# Patient Record
Sex: FEMALE | Race: WHITE | NOT HISPANIC OR LATINO | URBAN - METROPOLITAN AREA
[De-identification: names, ages, dates, MRNs, and addresses within clinical notes are randomized per-mention and may not be internally consistent; named-entity substitution may affect disease eponyms.]

---

## 2015-07-27 RX ORDER — DIAZEPAM 5 MG
1 TABLET ORAL
Qty: 0 | Refills: 0 | DISCHARGE
Start: 2015-07-27

## 2015-07-28 RX ORDER — RILUZOLE 50 MG/1
1 TABLET ORAL
Qty: 0 | Refills: 0 | DISCHARGE
Start: 2015-07-28

## 2017-04-04 ENCOUNTER — INPATIENT (INPATIENT)
Facility: HOSPITAL | Age: 62
LOS: 5 days | Discharge: HOME | DRG: 207 | End: 2017-04-10
Attending: GENERAL ACUTE CARE HOSPITAL | Admitting: GENERAL ACUTE CARE HOSPITAL
Payer: COMMERCIAL

## 2017-04-04 DIAGNOSIS — Z43.1 ENCOUNTER FOR ATTENTION TO GASTROSTOMY: Chronic | ICD-10-CM

## 2017-04-04 DIAGNOSIS — Z93.1 GASTROSTOMY STATUS: Chronic | ICD-10-CM

## 2017-04-04 LAB — GAS PNL BLDV: SIGNIFICANT CHANGE UP

## 2017-04-04 PROCEDURE — 93010 ELECTROCARDIOGRAM REPORT: CPT

## 2017-04-04 PROCEDURE — 99285 EMERGENCY DEPT VISIT HI MDM: CPT | Mod: 25

## 2017-04-04 RX ORDER — SODIUM CHLORIDE 9 MG/ML
1000 INJECTION INTRAMUSCULAR; INTRAVENOUS; SUBCUTANEOUS ONCE
Qty: 0 | Refills: 0 | Status: COMPLETED | OUTPATIENT
Start: 2017-04-04 | End: 2017-04-04

## 2017-04-04 NOTE — ED PROVIDER NOTE - OBJECTIVE STATEMENT
61 y.o. female ALS, vent dependent since 2015, recently admitted at [facility] for a PNA, discharged recently home on Augmentin.  Brought in by ambulance today after it was noticed she was having some episodes of hypoxia at home today.  Dr Newman made a house call today to assess the vent, tried bagging her to see if stats would improve but they did not.  According to EMS pt has had 100 % pulse ox during transport.  Pt is non verbal at baseline, according to  she is cognitively there but can not communicate.  This history was obtained from the .

## 2017-04-04 NOTE — ED PROVIDER NOTE - MEDICAL DECISION MAKING DETAILS
Susy: 61 year old female recently discharged from hospital for pna. Patient with trach on ventilator. Patient desaturating at home. febrile here. will get labs, cxr, tylenol, r/o pna, iv abx, admit to rcu.

## 2017-04-04 NOTE — ED ADULT NURSE NOTE - OBJECTIVE STATEMENT
60 y/o female hx of ALS presenting to the ED via EMS for low SPO2 % at home as per EMS; Per EMS patient recently discharged from [facility] for pneumonia;  Patient arrived to the ED with a tracheostomy; SPO2% 97% on arrival; Respiratory therapist at bedside; Suctioned by respiratory therapist; Patient febrile rectally 100.4; Patient nonverbal; Spontaneous eye opening; Positive PEERLA; Patient in no acute respiratory distress at this time; No retractions noted; No JVD noted; Patient responsive to verbal stimuli; Stage one pressure ulcer located on sacrum; safety and comfort measures provided;  at bedside 62 y/o female hx of ALS presenting to the ED via EMS for low SPO2 % at home as per EMS; Per EMS patient recently discharged from [facility] for pneumonia;  Patient arrived to the ED with a tracheostomy; SPO2% 97% on arrival; Respiratory therapist at bedside; Suctioned by respiratory therapist; Patient also has PEG tube on left side; Patient febrile rectally 100.4; Patient nonverbal; Spontaneous eye opening; Positive PEERLA; Patient in no acute respiratory distress at this time; No retractions noted; No JVD noted; Patient responsive to verbal stimuli; Stage one pressure ulcer located on sacrum; safety and comfort measures provided;  at bedside

## 2017-04-05 VITALS
DIASTOLIC BLOOD PRESSURE: 79 MMHG | SYSTOLIC BLOOD PRESSURE: 111 MMHG | HEART RATE: 86 BPM | RESPIRATION RATE: 16 BRPM | OXYGEN SATURATION: 94 % | TEMPERATURE: 100 F

## 2017-04-05 DIAGNOSIS — F41.9 ANXIETY DISORDER, UNSPECIFIED: ICD-10-CM

## 2017-04-05 DIAGNOSIS — Z93.0 TRACHEOSTOMY STATUS: Chronic | ICD-10-CM

## 2017-04-05 DIAGNOSIS — Z99.11 DEPENDENCE ON RESPIRATOR [VENTILATOR] STATUS: ICD-10-CM

## 2017-04-05 DIAGNOSIS — J18.9 PNEUMONIA, UNSPECIFIED ORGANISM: ICD-10-CM

## 2017-04-05 DIAGNOSIS — A41.9 SEPSIS, UNSPECIFIED ORGANISM: ICD-10-CM

## 2017-04-05 DIAGNOSIS — J96.21 ACUTE AND CHRONIC RESPIRATORY FAILURE WITH HYPOXIA: ICD-10-CM

## 2017-04-05 DIAGNOSIS — J18.1 LOBAR PNEUMONIA, UNSPECIFIED ORGANISM: ICD-10-CM

## 2017-04-05 DIAGNOSIS — G12.21 AMYOTROPHIC LATERAL SCLEROSIS: ICD-10-CM

## 2017-04-05 LAB
ALBUMIN SERPL ELPH-MCNC: 3.8 G/DL — SIGNIFICANT CHANGE UP (ref 3.3–5)
ALBUMIN SERPL ELPH-MCNC: 4 G/DL — SIGNIFICANT CHANGE UP (ref 3.3–5)
ALP SERPL-CCNC: 105 U/L — SIGNIFICANT CHANGE UP (ref 40–120)
ALP SERPL-CCNC: 107 U/L — SIGNIFICANT CHANGE UP (ref 40–120)
ALT FLD-CCNC: 32 U/L RC — SIGNIFICANT CHANGE UP (ref 10–45)
ALT FLD-CCNC: 34 U/L RC — SIGNIFICANT CHANGE UP (ref 10–45)
ANION GAP SERPL CALC-SCNC: 14 MMOL/L — SIGNIFICANT CHANGE UP (ref 5–17)
ANION GAP SERPL CALC-SCNC: 18 MMOL/L — HIGH (ref 5–17)
ANISOCYTOSIS BLD QL: SLIGHT — SIGNIFICANT CHANGE UP
APPEARANCE UR: CLEAR — SIGNIFICANT CHANGE UP
APTT BLD: 30.1 SEC — SIGNIFICANT CHANGE UP (ref 27.5–37.4)
AST SERPL-CCNC: 32 U/L — SIGNIFICANT CHANGE UP (ref 10–40)
AST SERPL-CCNC: 34 U/L — SIGNIFICANT CHANGE UP (ref 10–40)
BACTERIA # UR AUTO: ABNORMAL /HPF
BASE EXCESS BLDV CALC-SCNC: -0.2 MMOL/L — SIGNIFICANT CHANGE UP (ref -2–2)
BASOPHILS # BLD AUTO: 0 K/UL — SIGNIFICANT CHANGE UP (ref 0–0.2)
BILIRUB SERPL-MCNC: 0.5 MG/DL — SIGNIFICANT CHANGE UP (ref 0.2–1.2)
BILIRUB SERPL-MCNC: 0.6 MG/DL — SIGNIFICANT CHANGE UP (ref 0.2–1.2)
BILIRUB UR-MCNC: NEGATIVE — SIGNIFICANT CHANGE UP
BUN SERPL-MCNC: 14 MG/DL — SIGNIFICANT CHANGE UP (ref 7–23)
BUN SERPL-MCNC: 14 MG/DL — SIGNIFICANT CHANGE UP (ref 7–23)
CA-I SERPL-SCNC: 1.25 MMOL/L — SIGNIFICANT CHANGE UP (ref 1.12–1.3)
CALCIUM SERPL-MCNC: 9.5 MG/DL — SIGNIFICANT CHANGE UP (ref 8.4–10.5)
CALCIUM SERPL-MCNC: 9.7 MG/DL — SIGNIFICANT CHANGE UP (ref 8.4–10.5)
CHLORIDE BLDV-SCNC: 104 MMOL/L — SIGNIFICANT CHANGE UP (ref 96–108)
CHLORIDE SERPL-SCNC: 101 MMOL/L — SIGNIFICANT CHANGE UP (ref 96–108)
CHLORIDE SERPL-SCNC: 99 MMOL/L — SIGNIFICANT CHANGE UP (ref 96–108)
CO2 BLDV-SCNC: 25 MMOL/L — SIGNIFICANT CHANGE UP (ref 22–30)
CO2 SERPL-SCNC: 20 MMOL/L — LOW (ref 22–31)
CO2 SERPL-SCNC: 22 MMOL/L — SIGNIFICANT CHANGE UP (ref 22–31)
COLOR SPEC: YELLOW — SIGNIFICANT CHANGE UP
CREAT SERPL-MCNC: 0.34 MG/DL — LOW (ref 0.5–1.3)
CREAT SERPL-MCNC: 0.35 MG/DL — LOW (ref 0.5–1.3)
DACRYOCYTES BLD QL SMEAR: SLIGHT — SIGNIFICANT CHANGE UP
DIFF PNL FLD: NEGATIVE — SIGNIFICANT CHANGE UP
EOSINOPHIL # BLD AUTO: 0.6 K/UL — HIGH (ref 0–0.5)
EOSINOPHIL NFR BLD AUTO: 3 % — SIGNIFICANT CHANGE UP (ref 0–6)
GAS PNL BLDA: SIGNIFICANT CHANGE UP
GAS PNL BLDV: 139 MMOL/L — SIGNIFICANT CHANGE UP (ref 136–145)
GAS PNL BLDV: SIGNIFICANT CHANGE UP
GLUCOSE BLDV-MCNC: 124 MG/DL — HIGH (ref 70–99)
GLUCOSE SERPL-MCNC: 130 MG/DL — HIGH (ref 70–99)
GLUCOSE SERPL-MCNC: 151 MG/DL — HIGH (ref 70–99)
GLUCOSE UR QL: NEGATIVE — SIGNIFICANT CHANGE UP
HCO3 BLDV-SCNC: 24 MMOL/L — SIGNIFICANT CHANGE UP (ref 21–29)
HCT VFR BLD CALC: 33 % — LOW (ref 34.5–45)
HCT VFR BLD CALC: 33.9 % — LOW (ref 34.5–45)
HCT VFR BLDA CALC: 35 % — LOW (ref 39–50)
HGB BLD CALC-MCNC: 11.4 G/DL — LOW (ref 11.5–15.5)
HGB BLD-MCNC: 10.9 G/DL — LOW (ref 11.5–15.5)
HGB BLD-MCNC: 11.6 G/DL — SIGNIFICANT CHANGE UP (ref 11.5–15.5)
HOROWITZ INDEX BLDV+IHG-RTO: SIGNIFICANT CHANGE UP
INR BLD: 1.21 RATIO — HIGH (ref 0.88–1.16)
KETONES UR-MCNC: NEGATIVE — SIGNIFICANT CHANGE UP
LACTATE BLDV-MCNC: 2.6 MMOL/L — HIGH (ref 0.7–2)
LEGIONELLA AG UR QL: NEGATIVE — SIGNIFICANT CHANGE UP
LEUKOCYTE ESTERASE UR-ACNC: NEGATIVE — SIGNIFICANT CHANGE UP
LYMPHOCYTES # BLD AUTO: 1.5 K/UL — SIGNIFICANT CHANGE UP (ref 1–3.3)
LYMPHOCYTES # BLD AUTO: 8 % — LOW (ref 13–44)
MACROCYTES BLD QL: SLIGHT — SIGNIFICANT CHANGE UP
MAGNESIUM SERPL-MCNC: 1.9 MG/DL — SIGNIFICANT CHANGE UP (ref 1.6–2.6)
MCHC RBC-ENTMCNC: 29 PG — SIGNIFICANT CHANGE UP (ref 27–34)
MCHC RBC-ENTMCNC: 29.7 PG — SIGNIFICANT CHANGE UP (ref 27–34)
MCHC RBC-ENTMCNC: 33 GM/DL — SIGNIFICANT CHANGE UP (ref 32–36)
MCHC RBC-ENTMCNC: 34 GM/DL — SIGNIFICANT CHANGE UP (ref 32–36)
MCV RBC AUTO: 87.2 FL — SIGNIFICANT CHANGE UP (ref 80–100)
MCV RBC AUTO: 87.8 FL — SIGNIFICANT CHANGE UP (ref 80–100)
METAMYELOCYTES # FLD: 1 % — HIGH (ref 0–0)
MICROCYTES BLD QL: SLIGHT — SIGNIFICANT CHANGE UP
MONOCYTES # BLD AUTO: 0.8 K/UL — SIGNIFICANT CHANGE UP (ref 0–0.9)
MONOCYTES NFR BLD AUTO: 4 % — SIGNIFICANT CHANGE UP (ref 2–14)
MYELOCYTES NFR BLD: 1 % — HIGH (ref 0–0)
NEUTROPHILS # BLD AUTO: 16.6 K/UL — HIGH (ref 1.8–7.4)
NEUTROPHILS NFR BLD AUTO: 81 % — HIGH (ref 43–77)
NEUTS BAND # BLD: 2 % — SIGNIFICANT CHANGE UP (ref 0–8)
NITRITE UR-MCNC: NEGATIVE — SIGNIFICANT CHANGE UP
PCO2 BLDV: 38 MMHG — SIGNIFICANT CHANGE UP (ref 35–50)
PH BLDV: 7.41 — SIGNIFICANT CHANGE UP (ref 7.35–7.45)
PH UR: 6.5 — SIGNIFICANT CHANGE UP (ref 4.8–8)
PHOSPHATE SERPL-MCNC: 4.1 MG/DL — SIGNIFICANT CHANGE UP (ref 2.5–4.5)
PLAT MORPH BLD: NORMAL — SIGNIFICANT CHANGE UP
PLATELET # BLD AUTO: 499 K/UL — HIGH (ref 150–400)
PLATELET # BLD AUTO: 508 K/UL — HIGH (ref 150–400)
PO2 BLDV: 76 MMHG — HIGH (ref 25–45)
POIKILOCYTOSIS BLD QL AUTO: SLIGHT — SIGNIFICANT CHANGE UP
POLYCHROMASIA BLD QL SMEAR: SLIGHT — SIGNIFICANT CHANGE UP
POTASSIUM BLDV-SCNC: 3.7 MMOL/L — SIGNIFICANT CHANGE UP (ref 3.5–5)
POTASSIUM SERPL-MCNC: 3.8 MMOL/L — SIGNIFICANT CHANGE UP (ref 3.5–5.3)
POTASSIUM SERPL-MCNC: 3.9 MMOL/L — SIGNIFICANT CHANGE UP (ref 3.5–5.3)
POTASSIUM SERPL-SCNC: 3.8 MMOL/L — SIGNIFICANT CHANGE UP (ref 3.5–5.3)
POTASSIUM SERPL-SCNC: 3.9 MMOL/L — SIGNIFICANT CHANGE UP (ref 3.5–5.3)
PROT SERPL-MCNC: 8 G/DL — SIGNIFICANT CHANGE UP (ref 6–8.3)
PROT SERPL-MCNC: 8.2 G/DL — SIGNIFICANT CHANGE UP (ref 6–8.3)
PROT UR-MCNC: SIGNIFICANT CHANGE UP
PROTHROM AB SERPL-ACNC: 13.2 SEC — HIGH (ref 9.8–12.7)
RAPID RVP RESULT: SIGNIFICANT CHANGE UP
RBC # BLD: 3.76 M/UL — LOW (ref 3.8–5.2)
RBC # BLD: 3.89 M/UL — SIGNIFICANT CHANGE UP (ref 3.8–5.2)
RBC # FLD: 14.8 % — HIGH (ref 10.3–14.5)
RBC # FLD: 15 % — HIGH (ref 10.3–14.5)
RBC BLD AUTO: ABNORMAL
RBC CASTS # UR COMP ASSIST: SIGNIFICANT CHANGE UP /HPF (ref 0–2)
SAO2 % BLDV: 94 % — HIGH (ref 67–88)
SODIUM SERPL-SCNC: 135 MMOL/L — SIGNIFICANT CHANGE UP (ref 135–145)
SODIUM SERPL-SCNC: 139 MMOL/L — SIGNIFICANT CHANGE UP (ref 135–145)
SP GR SPEC: 1.02 — SIGNIFICANT CHANGE UP (ref 1.01–1.02)
SPHEROCYTES BLD QL SMEAR: SLIGHT — SIGNIFICANT CHANGE UP
STOMATOCYTES BLD QL SMEAR: PRESENT — SIGNIFICANT CHANGE UP
UROBILINOGEN FLD QL: 2
WBC # BLD: 18.6 K/UL — HIGH (ref 3.8–10.5)
WBC # BLD: 19.5 K/UL — HIGH (ref 3.8–10.5)
WBC # FLD AUTO: 18.6 K/UL — HIGH (ref 3.8–10.5)
WBC # FLD AUTO: 19.5 K/UL — HIGH (ref 3.8–10.5)
WBC UR QL: SIGNIFICANT CHANGE UP /HPF (ref 0–5)

## 2017-04-05 PROCEDURE — 71010: CPT | Mod: 26

## 2017-04-05 PROCEDURE — 99233 SBSQ HOSP IP/OBS HIGH 50: CPT | Mod: GC

## 2017-04-05 RX ORDER — PANTOPRAZOLE SODIUM 20 MG/1
40 TABLET, DELAYED RELEASE ORAL DAILY
Qty: 0 | Refills: 0 | Status: DISCONTINUED | OUTPATIENT
Start: 2017-04-05 | End: 2017-04-10

## 2017-04-05 RX ORDER — PIPERACILLIN AND TAZOBACTAM 4; .5 G/20ML; G/20ML
3.38 INJECTION, POWDER, LYOPHILIZED, FOR SOLUTION INTRAVENOUS ONCE
Qty: 0 | Refills: 0 | Status: COMPLETED | OUTPATIENT
Start: 2017-04-05 | End: 2017-04-05

## 2017-04-05 RX ORDER — ACETAMINOPHEN 500 MG
1000 TABLET ORAL ONCE
Qty: 0 | Refills: 0 | Status: COMPLETED | OUTPATIENT
Start: 2017-04-05 | End: 2017-04-05

## 2017-04-05 RX ORDER — IPRATROPIUM/ALBUTEROL SULFATE 18-103MCG
3 AEROSOL WITH ADAPTER (GRAM) INHALATION EVERY 6 HOURS
Qty: 0 | Refills: 0 | Status: DISCONTINUED | OUTPATIENT
Start: 2017-04-05 | End: 2017-04-10

## 2017-04-05 RX ORDER — VANCOMYCIN HCL 1 G
1000 VIAL (EA) INTRAVENOUS EVERY 12 HOURS
Qty: 0 | Refills: 0 | Status: DISCONTINUED | OUTPATIENT
Start: 2017-04-05 | End: 2017-04-06

## 2017-04-05 RX ORDER — MEROPENEM 1 G/30ML
INJECTION INTRAVENOUS
Qty: 0 | Refills: 0 | Status: DISCONTINUED | OUTPATIENT
Start: 2017-04-05 | End: 2017-04-10

## 2017-04-05 RX ORDER — SERTRALINE 25 MG/1
50 TABLET, FILM COATED ORAL DAILY
Qty: 0 | Refills: 0 | Status: DISCONTINUED | OUTPATIENT
Start: 2017-04-05 | End: 2017-04-10

## 2017-04-05 RX ORDER — MEROPENEM 1 G/30ML
500 INJECTION INTRAVENOUS ONCE
Qty: 0 | Refills: 0 | Status: COMPLETED | OUTPATIENT
Start: 2017-04-05 | End: 2017-04-05

## 2017-04-05 RX ORDER — AZITHROMYCIN 500 MG/1
TABLET, FILM COATED ORAL
Qty: 0 | Refills: 0 | Status: DISCONTINUED | OUTPATIENT
Start: 2017-04-05 | End: 2017-04-05

## 2017-04-05 RX ORDER — DIAZEPAM 5 MG
2 TABLET ORAL EVERY 8 HOURS
Qty: 0 | Refills: 0 | Status: DISCONTINUED | OUTPATIENT
Start: 2017-04-05 | End: 2017-04-10

## 2017-04-05 RX ORDER — VANCOMYCIN HCL 1 G
1000 VIAL (EA) INTRAVENOUS ONCE
Qty: 0 | Refills: 0 | Status: COMPLETED | OUTPATIENT
Start: 2017-04-05 | End: 2017-04-05

## 2017-04-05 RX ORDER — MEROPENEM 1 G/30ML
500 INJECTION INTRAVENOUS EVERY 8 HOURS
Qty: 0 | Refills: 0 | Status: DISCONTINUED | OUTPATIENT
Start: 2017-04-05 | End: 2017-04-10

## 2017-04-05 RX ORDER — ENOXAPARIN SODIUM 100 MG/ML
40 INJECTION SUBCUTANEOUS DAILY
Qty: 0 | Refills: 0 | Status: DISCONTINUED | OUTPATIENT
Start: 2017-04-05 | End: 2017-04-10

## 2017-04-05 RX ORDER — AZITHROMYCIN 500 MG/1
500 TABLET, FILM COATED ORAL ONCE
Qty: 0 | Refills: 0 | Status: COMPLETED | OUTPATIENT
Start: 2017-04-05 | End: 2017-04-05

## 2017-04-05 RX ORDER — RILUZOLE 50 MG/1
50 TABLET ORAL
Qty: 0 | Refills: 0 | Status: DISCONTINUED | OUTPATIENT
Start: 2017-04-05 | End: 2017-04-10

## 2017-04-05 RX ADMIN — AZITHROMYCIN 250 MILLIGRAM(S): 500 TABLET, FILM COATED ORAL at 03:43

## 2017-04-05 RX ADMIN — Medication 3 MILLILITER(S): at 23:23

## 2017-04-05 RX ADMIN — Medication 2 MILLIGRAM(S): at 21:06

## 2017-04-05 RX ADMIN — PIPERACILLIN AND TAZOBACTAM 200 GRAM(S): 4; .5 INJECTION, POWDER, LYOPHILIZED, FOR SOLUTION INTRAVENOUS at 01:17

## 2017-04-05 RX ADMIN — RILUZOLE 50 MILLIGRAM(S): 50 TABLET ORAL at 06:00

## 2017-04-05 RX ADMIN — RILUZOLE 50 MILLIGRAM(S): 50 TABLET ORAL at 16:12

## 2017-04-05 RX ADMIN — Medication 2 MILLIGRAM(S): at 13:01

## 2017-04-05 RX ADMIN — Medication 2 MILLIGRAM(S): at 06:01

## 2017-04-05 RX ADMIN — Medication 250 MILLIGRAM(S): at 02:00

## 2017-04-05 RX ADMIN — Medication 3 MILLILITER(S): at 05:34

## 2017-04-05 RX ADMIN — Medication 3 MILLILITER(S): at 17:06

## 2017-04-05 RX ADMIN — Medication 3 MILLILITER(S): at 11:02

## 2017-04-05 RX ADMIN — SODIUM CHLORIDE 2000 MILLILITER(S): 9 INJECTION INTRAMUSCULAR; INTRAVENOUS; SUBCUTANEOUS at 00:45

## 2017-04-05 RX ADMIN — Medication 250 MILLIGRAM(S): at 13:33

## 2017-04-05 RX ADMIN — Medication 400 MILLIGRAM(S): at 01:05

## 2017-04-05 RX ADMIN — MEROPENEM 200 MILLIGRAM(S): 1 INJECTION INTRAVENOUS at 21:06

## 2017-04-05 RX ADMIN — SERTRALINE 50 MILLIGRAM(S): 25 TABLET, FILM COATED ORAL at 11:34

## 2017-04-05 RX ADMIN — MEROPENEM 200 MILLIGRAM(S): 1 INJECTION INTRAVENOUS at 13:00

## 2017-04-05 RX ADMIN — ENOXAPARIN SODIUM 40 MILLIGRAM(S): 100 INJECTION SUBCUTANEOUS at 11:34

## 2017-04-05 RX ADMIN — MEROPENEM 200 MILLIGRAM(S): 1 INJECTION INTRAVENOUS at 03:43

## 2017-04-05 RX ADMIN — PANTOPRAZOLE SODIUM 40 MILLIGRAM(S): 20 TABLET, DELAYED RELEASE ORAL at 11:34

## 2017-04-05 NOTE — H&P ADULT. - RS GEN PE MLT RESP DETAILS PC
breath sounds equal/no intercostal retractions/rhonchi/no subcutaneous emphysema/no wheezes/good air movement

## 2017-04-05 NOTE — ED ADULT NURSE REASSESSMENT NOTE - NS ED NURSE REASSESS COMMENT FT1
0025Patient straight cathed for urine with two nurses present under aseptic technique; urine light yellow and clear; safety and comfort measures maintained

## 2017-04-05 NOTE — DIETITIAN INITIAL EVALUATION ADULT. - PROBLEM SELECTOR PLAN 2
+ bedbound and non interactive at baseline  -c/w with full supportive care  -c/w  riluzole - home doses  -DVT prophylaxis - c/w home doses of Lovenox- bid as pt is high risk for dvt formation 2/2 total immobilization

## 2017-04-05 NOTE — DIETITIAN INITIAL EVALUATION ADULT. - PHYSICAL APPEARANCE
BMI 24Kg/m2 based on Adm wt 59.7Kg and past reported ht 5 feet 2 inches (vs current admit noted as 5 feet 4 inches)

## 2017-04-05 NOTE — H&P ADULT. - ASSESSMENT
This is a 62yo bedbound, ventilator dependent 2/2 ALS who was released yesterday from [facility] after being treated for PNA from 3/29/17 - 4/4/17 and discharged home on Augmentin. Pt was noted to be hypoxic at home with an O2 Saturation down into the 80's, was assessed by her pulmonologist x 2 at home, was given nebulizer treatments, and sent to our ED for further treatment. Pt received zosyn and vancomycin in our ED, and was sent to the MICU as an RCU border.

## 2017-04-05 NOTE — H&P ADULT. - PMH
ALS (amyotrophic lateral sclerosis)    Aspiration into airway    HLD (hyperlipidemia)    Ventilator dependent  Since 2015

## 2017-04-05 NOTE — DIETITIAN INITIAL EVALUATION ADULT. - ENERGY NEEDS
past reported ht: 5 feet 2 inches, Adm wt: 132 pounds, BMI: 24 Kg/m2, IBW: 110 pounds (+/- 10%), 120% IBW. Edema: none noted; Skin: Stage 1 L buttock.

## 2017-04-05 NOTE — DIETITIAN INITIAL EVALUATION ADULT. - NS AS NUTRI INTERV ENTERAL NUTRITION
Recommend initiate Jevity 1.2 via PEG @ goal rate 80ml/hr x 18 hours to provide 1440ml formula, 1728cal, 80Gm protein, 1162ml free water (meets 29cal/Kg and 1.3Gm protein/Kg based on Adm wt 60Kg). Additional free water flushes per team.

## 2017-04-05 NOTE — H&P ADULT. - PROBLEM SELECTOR PLAN 2
+ trach /  -trach care  -c/w full ventilator support- pt is unable to wean-  -abg  -c/w nebulizer treatments + bedbound and non interactive at baseline  -c/w with full supportive care  -c/w valium, sertraline, riluzole - home doses  -DVT prophylaxis - c/w home doses of Lovenox- bid as pt is high risk for dvt formation 2/2 total immobilization + bedbound and non interactive at baseline  -c/w with full supportive care  -c/w  riluzole - home doses  -DVT prophylaxis - c/w home doses of Lovenox- bid as pt is high risk for dvt formation 2/2 total immobilization

## 2017-04-05 NOTE — DIETITIAN INITIAL EVALUATION ADULT. - PROBLEM SELECTOR PLAN 1
BLE PNA LLL > RLL (CAP)    -sputum culture- combicath sputum culture  -nebulizer treatments  -Urine Legionella  -meropenem / vancomycin/ azithromycin  -aspiration precautions  -urine legionella

## 2017-04-05 NOTE — H&P ADULT. - PROBLEM SELECTOR PLAN 1
BLE PNA LLL > RLL (CAP)    -sputum culture- combicath sputum culture  -nebulizer treatments  -urine legionella BLE PNA LLL > RLL (CAP)    -sputum culture- combicath sputum culture  -nebulizer treatments  -Urine Legionella  -meropenem / vancomycin/ azithromycin  -aspiration precautions  -urine legionella

## 2017-04-05 NOTE — DIETITIAN INITIAL EVALUATION ADULT. - OTHER INFO
Nutrition Consult for enteral nutrition evaluation received and appreciated. Pt is bedbound with amyotrophic lateral sclerosis, trach + PEG, admitted 3/29 - 4/4, readmitted to ED after 1 day home for hypoxic respiratory failure, sepsis, PNA, trach change and revision. Pt with multiple past admits and h/o frequent aspiration. Per 's report to RN and chart review, pt receives bolus feeds of 6 cans Jevity 1.2 per day. Weight history indicates gradual weight gain: reported UBW = 44.5Kg, 5/20/15 admit = 45.7Kg, 7/1/15 admit = 45.6Kg, 5/17/16 admit = 47.9Kg, current admit = 59.7Kg (question accuracy of newest wt)

## 2017-04-05 NOTE — H&P ADULT. - ATTENDING COMMENTS
This is a 60yo female  bedbound, nonverbal, ventilator dependent female since 2015 with a PMHX of ALS (amyotropic lateral sclerosis), dyslipidemia, and s/p gastrostomy tube placement in the past. Pt was recently admitted for PNA to [facility] from 3/29/17 - 4/4/17 and was discharged home yesterday with Amoxicillin/clavulanic acid. The patient was noted to by hypoxic at home; desaturating down into the 80's, was seen by Dr. Newman x 2, was given nebulizer treatments, and sent to our ED for further treatment. Pt now admitted to the MICU as an RCU patient and appears to have severe sepsis due to bilateral lower lobe PNA worse at the LLL( HCAP), acute on chronic hypoxemic respiratory failure, metabolic encephalopathy all in the setting of advance amyotrophic lateral sclerosis coupled with neurocognitive / behavioral disorder. Care and treatment as detailed above. Case discussed extensively with  at bedside, staff, team and specialist on board. Extremely poor quality of life. Will need advance illness consult to further delineate patient's acute and subacute goals of care.

## 2017-04-05 NOTE — H&P ADULT. - HISTORY OF PRESENT ILLNESS
This is a 62yo bedbound, ventilator dependent female since 2015 with a PMHX of ALS (amyotropic lateral sclerosis), dyslipidemia, and s/p gastrostomy tube placement in the past. Pt was recently admitted for PNA to [facility] from 3/29/17 - 4/4/17 and was discharged home yesterday with Augmentin. The patient was noted to by hypoxic, desaturating down into the 80's at home, was seen by Dr. Newman x 2 at home today, was given nebulizer treatments, and sent our ED for further treatment. Pt now admitted to the MICU as an RCU patient and appears to have bilateral lower lobe PNA worse at the LLL. Vancomycin and Zosyn were given in the ED. This is a 62yo bedbound, ventilator dependent female since 2015 with a PMHX of ALS (amyotropic lateral sclerosis), dyslipidemia, and s/p gastrostomy tube placement in the past. Pt was recently admitted for PNA to [facility] from 3/29/17 - 4/4/17 and was discharged home yesterday with Augmentin. The patient was noted to by hypoxic at home; desaturating down into the 80's, was seen by Dr. Newman x 2, was given nebulizer treatments, and sent to our ED for further treatment. Pt now admitted to the MICU as an RCU patient and appears to have bilateral lower lobe PNA worse at the LLL, febrile to 100.4 and with leukocytosis. Vancomycin and Zosyn were given in the ED.

## 2017-04-05 NOTE — H&P ADULT. - PROBLEM SELECTOR PLAN 3
+ bedbound and non interactive at baseline  -c/w with full supportive care  -c/w valium, sertraline, riluzole - home doses  -DVT prophylaxis - c/w home doses of Lovenox- bid as pt is high risk for dvt formation 2/2 total immobilization -Blood Cultures x 2  -UA/ Urine culture / sputum cultures  -d/c Augmentin and zosyn (one dose given in the ED)  -IVF bolus as needed  -pt is hemodynamically stable - start pressor support if she becomes hypotensive and fails IVF bolus  -will start IVF maintenance

## 2017-04-05 NOTE — DIETITIAN INITIAL EVALUATION ADULT. - PROBLEM SELECTOR PLAN 4
tracheostomy and ventilator dependent  Hypoxia most likely 2/2 PNA  -c/w full ventilator support- pt is unable to wean  -nebulizer treatments  -chest PT  -Blood gases- adjust ventilator as needed to enhance oxygenation status

## 2017-04-05 NOTE — H&P ADULT. - PROBLEM SELECTOR PLAN 4
-Blood Cultures x 2  -UA/ Urine culture / sputum cultures  -d/c Augmentin and zosyn (one dose given in the ED)  -IVF bolus as needed  -pt is hemodynamically stable - start pressor support if she becomes hypotensive and fails IVF bolus  -will start IVF maintenance tracheostomy and ventilator dependent  Hypoxia most likely 2/2 PNA  -c/w full ventilator support- pt is unable to wean  -nebulizer treatments  -chest PT  -Blood gases- adjust ventilator as needed to enhance oxygenation status

## 2017-04-05 NOTE — DIETITIAN INITIAL EVALUATION ADULT. - ORAL INTAKE PTA
Pt is EN dependent via PEG. Per RN and past RD notes, pt receives bolus feeds of 6 cans Jevity 1.2 (2 cans tid) to provide 1440ml formula, 1728cal, 80Gm protein. Per chart review, pt takes no nutrition supplements, reports NKFA./n/a

## 2017-04-05 NOTE — DIETITIAN INITIAL EVALUATION ADULT. - PROBLEM SELECTOR PLAN 3
-Blood Cultures x 2  -UA/ Urine culture / sputum cultures  -d/c Augmentin and zosyn (one dose given in the ED)  -IVF bolus as needed  -pt is hemodynamically stable - start pressor support if she becomes hypotensive and fails IVF bolus  -will start IVF maintenance

## 2017-04-06 ENCOUNTER — RESULT REVIEW (OUTPATIENT)
Age: 62
End: 2017-04-06

## 2017-04-06 LAB
ALBUMIN SERPL ELPH-MCNC: 3.5 G/DL — SIGNIFICANT CHANGE UP (ref 3.3–5)
ALP SERPL-CCNC: 93 U/L — SIGNIFICANT CHANGE UP (ref 40–120)
ALT FLD-CCNC: 33 U/L RC — SIGNIFICANT CHANGE UP (ref 10–45)
ANION GAP SERPL CALC-SCNC: 16 MMOL/L — SIGNIFICANT CHANGE UP (ref 5–17)
APTT BLD: 28.5 SEC — SIGNIFICANT CHANGE UP (ref 27.5–37.4)
AST SERPL-CCNC: 31 U/L — SIGNIFICANT CHANGE UP (ref 10–40)
BILIRUB SERPL-MCNC: 0.5 MG/DL — SIGNIFICANT CHANGE UP (ref 0.2–1.2)
BUN SERPL-MCNC: 11 MG/DL — SIGNIFICANT CHANGE UP (ref 7–23)
CALCIUM SERPL-MCNC: 8.9 MG/DL — SIGNIFICANT CHANGE UP (ref 8.4–10.5)
CHLORIDE SERPL-SCNC: 101 MMOL/L — SIGNIFICANT CHANGE UP (ref 96–108)
CO2 SERPL-SCNC: 22 MMOL/L — SIGNIFICANT CHANGE UP (ref 22–31)
CREAT SERPL-MCNC: 0.26 MG/DL — LOW (ref 0.5–1.3)
CULTURE RESULTS: NO GROWTH — SIGNIFICANT CHANGE UP
GAS PNL BLDA: SIGNIFICANT CHANGE UP
GLUCOSE SERPL-MCNC: 164 MG/DL — HIGH (ref 70–99)
HCT VFR BLD CALC: 30.3 % — LOW (ref 34.5–45)
HGB BLD-MCNC: 10.2 G/DL — LOW (ref 11.5–15.5)
INR BLD: 1.22 RATIO — HIGH (ref 0.88–1.16)
MAGNESIUM SERPL-MCNC: 1.9 MG/DL — SIGNIFICANT CHANGE UP (ref 1.6–2.6)
MCHC RBC-ENTMCNC: 29.7 PG — SIGNIFICANT CHANGE UP (ref 27–34)
MCHC RBC-ENTMCNC: 33.7 GM/DL — SIGNIFICANT CHANGE UP (ref 32–36)
MCV RBC AUTO: 88 FL — SIGNIFICANT CHANGE UP (ref 80–100)
PHOSPHATE SERPL-MCNC: 2.7 MG/DL — SIGNIFICANT CHANGE UP (ref 2.5–4.5)
PLATELET # BLD AUTO: 428 K/UL — HIGH (ref 150–400)
POTASSIUM SERPL-MCNC: 3.6 MMOL/L — SIGNIFICANT CHANGE UP (ref 3.5–5.3)
POTASSIUM SERPL-SCNC: 3.6 MMOL/L — SIGNIFICANT CHANGE UP (ref 3.5–5.3)
PROT SERPL-MCNC: 7 G/DL — SIGNIFICANT CHANGE UP (ref 6–8.3)
PROTHROM AB SERPL-ACNC: 13.2 SEC — HIGH (ref 9.8–12.7)
RBC # BLD: 3.44 M/UL — LOW (ref 3.8–5.2)
RBC # FLD: 15 % — HIGH (ref 10.3–14.5)
SODIUM SERPL-SCNC: 139 MMOL/L — SIGNIFICANT CHANGE UP (ref 135–145)
SPECIMEN SOURCE: SIGNIFICANT CHANGE UP
VANCOMYCIN TROUGH SERPL-MCNC: 14.8 UG/ML — SIGNIFICANT CHANGE UP (ref 10–20)
WBC # BLD: 13.9 K/UL — HIGH (ref 3.8–10.5)
WBC # FLD AUTO: 13.9 K/UL — HIGH (ref 3.8–10.5)

## 2017-04-06 PROCEDURE — 71010: CPT | Mod: 26

## 2017-04-06 PROCEDURE — 99233 SBSQ HOSP IP/OBS HIGH 50: CPT | Mod: GC

## 2017-04-06 RX ORDER — POTASSIUM CHLORIDE 20 MEQ
20 PACKET (EA) ORAL
Qty: 0 | Refills: 0 | Status: COMPLETED | OUTPATIENT
Start: 2017-04-06 | End: 2017-04-06

## 2017-04-06 RX ORDER — VANCOMYCIN HCL 1 G
1250 VIAL (EA) INTRAVENOUS EVERY 12 HOURS
Qty: 0 | Refills: 0 | Status: DISCONTINUED | OUTPATIENT
Start: 2017-04-06 | End: 2017-04-07

## 2017-04-06 RX ADMIN — MEROPENEM 200 MILLIGRAM(S): 1 INJECTION INTRAVENOUS at 13:18

## 2017-04-06 RX ADMIN — Medication 3 MILLILITER(S): at 17:14

## 2017-04-06 RX ADMIN — MEROPENEM 200 MILLIGRAM(S): 1 INJECTION INTRAVENOUS at 21:10

## 2017-04-06 RX ADMIN — RILUZOLE 50 MILLIGRAM(S): 50 TABLET ORAL at 16:39

## 2017-04-06 RX ADMIN — Medication 20 MILLIEQUIVALENT(S): at 03:25

## 2017-04-06 RX ADMIN — Medication 250 MILLIGRAM(S): at 01:12

## 2017-04-06 RX ADMIN — Medication 3 MILLILITER(S): at 05:02

## 2017-04-06 RX ADMIN — SERTRALINE 50 MILLIGRAM(S): 25 TABLET, FILM COATED ORAL at 13:01

## 2017-04-06 RX ADMIN — Medication 166.67 MILLIGRAM(S): at 17:07

## 2017-04-06 RX ADMIN — Medication 3 MILLILITER(S): at 11:33

## 2017-04-06 RX ADMIN — Medication 2 MILLIGRAM(S): at 05:13

## 2017-04-06 RX ADMIN — Medication 2 MILLIGRAM(S): at 21:10

## 2017-04-06 RX ADMIN — MEROPENEM 200 MILLIGRAM(S): 1 INJECTION INTRAVENOUS at 05:13

## 2017-04-06 RX ADMIN — Medication 2 MILLIGRAM(S): at 13:01

## 2017-04-06 RX ADMIN — PANTOPRAZOLE SODIUM 40 MILLIGRAM(S): 20 TABLET, DELAYED RELEASE ORAL at 12:50

## 2017-04-06 RX ADMIN — Medication 3 MILLILITER(S): at 23:10

## 2017-04-06 RX ADMIN — Medication 20 MILLIEQUIVALENT(S): at 05:13

## 2017-04-06 RX ADMIN — ENOXAPARIN SODIUM 40 MILLIGRAM(S): 100 INJECTION SUBCUTANEOUS at 12:50

## 2017-04-06 RX ADMIN — RILUZOLE 50 MILLIGRAM(S): 50 TABLET ORAL at 06:17

## 2017-04-07 LAB
ALBUMIN SERPL ELPH-MCNC: 3.6 G/DL — SIGNIFICANT CHANGE UP (ref 3.3–5)
ALP SERPL-CCNC: 101 U/L — SIGNIFICANT CHANGE UP (ref 40–120)
ALT FLD-CCNC: 31 U/L RC — SIGNIFICANT CHANGE UP (ref 10–45)
ANION GAP SERPL CALC-SCNC: 15 MMOL/L — SIGNIFICANT CHANGE UP (ref 5–17)
APTT BLD: 30.7 SEC — SIGNIFICANT CHANGE UP (ref 27.5–37.4)
AST SERPL-CCNC: 28 U/L — SIGNIFICANT CHANGE UP (ref 10–40)
BASOPHILS # BLD AUTO: 0 K/UL — SIGNIFICANT CHANGE UP (ref 0–0.2)
BASOPHILS NFR BLD AUTO: 0.1 % — SIGNIFICANT CHANGE UP (ref 0–2)
BILIRUB SERPL-MCNC: 0.4 MG/DL — SIGNIFICANT CHANGE UP (ref 0.2–1.2)
BUN SERPL-MCNC: 13 MG/DL — SIGNIFICANT CHANGE UP (ref 7–23)
CALCIUM SERPL-MCNC: 9.2 MG/DL — SIGNIFICANT CHANGE UP (ref 8.4–10.5)
CHLORIDE SERPL-SCNC: 100 MMOL/L — SIGNIFICANT CHANGE UP (ref 96–108)
CO2 SERPL-SCNC: 25 MMOL/L — SIGNIFICANT CHANGE UP (ref 22–31)
CREAT SERPL-MCNC: 0.26 MG/DL — LOW (ref 0.5–1.3)
EOSINOPHIL # BLD AUTO: 0.6 K/UL — HIGH (ref 0–0.5)
EOSINOPHIL NFR BLD AUTO: 4.1 % — SIGNIFICANT CHANGE UP (ref 0–6)
GAS PNL BLDA: SIGNIFICANT CHANGE UP
GLUCOSE SERPL-MCNC: 115 MG/DL — HIGH (ref 70–99)
GRAM STN FLD: SIGNIFICANT CHANGE UP
HCT VFR BLD CALC: 31.1 % — LOW (ref 34.5–45)
HGB BLD-MCNC: 10.5 G/DL — LOW (ref 11.5–15.5)
INR BLD: 1.17 RATIO — HIGH (ref 0.88–1.16)
LYMPHOCYTES # BLD AUTO: 1.5 K/UL — SIGNIFICANT CHANGE UP (ref 1–3.3)
LYMPHOCYTES # BLD AUTO: 10.5 % — LOW (ref 13–44)
MAGNESIUM SERPL-MCNC: 2 MG/DL — SIGNIFICANT CHANGE UP (ref 1.6–2.6)
MCHC RBC-ENTMCNC: 29.8 PG — SIGNIFICANT CHANGE UP (ref 27–34)
MCHC RBC-ENTMCNC: 33.8 GM/DL — SIGNIFICANT CHANGE UP (ref 32–36)
MCV RBC AUTO: 88.3 FL — SIGNIFICANT CHANGE UP (ref 80–100)
MONOCYTES # BLD AUTO: 0.9 K/UL — SIGNIFICANT CHANGE UP (ref 0–0.9)
MONOCYTES NFR BLD AUTO: 6.2 % — SIGNIFICANT CHANGE UP (ref 2–14)
NEUTROPHILS # BLD AUTO: 11.4 K/UL — HIGH (ref 1.8–7.4)
NEUTROPHILS NFR BLD AUTO: 79 % — HIGH (ref 43–77)
PHOSPHATE SERPL-MCNC: 2.5 MG/DL — SIGNIFICANT CHANGE UP (ref 2.5–4.5)
PLATELET # BLD AUTO: 454 K/UL — HIGH (ref 150–400)
POTASSIUM SERPL-MCNC: 4.1 MMOL/L — SIGNIFICANT CHANGE UP (ref 3.5–5.3)
POTASSIUM SERPL-SCNC: 4.1 MMOL/L — SIGNIFICANT CHANGE UP (ref 3.5–5.3)
PROT SERPL-MCNC: 7.4 G/DL — SIGNIFICANT CHANGE UP (ref 6–8.3)
PROTHROM AB SERPL-ACNC: 12.8 SEC — HIGH (ref 9.8–12.7)
RBC # BLD: 3.52 M/UL — LOW (ref 3.8–5.2)
RBC # FLD: 15 % — HIGH (ref 10.3–14.5)
SODIUM SERPL-SCNC: 140 MMOL/L — SIGNIFICANT CHANGE UP (ref 135–145)
SPECIMEN SOURCE: SIGNIFICANT CHANGE UP
WBC # BLD: 14.4 K/UL — HIGH (ref 3.8–10.5)
WBC # FLD AUTO: 14.4 K/UL — HIGH (ref 3.8–10.5)

## 2017-04-07 PROCEDURE — 88312 SPECIAL STAINS GROUP 1: CPT | Mod: 26

## 2017-04-07 PROCEDURE — 88112 CYTOPATH CELL ENHANCE TECH: CPT | Mod: 26

## 2017-04-07 PROCEDURE — 88305 TISSUE EXAM BY PATHOLOGIST: CPT | Mod: 26

## 2017-04-07 RX ORDER — FENTANYL CITRATE 50 UG/ML
50 INJECTION INTRAVENOUS ONCE
Qty: 0 | Refills: 0 | Status: DISCONTINUED | OUTPATIENT
Start: 2017-04-07 | End: 2017-04-07

## 2017-04-07 RX ADMIN — PANTOPRAZOLE SODIUM 40 MILLIGRAM(S): 20 TABLET, DELAYED RELEASE ORAL at 11:48

## 2017-04-07 RX ADMIN — RILUZOLE 50 MILLIGRAM(S): 50 TABLET ORAL at 16:36

## 2017-04-07 RX ADMIN — Medication 3 MILLILITER(S): at 17:15

## 2017-04-07 RX ADMIN — Medication 2 MILLIGRAM(S): at 05:06

## 2017-04-07 RX ADMIN — MEROPENEM 200 MILLIGRAM(S): 1 INJECTION INTRAVENOUS at 14:16

## 2017-04-07 RX ADMIN — Medication 2 MILLIGRAM(S): at 14:15

## 2017-04-07 RX ADMIN — RILUZOLE 50 MILLIGRAM(S): 50 TABLET ORAL at 06:02

## 2017-04-07 RX ADMIN — MEROPENEM 200 MILLIGRAM(S): 1 INJECTION INTRAVENOUS at 05:06

## 2017-04-07 RX ADMIN — ENOXAPARIN SODIUM 40 MILLIGRAM(S): 100 INJECTION SUBCUTANEOUS at 11:48

## 2017-04-07 RX ADMIN — FENTANYL CITRATE 50 MICROGRAM(S): 50 INJECTION INTRAVENOUS at 16:03

## 2017-04-07 RX ADMIN — Medication 3 MILLILITER(S): at 05:31

## 2017-04-07 RX ADMIN — MEROPENEM 200 MILLIGRAM(S): 1 INJECTION INTRAVENOUS at 22:19

## 2017-04-07 RX ADMIN — Medication 2 MILLIGRAM(S): at 22:21

## 2017-04-07 RX ADMIN — Medication 166.67 MILLIGRAM(S): at 03:36

## 2017-04-07 RX ADMIN — FENTANYL CITRATE 50 MICROGRAM(S): 50 INJECTION INTRAVENOUS at 14:20

## 2017-04-07 RX ADMIN — Medication 3 MILLILITER(S): at 11:51

## 2017-04-07 RX ADMIN — Medication 3 MILLILITER(S): at 23:16

## 2017-04-07 RX ADMIN — SERTRALINE 50 MILLIGRAM(S): 25 TABLET, FILM COATED ORAL at 11:49

## 2017-04-08 LAB
-  AMIKACIN: SIGNIFICANT CHANGE UP
-  AZTREONAM: SIGNIFICANT CHANGE UP
-  CEFEPIME: SIGNIFICANT CHANGE UP
-  CEFTAZIDIME: SIGNIFICANT CHANGE UP
-  CIPROFLOXACIN: SIGNIFICANT CHANGE UP
-  GENTAMICIN: SIGNIFICANT CHANGE UP
-  IMIPENEM: SIGNIFICANT CHANGE UP
-  LEVOFLOXACIN: SIGNIFICANT CHANGE UP
-  MEROPENEM: SIGNIFICANT CHANGE UP
-  PIPERACILLIN/TAZOBACTAM: SIGNIFICANT CHANGE UP
-  TOBRAMYCIN: SIGNIFICANT CHANGE UP
ANION GAP SERPL CALC-SCNC: 13 MMOL/L — SIGNIFICANT CHANGE UP (ref 5–17)
BUN SERPL-MCNC: 13 MG/DL — SIGNIFICANT CHANGE UP (ref 7–23)
CALCIUM SERPL-MCNC: 9.6 MG/DL — SIGNIFICANT CHANGE UP (ref 8.4–10.5)
CHLORIDE SERPL-SCNC: 102 MMOL/L — SIGNIFICANT CHANGE UP (ref 96–108)
CO2 SERPL-SCNC: 25 MMOL/L — SIGNIFICANT CHANGE UP (ref 22–31)
CREAT SERPL-MCNC: 0.23 MG/DL — LOW (ref 0.5–1.3)
CULTURE RESULTS: SIGNIFICANT CHANGE UP
GLUCOSE SERPL-MCNC: 129 MG/DL — HIGH (ref 70–99)
HCT VFR BLD CALC: 30.8 % — LOW (ref 34.5–45)
HGB BLD-MCNC: 10.1 G/DL — LOW (ref 11.5–15.5)
MAGNESIUM SERPL-MCNC: 2.1 MG/DL — SIGNIFICANT CHANGE UP (ref 1.6–2.6)
MCHC RBC-ENTMCNC: 28.8 PG — SIGNIFICANT CHANGE UP (ref 27–34)
MCHC RBC-ENTMCNC: 32.6 GM/DL — SIGNIFICANT CHANGE UP (ref 32–36)
MCV RBC AUTO: 88.2 FL — SIGNIFICANT CHANGE UP (ref 80–100)
METHOD TYPE: SIGNIFICANT CHANGE UP
NIGHT BLUE STAIN TISS: SIGNIFICANT CHANGE UP
ORGANISM # SPEC MICROSCOPIC CNT: SIGNIFICANT CHANGE UP
ORGANISM # SPEC MICROSCOPIC CNT: SIGNIFICANT CHANGE UP
PHOSPHATE SERPL-MCNC: 2.9 MG/DL — SIGNIFICANT CHANGE UP (ref 2.5–4.5)
PLATELET # BLD AUTO: 419 K/UL — HIGH (ref 150–400)
POTASSIUM SERPL-MCNC: 4.1 MMOL/L — SIGNIFICANT CHANGE UP (ref 3.5–5.3)
POTASSIUM SERPL-SCNC: 4.1 MMOL/L — SIGNIFICANT CHANGE UP (ref 3.5–5.3)
RBC # BLD: 3.5 M/UL — LOW (ref 3.8–5.2)
RBC # FLD: 15.3 % — HIGH (ref 10.3–14.5)
SODIUM SERPL-SCNC: 140 MMOL/L — SIGNIFICANT CHANGE UP (ref 135–145)
SPECIMEN SOURCE: SIGNIFICANT CHANGE UP
SPECIMEN SOURCE: SIGNIFICANT CHANGE UP
WBC # BLD: 11.1 K/UL — HIGH (ref 3.8–10.5)
WBC # FLD AUTO: 11.1 K/UL — HIGH (ref 3.8–10.5)

## 2017-04-08 PROCEDURE — 99233 SBSQ HOSP IP/OBS HIGH 50: CPT | Mod: GC

## 2017-04-08 RX ORDER — POLYETHYLENE GLYCOL 3350 17 G/17G
17 POWDER, FOR SOLUTION ORAL
Qty: 0 | Refills: 0 | Status: DISCONTINUED | OUTPATIENT
Start: 2017-04-08 | End: 2017-04-10

## 2017-04-08 RX ORDER — SENNA PLUS 8.6 MG/1
2.5 TABLET ORAL DAILY
Qty: 0 | Refills: 0 | Status: DISCONTINUED | OUTPATIENT
Start: 2017-04-08 | End: 2017-04-10

## 2017-04-08 RX ADMIN — SERTRALINE 50 MILLIGRAM(S): 25 TABLET, FILM COATED ORAL at 11:43

## 2017-04-08 RX ADMIN — Medication 3 MILLILITER(S): at 05:30

## 2017-04-08 RX ADMIN — Medication 3 MILLILITER(S): at 12:51

## 2017-04-08 RX ADMIN — PANTOPRAZOLE SODIUM 40 MILLIGRAM(S): 20 TABLET, DELAYED RELEASE ORAL at 11:43

## 2017-04-08 RX ADMIN — Medication 2 MILLIGRAM(S): at 05:18

## 2017-04-08 RX ADMIN — Medication 2 MILLIGRAM(S): at 13:05

## 2017-04-08 RX ADMIN — RILUZOLE 50 MILLIGRAM(S): 50 TABLET ORAL at 17:59

## 2017-04-08 RX ADMIN — MEROPENEM 200 MILLIGRAM(S): 1 INJECTION INTRAVENOUS at 13:05

## 2017-04-08 RX ADMIN — Medication 3 MILLILITER(S): at 23:23

## 2017-04-08 RX ADMIN — Medication 2 MILLIGRAM(S): at 21:57

## 2017-04-08 RX ADMIN — POLYETHYLENE GLYCOL 3350 17 GRAM(S): 17 POWDER, FOR SOLUTION ORAL at 05:18

## 2017-04-08 RX ADMIN — Medication 3 MILLILITER(S): at 17:20

## 2017-04-08 RX ADMIN — SENNA PLUS 2.5 MILLILITER(S): 8.6 TABLET ORAL at 11:43

## 2017-04-08 RX ADMIN — MEROPENEM 200 MILLIGRAM(S): 1 INJECTION INTRAVENOUS at 05:17

## 2017-04-08 RX ADMIN — RILUZOLE 50 MILLIGRAM(S): 50 TABLET ORAL at 06:07

## 2017-04-08 RX ADMIN — MEROPENEM 200 MILLIGRAM(S): 1 INJECTION INTRAVENOUS at 21:57

## 2017-04-08 RX ADMIN — POLYETHYLENE GLYCOL 3350 17 GRAM(S): 17 POWDER, FOR SOLUTION ORAL at 17:58

## 2017-04-08 RX ADMIN — ENOXAPARIN SODIUM 40 MILLIGRAM(S): 100 INJECTION SUBCUTANEOUS at 11:43

## 2017-04-09 LAB
-  AMIKACIN: SIGNIFICANT CHANGE UP
-  AZTREONAM: SIGNIFICANT CHANGE UP
-  CEFEPIME: SIGNIFICANT CHANGE UP
-  CEFTAZIDIME: SIGNIFICANT CHANGE UP
-  CIPROFLOXACIN: SIGNIFICANT CHANGE UP
-  GENTAMICIN: SIGNIFICANT CHANGE UP
-  IMIPENEM: SIGNIFICANT CHANGE UP
-  LEVOFLOXACIN: SIGNIFICANT CHANGE UP
-  MEROPENEM: SIGNIFICANT CHANGE UP
-  PIPERACILLIN/TAZOBACTAM: SIGNIFICANT CHANGE UP
-  TOBRAMYCIN: SIGNIFICANT CHANGE UP
ANION GAP SERPL CALC-SCNC: 14 MMOL/L — SIGNIFICANT CHANGE UP (ref 5–17)
BUN SERPL-MCNC: 14 MG/DL — SIGNIFICANT CHANGE UP (ref 7–23)
CALCIUM SERPL-MCNC: 9.5 MG/DL — SIGNIFICANT CHANGE UP (ref 8.4–10.5)
CHLORIDE SERPL-SCNC: 103 MMOL/L — SIGNIFICANT CHANGE UP (ref 96–108)
CO2 SERPL-SCNC: 26 MMOL/L — SIGNIFICANT CHANGE UP (ref 22–31)
CREAT SERPL-MCNC: 0.25 MG/DL — LOW (ref 0.5–1.3)
CULTURE RESULTS: SIGNIFICANT CHANGE UP
GLUCOSE SERPL-MCNC: 122 MG/DL — HIGH (ref 70–99)
HCT VFR BLD CALC: 32.1 % — LOW (ref 34.5–45)
HGB BLD-MCNC: 10.5 G/DL — LOW (ref 11.5–15.5)
MAGNESIUM SERPL-MCNC: 2 MG/DL — SIGNIFICANT CHANGE UP (ref 1.6–2.6)
MCHC RBC-ENTMCNC: 28.8 PG — SIGNIFICANT CHANGE UP (ref 27–34)
MCHC RBC-ENTMCNC: 32.7 GM/DL — SIGNIFICANT CHANGE UP (ref 32–36)
MCV RBC AUTO: 88 FL — SIGNIFICANT CHANGE UP (ref 80–100)
METHOD TYPE: SIGNIFICANT CHANGE UP
ORGANISM # SPEC MICROSCOPIC CNT: SIGNIFICANT CHANGE UP
ORGANISM # SPEC MICROSCOPIC CNT: SIGNIFICANT CHANGE UP
PHOSPHATE SERPL-MCNC: 2.6 MG/DL — SIGNIFICANT CHANGE UP (ref 2.5–4.5)
PLATELET # BLD AUTO: 437 K/UL — HIGH (ref 150–400)
POTASSIUM SERPL-MCNC: 4 MMOL/L — SIGNIFICANT CHANGE UP (ref 3.5–5.3)
POTASSIUM SERPL-SCNC: 4 MMOL/L — SIGNIFICANT CHANGE UP (ref 3.5–5.3)
RBC # BLD: 3.65 M/UL — LOW (ref 3.8–5.2)
RBC # FLD: 15.1 % — HIGH (ref 10.3–14.5)
SODIUM SERPL-SCNC: 143 MMOL/L — SIGNIFICANT CHANGE UP (ref 135–145)
SPECIMEN SOURCE: SIGNIFICANT CHANGE UP
WBC # BLD: 8.9 K/UL — SIGNIFICANT CHANGE UP (ref 3.8–10.5)
WBC # FLD AUTO: 8.9 K/UL — SIGNIFICANT CHANGE UP (ref 3.8–10.5)

## 2017-04-09 PROCEDURE — 99223 1ST HOSP IP/OBS HIGH 75: CPT

## 2017-04-09 PROCEDURE — 99233 SBSQ HOSP IP/OBS HIGH 50: CPT | Mod: GC

## 2017-04-09 RX ADMIN — ENOXAPARIN SODIUM 40 MILLIGRAM(S): 100 INJECTION SUBCUTANEOUS at 13:14

## 2017-04-09 RX ADMIN — RILUZOLE 50 MILLIGRAM(S): 50 TABLET ORAL at 08:30

## 2017-04-09 RX ADMIN — MEROPENEM 200 MILLIGRAM(S): 1 INJECTION INTRAVENOUS at 13:51

## 2017-04-09 RX ADMIN — Medication 3 MILLILITER(S): at 17:13

## 2017-04-09 RX ADMIN — Medication 2 MILLIGRAM(S): at 13:14

## 2017-04-09 RX ADMIN — Medication 3 MILLILITER(S): at 11:43

## 2017-04-09 RX ADMIN — SERTRALINE 50 MILLIGRAM(S): 25 TABLET, FILM COATED ORAL at 13:14

## 2017-04-09 RX ADMIN — MEROPENEM 200 MILLIGRAM(S): 1 INJECTION INTRAVENOUS at 22:47

## 2017-04-09 RX ADMIN — RILUZOLE 50 MILLIGRAM(S): 50 TABLET ORAL at 17:09

## 2017-04-09 RX ADMIN — SENNA PLUS 2.5 MILLILITER(S): 8.6 TABLET ORAL at 13:13

## 2017-04-09 RX ADMIN — Medication 3 MILLILITER(S): at 23:46

## 2017-04-09 RX ADMIN — MEROPENEM 200 MILLIGRAM(S): 1 INJECTION INTRAVENOUS at 06:23

## 2017-04-09 RX ADMIN — Medication 3 MILLILITER(S): at 05:19

## 2017-04-09 RX ADMIN — PANTOPRAZOLE SODIUM 40 MILLIGRAM(S): 20 TABLET, DELAYED RELEASE ORAL at 13:13

## 2017-04-09 RX ADMIN — Medication 2 MILLIGRAM(S): at 21:00

## 2017-04-09 RX ADMIN — Medication 2 MILLIGRAM(S): at 06:23

## 2017-04-10 ENCOUNTER — TRANSCRIPTION ENCOUNTER (OUTPATIENT)
Age: 62
End: 2017-04-10

## 2017-04-10 VITALS
DIASTOLIC BLOOD PRESSURE: 53 MMHG | OXYGEN SATURATION: 96 % | RESPIRATION RATE: 14 BRPM | SYSTOLIC BLOOD PRESSURE: 105 MMHG | HEART RATE: 84 BPM

## 2017-04-10 LAB
ANION GAP SERPL CALC-SCNC: 15 MMOL/L — SIGNIFICANT CHANGE UP (ref 5–17)
BUN SERPL-MCNC: 14 MG/DL — SIGNIFICANT CHANGE UP (ref 7–23)
CALCIUM SERPL-MCNC: 9 MG/DL — SIGNIFICANT CHANGE UP (ref 8.4–10.5)
CHLORIDE SERPL-SCNC: 101 MMOL/L — SIGNIFICANT CHANGE UP (ref 96–108)
CO2 SERPL-SCNC: 25 MMOL/L — SIGNIFICANT CHANGE UP (ref 22–31)
CREAT SERPL-MCNC: 0.25 MG/DL — LOW (ref 0.5–1.3)
CULTURE RESULTS: SIGNIFICANT CHANGE UP
CULTURE RESULTS: SIGNIFICANT CHANGE UP
GLUCOSE SERPL-MCNC: 122 MG/DL — HIGH (ref 70–99)
HCT VFR BLD CALC: 32.5 % — LOW (ref 34.5–45)
HGB BLD-MCNC: 10.6 G/DL — LOW (ref 11.5–15.5)
MAGNESIUM SERPL-MCNC: 2 MG/DL — SIGNIFICANT CHANGE UP (ref 1.6–2.6)
MCHC RBC-ENTMCNC: 28.8 PG — SIGNIFICANT CHANGE UP (ref 27–34)
MCHC RBC-ENTMCNC: 32.7 GM/DL — SIGNIFICANT CHANGE UP (ref 32–36)
MCV RBC AUTO: 88.3 FL — SIGNIFICANT CHANGE UP (ref 80–100)
NON-GYNECOLOGICAL CYTOLOGY STUDY: SIGNIFICANT CHANGE UP
PHOSPHATE SERPL-MCNC: 3.1 MG/DL — SIGNIFICANT CHANGE UP (ref 2.5–4.5)
PLATELET # BLD AUTO: 444 K/UL — HIGH (ref 150–400)
POTASSIUM SERPL-MCNC: 4.2 MMOL/L — SIGNIFICANT CHANGE UP (ref 3.5–5.3)
POTASSIUM SERPL-SCNC: 4.2 MMOL/L — SIGNIFICANT CHANGE UP (ref 3.5–5.3)
RBC # BLD: 3.69 M/UL — LOW (ref 3.8–5.2)
RBC # FLD: 15 % — HIGH (ref 10.3–14.5)
SODIUM SERPL-SCNC: 141 MMOL/L — SIGNIFICANT CHANGE UP (ref 135–145)
SPECIMEN SOURCE: SIGNIFICANT CHANGE UP
SPECIMEN SOURCE: SIGNIFICANT CHANGE UP
WBC # BLD: 10.6 K/UL — HIGH (ref 3.8–10.5)
WBC # FLD AUTO: 10.6 K/UL — HIGH (ref 3.8–10.5)

## 2017-04-10 PROCEDURE — 87449 NOS EACH ORGANISM AG IA: CPT

## 2017-04-10 PROCEDURE — 87102 FUNGUS ISOLATION CULTURE: CPT

## 2017-04-10 PROCEDURE — 99233 SBSQ HOSP IP/OBS HIGH 50: CPT | Mod: GC

## 2017-04-10 PROCEDURE — 85027 COMPLETE CBC AUTOMATED: CPT

## 2017-04-10 PROCEDURE — 87040 BLOOD CULTURE FOR BACTERIA: CPT

## 2017-04-10 PROCEDURE — 85730 THROMBOPLASTIN TIME PARTIAL: CPT

## 2017-04-10 PROCEDURE — 82803 BLOOD GASES ANY COMBINATION: CPT

## 2017-04-10 PROCEDURE — 83735 ASSAY OF MAGNESIUM: CPT

## 2017-04-10 PROCEDURE — 87581 M.PNEUMON DNA AMP PROBE: CPT

## 2017-04-10 PROCEDURE — 82947 ASSAY GLUCOSE BLOOD QUANT: CPT

## 2017-04-10 PROCEDURE — 88312 SPECIAL STAINS GROUP 1: CPT

## 2017-04-10 PROCEDURE — 99232 SBSQ HOSP IP/OBS MODERATE 35: CPT

## 2017-04-10 PROCEDURE — 88305 TISSUE EXAM BY PATHOLOGIST: CPT

## 2017-04-10 PROCEDURE — 85014 HEMATOCRIT: CPT

## 2017-04-10 PROCEDURE — 84295 ASSAY OF SERUM SODIUM: CPT

## 2017-04-10 PROCEDURE — 81001 URINALYSIS AUTO W/SCOPE: CPT

## 2017-04-10 PROCEDURE — 94640 AIRWAY INHALATION TREATMENT: CPT

## 2017-04-10 PROCEDURE — 80053 COMPREHEN METABOLIC PANEL: CPT

## 2017-04-10 PROCEDURE — 94799 UNLISTED PULMONARY SVC/PX: CPT

## 2017-04-10 PROCEDURE — 83605 ASSAY OF LACTIC ACID: CPT

## 2017-04-10 PROCEDURE — 87015 SPECIMEN INFECT AGNT CONCNTJ: CPT

## 2017-04-10 PROCEDURE — 51701 INSERT BLADDER CATHETER: CPT

## 2017-04-10 PROCEDURE — 80202 ASSAY OF VANCOMYCIN: CPT

## 2017-04-10 PROCEDURE — 82330 ASSAY OF CALCIUM: CPT

## 2017-04-10 PROCEDURE — 94002 VENT MGMT INPAT INIT DAY: CPT

## 2017-04-10 PROCEDURE — 87116 MYCOBACTERIA CULTURE: CPT

## 2017-04-10 PROCEDURE — 87086 URINE CULTURE/COLONY COUNT: CPT

## 2017-04-10 PROCEDURE — 87798 DETECT AGENT NOS DNA AMP: CPT

## 2017-04-10 PROCEDURE — 87633 RESP VIRUS 12-25 TARGETS: CPT

## 2017-04-10 PROCEDURE — 71045 X-RAY EXAM CHEST 1 VIEW: CPT

## 2017-04-10 PROCEDURE — 94003 VENT MGMT INPAT SUBQ DAY: CPT

## 2017-04-10 PROCEDURE — 87486 CHLMYD PNEUM DNA AMP PROBE: CPT

## 2017-04-10 PROCEDURE — 85610 PROTHROMBIN TIME: CPT

## 2017-04-10 PROCEDURE — 88112 CYTOPATH CELL ENHANCE TECH: CPT

## 2017-04-10 PROCEDURE — 82565 ASSAY OF CREATININE: CPT

## 2017-04-10 PROCEDURE — 82435 ASSAY OF BLOOD CHLORIDE: CPT

## 2017-04-10 PROCEDURE — 87186 SC STD MICRODIL/AGAR DIL: CPT

## 2017-04-10 PROCEDURE — 84100 ASSAY OF PHOSPHORUS: CPT

## 2017-04-10 PROCEDURE — 87070 CULTURE OTHR SPECIMN AEROBIC: CPT

## 2017-04-10 PROCEDURE — 99285 EMERGENCY DEPT VISIT HI MDM: CPT | Mod: 25

## 2017-04-10 PROCEDURE — 80048 BASIC METABOLIC PNL TOTAL CA: CPT

## 2017-04-10 PROCEDURE — 93005 ELECTROCARDIOGRAM TRACING: CPT | Mod: XU

## 2017-04-10 PROCEDURE — 87206 SMEAR FLUORESCENT/ACID STAI: CPT

## 2017-04-10 PROCEDURE — 84132 ASSAY OF SERUM POTASSIUM: CPT

## 2017-04-10 RX ORDER — CIPROFLOXACIN LACTATE 400MG/40ML
1 VIAL (ML) INTRAVENOUS
Qty: 8 | Refills: 0 | OUTPATIENT
Start: 2017-04-10 | End: 2017-04-18

## 2017-04-10 RX ORDER — SENNA PLUS 8.6 MG/1
2.5 TABLET ORAL
Qty: 0 | Refills: 0 | DISCHARGE
Start: 2017-04-10

## 2017-04-10 RX ORDER — POLYETHYLENE GLYCOL 3350 17 G/17G
17 POWDER, FOR SOLUTION ORAL
Qty: 0 | Refills: 0 | DISCHARGE
Start: 2017-04-10

## 2017-04-10 RX ADMIN — MEROPENEM 200 MILLIGRAM(S): 1 INJECTION INTRAVENOUS at 05:05

## 2017-04-10 RX ADMIN — ENOXAPARIN SODIUM 40 MILLIGRAM(S): 100 INJECTION SUBCUTANEOUS at 12:39

## 2017-04-10 RX ADMIN — RILUZOLE 50 MILLIGRAM(S): 50 TABLET ORAL at 06:31

## 2017-04-10 RX ADMIN — Medication 3 MILLILITER(S): at 05:08

## 2017-04-10 RX ADMIN — SERTRALINE 50 MILLIGRAM(S): 25 TABLET, FILM COATED ORAL at 11:17

## 2017-04-10 RX ADMIN — Medication 2 MILLIGRAM(S): at 05:04

## 2017-04-10 RX ADMIN — Medication 3 MILLILITER(S): at 11:30

## 2017-04-10 RX ADMIN — PANTOPRAZOLE SODIUM 40 MILLIGRAM(S): 20 TABLET, DELAYED RELEASE ORAL at 12:39

## 2017-04-10 NOTE — DISCHARGE NOTE ADULT - PLAN OF CARE
Resolution of symptoms Please finish your course of antibiotics You have a tracheostomy in place. Please continue with your ventilator for breathing assistance. Follow up with your neurologist on discharge

## 2017-04-10 NOTE — DISCHARGE NOTE ADULT - MEDICATION SUMMARY - MEDICATIONS TO TAKE
I will START or STAY ON the medications listed below when I get home from the hospital:    Lovenox 40 mg/0.4 mL injectable solution  --  injectable 2 times a day  -- Indication: For DVT ppx    diazepam 2 mg oral tablet  -- 1 tab(s) by gastrostomy tube 3 times a day  -- Indication: For ALS (amyotrophic lateral sclerosis)    sertraline 50 mg oral tablet  -- 1 tab(s) by gastrostomy tube once a day  -- Indication: For ALS (amyotrophic lateral sclerosis)    ipratropium 500 mcg/2.5 mL inhalation solution  -- 2.5 milliliter(s) inhaled every 6 hours  -- Indication: For Lung dx    levalbuterol 0.63 mg/3 mL inhalation solution  -- 3 milliliter(s) inhaled every 6 hours  -- Indication: For Lung dx    polyethylene glycol 3350 oral powder for reconstitution  -- 17 gram(s) by mouth 2 times a day  -- Indication: For Stool softner    senna 8.8 mg/5 mL oral syrup  -- 2.5 milliliter(s) by mouth once a day  -- Indication: For Constipation    riluzole 50 mg oral tablet  -- 1 tab(s) by gastrostomy tube 2 times a day (before meals)  -- Indication: For ALS (amyotrophic lateral sclerosis)    Levaquin 500 mg oral tablet  -- 1 tab(s) by mouth every 24 hours  -- Avoid prolonged or excessive exposure to direct and/or artificial sunlight while taking this medication.  Do not take dairy products, antacids, or iron preparations within one hour of this medication.  Finish all this medication unless otherwise directed by prescriber.  May cause drowsiness or dizziness.  Medication should be taken with plenty of water.    -- Indication: For Pneumonia

## 2017-04-10 NOTE — DISCHARGE NOTE ADULT - HOSPITAL COURSE
This is a 62yo bedbound, ventilator dependent female since 2015 with a PMHX of ALS (amyotropic lateral sclerosis), dyslipidemia, and s/p gastrostomy tube placement in the past. Pt was recently admitted for PNA to [facility] from 3/29/17 - 4/4/17 and was discharged home yesterday with Augmentin. The patient was noted to by hypoxic at home; desaturating down into the 80's, was seen by Dr. Newman x 2, was given nebulizer treatments, and sent to our ED for further treatment. Pt now admitted to the MICU as an RCU patient and appears to have bilateral lower lobe PNA worse at the LLL, febrile to 100.4 and with leukocytosis. Vancomycin and Zosyn were given in the ED.     Patient was admitted to the MICU for further management. Patient was continued on vancomycin and meropenem. Previous sputum cultures from [facility] grew morganella, pseudomonas, and serratia. Patient underwent bronchoscopy which revealed mucous plugs. Bronchial cultures at that time grew pseudomonas. Blood cultures and urine culture were negative. Vancomycin was discontinued, and meropenem was continued. Patient was evaluated by Infectious Disease. As per ID recommendations, will discharge patient home on Levaquin for a total course of 14 days of antibiotics. Patient's respiratory status improved and patient was discharged home in stable condition.

## 2017-04-10 NOTE — DISCHARGE NOTE ADULT - PATIENT PORTAL LINK FT
“You can access the [facility] Patient Portal, offered by [facility], by registering with the following website: [email or web address]”

## 2017-04-10 NOTE — DISCHARGE NOTE ADULT - CARE PLAN
Principal Discharge DX:	Pneumonia of right lower lobe due to infectious organism  Goal:	Resolution of symptoms  Instructions for follow-up, activity and diet:	Please finish your course of antibiotics  Secondary Diagnosis:	Acute on chronic respiratory failure with hypoxia  Instructions for follow-up, activity and diet:	You have a tracheostomy in place. Please continue with your ventilator for breathing assistance.  Secondary Diagnosis:	ALS (amyotrophic lateral sclerosis)  Instructions for follow-up, activity and diet:	Follow up with your neurologist on discharge

## 2017-05-04 LAB
CULTURE RESULTS: SIGNIFICANT CHANGE UP
SPECIMEN SOURCE: SIGNIFICANT CHANGE UP

## 2017-05-20 LAB
CULTURE RESULTS: SIGNIFICANT CHANGE UP
SPECIMEN SOURCE: SIGNIFICANT CHANGE UP

## 2018-05-09 ENCOUNTER — INPATIENT (INPATIENT)
Facility: HOSPITAL | Age: 63
LOS: 1 days | Discharge: HOME | End: 2018-05-11
Attending: THORACIC SURGERY (CARDIOTHORACIC VASCULAR SURGERY) | Admitting: THORACIC SURGERY (CARDIOTHORACIC VASCULAR SURGERY)
Payer: COMMERCIAL

## 2018-05-09 VITALS
RESPIRATION RATE: 15 BRPM | DIASTOLIC BLOOD PRESSURE: 70 MMHG | HEART RATE: 76 BPM | TEMPERATURE: 97 F | SYSTOLIC BLOOD PRESSURE: 128 MMHG | OXYGEN SATURATION: 99 %

## 2018-05-09 DIAGNOSIS — Z93.0 TRACHEOSTOMY STATUS: Chronic | ICD-10-CM

## 2018-05-09 DIAGNOSIS — G12.20 MOTOR NEURON DISEASE, UNSPECIFIED: ICD-10-CM

## 2018-05-09 DIAGNOSIS — Z43.1 ENCOUNTER FOR ATTENTION TO GASTROSTOMY: Chronic | ICD-10-CM

## 2018-05-09 DIAGNOSIS — Z93.1 GASTROSTOMY STATUS: Chronic | ICD-10-CM

## 2018-05-09 LAB
ALBUMIN SERPL ELPH-MCNC: 4.3 G/DL — SIGNIFICANT CHANGE UP (ref 3.3–5)
ALP SERPL-CCNC: 116 U/L — SIGNIFICANT CHANGE UP (ref 40–120)
ALT FLD-CCNC: 40 U/L — HIGH (ref 4–33)
APPEARANCE UR: CLEAR — SIGNIFICANT CHANGE UP
APTT BLD: 36 SEC — SIGNIFICANT CHANGE UP (ref 27.5–37.4)
AST SERPL-CCNC: 34 U/L — HIGH (ref 4–32)
BACTERIA # UR AUTO: SIGNIFICANT CHANGE UP
BASOPHILS # BLD AUTO: 0.04 K/UL — SIGNIFICANT CHANGE UP (ref 0–0.2)
BASOPHILS NFR BLD AUTO: 0.4 % — SIGNIFICANT CHANGE UP (ref 0–2)
BILIRUB SERPL-MCNC: 0.6 MG/DL — SIGNIFICANT CHANGE UP (ref 0.2–1.2)
BILIRUB UR-MCNC: NEGATIVE — SIGNIFICANT CHANGE UP
BLD GP AB SCN SERPL QL: NEGATIVE — SIGNIFICANT CHANGE UP
BLOOD UR QL VISUAL: NEGATIVE — SIGNIFICANT CHANGE UP
BUN SERPL-MCNC: 17 MG/DL — SIGNIFICANT CHANGE UP (ref 7–23)
CALCIUM SERPL-MCNC: 9.4 MG/DL — SIGNIFICANT CHANGE UP (ref 8.4–10.5)
CHLORIDE SERPL-SCNC: 99 MMOL/L — SIGNIFICANT CHANGE UP (ref 98–107)
CO2 SERPL-SCNC: 23 MMOL/L — SIGNIFICANT CHANGE UP (ref 22–31)
COLOR SPEC: YELLOW — SIGNIFICANT CHANGE UP
CREAT SERPL-MCNC: 0.2 MG/DL — LOW (ref 0.5–1.3)
EOSINOPHIL # BLD AUTO: 0.53 K/UL — HIGH (ref 0–0.5)
EOSINOPHIL NFR BLD AUTO: 5 % — SIGNIFICANT CHANGE UP (ref 0–6)
GLUCOSE SERPL-MCNC: 111 MG/DL — HIGH (ref 70–99)
GLUCOSE UR-MCNC: NEGATIVE — SIGNIFICANT CHANGE UP
HCT VFR BLD CALC: 38.4 % — SIGNIFICANT CHANGE UP (ref 34.5–45)
HGB BLD-MCNC: 12.4 G/DL — SIGNIFICANT CHANGE UP (ref 11.5–15.5)
IMM GRANULOCYTES # BLD AUTO: 0.14 # — SIGNIFICANT CHANGE UP
IMM GRANULOCYTES NFR BLD AUTO: 1.3 % — SIGNIFICANT CHANGE UP (ref 0–1.5)
INR BLD: 1 — SIGNIFICANT CHANGE UP (ref 0.88–1.17)
KETONES UR-MCNC: NEGATIVE — SIGNIFICANT CHANGE UP
LEUKOCYTE ESTERASE UR-ACNC: HIGH
LYMPHOCYTES # BLD AUTO: 2.31 K/UL — SIGNIFICANT CHANGE UP (ref 1–3.3)
LYMPHOCYTES # BLD AUTO: 21.8 % — SIGNIFICANT CHANGE UP (ref 13–44)
MAGNESIUM SERPL-MCNC: 2.2 MG/DL — SIGNIFICANT CHANGE UP (ref 1.6–2.6)
MCHC RBC-ENTMCNC: 28.8 PG — SIGNIFICANT CHANGE UP (ref 27–34)
MCHC RBC-ENTMCNC: 32.3 % — SIGNIFICANT CHANGE UP (ref 32–36)
MCV RBC AUTO: 89.3 FL — SIGNIFICANT CHANGE UP (ref 80–100)
MONOCYTES # BLD AUTO: 0.77 K/UL — SIGNIFICANT CHANGE UP (ref 0–0.9)
MONOCYTES NFR BLD AUTO: 7.3 % — SIGNIFICANT CHANGE UP (ref 2–14)
MUCOUS THREADS # UR AUTO: SIGNIFICANT CHANGE UP
NEUTROPHILS # BLD AUTO: 6.82 K/UL — SIGNIFICANT CHANGE UP (ref 1.8–7.4)
NEUTROPHILS NFR BLD AUTO: 64.2 % — SIGNIFICANT CHANGE UP (ref 43–77)
NITRITE UR-MCNC: POSITIVE — HIGH
NRBC # FLD: 0 — SIGNIFICANT CHANGE UP
PH UR: 6 — SIGNIFICANT CHANGE UP (ref 4.6–8)
PLATELET # BLD AUTO: 307 K/UL — SIGNIFICANT CHANGE UP (ref 150–400)
PMV BLD: 10.4 FL — SIGNIFICANT CHANGE UP (ref 7–13)
POTASSIUM SERPL-MCNC: 4.2 MMOL/L — SIGNIFICANT CHANGE UP (ref 3.5–5.3)
POTASSIUM SERPL-SCNC: 4.2 MMOL/L — SIGNIFICANT CHANGE UP (ref 3.5–5.3)
PROT SERPL-MCNC: 7.8 G/DL — SIGNIFICANT CHANGE UP (ref 6–8.3)
PROT UR-MCNC: 10 MG/DL — SIGNIFICANT CHANGE UP
PROTHROM AB SERPL-ACNC: 11.1 SEC — SIGNIFICANT CHANGE UP (ref 9.8–13.1)
RBC # BLD: 4.3 M/UL — SIGNIFICANT CHANGE UP (ref 3.8–5.2)
RBC # FLD: 16.1 % — HIGH (ref 10.3–14.5)
RBC CASTS # UR COMP ASSIST: SIGNIFICANT CHANGE UP (ref 0–?)
REVIEW TO FOLLOW: YES — SIGNIFICANT CHANGE UP
RH IG SCN BLD-IMP: POSITIVE — SIGNIFICANT CHANGE UP
SODIUM SERPL-SCNC: 139 MMOL/L — SIGNIFICANT CHANGE UP (ref 135–145)
SP GR SPEC: 1.02 — SIGNIFICANT CHANGE UP (ref 1–1.04)
UROBILINOGEN FLD QL: NORMAL MG/DL — SIGNIFICANT CHANGE UP
WBC # BLD: 10.61 K/UL — HIGH (ref 3.8–10.5)
WBC # FLD AUTO: 10.61 K/UL — HIGH (ref 3.8–10.5)
WBC UR QL: SIGNIFICANT CHANGE UP (ref 0–?)

## 2018-05-09 PROCEDURE — 99291 CRITICAL CARE FIRST HOUR: CPT

## 2018-05-09 PROCEDURE — 71045 X-RAY EXAM CHEST 1 VIEW: CPT | Mod: 26

## 2018-05-09 RX ORDER — ENOXAPARIN SODIUM 100 MG/ML
40 INJECTION SUBCUTANEOUS DAILY
Qty: 0 | Refills: 0 | Status: DISCONTINUED | OUTPATIENT
Start: 2018-05-09 | End: 2018-05-11

## 2018-05-09 RX ORDER — NYSTATIN CREAM 100000 [USP'U]/G
1 CREAM TOPICAL
Qty: 0 | Refills: 0 | Status: DISCONTINUED | OUTPATIENT
Start: 2018-05-09 | End: 2018-05-11

## 2018-05-09 RX ORDER — ALBUTEROL 90 UG/1
2.5 AEROSOL, METERED ORAL EVERY 6 HOURS
Qty: 0 | Refills: 0 | Status: DISCONTINUED | OUTPATIENT
Start: 2018-05-09 | End: 2018-05-11

## 2018-05-09 RX ORDER — SERTRALINE 25 MG/1
50 TABLET, FILM COATED ORAL DAILY
Qty: 0 | Refills: 0 | Status: DISCONTINUED | OUTPATIENT
Start: 2018-05-09 | End: 2018-05-09

## 2018-05-09 RX ORDER — RILUZOLE 50 MG/1
50 TABLET ORAL
Qty: 0 | Refills: 0 | Status: DISCONTINUED | OUTPATIENT
Start: 2018-05-09 | End: 2018-05-11

## 2018-05-09 RX ORDER — DIAZEPAM 5 MG
3 TABLET ORAL EVERY 6 HOURS
Qty: 0 | Refills: 0 | Status: DISCONTINUED | OUTPATIENT
Start: 2018-05-09 | End: 2018-05-11

## 2018-05-09 RX ORDER — SERTRALINE 25 MG/1
50 TABLET, FILM COATED ORAL DAILY
Qty: 0 | Refills: 0 | Status: DISCONTINUED | OUTPATIENT
Start: 2018-05-09 | End: 2018-05-11

## 2018-05-09 RX ADMIN — NYSTATIN CREAM 1 APPLICATION(S): 100000 CREAM TOPICAL at 18:29

## 2018-05-09 RX ADMIN — Medication 3 MILLIGRAM(S): at 21:38

## 2018-05-09 RX ADMIN — RILUZOLE 50 MILLIGRAM(S): 50 TABLET ORAL at 21:38

## 2018-05-09 NOTE — H&P ADULT - HISTORY OF PRESENT ILLNESS
HPI:61yo bedbound, ventilator dependent/p Tracheostomy female since 2015 with a PMHX of ALS (amyotropic lateral sclerosis), dyslipidemia, and s/p gastrostomy tube placement in the past. Pt was recently admitted for PNA to [facility] from 3/29/17 - 4/4/17. Pt was being care for at home w/ home care was noted to have a tracheostomy cuff leak. Also noted that Gastrotomy tube material is deteriorating with excess granulation tissue. Care provider denies purulence, bleeding at either site      PAST MEDICAL & SURGICAL HISTORY:  Aspiration into airway  Ventilator dependent: Since 2015  HLD (hyperlipidemia)  ALS (amyotrophic lateral sclerosis)  Encounter for PEG (percutaneous endoscopic gastrostomy): peg placed  Dependence on tracheostomy  S/P gastrostomy: 10/14 for dysphagia  receives jevity 2 cans TID      REVIEW OF SYSTEMS 12 systems negative, Pt unable to contribute      General:No Weight change/ Fatigue/ HA/Dizzy	    Skin/Breast: No Rashes/ Lesions/ Masses  	  Ophthalmologic: No Blurry vision/ Glaucoma/ Blindness  	  ENMT: No Hearing loss/ Drainage/ Lesions	    Respiratory and Thorax: No Cough/ Wheezing/ SOB/ Hemoptysis/ Sputum production  	  Cardiovascular: No Chest pain/ Palpitations/ Diaphoresis	    Gastrointestinal: No Nausea/ Vomiting/ Constipation/ Appetite Change	    Genitourinary: No Heamturia/ Dysuria/ Frequency change/ Impotence	    Musculoskeletal: No Pain/ Weakness/ Claudication	    Neurological: No Seizures/ TIA/CVA/ Parastesias	    Psychiatric: No Dementia/ Depression/ SI/HI	    Hematology/Lymphatics: No hx of bleeding/ Edema	    Endocrine:	No Hyperglycemia/ Hypoglycemia    Allergic/Immunologic:	 No Anaphylaxis/ Intolerance/ Recent illnesses    MEDICATIONS  (STANDING):    MEDICATIONS  (PRN):      Allergies    Bactrim DS (Rash)    Intolerances        SOCIAL HISTORY:  Occupation:  Smoking Hx: denies  Etoh Hx: denies  IVDA Hx: denies    FAMILY HISTORY:  No pertinent family history in first degree relatives    unless noted, no significant family hx with Mother, Father, Siblings    Vital Signs Last 24 Hrs  T(C): --  T(F): --  HR: 78 (09 May 2018 11:36) (78 - 78)  BP: --  BP(mean): --  RR: --  SpO2: 100% (09 May 2018 11:36) (100% - 100%)    General: WN/WD NAD  Neurology: Awake,, has no motor function other then eye tracking  Eyes: Scleras clear, PERRLA/ EOMI,   ENT:t, grossly patent pharynx, no stridor  Neck: Neck supple, trachea midline, No JVD, Tracheostomy intact w/ cuff leak  Respiratory: CTA B/L, No wheezing, rales, rhonchi  CV: RRR, S1S2, no murmurs, rubs or gallops  Abdominal: Soft, NT, ND +BS, + gastrostomy tube in tact w/o purulence/ bleeding  Extremities: No edema, + peripheral pulses  Skin: No Rashes, Hematoma, Ecchymosis  Lymphatic: No Neck, axilla, groin LAD  Psych:   Incisions:   Tubes:    LABS:                RADIOLOGY & ADDITIONAL STUDIES:    ASSESSMENT:   62yFemalePAST MEDICAL & SURGICAL HISTORY:  Aspiration into airway  Ventilator dependent: Since 2015  HLD (hyperlipidemia)  ALS (amyotrophic lateral sclerosis)  Encounter for PEG (percutaneous endoscopic gastrostomy): peg placed  Dependence on tracheostomy  S/P gastrostomy: 10/14 for dysphagia  receives jevity 2 cans TID      PLAN: Admit to CTICU            Plan for Revision of Trach/Peg            Plan d/w Dr Owens

## 2018-05-09 NOTE — PROGRESS NOTE ADULT - SUBJECTIVE AND OBJECTIVE BOX
TINO MATA  MRN-[id number]    HPI:  HPI:63yo bedbound, ventilator dependent/p Tracheostomy female since 2015 with a PMHX of ALS (amyotropic lateral sclerosis), dyslipidemia, and s/p gastrostomy tube placement in the past. Pt was recently admitted for PNA to [facility] from 3/29/17 - 4/4/17. Pt was being care for at home w/ home care was noted to have a tracheostomy cuff leak. Also noted that Gastrotomy tube material is deteriorating with excess granulation tissue. Care provider denies purulence, bleeding at either site      PAST MEDICAL & SURGICAL HISTORY:  Aspiration into airway  Ventilator dependent: Since 2015  HLD (hyperlipidemia)  ALS (amyotrophic lateral sclerosis)  Encounter for PEG (percutaneous endoscopic gastrostomy): peg placed  Dependence on tracheostomy  S/P gastrostomy: 10/14 for dysphagia  receives jevity 2 cans TID      REVIEW OF SYSTEMS 12 systems negative, Pt unable to contribute      General:No Weight change/ Fatigue/ HA/Dizzy	    Skin/Breast: No Rashes/ Lesions/ Masses  	  Ophthalmologic: No Blurry vision/ Glaucoma/ Blindness  	  ENMT: No Hearing loss/ Drainage/ Lesions	    Respiratory and Thorax: No Cough/ Wheezing/ SOB/ Hemoptysis/ Sputum production  	  Cardiovascular: No Chest pain/ Palpitations/ Diaphoresis	    Gastrointestinal: No Nausea/ Vomiting/ Constipation/ Appetite Change	    Genitourinary: No Heamturia/ Dysuria/ Frequency change/ Impotence	    Musculoskeletal: No Pain/ Weakness/ Claudication	    Neurological: No Seizures/ TIA/CVA/ Parastesias	    Psychiatric: No Dementia/ Depression/ SI/HI	    Hematology/Lymphatics: No hx of bleeding/ Edema	    Endocrine:	No Hyperglycemia/ Hypoglycemia    Allergic/Immunologic:	 No Anaphylaxis/ Intolerance/ Recent illnesses        Allergies    Bactrim DS (Rash)    Intolerances        SOCIAL HISTORY:  Occupation:  Smoking Hx: denies  Etoh Hx: denies  IVDA Hx: denies    FAMILY HISTORY:  No pertinent family history in first degree relatives    unless noted, no significant family hx with Mother, Father, Siblings    Vital Signs Last 24 Hrs  T(C): --  T(F): --  HR: 78 (09 May 2018 11:36) (78 - 78)  BP: --  BP(mean): --  RR: --  SpO2: 100% (09 May 2018 11:36) (100% - 100%)    General: WN/WD NAD  Neurology: Awake,, has no motor function other then eye tracking  Eyes: Scleras clear,   ENT:t, grossly patent pharynx, no stridor  Neck: Neck supple, trachea midline, No JVD, Tracheostomy intact w/ cuff leak  Respiratory: CTA B/L, No wheezing, rales, rhonchi  CV: RRR, S1S2, no murmurs, rubs or gallops  Abdominal: Soft, NT, ND +BS, + gastrostomy tube in tact w/o purulence/ bleeding  Extremities: No edema, + peripheral pulses  Skin: No Rashes, Hematoma, Ecchymosis      PAST MEDICAL & SURGICAL HISTORY:  Aspiration into airway  Ventilator dependent: Since 2015  HLD (hyperlipidemia)  ALS (amyotrophic lateral sclerosis)  Encounter for PEG (percutaneous endoscopic gastrostomy): peg placed  Dependence on tracheostomy  S/P gastrostomy: 10/14 for dysphagia  receives jevity 2 cans TID      ***VITAL SIGNS:  Vital Signs Last 24 Hrs  T(C): 36.2 (09 May 2018 11:30), Max: 36.2 (09 May 2018 11:30)  T(F): 97.2 (09 May 2018 11:30), Max: 97.2 (09 May 2018 11:30)  HR: 81 (09 May 2018 14:00) (76 - 81)  BP: 114/62 (09 May 2018 14:00) (114/62 - 128/70)  BP(mean): 0 (09 May 2018 14:00) (0 - 83)  RR: 27 (09 May 2018 14:00) (14 - 28)  SpO2: 99% (09 May 2018 14:00) (99% - 100%)  I/Os:   I&O's Detail    CAPILLARY BLOOD GLUCOSE        =======================  VENTILATOR SETTINGS  ===================  Mode: AC/ CMV (Assist Control/ Continuous Mandatory Ventilation)  RR (machine): 14  TV (machine): 450  FiO2: 40  PEEP: 5  MAP: 9  PIP: 19        ============================ LABS =========================                        12.4   10.61 )-----------( 307      ( 09 May 2018 14:00 )             38.4     05-09    139  |  99  |  17  ----------------------------<  111<H>  4.2   |  23  |  0.20<L>    Ca    9.4      09 May 2018 14:00  Mg     2.2     05-09    TPro  7.8  /  Alb  4.3  /  TBili  0.6  /  DBili  x   /  AST  34<H>  /  ALT  40<H>  /  AlkPhos  116  05-09    LIVER FUNCTIONS - ( 09 May 2018 14:00 )  Alb: 4.3 g/dL / Pro: 7.8 g/dL / ALK PHOS: 116 u/L / ALT: 40 u/L / AST: 34 u/L / GGT: x           PT/INR - ( 09 May 2018 14:00 )   PT: 11.1 SEC;   INR: 1.00          PTT - ( 09 May 2018 14:00 )  PTT:36.0 SEC      =======================  MEDICATIONS  =================  MEDICATIONS  (STANDING):  enoxaparin Injectable 40 milliGRAM(s) SubCutaneous daily  multivitamin   Solution 5 milliLiter(s) Oral daily  riluzole 50 milliGRAM(s) Oral two times a day before meals  sertraline Concentrate 50 milliGRAM(s) Oral daily    MEDICATIONS  (PRN):  ALBUTerol    0.083% 2.5 milliGRAM(s) Nebulizer every 6 hours PRN Shortness of Breath and/or Wheezing  diazepam   Solution 3 milliGRAM(s) Oral every 6 hours PRN Anxiety    ASSESSMENT:   62yFemalePAST MEDICAL & SURGICAL HISTORY:  Aspiration into airway  Ventilator dependent: Since 2015  HLD (hyperlipidemia)  ALS (amyotrophic lateral sclerosis)  Encounter for PEG (percutaneous endoscopic gastrostomy): peg placed  Dependence on tracheostomy  S/P gastrostomy: 10/14 for dysphagia  receives jevity 2 cans TID    ====================== NEUROLOGY=====================  Pain control with  Tylenol IV    ==================== RESPIRATORY======================  Pt on vent support via Trach    Mechanical Ventilation:  Mode: AC/ CMV (Assist Control/ Continuous Mandatory Ventilation)  RR (machine): 14  TV (machine): 450  FiO2: 40  PEEP: 5  MAP: 9  PIP: 19    Mechanical ventilator staus assessed & settings reviewed  Continue bronchodilators, pulmonary toilet    ====================CARDIOVASCULAR==================  Continue hemodynamic monitoring.  Not on any pressors    ===================== RENAL =========================  IVF  Monitor I/Os and electrolytes      ==================== GASTROINTESTINAL===================  NPO, OR today  Continue GI prophylaxis with Protonix  Continue Zofran / Reglan for nausea - PRN	    =======================    ENDOCRIN  =====================  Glycemic monitoring  F/S with coverage  ===================HEMATOLOGIC/ONC ===================  No signs of active bleeding.   Follow CBC in AM  DVT prophylaxis   ========================INFECTIOUS DISEASE================  No signs of infection. Monitor for fever / leukocytosis.          Pertinent clinical, laboratory, radiographic, hemodynamic, echocardiographic, respiratory data, microbiologic data and chart were reviewed and analyzed frequently throughout the course of the day and night. GI and DVT prophylaxis, glycemic control, head of bed elevation and skin care issues were addressed.  Patient seen, examined and plan discussed with CT Surgery / CTICU team during rounds.    I have spent      35         minutes of critical care time with this pt between 1pm tp 12 mn      Steve Grover DO, FACEP

## 2018-05-09 NOTE — PATIENT PROFILE ADULT. - PSH
Dependence on tracheostomy    Encounter for PEG (percutaneous endoscopic gastrostomy)  peg placed  S/P gastrostomy  10/14 for dysphagia  receives jevity 2 cans TID

## 2018-05-10 ENCOUNTER — APPOINTMENT (OUTPATIENT)
Dept: THORACIC SURGERY | Facility: HOSPITAL | Age: 63
End: 2018-05-10

## 2018-05-10 LAB
GRAM STN SPT: SIGNIFICANT CHANGE UP
SPECIMEN SOURCE: SIGNIFICANT CHANGE UP
SPECIMEN SOURCE: SIGNIFICANT CHANGE UP

## 2018-05-10 PROCEDURE — 74018 RADEX ABDOMEN 1 VIEW: CPT | Mod: 26

## 2018-05-10 PROCEDURE — 99291 CRITICAL CARE FIRST HOUR: CPT

## 2018-05-10 PROCEDURE — 71045 X-RAY EXAM CHEST 1 VIEW: CPT | Mod: 26

## 2018-05-10 PROCEDURE — 43760 CHANGE GASTROSTOMY TUBE PERCUTANEOUS W/O GUIDE: CPT

## 2018-05-10 PROCEDURE — 31624 DX BRONCHOSCOPE/LAVAGE: CPT

## 2018-05-10 RX ORDER — SODIUM CHLORIDE 9 MG/ML
250 INJECTION INTRAMUSCULAR; INTRAVENOUS; SUBCUTANEOUS ONCE
Qty: 0 | Refills: 0 | Status: COMPLETED | OUTPATIENT
Start: 2018-05-10 | End: 2018-05-10

## 2018-05-10 RX ADMIN — NYSTATIN CREAM 1 APPLICATION(S): 100000 CREAM TOPICAL at 06:33

## 2018-05-10 RX ADMIN — NYSTATIN CREAM 1 APPLICATION(S): 100000 CREAM TOPICAL at 17:12

## 2018-05-10 RX ADMIN — SERTRALINE 50 MILLIGRAM(S): 25 TABLET, FILM COATED ORAL at 13:40

## 2018-05-10 RX ADMIN — Medication 3 MILLIGRAM(S): at 22:28

## 2018-05-10 RX ADMIN — Medication 5 MILLILITER(S): at 13:41

## 2018-05-10 RX ADMIN — ENOXAPARIN SODIUM 40 MILLIGRAM(S): 100 INJECTION SUBCUTANEOUS at 13:40

## 2018-05-10 RX ADMIN — RILUZOLE 50 MILLIGRAM(S): 50 TABLET ORAL at 22:28

## 2018-05-10 RX ADMIN — SODIUM CHLORIDE 250 MILLILITER(S): 9 INJECTION INTRAMUSCULAR; INTRAVENOUS; SUBCUTANEOUS at 15:41

## 2018-05-10 RX ADMIN — RILUZOLE 50 MILLIGRAM(S): 50 TABLET ORAL at 13:41

## 2018-05-10 NOTE — DIETITIAN INITIAL EVALUATION ADULT. - OTHER INFO
Nutrition consult received on 5/9/18 for intubated > 48 hrs . Pt. non verbal, ventilator dependant , was on EN support  , from home . Met with private aide @ bedside and per her , pt. was on Jevity 1.2 4 cans daily via pump @ 70 cc/hr from 8PM to 8 AM along with free water auto flush 40 ml/hr  + 120 ml free water x3/d .  Pt. was maintaining wt., no issues with EN support , noted pt. admitted for trach and PEG revision . It is also noted pt  was initially on 6 cans of Jevity 1.2  and per private aide , pt. was gaining too much wt.  PCP  decreased to 5 cans and then to 4 cans/d.

## 2018-05-10 NOTE — CONSULT NOTE ADULT - ATTENDING COMMENTS
VASCULAR NEUROLOGY ATTENDING  The patient is seen and examined the history and imaging are reviewed. I agree with the resident note unless otherwise noted. Patient with ALS and exam as above. Using eye movements to communicate with increasing difficulty. Advise computer modification to decrease eye strain. Discussed with  at bedside.

## 2018-05-10 NOTE — CONSULT NOTE ADULT - SUBJECTIVE AND OBJECTIVE BOX
VASCULAR NEUROLOGY ATTENDING NOTE    Patient seen and examined history and imaging reviewed. Agree with resident/fellow note as applicable.  HPI:  HPI:61yo bedbound, ventilator dependent/p Tracheostomy female since 2015 with a PMHX of ALS (amyotropic lateral sclerosis), dyslipidemia, and s/p gastrostomy tube placement in the past. Pt was recently admitted for PNA to [facility] from 3/29/17 - 4/4/17. Pt was being care for at home w/ home care was noted to have a tracheostomy cuff leak. Also noted that Gastrotomy tube material is deteriorating with excess granulation tissue. Care provider denies purulence, bleeding at either site    Underwent Trach and PEG revision 5/10/18    PAST MEDICAL & SURGICAL HISTORY:  Aspiration into airway  Ventilator dependent: Since 2015  HLD (hyperlipidemia)  ALS (amyotrophic lateral sclerosis)  Encounter for PEG (percutaneous endoscopic gastrostomy): peg placed  Dependence on tracheostomy  S/P gastrostomy: 10/14 for dysphagia  receives jevity 2 cans TID      REVIEW OF SYSTEMS 12 systems negative, Pt unable to contribute      General:No Weight change/ Fatigue/ HA/Dizzy	    Skin/Breast: No Rashes/ Lesions/ Masses  	  Ophthalmologic: No Blurry vision/ Glaucoma/ Blindness  	  ENMT: No Hearing loss/ Drainage/ Lesions	    Respiratory and Thorax: No Cough/ Wheezing/ SOB/ Hemoptysis/ Sputum production  	  Cardiovascular: No Chest pain/ Palpitations/ Diaphoresis	    Gastrointestinal: No Nausea/ Vomiting/ Constipation/ Appetite Change	    Genitourinary: No Heamturia/ Dysuria/ Frequency change/ Impotence	    Musculoskeletal: No Pain/ Weakness/ Claudication	    Neurological: No Seizures/ TIA/CVA/ Parastesias	    Psychiatric: No Dementia/ Depression/ SI/HI	    Hematology/Lymphatics: No hx of bleeding/ Edema	    Endocrine:	No Hyperglycemia/ Hypoglycemia    Allergic/Immunologic:	 No Anaphylaxis/ Intolerance/ Recent illnesses    MEDICATIONS  (STANDING):    MEDICATIONS  (PRN):      Allergies    Bactrim DS (Rash)    Intolerances        SOCIAL HISTORY:  Occupation:  Smoking Hx: denies  Etoh Hx: denies  IVDA Hx: denies    FAMILY HISTORY:  No pertinent family history in first degree relatives    unless noted, no significant family hx with Mother, Father, Siblings    Overnight Events: None    VITALS:  Vital Signs Last 24 Hrs  T(C): 36.9 (10 May 2018 12:00), Max: 36.9 (10 May 2018 12:00)  T(F): 98.4 (10 May 2018 12:00), Max: 98.4 (10 May 2018 12:00)  HR: 85 (10 May 2018 18:00) (70 - 97)  BP: 114/56 (10 May 2018 18:00) (93/53 - 163/76)  BP(mean): 69 (10 May 2018 18:00) (0 - 98)  RR: 14 (10 May 2018 18:00) (13 - 26)  SpO2: 100% (10 May 2018 18:00) (96% - 100%)    Labs:   05-09    139  |  99  |  17  ----------------------------<  111<H>  4.2   |  23  |  0.20<L>    Ca    9.4      09 May 2018 14:00  Mg     2.2     05-09    TPro  7.8  /  Alb  4.3  /  TBili  0.6  /  DBili  x   /  AST  34<H>  /  ALT  40<H>  /  AlkPhos  116  05-09                          12.4   10.61 )-----------( 307      ( 09 May 2018 14:00 )             38.4       MEDS:  enoxaparin Injectable 40 milliGRAM(s) SubCutaneous daily  multivitamin   Solution 5 milliLiter(s) Oral daily  nystatin Powder 1 Application(s) Topical two times a day  riluzole 50 milliGRAM(s) Oral two times a day before meals  sertraline Concentrate 50 milliGRAM(s) Oral daily      Exam:   Eyes open attentive. Attempts to follow eye blink on request. No up/down eye movements. Minimal R gaze movements trace or absent L lateral eye movements. Flaccid quadraplegia VASCULAR NEUROLOGY ATTENDING NOTE    Patient seen and examined history and imaging reviewed. Agree with resident/fellow note as applicable.  HPI:  HPI:63yo bedbound, ventilator dependent/p Tracheostomy female since 2015 with a PMHX of ALS (amyotropic lateral sclerosis), dyslipidemia, and s/p gastrostomy tube placement in the past. Pt was recently admitted for PNA to [facility] from 3/29/17 - 4/4/17. Pt was being care for at home w/ home care was noted to have a tracheostomy cuff leak. Also noted that Gastrotomy tube material is deteriorating with excess granulation tissue. Care provider denies purulence, bleeding at either site    Underwent Trach and PEG revision 5/10/18    PAST MEDICAL & SURGICAL HISTORY:  Aspiration into airway  Ventilator dependent: Since 2015  HLD (hyperlipidemia)  ALS (amyotrophic lateral sclerosis)  Encounter for PEG (percutaneous endoscopic gastrostomy): peg placed  Dependence on tracheostomy  S/P gastrostomy: 10/14 for dysphagia  receives jevity 2 cans TID      REVIEW OF SYSTEMS 12 systems negative, Pt unable to contribute      General:No Weight change/ Fatigue/ HA/Dizzy	    Skin/Breast: No Rashes/ Lesions/ Masses  	  Ophthalmologic: No Blurry vision/ Glaucoma/ Blindness  	  ENMT: No Hearing loss/ Drainage/ Lesions	    Respiratory and Thorax: No Cough/ Wheezing/ SOB/ Hemoptysis/ Sputum production  	  Cardiovascular: No Chest pain/ Palpitations/ Diaphoresis	    Gastrointestinal: No Nausea/ Vomiting/ Constipation/ Appetite Change	    Genitourinary: No Heamturia/ Dysuria/ Frequency change/ Impotence	    Musculoskeletal: No Pain/ Weakness/ Claudication	    Neurological: No Seizures/ TIA/CVA/ Parastesias	    Psychiatric: No Dementia/ Depression/ SI/HI	    Hematology/Lymphatics: No hx of bleeding/ Edema	    Endocrine:	No Hyperglycemia/ Hypoglycemia    Allergic/Immunologic:	 No Anaphylaxis/ Intolerance/ Recent illnesses    MEDICATIONS  (STANDING):    MEDICATIONS  (PRN):      Allergies    Bactrim DS (Rash)    Intolerances        SOCIAL HISTORY:  Occupation:  Smoking Hx: denies  Etoh Hx: denies  IVDA Hx: denies    FAMILY HISTORY:  No pertinent family history in first degree relatives    unless noted, no significant family hx with Mother, Father, Siblings    Overnight Events: None    VITALS:  Vital Signs Last 24 Hrs  T(C): 36.9 (10 May 2018 12:00), Max: 36.9 (10 May 2018 12:00)  T(F): 98.4 (10 May 2018 12:00), Max: 98.4 (10 May 2018 12:00)  HR: 85 (10 May 2018 18:00) (70 - 97)  BP: 114/56 (10 May 2018 18:00) (93/53 - 163/76)  BP(mean): 69 (10 May 2018 18:00) (0 - 98)  RR: 14 (10 May 2018 18:00) (13 - 26)  SpO2: 100% (10 May 2018 18:00) (96% - 100%)    Labs:   05-09    139  |  99  |  17  ----------------------------<  111<H>  4.2   |  23  |  0.20<L>    Ca    9.4      09 May 2018 14:00  Mg     2.2     05-09    TPro  7.8  /  Alb  4.3  /  TBili  0.6  /  DBili  x   /  AST  34<H>  /  ALT  40<H>  /  AlkPhos  116  05-09                          12.4   10.61 )-----------( 307      ( 09 May 2018 14:00 )             38.4       MEDS:  enoxaparin Injectable 40 milliGRAM(s) SubCutaneous daily  multivitamin   Solution 5 milliLiter(s) Oral daily  nystatin Powder 1 Application(s) Topical two times a day  riluzole 50 milliGRAM(s) Oral two times a day before meals  sertraline Concentrate 50 milliGRAM(s) Oral daily      Exam:   Eyes open attentive. Attempts to follow eye blink on request. No up/down eye movements. R gaze movements and L lateral eye movements intact on far gaze. Flaccid quadraplegia

## 2018-05-10 NOTE — PROGRESS NOTE ADULT - SUBJECTIVE AND OBJECTIVE BOX
TINO MATA  MRN-[id number]    HPI:  HPI:61yo bedbound, ventilator dependent/p Tracheostomy female since 2015 with a PMHX of ALS (amyotropic lateral sclerosis), dyslipidemia, and s/p gastrostomy tube placement in the past. Pt was recently admitted for PNA to [facility] from 3/29/17 - 4/4/17. Pt was being care for at home w/ home care was noted to have a tracheostomy cuff leak. Also noted that Gastrotomy tube material is deteriorating with excess granulation tissue. Care provider denies purulence, bleeding at either site    PAST MEDICAL & SURGICAL HISTORY:  Aspiration into airway  Ventilator dependent: Since 2015  HLD (hyperlipidemia)  ALS (amyotrophic lateral sclerosis)  Encounter for PEG (percutaneous endoscopic gastrostomy): peg placed  Dependence on tracheostomy  S/P gastrostomy: 10/14 for dysphagia  receives jevity 2 cans TID      REVIEW OF SYSTEMS 12 systems negative, Pt unable to contribute      General:No Weight change/ Fatigue/ HA/Dizzy	    Skin/Breast: No Rashes/ Lesions/ Masses  	  Ophthalmologic: No Blurry vision/ Glaucoma/ Blindness  	  ENMT: No Hearing loss/ Drainage/ Lesions	    Respiratory and Thorax: No Cough/ Wheezing/ SOB/ Hemoptysis/ Sputum production  	  Cardiovascular: No Chest pain/ Palpitations/ Diaphoresis	    General: On full vent support NAD  Neurology: Awake,, has no motor function other then eye tracking  Eyes: Scleras clear,   ENT:t, grossly patent pharynx, no stridor  Neck: Neck supple, trachea midline, No JVD, Tracheostomy intact w/ cuff leak  Respiratory: CTA B/L, No wheezing, rales, rhonchi  CV: RRR, S1S2, no murmurs, rubs or gallops  Abdominal: Soft, NT, ND +BS, + gastrostomy tube in tact w/o purulence/ bleeding  Extremities: No edema, + peripheral pulses  Skin: No Rashes, Hematoma, EcchymosisGastrointestinal: No Nausea/ Vomiting/ Constipation/ Appetite Change	        PAST MEDICAL & SURGICAL HISTORY:  Aspiration into airway  Ventilator dependent: Since 2015  HLD (hyperlipidemia)  ALS (amyotrophic lateral sclerosis)  Encounter for PEG (percutaneous endoscopic gastrostomy): peg placed  Dependence on tracheostomy  S/P gastrostomy: 10/14 for dysphagia  receives jevity 2 cans TID      ***VITAL SIGNS:  Vital Signs Last 24 Hrs  T(C): 36.7 (10 May 2018 04:00), Max: 36.7 (10 May 2018 04:00)  T(F): 98.1 (10 May 2018 04:00), Max: 98.1 (10 May 2018 04:00)  HR: 84 (10 May 2018 06:00) (76 - 97)  BP: 138/72 (10 May 2018 06:00) (93/53 - 163/76)  BP(mean): 88 (10 May 2018 06:00) (0 - 98)  RR: 26 (10 May 2018 06:00) (13 - 28)  SpO2: 99% (10 May 2018 06:00) (96% - 100%)  I/Os:   I&O's Detail    09 May 2018 07:01  -  10 May 2018 06:32  --------------------------------------------------------  IN:    Jevity: 550 mL  Total IN: 550 mL    OUT:    Voided: 80 mL  Total OUT: 80 mL    Total NET: 470 mL        CAPILLARY BLOOD GLUCOSE        =======================  VENTILATOR SETTINGS  ===================  Mode: AC/ CMV (Assist Control/ Continuous Mandatory Ventilation)  RR (machine): 14  TV (machine): 450  FiO2: 40  PEEP: 5  MAP: 9  PIP: 23      ============================ LABS =========================                        12.4   10.61 )-----------( 307      ( 09 May 2018 14:00 )             38.4     05-09    139  |  99  |  17  ----------------------------<  111<H>  4.2   |  23  |  0.20<L>    Ca    9.4            A/P:    62yFemalePAST MEDICAL & SURGICAL HISTORY:  Aspiration into airway  Ventilator dependent: Since 2015  HLD (hyperlipidemia)  ALS (amyotrophic lateral sclerosis)  Encounter for PEG (percutaneous endoscopic gastrostomy): peg placed  Dependence on tracheostomy  S/P gastrostomy: 10/14 for dysphagia  receives jevity 2 cans TID    OR today for Trach and PEG revision    ====================== NEUROLOGY=====================  Pain control with  Tylenol IV    ==================== RESPIRATORY======================  Pt on vent support via Trach    Mechanical Ventilation:  Mode: AC/ CMV (Assist Control/ Continuous Mandatory Ventilation)  RR (machine): 14  TV (machine): 450  FiO2: 40  PEEP: 5  MAP: 9  PIP: 19    Mechanical ventilator staus assessed & settings reviewed  Continue bronchodilators, pulmonary toilet    ====================CARDIOVASCULAR==================  Continue hemodynamic monitoring.  Not on any pressors    ===================== RENAL =========================  IVF  Monitor I/Os and electrolytes      ==================== GASTROINTESTINAL===================  NPO, OR today for Trach revision  Continue GI prophylaxis with Protonix  Continue Zofran / Reglan for nausea - PRN	    =======================    ENDOCRIN  =====================  Glycemic monitoring  F/S with coverage  ===================HEMATOLOGIC/ONC ===================  No signs of active bleeding.   Follow CBC in AM  DVT prophylaxis   ========================INFECTIOUS DISEASE================  No signs of infection. Monitor for fever / leukocytosis.          Pertinent clinical, laboratory, radiographic, hemodynamic, echocardiographic, respiratory data, microbiologic data and chart were reviewed and analyzed frequently throughout the course of the day and night. GI and DVT prophylaxis, glycemic control, head of bed elevation and skin care issues were addressed.  Patient seen, examined and plan discussed with CT Surgery / CTICU team during rounds.    I have spent     40         minutes of critical care time with this pt between 1am tp 9am      Steve Grover DO, FACEP

## 2018-05-10 NOTE — BRIEF OPERATIVE NOTE - PROCEDURE
<<-----Click on this checkbox to enter Procedure Tracheostomy tube replacement  05/10/2018  Bivona XLT Distal Balloon  Active  MALEXIS6  Gastrostomy tube change  05/10/2018    Active  [email or web address]  Flexible bronchoscopy by cardiothoracic surgery  05/10/2018    Active  [email or web address]

## 2018-05-10 NOTE — DIETITIAN INITIAL EVALUATION ADULT. - MD RECOMMEND
EN when medically able reflective to home regimen  - Jevity 1.2 4 cans  /d via pump @ 70 ml/hr 12hs = 1138 kcal &  53 gm protein/d.

## 2018-05-10 NOTE — PROGRESS NOTE ADULT - SUBJECTIVE AND OBJECTIVE BOX
PULMONARY PROGRESS NOTE    TINO MATA  MRN-[id number]    Patient is a 62y old  Female who presents with a chief complaint of Elective revision of Tracheostomy and Gastrostomy (09 May 2018 13:09)      HPI:  ALS, vent dependent. Noted difficulty with maintaining trach seal with prior trach. Also had PEG tube change done. Has been stable on vent at home.   - Noted during bronch-heavy mucoid impaction in LLL  Some widening of the posterior wall of the trachea. Trach changed to XLT.-  -    ACTIVE MEDICATION LIST:  MEDICATIONS  (STANDING):  enoxaparin Injectable 40 milliGRAM(s) SubCutaneous daily  multivitamin   Solution 5 milliLiter(s) Oral daily  nystatin Powder 1 Application(s) Topical two times a day  riluzole 50 milliGRAM(s) Oral two times a day before meals  sertraline Concentrate 50 milliGRAM(s) Oral daily    MEDICATIONS  (PRN):  ALBUTerol    0.083% 2.5 milliGRAM(s) Nebulizer every 6 hours PRN Shortness of Breath and/or Wheezing  diazepam   Solution 3 milliGRAM(s) Oral every 6 hours PRN Anxiety      EXAM:  Vital Signs Last 24 Hrs  T(C): 35.8 (10 May 2018 08:00), Max: 36.7 (09 May 2018 23:59)  T(F): 96.4 (10 May 2018 08:00), Max: 98.1 (09 May 2018 23:59)  HR: 75 (10 May 2018 08:00) (75 - 97)  BP: 100/60 (10 May 2018 08:00) (93/53 - 163/76)  BP(mean): 70 (10 May 2018 08:00) (0 - 98)  RR: 14 (10 May 2018 08:00) (13 - 28)  SpO2: 100% (10 May 2018 08:00) (96% - 100%)    GENERAL: The patient is awake and alert in no apparent distress. Nonverbal    SKIN: Warm, dry, no rashes    LUNGS: Clear to auscultation without wheezing, rales or rhonchi; respirations unlabored    HEART: Regular rate and rhythm without murmur.    ABDOMEN: +BS, Soft, Nontender    EXTREMITIES: No clubbing, cyanosis, edema                              12.4   10.61 )-----------( 307      ( 09 May 2018 14:00 )             38.4       05-09    139  |  99  |  17  ----------------------------<  111<H>  4.2   |  23  |  0.20<L>    Ca    9.4      09 May 2018 14:00  Mg     2.2     05-09    TPro  7.8  /  Alb  4.3  /  TBili  0.6  /  DBili  x   /  AST  34<H>  /  ALT  40<H>  /  AlkPhos  116  05-09      LIVER FUNCTIONS - ( 09 May 2018 14:00 )  Alb: 4.3 g/dL / Pro: 7.8 g/dL / ALK PHOS: 116 u/L / ALT: 40 u/L / AST: 34 u/L / GGT: x           CXR; LLL atelectasis    Urine cultures: GNR > 100,000.      PROBLEM LIST:  62y Female with HEALTH ISSUES - PROBLEM Dx:    ALS; dependent on vent/PEG  LLL atelectasis.  Tracheal widening noted during procedure. Possible TE fistula         RECS:  Trach changed to 6 XLT cuffed trach.    To repeat CXR post bronch/suctioning.    Check bronch/urine cultures.    Gastrograffin PEG study.     Neuro f/u Mehrdad        Pranav Nweman MD  [phone]

## 2018-05-11 ENCOUNTER — TRANSCRIPTION ENCOUNTER (OUTPATIENT)
Age: 63
End: 2018-05-11

## 2018-05-11 VITALS
HEART RATE: 80 BPM | DIASTOLIC BLOOD PRESSURE: 65 MMHG | RESPIRATION RATE: 14 BRPM | OXYGEN SATURATION: 100 % | TEMPERATURE: 97 F | SYSTOLIC BLOOD PRESSURE: 122 MMHG

## 2018-05-11 LAB
-  AMIKACIN: SIGNIFICANT CHANGE UP
-  AMPICILLIN/SULBACTAM: SIGNIFICANT CHANGE UP
-  AMPICILLIN: SIGNIFICANT CHANGE UP
-  AZTREONAM: SIGNIFICANT CHANGE UP
-  CEFAZOLIN: SIGNIFICANT CHANGE UP
-  CEFEPIME: SIGNIFICANT CHANGE UP
-  CEFOXITIN: SIGNIFICANT CHANGE UP
-  CEFTAZIDIME: SIGNIFICANT CHANGE UP
-  CEFTRIAXONE: SIGNIFICANT CHANGE UP
-  CIPROFLOXACIN: SIGNIFICANT CHANGE UP
-  ERTAPENEM: SIGNIFICANT CHANGE UP
-  GENTAMICIN: SIGNIFICANT CHANGE UP
-  IMIPENEM: SIGNIFICANT CHANGE UP
-  LEVOFLOXACIN: SIGNIFICANT CHANGE UP
-  MEROPENEM: SIGNIFICANT CHANGE UP
-  NITROFURANTOIN: SIGNIFICANT CHANGE UP
-  PIPERACILLIN/TAZOBACTAM: SIGNIFICANT CHANGE UP
-  TIGECYCLINE: SIGNIFICANT CHANGE UP
-  TOBRAMYCIN: SIGNIFICANT CHANGE UP
-  TRIMETHOPRIM/SULFAMETHOXAZOLE: SIGNIFICANT CHANGE UP
ALBUMIN SERPL ELPH-MCNC: 3.8 G/DL — SIGNIFICANT CHANGE UP (ref 3.3–5)
ALP SERPL-CCNC: 101 U/L — SIGNIFICANT CHANGE UP (ref 40–120)
ALT FLD-CCNC: 35 U/L — HIGH (ref 4–33)
AST SERPL-CCNC: 32 U/L — SIGNIFICANT CHANGE UP (ref 4–32)
BACTERIA UR CULT: SIGNIFICANT CHANGE UP
BILIRUB SERPL-MCNC: 0.6 MG/DL — SIGNIFICANT CHANGE UP (ref 0.2–1.2)
BUN SERPL-MCNC: 19 MG/DL — SIGNIFICANT CHANGE UP (ref 7–23)
CALCIUM SERPL-MCNC: 8.9 MG/DL — SIGNIFICANT CHANGE UP (ref 8.4–10.5)
CHLORIDE SERPL-SCNC: 102 MMOL/L — SIGNIFICANT CHANGE UP (ref 98–107)
CO2 SERPL-SCNC: 26 MMOL/L — SIGNIFICANT CHANGE UP (ref 22–31)
CREAT SERPL-MCNC: 0.2 MG/DL — LOW (ref 0.5–1.3)
CULTURE - ACID FAST SMEAR CONCENTRATED: SIGNIFICANT CHANGE UP
GLUCOSE SERPL-MCNC: 121 MG/DL — HIGH (ref 70–99)
HCT VFR BLD CALC: 35.7 % — SIGNIFICANT CHANGE UP (ref 34.5–45)
HGB BLD-MCNC: 11.1 G/DL — LOW (ref 11.5–15.5)
MCHC RBC-ENTMCNC: 27.8 PG — SIGNIFICANT CHANGE UP (ref 27–34)
MCHC RBC-ENTMCNC: 31.1 % — LOW (ref 32–36)
MCV RBC AUTO: 89.5 FL — SIGNIFICANT CHANGE UP (ref 80–100)
METHOD TYPE: SIGNIFICANT CHANGE UP
NRBC # FLD: 0 — SIGNIFICANT CHANGE UP
ORGANISM # SPEC MICROSCOPIC CNT: SIGNIFICANT CHANGE UP
ORGANISM # SPEC MICROSCOPIC CNT: SIGNIFICANT CHANGE UP
PLATELET # BLD AUTO: 280 K/UL — SIGNIFICANT CHANGE UP (ref 150–400)
PMV BLD: 10.1 FL — SIGNIFICANT CHANGE UP (ref 7–13)
POTASSIUM SERPL-MCNC: 3.8 MMOL/L — SIGNIFICANT CHANGE UP (ref 3.5–5.3)
POTASSIUM SERPL-SCNC: 3.8 MMOL/L — SIGNIFICANT CHANGE UP (ref 3.5–5.3)
PROT SERPL-MCNC: 6.8 G/DL — SIGNIFICANT CHANGE UP (ref 6–8.3)
RBC # BLD: 3.99 M/UL — SIGNIFICANT CHANGE UP (ref 3.8–5.2)
RBC # FLD: 16.2 % — HIGH (ref 10.3–14.5)
SODIUM SERPL-SCNC: 139 MMOL/L — SIGNIFICANT CHANGE UP (ref 135–145)
SPECIMEN SOURCE: SIGNIFICANT CHANGE UP
WBC # BLD: 9.2 K/UL — SIGNIFICANT CHANGE UP (ref 3.8–10.5)
WBC # FLD AUTO: 9.2 K/UL — SIGNIFICANT CHANGE UP (ref 3.8–10.5)

## 2018-05-11 PROCEDURE — 99238 HOSP IP/OBS DSCHRG MGMT 30/<: CPT

## 2018-05-11 PROCEDURE — 71045 X-RAY EXAM CHEST 1 VIEW: CPT | Mod: 26

## 2018-05-11 PROCEDURE — 99291 CRITICAL CARE FIRST HOUR: CPT

## 2018-05-11 RX ORDER — NYSTATIN CREAM 100000 [USP'U]/G
1 CREAM TOPICAL
Qty: 1 | Refills: 0
Start: 2018-05-11

## 2018-05-11 RX ORDER — SODIUM CHLORIDE 9 MG/ML
1000 INJECTION, SOLUTION INTRAVENOUS
Qty: 0 | Refills: 0 | Status: DISCONTINUED | OUTPATIENT
Start: 2018-05-11 | End: 2018-05-11

## 2018-05-11 RX ADMIN — SERTRALINE 50 MILLIGRAM(S): 25 TABLET, FILM COATED ORAL at 11:28

## 2018-05-11 RX ADMIN — NYSTATIN CREAM 1 APPLICATION(S): 100000 CREAM TOPICAL at 05:44

## 2018-05-11 RX ADMIN — Medication 5 MILLILITER(S): at 11:28

## 2018-05-11 RX ADMIN — RILUZOLE 50 MILLIGRAM(S): 50 TABLET ORAL at 10:25

## 2018-05-11 RX ADMIN — ENOXAPARIN SODIUM 40 MILLIGRAM(S): 100 INJECTION SUBCUTANEOUS at 12:17

## 2018-05-11 RX ADMIN — SODIUM CHLORIDE 50 MILLILITER(S): 9 INJECTION, SOLUTION INTRAVENOUS at 05:44

## 2018-05-11 NOTE — DISCHARGE NOTE ADULT - CARE PROVIDER_API CALL
Mervin Owens), Thoracic Surgery  [id number] [place] [address]  [place] [address]  Phone: [phone]  Fax: [phone]

## 2018-05-11 NOTE — DISCHARGE NOTE ADULT - MEDICATION SUMMARY - MEDICATIONS TO TAKE
I will START or STAY ON the medications listed below when I get home from the hospital:    Lovenox 40 mg/0.4 mL injectable solution  --  injectable 2 times a day  -- Indication: For DVT prophylaxis    diazepam 2 mg oral tablet  -- 1 tab(s) by gastrostomy tube 3 times a day  -- Indication: For Spasm    sertraline 50 mg oral tablet  -- 1 tab(s) by gastrostomy tube once a day  -- Indication: For Depression    ipratropium 500 mcg/2.5 mL inhalation solution  -- 2.5 milliliter(s) inhaled every 6 hours  -- Indication: For Asthma    levalbuterol 0.63 mg/3 mL inhalation solution  -- 3 milliliter(s) inhaled every 6 hours  -- Indication: For Asthma    nystatin 100,000 units/g topical powder  -- 1 application on skin 2 times a day to groin MDD:2 michael. Continue till resolution  -- Indication: For Dermatosis    polyethylene glycol 3350 oral powder for reconstitution  -- 17 gram(s) by mouth 2 times a day  -- Indication: For Constipation    senna 8.8 mg/5 mL oral syrup  -- 2.5 milliliter(s) by mouth once a day  -- Indication: For Constipation    riluzole 50 mg oral tablet  -- 1 tab(s) by gastrostomy tube 2 times a day (before meals)  -- Indication: For ALS

## 2018-05-11 NOTE — DISCHARGE NOTE ADULT - HOME CARE AGENCY
[facility]/ [place] [phone] for your LPN services. Your LPN will be at your home at 1pm. [facility], [place] for your hha services.

## 2018-05-11 NOTE — DISCHARGE NOTE ADULT - HOSPITAL COURSE
63yo bedbound, ventilator dependent/p Tracheostomy female since 2015 with a PMHX of ALS (amyotropic lateral sclerosis), dyslipidemia, and s/p gastrostomy tube placement in the past. Pt was recently admitted for PNA to Counts include 234 beds at the [facility] from 3/29/17 - 4/4/17. Pt was being care for at home w/ home care was noted to have a tracheostomy cuff leak. Also noted that Gastrotomy tube material is deteriorating with excess granulation tissue. Care provider denies purulence, bleeding at either site. Pt underwent revision of tracheostomy w/ Bovona XLT 6 and change of PEG. Postop op course uncomplicated

## 2018-05-11 NOTE — CHART NOTE - NSCHARTNOTEFT_GEN_A_CORE
POST ANESTHESIA EVALUATION    62y Female POSTOP DAY 1 S/P tracheostomy exchange, bronchoscopy    MENTAL STATUS: Patient participation [ x ] Awake     [  ] Arousable     [  ] Sedated    AIRWAY PATENCY: [ x ] Satisfactory  [  ] Other: trach    Vital Signs Last 24 Hrs  T(C): 36.4 (11 May 2018 08:00), Max: 36.9 (10 May 2018 12:00)  T(F): 97.6 (11 May 2018 08:00), Max: 98.4 (10 May 2018 12:00)  HR: 75 (11 May 2018 10:00) (67 - 86)  BP: 120/67 (11 May 2018 10:00) (94/51 - 137/68)  BP(mean): 80 (11 May 2018 10:00) (58 - 86)  RR: 14 (11 May 2018 10:00) (14 - 15)  SpO2: 100% (11 May 2018 10:00) (99% - 100%)  I&O's Summary    10 May 2018 07:01  -  11 May 2018 07:00  --------------------------------------------------------  IN: 1140 mL / OUT: 0 mL / NET: 1140 mL          NAUSEA/ VOMITTING:  [x  ] NONE  [  ] CONTROLLED [  ] OTHER     PAIN: [ x ] CONTROLLED WITH CURRENT REGIMEN  [  ] OTHER    [x  ] NO APPARENT ANESTHESIA COMPLICATIONS      Comments:

## 2018-05-11 NOTE — PROGRESS NOTE ADULT - SUBJECTIVE AND OBJECTIVE BOX
62 ventilator dependent (Trach), bedbound, (since 2015) F PMH ALS since 2015 with a [facility] ALS, HLD, s/p gastrostomy tube placement for revision. The pt was admitted for PNA to [facility] from 3/29/17 - 4/4/17. Pt was being care for at home w/ home care was noted to have a tracheostomy cuff leak. Also noted that Gastrotomy tube material is deteriorating with excess granulation tissue. There is no purulence, bleeding at either site    POD # 1 ([id number]): FOB, Tracheostomy tube replacement Gastrostomy tube change      Allergies:  	Bactrim DS: Drug, Rash    Home Medications:   * Incomplete Medication History as of 09-May-2018 12:23 documented in Structured Notes  · 	Levaquin 500 mg oral tablet: 1 tab(s) orally every 24 hours, Last Dose Taken:    · 	polyethylene glycol 3350 oral powder for reconstitution: 17 gram(s) orally 2 times a day, Last Dose Taken:    · 	senna 8.8 mg/5 mL oral syrup: 2.5 milliliter(s) orally once a day, Last Dose Taken:    · 	riluzole 50 mg oral tablet: 1 tab(s) by gastrostomy tube 2 times a day (before meals), Last Dose Taken:    · 	ipratropium 500 mcg/2.5 mL inhalation solution: 2.5 milliliter(s) inhaled every 6 hours, Last Dose Taken:    · 	levalbuterol 0.63 mg/3 mL inhalation solution: 3 milliliter(s) inhaled every 6 hours, Last Dose Taken:    · 	diazepam 2 mg oral tablet: 1 tab(s) by gastrostomy tube 3 times a day, Last Dose Taken:    · 	sertraline 50 mg oral tablet: 1 tab(s) by gastrostomy tube once a day, Last Dose Taken:    · 	Lovenox 40 mg/0.4 mL injectable solution:  injectable 2 times a day, Last Dose Taken:      MEDICATIONS  (STANDING):  enoxaparin Injectable 40 milliGRAM(s) SubCutaneous daily  lactated ringers. 1000 milliLiter(s) (50 mL/Hr) IV Continuous <Continuous>  multivitamin   Solution 5 milliLiter(s) Oral daily  nystatin Powder 1 Application(s) Topical two times a day  riluzole 50 milliGRAM(s) Oral two times a day before meals  sertraline Concentrate 50 milliGRAM(s) Oral daily    MEDICATIONS  (PRN):  ALBUTerol    0.083% 2.5 milliGRAM(s) Nebulizer every 6 hours PRN Shortness of Breath and/or Wheezing  diazepam   Solution 3 milliGRAM(s) Oral every 6 hours PRN Anxiety    PAST MEDICAL & SURGICAL HISTORY:  Aspiration into airway  Ventilator dependent: Since 2015  HLD (hyperlipidemia)  ALS (amyotrophic lateral sclerosis)  Encounter for PEG (percutaneous endoscopic gastrostomy): peg placed  Dependence on tracheostomy  S/P gastrostomy: 10/14 for dysphagia  receives jevity 2 cans TID    ICU Vital Signs Last 24 Hrs  T(C): 36.2 (11 May 2018 04:00), Max: 36.9 (10 May 2018 12:00)  T(F): 97.2 (11 May 2018 04:00), Max: 98.4 (10 May 2018 12:00)  HR: 69 (11 May 2018 06:00) (69 - 86)  BP: 104/54 (11 May 2018 05:00) (94/51 - 137/68)  BP(mean): 64 (11 May 2018 05:00) (58 - 86)  RR: 14 (11 May 2018 06:00) (14 - 15)  SpO2: 99% (11 May 2018 06:00) (99% - 100%)      Physical exam:                           General:      Awake,, has no motor function other then eye tracking  Neurology:  Awake,, has no motor function other then eye tracking  Eyes:           Scleras clear,   ENT:           + trach, grossly patent pharynx, no stridor  Neck           + trach supple, trachea midline, No JVD,    Respiratory: CTA B/L, No wheezing, rales, rhonchi  CV:              RRR, S1S2, no murmurs, rubs or gallops  Abdominal:  Soft, NT, ND +BS, + gastrostomy tube in tact w/o purulence/ bleeding  Extremities:  No edema, + peripheral pulses  Skin:            No Rashes, Hematoma, Ecchymosis      I&O's Summary    09 May 2018 07:01  -  10 May 2018 07:00  --------------------------------------------------------  IN: 550 mL / OUT: 80 mL / NET: 470 mL    10 May 2018 07:01  -  11 May 2018 06:05  --------------------------------------------------------  IN: 1165 mL / OUT: 0 mL / NET: 1165 mL      Labs:                                                                           11.1   9.20  )-----------( 280      ( 11 May 2018 03:10 )             35.7                            05-11    139  |  102  |  19  ----------------------------<  121<H>  3.8   |  26  |  0.20<L>    Ca    8.9      11 May 2018 03:10  Mg     2.2     05-09    TPro  6.8  /  Alb  3.8  /  TBili  0.6  /  DBili  x   /  AST  32  /  ALT  35<H>  /  AlkPhos  101  05-11    LIVER FUNCTIONS - ( 11 May 2018 03:10 )  Alb: 3.8 g/dL / Pro: 6.8 g/dL / ALK PHOS: 101 u/L / ALT: 35 u/L / AST: 32 u/L / GGT: x                                PT/INR - ( 09 May 2018 14:00 )   PT: 11.1 SEC;   INR: 1.00      PTT - ( 09 May 2018 14:00 )  PTT:36.0 SEC    Mode: AC/ CMV (Assist Control/ Continuous Mandatory Ventilation)  RR (machine): 14  TV (machine): 450  FiO2: 40  PEEP: 5  ITime: 1  MAP: 8  PIP: 21         CXR  [id number]  IMPRESSION:  Gas 3 tube in place. No pneumothorax.  Unchanged left basal and retrocardiac opacity which can be due to left   lower lobe atelectasis. A small left pleural effusion may also be   present. Other underlying pathology is not excluded.    Plan:  62 ventilator dependent (Trach), bedbound, (since 2015) F PMH ALS since 2015 with a PMH ALS, HLD, s/p gastrostomy tube placement for revision. The pt was admitted for PNA to [facility] from 3/29/17 - 4/4/17. Pt was being care for at home w/ home care was noted to have a tracheostomy cuff leak. Also noted that Gastrotomy tube material is deteriorating with excess granulation tissue. There is no purulence, bleeding at either site  POD # 1 ([id number]): FOB, Tracheostomy tube replacement Gastrostomy tube change      NEUROLOGY  Pain control with  Tylenol IV    RESPIRATORY  Pt on vent support via Trach  Mechanical Ventilation:  Mode: AC/ CMV (Assist Control/ Continuous Mandatory Ventilation)  RR (machine): 14  TV (machine): 450  FiO2: 40  PEEP: 5  MAP: 9  PIP: 19  Mechanical ventilator staus assessed & settings reviewed  Continue bronchodilators, pulmonary toilet    CARDIOVASCULAR  Continue hemodynamic monitoring.  Not on any pressors    RENAL  IVF  Monitor I/Os and electrolytes      GASTROINTESTINAL  Resume TF  Continue GI prophylaxis with Protonix  Continue Zofran / Reglan for nausea - PRN	    ENDOCRINE  Glycemic monitoring  F/S with coverage    HEMATOLOGIC/ONC   No signs of active bleeding.   Follow CBC in AM  SQH & SCDs for VTE prophylaxis     INFECTIOUS DISEASE  No signs of infection. Monitor for fever / leukocytosis.    All available pertinent clinical, laboratory, radiographic, hemodynamic, echocardiographic, respiratory data, microbiologic data and chart were reviewed and analyzed frequently. GI and DVT prophylaxis, glycemic control, head of bed elevation and skin care issues were addressed.  Patient seen, examined and plan discussed with CT Surgery / CTICU team during rounds.    Jason Spain MD

## 2018-05-11 NOTE — DISCHARGE NOTE ADULT - CARE PLAN
Principal Discharge DX:	Dependence on tracheostomy  Goal:	Continue Ventilatory Care  Assessment and plan of treatment:	Continue tracheostomy care  Secondary Diagnosis:	S/P gastrostomy  Goal:	continue tube feeding

## 2018-05-11 NOTE — DISCHARGE NOTE ADULT - PATIENT PORTAL LINK FT
You can access the [facility], offered by [facility], by registering with the following website: [email or web address]

## 2018-05-11 NOTE — DISCHARGE NOTE ADULT - ADDITIONAL INSTRUCTIONS
Follow up with Dr Owens as necessary. [phone] Follow up with Dr Owens as necessary. [phone].  Take medications as prescribed. Follow up with your doctor as directed.  Call your doctor for fever over 101 degrees,  Please call [phone] for chest pain, shortness of breath or for signs & symptoms of stroke. Perform trach care daily. Perform skin care daily around PEG tube site. Notify provider if redness, irritation or skin breakdown noted around site.

## 2018-05-12 LAB
-  AMIKACIN: SIGNIFICANT CHANGE UP
-  AMPICILLIN/SULBACTAM: SIGNIFICANT CHANGE UP
-  AMPICILLIN: SIGNIFICANT CHANGE UP
-  AZTREONAM: SIGNIFICANT CHANGE UP
-  CEFAZOLIN: SIGNIFICANT CHANGE UP
-  CEFEPIME: SIGNIFICANT CHANGE UP
-  CEFOXITIN: SIGNIFICANT CHANGE UP
-  CEFTAZIDIME: SIGNIFICANT CHANGE UP
-  CEFTRIAXONE: SIGNIFICANT CHANGE UP
-  CIPROFLOXACIN: SIGNIFICANT CHANGE UP
-  ERTAPENEM: SIGNIFICANT CHANGE UP
-  GENTAMICIN: SIGNIFICANT CHANGE UP
-  IMIPENEM: SIGNIFICANT CHANGE UP
-  LEVOFLOXACIN: SIGNIFICANT CHANGE UP
-  MEROPENEM: SIGNIFICANT CHANGE UP
-  PIPERACILLIN/TAZOBACTAM: SIGNIFICANT CHANGE UP
-  TIGECYCLINE: SIGNIFICANT CHANGE UP
-  TOBRAMYCIN: SIGNIFICANT CHANGE UP
-  TRIMETHOPRIM/SULFAMETHOXAZOLE: SIGNIFICANT CHANGE UP
BACTERIA SPT RESP CULT: SIGNIFICANT CHANGE UP
GRAM STN SPT: SIGNIFICANT CHANGE UP
METHOD TYPE: SIGNIFICANT CHANGE UP
ORGANISM # SPEC MICROSCOPIC CNT: SIGNIFICANT CHANGE UP
ORGANISM # SPEC MICROSCOPIC CNT: SIGNIFICANT CHANGE UP

## 2018-05-13 LAB — SPECIMEN SOURCE: SIGNIFICANT CHANGE UP

## 2018-05-17 LAB — SPECIMEN SOURCE: SIGNIFICANT CHANGE UP

## 2018-06-07 LAB — FUNGUS SPEC QL CULT: SIGNIFICANT CHANGE UP

## 2018-06-21 LAB — ACID FAST STN SPEC: SIGNIFICANT CHANGE UP

## 2018-07-01 ENCOUNTER — RX RENEWAL (OUTPATIENT)
Age: 63
End: 2018-07-01

## 2018-07-02 ENCOUNTER — RX RENEWAL (OUTPATIENT)
Age: 63
End: 2018-07-02

## 2018-07-16 ENCOUNTER — MEDICATION RENEWAL (OUTPATIENT)
Age: 63
End: 2018-07-16

## 2018-07-24 ENCOUNTER — RX RENEWAL (OUTPATIENT)
Age: 63
End: 2018-07-24

## 2018-08-21 ENCOUNTER — RX RENEWAL (OUTPATIENT)
Age: 63
End: 2018-08-21

## 2018-08-22 ENCOUNTER — MOBILE ON CALL (OUTPATIENT)
Age: 63
End: 2018-08-22

## 2018-08-24 ENCOUNTER — RX CHANGE (OUTPATIENT)
Age: 63
End: 2018-08-24

## 2018-08-27 ENCOUNTER — LABORATORY RESULT (OUTPATIENT)
Age: 63
End: 2018-08-27

## 2018-09-06 ENCOUNTER — LABORATORY RESULT (OUTPATIENT)
Age: 63
End: 2018-09-06

## 2018-09-06 ENCOUNTER — RX RENEWAL (OUTPATIENT)
Age: 63
End: 2018-09-06

## 2018-09-07 ENCOUNTER — RX RENEWAL (OUTPATIENT)
Age: 63
End: 2018-09-07

## 2018-09-07 LAB
BASOPHILS # BLD AUTO: 0.18 K/UL
BASOPHILS NFR BLD AUTO: 1.6 %
EOSINOPHIL # BLD AUTO: 0.71 K/UL
EOSINOPHIL NFR BLD AUTO: 6.2 %
HCT VFR BLD CALC: 35.3 %
HGB BLD-MCNC: 10 G/DL
LYMPHOCYTES # BLD AUTO: 1.07 K/UL
LYMPHOCYTES NFR BLD AUTO: 9.3 %
MAN DIFF?: NORMAL
MCHC RBC-ENTMCNC: 26 PG
MCHC RBC-ENTMCNC: 28.3 GM/DL
MCV RBC AUTO: 91.9 FL
MONOCYTES # BLD AUTO: 0.45 K/UL
MONOCYTES NFR BLD AUTO: 3.9 %
NEUTROPHILS # BLD AUTO: 8.89 K/UL
NEUTROPHILS NFR BLD AUTO: 77.4 %
PLATELET # BLD AUTO: 334 K/UL
RBC # BLD: 3.84 M/UL
RBC # FLD: 20.2 %
WBC # FLD AUTO: 11.48 K/UL

## 2018-09-16 ENCOUNTER — RX RENEWAL (OUTPATIENT)
Age: 63
End: 2018-09-16

## 2018-09-22 ENCOUNTER — RX RENEWAL (OUTPATIENT)
Age: 63
End: 2018-09-22

## 2018-09-22 ENCOUNTER — MOBILE ON CALL (OUTPATIENT)
Age: 63
End: 2018-09-22

## 2018-10-20 ENCOUNTER — APPOINTMENT (OUTPATIENT)
Dept: PULMONOLOGY | Facility: CLINIC | Age: 63
End: 2018-10-20

## 2018-10-20 ENCOUNTER — MOBILE ON CALL (OUTPATIENT)
Age: 63
End: 2018-10-20

## 2018-10-21 ENCOUNTER — RX CHANGE (OUTPATIENT)
Age: 63
End: 2018-10-21

## 2018-10-22 ENCOUNTER — APPOINTMENT (OUTPATIENT)
Dept: PULMONOLOGY | Facility: CLINIC | Age: 63
End: 2018-10-22

## 2018-10-23 PROBLEM — Z99.11 DEPENDENCE ON RESPIRATOR [VENTILATOR] STATUS: Chronic | Status: ACTIVE | Noted: 2017-04-05

## 2018-10-23 PROBLEM — T17.908A UNSPECIFIED FOREIGN BODY IN RESPIRATORY TRACT, PART UNSPECIFIED CAUSING OTHER INJURY, INITIAL ENCOUNTER: Chronic | Status: ACTIVE | Noted: 2017-04-05

## 2018-10-23 RX ORDER — LEVOFLOXACIN 750 MG/1
750 TABLET, FILM COATED ORAL DAILY
Qty: 7 | Refills: 0 | Status: DISCONTINUED | COMMUNITY
Start: 2018-08-24 | End: 2018-10-23

## 2018-10-30 ENCOUNTER — RX RENEWAL (OUTPATIENT)
Age: 63
End: 2018-10-30

## 2018-11-04 ENCOUNTER — RX RENEWAL (OUTPATIENT)
Age: 63
End: 2018-11-04

## 2018-11-08 ENCOUNTER — APPOINTMENT (OUTPATIENT)
Dept: PULMONOLOGY | Facility: CLINIC | Age: 63
End: 2018-11-08
Payer: MEDICAID

## 2018-11-08 PROCEDURE — 99349 HOME/RES VST EST MOD MDM 40: CPT

## 2018-11-08 RX ORDER — NYSTATIN 100000 [USP'U]/G
100000 POWDER TOPICAL
Qty: 15 | Refills: 0 | Status: ACTIVE | COMMUNITY
Start: 2018-05-11

## 2018-11-08 RX ORDER — CEFPODOXIME PROXETIL 200 MG/1
200 TABLET, FILM COATED ORAL
Qty: 14 | Refills: 0 | Status: DISCONTINUED | COMMUNITY
Start: 2018-10-21 | End: 2018-11-08

## 2018-11-08 RX ORDER — ENOXAPARIN SODIUM 100 MG/ML
40 INJECTION SUBCUTANEOUS
Qty: 12 | Refills: 0 | Status: ACTIVE | COMMUNITY
Start: 2018-05-29

## 2018-11-11 LAB — BACTERIA SPT CULT: ABNORMAL

## 2018-11-25 ENCOUNTER — RX RENEWAL (OUTPATIENT)
Age: 63
End: 2018-11-25

## 2018-12-02 ENCOUNTER — RX RENEWAL (OUTPATIENT)
Age: 63
End: 2018-12-02

## 2018-12-06 ENCOUNTER — RX RENEWAL (OUTPATIENT)
Age: 63
End: 2018-12-06

## 2018-12-06 ENCOUNTER — MOBILE ON CALL (OUTPATIENT)
Age: 63
End: 2018-12-06

## 2018-12-17 ENCOUNTER — RX RENEWAL (OUTPATIENT)
Age: 63
End: 2018-12-17

## 2018-12-18 ENCOUNTER — RX RENEWAL (OUTPATIENT)
Age: 63
End: 2018-12-18

## 2018-12-31 ENCOUNTER — RX RENEWAL (OUTPATIENT)
Age: 63
End: 2018-12-31

## 2019-01-10 ENCOUNTER — RX RENEWAL (OUTPATIENT)
Age: 64
End: 2019-01-10

## 2019-01-13 ENCOUNTER — MOBILE ON CALL (OUTPATIENT)
Age: 64
End: 2019-01-13

## 2019-01-16 VITALS
SYSTOLIC BLOOD PRESSURE: 130 MMHG | DIASTOLIC BLOOD PRESSURE: 80 MMHG | OXYGEN SATURATION: 99 % | HEART RATE: 97 BPM | RESPIRATION RATE: 14 BRPM

## 2019-01-16 RX ORDER — TOBRAMYCIN 40 MG/ML
80 INJECTION INTRAMUSCULAR; INTRAVENOUS
Qty: 224 | Refills: 4 | Status: DISCONTINUED | OUTPATIENT
Start: 2018-12-17 | End: 2019-01-16

## 2019-01-16 NOTE — PHYSICAL EXAM
[Normal Conjunctiva] : the conjunctiva exhibited no abnormalities [Neck Appearance] : the appearance of the neck was normal [Neck Cervical Mass (___cm)] : no neck mass was observed [Jugular Venous Distention Increased] : there was no jugular-venous distention [Thyroid Diffuse Enlargement] : the thyroid was not enlarged [Thyroid Nodule] : there were no palpable thyroid nodules [Heart Rate And Rhythm] : heart rate and rhythm were normal [Heart Sounds] : normal S1 and S2 [Murmurs] : no murmurs present [Respiration, Rhythm And Depth] : normal respiratory rhythm and effort [Exaggerated Use Of Accessory Muscles For Inspiration] : no accessory muscle use [Auscultation Breath Sounds / Voice Sounds] : lungs were clear to auscultation bilaterally [Abdomen Soft] : soft [Abdomen Tenderness] : non-tender [Abdomen Mass (___ Cm)] : no abdominal mass palpated [Abnormal Walk] : normal gait [Gait - Sufficient For Exercise Testing] : the gait was sufficient for exercise testing [Nail Clubbing] : no clubbing of the fingernails [Cyanosis, Localized] : no localized cyanosis [Petechial Hemorrhages (___cm)] : no petechial hemorrhages [] : no ischemic changes [FreeTextEntry1] : trach side clean

## 2019-01-16 NOTE — ASSESSMENT
[FreeTextEntry1] : Unclear cause of change in status. May be related to medications. Will obtain chest x-ray and labs

## 2019-01-16 NOTE — HISTORY OF PRESENT ILLNESS
[FreeTextEntry1] : ALS, vent dependent\par LLL atelectasis\par Called to see patient at home for change in status. Patient was on nebulized tobramycin for atelectasis and Pseudomonas colonization. Developed allergic reaction. According to  was more lethargic and had increased secretions. Was started on Cipro on  1/13. \par Noted by nursing to be anxious diaphoretic and tachycardic. Oxygen saturation maintained and blood pressure unchanged. Nurse is concerned that patient required hospitalization. I did not feel this was required

## 2019-01-16 NOTE — REVIEW OF SYSTEMS
[Cough] : cough [Sputum] : sputum  [Dyspnea] : dyspnea [Chronically Infected with _____] : chronically infected with [unfilled] [Negative] : Endocrine [de-identified] : Noncommunicative [de-identified] : Noncommunicative

## 2019-01-21 ENCOUNTER — LABORATORY RESULT (OUTPATIENT)
Age: 64
End: 2019-01-21

## 2019-02-04 ENCOUNTER — RX RENEWAL (OUTPATIENT)
Age: 64
End: 2019-02-04

## 2019-02-05 ENCOUNTER — RX RENEWAL (OUTPATIENT)
Age: 64
End: 2019-02-05

## 2019-02-12 ENCOUNTER — RX RENEWAL (OUTPATIENT)
Age: 64
End: 2019-02-12

## 2019-03-26 ENCOUNTER — RX RENEWAL (OUTPATIENT)
Age: 64
End: 2019-03-26

## 2019-04-24 ENCOUNTER — RX RENEWAL (OUTPATIENT)
Age: 64
End: 2019-04-24

## 2019-04-24 ENCOUNTER — RX CHANGE (OUTPATIENT)
Age: 64
End: 2019-04-24

## 2019-05-05 ENCOUNTER — RX RENEWAL (OUTPATIENT)
Age: 64
End: 2019-05-05

## 2019-05-06 ENCOUNTER — RX RENEWAL (OUTPATIENT)
Age: 64
End: 2019-05-06

## 2019-05-09 ENCOUNTER — RX RENEWAL (OUTPATIENT)
Age: 64
End: 2019-05-09

## 2019-05-13 ENCOUNTER — RX RENEWAL (OUTPATIENT)
Age: 64
End: 2019-05-13

## 2019-05-16 RX ORDER — CIPROFLOXACIN HYDROCHLORIDE 750 MG/1
750 TABLET, FILM COATED ORAL
Qty: 14 | Refills: 0 | Status: DISCONTINUED | COMMUNITY
Start: 2019-01-13 | End: 2019-05-16

## 2019-05-16 NOTE — HISTORY OF PRESENT ILLNESS
[FreeTextEntry1] : ALS, vent dependent\par LLL atelectasis\par Called to see patient at home for vent management and trach change\par Chronic vent failure secondary to ALS.\par Has recent infections, resolved.\par Due for scheduled trach change

## 2019-05-16 NOTE — REVIEW OF SYSTEMS
[Cough] : cough [Sputum] : sputum  [Dyspnea] : dyspnea [Chronically Infected with _____] : chronically infected with [unfilled] [Negative] : Endocrine [de-identified] : Noncommunicative

## 2019-05-26 ENCOUNTER — RX RENEWAL (OUTPATIENT)
Age: 64
End: 2019-05-26

## 2019-06-07 ENCOUNTER — RX RENEWAL (OUTPATIENT)
Age: 64
End: 2019-06-07

## 2019-06-19 ENCOUNTER — RX RENEWAL (OUTPATIENT)
Age: 64
End: 2019-06-19

## 2019-06-24 ENCOUNTER — RX RENEWAL (OUTPATIENT)
Age: 64
End: 2019-06-24

## 2019-07-05 NOTE — PHYSICAL EXAM
[Normal Conjunctiva] : the conjunctiva exhibited no abnormalities [FreeTextEntry1] : trach side clean [Neck Appearance] : the appearance of the neck was normal [Jugular Venous Distention Increased] : there was no jugular-venous distention [Neck Cervical Mass (___cm)] : no neck mass was observed [Thyroid Nodule] : there were no palpable thyroid nodules [Thyroid Diffuse Enlargement] : the thyroid was not enlarged [Heart Rate And Rhythm] : heart rate and rhythm were normal [Murmurs] : no murmurs present [Heart Sounds] : normal S1 and S2 [Respiration, Rhythm And Depth] : normal respiratory rhythm and effort [Exaggerated Use Of Accessory Muscles For Inspiration] : no accessory muscle use [Auscultation Breath Sounds / Voice Sounds] : lungs were clear to auscultation bilaterally [Abdomen Soft] : soft [Abdomen Tenderness] : non-tender [Abdomen Mass (___ Cm)] : no abdominal mass palpated [Abnormal Walk] : normal gait [Gait - Sufficient For Exercise Testing] : the gait was sufficient for exercise testing [Nail Clubbing] : no clubbing of the fingernails [Cyanosis, Localized] : no localized cyanosis [Petechial Hemorrhages (___cm)] : no petechial hemorrhages [] : no ischemic changes

## 2019-07-05 NOTE — ASSESSMENT
[FreeTextEntry1] : Status Stable.\par follow up for monthly visit, elective trach changes\par May need site debreidment.

## 2019-07-05 NOTE — REVIEW OF SYSTEMS
[Sputum] : sputum  [Cough] : cough [Dyspnea] : dyspnea [Chronically Infected with _____] : chronically infected with [unfilled] [Negative] : Endocrine [de-identified] : Noncommunicative

## 2019-07-17 ENCOUNTER — RX RENEWAL (OUTPATIENT)
Age: 64
End: 2019-07-17

## 2019-07-22 ENCOUNTER — RX RENEWAL (OUTPATIENT)
Age: 64
End: 2019-07-22

## 2019-07-29 ENCOUNTER — RX RENEWAL (OUTPATIENT)
Age: 64
End: 2019-07-29

## 2019-08-12 ENCOUNTER — RX RENEWAL (OUTPATIENT)
Age: 64
End: 2019-08-12

## 2019-08-19 ENCOUNTER — RX RENEWAL (OUTPATIENT)
Age: 64
End: 2019-08-19

## 2019-08-27 ENCOUNTER — RX RENEWAL (OUTPATIENT)
Age: 64
End: 2019-08-27

## 2019-09-08 ENCOUNTER — INPATIENT (INPATIENT)
Facility: HOSPITAL | Age: 64
LOS: 0 days | Discharge: HOME | End: 2019-09-09
Attending: THORACIC SURGERY (CARDIOTHORACIC VASCULAR SURGERY) | Admitting: THORACIC SURGERY (CARDIOTHORACIC VASCULAR SURGERY)
Payer: MEDICARE

## 2019-09-08 ENCOUNTER — RX RENEWAL (OUTPATIENT)
Age: 64
End: 2019-09-08

## 2019-09-08 VITALS
HEART RATE: 80 BPM | WEIGHT: 106.04 LBS | DIASTOLIC BLOOD PRESSURE: 70 MMHG | TEMPERATURE: 98 F | OXYGEN SATURATION: 100 % | RESPIRATION RATE: 21 BRPM | SYSTOLIC BLOOD PRESSURE: 123 MMHG

## 2019-09-08 DIAGNOSIS — Z93.0 TRACHEOSTOMY STATUS: Chronic | ICD-10-CM

## 2019-09-08 DIAGNOSIS — Z43.1 ENCOUNTER FOR ATTENTION TO GASTROSTOMY: Chronic | ICD-10-CM

## 2019-09-08 DIAGNOSIS — Z93.1 GASTROSTOMY STATUS: Chronic | ICD-10-CM

## 2019-09-08 DIAGNOSIS — G12.20 MOTOR NEURON DISEASE, UNSPECIFIED: ICD-10-CM

## 2019-09-08 LAB
ANION GAP SERPL CALC-SCNC: 15 MMO/L — HIGH (ref 7–14)
APTT BLD: 32.8 SEC — SIGNIFICANT CHANGE UP (ref 27.5–36.3)
BASOPHILS # BLD AUTO: 0.04 K/UL — SIGNIFICANT CHANGE UP (ref 0–0.2)
BASOPHILS NFR BLD AUTO: 0.3 % — SIGNIFICANT CHANGE UP (ref 0–2)
BLD GP AB SCN SERPL QL: NEGATIVE — SIGNIFICANT CHANGE UP
BUN SERPL-MCNC: 14 MG/DL — SIGNIFICANT CHANGE UP (ref 7–23)
CALCIUM SERPL-MCNC: 10 MG/DL — SIGNIFICANT CHANGE UP (ref 8.4–10.5)
CHLORIDE SERPL-SCNC: 104 MMOL/L — SIGNIFICANT CHANGE UP (ref 98–107)
CO2 SERPL-SCNC: 20 MMOL/L — LOW (ref 22–31)
CREAT SERPL-MCNC: < 0.2 MG/DL — LOW (ref 0.5–1.3)
EOSINOPHIL # BLD AUTO: 0.65 K/UL — HIGH (ref 0–0.5)
EOSINOPHIL NFR BLD AUTO: 5.6 % — SIGNIFICANT CHANGE UP (ref 0–6)
GLUCOSE SERPL-MCNC: 89 MG/DL — SIGNIFICANT CHANGE UP (ref 70–99)
HCT VFR BLD CALC: 38 % — SIGNIFICANT CHANGE UP (ref 34.5–45)
HGB BLD-MCNC: 11.5 G/DL — SIGNIFICANT CHANGE UP (ref 11.5–15.5)
IMM GRANULOCYTES NFR BLD AUTO: 1 % — SIGNIFICANT CHANGE UP (ref 0–1.5)
INR BLD: 1.17 — SIGNIFICANT CHANGE UP (ref 0.88–1.17)
LYMPHOCYTES # BLD AUTO: 17.7 % — SIGNIFICANT CHANGE UP (ref 13–44)
LYMPHOCYTES # BLD AUTO: 2.07 K/UL — SIGNIFICANT CHANGE UP (ref 1–3.3)
MCHC RBC-ENTMCNC: 27 PG — SIGNIFICANT CHANGE UP (ref 27–34)
MCHC RBC-ENTMCNC: 30.3 % — LOW (ref 32–36)
MCV RBC AUTO: 89.2 FL — SIGNIFICANT CHANGE UP (ref 80–100)
MONOCYTES # BLD AUTO: 0.72 K/UL — SIGNIFICANT CHANGE UP (ref 0–0.9)
MONOCYTES NFR BLD AUTO: 6.2 % — SIGNIFICANT CHANGE UP (ref 2–14)
NEUTROPHILS # BLD AUTO: 8.08 K/UL — HIGH (ref 1.8–7.4)
NEUTROPHILS NFR BLD AUTO: 69.2 % — SIGNIFICANT CHANGE UP (ref 43–77)
NRBC # FLD: 0 K/UL — SIGNIFICANT CHANGE UP (ref 0–0)
PLATELET # BLD AUTO: 406 K/UL — HIGH (ref 150–400)
PMV BLD: 10.3 FL — SIGNIFICANT CHANGE UP (ref 7–13)
POTASSIUM SERPL-MCNC: 3.9 MMOL/L — SIGNIFICANT CHANGE UP (ref 3.5–5.3)
POTASSIUM SERPL-SCNC: 3.9 MMOL/L — SIGNIFICANT CHANGE UP (ref 3.5–5.3)
PROTHROM AB SERPL-ACNC: 13 SEC — SIGNIFICANT CHANGE UP (ref 9.8–13.1)
RBC # BLD: 4.26 M/UL — SIGNIFICANT CHANGE UP (ref 3.8–5.2)
RBC # FLD: 16.5 % — HIGH (ref 10.3–14.5)
RH IG SCN BLD-IMP: POSITIVE — SIGNIFICANT CHANGE UP
SODIUM SERPL-SCNC: 139 MMOL/L — SIGNIFICANT CHANGE UP (ref 135–145)
WBC # BLD: 11.68 K/UL — HIGH (ref 3.8–10.5)
WBC # FLD AUTO: 11.68 K/UL — HIGH (ref 3.8–10.5)

## 2019-09-08 PROCEDURE — 99232 SBSQ HOSP IP/OBS MODERATE 35: CPT

## 2019-09-08 PROCEDURE — 99233 SBSQ HOSP IP/OBS HIGH 50: CPT

## 2019-09-08 RX ORDER — IPRATROPIUM BROMIDE 0.2 MG/ML
500 SOLUTION, NON-ORAL INHALATION EVERY 6 HOURS
Refills: 0 | Status: DISCONTINUED | OUTPATIENT
Start: 2019-09-08 | End: 2019-09-08

## 2019-09-08 RX ORDER — DIAZEPAM 5 MG
2 TABLET ORAL THREE TIMES A DAY
Refills: 0 | Status: DISCONTINUED | OUTPATIENT
Start: 2019-09-08 | End: 2019-09-09

## 2019-09-08 RX ORDER — ENOXAPARIN SODIUM 100 MG/ML
40 INJECTION SUBCUTANEOUS
Refills: 0 | Status: DISCONTINUED | OUTPATIENT
Start: 2019-09-08 | End: 2019-09-09

## 2019-09-08 RX ORDER — LEVALBUTEROL 1.25 MG/.5ML
0.63 SOLUTION, CONCENTRATE RESPIRATORY (INHALATION) EVERY 6 HOURS
Refills: 0 | Status: DISCONTINUED | OUTPATIENT
Start: 2019-09-08 | End: 2019-09-09

## 2019-09-08 RX ORDER — CHLORHEXIDINE GLUCONATE 213 G/1000ML
15 SOLUTION TOPICAL EVERY 12 HOURS
Refills: 0 | Status: DISCONTINUED | OUTPATIENT
Start: 2019-09-08 | End: 2019-09-09

## 2019-09-08 RX ORDER — SERTRALINE 25 MG/1
50 TABLET, FILM COATED ORAL DAILY
Refills: 0 | Status: DISCONTINUED | OUTPATIENT
Start: 2019-09-08 | End: 2019-09-09

## 2019-09-08 RX ORDER — SODIUM CHLORIDE 9 MG/ML
3 INJECTION INTRAMUSCULAR; INTRAVENOUS; SUBCUTANEOUS EVERY 6 HOURS
Refills: 0 | Status: DISCONTINUED | OUTPATIENT
Start: 2019-09-08 | End: 2019-09-09

## 2019-09-08 RX ORDER — MULTIVIT-MIN/FERROUS GLUCONATE 9 MG/15 ML
15 LIQUID (ML) ORAL DAILY
Refills: 0 | Status: DISCONTINUED | OUTPATIENT
Start: 2019-09-08 | End: 2019-09-08

## 2019-09-08 RX ORDER — IPRATROPIUM BROMIDE 0.2 MG/ML
1 SOLUTION, NON-ORAL INHALATION EVERY 6 HOURS
Refills: 0 | Status: DISCONTINUED | OUTPATIENT
Start: 2019-09-08 | End: 2019-09-09

## 2019-09-08 RX ORDER — ERYTHROMYCIN BASE 5 MG/GRAM
1 OINTMENT (GRAM) OPHTHALMIC (EYE)
Refills: 0 | Status: DISCONTINUED | OUTPATIENT
Start: 2019-09-08 | End: 2019-09-09

## 2019-09-08 RX ORDER — INFLUENZA VIRUS VACCINE 15; 15; 15; 15 UG/.5ML; UG/.5ML; UG/.5ML; UG/.5ML
0.5 SUSPENSION INTRAMUSCULAR ONCE
Refills: 0 | Status: COMPLETED | OUTPATIENT
Start: 2019-09-08 | End: 2019-09-08

## 2019-09-08 RX ORDER — SENNA PLUS 8.6 MG/1
2.5 TABLET ORAL DAILY
Refills: 0 | Status: DISCONTINUED | OUTPATIENT
Start: 2019-09-08 | End: 2019-09-09

## 2019-09-08 RX ORDER — POLYETHYLENE GLYCOL 3350 17 G/17G
17 POWDER, FOR SOLUTION ORAL
Refills: 0 | Status: DISCONTINUED | OUTPATIENT
Start: 2019-09-08 | End: 2019-09-09

## 2019-09-08 RX ORDER — ACETAMINOPHEN 500 MG
500 TABLET ORAL EVERY 6 HOURS
Refills: 0 | Status: DISCONTINUED | OUTPATIENT
Start: 2019-09-08 | End: 2019-09-09

## 2019-09-08 RX ADMIN — Medication 1 APPLICATION(S): at 21:10

## 2019-09-08 RX ADMIN — Medication 1 DROP(S): at 21:12

## 2019-09-08 RX ADMIN — Medication 1 TABLET(S): at 21:11

## 2019-09-08 RX ADMIN — CHLORHEXIDINE GLUCONATE 15 MILLILITER(S): 213 SOLUTION TOPICAL at 21:09

## 2019-09-08 RX ADMIN — SENNA PLUS 2.5 MILLILITER(S): 8.6 TABLET ORAL at 21:11

## 2019-09-08 RX ADMIN — SERTRALINE 50 MILLIGRAM(S): 25 TABLET, FILM COATED ORAL at 21:12

## 2019-09-08 RX ADMIN — Medication 2 MILLIGRAM(S): at 21:15

## 2019-09-08 RX ADMIN — Medication 1 PUFF(S): at 22:38

## 2019-09-08 NOTE — PATIENT PROFILE ADULT - NSASFUNCLEVELADLTRANSFER_GEN_A_NUR
Name: Saulo Mccall: [employer]  
: 1962 Admit Date: 2018 Phone: [phone]  Room: [facility] PCP: Bere Franklin MD  MRN: [id number] Date: 2018  Code: DNR Chart and notes reviewed. Data reviewed. I review the patient's current medications in the medical record at each encounter.  I have evaluated and examined the patient. Overnight events Afebrile Sats 92% on 2L Tachycardic Resp cx NRF thus far RVP negative BLE VD negative for DVT 
 
ROS: Reports improvement in SOB. Slept better last night as well. Reports cough with occasional white sputum. Denies fever or chills. Denies CP or abd pain. Denies LE pain/swelling. Past Medical History:  
Diagnosis Date  Breast CA ([facility] [address]  Metastatic cancer ([facility] [address] Past Surgical History:  
Procedure Laterality Date  BREAST SURGERY PROCEDURE UNLISTED Left 2004  
 mastectomy  HX GYN  2010  
 ovaries removed  HX VASCULAR ACCESS Right   
 portacath Family History Problem Relation Age of Onset  Hypertension Mother  Cancer Father   
  mesothelioma  Cancer Maternal Grandfather 76 Bladder Social History Substance Use Topics  Smoking status: Never Smoker  Smokeless tobacco: Never Used  Alcohol use Yes Comment: 3-4 drinks/month Allergies Allergen Reactions  Pcn [Penicillins] Anaphylaxis  Contrast Agent [Iodine] Shortness of Breath and Itching Patient reported during phone conversation on 17. Current Facility-Administered Medications Medication Dose Route Frequency  cloNIDine HCl (CATAPRES) tablet 0.1 mg  0.1 mg Oral BID  budesonide (PULMICORT) 500 mcg/2 ml nebulizer suspension  500 mcg Nebulization BID RT  
 guaiFENesin ER (MUCINEX) tablet 600 mg  600 mg Oral Q12H  
 sodium chloride (AYR SALINE) 0.65 % nasal drops 2 Drop  2 Drop Both Nostrils Q2H PRN  pantoprazole (PROTONIX) tablet 40 mg  40 mg Oral ACB  ALPRAZolam (XANAX) tablet 0.5 mg  0.5 mg Oral QID PRN  
 lactobac ac& pc-s.therm-b.anim (KAYLA Q/RISAQUAD)  1 Cap Oral DAILY  LORazepam (ATIVAN) injection 1 mg  1 mg IntraVENous Q4H PRN  
 morphine IR (MS IR) tablet 15 mg  15 mg Oral Q4H PRN  
 enoxaparin (LOVENOX) injection 40 mg  40 mg SubCUTAneous Q24H  
 sodium chloride (NS) flush 5-10 mL  5-10 mL IntraVENous Q8H  
 sodium chloride (NS) flush 5-10 mL  5-10 mL IntraVENous PRN  
 sodium chloride (NS) flush 5-10 mL  5-10 mL IntraVENous Q8H  
 sodium chloride (NS) flush 5-10 mL  5-10 mL IntraVENous PRN  
 acetaminophen (TYLENOL) tablet 650 mg  650 mg Oral Q6H PRN  
 oxyCODONE IR (ROXICODONE) tablet 5 mg  5 mg Oral Q4H PRN  prochlorperazine (COMPAZINE) injection 10 mg  10 mg IntraVENous Q6H PRN  
 naloxone (NARCAN) injection 0.4 mg  0.4 mg IntraVENous PRN  
 docusate sodium (COLACE) capsule 100 mg  100 mg Oral BID  zolpidem (AMBIEN) tablet 5 mg  5 mg Oral QHS  promethazine (PHENERGAN) tablet 25 mg  25 mg Oral Q6H PRN  
 albuterol-ipratropium (DUO-NEB) 2.5 MG-0.5 MG/3 ML  3 mL Nebulization Q4H PRN  
 montelukast (SINGULAIR) tablet 10 mg  10 mg Oral DAILY  dexamethasone (DECADRON) tablet 4 mg  4 mg Oral Q12H  
 
 
VITALS: 
Patient Vitals for the past 24 hrs: 
 Temp Pulse Resp BP SpO2  
09/17/18 0803 97.4 °F (36.3 °C) - - 111/87 92 % 09/17/18 0700 - (!) 112 - - -  
09/17/18 0447 98.3 °F (36.8 °C) (!) 108 21 93/69 94 % 09/16/18 2309 - (!) 116 - - -  
09/16/18 2001 97.6 °F (36.4 °C) (!) 127 27 124/79 96 % 09/16/18 1842 - - - - 97 % 09/16/18 1515 97.5 °F (36.4 °C) (!) 126 (!) 32 99/73 96 % 09/16/18 1425 - (!) 114 - - - Physical Exam:  
 
General:  Alert, cooperative,tachypnic at rest, appears stated age. Head:  Normocephalic, without obvious abnormality, atraumatic. Eyes:  Conjunctivae/corneas clear. Nose: Nares normal. Septum midline.  Mucosa normal.   
Throat: Lips, mucosa, and tongue normal.   
 Neck: Supple, symmetrical, trachea midline, no adenopathy Lungs:   Clear to auscultation bilaterally. Chest wall:  No tenderness or deformity. Heart:  Regular rate and rhythm, S1, S2 normal, no murmur, click, rub or gallop. Abdomen:   Soft, non-tender. Bowel sounds normal.   
Extremities: Extremities normal, atraumatic, no cyanosis or edema. Pulses: 2+ and symmetric all extremities. Skin: Skin color, texture, turgor normal.   
Lymph nodes: Cervical nodes normal.  
Neurologic: Grossly nonfocal  
 
 
Lab Results Component Value Date/Time Sodium 138 09/15/2018 02:24 AM  
 Potassium 4.2 09/15/2018 02:24 AM  
 Chloride 103 09/15/2018 02:24 AM  
 CO2 24 09/15/2018 02:24 AM  
 BUN 11 09/15/2018 02:24 AM  
 Creatinine 0.59 09/15/2018 02:24 AM  
 Glucose 107 (H) 09/15/2018 02:24 AM  
 Calcium 9.1 09/15/2018 02:24 AM  
 Magnesium 2.2 09/15/2018 02:24 AM  
 Phosphorus 4.2 09/15/2018 02:24 AM  
 
 
Lab Results Component Value Date/Time WBC 9.6 09/15/2018 02:24 AM  
 HGB 13.5 09/15/2018 02:24 AM  
 PLATELET 013 (H) 15/04/7433 02:24 AM  
 MCV 93.6 09/15/2018 02:24 AM  
 
 
Lab Results Component Value Date/Time AST (SGOT) HEMOLYZED,RECOLLECT REQUESTED 09/14/2018 12:18 PM  
 Alk. phosphatase 163 (H) 09/14/2018 12:18 PM  
 Protein, total 7.4 09/14/2018 12:18 PM  
 Albumin 3.1 (L) 09/14/2018 12:18 PM  
 Globulin 4.3 (H) 09/14/2018 12:18 PM  
 
 
 
Lab Results Component Value Date/Time TSH 4.78 (H) 02/16/2018 09:11 PM  
  
 
No results found for: PH, PHI, PCO2, PCO2I, PO2, PO2I, HCO3, HCO3I, FIO2, FIO2I Lab Results Component Value Date/Time Troponin-I, Qt. <0.05 09/14/2018 12:18 PM  
  
 
Lab Results Component Value Date/Time Culture result: LIGHT PROBABLE NORMAL RESPIRATORY KAYLA 09/15/2018 04:15 PM  
 Culture result: SO FAR 09/15/2018 04:15 PM  
 Culture result: NO GROWTH 3 DAYS 09/14/2018 12:59 PM  
 
 
 
Lab Results Component Value Date/Time  Color YELLOW/STRAW 09/15/2018 02:38 AM  
 Appearance CLEAR 09/15/2018 02:38 AM  
 pH (UA) 6.0 09/15/2018 02:38 AM  
 Protein NEGATIVE  09/15/2018 02:38 AM  
 Glucose NEGATIVE  09/15/2018 02:38 AM  
 Ketone NEGATIVE  09/15/2018 02:38 AM  
 Bilirubin NEGATIVE  09/15/2018 02:38 AM  
 Blood NEGATIVE  09/15/2018 02:38 AM  
 Urobilinogen 0.2 09/15/2018 02:38 AM  
 Nitrites NEGATIVE  09/15/2018 02:38 AM  
 Leukocyte Esterase NEGATIVE  09/15/2018 02:38 AM  
 WBC 0-4 09/15/2018 02:38 AM  
 RBC 0-5 09/15/2018 02:38 AM  
 Bacteria NEGATIVE  09/15/2018 02:38 AM  
 
 
Images: no new images this morning IMPRESSION 
· Acute Respiratory distress with mild Hypoxia · Never smoker · Episodic dyspnea/ choking episodes - ? Element anxiety/ VCD · ? Episodic choking/swallowing d/o · Abnormal Chest CT:  Neg bronchoscopy on 7/17; ? Progressive metastatic disease? · Widely metastatic Breast Cancer- liver/bone/brain-- off rx · Pleural effusions- modest 
· Neg eval for PE /DVT---> tx dose lovenox just stopped · nml LVEF by ECHO; mild diastolic dysfunction/ normal RV 
 
PLAN 
· Supplemental O2 to keep sats >90% · Empiric course abx cover possible aspiration; Augmentin at d/c pending results of MBS · MBS today at 10; discussed with Speech · Brain irradiation this afternoon · rx ? Asthma- tried dulera once. · Reflux precautions elevate HOB/ NPO 3 hours prior bed · Continue protonix ( active gerd sx) · rx anxiety - ? Push palliation · DVT Prophylaxis:  Lovenox · Will need outpatient pulmonary follow up Discussed with Speech and Oncology.  
 
Vivian Graham  
 
 4 = completely dependent

## 2019-09-08 NOTE — H&P ADULT - HISTORY OF PRESENT ILLNESS
HPI:61yo bedbound, ventilator dependent/p Tracheostomy female since 2015 with a PMHX of ALS (amyotropic lateral sclerosis), dyslipidemia, and s/p gastrostomy tube placement in the past. Pt is being care for at home w/ home care. She presents for a tracheostomy exchange scheduled for 9/8/19.      PAST MEDICAL & SURGICAL HISTORY:  Aspiration into airway  Ventilator dependent: Since 2015  HLD (hyperlipidemia)  ALS (amyotrophic lateral sclerosis)  Encounter for PEG (percutaneous endoscopic gastrostomy): peg placed  Dependence on tracheostomy  S/P gastrostomy: 10/14 for dysphagia  receives jevity 2 cans TID      REVIEW OF SYSTEMS 12 systems negative, Pt unable to contribute      General:No Weight change/ Fatigue/ HA/Dizzy	    Skin/Breast: No Rashes/ Lesions/ Masses  	  Ophthalmologic: No Blurry vision/ Glaucoma/ Blindness  	  ENMT: No Hearing loss/ Drainage/ Lesions	    Respiratory and Thorax: No Cough/ Wheezing/ SOB/ Hemoptysis/ Sputum production  	  Cardiovascular: No Chest pain/ Palpitations/ Diaphoresis	    Gastrointestinal: No Nausea/ Vomiting/ Constipation/ Appetite Change	    Genitourinary: No Heamturia/ Dysuria/ Frequency change/ Impotence	    Musculoskeletal: No Pain/ Weakness/ Claudication	    Neurological: No Seizures/ TIA/CVA/ Parastesias	    Psychiatric: No Dementia/ Depression/ SI/HI	    Hematology/Lymphatics: No hx of bleeding/ Edema	    Endocrine:	No Hyperglycemia/ Hypoglycemia    Allergic/Immunologic:	 No Anaphylaxis/ Intolerance/ Recent illnesses    MEDICATIONS  (STANDING):    MEDICATIONS  (PRN):      Allergies    Bactrim DS (Rash)    Intolerances        SOCIAL HISTORY:  Occupation:  Smoking Hx: denies  Etoh Hx: denies  IVDA Hx: denies    FAMILY HISTORY:  No pertinent family history in first degree relatives    unless noted, no significant family hx with Mother, Father, Siblings    ICU Vital Signs Last 24 Hrs  T(C): 36.4 (08 Sep 2019 15:14), Max: 36.4 (08 Sep 2019 15:14)  T(F): 97.6 (08 Sep 2019 15:14), Max: 97.6 (08 Sep 2019 15:14)  HR: 80 (08 Sep 2019 15:14) (80 - 80)  BP: 123/70 (08 Sep 2019 15:14) (123/70 - 123/70)  BP(mean): --  ABP: --  ABP(mean): --  RR: 21 (08 Sep 2019 15:14) (21 - 21)  SpO2: 100% (08 Sep 2019 15:14) (100% - 100%)      General: WN/WD NAD  Neurology: Awake,, has no motor function other then eye tracking  Eyes: Scleras clear, PERRLA/ EOMI,   ENT:t, grossly patent pharynx, no stridor  Neck: Neck supple, trachea midline, No JVD, Tracheostomy intact w/ cuff leak  Respiratory: CTA B/L, No wheezing, rales, rhonchi  CV: RRR, S1S2, no murmurs, rubs or gallops  Abdominal: Soft, NT, ND +BS, + gastrostomy tube in tact w/o purulence/ bleeding  Extremities: No edema, + peripheral pulses  Skin: No Rashes, Hematoma, Ecchymosis  Lymphatic: No Neck, axilla, groin LAD          ASSESSMENT:   62yFemalePAST MEDICAL & SURGICAL HISTORY:  Aspiration into airway  Ventilator dependent: Since 2015  HLD (hyperlipidemia)  ALS (amyotrophic lateral sclerosis)  Encounter for PEG (percutaneous endoscopic gastrostomy): peg placed  Dependence on tracheostomy  S/P gastrostomy: 10/14 for dysphagia  receives jevity 2 cans TID      PLAN: Admit to [facility]            Plan for trach exchange            Plan d/w Dr Owens

## 2019-09-09 ENCOUNTER — TRANSCRIPTION ENCOUNTER (OUTPATIENT)
Age: 64
End: 2019-09-09

## 2019-09-09 ENCOUNTER — APPOINTMENT (OUTPATIENT)
Dept: THORACIC SURGERY | Facility: HOSPITAL | Age: 64
End: 2019-09-09

## 2019-09-09 VITALS
DIASTOLIC BLOOD PRESSURE: 66 MMHG | SYSTOLIC BLOOD PRESSURE: 106 MMHG | HEART RATE: 78 BPM | OXYGEN SATURATION: 100 % | RESPIRATION RATE: 14 BRPM

## 2019-09-09 DIAGNOSIS — Z99.11 DEPENDENCE ON RESPIRATOR [VENTILATOR] STATUS: ICD-10-CM

## 2019-09-09 LAB
APPEARANCE UR: SIGNIFICANT CHANGE UP
BACTERIA # UR AUTO: NEGATIVE — SIGNIFICANT CHANGE UP
BILIRUB UR-MCNC: NEGATIVE — SIGNIFICANT CHANGE UP
BLOOD UR QL VISUAL: NEGATIVE — SIGNIFICANT CHANGE UP
COLOR SPEC: YELLOW — SIGNIFICANT CHANGE UP
GLUCOSE UR-MCNC: NEGATIVE — SIGNIFICANT CHANGE UP
GRAM STN SPT: SIGNIFICANT CHANGE UP
HCV AB S/CO SERPL IA: 0.24 S/CO — SIGNIFICANT CHANGE UP (ref 0–0.99)
HCV AB SERPL-IMP: SIGNIFICANT CHANGE UP
HYALINE CASTS # UR AUTO: SIGNIFICANT CHANGE UP
KETONES UR-MCNC: NEGATIVE — SIGNIFICANT CHANGE UP
LEUKOCYTE ESTERASE UR-ACNC: SIGNIFICANT CHANGE UP
NITRITE UR-MCNC: NEGATIVE — SIGNIFICANT CHANGE UP
PH UR: 6.5 — SIGNIFICANT CHANGE UP (ref 5–8)
PROT UR-MCNC: 20 — SIGNIFICANT CHANGE UP
RBC CASTS # UR COMP ASSIST: HIGH (ref 0–?)
SP GR SPEC: 1.01 — SIGNIFICANT CHANGE UP (ref 1–1.04)
SPECIMEN SOURCE: SIGNIFICANT CHANGE UP
SQUAMOUS # UR AUTO: SIGNIFICANT CHANGE UP
UROBILINOGEN FLD QL: NORMAL — SIGNIFICANT CHANGE UP
WBC UR QL: HIGH (ref 0–?)

## 2019-09-09 PROCEDURE — 31502 CHANGE OF WINDPIPE AIRWAY: CPT

## 2019-09-09 PROCEDURE — 31624 DX BRONCHOSCOPE/LAVAGE: CPT

## 2019-09-09 PROCEDURE — 71045 X-RAY EXAM CHEST 1 VIEW: CPT | Mod: 26

## 2019-09-09 PROCEDURE — 99233 SBSQ HOSP IP/OBS HIGH 50: CPT

## 2019-09-09 RX ORDER — ALBUTEROL 90 UG/1
2 AEROSOL, METERED ORAL EVERY 6 HOURS
Refills: 0 | Status: DISCONTINUED | OUTPATIENT
Start: 2019-09-09 | End: 2019-09-09

## 2019-09-09 RX ORDER — ERYTHROMYCIN BASE 5 MG/GRAM
1 OINTMENT (GRAM) OPHTHALMIC (EYE)
Qty: 15 | Refills: 0
Start: 2019-09-09 | End: 2019-09-18

## 2019-09-09 RX ADMIN — Medication 1 DROP(S): at 13:06

## 2019-09-09 RX ADMIN — SERTRALINE 50 MILLIGRAM(S): 25 TABLET, FILM COATED ORAL at 11:13

## 2019-09-09 RX ADMIN — CHLORHEXIDINE GLUCONATE 15 MILLILITER(S): 213 SOLUTION TOPICAL at 05:03

## 2019-09-09 RX ADMIN — ALBUTEROL 2 PUFF(S): 90 AEROSOL, METERED ORAL at 04:18

## 2019-09-09 RX ADMIN — POLYETHYLENE GLYCOL 3350 17 GRAM(S): 17 POWDER, FOR SOLUTION ORAL at 05:03

## 2019-09-09 RX ADMIN — Medication 1 PUFF(S): at 04:20

## 2019-09-09 RX ADMIN — Medication 2 MILLIGRAM(S): at 13:06

## 2019-09-09 RX ADMIN — Medication 1 DROP(S): at 05:02

## 2019-09-09 RX ADMIN — Medication 1 PUFF(S): at 09:30

## 2019-09-09 RX ADMIN — Medication 2 MILLIGRAM(S): at 05:02

## 2019-09-09 RX ADMIN — Medication 1 TABLET(S): at 11:13

## 2019-09-09 RX ADMIN — ALBUTEROL 2 PUFF(S): 90 AEROSOL, METERED ORAL at 09:32

## 2019-09-09 RX ADMIN — SENNA PLUS 2.5 MILLILITER(S): 8.6 TABLET ORAL at 11:13

## 2019-09-09 RX ADMIN — Medication 1 APPLICATION(S): at 05:03

## 2019-09-09 NOTE — DISCHARGE NOTE NURSING/CASE MANAGEMENT/SOCIAL WORK - PATIENT PORTAL LINK FT
You can access the FollowMyHealth Patient Portal offered by [facility] by registering at the following website: [email or web address]. By joining [employer]’s FollowMyHealth portal, you will also be able to view your health information using other applications (apps) compatible with our system.

## 2019-09-09 NOTE — DISCHARGE NOTE PROVIDER - HOSPITAL COURSE
61yo bedbound, ventilator dependent/p Tracheostomy female since 2015 with a PMHX of ALS (amyotropic lateral sclerosis), dyslipidemia, and s/p gastrostomy tube placement in the past. Pt is being care for at home w/ home care. She presents for a tracheostomy exchange scheduled for 9/9/19.    FB, tracheostomy exchanged with #8 cuffed shiley performed, precede tolerated. Patient stable for discharge 61yo bedbound, ventilator dependent/p Tracheostomy female since 2015 with a PMHX of ALS (amyotropic lateral sclerosis), dyslipidemia, and s/p gastrostomy tube placement in the past. Pt is being care for at home w/ home care. She presents for a tracheostomy exchange scheduled for 9/9/19.    Patient's home health aid states while patient voids becomes tachycardic, concern for UTI so UA and culture sent, will be followed up outpatient    FB, tracheostomy exchanged with #8 cuffed josuéley performed, precede tolerated. Patient stable for discharge

## 2019-09-09 NOTE — CONSULT NOTE ADULT - ATTENDING COMMENTS
VASCULAR NEUROLOGY ATTENDING  The patient is seen and examined the history and imaging are reviewed. I agree with the resident note unless otherwise noted. Patient with ALS and exam as above. Decreased eye movements since last in-hospital evaluation from 5/18. Would continue current medication regimen. Consideration for increasing sertraline if needed. Discussed with  at bedside.

## 2019-09-09 NOTE — CONSULT NOTE ADULT - SUBJECTIVE AND OBJECTIVE BOX
VASCULAR NEUROLOGY ATTENDING NOTE    Patient seen and examined history and imaging reviewed. Agree with resident/fellow note as applicable.    HPI: 63yo bedbound, ventilator dependent/p Tracheostomy female since 2015 with a PMHX of ALS (amyotropic lateral sclerosis), dyslipidemia, and s/p gastrostomy tube placement in the past. Pt is being care for at home w/ home care. She presents for a tracheostomy exchange scheduled for 9/8/19.    VITALS:  Vital Signs Last 24 Hrs  T(C): 36.4 (09 Sep 2019 04:00), Max: 37.4 (08 Sep 2019 18:00)  T(F): 97.5 (09 Sep 2019 04:00), Max: 99.3 (08 Sep 2019 18:00)  HR: 72 (09 Sep 2019 11:19) (65 - 87)  BP: 83/54 (09 Sep 2019 11:00) (83/54 - 134/68)  BP(mean): 65 (09 Sep 2019 11:00) (65 - 87)  RR: 14 (09 Sep 2019 11:00) (14 - 21)  SpO2: 100% (09 Sep 2019 11:19) (99% - 100%)    Labs:   09-08    139  |  104  |  14  ----------------------------<  89  3.9   |  20<L>  |  < 0.20<L>    Ca    10.0      08 Sep 2019 20:45                            11.5   11.68 )-----------( 406      ( 08 Sep 2019 20:45 )             38.0       MEDS:  ALBUTerol    90 MICROgram(s) HFA Inhaler 2 Puff(s) Inhalation every 6 hours  artificial  tears Solution 1 Drop(s) Both EYES three times a day  chlorhexidine 0.12% Liquid 15 milliLiter(s) Oral Mucosa every 12 hours  diazepam    Tablet 2 milliGRAM(s) Oral three times a day  enoxaparin Injectable 40 milliGRAM(s) SubCutaneous two times a day  erythromycin   Ointment 1 Application(s) Right EYE two times a day  ipratropium 17 MICROgram(s) HFA Inhaler 1 Puff(s) Inhalation every 6 hours  multivitamin 1 Tablet(s) Oral daily  polyethylene glycol 3350 17 Gram(s) Oral two times a day  senna Syrup 2.5 milliLiter(s) Oral daily  sertraline 50 milliGRAM(s) Oral daily      Exam:   Eyes open attentive. Not following eye blink on request. No up/down eye movements. R gaze movements and L lateral eye movements intact on tracking but not on request. Flaccid quadraplegia

## 2019-09-09 NOTE — DIETITIAN INITIAL EVALUATION ADULT. - OTHER INFO
Pt. known from previous admissions , noted the latest nutrition assessment during the admission in May 2018 and discharged on Jevity 1.2 @ 70 ml/hr x12hrs w/ free water 120 ml x3/d , which continued until admission , noted 8.8# wt. decrease in > 1 year . Pt. scheduled to be discharged today on home regimen  EN . No nutrition intervention indicated .

## 2019-09-09 NOTE — PROGRESS NOTE ADULT - SUBJECTIVE AND OBJECTIVE BOX
TINO MATA            MRN-[id number]         Bactrim DS (Rash)                 HPI:  HPI:61yo bedbound, ventilator dependent/p Tracheostomy female since 2015 with a PMHX of ALS (amyotropic lateral sclerosis), dyslipidemia, and s/p gastrostomy tube placement in the past. Pt is being care for at home w/ home care. She presents for a tracheostomy exchange scheduled for 9/9/19.      Issues:    Chronic respiratory failure on Vent support  ALS (amyotrophic lateral sclerosis)  Tracheostomy status  Hyperlipidemia                  Home Medications:  diazepam 2 mg oral tablet: 1 tab(s) by gastrostomy tube 3 times a day (09 May 2018 12:23)  ipratropium 500 mcg/2.5 mL inhalation solution: 2.5 milliliter(s) inhaled every 6 hours (09 May 2018 12:23)  levalbuterol 0.63 mg/3 mL inhalation solution: 3 milliliter(s) inhaled every 6 hours (09 May 2018 12:23)  Lovenox 40 mg/0.4 mL injectable solution:  injectable 2 times a day (09 May 2018 12:23)  polyethylene glycol 3350 oral powder for reconstitution: 17 gram(s) orally 2 times a day (09 May 2018 12:23)  riluzole 50 mg oral tablet: 1 tab(s) by gastrostomy tube 2 times a day (before meals) (09 May 2018 12:23)  senna 8.8 mg/5 mL oral syrup: 2.5 milliliter(s) orally once a day (09 May 2018 12:23)  sertraline 50 mg oral tablet: 1 tab(s) by gastrostomy tube once a day (09 May 2018 12:23)      PAST MEDICAL & SURGICAL HISTORY:  Aspiration into airway  Ventilator dependent: Since 2015  HLD (hyperlipidemia)  ALS (amyotrophic lateral sclerosis)  Encounter for PEG (percutaneous endoscopic gastrostomy): peg placed  Dependence on tracheostomy  S/P gastrostomy: 10/14 for dysphagia  receives jevity 2 cans TID        ICU Vital Signs Last 24 Hrs  T(C): 37.4 (08 Sep 2019 18:00), Max: 37.4 (08 Sep 2019 18:00)  T(F): 99.3 (08 Sep 2019 18:00), Max: 99.3 (08 Sep 2019 18:00)  HR: 78 (08 Sep 2019 18:30) (69 - 82)  BP: 104/60 (08 Sep 2019 18:30) (104/60 - 134/68)  BP(mean): 73 (08 Sep 2019 18:30) (73 - 87)  ABP: --  ABP(mean): --  RR: 14 (08 Sep 2019 18:30) (14 - 21)  SpO2: 99% (08 Sep 2019 18:30) (99% - 100%)    I&O's Detail    08 Sep 2019 07:01  -  08 Sep 2019 18:42  --------------------------------------------------------  IN:  Total IN: 0 mL    OUT:    Stool: 1 mL  Total OUT: 1 mL    Total NET: -1 mL        CAPILLARY BLOOD GLUCOSE          Home Medications:  diazepam 2 mg oral tablet: 1 tab(s) by gastrostomy tube 3 times a day (09 May 2018 12:23)  ipratropium 500 mcg/2.5 mL inhalation solution: 2.5 milliliter(s) inhaled every 6 hours (09 May 2018 12:23)  levalbuterol 0.63 mg/3 mL inhalation solution: 3 milliliter(s) inhaled every 6 hours (09 May 2018 12:23)  Lovenox 40 mg/0.4 mL injectable solution:  injectable 2 times a day (09 May 2018 12:23)  polyethylene glycol 3350 oral powder for reconstitution: 17 gram(s) orally 2 times a day (09 May 2018 12:23)  riluzole 50 mg oral tablet: 1 tab(s) by gastrostomy tube 2 times a day (before meals) (09 May 2018 12:23)  senna 8.8 mg/5 mL oral syrup: 2.5 milliliter(s) orally once a day (09 May 2018 12:23)  sertraline 50 mg oral tablet: 1 tab(s) by gastrostomy tube once a day (09 May 2018 12:23)      MEDICATIONS  (STANDING):  artificial  tears Solution 1 Drop(s) Both EYES three times a day  chlorhexidine 0.12% Liquid 15 milliLiter(s) Oral Mucosa every 12 hours  diazepam    Tablet 2 milliGRAM(s) Oral three times a day  enoxaparin Injectable 40 milliGRAM(s) SubCutaneous two times a day  erythromycin   Ointment 1 Application(s) Right EYE two times a day  ipratropium    for Nebulization 500 MICROGram(s) Nebulizer every 6 hours  levalbuterol Inhalation 0.63 milliGRAM(s) Inhalation every 6 hours  multivitamin/minerals/iron Oral Solution (CENTRUM) 15 milliLiter(s) Oral daily  polyethylene glycol 3350 17 Gram(s) Oral two times a day  senna Syrup 2.5 milliLiter(s) Oral daily  sertraline 50 milliGRAM(s) Oral daily    MEDICATIONS  (PRN):  acetaminophen    Suspension .. 500 milliGRAM(s) Oral every 6 hours PRN Temp greater or equal to 38C (100.4F), Mild Pain (1 - 3)  sodium chloride 0.9% for Nebulization 3 milliLiter(s) Nebulizer every 6 hours PRN dry secrestions          Physical exam:                             General:               Pt is awake, comfortable on vent                                                 Neuro:                 Has ALS, Could not assess                            Cardiovascular:   S1 & S2, regular                           Respiratory:         Air entry is fair and equal on both sides, has bilateral conducted sounds                           GI:                          Soft, nondistended and nontender, Bowel sounds active.                            Ext:                        No cyanosis or edema     Labs:                                                                   Plan:    General: 61yo bedbound, ventilator dependent/p Tracheostomy female since 2015 with a PMHX of ALS (amyotropic lateral sclerosis), dyslipidemia, and s/p gastrostomy tube placement in the past. Pt is being care for at home w/ home care. She presents for a tracheostomy exchange scheduled for 9/9/19.                              Neuro:                                          ALS - On Riluzole  Continue Zoloft and Diazepam                            Cardiovascular:                                          Continue hemodynamic monitoring.                              Respiratory:                                         Pt is on full mechanical vent support XO--40-5                                         Comfortable, not in any distress.                                                                Continue bronchodilators Atrovent / Xopenex, pulmonary toilet                            GI                                         On tube feeds. NPO after MN for trach exchange in AM                                         Continue GI prophylaxis with Pepcid / Protonix                                         Continue Zofran / Reglan for nausea - PRN	                                                                 Renal:                                         Continue free water via PEG                                         Monitor I/Os and electrolytes                                                                                        Hem/ Onc:                                                                                  Hold AM dose of Xarelto                           Infectious disease:                                            No signs of infection. Monitor for fever / leukocytosis.                                          Trach site has some clear secretions. Local trach care                            Endocrine                                             No active issues    Pt is on Lovenox  for DVT prophylaxis.     Pertinent clinical, laboratory, radiographic, hemodynamic, echocardiographic, respiratory data, microbiologic data and chart were reviewed and analyzed frequently throughout the course of the day and night  Patient seen, examined and plan discussed with CT Surgeon  / CTICU team during rounds.    Pt's status discussed with family at bedside, updated status          Moo Anderson MD
TINO MATA            MRN-[id number]         Bactrim DS (Rash)               HPI:61yo bedbound, ventilator dependent/p Tracheostomy female since 2015 with a PMHX of ALS (amyotropic lateral sclerosis), dyslipidemia, and s/p gastrostomy tube placement in the past. Pt is being care for at home w/ home care. She presents for a tracheostomy exchange scheduled for 9/9/19.      Issues:    Chronic respiratory failure on Vent support  ALS (amyotrophic lateral sclerosis)  Tracheostomy status  Hyperlipidemia                 Home Medications:  diazepam 2 mg oral tablet: 1 tab(s) by gastrostomy tube 3 times a day (09 May 2018 12:23)  ipratropium 500 mcg/2.5 mL inhalation solution: 2.5 milliliter(s) inhaled every 6 hours (09 May 2018 12:23)  levalbuterol 0.63 mg/3 mL inhalation solution: 3 milliliter(s) inhaled every 6 hours (09 May 2018 12:23)  Lovenox 40 mg/0.4 mL injectable solution:  injectable 2 times a day (09 May 2018 12:23)  polyethylene glycol 3350 oral powder for reconstitution: 17 gram(s) orally 2 times a day (09 May 2018 12:23)  riluzole 50 mg oral tablet: 1 tab(s) by gastrostomy tube 2 times a day (before meals) (09 May 2018 12:23)  senna 8.8 mg/5 mL oral syrup: 2.5 milliliter(s) orally once a day (09 May 2018 12:23)  sertraline 50 mg oral tablet: 1 tab(s) by gastrostomy tube once a day (09 May 2018 12:23)      PAST MEDICAL & SURGICAL HISTORY:  Aspiration into airway  Ventilator dependent: Since 2015  HLD (hyperlipidemia)  ALS (amyotrophic lateral sclerosis)  Encounter for PEG (percutaneous endoscopic gastrostomy): peg placed  Dependence on tracheostomy  S/P gastrostomy: 10/14 for dysphagia  receives jevity 2 cans TID        ICU Vital Signs Last 24 Hrs  T(C): 36.4 (09 Sep 2019 04:00), Max: 37.4 (08 Sep 2019 18:00)  T(F): 97.5 (09 Sep 2019 04:00), Max: 99.3 (08 Sep 2019 18:00)  HR: 78 (09 Sep 2019 05:00) (69 - 87)  BP: 97/60 (09 Sep 2019 05:00) (97/60 - 134/68)  BP(mean): 73 (09 Sep 2019 05:00) (67 - 87)  ABP: --  ABP(mean): --  RR: 14 (09 Sep 2019 05:00) (14 - 21)  SpO2: 100% (09 Sep 2019 05:00) (99% - 100%)    I&O's Detail    08 Sep 2019 07:01  -  09 Sep 2019 05:34  --------------------------------------------------------  IN:    Enteral Tube Flush: 90 mL    ns in tub fed  [id number]: 280 mL  Total IN: 370 mL    OUT:    Stool: 1 mL  Total OUT: 1 mL    Total NET: 369 mL        CAPILLARY BLOOD GLUCOSE          Home Medications:  diazepam 2 mg oral tablet: 1 tab(s) by gastrostomy tube 3 times a day (09 May 2018 12:23)  ipratropium 500 mcg/2.5 mL inhalation solution: 2.5 milliliter(s) inhaled every 6 hours (09 May 2018 12:23)  levalbuterol 0.63 mg/3 mL inhalation solution: 3 milliliter(s) inhaled every 6 hours (09 May 2018 12:23)  Lovenox 40 mg/0.4 mL injectable solution:  injectable 2 times a day (09 May 2018 12:23)  polyethylene glycol 3350 oral powder for reconstitution: 17 gram(s) orally 2 times a day (09 May 2018 12:23)  riluzole 50 mg oral tablet: 1 tab(s) by gastrostomy tube 2 times a day (before meals) (09 May 2018 12:23)  senna 8.8 mg/5 mL oral syrup: 2.5 milliliter(s) orally once a day (09 May 2018 12:23)  sertraline 50 mg oral tablet: 1 tab(s) by gastrostomy tube once a day (09 May 2018 12:23)      MEDICATIONS  (STANDING):  ALBUTerol    90 MICROgram(s) HFA Inhaler 2 Puff(s) Inhalation every 6 hours  artificial  tears Solution 1 Drop(s) Both EYES three times a day  chlorhexidine 0.12% Liquid 15 milliLiter(s) Oral Mucosa every 12 hours  diazepam    Tablet 2 milliGRAM(s) Oral three times a day  enoxaparin Injectable 40 milliGRAM(s) SubCutaneous two times a day  erythromycin   Ointment 1 Application(s) Right EYE two times a day  ipratropium 17 MICROgram(s) HFA Inhaler 1 Puff(s) Inhalation every 6 hours  multivitamin 1 Tablet(s) Oral daily  polyethylene glycol 3350 17 Gram(s) Oral two times a day  senna Syrup 2.5 milliLiter(s) Oral daily  sertraline 50 milliGRAM(s) Oral daily    MEDICATIONS  (PRN):  acetaminophen    Suspension .. 500 milliGRAM(s) Oral every 6 hours PRN Temp greater or equal to 38C (100.4F), Mild Pain (1 - 3)  sodium chloride 0.9% for Nebulization 3 milliLiter(s) Nebulizer every 6 hours PRN dry secrestions      Mode: AC/ CMV (Assist Control/ Continuous Mandatory Ventilation)  RR (machine): 14  TV (machine): 450  FiO2: 35  PEEP: 5  MAP: 11  PIP: 27      Physical exam:                             General:               Pt is awake, comfortable on vent                                                 Neuro:                 Has ALS, Could not assess                            Cardiovascular:   S1 & S2, regular                           Respiratory:         Air entry is fair and equal on both sides, has bilateral conducted sounds                           GI:                          Soft, nondistended and nontender, Bowel sounds active.                            Ext:                        No cyanosis or edema         Labs:                                                                           11.5   11.68 )-----------( 406      ( 08 Sep 2019 20:45 )             38.0             09-08    139  |  104  |  14  ----------------------------<  89  3.9   |  20<L>  |  < 0.20<L>    Ca    10.0      08 Sep 2019 20:45                    PT/INR - ( 08 Sep 2019 20:45 )   PT: 13.0 SEC;   INR: 1.17          PTT - ( 08 Sep 2019 20:45 )  PTT:32.8 SEC          Plan:    General: 61yo bedbound, ventilator dependent/p Tracheostomy female since 2015 with a PMHX of ALS (amyotropic lateral sclerosis), dyslipidemia, and s/p gastrostomy tube placement in the past. Pt is being care for at home w/ home care. She presents for a tracheostomy exchange scheduled for 9/9/19.                              Neuro:                                          ALS - On Riluzole  Continue Zoloft and Diazepam                            Cardiovascular:                                          Continue hemodynamic monitoring.                              Respiratory:                                         Pt is on full mechanical vent support WG--40-5                                         Comfortable, not in any distress.                                                                Continue bronchodilators Atrovent / Xopenex, pulmonary toilet                            GI                                         On tube feeds. NPO after MN for trach exchange in AM                                         Continue GI prophylaxis with Pepcid / Protonix                                         Continue Zofran / Reglan for nausea - PRN	                                                                 Renal:                                         Continue free water via PEG                                         Monitor I/Os and electrolytes                                                                                        Hem/ Onc:                                                                                  Hold AM dose of Xarelto                           Infectious disease:                                            No signs of infection. Monitor for fever / leukocytosis.                                          Trach site has some clear secretions. Local trach care                            Endocrine                                             No active issues    Pt is on Lovenox  for DVT prophylaxis.     Pertinent clinical, laboratory, radiographic, hemodynamic, echocardiographic, respiratory data, microbiologic data and chart were reviewed and analyzed frequently throughout the course of the day and night  Patient seen, examined and plan discussed with CT Surgeon  / CTICU team during rounds.    Pt's status discussed with family at bedside, updated status      Moo Anderson MD

## 2019-09-09 NOTE — BRIEF OPERATIVE NOTE - OPERATION/FINDINGS
Moderate amount of thick white secretions suctioned, sent for BAL. Size 7 xlt was too long, size 8 shiley placed

## 2019-09-09 NOTE — DISCHARGE NOTE PROVIDER - CARE PROVIDER_API CALL
Merivn Owens)  Thoracic Surgery  [address] [place] [address] [place] [address]  Phone: [phone]  Fax: [phone]  Follow Up Time:

## 2019-09-09 NOTE — DISCHARGE NOTE PROVIDER - NSDCCPTREATMENT_GEN_ALL_CORE_FT
PRINCIPAL PROCEDURE  Procedure: Bronchoscopy  Findings and Treatment:       SECONDARY PROCEDURE  Procedure: Tracheostomy tube change  Findings and Treatment:

## 2019-09-09 NOTE — DISCHARGE NOTE PROVIDER - INSTRUCTIONS
Continue gastrostomy tube feeds: Jevity 1.2 @ 70mlx 12 hrs daily Continue gastrostomy tube feeds: Jevity 1.2 @ 70mlx 12 hrs daily    Free water via gastrostomy tube 120ml by gravity every 3 hours

## 2019-09-09 NOTE — DISCHARGE NOTE PROVIDER - NSDCCPCAREPLAN_GEN_ALL_CORE_FT
PRINCIPAL DISCHARGE DIAGNOSIS  Diagnosis: ALS (amyotrophic lateral sclerosis)  Assessment and Plan of Treatment:       SECONDARY DISCHARGE DIAGNOSES  Diagnosis: Ventilator dependence  Assessment and Plan of Treatment:

## 2019-09-09 NOTE — BRIEF OPERATIVE NOTE - NSICDXBRIEFPROCEDURE_GEN_ALL_CORE_FT
PROCEDURES:  Tracheostomy tube change 09-Sep-2019 08:57:10  Dustin Graham  Bronchoscopy 09-Sep-2019 08:56:55  Dustin Graham

## 2019-09-10 LAB
CULTURE - ACID FAST SMEAR CONCENTRATED: SIGNIFICANT CHANGE UP
SPECIMEN SOURCE: SIGNIFICANT CHANGE UP
SPECIMEN SOURCE: SIGNIFICANT CHANGE UP

## 2019-09-11 LAB
BACTERIA UR CULT: SIGNIFICANT CHANGE UP
GRAM STN SPT: SIGNIFICANT CHANGE UP
METHOD TYPE: SIGNIFICANT CHANGE UP
ORGANISM # SPEC MICROSCOPIC CNT: SIGNIFICANT CHANGE UP

## 2019-09-12 LAB
-  AMIKACIN: SIGNIFICANT CHANGE UP
-  AMIKACIN: SIGNIFICANT CHANGE UP
-  AMPICILLIN/SULBACTAM: SIGNIFICANT CHANGE UP
-  AMPICILLIN: SIGNIFICANT CHANGE UP
-  AZTREONAM: SIGNIFICANT CHANGE UP
-  AZTREONAM: SIGNIFICANT CHANGE UP
-  CEFAZOLIN: SIGNIFICANT CHANGE UP
-  CEFEPIME: SIGNIFICANT CHANGE UP
-  CEFEPIME: SIGNIFICANT CHANGE UP
-  CEFOXITIN: SIGNIFICANT CHANGE UP
-  CEFTAZIDIME: SIGNIFICANT CHANGE UP
-  CEFTAZIDIME: SIGNIFICANT CHANGE UP
-  CEFTRIAXONE: SIGNIFICANT CHANGE UP
-  ERTAPENEM: SIGNIFICANT CHANGE UP
-  GENTAMICIN: SIGNIFICANT CHANGE UP
-  GENTAMICIN: SIGNIFICANT CHANGE UP
-  IMIPENEM: SIGNIFICANT CHANGE UP
-  IMIPENEM: SIGNIFICANT CHANGE UP
-  LEVOFLOXACIN: SIGNIFICANT CHANGE UP
-  LEVOFLOXACIN: SIGNIFICANT CHANGE UP
-  MEROPENEM: SIGNIFICANT CHANGE UP
-  MEROPENEM: SIGNIFICANT CHANGE UP
-  PIPERACILLIN/TAZOBACTAM: SIGNIFICANT CHANGE UP
-  PIPERACILLIN/TAZOBACTAM: SIGNIFICANT CHANGE UP
-  TIGECYCLINE: SIGNIFICANT CHANGE UP
-  TOBRAMYCIN: SIGNIFICANT CHANGE UP
-  TOBRAMYCIN: SIGNIFICANT CHANGE UP
-  TRIMETHOPRIM/SULFAMETHOXAZOLE: SIGNIFICANT CHANGE UP
BACTERIA SPT RESP CULT: SIGNIFICANT CHANGE UP
METHOD TYPE: SIGNIFICANT CHANGE UP

## 2019-09-15 ENCOUNTER — MOBILE ON CALL (OUTPATIENT)
Age: 64
End: 2019-09-15

## 2019-09-16 LAB
SPECIMEN SOURCE: SIGNIFICANT CHANGE UP
SPECIMEN SOURCE: SIGNIFICANT CHANGE UP

## 2019-09-23 ENCOUNTER — RX RENEWAL (OUTPATIENT)
Age: 64
End: 2019-09-23

## 2019-10-10 LAB — FUNGUS SPEC QL CULT: SIGNIFICANT CHANGE UP

## 2019-10-14 ENCOUNTER — RX RENEWAL (OUTPATIENT)
Age: 64
End: 2019-10-14

## 2019-10-30 ENCOUNTER — RX RENEWAL (OUTPATIENT)
Age: 64
End: 2019-10-30

## 2019-10-31 ENCOUNTER — RX RENEWAL (OUTPATIENT)
Age: 64
End: 2019-10-31

## 2019-11-11 LAB
ACID FAST STN SPEC: SIGNIFICANT CHANGE UP
ACID FAST STN SPEC: SIGNIFICANT CHANGE UP

## 2019-11-13 ENCOUNTER — RX RENEWAL (OUTPATIENT)
Age: 64
End: 2019-11-13

## 2019-11-17 ENCOUNTER — RX CHANGE (OUTPATIENT)
Age: 64
End: 2019-11-17

## 2019-11-25 ENCOUNTER — APPOINTMENT (OUTPATIENT)
Dept: PULMONOLOGY | Facility: CLINIC | Age: 64
End: 2019-11-25

## 2019-11-25 VITALS — DIASTOLIC BLOOD PRESSURE: 60 MMHG | SYSTOLIC BLOOD PRESSURE: 100 MMHG

## 2019-11-25 DIAGNOSIS — Z23 ENCOUNTER FOR IMMUNIZATION: ICD-10-CM

## 2019-11-25 DIAGNOSIS — Z43.0 ENCOUNTER FOR ATTENTION TO TRACHEOSTOMY: ICD-10-CM

## 2019-11-27 ENCOUNTER — RX RENEWAL (OUTPATIENT)
Age: 64
End: 2019-11-27

## 2019-12-01 ENCOUNTER — RX RENEWAL (OUTPATIENT)
Age: 64
End: 2019-12-01

## 2019-12-04 ENCOUNTER — RX RENEWAL (OUTPATIENT)
Age: 64
End: 2019-12-04

## 2020-01-02 NOTE — PHYSICAL EXAM
[Normal Conjunctiva] : the conjunctiva exhibited no abnormalities [Neck Appearance] : the appearance of the neck was normal [Thyroid Nodule] : there were no palpable thyroid nodules [Neck Cervical Mass (___cm)] : no neck mass was observed [Thyroid Diffuse Enlargement] : the thyroid was not enlarged [Jugular Venous Distention Increased] : there was no jugular-venous distention [Heart Sounds] : normal S1 and S2 [Murmurs] : no murmurs present [Heart Rate And Rhythm] : heart rate and rhythm were normal [Exaggerated Use Of Accessory Muscles For Inspiration] : no accessory muscle use [Respiration, Rhythm And Depth] : normal respiratory rhythm and effort [Auscultation Breath Sounds / Voice Sounds] : lungs were clear to auscultation bilaterally [Abdomen Soft] : soft [Abdomen Tenderness] : non-tender [Abdomen Mass (___ Cm)] : no abdominal mass palpated [Cyanosis, Localized] : no localized cyanosis [Gait - Sufficient For Exercise Testing] : the gait was sufficient for exercise testing [Abnormal Walk] : normal gait [Nail Clubbing] : no clubbing of the fingernails [] : no ischemic changes [Petechial Hemorrhages (___cm)] : no petechial hemorrhages [FreeTextEntry1] : immobliness

## 2020-01-02 NOTE — REVIEW OF SYSTEMS
[Cough] : cough [Dyspnea] : dyspnea [Sputum] : sputum  [Chronically Infected with _____] : chronically infected with [unfilled] [Negative] : Endocrine [de-identified] : Noncommunicative

## 2020-01-02 NOTE — ASSESSMENT
[FreeTextEntry1] : Status Stable.\par follow up for monthly visit, elective trach changes\par order feeding tubes\par \par Halyard Gastrostomy Feeding Tube 20F

## 2020-01-02 NOTE — HISTORY OF PRESENT ILLNESS
[FreeTextEntry1] : ALS, vent dependent\par LLL atelectasis\par Called to see patient at home for vent management and trach change\par Chronic vent failure secondary to ALS.\par dependent on tube feeding\par Has recent infections, resolved.\par Due for scheduled trach change\par flu vaccination

## 2020-01-03 ENCOUNTER — RX RENEWAL (OUTPATIENT)
Age: 65
End: 2020-01-03

## 2020-01-12 ENCOUNTER — RX RENEWAL (OUTPATIENT)
Age: 65
End: 2020-01-12

## 2020-02-16 ENCOUNTER — RX RENEWAL (OUTPATIENT)
Age: 65
End: 2020-02-16

## 2020-03-03 ENCOUNTER — RX RENEWAL (OUTPATIENT)
Age: 65
End: 2020-03-03

## 2020-03-04 ENCOUNTER — RX RENEWAL (OUTPATIENT)
Age: 65
End: 2020-03-04

## 2020-04-01 ENCOUNTER — RX RENEWAL (OUTPATIENT)
Age: 65
End: 2020-04-01

## 2020-04-19 ENCOUNTER — APPOINTMENT (OUTPATIENT)
Dept: PULMONOLOGY | Facility: CLINIC | Age: 65
End: 2020-04-19
Payer: MEDICARE

## 2020-04-19 PROCEDURE — 99349 HOME/RES VST EST MOD MDM 40: CPT

## 2020-05-01 ENCOUNTER — RX RENEWAL (OUTPATIENT)
Age: 65
End: 2020-05-01

## 2020-05-05 VITALS
DIASTOLIC BLOOD PRESSURE: 80 MMHG | OXYGEN SATURATION: 95 % | SYSTOLIC BLOOD PRESSURE: 130 MMHG | RESPIRATION RATE: 12 BRPM

## 2020-05-05 NOTE — HISTORY OF PRESENT ILLNESS
[Never] : never [TextBox_4] : ALS, vent dependent\par LLL atelectasis\par Called to see patient at home for vent management and trach change\par Chronic vent failure secondary to ALS.\par dependent on tube feeding\par No recent infections, resolved.\par Due for scheduled trach change\par \par

## 2020-05-05 NOTE — PROCEDURE
[FreeTextEntry1] : Trach change performed with standard Rowenaley trach, no complications\par Trach site noted to have granulation tissue at inner upper border

## 2020-05-05 NOTE — REVIEW OF SYSTEMS
[Negative] : Genitourinary [TextBox_3] : could not obtain-not communicative, neg per attendant staff [TextBox_69] : tube fed [TextBox_14] : trach site reported as clean

## 2020-05-05 NOTE — PHYSICAL EXAM
[No Acute Distress] : no acute distress [Normal Appearance] : normal appearance [Normal Oropharynx] : normal oropharynx [Normal Rate/Rhythm] : normal rate/rhythm [Normal S1, S2] : normal s1, s2 [No Neck Mass] : no neck mass [No Murmurs] : no murmurs [No Resp Distress] : no resp distress [No Abnormalities] : no abnormalities [Clear to Auscultation Bilaterally] : clear to auscultation bilaterally [Normal Gait] : normal gait [Benign] : benign [No Clubbing] : no clubbing [FROM] : FROM [No Edema] : no edema [No Cyanosis] : no cyanosis [Normal Color/ Pigmentation] : normal color/ pigmentation [Oriented x3] : oriented x3 [Normal Affect] : normal affect [TextBox_132] : unable to move [TextBox_44] : trach site clean

## 2020-05-05 NOTE — ASSESSMENT
[FreeTextEntry1] : Overall status unchanged\par Will continue to do trach changes and monitor granulation tissue at the site of tracheotomy tube\par May require in-hospital evaluation for trach site modification as was required on prior occasions.

## 2020-05-12 ENCOUNTER — RX RENEWAL (OUTPATIENT)
Age: 65
End: 2020-05-12

## 2020-06-21 ENCOUNTER — APPOINTMENT (OUTPATIENT)
Dept: PULMONOLOGY | Facility: CLINIC | Age: 65
End: 2020-06-21

## 2020-06-22 NOTE — PROCEDURE
[FreeTextEntry1] : Trach change performed with standard Shiley trach, no complications\par No granulation tissue noted\par \par Vent setting: Rate 14,  with 2L 02 min

## 2020-06-22 NOTE — ASSESSMENT
[FreeTextEntry1] : Overall status unchanged\par Will continue to do trach changes and monitor granulation tissue at the site of tracheotomy tube\par Repeat CXR and labs\par Order new suction machine and cough assist\par Should have backup ventilator as patient is 100% vent dependent.\par \par

## 2020-06-22 NOTE — HISTORY OF PRESENT ILLNESS
[TextBox_4] : ALS, vent dependent\par LLL atelectasis\par Called to see patient at home for vent management and trach change\par Chronic vent failure secondary to ALS.\par dependent on tube feeding\par No recent infections, resolved.\par Due for scheduled trach change\par Notified that several devices not working:\par Suction machine only running when plugged in; needs replacement\par Cough assist device not working\par Requesting backup ventilator\par \par

## 2020-06-22 NOTE — REVIEW OF SYSTEMS
[TextBox_3] : could not obtain-not communicative, neg per attendant staff [TextBox_14] : trach site reported as clean [TextBox_69] : tube fed

## 2020-07-13 ENCOUNTER — RX RENEWAL (OUTPATIENT)
Age: 65
End: 2020-07-13

## 2020-07-15 RX ORDER — INFANT FORMULA, IRON/DHA/ARA 2.32 G/1
POWDER (GRAM) ORAL
Qty: 120 | Refills: 5 | Status: ACTIVE | COMMUNITY
Start: 2020-07-15

## 2020-07-21 ENCOUNTER — RX RENEWAL (OUTPATIENT)
Age: 65
End: 2020-07-21

## 2020-08-12 ENCOUNTER — RX RENEWAL (OUTPATIENT)
Age: 65
End: 2020-08-12

## 2020-08-16 NOTE — PHYSICAL EXAM
[No Acute Distress] : no acute distress [Normal Oropharynx] : normal oropharynx [Normal Appearance] : normal appearance [No Neck Mass] : no neck mass [Normal Rate/Rhythm] : normal rate/rhythm [Normal S1, S2] : normal s1, s2 [No Murmurs] : no murmurs [No Resp Distress] : no resp distress [Clear to Auscultation Bilaterally] : clear to auscultation bilaterally [No Abnormalities] : no abnormalities [Normal Gait] : normal gait [No Clubbing] : no clubbing [Benign] : benign [No Edema] : no edema [No Cyanosis] : no cyanosis [FROM] : FROM [Normal Color/ Pigmentation] : normal color/ pigmentation [Normal Affect] : normal affect [Oriented x3] : oriented x3 [TextBox_44] : trach site clean [TextBox_132] : unable to move

## 2020-08-16 NOTE — PROCEDURE
[FreeTextEntry1] : Trach change performed with standard Shiley trach, no complications\par No granulation tissue noted\par \par Vent setting: Rate 14,  with 2L 02 min\par \par CXR-chronic LLL atelectasis

## 2020-08-16 NOTE — ASSESSMENT
[FreeTextEntry1] : Overall status unchanged\par Will continue to do trach changes and monitor granulation tissue at the site of tracheotomy tube\par Overall stable

## 2020-08-16 NOTE — REVIEW OF SYSTEMS
[Negative] : Genitourinary [TextBox_3] : could not obtain-not communicative, neg per attendant staff [TextBox_14] : trach site reported as clean [TextBox_69] : tube fed

## 2020-08-16 NOTE — HISTORY OF PRESENT ILLNESS
[Never] : never [TextBox_4] : ALS, vent dependent\par LLL atelectasis\par Called to see patient at home for vent management and trach change\par Chronic vent failure secondary to ALS.\par dependent on tube feeding\par No recent infections, resolved.\par Due for scheduled trach change\par

## 2020-09-02 ENCOUNTER — RX RENEWAL (OUTPATIENT)
Age: 65
End: 2020-09-02

## 2020-10-28 NOTE — H&P ADULT. - DOCUMENT STATUS
10/28/20 1601   Quick Adds   Type of Visit Initial PT Evaluation   Living Environment   Living Environment Comments Pt lives in a house with spouse, 2 MONO with unilateral handrails. All needs met on main level. Independent with mobility at baseline.    Self-Care   Usual Activity Tolerance good   Current Activity Tolerance moderate   Equipment Currently Used at Home crutches;walker, standard   Disability/Function   Fall history within last six months no   General Information   Onset of Illness/Injury or Date of Surgery 10/28/20   Referring Physician Luiz Hernandez MD   Patient/Family Therapy Goals Statement (PT) To decreased pain   Pertinent History of Current Problem (include personal factors and/or comorbidities that impact the POC) Pt is a 55 year old male POD0 s/p R TANISHA.   Existing Precautions/Restrictions no known precautions/restrictions   Weight-Bearing Status - LLE full weight-bearing   Weight-Bearing Status - RLE weight-bearing as tolerated   Cognition   Orientation Status (Cognition) oriented x 4   Affect/Mental Status (Cognition) WFL   Follows Commands (Cognition) WFL   Pain Assessment   Patient Currently in Pain Yes, see Vital Sign flowsheet  (5/10 R hip)   Range of Motion (ROM)   ROM Comment B LE WFL except R hip decreased   Strength   Strength Comments B LE demonstrated at least 3/5 strength except for R hip d/t pain   Bed Mobility   Comment (Bed Mobility) ModA supine <> sit   Transfers   Transfer Safety Comments Janice sit to stand   Gait/Stairs (Locomotion)   Distance in Feet (Required for LE Total Joints) 4   Comment (Gait/Stairs) CGA with FWW   Balance   Balance Comments good unsupported static sitting; fair+ standing with FWW   Clinical Impression   Criteria for Skilled Therapeutic Intervention yes, treatment indicated   PT Diagnosis (PT) impaired functional mobility   Influenced by the following impairments decreased R hip ROM and strength; pain   Functional limitations due to  impairments impaired bed mobility, transfers, ambulation, stairs   Clinical Presentation Stable/Uncomplicated   Clinical Presentation Rationale Pt is medically stable   Clinical Decision Making (Complexity) low complexity   Therapy Frequency (PT)   (BID)   Predicted Duration of Therapy Intervention (days/wks) 2 days   Planned Therapy Interventions (PT) balance training;bed mobility training;cryotherapy;gait training;home exercise program;patient/family education;ROM (range of motion);stair training;strengthening;transfer training   Anticipated Equipment Needs at Discharge (PT) walker, rolling   Risk & Benefits of therapy have been explained evaluation/treatment results reviewed;care plan/treatment goals reviewed;participants included;participants voiced agreement with care plan;risks/benefits reviewed;current/potential barriers reviewed;patient   PT Discharge Planning    PT Rationale for DC Rec Anticipate that patient will be at least Z[place] with bed mobility and stairs; SBA with transfers and ambulation by discharge.   PT Brief overview of current status  CGA with FWW   Total Evaluation Time   Total Evaluation Time (Minutes) 4      Authored by Resident/PA/NP

## 2020-11-22 ENCOUNTER — INPATIENT (INPATIENT)
Facility: HOSPITAL | Age: 65
LOS: 22 days | Discharge: HOME | DRG: 853 | End: 2020-12-15
Attending: INTERNAL MEDICINE | Admitting: STUDENT IN AN ORGANIZED HEALTH CARE EDUCATION/TRAINING PROGRAM
Payer: MEDICARE

## 2020-11-22 VITALS — HEIGHT: 62 IN

## 2020-11-22 DIAGNOSIS — Z93.1 GASTROSTOMY STATUS: Chronic | ICD-10-CM

## 2020-11-22 DIAGNOSIS — Z93.0 TRACHEOSTOMY STATUS: Chronic | ICD-10-CM

## 2020-11-22 DIAGNOSIS — J18.9 PNEUMONIA, UNSPECIFIED ORGANISM: ICD-10-CM

## 2020-11-22 DIAGNOSIS — Z43.1 ENCOUNTER FOR ATTENTION TO GASTROSTOMY: Chronic | ICD-10-CM

## 2020-11-22 LAB
ALBUMIN SERPL ELPH-MCNC: 2.5 G/DL — LOW (ref 3.3–5)
ALBUMIN SERPL ELPH-MCNC: 2.7 G/DL — LOW (ref 3.3–5)
ALP SERPL-CCNC: 105 U/L — SIGNIFICANT CHANGE UP (ref 40–120)
ALP SERPL-CCNC: 209 U/L — HIGH (ref 40–120)
ALT FLD-CCNC: 27 U/L — SIGNIFICANT CHANGE UP (ref 10–45)
ALT FLD-CCNC: 29 U/L — SIGNIFICANT CHANGE UP (ref 10–45)
ANION GAP SERPL CALC-SCNC: 16 MMOL/L — SIGNIFICANT CHANGE UP (ref 5–17)
ANION GAP SERPL CALC-SCNC: 16 MMOL/L — SIGNIFICANT CHANGE UP (ref 5–17)
APPEARANCE UR: ABNORMAL
AST SERPL-CCNC: 28 U/L — SIGNIFICANT CHANGE UP (ref 10–40)
AST SERPL-CCNC: 42 U/L — HIGH (ref 10–40)
BASE EXCESS BLDV CALC-SCNC: -8.9 MMOL/L — LOW (ref -2–2)
BASOPHILS # BLD AUTO: 0 K/UL — SIGNIFICANT CHANGE UP (ref 0–0.2)
BASOPHILS NFR BLD AUTO: 0 % — SIGNIFICANT CHANGE UP (ref 0–2)
BILIRUB SERPL-MCNC: 1 MG/DL — SIGNIFICANT CHANGE UP (ref 0.2–1.2)
BILIRUB SERPL-MCNC: 1.1 MG/DL — SIGNIFICANT CHANGE UP (ref 0.2–1.2)
BILIRUB UR-MCNC: NEGATIVE — SIGNIFICANT CHANGE UP
BUN SERPL-MCNC: 29 MG/DL — HIGH (ref 7–23)
BUN SERPL-MCNC: 34 MG/DL — HIGH (ref 7–23)
CA-I SERPL-SCNC: 1.15 MMOL/L — SIGNIFICANT CHANGE UP (ref 1.12–1.3)
CALCIUM SERPL-MCNC: 8 MG/DL — LOW (ref 8.4–10.5)
CALCIUM SERPL-MCNC: 8.2 MG/DL — LOW (ref 8.4–10.5)
CHLORIDE BLDV-SCNC: 104 MMOL/L — SIGNIFICANT CHANGE UP (ref 96–108)
CHLORIDE SERPL-SCNC: 98 MMOL/L — SIGNIFICANT CHANGE UP (ref 96–108)
CHLORIDE SERPL-SCNC: 98 MMOL/L — SIGNIFICANT CHANGE UP (ref 96–108)
CO2 BLDV-SCNC: 18 MMOL/L — LOW (ref 22–30)
CO2 SERPL-SCNC: 14 MMOL/L — LOW (ref 22–31)
CO2 SERPL-SCNC: 16 MMOL/L — LOW (ref 22–31)
COLOR SPEC: YELLOW — SIGNIFICANT CHANGE UP
CREAT SERPL-MCNC: <0.3 MG/DL — LOW (ref 0.5–1.3)
CREAT SERPL-MCNC: <0.3 MG/DL — LOW (ref 0.5–1.3)
DIFF PNL FLD: ABNORMAL
EOSINOPHIL # BLD AUTO: 0 K/UL — SIGNIFICANT CHANGE UP (ref 0–0.5)
EOSINOPHIL NFR BLD AUTO: 0 % — SIGNIFICANT CHANGE UP (ref 0–6)
GAS PNL BLDA: SIGNIFICANT CHANGE UP
GAS PNL BLDV: 128 MMOL/L — LOW (ref 135–145)
GAS PNL BLDV: SIGNIFICANT CHANGE UP
GAS PNL BLDV: SIGNIFICANT CHANGE UP
GLUCOSE BLDV-MCNC: 155 MG/DL — HIGH (ref 70–99)
GLUCOSE SERPL-MCNC: 142 MG/DL — HIGH (ref 70–99)
GLUCOSE SERPL-MCNC: 158 MG/DL — HIGH (ref 70–99)
GLUCOSE UR QL: NEGATIVE — SIGNIFICANT CHANGE UP
HCO3 BLDV-SCNC: 17 MMOL/L — LOW (ref 21–29)
HCT VFR BLD CALC: 31.2 % — LOW (ref 34.5–45)
HCT VFR BLD CALC: 32.3 % — LOW (ref 34.5–45)
HCT VFR BLDA CALC: 32 % — LOW (ref 39–50)
HGB BLD CALC-MCNC: 10.4 G/DL — LOW (ref 11.5–15.5)
HGB BLD-MCNC: 10 G/DL — LOW (ref 11.5–15.5)
HGB BLD-MCNC: 10.2 G/DL — LOW (ref 11.5–15.5)
INR BLD: 1.33 RATIO — HIGH (ref 0.88–1.16)
KETONES UR-MCNC: NEGATIVE — SIGNIFICANT CHANGE UP
LACTATE BLDV-MCNC: 4.3 MMOL/L — CRITICAL HIGH (ref 0.7–2)
LEUKOCYTE ESTERASE UR-ACNC: ABNORMAL
LYMPHOCYTES # BLD AUTO: 0 % — LOW (ref 13–44)
LYMPHOCYTES # BLD AUTO: 0 K/UL — LOW (ref 1–3.3)
MAGNESIUM SERPL-MCNC: 1.7 MG/DL — SIGNIFICANT CHANGE UP (ref 1.6–2.6)
MCHC RBC-ENTMCNC: 28.8 PG — SIGNIFICANT CHANGE UP (ref 27–34)
MCHC RBC-ENTMCNC: 28.9 PG — SIGNIFICANT CHANGE UP (ref 27–34)
MCHC RBC-ENTMCNC: 31.6 GM/DL — LOW (ref 32–36)
MCHC RBC-ENTMCNC: 32.1 GM/DL — SIGNIFICANT CHANGE UP (ref 32–36)
MCV RBC AUTO: 90.2 FL — SIGNIFICANT CHANGE UP (ref 80–100)
MCV RBC AUTO: 91.2 FL — SIGNIFICANT CHANGE UP (ref 80–100)
MONOCYTES # BLD AUTO: 0.77 K/UL — SIGNIFICANT CHANGE UP (ref 0–0.9)
MONOCYTES NFR BLD AUTO: 4 % — SIGNIFICANT CHANGE UP (ref 2–14)
NEUTROPHILS # BLD AUTO: 17.87 K/UL — HIGH (ref 1.8–7.4)
NEUTROPHILS NFR BLD AUTO: 80 % — HIGH (ref 43–77)
NITRITE UR-MCNC: NEGATIVE — SIGNIFICANT CHANGE UP
NRBC # BLD: 0 /100 WBCS — SIGNIFICANT CHANGE UP (ref 0–0)
PCO2 BLDV: 41 MMHG — SIGNIFICANT CHANGE UP (ref 35–50)
PH BLDV: 7.25 — LOW (ref 7.35–7.45)
PH UR: 6.5 — SIGNIFICANT CHANGE UP (ref 5–8)
PHOSPHATE SERPL-MCNC: 1.8 MG/DL — LOW (ref 2.5–4.5)
PLATELET # BLD AUTO: 154 K/UL — SIGNIFICANT CHANGE UP (ref 150–400)
PLATELET # BLD AUTO: 161 K/UL — SIGNIFICANT CHANGE UP (ref 150–400)
PO2 BLDV: 86 MMHG — HIGH (ref 25–45)
POTASSIUM BLDV-SCNC: 3 MMOL/L — LOW (ref 3.5–5.3)
POTASSIUM SERPL-MCNC: 3.2 MMOL/L — LOW (ref 3.5–5.3)
POTASSIUM SERPL-MCNC: 3.5 MMOL/L — SIGNIFICANT CHANGE UP (ref 3.5–5.3)
POTASSIUM SERPL-SCNC: 3.2 MMOL/L — LOW (ref 3.5–5.3)
POTASSIUM SERPL-SCNC: 3.5 MMOL/L — SIGNIFICANT CHANGE UP (ref 3.5–5.3)
PROCALCITONIN SERPL-MCNC: 34.67 NG/ML — HIGH (ref 0.02–0.1)
PROT SERPL-MCNC: 5.5 G/DL — LOW (ref 6–8.3)
PROT SERPL-MCNC: 5.6 G/DL — LOW (ref 6–8.3)
PROT UR-MCNC: 100 — SIGNIFICANT CHANGE UP
PROTHROM AB SERPL-ACNC: 15.7 SEC — HIGH (ref 10.6–13.6)
RBC # BLD: 3.46 M/UL — LOW (ref 3.8–5.2)
RBC # BLD: 3.54 M/UL — LOW (ref 3.8–5.2)
RBC # FLD: 16.8 % — HIGH (ref 10.3–14.5)
RBC # FLD: 16.9 % — HIGH (ref 10.3–14.5)
SAO2 % BLDV: 96 % — HIGH (ref 67–88)
SARS-COV-2 RNA SPEC QL NAA+PROBE: SIGNIFICANT CHANGE UP
SODIUM SERPL-SCNC: 128 MMOL/L — LOW (ref 135–145)
SODIUM SERPL-SCNC: 130 MMOL/L — LOW (ref 135–145)
SP GR SPEC: 1.02 — SIGNIFICANT CHANGE UP (ref 1.01–1.02)
UROBILINOGEN FLD QL: NEGATIVE — SIGNIFICANT CHANGE UP
WBC # BLD: 19.22 K/UL — HIGH (ref 3.8–10.5)
WBC # BLD: 31.89 K/UL — HIGH (ref 3.8–10.5)
WBC # FLD AUTO: 19.22 K/UL — HIGH (ref 3.8–10.5)
WBC # FLD AUTO: 31.89 K/UL — HIGH (ref 3.8–10.5)

## 2020-11-22 PROCEDURE — 99291 CRITICAL CARE FIRST HOUR: CPT | Mod: CS,GC

## 2020-11-22 PROCEDURE — 71045 X-RAY EXAM CHEST 1 VIEW: CPT | Mod: 26

## 2020-11-22 PROCEDURE — 99291 CRITICAL CARE FIRST HOUR: CPT

## 2020-11-22 PROCEDURE — 93010 ELECTROCARDIOGRAM REPORT: CPT

## 2020-11-22 RX ORDER — DIAZEPAM 5 MG
1.5 TABLET ORAL
Refills: 0 | Status: DISCONTINUED | OUTPATIENT
Start: 2020-11-22 | End: 2020-11-29

## 2020-11-22 RX ORDER — IPRATROPIUM/ALBUTEROL SULFATE 18-103MCG
3 AEROSOL WITH ADAPTER (GRAM) INHALATION EVERY 6 HOURS
Refills: 0 | Status: DISCONTINUED | OUTPATIENT
Start: 2020-11-22 | End: 2020-12-10

## 2020-11-22 RX ORDER — CEFEPIME 1 G/1
1000 INJECTION, POWDER, FOR SOLUTION INTRAMUSCULAR; INTRAVENOUS EVERY 8 HOURS
Refills: 0 | Status: DISCONTINUED | OUTPATIENT
Start: 2020-11-22 | End: 2020-11-23

## 2020-11-22 RX ORDER — VANCOMYCIN HCL 1 G
1000 VIAL (EA) INTRAVENOUS EVERY 12 HOURS
Refills: 0 | Status: DISCONTINUED | OUTPATIENT
Start: 2020-11-22 | End: 2020-11-24

## 2020-11-22 RX ORDER — ENOXAPARIN SODIUM 100 MG/ML
0 INJECTION SUBCUTANEOUS
Qty: 0 | Refills: 0 | DISCHARGE

## 2020-11-22 RX ORDER — RIVAROXABAN 15 MG-20MG
10 KIT ORAL
Refills: 0 | Status: DISCONTINUED | OUTPATIENT
Start: 2020-11-22 | End: 2020-12-02

## 2020-11-22 RX ORDER — DIAZEPAM 5 MG
2.5 TABLET ORAL DAILY
Refills: 0 | Status: DISCONTINUED | OUTPATIENT
Start: 2020-11-22 | End: 2020-11-22

## 2020-11-22 RX ORDER — HEPARIN SODIUM 5000 [USP'U]/ML
5000 INJECTION INTRAVENOUS; SUBCUTANEOUS EVERY 8 HOURS
Refills: 0 | Status: DISCONTINUED | OUTPATIENT
Start: 2020-11-22 | End: 2020-11-22

## 2020-11-22 RX ORDER — MIDODRINE HYDROCHLORIDE 2.5 MG/1
10 TABLET ORAL EVERY 8 HOURS
Refills: 0 | Status: DISCONTINUED | OUTPATIENT
Start: 2020-11-22 | End: 2020-11-23

## 2020-11-22 RX ORDER — CEFEPIME 1 G/1
2000 INJECTION, POWDER, FOR SOLUTION INTRAMUSCULAR; INTRAVENOUS ONCE
Refills: 0 | Status: COMPLETED | OUTPATIENT
Start: 2020-11-22 | End: 2020-11-22

## 2020-11-22 RX ORDER — INFLUENZA VIRUS VACCINE 15; 15; 15; 15 UG/.5ML; UG/.5ML; UG/.5ML; UG/.5ML
0.5 SUSPENSION INTRAMUSCULAR ONCE
Refills: 0 | Status: COMPLETED | OUTPATIENT
Start: 2020-11-22 | End: 2020-11-22

## 2020-11-22 RX ORDER — POTASSIUM CHLORIDE 20 MEQ
10 PACKET (EA) ORAL ONCE
Refills: 0 | Status: COMPLETED | OUTPATIENT
Start: 2020-11-22 | End: 2020-11-22

## 2020-11-22 RX ORDER — SODIUM CHLORIDE 9 MG/ML
2200 INJECTION INTRAMUSCULAR; INTRAVENOUS; SUBCUTANEOUS ONCE
Refills: 0 | Status: COMPLETED | OUTPATIENT
Start: 2020-11-22 | End: 2020-11-22

## 2020-11-22 RX ORDER — CHLORHEXIDINE GLUCONATE 213 G/1000ML
15 SOLUTION TOPICAL EVERY 12 HOURS
Refills: 0 | Status: DISCONTINUED | OUTPATIENT
Start: 2020-11-22 | End: 2020-12-10

## 2020-11-22 RX ORDER — SODIUM CHLORIDE 9 MG/ML
1000 INJECTION INTRAMUSCULAR; INTRAVENOUS; SUBCUTANEOUS ONCE
Refills: 0 | Status: DISCONTINUED | OUTPATIENT
Start: 2020-11-22 | End: 2020-11-22

## 2020-11-22 RX ORDER — SERTRALINE 25 MG/1
50 TABLET, FILM COATED ORAL DAILY
Refills: 0 | Status: DISCONTINUED | OUTPATIENT
Start: 2020-11-22 | End: 2020-12-10

## 2020-11-22 RX ORDER — POTASSIUM CHLORIDE 20 MEQ
40 PACKET (EA) ORAL ONCE
Refills: 0 | Status: DISCONTINUED | OUTPATIENT
Start: 2020-11-22 | End: 2020-11-22

## 2020-11-22 RX ORDER — CHLORHEXIDINE GLUCONATE 213 G/1000ML
1 SOLUTION TOPICAL
Refills: 0 | Status: DISCONTINUED | OUTPATIENT
Start: 2020-11-22 | End: 2020-12-04

## 2020-11-22 RX ORDER — ACETAMINOPHEN 500 MG
650 TABLET ORAL ONCE
Refills: 0 | Status: COMPLETED | OUTPATIENT
Start: 2020-11-22 | End: 2020-11-22

## 2020-11-22 RX ORDER — MIDODRINE HYDROCHLORIDE 2.5 MG/1
10 TABLET ORAL ONCE
Refills: 0 | Status: DISCONTINUED | OUTPATIENT
Start: 2020-11-22 | End: 2020-11-22

## 2020-11-22 RX ORDER — DIAZEPAM 5 MG
2.5 TABLET ORAL
Refills: 0 | Status: DISCONTINUED | OUTPATIENT
Start: 2020-11-22 | End: 2020-11-29

## 2020-11-22 RX ORDER — SODIUM CHLORIDE 9 MG/ML
1000 INJECTION, SOLUTION INTRAVENOUS ONCE
Refills: 0 | Status: COMPLETED | OUTPATIENT
Start: 2020-11-22 | End: 2020-11-22

## 2020-11-22 RX ORDER — CHLORHEXIDINE GLUCONATE 213 G/1000ML
1 SOLUTION TOPICAL
Refills: 0 | Status: DISCONTINUED | OUTPATIENT
Start: 2020-11-22 | End: 2020-12-10

## 2020-11-22 RX ORDER — DIAZEPAM 5 MG
1.5 TABLET ORAL DAILY
Refills: 0 | Status: DISCONTINUED | OUTPATIENT
Start: 2020-11-22 | End: 2020-11-22

## 2020-11-22 RX ORDER — VANCOMYCIN HCL 1 G
1000 VIAL (EA) INTRAVENOUS ONCE
Refills: 0 | Status: COMPLETED | OUTPATIENT
Start: 2020-11-22 | End: 2020-11-22

## 2020-11-22 RX ORDER — RILUZOLE 50 MG/1
50 TABLET ORAL
Refills: 0 | Status: DISCONTINUED | OUTPATIENT
Start: 2020-11-22 | End: 2020-12-08

## 2020-11-22 RX ADMIN — CEFEPIME 100 MILLIGRAM(S): 1 INJECTION, POWDER, FOR SOLUTION INTRAMUSCULAR; INTRAVENOUS at 22:01

## 2020-11-22 RX ADMIN — Medication 250 MILLIGRAM(S): at 17:20

## 2020-11-22 RX ADMIN — SODIUM CHLORIDE 1000 MILLILITER(S): 9 INJECTION, SOLUTION INTRAVENOUS at 17:40

## 2020-11-22 RX ADMIN — Medication 100 MILLIEQUIVALENT(S): at 19:32

## 2020-11-22 RX ADMIN — CEFEPIME 100 MILLIGRAM(S): 1 INJECTION, POWDER, FOR SOLUTION INTRAMUSCULAR; INTRAVENOUS at 16:01

## 2020-11-22 RX ADMIN — SODIUM CHLORIDE 2200 MILLILITER(S): 9 INJECTION INTRAMUSCULAR; INTRAVENOUS; SUBCUTANEOUS at 15:45

## 2020-11-22 RX ADMIN — Medication 650 MILLIGRAM(S): at 16:00

## 2020-11-22 RX ADMIN — MIDODRINE HYDROCHLORIDE 10 MILLIGRAM(S): 2.5 TABLET ORAL at 21:05

## 2020-11-22 NOTE — CHART NOTE - NSCHARTNOTEFT_GEN_A_CORE
Notified by nurse that patient's left foot is significantly colder than the right. Patient evaluated at bedside: left foot cold with normal color. Pulses were not able to be palpated, however were identified in both dorsalis pedis and posterior tibial arteries on doppler examination. Arterial ultrasound examination ordered. Continue with q4h neurovascular checks for now.     Darien Kamara MD, PGY-2 Resident  Department of Internal Medicine  Pager: [phone] (NS) / [phone] (ANALIA)  Email: [email or web address]

## 2020-11-22 NOTE — H&P ADULT - NSHPLABSRESULTS_GEN_ALL_CORE
LABS:                        10.2   19.22 )-----------( 154      ( 2020 17:16 )             32.3     Hgb Trend: 10.2<--  11-22    130<L>  |  98  |  34<H>  ----------------------------<  142<H>  3.2<L>   |  16<L>  |  <0.30<L>    Ca    8.2<L>      2020 16:17    TPro  5.5<L>  /  Alb  2.7<L>  /  TBili  1.0  /  DBili  x   /  AST  28  /  ALT  27  /  AlkPhos  209<H>  -    Creatinine Trend: <0.30<--  LIVER FUNCTIONS - ( 2020 16:17 )  Alb: 2.7 g/dL / Pro: 5.5 g/dL / ALK PHOS: 209 U/L / ALT: 27 U/L / AST: 28 U/L / GGT: x                 Urinalysis Basic - ( 2020 16:17 )    Color: Yellow / Appearance: Slightly Turbid / S.019 / pH: x  Gluc: x / Ketone: Negative  / Bili: Negative / Urobili: Negative   Blood: x / Protein: 100 / Nitrite: Negative   Leuk Esterase: Moderate / RBC: 13 /hpf / WBC 9 /HPF   Sq Epi: x / Non Sq Epi: 4 /hpf / Bacteria: Negative        CAPILLARY BLOOD GLUCOSE

## 2020-11-22 NOTE — H&P ADULT - NSHPPHYSICALEXAM_GEN_ALL_CORE
General: trached, chronically ill appearing   HEENT: NCAT, tracheostomy in place  Cardiac: tachy but regular, no murmurs, 2+ peripheral pulses  Chest: CTA  Abdomen: soft, non-distended, bowel sounds present  Extremities: no peripheral edema  Skin: no rashes  Neuro: +tracks with eyes -motor in all extremities General: trached, chronically ill appearing   HEENT: NCAT, tracheostomy in place  Cardiac: tachy but regular, no murmurs, 2+ peripheral pulses  Chest: CTA, some transmitted upper airway sounds  Abdomen: soft, non-distended, bowel sounds present  Extremities: no peripheral edema  Skin: no rashes  Neuro: +tracks with eyes -motor in all extremities

## 2020-11-22 NOTE — ED PROVIDER NOTE - CRITICAL CARE ATTESTATION
none/no productive sputum I have personally provided the amount of critical care time documented below concurrently with the resident/fellow.  This time excludes time spent on separate procedures and time spent teaching. I have reviewed the resident’s/fellow’s documentation and I agree with the assessment and plan of care

## 2020-11-22 NOTE — H&P ADULT - ASSESSMENT
ASSESSMENT:      PLAN:    #Neuro  -----------------  - no motor at baseline  - tracks with eyes  - no sedation    #CV  -----------------  - tachycardic, hypotensive, fluid responsive in ED. Likely 2/2 sepsis  - closely monitor BP, no pressors presently    #Resp  -----------------  - trached on ventilator      #Renal  -----------------  - mildly hyponatremic, likely 2/2 fluid status  - 3.2L crystalloids bolus in ED    #GI  -----------------  - PEG tube in place  - tube feeds    #ID  -----------------  - concern for urosepsis vs sepsis 2/2 pneumonia  - CXR with L sided opacity  - cefepime and vanc for empiric coverage  - f/u blood and urine cultures for sensitivities  - pending COVID PCR    #Endo  -----------------  - BG <180      #Heme  -----------------  - monitor H&H, 10.2 right now, no signs of acute bleeding      #DVT Prophylaxis  -----------------  - SCDs      #GOC  -----------------  - full code   ASSESSMENT:      PLAN:    #Neuro  -----------------  - no motor at baseline  - tracks with eyes  - no sedation    #CV  -----------------  - tachycardic, hypotensive, fluid responsive in ED. Likely 2/2 sepsis  - closely monitor BP, no pressors presently    #Resp  -----------------  - trached on ventilator      #Renal  -----------------  - mildly hyponatremic, likely 2/2 fluid status  - 3.2L crystalloids bolus in ED    #GI  -----------------  - PEG tube in place  - tube feeds    #ID  -----------------  - concern for urosepsis vs sepsis 2/2 pneumonia  - CXR with L sided opacity  - cefepime and vanc for empiric coverage  - f/u blood and urine cultures for sensitivities  - pending COVID PCR    #Endo  -----------------  - BG <180      #Heme  -----------------  - monitor H&H, 10.2 right now, no signs of acute bleeding      #DVT Prophylaxis  -----------------  - c/w home xarelto 10mg      #GOC  -----------------  - full code   ASSESSMENT:  66 y/o F with advanced ALS s/p trach/PEG and HLD p/w tachycardia and decreased UOP concern for sepsis 2/2 UTI, admitted to MICU for further workup and vent management.     PLAN:    #Neuro  -----------------  - no motor function at baseline  - tracks with eyes  - no sedation    #CV  -----------------  - tachycardic, hypotensive, fluid responsive in ED. Likely 2/2 sepsis  - tachycardia gradually improving with IVF  - will consider additional medical therapy if patient with persistent tachycardia  - closely monitor BP, no pressors presently    #Resp  -----------------  - trached on ventilator  - currently saturating well on home vent settings    #Renal  -----------------  - mildly hyponatremic, likely 2/2 fluid status  - 3.2L crystalloids bolus in ED  - SCr at baseline  - will cont to monitor    #GI  -----------------  - PEG tube in place  - will resume tube feeds    #ID  -----------------  - concern for urosepsis vs sepsis 2/2 pneumonia  - CXR with L sided opacity  - cefepime and vanc for empiric coverage  - f/u blood and urine cultures for sensitivities  - will send sputum cultures  - pending COVID PCR    #Endo  -----------------  - BG <180  - will send TSH and A1C  - otherwise no active issues    #Heme  -----------------  - monitor H&H, 10.2 right now, no signs of acute bleeding    #DVT Prophylaxis  -----------------  - c/w home xarelto 10mg    #GOC  -----------------  - full code

## 2020-11-22 NOTE — ED ADULT NURSE NOTE - OBJECTIVE STATEMENT
66 y/o female with PMH of HLD, ALS, tracheostomy (vent-dependent) presents with worsening fevers, foul smelling urine, diaphoresis, hypotensive 70's systolic and tachycardia. Currently vented FIO2 40%, Peep 5, RR 14. +gastric distention with peg tube +fever on arrival to ED +placed on primafit for urine output monitoring +bowel movement now (no bleeding/melena) +pt was straight cath'd via aseptic technique draining clear yellow urine about 200 cc. Vitals are now stable after NS boluses. Will continue to monitor pt on telemetry. No vomiting, pedal edema, pressure ulcers on arrival to ED.

## 2020-11-22 NOTE — ED PROVIDER NOTE - CARE PLAN
Principal Discharge DX:	Pneumonia  Secondary Diagnosis:	Acute on chronic respiratory failure with hypoxia and hypercapnia   Principal Discharge DX:	Pneumonia  Secondary Diagnosis:	Sepsis  Secondary Diagnosis:	ALS (amyotrophic lateral sclerosis)  Secondary Diagnosis:	Ventilator dependent

## 2020-11-22 NOTE — ED PROVIDER NOTE - ATTENDING CONTRIBUTION TO CARE
Pt usually on trach collar presents with respiratory distress, on vent now, tachycardic, febrile.  Hx from family and EMS as pt nonverbal.  Exam: tachycardic, coarse bs, trach in place c/d/i.

## 2020-11-22 NOTE — ED PROVIDER NOTE - CLINICAL SUMMARY MEDICAL DECISION MAKING FREE TEXT BOX
Hakeem, PGY3- 65 yoF, PMHx of advanced ALS, s/p trach/peg, vent dependent BIB EMS for concern for weakness, tachycardia, fever. Pt has 24 hour RN care. RN on fri/sat/sun reports sightly more weak on Friday. Noticed elevated HR, decreased urination. Prompted a cath which revealed cloudy urine which was sent for Ua/Ucx (no results yet). Fever today. Lives with family.  tachy, soft systolics, abd soft, reps status basline Hakeem, PGY3- 65 yoF, PMHx of advanced ALS, s/p trach/peg, vent dependent BIB EMS for concern for weakness, tachycardia, fever. Pt has 24 hour RN care. RN on fri/sat/sun reports sightly more weak on Friday. Noticed elevated HR, decreased urination. Prompted a cath which revealed cloudy urine which was sent for Ua/Ucx (no results yet). Fever today. Lives with family.  tachy, soft systolics, abd soft, reps status baseline, neuro baseline  likely UTI/sepsis, eval for pna/covid/electrolyte issues

## 2020-11-22 NOTE — H&P ADULT - ATTENDING COMMENTS
Patient seen and examined in ED with MICU resident. Patient is a 65F advanced ALS who is ventilator dependent coming from home with tachycardia. In the ED she was noted to be hypotensive (SBP 80s), tachycardic (HR 150s), and febrile (102). She was found to have cloudy urine and a possible left sided opacity on CXR. She is being admitted for sepsis and further treatment. She is critically ill requiring frequent bedside visits with therapy change.    1. Chronic Hypoxemic respiratory Failure - continue mechanical ventilation on home settings currently. Maintain O2 sat > trach care. Unclear when the last time trach was changed. Patient known to Dr. Newman and Dr. Owens. The trach was placed by Dr. Owens about 5 years prior. Check ABG to ensure appropriate ventilation. Check sputum culture and MRSA nasal swab. Bronchodilator therapy.  2. Oropharyngeal Dysphagia - continue PEG feeds. Nutrition evaluation.  3. Infectious Disease - followup COVID swab - though low risk. Followup blood, urine, and sputum cultures. Will start broad spectrum antibiotics. continue to turn and position and followup nursing skin evaluation.  - Patient admitted with Sepsis and has responded to IVF with improvement in BP and HR. Will repeat lactate which was initially 4.7  4. Neuro - advanced ALS at baseline can track with eyes. This function worsens in settings of infection as currently noted.   5. Dispo - plan for eventual transfer to RCU once stable and bed available. Patient comes from home on vent and family would like this to be the discharge plan as well. SW followup in AM. Patient with services at home.  - Dr. Newman serves as patients PMD per .    I have provided 45 minutes of critical care time independent of procedures and/or teaching services. Patient seen and examined in ED with MICU resident. Patient is a 65F advanced ALS who is ventilator dependent coming from home with tachycardia. In the ED she was noted to be hypotensive (SBP 80s), tachycardic (HR 150s), and febrile (102). She was found to have cloudy urine and a possible left sided opacity on CXR. She is being admitted for sepsis and further treatment. She is critically ill requiring frequent bedside visits with therapy change.    1. Chronic Hypoxemic respiratory Failure - continue mechanical ventilation on home settings currently. Maintain O2 sat > trach care. Unclear when the last time trach was changed. Patient known to Dr. Newman and Dr. Owens. The trach was placed by Dr. Owens about 5 years prior. Check ABG to ensure appropriate ventilation. Check sputum culture and MRSA nasal swab. Bronchodilator therapy.  2. Oropharyngeal Dysphagia - continue PEG feeds. Nutrition evaluation.  3. Infectious Disease - followup COVID swab - though low risk. Followup blood, urine, and sputum cultures. Will start broad spectrum antibiotics. continue to turn and position and followup nursing skin evaluation.  - Patient admitted with Sepsis and has responded to IVF with improvement in BP and HR. Will repeat lactate which was initially 4.7  4. Neuro - advanced ALS at baseline can track with eyes. This function worsens in settings of infection as currently noted.   5. Lower Extremity - unilateral cool LE. Pulses are present with doppler. Patient already on AC with Xarelto. Will check LE arterial doppler.  6. Dispo - plan for eventual transfer to RCU once stable and bed available. Patient comes from home on vent and family would like this to be the discharge plan as well. SW followup in AM. Patient with services at home.  - Dr. Newman serves as patients PMD per .    I have provided 45 minutes of critical care time independent of procedures and/or teaching services.

## 2020-11-22 NOTE — ED PROVIDER NOTE - OBJECTIVE STATEMENT
65 yoF, PMHx of advanced ALS, s/p trach/peg, vent dependent BIB EMS for concern for weakness, tachycardia, fever. Pt has 24 hour RN care. RN on fri/sat/sun reports sightly more weak on Friday. Noticed elevated HR, decreased urination. Prompted a cath which revealed cloudy urine which was sent for Ua/Ucx (no results yet). Fever today. Lives with family.

## 2020-11-22 NOTE — H&P ADULT - HISTORY OF PRESENT ILLNESS
64yo F hx of advanced ALS, HLD, vent dependent s/p trach in 2015 and PEG BIBEMS from home for fevers. Pt's 24hr home health nurse reported over the past few days the pt has appeared more lethargic, has been tachycardic, and running fevers. Urination also decreased. Straight cath in the ED revealed cloudy urine. Urinates normally at home, no indwelling carter. Pt is baseline non-verbal w/ minimal motor function. Able to track with eyes. No hx of covid contacts.     ED Course:  - hypotensive, tachycardic to 150s, Tmax 102 on presentation >> received 3.2L fluid bolus >>BP improved 100s SBP, HR 130s  - started on cefepime and vanc  - Tylenol suppository  - CXR w/ L sided opacity  - COVID pending

## 2020-11-22 NOTE — ED PROVIDER NOTE - PHYSICAL EXAMINATION
General: trach, on vent, tachy, appears ill  Head: atraumatic, normocephalic  Eyes: PERRL, no scleral icterus  ENT: no epistaxis, airway patent,   Neck: full ROM, trach in place, no bleeding/leakage, on vent (baseline settings)  CV: tachy, regular, distal pulses present, systolics soft, 80s-90s  Pulm: lungs with rhonchi (chronic "nosy lungs per family)  GI: abd soft, non tender, no guarding/rebound/masses, G tube in place  Back: no signs of trauma  Extremities: joints stable, distal pulses intact, no edema, no outward signs of trauma   Neuro: non verbal, vent dependent, no motion of extremities, PERRL, likely baseline exam   Derm: warm, dry, normal color

## 2020-11-22 NOTE — H&P ADULT - NSICDXPASTMEDICALHX_GEN_ALL_CORE_FT
PAST MEDICAL HISTORY:  ALS (amyotrophic lateral sclerosis)     Aspiration into airway     HLD (hyperlipidemia)     Ventilator dependent Since 2015

## 2020-11-22 NOTE — ED PROVIDER NOTE - PROGRESS NOTE DETAILS
Dr. Hoyt Note: pt s/p 2L fluids with stable BP and persistent tachycardia and elevated lactate on repeat, would give additional fluids, antibx given, bands reported, pt with sepsis syndrome with likely pneumonia with high risk for morbidity and mortality.  MICU consulted, for admission to ICU likely. Hakeem, PGY3- admit to ICU (given no RCU beds). HR tachy but improving. Lactate improved. Spoke to , questions answered.

## 2020-11-22 NOTE — ED PROVIDER NOTE - SECONDARY DIAGNOSIS.
Acute on chronic respiratory failure with hypoxia and hypercapnia Sepsis ALS (amyotrophic lateral sclerosis) Ventilator dependent

## 2020-11-22 NOTE — H&P ADULT - NSICDXPASTSURGICALHX_GEN_ALL_CORE_FT
PAST SURGICAL HISTORY:  Dependence on tracheostomy     Encounter for PEG (percutaneous endoscopic gastrostomy) peg placed    S/P gastrostomy 10/14 for dysphagia  receives jevity 2 cans TID

## 2020-11-22 NOTE — CHART NOTE - NSCHARTNOTEFT_GEN_A_CORE
TO BE COMPLETED WITHIN 6 HOURS OF INITIAL ASSESSMENT:    For use in patients that have 2 sepsis criteria and new organ dysfunction   •	New or increased oxygen requirement  •	Lactate >2    If patient persistent hypotension (SBP<90) or any lactate >4 then provider evaluation (Physician/PA/NP) within 30 minutes of bolus completion is required.    Vital Signs Last 24 Hrs  T(C): 37.8 (22 Nov 2020 20:00), Max: 39.2 (22 Nov 2020 16:00)  T(F): 100 (22 Nov 2020 20:00), Max: 102.5 (22 Nov 2020 16:00)  HR: 120 (22 Nov 2020 20:00) (120 - 136)  BP: 89/52 (22 Nov 2020 20:00) (89/52 - 115/51)  BP(mean): 66 (22 Nov 2020 20:00) (66 - 66)  RR: 14 (22 Nov 2020 20:00) (14 - 14)  SpO2: 100% (22 Nov 2020 20:00) (99% - 100%)  		  LUNGS:  Grossly CTA bilaterally, no wheeze  HEART: Tachycardic, S1S2  CAPILLARY REFiLL:  	Fingers: [  x] less than 2 seconds [  ] more than 2 seconds                                           Toes: [  x]  less than 2 seconds [  ] more than 2 seconds   PERIPHERAL PULSES:  Radial: [  x] Palpable  [  ]  non-palpable                                         Dorsalis Pedis: [  x] Palpable  [  ] non-palpable                                           SKIN:   [ x ]  Diaphoretic  [  ]  mottling  [  ]  Cold extremities  [ x ]  Warm [  ]  Dry                      Other:    Labs:  22 Nov 2020 16:17    130    |  98     |  34     ----------------------------<  142    3.2     |  16     |  <0.30    Ca    8.2        22 Nov 2020 16:17    TPro  5.5    /  Alb  2.7    /  TBili  1.0    /  DBili  x      /  AST  28     /  ALT  27     /  AlkPhos  209    22 Nov 2020 16:17                          10.2   19.22 )-----------( 154      ( 22 Nov 2020 17:16 )             32.3     PT/INR - ( 22 Nov 2020 20:43 )   PT: 15.7 sec;   INR: 1.33 ratio           Lactate: 4.7    Plan (orders must be placed in EMR):     [  x]  Check Repeat Lactate   [  x]  No change in current plan  [  ]  Start Vasopressors:  [  ]  Repeat Fluid Bolus:  [  ] other:

## 2020-11-23 LAB
-  CANDIDA ALBICANS: SIGNIFICANT CHANGE UP
-  CANDIDA GLABRATA: SIGNIFICANT CHANGE UP
-  CANDIDA KRUSEI: SIGNIFICANT CHANGE UP
-  CANDIDA PARAPSILOSIS: SIGNIFICANT CHANGE UP
-  CANDIDA TROPICALIS: SIGNIFICANT CHANGE UP
-  COAGULASE NEGATIVE STAPHYLOCOCCUS: SIGNIFICANT CHANGE UP
-  K. PNEUMONIAE GROUP: SIGNIFICANT CHANGE UP
-  KPC RESISTANCE GENE: SIGNIFICANT CHANGE UP
-  STREPTOCOCCUS SP. (NOT GRP A, B OR S PNEUMONIAE): SIGNIFICANT CHANGE UP
A BAUMANNII DNA SPEC QL NAA+PROBE: SIGNIFICANT CHANGE UP
ALBUMIN SERPL ELPH-MCNC: 2.6 G/DL — LOW (ref 3.3–5)
ALBUMIN SERPL ELPH-MCNC: 2.7 G/DL — LOW (ref 3.3–5)
ALP SERPL-CCNC: 108 U/L — SIGNIFICANT CHANGE UP (ref 40–120)
ALP SERPL-CCNC: 125 U/L — HIGH (ref 40–120)
ALT FLD-CCNC: 33 U/L — SIGNIFICANT CHANGE UP (ref 10–45)
ALT FLD-CCNC: 37 U/L — SIGNIFICANT CHANGE UP (ref 10–45)
ANION GAP SERPL CALC-SCNC: 15 MMOL/L — SIGNIFICANT CHANGE UP (ref 5–17)
ANION GAP SERPL CALC-SCNC: 8 MMOL/L — SIGNIFICANT CHANGE UP (ref 5–17)
APPEARANCE: ABNORMAL
APTT BLD: 26.5 SEC — LOW (ref 27.5–35.5)
AST SERPL-CCNC: 39 U/L — SIGNIFICANT CHANGE UP (ref 10–40)
AST SERPL-CCNC: 40 U/L — SIGNIFICANT CHANGE UP (ref 10–40)
BACTERIA: ABNORMAL
BILIRUB SERPL-MCNC: 1.2 MG/DL — SIGNIFICANT CHANGE UP (ref 0.2–1.2)
BILIRUB SERPL-MCNC: 1.4 MG/DL — HIGH (ref 0.2–1.2)
BILIRUBIN URINE: NEGATIVE
BLOOD URINE: ABNORMAL
BUN SERPL-MCNC: 22 MG/DL — SIGNIFICANT CHANGE UP (ref 7–23)
BUN SERPL-MCNC: 26 MG/DL — HIGH (ref 7–23)
CALCIUM SERPL-MCNC: 8.4 MG/DL — SIGNIFICANT CHANGE UP (ref 8.4–10.5)
CALCIUM SERPL-MCNC: 8.8 MG/DL — SIGNIFICANT CHANGE UP (ref 8.4–10.5)
CHLORIDE SERPL-SCNC: 110 MMOL/L — HIGH (ref 96–108)
CHLORIDE SERPL-SCNC: 98 MMOL/L — SIGNIFICANT CHANGE UP (ref 96–108)
CO2 SERPL-SCNC: 16 MMOL/L — LOW (ref 22–31)
CO2 SERPL-SCNC: 19 MMOL/L — LOW (ref 22–31)
COLOR: YELLOW
CREAT SERPL-MCNC: <0.3 MG/DL — LOW (ref 0.5–1.3)
CREAT SERPL-MCNC: <0.3 MG/DL — LOW (ref 0.5–1.3)
CULTURE RESULTS: SIGNIFICANT CHANGE UP
E CLOAC COMP DNA BLD POS QL NAA+PROBE: SIGNIFICANT CHANGE UP
E COLI DNA BLD POS QL NAA+NON-PROBE: SIGNIFICANT CHANGE UP
ENTEROCOC DNA BLD POS QL NAA+NON-PROBE: SIGNIFICANT CHANGE UP
ENTEROCOC DNA BLD POS QL NAA+NON-PROBE: SIGNIFICANT CHANGE UP
FIBRINOGEN PPP-MCNC: 1046 MG/DL — HIGH (ref 290–520)
FSP PPP-MCNC: >=5 <20
GAS PNL BLDA: SIGNIFICANT CHANGE UP
GLUCOSE BLDC GLUCOMTR-MCNC: 117 MG/DL — HIGH (ref 70–99)
GLUCOSE QUALITATIVE U: NEGATIVE
GLUCOSE SERPL-MCNC: 125 MG/DL — HIGH (ref 70–99)
GLUCOSE SERPL-MCNC: 156 MG/DL — HIGH (ref 70–99)
GP B STREP DNA BLD POS QL NAA+NON-PROBE: SIGNIFICANT CHANGE UP
GRAM STN FLD: SIGNIFICANT CHANGE UP
HAEM INFLU DNA BLD POS QL NAA+NON-PROBE: SIGNIFICANT CHANGE UP
HCT VFR BLD CALC: 29.8 % — LOW (ref 34.5–45)
HCT VFR BLD CALC: 32.6 % — LOW (ref 34.5–45)
HGB BLD-MCNC: 10.1 G/DL — LOW (ref 11.5–15.5)
HGB BLD-MCNC: 9.7 G/DL — LOW (ref 11.5–15.5)
HYALINE CASTS: 1 /LPF
INR BLD: 1.25 RATIO — HIGH (ref 0.88–1.16)
K OXYTOCA DNA BLD POS QL NAA+NON-PROBE: SIGNIFICANT CHANGE UP
KETONES URINE: NEGATIVE
L MONOCYTOG DNA BLD POS QL NAA+NON-PROBE: SIGNIFICANT CHANGE UP
LEUKOCYTE ESTERASE URINE: ABNORMAL
MAGNESIUM SERPL-MCNC: 1.8 MG/DL — SIGNIFICANT CHANGE UP (ref 1.6–2.6)
MAGNESIUM SERPL-MCNC: 2 MG/DL — SIGNIFICANT CHANGE UP (ref 1.6–2.6)
MCHC RBC-ENTMCNC: 28.4 PG — SIGNIFICANT CHANGE UP (ref 27–34)
MCHC RBC-ENTMCNC: 28.5 PG — SIGNIFICANT CHANGE UP (ref 27–34)
MCHC RBC-ENTMCNC: 31 GM/DL — LOW (ref 32–36)
MCHC RBC-ENTMCNC: 32.6 GM/DL — SIGNIFICANT CHANGE UP (ref 32–36)
MCV RBC AUTO: 87.6 FL — SIGNIFICANT CHANGE UP (ref 80–100)
MCV RBC AUTO: 91.6 FL — SIGNIFICANT CHANGE UP (ref 80–100)
METHOD TYPE: SIGNIFICANT CHANGE UP
MICROSCOPIC-UA: NORMAL
MRSA PCR RESULT.: DETECTED
MRSA SPEC QL CULT: SIGNIFICANT CHANGE UP
MSSA DNA SPEC QL NAA+PROBE: SIGNIFICANT CHANGE UP
N MEN ISLT CULT: SIGNIFICANT CHANGE UP
NITRITE URINE: NEGATIVE
NRBC # BLD: 0 /100 WBCS — SIGNIFICANT CHANGE UP (ref 0–0)
NRBC # BLD: 0 /100 WBCS — SIGNIFICANT CHANGE UP (ref 0–0)
P AERUGINOSA DNA BLD POS NAA+NON-PROBE: SIGNIFICANT CHANGE UP
PH URINE: 8.5
PHOSPHATE SERPL-MCNC: 1.8 MG/DL — LOW (ref 2.5–4.5)
PHOSPHATE SERPL-MCNC: 3.5 MG/DL — SIGNIFICANT CHANGE UP (ref 2.5–4.5)
PLATELET # BLD AUTO: 138 K/UL — LOW (ref 150–400)
PLATELET # BLD AUTO: 181 K/UL — SIGNIFICANT CHANGE UP (ref 150–400)
POTASSIUM SERPL-MCNC: 3.3 MMOL/L — LOW (ref 3.5–5.3)
POTASSIUM SERPL-MCNC: 5.1 MMOL/L — SIGNIFICANT CHANGE UP (ref 3.5–5.3)
POTASSIUM SERPL-SCNC: 3.3 MMOL/L — LOW (ref 3.5–5.3)
POTASSIUM SERPL-SCNC: 5.1 MMOL/L — SIGNIFICANT CHANGE UP (ref 3.5–5.3)
PROT SERPL-MCNC: 5.7 G/DL — LOW (ref 6–8.3)
PROT SERPL-MCNC: 5.9 G/DL — LOW (ref 6–8.3)
PROTEIN URINE: ABNORMAL
PROTHROM AB SERPL-ACNC: 14.8 SEC — HIGH (ref 10.6–13.6)
RBC # BLD: 3.4 M/UL — LOW (ref 3.8–5.2)
RBC # BLD: 3.56 M/UL — LOW (ref 3.8–5.2)
RBC # FLD: 16.7 % — HIGH (ref 10.3–14.5)
RBC # FLD: 16.9 % — HIGH (ref 10.3–14.5)
RED BLOOD CELLS URINE: 20 /HPF
S AUREUS DNA NOSE QL NAA+PROBE: DETECTED
S MARCESCENS DNA BLD POS NAA+NON-PROBE: SIGNIFICANT CHANGE UP
S PNEUM DNA BLD POS QL NAA+NON-PROBE: SIGNIFICANT CHANGE UP
S PYO DNA BLD POS QL NAA+NON-PROBE: SIGNIFICANT CHANGE UP
SODIUM SERPL-SCNC: 129 MMOL/L — LOW (ref 135–145)
SODIUM SERPL-SCNC: 137 MMOL/L — SIGNIFICANT CHANGE UP (ref 135–145)
SPECIFIC GRAVITY URINE: 1.02
SPECIMEN SOURCE: SIGNIFICANT CHANGE UP
SQUAMOUS EPITHELIAL CELLS: 2 /HPF
TRIPLE PHOSPHATE CRYSTALS: ABNORMAL
URINE COMMENTS: NORMAL
UROBILINOGEN URINE: NORMAL
WBC # BLD: 13.61 K/UL — HIGH (ref 3.8–10.5)
WBC # BLD: 31.2 K/UL — HIGH (ref 3.8–10.5)
WBC # FLD AUTO: 13.61 K/UL — HIGH (ref 3.8–10.5)
WBC # FLD AUTO: 31.2 K/UL — HIGH (ref 3.8–10.5)
WHITE BLOOD CELLS URINE: 3 /HPF

## 2020-11-23 PROCEDURE — 93308 TTE F-UP OR LMTD: CPT | Mod: 26,GC

## 2020-11-23 PROCEDURE — 93926 LOWER EXTREMITY STUDY: CPT | Mod: 26,LT

## 2020-11-23 PROCEDURE — 99291 CRITICAL CARE FIRST HOUR: CPT | Mod: 25

## 2020-11-23 PROCEDURE — 76604 US EXAM CHEST: CPT | Mod: 26,GC

## 2020-11-23 RX ORDER — SODIUM CHLORIDE 9 MG/ML
1000 INJECTION, SOLUTION INTRAVENOUS
Refills: 0 | Status: DISCONTINUED | OUTPATIENT
Start: 2020-11-23 | End: 2020-11-23

## 2020-11-23 RX ORDER — POTASSIUM CHLORIDE 20 MEQ
40 PACKET (EA) ORAL ONCE
Refills: 0 | Status: COMPLETED | OUTPATIENT
Start: 2020-11-23 | End: 2020-11-23

## 2020-11-23 RX ORDER — MIDODRINE HYDROCHLORIDE 2.5 MG/1
20 TABLET ORAL EVERY 8 HOURS
Refills: 0 | Status: DISCONTINUED | OUTPATIENT
Start: 2020-11-23 | End: 2020-11-26

## 2020-11-23 RX ORDER — SODIUM CHLORIDE 9 MG/ML
1000 INJECTION, SOLUTION INTRAVENOUS ONCE
Refills: 0 | Status: COMPLETED | OUTPATIENT
Start: 2020-11-23 | End: 2020-11-23

## 2020-11-23 RX ORDER — POTASSIUM PHOSPHATE, MONOBASIC POTASSIUM PHOSPHATE, DIBASIC 236; 224 MG/ML; MG/ML
30 INJECTION, SOLUTION INTRAVENOUS ONCE
Refills: 0 | Status: COMPLETED | OUTPATIENT
Start: 2020-11-23 | End: 2020-11-23

## 2020-11-23 RX ORDER — PIPERACILLIN AND TAZOBACTAM 4; .5 G/20ML; G/20ML
3.38 INJECTION, POWDER, LYOPHILIZED, FOR SOLUTION INTRAVENOUS EVERY 8 HOURS
Refills: 0 | Status: DISCONTINUED | OUTPATIENT
Start: 2020-11-23 | End: 2020-11-23

## 2020-11-23 RX ORDER — MEROPENEM 1 G/30ML
INJECTION INTRAVENOUS
Refills: 0 | Status: DISCONTINUED | OUTPATIENT
Start: 2020-11-23 | End: 2020-11-25

## 2020-11-23 RX ORDER — MEROPENEM 1 G/30ML
1000 INJECTION INTRAVENOUS EVERY 8 HOURS
Refills: 0 | Status: DISCONTINUED | OUTPATIENT
Start: 2020-11-23 | End: 2020-11-25

## 2020-11-23 RX ORDER — NOREPINEPHRINE BITARTRATE/D5W 8 MG/250ML
0.05 PLASTIC BAG, INJECTION (ML) INTRAVENOUS
Qty: 8 | Refills: 0 | Status: DISCONTINUED | OUTPATIENT
Start: 2020-11-23 | End: 2020-11-25

## 2020-11-23 RX ORDER — PIPERACILLIN AND TAZOBACTAM 4; .5 G/20ML; G/20ML
3.38 INJECTION, POWDER, LYOPHILIZED, FOR SOLUTION INTRAVENOUS ONCE
Refills: 0 | Status: DISCONTINUED | OUTPATIENT
Start: 2020-11-23 | End: 2020-11-23

## 2020-11-23 RX ORDER — MEROPENEM 1 G/30ML
1000 INJECTION INTRAVENOUS ONCE
Refills: 0 | Status: COMPLETED | OUTPATIENT
Start: 2020-11-23 | End: 2020-11-23

## 2020-11-23 RX ORDER — SODIUM CHLORIDE 9 MG/ML
1000 INJECTION, SOLUTION INTRAVENOUS
Refills: 0 | Status: DISCONTINUED | OUTPATIENT
Start: 2020-11-23 | End: 2020-11-24

## 2020-11-23 RX ADMIN — Medication 250 MILLIGRAM(S): at 05:05

## 2020-11-23 RX ADMIN — Medication 250 MILLIGRAM(S): at 17:41

## 2020-11-23 RX ADMIN — MEROPENEM 100 MILLIGRAM(S): 1 INJECTION INTRAVENOUS at 14:45

## 2020-11-23 RX ADMIN — CHLORHEXIDINE GLUCONATE 15 MILLILITER(S): 213 SOLUTION TOPICAL at 17:42

## 2020-11-23 RX ADMIN — Medication 3 MILLILITER(S): at 05:50

## 2020-11-23 RX ADMIN — MIDODRINE HYDROCHLORIDE 10 MILLIGRAM(S): 2.5 TABLET ORAL at 05:05

## 2020-11-23 RX ADMIN — SERTRALINE 50 MILLIGRAM(S): 25 TABLET, FILM COATED ORAL at 05:06

## 2020-11-23 RX ADMIN — Medication 1.5 MILLIGRAM(S): at 10:34

## 2020-11-23 RX ADMIN — Medication 40 MILLIEQUIVALENT(S): at 01:20

## 2020-11-23 RX ADMIN — CHLORHEXIDINE GLUCONATE 1 APPLICATION(S): 213 SOLUTION TOPICAL at 02:01

## 2020-11-23 RX ADMIN — MIDODRINE HYDROCHLORIDE 10 MILLIGRAM(S): 2.5 TABLET ORAL at 14:45

## 2020-11-23 RX ADMIN — POTASSIUM PHOSPHATE, MONOBASIC POTASSIUM PHOSPHATE, DIBASIC 83.33 MILLIMOLE(S): 236; 224 INJECTION, SOLUTION INTRAVENOUS at 01:44

## 2020-11-23 RX ADMIN — SODIUM CHLORIDE 100 MILLILITER(S): 9 INJECTION, SOLUTION INTRAVENOUS at 10:30

## 2020-11-23 RX ADMIN — RIVAROXABAN 10 MILLIGRAM(S): KIT at 17:41

## 2020-11-23 RX ADMIN — MIDODRINE HYDROCHLORIDE 20 MILLIGRAM(S): 2.5 TABLET ORAL at 21:08

## 2020-11-23 RX ADMIN — CHLORHEXIDINE GLUCONATE 1 APPLICATION(S): 213 SOLUTION TOPICAL at 05:06

## 2020-11-23 RX ADMIN — RILUZOLE 50 MILLIGRAM(S): 50 TABLET ORAL at 17:42

## 2020-11-23 RX ADMIN — Medication 3 MILLILITER(S): at 17:21

## 2020-11-23 RX ADMIN — Medication 2.5 MILLIGRAM(S): at 20:33

## 2020-11-23 RX ADMIN — SODIUM CHLORIDE 100 MILLILITER(S): 9 INJECTION, SOLUTION INTRAVENOUS at 19:24

## 2020-11-23 RX ADMIN — RILUZOLE 50 MILLIGRAM(S): 50 TABLET ORAL at 05:06

## 2020-11-23 RX ADMIN — Medication 1 DROP(S): at 17:38

## 2020-11-23 RX ADMIN — SODIUM CHLORIDE 2000 MILLILITER(S): 9 INJECTION, SOLUTION INTRAVENOUS at 17:44

## 2020-11-23 RX ADMIN — MEROPENEM 100 MILLIGRAM(S): 1 INJECTION INTRAVENOUS at 21:08

## 2020-11-23 RX ADMIN — CEFEPIME 100 MILLIGRAM(S): 1 INJECTION, POWDER, FOR SOLUTION INTRAMUSCULAR; INTRAVENOUS at 05:05

## 2020-11-23 RX ADMIN — Medication 3 MILLILITER(S): at 11:17

## 2020-11-23 RX ADMIN — Medication 3 MILLILITER(S): at 01:14

## 2020-11-23 NOTE — PROGRESS NOTE ADULT - ATTENDING COMMENTS
Pt seen and examined. 65 F with advanced ALS, chronic hypoxic/ hypercapnic resp failure, vent dependent, s/p trach and PEG, now presenting with septic shock and gram negative bacteremia 2/2 bacterial PNA + UTI. Remains vent dependent, FiO2 and PEEP requirements at baseline. UA + and POCUS showing dense LLL consolidation with mobile air bronchograms consistent with pneumonia. BCXs showing GNRs, FUP speciation and sensitivity. Cont broad spectrum ABx coverage with Vanc and Zosyn for now. Check SCx, and FUP UCx. Titrate pressors to keep MAP >65, follow lactate. IVC ~ 1.5 cm indicating fluid responsiveness, bolus with LR. Mild hyponatremia, ? 2/2 hydration. If worsens will check serum and urine osm and urine electrolytes. Overall prognosis guarded. Pt seen and examined. 65 F with advanced ALS, chronic hypoxic/ hypercapnic resp failure, vent dependent, s/p trach and PEG, now presenting with septic shock and gram negative bacteremia 2/2 bacterial PNA + UTI. Remains vent dependent, FiO2 and PEEP requirements at baseline. UA + and POCUS showing dense LLL consolidation with mobile air bronchograms consistent with pneumonia. BCXs showing GNRs, FUP speciation and sensitivity. Cont broad spectrum ABx coverage with Vanc and Zosyn for now. Check SCx, and FUP UCx. Titrate pressors to keep MAP >65, follow lactate. IVC ~ 1.5 cm indicating fluid responsiveness, bolus with LR. Mild hyponatremia, ? 2/2 hydration. If worsens will check serum and urine osm and urine electrolytes. Oropharyngeal dysphagia, cont PEG feeds. Overall prognosis guarded.

## 2020-11-23 NOTE — PROGRESS NOTE ADULT - ASSESSMENT
ASSESSMENT:  66 y/o F with advanced ALS s/p trach/PEG and HLD p/w tachycardia and decreased UOP concern for sepsis 2/2 UTI, admitted to MICU for further workup and vent management.     PLAN:    #Neuro  -----------------  - no motor function at baseline  - tracks with eyes  - no sedation    #CV  -----------------  - tachycardic, hypotensive, fluid responsive in ED. Likely 2/2 sepsis  - tachycardia gradually improving with IVF  - will consider additional medical therapy if patient with persistent tachycardia  - closely monitor BP, no pressors presently    #Resp  -----------------  - trached on ventilator  - currently saturating well on home vent settings    #Renal  -----------------  - mildly hyponatremic, likely 2/2 fluid status  - 3.2L crystalloids bolus in ED  - SCr at baseline  - will cont to monitor    #GI  -----------------  - PEG tube in place  - will resume tube feeds    #ID  -----------------  - concern for urosepsis vs sepsis 2/2 pneumonia  - CXR with L sided opacity  - COVID neg  - cefepime and vanc for empiric coverage  - f/u blood and urine cultures for sensitivities  - will send sputum cultures  - MRSA PCR    #Endo  -----------------  - BG <180  - will send TSH and A1C  - otherwise no active issues    #Heme  -----------------  - monitor H&H, 10.2 right now, no signs of acute bleeding    #DVT Prophylaxis  -----------------  - c/w home xarelto 10mg    #GOC  -----------------  - full code   ASSESSMENT:  66 y/o F with advanced ALS s/p trach/PEG and HLD p/w tachycardia and decreased UOP concern for sepsis 2/2 UTI, admitted to MICU for further workup and vent management.     PLAN:    #Neuro  -----------------  - no motor function at baseline  - tracks with eyes  - no sedation    #CV  -----------------  - tachycardic, hypotensive, fluid responsive in ED. Likely 2/2 sepsis  - sinus tach on EKG  - closely monitor BP  - on midodrine and levophed  - POCUS showing A-lines and flattened IVC, EF grossly normal  - 1L LR bolus    #Resp  -----------------  - trached on ventilator  - currently saturating well on home vent settings  - combicath for sputum culture    #Renal  -----------------  - mildly hyponatremic, likely 2/2 fluid status  - net in 200cc in last 24hrs  - 3.2L crystalloids bolus in ED, 1L bolus today  - SCr at baseline  - will cont to monitor  - carter placed 11/22, no indwelling Carter at home    #GI  -----------------  - PEG tube in place  - will resume tube feeds    #ID  -----------------  - concern for urosepsis vs sepsis 2/2 pneumonia  - CXR with L sided pneumonia  - COVID neg  - c/wcefepime and vanc for empiric coverage  - f/u blood and urine cultures for sensitivities  - will send sputum cultures  - MRSA PCR    #Endo  -----------------  - BG <180  - otherwise no active issues    #Heme  -----------------  - stable H&H, 10.2 right now, no signs of acute bleeding    #DVT Prophylaxis  -----------------  - c/w home xarelto 10mg  - consider switching to SubQ heparin if clinical status worsens    #GOC  -----------------  - full code   ASSESSMENT:  64 y/o F with advanced ALS s/p trach/PEG and HLD p/w tachycardia and decreased UOP concern for sepsis 2/2 UTI, admitted to MICU for further workup and vent management.     PLAN:    #Neuro  -----------------  - no motor function at baseline  - tracks with eyes  - no sedation    #CV  -----------------  - tachycardic, hypotensive, fluid responsive in ED. Likely 2/2 sepsis  - sinus tach on EKG  - closely monitor BP  - on midodrine and levophed  - POCUS showing A-lines and flattened IVC, EF grossly normal  - 1L LR bolus    #Resp  -----------------  - trached on ventilator  - currently saturating well on home vent settings  - combicath for sputum culture    #Renal  -----------------  - mildly hyponatremic, likely 2/2 fluid status  - net in 200cc in last 24hrs  - 3.2L crystalloids bolus in ED, 1L bolus today  - SCr at baseline  - will cont to monitor  - carter placed 11/22, no indwelling Carter at home    #GI  -----------------  - PEG tube in place  - will resume tube feeds    #ID  -----------------  - concern for urosepsis vs sepsis 2/2 pneumonia  - CXR with L sided pneumonia  - COVID neg  - c/wcefepime and vanc for empiric coverage  - f/u blood and urine cultures for sensitivities  - will send sputum cultures  - MRSA PCR    #Endo  -----------------  - BG <180  - otherwise no active issues    #Heme  -----------------  - stable H&H, 10.2 right now, no signs of acute bleeding    #DVT Prophylaxis  -----------------  - c/w home xarelto 10mg  - consider switching to SubQ heparin if clinical status worsens    #GOC  -----------------  - full code      #INTERVAL PROGRESS  -----------------  Joseph Ovalle, PGY-1 11-23-20 @ 14:39: Pt switched from cefepime to meropenum given growth of aerobic and anaerobic gram neg rods on BCX.

## 2020-11-23 NOTE — CHART NOTE - NSCHARTNOTEFT_GEN_A_CORE
: Dedrick Cardona MD and Jg Goode MD    INDICATION: Shock    PROCEDURE:  [x] LIMITED ECHO  [x] LIMITED CHEST    FINDINGS:  Thoracic: A-lines anteriorly with lung sliding. Left pleural effusion with consolidation. No pleural effusion on the right.  Cardiac: Difficult to obtain views. Grossly normal LV function, RV appears grossly normal in size. IVC 1.5cm    INTERPRETATION:  Left pleural effusion with consolidation, likely 2/2 atelectasis. Grossly normal LV function with IVC 1.5cm, could benefit from fluid resuscitation.    Images stored in [employer] : Dedrick Cardona MD and Jg Goode MD    INDICATION: Shock    PROCEDURE:  [x] LIMITED ECHO  [x] LIMITED CHEST    FINDINGS:  Thoracic: A-lines anteriorly with lung sliding. Trace left pleural effusion with adjacent small consolidation with mobile air bronchograms. No pleural effusion on the right.  Cardiac: Difficult to obtain views. Grossly normal LV function, RV appears grossly normal in size. IVC 1.5cm    INTERPRETATION:  Left pleural effusion with consolidation, likely 2/2 pneumonia. Grossly normal LV function with IVC 1.5cm, could benefit from fluid resuscitation.    Images stored in [facility]

## 2020-11-23 NOTE — PROGRESS NOTE ADULT - SUBJECTIVE AND OBJECTIVE BOX
Joseph Ovalle, PGY1    INTERVAL HPI/OVERNIGHT EVENTS:   LLE colder than R. Arterial doppler ordered.  K 3.3, repleted  BP w/ MAP in 50s, midodrine started, no improvement, levophed added.    SUBJECTIVE:   Patient seen and examined at bedside. ***      OBJECTIVE:    VITAL SIGNS:  ICU Vital Signs Last 24 Hrs  T(C): 37.4 (2020 04:00), Max: 39.2 (2020 16:00)  T(F): 99.3 (2020 04:00), Max: 102.5 (2020 16:00)  HR: 105 (2020 07:30) (99 - 136)  BP: 93/52 (2020 07:30) (79/47 - 119/58)  BP(mean): 70 (2020 07:30) (58 - 83)  ABP: --  ABP(mean): --  RR: 18 (2020 07:30) (14 - 18)  SpO2: 100% (2020 07:30) (99% - 100%)    Mode: AC/ CMV (Assist Control/ Continuous Mandatory Ventilation), RR (machine): 14, TV (machine): 450, FiO2: 40, PEEP: 5, ITime: 1, MAP: 7, PIP: 21    -22 @ 07:01  -  - @ 07:00  --------------------------------------------------------  IN: 854.8 mL / OUT: 600 mL / NET: 254.8 mL      CAPILLARY BLOOD GLUCOSE      POCT Blood Glucose.: 117 mg/dL (2020 05:28)      PHYSICAL EXAM:  General: sedated  HEENT: NCAT, PERRL, clear conjunctiva, sclera anicteric  Neck: supple, no JVD  Respiratory: CTAB  Cardiovascular: RRR, S1S2, no m/r/g  Abdomen: soft, nontender, nondistended, normal bowel sounds  Extremities: no edema, no cyanosis  Skin: warm, perfused  Neurological: nonfocal    MEDICATIONS:  MEDICATIONS  (STANDING):  albuterol/ipratropium for Nebulization 3 milliLiter(s) Nebulizer every 6 hours  cefepime   IVPB 1000 milliGRAM(s) IV Intermittent every 8 hours  chlorhexidine 0.12% Liquid 15 milliLiter(s) Oral Mucosa every 12 hours  chlorhexidine 4% Liquid 1 Application(s) Topical <User Schedule>  chlorhexidine 4% Liquid 1 Application(s) Topical <User Schedule>  diazepam   Solution 1.5 milliGRAM(s) Oral <User Schedule>  diazepam   Solution 2.5 milliGRAM(s) Oral <User Schedule>  influenza   Vaccine 0.5 milliLiter(s) IntraMuscular once  midodrine. 10 milliGRAM(s) Oral every 8 hours  norepinephrine Infusion 0.05 MICROgram(s)/kG/Min (5.04 mL/Hr) IV Continuous <Continuous>  riluzole 50 milliGRAM(s) Oral two times a day  rivaroxaban 10 milliGRAM(s) Enteral Tube with dinner  sertraline 50 milliGRAM(s) Oral daily  vancomycin  IVPB 1000 milliGRAM(s) IV Intermittent every 12 hours    MEDICATIONS  (PRN):      ALLERGIES:  Allergies    Bactrim DS (Rash)    Intolerances        LABS:                        10.1   13.61 )-----------( 138      ( 2020 00:32 )             32.6     11-    129<L>  |  98  |  26<H>  ----------------------------<  125<H>  3.3<L>   |  16<L>  |  <0.30<L>    Ca    8.4      2020 00:32  Phos  1.8       Mg     1.8         TPro  5.9<L>  /  Alb  2.6<L>  /  TBili  1.4<H>  /  DBili  x   /  AST  40  /  ALT  33  /  AlkPhos  125<H>  11-23    PT/INR - ( 2020 00:32 )   PT: 14.8 sec;   INR: 1.25 ratio         PTT - ( 2020 00:32 )  PTT:26.5 sec  ABG - ( 2020 20:39 )  pH, Arterial: 7.25  pH, Blood: x     /  pCO2: 44    /  pO2: 163   / HCO3: 18    / Base Excess: -8.0  /  SaO2: 99                Urinalysis Basic - ( 2020 16:17 )    Color: Yellow / Appearance: Slightly Turbid / S.019 / pH: x  Gluc: x / Ketone: Negative  / Bili: Negative / Urobili: Negative   Blood: x / Protein: 100 / Nitrite: Negative   Leuk Esterase: Moderate / RBC: 13 /hpf / WBC 9 /HPF   Sq Epi: x / Non Sq Epi: 4 /hpf / Bacteria: Negative          RADIOLOGY & ADDITIONAL TESTS: Reviewed. Joseph Ovalle, PGY1    INTERVAL HPI/OVERNIGHT EVENTS:   LLE colder than R. Arterial doppler ordered.  K 3.3, repleted  BP w/ MAP in 50s, midodrine started, no improvement, levophed added.    SUBJECTIVE:   Patient seen and examined at bedside. Trached, PEG, on pressors, vitals currently stable.      OBJECTIVE:    VITAL SIGNS:  ICU Vital Signs Last 24 Hrs  T(C): 37.4 (2020 04:00), Max: 39.2 (2020 16:00)  T(F): 99.3 (2020 04:00), Max: 102.5 (2020 16:00)  HR: 105 (2020 07:30) (99 - 136)  BP: 93/52 (2020 07:30) (79/47 - 119/58)  BP(mean): 70 (2020 07:30) (58 - 83)  ABP: --  ABP(mean): --  RR: 18 (2020 07:30) (14 - 18)  SpO2: 100% (2020 07:30) (99% - 100%)    Mode: AC/ CMV (Assist Control/ Continuous Mandatory Ventilation), RR (machine): 14, TV (machine): 450, FiO2: 40, PEEP: 5, ITime: 1, MAP: 7, PIP: 21    - @ 07:01  -   @ 07:00  --------------------------------------------------------  IN: 854.8 mL / OUT: 600 mL / NET: 254.8 mL      CAPILLARY BLOOD GLUCOSE      POCT Blood Glucose.: 117 mg/dL (2020 05:28)      PHYSICAL EXAM:  General: no movement 2/2 ALS, chronically ill appearing  HEENT: NCAT, PERRL, +tracks with eyes  Neck: +trached  Respiratory: CTAB  Cardiovascular: RRR, S1S2, no m/r/g  Abdomen: distended but soft, +bowel sounds  Extremities: no edema, no cyanosis  Skin: warm, perfused  Neurological: limited 2/2 ALS    MEDICATIONS:  MEDICATIONS  (STANDING):  albuterol/ipratropium for Nebulization 3 milliLiter(s) Nebulizer every 6 hours  cefepime   IVPB 1000 milliGRAM(s) IV Intermittent every 8 hours  chlorhexidine 0.12% Liquid 15 milliLiter(s) Oral Mucosa every 12 hours  chlorhexidine 4% Liquid 1 Application(s) Topical <User Schedule>  chlorhexidine 4% Liquid 1 Application(s) Topical <User Schedule>  diazepam   Solution 1.5 milliGRAM(s) Oral <User Schedule>  diazepam   Solution 2.5 milliGRAM(s) Oral <User Schedule>  influenza   Vaccine 0.5 milliLiter(s) IntraMuscular once  midodrine. 10 milliGRAM(s) Oral every 8 hours  norepinephrine Infusion 0.05 MICROgram(s)/kG/Min (5.04 mL/Hr) IV Continuous <Continuous>  riluzole 50 milliGRAM(s) Oral two times a day  rivaroxaban 10 milliGRAM(s) Enteral Tube with dinner  sertraline 50 milliGRAM(s) Oral daily  vancomycin  IVPB 1000 milliGRAM(s) IV Intermittent every 12 hours    MEDICATIONS  (PRN):      ALLERGIES:  Allergies    Bactrim DS (Rash)    Intolerances        LABS:                        10.1   13.61 )-----------( 138      ( 2020 00:32 )             32.6     11-    129<L>  |  98  |  26<H>  ----------------------------<  125<H>  3.3<L>   |  16<L>  |  <0.30<L>    Ca    8.4      2020 00:32  Phos  1.8       Mg     1.8         TPro  5.9<L>  /  Alb  2.6<L>  /  TBili  1.4<H>  /  DBili  x   /  AST  40  /  ALT  33  /  AlkPhos  125<H>  11-23    PT/INR - ( 2020 00:32 )   PT: 14.8 sec;   INR: 1.25 ratio         PTT - ( 2020 00:32 )  PTT:26.5 sec  ABG - ( 2020 20:39 )  pH, Arterial: 7.25  pH, Blood: x     /  pCO2: 44    /  pO2: 163   / HCO3: 18    / Base Excess: -8.0  /  SaO2: 99                Urinalysis Basic - ( 2020 16:17 )    Color: Yellow / Appearance: Slightly Turbid / S.019 / pH: x  Gluc: x / Ketone: Negative  / Bili: Negative / Urobili: Negative   Blood: x / Protein: 100 / Nitrite: Negative   Leuk Esterase: Moderate / RBC: 13 /hpf / WBC 9 /HPF   Sq Epi: x / Non Sq Epi: 4 /hpf / Bacteria: Negative          RADIOLOGY & ADDITIONAL TESTS: Reviewed.

## 2020-11-23 NOTE — CONSULT NOTE ADULT - SUBJECTIVE AND OBJECTIVE BOX
PULMONARY CONSULT NOTE      TINO MATA  MRN-[id number]    Patient is a 65y old  Female who presents with a chief complaint of septic shock (23 Nov 2020 07:36)      HISTORY OF PRESENT ILLNESS:    66 yo female with ALS on vent support- known to Dr Newman- Vent setting: Rate 14,  with 2L 02 min  brought into the hospital fever, change in urine output  admitted to ICU for septic shock / gram negative bacteremia  remains on her home vent requirements  on broad spectrum abx        Allergies    Bactrim DS (Rash)    Intolerances        PAST MEDICAL & SURGICAL HISTORY:  Aspiration into airway    Ventilator dependent  Since 2015    HLD (hyperlipidemia)    ALS (amyotrophic lateral sclerosis)    Encounter for PEG (percutaneous endoscopic gastrostomy)  peg placed    Dependence on tracheostomy    S/P gastrostomy  10/14 for dysphagia  receives jevity 2 cans TID            FAMILY HISTORY:  No pertinent family history in first degree relatives      Prescriptions:  #8 Cuffed Shiley Tracheostomy :  (22 Nov 2020 18:10)  #8 Shiley Tracheostomy Inner Cannula: 7.6 mm Inner Diameter  12.2 mm Outer Diameter  81 mm Length (22 Nov 2020 18:10)  Eyemycin 0.5% ophthalmic ointment: 1 application to each affected eye 2 times a day, continue for 1 week  (22 Nov 2020 18:10)  nystatin 100,000 units/g topical powder: 1 application topically 2 times a day to groin MDD:2 michael. Continue till resolution (22 Nov 2020 20:35)      SOCIAL HISTORY  Smoking History:     REVIEW OF SYSTEMS:  unable to obtain     d.    Vital Signs Last 24 Hrs  T(C): 36.5 (23 Nov 2020 13:00), Max: 39.2 (22 Nov 2020 16:00)  T(F): 97.7 (23 Nov 2020 13:00), Max: 102.5 (22 Nov 2020 16:00)  HR: 95 (23 Nov 2020 13:00) (93 - 136)  BP: 106/56 (23 Nov 2020 13:15) (79/47 - 119/58)  BP(mean): 77 (23 Nov 2020 13:15) (58 - 83)  RR: 14 (23 Nov 2020 13:00) (14 - 18)  SpO2: 100% (23 Nov 2020 13:00) (99% - 100%)    PHYSICAL EXAMINATION:    GENERAL: The patient is an elderly female in nad  trach+  peg_+  on vent      MEDICATIONS  (STANDING):  albuterol/ipratropium for Nebulization 3 milliLiter(s) Nebulizer every 6 hours  artificial  tears Solution 1 Drop(s) Both EYES two times a day  cefepime   IVPB 1000 milliGRAM(s) IV Intermittent every 8 hours  chlorhexidine 0.12% Liquid 15 milliLiter(s) Oral Mucosa every 12 hours  chlorhexidine 4% Liquid 1 Application(s) Topical <User Schedule>  chlorhexidine 4% Liquid 1 Application(s) Topical <User Schedule>  diazepam   Solution 1.5 milliGRAM(s) Oral <User Schedule>  diazepam   Solution 2.5 milliGRAM(s) Oral <User Schedule>  influenza   Vaccine 0.5 milliLiter(s) IntraMuscular once  lactated ringers. 1000 milliLiter(s) (100 mL/Hr) IV Continuous <Continuous>  midodrine. 10 milliGRAM(s) Oral every 8 hours  norepinephrine Infusion 0.05 MICROgram(s)/kG/Min (5.04 mL/Hr) IV Continuous <Continuous>  riluzole 50 milliGRAM(s) Oral two times a day  rivaroxaban 10 milliGRAM(s) Enteral Tube with dinner  sertraline 50 milliGRAM(s) Oral daily  vancomycin  IVPB 1000 milliGRAM(s) IV Intermittent every 12 hours      MEDICATIONS  (PRN):        LABS:   CBC Full  -  ( 23 Nov 2020 00:32 )  WBC Count : 13.61 K/uL  RBC Count : 3.56 M/uL  Hemoglobin : 10.1 g/dL  Hematocrit : 32.6 %  Platelet Count - Automated : 138 K/uL  Mean Cell Volume : 91.6 fl  Mean Cell Hemoglobin : 28.4 pg  Mean Cell Hemoglobin Concentration : 31.0 gm/dL  Auto Neutrophil # : x  Auto Lymphocyte # : x  Auto Monocyte # : x  Auto Eosinophil # : x  Auto Basophil # : x  Auto Neutrophil % : x  Auto Lymphocyte % : x  Auto Monocyte % : x  Auto Eosinophil % : x  Auto Basophil % : x    PT/INR - ( 23 Nov 2020 00:32 )   PT: 14.8 sec;   INR: 1.25 ratio         PTT - ( 23 Nov 2020 00:32 )  PTT:26.5 sec  11-23    129<L>  |  98  |  26<H>  ----------------------------<  125<H>  3.3<L>   |  16<L>  |  <0.30<L>    Ca    8.4      23 Nov 2020 00:32  Phos  1.8     11-23  Mg     1.8     11-23    TPro  5.9<L>  /  Alb  2.6<L>  /  TBili  1.4<H>  /  DBili  x   /  AST  40  /  ALT  33  /  AlkPhos  125<H>  11-23    ABG - ( 23 Nov 2020 13:16 )  pH, Arterial: 7.33  pH, Blood: x     /  pCO2: 37    /  pO2: 180   / HCO3: 19    / Base Excess: -6.2  /  SaO2: 99                        Culture - Blood (collected 22 Nov 2020 18:28)  Source: .Blood Blood-Peripheral  Gram Stain (23 Nov 2020 13:38):    Growth in aerobic bottle: Gram Negative Rods    Growth in anaerobic bottle: Gram Negative Rods  Preliminary Report (23 Nov 2020 13:38):    Growth in aerobic bottle: Gram Negative Rods    Growth in anaerobic bottle: Gram Negative Rods    "Due to technical problems, Proteus sp. will Not be reported as part of    the BCID panel until further notice"    ***Blood Panel PCR results on this specimenare available    approximately 3 hours after the Gram stain result.***    Gram stain, PCR, and/or culture results may not always    correspond due to difference in methodologies.    ************************************************************    This PCR assaywas performed using [employer].    The following targets are tested for: Enterococcus,    vancomycin resistant enterococci, Listeria monocytogenes,    coagulase negative staphylococci, S. aureus,    methicillin resistant S. aureus, Streptococcus agalactiae    (Group B), S. pneumoniae, S. pyogenes (Group A),    Acinetobacter baumannii, Enterobacter cloacae, E. coli,    Klebsiella oxytoca, K. pneumoniae, Proteus sp.,    Serratia marcescens, Haemophilus influenzae,    Neisseria meningitidis, Pseudomonas aeruginosa, Candida    albicans, C. glabrata, C krusei, C parapsilosis,    C. tropicalis and the KPC resistance gene.  Organism: Blood Culture PCR (23 Nov 2020 12:14)  Organism: Blood Culture PCR (23 Nov 2020 12:14)    Culture - Blood (collected 22 Nov 2020 18:28)  Source: .Blood Blood-Peripheral  Gram Stain (23 Nov 2020 13:38):    Growth in anaerobic bottle: Gram Negative Rods  Preliminary Report (23 Nov 2020 13:38):    Growth in anaerobic bottle: Gram Negative Rods        RADIOLOGY & ADDITIONAL STUDIES:    < from: Xray Chest 1 View- PORTABLE-Urgent (11.22.20 @ 16:12) >    EXAM:  XR CHEST PORTABLE URGENT 1V                            PROCEDURE DATE:  11/22/2020            INTERPRETATION:  CLINICAL INFORMATION: Sepsis    TECHNIQUE: Frontal radiograph of the chest.    COMPARISON: Chest radiograph 9/9/2019.    FINDINGS:    Left lower lobe pneumonia.  Bilateral perihilar bronchial wall thickening. Small left pleural effusions.  No pneumothorax.  The cardiac silhouette size cannot be accurately assessed on this projection.  No acute osseous findings. Degenerative changes of the spine.  Tracheostomy tube above emely.      IMPRESSION:    Left lower lobe pneumonia.    < end of copied text >  ECHO:    ASSESSMENT:  65 with chronic resp failure s/p vent admitted for septic shock    PLAN:  appreciate ICU  trach care as per protocols  broad spectrum abx      Thank you for allowing me to participate in the care of this patient.  Please feel free to call me for any questions/concerns.      Megan Ko DO  [facility] Pulmonary/Sleep Medicine  [phone] PULMONARY CONSULT NOTE      TINO MATA  MRN-[id number]    Patient is a 65y old  Female who presents with a chief complaint of septic shock (23 Nov 2020 07:36)      HISTORY OF PRESENT ILLNESS:    66 yo female with ALS on vent support- known to Dr Newman- Vent setting: Rate 14,  with 2L 02 min  brought into the hospital fever, change in urine output  admitted to ICU for septic shock / gram negative bacteremia  remains on her home vent requirements  on broad spectrum abx        Allergies    Bactrim DS (Rash)    Intolerances        PAST MEDICAL & SURGICAL HISTORY:  Aspiration into airway    Ventilator dependent  Since 2015    HLD (hyperlipidemia)    ALS (amyotrophic lateral sclerosis)    Encounter for PEG (percutaneous endoscopic gastrostomy)  peg placed    Dependence on tracheostomy    S/P gastrostomy  10/14 for dysphagia  receives jevity 2 cans TID            FAMILY HISTORY:  No pertinent family history in first degree relatives      Prescriptions:  #8 Cuffed Shiley Tracheostomy :  (22 Nov 2020 18:10)  #8 Shiley Tracheostomy Inner Cannula: 7.6 mm Inner Diameter  12.2 mm Outer Diameter  81 mm Length (22 Nov 2020 18:10)  Eyemycin 0.5% ophthalmic ointment: 1 application to each affected eye 2 times a day, continue for 1 week  (22 Nov 2020 18:10)  nystatin 100,000 units/g topical powder: 1 application topically 2 times a day to groin MDD:2 michael. Continue till resolution (22 Nov 2020 20:35)      SOCIAL HISTORY  Smoking History:     REVIEW OF SYSTEMS:  unable to obtain     d.    Vital Signs Last 24 Hrs  T(C): 36.5 (23 Nov 2020 13:00), Max: 39.2 (22 Nov 2020 16:00)  T(F): 97.7 (23 Nov 2020 13:00), Max: 102.5 (22 Nov 2020 16:00)  HR: 95 (23 Nov 2020 13:00) (93 - 136)  BP: 106/56 (23 Nov 2020 13:15) (79/47 - 119/58)  BP(mean): 77 (23 Nov 2020 13:15) (58 - 83)  RR: 14 (23 Nov 2020 13:00) (14 - 18)  SpO2: 100% (23 Nov 2020 13:00) (99% - 100%)    PHYSICAL EXAMINATION:    GENERAL: The patient is an elderly female in nad  trach+  peg_+  on vent      MEDICATIONS  (STANDING):  albuterol/ipratropium for Nebulization 3 milliLiter(s) Nebulizer every 6 hours  artificial  tears Solution 1 Drop(s) Both EYES two times a day  cefepime   IVPB 1000 milliGRAM(s) IV Intermittent every 8 hours  chlorhexidine 0.12% Liquid 15 milliLiter(s) Oral Mucosa every 12 hours  chlorhexidine 4% Liquid 1 Application(s) Topical <User Schedule>  chlorhexidine 4% Liquid 1 Application(s) Topical <User Schedule>  diazepam   Solution 1.5 milliGRAM(s) Oral <User Schedule>  diazepam   Solution 2.5 milliGRAM(s) Oral <User Schedule>  influenza   Vaccine 0.5 milliLiter(s) IntraMuscular once  lactated ringers. 1000 milliLiter(s) (100 mL/Hr) IV Continuous <Continuous>  midodrine. 10 milliGRAM(s) Oral every 8 hours  norepinephrine Infusion 0.05 MICROgram(s)/kG/Min (5.04 mL/Hr) IV Continuous <Continuous>  riluzole 50 milliGRAM(s) Oral two times a day  rivaroxaban 10 milliGRAM(s) Enteral Tube with dinner  sertraline 50 milliGRAM(s) Oral daily  vancomycin  IVPB 1000 milliGRAM(s) IV Intermittent every 12 hours      MEDICATIONS  (PRN):        LABS:   CBC Full  -  ( 23 Nov 2020 00:32 )  WBC Count : 13.61 K/uL  RBC Count : 3.56 M/uL  Hemoglobin : 10.1 g/dL  Hematocrit : 32.6 %  Platelet Count - Automated : 138 K/uL  Mean Cell Volume : 91.6 fl  Mean Cell Hemoglobin : 28.4 pg  Mean Cell Hemoglobin Concentration : 31.0 gm/dL  Auto Neutrophil # : x  Auto Lymphocyte # : x  Auto Monocyte # : x  Auto Eosinophil # : x  Auto Basophil # : x  Auto Neutrophil % : x  Auto Lymphocyte % : x  Auto Monocyte % : x  Auto Eosinophil % : x  Auto Basophil % : x    PT/INR - ( 23 Nov 2020 00:32 )   PT: 14.8 sec;   INR: 1.25 ratio         PTT - ( 23 Nov 2020 00:32 )  PTT:26.5 sec  11-23    129<L>  |  98  |  26<H>  ----------------------------<  125<H>  3.3<L>   |  16<L>  |  <0.30<L>    Ca    8.4      23 Nov 2020 00:32  Phos  1.8     11-23  Mg     1.8     11-23    TPro  5.9<L>  /  Alb  2.6<L>  /  TBili  1.4<H>  /  DBili  x   /  AST  40  /  ALT  33  /  AlkPhos  125<H>  11-23    ABG - ( 23 Nov 2020 13:16 )  pH, Arterial: 7.33  pH, Blood: x     /  pCO2: 37    /  pO2: 180   / HCO3: 19    / Base Excess: -6.2  /  SaO2: 99                        Culture - Blood (collected 22 Nov 2020 18:28)  Source: .Blood Blood-Peripheral  Gram Stain (23 Nov 2020 13:38):    Growth in aerobic bottle: Gram Negative Rods    Growth in anaerobic bottle: Gram Negative Rods  Preliminary Report (23 Nov 2020 13:38):    Growth in aerobic bottle: Gram Negative Rods    Growth in anaerobic bottle: Gram Negative Rods    "Due to technical problems, Proteus sp. will Not be reported as part of    the BCID panel until further notice"    ***Blood Panel PCR results on this specimenare available    approximately 3 hours after the Gram stain result.***    Gram stain, PCR, and/or culture results may not always    correspond due to difference in methodologies.    ************************************************************    This PCR assaywas performed using [employer].    The following targets are tested for: Enterococcus,    vancomycin resistant enterococci, Listeria monocytogenes,    coagulase negative staphylococci, S. aureus,    methicillin resistant S. aureus, Streptococcus agalactiae    (Group B), S. pneumoniae, S. pyogenes (Group A),    Acinetobacter baumannii, Enterobacter cloacae, E. coli,    Klebsiella oxytoca, K. pneumoniae, Proteus sp.,    Serratia marcescens, Haemophilus influenzae,    Neisseria meningitidis, Pseudomonas aeruginosa, Candida    albicans, C. glabrata, C krusei, C parapsilosis,    C. tropicalis and the KPC resistance gene.  Organism: Blood Culture PCR (23 Nov 2020 12:14)  Organism: Blood Culture PCR (23 Nov 2020 12:14)    Culture - Blood (collected 22 Nov 2020 18:28)  Source: .Blood Blood-Peripheral  Gram Stain (23 Nov 2020 13:38):    Growth in anaerobic bottle: Gram Negative Rods  Preliminary Report (23 Nov 2020 13:38):    Growth in anaerobic bottle: Gram Negative Rods        RADIOLOGY & ADDITIONAL STUDIES:    < from: Xray Chest 1 View- PORTABLE-Urgent (11.22.20 @ 16:12) >    EXAM:  XR CHEST PORTABLE URGENT 1V                            PROCEDURE DATE:  11/22/2020            INTERPRETATION:  CLINICAL INFORMATION: Sepsis    TECHNIQUE: Frontal radiograph of the chest.    COMPARISON: Chest radiograph 9/9/2019.    FINDINGS:    Left lower lobe pneumonia.  Bilateral perihilar bronchial wall thickening. Small left pleural effusions.  No pneumothorax.  The cardiac silhouette size cannot be accurately assessed on this projection.  No acute osseous findings. Degenerative changes of the spine.  Tracheostomy tube above emely.      IMPRESSION:    Left lower lobe pneumonia.    < end of copied text >  ECHO:    ASSESSMENT:  65 with chronic resp failure s/p vent admitted for septic shock    PLAN:  appreciate ICU  trach care as per protocols  broad spectrum abx  of note- LLL atelectasis appears old- noted outpatient       Thank you for allowing me to participate in the care of this patient.  Please feel free to call me for any questions/concerns.      Megan Ko DO  [facility] Pulmonary/Sleep Medicine  [phone]

## 2020-11-24 LAB
ALBUMIN SERPL ELPH-MCNC: 2.4 G/DL — LOW (ref 3.3–5)
ALP SERPL-CCNC: 98 U/L — SIGNIFICANT CHANGE UP (ref 40–120)
ALT FLD-CCNC: 32 U/L — SIGNIFICANT CHANGE UP (ref 10–45)
ANION GAP SERPL CALC-SCNC: 10 MMOL/L — SIGNIFICANT CHANGE UP (ref 5–17)
APTT BLD: 24.3 SEC — LOW (ref 27.5–35.5)
AST SERPL-CCNC: 27 U/L — SIGNIFICANT CHANGE UP (ref 10–40)
BILIRUB SERPL-MCNC: 0.9 MG/DL — SIGNIFICANT CHANGE UP (ref 0.2–1.2)
BUN SERPL-MCNC: 18 MG/DL — SIGNIFICANT CHANGE UP (ref 7–23)
CALCIUM SERPL-MCNC: 8.7 MG/DL — SIGNIFICANT CHANGE UP (ref 8.4–10.5)
CHLORIDE SERPL-SCNC: 111 MMOL/L — HIGH (ref 96–108)
CO2 SERPL-SCNC: 17 MMOL/L — LOW (ref 22–31)
CREAT SERPL-MCNC: <0.3 MG/DL — LOW (ref 0.5–1.3)
GAS PNL BLDA: SIGNIFICANT CHANGE UP
GLUCOSE SERPL-MCNC: 138 MG/DL — HIGH (ref 70–99)
HCT VFR BLD CALC: 29 % — LOW (ref 34.5–45)
HGB BLD-MCNC: 9.1 G/DL — LOW (ref 11.5–15.5)
INR BLD: 1.7 RATIO — HIGH (ref 0.88–1.16)
MAGNESIUM SERPL-MCNC: 2 MG/DL — SIGNIFICANT CHANGE UP (ref 1.6–2.6)
MCHC RBC-ENTMCNC: 28 PG — SIGNIFICANT CHANGE UP (ref 27–34)
MCHC RBC-ENTMCNC: 31.4 GM/DL — LOW (ref 32–36)
MCV RBC AUTO: 89.2 FL — SIGNIFICANT CHANGE UP (ref 80–100)
NRBC # BLD: 0 /100 WBCS — SIGNIFICANT CHANGE UP (ref 0–0)
PHOSPHATE SERPL-MCNC: 2.5 MG/DL — SIGNIFICANT CHANGE UP (ref 2.5–4.5)
PLATELET # BLD AUTO: 144 K/UL — LOW (ref 150–400)
POTASSIUM SERPL-MCNC: 4.4 MMOL/L — SIGNIFICANT CHANGE UP (ref 3.5–5.3)
POTASSIUM SERPL-SCNC: 4.4 MMOL/L — SIGNIFICANT CHANGE UP (ref 3.5–5.3)
PROT SERPL-MCNC: 5.3 G/DL — LOW (ref 6–8.3)
PROTHROM AB SERPL-ACNC: 19.9 SEC — HIGH (ref 10.6–13.6)
RBC # BLD: 3.25 M/UL — LOW (ref 3.8–5.2)
RBC # FLD: 17.2 % — HIGH (ref 10.3–14.5)
SARS-COV-2 IGG SERPL QL IA: NEGATIVE — SIGNIFICANT CHANGE UP
SARS-COV-2 IGM SERPL IA-ACNC: <0.1 INDEX — SIGNIFICANT CHANGE UP
SODIUM SERPL-SCNC: 138 MMOL/L — SIGNIFICANT CHANGE UP (ref 135–145)
VANCOMYCIN TROUGH SERPL-MCNC: 22 UG/ML — HIGH (ref 10–20)
WBC # BLD: 23.84 K/UL — HIGH (ref 3.8–10.5)
WBC # FLD AUTO: 23.84 K/UL — HIGH (ref 3.8–10.5)

## 2020-11-24 PROCEDURE — 99291 CRITICAL CARE FIRST HOUR: CPT | Mod: 25

## 2020-11-24 PROCEDURE — 93308 TTE F-UP OR LMTD: CPT | Mod: 26,GC

## 2020-11-24 RX ORDER — SODIUM CHLORIDE 9 MG/ML
1000 INJECTION, SOLUTION INTRAVENOUS ONCE
Refills: 0 | Status: COMPLETED | OUTPATIENT
Start: 2020-11-24 | End: 2020-11-24

## 2020-11-24 RX ORDER — SODIUM CHLORIDE 9 MG/ML
1000 INJECTION, SOLUTION INTRAVENOUS
Refills: 0 | Status: DISCONTINUED | OUTPATIENT
Start: 2020-11-24 | End: 2020-11-24

## 2020-11-24 RX ADMIN — MEROPENEM 100 MILLIGRAM(S): 1 INJECTION INTRAVENOUS at 21:15

## 2020-11-24 RX ADMIN — Medication 3 MILLILITER(S): at 00:36

## 2020-11-24 RX ADMIN — CHLORHEXIDINE GLUCONATE 1 APPLICATION(S): 213 SOLUTION TOPICAL at 05:12

## 2020-11-24 RX ADMIN — MEROPENEM 100 MILLIGRAM(S): 1 INJECTION INTRAVENOUS at 13:45

## 2020-11-24 RX ADMIN — RIVAROXABAN 10 MILLIGRAM(S): KIT at 17:37

## 2020-11-24 RX ADMIN — MIDODRINE HYDROCHLORIDE 20 MILLIGRAM(S): 2.5 TABLET ORAL at 05:11

## 2020-11-24 RX ADMIN — Medication 2.5 MILLIGRAM(S): at 21:14

## 2020-11-24 RX ADMIN — CHLORHEXIDINE GLUCONATE 1 APPLICATION(S): 213 SOLUTION TOPICAL at 05:11

## 2020-11-24 RX ADMIN — Medication 62.5 MILLIMOLE(S): at 01:18

## 2020-11-24 RX ADMIN — Medication 1 DROP(S): at 05:11

## 2020-11-24 RX ADMIN — MEROPENEM 100 MILLIGRAM(S): 1 INJECTION INTRAVENOUS at 05:11

## 2020-11-24 RX ADMIN — CHLORHEXIDINE GLUCONATE 15 MILLILITER(S): 213 SOLUTION TOPICAL at 17:37

## 2020-11-24 RX ADMIN — Medication 1 DROP(S): at 17:37

## 2020-11-24 RX ADMIN — RILUZOLE 50 MILLIGRAM(S): 50 TABLET ORAL at 17:37

## 2020-11-24 RX ADMIN — Medication 3 MILLILITER(S): at 17:27

## 2020-11-24 RX ADMIN — CHLORHEXIDINE GLUCONATE 15 MILLILITER(S): 213 SOLUTION TOPICAL at 05:11

## 2020-11-24 RX ADMIN — Medication 1.5 MILLIGRAM(S): at 09:14

## 2020-11-24 RX ADMIN — SERTRALINE 50 MILLIGRAM(S): 25 TABLET, FILM COATED ORAL at 05:11

## 2020-11-24 RX ADMIN — MIDODRINE HYDROCHLORIDE 20 MILLIGRAM(S): 2.5 TABLET ORAL at 21:15

## 2020-11-24 RX ADMIN — MIDODRINE HYDROCHLORIDE 20 MILLIGRAM(S): 2.5 TABLET ORAL at 13:45

## 2020-11-24 RX ADMIN — Medication 3 MILLILITER(S): at 11:37

## 2020-11-24 RX ADMIN — Medication 3 MILLILITER(S): at 05:21

## 2020-11-24 RX ADMIN — RILUZOLE 50 MILLIGRAM(S): 50 TABLET ORAL at 05:11

## 2020-11-24 RX ADMIN — SODIUM CHLORIDE 250 MILLILITER(S): 9 INJECTION, SOLUTION INTRAVENOUS at 18:01

## 2020-11-24 NOTE — PROGRESS NOTE ADULT - SUBJECTIVE AND OBJECTIVE BOX
Joseph Ovalle, PGY1    INTERVAL HPI/OVERNIGHT EVENTS:   ***    SUBJECTIVE:   Patient seen and examined at bedside. ***      OBJECTIVE:    VITAL SIGNS:  ICU Vital Signs Last 24 Hrs  T(C): 37 (2020 04:00), Max: 37 (2020 04:00)  T(F): 98.6 (2020 04:00), Max: 98.6 (2020 04:00)  HR: 100 (2020 06:00) (82 - 106)  BP: 107/55 (2020 06:00) (84/49 - 127/61)  BP(mean): 79 (2020 06:00) (61 - 88)  ABP: --  ABP(mean): --  RR: 141 (2020 06:00) (14 - 141)  SpO2: 100% (2020 05:45) (100% - 100%)    Mode: AC/ CMV (Assist Control/ Continuous Mandatory Ventilation), RR (machine): 14, TV (machine): 450, FiO2: 30, PEEP: 5, ITime: 0.9, MAP: 8, PIP: 25     @ 07:01  -   @ 07:00  --------------------------------------------------------  IN: 4538 mL / OUT: 2655 mL / NET: 1883 mL      CAPILLARY BLOOD GLUCOSE      POCT Blood Glucose.: 117 mg/dL (2020 05:28)      PHYSICAL EXAM:  General: no movement 2/2 ALS, chronically ill appearing  HEENT: NCAT, PERRL, +tracks with eyes  Neck: +trached  Respiratory: CTAB  Cardiovascular: RRR, S1S2, no m/r/g  Abdomen: distended but soft, +bowel sounds  Extremities: no edema, no cyanosis  Skin: warm, perfused  Neurological: limited 2/2 ALS    MEDICATIONS:  MEDICATIONS  (STANDING):  albuterol/ipratropium for Nebulization 3 milliLiter(s) Nebulizer every 6 hours  artificial  tears Solution 1 Drop(s) Both EYES two times a day  chlorhexidine 0.12% Liquid 15 milliLiter(s) Oral Mucosa every 12 hours  chlorhexidine 4% Liquid 1 Application(s) Topical <User Schedule>  chlorhexidine 4% Liquid 1 Application(s) Topical <User Schedule>  diazepam   Solution 1.5 milliGRAM(s) Oral <User Schedule>  diazepam   Solution 2.5 milliGRAM(s) Oral <User Schedule>  influenza   Vaccine 0.5 milliLiter(s) IntraMuscular once  lactated ringers. 1000 milliLiter(s) (100 mL/Hr) IV Continuous <Continuous>  meropenem  IVPB      meropenem  IVPB 1000 milliGRAM(s) IV Intermittent every 8 hours  midodrine. 20 milliGRAM(s) Oral every 8 hours  norepinephrine Infusion 0.05 MICROgram(s)/kG/Min (5.04 mL/Hr) IV Continuous <Continuous>  riluzole 50 milliGRAM(s) Oral two times a day  rivaroxaban 10 milliGRAM(s) Enteral Tube with dinner  sertraline 50 milliGRAM(s) Oral daily  vancomycin  IVPB 1000 milliGRAM(s) IV Intermittent every 12 hours    MEDICATIONS  (PRN):  petrolatum Ophthalmic Ointment 1 Application(s) Both EYES every 6 hours PRN Dryness      ALLERGIES:  Allergies    Bactrim DS (Rash)    Intolerances        LABS:                        9.1    23.84 )-----------( 144      ( 2020 00:15 )             29.0     11-    138  |  111<H>  |  18  ----------------------------<  138<H>  4.4   |  17<L>  |  <0.30<L>    Ca    8.7      2020 00:15  Phos  2.5     11-24  Mg     2.0     -24    TPro  5.3<L>  /  Alb  2.4<L>  /  TBili  0.9  /  DBili  x   /  AST  27  /  ALT  32  /  AlkPhos  98  11-24    PT/INR - ( 2020 00:15 )   PT: 19.9 sec;   INR: 1.70 ratio         PTT - ( 2020 00:15 )  PTT:24.3 sec  ABG - ( 2020 00:08 )  pH, Arterial: 7.36  pH, Blood: x     /  pCO2: 37    /  pO2: 172   / HCO3: 20    / Base Excess: -4.3  /  SaO2: 99                Urinalysis Basic - ( 2020 16:17 )    Color: Yellow / Appearance: Slightly Turbid / S.019 / pH: x  Gluc: x / Ketone: Negative  / Bili: Negative / Urobili: Negative   Blood: x / Protein: 100 / Nitrite: Negative   Leuk Esterase: Moderate / RBC: 13 /hpf / WBC 9 /HPF   Sq Epi: x / Non Sq Epi: 4 /hpf / Bacteria: Negative          RADIOLOGY & ADDITIONAL TESTS: Reviewed. Joseph Ovalle, PGY1    INTERVAL HPI/OVERNIGHT EVENTS:   - increased midodrine 10mg Q8h to 20mg Q8h  - still pending organisms and sensitivities, UCx negative    SUBJECTIVE:   Patient seen and examined at bedside. Trached, PEG, on pressors, vitals currently stable.      OBJECTIVE:    VITAL SIGNS:  ICU Vital Signs Last 24 Hrs  T(C): 37 (2020 04:00), Max: 37 (2020 04:00)  T(F): 98.6 (2020 04:00), Max: 98.6 (2020 04:00)  HR: 100 (2020 06:00) (82 - 106)  BP: 107/55 (2020 06:00) (84/49 - 127/61)  BP(mean): 79 (2020 06:00) (61 - 88)  ABP: --  ABP(mean): --  RR: 141 (2020 06:00) (14 - 141)  SpO2: 100% (2020 05:45) (100% - 100%)    Mode: AC/ CMV (Assist Control/ Continuous Mandatory Ventilation), RR (machine): 14, TV (machine): 450, FiO2: 30, PEEP: 5, ITime: 0.9, MAP: 8, PIP: 25    - @ 07:01  -   @ 07:00  --------------------------------------------------------  IN: 4538 mL / OUT: 2655 mL / NET: 1883 mL      CAPILLARY BLOOD GLUCOSE      POCT Blood Glucose.: 117 mg/dL (2020 05:28)      PHYSICAL EXAM:  General: no movement 2/2 ALS, chronically ill appearing  HEENT: NCAT, PERRL, +tracks with eyes  Neck: +trached  Respiratory: CTAB  Cardiovascular: RRR, S1S2, no m/r/g  Abdomen: distended but soft, +bowel sounds  Extremities: no edema, no cyanosis  Skin: warm, perfused  Neurological: limited 2/2 ALS    MEDICATIONS:  MEDICATIONS  (STANDING):  albuterol/ipratropium for Nebulization 3 milliLiter(s) Nebulizer every 6 hours  artificial  tears Solution 1 Drop(s) Both EYES two times a day  chlorhexidine 0.12% Liquid 15 milliLiter(s) Oral Mucosa every 12 hours  chlorhexidine 4% Liquid 1 Application(s) Topical <User Schedule>  chlorhexidine 4% Liquid 1 Application(s) Topical <User Schedule>  diazepam   Solution 1.5 milliGRAM(s) Oral <User Schedule>  diazepam   Solution 2.5 milliGRAM(s) Oral <User Schedule>  influenza   Vaccine 0.5 milliLiter(s) IntraMuscular once  lactated ringers. 1000 milliLiter(s) (100 mL/Hr) IV Continuous <Continuous>  meropenem  IVPB      meropenem  IVPB 1000 milliGRAM(s) IV Intermittent every 8 hours  midodrine. 20 milliGRAM(s) Oral every 8 hours  norepinephrine Infusion 0.05 MICROgram(s)/kG/Min (5.04 mL/Hr) IV Continuous <Continuous>  riluzole 50 milliGRAM(s) Oral two times a day  rivaroxaban 10 milliGRAM(s) Enteral Tube with dinner  sertraline 50 milliGRAM(s) Oral daily  vancomycin  IVPB 1000 milliGRAM(s) IV Intermittent every 12 hours    MEDICATIONS  (PRN):  petrolatum Ophthalmic Ointment 1 Application(s) Both EYES every 6 hours PRN Dryness      ALLERGIES:  Allergies    Bactrim DS (Rash)    Intolerances        LABS:                        9.1    23.84 )-----------( 144      ( 2020 00:15 )             29.0     11-24    138  |  111<H>  |  18  ----------------------------<  138<H>  4.4   |  17<L>  |  <0.30<L>    Ca    8.7      2020 00:15  Phos  2.5     11-24  Mg     2.0     11-24    TPro  5.3<L>  /  Alb  2.4<L>  /  TBili  0.9  /  DBili  x   /  AST  27  /  ALT  32  /  AlkPhos  98  11-24    PT/INR - ( 2020 00:15 )   PT: 19.9 sec;   INR: 1.70 ratio         PTT - ( 2020 00:15 )  PTT:24.3 sec  ABG - ( 2020 00:08 )  pH, Arterial: 7.36  pH, Blood: x     /  pCO2: 37    /  pO2: 172   / HCO3: 20    / Base Excess: -4.3  /  SaO2: 99          Urinalysis Basic - ( 2020 16:17 )    Color: Yellow / Appearance: Slightly Turbid / S.019 / pH: x  Gluc: x / Ketone: Negative  / Bili: Negative / Urobili: Negative   Blood: x / Protein: 100 / Nitrite: Negative   Leuk Esterase: Moderate / RBC: 13 /hpf / WBC 9 /HPF   Sq Epi: x / Non Sq Epi: 4 /hpf / Bacteria: Negative      RADIOLOGY & ADDITIONAL TESTS: Reviewed. Joseph Ovalle, PGY1    INTERVAL HPI/OVERNIGHT EVENTS:   - No acute events  - No pressor requirements    SUBJECTIVE:   Patient seen and examined at bedside. Trached, PEG, on pressors, vitals currently stable.      OBJECTIVE:    VITAL SIGNS:  ICU Vital Signs Last 24 Hrs  T(C): 37 (2020 04:00), Max: 37 (2020 04:00)  T(F): 98.6 (2020 04:00), Max: 98.6 (2020 04:00)  HR: 100 (2020 06:00) (82 - 106)  BP: 107/55 (2020 06:00) (84/49 - 127/61)  BP(mean): 79 (2020 06:00) (61 - 88)  ABP: --  ABP(mean): --  RR: 141 (2020 06:00) (14 - 141)  SpO2: 100% (2020 05:45) (100% - 100%)    Mode: AC/ CMV (Assist Control/ Continuous Mandatory Ventilation), RR (machine): 14, TV (machine): 450, FiO2: 30, PEEP: 5, ITime: 0.9, MAP: 8, PIP: 25    -23 @ 07:01  -  11-24 @ 07:00  --------------------------------------------------------  IN: 4538 mL / OUT: 2655 mL / NET: 1883 mL      CAPILLARY BLOOD GLUCOSE      POCT Blood Glucose.: 117 mg/dL (2020 05:28)      PHYSICAL EXAM:  General: no movement 2/2 ALS, chronically ill appearing  HEENT: NCAT, PERRL, +tracks with eyes  Neck: +trached  Respiratory: CTAB  Cardiovascular: RRR, S1S2, no m/r/g  Abdomen: distended but soft, +bowel sounds  Extremities: no edema, no cyanosis  Skin: warm, perfused  Neurological: limited 2/2 ALS    MEDICATIONS:  MEDICATIONS  (STANDING):  albuterol/ipratropium for Nebulization 3 milliLiter(s) Nebulizer every 6 hours  artificial  tears Solution 1 Drop(s) Both EYES two times a day  chlorhexidine 0.12% Liquid 15 milliLiter(s) Oral Mucosa every 12 hours  chlorhexidine 4% Liquid 1 Application(s) Topical <User Schedule>  chlorhexidine 4% Liquid 1 Application(s) Topical <User Schedule>  diazepam   Solution 1.5 milliGRAM(s) Oral <User Schedule>  diazepam   Solution 2.5 milliGRAM(s) Oral <User Schedule>  influenza   Vaccine 0.5 milliLiter(s) IntraMuscular once  lactated ringers. 1000 milliLiter(s) (100 mL/Hr) IV Continuous <Continuous>  meropenem  IVPB      meropenem  IVPB 1000 milliGRAM(s) IV Intermittent every 8 hours  midodrine. 20 milliGRAM(s) Oral every 8 hours  norepinephrine Infusion 0.05 MICROgram(s)/kG/Min (5.04 mL/Hr) IV Continuous <Continuous>  riluzole 50 milliGRAM(s) Oral two times a day  rivaroxaban 10 milliGRAM(s) Enteral Tube with dinner  sertraline 50 milliGRAM(s) Oral daily  vancomycin  IVPB 1000 milliGRAM(s) IV Intermittent every 12 hours    MEDICATIONS  (PRN):  petrolatum Ophthalmic Ointment 1 Application(s) Both EYES every 6 hours PRN Dryness      ALLERGIES:  Allergies    Bactrim DS (Rash)    Intolerances        LABS:                        9.1    23.84 )-----------( 144      ( 2020 00:15 )             29.0     11-24    138  |  111<H>  |  18  ----------------------------<  138<H>  4.4   |  17<L>  |  <0.30<L>    Ca    8.7      2020 00:15  Phos  2.5     11-24  Mg     2.0     -24    TPro  5.3<L>  /  Alb  2.4<L>  /  TBili  0.9  /  DBili  x   /  AST  27  /  ALT  32  /  AlkPhos  98  11-24    PT/INR - ( 2020 00:15 )   PT: 19.9 sec;   INR: 1.70 ratio         PTT - ( 2020 00:15 )  PTT:24.3 sec  ABG - ( 2020 00:08 )  pH, Arterial: 7.36  pH, Blood: x     /  pCO2: 37    /  pO2: 172   / HCO3: 20    / Base Excess: -4.3  /  SaO2: 99          Urinalysis Basic - ( 2020 16:17 )    Color: Yellow / Appearance: Slightly Turbid / S.019 / pH: x  Gluc: x / Ketone: Negative  / Bili: Negative / Urobili: Negative   Blood: x / Protein: 100 / Nitrite: Negative   Leuk Esterase: Moderate / RBC: 13 /hpf / WBC 9 /HPF   Sq Epi: x / Non Sq Epi: 4 /hpf / Bacteria: Negative      RADIOLOGY & ADDITIONAL TESTS: Reviewed.

## 2020-11-24 NOTE — DIETITIAN INITIAL EVALUATION ADULT. - OTHER INFO
GASTROINTESTINAL:  Last BM: 11/22 (x2)  Bowel Regimen: none    NUTRITION STATUS:  - Skin: no pressure injuries documented  - Mild hyponatremia noted on admission; lactated ringers ordered @ 100 ml/hr x 10 hours    WEIGHT HISTORY: UWB (reported 2017) 45-48kg); 52.1kg (5/10/18). Current weight 53.8kg appears stable.

## 2020-11-24 NOTE — DIETITIAN INITIAL EVALUATION ADULT. - PERTINENT LABORATORY DATA
11-24 @ 00:15: Sodium 138, Potassium 4.4, Calcium 8.7, Magnesium 2.0, Phosphorus 2.5, BUN 18, Creatinine <0.30<L>, Glucose 138<H>, Alk Phos 98, ALT/SGPT 32, AST/SGOT 27, Albumin 2.4<L>, Total Bilirubin 0.9, Hemoglobin 9.1<L>, Hematocrit 29.0<L>,  11-23 @ 13:41: Sodium 137, Potassium 5.1, Calcium 8.8, Magnesium 2.0, Phosphorus 3.5, BUN 22, Creatinine <0.30<L>, Glucose 156<H>, Alk Phos 108, ALT/SGPT 37, AST/SGOT 39, Albumin 2.7<L>, Total Bilirubin 1.2, Hemoglobin 9.7<L>, Hematocrit 29.8<L>

## 2020-11-24 NOTE — CHART NOTE - NSCHARTNOTEFT_GEN_A_CORE
: Dedrick Cardona MD and Jg Goode MD    INDICATION: Fluid status assessment    PROCEDURE:  [x] LIMITED ECHO    FINDINGS: IVC 1.6cm    INTERPRETATION: IVC 1.6cm, patient to receive 1L NS bolus and re-assess    Images stored in [employer] : Dedrick Cardona MD and Jg Goode MD    INDICATION: Fluid status assessment    PROCEDURE:  [x] LIMITED ECHO    FINDINGS: IVC 1.6cm with variability    INTERPRETATION: IVC 1.6cm, likely to be fluid responsive, patient to receive 1L NS bolus and re-assess    Images stored in [employer]

## 2020-11-24 NOTE — DIETITIAN INITIAL EVALUATION ADULT. - ADD RECOMMEND
1) Continue EN regimen as above. 2) Add multivitamin/minerals 15ml via PEG. 1) Continue EN regimen as above. 2) Add multivitamin/minerals via PEG.

## 2020-11-24 NOTE — DIETITIAN INITIAL EVALUATION ADULT. - FACTORS AFF FOOD INTAKE
EN initiated 11/23: Jevity 1.5 via PEG @ 70 ml/hr x 13 hours (20:00-9:00) to provide 910 ml formula, 1365 calories (25 neftali/kg), 58 grams protein (1.1 gm/kg), 692ml free water, based on dosing wt 53.8kg. Current order meets nutrition needs.

## 2020-11-24 NOTE — DIETITIAN INITIAL EVALUATION ADULT. - REASON INDICATOR FOR ASSESSMENT
Nutrition Consult for EN PTA received and appreciated.   Information obtained from: medical record, communication with team. Pt non-verbal, trach on vent. Nutrition Consult for EN PTA received and appreciated.   Information obtained from: RN, medical record, communication with team. Pt non-verbal, trach on vent.

## 2020-11-24 NOTE — PROGRESS NOTE ADULT - ATTENDING COMMENTS
Pt seen and examined. 65 F with advanced ALS, chronic hypoxic/ hypercapnic resp failure, vent dependent, s/p trach and PEG, now presenting with septic shock and gram negative bacteremia 2/2 bacterial PNA + UTI. Remains vent dependent, FiO2 and PEEP requirements at baseline. Leukocytosis improving, cont antibiotic coverage with Meropenem, d-c Vanc.  BCxs showing GNRs, FUP speciation and sensitivity. Titrate pressors to keep MAP >65, follow lactate. IVC ~ 1.6 cm indicating fluid responsiveness, bolus with LR. Mild hyponatremia now resolved. Cont to follow Cr/UO and electrolytes.  Oropharyngeal dysphagia, cont PEG feeds. Overall prognosis guarded.

## 2020-11-24 NOTE — PROGRESS NOTE ADULT - SUBJECTIVE AND OBJECTIVE BOX
PULMONARY PROGRESS NOTE    TINO MATA  MRN-[id number]    Patient is a 65y old  Female who presents with a chief complaint of sepsis    Per chart: "64 y/o F with advanced ALS s/p trach/PEG and HLD p/w tachycardia and decreased UOP concern for sepsis 2/2 UTI, admitted to MICU for further workup and vent management." (24 Nov 2020 07:20)      HPI:  BP still soft req pressors  eyes open-  -    ROS: could not obtain  -  -    ACTIVE MEDICATION LIST:  MEDICATIONS  (STANDING):  albuterol/ipratropium for Nebulization 3 milliLiter(s) Nebulizer every 6 hours  artificial  tears Solution 1 Drop(s) Both EYES two times a day  chlorhexidine 0.12% Liquid 15 milliLiter(s) Oral Mucosa every 12 hours  chlorhexidine 4% Liquid 1 Application(s) Topical <User Schedule>  chlorhexidine 4% Liquid 1 Application(s) Topical <User Schedule>  diazepam   Solution 1.5 milliGRAM(s) Oral <User Schedule>  diazepam   Solution 2.5 milliGRAM(s) Oral <User Schedule>  influenza   Vaccine 0.5 milliLiter(s) IntraMuscular once  lactated ringers. 1000 milliLiter(s) (100 mL/Hr) IV Continuous <Continuous>  meropenem  IVPB      meropenem  IVPB 1000 milliGRAM(s) IV Intermittent every 8 hours  midodrine. 20 milliGRAM(s) Oral every 8 hours  norepinephrine Infusion 0.05 MICROgram(s)/kG/Min (5.04 mL/Hr) IV Continuous <Continuous>  riluzole 50 milliGRAM(s) Oral two times a day  rivaroxaban 10 milliGRAM(s) Enteral Tube with dinner  sertraline 50 milliGRAM(s) Oral daily  vancomycin  IVPB 1000 milliGRAM(s) IV Intermittent every 12 hours    MEDICATIONS  (PRN):  petrolatum Ophthalmic Ointment 1 Application(s) Both EYES every 6 hours PRN Dryness      EXAM:  Vital Signs Last 24 Hrs  T(C): 36.9 (24 Nov 2020 08:00), Max: 37 (24 Nov 2020 04:00)  T(F): 98.4 (24 Nov 2020 08:00), Max: 98.6 (24 Nov 2020 04:00)  HR: 84 (24 Nov 2020 08:00) (82 - 106)  BP: 113/53 (24 Nov 2020 08:00) (84/49 - 127/61)  BP(mean): 77 (24 Nov 2020 08:00) (61 - 88)  RR: 17 (24 Nov 2020 08:00) (14 - 141)  SpO2: 100% (24 Nov 2020 08:00) (100% - 100%)    GENERAL: The patient is awake. Noncommunicative    SKIN: Warm, dry, no rashes    LUNGS: Clear to auscultation without wheezing, rales or rhonchi; respirations unlabored    HEART: Regular rate and rhythm without murmur.    ABDOMEN: +BS, Soft, Nontender    EXTREMITIES: No clubbing, cyanosis, edema    ABG - ( 24 Nov 2020 00:08 )  pH, Arterial: 7.36  pH, Blood: x     /  pCO2: 37    /  pO2: 172   / HCO3: 20    / Base Excess: -4.3  /  SaO2: 99                                        9.1    23.84 )-----------( 144      ( 24 Nov 2020 00:15 )             29.0       11-24    138  |  111<H>  |  18  ----------------------------<  138<H>  4.4   |  17<L>  |  <0.30<L>    Ca    8.7      24 Nov 2020 00:15  Phos  2.5     11-24  Mg     2.0     11-24    TPro  5.3<L>  /  Alb  2.4<L>  /  TBili  0.9  /  DBili  x   /  AST  27  /  ALT  32  /  AlkPhos  98  11-24      LIVER FUNCTIONS - ( 24 Nov 2020 00:15 )  Alb: 2.4 g/dL / Pro: 5.3 g/dL / ALK PHOS: 98 U/L / ALT: 32 U/L / AST: 27 U/L / GGT: x             CXR - LLL atelectasis (chronic)  Blood cx- GNR  OPt UA- triple phos crystals        PROBLEM LIST:  65y Female with HEALTH ISSUES - PROBLEM Dx:  ALS  Vent dependent  UTI  Sepsis, Bacteremia    RECS: Supp care per ICU  Await BC result  taper pressors/midodrine as tolerated  Future role of UTI prevention such as urinary acidification? will d/w urology      Pranav Newman MD  [phone]

## 2020-11-24 NOTE — DIETITIAN INITIAL EVALUATION ADULT. - ENTERAL
Jevity 1.5 via PEG @ 70 ml/hr x 13 hours (20:00-9:00) to provide 910 ml formula, 1365 calories (25 neftali/kg), 58 grams protein (1.1 gm/kg), 692ml free water, based on dosing wt 53.8kg.

## 2020-11-24 NOTE — DIETITIAN INITIAL EVALUATION ADULT. - PERTINENT MEDS FT
MEDICATIONS  (STANDING):  albuterol/ipratropium for Nebulization 3 milliLiter(s) Nebulizer every 6 hours  artificial  tears Solution 1 Drop(s) Both EYES two times a day  chlorhexidine 0.12% Liquid 15 milliLiter(s) Oral Mucosa every 12 hours  chlorhexidine 4% Liquid 1 Application(s) Topical <User Schedule>  chlorhexidine 4% Liquid 1 Application(s) Topical <User Schedule>  diazepam   Solution 1.5 milliGRAM(s) Oral <User Schedule>  diazepam   Solution 2.5 milliGRAM(s) Oral <User Schedule>  influenza   Vaccine 0.5 milliLiter(s) IntraMuscular once  lactated ringers. 1000 milliLiter(s) (100 mL/Hr) IV Continuous <Continuous>  meropenem  IVPB      meropenem  IVPB 1000 milliGRAM(s) IV Intermittent every 8 hours  midodrine. 20 milliGRAM(s) Oral every 8 hours  norepinephrine Infusion 0.05 MICROgram(s)/kG/Min (5.04 mL/Hr) IV Continuous <Continuous>  riluzole 50 milliGRAM(s) Oral two times a day  rivaroxaban 10 milliGRAM(s) Enteral Tube with dinner  sertraline 50 milliGRAM(s) Oral daily  vancomycin  IVPB 1000 milliGRAM(s) IV Intermittent every 12 hours

## 2020-11-24 NOTE — PROGRESS NOTE ADULT - ASSESSMENT
66 y/o F with advanced ALS s/p trach/PEG and HLD p/w tachycardia and decreased UOP concern for sepsis 2/2 UTI, admitted to MICU for further workup and vent management.     PLAN:    #Neuro  -----------------  - no motor function at baseline  - tracks with eyes  - no sedation    #CV  -----------------  - tachycardic, hypotensive, fluid responsive in ED. Likely 2/2 sepsis  - sinus tach on EKG  - closely monitor BP  - on midodrine and levophed  - POCUS showing A-lines and flattened IVC, EF grossly normal  - 1L LR bolus    #Resp  -----------------  - trached on ventilator  - currently saturating well on home vent settings  - combicath for sputum culture    #Renal  -----------------  - mildly hyponatremic, likely 2/2 fluid status  - net in 200cc in last 24hrs  - 3.2L crystalloids bolus in ED, 1L bolus today  - SCr at baseline  - will cont to monitor  - carter placed 11/22, no indwelling Carter at home    #GI  -----------------  - PEG tube in place  - will resume tube feeds    #ID  -----------------  - concern for urosepsis vs sepsis 2/2 pneumonia  - CXR with L sided pneumonia  - COVID neg  - c/wcefepime and vanc for empiric coverage  - f/u blood and urine cultures for sensitivities  - will send sputum cultures  - MRSA PCR    #Endo  -----------------  - BG <180  - otherwise no active issues    #Heme  -----------------  - stable H&H, 10.2 right now, no signs of acute bleeding    #DVT Prophylaxis  -----------------  - c/w home xarelto 10mg  - consider switching to SubQ heparin if clinical status worsens    #GOC  -----------------  - full code      #INTERVAL PROGRESS  -----------------  ****   64 y/o F with advanced ALS s/p trach/PEG and HLD p/w tachycardia and decreased UOP concern for sepsis 2/2 UTI, admitted to MICU for further workup and vent management.     PLAN:    #Neuro  -----------------  - no motor function at baseline  - tracks with eyes  - no sedation    #CV  -----------------  - tachycardic, hypotensive, fluid responsive in ED. Likely 2/2 sepsis  - sinus tach on EKG  - closely monitor BP  - on midodrine and levophed  - POCUS showing A-lines and flattened IVC, EF grossly normal    #Resp  -----------------  - trached on ventilator  - currently saturating well on home vent settings  - combicath for sputum culture    #Renal  -----------------  - mildly hyponatremic, likely 2/2 fluid status  - net in 200cc in last 24hrs  - 3.2L crystalloids bolus in ED, 1L bolus today  - SCr at baseline  - will cont to monitor  - carter placed 11/22, no indwelling Carter at home    #GI  -----------------  - PEG tube in place  - tube feeds  - protonix    #ID  -----------------  - concern for urosepsis vs sepsis 2/2 pneumonia  - CXR with L sided pneumonia  - COVID neg  - c/w meropenum and vanc for empiric coverage  - f/u blood and urine cultures for sensitivities  - MRSA+    #Endo  -----------------  - BG <180  - otherwise no active issues    #Heme  -----------------  - stable H&H, no signs of acute bleeding    #DVT Prophylaxis  -----------------  - c/w home xarelto 10mg  - consider switching to SubQ heparin if clinical status worsens    #GOC  -----------------  - full code      #INTERVAL PROGRESS  -----------------  ****   64 y/o F with advanced ALS s/p trach/PEG and HLD p/w tachycardia and decreased UOP concern for sepsis 2/2 UTI, admitted to MICU for further workup and vent management.     PLAN:    #Neuro  -----------------  - no motor function at baseline  - tracks with eyes  - no sedation    #CV  -----------------  - tachycardic, hypotensive, fluid responsive in ED. Likely 2/2 sepsis  - sinus tach on EKG  - closely monitor BP  - on midodrine and levophed  - POCUS showing A-lines and flattened IVC, EF grossly normal    #Resp  -----------------  - trached on ventilator  - currently saturating well on home vent settings  - combicath for sputum culture    #Renal  -----------------  - mildly hyponatremic, likely 2/2 fluid status  - net in 200cc in last 24hrs  - 3.2L crystalloids bolus in ED, 1L bolus today  - SCr at baseline  - will cont to monitor  - carter placed 11/22, no indwelling Carter at home    #GI  -----------------  - PEG tube in place  - tube feeds, Jevity 1.5 13hrs  - protonix    #ID  -----------------  - concern for urosepsis vs sepsis 2/2 pneumonia  - CXR with L sided pneumonia  - COVID neg  - c/w meropenum and vanc for empiric coverage  - f/u blood and urine cultures for sensitivities  - MRSA+    #Endo  -----------------  - BG <180  - otherwise no active issues    #Heme  -----------------  - stable H&H, no signs of acute bleeding    #DVT Prophylaxis  -----------------  - c/w home xarelto 10mg  - consider switching to SubQ heparin if clinical status worsens    #GOC  -----------------  - full code      #INTERVAL PROGRESS  -----------------  ****   64 y/o F with advanced ALS s/p trach/PEG and HLD p/w tachycardia and decreased UOP concern for sepsis 2/2 UTI, admitted to MICU for further workup and vent management.     PLAN:    #Neuro  -----------------  - no motor function at baseline  - tracks with eyes  - no sedation    #CV  -----------------  - tachycardic, hypotensive, fluid responsive in ED. Likely 2/2 sepsis  - sinus tach on EKG  - closely monitor BP  - on midodrine and levophed  - POCUS showing 1.3-1.6cm IVC  - 1L /hr    #Resp  -----------------  - trached on ventilator  - currently saturating well on home vent settings    #Renal  -----------------  - mildly hyponatremic, likely 2/2 fluid status  - net in 200cc in last 24hrs  - 3.2L crystalloids bolus in ED, 1L bolus today  - SCr at baseline  - will cont to monitor, ON output was 100/hr  - carter placed 11/22, no indwelling Carter at home    #GI  -----------------  - PEG tube in place  - tube feeds, Jevity 1.5 13hrs  - protonix    #ID  -----------------  - concern for urosepsis vs sepsis 2/2 pneumonia  - CXR with L sided pneumonia  - COVID neg  - c/w meropenum and vanc for empiric coverage  - f/u blood and urine cultures for sensitivities  - MRSA+    #Endo  -----------------  - BG <180  - otherwise no active issues    #Heme  -----------------  - stable H&H, no signs of acute bleeding    #DVT Prophylaxis  -----------------  - c/w home xarelto 10mg  - consider switching to SubQ heparin if clinical status worsens    #GOC  -----------------  - full code      #INTERVAL PROGRESS  -----------------  ****   64 y/o F with advanced ALS s/p trach/PEG and HLD p/w tachycardia and decreased UOP concern for sepsis 2/2 UTI, admitted to MICU for further workup and vent management.     PLAN:    #Neuro  -----------------  - no motor function at baseline  - tracks with eyes  - no sedation    #CV  -----------------  - tachycardic, hypotensive, fluid responsive in ED. Likely 2/2 sepsis  - sinus tach on EKG  - closely monitor BP  - on midodrine and levophed  - POCUS showing 1.3-1.6cm IVC  - 1L /hr    #Resp  -----------------  - trached on ventilator  - currently saturating well on home vent settings    #Renal  -----------------  - mildly hyponatremic, likely 2/2 fluid status  - net in 200cc in last 24hrs  - 3.2L crystalloids bolus in ED, 1L bolus today  - SCr at baseline  - will cont to monitor, ON output was 100/hr  - carter placed 11/22, no indwelling Carter at home    #GI  -----------------  - PEG tube in place  - tube feeds, Jevity 1.5 13hrs    #ID  -----------------  - concern for urosepsis vs sepsis 2/2 pneumonia  - CXR with L sided pneumonia  - COVID neg  - c/w meropenum and vanc for empiric coverage  - f/u blood species for sensitivities  - MRSA+    #Endo  -----------------  - BG <180  - otherwise no active issues    #Heme  -----------------  - stable H&H, no signs of acute bleeding    #DVT Prophylaxis  -----------------  - c/w home xarelto 10mg  - consider switching to SubQ heparin if clinical status worsens    #GOC  -----------------  - full code      #INTERVAL PROGRESS  -----------------  ****   64 y/o F with advanced ALS s/p trach/PEG and HLD p/w tachycardia and decreased UOP concern for sepsis 2/2 UTI, admitted to MICU for further workup and vent management.     PLAN:    #Neuro  -----------------  - no motor function at baseline  - tracks with eyes  - no sedation    #CV  -----------------  - tachycardic, hypotensive, fluid responsive in ED. Likely 2/2 sepsis  - sinus tach on EKG  - closely monitor BP  - on midodrine and levophed    #Resp  -----------------  - trached on ventilator  - currently saturating well on home vent settings    #Renal  -----------------  - mildly hyponatremic, likely 2/2 fluid status  - net in 200cc in last 24hrs  - 3.2L crystalloids bolus in ED, 1L bolus today  - SCr at baseline  - will cont to monitor, ON output was 100/hr  - carter placed 11/22, no indwelling Carter at home    #GI  -----------------  - PEG tube in place  - tube feeds, Jevity 1.5 13hrs    #ID  -----------------  - concern for urosepsis vs sepsis 2/2 pneumonia  - COVID neg  - c/w meropenum coverage  - f/u blood species for sensitivities    #Endo  -----------------  - BG <180  - otherwise no active issues    #Heme  -----------------  - stable H&H, no signs of acute bleeding    #DVT Prophylaxis  -----------------  - c/w home xarelto 10mg  - consider switching to SubQ heparin if clinical status worsens    #GOC  -----------------  - full code      #INTERVAL PROGRESS  -----------------  ****

## 2020-11-24 NOTE — DIETITIAN INITIAL EVALUATION ADULT. - REASON FOR ADMISSION
sepsis    Per chart: "64 y/o F with advanced ALS s/p trach/PEG and HLD p/w tachycardia and decreased UOP concern for sepsis 2/2 UTI, admitted to MICU for further workup and vent management."

## 2020-11-25 LAB
-  AMIKACIN: SIGNIFICANT CHANGE UP
-  AMIKACIN: SIGNIFICANT CHANGE UP
-  AMPICILLIN/SULBACTAM: SIGNIFICANT CHANGE UP
-  AMPICILLIN/SULBACTAM: SIGNIFICANT CHANGE UP
-  AMPICILLIN: SIGNIFICANT CHANGE UP
-  AMPICILLIN: SIGNIFICANT CHANGE UP
-  AZTREONAM: SIGNIFICANT CHANGE UP
-  AZTREONAM: SIGNIFICANT CHANGE UP
-  CEFAZOLIN: SIGNIFICANT CHANGE UP
-  CEFAZOLIN: SIGNIFICANT CHANGE UP
-  CEFEPIME: SIGNIFICANT CHANGE UP
-  CEFEPIME: SIGNIFICANT CHANGE UP
-  CEFOXITIN: SIGNIFICANT CHANGE UP
-  CEFOXITIN: SIGNIFICANT CHANGE UP
-  CEFTAZIDIME/AVIBACTAM: SIGNIFICANT CHANGE UP
-  CEFTAZIDIME/AVIBACTAM: SIGNIFICANT CHANGE UP
-  CEFTOLOZANE/TAZOBACTAM: SIGNIFICANT CHANGE UP
-  CEFTOLOZANE/TAZOBACTAM: SIGNIFICANT CHANGE UP
-  CEFTRIAXONE: SIGNIFICANT CHANGE UP
-  CEFTRIAXONE: SIGNIFICANT CHANGE UP
-  CIPROFLOXACIN: SIGNIFICANT CHANGE UP
-  CIPROFLOXACIN: SIGNIFICANT CHANGE UP
-  ERTAPENEM: SIGNIFICANT CHANGE UP
-  ERTAPENEM: SIGNIFICANT CHANGE UP
-  GENTAMICIN: SIGNIFICANT CHANGE UP
-  GENTAMICIN: SIGNIFICANT CHANGE UP
-  IMIPENEM: SIGNIFICANT CHANGE UP
-  IMIPENEM: SIGNIFICANT CHANGE UP
-  LEVOFLOXACIN: SIGNIFICANT CHANGE UP
-  LEVOFLOXACIN: SIGNIFICANT CHANGE UP
-  MEROPENEM: SIGNIFICANT CHANGE UP
-  MEROPENEM: SIGNIFICANT CHANGE UP
-  PIPERACILLIN/TAZOBACTAM: SIGNIFICANT CHANGE UP
-  PIPERACILLIN/TAZOBACTAM: SIGNIFICANT CHANGE UP
-  TIGECYCLINE: SIGNIFICANT CHANGE UP
-  TIGECYCLINE: SIGNIFICANT CHANGE UP
-  TOBRAMYCIN: SIGNIFICANT CHANGE UP
-  TOBRAMYCIN: SIGNIFICANT CHANGE UP
-  TRIMETHOPRIM/SULFAMETHOXAZOLE: SIGNIFICANT CHANGE UP
-  TRIMETHOPRIM/SULFAMETHOXAZOLE: SIGNIFICANT CHANGE UP
ALBUMIN SERPL ELPH-MCNC: 2.2 G/DL — LOW (ref 3.3–5)
ALP SERPL-CCNC: 118 U/L — SIGNIFICANT CHANGE UP (ref 40–120)
ALT FLD-CCNC: 24 U/L — SIGNIFICANT CHANGE UP (ref 10–45)
ANION GAP SERPL CALC-SCNC: 10 MMOL/L — SIGNIFICANT CHANGE UP (ref 5–17)
APTT BLD: 28.1 SEC — SIGNIFICANT CHANGE UP (ref 27.5–35.5)
AST SERPL-CCNC: 16 U/L — SIGNIFICANT CHANGE UP (ref 10–40)
BACTERIA UR CULT: ABNORMAL
BILIRUB SERPL-MCNC: 0.9 MG/DL — SIGNIFICANT CHANGE UP (ref 0.2–1.2)
BUN SERPL-MCNC: 18 MG/DL — SIGNIFICANT CHANGE UP (ref 7–23)
CALCIUM SERPL-MCNC: 8.7 MG/DL — SIGNIFICANT CHANGE UP (ref 8.4–10.5)
CHLORIDE SERPL-SCNC: 110 MMOL/L — HIGH (ref 96–108)
CO2 SERPL-SCNC: 21 MMOL/L — LOW (ref 22–31)
CREAT SERPL-MCNC: <0.3 MG/DL — LOW (ref 0.5–1.3)
CULTURE RESULTS: SIGNIFICANT CHANGE UP
GAS PNL BLDA: SIGNIFICANT CHANGE UP
GLUCOSE SERPL-MCNC: 138 MG/DL — HIGH (ref 70–99)
HCT VFR BLD CALC: 30.6 % — LOW (ref 34.5–45)
HGB BLD-MCNC: 9.7 G/DL — LOW (ref 11.5–15.5)
INR BLD: 1.5 RATIO — HIGH (ref 0.88–1.16)
MAGNESIUM SERPL-MCNC: 1.9 MG/DL — SIGNIFICANT CHANGE UP (ref 1.6–2.6)
MCHC RBC-ENTMCNC: 28.4 PG — SIGNIFICANT CHANGE UP (ref 27–34)
MCHC RBC-ENTMCNC: 31.7 GM/DL — LOW (ref 32–36)
MCV RBC AUTO: 89.7 FL — SIGNIFICANT CHANGE UP (ref 80–100)
METHOD TYPE: SIGNIFICANT CHANGE UP
METHOD TYPE: SIGNIFICANT CHANGE UP
NRBC # BLD: 0 /100 WBCS — SIGNIFICANT CHANGE UP (ref 0–0)
ORGANISM # SPEC MICROSCOPIC CNT: SIGNIFICANT CHANGE UP
PHOSPHATE SERPL-MCNC: 2.3 MG/DL — LOW (ref 2.5–4.5)
PLATELET # BLD AUTO: 137 K/UL — LOW (ref 150–400)
POTASSIUM SERPL-MCNC: 4 MMOL/L — SIGNIFICANT CHANGE UP (ref 3.5–5.3)
POTASSIUM SERPL-SCNC: 4 MMOL/L — SIGNIFICANT CHANGE UP (ref 3.5–5.3)
PROT SERPL-MCNC: 5.3 G/DL — LOW (ref 6–8.3)
PROTHROM AB SERPL-ACNC: 17.6 SEC — HIGH (ref 10.6–13.6)
RBC # BLD: 3.41 M/UL — LOW (ref 3.8–5.2)
RBC # FLD: 17.1 % — HIGH (ref 10.3–14.5)
SODIUM SERPL-SCNC: 141 MMOL/L — SIGNIFICANT CHANGE UP (ref 135–145)
SPECIMEN SOURCE: SIGNIFICANT CHANGE UP
VANCOMYCIN TROUGH SERPL-MCNC: 16.1 UG/ML — SIGNIFICANT CHANGE UP (ref 10–20)
WBC # BLD: 14.45 K/UL — HIGH (ref 3.8–10.5)
WBC # FLD AUTO: 14.45 K/UL — HIGH (ref 3.8–10.5)

## 2020-11-25 PROCEDURE — 99291 CRITICAL CARE FIRST HOUR: CPT

## 2020-11-25 RX ORDER — CEFEPIME 1 G/1
2000 INJECTION, POWDER, FOR SOLUTION INTRAMUSCULAR; INTRAVENOUS EVERY 12 HOURS
Refills: 0 | Status: DISCONTINUED | OUTPATIENT
Start: 2020-11-25 | End: 2020-11-30

## 2020-11-25 RX ADMIN — Medication 1 DROP(S): at 05:02

## 2020-11-25 RX ADMIN — CEFEPIME 100 MILLIGRAM(S): 1 INJECTION, POWDER, FOR SOLUTION INTRAMUSCULAR; INTRAVENOUS at 13:28

## 2020-11-25 RX ADMIN — Medication 3 MILLILITER(S): at 05:32

## 2020-11-25 RX ADMIN — CHLORHEXIDINE GLUCONATE 15 MILLILITER(S): 213 SOLUTION TOPICAL at 17:23

## 2020-11-25 RX ADMIN — Medication 62.5 MILLIMOLE(S): at 01:16

## 2020-11-25 RX ADMIN — SERTRALINE 50 MILLIGRAM(S): 25 TABLET, FILM COATED ORAL at 05:02

## 2020-11-25 RX ADMIN — RILUZOLE 50 MILLIGRAM(S): 50 TABLET ORAL at 17:23

## 2020-11-25 RX ADMIN — CHLORHEXIDINE GLUCONATE 15 MILLILITER(S): 213 SOLUTION TOPICAL at 05:02

## 2020-11-25 RX ADMIN — MEROPENEM 100 MILLIGRAM(S): 1 INJECTION INTRAVENOUS at 05:02

## 2020-11-25 RX ADMIN — Medication 3 MILLILITER(S): at 00:09

## 2020-11-25 RX ADMIN — Medication 3 MILLILITER(S): at 17:35

## 2020-11-25 RX ADMIN — Medication 1.5 MILLIGRAM(S): at 09:40

## 2020-11-25 RX ADMIN — RILUZOLE 50 MILLIGRAM(S): 50 TABLET ORAL at 05:02

## 2020-11-25 RX ADMIN — Medication 2.5 MILLIGRAM(S): at 21:13

## 2020-11-25 RX ADMIN — CHLORHEXIDINE GLUCONATE 1 APPLICATION(S): 213 SOLUTION TOPICAL at 05:01

## 2020-11-25 RX ADMIN — Medication 1 APPLICATION(S): at 13:28

## 2020-11-25 RX ADMIN — Medication 3 MILLILITER(S): at 11:06

## 2020-11-25 RX ADMIN — Medication 1 DROP(S): at 17:23

## 2020-11-25 RX ADMIN — RIVAROXABAN 10 MILLIGRAM(S): KIT at 17:23

## 2020-11-25 NOTE — PROGRESS NOTE ADULT - ASSESSMENT
64 y/o F with advanced ALS s/p trach/PEG and HLD p/w tachycardia and decreased UOP concern for sepsis 2/2 UTI, admitted to MICU for further workup and vent management.     PLAN:    #Neuro  -----------------  - no motor function at baseline  - tracks with eyes  - no sedation    #CV  -----------------  - tachycardic, hypotensive, fluid responsive in ED. Likely 2/2 sepsis  - sinus tach on EKG  - closely monitor BP  - on midodrine and levophed    #Resp  -----------------  - trached on ventilator  - currently saturating well on home vent settings    #Renal  -----------------  - mildly hyponatremic, likely 2/2 fluid status  - net in 200cc in last 24hrs  - 3.2L crystalloids bolus in ED, 1L bolus today  - SCr at baseline  - will cont to monitor, ON output was 100/hr  - carter placed 11/22, no indwelling Carter at home    #GI  -----------------  - PEG tube in place  - tube feeds, Jevity 1.5 13hrs    #ID  -----------------  - concern for urosepsis vs sepsis 2/2 pneumonia  - COVID neg  - c/w meropenum coverage  - f/u blood species for sensitivities    #Endo  -----------------  - BG <180  - otherwise no active issues    #Heme  -----------------  - stable H&H, no signs of acute bleeding    #DVT Prophylaxis  -----------------  - c/w home xarelto 10mg  - consider switching to SubQ heparin if clinical status worsens    #GOC  -----------------  - full code      #INTERVAL PROGRESS  -----------------  ****   64 y/o F with advanced ALS s/p trach/PEG and HLD p/w tachycardia and decreased UOP concern for sepsis 2/2 UTI, admitted to MICU for further workup and vent management.     PLAN:    #Neuro  -----------------  - no motor function at baseline  - tracks with eyes  - no sedation    #CV  -----------------  - tachycardic, hypotensive, fluid responsive in ED. Likely 2/2 sepsis  - sinus tach on EKG  - closely monitor BP  - on midodrine, off of pressors    #Resp  -----------------  - trached on ventilator  - currently saturating well on home vent settings    #Renal  -----------------  - mildly hyponatremic, likely 2/2 fluid status  - net in 200cc in last 24hrs  - 3.2L crystalloids bolus in ED, 1L bolus today  - SCr at baseline  - will cont to monitor, ON output was 100/hr  - dc carter, no carter at home    #GI  -----------------  - PEG tube in place  - tube feeds, Jevity 1.5 13hrs    #ID  -----------------  - concern for urosepsis vs sepsis 2/2 pneumonia  - COVID neg  - sensitivities back for Morganella bacteremia, will switch to cefepime    #Endo  -----------------  - BG <180  - otherwise no active issues    #Heme  -----------------  - stable H&H, no signs of acute bleeding    #DVT Prophylaxis  -----------------  - c/w home xarelto 10mg  - consider switching to SubQ heparin if clinical status worsens    #GOC  -----------------  - full code      #INTERVAL PROGRESS  -----------------

## 2020-11-25 NOTE — CHART NOTE - NSCHARTNOTEFT_GEN_A_CORE
MICU Transfer Note    Transfer from: MICU    Transfer to: (  ) Medicine    (  ) Telemetry     (x) RCU        (    ) Palliative         (   ) Stroke Unit          (   ) __________________    Accepting Physician: Dr. Booth  Signout given to:     MICU COURSE:   Pt admitted to MICU from ED for septic shock from suspected urologic source. Pt required pressor support and fluid resuscitation. Was started on meropenum and vancomycin for empiric coverage. Blood cultures showed Morganella sensitive to cefepime. Abx switched to cefepime. Pt is stable on vent. Was able to wean off pressor, only taking midodrine 20mg Q8 now. Pressures in >120s SBP for last 24+ hours. From a clinical stand point, pt does not require further MICU level care.          ASSESSMENT & PLAN:     Joseph Ovalle, PGY1    INTERVAL HPI/OVERNIGHT EVENTS:   - No acute events  - No pressor requirements    SUBJECTIVE:   Patient seen and examined at bedside. Trached, PEG, on pressors, vitals currently stable.      OBJECTIVE:    VITAL SIGNS:  ICU Vital Signs Last 24 Hrs  T(C): 37.4 (25 Nov 2020 04:00), Max: 37.6 (25 Nov 2020 00:00)  T(F): 99.3 (25 Nov 2020 04:00), Max: 99.7 (25 Nov 2020 00:00)  HR: 103 (25 Nov 2020 07:00) (84 - 104)  BP: 133/63 (25 Nov 2020 07:00) (94/50 - 144/69)  BP(mean): 91 (25 Nov 2020 07:00) (69 - 98)  ABP: --  ABP(mean): --  RR: 14 (25 Nov 2020 07:00) (14 - 17)  SpO2: 100% (25 Nov 2020 07:00) (99% - 100%)    Mode: AC/ CMV (Assist Control/ Continuous Mandatory Ventilation), RR (machine): 14, TV (machine): 450, FiO2: 30, PEEP: 5, ITime: 0.9, MAP: 7, PIP: 23    11-24 @ 07:01  -  11-25 @ 07:00  --------------------------------------------------------  IN: 2757 mL / OUT: 1640 mL / NET: 1117 mL      CAPILLARY BLOOD GLUCOSE          PHYSICAL EXAM:  General: no movement 2/2 ALS, chronically ill appearing  HEENT: NCAT, PERRL, +tracks with eyes  Neck: +trached  Respiratory: CTAB  Cardiovascular: RRR, S1S2, no m/r/g  Abdomen: distended but soft, +bowel sounds  Extremities: no edema, no cyanosis  Skin: warm, perfused  Neurological: limited 2/2 ALS    MEDICATIONS:  MEDICATIONS  (STANDING):  albuterol/ipratropium for Nebulization 3 milliLiter(s) Nebulizer every 6 hours  artificial  tears Solution 1 Drop(s) Both EYES two times a day  chlorhexidine 0.12% Liquid 15 milliLiter(s) Oral Mucosa every 12 hours  chlorhexidine 4% Liquid 1 Application(s) Topical <User Schedule>  chlorhexidine 4% Liquid 1 Application(s) Topical <User Schedule>  diazepam   Solution 1.5 milliGRAM(s) Oral <User Schedule>  diazepam   Solution 2.5 milliGRAM(s) Oral <User Schedule>  influenza   Vaccine 0.5 milliLiter(s) IntraMuscular once  meropenem  IVPB      meropenem  IVPB 1000 milliGRAM(s) IV Intermittent every 8 hours  midodrine. 20 milliGRAM(s) Oral every 8 hours  norepinephrine Infusion 0.05 MICROgram(s)/kG/Min (5.04 mL/Hr) IV Continuous <Continuous>  riluzole 50 milliGRAM(s) Oral two times a day  rivaroxaban 10 milliGRAM(s) Enteral Tube with dinner  sertraline 50 milliGRAM(s) Oral daily    MEDICATIONS  (PRN):  petrolatum Ophthalmic Ointment 1 Application(s) Both EYES every 6 hours PRN Dryness      ALLERGIES:  Allergies    Bactrim DS (Rash)    Intolerances        LABS:                        9.7    14.45 )-----------( 137      ( 25 Nov 2020 00:15 )             30.6     11-25    141  |  110<H>  |  18  ----------------------------<  138<H>  4.0   |  21<L>  |  <0.30<L>    Ca    8.7      25 Nov 2020 00:15  Phos  2.3     11-25  Mg     1.9     11-25    TPro  5.3<L>  /  Alb  2.2<L>  /  TBili  0.9  /  DBili  x   /  AST  16  /  ALT  24  /  AlkPhos  118  11-25    PT/INR - ( 25 Nov 2020 00:15 )   PT: 17.6 sec;   INR: 1.50 ratio         PTT - ( 25 Nov 2020 00:15 )  PTT:28.1 sec  ABG - ( 25 Nov 2020 00:17 )  pH, Arterial: 7.44  pH, Blood: x     /  pCO2: 35    /  pO2: 147   / HCO3: 23    / Base Excess: -.2   /  SaO2: 99            RADIOLOGY & ADDITIONAL TESTS: Reviewed.    Assessment and Plan:   · Assessment	  64 y/o F with advanced ALS s/p trach/PEG and HLD p/w tachycardia and decreased UOP concern for sepsis 2/2 UTI, admitted to MICU for further workup and vent management.     PLAN:    #Neuro  -----------------  - no motor function at baseline  - tracks with eyes  - no sedation    #CV  -----------------  - tachycardic, hypotensive, fluid responsive in ED. Likely 2/2 sepsis  - sinus tach on EKG  - closely monitor BP  - on midodrine, off of pressors    #Resp  -----------------  - trached on ventilator  - currently saturating well on home vent settings    #Renal  -----------------  - mildly hyponatremic, likely 2/2 fluid status  - net in 200cc in last 24hrs  - 3.2L crystalloids bolus in ED, 1L bolus today  - SCr at baseline  - will cont to monitor, ON output was 100/hr  - dc carter, no carter at home    #GI  -----------------  - PEG tube in place  - tube feeds, Jevity 1.5 13hrs    #ID  -----------------  - concern for urosepsis vs sepsis 2/2 pneumonia  - COVID neg  - sensitivities back for Morganella bacteremia, will switch to cefepime    #Endo  -----------------  - BG <180  - otherwise no active issues    #Heme  -----------------  - stable H&H, no signs of acute bleeding    #DVT Prophylaxis  -----------------  - c/w home xarelto 10mg  - consider switching to SubQ heparin if clinical status worsens    #GOC  -----------------  - full code          FOR FOLLOW UP:    [ ] results from repeat blood cultures drawn 11/24.        LABS:                        9.7    14.45 )-----------( 137      ( 25 Nov 2020 00:15 )             30.6     Hgb Trend: 9.7<--, 9.1<--, 9.7<--, 10.1<--, 10.0<--  11-25    141  |  110<H>  |  18  ----------------------------<  138<H>  4.0   |  21<L>  |  <0.30<L>    Ca    8.7      25 Nov 2020 00:15  Phos  2.3     11-25  Mg     1.9     11-25    TPro  5.3<L>  /  Alb  2.2<L>  /  TBili  0.9  /  DBili  x   /  AST  16  /  ALT  24  /  AlkPhos  118  11-25    Creatinine Trend: <0.30<--, <0.30<--, <0.30<--, <0.30<--, <0.30<--, <0.30<--  LIVER FUNCTIONS - ( 25 Nov 2020 00:15 )  Alb: 2.2 g/dL / Pro: 5.3 g/dL / ALK PHOS: 118 U/L / ALT: 24 U/L / AST: 16 U/L / GGT: x           PT/INR - ( 25 Nov 2020 00:15 )   PT: 17.6 sec;   INR: 1.50 ratio         PTT - ( 25 Nov 2020 00:15 )  PTT:28.1 sec        ABG - ( 25 Nov 2020 00:17 )  pH, Arterial: 7.44  pH, Blood: x     /  pCO2: 35    /  pO2: 147   / HCO3: 23    / Base Excess: -.2   /  SaO2: 99            CAPILLARY BLOOD GLUCOSE

## 2020-11-25 NOTE — PROGRESS NOTE ADULT - ATTENDING COMMENTS
Pt seen and examined. 65 F with advanced ALS, chronic hypoxic/ hypercapnic resp failure, vent dependent, s/p trach and PEG, now presenting with septic shock and Morganella bacteremia 2/2 bacterial PNA + UTI. Remains vent dependent, FiO2 and PEEP requirements at baseline. Leukocytosis improving, change Abx to Cefepime based on sensitivities. check surveillance Bcxs to ensure clearance of bacteremia. Now off pressors,  keep MAP >65, lactate cleared. Mild hyponatremia now resolved. Cont to follow Cr/UO and electrolytes.  Oropharyngeal dysphagia, cont PEG feeds. Overall prognosis guarded.

## 2020-11-25 NOTE — PROGRESS NOTE ADULT - SUBJECTIVE AND OBJECTIVE BOX
Joseph Ovalle, PGY1    INTERVAL HPI/OVERNIGHT EVENTS:   ***    SUBJECTIVE:   Patient seen and examined at bedside. ***      OBJECTIVE:    VITAL SIGNS:  ICU Vital Signs Last 24 Hrs  T(C): 37.4 (25 Nov 2020 04:00), Max: 37.6 (25 Nov 2020 00:00)  T(F): 99.3 (25 Nov 2020 04:00), Max: 99.7 (25 Nov 2020 00:00)  HR: 103 (25 Nov 2020 07:00) (84 - 104)  BP: 133/63 (25 Nov 2020 07:00) (94/50 - 144/69)  BP(mean): 91 (25 Nov 2020 07:00) (69 - 98)  ABP: --  ABP(mean): --  RR: 14 (25 Nov 2020 07:00) (14 - 17)  SpO2: 100% (25 Nov 2020 07:00) (99% - 100%)    Mode: AC/ CMV (Assist Control/ Continuous Mandatory Ventilation), RR (machine): 14, TV (machine): 450, FiO2: 30, PEEP: 5, ITime: 0.9, MAP: 7, PIP: 23    11-24 @ 07:01  -  11-25 @ 07:00  --------------------------------------------------------  IN: 2757 mL / OUT: 1640 mL / NET: 1117 mL      CAPILLARY BLOOD GLUCOSE          PHYSICAL EXAM:  General: sedated  HEENT: NCAT, PERRL, clear conjunctiva, sclera anicteric  Neck: supple, no JVD  Respiratory: CTAB  Cardiovascular: RRR, S1S2, no m/r/g  Abdomen: soft, nontender, nondistended, normal bowel sounds  Extremities: no edema, no cyanosis  Skin: warm, perfused  Neurological: nonfocal    MEDICATIONS:  MEDICATIONS  (STANDING):  albuterol/ipratropium for Nebulization 3 milliLiter(s) Nebulizer every 6 hours  artificial  tears Solution 1 Drop(s) Both EYES two times a day  chlorhexidine 0.12% Liquid 15 milliLiter(s) Oral Mucosa every 12 hours  chlorhexidine 4% Liquid 1 Application(s) Topical <User Schedule>  chlorhexidine 4% Liquid 1 Application(s) Topical <User Schedule>  diazepam   Solution 1.5 milliGRAM(s) Oral <User Schedule>  diazepam   Solution 2.5 milliGRAM(s) Oral <User Schedule>  influenza   Vaccine 0.5 milliLiter(s) IntraMuscular once  meropenem  IVPB      meropenem  IVPB 1000 milliGRAM(s) IV Intermittent every 8 hours  midodrine. 20 milliGRAM(s) Oral every 8 hours  norepinephrine Infusion 0.05 MICROgram(s)/kG/Min (5.04 mL/Hr) IV Continuous <Continuous>  riluzole 50 milliGRAM(s) Oral two times a day  rivaroxaban 10 milliGRAM(s) Enteral Tube with dinner  sertraline 50 milliGRAM(s) Oral daily    MEDICATIONS  (PRN):  petrolatum Ophthalmic Ointment 1 Application(s) Both EYES every 6 hours PRN Dryness      ALLERGIES:  Allergies    Bactrim DS (Rash)    Intolerances        LABS:                        9.7    14.45 )-----------( 137      ( 25 Nov 2020 00:15 )             30.6     11-25    141  |  110<H>  |  18  ----------------------------<  138<H>  4.0   |  21<L>  |  <0.30<L>    Ca    8.7      25 Nov 2020 00:15  Phos  2.3     11-25  Mg     1.9     11-25    TPro  5.3<L>  /  Alb  2.2<L>  /  TBili  0.9  /  DBili  x   /  AST  16  /  ALT  24  /  AlkPhos  118  11-25    PT/INR - ( 25 Nov 2020 00:15 )   PT: 17.6 sec;   INR: 1.50 ratio         PTT - ( 25 Nov 2020 00:15 )  PTT:28.1 sec  ABG - ( 25 Nov 2020 00:17 )  pH, Arterial: 7.44  pH, Blood: x     /  pCO2: 35    /  pO2: 147   / HCO3: 23    / Base Excess: -.2   /  SaO2: 99                      RADIOLOGY & ADDITIONAL TESTS: Reviewed. Joseph Ovalle, PGY1    INTERVAL HPI/OVERNIGHT EVENTS:   - No acute events  - No pressor requirements    SUBJECTIVE:   Patient seen and examined at bedside. Trached, PEG, on pressors, vitals currently stable.      OBJECTIVE:    VITAL SIGNS:  ICU Vital Signs Last 24 Hrs  T(C): 37.4 (25 Nov 2020 04:00), Max: 37.6 (25 Nov 2020 00:00)  T(F): 99.3 (25 Nov 2020 04:00), Max: 99.7 (25 Nov 2020 00:00)  HR: 103 (25 Nov 2020 07:00) (84 - 104)  BP: 133/63 (25 Nov 2020 07:00) (94/50 - 144/69)  BP(mean): 91 (25 Nov 2020 07:00) (69 - 98)  ABP: --  ABP(mean): --  RR: 14 (25 Nov 2020 07:00) (14 - 17)  SpO2: 100% (25 Nov 2020 07:00) (99% - 100%)    Mode: AC/ CMV (Assist Control/ Continuous Mandatory Ventilation), RR (machine): 14, TV (machine): 450, FiO2: 30, PEEP: 5, ITime: 0.9, MAP: 7, PIP: 23    11-24 @ 07:01  -  11-25 @ 07:00  --------------------------------------------------------  IN: 2757 mL / OUT: 1640 mL / NET: 1117 mL      CAPILLARY BLOOD GLUCOSE          PHYSICAL EXAM:  General: no movement 2/2 ALS, chronically ill appearing  HEENT: NCAT, PERRL, +tracks with eyes  Neck: +trached  Respiratory: CTAB  Cardiovascular: RRR, S1S2, no m/r/g  Abdomen: distended but soft, +bowel sounds  Extremities: no edema, no cyanosis  Skin: warm, perfused  Neurological: limited 2/2 ALS    MEDICATIONS:  MEDICATIONS  (STANDING):  albuterol/ipratropium for Nebulization 3 milliLiter(s) Nebulizer every 6 hours  artificial  tears Solution 1 Drop(s) Both EYES two times a day  chlorhexidine 0.12% Liquid 15 milliLiter(s) Oral Mucosa every 12 hours  chlorhexidine 4% Liquid 1 Application(s) Topical <User Schedule>  chlorhexidine 4% Liquid 1 Application(s) Topical <User Schedule>  diazepam   Solution 1.5 milliGRAM(s) Oral <User Schedule>  diazepam   Solution 2.5 milliGRAM(s) Oral <User Schedule>  influenza   Vaccine 0.5 milliLiter(s) IntraMuscular once  meropenem  IVPB      meropenem  IVPB 1000 milliGRAM(s) IV Intermittent every 8 hours  midodrine. 20 milliGRAM(s) Oral every 8 hours  norepinephrine Infusion 0.05 MICROgram(s)/kG/Min (5.04 mL/Hr) IV Continuous <Continuous>  riluzole 50 milliGRAM(s) Oral two times a day  rivaroxaban 10 milliGRAM(s) Enteral Tube with dinner  sertraline 50 milliGRAM(s) Oral daily    MEDICATIONS  (PRN):  petrolatum Ophthalmic Ointment 1 Application(s) Both EYES every 6 hours PRN Dryness      ALLERGIES:  Allergies    Bactrim DS (Rash)    Intolerances        LABS:                        9.7    14.45 )-----------( 137      ( 25 Nov 2020 00:15 )             30.6     11-25    141  |  110<H>  |  18  ----------------------------<  138<H>  4.0   |  21<L>  |  <0.30<L>    Ca    8.7      25 Nov 2020 00:15  Phos  2.3     11-25  Mg     1.9     11-25    TPro  5.3<L>  /  Alb  2.2<L>  /  TBili  0.9  /  DBili  x   /  AST  16  /  ALT  24  /  AlkPhos  118  11-25    PT/INR - ( 25 Nov 2020 00:15 )   PT: 17.6 sec;   INR: 1.50 ratio         PTT - ( 25 Nov 2020 00:15 )  PTT:28.1 sec  ABG - ( 25 Nov 2020 00:17 )  pH, Arterial: 7.44  pH, Blood: x     /  pCO2: 35    /  pO2: 147   / HCO3: 23    / Base Excess: -.2   /  SaO2: 99            RADIOLOGY & ADDITIONAL TESTS: Reviewed.

## 2020-11-26 LAB
ALBUMIN SERPL ELPH-MCNC: 2.5 G/DL — LOW (ref 3.3–5)
ALP SERPL-CCNC: 130 U/L — HIGH (ref 40–120)
ALT FLD-CCNC: 20 U/L — SIGNIFICANT CHANGE UP (ref 10–45)
ANION GAP SERPL CALC-SCNC: 10 MMOL/L — SIGNIFICANT CHANGE UP (ref 5–17)
APTT BLD: 31.3 SEC — SIGNIFICANT CHANGE UP (ref 27.5–35.5)
AST SERPL-CCNC: 18 U/L — SIGNIFICANT CHANGE UP (ref 10–40)
BILIRUB SERPL-MCNC: 0.9 MG/DL — SIGNIFICANT CHANGE UP (ref 0.2–1.2)
BUN SERPL-MCNC: 16 MG/DL — SIGNIFICANT CHANGE UP (ref 7–23)
CALCIUM SERPL-MCNC: 8.9 MG/DL — SIGNIFICANT CHANGE UP (ref 8.4–10.5)
CHLORIDE SERPL-SCNC: 103 MMOL/L — SIGNIFICANT CHANGE UP (ref 96–108)
CO2 SERPL-SCNC: 23 MMOL/L — SIGNIFICANT CHANGE UP (ref 22–31)
CREAT SERPL-MCNC: <0.3 MG/DL — LOW (ref 0.5–1.3)
GLUCOSE SERPL-MCNC: 118 MG/DL — HIGH (ref 70–99)
HCT VFR BLD CALC: 32.7 % — LOW (ref 34.5–45)
HGB BLD-MCNC: 10.4 G/DL — LOW (ref 11.5–15.5)
INR BLD: 1.43 RATIO — HIGH (ref 0.88–1.16)
MAGNESIUM SERPL-MCNC: 1.7 MG/DL — SIGNIFICANT CHANGE UP (ref 1.6–2.6)
MCHC RBC-ENTMCNC: 28.3 PG — SIGNIFICANT CHANGE UP (ref 27–34)
MCHC RBC-ENTMCNC: 31.8 GM/DL — LOW (ref 32–36)
MCV RBC AUTO: 89.1 FL — SIGNIFICANT CHANGE UP (ref 80–100)
NRBC # BLD: 0 /100 WBCS — SIGNIFICANT CHANGE UP (ref 0–0)
PHOSPHATE SERPL-MCNC: 2.6 MG/DL — SIGNIFICANT CHANGE UP (ref 2.5–4.5)
PLATELET # BLD AUTO: 127 K/UL — LOW (ref 150–400)
POTASSIUM SERPL-MCNC: 3.8 MMOL/L — SIGNIFICANT CHANGE UP (ref 3.5–5.3)
POTASSIUM SERPL-SCNC: 3.8 MMOL/L — SIGNIFICANT CHANGE UP (ref 3.5–5.3)
PROT SERPL-MCNC: 5.6 G/DL — LOW (ref 6–8.3)
PROTHROM AB SERPL-ACNC: 16.9 SEC — HIGH (ref 10.6–13.6)
RBC # BLD: 3.67 M/UL — LOW (ref 3.8–5.2)
RBC # FLD: 16.7 % — HIGH (ref 10.3–14.5)
SODIUM SERPL-SCNC: 136 MMOL/L — SIGNIFICANT CHANGE UP (ref 135–145)
WBC # BLD: 9.84 K/UL — SIGNIFICANT CHANGE UP (ref 3.8–10.5)
WBC # FLD AUTO: 9.84 K/UL — SIGNIFICANT CHANGE UP (ref 3.8–10.5)

## 2020-11-26 PROCEDURE — 99291 CRITICAL CARE FIRST HOUR: CPT

## 2020-11-26 RX ORDER — MAGNESIUM SULFATE 500 MG/ML
2 VIAL (ML) INJECTION ONCE
Refills: 0 | Status: COMPLETED | OUTPATIENT
Start: 2020-11-26 | End: 2020-11-26

## 2020-11-26 RX ORDER — MIDODRINE HYDROCHLORIDE 2.5 MG/1
20 TABLET ORAL EVERY 8 HOURS
Refills: 0 | Status: DISCONTINUED | OUTPATIENT
Start: 2020-11-26 | End: 2020-11-29

## 2020-11-26 RX ADMIN — Medication 1 DROP(S): at 17:41

## 2020-11-26 RX ADMIN — Medication 2.5 MILLIGRAM(S): at 21:29

## 2020-11-26 RX ADMIN — Medication 3 MILLILITER(S): at 00:21

## 2020-11-26 RX ADMIN — Medication 1.5 MILLIGRAM(S): at 09:23

## 2020-11-26 RX ADMIN — CHLORHEXIDINE GLUCONATE 15 MILLILITER(S): 213 SOLUTION TOPICAL at 05:09

## 2020-11-26 RX ADMIN — CHLORHEXIDINE GLUCONATE 1 APPLICATION(S): 213 SOLUTION TOPICAL at 12:46

## 2020-11-26 RX ADMIN — Medication 3 MILLILITER(S): at 11:07

## 2020-11-26 RX ADMIN — CHLORHEXIDINE GLUCONATE 1 APPLICATION(S): 213 SOLUTION TOPICAL at 05:10

## 2020-11-26 RX ADMIN — Medication 50 GRAM(S): at 01:56

## 2020-11-26 RX ADMIN — CEFEPIME 100 MILLIGRAM(S): 1 INJECTION, POWDER, FOR SOLUTION INTRAMUSCULAR; INTRAVENOUS at 14:42

## 2020-11-26 RX ADMIN — RILUZOLE 50 MILLIGRAM(S): 50 TABLET ORAL at 17:39

## 2020-11-26 RX ADMIN — Medication 1 DROP(S): at 05:09

## 2020-11-26 RX ADMIN — RILUZOLE 50 MILLIGRAM(S): 50 TABLET ORAL at 05:09

## 2020-11-26 RX ADMIN — CEFEPIME 100 MILLIGRAM(S): 1 INJECTION, POWDER, FOR SOLUTION INTRAMUSCULAR; INTRAVENOUS at 01:52

## 2020-11-26 RX ADMIN — RIVAROXABAN 10 MILLIGRAM(S): KIT at 17:38

## 2020-11-26 RX ADMIN — Medication 1 APPLICATION(S): at 05:44

## 2020-11-26 RX ADMIN — Medication 3 MILLILITER(S): at 17:05

## 2020-11-26 RX ADMIN — Medication 3 MILLILITER(S): at 05:11

## 2020-11-26 RX ADMIN — Medication 1 APPLICATION(S): at 12:45

## 2020-11-26 RX ADMIN — CHLORHEXIDINE GLUCONATE 15 MILLILITER(S): 213 SOLUTION TOPICAL at 17:43

## 2020-11-26 RX ADMIN — SERTRALINE 50 MILLIGRAM(S): 25 TABLET, FILM COATED ORAL at 05:09

## 2020-11-26 NOTE — PROGRESS NOTE ADULT - SUBJECTIVE AND OBJECTIVE BOX
Joseph Ovalle, PGY1    INTERVAL HPI/OVERNIGHT EVENTS:   - No acute events    SUBJECTIVE:   Patient seen and examined at bedside. Trached, PEG, on pressors, vitals currently stable.      OBJECTIVE:    VITAL SIGNS:  ICU Vital Signs Last 24 Hrs  T(C): 37.2 (26 Nov 2020 04:00), Max: 37.5 (25 Nov 2020 08:00)  T(F): 99 (26 Nov 2020 04:00), Max: 99.5 (25 Nov 2020 08:00)  HR: 105 (26 Nov 2020 05:09) (96 - 116)  BP: 143/68 (26 Nov 2020 04:00) (118/58 - 164/76)  BP(mean): 98 (26 Nov 2020 04:00) (81 - 111)  ABP: --  ABP(mean): --  RR: 16 (26 Nov 2020 04:00) (14 - 22)  SpO2: 99% (26 Nov 2020 05:09) (98% - 100%)    Mode: AC/ CMV (Assist Control/ Continuous Mandatory Ventilation), RR (machine): 14, TV (machine): 450, FiO2: 30, PEEP: 5, ITime: 0.9, MAP: 9, PIP: 25    11-25 @ 07:01  -  11-26 @ 07:00  --------------------------------------------------------  IN: 1190 mL / OUT: 1900 mL / NET: -710 mL      CAPILLARY BLOOD GLUCOSE      PHYSICAL EXAM:  General: sedated  HEENT: NCAT, PERRL, clear conjunctiva, sclera anicteric  Neck: supple, no JVD  Respiratory: CTAB  Cardiovascular: RRR, S1S2, no m/r/g  Abdomen: soft, nontender, nondistended, normal bowel sounds  Extremities: no edema, no cyanosis  Skin: warm, perfused  Neurological: nonfocal    MEDICATIONS:  MEDICATIONS  (STANDING):  albuterol/ipratropium for Nebulization 3 milliLiter(s) Nebulizer every 6 hours  artificial  tears Solution 1 Drop(s) Both EYES two times a day  cefepime   IVPB 2000 milliGRAM(s) IV Intermittent every 12 hours  chlorhexidine 0.12% Liquid 15 milliLiter(s) Oral Mucosa every 12 hours  chlorhexidine 4% Liquid 1 Application(s) Topical <User Schedule>  chlorhexidine 4% Liquid 1 Application(s) Topical <User Schedule>  diazepam   Solution 1.5 milliGRAM(s) Oral <User Schedule>  diazepam   Solution 2.5 milliGRAM(s) Oral <User Schedule>  influenza   Vaccine 0.5 milliLiter(s) IntraMuscular once  midodrine. 20 milliGRAM(s) Oral every 8 hours  riluzole 50 milliGRAM(s) Oral two times a day  rivaroxaban 10 milliGRAM(s) Enteral Tube with dinner  sertraline 50 milliGRAM(s) Oral daily    MEDICATIONS  (PRN):  petrolatum Ophthalmic Ointment 1 Application(s) Both EYES every 6 hours PRN Dryness      ALLERGIES:  Allergies    Bactrim DS (Rash)    Intolerances        LABS:                        10.4   9.84  )-----------( 127      ( 26 Nov 2020 00:18 )             32.7     11-26    136  |  103  |  16  ----------------------------<  118<H>  3.8   |  23  |  <0.30<L>    Ca    8.9      26 Nov 2020 00:18  Phos  2.6     11-26  Mg     1.7     11-26    TPro  5.6<L>  /  Alb  2.5<L>  /  TBili  0.9  /  DBili  x   /  AST  18  /  ALT  20  /  AlkPhos  130<H>  11-26    PT/INR - ( 26 Nov 2020 00:18 )   PT: 16.9 sec;   INR: 1.43 ratio         PTT - ( 26 Nov 2020 00:18 )  PTT:31.3 sec  ABG - ( 25 Nov 2020 00:17 )  pH, Arterial: 7.44  pH, Blood: x     /  pCO2: 35    /  pO2: 147   / HCO3: 23    / Base Excess: -.2   /  SaO2: 99            RADIOLOGY & ADDITIONAL TESTS: Reviewed. Joseph Ovalle, PGY1    INTERVAL HPI/OVERNIGHT EVENTS:   - No acute events    SUBJECTIVE:   Patient seen and examined at bedside. Trached, PEG, vitals stable.      OBJECTIVE:    VITAL SIGNS:  ICU Vital Signs Last 24 Hrs  T(C): 37.2 (26 Nov 2020 04:00), Max: 37.5 (25 Nov 2020 08:00)  T(F): 99 (26 Nov 2020 04:00), Max: 99.5 (25 Nov 2020 08:00)  HR: 105 (26 Nov 2020 05:09) (96 - 116)  BP: 143/68 (26 Nov 2020 04:00) (118/58 - 164/76)  BP(mean): 98 (26 Nov 2020 04:00) (81 - 111)  ABP: --  ABP(mean): --  RR: 16 (26 Nov 2020 04:00) (14 - 22)  SpO2: 99% (26 Nov 2020 05:09) (98% - 100%)    Mode: AC/ CMV (Assist Control/ Continuous Mandatory Ventilation), RR (machine): 14, TV (machine): 450, FiO2: 30, PEEP: 5, ITime: 0.9, MAP: 9, PIP: 25    11-25 @ 07:01  -  11-26 @ 07:00  --------------------------------------------------------  IN: 1190 mL / OUT: 1900 mL / NET: -710 mL      CAPILLARY BLOOD GLUCOSE      PHYSICAL EXAM:  General: no movement 2/2 ALS, chronically ill appearing  HEENT: NCAT, PERRL, +tracks with eyes  Neck: +trached  Respiratory: CTAB  Cardiovascular: RRR, S1S2, no m/r/g  Abdomen: distended but soft, +bowel sounds  Extremities: no edema, no cyanosis  Skin: warm, perfused  Neurological: limited 2/2 ALS    MEDICATIONS:  MEDICATIONS  (STANDING):  albuterol/ipratropium for Nebulization 3 milliLiter(s) Nebulizer every 6 hours  artificial  tears Solution 1 Drop(s) Both EYES two times a day  cefepime   IVPB 2000 milliGRAM(s) IV Intermittent every 12 hours  chlorhexidine 0.12% Liquid 15 milliLiter(s) Oral Mucosa every 12 hours  chlorhexidine 4% Liquid 1 Application(s) Topical <User Schedule>  chlorhexidine 4% Liquid 1 Application(s) Topical <User Schedule>  diazepam   Solution 1.5 milliGRAM(s) Oral <User Schedule>  diazepam   Solution 2.5 milliGRAM(s) Oral <User Schedule>  influenza   Vaccine 0.5 milliLiter(s) IntraMuscular once  midodrine. 20 milliGRAM(s) Oral every 8 hours  riluzole 50 milliGRAM(s) Oral two times a day  rivaroxaban 10 milliGRAM(s) Enteral Tube with dinner  sertraline 50 milliGRAM(s) Oral daily    MEDICATIONS  (PRN):  petrolatum Ophthalmic Ointment 1 Application(s) Both EYES every 6 hours PRN Dryness      ALLERGIES:  Allergies    Bactrim DS (Rash)    Intolerances        LABS:                        10.4   9.84  )-----------( 127      ( 26 Nov 2020 00:18 )             32.7     11-26    136  |  103  |  16  ----------------------------<  118<H>  3.8   |  23  |  <0.30<L>    Ca    8.9      26 Nov 2020 00:18  Phos  2.6     11-26  Mg     1.7     11-26    TPro  5.6<L>  /  Alb  2.5<L>  /  TBili  0.9  /  DBili  x   /  AST  18  /  ALT  20  /  AlkPhos  130<H>  11-26    PT/INR - ( 26 Nov 2020 00:18 )   PT: 16.9 sec;   INR: 1.43 ratio         PTT - ( 26 Nov 2020 00:18 )  PTT:31.3 sec  ABG - ( 25 Nov 2020 00:17 )  pH, Arterial: 7.44  pH, Blood: x     /  pCO2: 35    /  pO2: 147   / HCO3: 23    / Base Excess: -.2   /  SaO2: 99            RADIOLOGY & ADDITIONAL TESTS: Reviewed.

## 2020-11-26 NOTE — PROGRESS NOTE ADULT - ATTENDING COMMENTS
1. Severe sepsis from morganella bacteremia. Continue cefepime.  Pt now on midodrine. Off pressors. Repeat blood cxs to see if bacteremia has cleared.  2. Chronic hypercapnic and hypoxemic respiratory failure from ALS. Continue current AC vent settings.  3. Neuro: ALS. No motor movements except moves eyes.

## 2020-11-26 NOTE — PROGRESS NOTE ADULT - ASSESSMENT
66 y/o F with advanced ALS s/p trach/PEG and HLD p/w tachycardia and decreased UOP concern for sepsis 2/2 UTI, admitted to MICU for further workup and vent management. Septic shock requiring pressors, broad spectrum abx. Now off pressors, stable from a hemodynamic and respiratory standpoint.     PLAN:    #Neuro  -----------------  - no motor function at baseline  - tracks with eyes  - no sedation    #CV  -----------------  - tachycardic, hypotensive, fluid responsive in ED. Likely 2/2 sepsis  - sinus tach on EKG  - closely monitor BP  - on midodrine, off of pressors    #Resp  -----------------  - trached on ventilator  - currently saturating well on home vent settings    #Renal  -----------------  - mildly hyponatremic, likely 2/2 fluid status  - net in 200cc in last 24hrs  - 3.2L crystalloids bolus in ED, 1L bolus today  - SCr at baseline  - will cont to monitor, ON output was 100/hr  - dc carter, no carter at home    #GI  -----------------  - PEG tube in place  - tube feeds, Jevity 1.5 13hrs    #ID  -----------------  - concern for urosepsis vs sepsis 2/2 pneumonia  - COVID neg  - sensitivities back for Morganella bacteremia, will switch to cefepime    #Endo  -----------------  - BG <180  - otherwise no active issues    #Heme  -----------------  - stable H&H, no signs of acute bleeding    #DVT Prophylaxis  -----------------  - c/w home xarelto 10mg  - consider switching to SubQ heparin if clinical status worsens    #GOC  -----------------  - full code      #INTERVAL PROGRESS  -----------------   66 y/o F with advanced ALS s/p trach/PEG and HLD p/w tachycardia and decreased UOP concern for sepsis 2/2 UTI, admitted to MICU for further workup and vent management. Septic shock requiring pressors, broad spectrum abx. Now off pressors, stable from a hemodynamic and respiratory standpoint.     PLAN:    #Neuro  -----------------  - no motor function at baseline  - tracks with eyes  - no sedation    #CV  -----------------  - tachycardic, hypotensive, fluid responsive in ED. Likely 2/2 sepsis  - sinus tach on EKG  - closely monitor BP  - on midodrine PRN, off of pressors 48hrs    #Resp  -----------------  - trached on ventilator  - currently saturating well on home vent settings    #Renal  -----------------  - mildly hyponatremic, likely 2/2 fluid status  - net in 200cc in last 24hrs  - 3.2L crystalloids bolus in ED, 1L bolus today  - SCr at baseline  - will cont to monitor, ON output was 100/hr  - dc carter, no carter at home    #GI  -----------------  - PEG tube in place  - tube feeds, Jevity 1.5 13hrs    #ID  -----------------  - concern for urosepsis vs sepsis 2/2 pneumonia  - COVID neg  - sensitivities back for Morganella bacteremia, will switch to cefepime  - rpt bcx ngtd    #Endo  -----------------  - BG <180  - otherwise no active issues    #Heme  -----------------  - stable H&H, no signs of acute bleeding    #DVT Prophylaxis  -----------------  - c/w home xarelto 10mg  - consider switching to SubQ heparin if clinical status worsens    #GOC  -----------------  - full code      #INTERVAL PROGRESS  -----------------

## 2020-11-27 LAB
ALBUMIN SERPL ELPH-MCNC: 3 G/DL — LOW (ref 3.3–5)
ALP SERPL-CCNC: 130 U/L — HIGH (ref 40–120)
ALT FLD-CCNC: 17 U/L — SIGNIFICANT CHANGE UP (ref 10–45)
ANION GAP SERPL CALC-SCNC: 12 MMOL/L — SIGNIFICANT CHANGE UP (ref 5–17)
AST SERPL-CCNC: 17 U/L — SIGNIFICANT CHANGE UP (ref 10–40)
BILIRUB SERPL-MCNC: 0.8 MG/DL — SIGNIFICANT CHANGE UP (ref 0.2–1.2)
BUN SERPL-MCNC: 17 MG/DL — SIGNIFICANT CHANGE UP (ref 7–23)
CALCIUM SERPL-MCNC: 8.8 MG/DL — SIGNIFICANT CHANGE UP (ref 8.4–10.5)
CHLORIDE SERPL-SCNC: 100 MMOL/L — SIGNIFICANT CHANGE UP (ref 96–108)
CO2 SERPL-SCNC: 26 MMOL/L — SIGNIFICANT CHANGE UP (ref 22–31)
CREAT SERPL-MCNC: <0.3 MG/DL — LOW (ref 0.5–1.3)
GLUCOSE SERPL-MCNC: 128 MG/DL — HIGH (ref 70–99)
HCT VFR BLD CALC: 34.4 % — LOW (ref 34.5–45)
HGB BLD-MCNC: 10.7 G/DL — LOW (ref 11.5–15.5)
INR BLD: 1.22 RATIO — HIGH (ref 0.88–1.16)
MAGNESIUM SERPL-MCNC: 2.1 MG/DL — SIGNIFICANT CHANGE UP (ref 1.6–2.6)
MCHC RBC-ENTMCNC: 27.9 PG — SIGNIFICANT CHANGE UP (ref 27–34)
MCHC RBC-ENTMCNC: 31.1 GM/DL — LOW (ref 32–36)
MCV RBC AUTO: 89.6 FL — SIGNIFICANT CHANGE UP (ref 80–100)
NRBC # BLD: 0 /100 WBCS — SIGNIFICANT CHANGE UP (ref 0–0)
PHOSPHATE SERPL-MCNC: 3.3 MG/DL — SIGNIFICANT CHANGE UP (ref 2.5–4.5)
PLATELET # BLD AUTO: 162 K/UL — SIGNIFICANT CHANGE UP (ref 150–400)
POTASSIUM SERPL-MCNC: 4 MMOL/L — SIGNIFICANT CHANGE UP (ref 3.5–5.3)
POTASSIUM SERPL-SCNC: 4 MMOL/L — SIGNIFICANT CHANGE UP (ref 3.5–5.3)
PROT SERPL-MCNC: 6.1 G/DL — SIGNIFICANT CHANGE UP (ref 6–8.3)
PROTHROM AB SERPL-ACNC: 14.5 SEC — HIGH (ref 10.6–13.6)
RBC # BLD: 3.84 M/UL — SIGNIFICANT CHANGE UP (ref 3.8–5.2)
RBC # FLD: 16.4 % — HIGH (ref 10.3–14.5)
SODIUM SERPL-SCNC: 138 MMOL/L — SIGNIFICANT CHANGE UP (ref 135–145)
WBC # BLD: 12.71 K/UL — HIGH (ref 3.8–10.5)
WBC # FLD AUTO: 12.71 K/UL — HIGH (ref 3.8–10.5)

## 2020-11-27 PROCEDURE — 99291 CRITICAL CARE FIRST HOUR: CPT

## 2020-11-27 RX ORDER — POLYETHYLENE GLYCOL 3350 17 G/17G
17 POWDER, FOR SOLUTION ORAL
Refills: 0 | Status: DISCONTINUED | OUTPATIENT
Start: 2020-11-27 | End: 2020-12-08

## 2020-11-27 RX ORDER — SENNA PLUS 8.6 MG/1
1 TABLET ORAL DAILY
Refills: 0 | Status: DISCONTINUED | OUTPATIENT
Start: 2020-11-27 | End: 2020-12-08

## 2020-11-27 RX ADMIN — Medication 3 MILLILITER(S): at 05:59

## 2020-11-27 RX ADMIN — CEFEPIME 100 MILLIGRAM(S): 1 INJECTION, POWDER, FOR SOLUTION INTRAMUSCULAR; INTRAVENOUS at 14:56

## 2020-11-27 RX ADMIN — CHLORHEXIDINE GLUCONATE 1 APPLICATION(S): 213 SOLUTION TOPICAL at 14:54

## 2020-11-27 RX ADMIN — RILUZOLE 50 MILLIGRAM(S): 50 TABLET ORAL at 05:10

## 2020-11-27 RX ADMIN — Medication 1 DROP(S): at 05:10

## 2020-11-27 RX ADMIN — SERTRALINE 50 MILLIGRAM(S): 25 TABLET, FILM COATED ORAL at 05:11

## 2020-11-27 RX ADMIN — Medication 3 MILLILITER(S): at 00:01

## 2020-11-27 RX ADMIN — Medication 1 APPLICATION(S): at 01:36

## 2020-11-27 RX ADMIN — CEFEPIME 100 MILLIGRAM(S): 1 INJECTION, POWDER, FOR SOLUTION INTRAMUSCULAR; INTRAVENOUS at 01:33

## 2020-11-27 RX ADMIN — RILUZOLE 50 MILLIGRAM(S): 50 TABLET ORAL at 17:17

## 2020-11-27 RX ADMIN — Medication 1 APPLICATION(S): at 11:44

## 2020-11-27 RX ADMIN — CHLORHEXIDINE GLUCONATE 1 APPLICATION(S): 213 SOLUTION TOPICAL at 05:10

## 2020-11-27 RX ADMIN — Medication 3 MILLILITER(S): at 11:25

## 2020-11-27 RX ADMIN — CHLORHEXIDINE GLUCONATE 15 MILLILITER(S): 213 SOLUTION TOPICAL at 05:10

## 2020-11-27 RX ADMIN — Medication 1 DROP(S): at 17:17

## 2020-11-27 RX ADMIN — Medication 3 MILLILITER(S): at 17:17

## 2020-11-27 RX ADMIN — Medication 1.5 MILLIGRAM(S): at 10:50

## 2020-11-27 RX ADMIN — Medication 2.5 MILLIGRAM(S): at 22:44

## 2020-11-27 RX ADMIN — RIVAROXABAN 10 MILLIGRAM(S): KIT at 17:17

## 2020-11-27 RX ADMIN — POLYETHYLENE GLYCOL 3350 17 GRAM(S): 17 POWDER, FOR SOLUTION ORAL at 05:10

## 2020-11-27 RX ADMIN — CHLORHEXIDINE GLUCONATE 15 MILLILITER(S): 213 SOLUTION TOPICAL at 17:16

## 2020-11-27 NOTE — PROGRESS NOTE ADULT - SUBJECTIVE AND OBJECTIVE BOX
Joseph Ovalle, PGY1    INTERVAL HPI/OVERNIGHT EVENTS:   - no acute ON events    SUBJECTIVE:   Patient seen and examined at bedside. Trached, PEG, vitals stable.      OBJECTIVE:    VITAL SIGNS:  ICU Vital Signs Last 24 Hrs  T(C): 36.7 (27 Nov 2020 04:00), Max: 37.2 (26 Nov 2020 08:00)  T(F): 98.1 (27 Nov 2020 04:00), Max: 99 (26 Nov 2020 08:00)  HR: 107 (27 Nov 2020 07:00) (100 - 116)  BP: 155/75 (27 Nov 2020 07:00) (126/64 - 166/81)  BP(mean): 108 (27 Nov 2020 07:00) (88 - 116)  ABP: --  ABP(mean): --  RR: 21 (27 Nov 2020 07:00) (14 - 21)  SpO2: 99% (27 Nov 2020 07:00) (98% - 100%)    Mode: AC/ CMV (Assist Control/ Continuous Mandatory Ventilation), RR (machine): 14, TV (machine): 450, FiO2: 30, PEEP: 5, ITime: 0.9, MAP: 9, PIP: 19    11-26 @ 07:01  -  11-27 @ 07:00  --------------------------------------------------------  IN: 1290 mL / OUT: 2000 mL / NET: -710 mL      CAPILLARY BLOOD GLUCOSE          PHYSICAL EXAM:  General: no movement 2/2 ALS, chronically ill appearing  HEENT: NCAT, PERRL, +tracks with eyes  Neck: +trached  Respiratory: CTAB  Cardiovascular: RRR, S1S2, no m/r/g  Abdomen: distended but soft, +bowel sounds  Extremities: no edema, no cyanosis  Skin: warm, perfused  Neurological: limited 2/2 ALS    MEDICATIONS:  MEDICATIONS  (STANDING):  albuterol/ipratropium for Nebulization 3 milliLiter(s) Nebulizer every 6 hours  artificial  tears Solution 1 Drop(s) Both EYES two times a day  cefepime   IVPB 2000 milliGRAM(s) IV Intermittent every 12 hours  chlorhexidine 0.12% Liquid 15 milliLiter(s) Oral Mucosa every 12 hours  chlorhexidine 4% Liquid 1 Application(s) Topical <User Schedule>  chlorhexidine 4% Liquid 1 Application(s) Topical <User Schedule>  diazepam   Solution 1.5 milliGRAM(s) Oral <User Schedule>  diazepam   Solution 2.5 milliGRAM(s) Oral <User Schedule>  influenza   Vaccine 0.5 milliLiter(s) IntraMuscular once  polyethylene glycol 3350 17 Gram(s) Oral two times a day  riluzole 50 milliGRAM(s) Oral two times a day  rivaroxaban 10 milliGRAM(s) Enteral Tube with dinner  senna 1 Tablet(s) Oral daily  sertraline 50 milliGRAM(s) Oral daily    MEDICATIONS  (PRN):  midodrine. 20 milliGRAM(s) Oral every 8 hours PRN BP  petrolatum Ophthalmic Ointment 1 Application(s) Both EYES every 6 hours PRN Dryness      ALLERGIES:  Allergies    Bactrim DS (Rash)    Intolerances        LABS:                        10.7   12.71 )-----------( 162      ( 27 Nov 2020 00:30 )             34.4     11-27    138  |  100  |  17  ----------------------------<  128<H>  4.0   |  26  |  <0.30<L>    Ca    8.8      27 Nov 2020 00:30  Phos  3.3     11-27  Mg     2.1     11-27    TPro  6.1  /  Alb  3.0<L>  /  TBili  0.8  /  DBili  x   /  AST  17  /  ALT  17  /  AlkPhos  130<H>  11-27    PT/INR - ( 27 Nov 2020 00:30 )   PT: 14.5 sec;   INR: 1.22 ratio         PTT - ( 26 Nov 2020 00:18 )  PTT:31.3 sec          RADIOLOGY & ADDITIONAL TESTS: Reviewed.

## 2020-11-27 NOTE — PROGRESS NOTE ADULT - ASSESSMENT
66 y/o F with advanced ALS s/p trach/PEG and HLD p/w tachycardia and decreased UOP concern for sepsis 2/2 UTI, admitted to MICU for further workup and vent management. Septic shock requiring pressors, broad spectrum abx. Now off pressors, stable from a hemodynamic and respiratory standpoint.     PLAN:    #Neuro  -----------------  - no motor function at baseline  - tracks with eyes  - no sedation    #CV  -----------------  - off pressors  - on midodrine PRN    #Resp  -----------------  - trached on ventilator  - currently saturating well on home vent settings    #Renal  -----------------  - mildly hyponatremic, likely 2/2 fluid status  - net in 200cc in last 24hrs  - 3.2L crystalloids bolus in ED, 1L bolus today  - SCr at baseline  - will cont to monitor, ON output was 100/hr  - dc carter, no carter at home    #GI  -----------------  - PEG tube in place  - tube feeds, Jevity 1.5 13hrs  - tolerating well    #ID  -----------------  - here for septic shock, presummed GI/uro source  - COVID neg  - sensitivities back for Morganella bacteremia, will switch to cefepime  - rpt bcx negative    #Endo  -----------------  - BG <180    #Heme  -----------------  - stable H&H, no signs of acute bleeding    #DVT Prophylaxis  -----------------  - c/w home xarelto 10mg    #GOC  -----------------  - full code      #INTERVAL PROGRESS  -----------------

## 2020-11-27 NOTE — PROGRESS NOTE ADULT - ATTENDING COMMENTS
Pt seen and examined. 65 F with advanced ALS, chronic hypoxic/ hypercapnic resp failure, vent dependent, s/p trach and PEG, now presenting with septic shock and Morganella bacteremia 2/2 bacterial PNA + UTI. Remains vent dependent, FiO2 and PEEP requirements at baseline. Leukocytosis improving. Cont Cefepime, surveillance Bcxs  from 11/25 remain NGTD. Stable hemodynamics off pressors,  keep MAP >65, lactate cleared. Mild hyponatremia now resolved. Cont to follow Cr/UO and electrolytes.  Oropharyngeal dysphagia, cont PEG feeds. Overall prognosis guarded.

## 2020-11-28 DIAGNOSIS — R13.12 DYSPHAGIA, OROPHARYNGEAL PHASE: ICD-10-CM

## 2020-11-28 DIAGNOSIS — A41.4 SEPSIS DUE TO ANAEROBES: ICD-10-CM

## 2020-11-28 DIAGNOSIS — Z93.0 TRACHEOSTOMY STATUS: ICD-10-CM

## 2020-11-28 DIAGNOSIS — E87.1 HYPO-OSMOLALITY AND HYPONATREMIA: ICD-10-CM

## 2020-11-28 DIAGNOSIS — G12.21 AMYOTROPHIC LATERAL SCLEROSIS: ICD-10-CM

## 2020-11-28 DIAGNOSIS — Z29.9 ENCOUNTER FOR PROPHYLACTIC MEASURES, UNSPECIFIED: ICD-10-CM

## 2020-11-28 DIAGNOSIS — Z99.11 DEPENDENCE ON RESPIRATOR [VENTILATOR] STATUS: ICD-10-CM

## 2020-11-28 LAB
ALBUMIN SERPL ELPH-MCNC: 3.5 G/DL — SIGNIFICANT CHANGE UP (ref 3.3–5)
ALP SERPL-CCNC: 134 U/L — HIGH (ref 40–120)
ALT FLD-CCNC: 20 U/L — SIGNIFICANT CHANGE UP (ref 10–45)
ANION GAP SERPL CALC-SCNC: 10 MMOL/L — SIGNIFICANT CHANGE UP (ref 5–17)
AST SERPL-CCNC: 19 U/L — SIGNIFICANT CHANGE UP (ref 10–40)
BILIRUB SERPL-MCNC: 0.7 MG/DL — SIGNIFICANT CHANGE UP (ref 0.2–1.2)
BUN SERPL-MCNC: 19 MG/DL — SIGNIFICANT CHANGE UP (ref 7–23)
CALCIUM SERPL-MCNC: 9.2 MG/DL — SIGNIFICANT CHANGE UP (ref 8.4–10.5)
CHLORIDE SERPL-SCNC: 99 MMOL/L — SIGNIFICANT CHANGE UP (ref 96–108)
CO2 SERPL-SCNC: 29 MMOL/L — SIGNIFICANT CHANGE UP (ref 22–31)
CREAT SERPL-MCNC: <0.3 MG/DL — LOW (ref 0.5–1.3)
GLUCOSE SERPL-MCNC: 153 MG/DL — HIGH (ref 70–99)
HCT VFR BLD CALC: 36.6 % — SIGNIFICANT CHANGE UP (ref 34.5–45)
HGB BLD-MCNC: 11.3 G/DL — LOW (ref 11.5–15.5)
INR BLD: 1.22 RATIO — HIGH (ref 0.88–1.16)
MAGNESIUM SERPL-MCNC: 1.8 MG/DL — SIGNIFICANT CHANGE UP (ref 1.6–2.6)
MCHC RBC-ENTMCNC: 28.3 PG — SIGNIFICANT CHANGE UP (ref 27–34)
MCHC RBC-ENTMCNC: 30.9 GM/DL — LOW (ref 32–36)
MCV RBC AUTO: 91.5 FL — SIGNIFICANT CHANGE UP (ref 80–100)
NRBC # BLD: 0 /100 WBCS — SIGNIFICANT CHANGE UP (ref 0–0)
PHOSPHATE SERPL-MCNC: 2.7 MG/DL — SIGNIFICANT CHANGE UP (ref 2.5–4.5)
PLATELET # BLD AUTO: 246 K/UL — SIGNIFICANT CHANGE UP (ref 150–400)
POTASSIUM SERPL-MCNC: 4.1 MMOL/L — SIGNIFICANT CHANGE UP (ref 3.5–5.3)
POTASSIUM SERPL-SCNC: 4.1 MMOL/L — SIGNIFICANT CHANGE UP (ref 3.5–5.3)
PROT SERPL-MCNC: 6.8 G/DL — SIGNIFICANT CHANGE UP (ref 6–8.3)
PROTHROM AB SERPL-ACNC: 14.5 SEC — HIGH (ref 10.6–13.6)
RBC # BLD: 4 M/UL — SIGNIFICANT CHANGE UP (ref 3.8–5.2)
RBC # FLD: 16.2 % — HIGH (ref 10.3–14.5)
SODIUM SERPL-SCNC: 138 MMOL/L — SIGNIFICANT CHANGE UP (ref 135–145)
WBC # BLD: 17.12 K/UL — HIGH (ref 3.8–10.5)
WBC # FLD AUTO: 17.12 K/UL — HIGH (ref 3.8–10.5)

## 2020-11-28 PROCEDURE — ZZZZZ: CPT

## 2020-11-28 PROCEDURE — 99233 SBSQ HOSP IP/OBS HIGH 50: CPT | Mod: GC

## 2020-11-28 RX ADMIN — POLYETHYLENE GLYCOL 3350 17 GRAM(S): 17 POWDER, FOR SOLUTION ORAL at 05:33

## 2020-11-28 RX ADMIN — CHLORHEXIDINE GLUCONATE 1 APPLICATION(S): 213 SOLUTION TOPICAL at 05:33

## 2020-11-28 RX ADMIN — RILUZOLE 50 MILLIGRAM(S): 50 TABLET ORAL at 05:31

## 2020-11-28 RX ADMIN — CEFEPIME 100 MILLIGRAM(S): 1 INJECTION, POWDER, FOR SOLUTION INTRAMUSCULAR; INTRAVENOUS at 14:50

## 2020-11-28 RX ADMIN — CHLORHEXIDINE GLUCONATE 15 MILLILITER(S): 213 SOLUTION TOPICAL at 17:19

## 2020-11-28 RX ADMIN — Medication 3 MILLILITER(S): at 23:10

## 2020-11-28 RX ADMIN — CEFEPIME 100 MILLIGRAM(S): 1 INJECTION, POWDER, FOR SOLUTION INTRAMUSCULAR; INTRAVENOUS at 01:40

## 2020-11-28 RX ADMIN — Medication 1.5 MILLIGRAM(S): at 09:18

## 2020-11-28 RX ADMIN — RIVAROXABAN 10 MILLIGRAM(S): KIT at 17:19

## 2020-11-28 RX ADMIN — Medication 3 MILLILITER(S): at 12:39

## 2020-11-28 RX ADMIN — POLYETHYLENE GLYCOL 3350 17 GRAM(S): 17 POWDER, FOR SOLUTION ORAL at 17:19

## 2020-11-28 RX ADMIN — Medication 1 DROP(S): at 17:19

## 2020-11-28 RX ADMIN — Medication 3 MILLILITER(S): at 00:34

## 2020-11-28 RX ADMIN — CHLORHEXIDINE GLUCONATE 15 MILLILITER(S): 213 SOLUTION TOPICAL at 05:35

## 2020-11-28 RX ADMIN — RILUZOLE 50 MILLIGRAM(S): 50 TABLET ORAL at 17:19

## 2020-11-28 RX ADMIN — CHLORHEXIDINE GLUCONATE 1 APPLICATION(S): 213 SOLUTION TOPICAL at 11:47

## 2020-11-28 RX ADMIN — Medication 3 MILLILITER(S): at 05:20

## 2020-11-28 RX ADMIN — Medication 1 DROP(S): at 05:32

## 2020-11-28 RX ADMIN — Medication 3 MILLILITER(S): at 17:00

## 2020-11-28 RX ADMIN — SENNA PLUS 1 TABLET(S): 8.6 TABLET ORAL at 11:46

## 2020-11-28 RX ADMIN — Medication 2.5 MILLIGRAM(S): at 21:54

## 2020-11-28 RX ADMIN — SERTRALINE 50 MILLIGRAM(S): 25 TABLET, FILM COATED ORAL at 05:34

## 2020-11-28 NOTE — CONSULT NOTE ADULT - ASSESSMENT
66yo F hx of advanced ALS, HLD, vent dependent s/p trach in 2015 and PEG BIBEMS from home for fevers. Treated for sepsis in MICU then transferred to RCU where the team noticed air leaking around trach cuff. #8 cuffed DCT was removed and replaced with a #7 distal XLT. Pt tolerated procedure well with no further evidence of air leaking.     *Pt evaluated by covering ENT Dr. Joseline Branch at the bedside

## 2020-11-28 NOTE — PROGRESS NOTE ADULT - PROBLEM SELECTOR PLAN 6
DVT ppx: Xarelto 10mg daily  LLE Doppler 11/25- flow-limiting stenoses affecting the distal left superficial femoral artery and the popliteal arter    GI ppx: protonix 40mg daily

## 2020-11-28 NOTE — PROGRESS NOTE ADULT - PROBLEM SELECTOR PLAN 4
Continue Rilutek 50mg bid Continue Rilutek 50mg bid  Continue Valium 1.5 am and 2.5 pm   Continue Zoloft 50mg daily

## 2020-11-28 NOTE — PROGRESS NOTE ADULT - ATTENDING COMMENTS
Pt seen and examined.  d/w rcu team       65 F with advanced ALS, chronic hypoxic/ hypercapnic resp failure, vent dependent, s/p trach and PEG, now presenting with septic shock and Morganella bacteremia 2/2 bacterial PNA + UTI.   Remains vent dependent, FiO2 and PEEP requirements at baseline. increase pressure cuff pressure in the treach baloon---ENT EVALUATION FOR A TREACH CHANGE      Leukocytosis improving. Cont Cefepime, surveillance Bcxs  from 11/25 remain NGTD. Stable hemodynamics off pressors,  keep MAP >65, lactate cleared. Mild hyponatremia now resolved. Cont to follow Cr/UO and electrolytes.  Oropharyngeal dysphagia, cont PEG feeds. Overall prognosis guarded.

## 2020-11-28 NOTE — CONSULT NOTE ADULT - SUBJECTIVE AND OBJECTIVE BOX
CC: trach change    64yo F hx of advanced ALS, HLD, vent dependent s/p trach in 2015 and PEG BIBEMS from home for fevers. Pt's 24hr home health nurse reported over the past few days the pt has appeared more lethargic, has been tachycardic, and running fevers. Urination also decreased. Straight cath in the ED revealed cloudy urine. Urinates normally at home, no indwelling carter. Pt is baseline non-verbal w/ minimal motor function. Able to track with eyes. No hx of covid contacts. In ED, pt found to be hypotensive, tachycardic to 150s, Tmax 102 on presentation >> received 3.2L fluid bolus >>BP improved 100s SBP, HR 130s and started on broad spectrum abx. Pt since then clinically improved and was transferred to [facility] where they found air leaking from around the trach despite overinflating the cuff, as well as shallow volumes on the vent. ENT was called to evaluate for trach change.       PAST MEDICAL & SURGICAL HISTORY:  Aspiration into airway    Ventilator dependent  Since 2015    HLD (hyperlipidemia)    ALS (amyotrophic lateral sclerosis)    Encounter for PEG (percutaneous endoscopic gastrostomy)  peg placed    Dependence on tracheostomy    S/P gastrostomy  10/14 for dysphagia  receives jevity 2 cans TID      Allergies    Bactrim DS (Rash)    Intolerances      MEDICATIONS  (STANDING):  albuterol/ipratropium for Nebulization 3 milliLiter(s) Nebulizer every 6 hours  artificial  tears Solution 1 Drop(s) Both EYES two times a day  cefepime   IVPB 2000 milliGRAM(s) IV Intermittent every 12 hours  chlorhexidine 0.12% Liquid 15 milliLiter(s) Oral Mucosa every 12 hours  chlorhexidine 4% Liquid 1 Application(s) Topical <User Schedule>  chlorhexidine 4% Liquid 1 Application(s) Topical <User Schedule>  diazepam   Solution 1.5 milliGRAM(s) Oral <User Schedule>  diazepam   Solution 2.5 milliGRAM(s) Oral <User Schedule>  influenza   Vaccine 0.5 milliLiter(s) IntraMuscular once  polyethylene glycol 3350 17 Gram(s) Oral two times a day  riluzole 50 milliGRAM(s) Oral two times a day  rivaroxaban 10 milliGRAM(s) Enteral Tube with dinner  senna 1 Tablet(s) Oral daily  sertraline 50 milliGRAM(s) Oral daily    MEDICATIONS  (PRN):  midodrine. 20 milliGRAM(s) Oral every 8 hours PRN BP  petrolatum Ophthalmic Ointment 1 Application(s) Both EYES every 6 hours PRN Dryness      Social History: no pertinent social history    Family history: no pertinent family history    ROS:   unable to obtain    Vital Signs Last 24 Hrs  T(C): 36.7 (28 Nov 2020 13:58), Max: 37 (27 Nov 2020 19:36)  T(F): 98.1 (28 Nov 2020 13:58), Max: 98.6 (27 Nov 2020 19:36)  HR: 110 (28 Nov 2020 17:01) (100 - 118)  BP: 138/85 (28 Nov 2020 13:58) (138/85 - 163/89)  BP(mean): --  RR: 15 (28 Nov 2020 13:58) (14 - 15)  SpO2: 99% (28 Nov 2020 17:01) (97% - 100%)                          11.3   17.12 )-----------( 246      ( 28 Nov 2020 06:47 )             36.6    11-28    138  |  99  |  19  ----------------------------<  153<H>  4.1   |  29  |  <0.30<L>    Ca    9.2      28 Nov 2020 06:49  Phos  2.7     11-28  Mg     1.8     11-28    TPro  6.8  /  Alb  3.5  /  TBili  0.7  /  DBili  x   /  AST  19  /  ALT  20  /  AlkPhos  134<H>  11-28   PT/INR - ( 28 Nov 2020 06:48 )   PT: 14.5 sec;   INR: 1.22 ratio             PHYSICAL EXAM:  Gen: NAD, nonverbal  Skin: No rashes, bruises, or lesions  Head: Normocephalic, Atraumatic  Face: no edema, erythema, or fluctuance. Parotid glands soft without mass  Eyes: no scleral injection  Nose: Nares bilaterally patent, no discharge  Mouth: No Stridor / Drooling / Trismus.  Mucosa moist, tongue/uvula midline, oropharynx clear  Neck: #8 DCT cuffed removed and replaced with a #7 distal XLT cuffed. Flat, supple, no lymphadenopathy, trachea midline, no masses  Lymphatic: No lymphadenopathy  Resp: on vent  CV: no peripheral edema/cyanosis  GI: nondistended   Peripheral vascular: no JVD or edema  Neuro: unable to evaluate        Risks and benefits discussed with pt. Then, she was placed in a supine position with neck extended. A 10 CC syringe used to completely remove any remaining air in the trach cuff. #8 shiley cuffed trach tube was removed and replaced with with a #7 distal XLT cuffed. No bleeding. Clear secretions suctioned from stoma. Bedside tracheoscopy performed, emely visualized, no purulence, no erythema, no evidence of tracheomalacia. Pt tolerated the procedure well without complications.

## 2020-11-28 NOTE — PROGRESS NOTE ADULT - PROBLEM SELECTOR PLAN 2
S/p Tracheostomy 2015   - 450/14/30/5  -Weaning attempts as tolerated   -Maintain 02 sat > 92%  -Trach Care   -Aggressive Pulmonary Toileting/ Airway clearance  w/ Duonebs and Chest PT

## 2020-11-28 NOTE — PROGRESS NOTE ADULT - ASSESSMENT
65 F with advanced ALS, chronic hypoxic/ hypercapnic resp failure, vent dependent, s/p trach 2015 and PEG, now presenting with septic shock and Morganella bacteremia 2/2 bacterial PNA + UTI.  Patient was BIBEMS from home for fevers. Pt's 24hr home health nurse reported over the past few days the pt has appeared more lethargic, has been tachycardic, and running fevers with decreased Urinations. She was empirically started on meropenum and vancomycin in ED.  Was managed for Septic shock s/p fluid resuscitation, IV pressors, and Transferred to MICU.  Now her Leukocytosis improving and switched to Cefepime after Bcx 11/22 revealed Morganella morganii.  Surveillance Bcxs  from 11/25 remain NGTD.  Now patient off IV pressors and Stable Hemodynamics on oral midodrine 20mg q8. Hospital Course c/b Mild hyponatremia now resolved. Transferred to [facility] 11/28. 65 F with advanced ALS, chronic hypoxic/ hypercapnic resp failure, vent dependent, s/p trach 2015 and PEG, now presenting with septic shock and Morganella bacteremia 2/2 bacterial PNA + UTI.  Patient was BIBEMS from home for fevers. Pt's 24hr home health nurse reported over the past few days the pt has appeared more lethargic, has been tachycardic, and running fevers with decreased Urinations. She was empirically started on meropenum and vancomycin in ED.  Was managed for Septic shock s/p fluid resuscitation, IV pressors, and Transferred to MICU.  Now her Leukocytosis improving and switched to Cefepime after Bcx 11/22 revealed Morganella morganii.  Surveillance Bcxs  from 11/25 remain NGTD.  Now patient off IV pressors and Stable Hemodynamics on oral midodrine 20mg q8. Hospital Course c/b Mild hyponatremia now resolved. Transferred to [facility] 11/28.     11/28: Received to [facility]. Persistent Leukocytosis on Cefepime but Afebrile overnight. and remains hemodynamically stable. Pt noted with w/ Air leak and Elevated Cuff pressure. S/p Trach change to #7 distal XLT Amando by ENT.

## 2020-11-28 NOTE — PROGRESS NOTE ADULT - SUBJECTIVE AND OBJECTIVE BOX
[facility] Accept Note:     Patient is a 65y old  Female who presents with a chief complaint of sepsis (27 Nov 2020 07:46)    HPI:  66yo F hx of advanced ALS, HLD, vent dependent s/p trach in 2015 and PEG BIBEMS from home for fevers. Pt's 24hr home health nurse reported over the past few days the pt has appeared more lethargic, has been tachycardic, and running fevers. Urination also decreased. Straight cath in the ED revealed cloudy urine. Urinates normally at home, no indwelling carter. Pt is baseline non-verbal w/ minimal motor function. Able to track with eyes. No hx of covid contacts.     ED Course:  - hypotensive, tachycardic to 150s, Tmax 102 on presentation >> received 3.2L fluid bolus >>BP improved 100s SBP, HR 130s  - started on cefepime and vanc  - Tylenol suppository  - CXR w/ L sided opacity  - COVID pending (22 Nov 2020 18:05)      Interval Events:    REVIEW OF SYSTEMS:  [ ] Positive  [ ] All other systems negative  [ ] Unable to assess ROS because ________    Vital Signs Last 24 Hrs  T(C): 36.9 (11-28-20 @ 04:32), Max: 37 (11-27-20 @ 19:36)  T(F): 98.5 (11-28-20 @ 04:32), Max: 98.6 (11-27-20 @ 19:36)  HR: 100 (11-28-20 @ 05:26) (100 - 117)  BP: 153/93 (11-28-20 @ 04:32) (135/64 - 165/77)  RR: 14 (11-28-20 @ 04:32) (14 - 23)  SpO2: 100% (11-28-20 @ 05:26) (98% - 100%)PHYSICAL EXAM:  HEENT:   [ ]Tracheostomy:  [ ]Pupils equal  [ ]No oral lesions  [ ]Abnormal        SKIN  [ ]No Rash  [ ] Abnormal  [ ] pressure    CARDIAC  [ ]Regular  [ ]Abnormal    PULMONARY  [ ]Bilateral Clear Breath Sounds  [ ]Normal Excursion  [ ]Abnormal    GI  [ ]PEG      [ ] +BS		              [ ]Soft, nondistended, nontender	  [ ]Abnormal    MUSCULOSKELETAL                                   [ ]Bedbound                 [ ]Abnormal    [ ]Ambulatory/OOB to chair                           EXTREMITIES                                         [ ]Normal  [ ]Edema                           NEUROLOGIC  [ ] Normal, non focal  [ ] Focal findings:    PSYCHIATRIC  [ ]Alert and appropriate  [ ] Sedated	 [ ]Agitated    :  Carter: [ ] Yes, if yes: Date of Placement:                   [  ] No    LINES: Central Lines [ ] Yes, if yes: Date of Placement                                     [  ] No    HOSPITAL MEDICATIONS:  MEDICATIONS  (STANDING):  albuterol/ipratropium for Nebulization 3 milliLiter(s) Nebulizer every 6 hours  artificial  tears Solution 1 Drop(s) Both EYES two times a day  cefepime   IVPB 2000 milliGRAM(s) IV Intermittent every 12 hours  chlorhexidine 0.12% Liquid 15 milliLiter(s) Oral Mucosa every 12 hours  chlorhexidine 4% Liquid 1 Application(s) Topical <User Schedule>  chlorhexidine 4% Liquid 1 Application(s) Topical <User Schedule>  diazepam   Solution 1.5 milliGRAM(s) Oral <User Schedule>  diazepam   Solution 2.5 milliGRAM(s) Oral <User Schedule>  influenza   Vaccine 0.5 milliLiter(s) IntraMuscular once  polyethylene glycol 3350 17 Gram(s) Oral two times a day  riluzole 50 milliGRAM(s) Oral two times a day  rivaroxaban 10 milliGRAM(s) Enteral Tube with dinner  senna 1 Tablet(s) Oral daily  sertraline 50 milliGRAM(s) Oral daily    MEDICATIONS  (PRN):  midodrine. 20 milliGRAM(s) Oral every 8 hours PRN BP  petrolatum Ophthalmic Ointment 1 Application(s) Both EYES every 6 hours PRN Dryness      LABS:                        11.3   17.12 )-----------( 246      ( 28 Nov 2020 06:47 )             36.6     11-28    138  |  99  |  19  ----------------------------<  153<H>  4.1   |  29  |  <0.30<L>    Ca    9.2      28 Nov 2020 06:49  Phos  2.7     11-28  Mg     1.8     11-28    TPro  6.8  /  Alb  3.5  /  TBili  0.7  /  DBili  x   /  AST  19  /  ALT  20  /  AlkPhos  134<H>  11-28    PT/INR - ( 28 Nov 2020 06:48 )   PT: 14.5 sec;   INR: 1.22 ratio                 CAPILLARY BLOOD GLUCOSE    MICROBIOLOGY:     RADIOLOGY:  [ ] Reviewed and interpreted by me    Mode: AC/ CMV (Assist Control/ Continuous Mandatory Ventilation)  RR (machine): 14  TV (machine): 450  FiO2: 30  PEEP: 5  ITime: 1  MAP: 8  PIP: 21   R[facility] Accept Note:     Patient is a 65y old  Female who presents with a chief complaint of sepsis (27 Nov 2020 07:46)    HPI:  64yo F hx of advanced ALS, HLD, vent dependent s/p trach in 2015 and PEG BIBEMS from home for fevers. Pt's 24hr home health nurse reported over the past few days the pt has appeared more lethargic, has been tachycardic, and running fevers. Urination also decreased. Straight cath in the ED revealed cloudy urine. Urinates normally at home, no indwelling carter. Pt is baseline non-verbal w/ minimal motor function. Able to track with eyes. No hx of covid contacts.     ED Course:  - hypotensive, tachycardic to 150s, Tmax 102 on presentation >> received 3.2L fluid bolus >>BP improved 100s SBP, HR 130s  - started on cefepime and vanc  - Tylenol suppository  - CXR w/ L sided opacity  - COVID pending (22 Nov 2020 18:05)      Interval Events: Worsening Leukocytosis    REVIEW OF SYSTEMS:  [ ] Positive  [ ] All other systems negative  [ ] Unable to assess ROS because ________    Vital Signs Last 24 Hrs  T(C): 36.9 (11-28-20 @ 04:32), Max: 37 (11-27-20 @ 19:36)  T(F): 98.5 (11-28-20 @ 04:32), Max: 98.6 (11-27-20 @ 19:36)  HR: 100 (11-28-20 @ 05:26) (100 - 117)  BP: 153/93 (11-28-20 @ 04:32) (135/64 - 165/77)  RR: 14 (11-28-20 @ 04:32) (14 - 23)  SpO2: 100% (11-28-20 @ 05:26) (98% - 100%)    PHYSICAL EXAM:    HEENT:   [ x]Tracheostomy: *DCT Shiley   [ ]Pupils equal  [x ]No oral lesions  [ ]Abnormal    SKIN  [ ]No Rash  [ ] Abnormal  [ ] pressure    CARDIAC  [ ]Regular  [ ]Abnormal    PULMONARY  [x ]Bilateral Clear Breath Sounds  [ ]Normal Excursion  [ ]Abnormal    GI  [x ]PEG      [x ] +BS		              [ ]Soft, nondistended, nontender	  [ x]Abnormal-Soft and Mildly distended     MUSCULOSKELETAL                                   [x ]Bedbound                 [x ]Abnormal - not moving extremites x 4  [ ]Ambulatory/OOB to chair                           EXTREMITIES                                         [ ]Normal  [x ]Edema - 1+ Bilateral Hand Edema                          NEUROLOGIC  [ ] Normal, non focal  [ x] Focal findings: Lethargic, ill Appearing, Nonverbal. Eyes open, a    PSYCHIATRIC  [x] Unable to assess   [ ] Sedated	 [ ]Agitated    :  Carter: [ ] Yes, if yes: Date of Placement:                   [  x] No    LINES: Central Lines [ ] Yes, if yes: Date of Placement                                     [ x ] No    HOSPITAL MEDICATIONS:  MEDICATIONS  (STANDING):  albuterol/ipratropium for Nebulization 3 milliLiter(s) Nebulizer every 6 hours  artificial  tears Solution 1 Drop(s) Both EYES two times a day  cefepime   IVPB 2000 milliGRAM(s) IV Intermittent every 12 hours  chlorhexidine 0.12% Liquid 15 milliLiter(s) Oral Mucosa every 12 hours  chlorhexidine 4% Liquid 1 Application(s) Topical <User Schedule>  chlorhexidine 4% Liquid 1 Application(s) Topical <User Schedule>  diazepam   Solution 1.5 milliGRAM(s) Oral <User Schedule>  diazepam   Solution 2.5 milliGRAM(s) Oral <User Schedule>  influenza   Vaccine 0.5 milliLiter(s) IntraMuscular once  polyethylene glycol 3350 17 Gram(s) Oral two times a day  riluzole 50 milliGRAM(s) Oral two times a day  rivaroxaban 10 milliGRAM(s) Enteral Tube with dinner  senna 1 Tablet(s) Oral daily  sertraline 50 milliGRAM(s) Oral daily    MEDICATIONS  (PRN):  midodrine. 20 milliGRAM(s) Oral every 8 hours PRN BP  petrolatum Ophthalmic Ointment 1 Application(s) Both EYES every 6 hours PRN Dryness      LABS:                        11.3   17.12 )-----------( 246      ( 28 Nov 2020 06:47 )             36.6     11-28    138  |  99  |  19  ----------------------------<  153<H>  4.1   |  29  |  <0.30<L>    Ca    9.2      28 Nov 2020 06:49  Phos  2.7     11-28  Mg     1.8     11-28    TPro  6.8  /  Alb  3.5  /  TBili  0.7  /  DBili  x   /  AST  19  /  ALT  20  /  AlkPhos  134<H>  11-28    PT/INR - ( 28 Nov 2020 06:48 )   PT: 14.5 sec;   INR: 1.22 ratio                 CAPILLARY BLOOD GLUCOSE    MICROBIOLOGY:     RADIOLOGY:  [ ] Reviewed and interpreted by me    Mode: AC/ CMV (Assist Control/ Continuous Mandatory Ventilation)  RR (machine): 14  TV (machine): 450  FiO2: 30  PEEP: 5  ITime: 1  MAP: 8  PIP: 21   [facility] Accept Note:     Patient is a 65y old  Female who presents with a chief complaint of sepsis (27 Nov 2020 07:46)    HPI:  64yo F hx of advanced ALS, HLD, vent dependent s/p trach in 2015 and PEG BIBEMS from home for fevers. Pt's 24hr home health nurse reported over the past few days the pt has appeared more lethargic, has been tachycardic, and running fevers. Urination also decreased. Straight cath in the ED revealed cloudy urine. Urinates normally at home, no indwelling carter. Pt is baseline non-verbal w/ minimal motor function. Able to track with eyes. No hx of covid contacts.     ED Course:  - hypotensive, tachycardic to 150s, Tmax 102 on presentation >> received 3.2L fluid bolus >>BP improved 100s SBP, HR 130s  - started on cefepime and vanc  - Tylenol suppository  - CXR w/ L sided opacity  - COVID pending (22 Nov 2020 18:05)      Interval Events: Worsening Leukocytosis    REVIEW OF SYSTEMS:  [ ] Positive  [ ] All other systems negative  [ x] Unable to assess ROS because _Nonverbal_____    Vital Signs Last 24 Hrs  T(C): 36.9 (11-28-20 @ 04:32), Max: 37 (11-27-20 @ 19:36)  T(F): 98.5 (11-28-20 @ 04:32), Max: 98.6 (11-27-20 @ 19:36)  HR: 100 (11-28-20 @ 05:26) (100 - 117)  BP: 153/93 (11-28-20 @ 04:32) (135/64 - 165/77)  RR: 14 (11-28-20 @ 04:32) (14 - 23)  SpO2: 100% (11-28-20 @ 05:26) (98% - 100%)    PHYSICAL EXAM:    HEENT:   [ x]Tracheostomy: 8 DCT Shiley   [ ]Pupils equal  [x ]No oral lesions  [ ]Abnormal    SKIN  [ x]No Rash  [ ] Abnormal  [ ] pressure    CARDIAC  [ x]Regular  [ ]Abnormal    PULMONARY  [x ]Bilateral Clear Breath Sounds  [ ]Normal Excursion  [ ]Abnormal    GI  [x ]PEG      [x ] +BS		              [ ]Soft, nondistended, nontender	  [ x]Abnormal-Soft and Mildly distended     MUSCULOSKELETAL                                   [x ]Bedbound                 [x ]Abnormal - not moving extremites x 4  [ ]Ambulatory/OOB to chair                           EXTREMITIES                                         [ ]Normal  [x ]Edema - 1+ Bilateral Hand Edema                          NEUROLOGIC  [ ] Normal, non focal  [ x] Focal findings: Lethargic, ill Appearing, Nonverbal. Eyes open, not following commands, not moving extremities x 4 in setting of Advanced ALS at baseline     PSYCHIATRIC  [x] Unable to assess   [ ] Sedated	 [ ]Agitated    :  Carter: [ ] Yes, if yes: Date of Placement:                   [  x] No    LINES: Central Lines [ ] Yes, if yes: Date of Placement                                     [ x ] No    HOSPITAL MEDICATIONS:  MEDICATIONS  (STANDING):  albuterol/ipratropium for Nebulization 3 milliLiter(s) Nebulizer every 6 hours  artificial  tears Solution 1 Drop(s) Both EYES two times a day  cefepime   IVPB 2000 milliGRAM(s) IV Intermittent every 12 hours  chlorhexidine 0.12% Liquid 15 milliLiter(s) Oral Mucosa every 12 hours  chlorhexidine 4% Liquid 1 Application(s) Topical <User Schedule>  chlorhexidine 4% Liquid 1 Application(s) Topical <User Schedule>  diazepam   Solution 1.5 milliGRAM(s) Oral <User Schedule>  diazepam   Solution 2.5 milliGRAM(s) Oral <User Schedule>  influenza   Vaccine 0.5 milliLiter(s) IntraMuscular once  polyethylene glycol 3350 17 Gram(s) Oral two times a day  riluzole 50 milliGRAM(s) Oral two times a day  rivaroxaban 10 milliGRAM(s) Enteral Tube with dinner  senna 1 Tablet(s) Oral daily  sertraline 50 milliGRAM(s) Oral daily    MEDICATIONS  (PRN):  midodrine. 20 milliGRAM(s) Oral every 8 hours PRN BP  petrolatum Ophthalmic Ointment 1 Application(s) Both EYES every 6 hours PRN Dryness      LABS:                        11.3   17.12 )-----------( 246      ( 28 Nov 2020 06:47 )             36.6     11-28    138  |  99  |  19  ----------------------------<  153<H>  4.1   |  29  |  <0.30<L>    Ca    9.2      28 Nov 2020 06:49  Phos  2.7     11-28  Mg     1.8     11-28    TPro  6.8  /  Alb  3.5  /  TBili  0.7  /  DBili  x   /  AST  19  /  ALT  20  /  AlkPhos  134<H>  11-28    PT/INR - ( 28 Nov 2020 06:48 )   PT: 14.5 sec;   INR: 1.22 ratio                 CAPILLARY BLOOD GLUCOSE    MICROBIOLOGY:     RADIOLOGY:  [ ] Reviewed and interpreted by me    Mode: AC/ CMV (Assist Control/ Continuous Mandatory Ventilation)  RR (machine): 14  TV (machine): 450  FiO2: 30  PEEP: 5  ITime: 1  MAP: 8  PIP: 21   [facility] Accept Note:     Patient is a 65y old  Female who presents with a chief complaint of sepsis (27 Nov 2020 07:46)    HPI:  64yo F hx of advanced ALS, HLD, vent dependent s/p trach in 2015 and PEG BIBEMS from home for fevers. Pt's 24hr home health nurse reported over the past few days the pt has appeared more lethargic, has been tachycardic, and running fevers. Urination also decreased. Straight cath in the ED revealed cloudy urine. Urinates normally at home, no indwelling carter. Pt is baseline non-verbal w/ minimal motor function. Able to track with eyes. No hx of covid contacts.     ED Course:  - hypotensive, tachycardic to 150s, Tmax 102 on presentation >> received 3.2L fluid bolus >>BP improved 100s SBP, HR 130s  - started on cefepime and vanc  - Tylenol suppository  - CXR w/ L sided opacity  - COVID pending (22 Nov 2020 18:05)      Interval Events: Worsening Leukocytosis    REVIEW OF SYSTEMS:  [ ] Positive  [ ] All other systems negative  [ x] Unable to assess ROS because _Nonverbal_____    Vital Signs Last 24 Hrs  T(C): 36.9 (11-28-20 @ 04:32), Max: 37 (11-27-20 @ 19:36)  T(F): 98.5 (11-28-20 @ 04:32), Max: 98.6 (11-27-20 @ 19:36)  HR: 100 (11-28-20 @ 05:26) (100 - 117)  BP: 153/93 (11-28-20 @ 04:32) (135/64 - 165/77)  RR: 14 (11-28-20 @ 04:32) (14 - 23)  SpO2: 100% (11-28-20 @ 05:26) (98% - 100%)    PHYSICAL EXAM:    HEENT:   [ x]Tracheostomy: 8 DCT Shiley   [x ]Pupils w/ mildly reddened, no discharge or drainage noted  [x ]No oral lesions  [ ]Abnormal    SKIN  [ x]No Rash  [ ] Abnormal  [ ] pressure    CARDIAC  [ x]Regular  [ ]Abnormal    PULMONARY  [x ]Bilateral Clear Breath Sounds  [ ]Normal Excursion  [ ]Abnormal    GI  [x ]PEG      [x ] +BS		              [ ]Soft, nondistended, nontender	  [ x]Abnormal-Soft and Mildly distended     MUSCULOSKELETAL                                   [x ]Bedbound                 [x ]Abnormal - not moving extremites x 4  [ ]Ambulatory/OOB to chair                           EXTREMITIES                                         [ ]Normal  [x ]Edema - 1+ Bilateral Hand Edema                          NEUROLOGIC  [ ] Normal, non focal  [ x] Focal findings: Lethargic, ill Appearing, Nonverbal. Eyes open, not following commands, not moving extremities x 4 in setting of Advanced ALS at baseline     PSYCHIATRIC  [x] Unable to assess   [ ] Sedated	 [ ]Agitated    :  Carter: [ ] Yes, if yes: Date of Placement:                   [  x] No    LINES: Central Lines [ ] Yes, if yes: Date of Placement                                     [ x ] No    HOSPITAL MEDICATIONS:  MEDICATIONS  (STANDING):  albuterol/ipratropium for Nebulization 3 milliLiter(s) Nebulizer every 6 hours  artificial  tears Solution 1 Drop(s) Both EYES two times a day  cefepime   IVPB 2000 milliGRAM(s) IV Intermittent every 12 hours  chlorhexidine 0.12% Liquid 15 milliLiter(s) Oral Mucosa every 12 hours  chlorhexidine 4% Liquid 1 Application(s) Topical <User Schedule>  chlorhexidine 4% Liquid 1 Application(s) Topical <User Schedule>  diazepam   Solution 1.5 milliGRAM(s) Oral <User Schedule>  diazepam   Solution 2.5 milliGRAM(s) Oral <User Schedule>  influenza   Vaccine 0.5 milliLiter(s) IntraMuscular once  polyethylene glycol 3350 17 Gram(s) Oral two times a day  riluzole 50 milliGRAM(s) Oral two times a day  rivaroxaban 10 milliGRAM(s) Enteral Tube with dinner  senna 1 Tablet(s) Oral daily  sertraline 50 milliGRAM(s) Oral daily    MEDICATIONS  (PRN):  midodrine. 20 milliGRAM(s) Oral every 8 hours PRN BP  petrolatum Ophthalmic Ointment 1 Application(s) Both EYES every 6 hours PRN Dryness      LABS:                        11.3   17.12 )-----------( 246      ( 28 Nov 2020 06:47 )             36.6     11-28    138  |  99  |  19  ----------------------------<  153<H>  4.1   |  29  |  <0.30<L>    Ca    9.2      28 Nov 2020 06:49  Phos  2.7     11-28  Mg     1.8     11-28    TPro  6.8  /  Alb  3.5  /  TBili  0.7  /  DBili  x   /  AST  19  /  ALT  20  /  AlkPhos  134<H>  11-28    PT/INR - ( 28 Nov 2020 06:48 )   PT: 14.5 sec;   INR: 1.22 ratio                 CAPILLARY BLOOD GLUCOSE    MICROBIOLOGY:     RADIOLOGY:  [ ] Reviewed and interpreted by me    Mode: AC/ CMV (Assist Control/ Continuous Mandatory Ventilation)  RR (machine): 14  TV (machine): 450  FiO2: 30  PEEP: 5  ITime: 1  MAP: 8  PIP: 21

## 2020-11-28 NOTE — PROGRESS NOTE ADULT - PROBLEM SELECTOR PLAN 1
Septic shock 2/2 Morganella bacteremia 2/2 bacterial PNA + UTI. , presumed GI/uro source  - COVID neg  -  Bcx 11/22 + Morganella bacteremia  -  CXR 11/22: LLL PNA   -  Repeat Bcx 11/25: NGTD  Switched to Cefepime 11/25-

## 2020-11-29 LAB
ANION GAP SERPL CALC-SCNC: 9 MMOL/L — SIGNIFICANT CHANGE UP (ref 5–17)
BUN SERPL-MCNC: 20 MG/DL — SIGNIFICANT CHANGE UP (ref 7–23)
CALCIUM SERPL-MCNC: 9.3 MG/DL — SIGNIFICANT CHANGE UP (ref 8.4–10.5)
CHLORIDE SERPL-SCNC: 98 MMOL/L — SIGNIFICANT CHANGE UP (ref 96–108)
CO2 SERPL-SCNC: 28 MMOL/L — SIGNIFICANT CHANGE UP (ref 22–31)
CREAT SERPL-MCNC: <0.3 MG/DL — LOW (ref 0.5–1.3)
GLUCOSE SERPL-MCNC: 121 MG/DL — HIGH (ref 70–99)
HCT VFR BLD CALC: 36.1 % — SIGNIFICANT CHANGE UP (ref 34.5–45)
HGB BLD-MCNC: 11 G/DL — LOW (ref 11.5–15.5)
MAGNESIUM SERPL-MCNC: 1.8 MG/DL — SIGNIFICANT CHANGE UP (ref 1.6–2.6)
MCHC RBC-ENTMCNC: 28.1 PG — SIGNIFICANT CHANGE UP (ref 27–34)
MCHC RBC-ENTMCNC: 30.5 GM/DL — LOW (ref 32–36)
MCV RBC AUTO: 92.1 FL — SIGNIFICANT CHANGE UP (ref 80–100)
NRBC # BLD: 0 /100 WBCS — SIGNIFICANT CHANGE UP (ref 0–0)
PHOSPHATE SERPL-MCNC: 2.7 MG/DL — SIGNIFICANT CHANGE UP (ref 2.5–4.5)
PLATELET # BLD AUTO: 379 K/UL — SIGNIFICANT CHANGE UP (ref 150–400)
POTASSIUM SERPL-MCNC: 5 MMOL/L — SIGNIFICANT CHANGE UP (ref 3.5–5.3)
POTASSIUM SERPL-SCNC: 5 MMOL/L — SIGNIFICANT CHANGE UP (ref 3.5–5.3)
RBC # BLD: 3.92 M/UL — SIGNIFICANT CHANGE UP (ref 3.8–5.2)
RBC # FLD: 16.3 % — HIGH (ref 10.3–14.5)
SARS-COV-2 RNA SPEC QL NAA+PROBE: SIGNIFICANT CHANGE UP
SODIUM SERPL-SCNC: 135 MMOL/L — SIGNIFICANT CHANGE UP (ref 135–145)
VANCOMYCIN FLD-MCNC: <4 UG/ML — SIGNIFICANT CHANGE UP
WBC # BLD: 25.6 K/UL — HIGH (ref 3.8–10.5)
WBC # FLD AUTO: 25.6 K/UL — HIGH (ref 3.8–10.5)

## 2020-11-29 PROCEDURE — 99233 SBSQ HOSP IP/OBS HIGH 50: CPT | Mod: GC

## 2020-11-29 PROCEDURE — 99223 1ST HOSP IP/OBS HIGH 75: CPT | Mod: GC

## 2020-11-29 RX ORDER — DIAZEPAM 5 MG
1.5 TABLET ORAL DAILY
Refills: 0 | Status: DISCONTINUED | OUTPATIENT
Start: 2020-11-29 | End: 2020-12-06

## 2020-11-29 RX ORDER — DIAZEPAM 5 MG
1.5 TABLET ORAL
Refills: 0 | Status: DISCONTINUED | OUTPATIENT
Start: 2020-11-29 | End: 2020-11-29

## 2020-11-29 RX ORDER — VANCOMYCIN HCL 1 G
1000 VIAL (EA) INTRAVENOUS ONCE
Refills: 0 | Status: COMPLETED | OUTPATIENT
Start: 2020-11-29 | End: 2020-11-29

## 2020-11-29 RX ORDER — DIAZEPAM 5 MG
2.5 TABLET ORAL
Refills: 0 | Status: DISCONTINUED | OUTPATIENT
Start: 2020-11-29 | End: 2020-11-29

## 2020-11-29 RX ORDER — DIAZEPAM 5 MG
2.5 TABLET ORAL DAILY
Refills: 0 | Status: DISCONTINUED | OUTPATIENT
Start: 2020-11-29 | End: 2020-12-06

## 2020-11-29 RX ORDER — MIDODRINE HYDROCHLORIDE 2.5 MG/1
20 TABLET ORAL EVERY 8 HOURS
Refills: 0 | Status: DISCONTINUED | OUTPATIENT
Start: 2020-11-29 | End: 2020-11-30

## 2020-11-29 RX ADMIN — SERTRALINE 50 MILLIGRAM(S): 25 TABLET, FILM COATED ORAL at 05:30

## 2020-11-29 RX ADMIN — POLYETHYLENE GLYCOL 3350 17 GRAM(S): 17 POWDER, FOR SOLUTION ORAL at 05:30

## 2020-11-29 RX ADMIN — CEFEPIME 100 MILLIGRAM(S): 1 INJECTION, POWDER, FOR SOLUTION INTRAMUSCULAR; INTRAVENOUS at 13:08

## 2020-11-29 RX ADMIN — MIDODRINE HYDROCHLORIDE 20 MILLIGRAM(S): 2.5 TABLET ORAL at 21:15

## 2020-11-29 RX ADMIN — RILUZOLE 50 MILLIGRAM(S): 50 TABLET ORAL at 17:35

## 2020-11-29 RX ADMIN — SENNA PLUS 1 TABLET(S): 8.6 TABLET ORAL at 11:33

## 2020-11-29 RX ADMIN — Medication 3 MILLILITER(S): at 23:07

## 2020-11-29 RX ADMIN — Medication 250 MILLIGRAM(S): at 11:33

## 2020-11-29 RX ADMIN — Medication 2.5 MILLIGRAM(S): at 20:50

## 2020-11-29 RX ADMIN — CHLORHEXIDINE GLUCONATE 15 MILLILITER(S): 213 SOLUTION TOPICAL at 17:35

## 2020-11-29 RX ADMIN — Medication 1 DROP(S): at 05:28

## 2020-11-29 RX ADMIN — RILUZOLE 50 MILLIGRAM(S): 50 TABLET ORAL at 05:30

## 2020-11-29 RX ADMIN — CHLORHEXIDINE GLUCONATE 15 MILLILITER(S): 213 SOLUTION TOPICAL at 05:30

## 2020-11-29 RX ADMIN — Medication 3 MILLILITER(S): at 11:53

## 2020-11-29 RX ADMIN — CHLORHEXIDINE GLUCONATE 1 APPLICATION(S): 213 SOLUTION TOPICAL at 05:30

## 2020-11-29 RX ADMIN — Medication 3 MILLILITER(S): at 05:20

## 2020-11-29 RX ADMIN — Medication 3 MILLILITER(S): at 18:24

## 2020-11-29 RX ADMIN — RIVAROXABAN 10 MILLIGRAM(S): KIT at 17:35

## 2020-11-29 RX ADMIN — Medication 1 DROP(S): at 17:36

## 2020-11-29 RX ADMIN — CEFEPIME 100 MILLIGRAM(S): 1 INJECTION, POWDER, FOR SOLUTION INTRAMUSCULAR; INTRAVENOUS at 01:34

## 2020-11-29 RX ADMIN — Medication 1.5 MILLIGRAM(S): at 09:38

## 2020-11-29 NOTE — PROGRESS NOTE ADULT - ATTENDING COMMENTS
Pt seen and examined.  d/w rcu team       65 F with advanced ALS, chronic hypoxic/ hypercapnic resp failure, vent dependent, s/p trach and PEG, now presenting with septic shock and Morganella bacteremia 2/2 bacterial PNA + UTI.   Remains vent dependent, FiO2 and PEEP requirements at baseline.    Pt noted with w/ Air leak and Elevated Cuff pressure. S/p Trach change to #7 distal XLT [place] by ENT. --the TV ON THE VENT   are 450 now --       Leukocytosis improving. Cont Cefepime, surveillance Bcxs  from 11/25 remain NGTD. Stable hemodynamics off pressors,  keep MAP >65, lactate cleared. Mild hyponatremia now resolved. Cont to follow Cr/UO and electrolytes.  Oropharyngeal dysphagia, cont PEG feeds. Overall prognosis guarded.

## 2020-11-29 NOTE — PROGRESS NOTE ADULT - PROBLEM SELECTOR PLAN 6
DVT ppx: Xarelto 10mg daily  LLE Doppler 11/25- flow-limiting stenoses affecting the distal left superficial femoral artery and the popliteal arter    GI ppx: protonix 40mg daily -DVT ppx: Xarelto 10mg daily  -LLE Doppler 11/25- flow-limiting stenoses affecting the distal left superficial femoral artery and the popliteal artery  -GI ppx: protonix 40mg daily

## 2020-11-29 NOTE — PROGRESS NOTE ADULT - PROBLEM SELECTOR PLAN 1
Septic shock 2/2 Morganella bacteremia 2/2 bacterial PNA + UTI. , presumed GI/uro source  - COVID neg  -  Bcx 11/22 + Morganella bacteremia  -  CXR 11/22: LLL PNA   -  Repeat Bcx 11/25: NGTD  Switched to Cefepime 11/25- Septic shock 2/2 Morganella bacteremia 2/2 bacterial PNA + UTI. , presumed GI/uro source  - COVID neg  -  Bcx 11/22 + Morganella bacteremia  -  CXR 11/22: LLL PNA   -  Repeat Bcx 11/25: NGTD  - Switched to Cefepime 11/25-  - WBC 25; ID consult called; single dose vanco 1g given

## 2020-11-29 NOTE — CONSULT NOTE ADULT - SUBJECTIVE AND OBJECTIVE BOX
Patient is a 65y old  Female who presents with a chief complaint of sepsis (29 Nov 2020 08:58)    HPI:  64 y/o F with advanced ALS s/p trach/PEG 2015 was BIBEMS for fevers and tachycardia.     Pt was febrile to 102.5 with leukocytosis to 19.22 with 13% bands. CXR suggestive of LLL pneumonia. Pt was in shock, admitted to ICU for pressors and started on broad spectrum antibiotics. Bcx 11/22 returned 4 of 4 positive for Morganella, UA/Ucx unremarkable, Sputum cx normal flavia.    Pt improved on cefepime, repeat bcx 11/25 negative x 2 sets, weaned to oral midodrine from IV pressors and transferred to RCU.    Throughout admission pt has had fluctuating leukcoytosis, bumped back up to 25.6 today. As per RN at bedside, one soft BM but no gross diarrhea, no decubitus ulcers, and minimal secretions.     prior hospital charts reviewed [ x ]  primary team notes reviewed [ x ]  other consultant notes reviewed [x  ]  PAST MEDICAL & SURGICAL HISTORY:  Aspiration into airway    Ventilator dependent  Since 2015    HLD (hyperlipidemia)    ALS (amyotrophic lateral sclerosis)    Encounter for PEG (percutaneous endoscopic gastrostomy)  peg placed    Dependence on tracheostomy    S/P gastrostomy  10/14 for dysphagia  receives jevity 2 cans TID      Allergies  Bactrim DS (Rash)    ANTIMICROBIALS (past 90 days)  MEDICATIONS  (STANDING):  cefepime   IVPB   100 mL/Hr IV Intermittent (11-22-20 @ 16:01)    cefepime   IVPB   100 mL/Hr IV Intermittent (11-23-20 @ 05:05)   100 mL/Hr IV Intermittent (11-22-20 @ 22:01)    cefepime   IVPB   100 mL/Hr IV Intermittent (11-29-20 @ 13:08)   100 mL/Hr IV Intermittent (11-29-20 @ 01:34)   100 mL/Hr IV Intermittent (11-28-20 @ 14:50)   100 mL/Hr IV Intermittent (11-28-20 @ 01:40)   100 mL/Hr IV Intermittent (11-27-20 @ 14:56)   100 mL/Hr IV Intermittent (11-27-20 @ 01:33)   100 mL/Hr IV Intermittent (11-26-20 @ 14:42)   100 mL/Hr IV Intermittent (11-26-20 @ 01:52)   100 mL/Hr IV Intermittent (11-25-20 @ 13:28)    meropenem  IVPB   100 mL/Hr IV Intermittent (11-23-20 @ 14:45)    meropenem  IVPB   100 mL/Hr IV Intermittent (11-25-20 @ 05:02)   100 mL/Hr IV Intermittent (11-24-20 @ 21:15)   100 mL/Hr IV Intermittent (11-24-20 @ 13:45)   100 mL/Hr IV Intermittent (11-24-20 @ 05:11)   100 mL/Hr IV Intermittent (11-23-20 @ 21:08)    vancomycin  IVPB   250 mL/Hr IV Intermittent (11-29-20 @ 11:33)    vancomycin  IVPB   250 mL/Hr IV Intermittent (11-23-20 @ 17:41)   250 mL/Hr IV Intermittent (11-23-20 @ 05:05)    vancomycin  IVPB.   250 mL/Hr IV Intermittent (11-22-20 @ 17:20)      ANTIMICROBIALS:    cefepime   IVPB 2000 every 12 hours    OTHER MEDS: MEDICATIONS  (STANDING):  albuterol/ipratropium for Nebulization 3 every 6 hours  diazepam   Solution 1.5 daily  diazepam   Solution 2.5 daily  influenza   Vaccine 0.5 once  midodrine. 20 every 8 hours PRN  polyethylene glycol 3350 17 two times a day  riluzole 50 two times a day  rivaroxaban 10 with dinner  senna 1 daily  sertraline 50 daily    SOCIAL HISTORY:   unable to obtain as patient is non-verbal    FAMILY HISTORY:   unable to obtain as patient is non-verbal      REVIEW OF SYSTEMS  [  x] ROS unobtainable because:   unable to obtain as patient is non-verbal  [  ] All other systems negative except as noted below:	    Constitutional:  [ ] fever [ ] chills  [ ] weight loss  [ ] weakness  Skin:  [ ] rash [ ] phlebitis	  Eyes: [ ] icterus [ ] pain  [ ] discharge	  ENMT: [ ] sore throat  [ ] thrush [ ] ulcers [ ] exudates  Respiratory: [ ] dyspnea [ ] hemoptysis [ ] cough [ ] sputum	  Cardiovascular:  [ ] chest pain [ ] palpitations [ ] edema	  Gastrointestinal:  [ ] nausea [ ] vomiting [ ] diarrhea [ ] constipation [ ] pain	  Genitourinary:  [ ] dysuria [ ] frequency [ ] hematuria [ ] discharge [ ] flank pain  [ ] incontinence  Musculoskeletal:  [ ] myalgias [ ] arthralgias [ ] arthritis  [ ] back pain  Neurological:  [ ] headache [ ] seizures  [ ] confusion/altered mental status  Psychiatric:  [ ] anxiety [ ] depression	  Hematology/Lymphatics:  [ ] lymphadenopathy  Endocrine:  [ ] adrenal [ ] thyroid  Allergic/Immunologic:	 [ ] transplant [ ] seasonal    Vital Signs Last 24 Hrs  T(F): 98.3 (11-29-20 @ 13:39), Max: 100.2 (11-23-20 @ 00:00)  Vital Signs Last 24 Hrs  HR: 113 (11-29-20 @ 16:35) (102 - 114)  BP: 103/69 (11-29-20 @ 13:39) (103/69 - 139/87)  RR: 14 (11-29-20 @ 13:39)  SpO2: 99% (11-29-20 @ 16:35) (98% - 100%)  Wt(kg): --    PHYSICAL EXAM:  Constitutional: non-toxic, flacid paralysis  HEAD/EYES: b/l conjunctival injection  Cardiovascular:   normal S1, S2, no murmur, no edema  Respiratory:  clear BS bilaterally, no wheezes, no rales  GI:  soft, mildly distended, non-tender, normal bowel sounds  :  no invasive carter  Musculoskeletal:  no synovitis  Skin:  no rash, no erythema, no phlebitis  Heme/Onc: no lymphadenopathy   Psychiatric: non-verbal                            11.0   25.60 )-----------( 379      ( 29 Nov 2020 07:17 )             36.1     11-29    135  |  98  |  20  ----------------------------<  121<H>  5.0   |  28  |  <0.30<L>    Ca    9.3      29 Nov 2020 07:17  Phos  2.7     11-29  Mg     1.8     11-29    TPro  6.8  /  Alb  3.5  /  TBili  0.7  /  DBili  x   /  AST  19  /  ALT  20  /  AlkPhos  134<H>  11-28    MICROBIOLOGY:Vancomycin Level, Random: <4.0 (11-29 @ 07:17)  Vancomycin Level, Trough: 16.1 (11-25 @ 00:15)    Culture - Blood (collected 25 Nov 2020 02:16)  Source: .Blood Blood  Preliminary Report (26 Nov 2020 03:01):    No growth to date.    Culture - Blood (collected 25 Nov 2020 02:16)  Source: .Blood Blood  Preliminary Report (26 Nov 2020 03:01):    No growth to date.        Culture - Blood (11.22.20 @ 18:28)   - Ceftolozane/tazobactam: S <=2   - Ceftolozane/tazobactam: S <=2   - Ceftazidime/Avibactam: S <=4   - Ceftazidime/Avibactam: S <=4   - Multidrug (KPC pos) resistant organism: Nondet   - Staphylococcus aureus: Nondet   - Methicillin resistant Staphylococcus aureus (MRSA): Nondet   - Coagulase negative Staphylococcus: Nondet   - Enterococcus species: Nondet   - Vancomycin resistant Enterococcus sp.: Nondet   - Escherichia coli: Nondet   - Klebsiella oxytoca: Nondet   - Klebsiella pneumoniae: Nondet   - Serratia marcescens: Nondet   - Haemophilus influenzae: Nondet   - Listeria monocytogenes: Nondet   - Neisseria meningitidis: Nondet   - Pseudomonas aeruginosa: Nondet   - Acinetobacter baumanii: Nondet   - Enterobacter cloacae complex: Nondet   - Streptococcus sp. (Not Grp A, B or S pneumoniae): Nondet   - Streptococcus agalactiae (Group B): Nondet   - Streptococcus pyogenes (Group A): Nondet   - Streptococcus pneumoniae: Nondet   - Candida albicans: Nondet   - Candida glabrata: Nondet   - Candida krusei: Nondet   - Candida parapsilosis: Nondet   - Candida tropicalis: Nondet   - Trimethoprim/Sulfamethoxazole: R >2/38   - Trimethoprim/Sulfamethoxazole: R >2/38   - Piperacillin/Tazobactam: S <=8   - Piperacillin/Tazobactam: S <=8   - Tigecycline: R <=2   - Tigecycline: R <=2   - Tobramycin: S 4   - Tobramycin: S 4   - Cefoxitin: I 16   - Cefoxitin: S <=8   - Ceftriaxone: S <=1 Enterobacter, Citrobacter, and Serratia may develop resistance during prolonged therapy   - Ceftriaxone: S <=1 Enterobacter, Citrobacter, and Serratia may develop resistance during prolonged therapy   - Ciprofloxacin: R 2   - Ciprofloxacin: R >2   - Ertapenem: S <=0.5   - Ertapenem: S <=0.5   - Gentamicin: R >8   - Gentamicin: R >8   - Imipenem: I 2   - Imipenem: I 2   - Levofloxacin: R 2   - Levofloxacin: R 2   - Meropenem: S <=1   - Meropenem: S <=1   - Amikacin: S <=16   - Amikacin: S <=16   - Ampicillin: R >16 These ampicillin results predict results for amoxicillin   - Ampicillin: R >16 These ampicillin results predict results for amoxicillin   Gram Stain:   Growth in aerobic bottle: Gram Negative Rods   Growth in anaerobic bottle: Gram Negative Rods   - Ampicillin/Sulbactam: I 16/8 Enterobacter, Citrobacter, and Serratia may develop resistance during prolonged therapy (3-4 days)   - Ampicillin/Sulbactam: I 16/8 Enterobacter, Citrobacter, and Serratia may develop resistance during prolonged therapy (3-4 days)   - Aztreonam: S <=4   - Aztreonam: S <=4   - Cefazolin: R >16 Enterobacter, Citrobacter, and Serratia may develop resistance during prolonged therapy (3-4 days)   - Cefazolin: R >16 Enterobacter, Citrobacter, and Serratia may develop resistance during prolonged therapy (3-4 days)   - Cefepime: S <=2   - Cefepime: S <=2   Specimen Source: .Blood Blood-Peripheral   Organism: Blood Culture PCR   Organism: Morganella morganii   Organism: Morganella morganii   Culture Results:   Growth in aerobic and anaerobic bottles: Morganella morganii Multiple   Morphological Strains           RADIOLOGY:  < from: [facility] Duplex Low Ext Arterial, Ltd, Left (11.23.20 @ 13:49) >  Impression:    There are flow-limiting stenoses affecting the distal left superficial femoral artery and the popliteal artery.    < from: Xray Chest 1 View- PORTABLE-Urgent (11.22.20 @ 16:12) >    IMPRESSION:    Left lower lobe pneumonia.      < end of copied text >         Patient is a 65y old  Female who presents with a chief complaint of sepsis (29 Nov 2020 08:58)    HPI:  66 y/o F with advanced ALS s/p trach/PEG 2015 was BIBEMS for fevers and tachycardia.     Pt was febrile to 102.5 with leukocytosis to 19.22 with 13% bands. CXR suggestive of LLL pneumonia. Pt was in shock, admitted to ICU for pressors and started on broad spectrum antibiotics. Bcx 11/22 returned 4 of 4 positive for Morganella, UA/Ucx unremarkable, Sputum cx normal flavia.    Pt improved on cefepime, repeat bcx 11/25 negative x 2 sets, weaned to oral midodrine from IV pressors and transferred to RCU.    Throughout admission pt has had fluctuating leukcoytosis, bumped back up to 25.6 today. As per RN at bedside, one soft BM but no gross diarrhea, no decubitus ulcers, and minimal secretions.     prior hospital charts reviewed [ x ]  primary team notes reviewed [ x ]  other consultant notes reviewed [x  ]  PAST MEDICAL & SURGICAL HISTORY:  Aspiration into airway    Ventilator dependent  Since 2015    HLD (hyperlipidemia)    ALS (amyotrophic lateral sclerosis)    Encounter for PEG (percutaneous endoscopic gastrostomy)  peg placed    Dependence on tracheostomy    S/P gastrostomy  10/14 for dysphagia  receives jevity 2 cans TID      Allergies  Bactrim DS (Rash)    ANTIMICROBIALS (past 90 days)  MEDICATIONS  (STANDING):  cefepime   IVPB   100 mL/Hr IV Intermittent (11-22-20 @ 16:01)    cefepime   IVPB   100 mL/Hr IV Intermittent (11-23-20 @ 05:05)   100 mL/Hr IV Intermittent (11-22-20 @ 22:01)    cefepime   IVPB   100 mL/Hr IV Intermittent (11-29-20 @ 13:08)   100 mL/Hr IV Intermittent (11-29-20 @ 01:34)   100 mL/Hr IV Intermittent (11-28-20 @ 14:50)   100 mL/Hr IV Intermittent (11-28-20 @ 01:40)   100 mL/Hr IV Intermittent (11-27-20 @ 14:56)   100 mL/Hr IV Intermittent (11-27-20 @ 01:33)   100 mL/Hr IV Intermittent (11-26-20 @ 14:42)   100 mL/Hr IV Intermittent (11-26-20 @ 01:52)   100 mL/Hr IV Intermittent (11-25-20 @ 13:28)    meropenem  IVPB   100 mL/Hr IV Intermittent (11-23-20 @ 14:45)    meropenem  IVPB   100 mL/Hr IV Intermittent (11-25-20 @ 05:02)   100 mL/Hr IV Intermittent (11-24-20 @ 21:15)   100 mL/Hr IV Intermittent (11-24-20 @ 13:45)   100 mL/Hr IV Intermittent (11-24-20 @ 05:11)   100 mL/Hr IV Intermittent (11-23-20 @ 21:08)    vancomycin  IVPB   250 mL/Hr IV Intermittent (11-29-20 @ 11:33)    vancomycin  IVPB   250 mL/Hr IV Intermittent (11-23-20 @ 17:41)   250 mL/Hr IV Intermittent (11-23-20 @ 05:05)    vancomycin  IVPB.   250 mL/Hr IV Intermittent (11-22-20 @ 17:20)      ANTIMICROBIALS:    cefepime   IVPB 2000 every 12 hours    OTHER MEDS: MEDICATIONS  (STANDING):  albuterol/ipratropium for Nebulization 3 every 6 hours  diazepam   Solution 1.5 daily  diazepam   Solution 2.5 daily  influenza   Vaccine 0.5 once  midodrine. 20 every 8 hours PRN  polyethylene glycol 3350 17 two times a day  riluzole 50 two times a day  rivaroxaban 10 with dinner  senna 1 daily  sertraline 50 daily    SOCIAL HISTORY:   unable to obtain as patient is non-verbal    FAMILY HISTORY:   unable to obtain as patient is non-verbal      REVIEW OF SYSTEMS  [  x] ROS unobtainable because:   unable to obtain as patient is non-verbal  [  ] All other systems negative except as noted below:	    Constitutional:  [ ] fever [ ] chills  [ ] weight loss  [ ] weakness  Skin:  [ ] rash [ ] phlebitis	  Eyes: [ ] icterus [ ] pain  [ ] discharge	  ENMT: [ ] sore throat  [ ] thrush [ ] ulcers [ ] exudates  Respiratory: [ ] dyspnea [ ] hemoptysis [ ] cough [ ] sputum	  Cardiovascular:  [ ] chest pain [ ] palpitations [ ] edema	  Gastrointestinal:  [ ] nausea [ ] vomiting [ ] diarrhea [ ] constipation [ ] pain	  Genitourinary:  [ ] dysuria [ ] frequency [ ] hematuria [ ] discharge [ ] flank pain  [ ] incontinence  Musculoskeletal:  [ ] myalgias [ ] arthralgias [ ] arthritis  [ ] back pain  Neurological:  [ ] headache [ ] seizures  [ ] confusion/altered mental status  Psychiatric:  [ ] anxiety [ ] depression	  Hematology/Lymphatics:  [ ] lymphadenopathy  Endocrine:  [ ] adrenal [ ] thyroid  Allergic/Immunologic:	 [ ] transplant [ ] seasonal    Vital Signs Last 24 Hrs  T(F): 98.3 (11-29-20 @ 13:39), Max: 100.2 (11-23-20 @ 00:00)  Vital Signs Last 24 Hrs  HR: 113 (11-29-20 @ 16:35) (102 - 114)  BP: 103/69 (11-29-20 @ 13:39) (103/69 - 139/87)  RR: 14 (11-29-20 @ 13:39)  SpO2: 99% (11-29-20 @ 16:35) (98% - 100%)  Wt(kg): --    PHYSICAL EXAM:  Constitutional: non-toxic, flacid paralysis  HEAD/EYES: b/l conjunctival injection  ENT: trach with no secretions  Cardiovascular:   normal S1, S2, no murmur  Respiratory:  clear BS bilaterally, no wheezes, no rales  GI:  soft, mildly distended, non-tender, normal bowel sounds, PEG  :  no  carter  Musculoskeletal:  no synovitis  Skin:  no rash, no erythema, no phlebitis  Heme/Onc: no lymphadenopathy   vascular: no phlebitis, L leg cold                            11.0   25.60 )-----------( 379      ( 29 Nov 2020 07:17 )             36.1     11-29    135  |  98  |  20  ----------------------------<  121<H>  5.0   |  28  |  <0.30<L>    Ca    9.3      29 Nov 2020 07:17  Phos  2.7     11-29  Mg     1.8     11-29    TPro  6.8  /  Alb  3.5  /  TBili  0.7  /  DBili  x   /  AST  19  /  ALT  20  /  AlkPhos  134<H>  11-28    MICROBIOLOGY:Vancomycin Level, Random: <4.0 (11-29 @ 07:17)  Vancomycin Level, Trough: 16.1 (11-25 @ 00:15)    Culture - Blood (collected 25 Nov 2020 02:16)  Source: .Blood Blood  Preliminary Report (26 Nov 2020 03:01):    No growth to date.    Culture - Blood (collected 25 Nov 2020 02:16)  Source: .Blood Blood  Preliminary Report (26 Nov 2020 03:01):    No growth to date.        Culture - Blood (11.22.20 @ 18:28)   - Ceftolozane/tazobactam: S <=2   - Ceftolozane/tazobactam: S <=2   - Ceftazidime/Avibactam: S <=4   - Ceftazidime/Avibactam: S <=4   - Multidrug (KPC pos) resistant organism: Nondet   - Staphylococcus aureus: Nondet   - Methicillin resistant Staphylococcus aureus (MRSA): Nondet   - Coagulase negative Staphylococcus: Nondet   - Enterococcus species: Nondet   - Vancomycin resistant Enterococcus sp.: Nondet   - Escherichia coli: Nondet   - Klebsiella oxytoca: Nondet   - Klebsiella pneumoniae: Nondet   - Serratia marcescens: Nondet   - Haemophilus influenzae: Nondet   - Listeria monocytogenes: Nondet   - Neisseria meningitidis: Nondet   - Pseudomonas aeruginosa: Nondet   - Acinetobacter baumanii: Nondet   - Enterobacter cloacae complex: Nondet   - Streptococcus sp. (Not Grp A, B or S pneumoniae): Nondet   - Streptococcus agalactiae (Group B): Nondet   - Streptococcus pyogenes (Group A): Nondet   - Streptococcus pneumoniae: Nondet   - Candida albicans: Nondet   - Candida glabrata: Nondet   - Candida krusei: Nondet   - Candida parapsilosis: Nondet   - Candida tropicalis: Nondet   - Trimethoprim/Sulfamethoxazole: R >2/38   - Trimethoprim/Sulfamethoxazole: R >2/38   - Piperacillin/Tazobactam: S <=8   - Piperacillin/Tazobactam: S <=8   - Tigecycline: R <=2   - Tigecycline: R <=2   - Tobramycin: S 4   - Tobramycin: S 4   - Cefoxitin: I 16   - Cefoxitin: S <=8   - Ceftriaxone: S <=1 Enterobacter, Citrobacter, and Serratia may develop resistance during prolonged therapy   - Ceftriaxone: S <=1 Enterobacter, Citrobacter, and Serratia may develop resistance during prolonged therapy   - Ciprofloxacin: R 2   - Ciprofloxacin: R >2   - Ertapenem: S <=0.5   - Ertapenem: S <=0.5   - Gentamicin: R >8   - Gentamicin: R >8   - Imipenem: I 2   - Imipenem: I 2   - Levofloxacin: R 2   - Levofloxacin: R 2   - Meropenem: S <=1   - Meropenem: S <=1   - Amikacin: S <=16   - Amikacin: S <=16   - Ampicillin: R >16 These ampicillin results predict results for amoxicillin   - Ampicillin: R >16 These ampicillin results predict results for amoxicillin   Gram Stain:   Growth in aerobic bottle: Gram Negative Rods   Growth in anaerobic bottle: Gram Negative Rods   - Ampicillin/Sulbactam: I 16/8 Enterobacter, Citrobacter, and Serratia may develop resistance during prolonged therapy (3-4 days)   - Ampicillin/Sulbactam: I 16/8 Enterobacter, Citrobacter, and Serratia may develop resistance during prolonged therapy (3-4 days)   - Aztreonam: S <=4   - Aztreonam: S <=4   - Cefazolin: R >16 Enterobacter, Citrobacter, and Serratia may develop resistance during prolonged therapy (3-4 days)   - Cefazolin: R >16 Enterobacter, Citrobacter, and Serratia may develop resistance during prolonged therapy (3-4 days)   - Cefepime: S <=2   - Cefepime: S <=2   Specimen Source: .Blood Blood-Peripheral   Organism: Blood Culture PCR   Organism: Morganella morganii   Organism: Morganella morganii   Culture Results:   Growth in aerobic and anaerobic bottles: Morganella morganii Multiple   Morphological Strains           RADIOLOGY:  < from: [facility] Duplex Low Ext Arterial, Ltd, Left (11.23.20 @ 13:49) >  Impression:    There are flow-limiting stenoses affecting the distal left superficial femoral artery and the popliteal artery.    < from: Xray Chest 1 View- PORTABLE-Urgent (11.22.20 @ 16:12) >    IMPRESSION:    Left lower lobe pneumonia.      < end of copied text >

## 2020-11-29 NOTE — CONSULT NOTE ADULT - ASSESSMENT
64 y/o F with advanced ALS s/p trach/PEG 2015 was BIBEMS for fevers and tachycardia, found to have Morganella morganii bacteremia.     Morganella morganii bacteremia  -unclear source, usually urine but Ucx negative, however abx given first  -CXR with LLL pneumonia  -bacteremia cleared on subsequent bcx  -I cannot explain the degree of fluctuation in patients leukocytosis throughout admission, especially given stability of rest of CBC. Unclear that this most recent rise in WBC represents new process but would send Bcx, Ucx  -As per RN, no diarrhea. If diarrhea would send Cdiff  -consider ophtho consult for b/l conjunctival injectino

## 2020-11-29 NOTE — PROGRESS NOTE ADULT - SUBJECTIVE AND OBJECTIVE BOX
Patient is a 65y old  Female who presents with a chief complaint of sepsis (28 Nov 2020 17:13)      Interval Events: no overnight events    REVIEW OF SYSTEMS:  [ ] Positive  [ ] All other systems negative  [x ] Unable to assess ROS because nonverbal    Vital Signs Last 24 Hrs  T(C): 36.8 (11-29-20 @ 04:35), Max: 36.9 (11-28-20 @ 20:59)  T(F): 98.2 (11-29-20 @ 04:35), Max: 98.5 (11-28-20 @ 20:59)  HR: 114 (11-29-20 @ 08:41) (106 - 118)  BP: 137/85 (11-29-20 @ 04:35) (137/85 - 139/87)  RR: 14 (11-29-20 @ 04:35) (14 - 15)  SpO2: 98% (11-29-20 @ 08:41) (97% - 100%)    PHYSICAL EXAM:    HEENT:   [ x]Tracheostomy: 8 DCT Shiley   [x ]Pupils w/ mildly reddened, no discharge or drainage noted  [x ]No oral lesions  [ ]Abnormal    SKIN  [ x]No Rash  [ ] Abnormal  [ ] pressure    CARDIAC  [ x]Regular  [ ]Abnormal    PULMONARY  [x ]Bilateral Clear Breath Sounds  [ ]Normal Excursion  [ ]Abnormal    GI  [x ]PEG      [x ] +BS		              [ ]Soft, nondistended, nontender	  [ x]Abnormal-Soft and Mildly distended     MUSCULOSKELETAL                                   [x ]Bedbound                 [x ]Abnormal - not moving extremites x 4  [ ]Ambulatory/OOB to chair                           EXTREMITIES                                         [ ]Normal  [x ]Edema - 1+ Bilateral Hand Edema                          NEUROLOGIC  [ ] Normal, non focal  [ x] Focal findings: Lethargic, ill Appearing, Nonverbal. Eyes open, not following commands, not moving extremities x 4 in setting of Advanced ALS at baseline     PSYCHIATRIC  [x] Unable to assess   [ ] Sedated	 [ ]Agitated    :  Newman: [ ] Yes, if yes: Date of Placement:                   [  x] No    LINES: Central Lines [ ] Yes, if yes: Date of Placement                                     [ x ] No    HOSPITAL MEDICATIONS:  MEDICATIONS  (STANDING):  albuterol/ipratropium for Nebulization 3 milliLiter(s) Nebulizer every 6 hours  artificial  tears Solution 1 Drop(s) Both EYES two times a day  cefepime   IVPB 2000 milliGRAM(s) IV Intermittent every 12 hours  chlorhexidine 0.12% Liquid 15 milliLiter(s) Oral Mucosa every 12 hours  chlorhexidine 4% Liquid 1 Application(s) Topical <User Schedule>  chlorhexidine 4% Liquid 1 Application(s) Topical <User Schedule>  diazepam    Tablet 1.5 milliGRAM(s) Oral <User Schedule>  diazepam    Tablet 2.5 milliGRAM(s) Oral <User Schedule>  influenza   Vaccine 0.5 milliLiter(s) IntraMuscular once  polyethylene glycol 3350 17 Gram(s) Oral two times a day  riluzole 50 milliGRAM(s) Oral two times a day  rivaroxaban 10 milliGRAM(s) Enteral Tube with dinner  senna 1 Tablet(s) Oral daily  sertraline 50 milliGRAM(s) Oral daily    MEDICATIONS  (PRN):  midodrine. 20 milliGRAM(s) Oral every 8 hours PRN BP  petrolatum Ophthalmic Ointment 1 Application(s) Both EYES every 6 hours PRN Dryness      LABS:                        11.0   25.60 )-----------( 379      ( 29 Nov 2020 07:17 )             36.1     11-29    135  |  98  |  20  ----------------------------<  121<H>  5.0   |  28  |  <0.30<L>    Ca    9.3      29 Nov 2020 07:17  Phos  2.7     11-29  Mg     1.8     11-29    TPro  6.8  /  Alb  3.5  /  TBili  0.7  /  DBili  x   /  AST  19  /  ALT  20  /  AlkPhos  134<H>  11-28    PT/INR - ( 28 Nov 2020 06:48 )   PT: 14.5 sec;   INR: 1.22 ratio        CAPILLARY BLOOD GLUCOSE: reviewed    MICROBIOLOGY:  reviewed    RADIOLOGY: reviewed    Mode: AC/ CMV (Assist Control/ Continuous Mandatory Ventilation)  RR (machine): 14  TV (machine): 450  FiO2: 30  PEEP: 5  ITime: 1  MAP: 9  PIP: 22   Patient is a 65y old  Female who presents with a chief complaint of sepsis (28 Nov 2020 17:13)      Interval Events: no overnight events    REVIEW OF SYSTEMS:  [ ] Positive  [ ] All other systems negative  [x ] Unable to assess ROS because nonverbal    Vital Signs Last 24 Hrs  T(C): 36.8 (11-29-20 @ 04:35), Max: 36.9 (11-28-20 @ 20:59)  T(F): 98.2 (11-29-20 @ 04:35), Max: 98.5 (11-28-20 @ 20:59)  HR: 114 (11-29-20 @ 08:41) (106 - 118)  BP: 137/85 (11-29-20 @ 04:35) (137/85 - 139/87)  RR: 14 (11-29-20 @ 04:35) (14 - 15)  SpO2: 98% (11-29-20 @ 08:41) (97% - 100%)    PHYSICAL EXAM:    HEENT:   [ x]Tracheostomy: #7 XLT cuffed Shiley   [x ]Pupils w/ mildly reddened, no discharge or drainage noted  [x ]No oral lesions  [ ]Abnormal    SKIN  [ x]No Rash  [ ] Abnormal  [ ] pressure    CARDIAC  [ x]Regular  [ ]Abnormal    PULMONARY  [x ]Bilateral Clear Breath Sounds  [ ]Normal Excursion  [ ]Abnormal    GI  [x ]PEG      [x ] +BS		              [ ]Soft, nondistended, nontender	  [ x]Abnormal-Soft and Mildly distended     MUSCULOSKELETAL                                   [x ]Bedbound                 [x ]Abnormal - not moving extremites x 4  [ ]Ambulatory/OOB to chair                           EXTREMITIES                                         [ ]Normal  [x ]Edema - 1+ Bilateral Hand Edema                          NEUROLOGIC  [ ] Normal, non focal  [ x] Focal findings: Lethargic, ill Appearing, Nonverbal. Eyes open, not following commands, not moving extremities x 4 in setting of Advanced ALS at baseline     PSYCHIATRIC  [x] Unable to assess   [ ] Sedated	 [ ]Agitated    :  Paty: [ ] Yes, if yes: Date of Placement:                   [  x] No    LINES: Central Lines [ ] Yes, if yes: Date of Placement                                     [ x ] No    HOSPITAL MEDICATIONS:  MEDICATIONS  (STANDING):  albuterol/ipratropium for Nebulization 3 milliLiter(s) Nebulizer every 6 hours  artificial  tears Solution 1 Drop(s) Both EYES two times a day  cefepime   IVPB 2000 milliGRAM(s) IV Intermittent every 12 hours  chlorhexidine 0.12% Liquid 15 milliLiter(s) Oral Mucosa every 12 hours  chlorhexidine 4% Liquid 1 Application(s) Topical <User Schedule>  chlorhexidine 4% Liquid 1 Application(s) Topical <User Schedule>  diazepam    Tablet 1.5 milliGRAM(s) Oral <User Schedule>  diazepam    Tablet 2.5 milliGRAM(s) Oral <User Schedule>  influenza   Vaccine 0.5 milliLiter(s) IntraMuscular once  polyethylene glycol 3350 17 Gram(s) Oral two times a day  riluzole 50 milliGRAM(s) Oral two times a day  rivaroxaban 10 milliGRAM(s) Enteral Tube with dinner  senna 1 Tablet(s) Oral daily  sertraline 50 milliGRAM(s) Oral daily    MEDICATIONS  (PRN):  midodrine. 20 milliGRAM(s) Oral every 8 hours PRN BP  petrolatum Ophthalmic Ointment 1 Application(s) Both EYES every 6 hours PRN Dryness      LABS:                        11.0   25.60 )-----------( 379      ( 29 Nov 2020 07:17 )             36.1     11-29    135  |  98  |  20  ----------------------------<  121<H>  5.0   |  28  |  <0.30<L>    Ca    9.3      29 Nov 2020 07:17  Phos  2.7     11-29  Mg     1.8     11-29    TPro  6.8  /  Alb  3.5  /  TBili  0.7  /  DBili  x   /  AST  19  /  ALT  20  /  AlkPhos  134<H>  11-28    PT/INR - ( 28 Nov 2020 06:48 )   PT: 14.5 sec;   INR: 1.22 ratio        CAPILLARY BLOOD GLUCOSE: reviewed    MICROBIOLOGY:  reviewed    RADIOLOGY: reviewed    Mode: AC/ CMV (Assist Control/ Continuous Mandatory Ventilation)  RR (machine): 14  TV (machine): 450  FiO2: 30  PEEP: 5  ITime: 1  MAP: 9  PIP: 22

## 2020-11-29 NOTE — PROGRESS NOTE ADULT - ASSESSMENT
65 F with advanced ALS, chronic hypoxic/ hypercapnic resp failure, vent dependent, s/p trach 2015 and PEG, now presenting with septic shock and Morganella bacteremia 2/2 bacterial PNA + UTI.  Patient was BIBEMS from home for fevers. Pt's 24hr home health nurse reported over the past few days the pt has appeared more lethargic, has been tachycardic, and running fevers with decreased Urinations. She was empirically started on meropenum and vancomycin in ED.  Was managed for Septic shock s/p fluid resuscitation, IV pressors, and Transferred to MICU.  Now her Leukocytosis improving and switched to Cefepime after Bcx 11/22 revealed Morganella morganii.  Surveillance Bcxs  from 11/25 remain NGTD.  Now patient off IV pressors and Stable Hemodynamics on oral midodrine 20mg q8. Hospital Course c/b Mild hyponatremia now resolved. Transferred to RCU 11/28.     11/28: Received to RCU. Persistent Leukocytosis on Cefepime but Afebrile overnight. and remains hemodynamically stable. Pt noted with w/ Air leak and Elevated Cuff pressure. S/p Trach change to #7 distal XLT Amando by ENT. 65 F with advanced ALS, chronic hypoxic/ hypercapnic resp failure, vent dependent, s/p trach 2015 and PEG, now presenting with septic shock and Morganella bacteremia 2/2 bacterial PNA + UTI.  Patient was BIBEMS from home for fevers. Pt's 24hr home health nurse reported over the past few days the pt has appeared more lethargic, has been tachycardic, and running fevers with decreased Urinations. She was empirically started on meropenum and vancomycin in ED.  Was managed for Septic shock s/p fluid resuscitation, IV pressors, and Transferred to MICU.  Now her Leukocytosis improving and switched to Cefepime after Bcx 11/22 revealed Morganella morganii.  Surveillance Bcxs  from 11/25 remain NGTD.  Now patient off IV pressors and Stable Hemodynamics on oral midodrine 20mg q8. Hospital Course c/b Mild hyponatremia now resolved. Transferred to RCU 11/28. Trach changed to #7 distal xlt lise by ENT due to air leak.    11/29: WBC spike at 25 today.  Afebrile and vitals stable.  ID consult called.  Single dose Vancomycin 1g given. 65 F with advanced ALS, chronic hypoxic/ hypercapnic resp failure, vent dependent, s/p trach 2015 and PEG, now presenting with septic shock and Morganella bacteremia 2/2 bacterial PNA + UTI.  Patient was BIBEMS from home for fevers. Pt's 24hr home health nurse reported over the past few days the pt has appeared more lethargic, has been tachycardic, and running fevers with decreased Urinations. She was empirically started on meropenum and vancomycin in ED.  Was managed for Septic shock s/p fluid resuscitation, IV pressors, and Transferred to MICU.  Now her Leukocytosis improving and switched to Cefepime after Bcx 11/22 revealed Morganella morganii.  Surveillance Bcxs from 11/25 remain NGTD.  LLE doppler study revealed stenosis of femoral and popliteal arteries causing cool, pale LLE without cyanosis.  Now patient off IV pressors and stable Hemodynamics on oral midodrine 20mg q8. Hospital Course c/b Mild hyponatremia now resolved. Transferred to RCU 11/28. Trach changed to #7 distal xlt lise by ENT due to air leak.    11/29: WBC spike at 25 today.  Single dose Vancomycin 1g given.  Afebrile and vitals stable.  ID consult and recs followed for optho for conjunctival irritation, LE dopplers, and pan cx.

## 2020-11-30 DIAGNOSIS — I77.1 STRICTURE OF ARTERY: ICD-10-CM

## 2020-11-30 DIAGNOSIS — H16.8 OTHER KERATITIS: ICD-10-CM

## 2020-11-30 LAB
ANION GAP SERPL CALC-SCNC: 11 MMOL/L — SIGNIFICANT CHANGE UP (ref 5–17)
APPEARANCE UR: ABNORMAL
BACTERIA # UR AUTO: NEGATIVE — SIGNIFICANT CHANGE UP
BASOPHILS # BLD AUTO: 0 K/UL — SIGNIFICANT CHANGE UP (ref 0–0.2)
BASOPHILS NFR BLD AUTO: 0 % — SIGNIFICANT CHANGE UP (ref 0–2)
BILIRUB UR-MCNC: NEGATIVE — SIGNIFICANT CHANGE UP
BUN SERPL-MCNC: 21 MG/DL — SIGNIFICANT CHANGE UP (ref 7–23)
CALCIUM SERPL-MCNC: 8.9 MG/DL — SIGNIFICANT CHANGE UP (ref 8.4–10.5)
CHLORIDE SERPL-SCNC: 98 MMOL/L — SIGNIFICANT CHANGE UP (ref 96–108)
CO2 SERPL-SCNC: 26 MMOL/L — SIGNIFICANT CHANGE UP (ref 22–31)
COLOR SPEC: YELLOW — SIGNIFICANT CHANGE UP
CREAT SERPL-MCNC: <0.3 MG/DL — LOW (ref 0.5–1.3)
CULTURE RESULTS: SIGNIFICANT CHANGE UP
CULTURE RESULTS: SIGNIFICANT CHANGE UP
DACRYOCYTES BLD QL SMEAR: SLIGHT — SIGNIFICANT CHANGE UP
DIFF PNL FLD: NEGATIVE — SIGNIFICANT CHANGE UP
EOSINOPHIL # BLD AUTO: 0.26 K/UL — SIGNIFICANT CHANGE UP (ref 0–0.5)
EOSINOPHIL NFR BLD AUTO: 0.9 % — SIGNIFICANT CHANGE UP (ref 0–6)
EPI CELLS # UR: 1 /HPF — SIGNIFICANT CHANGE UP (ref 0–5)
GLUCOSE SERPL-MCNC: 103 MG/DL — HIGH (ref 70–99)
GLUCOSE UR QL: NEGATIVE — SIGNIFICANT CHANGE UP
GRAM STN FLD: SIGNIFICANT CHANGE UP
HCT VFR BLD CALC: 37.2 % — SIGNIFICANT CHANGE UP (ref 34.5–45)
HGB BLD-MCNC: 11.5 G/DL — SIGNIFICANT CHANGE UP (ref 11.5–15.5)
HYALINE CASTS # UR AUTO: 0 /LPF — SIGNIFICANT CHANGE UP (ref 0–7)
KETONES UR-MCNC: NEGATIVE — SIGNIFICANT CHANGE UP
LEUKOCYTE ESTERASE UR-ACNC: ABNORMAL
LYMPHOCYTES # BLD AUTO: 10.5 % — LOW (ref 13–44)
LYMPHOCYTES # BLD AUTO: 3.06 K/UL — SIGNIFICANT CHANGE UP (ref 1–3.3)
MAGNESIUM SERPL-MCNC: 1.9 MG/DL — SIGNIFICANT CHANGE UP (ref 1.6–2.6)
MANUAL SMEAR VERIFICATION: SIGNIFICANT CHANGE UP
MCHC RBC-ENTMCNC: 28.3 PG — SIGNIFICANT CHANGE UP (ref 27–34)
MCHC RBC-ENTMCNC: 30.9 GM/DL — LOW (ref 32–36)
MCV RBC AUTO: 91.4 FL — SIGNIFICANT CHANGE UP (ref 80–100)
MONOCYTES # BLD AUTO: 1.02 K/UL — HIGH (ref 0–0.9)
MONOCYTES NFR BLD AUTO: 3.5 % — SIGNIFICANT CHANGE UP (ref 2–14)
MYELOCYTES NFR BLD: 3.5 % — HIGH (ref 0–0)
NEUTROPHILS # BLD AUTO: 23.82 K/UL — HIGH (ref 1.8–7.4)
NEUTROPHILS NFR BLD AUTO: 81.6 % — HIGH (ref 43–77)
NITRITE UR-MCNC: NEGATIVE — SIGNIFICANT CHANGE UP
PH UR: 7.5 — SIGNIFICANT CHANGE UP (ref 5–8)
PHOSPHATE SERPL-MCNC: 3.5 MG/DL — SIGNIFICANT CHANGE UP (ref 2.5–4.5)
PLAT MORPH BLD: NORMAL — SIGNIFICANT CHANGE UP
PLATELET # BLD AUTO: 451 K/UL — HIGH (ref 150–400)
POIKILOCYTOSIS BLD QL AUTO: SLIGHT — SIGNIFICANT CHANGE UP
POLYCHROMASIA BLD QL SMEAR: SLIGHT — SIGNIFICANT CHANGE UP
POTASSIUM SERPL-MCNC: 5.4 MMOL/L — HIGH (ref 3.5–5.3)
POTASSIUM SERPL-SCNC: 5.4 MMOL/L — HIGH (ref 3.5–5.3)
PROT UR-MCNC: SIGNIFICANT CHANGE UP
RBC # BLD: 4.07 M/UL — SIGNIFICANT CHANGE UP (ref 3.8–5.2)
RBC # FLD: 16.2 % — HIGH (ref 10.3–14.5)
RBC BLD AUTO: ABNORMAL
RBC CASTS # UR COMP ASSIST: 5 /HPF — HIGH (ref 0–4)
SODIUM SERPL-SCNC: 135 MMOL/L — SIGNIFICANT CHANGE UP (ref 135–145)
SP GR SPEC: 1.01 — LOW (ref 1.01–1.02)
SPECIMEN SOURCE: SIGNIFICANT CHANGE UP
TARGETS BLD QL SMEAR: SLIGHT — SIGNIFICANT CHANGE UP
UROBILINOGEN FLD QL: SIGNIFICANT CHANGE UP
WBC # BLD: 29.19 K/UL — HIGH (ref 3.8–10.5)
WBC # FLD AUTO: 29.19 K/UL — HIGH (ref 3.8–10.5)
WBC UR QL: 2 /HPF — SIGNIFICANT CHANGE UP (ref 0–5)

## 2020-11-30 PROCEDURE — 71260 CT THORAX DX C+: CPT | Mod: 26

## 2020-11-30 PROCEDURE — 99233 SBSQ HOSP IP/OBS HIGH 50: CPT

## 2020-11-30 PROCEDURE — 74177 CT ABD & PELVIS W/CONTRAST: CPT | Mod: 26

## 2020-11-30 PROCEDURE — 99221 1ST HOSP IP/OBS SF/LOW 40: CPT | Mod: GC

## 2020-11-30 RX ORDER — SODIUM ZIRCONIUM CYCLOSILICATE 10 G/10G
5 POWDER, FOR SUSPENSION ORAL ONCE
Refills: 0 | Status: COMPLETED | OUTPATIENT
Start: 2020-11-30 | End: 2020-11-30

## 2020-11-30 RX ORDER — SODIUM CHLORIDE 9 MG/ML
250 INJECTION INTRAMUSCULAR; INTRAVENOUS; SUBCUTANEOUS ONCE
Refills: 0 | Status: COMPLETED | OUTPATIENT
Start: 2020-11-30 | End: 2020-11-30

## 2020-11-30 RX ORDER — VANCOMYCIN HCL 1 G
1000 VIAL (EA) INTRAVENOUS ONCE
Refills: 0 | Status: COMPLETED | OUTPATIENT
Start: 2020-11-30 | End: 2020-11-30

## 2020-11-30 RX ORDER — MIDODRINE HYDROCHLORIDE 2.5 MG/1
10 TABLET ORAL EVERY 8 HOURS
Refills: 0 | Status: DISCONTINUED | OUTPATIENT
Start: 2020-11-30 | End: 2020-12-03

## 2020-11-30 RX ORDER — MEROPENEM 1 G/30ML
1000 INJECTION INTRAVENOUS EVERY 8 HOURS
Refills: 0 | Status: DISCONTINUED | OUTPATIENT
Start: 2020-11-30 | End: 2020-12-04

## 2020-11-30 RX ADMIN — Medication 250 MILLIGRAM(S): at 14:30

## 2020-11-30 RX ADMIN — RILUZOLE 50 MILLIGRAM(S): 50 TABLET ORAL at 05:22

## 2020-11-30 RX ADMIN — SERTRALINE 50 MILLIGRAM(S): 25 TABLET, FILM COATED ORAL at 05:22

## 2020-11-30 RX ADMIN — CHLORHEXIDINE GLUCONATE 15 MILLILITER(S): 213 SOLUTION TOPICAL at 05:23

## 2020-11-30 RX ADMIN — RIVAROXABAN 10 MILLIGRAM(S): KIT at 17:56

## 2020-11-30 RX ADMIN — SODIUM ZIRCONIUM CYCLOSILICATE 5 GRAM(S): 10 POWDER, FOR SUSPENSION ORAL at 14:13

## 2020-11-30 RX ADMIN — Medication 1 DROP(S): at 17:56

## 2020-11-30 RX ADMIN — CHLORHEXIDINE GLUCONATE 1 APPLICATION(S): 213 SOLUTION TOPICAL at 05:23

## 2020-11-30 RX ADMIN — Medication 1 APPLICATION(S): at 17:57

## 2020-11-30 RX ADMIN — CEFEPIME 100 MILLIGRAM(S): 1 INJECTION, POWDER, FOR SOLUTION INTRAMUSCULAR; INTRAVENOUS at 03:08

## 2020-11-30 RX ADMIN — RILUZOLE 50 MILLIGRAM(S): 50 TABLET ORAL at 17:56

## 2020-11-30 RX ADMIN — MEROPENEM 100 MILLIGRAM(S): 1 INJECTION INTRAVENOUS at 21:08

## 2020-11-30 RX ADMIN — Medication 3 MILLILITER(S): at 23:51

## 2020-11-30 RX ADMIN — Medication 3 MILLILITER(S): at 17:38

## 2020-11-30 RX ADMIN — Medication 1 APPLICATION(S): at 23:54

## 2020-11-30 RX ADMIN — Medication 1 TABLET(S): at 18:03

## 2020-11-30 RX ADMIN — CHLORHEXIDINE GLUCONATE 1 APPLICATION(S): 213 SOLUTION TOPICAL at 14:14

## 2020-11-30 RX ADMIN — Medication 3 MILLILITER(S): at 11:24

## 2020-11-30 RX ADMIN — MEROPENEM 100 MILLIGRAM(S): 1 INJECTION INTRAVENOUS at 14:16

## 2020-11-30 RX ADMIN — MIDODRINE HYDROCHLORIDE 20 MILLIGRAM(S): 2.5 TABLET ORAL at 04:48

## 2020-11-30 RX ADMIN — Medication 1 APPLICATION(S): at 14:16

## 2020-11-30 RX ADMIN — Medication 3 MILLILITER(S): at 05:05

## 2020-11-30 RX ADMIN — MIDODRINE HYDROCHLORIDE 10 MILLIGRAM(S): 2.5 TABLET ORAL at 21:09

## 2020-11-30 RX ADMIN — Medication 1 DROP(S): at 14:14

## 2020-11-30 RX ADMIN — Medication 1.5 MILLIGRAM(S): at 09:16

## 2020-11-30 RX ADMIN — CHLORHEXIDINE GLUCONATE 15 MILLILITER(S): 213 SOLUTION TOPICAL at 17:56

## 2020-11-30 RX ADMIN — Medication 1 DROP(S): at 05:22

## 2020-11-30 RX ADMIN — POLYETHYLENE GLYCOL 3350 17 GRAM(S): 17 POWDER, FOR SOLUTION ORAL at 05:23

## 2020-11-30 RX ADMIN — Medication 1 DROP(S): at 23:54

## 2020-11-30 RX ADMIN — SODIUM CHLORIDE 1000 MILLILITER(S): 9 INJECTION INTRAMUSCULAR; INTRAVENOUS; SUBCUTANEOUS at 05:00

## 2020-11-30 RX ADMIN — Medication 2.5 MILLIGRAM(S): at 21:08

## 2020-11-30 NOTE — CONSULT NOTE ADULT - ATTENDING COMMENTS
65 f with ALS, s/p trach and PEG, was admitted 11/22 for septic shock and morganella bacteremia, was considered due to UTI vs pneumonia but urine cx was negative, sputum cx negative, CXR with LLL pneumonia  blood cx negative 11/25  WBC initially 19 then 31 and fluctuating, normalized on 11/26 and then increased to 25, no diarrhea, no resp secretions and no fever  exam with b/l injected conjunctivae and cold LLE     septic shock with morganella bacteremia, ?UTI vs pneumonia but urine and sputum cx negative  now again with leukocytosis and cold LLE    * LE arterial doppler  * repeat blood cx, urine cx and sputum cx  * c/w cefepime for now, blood cx negative 11/25 so day 5  * if worsening status will need chest/abd/pelvis CT with contrast  * consider ophthalmo eval    The above assessment and plan was discussed with the primary team    Salome Terrazas MD  Pager [phone]  After 5pm and on weekends call [phone]
I have interviewed and examined the patient and reviewed the residents note including the history, exam, assessment, and plan.  I agree with the residents assessment and plan.    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 No infiltrate.  OS: inferior epithelial defect measuring 5H x 3V mm. No infiltrate.  - Alternate erythromycin ointment q4 hours with lacrilube ointment q4 hours in both eyes so that patient is getting ointment every 2 hours   - Ocuflox drops QID to both eyes  - Please tape the eyelids shut for at least 10 hours. Given the ALS, patient likely has preserved mental function and would likely prefer to have eyes open. Recommend taping eyes shut at bedtime and allowing for a break from taping during the day  - Ophthalmology will continue to follow  - findings and plan discussed with primary team  - case to be discussed with Dr. Kristie Swan MD

## 2020-11-30 NOTE — PROGRESS NOTE ADULT - SUBJECTIVE AND OBJECTIVE BOX
Patient is a 65y old  Female who presents with a chief complaint of sepsis (29 Nov 2020 18:21)      Interval Events: Patient was Hypotensive overnight, Received Bolus and Midodrine with improved BP     REVIEW OF SYSTEMS:  [ ] Positive  [ ] All other systems negative  [x] Unable to assess ROS because Patient is unresponsive to verbal stimuli     Vital Signs Last 24 Hrs  T(C): 36.9 (11-30-20 @ 06:47), Max: 36.9 (11-30-20 @ 06:47)  T(F): 98.5 (11-30-20 @ 06:47), Max: 98.5 (11-30-20 @ 06:47)  HR: 74 (11-30-20 @ 08:26) (68 - 113)  BP: 105/58 (11-30-20 @ 06:47) (76/50 - 134/78)  RR: 14 (11-30-20 @ 04:27) (14 - 16)  SpO2: 100% (11-30-20 @ 08:26) (96% - 100%)    PHYSICAL EXAM:  HEENT:   [x]Tracheostomy: # 7 XLT Shiley   [x]Pupils equal  [ ]No oral lesions  [x]Abnormal: + Injected Conjunctiva RT > Lft     SKIN  [x]No Rash  [ ] Abnormal  [ ] pressure    CARDIAC  [ ]Regular  [x]Abnormal: Mildly tachycardic but regular rhythm     PULMONARY  [ ]Bilateral Clear Breath Sounds  [ ]Normal Excursion  [x]Abnormal: Bilateral Coarse Breath sounds, inspiratory crackles left base     GI  [x]PEG      [x] +BS		              [x]Soft, nondistended, nontender	  [ ]Abnormal    MUSCULOSKELETAL                                   [x]Bedbound                 [ ]Abnormal    [ ]Ambulatory/OOB to chair                           EXTREMITIES                                         [x] Decrease Dorsalis pedal pulse on left lower extremity ( Located with bedside doppler)   [ ]Edema                           NEUROLOGIC  [ ] Normal, non focal  [x] Focal findings: Functional Quadriplegic 2/2 ALS     PSYCHIATRIC  [ ]Alert and appropriate  [x] Sedated	 [ ]Agitated    :  Paty: [ ] Yes, if yes: Date of Placement:                   [x] No    LINES: Central Lines [ ] Yes, if yes: Date of Placement                                     [x] No    HOSPITAL MEDICATIONS:  MEDICATIONS  (STANDING):  albuterol/ipratropium for Nebulization 3 milliLiter(s) Nebulizer every 6 hours  artificial  tears Solution 1 Drop(s) Both EYES two times a day  cefepime   IVPB 2000 milliGRAM(s) IV Intermittent every 12 hours  chlorhexidine 0.12% Liquid 15 milliLiter(s) Oral Mucosa every 12 hours  chlorhexidine 4% Liquid 1 Application(s) Topical <User Schedule>  chlorhexidine 4% Liquid 1 Application(s) Topical <User Schedule>  diazepam   Solution 1.5 milliGRAM(s) Oral daily  diazepam   Solution 2.5 milliGRAM(s) Oral daily  influenza   Vaccine 0.5 milliLiter(s) IntraMuscular once  midodrine. 10 milliGRAM(s) Oral every 8 hours  polyethylene glycol 3350 17 Gram(s) Oral two times a day  riluzole 50 milliGRAM(s) Oral two times a day  rivaroxaban 10 milliGRAM(s) Enteral Tube with dinner  senna 1 Tablet(s) Oral daily  sertraline 50 milliGRAM(s) Oral daily  sodium chloride 0.9% Bolus 250 milliLiter(s) IV Bolus once  sodium zirconium cyclosilicate 5 Gram(s) Oral once    MEDICATIONS  (PRN):  petrolatum Ophthalmic Ointment 1 Application(s) Both EYES every 6 hours PRN Dryness      LABS:                        11.5   29.19 )-----------( 451      ( 30 Nov 2020 07:19 )             37.2     11-30    135  |  98  |  21  ----------------------------<  103<H>  5.4<H>   |  26  |  <0.30<L>    Ca    8.9      30 Nov 2020 07:10  Phos  3.5     11-30  Mg     1.9     11-30              CAPILLARY BLOOD GLUCOSE    MICROBIOLOGY:     RADIOLOGY:  [ ] Reviewed and interpreted by me    Mode: AC/ CMV (Assist Control/ Continuous Mandatory Ventilation)  RR (machine): 14  TV (machine): 450  FiO2: 30  PEEP: 5  ITime: 1  MAP: 9  PIP: 21

## 2020-11-30 NOTE — PROGRESS NOTE ADULT - ASSESSMENT
65 f with ALS, s/p trach and PEG, was admitted 11/22 for septic shock and morganella bacteremia, was considered due to UTI vs pneumonia but urine cx was negative, sputum cx negative, CXR with LLL pneumonia  blood cx negative 11/25  WBC initially 19 then 31 and fluctuating, normalized on 11/26 and then increased to 29 with some hypotension, no diarrhea, no resp secretions and no fever  exam with b/l injected conjunctivae and cold LLE but that resolved LLE not cold anymore     septic shock with morganella bacteremia, ?UTI vs pneumonia but urine and sputum cx negative  now again with leukocytosis and hypotension    * s/p a dose of vanco 11/29, wound give another dose of vanco today until cultures are back  * f/u the repeat blood cx, urine cx and sputum cx  * on cefepime, blood cx negative 11/25 so day 6 post negative cx, switch to andie 1 q 8 in view of new sepsis  * chest/abd/pelvis CT with contrast  * consider ophthalmo eval    The above assessment and plan was discussed with U    Salome Terrazas MD  Pager [phone]  After 5pm and on weekends call [phone]

## 2020-11-30 NOTE — PROGRESS NOTE ADULT - SUBJECTIVE AND OBJECTIVE BOX
Follow Up:  leukocytosis    Interval History: pt was hypotensive overnight and WBC increased to 29    ROS:    Unobtainable because of mental status        Allergies  Bactrim DS (Rash)        ANTIMICROBIALS:  cefepime   IVPB 2000 every 12 hours      OTHER MEDS:  albuterol/ipratropium for Nebulization 3 milliLiter(s) Nebulizer every 6 hours  artificial  tears Solution 1 Drop(s) Both EYES every 6 hours  chlorhexidine 0.12% Liquid 15 milliLiter(s) Oral Mucosa every 12 hours  chlorhexidine 4% Liquid 1 Application(s) Topical <User Schedule>  chlorhexidine 4% Liquid 1 Application(s) Topical <User Schedule>  diazepam   Solution 1.5 milliGRAM(s) Oral daily  diazepam   Solution 2.5 milliGRAM(s) Oral daily  influenza   Vaccine 0.5 milliLiter(s) IntraMuscular once  midodrine. 10 milliGRAM(s) Oral every 8 hours  petrolatum Ophthalmic Ointment 1 Application(s) Both EYES every 6 hours  polyethylene glycol 3350 17 Gram(s) Oral two times a day  riluzole 50 milliGRAM(s) Oral two times a day  rivaroxaban 10 milliGRAM(s) Enteral Tube with dinner  senna 1 Tablet(s) Oral daily  sertraline 50 milliGRAM(s) Oral daily  sodium chloride 0.9% Bolus 250 milliLiter(s) IV Bolus once  sodium zirconium cyclosilicate 5 Gram(s) Oral once      Vital Signs Last 24 Hrs  T(C): 36.9 (2020 06:47), Max: 36.9 (2020 06:47)  T(F): 98.5 (2020 06:47), Max: 98.5 (2020 06:47)  HR: 77 (2020 12:10) (68 - 113)  BP: 105/58 (2020 06:47) (76/50 - 134/78)  BP(mean): --  RR: 14 (2020 12:09) (14 - 16)  SpO2: 100% (2020 12:10) (96% - 100%)    Physical Exam:  General:    NAD  Eye: injected conjunctiva  ENT:   trach  Cardio:     regular S1, S2  Respiratory:    clear b/l,    no wheezing  abd:     soft,   BS +,   PEG  :     no  carter  Musculoskeletal:   no joint swelling  vascular: no phlebitis, the LLE not cold anymore  Skin:    no rash, no decubitus              11.5   29.19 )-----------( 451      ( 2020 07:19 )             37.2           135  |  98  |  21  ----------------------------<  103<H>  5.4<H>   |  26  |  <0.30<L>    Ca    8.9      2020 07:10  Phos  3.5       Mg     1.9             Urinalysis Basic - ( 2020 08:50 )    Color: Yellow / Appearance: Slightly Turbid / S.007 / pH: x  Gluc: x / Ketone: Negative  / Bili: Negative / Urobili: <2 mg/dL   Blood: x / Protein: Trace / Nitrite: Negative   Leuk Esterase: Moderate / RBC: 5 /HPF / WBC 2 /HPF   Sq Epi: x / Non Sq Epi: 1 /HPF / Bacteria: Negative        MICROBIOLOGY:  v  .Sputum Sputum  20 --  --    Few polymorphonuclear leukocytes per low power field  Rare Squamous epithelial cells per low power field  Few Gram Variable Rods seen per oil power field  Rare Gram positive cocci in pairs seen per oil power field      .Blood Blood  20   No Growth Final  --  --      .Sputum Sputum  20   Normal Respiratory Kia present  --    Rare polymorphonuclear leukocytes per low power field  Rare Squamous epithelial cells per low power field  Few Gram Variable Rods per oil power field      .Blood Blood-Peripheral  20   Growth in aerobic and anaerobic bottles: Morganella morganii  See previous culture [id number]  --  Blood Culture PCR  Morganella morganii  Morganella morganii      .Urine Clean Catch (Midstream)  20   <10,000 CFU/mL Normal Urogenital Kia  --  --                RADIOLOGY:  Images independently visualized and reviewed personally, findings as below  < from: Xray Chest 1 View- PORTABLE-Urgent (20 @ 16:12) >  IMPRESSION:    Left lower lobe pneumonia.      < end of copied text >  < from: [facility] Duplex Low Ext Arterial, Ltd, Left (20 @ 13:49) >  Impression:    There are flow-limiting stenoses affecting the distal left superficial femoral artery and the popliteal artery.

## 2020-11-30 NOTE — PROGRESS NOTE ADULT - PROBLEM SELECTOR PLAN 1
Patient admitted with Sepsis 2/2 Bacteremia / PNA  Blood cx 11/22: Morganella Morganii, Repeat BLD CX 11/25: No growth   CXR 11/22: Left Lower Lobe Pneumonia   Patient remains with increasing WBC, Case d/w ID   Will change Cefepime to Meropenem and Give Vanco x 1  Sputum cx 11/20: Gram Variable Rods / Gm positive cocci   Blood cx 11/30: Sent/ results Pending   Will Obtain CT Chest/ ABD/ Pelvis to evaluate infectious source Patient admitted with Sepsis 2/2 Bacteremia / PNA  Blood cx 11/22: Morganella Morganii, Repeat BLD CX 11/25: No growth   CXR 11/22: Left Lower Lobe Pneumonia   Patient remains with increasing WBC, Case d/w ID   Will change Cefepime to Meropenem and Give Vanco x 1  Sputum cx 11/20: Gram Variable Rods / Gm positive cocci   Blood cx 11/30: Sent/ results Pending   Will Obtain CT Chest/ ABD/ Pelvis to evaluate infectious source  Midodrine changed to 10 mg q 8 hrs standing ( Hold For SBP > 120 / hr< 60 )

## 2020-11-30 NOTE — PROVIDER CONTACT NOTE (OTHER) - BACKGROUND
elevated WBC , bc x to be send , sputum culture send 11/29 UA send this AM. next dose of midodrine at 0530

## 2020-11-30 NOTE — PROGRESS NOTE ADULT - PROBLEM SELECTOR PLAN 5
LLE Doppler 11/25: Flow Limiting stenosis of Distal and Left Superficial femoral and Popliteal artery LLE Doppler 11/25: Flow Limiting stenosis of Distal and Left Superficial femoral and Popliteal artery  Continue Xarelto

## 2020-11-30 NOTE — CONSULT NOTE ADULT - ASSESSMENT
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 No infiltrate.  OS: inferior epithelial defect measuring 5H x 3V mm. No infiltrate.  - Alternate erythromycin ointment q4 hours with lacrilube ointment q4 hours in both eyes so that patient is getting ointment every 2 hours   - Ocuflox drops QID to both eyes  - Please tape the eyelids shut for at least 10 hours. Given the ALS, patient likely has preserved mental function and would likely prefer to have eyes open. 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 No infiltrate.  OS: inferior epithelial defect measuring 5H x 3V mm. No infiltrate.  - Alternate erythromycin ointment q4 hours with lacrilube ointment q4 hours in both eyes so that patient is getting ointment every 2 hours   - Ocuflox drops QID to both eyes  - Please tape the eyelids shut for at least 10 hours. Given the ALS, patient likely has preserved mental function and would likely prefer to have eyes open. Recommend taping eyes shut at bedtime and allowing for a break from taping during the day  - Ophthalmology will continue to follow  - findings and plan discussed with primary team  - case to be discussed with Dr. Flowers    The patient should follow-up with [facility]mology within 1 week of discharge, sooner if symptoms worsen or change:  [address] [id number]  [phone]

## 2020-11-30 NOTE — PROGRESS NOTE ADULT - ASSESSMENT
65 F with advanced ALS, chronic hypoxic/ hypercapnic resp failure, vent dependent, s/p trach 2015 and PEG, now presenting with septic shock and Morganella bacteremia 2/2 bacterial PNA + UTI.  Patient was BIBEMS from home for fevers. Pt's 24hr home health nurse reported over the past few days the pt has appeared more lethargic, has been tachycardic, and running fevers with decreased Urination. She was empirically started on meropenum and vancomycin in ED.  Was managed for Septic shock s/p fluid resuscitation, IV pressors, and Transferred to MICU. Patients abx were switched to Cefepime after Bcx 11/22 revealed Morganella morganii.  Surveillance Bcxs from 11/25 remain NGTD.  LLE doppler study revealed stenosis of femoral and popliteal arteries causing cool, pale LLE without cyanosis. Patient was weaned off vasopressors and placed on Midodrine. Patient was Transferred to RCU on 11/28. Trach changed to #7 distal xlt shilinda by ENT due to air leak on 11/28.    11/30: Patient was hypotensive overnight improved with bolus and midodrine administration. Patient with rising WBC; Case d/w ID will broaden abx to Meropenem and give additional dose of Vancomycin today. Sputum cxs sent 11/30; Gram Variable Rods/ gm positive cocci ( identification pending); repeat Blood cxs sent this morning ( results pending). Will obtain Chest CT / ABD / Pelvis to evaluate for course of sepsis. Patient with mildly hypokalemia today Lokelma x 1 given in setting of rising potassium over 48 hrs. 65 F with advanced ALS, chronic hypoxic/ hypercapnic resp failure, vent dependent, s/p trach 2015 and PEG, now presenting with septic shock and Morganella bacteremia 2/2 bacterial PNA + UTI.  Patient was BIBEMS from home for fevers. Pt's 24hr home health nurse reported over the past few days the pt has appeared more lethargic, has been tachycardic, and running fevers with decreased Urination. She was empirically started on meropenum and vancomycin in ED.  Was managed for Septic shock s/p fluid resuscitation, IV pressors, and Transferred to MICU. Patients abx were switched to Cefepime after Bcx 11/22 revealed Morganella morganii.  Surveillance Bcxs from 11/25 remain NGTD.  LLE doppler study revealed stenosis of femoral and popliteal arteries causing cool, pale LLE without cyanosis. Patient was weaned off vasopressors and placed on Midodrine. Patient was Transferred to RCU on 11/28. Trach changed to #7 distal xlt shilinda by ENT due to air leak on 11/28.    11/30: Patient was hypotensive overnight improved with bolus and midodrine administration. Patient with rising WBC; Case d/w ID will broaden abx to Meropenem and give additional dose of Vancomycin today. Sputum cxs sent 11/30; Gram Variable Rods/ gm positive cocci ( identification pending); repeat Blood cxs sent this morning ( results pending). Will obtain Chest CT / ABD / Pelvis to evaluate for course of sepsis. Patient with mildly hypokalemia today Lokelma x 1 given in setting of rising potassium over 48 hrs. Midodrine changed to 10 mg q 8 hrs .

## 2020-11-30 NOTE — CONSULT NOTE ADULT - SUBJECTIVE AND OBJECTIVE BOX
[facility] DEPARTMENT OF OPHTHALMOLOGY - INITIAL ADULT CONSULT  ------------------------------------------------------------------------------------------------------  Anyi Goldstein MD PGY-3  Pager: [phone]  -------------------------------------------------------------------------------------------------------    HPI: 66 y/o F with advanced ALS, HLD, vent dependent s/p trach in 2015 and PEG BIBEMS from home for fevers. Ophthalmology consulted for ocular evaluation due to concern of exposure keratopathy.     PAST MEDICAL & SURGICAL HISTORY:  Ventilator dependent Since 2015  HLD (hyperlipidemia)  ALS (amyotrophic lateral sclerosis)  PEG dependent  Dependence on tracheostomy    Past ocular history: none    FAMILY HISTORY:  No pertinent family history in first degree relatives    Social History: lives at home with 24 hour health aid    MEDICATIONS  (STANDING):  albuterol/ipratropium for Nebulization 3 milliLiter(s) Nebulizer every 6 hours  artificial  tears Solution 1 Drop(s) Both EYES every 6 hours  chlorhexidine 0.12% Liquid 15 milliLiter(s) Oral Mucosa every 12 hours  chlorhexidine 4% Liquid 1 Application(s) Topical <User Schedule>  chlorhexidine 4% Liquid 1 Application(s) Topical <User Schedule>  diazepam   Solution 1.5 milliGRAM(s) Oral daily  diazepam   Solution 2.5 milliGRAM(s) Oral daily  influenza   Vaccine 0.5 milliLiter(s) IntraMuscular once  meropenem  IVPB 1000 milliGRAM(s) IV Intermittent every 8 hours  midodrine. 10 milliGRAM(s) Oral every 8 hours  multivitamin 1 Tablet(s) Oral daily  petrolatum Ophthalmic Ointment 1 Application(s) Both EYES every 6 hours  polyethylene glycol 3350 17 Gram(s) Oral two times a day  riluzole 50 milliGRAM(s) Oral two times a day  rivaroxaban 10 milliGRAM(s) Enteral Tube with dinner  senna 1 Tablet(s) Oral daily  sertraline 50 milliGRAM(s) Oral daily  sodium chloride 0.9% Bolus 250 milliLiter(s) IV Bolus once    Allergies & Intolerances:   Bactrim DS (Rash)    Review of Systems: Unable to obtain due to mental status    VITALS: T(C): 36.4 (11-30-20 @ 19:16)  T(F): 97.6 (11-30-20 @ 19:16), Max: 98.5 (11-30-20 @ 06:47)  HR: 77 (11-30-20 @ 19:54) (68 - 112)  BP: 141/80 (11-30-20 @ 19:16) (76/50 - 141/80)  RR:  (14 - 22)  SpO2:  (97% - 100%)    Alert and oriented x 0; patient non-verbal due to severe ALS but does track with eyes     Ophthalmology Exam:  Visual acuity (sc): RONIT as patient is non-verbal  Pupils: PERRL OU, no APD  Ttono: 11 OU  Extraocular movements (EOMs): grossly full  Confrontational Visual Field (CVF): RONIT as patient is non-verbal  Color Plates: RONIT as patient is non-verbal    Pen Light Exam (PLE)  External: Flat OU  Lids/Lashes/Lacrimal Ducts: Flat OU    Sclera/Conjunctiva: 1+ inj OU  Cornea: dense 4+ SPK OU; OD with inferior confluent SPK surrounding area of epithelial sloughing and with epithelial defect nasal to this area measuring 2x2 mm; OS with inferior epithelilal defect measuring 3x5 mm; no infiltrates noted   Anterior Chamber: D+F OU    Iris: Flat OU  Lens: Cl OU    Fundus Exam: dilated with 1% tropicamide and 2.5% phenylephrine  Approval obtained from primary team for dilation  Patient aware that pupils can remained dilated for at least 4-6 hours  Exam performed with 20D lens    Vitreous: wnl OU  Disc, cup/disc: sharp and pink, 0.4 OU  Macula: wnl OU  Vessels: wnl OU  Periphery: limited view due to surface disease   [facility] DEPARTMENT OF OPHTHALMOLOGY - INITIAL ADULT CONSULT  ------------------------------------------------------------------------------------------------------  Anyi Goldstein MD PGY-3  Pager: [phone]  -------------------------------------------------------------------------------------------------------    HPI: 64 y/o F with advanced ALS, HLD, vent dependent s/p trach in 2015 and PEG BIBEMS from home for fevers. Ophthalmology consulted for ocular evaluation due to concern of exposure keratopathy. Unable to obtain history due to mental status.    PAST MEDICAL & SURGICAL HISTORY:  Ventilator dependent Since 2015  HLD (hyperlipidemia)  ALS (amyotrophic lateral sclerosis)  PEG dependent  Dependence on tracheostomy    Past ocular history: none    FAMILY HISTORY:  No pertinent family history in first degree relatives, no glaucoma    Social History: lives at home with 24 hour health aid    MEDICATIONS  (STANDING):  albuterol/ipratropium for Nebulization 3 milliLiter(s) Nebulizer every 6 hours  artificial  tears Solution 1 Drop(s) Both EYES every 6 hours  chlorhexidine 0.12% Liquid 15 milliLiter(s) Oral Mucosa every 12 hours  chlorhexidine 4% Liquid 1 Application(s) Topical <User Schedule>  chlorhexidine 4% Liquid 1 Application(s) Topical <User Schedule>  diazepam   Solution 1.5 milliGRAM(s) Oral daily  diazepam   Solution 2.5 milliGRAM(s) Oral daily  influenza   Vaccine 0.5 milliLiter(s) IntraMuscular once  meropenem  IVPB 1000 milliGRAM(s) IV Intermittent every 8 hours  midodrine. 10 milliGRAM(s) Oral every 8 hours  multivitamin 1 Tablet(s) Oral daily  petrolatum Ophthalmic Ointment 1 Application(s) Both EYES every 6 hours  polyethylene glycol 3350 17 Gram(s) Oral two times a day  riluzole 50 milliGRAM(s) Oral two times a day  rivaroxaban 10 milliGRAM(s) Enteral Tube with dinner  senna 1 Tablet(s) Oral daily  sertraline 50 milliGRAM(s) Oral daily  sodium chloride 0.9% Bolus 250 milliLiter(s) IV Bolus once    Allergies & Intolerances:   Bactrim DS (Rash)    Review of Systems: Unable to obtain due to mental status    VITALS: T(C): 36.4 (11-30-20 @ 19:16)  T(F): 97.6 (11-30-20 @ 19:16), Max: 98.5 (11-30-20 @ 06:47)  HR: 77 (11-30-20 @ 19:54) (68 - 112)  BP: 141/80 (11-30-20 @ 19:16) (76/50 - 141/80)  RR:  (14 - 22)  SpO2:  (97% - 100%)    Alert and oriented x 0; patient non-verbal due to severe ALS but does track with eyes     Ophthalmology Exam:  Visual acuity (sc): RONIT as patient is non-verbal  Pupils: PERRL OU, no APD  Ttono: 11 OU  Extraocular movements (EOMs): grossly full  Confrontational Visual Field (CVF): RONIT as patient is non-verbal  Color Plates: RONIT as patient is non-verbal    Pen Light Exam (PLE)  External: Flat OU  Lids/Lashes/Lacrimal Ducts: Flat OU    Sclera/Conjunctiva: 1+ inj OU  Cornea: dense 4+ SPK OU; OD with inferior confluent SPK surrounding area of epithelial sloughing and with epithelial defect nasal to this area measuring 2x2 mm; OS with inferior epithelilal defect measuring 3x5 mm; no infiltrates noted   Anterior Chamber: D+F OU    Iris: Flat OU  Lens: Cl OU    Fundus Exam: dilated with 1% tropicamide and 2.5% phenylephrine  Approval obtained from primary team for dilation  Patient aware that pupils can remained dilated for at least 4-6 hours  Exam performed with 20D lens    Vitreous: wnl OU  Disc, cup/disc: sharp and pink, 0.4 OU  Macula: limited view due to surface disease  Vessels: limited view due to surface disease  Periphery: limited view due to surface disease

## 2020-11-30 NOTE — CHART NOTE - NSCHARTNOTEFT_GEN_A_CORE
Nutrition Chart Note.  Pt seen for: Nutrition follow-up on RCU    Source: EMR, Team    Chart reviewed, events noted. Per chart: 65F with advanced ALS s/p trach/PEG and HLD p/w tachycardia and decreased UOP concern for sepsis 2/2 UTI, admitted to MICU for further workup and vent management. Required IV pressors which has been terminated, transferred to RCU 11/28. Trach changed to #7 distal XLT shiley 2/2 air leak on 11/28.      Diet : NPO with Tube Feeds via PEG  Enteral Nutrition: Jevity 1.5 @ 70mL/hr x 13hrs (20:00-9:00). Provides 910mL formula, 1365kcals, 58g protein, 692ml free water  - RD observed Jevity 1.5; currently not infusing at this time as per orders (runs between hrs of 20:00-9:00)    Per flowsheets, Pt has received >80% EN provisions in the last 3 days (11/27-11/29).    Nutrition Events:   - 11/30: Potassium (5.4) <H> -  Lokelma x 1 given in setting of rising potassium over 48 hrs  - Home EN regimen of Jevity 1.2 @ 70 ml/hr x 13 hours overnight (8pm-9am)      GI: c fecal incontinence, last BM 11/30 (x1), 11/29 (x2), 11/28 (x1) - bowel regimen ordered (miralax, senna). Noted receiving abx      Current Weight: Will continue to monitor/trend weight status   Weight (in kg): 52.6 (11-27), 53.8 (11-22; dosing)      Pertinent Medications: MEDICATIONS  (STANDING):  albuterol/ipratropium for Nebulization 3 milliLiter(s) Nebulizer every 6 hours  artificial  tears Solution 1 Drop(s) Both EYES every 6 hours  chlorhexidine 0.12% Liquid 15 milliLiter(s) Oral Mucosa every 12 hours  chlorhexidine 4% Liquid 1 Application(s) Topical <User Schedule>  chlorhexidine 4% Liquid 1 Application(s) Topical <User Schedule>  diazepam   Solution 1.5 milliGRAM(s) Oral daily  diazepam   Solution 2.5 milliGRAM(s) Oral daily  influenza   Vaccine 0.5 milliLiter(s) IntraMuscular once  meropenem  IVPB 1000 milliGRAM(s) IV Intermittent every 8 hours  midodrine. 10 milliGRAM(s) Oral every 8 hours  petrolatum Ophthalmic Ointment 1 Application(s) Both EYES every 6 hours  polyethylene glycol 3350 17 Gram(s) Oral two times a day  riluzole 50 milliGRAM(s) Oral two times a day  rivaroxaban 10 milliGRAM(s) Enteral Tube with dinner  senna 1 Tablet(s) Oral daily  sertraline 50 milliGRAM(s) Oral daily  sodium chloride 0.9% Bolus 250 milliLiter(s) IV Bolus once  vancomycin  IVPB 1000 milliGRAM(s) IV Intermittent once    MEDICATIONS  (PRN):    Pertinent Labs:  11-30 Na135 mmol/L Glu 103 mg/dL<H> K+ 5.4 mmol/L<H> Cr  <0.30 mg/dL<L> BUN 21 mg/dL 11-30 Phos 3.5 mg/dL 11-28 Alb 3.5 g/dL      Skin per nursing documentation: no pressure injuries noted  Edema: 2+ B/L hands    Estimated Needs:   [x ] no change since previous assessment  Based on dosing weight: 118.6 lb/53.8 kg  Energy (25-30 kcals/kg): 4129-7700  Protein (1.0-1.2 g pro/kg): 53.8-64.56        Previous Nutrition Diagnosis: Swallowing difficulty  Nutrition Diagnosis is [x ] ongoing  - addressed with EN        New Nutrition Diagnosis: N/A      Interventions:   1. Continue Jevity 1.5 via PEG @ 70 ml/hr x 13 hours (20:00-9:00) to provide 910 ml formula, 1365 calories (25 neftali/kg), 58 grams protein (1.1 gm/kg), 692ml free water, based on dosing wt 53.8kg.  2. Recommend Add multivitamin/minerals via PEG to meet 100% RDI's    Monitoring and Evaluation:   Continue to monitor Nutritional intake, Tolerance to diet prescription, weights, labs, skin integrity  RD remains available upon request and will follow up per protocol    Beatris Bonilla, MS, RD, CDN  pager #[phone] Nutrition Chart Note.  Pt seen for: Nutrition follow-up on RCU    Source: EMR, Team    Chart reviewed, events noted. Per chart: 65F with advanced ALS s/p trach/PEG and HLD p/w tachycardia and decreased UOP concern for sepsis 2/2 UTI, admitted to MICU for further workup and vent management. Required IV pressors which has been terminated, transferred to RCU 11/28. Trach changed to #7 distal XLT shiley 2/2 air leak on 11/28.      Diet : NPO with Tube Feeds via PEG  Enteral Nutrition: Jevity 1.5 @ 70mL/hr x 13hrs (20:00-9:00). Provides 910mL formula, 1365kcals, 58g protein, 692ml free water  - RD observed Jevity 1.5; currently not infusing at this time as per orders (runs between hrs of 20:00-9:00)    Per flowsheets, Pt has received >80% EN provisions in the last 3 days (11/27-11/29).    Nutrition Events:   - 11/30: Potassium (5.4) <H> -  Lokelma x 1 given in setting of rising potassium over 48 hrs  - Home EN regimen of Jevity 1.2 @ 70 ml/hr x 13 hours overnight (8pm-9am)      GI: c fecal incontinence, last BM 11/30 (x1), 11/29 (x2), 11/28 (x1) - bowel regimen ordered (miralax, senna). Noted receiving abx      Current Weight: Will continue to monitor/trend weight status   Weight (in kg): 52.6 (11-27), 53.8 (11-22; dosing)      Pertinent Medications: MEDICATIONS  (STANDING):  albuterol/ipratropium for Nebulization 3 milliLiter(s) Nebulizer every 6 hours  artificial  tears Solution 1 Drop(s) Both EYES every 6 hours  chlorhexidine 0.12% Liquid 15 milliLiter(s) Oral Mucosa every 12 hours  chlorhexidine 4% Liquid 1 Application(s) Topical <User Schedule>  chlorhexidine 4% Liquid 1 Application(s) Topical <User Schedule>  diazepam   Solution 1.5 milliGRAM(s) Oral daily  diazepam   Solution 2.5 milliGRAM(s) Oral daily  influenza   Vaccine 0.5 milliLiter(s) IntraMuscular once  meropenem  IVPB 1000 milliGRAM(s) IV Intermittent every 8 hours  midodrine. 10 milliGRAM(s) Oral every 8 hours  petrolatum Ophthalmic Ointment 1 Application(s) Both EYES every 6 hours  polyethylene glycol 3350 17 Gram(s) Oral two times a day  riluzole 50 milliGRAM(s) Oral two times a day  rivaroxaban 10 milliGRAM(s) Enteral Tube with dinner  senna 1 Tablet(s) Oral daily  sertraline 50 milliGRAM(s) Oral daily  sodium chloride 0.9% Bolus 250 milliLiter(s) IV Bolus once  vancomycin  IVPB 1000 milliGRAM(s) IV Intermittent once    MEDICATIONS  (PRN):    Pertinent Labs:  11-30 Na135 mmol/L Glu 103 mg/dL<H> K+ 5.4 mmol/L<H> Cr  <0.30 mg/dL<L> BUN 21 mg/dL 11-30 Phos 3.5 mg/dL 11-28 Alb 3.5 g/dL      Skin per nursing documentation: no pressure injuries noted  Edema: 2+ B/L hands    Estimated Needs:   [x ] no change since previous assessment  Based on dosing weight: 118.6 lb/53.8 kg  Energy (25-30 kcals/kg): 6978-1949  Protein (1.0-1.2 g pro/kg): 53.8-64.56        Previous Nutrition Diagnosis: Swallowing difficulty  Nutrition Diagnosis is [x ] ongoing  - addressed with EN        New Nutrition Diagnosis: N/A      Interventions:   1. Continue Jevity 1.5 via PEG @ 70 ml/hr x 13 hours (20:00-9:00) to provide 910 ml formula, 1365 calories (25 neftali/kg), 58 grams protein (1.1 gm/kg), 692ml free water, based on dosing wt 53.8kg.  2. Recommend Add multivitamin/minerals via PEG to meet 100% RDI's  3. Monitor electrolytes - noted potassium slightly elevated, continue Lokelma PRN - defer to team    Monitoring and Evaluation:   Continue to monitor Nutritional intake, Tolerance to diet prescription, weights, labs, skin integrity  RD remains available upon request and will follow up per protocol    Beatris Bonilla, MS, RD, CDN  pager #[phone]

## 2020-11-30 NOTE — PROGRESS NOTE ADULT - PROBLEM SELECTOR PLAN 2
Patient with Tracheostomy at baseline ( 2015)   Continue Mechanical Ventilation  Continue Chest PT / Suctioning PRN

## 2020-11-30 NOTE — PROGRESS NOTE ADULT - ATTENDING COMMENTS
65 F with advanced ALS, chronic hypoxic/hypercapnic respiratory failure, vent dependent, s/p trach and PEG, now presenting with septic shock and Morganella bacteremia 2/2 bacterial PNA + UTI.   - Continues to have rising leukocytosis, unclear etiology  - Check CT chest/abd/pelvis   - Continue antibiotics

## 2020-11-30 NOTE — CHART NOTE - NSCHARTNOTEFT_GEN_A_CORE
Called by bedside RN for hypotension.  At bedside to see patient.  Manual BP 78/50, HR 89.  Patient has been afebrile.  As per RN patient has not had diarrhea/urinary frequency/vomiting.          Vital Signs Last 24 Hrs  T(C): 36.4 (30 Nov 2020 04:27), Max: 36.8 (29 Nov 2020 13:39)  T(F): 97.6 (30 Nov 2020 04:27), Max: 98.3 (29 Nov 2020 13:39)  HR: 89 (30 Nov 2020 04:27) (84 - 114)  BP: 78/50 (30 Nov 2020 04:41) (76/50 - 103/69)  RR: 14 (30 Nov 2020 04:27) (14 - 16)  SpO2: 100% (30 Nov 2020 04:27) (96% - 100%)        Physical Exam:  General: WN/WD NAD  Neurology: A&Ox0  Respiratory: CTA B/L.  Trach currently on assist control on vent.  CV: RRR, S1S2, no murmur  Abdominal: Soft, NT, ND no palpable mass  MSK: No edema, cool LLE       Labs:                          11.0   25.60 )-----------( 379      ( 29 Nov 2020 07:17 )             36.1     11-29    135  |  98  |  20  ----------------------------<  121<H>  5.0   |  28  |  <0.30<L>    Ca    9.3      29 Nov 2020 07:17  Phos  2.7     11-29  Mg     1.8     11-29    TPro  6.8  /  Alb  3.5  /  TBili  0.7  /  DBili  x   /  AST  19  /  ALT  20  /  AlkPhos  134<H>  11-28              HPI:  64yo F hx of advanced ALS, HLD, vent dependent s/p trach in 2015 and PEG BIBEMS from home for fevers. Recent transfer from MICU for resolution of septic shock that required IV fluids/vasopressors. Has had fluctuating coarse of leukocytosis--WBC on 11/29 was 25thousand. Currently on cefepime 2grams for LLL PNA.  Now presents with hypotension       Impression: Hypotension likely 2/2 orthostatics/sepsis    >Give PRN midodrine 20mg now  >f/u blood cultures  >Normal Saline 250cc bolus x 1   >Reassess BP at 0600  >continue iv cefepime  >? CT C/A/P with IV contrast   > Follow up labs this AM  >Follow up with RCU team and ID in AM  >VS q4    Alex Agee NP  [facility]  Spectra #[phone]

## 2020-12-01 LAB
ANION GAP SERPL CALC-SCNC: 11 MMOL/L — SIGNIFICANT CHANGE UP (ref 5–17)
BASOPHILS # BLD AUTO: 0.08 K/UL — SIGNIFICANT CHANGE UP (ref 0–0.2)
BASOPHILS NFR BLD AUTO: 0.4 % — SIGNIFICANT CHANGE UP (ref 0–2)
BUN SERPL-MCNC: 19 MG/DL — SIGNIFICANT CHANGE UP (ref 7–23)
CALCIUM SERPL-MCNC: 8.6 MG/DL — SIGNIFICANT CHANGE UP (ref 8.4–10.5)
CHLORIDE SERPL-SCNC: 97 MMOL/L — SIGNIFICANT CHANGE UP (ref 96–108)
CO2 SERPL-SCNC: 25 MMOL/L — SIGNIFICANT CHANGE UP (ref 22–31)
CREAT SERPL-MCNC: <0.3 MG/DL — LOW (ref 0.5–1.3)
EOSINOPHIL # BLD AUTO: 0.63 K/UL — HIGH (ref 0–0.5)
EOSINOPHIL NFR BLD AUTO: 3.5 % — SIGNIFICANT CHANGE UP (ref 0–6)
GLUCOSE SERPL-MCNC: 118 MG/DL — HIGH (ref 70–99)
HCT VFR BLD CALC: 35.3 % — SIGNIFICANT CHANGE UP (ref 34.5–45)
HGB BLD-MCNC: 10.7 G/DL — LOW (ref 11.5–15.5)
IMM GRANULOCYTES NFR BLD AUTO: 4.6 % — HIGH (ref 0–1.5)
LYMPHOCYTES # BLD AUTO: 1.23 K/UL — SIGNIFICANT CHANGE UP (ref 1–3.3)
LYMPHOCYTES # BLD AUTO: 6.8 % — LOW (ref 13–44)
MAGNESIUM SERPL-MCNC: 2 MG/DL — SIGNIFICANT CHANGE UP (ref 1.6–2.6)
MCHC RBC-ENTMCNC: 27.7 PG — SIGNIFICANT CHANGE UP (ref 27–34)
MCHC RBC-ENTMCNC: 30.3 GM/DL — LOW (ref 32–36)
MCV RBC AUTO: 91.5 FL — SIGNIFICANT CHANGE UP (ref 80–100)
MONOCYTES # BLD AUTO: 0.74 K/UL — SIGNIFICANT CHANGE UP (ref 0–0.9)
MONOCYTES NFR BLD AUTO: 4.1 % — SIGNIFICANT CHANGE UP (ref 2–14)
NEUTROPHILS # BLD AUTO: 14.68 K/UL — HIGH (ref 1.8–7.4)
NEUTROPHILS NFR BLD AUTO: 80.6 % — HIGH (ref 43–77)
NRBC # BLD: 0 /100 WBCS — SIGNIFICANT CHANGE UP (ref 0–0)
PHOSPHATE SERPL-MCNC: 3.8 MG/DL — SIGNIFICANT CHANGE UP (ref 2.5–4.5)
PLATELET # BLD AUTO: 525 K/UL — HIGH (ref 150–400)
POTASSIUM SERPL-MCNC: 3.7 MMOL/L — SIGNIFICANT CHANGE UP (ref 3.5–5.3)
POTASSIUM SERPL-SCNC: 3.7 MMOL/L — SIGNIFICANT CHANGE UP (ref 3.5–5.3)
RBC # BLD: 3.86 M/UL — SIGNIFICANT CHANGE UP (ref 3.8–5.2)
RBC # FLD: 16.2 % — HIGH (ref 10.3–14.5)
SODIUM SERPL-SCNC: 133 MMOL/L — LOW (ref 135–145)
VANCOMYCIN TROUGH SERPL-MCNC: 17.6 UG/ML — SIGNIFICANT CHANGE UP (ref 10–20)
WBC # BLD: 18.19 K/UL — HIGH (ref 3.8–10.5)
WBC # FLD AUTO: 18.19 K/UL — HIGH (ref 3.8–10.5)

## 2020-12-01 PROCEDURE — 99233 SBSQ HOSP IP/OBS HIGH 50: CPT

## 2020-12-01 PROCEDURE — 99232 SBSQ HOSP IP/OBS MODERATE 35: CPT

## 2020-12-01 RX ORDER — OFLOXACIN 0.3 %
1 DROPS OPHTHALMIC (EYE) EVERY 6 HOURS
Refills: 0 | Status: DISCONTINUED | OUTPATIENT
Start: 2020-12-01 | End: 2020-12-02

## 2020-12-01 RX ORDER — ERYTHROMYCIN BASE 5 MG/GRAM
1 OINTMENT (GRAM) OPHTHALMIC (EYE)
Refills: 0 | Status: DISCONTINUED | OUTPATIENT
Start: 2020-12-01 | End: 2020-12-10

## 2020-12-01 RX ADMIN — Medication 3 MILLILITER(S): at 05:29

## 2020-12-01 RX ADMIN — Medication 3 MILLILITER(S): at 11:02

## 2020-12-01 RX ADMIN — MIDODRINE HYDROCHLORIDE 10 MILLIGRAM(S): 2.5 TABLET ORAL at 16:08

## 2020-12-01 RX ADMIN — Medication 2.5 MILLIGRAM(S): at 20:34

## 2020-12-01 RX ADMIN — Medication 1 DROP(S): at 11:38

## 2020-12-01 RX ADMIN — CHLORHEXIDINE GLUCONATE 15 MILLILITER(S): 213 SOLUTION TOPICAL at 18:31

## 2020-12-01 RX ADMIN — Medication 1 DROP(S): at 18:31

## 2020-12-01 RX ADMIN — CHLORHEXIDINE GLUCONATE 1 APPLICATION(S): 213 SOLUTION TOPICAL at 11:39

## 2020-12-01 RX ADMIN — Medication 1 TABLET(S): at 11:48

## 2020-12-01 RX ADMIN — RILUZOLE 50 MILLIGRAM(S): 50 TABLET ORAL at 20:33

## 2020-12-01 RX ADMIN — Medication 1 APPLICATION(S): at 05:52

## 2020-12-01 RX ADMIN — Medication 3 MILLILITER(S): at 17:40

## 2020-12-01 RX ADMIN — POLYETHYLENE GLYCOL 3350 17 GRAM(S): 17 POWDER, FOR SOLUTION ORAL at 06:00

## 2020-12-01 RX ADMIN — Medication 1 APPLICATION(S): at 16:08

## 2020-12-01 RX ADMIN — CHLORHEXIDINE GLUCONATE 15 MILLILITER(S): 213 SOLUTION TOPICAL at 05:52

## 2020-12-01 RX ADMIN — MEROPENEM 100 MILLIGRAM(S): 1 INJECTION INTRAVENOUS at 05:53

## 2020-12-01 RX ADMIN — SERTRALINE 50 MILLIGRAM(S): 25 TABLET, FILM COATED ORAL at 05:53

## 2020-12-01 RX ADMIN — Medication 1 APPLICATION(S): at 15:50

## 2020-12-01 RX ADMIN — MEROPENEM 100 MILLIGRAM(S): 1 INJECTION INTRAVENOUS at 15:00

## 2020-12-01 RX ADMIN — Medication 1 APPLICATION(S): at 21:44

## 2020-12-01 RX ADMIN — Medication 3 MILLILITER(S): at 23:20

## 2020-12-01 RX ADMIN — CHLORHEXIDINE GLUCONATE 1 APPLICATION(S): 213 SOLUTION TOPICAL at 05:53

## 2020-12-01 RX ADMIN — RILUZOLE 50 MILLIGRAM(S): 50 TABLET ORAL at 05:53

## 2020-12-01 RX ADMIN — Medication 1.5 MILLIGRAM(S): at 11:48

## 2020-12-01 RX ADMIN — MEROPENEM 100 MILLIGRAM(S): 1 INJECTION INTRAVENOUS at 21:43

## 2020-12-01 RX ADMIN — MIDODRINE HYDROCHLORIDE 10 MILLIGRAM(S): 2.5 TABLET ORAL at 21:43

## 2020-12-01 RX ADMIN — Medication 1 DROP(S): at 18:30

## 2020-12-01 RX ADMIN — Medication 1 APPLICATION(S): at 18:30

## 2020-12-01 RX ADMIN — Medication 1 APPLICATION(S): at 11:39

## 2020-12-01 RX ADMIN — SENNA PLUS 1 TABLET(S): 8.6 TABLET ORAL at 11:39

## 2020-12-01 RX ADMIN — Medication 1 DROP(S): at 05:52

## 2020-12-01 RX ADMIN — Medication 1 APPLICATION(S): at 20:34

## 2020-12-01 RX ADMIN — RIVAROXABAN 10 MILLIGRAM(S): KIT at 18:30

## 2020-12-01 RX ADMIN — MIDODRINE HYDROCHLORIDE 10 MILLIGRAM(S): 2.5 TABLET ORAL at 05:53

## 2020-12-01 NOTE — PROVIDER CONTACT NOTE (OTHER) - SITUATION
increased secretions noted compare 11/29-11/30. pt. suctioned 6x. in the last 2 episodes sats drop to 80' but rapidly improving to 97%. in the last episode also noted 2 plugs. CPT done

## 2020-12-01 NOTE — PROGRESS NOTE ADULT - PROBLEM SELECTOR PLAN 5
LLE Doppler 11/25: Flow Limiting stenosis of Distal and Left Superficial femoral and Popliteal artery  Continue Xarelto

## 2020-12-01 NOTE — PROGRESS NOTE ADULT - ASSESSMENT
65 f with ALS, s/p trach and PEG, was admitted 11/22 for septic shock and morganella bacteremia, was considered due to UTI vs pneumonia but urine cx was negative, sputum cx negative, CXR with LLL pneumonia  blood cx negative 11/25  WBC initially 19 then 31 and fluctuating, normalized on 11/26 and then increased to 29 with some hypotension, no diarrhea, no resp secretions and no fever  exam with b/l injected conjunctivae and cold LLE but that resolved LLE not cold anymore     septic shock with morganella bacteremia, ?UTI vs pneumonia but urine and sputum cx negative  cleared blood cultures 11/25  now again with leukocytosis and hypotension, CT with LLL collapse, multiple L renal and ureteral stones with mild hydro and urothelial enhancement so likely pyelonephritis     * blood cxs negative, no more vanco needed  * would check a urine cx in view of CT findings  * c/w andie 1 q 8, started 11/20  * monitor the CBC/diff  The above assessment and plan was discussed with U    Salome Terrazas MD  Pager [phone]  After 5pm and on weekends call [phone]

## 2020-12-01 NOTE — CONSULT NOTE ADULT - ASSESSMENT
Impression: Admission for urosepsis with left non obstructing renal calculi and a 2mm distal ureteral stone, also called non-obstucting.                    It is not clear how much the distal stone is contributing to this entire episode, but it is suspect    Plan: There are several options available           1) The stone is only 2mm with very significant possibility that it will pass on its own and do nothing further unless she clinically deteriorates. This would obviate any invasive procedure.            2) Insert a left ureteral stent to relieve any possibly infected obstruction. Leave the stent in 1 week and remove and a distal 2mm stone will pass on its own. The problem with this plan is that there are more stones that could drop from the left kidney, placing us in the same situation. Therefore, she may need a second procedure of ureteroscopy to clean out the remaining stones           3) Place a percutaneous nephrostomy on the left. This would drain the kidney and prevent any further obstruction from the remaining stones. This would mean a permanent extra "tube", but everything could be resolved with one procedure.    She is stable at this time and will discuss further in the morning. If it can be done by IR, I am leaning toward a percutaneous nephrostomy but will discuss with [facility] staff, dr. Newman and ID

## 2020-12-01 NOTE — PROGRESS NOTE ADULT - SUBJECTIVE AND OBJECTIVE BOX
Patient is a 65y old  Female who presents with a chief complaint of sepsis (2020 20:25)      Interval Events: No events reported overnight, patient S/p CT Chest / ABD/ Pelvis yesterday evening for infectious work up     REVIEW OF SYSTEMS:  [ ] Positive  [ ] All other systems negative  [x] Unable to assess ROS because patient is Non-Verbal     Vital Signs Last 24 Hrs  T(C): 37 (20 @ 04:04), Max: 37 (20 @ 04:04)  T(F): 98.6 (20 @ 04:04), Max: 98.6 (20 @ 04:04)  HR: 84 (20 @ 08:33) (69 - 92)  BP: 117/69 (20 @ 04:04) (117/69 - 141/80)  RR: 14 (20 @ 04:04) (14 - 22)  SpO2: 100% (20 @ 08:33) (97% - 100%)    PHYSICAL EXAM:  HEENT:   [x]Tracheostomy: # 7 XLT Shiley   [x]Pupils equal  [ ]No oral lesions  [x]Abnormal: + Injected Conjunctiva RT > Lft     SKIN  [x]No Rash  [ ] Abnormal  [ ] pressure    CARDIAC  [x]Regular  [ ]Abnormal:     PULMONARY  [ ]Bilateral Clear Breath Sounds  [ ]Normal Excursion  [x]Abnormal: Bilateral Coarse Breath sounds, inspiratory crackles left base     GI  [x]PEG      [x] +BS		              [x]Soft, nondistended, nontender	  [ ]Abnormal    MUSCULOSKELETAL                                   [x]Bedbound                 [ ]Abnormal    [ ]Ambulatory/OOB to chair                           EXTREMITIES                                         [x] Decrease Dorsalis pedal pulse on left lower extremity ( Located with bedside doppler), Warm to touch   [ ]Edema                           NEUROLOGIC  [ ] Normal, non focal  [x] Focal findings: Functional Quadriplegic 2/2 ALS     PSYCHIATRIC  [ ]Alert and appropriate  [x] Sedated	 [ ]Agitated    :  Newman: [ ] Yes, if yes: Date of Placement:                   [x] No    LINES: Central Lines [ ] Yes, if yes: Date of Placement                                     [x] No    HOSPITAL MEDICATIONS:  MEDICATIONS  (STANDING):  albuterol/ipratropium for Nebulization 3 milliLiter(s) Nebulizer every 6 hours  artificial  tears Solution 1 Drop(s) Both EYES every 6 hours  chlorhexidine 0.12% Liquid 15 milliLiter(s) Oral Mucosa every 12 hours  chlorhexidine 4% Liquid 1 Application(s) Topical <User Schedule>  chlorhexidine 4% Liquid 1 Application(s) Topical <User Schedule>  diazepam   Solution 1.5 milliGRAM(s) Oral daily  diazepam   Solution 2.5 milliGRAM(s) Oral daily  erythromycin   Ointment 1 Application(s) Both EYES <User Schedule>  influenza   Vaccine 0.5 milliLiter(s) IntraMuscular once  meropenem  IVPB 1000 milliGRAM(s) IV Intermittent every 8 hours  midodrine. 10 milliGRAM(s) Oral every 8 hours  multivitamin 1 Tablet(s) Oral daily  ofloxacin 0.3% Solution 1 Drop(s) Both Ears every 6 hours  petrolatum Ophthalmic Ointment 1 Application(s) Both EYES <User Schedule>  polyethylene glycol 3350 17 Gram(s) Oral two times a day  riluzole 50 milliGRAM(s) Oral two times a day  rivaroxaban 10 milliGRAM(s) Enteral Tube with dinner  senna 1 Tablet(s) Oral daily  sertraline 50 milliGRAM(s) Oral daily    MEDICATIONS  (PRN):      LABS:                        10.7   18.19 )-----------( 525      ( 01 Dec 2020 06:42 )             35.3     12-    133<L>  |  97  |  19  ----------------------------<  118<H>  3.7   |  25  |  <0.30<L>    Ca    8.6      01 Dec 2020 06:42  Phos  3.8     12  Mg     2.0     12        Urinalysis Basic - ( 2020 08:50 )    Color: Yellow / Appearance: Slightly Turbid / S.007 / pH: x  Gluc: x / Ketone: Negative  / Bili: Negative / Urobili: <2 mg/dL   Blood: x / Protein: Trace / Nitrite: Negative   Leuk Esterase: Moderate / RBC: 5 /HPF / WBC 2 /HPF   Sq Epi: x / Non Sq Epi: 1 /HPF / Bacteria: Negative          CAPILLARY BLOOD GLUCOSE    MICROBIOLOGY:     RADIOLOGY:  [ ] Reviewed and interpreted by me    Mode: AC/ CMV (Assist Control/ Continuous Mandatory Ventilation)  RR (machine): 14  TV (machine): 450  FiO2: 30  PEEP: 5  ITime: 1  MAP: 9  PIP: 21

## 2020-12-01 NOTE — PROGRESS NOTE ADULT - ATTENDING COMMENTS
65 F with advanced ALS, chronic hypoxic/hypercapnic respiratory failure, vent dependent, s/p trach and PEG, now presenting with septic shock and Morganella bacteremia 2/2 bacterial PNA + UTI.   - CT shows LLL atelectasis likely due to secretions as well as colitis and pyelonephritis.   - Continue meropenem. D/C vancomycin.  - Metanebs to mobilize secretions.   - Leukocytosis improved.

## 2020-12-01 NOTE — PROGRESS NOTE ADULT - PROBLEM SELECTOR PLAN 2
Patient with Tracheostomy at baseline ( 2015)   Continue Mechanical Ventilation  Metaneb added for airway clearance   Continue Chest PT / Suctioning PRN

## 2020-12-01 NOTE — PROGRESS NOTE ADULT - ASSESSMENT
65 F with advanced ALS, chronic hypoxic/ hypercapnic resp failure, vent dependent, s/p trach 2015 and PEG, now presenting with septic shock and Morganella bacteremia 2/2 bacterial PNA + UTI.  Patient was BIBEMS from home for fevers. Pt's 24hr home health nurse reported over the past few days the pt has appeared more lethargic, has been tachycardic, and running fevers with decreased Urination. She was empirically started on meropenum and vancomycin in ED.  Was managed for Septic shock s/p fluid resuscitation, IV pressors, and Transferred to MICU. Patients abx were switched to Cefepime after Bcx 11/22 revealed Morganella morganii.  Surveillance Bcxs from 11/25 remain NGTD.  LLE doppler study revealed stenosis of femoral and popliteal arteries causing cool, pale LLE without cyanosis. Patient was weaned off vasopressors and placed on Midodrine. Patient was Transferred to RCU on 11/28. Trach changed to #7 distal xlt lise by ENT due to air leak on 11/28.    12/1: Patient with improved bp compared to yesterday since initiation of standing Midodrine. Patient had CT Chest abd pelvis performed yesterday consistent with Left lower lobe collapse and possible UTI. Case d/w  will continue Meropenem no further need for IV Vancomycin.

## 2020-12-01 NOTE — PROGRESS NOTE ADULT - SUBJECTIVE AND OBJECTIVE BOX
Follow Up:  leukocytosis    Interval History: CT s/o nephrolithiasis and hydro with urothelial enhancement s/o infection/ inflammation, today WBC improved to 18     ROS:    Unobtainable because of mental status            Allergies  Bactrim DS (Rash)        ANTIMICROBIALS:  meropenem  IVPB 1000 every 8 hours      OTHER MEDS:  albuterol/ipratropium for Nebulization 3 milliLiter(s) Nebulizer every 6 hours  artificial  tears Solution 1 Drop(s) Both EYES every 6 hours  chlorhexidine 0.12% Liquid 15 milliLiter(s) Oral Mucosa every 12 hours  chlorhexidine 4% Liquid 1 Application(s) Topical <User Schedule>  chlorhexidine 4% Liquid 1 Application(s) Topical <User Schedule>  diazepam   Solution 1.5 milliGRAM(s) Oral daily  diazepam   Solution 2.5 milliGRAM(s) Oral daily  erythromycin   Ointment 1 Application(s) Both EYES <User Schedule>  influenza   Vaccine 0.5 milliLiter(s) IntraMuscular once  midodrine. 10 milliGRAM(s) Oral every 8 hours  multivitamin 1 Tablet(s) Oral daily  ofloxacin 0.3% Solution 1 Drop(s) Both Ears every 6 hours  petrolatum Ophthalmic Ointment 1 Application(s) Both EYES <User Schedule>  polyethylene glycol 3350 17 Gram(s) Oral two times a day  riluzole 50 milliGRAM(s) Oral two times a day  rivaroxaban 10 milliGRAM(s) Enteral Tube with dinner  senna 1 Tablet(s) Oral daily  sertraline 50 milliGRAM(s) Oral daily      Vital Signs Last 24 Hrs  T(C): 37 (01 Dec 2020 04:04), Max: 37 (01 Dec 2020 04:04)  T(F): 98.6 (01 Dec 2020 04:04), Max: 98.6 (01 Dec 2020 04:04)  HR: 80 (01 Dec 2020 11:07) (69 - 92)  BP: 117/69 (01 Dec 2020 04:04) (117/69 - 141/80)  BP(mean): --  RR: 14 (01 Dec 2020 04:04) (14 - 22)  SpO2: 100% (01 Dec 2020 11:07) (98% - 100%)    Physical Exam:  General:    NAD  Eye: covered   ENT:   trach  Cardio:     regular S1, S2  Respiratory:    clear b/l,    no wheezing  abd:     soft,   BS +,   PEG  :     no  carter  Musculoskeletal:   no joint swelling  vascular: no phlebitis  Skin:    no rash, no decubitus                           10.7   18.19 )-----------( 525      ( 01 Dec 2020 06:42 )             35.3       12    133<L>  |  97  |  19  ----------------------------<  118<H>  3.7   |  25  |  <0.30<L>    Ca    8.6      01 Dec 2020 06:42  Phos  3.8       Mg     2.0             Urinalysis Basic - ( 2020 08:50 )    Color: Yellow / Appearance: Slightly Turbid / S.007 / pH: x  Gluc: x / Ketone: Negative  / Bili: Negative / Urobili: <2 mg/dL   Blood: x / Protein: Trace / Nitrite: Negative   Leuk Esterase: Moderate / RBC: 5 /HPF / WBC 2 /HPF   Sq Epi: x / Non Sq Epi: 1 /HPF / Bacteria: Negative        MICROBIOLOGY:  Vancomycin Level, Trough: 17.6 ug/mL (20 @ 06:42)  v  .Sputum Sputum  20   Normal Respiratory Kia present  --    Few polymorphonuclear leukocytes per low power field  Rare Squamous epithelial cells per low power field  Few Gram Variable Rods seen per oil power field  Rare Gram positive cocci in pairs seen per oil power field      .Blood Blood  20   No Growth Final  --  --      .Sputum Sputum  20   Normal Respiratory Kia present  --    Rare polymorphonuclear leukocytes per low power field  Rare Squamous epithelial cells per low power field  Few Gram Variable Rods per oil power field      .Blood Blood-Peripheral  20   Growth in aerobic and anaerobic bottles: Morganella morganii  See previous culture [id number]  --  Blood Culture PCR  Morganella morganii  Morganella morganii      .Urine Clean Catch (Midstream)  20   <10,000 CFU/mL Normal Urogenital Kia  --  --                RADIOLOGY:  Images independently visualized and reviewed personally, findings as below  < from: CT Abdomen and Pelvis w/ Oral Cont and w/ IV Cont (20 @ 20:16) >  IMPRESSION:  Left lower lobe collapse. Superimposed infection is not excluded.    2 mm left ureteral calculus and multiple nonobstructive subcentimeter left renal calculi. Associated mild hydronephrosis and urothelial enhancement suggests inflammation/infection.    Cholelithiasis.

## 2020-12-01 NOTE — PROGRESS NOTE ADULT - PROBLEM SELECTOR PLAN 1
Patient admitted with Sepsis 2/2 Bacteremia / PNA  Blood cx 11/22: Morganella Morganii, Repeat BLD CX 11/25: No growth   Sputum cx 11/20: Normal respiratory flavia   Blood cx 11/30: ( Results Pending)   CT Chest/ ABD/ Pelvis 11/30: Left Lower lobe collapse and Urothelial enhancement consistent with inflammation/ infection   Continue Meropenem, No further Vancomycin as per ID  Continue Midodrine changed to 10 mg q 8 hrs for BP Stability

## 2020-12-01 NOTE — PROGRESS NOTE ADULT - PROBLEM SELECTOR PLAN 6
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

## 2020-12-01 NOTE — CONSULT NOTE ADULT - SUBJECTIVE AND OBJECTIVE BOX
TINO MATA           Female     [id number]  65y               Patient is a 65y old  Female who presents with a chief complaint of sepsis (01 Dec 2020 11:41)      HPI:  66yo F hx of advanced ALS, HLD, vent dependent s/p trach in 2015 and PEG BIBEMS from home for fevers. Pt's 24hr home health nurse reported over the past few days the pt has appeared more lethargic, has been tachycardic, and running fevers. Urination also decreased. Straight cath in the ED revealed cloudy urine. Urinates normally at home, no indwelling carter. Pt is baseline non-verbal w/ minimal motor function. Able to track with eyes. No hx of covid contacts.     ED Course:  - hypotensive, tachycardic to 150s, Tmax 102 on presentation >> received 3.2L fluid bolus >>BP improved 100s SBP, HR 130s  - started on cefepime and vanc  - Tylenol suppository  - CXR w/ L sided opacity  - COVID pending (2020 18:05)    CT scan done revealed bilateral hydronephrosis, mild, more on the left then the right. There is a 2mm distal ureteral stone on the left, called by radiology as non-obstructing and several more stones on the left kidney, also non-obstructing, and no stones on the right.   Patient is voiding on her own, bladder scan revealed a 144cc PVR, and straight cath confirmed this at only 200cc.       PAST MEDICAL & SURGICAL HISTORY:  Aspiration into airway    Ventilator dependent  Since     HLD (hyperlipidemia)    ALS (amyotrophic lateral sclerosis)    Encounter for PEG (percutaneous endoscopic gastrostomy)  peg placed    Dependence on tracheostomy    S/P gastrostomy  10/14 for dysphagia  receives jevity 2 cans TID            MEDICATIONS  (STANDING):  albuterol/ipratropium for Nebulization 3 milliLiter(s) Nebulizer every 6 hours  artificial  tears Solution 1 Drop(s) Both EYES every 6 hours  chlorhexidine 0.12% Liquid 15 milliLiter(s) Oral Mucosa every 12 hours  chlorhexidine 4% Liquid 1 Application(s) Topical <User Schedule>  chlorhexidine 4% Liquid 1 Application(s) Topical <User Schedule>  diazepam   Solution 1.5 milliGRAM(s) Oral daily  diazepam   Solution 2.5 milliGRAM(s) Oral daily  erythromycin   Ointment 1 Application(s) Both EYES <User Schedule>  influenza   Vaccine 0.5 milliLiter(s) IntraMuscular once  meropenem  IVPB 1000 milliGRAM(s) IV Intermittent every 8 hours  midodrine. 10 milliGRAM(s) Oral every 8 hours  multivitamin 1 Tablet(s) Oral daily  ofloxacin 0.3% Solution 1 Drop(s) Both Ears every 6 hours  petrolatum Ophthalmic Ointment 1 Application(s) Both EYES <User Schedule>  polyethylene glycol 3350 17 Gram(s) Oral two times a day  riluzole 50 milliGRAM(s) Oral two times a day  rivaroxaban 10 milliGRAM(s) Enteral Tube with dinner  senna 1 Tablet(s) Oral daily  sertraline 50 milliGRAM(s) Oral daily    MEDICATIONS  (PRN):      Allergies    Bactrim DS (Rash)    Intolerances        SOCIAL HISTORY:     FAMILY HISTORY:  No pertinent family history in first degree relatives        Vital Signs Last 24 Hrs  T(C): 36.5 (01 Dec 2020 11:45), Max: 37 (01 Dec 2020 04:04)  T(F): 97.7 (01 Dec 2020 11:45), Max: 98.6 (01 Dec 2020 04:04)  HR: 90 (01 Dec 2020 17:42) (69 - 95)  BP: 131/76 (01 Dec 2020 11:45) (117/69 - 131/76)  BP(mean): --  RR: 16 (01 Dec 2020 11:45) (14 - 16)  SpO2: 99% (01 Dec 2020 17:42) (99% - 100%)    PHYSICAL EXAM: Pt on respirator, no communication    I&O's Summary    2020 07:  -  01 Dec 2020 07:00  --------------------------------------------------------  IN: 1160 mL / OUT: 1250 mL / NET: -90 mL    01 Dec 2020 07:01  -  01 Dec 2020 20:33  --------------------------------------------------------  IN: 380 mL / OUT: 0 mL / NET: 380 mL        LABS:                        10.7   18.19 )-----------( 525      ( 01 Dec 2020 06:42 )             35.3     12-    133<L>  |  97  |  19  ----------------------------<  118<H>  3.7   |  25  |  <0.30<L>    Ca    8.6      01 Dec 2020 06:42  Phos  3.8     12  Mg     2.0             Urinalysis Basic - ( 2020 08:50 )    Color: Yellow / Appearance: Slightly Turbid / S.007 / pH: x  Gluc: x / Ketone: Negative  / Bili: Negative / Urobili: <2 mg/dL   Blood: x / Protein: Trace / Nitrite: Negative   Leuk Esterase: Moderate / RBC: 5 /HPF / WBC 2 /HPF   Sq Epi: x / Non Sq Epi: 1 /HPF / Bacteria: Negative      Urine Culture:       RADIOLOGY & ADDITIONAL STUDIES:

## 2020-12-02 LAB
-  CEFTAZIDIME: SIGNIFICANT CHANGE UP
-  LEVOFLOXACIN: SIGNIFICANT CHANGE UP
-  TRIMETHOPRIM/SULFAMETHOXAZOLE: SIGNIFICANT CHANGE UP
ANION GAP SERPL CALC-SCNC: 11 MMOL/L — SIGNIFICANT CHANGE UP (ref 5–17)
APTT BLD: 30.8 SEC — SIGNIFICANT CHANGE UP (ref 27.5–35.5)
BASOPHILS # BLD AUTO: 0.07 K/UL — SIGNIFICANT CHANGE UP (ref 0–0.2)
BASOPHILS NFR BLD AUTO: 0.4 % — SIGNIFICANT CHANGE UP (ref 0–2)
BLD GP AB SCN SERPL QL: NEGATIVE — SIGNIFICANT CHANGE UP
BUN SERPL-MCNC: 17 MG/DL — SIGNIFICANT CHANGE UP (ref 7–23)
CALCIUM SERPL-MCNC: 8.7 MG/DL — SIGNIFICANT CHANGE UP (ref 8.4–10.5)
CHLORIDE SERPL-SCNC: 101 MMOL/L — SIGNIFICANT CHANGE UP (ref 96–108)
CO2 SERPL-SCNC: 25 MMOL/L — SIGNIFICANT CHANGE UP (ref 22–31)
CREAT SERPL-MCNC: <0.3 MG/DL — LOW (ref 0.5–1.3)
CULTURE RESULTS: NO GROWTH — SIGNIFICANT CHANGE UP
CULTURE RESULTS: SIGNIFICANT CHANGE UP
EOSINOPHIL # BLD AUTO: 0.74 K/UL — HIGH (ref 0–0.5)
EOSINOPHIL NFR BLD AUTO: 4.2 % — SIGNIFICANT CHANGE UP (ref 0–6)
GLUCOSE SERPL-MCNC: 118 MG/DL — HIGH (ref 70–99)
HCT VFR BLD CALC: 32.8 % — LOW (ref 34.5–45)
HGB BLD-MCNC: 10.3 G/DL — LOW (ref 11.5–15.5)
IMM GRANULOCYTES NFR BLD AUTO: 2.6 % — HIGH (ref 0–1.5)
INR BLD: 1.12 RATIO — SIGNIFICANT CHANGE UP (ref 0.88–1.16)
LYMPHOCYTES # BLD AUTO: 1.23 K/UL — SIGNIFICANT CHANGE UP (ref 1–3.3)
LYMPHOCYTES # BLD AUTO: 7 % — LOW (ref 13–44)
MAGNESIUM SERPL-MCNC: 2 MG/DL — SIGNIFICANT CHANGE UP (ref 1.6–2.6)
MCHC RBC-ENTMCNC: 28.5 PG — SIGNIFICANT CHANGE UP (ref 27–34)
MCHC RBC-ENTMCNC: 31.4 GM/DL — LOW (ref 32–36)
MCV RBC AUTO: 90.9 FL — SIGNIFICANT CHANGE UP (ref 80–100)
METHOD TYPE: SIGNIFICANT CHANGE UP
MONOCYTES # BLD AUTO: 1.06 K/UL — HIGH (ref 0–0.9)
MONOCYTES NFR BLD AUTO: 6 % — SIGNIFICANT CHANGE UP (ref 2–14)
NEUTROPHILS # BLD AUTO: 14.07 K/UL — HIGH (ref 1.8–7.4)
NEUTROPHILS NFR BLD AUTO: 79.8 % — HIGH (ref 43–77)
NRBC # BLD: 0 /100 WBCS — SIGNIFICANT CHANGE UP (ref 0–0)
ORGANISM # SPEC MICROSCOPIC CNT: SIGNIFICANT CHANGE UP
ORGANISM # SPEC MICROSCOPIC CNT: SIGNIFICANT CHANGE UP
PHOSPHATE SERPL-MCNC: 2.8 MG/DL — SIGNIFICANT CHANGE UP (ref 2.5–4.5)
PLATELET # BLD AUTO: 571 K/UL — HIGH (ref 150–400)
POTASSIUM SERPL-MCNC: 4.5 MMOL/L — SIGNIFICANT CHANGE UP (ref 3.5–5.3)
POTASSIUM SERPL-SCNC: 4.5 MMOL/L — SIGNIFICANT CHANGE UP (ref 3.5–5.3)
PROTHROM AB SERPL-ACNC: 13.4 SEC — SIGNIFICANT CHANGE UP (ref 10.6–13.6)
RBC # BLD: 3.61 M/UL — LOW (ref 3.8–5.2)
RBC # FLD: 16.1 % — HIGH (ref 10.3–14.5)
RH IG SCN BLD-IMP: POSITIVE — SIGNIFICANT CHANGE UP
SODIUM SERPL-SCNC: 137 MMOL/L — SIGNIFICANT CHANGE UP (ref 135–145)
SPECIMEN SOURCE: SIGNIFICANT CHANGE UP
SPECIMEN SOURCE: SIGNIFICANT CHANGE UP
WBC # BLD: 17.63 K/UL — HIGH (ref 3.8–10.5)
WBC # FLD AUTO: 17.63 K/UL — HIGH (ref 3.8–10.5)

## 2020-12-02 PROCEDURE — 99233 SBSQ HOSP IP/OBS HIGH 50: CPT

## 2020-12-02 PROCEDURE — 99231 SBSQ HOSP IP/OBS SF/LOW 25: CPT | Mod: GC

## 2020-12-02 PROCEDURE — 99232 SBSQ HOSP IP/OBS MODERATE 35: CPT

## 2020-12-02 RX ORDER — SODIUM CHLORIDE 9 MG/ML
1000 INJECTION, SOLUTION INTRAVENOUS
Refills: 0 | Status: DISCONTINUED | OUTPATIENT
Start: 2020-12-02 | End: 2020-12-02

## 2020-12-02 RX ORDER — OFLOXACIN 0.3 %
1 DROPS OPHTHALMIC (EYE)
Refills: 0 | Status: DISCONTINUED | OUTPATIENT
Start: 2020-12-02 | End: 2020-12-10

## 2020-12-02 RX ADMIN — Medication 3 MILLILITER(S): at 05:45

## 2020-12-02 RX ADMIN — Medication 1 APPLICATION(S): at 06:17

## 2020-12-02 RX ADMIN — SERTRALINE 50 MILLIGRAM(S): 25 TABLET, FILM COATED ORAL at 06:17

## 2020-12-02 RX ADMIN — Medication 1 APPLICATION(S): at 21:03

## 2020-12-02 RX ADMIN — Medication 1 DROP(S): at 06:17

## 2020-12-02 RX ADMIN — Medication 1 APPLICATION(S): at 19:38

## 2020-12-02 RX ADMIN — Medication 3 MILLILITER(S): at 11:37

## 2020-12-02 RX ADMIN — Medication 1 APPLICATION(S): at 17:00

## 2020-12-02 RX ADMIN — Medication 1 APPLICATION(S): at 04:27

## 2020-12-02 RX ADMIN — Medication 3 MILLILITER(S): at 17:48

## 2020-12-02 RX ADMIN — SODIUM CHLORIDE 50 MILLILITER(S): 9 INJECTION, SOLUTION INTRAVENOUS at 09:25

## 2020-12-02 RX ADMIN — Medication 1 APPLICATION(S): at 09:27

## 2020-12-02 RX ADMIN — Medication 1 DROP(S): at 23:03

## 2020-12-02 RX ADMIN — Medication 1 DROP(S): at 17:44

## 2020-12-02 RX ADMIN — MIDODRINE HYDROCHLORIDE 10 MILLIGRAM(S): 2.5 TABLET ORAL at 13:21

## 2020-12-02 RX ADMIN — RILUZOLE 50 MILLIGRAM(S): 50 TABLET ORAL at 06:17

## 2020-12-02 RX ADMIN — POLYETHYLENE GLYCOL 3350 17 GRAM(S): 17 POWDER, FOR SOLUTION ORAL at 06:17

## 2020-12-02 RX ADMIN — Medication 1 APPLICATION(S): at 12:08

## 2020-12-02 RX ADMIN — Medication 1 DROP(S): at 17:42

## 2020-12-02 RX ADMIN — CHLORHEXIDINE GLUCONATE 15 MILLILITER(S): 213 SOLUTION TOPICAL at 17:43

## 2020-12-02 RX ADMIN — Medication 3 MILLILITER(S): at 23:00

## 2020-12-02 RX ADMIN — CHLORHEXIDINE GLUCONATE 1 APPLICATION(S): 213 SOLUTION TOPICAL at 12:11

## 2020-12-02 RX ADMIN — Medication 1.5 MILLIGRAM(S): at 09:25

## 2020-12-02 RX ADMIN — Medication 1 APPLICATION(S): at 02:18

## 2020-12-02 RX ADMIN — Medication 1 APPLICATION(S): at 17:43

## 2020-12-02 RX ADMIN — CHLORHEXIDINE GLUCONATE 15 MILLILITER(S): 213 SOLUTION TOPICAL at 06:17

## 2020-12-02 RX ADMIN — Medication 1 DROP(S): at 12:10

## 2020-12-02 RX ADMIN — Medication 1 APPLICATION(S): at 13:22

## 2020-12-02 RX ADMIN — RILUZOLE 50 MILLIGRAM(S): 50 TABLET ORAL at 17:42

## 2020-12-02 RX ADMIN — Medication 1 TABLET(S): at 12:09

## 2020-12-02 RX ADMIN — Medication 1 DROP(S): at 00:30

## 2020-12-02 RX ADMIN — MEROPENEM 100 MILLIGRAM(S): 1 INJECTION INTRAVENOUS at 13:21

## 2020-12-02 RX ADMIN — Medication 1 DROP(S): at 12:09

## 2020-12-02 RX ADMIN — MEROPENEM 100 MILLIGRAM(S): 1 INJECTION INTRAVENOUS at 06:16

## 2020-12-02 RX ADMIN — Medication 1 APPLICATION(S): at 23:03

## 2020-12-02 RX ADMIN — CHLORHEXIDINE GLUCONATE 1 APPLICATION(S): 213 SOLUTION TOPICAL at 06:18

## 2020-12-02 RX ADMIN — MIDODRINE HYDROCHLORIDE 10 MILLIGRAM(S): 2.5 TABLET ORAL at 06:18

## 2020-12-02 RX ADMIN — Medication 1 APPLICATION(S): at 01:12

## 2020-12-02 RX ADMIN — MEROPENEM 100 MILLIGRAM(S): 1 INJECTION INTRAVENOUS at 21:03

## 2020-12-02 RX ADMIN — Medication 2.5 MILLIGRAM(S): at 21:03

## 2020-12-02 NOTE — PROGRESS NOTE ADULT - SUBJECTIVE AND OBJECTIVE BOX
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     Alert and oriented x 0; patient non-verbal due to severe ALS but does track with eyes     Ophthalmology Exam:  Visual acuity (sc): RONIT as patient is non-verbal  Pupils: PERRL OU, no APD  Ttono: 11 OU  Extraocular movements (EOMs): grossly full  Confrontational Visual Field (CVF): RONIT as patient is non-verbal  Color Plates: RONIT as patient is non-verbal    Pen Light Exam (PLE)  External: Flat OU  Lids/Lashes/Lacrimal Ducts: Flat OU  Does not blink and keeps eyes open  Sclera/Conjunctiva: 1+ inj OU  Cornea: dense 4+ SPK OU; OD with inferior confluent SPK surrounding area of epithelial sloughing, epi defect is resolved OS with 4+SPK  Anterior Chamber: D+F OU    Iris: Flat OU  Lens: Cl OU      Assessment and Recommendation:   · Assessment	  66 y/o F with advanced ALS, HLD, vent dependent s/p trach and PEG dependent here for fevers; 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 No infiltrate.  OS: confluent SPK, mostly inferiorly No infiltrate.  - Alternate erythromycin ointment q4 hours with lacrilube ointment q4 hours in both eyes so that patient is getting ointment every 2 hours   - Ocuflox drops QID to both eyes  - Please tape the eyelids shut for at least 10 hours. Given the ALS, patient likely has preserved mental function and would likely prefer to have eyes open. 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  Unable to obtain history due to mental status.     Alert and oriented x 0; patient non-verbal due to severe ALS but does track with eyes     Ophthalmology Exam:  Visual acuity (sc): RONIT as patient is non-verbal  Pupils: PERRL OU, no APD  Ttono: 11 OU  Extraocular movements (EOMs): grossly full  Confrontational Visual Field (CVF): RONIT as patient is non-verbal  Color Plates: RONIT as patient is non-verbal    Pen Light Exam (PLE)  External: Flat OU  Lids/Lashes/Lacrimal Ducts: Flat OU  Does not blink and keeps eyes open, + lagophthalmos OU  Sclera/Conjunctiva: 1+ inj OU  Cornea: dense 4+ SPK OU; OD with inferior confluent SPK surrounding area of epithelial sloughing, epi defect is resolved OS, no infiltrates OU  Anterior Chamber: D+F OU    Iris: Flat OU  Lens: Cl OU      Assessment and Recommendation:   · Assessment	  66 y/o F with advanced ALS, HLD, vent dependent s/p trach and PEG dependent here for fevers; 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 No infiltrate.  OS: confluent SPK, mostly inferiorly No infiltrate.  - Alternate erythromycin ointment q4 hours with lacrilube ointment q4 hours in both eyes so that patient is getting ointment every 2 hours   - Ocuflox drops QID to both eyes  - Please tape the eyelids shut for at least 10 hours. Given the ALS, patient likely has preserved mental function and would likely prefer to have eyes open. Recommend taping eyes shut at bedtime and allowing for a break from taping during the day  - Ophthalmology will continue to follow  - if epi defects worsen, may have to increase ointment to q1h and tape lids at all times  - findings and plan discussed with primary team  - d/w Dr. Flowers (cornea)      The patient should follow-up with [facility] Ophthalmology within 1 week of discharge, sooner if symptoms worsen or change:  [address]  [place] [address]  [phone]

## 2020-12-02 NOTE — PROGRESS NOTE ADULT - SUBJECTIVE AND OBJECTIVE BOX
Patient is a 65y old  Female who presents with a chief complaint of sepsis (01 Dec 2020 20:32)      Interval Events: No events reported overnight     REVIEW OF SYSTEMS:  [ ] Positive  [ ] All other systems negative  [x] Unable to assess ROS because patient with ALS unable to answer verbal questioning     Vital Signs Last 24 Hrs  T(C): 36.9 (20 @ 04:11), Max: 36.9 (20 @ 04:11)  T(F): 98.4 (20 @ 04:11), Max: 98.4 (20 @ 04:11)  HR: 87 (20 @ 08:15) (75 - 95)  BP: 118/68 (20 @ 06:00) (111/72 - 131/76)  RR: 14 (20 @ 04:42) (14 - 16)  SpO2: 97% (20 @ 08:15) (97% - 100%)    PHYSICAL EXAM:  HEENT:   [x]Tracheostomy: # 7 XLT Shiley   [x]Pupils equal  [ ]No oral lesions  [x]Abnormal: + Injected Conjunctiva RT > Lft     SKIN  [x]No Rash  [ ] Abnormal  [ ] pressure    CARDIAC  [x]Regular  [ ]Abnormal:     PULMONARY  [ ]Bilateral Clear Breath Sounds  [ ]Normal Excursion  [x]Abnormal: Bilateral Coarse Breath sounds    GI  [x]PEG      [x] +BS		              [x]Soft, nondistended, nontender	  [ ]Abnormal    MUSCULOSKELETAL                                   [x]Bedbound                 [ ]Abnormal    [ ]Ambulatory/OOB to chair                           EXTREMITIES                                         [x] Decrease Dorsalis pedal pulse on left lower extremity ( Located with bedside doppler), Warm to touch   [ ]Edema                           NEUROLOGIC  [ ] Normal, non focal  [x] Focal findings: Functional Quadriplegic 2/2 ALS     PSYCHIATRIC  [ ]Alert and appropriate  [x] Sedated	 [ ]Agitated    :  Newman: [ ] Yes, if yes: Date of Placement:                   [x] No    LINES: Central Lines [ ] Yes, if yes: Date of Placement                                     [x] No    HOSPITAL MEDICATIONS:  MEDICATIONS  (STANDING):  albuterol/ipratropium for Nebulization 3 milliLiter(s) Nebulizer every 6 hours  artificial  tears Solution 1 Drop(s) Both EYES every 6 hours  chlorhexidine 0.12% Liquid 15 milliLiter(s) Oral Mucosa every 12 hours  chlorhexidine 4% Liquid 1 Application(s) Topical <User Schedule>  chlorhexidine 4% Liquid 1 Application(s) Topical <User Schedule>  dextrose 5% + lactated ringers. 1000 milliLiter(s) (50 mL/Hr) IV Continuous <Continuous>  diazepam   Solution 1.5 milliGRAM(s) Oral daily  diazepam   Solution 2.5 milliGRAM(s) Oral daily  erythromycin   Ointment 1 Application(s) Both EYES <User Schedule>  influenza   Vaccine 0.5 milliLiter(s) IntraMuscular once  meropenem  IVPB 1000 milliGRAM(s) IV Intermittent every 8 hours  midodrine. 10 milliGRAM(s) Oral every 8 hours  multivitamin 1 Tablet(s) Oral daily  ofloxacin 0.3% Solution 1 Drop(s) Both Ears every 6 hours  petrolatum Ophthalmic Ointment 1 Application(s) Both EYES <User Schedule>  polyethylene glycol 3350 17 Gram(s) Oral two times a day  riluzole 50 milliGRAM(s) Oral two times a day  rivaroxaban 10 milliGRAM(s) Enteral Tube with dinner  senna 1 Tablet(s) Oral daily  sertraline 50 milliGRAM(s) Oral daily    MEDICATIONS  (PRN):      LABS:                        10.3   17.63 )-----------( 571      ( 02 Dec 2020 06:52 )             32.8     12-02    137  |  101  |  17  ----------------------------<  118<H>  4.5   |  25  |  <0.30<L>    Ca    8.7      02 Dec 2020 06:52  Phos  2.8     12-02  Mg     2.0     12-02        Urinalysis Basic - ( 2020 08:50 )    Color: Yellow / Appearance: Slightly Turbid / S.007 / pH: x  Gluc: x / Ketone: Negative  / Bili: Negative / Urobili: <2 mg/dL   Blood: x / Protein: Trace / Nitrite: Negative   Leuk Esterase: Moderate / RBC: 5 /HPF / WBC 2 /HPF   Sq Epi: x / Non Sq Epi: 1 /HPF / Bacteria: Negative          CAPILLARY BLOOD GLUCOSE    MICROBIOLOGY:     RADIOLOGY:  [ ] Reviewed and interpreted by me    Mode: AC/ CMV (Assist Control/ Continuous Mandatory Ventilation)  RR (machine): 14  TV (machine): 450  FiO2: 30  PEEP: 5  ITime: 1  MAP: 9  PIP: 22

## 2020-12-02 NOTE — PROGRESS NOTE ADULT - PROBLEM SELECTOR PLAN 7
Continue Protonix Continue Protonix and Xarelto  As per  patient was on Xarelto for DVT prophylaxis Continue Protonix   As per  patient was on Xarelto for DVT prophylaxis  Will Hold Xarelto for possible future  procedure   Will place patient on Venodyne boots

## 2020-12-02 NOTE — PROGRESS NOTE ADULT - ASSESSMENT
65 F with advanced ALS, chronic hypoxic/ hypercapnic resp failure, vent dependent, s/p trach 2015 and PEG, now presenting with septic shock and Morganella bacteremia 2/2 bacterial PNA + UTI.  Patient was BIBEMS from home for fevers. Pt's 24hr home health nurse reported over the past few days the pt has appeared more lethargic, has been tachycardic, and running fevers with decreased Urination. She was empirically started on meropenum and vancomycin in ED.  Was managed for Septic shock s/p fluid resuscitation, IV pressors, and Transferred to MICU. Patients abx were switched to Cefepime after Bcx 11/22 revealed Morganella morganii.  Surveillance Bcxs from 11/25 remain NGTD.  LLE doppler study revealed stenosis of femoral and popliteal arteries causing cool, pale LLE without cyanosis. Patient was weaned off vasopressors and placed on Midodrine. Patient was Transferred to RCU on 11/28. Trach changed to #7 distal xlt lise by ENT due to air leak on 11/28. Patient had CT abd pelvis performed which revealed 2 mm non obstructive left ureteral stone and multiple renal calculi with mild bilateral hydronephrosis.     12/1: Patient remains with Leukocytosis but afebrile. Patient seen by urology for Non-obstructing left renal stone / Left renal calculi for evaluation of  possible ureteral stent vs percutaneous nephrostomy. 65 F with advanced ALS, chronic hypoxic/ hypercapnic resp failure, vent dependent, s/p trach 2015 and PEG, now presenting with septic shock and Morganella bacteremia 2/2 bacterial PNA + UTI.  Patient was BIBEMS from home for fevers. Pt's 24hr home health nurse reported over the past few days the pt has appeared more lethargic, has been tachycardic, and running fevers with decreased Urination. She was empirically started on meropenum and vancomycin in ED.  Was managed for Septic shock s/p fluid resuscitation, IV pressors, and Transferred to MICU. Patients abx were switched to Cefepime after Bcx 11/22 revealed Morganella morganii.  Surveillance Bcxs from 11/25 remain NGTD.  LLE doppler study revealed stenosis of femoral and popliteal arteries causing cool, pale LLE without cyanosis. Patient was weaned off vasopressors and placed on Midodrine. Patient was Transferred to RCU on 11/28. Trach changed to #7 distal xlt lise by ENT due to air leak on 11/28. Patient had CT abd pelvis performed which revealed 2 mm non obstructive left ureteral stone and multiple renal calculi with mild bilateral hydronephrosis.     12/1: Patient remains with Leukocytosis but afebrile. Patient's Private Pulmonologist  consulted patient's private urologist for Non-obstructing left renal stone / Left renal calculi. Will hold on any  intervention as patiens remains afebrile and Leukocytosis continues to improve. 65 F with advanced ALS, chronic hypoxic/ hypercapnic resp failure, vent dependent, s/p trach 2015 and PEG, now presenting with septic shock and Morganella bacteremia 2/2 bacterial PNA + UTI.  Patient was BIBEMS from home for fevers. Pt's 24hr home health nurse reported over the past few days the pt has appeared more lethargic, has been tachycardic, and running fevers with decreased Urination. She was empirically started on meropenum and vancomycin in ED.  Was managed for Septic shock s/p fluid resuscitation, IV pressors, and Transferred to MICU. Patients abx were switched to Cefepime after Bcx 11/22 revealed Morganella morganii.  Surveillance Bcxs from 11/25 remain NGTD.  LLE doppler study revealed stenosis of femoral and popliteal arteries causing cool, pale LLE without cyanosis. Patient was weaned off vasopressors and placed on Midodrine. Patient was Transferred to RCU on 11/28. Trach changed to #7 distal xlt lise by ENT due to air leak on 11/28. Patient had CT abd pelvis performed which revealed 2 mm non obstructive left ureteral stone and multiple renal calculi with mild bilateral hydronephrosis.     12/1: Patient remains with Leukocytosis but afebrile. Patient's Private Pulmonologist  consulted patient's private urologist for Non-obstructing left renal stone / Left renal calculi. Will hold on any emergent  intervention as patiens remains afebrile and Leukocytosis continues to improve. Will hold Xarelto starting today in the event patient will require procedure.

## 2020-12-02 NOTE — PROGRESS NOTE ADULT - PROBLEM SELECTOR PLAN 5
LLE Doppler 11/25: Flow Limiting stenosis of Distal and Left Superficial femoral and Popliteal artery  Continue Xarelto LLE Doppler 11/25: Flow Limiting stenosis of Distal and Left Superficial femoral and Popliteal artery

## 2020-12-02 NOTE — PROGRESS NOTE ADULT - ASSESSMENT
Urosepesis    Plan: I planned for insertion of ureteral stent. Plan not agreed to by RCU attending. Will sign off, if need further care please call full time urology

## 2020-12-02 NOTE — PROGRESS NOTE ADULT - PROBLEM SELECTOR PLAN 1
Patient admitted with Sepsis 2/2 Bacteremia   Blood cx 11/22: Morganella Morganii, Repeat Bld CX 11/25: No growth   CT Chest / ABD/ Pelvis 11/30: Left Lower Lobe Collapse, 2mm Nonobstructive Left ureteral stone, left renal calculi and mild bilateral hydronephrosis   Sputum Cx 11/30: Stenotrophomonas ( Sensitivity Pending )   Urine Cx 12/1: Sent / Pending   Patient seen by Urology Possible Ureteral Stent vs Percutaneous Nephrostomy   Continue Meropenem as per Infectious Disease   Continue Midodrine 10 mg q 8 hrs for BP Stability   Taper Midodrine as bp tolerates Patient admitted with Sepsis 2/2 Bacteremia   Blood cx 11/22: Morganella Morganii, Repeat Bld CX 11/25: No growth   CT Chest / ABD/ Pelvis 11/30: Left Lower Lobe Collapse, 2mm Nonobstructive Left ureteral stone, left renal calculi and mild bilateral hydronephrosis   Sputum Cx 11/30: Stenotrophomonas ( Sensitivity Pending )   Urine Cx 12/1: Sent / Pending   Will hold on  intervention as patient remains afebrile and leukocytosis improving  Continue Meropenem as per Infectious Disease   Continue Midodrine 10 mg q 8 hrs for BP Stability   Taper Midodrine as bp tolerates Patient admitted with Sepsis 2/2 Bacteremia   Blood cx 11/22: Morganella Morganii, Repeat Bld CX 11/25: No growth   CT Chest / ABD/ Pelvis 11/30: Left Lower Lobe Collapse, 2mm Nonobstructive Left ureteral stone, left renal calculi and mild bilateral hydronephrosis   Sputum Cx 11/30: Stenotrophomonas ( Sensitivity Pending )   Urine Cx 12/1: Sent / Pending   Will hold on emergent  intervention as patient remains afebrile and leukocytosis currently improving  Continue Meropenem as per Infectious Disease   Continue Midodrine 10 mg q 8 hrs for BP Stability   Taper Midodrine as bp tolerates

## 2020-12-02 NOTE — PROGRESS NOTE ADULT - SUBJECTIVE AND OBJECTIVE BOX
CC: F/U for Bacteremia    Saw/spoke to patient. No fevers, no chills. No new complaints.    Allergies  Bactrim DS (Rash)    ANTIMICROBIALS:  meropenem  IVPB 1000 every 8 hours    PE:    Vital Signs Last 24 Hrs  T(C): 36.9 (02 Dec 2020 04:11), Max: 36.9 (02 Dec 2020 04:11)  T(F): 98.4 (02 Dec 2020 04:11), Max: 98.4 (02 Dec 2020 04:11)  HR: 87 (02 Dec 2020 08:15) (75 - 95)  BP: 118/68 (02 Dec 2020 06:00) (111/72 - 131/76)  RR: 14 (02 Dec 2020 04:42) (14 - 16)  SpO2: 97% (02 Dec 2020 08:15) (97% - 100%)    Gen: AOx0-1, NAD  CV: S1+S2 normal, nontachycardic  Resp: Trach  Abd: Soft, nontender, +BS, PEG  Ext: No LE edema, no wounds    LABS:                        10.3   17.63 )-----------( 571      ( 02 Dec 2020 06:52 )             32.8     12-02    137  |  101  |  17  ----------------------------<  118<H>  4.5   |  25  |  <0.30<L>    Ca    8.7      02 Dec 2020 06:52  Phos  2.8     12-02  Mg     2.0     12-02    MICROBIOLOGY:    .Blood Blood-Peripheral  11-30-20   No growth to date.    .Sputum Sputum  11-30-20   Moderate Stenotrophomonas maltophilia  Normal Respiratory Kia present  --    Few polymorphonuclear leukocytes per low power field  Rare Squamous epithelial cells per low power field  Few Gram Variable Rods seen per oil power field  Rare Gram positive cocci in pairs seen per oil power field    .Blood Blood  11-25-20   No Growth Final      .Sputum Sputum  11-23-20   Normal Respiratory Kia present  --    Rare polymorphonuclear leukocytes per low power field  Rare Squamous epithelial cells per low power field  Few Gram Variable Rods per oil power field    .Blood Blood-Peripheral  11-22-20   Growth in aerobic and anaerobic bottles: Morganella morganii  See previous culture [id number]  --  Blood Culture PCR  Morganella morganii  Morganella morganii    .Urine Clean Catch (Midstream)  11-22-20   <10,000 CFU/mL Normal Urogenital Kia      (otherwise reviewed)    RADIOLOGY:    11/30 CT:    IMPRESSION:  Left lower lobe collapse. Superimposed infection is not excluded.    2 mm left ureteral calculus and multiple nonobstructive subcentimeter left renal calculi. Associated mild hydronephrosis and urothelial enhancement suggests inflammation/infection.    Cholelithiasis.

## 2020-12-02 NOTE — PROGRESS NOTE ADULT - ATTENDING COMMENTS
65 F with advanced ALS, chronic hypoxic/hypercapnic respiratory failure, vent dependent, s/p trach and PEG, now presenting with septic shock and Morganella bacteremia due to UTI, nonobstructing stones on CT.  - Urology was consulted by Dr. Newman and was planned for ureteral stent or nephrostomy tube.  - At this time, the patient has improved clinically with leukocytosis improving and hemodynamically stable. Patient is voiding and no signs of renal failure. Will monitor bedside POCUS to ensure hydronephrosis is not worsening. Patient does have renal calculi which may potentially be cause of obstruction in future. Will discuss plan with son. No emergent need for procedure at this time so will hold off tonight's procedure and reschedule pending clinical status and discussion with family. Hold Xarelto.   - Continue meropenem.  - Metanebs to mobilize secretions.

## 2020-12-02 NOTE — PROGRESS NOTE ADULT - SUBJECTIVE AND OBJECTIVE BOX
TINO MATA 65y Female    The patient is a Patient is a 65y old  Female who presents with a chief complaint of sepsis (02 Dec 2020 11:11)  Uneventful night. WBC still elevated. Had made decision to insert ureteral stent. At first agreeable with pulmonary and ID. RCU attending did not feel it was indicated and case cancelled      Vital Signs Last 24 Hrs  T(C): 36.8 (02 Dec 2020 12:12), Max: 36.9 (02 Dec 2020 04:11)  T(F): 98.3 (02 Dec 2020 12:12), Max: 98.4 (02 Dec 2020 04:11)  HR: 87 (02 Dec 2020 16:15) (75 - 95)  BP: 129/83 (02 Dec 2020 12:12) (111/72 - 129/83)  BP(mean): --  RR: 15 (02 Dec 2020 16:15) (14 - 16)  SpO2: 98% (02 Dec 2020 16:15) (97% - 100%)    albuterol/ipratropium for Nebulization 3 milliLiter(s) Nebulizer every 6 hours  artificial  tears Solution 1 Drop(s) Both EYES every 6 hours  chlorhexidine 0.12% Liquid 15 milliLiter(s) Oral Mucosa every 12 hours  chlorhexidine 4% Liquid 1 Application(s) Topical <User Schedule>  chlorhexidine 4% Liquid 1 Application(s) Topical <User Schedule>  diazepam   Solution 1.5 milliGRAM(s) Oral daily  diazepam   Solution 2.5 milliGRAM(s) Oral daily  erythromycin   Ointment 1 Application(s) Both EYES <User Schedule>  influenza   Vaccine 0.5 milliLiter(s) IntraMuscular once  meropenem  IVPB 1000 milliGRAM(s) IV Intermittent every 8 hours  midodrine. 10 milliGRAM(s) Oral every 8 hours  multivitamin 1 Tablet(s) Oral daily  ofloxacin 0.3% Solution 1 Drop(s) Both EYES four times a day  petrolatum Ophthalmic Ointment 1 Application(s) Both EYES <User Schedule>  polyethylene glycol 3350 17 Gram(s) Oral two times a day  riluzole 50 milliGRAM(s) Oral two times a day  senna 1 Tablet(s) Oral daily  sertraline 50 milliGRAM(s) Oral daily          Physical exam:      Urine:     I&O's Summary    01 Dec 2020 07:01  -  02 Dec 2020 07:00  --------------------------------------------------------  IN: 1300 mL / OUT: 200 mL / NET: 1100 mL    02 Dec 2020 07:01  -  02 Dec 2020 17:11  --------------------------------------------------------  IN: 310 mL / OUT: 200 mL / NET: 110 mL        LABS:                        10.3   17.63 )-----------( 571      ( 02 Dec 2020 06:52 )             32.8     12-02    137  |  101  |  17  ----------------------------<  118<H>  4.5   |  25  |  <0.30<L>    Ca    8.7      02 Dec 2020 06:52  Phos  2.8     12-02  Mg     2.0     12-02        Urine Culture: 12-01 @ 17:36  Urine Culure Resuls   No growth  Organism --        Radiology    Prior notes/chart reviewed.

## 2020-12-02 NOTE — PROGRESS NOTE ADULT - ASSESSMENT
65 F with ALS, s/p trach and PEG, was admitted 11/22 for septic shock and morganella bacteremia, was considered due to UTI vs pneumonia but urine cx was negative, sputum cx negative, CXR with LLL pneumonia  Blood cx negative 11/25  WBC elevated, somewhat trending down  Septic shock with morganella bacteremia, ? UTI vs pneumonia but urine and sputum cx negative  Cleared blood cultures 11/25  Now again with leukocytosis and hypotension, CT with LLL collapse, multiple L renal and ureteral stones with mild hydro and urothelial enhancement so likely pyelonephritis   Overall, Leukocytosis, bacteremia (morganella), positive sputum culture  - Meropenem 1g q 8  - F/U pending BCXs  - F/U UCX  - Trend CBC/WBC  - Note Steno in sputum, unclear significance, monitor for clinical signs pna/tracheitis, low threshold to treat if signs resp infection    Chase Mims MD  Pager [phone]  After 5pm and on weekends call [phone]

## 2020-12-02 NOTE — PROGRESS NOTE ADULT - ATTENDING COMMENTS
I have discussed the patient with the resident and reviewed the residents note including the history, exam, assessment, and plan.  I agree with the residents assessment and plan.    Improving exposure keratopathy OU  Continue current management  Will follow  Dr. Flowers aware of pt (cornea)    Yarely Swan MD

## 2020-12-03 LAB
ANION GAP SERPL CALC-SCNC: 13 MMOL/L — SIGNIFICANT CHANGE UP (ref 5–17)
ANISOCYTOSIS BLD QL: SLIGHT — SIGNIFICANT CHANGE UP
BASOPHILS # BLD AUTO: 0 K/UL — SIGNIFICANT CHANGE UP (ref 0–0.2)
BASOPHILS NFR BLD AUTO: 0 % — SIGNIFICANT CHANGE UP (ref 0–2)
BUN SERPL-MCNC: 16 MG/DL — SIGNIFICANT CHANGE UP (ref 7–23)
CALCIUM SERPL-MCNC: 9.1 MG/DL — SIGNIFICANT CHANGE UP (ref 8.4–10.5)
CHLORIDE SERPL-SCNC: 102 MMOL/L — SIGNIFICANT CHANGE UP (ref 96–108)
CO2 SERPL-SCNC: 21 MMOL/L — LOW (ref 22–31)
CREAT SERPL-MCNC: <0.3 MG/DL — LOW (ref 0.5–1.3)
EOSINOPHIL # BLD AUTO: 0.15 K/UL — SIGNIFICANT CHANGE UP (ref 0–0.5)
EOSINOPHIL NFR BLD AUTO: 0.9 % — SIGNIFICANT CHANGE UP (ref 0–6)
GLUCOSE SERPL-MCNC: 113 MG/DL — HIGH (ref 70–99)
HCT VFR BLD CALC: 37.2 % — SIGNIFICANT CHANGE UP (ref 34.5–45)
HGB BLD-MCNC: 11.2 G/DL — LOW (ref 11.5–15.5)
HYPOCHROMIA BLD QL: SLIGHT — SIGNIFICANT CHANGE UP
LYMPHOCYTES # BLD AUTO: 12.7 % — LOW (ref 13–44)
LYMPHOCYTES # BLD AUTO: 2.05 K/UL — SIGNIFICANT CHANGE UP (ref 1–3.3)
MAGNESIUM SERPL-MCNC: 2.2 MG/DL — SIGNIFICANT CHANGE UP (ref 1.6–2.6)
MANUAL SMEAR VERIFICATION: SIGNIFICANT CHANGE UP
MCHC RBC-ENTMCNC: 28.3 PG — SIGNIFICANT CHANGE UP (ref 27–34)
MCHC RBC-ENTMCNC: 30.1 GM/DL — LOW (ref 32–36)
MCV RBC AUTO: 93.9 FL — SIGNIFICANT CHANGE UP (ref 80–100)
METAMYELOCYTES # FLD: 1.8 % — HIGH (ref 0–0)
MONOCYTES # BLD AUTO: 1.47 K/UL — HIGH (ref 0–0.9)
MONOCYTES NFR BLD AUTO: 9.1 % — SIGNIFICANT CHANGE UP (ref 2–14)
NEUTROPHILS # BLD AUTO: 12.18 K/UL — HIGH (ref 1.8–7.4)
NEUTROPHILS NFR BLD AUTO: 75.5 % — SIGNIFICANT CHANGE UP (ref 43–77)
PHOSPHATE SERPL-MCNC: 3.2 MG/DL — SIGNIFICANT CHANGE UP (ref 2.5–4.5)
PLAT MORPH BLD: ABNORMAL
PLATELET # BLD AUTO: 532 K/UL — HIGH (ref 150–400)
POLYCHROMASIA BLD QL SMEAR: SLIGHT — SIGNIFICANT CHANGE UP
POTASSIUM SERPL-MCNC: 4.4 MMOL/L — SIGNIFICANT CHANGE UP (ref 3.5–5.3)
POTASSIUM SERPL-SCNC: 4.4 MMOL/L — SIGNIFICANT CHANGE UP (ref 3.5–5.3)
RBC # BLD: 3.96 M/UL — SIGNIFICANT CHANGE UP (ref 3.8–5.2)
RBC # FLD: 16.1 % — HIGH (ref 10.3–14.5)
RBC BLD AUTO: SIGNIFICANT CHANGE UP
SMUDGE CELLS # BLD: PRESENT — SIGNIFICANT CHANGE UP
SODIUM SERPL-SCNC: 136 MMOL/L — SIGNIFICANT CHANGE UP (ref 135–145)
WBC # BLD: 16.13 K/UL — HIGH (ref 3.8–10.5)
WBC # FLD AUTO: 16.13 K/UL — HIGH (ref 3.8–10.5)

## 2020-12-03 PROCEDURE — 99233 SBSQ HOSP IP/OBS HIGH 50: CPT

## 2020-12-03 PROCEDURE — 99232 SBSQ HOSP IP/OBS MODERATE 35: CPT

## 2020-12-03 RX ORDER — HEPARIN SODIUM 5000 [USP'U]/ML
5000 INJECTION INTRAVENOUS; SUBCUTANEOUS EVERY 12 HOURS
Refills: 0 | Status: DISCONTINUED | OUTPATIENT
Start: 2020-12-03 | End: 2020-12-10

## 2020-12-03 RX ORDER — MIDODRINE HYDROCHLORIDE 2.5 MG/1
5 TABLET ORAL EVERY 8 HOURS
Refills: 0 | Status: DISCONTINUED | OUTPATIENT
Start: 2020-12-03 | End: 2020-12-04

## 2020-12-03 RX ADMIN — Medication 1 APPLICATION(S): at 11:05

## 2020-12-03 RX ADMIN — Medication 1 APPLICATION(S): at 21:34

## 2020-12-03 RX ADMIN — Medication 1.5 MILLIGRAM(S): at 11:05

## 2020-12-03 RX ADMIN — Medication 3 MILLILITER(S): at 23:16

## 2020-12-03 RX ADMIN — RILUZOLE 50 MILLIGRAM(S): 50 TABLET ORAL at 05:16

## 2020-12-03 RX ADMIN — CHLORHEXIDINE GLUCONATE 1 APPLICATION(S): 213 SOLUTION TOPICAL at 11:07

## 2020-12-03 RX ADMIN — Medication 1 APPLICATION(S): at 05:19

## 2020-12-03 RX ADMIN — Medication 1 APPLICATION(S): at 17:43

## 2020-12-03 RX ADMIN — HEPARIN SODIUM 5000 UNIT(S): 5000 INJECTION INTRAVENOUS; SUBCUTANEOUS at 17:50

## 2020-12-03 RX ADMIN — SENNA PLUS 1 TABLET(S): 8.6 TABLET ORAL at 11:08

## 2020-12-03 RX ADMIN — Medication 1 APPLICATION(S): at 08:02

## 2020-12-03 RX ADMIN — Medication 3 MILLILITER(S): at 17:57

## 2020-12-03 RX ADMIN — Medication 1 DROP(S): at 11:07

## 2020-12-03 RX ADMIN — CHLORHEXIDINE GLUCONATE 15 MILLILITER(S): 213 SOLUTION TOPICAL at 05:15

## 2020-12-03 RX ADMIN — Medication 3 MILLILITER(S): at 11:52

## 2020-12-03 RX ADMIN — CHLORHEXIDINE GLUCONATE 15 MILLILITER(S): 213 SOLUTION TOPICAL at 17:44

## 2020-12-03 RX ADMIN — MEROPENEM 100 MILLIGRAM(S): 1 INJECTION INTRAVENOUS at 21:34

## 2020-12-03 RX ADMIN — RILUZOLE 50 MILLIGRAM(S): 50 TABLET ORAL at 17:47

## 2020-12-03 RX ADMIN — Medication 1 DROP(S): at 05:18

## 2020-12-03 RX ADMIN — CHLORHEXIDINE GLUCONATE 1 APPLICATION(S): 213 SOLUTION TOPICAL at 05:15

## 2020-12-03 RX ADMIN — MEROPENEM 100 MILLIGRAM(S): 1 INJECTION INTRAVENOUS at 13:24

## 2020-12-03 RX ADMIN — Medication 1 APPLICATION(S): at 13:24

## 2020-12-03 RX ADMIN — Medication 1 APPLICATION(S): at 02:34

## 2020-12-03 RX ADMIN — Medication 3 MILLILITER(S): at 05:03

## 2020-12-03 RX ADMIN — Medication 1 DROP(S): at 05:13

## 2020-12-03 RX ADMIN — MEROPENEM 100 MILLIGRAM(S): 1 INJECTION INTRAVENOUS at 05:11

## 2020-12-03 RX ADMIN — SERTRALINE 50 MILLIGRAM(S): 25 TABLET, FILM COATED ORAL at 05:16

## 2020-12-03 RX ADMIN — Medication 1 DROP(S): at 17:44

## 2020-12-03 RX ADMIN — Medication 1 APPLICATION(S): at 10:06

## 2020-12-03 RX ADMIN — Medication 1 DROP(S): at 17:43

## 2020-12-03 RX ADMIN — Medication 2.5 MILLIGRAM(S): at 21:57

## 2020-12-03 RX ADMIN — POLYETHYLENE GLYCOL 3350 17 GRAM(S): 17 POWDER, FOR SOLUTION ORAL at 17:43

## 2020-12-03 RX ADMIN — Medication 1 APPLICATION(S): at 03:53

## 2020-12-03 RX ADMIN — Medication 1 TABLET(S): at 11:08

## 2020-12-03 RX ADMIN — MIDODRINE HYDROCHLORIDE 5 MILLIGRAM(S): 2.5 TABLET ORAL at 13:25

## 2020-12-03 NOTE — PROGRESS NOTE ADULT - PROBLEM SELECTOR PLAN 2
Patient with Tracheostomy at baseline ( 2015)   Continue Mechanical Ventilation  Continue Chest PT / Suctioning PRN Patient with Tracheostomy at baseline ( 2015)   Continue Mechanical Ventilation  Patient does not tolerate weaning due to advanced ALS  Continue Chest PT / Suctioning PRN

## 2020-12-03 NOTE — PROGRESS NOTE ADULT - ASSESSMENT
65 F with advanced ALS, chronic hypoxic/ hypercapnic resp failure, vent dependent, s/p trach 2015 and PEG, now presenting with septic shock and Morganella bacteremia 2/2 bacterial PNA + UTI.  Patient was BIBEMS from home for fevers. Pt's 24hr home health nurse reported over the past few days the pt has appeared more lethargic, has been tachycardic, and running fevers with decreased Urination. She was empirically started on meropenum and vancomycin in ED.  Was managed for Septic shock s/p fluid resuscitation, IV pressors, and Transferred to MICU. Patients abx were switched to Cefepime after Bcx 11/22 revealed Morganella morganii.  Surveillance Bcxs from 11/25 remain NGTD.  LLE doppler study revealed stenosis of femoral and popliteal arteries causing cool, pale LLE without cyanosis. Patient was weaned off vasopressors and placed on Midodrine. Patient was Transferred to RCU on 11/28. Trach changed to #7 distal xlt lise by ENT due to air leak on 11/28. Patient had CT abd pelvis performed which revealed 2 mm non obstructive left ureteral stone and multiple renal calculi with mild bilateral hydronephrosis.     12/2: Patient remained afebrile overnight, le 65 F with advanced ALS, chronic hypoxic/ hypercapnic resp failure, vent dependent, s/p trach 2015 and PEG, now presenting with septic shock and Morganella bacteremia 2/2 bacterial PNA + UTI.  Patient was BIBEMS from home for fevers. Pt's 24hr home health nurse reported over the past few days the pt has appeared more lethargic, has been tachycardic, and running fevers with decreased Urination. She was empirically started on meropenum and vancomycin in ED.  Was managed for Septic shock s/p fluid resuscitation, IV pressors, and Transferred to MICU. Patients abx were switched to Cefepime after Bcx 11/22 revealed Morganella morganii.  Surveillance Bcxs from 11/25 remain NGTD.  LLE doppler study revealed stenosis of femoral and popliteal arteries causing cool, pale LLE without cyanosis. Patient was weaned off vasopressors and placed on Midodrine. Patient was Transferred to RCU on 11/28. Trach changed to #7 distal xlt lise by ENT due to air leak on 11/28. Patient had CT abd pelvis performed which revealed 2 mm non obstructive left ureteral stone and multiple renal calculi with mild bilateral hydronephrosis.     12/2: Patient remained afebrile overnight, leukocytosis continues to improve. Bedside sono to be performed today to evaluate left renal stone today. BP remains improved will taper Midodrine to 5 mg q 8 hrs.

## 2020-12-03 NOTE — PROGRESS NOTE ADULT - PROBLEM SELECTOR PLAN 5
LLE Doppler 11/25: Flow Limiting stenosis of Distal and Left Superficial femoral and Popliteal artery

## 2020-12-03 NOTE — PROGRESS NOTE ADULT - ASSESSMENT
65 f with ALS, s/p trach and PEG, was admitted 11/22 for septic shock and morganella bacteremia, was considered due to UTI vs pneumonia but urine cx was negative, sputum cx negative, CXR with LLL pneumonia  blood cx negative 11/25  WBC initially 19 then 31 and fluctuating, normalized on 11/26 and then increased to 29 with some hypotension, no diarrhea, no resp secretions and no fever  exam with b/l injected conjunctivae and cold LLE but that resolved LLE not cold anymore     septic shock with morganella bacteremia, ?UTI vs pneumonia but urine and sputum cx negative  cleared blood cultures 11/25  now again with leukocytosis and hypotension, CT with LLL collapse, multiple L renal and ureteral stones with mild hydro and urothelial enhancement so likely pyelonephritis but urine cx came back negative, done on antibiotics though   sputum cx with Stenotrophomonas sensitive to levo and bactrim but no clinical evidence of pneumonia      * c/w andie 1 q 8, started 11/20  * monitor the CBC/diff  * if any change in the respiratory status or more fever, would start levo for steno in the sputum cx    The above assessment and plan was discussed with RCU    Salome Terrazas MD  Pager [phone]  After 5pm and on weekends call [phone]

## 2020-12-03 NOTE — PROGRESS NOTE ADULT - PROBLEM SELECTOR PLAN 7
Continue Protonix   As per  patient was on Xarelto for DVT prophylaxis  Will Hold Xarelto for possible future  procedure   Will place patient on Venodyne boots Continue Protonix   As per  patient was on Xarelto for DVT prophylaxis  Will Hold Xarelto for possible future  procedure   Will place patient on Heparin sub q for now

## 2020-12-03 NOTE — PROGRESS NOTE ADULT - ATTENDING COMMENTS
65 F with advanced ALS, chronic hypoxic/hypercapnic respiratory failure, vent dependent, s/p trach and PEG, now presenting with septic shock and Morganella bacteremia due to UTI, nonobstructing stones on CT.  - Clinically stable. Afebrile and remains hemodynamically stable. Leukocytosis is downtrending. Creatinine remains normal.  - Bedside POCUS performed by be does not show evidence of hydronephrosis. Renal calculi are present.  - Called Dr. Workman to discuss role of stenting at this time per his note but he states he has signed off and does not wish to discuss case and defers to in-house urology for further questions. In house urology service consulted. Patient is clinically stable at this time but if urology feels that a stent may benefit patient, she is stable for procedure. She is now off Xarelto.    - Continue meropenem. Suspect Stenotrophomonas is colonizer but if change in status, will consider addition of Levaquin per ID recommendations.   - Continue metanebs to mobilize secretions.

## 2020-12-03 NOTE — PROGRESS NOTE ADULT - SUBJECTIVE AND OBJECTIVE BOX
Patient is a 65y old  Female who presents with a chief complaint of sepsis (02 Dec 2020 17:10)      Interval Events: No events reported overnight     REVIEW OF SYSTEMS:  [ ] Positive  [ ] All other systems negative  [x] Unable to assess ROS because patient unable to communicate due to severe ALS     Vital Signs Last 24 Hrs  T(C): 36.4 (12-03-20 @ 04:28), Max: 36.8 (12-02-20 @ 12:12)  T(F): 97.5 (12-03-20 @ 04:28), Max: 98.3 (12-02-20 @ 12:12)  HR: 84 (12-03-20 @ 08:06) (75 - 91)  BP: 148/85 (12-03-20 @ 04:28) (129/83 - 148/85)  RR: 14 (12-03-20 @ 04:28) (14 - 16)  SpO2: 100% (12-03-20 @ 08:06) (96% - 100%)    PHYSICAL EXAM:  HEENT:   [x]Tracheostomy: # 7 XLT Shiley   [x]Pupils equal  [ ]No oral lesions  [x]Abnormal: + Injected Conjunctiva RT > Lft     SKIN  [x]No Rash  [ ] Abnormal  [ ] pressure    CARDIAC  [x]Regular  [ ]Abnormal:     PULMONARY  [ ]Bilateral Clear Breath Sounds  [ ]Normal Excursion  [x]Abnormal: Bilateral Coarse Breath sounds    GI  [x]PEG      [x] +BS		              [x]Soft, nondistended, nontender	  [ ]Abnormal    MUSCULOSKELETAL                                   [x]Bedbound                 [ ]Abnormal    [ ]Ambulatory/OOB to chair                           EXTREMITIES                                         [x] Decrease Dorsalis pedal pulse on left lower extremity ( Located with bedside doppler), Warm to touch   [ ]Edema                           NEUROLOGIC  [ ] Normal, non focal  [x] Focal findings: Functional Quadriplegic 2/2 ALS     PSYCHIATRIC  [ ]Alert and appropriate  [x] Sedated	 [ ]Agitated    :  Newman: [ ] Yes, if yes: Date of Placement:                   [x] No    LINES: Central Lines [ ] Yes, if yes: Date of Placement                                     [x] No      HOSPITAL MEDICATIONS:  MEDICATIONS  (STANDING):  albuterol/ipratropium for Nebulization 3 milliLiter(s) Nebulizer every 6 hours  artificial  tears Solution 1 Drop(s) Both EYES every 6 hours  chlorhexidine 0.12% Liquid 15 milliLiter(s) Oral Mucosa every 12 hours  chlorhexidine 4% Liquid 1 Application(s) Topical <User Schedule>  chlorhexidine 4% Liquid 1 Application(s) Topical <User Schedule>  diazepam   Solution 1.5 milliGRAM(s) Oral daily  diazepam   Solution 2.5 milliGRAM(s) Oral daily  erythromycin   Ointment 1 Application(s) Both EYES <User Schedule>  influenza   Vaccine 0.5 milliLiter(s) IntraMuscular once  meropenem  IVPB 1000 milliGRAM(s) IV Intermittent every 8 hours  midodrine. 10 milliGRAM(s) Oral every 8 hours  multivitamin 1 Tablet(s) Oral daily  ofloxacin 0.3% Solution 1 Drop(s) Both EYES four times a day  petrolatum Ophthalmic Ointment 1 Application(s) Both EYES <User Schedule>  polyethylene glycol 3350 17 Gram(s) Oral two times a day  riluzole 50 milliGRAM(s) Oral two times a day  senna 1 Tablet(s) Oral daily  sertraline 50 milliGRAM(s) Oral daily    MEDICATIONS  (PRN):      LABS:                        10.3   17.63 )-----------( 571      ( 02 Dec 2020 06:52 )             32.8     12-02    137  |  101  |  17  ----------------------------<  118<H>  4.5   |  25  |  <0.30<L>    Ca    8.7      02 Dec 2020 06:52  Phos  2.8     12-02  Mg     2.0     12-02      PT/INR - ( 02 Dec 2020 09:56 )   PT: 13.4 sec;   INR: 1.12 ratio         PTT - ( 02 Dec 2020 09:56 )  PTT:30.8 sec        CAPILLARY BLOOD GLUCOSE    MICROBIOLOGY:     RADIOLOGY:  [ ] Reviewed and interpreted by me    Mode: AC/ CMV (Assist Control/ Continuous Mandatory Ventilation)  RR (machine): 14  TV (machine): 450  FiO2: 30  PEEP: 5  ITime: 1  MAP: 9  PIP: 22

## 2020-12-03 NOTE — PROGRESS NOTE ADULT - SUBJECTIVE AND OBJECTIVE BOX
Follow Up:  leukocytosis    Interval History: WBC slightly improved to 16, urine cx came back negative, sputum cx with steno    ROS:    Unobtainable because of mental status                Allergies  Bactrim DS (Rash)        ANTIMICROBIALS:  meropenem  IVPB 1000 every 8 hours      OTHER MEDS:  albuterol/ipratropium for Nebulization 3 milliLiter(s) Nebulizer every 6 hours  artificial  tears Solution 1 Drop(s) Both EYES every 6 hours  chlorhexidine 0.12% Liquid 15 milliLiter(s) Oral Mucosa every 12 hours  chlorhexidine 4% Liquid 1 Application(s) Topical <User Schedule>  chlorhexidine 4% Liquid 1 Application(s) Topical <User Schedule>  diazepam   Solution 1.5 milliGRAM(s) Oral daily  diazepam   Solution 2.5 milliGRAM(s) Oral daily  erythromycin   Ointment 1 Application(s) Both EYES <User Schedule>  heparin   Injectable 5000 Unit(s) SubCutaneous every 12 hours  influenza   Vaccine 0.5 milliLiter(s) IntraMuscular once  midodrine. 5 milliGRAM(s) Oral every 8 hours  multivitamin 1 Tablet(s) Oral daily  ofloxacin 0.3% Solution 1 Drop(s) Both EYES four times a day  petrolatum Ophthalmic Ointment 1 Application(s) Both EYES <User Schedule>  polyethylene glycol 3350 17 Gram(s) Oral two times a day  riluzole 50 milliGRAM(s) Oral two times a day  senna 1 Tablet(s) Oral daily  sertraline 50 milliGRAM(s) Oral daily      Vital Signs Last 24 Hrs  T(C): 36.7 (03 Dec 2020 12:06), Max: 36.8 (02 Dec 2020 21:01)  T(F): 98 (03 Dec 2020 12:06), Max: 98.2 (02 Dec 2020 21:01)  HR: 101 (03 Dec 2020 12:06) (75 - 101)  BP: 105/69 (03 Dec 2020 12:06) (105/69 - 148/85)  BP(mean): --  RR: 16 (03 Dec 2020 12:06) (14 - 16)  SpO2: 100% (03 Dec 2020 12:06) (96% - 100%)    Physical Exam:  General:    NAD  ENT:   trach, no significant secretions  Cardio:     regular S1, S2  Respiratory:    clear b/l,    no wheezing  abd:     soft,   BS +,   PEG  :     no  carter  Musculoskeletal:   no joint swelling  vascular: no phlebitis  Skin:    no rash, no decubitus                         11.2   16.13 )-----------( 532      ( 03 Dec 2020 10:41 )             37.2       12-03    136  |  102  |  16  ----------------------------<  113<H>  4.4   |  21<L>  |  <0.30<L>    Ca    9.1      03 Dec 2020 09:20  Phos  3.2     12-03  Mg     2.2     12-03            MICROBIOLOGY:  v  .Urine Catheterized  12-01-20   No growth  --  --      .Blood Blood-Peripheral  11-30-20   No growth to date.  --  --      .Sputum Sputum  11-30-20   Moderate Stenotrophomonas maltophilia  Normal Respiratory Kia present  --  Stenotrophomonas maltophilia      .Blood Blood  11-25-20   No Growth Final  --  --      .Sputum Sputum  11-23-20   Normal Respiratory Kai present  --    Rare polymorphonuclear leukocytes per low power field  Rare Squamous epithelial cells per low power field  Few Gram Variable Rods per oil power field      .Blood Blood-Peripheral  11-22-20   Growth in aerobic and anaerobic bottles: Morganella morganii  See previous culture [id number]  --  Blood Culture PCR  Morganella morganii  Morganella morganii      .Urine Clean Catch (Midstream)  11-22-20   <10,000 CFU/mL Normal Urogenital Kia  --  --                RADIOLOGY:  Images independently visualized and reviewed personally, findings as below  < from: CT Abdomen and Pelvis w/ Oral Cont and w/ IV Cont (11.30.20 @ 20:16) >  IMPRESSION:  Left lower lobe collapse. Superimposed infection is not excluded.    2 mm left ureteral calculus and multiple nonobstructive subcentimeter left renal calculi. Associated mild hydronephrosis and urothelial enhancement suggests inflammation/infection.    Cholelithiasis.

## 2020-12-04 LAB
ANION GAP SERPL CALC-SCNC: 12 MMOL/L — SIGNIFICANT CHANGE UP (ref 5–17)
BASOPHILS # BLD AUTO: 0.1 K/UL — SIGNIFICANT CHANGE UP (ref 0–0.2)
BASOPHILS NFR BLD AUTO: 0.8 % — SIGNIFICANT CHANGE UP (ref 0–2)
BUN SERPL-MCNC: 16 MG/DL — SIGNIFICANT CHANGE UP (ref 7–23)
CALCIUM SERPL-MCNC: 9.4 MG/DL — SIGNIFICANT CHANGE UP (ref 8.4–10.5)
CHLORIDE SERPL-SCNC: 103 MMOL/L — SIGNIFICANT CHANGE UP (ref 96–108)
CO2 SERPL-SCNC: 24 MMOL/L — SIGNIFICANT CHANGE UP (ref 22–31)
CREAT SERPL-MCNC: <0.3 MG/DL — LOW (ref 0.5–1.3)
EOSINOPHIL # BLD AUTO: 0.55 K/UL — HIGH (ref 0–0.5)
EOSINOPHIL NFR BLD AUTO: 4.2 % — SIGNIFICANT CHANGE UP (ref 0–6)
GLUCOSE SERPL-MCNC: 110 MG/DL — HIGH (ref 70–99)
HCT VFR BLD CALC: 35.8 % — SIGNIFICANT CHANGE UP (ref 34.5–45)
HGB BLD-MCNC: 10.8 G/DL — LOW (ref 11.5–15.5)
IMM GRANULOCYTES NFR BLD AUTO: 1.6 % — HIGH (ref 0–1.5)
LYMPHOCYTES # BLD AUTO: 1.45 K/UL — SIGNIFICANT CHANGE UP (ref 1–3.3)
LYMPHOCYTES # BLD AUTO: 11 % — LOW (ref 13–44)
MAGNESIUM SERPL-MCNC: 2.1 MG/DL — SIGNIFICANT CHANGE UP (ref 1.6–2.6)
MCHC RBC-ENTMCNC: 28 PG — SIGNIFICANT CHANGE UP (ref 27–34)
MCHC RBC-ENTMCNC: 30.2 GM/DL — LOW (ref 32–36)
MCV RBC AUTO: 92.7 FL — SIGNIFICANT CHANGE UP (ref 80–100)
MONOCYTES # BLD AUTO: 1.01 K/UL — HIGH (ref 0–0.9)
MONOCYTES NFR BLD AUTO: 7.6 % — SIGNIFICANT CHANGE UP (ref 2–14)
NEUTROPHILS # BLD AUTO: 9.89 K/UL — HIGH (ref 1.8–7.4)
NEUTROPHILS NFR BLD AUTO: 74.8 % — SIGNIFICANT CHANGE UP (ref 43–77)
NRBC # BLD: 0 /100 WBCS — SIGNIFICANT CHANGE UP (ref 0–0)
PHOSPHATE SERPL-MCNC: 2.6 MG/DL — SIGNIFICANT CHANGE UP (ref 2.5–4.5)
PLATELET # BLD AUTO: 591 K/UL — HIGH (ref 150–400)
POTASSIUM SERPL-MCNC: 4.3 MMOL/L — SIGNIFICANT CHANGE UP (ref 3.5–5.3)
POTASSIUM SERPL-SCNC: 4.3 MMOL/L — SIGNIFICANT CHANGE UP (ref 3.5–5.3)
RBC # BLD: 3.86 M/UL — SIGNIFICANT CHANGE UP (ref 3.8–5.2)
RBC # FLD: 15.9 % — HIGH (ref 10.3–14.5)
SODIUM SERPL-SCNC: 139 MMOL/L — SIGNIFICANT CHANGE UP (ref 135–145)
WBC # BLD: 13.21 K/UL — HIGH (ref 3.8–10.5)
WBC # FLD AUTO: 13.21 K/UL — HIGH (ref 3.8–10.5)

## 2020-12-04 PROCEDURE — 99222 1ST HOSP IP/OBS MODERATE 55: CPT

## 2020-12-04 PROCEDURE — 99232 SBSQ HOSP IP/OBS MODERATE 35: CPT

## 2020-12-04 PROCEDURE — 99233 SBSQ HOSP IP/OBS HIGH 50: CPT

## 2020-12-04 RX ORDER — MEROPENEM 1 G/30ML
1000 INJECTION INTRAVENOUS EVERY 8 HOURS
Refills: 0 | Status: COMPLETED | OUTPATIENT
Start: 2020-12-04 | End: 2020-12-09

## 2020-12-04 RX ADMIN — HEPARIN SODIUM 5000 UNIT(S): 5000 INJECTION INTRAVENOUS; SUBCUTANEOUS at 05:03

## 2020-12-04 RX ADMIN — Medication 1 APPLICATION(S): at 03:44

## 2020-12-04 RX ADMIN — RILUZOLE 50 MILLIGRAM(S): 50 TABLET ORAL at 17:47

## 2020-12-04 RX ADMIN — Medication 3 MILLILITER(S): at 12:32

## 2020-12-04 RX ADMIN — CHLORHEXIDINE GLUCONATE 15 MILLILITER(S): 213 SOLUTION TOPICAL at 17:46

## 2020-12-04 RX ADMIN — Medication 1 APPLICATION(S): at 08:41

## 2020-12-04 RX ADMIN — Medication 3 MILLILITER(S): at 23:53

## 2020-12-04 RX ADMIN — Medication 1 DROP(S): at 11:53

## 2020-12-04 RX ADMIN — Medication 1 APPLICATION(S): at 22:40

## 2020-12-04 RX ADMIN — Medication 1 DROP(S): at 05:04

## 2020-12-04 RX ADMIN — CHLORHEXIDINE GLUCONATE 1 APPLICATION(S): 213 SOLUTION TOPICAL at 05:04

## 2020-12-04 RX ADMIN — MEROPENEM 100 MILLIGRAM(S): 1 INJECTION INTRAVENOUS at 05:03

## 2020-12-04 RX ADMIN — HEPARIN SODIUM 5000 UNIT(S): 5000 INJECTION INTRAVENOUS; SUBCUTANEOUS at 17:47

## 2020-12-04 RX ADMIN — Medication 1 DROP(S): at 17:47

## 2020-12-04 RX ADMIN — Medication 1 TABLET(S): at 11:54

## 2020-12-04 RX ADMIN — Medication 1 DROP(S): at 11:54

## 2020-12-04 RX ADMIN — RILUZOLE 50 MILLIGRAM(S): 50 TABLET ORAL at 05:03

## 2020-12-04 RX ADMIN — Medication 1 APPLICATION(S): at 11:52

## 2020-12-04 RX ADMIN — Medication 1.5 MILLIGRAM(S): at 08:40

## 2020-12-04 RX ADMIN — Medication 3 MILLILITER(S): at 18:18

## 2020-12-04 RX ADMIN — Medication 1 APPLICATION(S): at 17:45

## 2020-12-04 RX ADMIN — POLYETHYLENE GLYCOL 3350 17 GRAM(S): 17 POWDER, FOR SOLUTION ORAL at 17:47

## 2020-12-04 RX ADMIN — Medication 1 APPLICATION(S): at 10:53

## 2020-12-04 RX ADMIN — Medication 1 DROP(S): at 00:06

## 2020-12-04 RX ADMIN — Medication 2.5 MILLIGRAM(S): at 20:54

## 2020-12-04 RX ADMIN — Medication 1 APPLICATION(S): at 02:46

## 2020-12-04 RX ADMIN — Medication 1 APPLICATION(S): at 00:06

## 2020-12-04 RX ADMIN — Medication 1 DROP(S): at 17:46

## 2020-12-04 RX ADMIN — Medication 1 APPLICATION(S): at 05:04

## 2020-12-04 RX ADMIN — MEROPENEM 100 MILLIGRAM(S): 1 INJECTION INTRAVENOUS at 14:45

## 2020-12-04 RX ADMIN — SERTRALINE 50 MILLIGRAM(S): 25 TABLET, FILM COATED ORAL at 05:03

## 2020-12-04 RX ADMIN — POLYETHYLENE GLYCOL 3350 17 GRAM(S): 17 POWDER, FOR SOLUTION ORAL at 06:38

## 2020-12-04 RX ADMIN — SENNA PLUS 1 TABLET(S): 8.6 TABLET ORAL at 11:54

## 2020-12-04 RX ADMIN — Medication 1 APPLICATION(S): at 17:46

## 2020-12-04 RX ADMIN — CHLORHEXIDINE GLUCONATE 15 MILLILITER(S): 213 SOLUTION TOPICAL at 05:03

## 2020-12-04 RX ADMIN — Medication 3 MILLILITER(S): at 05:23

## 2020-12-04 RX ADMIN — Medication 1 APPLICATION(S): at 20:48

## 2020-12-04 RX ADMIN — MEROPENEM 100 MILLIGRAM(S): 1 INJECTION INTRAVENOUS at 22:40

## 2020-12-04 RX ADMIN — Medication 1 APPLICATION(S): at 14:44

## 2020-12-04 NOTE — PROGRESS NOTE ADULT - PROBLEM SELECTOR PLAN 5
LLE Doppler 11/25: Flow Limiting stenosis of Distal and Left Superficial femoral and Popliteal artery  Heparin sc while in hospital then convert back to home Xarelto

## 2020-12-04 NOTE — PROGRESS NOTE ADULT - PROBLEM SELECTOR PLAN 7
Continue Protonix   As per  patient was on Xarelto for DVT prophylaxis  Will Hold Xarelto for possible future  procedure   Heparin sub q for now

## 2020-12-04 NOTE — PROGRESS NOTE ADULT - ASSESSMENT
65 F with advanced ALS, chronic hypoxic/ hypercapnic resp failure, vent dependent, s/p trach 2015 and PEG, now presenting with septic shock and Morganella bacteremia 2/2 bacterial PNA + UTI.  Patient was BIBEMS from home for fevers. Pt's 24hr home health nurse reported over the past few days the pt has appeared more lethargic, has been tachycardic, and running fevers with decreased Urination. She was empirically started on meropenum and vancomycin in ED.  Was managed for Septic shock s/p fluid resuscitation, IV pressors, and Transferred to MICU. Patients abx were switched to Cefepime after Bcx 11/22 revealed Morganella morganii.  Surveillance Bcxs from 11/25 remain NGTD.  LLE doppler study revealed stenosis of femoral and popliteal arteries causing cool, pale LLE without cyanosis. Patient was weaned off vasopressors and placed on Midodrine. Patient was Transferred to [facility] on 11/28. Trach changed to #7 distal xlt lise by ENT due to air leak on 11/28. Patient had CT abd pelvis performed which revealed 2 mm non obstructive left ureteral stone and multiple renal calculi with mild bilateral hydronephrosis.     12/4: Leukocytosis improving. Day 5/10 Meropenem (ends 12/9).  BP stable and d/c Midodrine.  Urology plan for intervention 12/10 for renal calculi- scope, lithotripsy, and stent (remove after 1wk).

## 2020-12-04 NOTE — PROGRESS NOTE ADULT - ASSESSMENT
65 f with ALS, s/p trach and PEG, was admitted 11/22 for septic shock and morganella bacteremia, was considered due to UTI vs pneumonia but urine cx was negative, sputum cx negative, CXR with LLL pneumonia  blood cx negative 11/25  WBC initially 19 then 31 and fluctuating, normalized on 11/26 and then increased to 29 with some hypotension, no diarrhea, no resp secretions and no fever  exam with b/l injected conjunctivae and cold LLE but that resolved LLE not cold anymore     septic shock with morganella bacteremia, ?UTI vs pneumonia but urine and sputum cx negative  cleared blood cultures 11/25  new  leukocytosis and hypotension while on cefepime, CT with LLL collapse, multiple L renal and ureteral stones with mild hydro and urothelial enhancement so likely pyelonephritis but urine cx came back negative, done on antibiotics though, pt improved on andie  sputum cx with Stenotrophomonas sensitive to levo and bactrim but no clinical evidence of pneumonia, likely colonization      * c/w andie 1 q 8, started 11/30, now day 5 will complete a 10 day course  * monitor the CBC/diff  * if any change in the respiratory status or more fever, would start levo for steno in the sputum cx    The above assessment and plan was discussed with NUNO Terrazas MD  Pager [phone]  After 5pm and on weekends call [phone]

## 2020-12-04 NOTE — PROGRESS NOTE ADULT - SUBJECTIVE AND OBJECTIVE BOX
Patient is a 65y old  Female who presents with a chief complaint of sepsis (03 Dec 2020 13:46)      Interval Events:    REVIEW OF SYSTEMS:  [ ] Positive  [ ] All other systems negative  [ ] Unable to assess ROS because ________    Vital Signs Last 24 Hrs  T(C): 37.1 (12-04-20 @ 04:00), Max: 37.1 (12-04-20 @ 04:00)  T(F): 98.7 (12-04-20 @ 04:00), Max: 98.7 (12-04-20 @ 04:00)  HR: 93 (12-04-20 @ 05:39) (80 - 107)  BP: 133/78 (12-04-20 @ 04:00) (105/69 - 148/81)  RR: 14 (12-04-20 @ 04:00) (14 - 17)  SpO2: 100% (12-04-20 @ 05:39) (95% - 100%)    PHYSICAL EXAM:  HEENT:   [ ]Tracheostomy:  [ ]Pupils equal  [ ]No oral lesions  [ ]Abnormal    SKIN  [ ]No Rash  [ ] Abnormal  [ ] pressure    CARDIAC  [ ]Regular  [ ]Abnormal    PULMONARY  [ ]Bilateral Clear Breath Sounds  [ ]Normal Excursion  [ ]Abnormal    GI  [ ]PEG      [ ] +BS		              [ ]Soft, nondistended, nontender	  [ ]Abnormal    MUSCULOSKELETAL                                   [ ]Bedbound                 [ ]Abnormal    [ ]Ambulatory/OOB to chair                           EXTREMITIES                                         [ ]Normal  [ ]Edema                           NEUROLOGIC  [ ] Normal, non focal  [ ] Focal findings:    PSYCHIATRIC  [ ]Alert and appropriate  [ ] Sedated	 [ ]Agitated    :  Paty: [ ] Yes, if yes: Date of Placement:                   [  ] No    LINES: Central Lines [ ] Yes, if yes: Date of Placement                                     [  ] No    HOSPITAL MEDICATIONS:  MEDICATIONS  (STANDING):  albuterol/ipratropium for Nebulization 3 milliLiter(s) Nebulizer every 6 hours  artificial  tears Solution 1 Drop(s) Both EYES every 6 hours  chlorhexidine 0.12% Liquid 15 milliLiter(s) Oral Mucosa every 12 hours  chlorhexidine 4% Liquid 1 Application(s) Topical <User Schedule>  diazepam   Solution 1.5 milliGRAM(s) Oral daily  diazepam   Solution 2.5 milliGRAM(s) Oral daily  erythromycin   Ointment 1 Application(s) Both EYES <User Schedule>  heparin   Injectable 5000 Unit(s) SubCutaneous every 12 hours  influenza   Vaccine 0.5 milliLiter(s) IntraMuscular once  meropenem  IVPB 1000 milliGRAM(s) IV Intermittent every 8 hours  midodrine. 5 milliGRAM(s) Oral every 8 hours  multivitamin 1 Tablet(s) Oral daily  ofloxacin 0.3% Solution 1 Drop(s) Both EYES four times a day  petrolatum Ophthalmic Ointment 1 Application(s) Both EYES <User Schedule>  polyethylene glycol 3350 17 Gram(s) Oral two times a day  riluzole 50 milliGRAM(s) Oral two times a day  senna 1 Tablet(s) Oral daily  sertraline 50 milliGRAM(s) Oral daily    MEDICATIONS  (PRN):      LABS:                        10.8   13.21 )-----------( 591      ( 04 Dec 2020 07:19 )             35.8     12-04    139  |  103  |  16  ----------------------------<  110<H>  4.3   |  24  |  <0.30<L>    Ca    9.4      04 Dec 2020 07:19  Phos  2.6     12-04  Mg     2.1     12-04      PT/INR - ( 02 Dec 2020 09:56 )   PT: 13.4 sec;   INR: 1.12 ratio         PTT - ( 02 Dec 2020 09:56 )  PTT:30.8 sec        CAPILLARY BLOOD GLUCOSE    MICROBIOLOGY:     RADIOLOGY:  [ ] Reviewed and interpreted by me    Mode: AC/ CMV (Assist Control/ Continuous Mandatory Ventilation)  RR (machine): 14  TV (machine): 450  FiO2: 30  PEEP: 5  ITime: 1  MAP: 8  PIP: 24   Patient is a 65y old  Female who presents with a chief complaint of sepsis (03 Dec 2020 13:46)      Interval Events: No overnight events.    REVIEW OF SYSTEMS:  [ ] Positive  [ ] All other systems negative  [x] Unable to assess ROS because patient unable to communicate due to severe ALS    Vital Signs Last 24 Hrs  T(C): 37.1 (12-04-20 @ 04:00), Max: 37.1 (12-04-20 @ 04:00)  T(F): 98.7 (12-04-20 @ 04:00), Max: 98.7 (12-04-20 @ 04:00)  HR: 93 (12-04-20 @ 05:39) (80 - 107)  BP: 133/78 (12-04-20 @ 04:00) (105/69 - 148/81)  RR: 14 (12-04-20 @ 04:00) (14 - 17)  SpO2: 100% (12-04-20 @ 05:39) (95% - 100%)    PHYSICAL EXAM:  HEENT:   [x]Tracheostomy: # 7 XLT Shiley   [x]Pupils equal  [ ]No oral lesions  [x]Abnormal: + Injected Conjunctiva RT > Lft     SKIN  [x]No Rash  [ ] Abnormal  [ ] pressure    CARDIAC  [x]Regular  [ ]Abnormal:     PULMONARY  [ ]Bilateral Clear Breath Sounds  [ ]Normal Excursion  [x]Abnormal: Bilateral Coarse Breath sounds    GI  [x]PEG      [x] +BS		              [x]Soft, nondistended, nontender	  [ ]Abnormal    MUSCULOSKELETAL                                   [x]Bedbound                 [ ]Abnormal    [ ]Ambulatory/OOB to chair                           EXTREMITIES                                         [x] Decrease Dorsalis pedal pulse on doppler of left lower extremity, Warm to touch   [ ]Edema                           NEUROLOGIC  [ ] Normal, non focal  [x] Focal findings: Functional Quadriplegic 2/2 ALS     PSYCHIATRIC  [ ]Alert and appropriate  [x] Sedated	 [ ]Agitated    :  Newman: [ ] Yes, if yes: Date of Placement:                   [x] No    LINES: Central Lines [ ] Yes, if yes: Date of Placement                                     [x] No    HOSPITAL MEDICATIONS:  MEDICATIONS  (STANDING):  albuterol/ipratropium for Nebulization 3 milliLiter(s) Nebulizer every 6 hours  artificial  tears Solution 1 Drop(s) Both EYES every 6 hours  chlorhexidine 0.12% Liquid 15 milliLiter(s) Oral Mucosa every 12 hours  chlorhexidine 4% Liquid 1 Application(s) Topical <User Schedule>  diazepam   Solution 1.5 milliGRAM(s) Oral daily  diazepam   Solution 2.5 milliGRAM(s) Oral daily  erythromycin   Ointment 1 Application(s) Both EYES <User Schedule>  heparin   Injectable 5000 Unit(s) SubCutaneous every 12 hours  influenza   Vaccine 0.5 milliLiter(s) IntraMuscular once  meropenem  IVPB 1000 milliGRAM(s) IV Intermittent every 8 hours  midodrine. 5 milliGRAM(s) Oral every 8 hours  multivitamin 1 Tablet(s) Oral daily  ofloxacin 0.3% Solution 1 Drop(s) Both EYES four times a day  petrolatum Ophthalmic Ointment 1 Application(s) Both EYES <User Schedule>  polyethylene glycol 3350 17 Gram(s) Oral two times a day  riluzole 50 milliGRAM(s) Oral two times a day  senna 1 Tablet(s) Oral daily  sertraline 50 milliGRAM(s) Oral daily    MEDICATIONS  (PRN):      LABS:                        10.8   13.21 )-----------( 591      ( 04 Dec 2020 07:19 )             35.8     12-04    139  |  103  |  16  ----------------------------<  110<H>  4.3   |  24  |  <0.30<L>    Ca    9.4      04 Dec 2020 07:19  Phos  2.6     12-04  Mg     2.1     12-04      PT/INR - ( 02 Dec 2020 09:56 )   PT: 13.4 sec;   INR: 1.12 ratio    PTT - ( 02 Dec 2020 09:56 )  PTT:30.8 sec    CAPILLARY BLOOD GLUCOSE: reviewed    MICROBIOLOGY: reviewed    RADIOLOGY: reviewed    Mode: AC/ CMV (Assist Control/ Continuous Mandatory Ventilation)  RR (machine): 14  TV (machine): 450  FiO2: 30  PEEP: 5  ITime: 1  MAP: 8  PIP: 24

## 2020-12-04 NOTE — PROGRESS NOTE ADULT - PROBLEM SELECTOR PLAN 1
Patient admitted with Sepsis 2/2 Bacteremia   Blood cx 11/22: Morganella Morganii, Repeat Bld CX 11/25: No growth   CT Chest / ABD/ Pelvis 11/30: Left Lower Lobe Collapse, 2mm Nonobstructive Left ureteral stone, left renal calculi and mild bilateral hydronephrosis   Urine Cx 12/1: No growth   Sputum Cx 11/30: Stenotrophomonas ( Possible Colonization )   Continue Meropenem 10 day course (completes 12/9) per ID    scope, litho, and stent on 12/10 for renal calculi/hydro  Midodrine d/c

## 2020-12-04 NOTE — CONSULT NOTE ADULT - SUBJECTIVE AND OBJECTIVE BOX
HPI:  65 F with advanced ALS, chronic hypoxic/ hypercapnic resp failure, vent dependent, s/p trach 2015 and PEG, now presenting with septic shock and Morganella bacteremia 2/2 bacterial PNA + UTI.  Patient was BIBEMS from home for fevers. Pt's 24hr home health nurse reported over the past few days the pt has appeared more lethargic, has been tachycardic, and running fevers with decreased Urination. She was empirically started on meropenum and vancomycin in ED.  Was managed for Septic shock s/p fluid resuscitation, IV pressors, and Transferred to MICU. Patients abx were switched to Cefepime after Bcx 11/22 revealed Morganella morganii.  Surveillance Bcxs from 11/25 remain NGTD.  LLE doppler study revealed stenosis of femoral and popliteal arteries causing cool, pale LLE without cyanosis. Patient was weaned off vasopressors and placed on Midodrine. Patient was Transferred to RCU on 11/28. Trach changed to #7 distal xlt shiley by ENT due to air leak on 11/28. Patient had CT abd pelvis performed which revealed 2 mm non obstructive left ureteral stone and multiple renal calculi with mild bilateral hydronephrosis.   12/4: Leukocytosis improving. Day 5/10 Meropenem (ends 12/9).  BP stable and d/c Midodrine.  Urology plan for intervention 12/10 for renal calculi- scope, lithotripsy, and stent (remove after 1wk).      Patient seen at bedside, nontoxic.  Without acute events overnight.  Discussion between  attending, Dr. Guerrero and Pulm attending, Dr. Eddy. regarding management of stone.  Overall, previously planned for cysto stent placement; however, given improvement in clinical and objective status, decision made to hold emergent stent placement as patient was on A/C.        PAST MEDICAL & SURGICAL HISTORY:  Aspiration into airway    Ventilator dependent  Since 2015    HLD (hyperlipidemia)    ALS (amyotrophic lateral sclerosis)    Encounter for PEG (percutaneous endoscopic gastrostomy)  peg placed    Dependence on tracheostomy    S/P gastrostomy  10/14 for dysphagia  receives jevity 2 cans TID      MEDICATIONS  (STANDING):  albuterol/ipratropium for Nebulization 3 milliLiter(s) Nebulizer every 6 hours  artificial  tears Solution 1 Drop(s) Both EYES every 6 hours  chlorhexidine 0.12% Liquid 15 milliLiter(s) Oral Mucosa every 12 hours  chlorhexidine 4% Liquid 1 Application(s) Topical <User Schedule>  diazepam   Solution 1.5 milliGRAM(s) Oral daily  diazepam   Solution 2.5 milliGRAM(s) Oral daily  erythromycin   Ointment 1 Application(s) Both EYES <User Schedule>  heparin   Injectable 5000 Unit(s) SubCutaneous every 12 hours  influenza   Vaccine 0.5 milliLiter(s) IntraMuscular once  meropenem  IVPB 1000 milliGRAM(s) IV Intermittent every 8 hours  multivitamin 1 Tablet(s) Oral daily  ofloxacin 0.3% Solution 1 Drop(s) Both EYES four times a day  petrolatum Ophthalmic Ointment 1 Application(s) Both EYES <User Schedule>  polyethylene glycol 3350 17 Gram(s) Oral two times a day  riluzole 50 milliGRAM(s) Oral two times a day  senna 1 Tablet(s) Oral daily  sertraline 50 milliGRAM(s) Oral daily    MEDICATIONS  (PRN):    FAMILY HISTORY:  No pertinent family history in first degree relatives      Allergies    Bactrim DS (Rash)    Intolerances      SOCIAL HISTORY:   Tobacco hx:    REVIEW OF SYSTEMS: Pertinent positives and negatives as stated in HPI, otherwise negative    Vital signs  T(C): 36.8, Max: 37.1 (12-04 @ 04:00)  HR: 70  BP: 125/81  SpO2: 98%    Output    12-03-20 @ 07:01  -  12-04-20 @ 07:00  --------------------------------------------------------  IN: 1320 mL / OUT: 500 mL / NET: 820 mL    12-04-20 @ 07:01  -  12-04-20 @ 22:18  --------------------------------------------------------  IN: 0 mL / OUT: 400 mL / NET: -400 mL      UOP    Physical Exam  Gen: NAD  Pulm: Trach - ventilator  Abd: No appreciable CVAT    LABS:  12-04 @ 07:19    WBC 13.21 / Hct 35.8  / SCr <0.30    12-03 @ 10:41    WBC 16.13 / Hct 37.2  / SCr --       12-04    139  |  103  |  16  ----------------------------<  110<H>  4.3   |  24  |  <0.30<L>    Ca    9.4      04 Dec 2020 07:19  Phos  2.6     12-04  Mg     2.1     12-04            Urine Cx: cultureCulture - Urine (12.01.20 @ 17:36)   Specimen Source: .Urine Catheterized   Culture Results:   No growth       Historical Values  Culture - Urine (12.01.20 @ 17:36)   Specimen Source: .Urine Catheterized   Culture Results:   No growth   Culture - Urine (11.22.20 @ 18:15)   Specimen Source: .Urine Clean Catch (Midstream)   Culture Results:   <10,000 CFU/mL Normal Urogenital Kia   Culture - Urine (09.09.19 @ 11:14)   Culture - Urine:   NO GROWTH AT 24 HOURS   Specimen Source: URINE CATHETER   Culture - Urine (05.09.18 @ 17:16)   - Amikacin: S <=16 KULDIP   - Ampicillin/Sulbactam: S <=8/4 KULDIP   - Cefepime: S <=4 KULDIP   - Aztreonam: S <=4 KULDIP   - Ampicillin: R >16 KULDIP   - Gentamicin: S <=4 KULDIP   - Levofloxacin: S <=2 KULDIP   - Ertapenem: S <=1 KULDIP   - Ceftriaxone: S <=1 KULDIP   - Ceftazidime: S <=1 KULDIP   - Cefazolin: S <=8 KULDIP   - Cefoxitin: S <=8 KULDIP   - Ciprofloxacin: S <=1 KULDIP   - Piperacillin/Tazobactam: S <=16 KULDIP   - Nitrofurantoin: S <=32 KULDIP   - Imipenem: S <=1 KULDIP   - Meropenem: S <=1 KULDIP   - Tigecycline: S <=2 KULDIP   - Tobramycin: S <=4 KULDIP   Culture - Urine:   COLONY COUNT: > = 100,000 CFU/ML   - Trimethoprim/Sulfamethoxazole: S <=2/38 KULDIP   Specimen Source: URINE MIDSTREAM   Organism Identification: Klebsiella pneumoniae   Organism: Klebsiella pneumoniae   Method Type: [place] NEG URINE COMBO 61   Culture - Urine (04.05.17 @ 05:00)   Specimen Source: .Urine Catheterized   Culture Results:   No growth   Culture - Urine (07.07.15 @ 18:43)   Specimen Source: .Urine Catheterized   Culture Results:   No growth   Culture - Urine (07.01.15 @ 21:57)   Specimen Source: .Urine Catheterized   Culture Results:   No growth   Culture - Urine (05.19.15 @ 12:37)   Specimen Source: .Urine Catheterized   Culture Results:   No growth     RADIOLOGY:  < from: CT Abdomen and Pelvis w/ Oral Cont and w/ IV Cont (11.30.20 @ 20:16) >    EXAM:  CT ABDOMEN AND PELVIS OC IC                            KIDNEYS/URETERS: Subcentimeter nonobstructive left renal calculi. Mild bilateral hydronephrosis. Mild left ureteral dilatation with urothelial enhancement. 2 mm nonobstructive calculus distal left ureter (3, 251).    BLADDER: Air bubble within bladder lumen.  REPRODUCTIVE ORGANS: Uterus and adnexa appear unremarkable.        IMPRESSION:  Left lower lobe collapse. Superimposed infection is not excluded.    2 mm left ureteral calculus and multiple nonobstructive subcentimeter left renal calculi. Associated mild hydronephrosis and urothelial enhancement suggests inflammation/infection.    Cholelithiasis.  /                  NIKKO CHINCHILLA MD; Attending Radiologist  This document has been electronically signed. Dec  1 2020 10:01AM

## 2020-12-04 NOTE — PROGRESS NOTE ADULT - PROBLEM SELECTOR PLAN 2
Patient with Tracheostomy at baseline ( 2015)   Continue Mechanical Ventilation  Patient does not tolerate weaning due to advanced ALS  Continue Chest PT / Suctioning PRN

## 2020-12-04 NOTE — PROGRESS NOTE ADULT - SUBJECTIVE AND OBJECTIVE BOX
Follow Up:  leukocytosis    Interval History: WBC improved to 12, no acute events    ROS:    Unobtainable because of mental status          Allergies  Bactrim DS (Rash)        ANTIMICROBIALS:  meropenem  IVPB 1000 every 8 hours      OTHER MEDS:  albuterol/ipratropium for Nebulization 3 milliLiter(s) Nebulizer every 6 hours  artificial  tears Solution 1 Drop(s) Both EYES every 6 hours  chlorhexidine 0.12% Liquid 15 milliLiter(s) Oral Mucosa every 12 hours  chlorhexidine 4% Liquid 1 Application(s) Topical <User Schedule>  diazepam   Solution 1.5 milliGRAM(s) Oral daily  diazepam   Solution 2.5 milliGRAM(s) Oral daily  erythromycin   Ointment 1 Application(s) Both EYES <User Schedule>  heparin   Injectable 5000 Unit(s) SubCutaneous every 12 hours  influenza   Vaccine 0.5 milliLiter(s) IntraMuscular once  multivitamin 1 Tablet(s) Oral daily  ofloxacin 0.3% Solution 1 Drop(s) Both EYES four times a day  petrolatum Ophthalmic Ointment 1 Application(s) Both EYES <User Schedule>  polyethylene glycol 3350 17 Gram(s) Oral two times a day  riluzole 50 milliGRAM(s) Oral two times a day  senna 1 Tablet(s) Oral daily  sertraline 50 milliGRAM(s) Oral daily      Vital Signs Last 24 Hrs  T(C): 37.1 (04 Dec 2020 04:00), Max: 37.1 (04 Dec 2020 04:00)  T(F): 98.7 (04 Dec 2020 04:00), Max: 98.7 (04 Dec 2020 04:00)  HR: 104 (04 Dec 2020 08:29) (80 - 107)  BP: 133/78 (04 Dec 2020 04:00) (105/69 - 148/81)  BP(mean): --  RR: 14 (04 Dec 2020 04:00) (14 - 17)  SpO2: 99% (04 Dec 2020 08:29) (95% - 100%)    Physical Exam:  General:    NAD  ENT:   trach, no significant secretions  Cardio:     regular S1, S2  Respiratory:    clear b/l,    no wheezing  abd:     soft,   BS +,   PEG  :     no  carter  Musculoskeletal:   no joint swelling  vascular: no phlebitis  Skin:    no rash, no decubitus                           10.8   13.21 )-----------( 591      ( 04 Dec 2020 07:19 )             35.8       12-04    139  |  103  |  16  ----------------------------<  110<H>  4.3   |  24  |  <0.30<L>    Ca    9.4      04 Dec 2020 07:19  Phos  2.6     12-04  Mg     2.1     12-04            MICROBIOLOGY:  v  .Urine Catheterized  12-01-20   No growth  --  --      .Blood Blood-Peripheral  11-30-20   No growth to date.  --  --      .Sputum Sputum  11-30-20   Moderate Stenotrophomonas maltophilia  Normal Respiratory Kia present  --  Stenotrophomonas maltophilia      .Blood Blood  11-25-20   No Growth Final  --  --      .Sputum Sputum  11-23-20   Normal Respiratory Kia present  --    Rare polymorphonuclear leukocytes per low power field  Rare Squamous epithelial cells per low power field  Few Gram Variable Rods per oil power field      .Blood Blood-Peripheral  11-22-20   Growth in aerobic and anaerobic bottles: Morganella morganii  See previous culture [id number]  --  Blood Culture PCR  Morganella morganii  Morganella morganii      .Urine Clean Catch (Midstream)  11-22-20   <10,000 CFU/mL Normal Urogenital Kia  --  --                RADIOLOGY:  Images independently visualized and reviewed personally, findings as below  < from: CT Abdomen and Pelvis w/ Oral Cont and w/ IV Cont (11.30.20 @ 20:16) >  IMPRESSION:  Left lower lobe collapse. Superimposed infection is not excluded.    2 mm left ureteral calculus and multiple nonobstructive subcentimeter left renal calculi. Associated mild hydronephrosis and urothelial enhancement suggests inflammation/infection.    Cholelithiasis.    < end of copied text >  < from: CT Chest w/ IV Cont (11.30.20 @ 20:16) >  IMPRESSION:  Left lower lobe collapse. Superimposed infection is not excluded.    2 mm left ureteral calculus and multiple nonobstructive subcentimeter left renal calculi. Associated mild hydronephrosis and urothelial enhancement suggests inflammation/infection.    Cholelithiasis.

## 2020-12-04 NOTE — CONSULT NOTE ADULT - ASSESSMENT
65 F with advanced ALS, chronic hypoxic/ hypercapnic resp failure, vent dependent, s/p trach 2015 and PEG, now presenting with septic shock and Morganella bacteremia 2/2 bacterial PNA + UTI.   -- Discussed in depth between RCU and  attending (Dr. Guerrero) regarding distal stone in setting of fever/sepsis  -- Plan for OR next Thursday (12/10) for cystoscopy, ureteral stent placement   -- Please document medical clearance for general anesthesia  -- Preop Wednesday (12/9), NPOpMN, pertinent labs, IVF

## 2020-12-04 NOTE — PROGRESS NOTE ADULT - ATTENDING COMMENTS
65 F with advanced ALS, chronic hypoxic/hypercapnic respiratory failure, vent dependent, s/p trach and PEG, now presenting with septic shock and Morganella bacteremia due to UTI, nonobstructing stones on CT.  - Clinically stable. Afebrile and remains hemodynamically stable. Leukocytosis continues to downtrend. Creatinine remains normal.  - Discussed case with Dr. Guerrero. No urgent need for procedure as patient is clinically stable but given renal stones is scheduled ureteroscopy with laser lithothripsy and stent placement on 12/10.  - Continue meropenem until 12/9. Suspect Stenotrophomonas is colonizer but if change in status, will consider addition of Levaquin per ID recommendations.   - Continue metanebs to mobilize secretions.  - D/C midodrine. No

## 2020-12-05 LAB
ANION GAP SERPL CALC-SCNC: 15 MMOL/L — SIGNIFICANT CHANGE UP (ref 5–17)
BUN SERPL-MCNC: 19 MG/DL — SIGNIFICANT CHANGE UP (ref 7–23)
CALCIUM SERPL-MCNC: 9.6 MG/DL — SIGNIFICANT CHANGE UP (ref 8.4–10.5)
CHLORIDE SERPL-SCNC: 102 MMOL/L — SIGNIFICANT CHANGE UP (ref 96–108)
CO2 SERPL-SCNC: 21 MMOL/L — LOW (ref 22–31)
CREAT SERPL-MCNC: <0.3 MG/DL — LOW (ref 0.5–1.3)
CULTURE RESULTS: SIGNIFICANT CHANGE UP
GLUCOSE SERPL-MCNC: 98 MG/DL — SIGNIFICANT CHANGE UP (ref 70–99)
HCT VFR BLD CALC: 35.9 % — SIGNIFICANT CHANGE UP (ref 34.5–45)
HGB BLD-MCNC: 11.2 G/DL — LOW (ref 11.5–15.5)
MAGNESIUM SERPL-MCNC: 2 MG/DL — SIGNIFICANT CHANGE UP (ref 1.6–2.6)
MCHC RBC-ENTMCNC: 28.4 PG — SIGNIFICANT CHANGE UP (ref 27–34)
MCHC RBC-ENTMCNC: 31.2 GM/DL — LOW (ref 32–36)
MCV RBC AUTO: 90.9 FL — SIGNIFICANT CHANGE UP (ref 80–100)
NRBC # BLD: 0 /100 WBCS — SIGNIFICANT CHANGE UP (ref 0–0)
PHOSPHATE SERPL-MCNC: 2.5 MG/DL — SIGNIFICANT CHANGE UP (ref 2.5–4.5)
PLATELET # BLD AUTO: 596 K/UL — HIGH (ref 150–400)
POTASSIUM SERPL-MCNC: 4.6 MMOL/L — SIGNIFICANT CHANGE UP (ref 3.5–5.3)
POTASSIUM SERPL-SCNC: 4.6 MMOL/L — SIGNIFICANT CHANGE UP (ref 3.5–5.3)
RBC # BLD: 3.95 M/UL — SIGNIFICANT CHANGE UP (ref 3.8–5.2)
RBC # FLD: 16.1 % — HIGH (ref 10.3–14.5)
SODIUM SERPL-SCNC: 138 MMOL/L — SIGNIFICANT CHANGE UP (ref 135–145)
SPECIMEN SOURCE: SIGNIFICANT CHANGE UP
WBC # BLD: 11.21 K/UL — HIGH (ref 3.8–10.5)
WBC # FLD AUTO: 11.21 K/UL — HIGH (ref 3.8–10.5)

## 2020-12-05 PROCEDURE — 99233 SBSQ HOSP IP/OBS HIGH 50: CPT | Mod: GC

## 2020-12-05 RX ADMIN — Medication 1 DROP(S): at 17:18

## 2020-12-05 RX ADMIN — MEROPENEM 100 MILLIGRAM(S): 1 INJECTION INTRAVENOUS at 14:58

## 2020-12-05 RX ADMIN — Medication 1 DROP(S): at 00:06

## 2020-12-05 RX ADMIN — Medication 1 APPLICATION(S): at 06:30

## 2020-12-05 RX ADMIN — RILUZOLE 50 MILLIGRAM(S): 50 TABLET ORAL at 17:18

## 2020-12-05 RX ADMIN — Medication 2.5 MILLIGRAM(S): at 21:11

## 2020-12-05 RX ADMIN — Medication 1 APPLICATION(S): at 00:06

## 2020-12-05 RX ADMIN — CHLORHEXIDINE GLUCONATE 15 MILLILITER(S): 213 SOLUTION TOPICAL at 06:29

## 2020-12-05 RX ADMIN — Medication 1.5 MILLIGRAM(S): at 10:34

## 2020-12-05 RX ADMIN — Medication 1 DROP(S): at 06:31

## 2020-12-05 RX ADMIN — Medication 1 APPLICATION(S): at 08:21

## 2020-12-05 RX ADMIN — Medication 1 APPLICATION(S): at 12:54

## 2020-12-05 RX ADMIN — Medication 1 DROP(S): at 00:05

## 2020-12-05 RX ADMIN — Medication 1 DROP(S): at 17:14

## 2020-12-05 RX ADMIN — MEROPENEM 100 MILLIGRAM(S): 1 INJECTION INTRAVENOUS at 21:11

## 2020-12-05 RX ADMIN — Medication 1 DROP(S): at 12:53

## 2020-12-05 RX ADMIN — CHLORHEXIDINE GLUCONATE 1 APPLICATION(S): 213 SOLUTION TOPICAL at 06:29

## 2020-12-05 RX ADMIN — Medication 1 APPLICATION(S): at 02:37

## 2020-12-05 RX ADMIN — RILUZOLE 50 MILLIGRAM(S): 50 TABLET ORAL at 06:31

## 2020-12-05 RX ADMIN — HEPARIN SODIUM 5000 UNIT(S): 5000 INJECTION INTRAVENOUS; SUBCUTANEOUS at 17:18

## 2020-12-05 RX ADMIN — Medication 1 APPLICATION(S): at 21:06

## 2020-12-05 RX ADMIN — Medication 1 TABLET(S): at 12:54

## 2020-12-05 RX ADMIN — Medication 3 MILLILITER(S): at 05:34

## 2020-12-05 RX ADMIN — Medication 1 APPLICATION(S): at 15:59

## 2020-12-05 RX ADMIN — Medication 3 MILLILITER(S): at 17:54

## 2020-12-05 RX ADMIN — POLYETHYLENE GLYCOL 3350 17 GRAM(S): 17 POWDER, FOR SOLUTION ORAL at 17:18

## 2020-12-05 RX ADMIN — POLYETHYLENE GLYCOL 3350 17 GRAM(S): 17 POWDER, FOR SOLUTION ORAL at 06:31

## 2020-12-05 RX ADMIN — Medication 1 APPLICATION(S): at 17:17

## 2020-12-05 RX ADMIN — CHLORHEXIDINE GLUCONATE 15 MILLILITER(S): 213 SOLUTION TOPICAL at 17:17

## 2020-12-05 RX ADMIN — MEROPENEM 100 MILLIGRAM(S): 1 INJECTION INTRAVENOUS at 06:31

## 2020-12-05 RX ADMIN — SERTRALINE 50 MILLIGRAM(S): 25 TABLET, FILM COATED ORAL at 06:33

## 2020-12-05 RX ADMIN — HEPARIN SODIUM 5000 UNIT(S): 5000 INJECTION INTRAVENOUS; SUBCUTANEOUS at 06:30

## 2020-12-05 RX ADMIN — SENNA PLUS 1 TABLET(S): 8.6 TABLET ORAL at 12:54

## 2020-12-05 RX ADMIN — Medication 3 MILLILITER(S): at 12:07

## 2020-12-05 RX ADMIN — Medication 1 APPLICATION(S): at 10:35

## 2020-12-05 RX ADMIN — Medication 1 DROP(S): at 06:30

## 2020-12-05 NOTE — PROGRESS NOTE ADULT - PROBLEM SELECTOR PLAN 1
Patient admitted with Sepsis 2/2 Bacteremia   Blood cx 11/22: Morganella Morganii, Repeat Bld CX 11/25: No growth   CT Chest / ABD/ Pelvis 11/30: Left Lower Lobe Collapse, 2mm Nonobstructive Left ureteral stone, left renal calculi and mild bilateral hydronephrosis   Urine Cx 12/1: No growth   Sputum Cx 11/30: Stenotrophomonas ( Possible Colonization )   Continue Meropenem 10 day course (completes 12/9) per ID    scope, litho, and stent on 12/10 for renal calculi/hydro  Midodrine d/c Patient admitted with Sepsis 2/2 Bacteremia   Blood cx 11/22: Morganella Morganii, Repeat Bld CX 11/25: No growth   CT Chest / ABD/ Pelvis 11/30: Left Lower Lobe Collapse, 2mm Nonobstructive Left ureteral stone, left renal calculi and mild bilateral hydronephrosis   Urine Cx 12/1: No growth   Sputum Cx 11/30: Stenotrophomonas ( Possible Colonization )   Continue Meropenem 10 day course (completes 12/9) per ID    cystoscopy, litho, and stent on 12/10 for renal calculi/hydro  Midodrine d/c

## 2020-12-05 NOTE — PROGRESS NOTE ADULT - SUBJECTIVE AND OBJECTIVE BOX
Patient is a 65y old  Female who presents with a chief complaint of sepsis (04 Dec 2020 22:18)      Interval Events:    REVIEW OF SYSTEMS:  [ ] Positive  [ ] All other systems negative  [ ] Unable to assess ROS because ________    Vital Signs Last 24 Hrs  T(C): 36.5 (12-05-20 @ 04:50), Max: 36.8 (12-04-20 @ 21:11)  T(F): 97.7 (12-05-20 @ 04:50), Max: 98.3 (12-04-20 @ 21:11)  HR: 100 (12-05-20 @ 05:34) (70 - 108)  BP: 103/72 (12-05-20 @ 04:50) (103/72 - 125/81)  RR: 14 (12-05-20 @ 04:50) (14 - 18)  SpO2: 96% (12-05-20 @ 05:34) (96% - 100%)    PHYSICAL EXAM:  HEENT:   [ ]Tracheostomy:  [ ]Pupils equal  [ ]No oral lesions  [ ]Abnormal    SKIN  [ ]No Rash  [ ] Abnormal  [ ] pressure    CARDIAC  [ ]Regular  [ ]Abnormal    PULMONARY  [ ]Bilateral Clear Breath Sounds  [ ]Normal Excursion  [ ]Abnormal    GI  [ ]PEG      [ ] +BS		              [ ]Soft, nondistended, nontender	  [ ]Abnormal    MUSCULOSKELETAL                                   [ ]Bedbound                 [ ]Abnormal    [ ]Ambulatory/OOB to chair                           EXTREMITIES                                         [ ]Normal  [ ]Edema                           NEUROLOGIC  [ ] Normal, non focal  [ ] Focal findings:    PSYCHIATRIC  [ ]Alert and appropriate  [ ] Sedated	 [ ]Agitated    :  Paty: [ ] Yes, if yes: Date of Placement:                   [  ] No    LINES: Central Lines [ ] Yes, if yes: Date of Placement                                     [  ] No    HOSPITAL MEDICATIONS:  MEDICATIONS  (STANDING):  albuterol/ipratropium for Nebulization 3 milliLiter(s) Nebulizer every 6 hours  artificial  tears Solution 1 Drop(s) Both EYES every 6 hours  chlorhexidine 0.12% Liquid 15 milliLiter(s) Oral Mucosa every 12 hours  chlorhexidine 4% Liquid 1 Application(s) Topical <User Schedule>  diazepam   Solution 1.5 milliGRAM(s) Oral daily  diazepam   Solution 2.5 milliGRAM(s) Oral daily  erythromycin   Ointment 1 Application(s) Both EYES <User Schedule>  heparin   Injectable 5000 Unit(s) SubCutaneous every 12 hours  influenza   Vaccine 0.5 milliLiter(s) IntraMuscular once  meropenem  IVPB 1000 milliGRAM(s) IV Intermittent every 8 hours  multivitamin 1 Tablet(s) Oral daily  ofloxacin 0.3% Solution 1 Drop(s) Both EYES four times a day  petrolatum Ophthalmic Ointment 1 Application(s) Both EYES <User Schedule>  polyethylene glycol 3350 17 Gram(s) Oral two times a day  riluzole 50 milliGRAM(s) Oral two times a day  senna 1 Tablet(s) Oral daily  sertraline 50 milliGRAM(s) Oral daily    MEDICATIONS  (PRN):      LABS:                        10.8   13.21 )-----------( 591      ( 04 Dec 2020 07:19 )             35.8     12-04    139  |  103  |  16  ----------------------------<  110<H>  4.3   |  24  |  <0.30<L>    Ca    9.4      04 Dec 2020 07:19  Phos  2.6     12-04  Mg     2.1     12-04              CAPILLARY BLOOD GLUCOSE    MICROBIOLOGY:     RADIOLOGY:  [ ] Reviewed and interpreted by me    Mode: AC/ CMV (Assist Control/ Continuous Mandatory Ventilation)  RR (machine): 14  TV (machine): 450  FiO2: 21  PEEP: 5  ITime: 1  MAP: 9  PIP: 25   Patient is a 65y old  Female who presents with a chief complaint of sepsis (04 Dec 2020 22:18)    Interval Events: no overnight events    REVIEW OF SYSTEMS:  [ ] Positive  [ ] All other systems negative  [x] Unable to assess ROS because patient unable to communicate due to severe ALS    Vital Signs Last 24 Hrs  T(C): 36.5 (12-05-20 @ 04:50), Max: 36.8 (12-04-20 @ 21:11)  T(F): 97.7 (12-05-20 @ 04:50), Max: 98.3 (12-04-20 @ 21:11)  HR: 100 (12-05-20 @ 05:34) (70 - 108)  BP: 103/72 (12-05-20 @ 04:50) (103/72 - 125/81)  RR: 14 (12-05-20 @ 04:50) (14 - 18)  SpO2: 96% (12-05-20 @ 05:34) (96% - 100%)    PHYSICAL EXAM:  HEENT:   [x]Tracheostomy: # 7 XLT Shiley   [x]Pupils equal  [ ]No oral lesions  [x]Abnormal: + Injected Conjunctiva RT > Lft     SKIN  [x]No Rash  [ ] Abnormal  [ ] pressure    CARDIAC  [x]Regular  [ ]Abnormal:     PULMONARY  [ ]Bilateral Clear Breath Sounds  [ ]Normal Excursion  [x]Abnormal: Bilateral Coarse Breath sounds    GI  [x]PEG      [x] +BS		              [x]Soft, nondistended, nontender	  [ ]Abnormal    MUSCULOSKELETAL                                   [x]Bedbound                 [ ]Abnormal    [ ]Ambulatory/OOB to chair                           EXTREMITIES                                         [x] Decrease Dorsalis pedal pulse on doppler of left lower extremity, Warm to touch   [ ]Edema                           NEUROLOGIC  [ ] Normal, non focal  [x] Focal findings: Functional Quadriplegic 2/2 ALS     PSYCHIATRIC  [ ]Alert and appropriate  [x] Sedated	 [ ]Agitated    :  Newman: [ ] Yes, if yes: Date of Placement:                   [x] No    LINES: Central Lines [ ] Yes, if yes: Date of Placement                                     [x] No    HOSPITAL MEDICATIONS:  MEDICATIONS  (STANDING):  albuterol/ipratropium for Nebulization 3 milliLiter(s) Nebulizer every 6 hours  artificial  tears Solution 1 Drop(s) Both EYES every 6 hours  chlorhexidine 0.12% Liquid 15 milliLiter(s) Oral Mucosa every 12 hours  chlorhexidine 4% Liquid 1 Application(s) Topical <User Schedule>  diazepam   Solution 1.5 milliGRAM(s) Oral daily  diazepam   Solution 2.5 milliGRAM(s) Oral daily  erythromycin   Ointment 1 Application(s) Both EYES <User Schedule>  heparin   Injectable 5000 Unit(s) SubCutaneous every 12 hours  influenza   Vaccine 0.5 milliLiter(s) IntraMuscular once  meropenem  IVPB 1000 milliGRAM(s) IV Intermittent every 8 hours  multivitamin 1 Tablet(s) Oral daily  ofloxacin 0.3% Solution 1 Drop(s) Both EYES four times a day  petrolatum Ophthalmic Ointment 1 Application(s) Both EYES <User Schedule>  polyethylene glycol 3350 17 Gram(s) Oral two times a day  riluzole 50 milliGRAM(s) Oral two times a day  senna 1 Tablet(s) Oral daily  sertraline 50 milliGRAM(s) Oral daily    MEDICATIONS  (PRN):      LABS:                        10.8   13.21 )-----------( 591      ( 04 Dec 2020 07:19 )             35.8     12-04    139  |  103  |  16  ----------------------------<  110<H>  4.3   |  24  |  <0.30<L>    Ca    9.4      04 Dec 2020 07:19  Phos  2.6     12-04  Mg     2.1     12-04      CAPILLARY BLOOD GLUCOSE: reviewed    MICROBIOLOGY: reviewed    RADIOLOGY: reviewed    Mode: AC/ CMV (Assist Control/ Continuous Mandatory Ventilation)  RR (machine): 14  TV (machine): 450  FiO2: 21  PEEP: 5  ITime: 1  MAP: 9  PIP: 25

## 2020-12-05 NOTE — PROGRESS NOTE ADULT - ASSESSMENT
65 F with advanced ALS, chronic hypoxic/ hypercapnic resp failure, vent dependent, s/p trach 2015 and PEG, now presenting with septic shock and Morganella bacteremia 2/2 bacterial PNA + UTI.  Patient was BIBEMS from home for fevers. Pt's 24hr home health nurse reported over the past few days the pt has appeared more lethargic, has been tachycardic, and running fevers with decreased Urination. She was empirically started on meropenum and vancomycin in ED.  Was managed for Septic shock s/p fluid resuscitation, IV pressors, and Transferred to MICU. Patients abx were switched to Cefepime after Bcx 11/22 revealed Morganella morganii.  Surveillance Bcxs from 11/25 remain NGTD.  LLE doppler study revealed stenosis of femoral and popliteal arteries causing cool, pale LLE without cyanosis. Patient was weaned off vasopressors and placed on Midodrine. Patient was Transferred to RCU on 11/28. Trach changed to #7 distal xlt lise by ENT due to air leak on 11/28. Patient had CT abd pelvis performed which revealed 2 mm non obstructive left ureteral stone and multiple renal calculi with mild bilateral hydronephrosis.     12/4: Leukocytosis improving. Day 5/10 Meropenem (ends 12/9).  BP stable and d/c Midodrine.  Urology plan for intervention 12/10 for renal calculi- scope, lithotripsy, and stent (remove after 1wk). 65 F with advanced ALS, chronic hypoxic/ hypercapnic resp failure, vent dependent, s/p trach 2015 and PEG, now presenting with septic shock and Morganella bacteremia 2/2 bacterial PNA + UTI.  Patient was BIBEMS from home for fevers. Pt's 24hr home health nurse reported over the past few days the pt has appeared more lethargic, has been tachycardic, and running fevers with decreased Urination. She was empirically started on meropenum and vancomycin in ED.  Was managed for Septic shock s/p fluid resuscitation, IV pressors, and Transferred to MICU. Patients abx were switched to Cefepime after Bcx 11/22 revealed Morganella morganii.  Surveillance Bcxs from 11/25 remain NGTD.  LLE doppler study revealed stenosis of femoral and popliteal arteries causing cool, pale LLE without cyanosis. Patient was weaned off vasopressors and placed on Midodrine. Patient was Transferred to RCU on 11/28. Trach changed to #7 distal xlt lise by ENT due to air leak on 11/28. Patient had CT abd pelvis performed which revealed 2 mm non obstructive left ureteral stone and multiple renal calculi with mild bilateral hydronephrosis.     12/5: Leukocytosis improving. Day 6/10 Meropenem (ends 12/9).  Vital signs stable and afebrile.  Urology plan for intervention 12/10 for renal calculi- cystoscopy, lithotripsy, and stent (remove after 1wk).

## 2020-12-06 LAB
ANION GAP SERPL CALC-SCNC: 14 MMOL/L — SIGNIFICANT CHANGE UP (ref 5–17)
BASOPHILS # BLD AUTO: 0.18 K/UL — SIGNIFICANT CHANGE UP (ref 0–0.2)
BASOPHILS NFR BLD AUTO: 1.3 % — SIGNIFICANT CHANGE UP (ref 0–2)
BUN SERPL-MCNC: 22 MG/DL — SIGNIFICANT CHANGE UP (ref 7–23)
CALCIUM SERPL-MCNC: 9.4 MG/DL — SIGNIFICANT CHANGE UP (ref 8.4–10.5)
CHLORIDE SERPL-SCNC: 103 MMOL/L — SIGNIFICANT CHANGE UP (ref 96–108)
CO2 SERPL-SCNC: 21 MMOL/L — LOW (ref 22–31)
CREAT SERPL-MCNC: <0.3 MG/DL — LOW (ref 0.5–1.3)
EOSINOPHIL # BLD AUTO: 0.71 K/UL — HIGH (ref 0–0.5)
EOSINOPHIL NFR BLD AUTO: 5 % — SIGNIFICANT CHANGE UP (ref 0–6)
GLUCOSE SERPL-MCNC: 107 MG/DL — HIGH (ref 70–99)
HCT VFR BLD CALC: 37.9 % — SIGNIFICANT CHANGE UP (ref 34.5–45)
HGB BLD-MCNC: 11.2 G/DL — LOW (ref 11.5–15.5)
IMM GRANULOCYTES NFR BLD AUTO: 0.8 % — SIGNIFICANT CHANGE UP (ref 0–1.5)
LYMPHOCYTES # BLD AUTO: 14.3 % — SIGNIFICANT CHANGE UP (ref 13–44)
LYMPHOCYTES # BLD AUTO: 2.02 K/UL — SIGNIFICANT CHANGE UP (ref 1–3.3)
MAGNESIUM SERPL-MCNC: 2 MG/DL — SIGNIFICANT CHANGE UP (ref 1.6–2.6)
MCHC RBC-ENTMCNC: 27.4 PG — SIGNIFICANT CHANGE UP (ref 27–34)
MCHC RBC-ENTMCNC: 29.6 GM/DL — LOW (ref 32–36)
MCV RBC AUTO: 92.7 FL — SIGNIFICANT CHANGE UP (ref 80–100)
MONOCYTES # BLD AUTO: 2.23 K/UL — HIGH (ref 0–0.9)
MONOCYTES NFR BLD AUTO: 15.8 % — HIGH (ref 2–14)
NEUTROPHILS # BLD AUTO: 8.86 K/UL — HIGH (ref 1.8–7.4)
NEUTROPHILS NFR BLD AUTO: 62.8 % — SIGNIFICANT CHANGE UP (ref 43–77)
NRBC # BLD: 0 /100 WBCS — SIGNIFICANT CHANGE UP (ref 0–0)
PHOSPHATE SERPL-MCNC: 2.7 MG/DL — SIGNIFICANT CHANGE UP (ref 2.5–4.5)
PLATELET # BLD AUTO: 600 K/UL — HIGH (ref 150–400)
POTASSIUM SERPL-MCNC: 4.3 MMOL/L — SIGNIFICANT CHANGE UP (ref 3.5–5.3)
POTASSIUM SERPL-SCNC: 4.3 MMOL/L — SIGNIFICANT CHANGE UP (ref 3.5–5.3)
RBC # BLD: 4.09 M/UL — SIGNIFICANT CHANGE UP (ref 3.8–5.2)
RBC # FLD: 16 % — HIGH (ref 10.3–14.5)
SARS-COV-2 RNA SPEC QL NAA+PROBE: SIGNIFICANT CHANGE UP
SODIUM SERPL-SCNC: 138 MMOL/L — SIGNIFICANT CHANGE UP (ref 135–145)
WBC # BLD: 14.12 K/UL — HIGH (ref 3.8–10.5)
WBC # FLD AUTO: 14.12 K/UL — HIGH (ref 3.8–10.5)

## 2020-12-06 PROCEDURE — 99233 SBSQ HOSP IP/OBS HIGH 50: CPT | Mod: GC

## 2020-12-06 RX ORDER — DIAZEPAM 5 MG
1.5 TABLET ORAL
Refills: 0 | Status: DISCONTINUED | OUTPATIENT
Start: 2020-12-06 | End: 2020-12-10

## 2020-12-06 RX ORDER — DIAZEPAM 5 MG
2.5 TABLET ORAL
Refills: 0 | Status: DISCONTINUED | OUTPATIENT
Start: 2020-12-06 | End: 2020-12-10

## 2020-12-06 RX ADMIN — MEROPENEM 100 MILLIGRAM(S): 1 INJECTION INTRAVENOUS at 05:19

## 2020-12-06 RX ADMIN — Medication 1 DROP(S): at 11:52

## 2020-12-06 RX ADMIN — SERTRALINE 50 MILLIGRAM(S): 25 TABLET, FILM COATED ORAL at 05:20

## 2020-12-06 RX ADMIN — MEROPENEM 100 MILLIGRAM(S): 1 INJECTION INTRAVENOUS at 21:08

## 2020-12-06 RX ADMIN — Medication 1 DROP(S): at 00:24

## 2020-12-06 RX ADMIN — Medication 1 APPLICATION(S): at 15:27

## 2020-12-06 RX ADMIN — Medication 3 MILLILITER(S): at 12:07

## 2020-12-06 RX ADMIN — Medication 1 APPLICATION(S): at 09:56

## 2020-12-06 RX ADMIN — Medication 1.5 MILLIGRAM(S): at 10:33

## 2020-12-06 RX ADMIN — HEPARIN SODIUM 5000 UNIT(S): 5000 INJECTION INTRAVENOUS; SUBCUTANEOUS at 05:20

## 2020-12-06 RX ADMIN — Medication 3 MILLILITER(S): at 00:25

## 2020-12-06 RX ADMIN — Medication 2.5 MILLIGRAM(S): at 21:07

## 2020-12-06 RX ADMIN — Medication 3 MILLILITER(S): at 18:03

## 2020-12-06 RX ADMIN — Medication 1 DROP(S): at 05:18

## 2020-12-06 RX ADMIN — CHLORHEXIDINE GLUCONATE 15 MILLILITER(S): 213 SOLUTION TOPICAL at 05:19

## 2020-12-06 RX ADMIN — Medication 1 DROP(S): at 18:03

## 2020-12-06 RX ADMIN — RILUZOLE 50 MILLIGRAM(S): 50 TABLET ORAL at 05:20

## 2020-12-06 RX ADMIN — POLYETHYLENE GLYCOL 3350 17 GRAM(S): 17 POWDER, FOR SOLUTION ORAL at 05:20

## 2020-12-06 RX ADMIN — Medication 1 APPLICATION(S): at 18:03

## 2020-12-06 RX ADMIN — Medication 1 APPLICATION(S): at 05:19

## 2020-12-06 RX ADMIN — Medication 1 APPLICATION(S): at 21:04

## 2020-12-06 RX ADMIN — Medication 1 APPLICATION(S): at 05:18

## 2020-12-06 RX ADMIN — Medication 1 APPLICATION(S): at 11:52

## 2020-12-06 RX ADMIN — Medication 3 MILLILITER(S): at 05:26

## 2020-12-06 RX ADMIN — CHLORHEXIDINE GLUCONATE 15 MILLILITER(S): 213 SOLUTION TOPICAL at 18:02

## 2020-12-06 RX ADMIN — Medication 1 DROP(S): at 18:02

## 2020-12-06 RX ADMIN — Medication 1 APPLICATION(S): at 00:25

## 2020-12-06 RX ADMIN — Medication 1 APPLICATION(S): at 10:33

## 2020-12-06 RX ADMIN — Medication 3 MILLILITER(S): at 23:23

## 2020-12-06 RX ADMIN — SENNA PLUS 1 TABLET(S): 8.6 TABLET ORAL at 11:51

## 2020-12-06 RX ADMIN — Medication 1 TABLET(S): at 11:52

## 2020-12-06 RX ADMIN — MEROPENEM 100 MILLIGRAM(S): 1 INJECTION INTRAVENOUS at 14:26

## 2020-12-06 RX ADMIN — POLYETHYLENE GLYCOL 3350 17 GRAM(S): 17 POWDER, FOR SOLUTION ORAL at 18:02

## 2020-12-06 RX ADMIN — CHLORHEXIDINE GLUCONATE 1 APPLICATION(S): 213 SOLUTION TOPICAL at 05:19

## 2020-12-06 RX ADMIN — RILUZOLE 50 MILLIGRAM(S): 50 TABLET ORAL at 18:02

## 2020-12-06 RX ADMIN — Medication 1 APPLICATION(S): at 02:44

## 2020-12-06 RX ADMIN — HEPARIN SODIUM 5000 UNIT(S): 5000 INJECTION INTRAVENOUS; SUBCUTANEOUS at 18:02

## 2020-12-06 NOTE — PROGRESS NOTE ADULT - PROBLEM SELECTOR PLAN 1
Patient admitted with Sepsis 2/2 Bacteremia   Blood cx 11/22: Morganella Morganii, Repeat Bld CX 11/25: No growth   CT Chest / ABD/ Pelvis 11/30: Left Lower Lobe Collapse, 2mm Nonobstructive Left ureteral stone, left renal calculi and mild bilateral hydronephrosis   Urine Cx 12/1: No growth   Sputum Cx 11/30: Stenotrophomonas ( Possible Colonization )   Continue Meropenem 10 day course (completes 12/9) per ID    cystoscopy, litho, and stent on 12/10 for renal calculi/hydro  Midodrine d/c

## 2020-12-06 NOTE — PROGRESS NOTE ADULT - SUBJECTIVE AND OBJECTIVE BOX
Patient is a 65y old  Female who presents with a chief complaint of sepsis (05 Dec 2020 07:53)      Interval Events:    REVIEW OF SYSTEMS:  [ ] Positive  [ ] All other systems negative  [ ] Unable to assess ROS because ________    Vital Signs Last 24 Hrs  T(C): 37.2 (12-06-20 @ 04:51), Max: 37.2 (12-06-20 @ 04:51)  T(F): 99 (12-06-20 @ 04:51), Max: 99 (12-06-20 @ 04:51)  HR: 101 (12-06-20 @ 08:49) (84 - 111)  BP: 119/79 (12-06-20 @ 04:51) (100/57 - 119/79)  RR: 14 (12-06-20 @ 04:51) (14 - 37)  SpO2: 98% (12-06-20 @ 08:49) (97% - 100%)    PHYSICAL EXAM:  HEENT:   [ ]Tracheostomy:  [ ]Pupils equal  [ ]No oral lesions  [ ]Abnormal    SKIN  [ ]No Rash  [ ] Abnormal  [ ] pressure    CARDIAC  [ ]Regular  [ ]Abnormal    PULMONARY  [ ]Bilateral Clear Breath Sounds  [ ]Normal Excursion  [ ]Abnormal    GI  [ ]PEG      [ ] +BS		              [ ]Soft, nondistended, nontender	  [ ]Abnormal    MUSCULOSKELETAL                                   [ ]Bedbound                 [ ]Abnormal    [ ]Ambulatory/OOB to chair                           EXTREMITIES                                         [ ]Normal  [ ]Edema                           NEUROLOGIC  [ ] Normal, non focal  [ ] Focal findings:    PSYCHIATRIC  [ ]Alert and appropriate  [ ] Sedated	 [ ]Agitated    :  Newman: [ ] Yes, if yes: Date of Placement:                   [  ] No    LINES: Central Lines [ ] Yes, if yes: Date of Placement                                     [  ] No    HOSPITAL MEDICATIONS:  MEDICATIONS  (STANDING):  albuterol/ipratropium for Nebulization 3 milliLiter(s) Nebulizer every 6 hours  artificial  tears Solution 1 Drop(s) Both EYES every 6 hours  chlorhexidine 0.12% Liquid 15 milliLiter(s) Oral Mucosa every 12 hours  chlorhexidine 4% Liquid 1 Application(s) Topical <User Schedule>  diazepam   Solution 1.5 milliGRAM(s) Enteral Tube <User Schedule>  erythromycin   Ointment 1 Application(s) Both EYES <User Schedule>  heparin   Injectable 5000 Unit(s) SubCutaneous every 12 hours  influenza   Vaccine 0.5 milliLiter(s) IntraMuscular once  meropenem  IVPB 1000 milliGRAM(s) IV Intermittent every 8 hours  multivitamin 1 Tablet(s) Oral daily  ofloxacin 0.3% Solution 1 Drop(s) Both EYES four times a day  petrolatum Ophthalmic Ointment 1 Application(s) Both EYES <User Schedule>  polyethylene glycol 3350 17 Gram(s) Oral two times a day  riluzole 50 milliGRAM(s) Oral two times a day  senna 1 Tablet(s) Oral daily  sertraline 50 milliGRAM(s) Oral daily    MEDICATIONS  (PRN):      LABS:                        11.2   14.12 )-----------( 600      ( 06 Dec 2020 07:00 )             37.9     12-06    138  |  103  |  22  ----------------------------<  107<H>  4.3   |  21<L>  |  <0.30<L>    Ca    9.4      06 Dec 2020 07:07  Phos  2.7     12-06  Mg     2.0     12-06              CAPILLARY BLOOD GLUCOSE    MICROBIOLOGY:     RADIOLOGY:  [ ] Reviewed and interpreted by me    Mode: AC/ CMV (Assist Control/ Continuous Mandatory Ventilation)  RR (machine): 14  TV (machine): 450  FiO2: 21  PEEP: 5  ITime: 1  MAP: 9  PIP: 24   Patient is a 65y old  Female who presents with a chief complaint of sepsis (05 Dec 2020 07:53)      Interval Events:  no overnight issues     REVIEW OF SYSTEMS:  [ ] Positive  [ ] All other systems negative  [x ] Unable to assess ROS because _non verbal _______    Vital Signs Last 24 Hrs  T(C): 37.2 (12-06-20 @ 04:51), Max: 37.2 (12-06-20 @ 04:51)  T(F): 99 (12-06-20 @ 04:51), Max: 99 (12-06-20 @ 04:51)  HR: 101 (12-06-20 @ 08:49) (84 - 111)  BP: 119/79 (12-06-20 @ 04:51) (100/57 - 119/79)  RR: 14 (12-06-20 @ 04:51) (14 - 37)  SpO2: 98% (12-06-20 @ 08:49) (97% - 100%)    PHYSICAL EXAM:  HEENT:   [x ]Tracheostomy:  7 XLT  no wean   [ ]Pupils equal    + Injected Conjunctiva RT > Lft     [ ]No oral lesions  [ ]Abnormal    SKIN  [x ]No Rash  [ ] Abnormal  [ ] pressure    CARDIAC  [x ]Regular  [ ]Abnormal    PULMONARY  [x ]Bilateral Clear Breath Sounds  [ ]Normal Excursion  [ ]Abnormal    GI  [x ]PEG      [x ] +BS		              [x ]Soft, nondistended, nontender	  [ ]Abnormal    MUSCULOSKELETAL                                   [x ]Bedbound                 [ ]Abnormal    [ ]Ambulatory/OOB to chair                           EXTREMITIES                                         [ ]Normal  [ ]Edema                           NEUROLOGIC  [ ] Normal, non focal  [x ] Focal findings:    functional quad      PSYCHIATRIC  [ ]Alert and appropriate  [ ] Sedated	 [ ]Agitated    :  Newman: [ ] Yes, if yes: Date of Placement:                   [  ] No    LINES: Central Lines [ ] Yes, if yes: Date of Placement                                     [  ] No    HOSPITAL MEDICATIONS:  MEDICATIONS  (STANDING):  albuterol/ipratropium for Nebulization 3 milliLiter(s) Nebulizer every 6 hours  artificial  tears Solution 1 Drop(s) Both EYES every 6 hours  chlorhexidine 0.12% Liquid 15 milliLiter(s) Oral Mucosa every 12 hours  chlorhexidine 4% Liquid 1 Application(s) Topical <User Schedule>  diazepam   Solution 1.5 milliGRAM(s) Enteral Tube <User Schedule>  erythromycin   Ointment 1 Application(s) Both EYES <User Schedule>  heparin   Injectable 5000 Unit(s) SubCutaneous every 12 hours  influenza   Vaccine 0.5 milliLiter(s) IntraMuscular once  meropenem  IVPB 1000 milliGRAM(s) IV Intermittent every 8 hours  multivitamin 1 Tablet(s) Oral daily  ofloxacin 0.3% Solution 1 Drop(s) Both EYES four times a day  petrolatum Ophthalmic Ointment 1 Application(s) Both EYES <User Schedule>  polyethylene glycol 3350 17 Gram(s) Oral two times a day  riluzole 50 milliGRAM(s) Oral two times a day  senna 1 Tablet(s) Oral daily  sertraline 50 milliGRAM(s) Oral daily    MEDICATIONS  (PRN):      LABS:                        11.2   14.12 )-----------( 600      ( 06 Dec 2020 07:00 )             37.9     12-06    138  |  103  |  22  ----------------------------<  107<H>  4.3   |  21<L>  |  <0.30<L>    Ca    9.4      06 Dec 2020 07:07  Phos  2.7     12-06  Mg     2.0     12-06              CAPILLARY BLOOD GLUCOSE    MICROBIOLOGY:     RADIOLOGY:  [ ] Reviewed and interpreted by me    Mode: AC/ CMV (Assist Control/ Continuous Mandatory Ventilation)  RR (machine): 14  TV (machine): 450  FiO2: 21  PEEP: 5  ITime: 1  MAP: 9  PIP: 24   Patient is a 65y old  Female who presents with a chief complaint of sepsis (05 Dec 2020 07:53)      Interval Events:  no overnight issues     REVIEW OF SYSTEMS:  [ ] Positive  [ ] All other systems negative  [x ] Unable to assess ROS because _non verbal _______    Vital Signs Last 24 Hrs  T(C): 37.2 (12-06-20 @ 04:51), Max: 37.2 (12-06-20 @ 04:51)  T(F): 99 (12-06-20 @ 04:51), Max: 99 (12-06-20 @ 04:51)  HR: 101 (12-06-20 @ 08:49) (84 - 111)  BP: 119/79 (12-06-20 @ 04:51) (100/57 - 119/79)  RR: 14 (12-06-20 @ 04:51) (14 - 37)  SpO2: 98% (12-06-20 @ 08:49) (97% - 100%)    PHYSICAL EXAM:  HEENT:   [x ]Tracheostomy:  7 XLT  no wean   [ ]Pupils equal    + Injected Conjunctiva RT > Lft     [ ]No oral lesions  [ ]Abnormal    SKIN  [x ]No Rash  [ ] Abnormal  [ ] pressure    CARDIAC  [x ]Regular  [ ]Abnormal    PULMONARY  [x ]Bilateral Clear Breath Sounds  [ ]Normal Excursion  [ ]Abnormal    GI  [x ]PEG      [x ] +BS		              [x ]Soft, nondistended, nontender	  [ ]Abnormal    MUSCULOSKELETAL                                   [x ]Bedbound                 [ ]Abnormal    [ ]Ambulatory/OOB to chair                           EXTREMITIES                                         [ ]Normal    [ x]Edema       dorsalis pedal pulse on doppler of left lower extremity, Warm to touch                          NEUROLOGIC  [ ] Normal, non focal  [x ] Focal findings:    functional quad      PSYCHIATRIC  [ ]Alert and appropriate  [ ] Sedated	 [ ]Agitated    :  Newman: [ ] Yes, if yes: Date of Placement:                   [x  ] No    LINES: Central Lines [ ] Yes, if yes: Date of Placement                                     [ x ] No    HOSPITAL MEDICATIONS:  MEDICATIONS  (STANDING):  albuterol/ipratropium for Nebulization 3 milliLiter(s) Nebulizer every 6 hours  artificial  tears Solution 1 Drop(s) Both EYES every 6 hours  chlorhexidine 0.12% Liquid 15 milliLiter(s) Oral Mucosa every 12 hours  chlorhexidine 4% Liquid 1 Application(s) Topical <User Schedule>  diazepam   Solution 1.5 milliGRAM(s) Enteral Tube <User Schedule>  erythromycin   Ointment 1 Application(s) Both EYES <User Schedule>  heparin   Injectable 5000 Unit(s) SubCutaneous every 12 hours  influenza   Vaccine 0.5 milliLiter(s) IntraMuscular once  meropenem  IVPB 1000 milliGRAM(s) IV Intermittent every 8 hours  multivitamin 1 Tablet(s) Oral daily  ofloxacin 0.3% Solution 1 Drop(s) Both EYES four times a day  petrolatum Ophthalmic Ointment 1 Application(s) Both EYES <User Schedule>  polyethylene glycol 3350 17 Gram(s) Oral two times a day  riluzole 50 milliGRAM(s) Oral two times a day  senna 1 Tablet(s) Oral daily  sertraline 50 milliGRAM(s) Oral daily    MEDICATIONS  (PRN):      LABS:                        11.2   14.12 )-----------( 600      ( 06 Dec 2020 07:00 )             37.9     12-06    138  |  103  |  22  ----------------------------<  107<H>  4.3   |  21<L>  |  <0.30<L>    Ca    9.4      06 Dec 2020 07:07  Phos  2.7     12-06  Mg     2.0     12-06              CAPILLARY BLOOD GLUCOSE    MICROBIOLOGY:     RADIOLOGY:  [ ] Reviewed and interpreted by me    Mode: AC/ CMV (Assist Control/ Continuous Mandatory Ventilation)  RR (machine): 14  TV (machine): 450  FiO2: 21  PEEP: 5  ITime: 1  MAP: 9  PIP: 24

## 2020-12-06 NOTE — PROGRESS NOTE ADULT - ASSESSMENT
65 F with advanced ALS, chronic hypoxic/ hypercapnic resp failure, vent dependent, s/p trach 2015 and PEG, now presenting with septic shock and Morganella bacteremia 2/2 bacterial PNA + UTI.  Patient was BIBEMS from home for fevers. Pt's 24hr home health nurse reported over the past few days the pt has appeared more lethargic, has been tachycardic, and running fevers with decreased Urination. She was empirically started on meropenum and vancomycin in ED.  Was managed for Septic shock s/p fluid resuscitation, IV pressors, and Transferred to MICU. Patients abx were switched to Cefepime after Bcx 11/22 revealed Morganella morganii.  Surveillance Bcxs from 11/25 remain NGTD.  LLE doppler study revealed stenosis of femoral and popliteal arteries causing cool, pale LLE without cyanosis. Patient was weaned off vasopressors and placed on Midodrine. Patient was Transferred to [facility] on 11/28. Trach changed to #7 distal xlt lise by ENT due to air leak on 11/28. Patient had CT abd pelvis performed which revealed 2 mm non obstructive left ureteral stone and multiple renal calculi with mild bilateral hydronephrosis.     12/5: Leukocytosis improving. Day 6/10 Meropenem (ends 12/9).  Vital signs stable and afebrile.  Urology plan for intervention 12/10 for renal calculi- cystoscopy, lithotripsy, and stent (remove after 1wk).

## 2020-12-07 LAB
ANION GAP SERPL CALC-SCNC: 14 MMOL/L — SIGNIFICANT CHANGE UP (ref 5–17)
BASOPHILS # BLD AUTO: 0.19 K/UL — SIGNIFICANT CHANGE UP (ref 0–0.2)
BASOPHILS NFR BLD AUTO: 1.8 % — SIGNIFICANT CHANGE UP (ref 0–2)
BUN SERPL-MCNC: 23 MG/DL — SIGNIFICANT CHANGE UP (ref 7–23)
CALCIUM SERPL-MCNC: 9.8 MG/DL — SIGNIFICANT CHANGE UP (ref 8.4–10.5)
CHLORIDE SERPL-SCNC: 104 MMOL/L — SIGNIFICANT CHANGE UP (ref 96–108)
CO2 SERPL-SCNC: 23 MMOL/L — SIGNIFICANT CHANGE UP (ref 22–31)
CREAT SERPL-MCNC: <0.3 MG/DL — LOW (ref 0.5–1.3)
EOSINOPHIL # BLD AUTO: 0.74 K/UL — HIGH (ref 0–0.5)
EOSINOPHIL NFR BLD AUTO: 7 % — HIGH (ref 0–6)
GLUCOSE SERPL-MCNC: 117 MG/DL — HIGH (ref 70–99)
HCT VFR BLD CALC: 38.4 % — SIGNIFICANT CHANGE UP (ref 34.5–45)
HGB BLD-MCNC: 11.2 G/DL — LOW (ref 11.5–15.5)
IMM GRANULOCYTES NFR BLD AUTO: 0.9 % — SIGNIFICANT CHANGE UP (ref 0–1.5)
LYMPHOCYTES # BLD AUTO: 1.1 K/UL — SIGNIFICANT CHANGE UP (ref 1–3.3)
LYMPHOCYTES # BLD AUTO: 10.3 % — LOW (ref 13–44)
MAGNESIUM SERPL-MCNC: 2.1 MG/DL — SIGNIFICANT CHANGE UP (ref 1.6–2.6)
MCHC RBC-ENTMCNC: 27.9 PG — SIGNIFICANT CHANGE UP (ref 27–34)
MCHC RBC-ENTMCNC: 29.2 GM/DL — LOW (ref 32–36)
MCV RBC AUTO: 95.8 FL — SIGNIFICANT CHANGE UP (ref 80–100)
MONOCYTES # BLD AUTO: 0.92 K/UL — HIGH (ref 0–0.9)
MONOCYTES NFR BLD AUTO: 8.6 % — SIGNIFICANT CHANGE UP (ref 2–14)
NEUTROPHILS # BLD AUTO: 7.59 K/UL — HIGH (ref 1.8–7.4)
NEUTROPHILS NFR BLD AUTO: 71.4 % — SIGNIFICANT CHANGE UP (ref 43–77)
NRBC # BLD: 0 /100 WBCS — SIGNIFICANT CHANGE UP (ref 0–0)
PHOSPHATE SERPL-MCNC: 3.1 MG/DL — SIGNIFICANT CHANGE UP (ref 2.5–4.5)
PLATELET # BLD AUTO: 531 K/UL — HIGH (ref 150–400)
POTASSIUM SERPL-MCNC: 4.3 MMOL/L — SIGNIFICANT CHANGE UP (ref 3.5–5.3)
POTASSIUM SERPL-SCNC: 4.3 MMOL/L — SIGNIFICANT CHANGE UP (ref 3.5–5.3)
RBC # BLD: 4.01 M/UL — SIGNIFICANT CHANGE UP (ref 3.8–5.2)
RBC # FLD: 16.3 % — HIGH (ref 10.3–14.5)
SODIUM SERPL-SCNC: 141 MMOL/L — SIGNIFICANT CHANGE UP (ref 135–145)
WBC # BLD: 10.64 K/UL — HIGH (ref 3.8–10.5)
WBC # FLD AUTO: 10.64 K/UL — HIGH (ref 3.8–10.5)

## 2020-12-07 PROCEDURE — 99233 SBSQ HOSP IP/OBS HIGH 50: CPT

## 2020-12-07 PROCEDURE — 99232 SBSQ HOSP IP/OBS MODERATE 35: CPT

## 2020-12-07 RX ADMIN — Medication 1 APPLICATION(S): at 17:25

## 2020-12-07 RX ADMIN — Medication 1 APPLICATION(S): at 05:12

## 2020-12-07 RX ADMIN — Medication 1 APPLICATION(S): at 00:19

## 2020-12-07 RX ADMIN — MEROPENEM 100 MILLIGRAM(S): 1 INJECTION INTRAVENOUS at 21:49

## 2020-12-07 RX ADMIN — Medication 1 APPLICATION(S): at 03:03

## 2020-12-07 RX ADMIN — Medication 2.5 MILLIGRAM(S): at 20:56

## 2020-12-07 RX ADMIN — Medication 1 APPLICATION(S): at 11:41

## 2020-12-07 RX ADMIN — CHLORHEXIDINE GLUCONATE 15 MILLILITER(S): 213 SOLUTION TOPICAL at 17:26

## 2020-12-07 RX ADMIN — Medication 1.5 MILLIGRAM(S): at 08:32

## 2020-12-07 RX ADMIN — Medication 1 APPLICATION(S): at 16:26

## 2020-12-07 RX ADMIN — SENNA PLUS 1 TABLET(S): 8.6 TABLET ORAL at 11:45

## 2020-12-07 RX ADMIN — MEROPENEM 100 MILLIGRAM(S): 1 INJECTION INTRAVENOUS at 13:14

## 2020-12-07 RX ADMIN — Medication 1 APPLICATION(S): at 23:56

## 2020-12-07 RX ADMIN — Medication 3 MILLILITER(S): at 11:28

## 2020-12-07 RX ADMIN — Medication 1 DROP(S): at 05:12

## 2020-12-07 RX ADMIN — POLYETHYLENE GLYCOL 3350 17 GRAM(S): 17 POWDER, FOR SOLUTION ORAL at 05:13

## 2020-12-07 RX ADMIN — Medication 1 APPLICATION(S): at 08:33

## 2020-12-07 RX ADMIN — Medication 1 DROP(S): at 17:25

## 2020-12-07 RX ADMIN — Medication 1 DROP(S): at 23:56

## 2020-12-07 RX ADMIN — HEPARIN SODIUM 5000 UNIT(S): 5000 INJECTION INTRAVENOUS; SUBCUTANEOUS at 17:27

## 2020-12-07 RX ADMIN — Medication 1 TABLET(S): at 11:45

## 2020-12-07 RX ADMIN — SERTRALINE 50 MILLIGRAM(S): 25 TABLET, FILM COATED ORAL at 05:12

## 2020-12-07 RX ADMIN — MEROPENEM 100 MILLIGRAM(S): 1 INJECTION INTRAVENOUS at 05:12

## 2020-12-07 RX ADMIN — RILUZOLE 50 MILLIGRAM(S): 50 TABLET ORAL at 05:12

## 2020-12-07 RX ADMIN — Medication 1 DROP(S): at 00:19

## 2020-12-07 RX ADMIN — Medication 1 DROP(S): at 11:41

## 2020-12-07 RX ADMIN — Medication 1 APPLICATION(S): at 21:49

## 2020-12-07 RX ADMIN — HEPARIN SODIUM 5000 UNIT(S): 5000 INJECTION INTRAVENOUS; SUBCUTANEOUS at 05:13

## 2020-12-07 RX ADMIN — Medication 1 DROP(S): at 17:26

## 2020-12-07 RX ADMIN — Medication 1 APPLICATION(S): at 10:25

## 2020-12-07 RX ADMIN — Medication 1 APPLICATION(S): at 20:33

## 2020-12-07 RX ADMIN — CHLORHEXIDINE GLUCONATE 15 MILLILITER(S): 213 SOLUTION TOPICAL at 05:11

## 2020-12-07 RX ADMIN — Medication 1 APPLICATION(S): at 13:15

## 2020-12-07 RX ADMIN — CHLORHEXIDINE GLUCONATE 1 APPLICATION(S): 213 SOLUTION TOPICAL at 05:11

## 2020-12-07 RX ADMIN — Medication 3 MILLILITER(S): at 23:20

## 2020-12-07 RX ADMIN — POLYETHYLENE GLYCOL 3350 17 GRAM(S): 17 POWDER, FOR SOLUTION ORAL at 17:27

## 2020-12-07 RX ADMIN — Medication 3 MILLILITER(S): at 05:29

## 2020-12-07 RX ADMIN — Medication 1 DROP(S): at 11:40

## 2020-12-07 RX ADMIN — RILUZOLE 50 MILLIGRAM(S): 50 TABLET ORAL at 17:27

## 2020-12-07 RX ADMIN — Medication 3 MILLILITER(S): at 18:03

## 2020-12-07 NOTE — PROGRESS NOTE ADULT - SUBJECTIVE AND OBJECTIVE BOX
Follow Up:  leukocytosis    Interval History: WBC improved to 10, afebrile    ROS:    Unobtainable because of mental status          Allergies  Bactrim DS (Rash)        ANTIMICROBIALS:  meropenem  IVPB 1000 every 8 hours      OTHER MEDS:  albuterol/ipratropium for Nebulization 3 milliLiter(s) Nebulizer every 6 hours  artificial  tears Solution 1 Drop(s) Both EYES every 6 hours  chlorhexidine 0.12% Liquid 15 milliLiter(s) Oral Mucosa every 12 hours  chlorhexidine 4% Liquid 1 Application(s) Topical <User Schedule>  diazepam   Solution 1.5 milliGRAM(s) Enteral Tube <User Schedule>  diazepam   Solution 2.5 milliGRAM(s) Enteral Tube <User Schedule>  erythromycin   Ointment 1 Application(s) Both EYES <User Schedule>  heparin   Injectable 5000 Unit(s) SubCutaneous every 12 hours  influenza   Vaccine 0.5 milliLiter(s) IntraMuscular once  multivitamin 1 Tablet(s) Oral daily  ofloxacin 0.3% Solution 1 Drop(s) Both EYES four times a day  petrolatum Ophthalmic Ointment 1 Application(s) Both EYES <User Schedule>  polyethylene glycol 3350 17 Gram(s) Oral two times a day  riluzole 50 milliGRAM(s) Oral two times a day  senna 1 Tablet(s) Oral daily  sertraline 50 milliGRAM(s) Oral daily      Vital Signs Last 24 Hrs  T(C): 36.7 (07 Dec 2020 14:22), Max: 36.7 (07 Dec 2020 14:22)  T(F): 98.1 (07 Dec 2020 14:22), Max: 98.1 (07 Dec 2020 14:22)  HR: 90 (07 Dec 2020 16:01) (90 - 109)  BP: 131/74 (07 Dec 2020 14:22) (114/71 - 143/83)  BP(mean): --  RR: 14 (07 Dec 2020 14:22) (14 - 18)  SpO2: 96% (07 Dec 2020 16:01) (95% - 100%)    Physical Exam:  General:    NAD  ENT:   trach, no significant secretions  Cardio:     regular S1, S2  Respiratory:    clear b/l,    no wheezing  abd:     soft,   BS +,   PEG  :     no  carter  Musculoskeletal:   no joint swelling  vascular: no phlebitis  Skin:    no rash, no decubitus                           11.2   10.64 )-----------( 531      ( 07 Dec 2020 06:48 )             38.4       12-07    141  |  104  |  23  ----------------------------<  117<H>  4.3   |  23  |  <0.30<L>    Ca    9.8      07 Dec 2020 06:47  Phos  3.1     12-07  Mg     2.1     12-07            MICROBIOLOGY:  v  .Urine Catheterized  12-01-20   No growth  --  --      .Blood Blood-Peripheral  11-30-20   No Growth Final  --  --      .Sputum Sputum  11-30-20   Moderate Stenotrophomonas maltophilia  Normal Respiratory Kia present  --  Stenotrophomonas maltophilia      .Blood Blood  11-25-20   No Growth Final  --  --      .Sputum Sputum  11-23-20   Normal Respiratory Kia present  --    Rare polymorphonuclear leukocytes per low power field  Rare Squamous epithelial cells per low power field  Few Gram Variable Rods per oil power field      .Blood Blood-Peripheral  11-22-20   Growth in aerobic and anaerobic bottles: Morganella morganii  See previous culture [id number]  --  Blood Culture PCR  Morganella morganii  Morganella morganii      .Urine Clean Catch (Midstream)  11-22-20   <10,000 CFU/mL Normal Urogenital Kia  --  --                RADIOLOGY:  Images independently visualized and reviewed personally, findings as below  < from: CT Abdomen and Pelvis w/ Oral Cont and w/ IV Cont (11.30.20 @ 20:16) >  Left lower lobe collapse. Superimposed infection is not excluded.    2 mm left ureteral calculus and multiple nonobstructive subcentimeter left renal calculi. Associated mild hydronephrosis and urothelial enhancement suggests inflammation/infection.    Cholelithiasis.

## 2020-12-07 NOTE — PROGRESS NOTE ADULT - PROBLEM SELECTOR PLAN 6
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

## 2020-12-07 NOTE — PROGRESS NOTE ADULT - PROBLEM SELECTOR PLAN 7
Continue Protonix   As per  patient was on Xarelto for DVT prophylaxis  Will Hold Xarelto for possible future  procedure   Continue Heparin sub q for now

## 2020-12-07 NOTE — PROGRESS NOTE ADULT - PROBLEM SELECTOR PLAN 5
LLE Doppler 11/25: Flow Limiting stenosis of Distal and Left Superficial femoral and Popliteal artery  Previously was on Xarelto as outpatient for DVT Prophylaxis   Will resume Xarelto post  Procedure

## 2020-12-07 NOTE — PROGRESS NOTE ADULT - ATTENDING COMMENTS
I have discussed the patient with the resident and reviewed the residents note including the history, exam, assessment, and plan.  I agree with the residents assessment and plan.    Pt will need taping at all times for now as exposure appears to be slightly worse.  Continue current management  Will follow  Will need to be evaluated by corneal specialist- Dr. Flowers aware of pt    Yarely Swan MD

## 2020-12-07 NOTE — PROGRESS NOTE ADULT - SUBJECTIVE AND OBJECTIVE BOX
Patient is a 65y old  Female who presents with a chief complaint of sepsis (06 Dec 2020 09:47)      Interval Events: No events reported overnight    REVIEW OF SYSTEMS:  [ ] Positive  [ ] All other systems negative  [x] Unable to assess ROS because Patient is Nonverbal    Vital Signs Last 24 Hrs  T(C): 36.5 (12-07-20 @ 04:40), Max: 36.5 (12-07-20 @ 04:40)  T(F): 97.7 (12-07-20 @ 04:40), Max: 97.7 (12-07-20 @ 04:40)  HR: 98 (12-07-20 @ 08:19) (92 - 109)  BP: 143/83 (12-07-20 @ 04:40) (114/71 - 143/83)  RR: 14 (12-07-20 @ 04:40) (14 - 18)  SpO2: 98% (12-07-20 @ 08:19) (95% - 100%)    PHYSICAL EXAM:  HEENT:   [x]Tracheostomy: # 7 XLT Shiley   [x]Pupils equal  [ ]No oral lesions  [x]Abnormal: + Injected Conjunctiva RT > Lft     SKIN  [x]No Rash  [ ] Abnormal  [ ] pressure    CARDIAC  [x]Regular  [ ]Abnormal:     PULMONARY  [ ]Bilateral Clear Breath Sounds  [ ]Normal Excursion  [x]Abnormal: Bilateral Coarse Breath sounds    GI  [x]PEG      [x] +BS		              [x]Soft, nondistended, nontender	  [ ]Abnormal    MUSCULOSKELETAL                                   [x]Bedbound                 [ ]Abnormal    [ ]Ambulatory/OOB to chair                           EXTREMITIES                                         [x] Decrease Dorsalis pedal pulse on left lower extremity ( Located with bedside doppler), Warm to touch   [ ]Edema                           NEUROLOGIC  [ ] Normal, non focal  [x] Focal findings: Functional Quadriplegic 2/2 ALS     PSYCHIATRIC  [ ]Alert and appropriate  [x] Sedated	 [ ]Agitated    :  Newman: [ ] Yes, if yes: Date of Placement:                   [x] No    LINES: Central Lines [ ] Yes, if yes: Date of Placement                                     [x] No    HOSPITAL MEDICATIONS:  MEDICATIONS  (STANDING):  albuterol/ipratropium for Nebulization 3 milliLiter(s) Nebulizer every 6 hours  artificial  tears Solution 1 Drop(s) Both EYES every 6 hours  chlorhexidine 0.12% Liquid 15 milliLiter(s) Oral Mucosa every 12 hours  chlorhexidine 4% Liquid 1 Application(s) Topical <User Schedule>  diazepam   Solution 1.5 milliGRAM(s) Enteral Tube <User Schedule>  diazepam   Solution 2.5 milliGRAM(s) Enteral Tube <User Schedule>  erythromycin   Ointment 1 Application(s) Both EYES <User Schedule>  heparin   Injectable 5000 Unit(s) SubCutaneous every 12 hours  influenza   Vaccine 0.5 milliLiter(s) IntraMuscular once  meropenem  IVPB 1000 milliGRAM(s) IV Intermittent every 8 hours  multivitamin 1 Tablet(s) Oral daily  ofloxacin 0.3% Solution 1 Drop(s) Both EYES four times a day  petrolatum Ophthalmic Ointment 1 Application(s) Both EYES <User Schedule>  polyethylene glycol 3350 17 Gram(s) Oral two times a day  riluzole 50 milliGRAM(s) Oral two times a day  senna 1 Tablet(s) Oral daily  sertraline 50 milliGRAM(s) Oral daily    MEDICATIONS  (PRN):      LABS:                        11.2   10.64 )-----------( 531      ( 07 Dec 2020 06:48 )             38.4     12-07    141  |  104  |  23  ----------------------------<  117<H>  4.3   |  23  |  <0.30<L>    Ca    9.8      07 Dec 2020 06:47  Phos  3.1     12-07  Mg     2.1     12-07              CAPILLARY BLOOD GLUCOSE    MICROBIOLOGY:     RADIOLOGY:  [ ] Reviewed and interpreted by me    Mode: AC/ CMV (Assist Control/ Continuous Mandatory Ventilation)  RR (machine): 14  TV (machine): 450  FiO2: 21  PEEP: 5  ITime: 1  MAP: 8  PIP: 21

## 2020-12-07 NOTE — PROGRESS NOTE ADULT - ASSESSMENT
65 f with ALS, s/p trach and PEG, was admitted 11/22 for septic shock and morganella bacteremia, was considered due to UTI vs pneumonia but urine cx was negative, sputum cx negative, CXR with LLL pneumonia  blood cx negative 11/25  WBC initially 19 then 31 and fluctuating, normalized on 11/26 and then increased to 29 with some hypotension, no diarrhea, no resp secretions and no fever  exam with b/l injected conjunctivae and cold LLE but that resolved LLE not cold anymore     septic shock with morganella bacteremia, ?UTI vs pneumonia but urine and sputum cx negative  cleared blood cultures 11/25  new  leukocytosis and hypotension while on cefepime, CT with LLL collapse, multiple L renal and ureteral stones with mild hydro and urothelial enhancement so likely pyelonephritis but urine cx came back negative, done on antibiotics though, pt improved on andie  sputum cx with Stenotrophomonas sensitive to levo and bactrim but no clinical evidence of pneumonia, likely colonization      * c/w andie 1 q 8, started 11/30, now day 8 will complete a 10 day course  * monitor the CBC/diff  * if any change in the respiratory status or more fever, would start levo for steno in the sputum cx    The above assessment and plan was discussed with NUNO Terrazas MD  Pager [phone]  After 5pm and on weekends call [phone]

## 2020-12-07 NOTE — PROGRESS NOTE ADULT - ASSESSMENT
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 No infiltrate.  OS: confluent SPK, mostly inferiorly No infiltrate., small epidefects inferiorly.   - filaments removed w/ cotton tip.   - Alternate erythromycin ointment q4 hours with lacrilube ointment q4 hours in both eyes so that patient is getting ointment every 2 hours   - Ocuflox drops QID to both eyes  - Given the ALS, patient likely has preserved mental function and would likely prefer to have eyes open. however, given minimal improvement of epithelial defects, recommend taping overnight (on exam today patient did not have eyes taped in the morning) and please tape for at least 4-6 hours in the day.   - Ophthalmology will continue to follow  - findings and plan discussed with primary team      The patient should follow-up with [facility] within 1 week of discharge, sooner if symptoms worsen or change:  [address]  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 No infiltrate.  OS: confluent SPK, mostly inferiorly No infiltrate., small epidefects inferiorly.   - filaments removed w/ cotton tip.   - Alternate erythromycin ointment q4 hours with lacrilube ointment q4 hours in both eyes so that patient is getting ointment every 2 hours   - Ocuflox drops QID to both eyes  - Given the ALS, patient likely has preserved mental function and would likely prefer to have eyes open. however, given minimal improvement of epithelial defects, recommend taping overnight (on exam today patient did not have eyes taped in the morning) and please tape for at least 4-6 hours in the day. Would be ideal for pt to be taped at all times at this time.  - Ophthalmology will continue to follow  - findings and plan discussed with primary team      The patient should follow-up with [facility]mology within 1 week of discharge, sooner if symptoms worsen or change:  [address]  [facility] [address]  [phone]

## 2020-12-07 NOTE — PROGRESS NOTE ADULT - PROBLEM SELECTOR PLAN 1
Patient admitted with Sepsis 2/2 Bacteremia   Blood cx 11/22: Morganella Morganii, Repeat Bld CX 11/25: No growth   CT Chest / ABD/ Pelvis 11/30: Left Lower Lobe Collapse, 2mm Nonobstructive Left ureteral stone, left renal calculi and mild bilateral hydronephrosis   Urine Cx 12/1: No growth   Sputum Cx 11/30: Stenotrophomonas ( Possible Colonization )   Continue Meropenem 10 day course (completes 12/9) per ID   Planned for Cystoscopy, lithotripsy and stent placement on 12/10 for left renal calculi / hydronephrosis By Urology   Stent will be removed the following week

## 2020-12-07 NOTE — PROGRESS NOTE ADULT - ATTENDING COMMENTS
65 F with advanced ALS, chronic hypoxic/hypercapnic respiratory failure, vent dependent, s/p trach and PEG p/w septic shock and Morganella bacteremia due to UTI. Has  nonobstructing stones on CT w/ mild hydro and ureteral dilatation. Plan for Urological intervention this week w/ likely stent placement. Cont abx. Monitor UO. 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 Cont riluzole for ALS. Pt medically optimized for urological procedure. Pt has moderate risk for complications given baseline ventilator dependence.

## 2020-12-07 NOTE — PROGRESS NOTE ADULT - ASSESSMENT
65 F with advanced ALS, chronic hypoxic/ hypercapnic resp failure, vent dependent, s/p trach 2015 and PEG, now presenting with septic shock and Morganella bacteremia 2/2 bacterial PNA + UTI.  Patient was BIBEMS from home for fevers. Pt's 24hr home health nurse reported over the past few days the pt has appeared more lethargic, has been tachycardic, and running fevers with decreased Urination. She was empirically started on meropenum and vancomycin in ED.  Was managed for Septic shock s/p fluid resuscitation, IV pressors, and Transferred to MICU. Patients abx were switched to Cefepime after Bcx 11/22 revealed Morganella morganii.  Surveillance Bcxs from 11/25 remain NGTD.  LLE doppler study revealed stenosis of femoral and popliteal arteries causing cool, pale LLE without cyanosis. Patient was weaned off vasopressors and placed on Midodrine. Patient was Transferred to RCU on 11/28. Trach changed to #7 distal xlt lise by ENT due to air leak on 11/28. Patient had CT abd pelvis performed which revealed 2 mm non obstructive left ureteral stone and multiple renal calculi with mild bilateral hydronephrosis.     12/7: Leukocytosis improving. Day 8/10 Meropenem (ends 12/9).  Vital signs remain stable and patient remains afebrile. Patient scheduled for cystoscopy, lithotripsy, and left ureteral stent placement on 12/10.

## 2020-12-07 NOTE — PROGRESS NOTE ADULT - SUBJECTIVE AND OBJECTIVE BOX
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     MEDICATIONS  (STANDING):  albuterol/ipratropium for Nebulization 3 milliLiter(s) Nebulizer every 6 hours  artificial  tears Solution 1 Drop(s) Both EYES every 6 hours  chlorhexidine 0.12% Liquid 15 milliLiter(s) Oral Mucosa every 12 hours  chlorhexidine 4% Liquid 1 Application(s) Topical <User Schedule>  diazepam   Solution 1.5 milliGRAM(s) Enteral Tube <User Schedule>  diazepam   Solution 2.5 milliGRAM(s) Enteral Tube <User Schedule>  erythromycin   Ointment 1 Application(s) Both EYES <User Schedule>  heparin   Injectable 5000 Unit(s) SubCutaneous every 12 hours  influenza   Vaccine 0.5 milliLiter(s) IntraMuscular once  meropenem  IVPB 1000 milliGRAM(s) IV Intermittent every 8 hours  multivitamin 1 Tablet(s) Oral daily  ofloxacin 0.3% Solution 1 Drop(s) Both EYES four times a day  petrolatum Ophthalmic Ointment 1 Application(s) Both EYES <User Schedule>  polyethylene glycol 3350 17 Gram(s) Oral two times a day  riluzole 50 milliGRAM(s) Oral two times a day  senna 1 Tablet(s) Oral daily  sertraline 50 milliGRAM(s) Oral daily    MEDICATIONS  (PRN):      VITALS: T(C): 36.5 (12-07-20 @ 04:40)  T(F): 97.7 (12-07-20 @ 04:40), Max: 97.7 (12-07-20 @ 04:40)  HR: 98 (12-07-20 @ 08:19) (92 - 109)  BP: 143/83 (12-07-20 @ 04:40) (114/71 - 143/83)  RR:  (14 - 18)  SpO2:  (95% - 100%)  Wt(kg): --  General: AAO x 0    Ophthalmology Exam:  Visual acuity (sc): RONIT as patient is non-verbal  Pupils: PERRL OU, no APD  Ttono: 11 OU  Extraocular movements (EOMs): grossly full  Confrontational Visual Field (CVF): RONIT as patient is non-verbal  Color Plates: RONIT as patient is non-verbal    Pen Light Exam (PLE)  External: Flat OU  Lids/Lashes/Lacrimal Ducts: Flat OU  Does not blink and keeps eyes open  Sclera/Conjunctiva: 1+ inj OU  Cornea: dense 4+ SPK OU; OD with inferior confluent SPK surrounding area of epithelial sloughing, epidefect OD stable, some filaments removed w/ cotton tip. OS with 4+SPK and small epidefects inferiorly.   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     MEDICATIONS  (STANDING):  albuterol/ipratropium for Nebulization 3 milliLiter(s) Nebulizer every 6 hours  artificial  tears Solution 1 Drop(s) Both EYES every 6 hours  chlorhexidine 0.12% Liquid 15 milliLiter(s) Oral Mucosa every 12 hours  chlorhexidine 4% Liquid 1 Application(s) Topical <User Schedule>  diazepam   Solution 1.5 milliGRAM(s) Enteral Tube <User Schedule>  diazepam   Solution 2.5 milliGRAM(s) Enteral Tube <User Schedule>  erythromycin   Ointment 1 Application(s) Both EYES <User Schedule>  heparin   Injectable 5000 Unit(s) SubCutaneous every 12 hours  influenza   Vaccine 0.5 milliLiter(s) IntraMuscular once  meropenem  IVPB 1000 milliGRAM(s) IV Intermittent every 8 hours  multivitamin 1 Tablet(s) Oral daily  ofloxacin 0.3% Solution 1 Drop(s) Both EYES four times a day  petrolatum Ophthalmic Ointment 1 Application(s) Both EYES <User Schedule>  polyethylene glycol 3350 17 Gram(s) Oral two times a day  riluzole 50 milliGRAM(s) Oral two times a day  senna 1 Tablet(s) Oral daily  sertraline 50 milliGRAM(s) Oral daily    MEDICATIONS  (PRN):      VITALS: T(C): 36.5 (12-07-20 @ 04:40)  T(F): 97.7 (12-07-20 @ 04:40), Max: 97.7 (12-07-20 @ 04:40)  HR: 98 (12-07-20 @ 08:19) (92 - 109)  BP: 143/83 (12-07-20 @ 04:40) (114/71 - 143/83)  RR:  (14 - 18)  SpO2:  (95% - 100%)  Wt(kg): --  General: AAO x 0    Ophthalmology Exam:  Visual acuity (sc): RONIT as patient is non-verbal  Pupils: PERRL OU, no APD  Ttono: 10 OD 11 OS  Extraocular movements (EOMs): grossly full  Confrontational Visual Field (CVF): RONIT as patient is non-verbal  Color Plates: RONIT as patient is non-verbal    Pen Light Exam (PLE)  External: Flat OU  Lids/Lashes/Lacrimal Ducts: Flat OU  Does not blink and keeps eyes open  Sclera/Conjunctiva: 1+ inj OU  Cornea: dense 4+ SPK OU; OD with inferior confluent SPK surrounding area of epithelial sloughing, epidefect OD stable, some filaments removed w/ cotton tip. OS with 4+SPK and small epidefects inferiorly.   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  Unable to obtain history due to mental status    MEDICATIONS  (STANDING):  albuterol/ipratropium for Nebulization 3 milliLiter(s) Nebulizer every 6 hours  artificial  tears Solution 1 Drop(s) Both EYES every 6 hours  chlorhexidine 0.12% Liquid 15 milliLiter(s) Oral Mucosa every 12 hours  chlorhexidine 4% Liquid 1 Application(s) Topical <User Schedule>  diazepam   Solution 1.5 milliGRAM(s) Enteral Tube <User Schedule>  diazepam   Solution 2.5 milliGRAM(s) Enteral Tube <User Schedule>  erythromycin   Ointment 1 Application(s) Both EYES <User Schedule>  heparin   Injectable 5000 Unit(s) SubCutaneous every 12 hours  influenza   Vaccine 0.5 milliLiter(s) IntraMuscular once  meropenem  IVPB 1000 milliGRAM(s) IV Intermittent every 8 hours  multivitamin 1 Tablet(s) Oral daily  ofloxacin 0.3% Solution 1 Drop(s) Both EYES four times a day  petrolatum Ophthalmic Ointment 1 Application(s) Both EYES <User Schedule>  polyethylene glycol 3350 17 Gram(s) Oral two times a day  riluzole 50 milliGRAM(s) Oral two times a day  senna 1 Tablet(s) Oral daily  sertraline 50 milliGRAM(s) Oral daily    MEDICATIONS  (PRN):      VITALS: T(C): 36.5 (12-07-20 @ 04:40)  T(F): 97.7 (12-07-20 @ 04:40), Max: 97.7 (12-07-20 @ 04:40)  HR: 98 (12-07-20 @ 08:19) (92 - 109)  BP: 143/83 (12-07-20 @ 04:40) (114/71 - 143/83)  RR:  (14 - 18)  SpO2:  (95% - 100%)  Wt(kg): --  General: AAO x 0    Ophthalmology Exam:  Visual acuity (sc): RONIT as patient is non-verbal  Pupils: PERRL OU, no APD  Ttono: 10 OD 11 OS  Extraocular movements (EOMs): grossly full  Confrontational Visual Field (CVF): RONIT as patient is non-verbal  Color Plates: RONIT as patient is non-verbal    Pen Light Exam (PLE)  External: Flat OU  Lids/Lashes/Lacrimal Ducts: Flat OU  Does not blink and keeps eyes open  Sclera/Conjunctiva: 1+ inj OU  Cornea: dense 4+ SPK OU; OD with inferior confluent SPK surrounding area of epithelial sloughing, epi defect OD stable, some filaments removed w/ cotton tip. OS with 4+SPK and small epi defects inferiorly.   Anterior Chamber: D+F OU    Iris: Flat OU  Lens: Cl OU

## 2020-12-08 DIAGNOSIS — Z02.9 ENCOUNTER FOR ADMINISTRATIVE EXAMINATIONS, UNSPECIFIED: ICD-10-CM

## 2020-12-08 LAB
ANION GAP SERPL CALC-SCNC: 12 MMOL/L — SIGNIFICANT CHANGE UP (ref 5–17)
ANION GAP SERPL CALC-SCNC: 14 MMOL/L — SIGNIFICANT CHANGE UP (ref 5–17)
BASOPHILS # BLD AUTO: 0.2 K/UL — SIGNIFICANT CHANGE UP (ref 0–0.2)
BASOPHILS NFR BLD AUTO: 1.8 % — SIGNIFICANT CHANGE UP (ref 0–2)
BUN SERPL-MCNC: 21 MG/DL — SIGNIFICANT CHANGE UP (ref 7–23)
BUN SERPL-MCNC: 21 MG/DL — SIGNIFICANT CHANGE UP (ref 7–23)
CALCIUM SERPL-MCNC: 9.7 MG/DL — SIGNIFICANT CHANGE UP (ref 8.4–10.5)
CALCIUM SERPL-MCNC: 9.9 MG/DL — SIGNIFICANT CHANGE UP (ref 8.4–10.5)
CHLORIDE SERPL-SCNC: 102 MMOL/L — SIGNIFICANT CHANGE UP (ref 96–108)
CHLORIDE SERPL-SCNC: 103 MMOL/L — SIGNIFICANT CHANGE UP (ref 96–108)
CO2 SERPL-SCNC: 22 MMOL/L — SIGNIFICANT CHANGE UP (ref 22–31)
CO2 SERPL-SCNC: 22 MMOL/L — SIGNIFICANT CHANGE UP (ref 22–31)
CREAT SERPL-MCNC: <0.3 MG/DL — LOW (ref 0.5–1.3)
CREAT SERPL-MCNC: <0.3 MG/DL — LOW (ref 0.5–1.3)
EOSINOPHIL # BLD AUTO: 0.97 K/UL — HIGH (ref 0–0.5)
EOSINOPHIL NFR BLD AUTO: 8.5 % — HIGH (ref 0–6)
GLUCOSE SERPL-MCNC: 107 MG/DL — HIGH (ref 70–99)
GLUCOSE SERPL-MCNC: 84 MG/DL — SIGNIFICANT CHANGE UP (ref 70–99)
HCT VFR BLD CALC: 37.2 % — SIGNIFICANT CHANGE UP (ref 34.5–45)
HGB BLD-MCNC: 11.5 G/DL — SIGNIFICANT CHANGE UP (ref 11.5–15.5)
IMM GRANULOCYTES NFR BLD AUTO: 0.7 % — SIGNIFICANT CHANGE UP (ref 0–1.5)
LYMPHOCYTES # BLD AUTO: 18.6 % — SIGNIFICANT CHANGE UP (ref 13–44)
LYMPHOCYTES # BLD AUTO: 2.11 K/UL — SIGNIFICANT CHANGE UP (ref 1–3.3)
MAGNESIUM SERPL-MCNC: 2.2 MG/DL — SIGNIFICANT CHANGE UP (ref 1.6–2.6)
MAGNESIUM SERPL-MCNC: 2.3 MG/DL — SIGNIFICANT CHANGE UP (ref 1.6–2.6)
MCHC RBC-ENTMCNC: 28.7 PG — SIGNIFICANT CHANGE UP (ref 27–34)
MCHC RBC-ENTMCNC: 30.9 GM/DL — LOW (ref 32–36)
MCV RBC AUTO: 92.8 FL — SIGNIFICANT CHANGE UP (ref 80–100)
MONOCYTES # BLD AUTO: 1.09 K/UL — HIGH (ref 0–0.9)
MONOCYTES NFR BLD AUTO: 9.6 % — SIGNIFICANT CHANGE UP (ref 2–14)
NEUTROPHILS # BLD AUTO: 6.91 K/UL — SIGNIFICANT CHANGE UP (ref 1.8–7.4)
NEUTROPHILS NFR BLD AUTO: 60.8 % — SIGNIFICANT CHANGE UP (ref 43–77)
NRBC # BLD: 0 /100 WBCS — SIGNIFICANT CHANGE UP (ref 0–0)
PHOSPHATE SERPL-MCNC: 2.1 MG/DL — LOW (ref 2.5–4.5)
PHOSPHATE SERPL-MCNC: 2.7 MG/DL — SIGNIFICANT CHANGE UP (ref 2.5–4.5)
PLATELET # BLD AUTO: 575 K/UL — HIGH (ref 150–400)
POTASSIUM SERPL-MCNC: 4.5 MMOL/L — SIGNIFICANT CHANGE UP (ref 3.5–5.3)
POTASSIUM SERPL-MCNC: 7 MMOL/L — CRITICAL HIGH (ref 3.5–5.3)
POTASSIUM SERPL-SCNC: 4.5 MMOL/L — SIGNIFICANT CHANGE UP (ref 3.5–5.3)
POTASSIUM SERPL-SCNC: 7 MMOL/L — CRITICAL HIGH (ref 3.5–5.3)
RBC # BLD: 4.01 M/UL — SIGNIFICANT CHANGE UP (ref 3.8–5.2)
RBC # FLD: 16.6 % — HIGH (ref 10.3–14.5)
SODIUM SERPL-SCNC: 136 MMOL/L — SIGNIFICANT CHANGE UP (ref 135–145)
SODIUM SERPL-SCNC: 139 MMOL/L — SIGNIFICANT CHANGE UP (ref 135–145)
WBC # BLD: 11.36 K/UL — HIGH (ref 3.8–10.5)
WBC # FLD AUTO: 11.36 K/UL — HIGH (ref 3.8–10.5)

## 2020-12-08 PROCEDURE — 99232 SBSQ HOSP IP/OBS MODERATE 35: CPT

## 2020-12-08 RX ORDER — RILUZOLE 50 MG/1
50 TABLET ORAL
Refills: 0 | Status: DISCONTINUED | OUTPATIENT
Start: 2020-12-08 | End: 2020-12-10

## 2020-12-08 RX ORDER — SODIUM CHLORIDE 9 MG/ML
1000 INJECTION INTRAMUSCULAR; INTRAVENOUS; SUBCUTANEOUS
Refills: 0 | Status: DISCONTINUED | OUTPATIENT
Start: 2020-12-08 | End: 2020-12-09

## 2020-12-08 RX ORDER — SODIUM,POTASSIUM PHOSPHATES 278-250MG
1 POWDER IN PACKET (EA) ORAL EVERY 8 HOURS
Refills: 0 | Status: COMPLETED | OUTPATIENT
Start: 2020-12-08 | End: 2020-12-09

## 2020-12-08 RX ORDER — POLYETHYLENE GLYCOL 3350 17 G/17G
17 POWDER, FOR SOLUTION ORAL
Refills: 0 | Status: DISCONTINUED | OUTPATIENT
Start: 2020-12-08 | End: 2020-12-10

## 2020-12-08 RX ORDER — SENNA PLUS 8.6 MG/1
1 TABLET ORAL DAILY
Refills: 0 | Status: DISCONTINUED | OUTPATIENT
Start: 2020-12-08 | End: 2020-12-10

## 2020-12-08 RX ADMIN — Medication 3 MILLILITER(S): at 11:29

## 2020-12-08 RX ADMIN — Medication 1 APPLICATION(S): at 13:29

## 2020-12-08 RX ADMIN — Medication 1 APPLICATION(S): at 15:30

## 2020-12-08 RX ADMIN — Medication 2.5 MILLIGRAM(S): at 20:05

## 2020-12-08 RX ADMIN — SENNA PLUS 1 TABLET(S): 8.6 TABLET ORAL at 11:43

## 2020-12-08 RX ADMIN — Medication 1 DROP(S): at 17:13

## 2020-12-08 RX ADMIN — Medication 1 APPLICATION(S): at 02:07

## 2020-12-08 RX ADMIN — SERTRALINE 50 MILLIGRAM(S): 25 TABLET, FILM COATED ORAL at 05:02

## 2020-12-08 RX ADMIN — Medication 1 DROP(S): at 05:02

## 2020-12-08 RX ADMIN — Medication 1 TABLET(S): at 13:29

## 2020-12-08 RX ADMIN — CHLORHEXIDINE GLUCONATE 15 MILLILITER(S): 213 SOLUTION TOPICAL at 17:13

## 2020-12-08 RX ADMIN — SODIUM CHLORIDE 50 MILLILITER(S): 9 INJECTION INTRAMUSCULAR; INTRAVENOUS; SUBCUTANEOUS at 15:27

## 2020-12-08 RX ADMIN — HEPARIN SODIUM 5000 UNIT(S): 5000 INJECTION INTRAVENOUS; SUBCUTANEOUS at 05:01

## 2020-12-08 RX ADMIN — Medication 1 DROP(S): at 23:39

## 2020-12-08 RX ADMIN — Medication 1 TABLET(S): at 21:44

## 2020-12-08 RX ADMIN — Medication 3 MILLILITER(S): at 17:08

## 2020-12-08 RX ADMIN — HEPARIN SODIUM 5000 UNIT(S): 5000 INJECTION INTRAVENOUS; SUBCUTANEOUS at 17:13

## 2020-12-08 RX ADMIN — Medication 1 APPLICATION(S): at 20:06

## 2020-12-08 RX ADMIN — Medication 1 APPLICATION(S): at 21:44

## 2020-12-08 RX ADMIN — Medication 1.5 MILLIGRAM(S): at 08:45

## 2020-12-08 RX ADMIN — MEROPENEM 100 MILLIGRAM(S): 1 INJECTION INTRAVENOUS at 05:03

## 2020-12-08 RX ADMIN — Medication 1 APPLICATION(S): at 08:21

## 2020-12-08 RX ADMIN — RILUZOLE 50 MILLIGRAM(S): 50 TABLET ORAL at 05:02

## 2020-12-08 RX ADMIN — Medication 3 MILLILITER(S): at 23:25

## 2020-12-08 RX ADMIN — Medication 1 DROP(S): at 11:41

## 2020-12-08 RX ADMIN — Medication 1 APPLICATION(S): at 11:43

## 2020-12-08 RX ADMIN — Medication 1 DROP(S): at 23:40

## 2020-12-08 RX ADMIN — RILUZOLE 50 MILLIGRAM(S): 50 TABLET ORAL at 17:10

## 2020-12-08 RX ADMIN — MEROPENEM 100 MILLIGRAM(S): 1 INJECTION INTRAVENOUS at 21:44

## 2020-12-08 RX ADMIN — MEROPENEM 100 MILLIGRAM(S): 1 INJECTION INTRAVENOUS at 13:28

## 2020-12-08 RX ADMIN — CHLORHEXIDINE GLUCONATE 15 MILLILITER(S): 213 SOLUTION TOPICAL at 05:01

## 2020-12-08 RX ADMIN — Medication 1 APPLICATION(S): at 23:40

## 2020-12-08 RX ADMIN — CHLORHEXIDINE GLUCONATE 1 APPLICATION(S): 213 SOLUTION TOPICAL at 05:03

## 2020-12-08 RX ADMIN — POLYETHYLENE GLYCOL 3350 17 GRAM(S): 17 POWDER, FOR SOLUTION ORAL at 05:02

## 2020-12-08 RX ADMIN — Medication 1 TABLET(S): at 11:42

## 2020-12-08 RX ADMIN — Medication 1 APPLICATION(S): at 03:53

## 2020-12-08 RX ADMIN — Medication 1 DROP(S): at 11:42

## 2020-12-08 RX ADMIN — Medication 1 APPLICATION(S): at 17:11

## 2020-12-08 RX ADMIN — POLYETHYLENE GLYCOL 3350 17 GRAM(S): 17 POWDER, FOR SOLUTION ORAL at 17:10

## 2020-12-08 RX ADMIN — Medication 1 APPLICATION(S): at 10:15

## 2020-12-08 RX ADMIN — Medication 3 MILLILITER(S): at 05:30

## 2020-12-08 RX ADMIN — Medication 1 DROP(S): at 17:10

## 2020-12-08 RX ADMIN — Medication 1 APPLICATION(S): at 05:02

## 2020-12-08 RX ADMIN — SODIUM CHLORIDE 50 MILLILITER(S): 9 INJECTION INTRAMUSCULAR; INTRAVENOUS; SUBCUTANEOUS at 21:44

## 2020-12-08 NOTE — PROGRESS NOTE ADULT - SUBJECTIVE AND OBJECTIVE BOX
Patient is a 65y old  Female who presents with a chief complaint of sepsis (08 Dec 2020 07:31)      Interval Events: RN Reported patient with what appeared to be urinary retention x 1 yesterday     REVIEW OF SYSTEMS:  [ ] Positive  [ ] All other systems negative  [x] Unable to assess ROS because patient is Non-verbal due to advanced ALS     Vital Signs Last 24 Hrs  T(C): 36.6 (12-08-20 @ 04:40), Max: 36.8 (12-07-20 @ 18:00)  T(F): 97.9 (12-08-20 @ 04:40), Max: 98.2 (12-07-20 @ 18:00)  HR: 96 (12-08-20 @ 06:10) (90 - 111)  BP: 110/75 (12-08-20 @ 04:40) (110/75 - 142/74)  RR: 14 (12-08-20 @ 04:40) (14 - 18)  SpO2: 99% (12-08-20 @ 06:10) (96% - 100%)    PHYSICAL EXAM:  HEENT:   [x]Tracheostomy: # 7 XLT Shiley   [x]Pupils equal  [ ]No oral lesions  [x]Abnormal: + Injected Conjunctiva RT > Lft     SKIN  [x]No Rash  [ ] Abnormal  [ ] pressure    CARDIAC  [x]Regular  [ ]Abnormal:     PULMONARY  [ ]Bilateral Clear Breath Sounds  [ ]Normal Excursion  [x]Abnormal: Bilateral Coarse Breath sounds    GI  [x]PEG      [x] +BS		              [x]Soft, nondistended, nontender	  [ ]Abnormal    MUSCULOSKELETAL                                   [x]Bedbound                 [ ]Abnormal    [ ]Ambulatory/OOB to chair                           EXTREMITIES                                         [x] Decrease Dorsalis pedal pulse on left lower extremity ( Located with bedside doppler), Warm to touch   [ ]Edema                           NEUROLOGIC  [ ] Normal, non focal  [x] Focal findings: Functional Quadriplegic 2/2 ALS     PSYCHIATRIC  [ ]Alert and appropriate  [x] Sedated	 [ ]Agitated    :  Newman: [ ] Yes, if yes: Date of Placement:                   [x] No    LINES: Central Lines [ ] Yes, if yes: Date of Placement                                     [x] No    HOSPITAL MEDICATIONS:  MEDICATIONS  (STANDING):  albuterol/ipratropium for Nebulization 3 milliLiter(s) Nebulizer every 6 hours  artificial  tears Solution 1 Drop(s) Both EYES every 6 hours  chlorhexidine 0.12% Liquid 15 milliLiter(s) Oral Mucosa every 12 hours  chlorhexidine 4% Liquid 1 Application(s) Topical <User Schedule>  diazepam   Solution 1.5 milliGRAM(s) Enteral Tube <User Schedule>  diazepam   Solution 2.5 milliGRAM(s) Enteral Tube <User Schedule>  erythromycin   Ointment 1 Application(s) Both EYES <User Schedule>  heparin   Injectable 5000 Unit(s) SubCutaneous every 12 hours  influenza   Vaccine 0.5 milliLiter(s) IntraMuscular once  meropenem  IVPB 1000 milliGRAM(s) IV Intermittent every 8 hours  multivitamin 1 Tablet(s) Oral daily  ofloxacin 0.3% Solution 1 Drop(s) Both EYES four times a day  petrolatum Ophthalmic Ointment 1 Application(s) Both EYES <User Schedule>  polyethylene glycol 3350 17 Gram(s) Oral two times a day  senna 1 Tablet(s) Oral daily  sertraline 50 milliGRAM(s) Oral daily    MEDICATIONS  (PRN):      LABS:                        11.5   11.36 )-----------( 575      ( 08 Dec 2020 07:40 )             37.2     12-08    136  |  102  |  21  ----------------------------<  84  7.0<HH>   |  22  |  <0.30<L>    Ca    9.9      08 Dec 2020 07:40  Phos  2.7     12-08  Mg     2.3     12-08              CAPILLARY BLOOD GLUCOSE    MICROBIOLOGY:     RADIOLOGY:  [ ] Reviewed and interpreted by me    Mode: AC/ CMV (Assist Control/ Continuous Mandatory Ventilation)  RR (machine): 14  TV (machine): 450  FiO2: 21  PEEP: 5  ITime: 1  MAP: 8  PIP: 21

## 2020-12-08 NOTE — PROGRESS NOTE ADULT - ASSESSMENT
65 F with advanced ALS, chronic hypoxic/ hypercapnic resp failure, vent dependent, s/p trach 2015 and PEG, now presenting with septic shock and Morganella bacteremia 2/2 bacterial PNA + UTI.  Patient was BIBEMS from home for fevers. Pt's 24hr home health nurse reported over the past few days the pt has appeared more lethargic, has been tachycardic, and running fevers with decreased Urination. She was empirically started on meropenum and vancomycin in ED.  Was managed for Septic shock s/p fluid resuscitation, IV pressors, and Transferred to MICU. Patients abx were switched to Cefepime after Bcx 11/22 revealed Morganella morganii.  Surveillance Bcxs from 11/25 remain NGTD.  LLE doppler study revealed stenosis of femoral and popliteal arteries causing cool, pale LLE without cyanosis. Patient was weaned off vasopressors and placed on Midodrine. Patient was Transferred to RCU on 11/28. Trach changed to #7 distal xlt lise by ENT due to air leak on 11/28. Patient had CT abd pelvis performed which revealed 2 mm non obstructive left ureteral stone and multiple renal calculi with mild bilateral hydronephrosis.     12/8: No events reported overnight patient remains afebrile but slight bump in wbc today, will continue to monitor wbc and temperature curve. Patient scheduled for Ureteroscopy, Ureteral Stent, and lithotripsy on 12/10.

## 2020-12-08 NOTE — PROGRESS NOTE ADULT - PROBLEM SELECTOR PLAN 8
Patient is from home with 24 /7 care   Will need reinstating of home services prior to dc   Patients  called and updated on clinical status today

## 2020-12-08 NOTE — PROGRESS NOTE ADULT - SUBJECTIVE AND OBJECTIVE BOX
UROLOGY Progress Note  TINO MATA    S: Patient seen at bedside. Afebrile, leukocytosis improving.     O:  T(C): 36.6 (12-08-20 @ 04:40), Max: 36.8 (12-07-20 @ 18:00)  HR: 96 (12-08-20 @ 06:10) (90 - 111)  BP: 110/75 (12-08-20 @ 04:40) (110/75 - 142/74)  RR: 14 (12-08-20 @ 04:40) (14 - 18)  SpO2: 99% (12-08-20 @ 06:10) (96% - 100%)        Physical Exam:  Physical Exam  Gen: NAD  Pulm: Trach, mechanical ventiilatioin  Abd: No appreciable CVAT      Labs:  CBC (12-07 @ 06:48)                              11.2<L>                         10.64<H>  )----------------(  531<H>     71.4  % Neutrophils, 10.3<L>% Lymphocytes, ANC: 7.59<H>                              38.4                  BMP (12-07 @ 06:47)             141     |  104     |  23    		Ca++ --      Ca 9.8                ---------------------------------( 117<H>		Mg 2.1                4.3     |  23      |  <0.30<L>			Ph 3.1               -> .Urine Catheterized Culture (12-01 @ 17:36)     NG    NG    No growth    -> .Blood Blood-Peripheral Culture (11-30 @ 15:02)     NG    NG    No Growth Final    -> .Sputum Sputum Culture (11-30 @ 00:33)       Few polymorphonuclear leukocytes per low power field  Rare Squamous epithelial cells per low power field  Few Gram Variable Rods seen per oil power field  Rare Gram positive cocci in pairs seen per oil power field    Stenotrophomonas maltophilia    Moderate Stenotrophomonas maltophilia  Normal Respiratory Kia present        A/P: 65y Female s/p    - Pain control  - OOB as tolerated  - Diet:  - Monitor GI/ fxn  - Dispo: Floor

## 2020-12-08 NOTE — PROGRESS NOTE ADULT - ASSESSMENT
65 f with ALS, s/p trach and PEG, was admitted 11/22 for septic shock and morganella bacteremia, was considered due to UTI vs pneumonia but urine cx was negative, sputum cx negative, CXR with LLL pneumonia  blood cx negative 11/25  WBC initially 19 then 31 and fluctuating, normalized on 11/26 and then increased to 29 with some hypotension, no diarrhea, no resp secretions and no fever  exam with b/l injected conjunctivae and cold LLE but that resolved LLE not cold anymore     septic shock with morganella bacteremia, ?UTI vs pneumonia but urine and sputum cx negative  cleared blood cultures 11/25  new  leukocytosis and hypotension while on cefepime, CT with LLL collapse, multiple L renal and ureteral stones with mild hydro and urothelial enhancement so likely pyelonephritis but urine cx came back negative, done on antibiotics though, pt improved on andei  sputum cx with Stenotrophomonas sensitive to levo and bactrim but no clinical evidence of pneumonia, likely colonization      * c/w andie 1 q 8, started 11/30, now day 9, plan was for a 10 day course, but now urology is doing a cystoscopy and ureteral stent, so will continue until after the cystoscopy  * monitor the CBC/diff  * if any change in the respiratory status or more fever, would start levo for steno in the sputum cx    The above assessment and plan was discussed with RCMARIO Terrazas MD  Pager [phone]  After 5pm and on weekends call [phone]

## 2020-12-08 NOTE — PROGRESS NOTE ADULT - ATTENDING COMMENTS
65 F with advanced ALS, chronic hypoxic/hypercapnic respiratory failure, vent dependent, s/p trach and PEG p/w septic shock and Morganella bacteremia due to UTI. Has nonobstructing stones on CT w/ mild hydro and ureteral dilatation. Plan for Urological intervention this week w/ likely stent placement. Cont abx until postprocedure. Monitor UO, started on maintenance IVF. 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 Cont riluzole for ALS. Pt medically optimized and cleared for urological procedure. Pt has moderate risk for complications given baseline ventilator dependence.

## 2020-12-09 ENCOUNTER — TRANSCRIPTION ENCOUNTER (OUTPATIENT)
Age: 65
End: 2020-12-09

## 2020-12-09 LAB
ANION GAP SERPL CALC-SCNC: 11 MMOL/L — SIGNIFICANT CHANGE UP (ref 5–17)
BASOPHILS # BLD AUTO: 0.14 K/UL — SIGNIFICANT CHANGE UP (ref 0–0.2)
BASOPHILS NFR BLD AUTO: 1.4 % — SIGNIFICANT CHANGE UP (ref 0–2)
BUN SERPL-MCNC: 17 MG/DL — SIGNIFICANT CHANGE UP (ref 7–23)
CALCIUM SERPL-MCNC: 9 MG/DL — SIGNIFICANT CHANGE UP (ref 8.4–10.5)
CHLORIDE SERPL-SCNC: 103 MMOL/L — SIGNIFICANT CHANGE UP (ref 96–108)
CO2 SERPL-SCNC: 22 MMOL/L — SIGNIFICANT CHANGE UP (ref 22–31)
CREAT SERPL-MCNC: <0.3 MG/DL — LOW (ref 0.5–1.3)
EOSINOPHIL # BLD AUTO: 0.75 K/UL — HIGH (ref 0–0.5)
EOSINOPHIL NFR BLD AUTO: 7.7 % — HIGH (ref 0–6)
GLUCOSE BLDC GLUCOMTR-MCNC: 98 MG/DL — SIGNIFICANT CHANGE UP (ref 70–99)
GLUCOSE SERPL-MCNC: 85 MG/DL — SIGNIFICANT CHANGE UP (ref 70–99)
HCT VFR BLD CALC: 32.1 % — LOW (ref 34.5–45)
HGB BLD-MCNC: 9.8 G/DL — LOW (ref 11.5–15.5)
IMM GRANULOCYTES NFR BLD AUTO: 0.3 % — SIGNIFICANT CHANGE UP (ref 0–1.5)
LYMPHOCYTES # BLD AUTO: 1.72 K/UL — SIGNIFICANT CHANGE UP (ref 1–3.3)
LYMPHOCYTES # BLD AUTO: 17.7 % — SIGNIFICANT CHANGE UP (ref 13–44)
MAGNESIUM SERPL-MCNC: 2.1 MG/DL — SIGNIFICANT CHANGE UP (ref 1.6–2.6)
MCHC RBC-ENTMCNC: 28.2 PG — SIGNIFICANT CHANGE UP (ref 27–34)
MCHC RBC-ENTMCNC: 30.5 GM/DL — LOW (ref 32–36)
MCV RBC AUTO: 92.5 FL — SIGNIFICANT CHANGE UP (ref 80–100)
MONOCYTES # BLD AUTO: 0.72 K/UL — SIGNIFICANT CHANGE UP (ref 0–0.9)
MONOCYTES NFR BLD AUTO: 7.4 % — SIGNIFICANT CHANGE UP (ref 2–14)
NEUTROPHILS # BLD AUTO: 6.36 K/UL — SIGNIFICANT CHANGE UP (ref 1.8–7.4)
NEUTROPHILS NFR BLD AUTO: 65.5 % — SIGNIFICANT CHANGE UP (ref 43–77)
NRBC # BLD: 0 /100 WBCS — SIGNIFICANT CHANGE UP (ref 0–0)
PHOSPHATE SERPL-MCNC: 2.2 MG/DL — LOW (ref 2.5–4.5)
PLATELET # BLD AUTO: 504 K/UL — HIGH (ref 150–400)
POTASSIUM SERPL-MCNC: 4.6 MMOL/L — SIGNIFICANT CHANGE UP (ref 3.5–5.3)
POTASSIUM SERPL-SCNC: 4.6 MMOL/L — SIGNIFICANT CHANGE UP (ref 3.5–5.3)
RBC # BLD: 3.47 M/UL — LOW (ref 3.8–5.2)
RBC # FLD: 16.8 % — HIGH (ref 10.3–14.5)
SODIUM SERPL-SCNC: 136 MMOL/L — SIGNIFICANT CHANGE UP (ref 135–145)
WBC # BLD: 9.72 K/UL — SIGNIFICANT CHANGE UP (ref 3.8–10.5)
WBC # FLD AUTO: 9.72 K/UL — SIGNIFICANT CHANGE UP (ref 3.8–10.5)

## 2020-12-09 PROCEDURE — 99232 SBSQ HOSP IP/OBS MODERATE 35: CPT

## 2020-12-09 RX ORDER — MEROPENEM 1 G/30ML
INJECTION INTRAVENOUS
Refills: 0 | Status: DISCONTINUED | OUTPATIENT
Start: 2020-12-09 | End: 2020-12-09

## 2020-12-09 RX ORDER — SODIUM CHLORIDE 9 MG/ML
1000 INJECTION INTRAMUSCULAR; INTRAVENOUS; SUBCUTANEOUS
Refills: 0 | Status: DISCONTINUED | OUTPATIENT
Start: 2020-12-09 | End: 2020-12-10

## 2020-12-09 RX ORDER — MEROPENEM 1 G/30ML
1000 INJECTION INTRAVENOUS EVERY 8 HOURS
Refills: 0 | Status: DISCONTINUED | OUTPATIENT
Start: 2020-12-09 | End: 2020-12-10

## 2020-12-09 RX ORDER — SODIUM CHLORIDE 9 MG/ML
1000 INJECTION, SOLUTION INTRAVENOUS
Refills: 0 | Status: DISCONTINUED | OUTPATIENT
Start: 2020-12-10 | End: 2020-12-10

## 2020-12-09 RX ORDER — SODIUM,POTASSIUM PHOSPHATES 278-250MG
1 POWDER IN PACKET (EA) ORAL EVERY 8 HOURS
Refills: 0 | Status: COMPLETED | OUTPATIENT
Start: 2020-12-09 | End: 2020-12-09

## 2020-12-09 RX ADMIN — Medication 1 APPLICATION(S): at 18:05

## 2020-12-09 RX ADMIN — RILUZOLE 50 MILLIGRAM(S): 50 TABLET ORAL at 05:26

## 2020-12-09 RX ADMIN — Medication 1 DROP(S): at 11:03

## 2020-12-09 RX ADMIN — Medication 1 TABLET(S): at 11:28

## 2020-12-09 RX ADMIN — SERTRALINE 50 MILLIGRAM(S): 25 TABLET, FILM COATED ORAL at 05:26

## 2020-12-09 RX ADMIN — MEROPENEM 100 MILLIGRAM(S): 1 INJECTION INTRAVENOUS at 13:28

## 2020-12-09 RX ADMIN — Medication 1 APPLICATION(S): at 11:00

## 2020-12-09 RX ADMIN — Medication 1 APPLICATION(S): at 05:27

## 2020-12-09 RX ADMIN — Medication 1 DROP(S): at 05:27

## 2020-12-09 RX ADMIN — Medication 1 APPLICATION(S): at 21:52

## 2020-12-09 RX ADMIN — Medication 1 APPLICATION(S): at 08:47

## 2020-12-09 RX ADMIN — Medication 2.5 MILLIGRAM(S): at 21:53

## 2020-12-09 RX ADMIN — Medication 1 APPLICATION(S): at 21:53

## 2020-12-09 RX ADMIN — MEROPENEM 100 MILLIGRAM(S): 1 INJECTION INTRAVENOUS at 21:54

## 2020-12-09 RX ADMIN — POLYETHYLENE GLYCOL 3350 17 GRAM(S): 17 POWDER, FOR SOLUTION ORAL at 05:26

## 2020-12-09 RX ADMIN — Medication 1 APPLICATION(S): at 13:28

## 2020-12-09 RX ADMIN — Medication 1 TABLET(S): at 05:26

## 2020-12-09 RX ADMIN — CHLORHEXIDINE GLUCONATE 1 APPLICATION(S): 213 SOLUTION TOPICAL at 05:30

## 2020-12-09 RX ADMIN — Medication 1 TABLET(S): at 13:28

## 2020-12-09 RX ADMIN — HEPARIN SODIUM 5000 UNIT(S): 5000 INJECTION INTRAVENOUS; SUBCUTANEOUS at 18:06

## 2020-12-09 RX ADMIN — Medication 1 APPLICATION(S): at 11:29

## 2020-12-09 RX ADMIN — Medication 1 APPLICATION(S): at 23:52

## 2020-12-09 RX ADMIN — SODIUM CHLORIDE 50 MILLILITER(S): 9 INJECTION INTRAMUSCULAR; INTRAVENOUS; SUBCUTANEOUS at 05:27

## 2020-12-09 RX ADMIN — SODIUM CHLORIDE 50 MILLILITER(S): 9 INJECTION INTRAMUSCULAR; INTRAVENOUS; SUBCUTANEOUS at 14:20

## 2020-12-09 RX ADMIN — Medication 1 APPLICATION(S): at 02:03

## 2020-12-09 RX ADMIN — Medication 3 MILLILITER(S): at 17:47

## 2020-12-09 RX ADMIN — Medication 1.5 MILLIGRAM(S): at 08:45

## 2020-12-09 RX ADMIN — Medication 1 DROP(S): at 18:06

## 2020-12-09 RX ADMIN — Medication 1 DROP(S): at 23:51

## 2020-12-09 RX ADMIN — HEPARIN SODIUM 5000 UNIT(S): 5000 INJECTION INTRAVENOUS; SUBCUTANEOUS at 05:26

## 2020-12-09 RX ADMIN — Medication 1 DROP(S): at 11:04

## 2020-12-09 RX ADMIN — CHLORHEXIDINE GLUCONATE 15 MILLILITER(S): 213 SOLUTION TOPICAL at 05:26

## 2020-12-09 RX ADMIN — Medication 3 MILLILITER(S): at 13:34

## 2020-12-09 RX ADMIN — Medication 1 APPLICATION(S): at 04:01

## 2020-12-09 RX ADMIN — Medication 3 MILLILITER(S): at 05:29

## 2020-12-09 RX ADMIN — Medication 1 TABLET(S): at 21:53

## 2020-12-09 RX ADMIN — SENNA PLUS 1 TABLET(S): 8.6 TABLET ORAL at 11:05

## 2020-12-09 RX ADMIN — RILUZOLE 50 MILLIGRAM(S): 50 TABLET ORAL at 18:07

## 2020-12-09 RX ADMIN — CHLORHEXIDINE GLUCONATE 15 MILLILITER(S): 213 SOLUTION TOPICAL at 18:06

## 2020-12-09 RX ADMIN — Medication 1 TABLET(S): at 11:03

## 2020-12-09 RX ADMIN — MEROPENEM 100 MILLIGRAM(S): 1 INJECTION INTRAVENOUS at 05:27

## 2020-12-09 RX ADMIN — Medication 3 MILLILITER(S): at 23:37

## 2020-12-09 RX ADMIN — Medication 1 DROP(S): at 18:07

## 2020-12-09 NOTE — PROGRESS NOTE ADULT - ASSESSMENT
66 yo F scheduled for cystoscopy, ureteral stent placement with Dr. Jani Guerrero on 12/10.    Orders:  NPO after midnight  IVF after midnight   Consent obtained  Pt medically optimized and cleared for urologic procedure by Dr. Dinh  Type & Screen  Anti-coagulation held 64 yo F scheduled for cystoscopy, L ureteral stent placement with Dr. Jani Guerrero on 12/10.    Orders:  NPO after midnight  IVF after midnight   Consent obtained  Pt medically optimized and cleared for urologic procedure by Dr. Dinh  Type & Screen  Anti-coagulation held 64 yo F scheduled for Left ureteroscopy, laser lithotripsy, left ureteral stent placement with Dr. Jani Guerrero on 12/10.    Orders:  NPO after midnight  IVF after midnight   Consent obtained from HCP (, Dr. Velasquez.)  Pt medically optimized and cleared for urologic procedure by Dr. Dinh  Type & Screen  Anti-coagulation held

## 2020-12-09 NOTE — PROVIDER CONTACT NOTE (OTHER) - ACTION/TREATMENT ORDERED:
continue to monitor if continue to increase notify
nebulizers now, continue to monitor and notify if pt. and notify if saturations do not improve after suction.
ivf started as orderd

## 2020-12-09 NOTE — PROGRESS NOTE ADULT - PROBLEM SELECTOR PLAN 1
Patient admitted with Sepsis 2/2 Bacteremia   Blood cx 11/22: Morganella Morganii, Repeat Bld CX 11/25: No growth   CT Chest / ABD/ Pelvis 11/30: Left Lower Lobe Collapse, 2mm Nonobstructive Left ureteral stone, left renal calculi and mild bilateral hydronephrosis   Urine Cx 12/1: No growth   Sputum Cx 11/30: Stenotrophomonas ( Possible Colonization )   Continue Meropenem 10 day course, completes today (12/9)   Planned for Cystoscopy, lithotripsy and stent placement on 12/10 for left renal calculi / hydronephrosis By Urology   Stent will be removed the following week prior to discharge Patient admitted with Sepsis 2/2 Bacteremia   Blood cx 11/22: Morganella Morganii, Repeat Bld CX 11/25: No growth   CT Chest / ABD/ Pelvis 11/30: Left Lower Lobe Collapse, 2mm Nonobstructive Left ureteral stone, left renal calculi and mild bilateral hydronephrosis   Urine Cx 12/1: No growth   Sputum Cx 11/30: Stenotrophomonas ( Possible Colonization )   Continue Meropenem until  procedure performed as per ID  Planned for Cystoscopy, lithotripsy and stent placement on 12/10 for left renal calculi / hydronephrosis By Urology   Stent will be removed the following week prior to discharge Patient admitted with Sepsis 2/2 Bacteremia   Blood cx 11/22: Morganella Morganii, Repeat Bld CX 11/25: No growth   CT Chest / ABD/ Pelvis 11/30: Left Lower Lobe Collapse, 2mm Nonobstructive Left ureteral stone, left renal calculi and mild bilateral hydronephrosis   Urine Cx 12/1: No growth   Sputum Cx 11/30: Stenotrophomonas ( Possible Colonization )   Continue Meropenem until after  procedure performed as per ID  Planned for Cystoscopy, lithotripsy and stent placement on 12/10 for left renal calculi / hydronephrosis By Urology   Stent will be removed the following week prior to discharge

## 2020-12-09 NOTE — PROGRESS NOTE ADULT - SUBJECTIVE AND OBJECTIVE BOX
Follow Up:  leukocytosis    Interval History: pt afebrile, plan for cystoscopy and stent tomorrow    ROS:    Unobtainable because of mental status        Allergies  Bactrim DS (Rash)        ANTIMICROBIALS:  meropenem  IVPB 1000 every 8 hours      OTHER MEDS:  albuterol/ipratropium for Nebulization 3 milliLiter(s) Nebulizer every 6 hours  artificial  tears Solution 1 Drop(s) Both EYES every 6 hours  chlorhexidine 0.12% Liquid 15 milliLiter(s) Oral Mucosa every 12 hours  chlorhexidine 4% Liquid 1 Application(s) Topical <User Schedule>  diazepam   Solution 1.5 milliGRAM(s) Enteral Tube <User Schedule>  diazepam   Solution 2.5 milliGRAM(s) Enteral Tube <User Schedule>  erythromycin   Ointment 1 Application(s) Both EYES <User Schedule>  heparin   Injectable 5000 Unit(s) SubCutaneous every 12 hours  influenza   Vaccine 0.5 milliLiter(s) IntraMuscular once  multivitamin 1 Tablet(s) Oral daily  ofloxacin 0.3% Solution 1 Drop(s) Both EYES four times a day  petrolatum Ophthalmic Ointment 1 Application(s) Both EYES <User Schedule>  polyethylene glycol 3350 17 Gram(s) Oral two times a day  potassium phosphate / sodium phosphate Tablet (K-PHOS No. 2) 1 Tablet(s) Oral every 8 hours  riluzole 50 milliGRAM(s) Oral two times a day  senna 1 Tablet(s) Oral daily  sertraline 50 milliGRAM(s) Oral daily  sodium chloride 0.9%. 1000 milliLiter(s) IV Continuous <Continuous>      Vital Signs Last 24 Hrs  T(C): 37.2 (09 Dec 2020 03:23), Max: 37.2 (09 Dec 2020 03:23)  T(F): 98.9 (09 Dec 2020 03:23), Max: 98.9 (09 Dec 2020 03:23)  HR: 94 (09 Dec 2020 08:21) (90 - 105)  BP: 127/78 (09 Dec 2020 03:23) (127/78 - 135/69)  BP(mean): --  RR: 15 (09 Dec 2020 03:23) (14 - 16)  SpO2: 98% (09 Dec 2020 08:21) (97% - 100%)    Physical Exam:  General:    NAD  ENT:   trach, no significant secretions  Cardio:     regular S1, S2  Respiratory:    clear b/l,    no wheezing  abd:     soft,   BS +,   PEG  :     no  carter  Musculoskeletal:   no joint swelling  vascular: no phlebitis  Skin:    no rash, no decubitus                           9.8    9.72  )-----------( 504      ( 09 Dec 2020 07:37 )             32.1       12-09    136  |  103  |  17  ----------------------------<  85  4.6   |  22  |  <0.30<L>    Ca    9.0      09 Dec 2020 07:37  Phos  2.2     12-09  Mg     2.1     12-09            MICROBIOLOGY:  v  .Urine Catheterized  12-01-20   No growth  --  --      .Blood Blood-Peripheral  11-30-20   No Growth Final  --  --      .Sputum Sputum  11-30-20   Moderate Stenotrophomonas maltophilia  Normal Respiratory Kia present  --  Stenotrophomonas maltophilia      .Blood Blood  11-25-20   No Growth Final  --  --      .Sputum Sputum  11-23-20   Normal Respiratory Kia present  --    Rare polymorphonuclear leukocytes per low power field  Rare Squamous epithelial cells per low power field  Few Gram Variable Rods per oil power field      .Blood Blood-Peripheral  11-22-20   Growth in aerobic and anaerobic bottles: Morganella morganii  See previous culture [id number]  --  Blood Culture PCR  Morganella morganii  Morganella morganii      .Urine Clean Catch (Midstream)  11-22-20   <10,000 CFU/mL Normal Urogenital Kia  --  --                RADIOLOGY:  Images independently visualized and reviewed personally, findings as below

## 2020-12-09 NOTE — PROGRESS NOTE ADULT - PROBLEM SELECTOR PLAN 2
Patient with Tracheostomy at baseline ( 2015)   Continue Mechanical Ventilation  Patient does not tolerate weaning due to advanced ALS  Continue Chest PT / Suctioning PRN / Metaneb

## 2020-12-09 NOTE — PROGRESS NOTE ADULT - SUBJECTIVE AND OBJECTIVE BOX
Urology Preop Note    Diagnosis:   Procedure: cystoscopy, ureteral stent placement  Surgeon: Jani Guerrero                          9.8    9.72  )-----------( 504      ( 09 Dec 2020 07:37 )             32.1       12-09    136  |  103  |  17  ----------------------------<  85  4.6   |  22  |  <0.30<L>    Ca    9.0      09 Dec 2020 07:37  Phos  2.2     12-09  Mg     2.1     12-09      UCx: Culture - Urine (12.01.20 @ 17:36)    Specimen Source: .Urine Catheterized    Culture Results:   No growth    EKG: < from: 12 Lead ECG (11.22.20 @ 15:55) >  Ventricular Rate 133 BPM    Atrial Rate 133 BPM    P-R Interval 104 ms    QRS Duration 64 ms    Q-T Interval 382 ms    QTC Calculation(Bazett) 568 ms    P Axis -7 degrees    R Axis 21 degrees    T Axis 34 degrees    Diagnosis Line SINUS TACHYCARDIAWITH SHORT ME  NONSPECIFIC ST AND T WAVE ABNORMALITY  ABNORMAL ECG  WHEN COMPARED WITH ECG OF 04-APR-2017 23:54,  SIGNIFICANT CHANGES HAVE OCCURRED  new sinus tachycardia    Confirmed by ROSA Gore Zlata ([id number]) on 11/23/2020 9:27:11 AM    < end of copied text >    CT chest: < from: CT Chest w/ IV Cont (11.30.20 @ 20:16) >  IMPRESSION:  Left lower lobe collapse. Superimposed infection is not excluded.    2 mm left ureteral calculus and multiple nonobstructive subcentimeter left renal calculi. Associated mild hydronephrosis and urothelial enhancement suggests inflammation/infection.    Cholelithiasis.    NIKKO CHINCHILLA MD; Attending Radiologist  This document has been electronically signed. Dec  1 2020 10:01AM    < end of copied text >         Urology Preop Note    Diagnosis: 2mm L distal stone with sepsis  Procedure: cystoscopy, L ureteral stent placement  Surgeon: Jani Guerrero                          9.8    9.72  )-----------( 504      ( 09 Dec 2020 07:37 )             32.1       12-09    136  |  103  |  17  ----------------------------<  85  4.6   |  22  |  <0.30<L>    Ca    9.0      09 Dec 2020 07:37  Phos  2.2     12-09  Mg     2.1     12-09      UCx: Culture - Urine (12.01.20 @ 17:36)    Specimen Source: .Urine Catheterized    Culture Results:   No growth    EKG: < from: 12 Lead ECG (11.22.20 @ 15:55) >  Ventricular Rate 133 BPM    Atrial Rate 133 BPM    P-R Interval 104 ms    QRS Duration 64 ms    Q-T Interval 382 ms    QTC Calculation(Bazett) 568 ms    P Axis -7 degrees    R Axis 21 degrees    T Axis 34 degrees    Diagnosis Line SINUS TACHYCARDIAWITH SHORT PA  NONSPECIFIC ST AND T WAVE ABNORMALITY  ABNORMAL ECG  WHEN COMPARED WITH ECG OF 04-APR-2017 23:54,  SIGNIFICANT CHANGES HAVE OCCURRED  new sinus tachycardia    Confirmed by ROSA Gore Zlata ([id number]) on 11/23/2020 9:27:11 AM    < end of copied text >    CT chest: < from: CT Chest w/ IV Cont (11.30.20 @ 20:16) >  IMPRESSION:  Left lower lobe collapse. Superimposed infection is not excluded.    2 mm left ureteral calculus and multiple nonobstructive subcentimeter left renal calculi. Associated mild hydronephrosis and urothelial enhancement suggests inflammation/infection.    Cholelithiasis.    NIKKO CHINCHILLA MD; Attending Radiologist  This document has been electronically signed. Dec  1 2020 10:01AM    < end of copied text >         Urology Preop Note    Diagnosis: 2mm L distal stone with sepsis  Procedure: Left ureteroscopy, laser lithotripsy, left ureteral stent placement  Surgeon: Jani Guerrero                          9.8    9.72  )-----------( 504      ( 09 Dec 2020 07:37 )             32.1       12-09    136  |  103  |  17  ----------------------------<  85  4.6   |  22  |  <0.30<L>    Ca    9.0      09 Dec 2020 07:37  Phos  2.2     12-09  Mg     2.1     12-09      UCx: Culture - Urine (12.01.20 @ 17:36)    Specimen Source: .Urine Catheterized    Culture Results:   No growth    EKG: < from: 12 Lead ECG (11.22.20 @ 15:55) >  Ventricular Rate 133 BPM    Atrial Rate 133 BPM    P-R Interval 104 ms    QRS Duration 64 ms    Q-T Interval 382 ms    QTC Calculation(Bazett) 568 ms    P Axis -7 degrees    R Axis 21 degrees    T Axis 34 degrees    Diagnosis Line SINUS TACHYCARDIAWITH SHORT NH  NONSPECIFIC ST AND T WAVE ABNORMALITY  ABNORMAL ECG  WHEN COMPARED WITH ECG OF 04-APR-2017 23:54,  SIGNIFICANT CHANGES HAVE OCCURRED  new sinus tachycardia    Confirmed by ROSA Gore Zlata ([id number]) on 11/23/2020 9:27:11 AM    < end of copied text >    CT chest: < from: CT Chest w/ IV Cont (11.30.20 @ 20:16) >  IMPRESSION:  Left lower lobe collapse. Superimposed infection is not excluded.    2 mm left ureteral calculus and multiple nonobstructive subcentimeter left renal calculi. Associated mild hydronephrosis and urothelial enhancement suggests inflammation/infection.    Cholelithiasis.    NIKKO CHINCHILLA MD; Attending Radiologist  This document has been electronically signed. Dec  1 2020 10:01AM    < end of copied text >

## 2020-12-09 NOTE — PROGRESS NOTE ADULT - ATTENDING COMMENTS
65 F with advanced ALS, chronic hypoxic/hypercapnic respiratory failure, vent dependent, s/p trach and PEG p/w septic shock and Morganella bacteremia due to UTI. Has nonobstructing stones on CT w/ mild hydro and ureteral dilatation. Plan for Urological intervention  w/ likely stent placement. Cont abx until postprocedure. Monitor UO, started on maintenance IVF. 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 Cont riluzole for ALS. Pt medically optimized and cleared for urological procedure. Pt has moderate risk for complications given baseline ventilator dependence.

## 2020-12-09 NOTE — PROGRESS NOTE ADULT - ASSESSMENT
65 f with ALS, s/p trach and PEG, was admitted 11/22 for septic shock and morganella bacteremia, was considered due to UTI vs pneumonia but urine cx was negative, sputum cx negative, CXR with LLL pneumonia  blood cx negative 11/25  WBC initially 19 then 31 and fluctuating, normalized on 11/26 and then increased to 29 with some hypotension, no diarrhea, no resp secretions and no fever  exam with b/l injected conjunctivae and cold LLE but that resolved LLE not cold anymore     septic shock with morganella bacteremia, ?UTI vs pneumonia but urine and sputum cx negative  cleared blood cultures 11/25  new  leukocytosis and hypotension while on cefepime, CT with LLL collapse, multiple L renal and ureteral stones with mild hydro and urothelial enhancement so likely pyelonephritis but urine cx came back negative, done on antibiotics though, pt improved on andie  sputum cx with Stenotrophomonas sensitive to levo and bactrim but no clinical evidence of pneumonia, likely colonization      * c/w andie 1 q 8, started 11/30, now day 10, plan was for a 10 day course, but now urology is doing a cystoscopy and ureteral stent tomorrow, so will continue until after the cystoscopy  * monitor the CBC/diff  * if any change in the respiratory status or more fever, would start levo for steno in the sputum cx    The above assessment and plan was discussed with U    Salome Terrazas MD  Pager [phone]  After 5pm and on weekends call [phone]

## 2020-12-09 NOTE — PROGRESS NOTE ADULT - PROBLEM SELECTOR PLAN 6
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

## 2020-12-09 NOTE — PROGRESS NOTE ADULT - PROBLEM SELECTOR PLAN 8
Patient is from home with 24 /7 care   Will need reinstating of home services prior to dc   Patients  aware of medical / discharge plan

## 2020-12-09 NOTE — PROGRESS NOTE ADULT - ASSESSMENT
65 F with advanced ALS, chronic hypoxic/ hypercapnic resp failure, vent dependent, s/p trach 2015 and PEG, now presenting with septic shock and Morganella bacteremia 2/2 bacterial PNA + UTI.  Patient was BIBEMS from home for fevers. Pt's 24hr home health nurse reported over the past few days the pt has appeared more lethargic, has been tachycardic, and running fevers with decreased Urination. She was empirically started on meropenum and vancomycin in ED.  Was managed for Septic shock s/p fluid resuscitation, IV pressors, and Transferred to MICU. Patients abx were switched to Cefepime after Bcx 11/22 revealed Morganella morganii.  Surveillance Bcxs from 11/25 remain NGTD.  LLE doppler study revealed stenosis of femoral and popliteal arteries causing cool, pale LLE without cyanosis. Patient was weaned off vasopressors and placed on Midodrine. Patient was Transferred to RCU on 11/28. Trach changed to #7 distal xlt lise by ENT due to air leak on 11/28. Patient had CT abd pelvis performed which revealed 2 mm non obstructive left ureteral stone and multiple renal calculi with mild bilateral hydronephrosis.     12/9: RN reported patient with decreased Urine output yesterday afternoon was placed on NS IVF @ 50 cc/hr. Patient was put on Bladder Scan q 6 hrs, noted to have urinary retention in the afternoon 600 cc of retained urine noted. Patient straight cath' d x 1; will continue to monitor for urinary retention, Patient will be NPO At midnight for  procedure tomorrow. 65 F with advanced ALS, chronic hypoxic/ hypercapnic resp failure, vent dependent, s/p trach 2015 and PEG, now presenting with septic shock and Morganella bacteremia 2/2 bacterial PNA + UTI.  Patient was BIBEMS from home for fevers. Pt's 24hr home health nurse reported over the past few days the pt has appeared more lethargic, has been tachycardic, and running fevers with decreased Urination. She was empirically started on meropenum and vancomycin in ED.  Was managed for Septic shock s/p fluid resuscitation, IV pressors, and Transferred to MICU. Patients abx were switched to Cefepime after Bcx 11/22 revealed Morganella morganii.  Surveillance Bcxs from 11/25 remain NGTD.  LLE doppler study revealed stenosis of femoral and popliteal arteries causing cool, pale LLE without cyanosis. Patient was weaned off vasopressors and placed on Midodrine. Patient was Transferred to RCU on 11/28. Trach changed to #7 distal xlt lise by ENT due to air leak on 11/28. Patient had CT abd pelvis performed which revealed 2 mm non obstructive left ureteral stone and multiple renal calculi with mild bilateral hydronephrosis.     12/9: RN reported patient with decreased Urine output yesterday afternoon was placed on NS IVF @ 50 cc/hr. Patient was put on Bladder Scan q 6 hrs, noted to have urinary retention in the afternoon 600 cc of retained urine noted. Patient straight cath' d x 1; will continue to monitor for urinary retention, Patient will be NPO At midnight for  procedure tomorrow. ID wants to continue Meropenem after until  procedure performed, will renew. 65 F with advanced ALS, chronic hypoxic/ hypercapnic resp failure, vent dependent, s/p trach 2015 and PEG, now presenting with septic shock and Morganella bacteremia 2/2 bacterial PNA + UTI.  Patient was BIBEMS from home for fevers. Pt's 24hr home health nurse reported over the past few days the pt has appeared more lethargic, has been tachycardic, and running fevers with decreased Urination. She was empirically started on meropenum and vancomycin in ED.  Was managed for Septic shock s/p fluid resuscitation, IV pressors, and Transferred to MICU. Patients abx were switched to Cefepime after Bcx 11/22 revealed Morganella morganii.  Surveillance Bcxs from 11/25 remain NGTD.  LLE doppler study revealed stenosis of femoral and popliteal arteries causing cool, pale LLE without cyanosis. Patient was weaned off vasopressors and placed on Midodrine. Patient was Transferred to RCU on 11/28. Trach changed to #7 distal xlt lise by ENT due to air leak on 11/28. Patient had CT abd pelvis performed which revealed 2 mm non obstructive left ureteral stone and multiple renal calculi with mild bilateral hydronephrosis.     12/9: RN reported patient with decreased Urine output yesterday afternoon was placed on NS IVF @ 50 cc/hr. Patient was put on Bladder Scan q 6 hrs, noted to have urinary retention in the afternoon 600 cc of retained urine noted. Patient straight cath' d x 1; will continue to monitor for urinary retention, Patient will be NPO At midnight for  procedure tomorrow. ID wants to continue Meropenem until after  procedure performed, will renew.

## 2020-12-09 NOTE — PROGRESS NOTE ADULT - SUBJECTIVE AND OBJECTIVE BOX
Patient is a 65y old  Female who presents with a chief complaint of sepsis (08 Dec 2020 15:03)      Interval Events: No events reported overnight     REVIEW OF SYSTEMS:  [ ] Positive  [ ] All other systems negative  [x] Unable to assess ROS because patient unable to communicate due to severe ALS     Vital Signs Last 24 Hrs  T(C): 37.2 (12-09-20 @ 03:23), Max: 37.2 (12-09-20 @ 03:23)  T(F): 98.9 (12-09-20 @ 03:23), Max: 98.9 (12-09-20 @ 03:23)  HR: 103 (12-09-20 @ 05:42) (90 - 110)  BP: 127/78 (12-09-20 @ 03:23) (127/78 - 135/69)  RR: 15 (12-09-20 @ 03:23) (14 - 16)  SpO2: 97% (12-09-20 @ 05:42) (97% - 100%)    PHYSICAL EXAM:  HEENT:   [x]Tracheostomy: # 7 XLT Shiley   [x]Pupils equal  [ ]No oral lesions  [x]Abnormal: + Injected Conjunctiva RT > Lft     SKIN  [x]No Rash  [ ] Abnormal  [ ] pressure    CARDIAC  [x]Regular  [ ]Abnormal:     PULMONARY  [ ]Bilateral Clear Breath Sounds  [ ]Normal Excursion  [x]Abnormal: Bilateral Coarse Breath sounds    GI  [x]PEG      [x] +BS		              [x]Soft, nondistended, nontender	  [ ]Abnormal    MUSCULOSKELETAL                                   [x]Bedbound                 [ ]Abnormal    [ ]Ambulatory/OOB to chair                           EXTREMITIES                                         [x] Decrease Dorsalis pedal pulse on left lower extremity ( Located with bedside doppler), Warm to touch   [ ]Edema                           NEUROLOGIC  [ ] Normal, non focal  [x] Focal findings: Functional Quadriplegic 2/2 ALS     PSYCHIATRIC  [ ]Alert and appropriate  [x] Sedated	 [ ]Agitated    :  Newman: [ ] Yes, if yes: Date of Placement:                   [x] No    LINES: Central Lines [ ] Yes, if yes: Date of Placement                                     [x] No      HOSPITAL MEDICATIONS:  MEDICATIONS  (STANDING):  albuterol/ipratropium for Nebulization 3 milliLiter(s) Nebulizer every 6 hours  artificial  tears Solution 1 Drop(s) Both EYES every 6 hours  chlorhexidine 0.12% Liquid 15 milliLiter(s) Oral Mucosa every 12 hours  chlorhexidine 4% Liquid 1 Application(s) Topical <User Schedule>  diazepam   Solution 1.5 milliGRAM(s) Enteral Tube <User Schedule>  diazepam   Solution 2.5 milliGRAM(s) Enteral Tube <User Schedule>  erythromycin   Ointment 1 Application(s) Both EYES <User Schedule>  heparin   Injectable 5000 Unit(s) SubCutaneous every 12 hours  influenza   Vaccine 0.5 milliLiter(s) IntraMuscular once  multivitamin 1 Tablet(s) Oral daily  ofloxacin 0.3% Solution 1 Drop(s) Both EYES four times a day  petrolatum Ophthalmic Ointment 1 Application(s) Both EYES <User Schedule>  polyethylene glycol 3350 17 Gram(s) Oral two times a day  riluzole 50 milliGRAM(s) Oral two times a day  senna 1 Tablet(s) Oral daily  sertraline 50 milliGRAM(s) Oral daily  sodium chloride 0.9%. 1000 milliLiter(s) (50 mL/Hr) IV Continuous <Continuous>    MEDICATIONS  (PRN):      LABS:                        9.8    9.72  )-----------( 504      ( 09 Dec 2020 07:37 )             32.1     12-08    139  |  103  |  21  ----------------------------<  107<H>  4.5   |  22  |  <0.30<L>    Ca    9.7      08 Dec 2020 09:16  Phos  2.1     12-08  Mg     2.2     12-08              CAPILLARY BLOOD GLUCOSE    MICROBIOLOGY:     RADIOLOGY:  [ ] Reviewed and interpreted by me    Mode: AC/ CMV (Assist Control/ Continuous Mandatory Ventilation)  RR (machine): 14  TV (machine): 450  FiO2: 21  PEEP: 5  ITime: 1  MAP: 9  PIP: 26   Patient is a 65y old  Female who presents with a chief complaint of sepsis (08 Dec 2020 15:03)      Interval Events: Patient with decreased Urine output reported yesterday, Placed on IVF. Required to be straight cath x 1 in setting of urinary retention     REVIEW OF SYSTEMS:  [ ] Positive  [ ] All other systems negative  [x] Unable to assess ROS because patient unable to communicate due to severe ALS     Vital Signs Last 24 Hrs  T(C): 37.2 (12-09-20 @ 03:23), Max: 37.2 (12-09-20 @ 03:23)  T(F): 98.9 (12-09-20 @ 03:23), Max: 98.9 (12-09-20 @ 03:23)  HR: 103 (12-09-20 @ 05:42) (90 - 110)  BP: 127/78 (12-09-20 @ 03:23) (127/78 - 135/69)  RR: 15 (12-09-20 @ 03:23) (14 - 16)  SpO2: 97% (12-09-20 @ 05:42) (97% - 100%)    PHYSICAL EXAM:  HEENT:   [x]Tracheostomy: # 7 XLT Shiley   [x]Pupils equal  [ ]No oral lesions  [x]Abnormal: + Injected Conjunctiva RT > Lft     SKIN  [x]No Rash  [ ] Abnormal  [ ] pressure    CARDIAC  [x]Regular  [ ]Abnormal:     PULMONARY  [ ]Bilateral Clear Breath Sounds  [ ]Normal Excursion  [x]Abnormal: Bilateral Coarse Breath sounds    GI  [x]PEG      [x] +BS		              [x]Soft, nondistended, nontender	  [ ]Abnormal    MUSCULOSKELETAL                                   [x]Bedbound                 [ ]Abnormal    [ ]Ambulatory/OOB to chair                           EXTREMITIES                                         [x] Decrease Dorsalis pedal pulse on left lower extremity ( Located with bedside doppler), Warm to touch   [ ]Edema                           NEUROLOGIC  [ ] Normal, non focal  [x] Focal findings: Functional Quadriplegic 2/2 ALS     PSYCHIATRIC  [ ]Alert and appropriate  [x] Sedated	 [ ]Agitated    :  Newman: [ ] Yes, if yes: Date of Placement:                   [x] No    LINES: Central Lines [ ] Yes, if yes: Date of Placement                                     [x] No      HOSPITAL MEDICATIONS:  MEDICATIONS  (STANDING):  albuterol/ipratropium for Nebulization 3 milliLiter(s) Nebulizer every 6 hours  artificial  tears Solution 1 Drop(s) Both EYES every 6 hours  chlorhexidine 0.12% Liquid 15 milliLiter(s) Oral Mucosa every 12 hours  chlorhexidine 4% Liquid 1 Application(s) Topical <User Schedule>  diazepam   Solution 1.5 milliGRAM(s) Enteral Tube <User Schedule>  diazepam   Solution 2.5 milliGRAM(s) Enteral Tube <User Schedule>  erythromycin   Ointment 1 Application(s) Both EYES <User Schedule>  heparin   Injectable 5000 Unit(s) SubCutaneous every 12 hours  influenza   Vaccine 0.5 milliLiter(s) IntraMuscular once  multivitamin 1 Tablet(s) Oral daily  ofloxacin 0.3% Solution 1 Drop(s) Both EYES four times a day  petrolatum Ophthalmic Ointment 1 Application(s) Both EYES <User Schedule>  polyethylene glycol 3350 17 Gram(s) Oral two times a day  riluzole 50 milliGRAM(s) Oral two times a day  senna 1 Tablet(s) Oral daily  sertraline 50 milliGRAM(s) Oral daily  sodium chloride 0.9%. 1000 milliLiter(s) (50 mL/Hr) IV Continuous <Continuous>    MEDICATIONS  (PRN):      LABS:                        9.8    9.72  )-----------( 504      ( 09 Dec 2020 07:37 )             32.1     12-08    139  |  103  |  21  ----------------------------<  107<H>  4.5   |  22  |  <0.30<L>    Ca    9.7      08 Dec 2020 09:16  Phos  2.1     12-08  Mg     2.2     12-08              CAPILLARY BLOOD GLUCOSE    MICROBIOLOGY:     RADIOLOGY:  [ ] Reviewed and interpreted by me    Mode: AC/ CMV (Assist Control/ Continuous Mandatory Ventilation)  RR (machine): 14  TV (machine): 450  FiO2: 21  PEEP: 5  ITime: 1  MAP: 9  PIP: 26

## 2020-12-09 NOTE — PROGRESS NOTE ADULT - SUBJECTIVE AND OBJECTIVE BOX
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  Unable to obtain history due to mental status    MEDICATIONS  (STANDING):  albuterol/ipratropium for Nebulization 3 milliLiter(s) Nebulizer every 6 hours  artificial  tears Solution 1 Drop(s) Both EYES every 6 hours  chlorhexidine 0.12% Liquid 15 milliLiter(s) Oral Mucosa every 12 hours  chlorhexidine 4% Liquid 1 Application(s) Topical <User Schedule>  diazepam   Solution 1.5 milliGRAM(s) Enteral Tube <User Schedule>  diazepam   Solution 2.5 milliGRAM(s) Enteral Tube <User Schedule>  erythromycin   Ointment 1 Application(s) Both EYES <User Schedule>  heparin   Injectable 5000 Unit(s) SubCutaneous every 12 hours  influenza   Vaccine 0.5 milliLiter(s) IntraMuscular once  meropenem  IVPB 1000 milliGRAM(s) IV Intermittent every 8 hours  multivitamin 1 Tablet(s) Oral daily  ofloxacin 0.3% Solution 1 Drop(s) Both EYES four times a day  petrolatum Ophthalmic Ointment 1 Application(s) Both EYES <User Schedule>  polyethylene glycol 3350 17 Gram(s) Oral two times a day  riluzole 50 milliGRAM(s) Oral two times a day  senna 1 Tablet(s) Oral daily  sertraline 50 milliGRAM(s) Oral daily    VITALS: T(C): 36.5 (12-07-20 @ 04:40)  T(F): 97.7 (12-07-20 @ 04:40), Max: 97.7 (12-07-20 @ 04:40)  HR: 98 (12-07-20 @ 08:19) (92 - 109)  BP: 143/83 (12-07-20 @ 04:40) (114/71 - 143/83)  RR:  (14 - 18)  SpO2:  (95% - 100%)  Wt(kg): --  General: AAO x 0    Ophthalmology Exam:  Visual acuity (sc): RONIT as patient is non-verbal  Pupils: PERRL OU, no APD  Ttono: Soft OU  Extraocular movements (EOMs): grossly full  Confrontational Visual Field (CVF): RONIT as patient is non-verbal  Color Plates: RONIT as patient is non-verbal    Pen Light Exam (PLE)  External: Flat OU  Lids/Lashes/Lacrimal Ducts: Flat OU  Does not blink and keeps eyes open  Sclera/Conjunctiva: 1+ inj OU  Cornea: OD with inferior confluent SPK surrounding area of epithelial sloughing, no epi defect, no infiltrate, no filaments; OS with 2+ SPK and no epi defect, no infiltrate, and no filaments  Anterior Chamber: D+F OU    Iris: Flat OU  Lens: Cl OU      Assessment and Plan:   64 y/o F with advanced ALS, HLD, vent dependent s/p trach and PEG dependent here for fevers; 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 However, given severity of surface disease, would recommend taping at all times at this time (please make sure the eyelids are closed when taping)  - Ophthalmology will continue to follow  - findings and plan discussed with primary team    The patient should follow-up with [facility] Ophthalmology within 1 week of discharge, sooner if symptoms worsen or change:  [address] [address]  [phone] [facility] DEPARTMENT OF OPHTHALMOLOGY  ------------------------------------------------------------------------------  Anyi DOE MD  Pager: [phone]  ------------------------------------------------------------------------------    Interval History: 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  Unable to obtain history due to mental status    MEDICATIONS  (STANDING):  albuterol/ipratropium for Nebulization 3 milliLiter(s) Nebulizer every 6 hours  artificial  tears Solution 1 Drop(s) Both EYES every 6 hours  chlorhexidine 0.12% Liquid 15 milliLiter(s) Oral Mucosa every 12 hours  chlorhexidine 4% Liquid 1 Application(s) Topical <User Schedule>  diazepam   Solution 1.5 milliGRAM(s) Enteral Tube <User Schedule>  diazepam   Solution 2.5 milliGRAM(s) Enteral Tube <User Schedule>  erythromycin   Ointment 1 Application(s) Both EYES <User Schedule>  heparin   Injectable 5000 Unit(s) SubCutaneous every 12 hours  influenza   Vaccine 0.5 milliLiter(s) IntraMuscular once  meropenem  IVPB 1000 milliGRAM(s) IV Intermittent every 8 hours  multivitamin 1 Tablet(s) Oral daily  ofloxacin 0.3% Solution 1 Drop(s) Both EYES four times a day  petrolatum Ophthalmic Ointment 1 Application(s) Both EYES <User Schedule>  polyethylene glycol 3350 17 Gram(s) Oral two times a day  riluzole 50 milliGRAM(s) Oral two times a day  senna 1 Tablet(s) Oral daily  sertraline 50 milliGRAM(s) Oral daily    VITALS: T(C): 36.5 (12-07-20 @ 04:40)  T(F): 97.7 (12-07-20 @ 04:40), Max: 97.7 (12-07-20 @ 04:40)  HR: 98 (12-07-20 @ 08:19) (92 - 109)  BP: 143/83 (12-07-20 @ 04:40) (114/71 - 143/83)  RR:  (14 - 18)  SpO2:  (95% - 100%)  Wt(kg): --  General: AAO x 0    Ophthalmology Exam:  Visual acuity (sc): RONIT as patient is non-verbal  Pupils: PERRL OU, no APD  Ttono: Soft OU  Extraocular movements (EOMs): grossly full  Confrontational Visual Field (CVF): RONIT as patient is non-verbal  Color Plates: RONIT as patient is non-verbal    Pen Light Exam (PLE)  External: Flat OU  Lids/Lashes/Lacrimal Ducts: Flat OU  Does not blink and keeps eyes open  Sclera/Conjunctiva: 1+ inj OU  Cornea: OD with inferior confluent SPK surrounding area of epithelial sloughing, no epi defect, no infiltrate, no filaments; OS with 2+ SPK and no epi defect, no infiltrate, and no filaments  Anterior Chamber: D+F OU    Iris: Flat OU  Lens: Cl OU      Assessment and Plan:   64 y/o F with advanced ALS, HLD, vent dependent s/p trach and PEG dependent here for fevers; 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 However, given severity of surface disease, would recommend taping at all times at this time (please make sure the eyelids are closed when taping)  - Ophthalmology will continue to follow  - findings and plan discussed with primary team  - case discussed with Dr. Flowers, cornea specialist- to be evaluated again by Dr. Flowers on Friday    The patient should follow-up with [facility] Ophthalmology within 1 week of discharge, sooner if symptoms worsen or change:  [address] [address]  [phone]

## 2020-12-09 NOTE — PROGRESS NOTE ADULT - PROBLEM SELECTOR PLAN 7
Continue Protonix   As per  patient was on Xarelto for DVT prophylaxis  Will Hold Xarelto for possible future  procedure   Will hold AM  Heparin sub q for  Procedure

## 2020-12-10 ENCOUNTER — RESULT REVIEW (OUTPATIENT)
Age: 65
End: 2020-12-10

## 2020-12-10 ENCOUNTER — APPOINTMENT (OUTPATIENT)
Dept: UROLOGY | Facility: HOSPITAL | Age: 65
End: 2020-12-10

## 2020-12-10 LAB
ANION GAP SERPL CALC-SCNC: 13 MMOL/L — SIGNIFICANT CHANGE UP (ref 5–17)
APTT BLD: 31.4 SEC — SIGNIFICANT CHANGE UP (ref 27.5–35.5)
BASOPHILS # BLD AUTO: 0.12 K/UL — SIGNIFICANT CHANGE UP (ref 0–0.2)
BASOPHILS NFR BLD AUTO: 1.5 % — SIGNIFICANT CHANGE UP (ref 0–2)
BLD GP AB SCN SERPL QL: NEGATIVE — SIGNIFICANT CHANGE UP
BUN SERPL-MCNC: 12 MG/DL — SIGNIFICANT CHANGE UP (ref 7–23)
CALCIUM SERPL-MCNC: 9.2 MG/DL — SIGNIFICANT CHANGE UP (ref 8.4–10.5)
CHLORIDE SERPL-SCNC: 106 MMOL/L — SIGNIFICANT CHANGE UP (ref 96–108)
CO2 SERPL-SCNC: 21 MMOL/L — LOW (ref 22–31)
CREAT SERPL-MCNC: <0.3 MG/DL — LOW (ref 0.5–1.3)
EOSINOPHIL # BLD AUTO: 0.66 K/UL — HIGH (ref 0–0.5)
EOSINOPHIL NFR BLD AUTO: 8.4 % — HIGH (ref 0–6)
GLUCOSE BLDC GLUCOMTR-MCNC: 105 MG/DL — HIGH (ref 70–99)
GLUCOSE SERPL-MCNC: 96 MG/DL — SIGNIFICANT CHANGE UP (ref 70–99)
HCT VFR BLD CALC: 33.9 % — LOW (ref 34.5–45)
HGB BLD-MCNC: 10.4 G/DL — LOW (ref 11.5–15.5)
IMM GRANULOCYTES NFR BLD AUTO: 0.6 % — SIGNIFICANT CHANGE UP (ref 0–1.5)
INR BLD: 1.08 RATIO — SIGNIFICANT CHANGE UP (ref 0.88–1.16)
LYMPHOCYTES # BLD AUTO: 1.11 K/UL — SIGNIFICANT CHANGE UP (ref 1–3.3)
LYMPHOCYTES # BLD AUTO: 14.1 % — SIGNIFICANT CHANGE UP (ref 13–44)
MAGNESIUM SERPL-MCNC: 2.1 MG/DL — SIGNIFICANT CHANGE UP (ref 1.6–2.6)
MCHC RBC-ENTMCNC: 28.3 PG — SIGNIFICANT CHANGE UP (ref 27–34)
MCHC RBC-ENTMCNC: 30.7 GM/DL — LOW (ref 32–36)
MCV RBC AUTO: 92.4 FL — SIGNIFICANT CHANGE UP (ref 80–100)
MONOCYTES # BLD AUTO: 0.54 K/UL — SIGNIFICANT CHANGE UP (ref 0–0.9)
MONOCYTES NFR BLD AUTO: 6.8 % — SIGNIFICANT CHANGE UP (ref 2–14)
NEUTROPHILS # BLD AUTO: 5.41 K/UL — SIGNIFICANT CHANGE UP (ref 1.8–7.4)
NEUTROPHILS NFR BLD AUTO: 68.6 % — SIGNIFICANT CHANGE UP (ref 43–77)
NRBC # BLD: 0 /100 WBCS — SIGNIFICANT CHANGE UP (ref 0–0)
PHOSPHATE SERPL-MCNC: 2.9 MG/DL — SIGNIFICANT CHANGE UP (ref 2.5–4.5)
PLATELET # BLD AUTO: 467 K/UL — HIGH (ref 150–400)
POTASSIUM SERPL-MCNC: 4 MMOL/L — SIGNIFICANT CHANGE UP (ref 3.5–5.3)
POTASSIUM SERPL-SCNC: 4 MMOL/L — SIGNIFICANT CHANGE UP (ref 3.5–5.3)
PROTHROM AB SERPL-ACNC: 12.9 SEC — SIGNIFICANT CHANGE UP (ref 10.6–13.6)
RBC # BLD: 3.67 M/UL — LOW (ref 3.8–5.2)
RBC # FLD: 17 % — HIGH (ref 10.3–14.5)
RH IG SCN BLD-IMP: POSITIVE — SIGNIFICANT CHANGE UP
SODIUM SERPL-SCNC: 140 MMOL/L — SIGNIFICANT CHANGE UP (ref 135–145)
WBC # BLD: 7.89 K/UL — SIGNIFICANT CHANGE UP (ref 3.8–10.5)
WBC # FLD AUTO: 7.89 K/UL — SIGNIFICANT CHANGE UP (ref 3.8–10.5)

## 2020-12-10 PROCEDURE — 99233 SBSQ HOSP IP/OBS HIGH 50: CPT

## 2020-12-10 PROCEDURE — 52356 CYSTO/URETERO W/LITHOTRIPSY: CPT | Mod: LT

## 2020-12-10 PROCEDURE — 99232 SBSQ HOSP IP/OBS MODERATE 35: CPT

## 2020-12-10 PROCEDURE — 74420 UROGRAPHY RTRGR +-KUB: CPT | Mod: 26

## 2020-12-10 PROCEDURE — 88300 SURGICAL PATH GROSS: CPT | Mod: 26

## 2020-12-10 RX ORDER — CHLORHEXIDINE GLUCONATE 213 G/1000ML
15 SOLUTION TOPICAL EVERY 12 HOURS
Refills: 0 | Status: DISCONTINUED | OUTPATIENT
Start: 2020-12-10 | End: 2020-12-15

## 2020-12-10 RX ORDER — SODIUM CHLORIDE 9 MG/ML
1000 INJECTION INTRAMUSCULAR; INTRAVENOUS; SUBCUTANEOUS
Refills: 0 | Status: DISCONTINUED | OUTPATIENT
Start: 2020-12-10 | End: 2020-12-11

## 2020-12-10 RX ORDER — MEROPENEM 1 G/30ML
1000 INJECTION INTRAVENOUS EVERY 8 HOURS
Refills: 0 | Status: DISCONTINUED | OUTPATIENT
Start: 2020-12-10 | End: 2020-12-11

## 2020-12-10 RX ORDER — OFLOXACIN 0.3 %
1 DROPS OPHTHALMIC (EYE)
Refills: 0 | Status: DISCONTINUED | OUTPATIENT
Start: 2020-12-10 | End: 2020-12-15

## 2020-12-10 RX ORDER — HEPARIN SODIUM 5000 [USP'U]/ML
5000 INJECTION INTRAVENOUS; SUBCUTANEOUS EVERY 12 HOURS
Refills: 0 | Status: DISCONTINUED | OUTPATIENT
Start: 2020-12-10 | End: 2020-12-12

## 2020-12-10 RX ORDER — POLYETHYLENE GLYCOL 3350 17 G/17G
17 POWDER, FOR SOLUTION ORAL
Refills: 0 | Status: DISCONTINUED | OUTPATIENT
Start: 2020-12-10 | End: 2020-12-15

## 2020-12-10 RX ORDER — ERYTHROMYCIN BASE 5 MG/GRAM
1 OINTMENT (GRAM) OPHTHALMIC (EYE)
Refills: 0 | Status: DISCONTINUED | OUTPATIENT
Start: 2020-12-10 | End: 2020-12-15

## 2020-12-10 RX ORDER — SENNA PLUS 8.6 MG/1
1 TABLET ORAL DAILY
Refills: 0 | Status: DISCONTINUED | OUTPATIENT
Start: 2020-12-10 | End: 2020-12-15

## 2020-12-10 RX ORDER — SERTRALINE 25 MG/1
50 TABLET, FILM COATED ORAL DAILY
Refills: 0 | Status: DISCONTINUED | OUTPATIENT
Start: 2020-12-10 | End: 2020-12-15

## 2020-12-10 RX ORDER — CHLORHEXIDINE GLUCONATE 213 G/1000ML
15 SOLUTION TOPICAL EVERY 12 HOURS
Refills: 0 | Status: DISCONTINUED | OUTPATIENT
Start: 2020-12-10 | End: 2020-12-10

## 2020-12-10 RX ADMIN — Medication 1 DROP(S): at 17:17

## 2020-12-10 RX ADMIN — SODIUM CHLORIDE 50 MILLILITER(S): 9 INJECTION INTRAMUSCULAR; INTRAVENOUS; SUBCUTANEOUS at 12:00

## 2020-12-10 RX ADMIN — SODIUM CHLORIDE 50 MILLILITER(S): 9 INJECTION, SOLUTION INTRAVENOUS at 00:02

## 2020-12-10 RX ADMIN — Medication 1 APPLICATION(S): at 12:00

## 2020-12-10 RX ADMIN — Medication 1 APPLICATION(S): at 14:21

## 2020-12-10 RX ADMIN — CHLORHEXIDINE GLUCONATE 15 MILLILITER(S): 213 SOLUTION TOPICAL at 17:16

## 2020-12-10 RX ADMIN — HEPARIN SODIUM 5000 UNIT(S): 5000 INJECTION INTRAVENOUS; SUBCUTANEOUS at 17:17

## 2020-12-10 RX ADMIN — SERTRALINE 50 MILLIGRAM(S): 25 TABLET, FILM COATED ORAL at 11:58

## 2020-12-10 RX ADMIN — Medication 1 DROP(S): at 11:58

## 2020-12-10 RX ADMIN — MEROPENEM 100 MILLIGRAM(S): 1 INJECTION INTRAVENOUS at 05:01

## 2020-12-10 RX ADMIN — MEROPENEM 100 MILLIGRAM(S): 1 INJECTION INTRAVENOUS at 22:13

## 2020-12-10 RX ADMIN — Medication 1 DROP(S): at 12:00

## 2020-12-10 RX ADMIN — CHLORHEXIDINE GLUCONATE 15 MILLILITER(S): 213 SOLUTION TOPICAL at 05:00

## 2020-12-10 RX ADMIN — Medication 1 DROP(S): at 05:01

## 2020-12-10 RX ADMIN — Medication 1 DROP(S): at 17:16

## 2020-12-10 RX ADMIN — Medication 1 APPLICATION(S): at 05:01

## 2020-12-10 RX ADMIN — Medication 1 TABLET(S): at 12:22

## 2020-12-10 RX ADMIN — Medication 1 APPLICATION(S): at 05:00

## 2020-12-10 RX ADMIN — Medication 1 APPLICATION(S): at 17:17

## 2020-12-10 RX ADMIN — Medication 1 APPLICATION(S): at 20:07

## 2020-12-10 RX ADMIN — POLYETHYLENE GLYCOL 3350 17 GRAM(S): 17 POWDER, FOR SOLUTION ORAL at 05:00

## 2020-12-10 RX ADMIN — Medication 1 DROP(S): at 05:00

## 2020-12-10 RX ADMIN — MEROPENEM 100 MILLIGRAM(S): 1 INJECTION INTRAVENOUS at 14:20

## 2020-12-10 RX ADMIN — RILUZOLE 50 MILLIGRAM(S): 50 TABLET ORAL at 05:00

## 2020-12-10 RX ADMIN — Medication 1 APPLICATION(S): at 17:16

## 2020-12-10 RX ADMIN — Medication 1 APPLICATION(S): at 01:20

## 2020-12-10 RX ADMIN — SERTRALINE 50 MILLIGRAM(S): 25 TABLET, FILM COATED ORAL at 05:00

## 2020-12-10 RX ADMIN — CHLORHEXIDINE GLUCONATE 1 APPLICATION(S): 213 SOLUTION TOPICAL at 05:01

## 2020-12-10 RX ADMIN — Medication 1 APPLICATION(S): at 22:14

## 2020-12-10 RX ADMIN — Medication 3 MILLILITER(S): at 05:30

## 2020-12-10 RX ADMIN — POLYETHYLENE GLYCOL 3350 17 GRAM(S): 17 POWDER, FOR SOLUTION ORAL at 17:17

## 2020-12-10 NOTE — PROGRESS NOTE ADULT - PROBLEM SELECTOR PLAN 6
Patient with exposure Keratitis likely to inability to close her eyes  Continue Erythromycin and Ocuflox gtt  Continue Artificial tears and Lacrilube   Opthalmology follow up appreciated ( Eyelids to remain taped closed 24/7)

## 2020-12-10 NOTE — PROGRESS NOTE ADULT - SUBJECTIVE AND OBJECTIVE BOX
Patient is a 65y old  Female who presents with a chief complaint of sepsis (09 Dec 2020 22:08)      Interval Events: No events reported overnight, Patient scheduled for  procedure today     REVIEW OF SYSTEMS:  [ ] Positive  [ ] All other systems negative  [x] Unable to assess ROS because patient is Non-Verbal due to advanced ALS     Vital Signs Last 24 Hrs  T(C): 36.6 (12-10-20 @ 07:22), Max: 36.9 (12-10-20 @ 04:23)  T(F): 97.8 (12-10-20 @ 07:22), Max: 98.4 (12-10-20 @ 04:23)  HR: 98 (12-10-20 @ 07:22) (85 - 111)  BP: 146/84 (12-10-20 @ 07:22) (116/81 - 146/84)  RR: 14 (12-10-20 @ 04:23) (14 - 14)  SpO2: 96% (12-10-20 @ 07:22) (96% - 100%)    PHYSICAL EXAM:  HEENT:   [x]Tracheostomy: # 7 XLT [place]   [x]Pupils equal  [ ]No oral lesions  [x]Abnormal: + Injected Conjunctiva RT > Lft     SKIN  [x]No Rash  [ ] Abnormal  [ ] pressure    CARDIAC  [x]Regular  [ ]Abnormal:     PULMONARY  [ ]Bilateral Clear Breath Sounds  [ ]Normal Excursion  [x]Abnormal: Bilateral Coarse Breath sounds    GI  [x]PEG      [x] +BS		              [x]Soft, nondistended, nontender	  [ ]Abnormal    MUSCULOSKELETAL                                   [x]Bedbound                 [ ]Abnormal    [ ]Ambulatory/OOB to chair                           EXTREMITIES                                         [x] Decrease Dorsalis pedal pulse on left lower extremity, Warm to touch   [ ]Edema                           NEUROLOGIC  [ ] Normal, non focal  [x] Focal findings: Functional Quadriplegic 2/2 ALS     PSYCHIATRIC  [ ]Alert and appropriate  [x] Sedated	 [ ]Agitated    :  Newman: [ ] Yes, if yes: Date of Placement:                   [x] No    LINES: Central Lines [ ] Yes, if yes: Date of Placement                                     [x] No        HOSPITAL MEDICATIONS:  MEDICATIONS  (STANDING):  albuterol/ipratropium for Nebulization 3 milliLiter(s) Nebulizer every 6 hours  artificial  tears Solution 1 Drop(s) Both EYES every 6 hours  chlorhexidine 0.12% Liquid 15 milliLiter(s) Oral Mucosa every 12 hours  chlorhexidine 4% Liquid 1 Application(s) Topical <User Schedule>  dextrose 5% + lactated ringers. 1000 milliLiter(s) (50 mL/Hr) IV Continuous <Continuous>  diazepam   Solution 1.5 milliGRAM(s) Enteral Tube <User Schedule>  diazepam   Solution 2.5 milliGRAM(s) Enteral Tube <User Schedule>  erythromycin   Ointment 1 Application(s) Both EYES <User Schedule>  heparin   Injectable 5000 Unit(s) SubCutaneous every 12 hours  influenza   Vaccine 0.5 milliLiter(s) IntraMuscular once  meropenem  IVPB 1000 milliGRAM(s) IV Intermittent every 8 hours  multivitamin 1 Tablet(s) Oral daily  ofloxacin 0.3% Solution 1 Drop(s) Both EYES four times a day  petrolatum Ophthalmic Ointment 1 Application(s) Both EYES <User Schedule>  polyethylene glycol 3350 17 Gram(s) Oral two times a day  riluzole 50 milliGRAM(s) Oral two times a day  senna 1 Tablet(s) Oral daily  sertraline 50 milliGRAM(s) Oral daily    MEDICATIONS  (PRN):      LABS:                        10.4   7.89  )-----------( 467      ( 10 Dec 2020 07:01 )             33.9     12-10    140  |  106  |  12  ----------------------------<  96  4.0   |  21<L>  |  <0.30<L>    Ca    9.2      10 Dec 2020 07:00  Phos  2.9     12-10  Mg     2.1     12-10      PT/INR - ( 10 Dec 2020 07:01 )   PT: 12.9 sec;   INR: 1.08 ratio         PTT - ( 10 Dec 2020 07:01 )  PTT:31.4 sec        CAPILLARY BLOOD GLUCOSE    MICROBIOLOGY:     RADIOLOGY:  [ ] Reviewed and interpreted by me    Mode: AC/ CMV (Assist Control/ Continuous Mandatory Ventilation)  RR (machine): 14  TV (machine): 450  FiO2: 21  PEEP: 5  ITime: 1  MAP: 9  PIP: 24

## 2020-12-10 NOTE — PROGRESS NOTE ADULT - PROBLEM SELECTOR PLAN 7
Continue Protonix   As per  patient was on Xarelto for DVT prophylaxis  Will Hold Xarelto for  procedure   Will hold AM  Heparin sub q for  Procedure

## 2020-12-10 NOTE — PROGRESS NOTE ADULT - ASSESSMENT
65 F with advanced ALS, chronic hypoxic/ hypercapnic resp failure, vent dependent, s/p trach 2015 and PEG, now presenting with septic shock and Morganella bacteremia 2/2 bacterial PNA + UTI.  Patient was BIBEMS from home for fevers. Pt's 24hr home health nurse reported over the past few days the pt has appeared more lethargic, has been tachycardic, and running fevers with decreased Urination. She was empirically started on meropenum and vancomycin in ED.  Was managed for Septic shock s/p fluid resuscitation, IV pressors, and Transferred to MICU. Patients abx were switched to Cefepime after Bcx 11/22 revealed Morganella morganii.  Surveillance Bcxs from 11/25 remain NGTD.  LLE doppler study revealed stenosis of femoral and popliteal arteries causing cool, pale LLE without cyanosis. Patient was weaned off vasopressors and placed on Midodrine. Patient was Transferred to RCU on 11/28. Trach changed to #7 distal xlt lise by ENT due to air leak on 11/28. Patient had CT abd pelvis performed which revealed 2 mm non obstructive left ureteral stone and multiple renal calculi with mild bilateral hydronephrosis.     12/10: Patient scheduled for Ureteroscopy, Laser Lithotripsy and stent placement this morning. Meropenem extended yesterday in setting of  Procedure today. Patients IVF were continued for decreased urine out yesterday, Bun /creat remain stable on morning BMP. Patient did not require straight catheterization overnight. 65 F with advanced ALS, chronic hypoxic/ hypercapnic resp failure, vent dependent, s/p trach 2015 and PEG, now presenting with septic shock and Morganella bacteremia 2/2 bacterial PNA + UTI.  Patient was BIBEMS from home for fevers. Pt's 24hr home health nurse reported over the past few days the pt has appeared more lethargic, has been tachycardic, and running fevers with decreased Urination. She was empirically started on meropenum and vancomycin in ED.  Was managed for Septic shock s/p fluid resuscitation, IV pressors, and Transferred to MICU. Patients abx were switched to Cefepime after Bcx 11/22 revealed Morganella morganii.  Surveillance Bcxs from 11/25 remain NGTD.  LLE doppler study revealed stenosis of femoral and popliteal arteries causing cool, pale LLE without cyanosis. Patient was weaned off vasopressors and placed on Midodrine. Patient was Transferred to RCU on 11/28. Trach changed to #7 distal xlt lise by ENT due to air leak on 11/28. Patient had CT abd pelvis performed which revealed 2 mm non obstructive left ureteral stone and multiple renal calculi with mild bilateral hydronephrosis.     12/10: Patient scheduled for Ureteroscopy, Laser Lithotripsy and stent placement this morning. Meropenem extended yesterday in setting of  Procedure today. Patients IVF were continued for decreased urine output yesterday, Bun /creat remain stable on morning BMP. Patient did not require straight catheterization overnight.

## 2020-12-10 NOTE — PROGRESS NOTE ADULT - SUBJECTIVE AND OBJECTIVE BOX
Follow Up:  leukocytosis    Interval History: pt afebrile, s/p cystoscopy, stent and lithotripsy    ROS:    Unobtainable because of mental status      Allergies  Bactrim DS (Rash)        ANTIMICROBIALS:  meropenem  IVPB 1000 every 8 hours      OTHER MEDS:  artificial  tears Solution 1 Drop(s) Both EYES every 6 hours  chlorhexidine 0.12% Liquid 15 milliLiter(s) Oral Mucosa every 12 hours  erythromycin   Ointment 1 Application(s) Both EYES <User Schedule>  heparin   Injectable 5000 Unit(s) SubCutaneous every 12 hours  influenza   Vaccine 0.5 milliLiter(s) IntraMuscular once  multivitamin 1 Tablet(s) Oral daily  ofloxacin 0.3% Solution 1 Drop(s) Both EYES four times a day  petrolatum Ophthalmic Ointment 1 Application(s) Both EYES <User Schedule>  polyethylene glycol 3350 17 Gram(s) Oral two times a day  senna 1 Tablet(s) Oral daily  sertraline 50 milliGRAM(s) Oral daily  sodium chloride 0.9%. 1000 milliLiter(s) IV Continuous <Continuous>      Vital Signs Last 24 Hrs  T(C): 36.4 (10 Dec 2020 11:53), Max: 36.9 (10 Dec 2020 04:23)  T(F): 97.5 (10 Dec 2020 11:53), Max: 98.4 (10 Dec 2020 04:23)  HR: 82 (10 Dec 2020 15:23) (82 - 111)  BP: 126/79 (10 Dec 2020 11:53) (111/56 - 146/84)  BP(mean): 83 (10 Dec 2020 10:45) (77 - 84)  RR: 16 (10 Dec 2020 15:23) (14 - 16)  SpO2: 98% (10 Dec 2020 15:23) (96% - 100%)    Physical Exam:  General:    NAD  ENT:   trach, no significant secretions  Cardio:     regular S1, S2  Respiratory:    clear b/l,    no wheezing  abd:     soft,   BS +,   PEG  :     no  carter  Musculoskeletal:   no joint swelling  vascular: no phlebitis  Skin:    no rash, no decubitus                             10.4   7.89  )-----------( 467      ( 10 Dec 2020 07:01 )             33.9       12-10    140  |  106  |  12  ----------------------------<  96  4.0   |  21<L>  |  <0.30<L>    Ca    9.2      10 Dec 2020 07:00  Phos  2.9     12-10  Mg     2.1     12-10            MICROBIOLOGY:  v  .Urine Catheterized  12-01-20   No growth  --  --      .Blood Blood-Peripheral  11-30-20   No Growth Final  --  --      .Sputum Sputum  11-30-20   Moderate Stenotrophomonas maltophilia  Normal Respiratory Kia present  --  Stenotrophomonas maltophilia      .Blood Blood  11-25-20   No Growth Final  --  --      .Sputum Sputum  11-23-20   Normal Respiratory Kai present  --    Rare polymorphonuclear leukocytes per low power field  Rare Squamous epithelial cells per low power field  Few Gram Variable Rods per oil power field      .Blood Blood-Peripheral  11-22-20   Growth in aerobic and anaerobic bottles: Morganella morganii  See previous culture [id number]  --  Blood Culture PCR  Morganella morganii  Morganella morganii      .Urine Clean Catch (Midstream)  11-22-20   <10,000 CFU/mL Normal Urogenital Kia  --  --                RADIOLOGY:  Images independently visualized and reviewed personally, findings as below  < from: CT Abdomen and Pelvis w/ Oral Cont and w/ IV Cont (11.30.20 @ 20:16) >  Left lower lobe collapse. Superimposed infection is not excluded.    2 mm left ureteral calculus and multiple nonobstructive subcentimeter left renal calculi. Associated mild hydronephrosis and urothelial enhancement suggests inflammation/infection.    Cholelithiasis.

## 2020-12-10 NOTE — PROGRESS NOTE ADULT - PROBLEM SELECTOR PLAN 8
Patient is from home with 24 /7 care   Will need reinstating of home services prior to dc   Patients  aware of medical / discharge plan  Will likely proceed with Dc planning end of next week after  Stent removal

## 2020-12-10 NOTE — PROGRESS NOTE ADULT - ATTENDING COMMENTS
65 F with advanced ALS, chronic hypoxic/hypercapnic respiratory failure, vent dependent, s/p trach and PEG p/w septic shock and Morganella bacteremia due to UTI. Has nonobstructing stones on CT w/ mild hydro and ureteral dilatation. S/p lithotripsy and stent placement. Cont abx until postprocedure. Monitor UO, started on maintenance IVF. 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 Cont riluzole for ALS. Pt medically optimized and cleared for urological procedure. Pt has moderate risk for complications given baseline ventilator dependence.

## 2020-12-10 NOTE — PROGRESS NOTE ADULT - PROBLEM SELECTOR PLAN 1
Patient admitted with Sepsis 2/2 Bacteremia   Blood cx 11/22: Morganella Morganii, Repeat Bld CX 11/25: No growth   CT Chest / ABD/ Pelvis 11/30: Left Lower Lobe Collapse, 2mm Nonobstructive Left ureteral stone, left renal calculi and mild bilateral hydronephrosis   Urine Cx 12/1: No growth   Sputum Cx 11/30: Stenotrophomonas ( Possible Colonization )   Continue Meropenem until after  procedure performed as per ID  Planned for Cystoscopy, Laser lithotripsy and stent placement today  Stent will be removed the following week prior to discharge

## 2020-12-10 NOTE — CHART NOTE - NSCHARTNOTEFT_GEN_A_CORE
Patient S/p Ureteroscopy,  Stent placement and Lithotripsy this morning. Patient returned to the floor in NAD with stable VSS. Case d/w  team patient with intermittent urinary retention over the past 24 hrs to see if their was a contraindication to performing straight catheterization with Stent in place and external threads present. As per  no contraindication to straight catheterization with  stent but external threads must remain fixed in place with external Tegaderm.

## 2020-12-10 NOTE — CHART NOTE - NSCHARTNOTEFT_GEN_A_CORE
Nutrition Follow Up Note  Patient seen for: Nutrition follow up assessment in RCU  Source: EMR, RN    Diet: NPO with TF via PEG. See below.    Chart reviewed, events noted. Pt is a 66 yo F with PMHx advanced ALS s/p trach/PEG and HLD p/w tachycardia and decreased UOP concern for sepsis 2/2 UTI, admitted to MICU for further workup and vent management. Required IV pressors which has been discontinued. Transferred to RCU 11/28. Trach changed to #7 distal XLT shiley 2/2 air leak on 11/28. Pt s/p Ureteroscopy with  Stent placement and Lithotripsy this morning 12/10.      Diet : NPO with Tube Feeds via PEG  Enteral Nutrition: Jevity 1.5 @ 70mL/hr x 13hrs (20:00-9:00). Provides 910mL formula, 1365kcals, 58g protein, 692ml free water  - RD observed Jevity 1.5; currently not infusing at this time as per orders (runs between hrs of 20:00-9:00)      Enteral /Parenteral Nutrition:  NPO with Tube Feeds via PEG  Jevity 1.5 @ 70mL/hr x 13hrs (20:00-9:00) to provide 910mL formula, 1365kcals, 58g protein, 692ml free water  - Observed Jevity 1.5 hanging and infusing at goal rate of 70ml/hr  - Per RN, TF was held yesterday (12/9) pending procedure and resumed today (12/10) around 11:00  - Per flow sheets, pt has received 100% EN provisions in the last 5 days (12/5-12/8)  - Continues on Multivitamin ordered per prior RD recommendations on 11/30    Nutrition related labs:   - Potassium previously <H>; currently WNL  - Phosphorous previously <L>; currently WNL  - Creatinine <L> (<0.3)  Will continue to monitor    GI:   - Noted with fecal incontinence  - last BM 12/9   - Continues on bowel regimen (miralax, senna) and antibiotics      Daily Weight: (12/1) 115.3; (12/2) 115.3; (12/9) 109.3; Dosing wt: 118.6 (12/10)  % Weight Change  No significant wt changes. Will continue to monitor/trend weight    Pertinent Medications: MEDICATIONS  (STANDING):  artificial  tears Solution 1 Drop(s) Both EYES every 6 hours  chlorhexidine 0.12% Liquid 15 milliLiter(s) Oral Mucosa every 12 hours  erythromycin   Ointment 1 Application(s) Both EYES <User Schedule>  heparin   Injectable 5000 Unit(s) SubCutaneous every 12 hours  influenza   Vaccine 0.5 milliLiter(s) IntraMuscular once  meropenem  IVPB 1000 milliGRAM(s) IV Intermittent every 8 hours  multivitamin 1 Tablet(s) Oral daily  ofloxacin 0.3% Solution 1 Drop(s) Both EYES four times a day  petrolatum Ophthalmic Ointment 1 Application(s) Both EYES <User Schedule>  polyethylene glycol 3350 17 Gram(s) Oral two times a day  senna 1 Tablet(s) Oral daily  sertraline 50 milliGRAM(s) Oral daily  sodium chloride 0.9%. 1000 milliLiter(s) (50 mL/Hr) IV Continuous <Continuous>    MEDICATIONS  (PRN):    Pertinent Labs: 12-10 @ 07:00: Na 140, BUN 12, Cr <0.30<L>, BG 96, K+ 4.0, Phos 2.9, Mg 2.1, Alk Phos --, ALT/SGPT --, AST/SGOT --, HbA1c --    Finger Sticks:  POCT Blood Glucose.: 105 mg/dL (12-10 @ 05:41)  POCT Blood Glucose.: 98 mg/dL (12-09 @ 23:31)      Skin per nursing documentation: No pressure injuries noted at this time   Edema: Pt previously noted with edema, however has since resolved. No edema present at this time.    Estimated Needs:   [ x ] no change since previous assessment  Based on dosing weight: 118.6 lb (53.8 kg)  Energy (25-30 kcals/kg): 5855-8580  Protein (1.0-1.2 g pro/kg): 53.8-64.56    Previous Nutrition Diagnosis: Swallowing difficulty  Nutrition Diagnosis is [ x ] ongoing  - being addressed with TF via PEG       New Nutrition Diagnosis: N/A    Recommend  1) Continue Jevity 1.5 via PEG @ 70 ml/hr x 13 hours (20:00-9:00) to provide 910 ml formula, 1365 calories (25 neftali/kg), 58 grams protein (1.1 gm/kg), 692ml free water. Based on dosing wt 53.8kg.  2.) Continue multivitamin with minerals as ordered    Monitoring and Evaluation:   Continue to monitor Nutritional intake, Tolerance to diet prescription, weights, labs, skin integrity    RD remains available upon request and will follow up per protocol  Soniya Bergman, Dietetic Intern PGR# [phone] Nutrition Follow Up Note  Patient seen for: Nutrition follow up assessment in RCU  Source: EMR, RN    Diet: NPO with TF via PEG. See below.    Chart reviewed, events noted. Pt is a 64 yo F with PMHx advanced ALS s/p trach/PEG and HLD p/w tachycardia and decreased UOP concern for sepsis 2/2 UTI, admitted to MICU for further workup and vent management. Required IV pressors which has been discontinued. Transferred to RCU 11/28. Trach changed to #7 distal XLT shiley 2/2 air leak on 11/28. Pt s/p Ureteroscopy with  Stent placement and Lithotripsy this morning 12/10.      Diet : NPO with Tube Feeds via PEG    Enteral /Parenteral Nutrition:  NPO with Tube Feeds via PEG  Jevity 1.5 @ 70mL/hr x 13hrs (20:00-9:00) to provide 910mL formula, 1365kcals, 58g protein, 692ml free water  - Observed Jevity 1.5 hanging and infusing at goal rate of 70ml/hr  - Per RN, TF was held yesterday (12/9) pending procedure and resumed today (12/10) around 11:00  - Per flow sheets, pt has received 100% EN provisions in the last 5 days (12/5-12/8)  - Continues on Multivitamin ordered per prior RD recommendations on 11/30    Nutrition related labs:   - Potassium previously <H>; currently WNL  - Phosphorous previously <L>; currently WNL  - Creatinine <L> (<0.3)  Will continue to monitor    GI:   - Noted with fecal incontinence  - last BM 12/9   - Continues on bowel regimen (miralax, senna) and antibiotics      Daily Weight (in pounds): (12/1) 115.3; (12/2) 115.3; (12/9) 109.3; Dosing wt: 118.6 (12/10)  % Weight Change  No significant wt changes. Will continue to monitor/trend weight    Pertinent Medications: MEDICATIONS  (STANDING):  artificial  tears Solution 1 Drop(s) Both EYES every 6 hours  chlorhexidine 0.12% Liquid 15 milliLiter(s) Oral Mucosa every 12 hours  erythromycin   Ointment 1 Application(s) Both EYES <User Schedule>  heparin   Injectable 5000 Unit(s) SubCutaneous every 12 hours  influenza   Vaccine 0.5 milliLiter(s) IntraMuscular once  meropenem  IVPB 1000 milliGRAM(s) IV Intermittent every 8 hours  multivitamin 1 Tablet(s) Oral daily  ofloxacin 0.3% Solution 1 Drop(s) Both EYES four times a day  petrolatum Ophthalmic Ointment 1 Application(s) Both EYES <User Schedule>  polyethylene glycol 3350 17 Gram(s) Oral two times a day  senna 1 Tablet(s) Oral daily  sertraline 50 milliGRAM(s) Oral daily  sodium chloride 0.9%. 1000 milliLiter(s) (50 mL/Hr) IV Continuous <Continuous>    MEDICATIONS  (PRN):    Pertinent Labs: 12-10 @ 07:00: Na 140, BUN 12, Cr <0.30<L>, BG 96, K+ 4.0, Phos 2.9, Mg 2.1, Alk Phos --, ALT/SGPT --, AST/SGOT --, HbA1c --    Finger Sticks:  POCT Blood Glucose.: 105 mg/dL (12-10 @ 05:41)  POCT Blood Glucose.: 98 mg/dL (12-09 @ 23:31)      Skin per nursing documentation: No pressure injuries noted at this time   Edema: Pt previously noted with edema, however has since resolved. No edema present at this time.    Estimated Needs:   [ x ] no change since previous assessment  Based on dosing weight: 118.6 lb (53.8 kg)  Energy (25-30 kcals/kg): 5445-5170  Protein (1.0-1.2 g pro/kg): 53.8-64.56    Previous Nutrition Diagnosis: Swallowing difficulty  Nutrition Diagnosis is [ x ] ongoing  - being addressed with TF via PEG       New Nutrition Diagnosis: N/A    Recommend  1) Continue Jevity 1.5 via PEG @ 70 ml/hr x 13 hours (20:00-9:00) to provide 910 ml formula, 1365 calories (25 neftali/kg), 58 grams protein (1.1 gm/kg), 692ml free water. Based on dosing wt 53.8kg.  2.) Continue multivitamin with minerals as ordered    Monitoring and Evaluation:   Continue to monitor Nutritional intake, Tolerance to diet prescription, weights, labs, skin integrity    RD remains available upon request and will follow up per protocol  Soniya Bergman, Dietetic Intern PGR# [phone]

## 2020-12-10 NOTE — PROGRESS NOTE ADULT - ASSESSMENT
[facility]  Progress Note    Galvez Melanie Torres Patient Status:  Inpatient    1954 MRN [id number]   [facility] 4SW-A Attending Vanessa Bauer MD   [address] PCP Ventura Harper MD     Subjective:  Jassi Jacobsen Cul 65 f with ALS, s/p trach and PEG, was admitted 11/22 for septic shock and morganella bacteremia, was considered due to UTI vs pneumonia but urine cx was negative, sputum cx negative, CXR with LLL pneumonia  blood cx negative 11/25  WBC initially 19 then 31 and fluctuating, normalized on 11/26 and then increased to 29 with some hypotension, no diarrhea, no resp secretions and no fever  exam with b/l injected conjunctivae and cold LLE but that resolved LLE not cold anymore     septic shock with morganella bacteremia, ?UTI vs pneumonia but urine and sputum cx negative  cleared blood cultures 11/25  new  leukocytosis and hypotension while on cefepime, CT with LLL collapse, multiple L renal and ureteral stones with mild hydro and urothelial enhancement so likely pyelonephritis but urine cx came back negative, done on antibiotics though, pt improved on andie  sputum cx with Stenotrophomonas sensitive to levo and bactrim but no clinical evidence of pneumonia, likely colonization      * c/w andie 1 q 8, started 11/30, now day 10, plan was for a 10 day course, but was extended for cystoscopy, ureteral stent and lithotripsy which was done today  * discontinue meropenem tomorrow  * will sign off, please call with questions    The above assessment and plan was discussed with U    Salome Terrazas MD  Pager [phone]  After 5pm and on weekends call [phone]

## 2020-12-11 LAB
ANION GAP SERPL CALC-SCNC: 11 MMOL/L — SIGNIFICANT CHANGE UP (ref 5–17)
BASOPHILS # BLD AUTO: 0.11 K/UL — SIGNIFICANT CHANGE UP (ref 0–0.2)
BASOPHILS NFR BLD AUTO: 1.5 % — SIGNIFICANT CHANGE UP (ref 0–2)
BUN SERPL-MCNC: 11 MG/DL — SIGNIFICANT CHANGE UP (ref 7–23)
CALCIUM SERPL-MCNC: 9.1 MG/DL — SIGNIFICANT CHANGE UP (ref 8.4–10.5)
CHLORIDE SERPL-SCNC: 104 MMOL/L — SIGNIFICANT CHANGE UP (ref 96–108)
CO2 SERPL-SCNC: 22 MMOL/L — SIGNIFICANT CHANGE UP (ref 22–31)
CREAT SERPL-MCNC: <0.3 MG/DL — LOW (ref 0.5–1.3)
EOSINOPHIL # BLD AUTO: 0.64 K/UL — HIGH (ref 0–0.5)
EOSINOPHIL NFR BLD AUTO: 8.8 % — HIGH (ref 0–6)
GLUCOSE BLDC GLUCOMTR-MCNC: 123 MG/DL — HIGH (ref 70–99)
GLUCOSE SERPL-MCNC: 101 MG/DL — HIGH (ref 70–99)
HCT VFR BLD CALC: 35.4 % — SIGNIFICANT CHANGE UP (ref 34.5–45)
HGB BLD-MCNC: 10.8 G/DL — LOW (ref 11.5–15.5)
IMM GRANULOCYTES NFR BLD AUTO: 0.6 % — SIGNIFICANT CHANGE UP (ref 0–1.5)
LYMPHOCYTES # BLD AUTO: 1.02 K/UL — SIGNIFICANT CHANGE UP (ref 1–3.3)
LYMPHOCYTES # BLD AUTO: 14.1 % — SIGNIFICANT CHANGE UP (ref 13–44)
MAGNESIUM SERPL-MCNC: 2.2 MG/DL — SIGNIFICANT CHANGE UP (ref 1.6–2.6)
MCHC RBC-ENTMCNC: 28.4 PG — SIGNIFICANT CHANGE UP (ref 27–34)
MCHC RBC-ENTMCNC: 30.5 GM/DL — LOW (ref 32–36)
MCV RBC AUTO: 93.2 FL — SIGNIFICANT CHANGE UP (ref 80–100)
MONOCYTES # BLD AUTO: 0.52 K/UL — SIGNIFICANT CHANGE UP (ref 0–0.9)
MONOCYTES NFR BLD AUTO: 7.2 % — SIGNIFICANT CHANGE UP (ref 2–14)
NEUTROPHILS # BLD AUTO: 4.91 K/UL — SIGNIFICANT CHANGE UP (ref 1.8–7.4)
NEUTROPHILS NFR BLD AUTO: 67.8 % — SIGNIFICANT CHANGE UP (ref 43–77)
NRBC # BLD: 0 /100 WBCS — SIGNIFICANT CHANGE UP (ref 0–0)
PHOSPHATE SERPL-MCNC: 2.5 MG/DL — SIGNIFICANT CHANGE UP (ref 2.5–4.5)
PLATELET # BLD AUTO: 428 K/UL — HIGH (ref 150–400)
POTASSIUM SERPL-MCNC: 4.2 MMOL/L — SIGNIFICANT CHANGE UP (ref 3.5–5.3)
POTASSIUM SERPL-SCNC: 4.2 MMOL/L — SIGNIFICANT CHANGE UP (ref 3.5–5.3)
RBC # BLD: 3.8 M/UL — SIGNIFICANT CHANGE UP (ref 3.8–5.2)
RBC # FLD: 17.2 % — HIGH (ref 10.3–14.5)
SODIUM SERPL-SCNC: 137 MMOL/L — SIGNIFICANT CHANGE UP (ref 135–145)
WBC # BLD: 7.24 K/UL — SIGNIFICANT CHANGE UP (ref 3.8–10.5)
WBC # FLD AUTO: 7.24 K/UL — SIGNIFICANT CHANGE UP (ref 3.8–10.5)

## 2020-12-11 PROCEDURE — 99231 SBSQ HOSP IP/OBS SF/LOW 25: CPT

## 2020-12-11 PROCEDURE — 99233 SBSQ HOSP IP/OBS HIGH 50: CPT

## 2020-12-11 RX ORDER — IPRATROPIUM/ALBUTEROL SULFATE 18-103MCG
3 AEROSOL WITH ADAPTER (GRAM) INHALATION EVERY 6 HOURS
Refills: 0 | Status: DISCONTINUED | OUTPATIENT
Start: 2020-12-11 | End: 2020-12-15

## 2020-12-11 RX ORDER — DIAZEPAM 5 MG
1.5 TABLET ORAL
Refills: 0 | Status: DISCONTINUED | OUTPATIENT
Start: 2020-12-11 | End: 2020-12-15

## 2020-12-11 RX ORDER — RILUZOLE 50 MG/1
50 TABLET ORAL
Refills: 0 | Status: DISCONTINUED | OUTPATIENT
Start: 2020-12-11 | End: 2020-12-15

## 2020-12-11 RX ORDER — DIAZEPAM 5 MG
2.5 TABLET ORAL
Refills: 0 | Status: DISCONTINUED | OUTPATIENT
Start: 2020-12-11 | End: 2020-12-15

## 2020-12-11 RX ADMIN — Medication 1 APPLICATION(S): at 21:43

## 2020-12-11 RX ADMIN — RILUZOLE 50 MILLIGRAM(S): 50 TABLET ORAL at 08:44

## 2020-12-11 RX ADMIN — Medication 3 MILLILITER(S): at 11:13

## 2020-12-11 RX ADMIN — Medication 1 DROP(S): at 23:56

## 2020-12-11 RX ADMIN — Medication 1 DROP(S): at 05:48

## 2020-12-11 RX ADMIN — MEROPENEM 100 MILLIGRAM(S): 1 INJECTION INTRAVENOUS at 05:46

## 2020-12-11 RX ADMIN — Medication 1 APPLICATION(S): at 16:46

## 2020-12-11 RX ADMIN — Medication 3 MILLILITER(S): at 17:12

## 2020-12-11 RX ADMIN — Medication 3 MILLILITER(S): at 23:14

## 2020-12-11 RX ADMIN — Medication 2.5 MILLIGRAM(S): at 21:43

## 2020-12-11 RX ADMIN — HEPARIN SODIUM 5000 UNIT(S): 5000 INJECTION INTRAVENOUS; SUBCUTANEOUS at 17:43

## 2020-12-11 RX ADMIN — CHLORHEXIDINE GLUCONATE 15 MILLILITER(S): 213 SOLUTION TOPICAL at 17:41

## 2020-12-11 RX ADMIN — Medication 1 APPLICATION(S): at 04:40

## 2020-12-11 RX ADMIN — Medication 1 DROP(S): at 05:45

## 2020-12-11 RX ADMIN — Medication 1 APPLICATION(S): at 17:42

## 2020-12-11 RX ADMIN — HEPARIN SODIUM 5000 UNIT(S): 5000 INJECTION INTRAVENOUS; SUBCUTANEOUS at 05:46

## 2020-12-11 RX ADMIN — Medication 1 APPLICATION(S): at 14:34

## 2020-12-11 RX ADMIN — Medication 1 APPLICATION(S): at 05:46

## 2020-12-11 RX ADMIN — RILUZOLE 50 MILLIGRAM(S): 50 TABLET ORAL at 17:42

## 2020-12-11 RX ADMIN — Medication 1 TABLET(S): at 11:18

## 2020-12-11 RX ADMIN — CHLORHEXIDINE GLUCONATE 15 MILLILITER(S): 213 SOLUTION TOPICAL at 05:46

## 2020-12-11 RX ADMIN — Medication 1 APPLICATION(S): at 02:35

## 2020-12-11 RX ADMIN — Medication 1 DROP(S): at 17:41

## 2020-12-11 RX ADMIN — Medication 1 APPLICATION(S): at 08:36

## 2020-12-11 RX ADMIN — Medication 1.5 MILLIGRAM(S): at 08:35

## 2020-12-11 RX ADMIN — Medication 1 APPLICATION(S): at 23:57

## 2020-12-11 RX ADMIN — SERTRALINE 50 MILLIGRAM(S): 25 TABLET, FILM COATED ORAL at 11:17

## 2020-12-11 RX ADMIN — Medication 1 DROP(S): at 11:16

## 2020-12-11 RX ADMIN — Medication 1 APPLICATION(S): at 11:16

## 2020-12-11 RX ADMIN — Medication 1 APPLICATION(S): at 19:57

## 2020-12-11 RX ADMIN — Medication 1 APPLICATION(S): at 11:19

## 2020-12-11 RX ADMIN — POLYETHYLENE GLYCOL 3350 17 GRAM(S): 17 POWDER, FOR SOLUTION ORAL at 05:48

## 2020-12-11 NOTE — PROGRESS NOTE ADULT - SUBJECTIVE AND OBJECTIVE BOX
UROLOGY Progress Note  TINO MATA    S: Patient seen at bedside.  Overall no issues overnight s/p L. URS/Stone extraction/stent, making adequate urine.  Afebrile, VSS.    O:  Vital Signs Last 24 Hrs  T(C): 36.6 (11 Dec 2020 06:00), Max: 36.6 (10 Dec 2020 07:22)  T(F): 97.8 (11 Dec 2020 06:00), Max: 97.8 (10 Dec 2020 07:22)  HR: 100 (11 Dec 2020 06:00) (82 - 100)  BP: 152/86 (11 Dec 2020 05:45) (111/56 - 152/86)  BP(mean): 83 (10 Dec 2020 10:45) (77 - 84)  RR: 14 (11 Dec 2020 06:00) (14 - 16)  SpO2: 95% (11 Dec 2020 06:00) (95% - 100%)    12-09-20 @ 07:01  -  12-10-20 @ 07:00  --------------------------------------------------------  IN: 1710 mL / OUT: 0 mL / NET: 1710 mL    12-10-20 @ 07:01  -  12-11-20 @ 06:52  --------------------------------------------------------  IN: 2040 mL / OUT: 400 mL / NET: 1640 mL      Physical Exam:  Gen: NAD, eye-patches taped  Resp: No acute respiratory distress, normal effort  Abd: Soft, NT, ND  : primafit in place, stent strings tegadermed to suprapubic/mons area    Labs:  CBC (12-10 @ 07:01)                              10.4<L>                         7.89    )----------------(  467<H>     68.6  % Neutrophils, 14.1  % Lymphocytes, ANC: 5.41                                33.9<L>                BMP (12-10 @ 07:00)             140     |  106     |  12    		Ca++ --      Ca 9.2                ---------------------------------( 96    		Mg 2.1                4.0     |  21<L>   |  <0.30<L>			Ph 2.9         Coags (12-10 @ 07:01)  aPTT 31.4 / INR 1.08 / PT 12.9        -> .Urine Catheterized Culture (12-01 @ 17:36)     NG    NG    No growth            A/P: 65y Female s/p    - Pain control  - OOB as tolerated  - Diet:  - Monitor GI/ fxn  - Dispo: Floor

## 2020-12-11 NOTE — PROGRESS NOTE ADULT - ATTENDING COMMENTS
65 F with advanced ALS, chronic hypoxic/hypercapnic respiratory failure, vent dependent, s/p trach and PEG p/w septic shock and Morganella bacteremia due to UTI. Has nonobstructing stones on CT w/ mild hydro and ureteral dilatation. S/p lithotripsy and stent placement.  Can d/c abx today. No sepsis type picture psot procedure. Monitor UO, started on maintenance IVF. 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 Cont riluzole for ALS.  Plan for discharge started.

## 2020-12-11 NOTE — PROGRESS NOTE ADULT - SUBJECTIVE AND OBJECTIVE BOX
Patient is a 65y old  Female who presents with a chief complaint of sepsis (11 Dec 2020 06:49)      Interval Events:    REVIEW OF SYSTEMS:  [ ] Positive  [ ] All other systems negative  [ ] Unable to assess ROS because ________    Vital Signs Last 24 Hrs  T(C): 36.6 (12-11-20 @ 06:00), Max: 36.6 (12-10-20 @ 22:11)  T(F): 97.8 (12-11-20 @ 06:00), Max: 97.8 (12-10-20 @ 22:11)  HR: 100 (12-11-20 @ 06:00) (82 - 100)  BP: 152/86 (12-11-20 @ 05:45) (111/56 - 152/86)  RR: 14 (12-11-20 @ 06:00) (14 - 16)  SpO2: 95% (12-11-20 @ 06:00) (95% - 100%)PHYSICAL EXAM:  HEENT:   [ ]Tracheostomy:  [ ]Pupils equal  [ ]No oral lesions  [ ]Abnormal        SKIN  [ ]No Rash  [ ] Abnormal  [ ] pressure    CARDIAC  [ ]Regular  [ ]Abnormal    PULMONARY  [ ]Bilateral Clear Breath Sounds  [ ]Normal Excursion  [ ]Abnormal    GI  [ ]PEG      [ ] +BS		              [ ]Soft, nondistended, nontender	  [ ]Abnormal    MUSCULOSKELETAL                                   [ ]Bedbound                 [ ]Abnormal    [ ]Ambulatory/OOB to chair                           EXTREMITIES                                         [ ]Normal  [ ]Edema                           NEUROLOGIC  [ ] Normal, non focal  [ ] Focal findings:    PSYCHIATRIC  [ ]Alert and appropriate  [ ] Sedated	 [ ]Agitated    :  Newman: [ ] Yes, if yes: Date of Placement:                   [  ] No    LINES: Central Lines [ ] Yes, if yes: Date of Placement                                     [  ] No    HOSPITAL MEDICATIONS:  MEDICATIONS  (STANDING):  artificial  tears Solution 1 Drop(s) Both EYES every 6 hours  chlorhexidine 0.12% Liquid 15 milliLiter(s) Oral Mucosa every 12 hours  erythromycin   Ointment 1 Application(s) Both EYES <User Schedule>  heparin   Injectable 5000 Unit(s) SubCutaneous every 12 hours  influenza   Vaccine 0.5 milliLiter(s) IntraMuscular once  meropenem  IVPB 1000 milliGRAM(s) IV Intermittent every 8 hours  multivitamin 1 Tablet(s) Oral daily  ofloxacin 0.3% Solution 1 Drop(s) Both EYES four times a day  petrolatum Ophthalmic Ointment 1 Application(s) Both EYES <User Schedule>  polyethylene glycol 3350 17 Gram(s) Oral two times a day  senna 1 Tablet(s) Oral daily  sertraline 50 milliGRAM(s) Oral daily  sodium chloride 0.9%. 1000 milliLiter(s) (50 mL/Hr) IV Continuous <Continuous>    MEDICATIONS  (PRN):      LABS:                        10.8   7.24  )-----------( 428      ( 11 Dec 2020 06:51 )             35.4     12-11    137  |  104  |  11  ----------------------------<  101<H>  4.2   |  22  |  <0.30<L>    Ca    9.1      11 Dec 2020 06:50  Phos  2.5     12-11  Mg     2.2     12-11      PT/INR - ( 10 Dec 2020 07:01 )   PT: 12.9 sec;   INR: 1.08 ratio         PTT - ( 10 Dec 2020 07:01 )  PTT:31.4 sec        CAPILLARY BLOOD GLUCOSE    MICROBIOLOGY:     RADIOLOGY:  [ ] Reviewed and interpreted by me    Mode: AC/ CMV (Assist Control/ Continuous Mandatory Ventilation)  RR (machine): 14  TV (machine): 450  FiO2: 21  PEEP: 5  ITime: 1  MAP: 9  PIP: 25   Patient is a 65y old  Female who presents with a chief complaint of sepsis (11 Dec 2020 06:49)      Interval Events: S/p Stent; Afebrile     REVIEW OF SYSTEMS:  [ ] Positive  [ ] All other systems negative  [ x] Unable to assess ROS because __Nonverbal_____    Vital Signs Last 24 Hrs  T(C): 36.6 (12-11-20 @ 06:00), Max: 36.6 (12-10-20 @ 22:11)  T(F): 97.8 (12-11-20 @ 06:00), Max: 97.8 (12-10-20 @ 22:11)  HR: 100 (12-11-20 @ 06:00) (82 - 100)  BP: 152/86 (12-11-20 @ 05:45) (111/56 - 152/86)  RR: 14 (12-11-20 @ 06:00) (14 - 16)  SpO2: 95% (12-11-20 @ 06:00) (95% - 100%)    PHYSICAL EXAM:    HEENT:   [x]Tracheostomy: # 7 distal XLT Shiley, cuffed    [x]Pupils equal  [ ]No oral lesions  [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      SKIN  [x]No Rash  [ ] Abnormal  [ ] pressure    CARDIAC  [x]Regular  [ ]Abnormal:     PULMONARY  [ ]Bilateral Clear Breath Sounds  [ ]Normal Excursion  [x]Abnormal: Mild Bilateral Coarse Breath sounds    GI  [x]PEG      [x] +BS		              [x]Soft, nondistended, nontender	  [ ]Abnormal    MUSCULOSKELETAL                                   [x]Bedbound                 [ ]Abnormal    [ ]Ambulatory/OOB to chair                           EXTREMITIES                                         [x] Decrease Dorsalis pedal pulse on left lower extremity, Warm to touch   [ ]Edema                           NEUROLOGIC  [ ] Normal, non focal  [x] Focal findings: Awake, Nonvervbal, not following commands;  Functional Quadriplegic 2/2 ALS     PSYCHIATRIC  [ ] Unable, Noverbal   [ ] Sedated	 [ ]Agitated    :  Paty: [ ] Yes, if yes: Date of Placement:                   [x] No    LINES: Central Lines [ ] Yes, if yes: Date of Placement                                     [x] No    HOSPITAL MEDICATIONS:  MEDICATIONS  (STANDING):  artificial  tears Solution 1 Drop(s) Both EYES every 6 hours  chlorhexidine 0.12% Liquid 15 milliLiter(s) Oral Mucosa every 12 hours  erythromycin   Ointment 1 Application(s) Both EYES <User Schedule>  heparin   Injectable 5000 Unit(s) SubCutaneous every 12 hours  influenza   Vaccine 0.5 milliLiter(s) IntraMuscular once  meropenem  IVPB 1000 milliGRAM(s) IV Intermittent every 8 hours  multivitamin 1 Tablet(s) Oral daily  ofloxacin 0.3% Solution 1 Drop(s) Both EYES four times a day  petrolatum Ophthalmic Ointment 1 Application(s) Both EYES <User Schedule>  polyethylene glycol 3350 17 Gram(s) Oral two times a day  senna 1 Tablet(s) Oral daily  sertraline 50 milliGRAM(s) Oral daily  sodium chloride 0.9%. 1000 milliLiter(s) (50 mL/Hr) IV Continuous <Continuous>    MEDICATIONS  (PRN):      LABS:                        10.8   7.24  )-----------( 428      ( 11 Dec 2020 06:51 )             35.4     12-11    137  |  104  |  11  ----------------------------<  101<H>  4.2   |  22  |  <0.30<L>    Ca    9.1      11 Dec 2020 06:50  Phos  2.5     12-11  Mg     2.2     12-11      PT/INR - ( 10 Dec 2020 07:01 )   PT: 12.9 sec;   INR: 1.08 ratio         PTT - ( 10 Dec 2020 07:01 )  PTT:31.4 sec        CAPILLARY BLOOD GLUCOSE    MICROBIOLOGY:     RADIOLOGY:  [ ] Reviewed and interpreted by me    Mode: AC/ CMV (Assist Control/ Continuous Mandatory Ventilation)  RR (machine): 14  TV (machine): 450  FiO2: 21  PEEP: 5  ITime: 1  MAP: 9  PIP: 25   Patient is a 65y old  Female who presents with a chief complaint of sepsis (11 Dec 2020 06:49)      Interval Events: S/p Stent; Afebrile     REVIEW OF SYSTEMS:  [ ] Positive  [ ] All other systems negative  [ x] Unable to assess ROS because __Nonverbal_____    Vital Signs Last 24 Hrs  T(C): 36.6 (12-11-20 @ 06:00), Max: 36.6 (12-10-20 @ 22:11)  T(F): 97.8 (12-11-20 @ 06:00), Max: 97.8 (12-10-20 @ 22:11)  HR: 100 (12-11-20 @ 06:00) (82 - 100)  BP: 152/86 (12-11-20 @ 05:45) (111/56 - 152/86)  RR: 14 (12-11-20 @ 06:00) (14 - 16)  SpO2: 95% (12-11-20 @ 06:00) (95% - 100%)    PHYSICAL EXAM:    HEENT:   [x]Tracheostomy: # 7 distal XLT Shiley, cuffed    [x]Pupils equal  [ ]No oral lesions  [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      SKIN  [x]No Rash  [ ] Abnormal  [ ] pressure    CARDIAC  [x]Regular  [ ]Abnormal:     PULMONARY  [ ]Bilateral Clear Breath Sounds  [ ]Normal Excursion  [x]Abnormal: Mild Bilateral Coarse Breath sounds    GI  [x]PEG      [x] +BS		              [x]Soft, nondistended, nontender	  [ ]Abnormal    MUSCULOSKELETAL                                   [x]Bedbound                 [ ]Abnormal    [ ]Ambulatory/OOB to chair                           EXTREMITIES                                         [x] Decrease Dorsalis pedal pulse on left lower extremity, Warm to touch   [ ]Edema                           NEUROLOGIC  [ ] Normal, non focal  [x] Focal findings: Awake, Nonvervbal, not following commands;  Functional Quadriplegic 2/2 ALS     PSYCHIATRIC  [x ] Unable to assess, Nonverbal  [ ] Sedated	 [ ]Agitated    :  Paty: [ ] Yes, if yes: Date of Placement:                   [x] No    LINES: Central Lines [ ] Yes, if yes: Date of Placement                                     [x] No    HOSPITAL MEDICATIONS:  MEDICATIONS  (STANDING):  artificial  tears Solution 1 Drop(s) Both EYES every 6 hours  chlorhexidine 0.12% Liquid 15 milliLiter(s) Oral Mucosa every 12 hours  erythromycin   Ointment 1 Application(s) Both EYES <User Schedule>  heparin   Injectable 5000 Unit(s) SubCutaneous every 12 hours  influenza   Vaccine 0.5 milliLiter(s) IntraMuscular once  meropenem  IVPB 1000 milliGRAM(s) IV Intermittent every 8 hours  multivitamin 1 Tablet(s) Oral daily  ofloxacin 0.3% Solution 1 Drop(s) Both EYES four times a day  petrolatum Ophthalmic Ointment 1 Application(s) Both EYES <User Schedule>  polyethylene glycol 3350 17 Gram(s) Oral two times a day  senna 1 Tablet(s) Oral daily  sertraline 50 milliGRAM(s) Oral daily  sodium chloride 0.9%. 1000 milliLiter(s) (50 mL/Hr) IV Continuous <Continuous>    MEDICATIONS  (PRN):      LABS:                        10.8   7.24  )-----------( 428      ( 11 Dec 2020 06:51 )             35.4     12-11    137  |  104  |  11  ----------------------------<  101<H>  4.2   |  22  |  <0.30<L>    Ca    9.1      11 Dec 2020 06:50  Phos  2.5     12-11  Mg     2.2     12-11      PT/INR - ( 10 Dec 2020 07:01 )   PT: 12.9 sec;   INR: 1.08 ratio         PTT - ( 10 Dec 2020 07:01 )  PTT:31.4 sec        CAPILLARY BLOOD GLUCOSE    MICROBIOLOGY:     RADIOLOGY:  [ ] Reviewed and interpreted by me    Mode: AC/ CMV (Assist Control/ Continuous Mandatory Ventilation)  RR (machine): 14  TV (machine): 450  FiO2: 21  PEEP: 5  ITime: 1  MAP: 9  PIP: 25

## 2020-12-11 NOTE — PROGRESS NOTE ADULT - ASSESSMENT
64yo F with PMH advanced ALS, chronic hypoxia/hypercapneic respiratory failure, vent dependent, s/p trach/peg 2015 with incidentally found 2mm L. distal stone now s/p URS/stent placement.     - Monitor for fevers  - Continue to record UOP (primafit), if concern for low UOP/retention > bladder scan  - Keep stent string secured (tegaderm)  - Will plan to remove stent sometime next week at bedside 64yo F with PMH advanced ALS, chronic hypoxia/hypercapneic respiratory failure, vent dependent, s/p trach/peg 2015 with incidentally found 2mm L. distal stone now s/p URS/stent placement.     - Monitor for fevers  - Continue to record UOP (primafit), if concern for low UOP/retention > bladder scan  - Keep stent string secured (tegaderm)  - Will plan to remove stent next week,  spoken to who says has RN at home who could remove vs. if still IP can do at bedside (no contraindication to discharge from  perspective).   - Patient may f/u with Dr. Guerrero at the [facility].  Please call the office ([phone]) to confirm/schedule appointment.

## 2020-12-11 NOTE — PROGRESS NOTE ADULT - SUBJECTIVE AND OBJECTIVE BOX
[facility] DEPARTMENT OF OPHTHALMOLOGY  ------------------------------------------------------------------------------  Anyi VALENTINO3 MD  Pager: [phone]  ------------------------------------------------------------------------------    S: Patient seen and examined at the bedside. Patient is non-verbal. Patient's son at bedside, who reports that the patient has an eye regimen at home, performed by her home nurse. Son is unsure what drops or ointments she is on. He reports that occasionally an ophthalmologist makes a home visit to see the patient, usually only when the family notices that the eyes look bad.     MEDICATIONS  (STANDING):  albuterol/ipratropium for Nebulization 3 milliLiter(s) Nebulizer every 6 hours  artificial  tears Solution 1 Drop(s) Both EYES every 6 hours  chlorhexidine 0.12% Liquid 15 milliLiter(s) Oral Mucosa every 12 hours  chlorhexidine 4% Liquid 1 Application(s) Topical <User Schedule>  diazepam   Solution 1.5 milliGRAM(s) Enteral Tube <User Schedule>  diazepam   Solution 2.5 milliGRAM(s) Enteral Tube <User Schedule>  erythromycin   Ointment 1 Application(s) Both EYES <User Schedule>  heparin   Injectable 5000 Unit(s) SubCutaneous every 12 hours  influenza   Vaccine 0.5 milliLiter(s) IntraMuscular once  meropenem  IVPB 1000 milliGRAM(s) IV Intermittent every 8 hours  multivitamin 1 Tablet(s) Oral daily  ofloxacin 0.3% Solution 1 Drop(s) Both EYES four times a day  petrolatum Ophthalmic Ointment 1 Application(s) Both EYES <User Schedule>  polyethylene glycol 3350 17 Gram(s) Oral two times a day  riluzole 50 milliGRAM(s) Oral two times a day  senna 1 Tablet(s) Oral daily  sertraline 50 milliGRAM(s) Oral daily    VITALS: T(C): 36.5 (12-07-20 @ 04:40)  T(F): 97.7 (12-07-20 @ 04:40), Max: 97.7 (12-07-20 @ 04:40)  HR: 98 (12-07-20 @ 08:19) (92 - 109)  BP: 143/83 (12-07-20 @ 04:40) (114/71 - 143/83)  RR:  (14 - 18)  SpO2:  (95% - 100%)  Wt(kg): --  General: AAO x 0    Ophthalmology Exam:  Visual acuity (sc): RONIT as patient is non-verbal  Pupils: PERRL OU, no APD  Ttono: Soft OU  Extraocular movements (EOMs): grossly full  Confrontational Visual Field (CVF): RONIT as patient is non-verbal  Color Plates: RONIT as patient is non-verbal    Pen Light Exam (PLE)  External: Flat OU  Lids/Lashes/Lacrimal Ducts: Flat OU  Does not blink and keeps eyes open  Sclera/Conjunctiva: 1+ inj OU  Cornea: OD with 3+ inferior SPK, no epi defect, no infiltrate: OS also with 2+ SPK and no epi defect, no infiltrate, and no filaments  Anterior Chamber: D+F OU    Iris: Flat OU  Lens: Cl OU      Assessment and Plan:   64 y/o F with advanced ALS, HLD, vent dependent s/p trach and PEG dependent here for fevers; 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 However, given severity of surface disease, would recommend taping at all times at this time (please make sure the eyelids are closed when taping)  - Ophthalmology will continue to follow  - findings and plan discussed with primary team and son  - Son was at bedside today; discussed importance of regular ophthalmic care and made recommendations for discharge (recommendation is for erythromycin ointment every 1 hour while awake and taping as much as possible at home). Discussed the option of tarsorrhaphy, but son refused.    The patient should follow-up with [facility] within 1 week of discharge, sooner if symptoms worsen or change:  [address] [address]  [phone]    SDW Dr. Flowers (cornea specialist)

## 2020-12-11 NOTE — PROGRESS NOTE ADULT - ASSESSMENT
65 F with advanced ALS, chronic hypoxic/ hypercapnic resp failure, vent dependent, s/p trach 2015 and PEG, now presenting with septic shock and Morganella bacteremia 2/2 bacterial PNA + UTI.  Patient was BIBEMS from home for fevers. Pt's 24hr home health nurse reported over the past few days the pt has appeared more lethargic, has been tachycardic, and running fevers with decreased Urination. She was empirically started on meropenum and vancomycin in ED.  Was managed for Septic shock s/p fluid resuscitation, IV pressors, and Transferred to MICU. Patients abx were switched to Cefepime after Bcx 11/22 revealed Morganella morganii.  Surveillance Bcxs from 11/25 remain NGTD.  LLE doppler study revealed stenosis of femoral and popliteal arteries causing cool, pale LLE without cyanosis. Patient was weaned off vasopressors and placed on Midodrine. Patient was Transferred to RCU on 11/28. Trach changed to #7 distal xlt lise by ENT due to air leak on 11/28. Patient had CT abd pelvis performed which revealed 2 mm non obstructive left ureteral stone and multiple renal calculi with mild bilateral hydronephrosis.     12/10: Patient scheduled for Ureteroscopy, Laser Lithotripsy and stent placement this morning. Meropenem extended yesterday in setting of  Procedure today. Patients IVF were continued for decreased urine output yesterday, Bun /creat remain stable on morning BMP. Patient did not require straight catheterization overnight. 65 F with advanced ALS, chronic hypoxic/ hypercapnic resp failure, vent dependent, s/p trach 2015 and PEG, now presenting with septic shock and Morganella bacteremia 2/2 bacterial PNA + UTI.  Patient was BIBEMS from home for fevers. Pt's 24hr home health nurse reported over the past few days the pt has appeared more lethargic, has been tachycardic, and running fevers with decreased Urination. She was empirically started on meropenum and vancomycin in ED.  Was managed for Septic shock s/p fluid resuscitation, IV pressors, and Transferred to MICU. Patients abx were switched to Cefepime after Bcx 11/22 revealed Morganella morganii.  Surveillance Bcxs from 11/25 remain NGTD.  LLE doppler study revealed stenosis of femoral and popliteal arteries causing cool, pale LLE without cyanosis. Patient was weaned off vasopressors and placed on Midodrine. Patient was Transferred to RCU on 11/28. Trach changed to #7 distal xlt lise by ENT due to air leak on 11/28. Patient had CT abd pelvis performed which revealed 2 mm non obstructive left ureteral stone and multiple renal calculi with mild bilateral hydronephrosis.     12/10: Patient scheduled for Ureteroscopy, Laser Lithotripsy and stent placement this morning. Meropenem extended yesterday in setting of  Procedure today. Patients IVF were continued for decreased urine output yesterday, Bun /creat remain stable on morning BMP. Patient did not require straight catheterization overnight.   12/11: Last Day of antibiotics today, Decreased UOP, RN to place primifit to monitor I/O's. Remains Afebrile,  WBC and Vitals Stable.    65 F with advanced ALS, chronic hypoxic/ hypercapnic resp failure, vent dependent, s/p trach 2015 and PEG, now presenting with septic shock and Morganella bacteremia 2/2 bacterial PNA + UTI.  Patient was BIBEMS from home for fevers. Pt's 24hr home health nurse reported over the past few days the pt has appeared more lethargic, has been tachycardic, and running fevers with decreased Urination. She was empirically started on meropenum and vancomycin in ED.  Was managed for Septic shock s/p fluid resuscitation, IV pressors, and Transferred to MICU. Patients abx were switched to Cefepime after Bcx 11/22 revealed Morganella morganii.  Surveillance Bcxs from 11/25 remain NGTD.  LLE doppler study revealed stenosis of femoral and popliteal arteries causing cool, pale LLE without cyanosis. Patient was weaned off vasopressors and placed on Midodrine. Patient was Transferred to RCU on 11/28. Trach changed to #7 distal xlt lise by ENT due to air leak on 11/28. Patient had CT abd pelvis performed which revealed 2 mm non obstructive left ureteral stone and multiple renal calculi with mild bilateral hydronephrosis.     12/10: Patient scheduled for Ureteroscopy, Laser Lithotripsy and stent placement this morning. Meropenem extended yesterday in setting of  Procedure today. Patients IVF were continued for decreased urine output yesterday, Bun /creat remain stable on morning BMP. Patient did not require straight catheterization overnight.   12/11: Last Day of antibiotics today, Decreased UOP, RN to place primifit to monitor I/O's. Remains Afebrile,  WBC and Vitals Stable.  following and planned for stent removal next week.

## 2020-12-12 ENCOUNTER — TRANSCRIPTION ENCOUNTER (OUTPATIENT)
Age: 65
End: 2020-12-12

## 2020-12-12 LAB
ANION GAP SERPL CALC-SCNC: 11 MMOL/L — SIGNIFICANT CHANGE UP (ref 5–17)
BASOPHILS # BLD AUTO: 0.08 K/UL — SIGNIFICANT CHANGE UP (ref 0–0.2)
BASOPHILS NFR BLD AUTO: 1.1 % — SIGNIFICANT CHANGE UP (ref 0–2)
BUN SERPL-MCNC: 11 MG/DL — SIGNIFICANT CHANGE UP (ref 7–23)
CALCIUM SERPL-MCNC: 9.5 MG/DL — SIGNIFICANT CHANGE UP (ref 8.4–10.5)
CHLORIDE SERPL-SCNC: 103 MMOL/L — SIGNIFICANT CHANGE UP (ref 96–108)
CO2 SERPL-SCNC: 23 MMOL/L — SIGNIFICANT CHANGE UP (ref 22–31)
CREAT SERPL-MCNC: <0.3 MG/DL — LOW (ref 0.5–1.3)
EOSINOPHIL # BLD AUTO: 0.63 K/UL — HIGH (ref 0–0.5)
EOSINOPHIL NFR BLD AUTO: 8.3 % — HIGH (ref 0–6)
GLUCOSE SERPL-MCNC: 101 MG/DL — HIGH (ref 70–99)
HCT VFR BLD CALC: 35.8 % — SIGNIFICANT CHANGE UP (ref 34.5–45)
HGB BLD-MCNC: 11 G/DL — LOW (ref 11.5–15.5)
IMM GRANULOCYTES NFR BLD AUTO: 0.5 % — SIGNIFICANT CHANGE UP (ref 0–1.5)
LYMPHOCYTES # BLD AUTO: 1.73 K/UL — SIGNIFICANT CHANGE UP (ref 1–3.3)
LYMPHOCYTES # BLD AUTO: 22.8 % — SIGNIFICANT CHANGE UP (ref 13–44)
MAGNESIUM SERPL-MCNC: 2.2 MG/DL — SIGNIFICANT CHANGE UP (ref 1.6–2.6)
MCHC RBC-ENTMCNC: 28.8 PG — SIGNIFICANT CHANGE UP (ref 27–34)
MCHC RBC-ENTMCNC: 30.7 GM/DL — LOW (ref 32–36)
MCV RBC AUTO: 93.7 FL — SIGNIFICANT CHANGE UP (ref 80–100)
MONOCYTES # BLD AUTO: 0.55 K/UL — SIGNIFICANT CHANGE UP (ref 0–0.9)
MONOCYTES NFR BLD AUTO: 7.2 % — SIGNIFICANT CHANGE UP (ref 2–14)
NEUTROPHILS # BLD AUTO: 4.56 K/UL — SIGNIFICANT CHANGE UP (ref 1.8–7.4)
NEUTROPHILS NFR BLD AUTO: 60.1 % — SIGNIFICANT CHANGE UP (ref 43–77)
NRBC # BLD: 0 /100 WBCS — SIGNIFICANT CHANGE UP (ref 0–0)
PHOSPHATE SERPL-MCNC: 2.2 MG/DL — LOW (ref 2.5–4.5)
PLATELET # BLD AUTO: 446 K/UL — HIGH (ref 150–400)
POTASSIUM SERPL-MCNC: 4.4 MMOL/L — SIGNIFICANT CHANGE UP (ref 3.5–5.3)
POTASSIUM SERPL-SCNC: 4.4 MMOL/L — SIGNIFICANT CHANGE UP (ref 3.5–5.3)
RBC # BLD: 3.82 M/UL — SIGNIFICANT CHANGE UP (ref 3.8–5.2)
RBC # FLD: 17.3 % — HIGH (ref 10.3–14.5)
SODIUM SERPL-SCNC: 137 MMOL/L — SIGNIFICANT CHANGE UP (ref 135–145)
WBC # BLD: 7.59 K/UL — SIGNIFICANT CHANGE UP (ref 3.8–10.5)
WBC # FLD AUTO: 7.59 K/UL — SIGNIFICANT CHANGE UP (ref 3.8–10.5)

## 2020-12-12 PROCEDURE — 99233 SBSQ HOSP IP/OBS HIGH 50: CPT | Mod: GC

## 2020-12-12 RX ORDER — RIVAROXABAN 15 MG-20MG
10 KIT ORAL DAILY
Refills: 0 | Status: DISCONTINUED | OUTPATIENT
Start: 2020-12-12 | End: 2020-12-15

## 2020-12-12 RX ORDER — RIVAROXABAN 15 MG-20MG
10 KIT ORAL DAILY
Refills: 0 | Status: DISCONTINUED | OUTPATIENT
Start: 2020-12-12 | End: 2020-12-12

## 2020-12-12 RX ADMIN — Medication 1 APPLICATION(S): at 21:12

## 2020-12-12 RX ADMIN — Medication 1 DROP(S): at 05:18

## 2020-12-12 RX ADMIN — Medication 1 DROP(S): at 05:19

## 2020-12-12 RX ADMIN — Medication 1 APPLICATION(S): at 09:25

## 2020-12-12 RX ADMIN — Medication 3 MILLILITER(S): at 23:11

## 2020-12-12 RX ADMIN — Medication 3 MILLILITER(S): at 17:51

## 2020-12-12 RX ADMIN — Medication 1 DROP(S): at 17:17

## 2020-12-12 RX ADMIN — Medication 1 APPLICATION(S): at 02:05

## 2020-12-12 RX ADMIN — SERTRALINE 50 MILLIGRAM(S): 25 TABLET, FILM COATED ORAL at 11:43

## 2020-12-12 RX ADMIN — Medication 1 APPLICATION(S): at 20:28

## 2020-12-12 RX ADMIN — Medication 1 TABLET(S): at 11:43

## 2020-12-12 RX ADMIN — Medication 1 APPLICATION(S): at 05:20

## 2020-12-12 RX ADMIN — Medication 1.5 MILLIGRAM(S): at 08:10

## 2020-12-12 RX ADMIN — RILUZOLE 50 MILLIGRAM(S): 50 TABLET ORAL at 05:22

## 2020-12-12 RX ADMIN — Medication 1 DROP(S): at 11:40

## 2020-12-12 RX ADMIN — Medication 2.5 MILLIGRAM(S): at 21:11

## 2020-12-12 RX ADMIN — Medication 1 APPLICATION(S): at 08:07

## 2020-12-12 RX ADMIN — Medication 1 APPLICATION(S): at 17:01

## 2020-12-12 RX ADMIN — Medication 1 APPLICATION(S): at 17:17

## 2020-12-12 RX ADMIN — Medication 1 APPLICATION(S): at 13:22

## 2020-12-12 RX ADMIN — RIVAROXABAN 10 MILLIGRAM(S): KIT at 21:11

## 2020-12-12 RX ADMIN — CHLORHEXIDINE GLUCONATE 15 MILLILITER(S): 213 SOLUTION TOPICAL at 17:16

## 2020-12-12 RX ADMIN — Medication 1 DROP(S): at 17:16

## 2020-12-12 RX ADMIN — Medication 3 MILLILITER(S): at 05:30

## 2020-12-12 RX ADMIN — HEPARIN SODIUM 5000 UNIT(S): 5000 INJECTION INTRAVENOUS; SUBCUTANEOUS at 05:18

## 2020-12-12 RX ADMIN — Medication 1 APPLICATION(S): at 11:41

## 2020-12-12 RX ADMIN — POLYETHYLENE GLYCOL 3350 17 GRAM(S): 17 POWDER, FOR SOLUTION ORAL at 17:17

## 2020-12-12 RX ADMIN — CHLORHEXIDINE GLUCONATE 15 MILLILITER(S): 213 SOLUTION TOPICAL at 05:17

## 2020-12-12 RX ADMIN — SENNA PLUS 1 TABLET(S): 8.6 TABLET ORAL at 11:43

## 2020-12-12 RX ADMIN — Medication 1 APPLICATION(S): at 05:19

## 2020-12-12 RX ADMIN — RILUZOLE 50 MILLIGRAM(S): 50 TABLET ORAL at 17:18

## 2020-12-12 RX ADMIN — Medication 3 MILLILITER(S): at 11:51

## 2020-12-12 NOTE — DISCHARGE NOTE PROVIDER - CARE PROVIDERS DIRECT ADDRESSES
,[email or web address]

## 2020-12-12 NOTE — DISCHARGE NOTE PROVIDER - NSDCCPCAREPLAN_GEN_ALL_CORE_FT
PRINCIPAL DISCHARGE DIAGNOSIS  Diagnosis: Pneumonia  Assessment and Plan of Treatment:       SECONDARY DISCHARGE DIAGNOSES  Diagnosis: Ventilator dependent  Assessment and Plan of Treatment: Since 2015    Diagnosis: ALS (amyotrophic lateral sclerosis)  Assessment and Plan of Treatment:     Diagnosis: Sepsis  Assessment and Plan of Treatment:      PRINCIPAL DISCHARGE DIAGNOSIS  Diagnosis: Septic shock  Assessment and Plan of Treatment: Admitted with Septic shock with acute organ dysfunction due to anaerobic bacteria.   - Blood cx 11/22: Morganella Morganii, Repeat Bld CX 11/25: No growth   - CT Chest / ABD/ Pelvis 11/30: Left Lower Lobe Collapse, 2mm Nonobstructive Left ureteral stone, left renal calculi and mild bilateral hydronephrosis   - Urine Cx 12/1: No growth   -Sputum Cx 11/30: Stenotrophomonas ( Possible Colonization )   - S/p Ureteroscopy, Laser Lithotripsy and stent placement on 12/10  -Completed Meropenem on 12/11  -Stent will be removed next week prior to discharge.  -Infectious disease and  consults appreciated  - Patient will require outpatient follow up with Dr. Guerrero at the [facility] for [facility]rology at office ([phone]) to confirm/schedule appointment.         SECONDARY DISCHARGE DIAGNOSES  Diagnosis: Oropharyngeal dysphagia  Assessment and Plan of Treatment: Tolerating Jevity 1.5 TF at goal.    Diagnosis: Ventilator dependent  Assessment and Plan of Treatment: Patient with Tracheostomy at baseline ( 2015)   Continue Mechanical Ventilation  Patient does not tolerate weaning due to advanced ALS  Continue Chest PT / Suctioning PRN / Metaneb.    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      Diagnosis: ALS (amyotrophic lateral sclerosis)  Assessment and Plan of Treatment: Continue Rilutek 50mg bid  Continue Valium 1.5 am and 2.5 pm   Continue Zoloft 50mg daily.    Diagnosis: Arterial stenosis  Assessment and Plan of Treatment: LLE Doppler 11/25: Flow Limiting stenosis of Distal and Left Superficial femoral and Popliteal artery   Xarelto resumed after   Procedure for DVT prophylaxis     PRINCIPAL DISCHARGE DIAGNOSIS  Diagnosis: Septic shock  Assessment and Plan of Treatment: Admitted with Septic shock with acute organ dysfunction due to Morganella bacteremia 2/2 bacterial PNA + UTI   - Blood cx 11/22: Morganella Morganii, Repeat Bld CX 11/25: No growth   - CT Chest / ABD/ Pelvis 11/30: Left Lower Lobe Collapse, 2mm Nonobstructive Left ureteral stone, left renal calculi and mild bilateral hydronephrosis   - Urine Cx 12/1: No growth   -Sputum Cx 11/30: Stenotrophomonas ( Possible Colonization )   - S/p Ureteroscopy, Laser Lithotripsy and stent placement on 12/10  -Completed Meropenem on 12/11  -Stent will be removed next week prior to discharge.  -Infectious disease and  consults appreciated  - Patient will require outpatient follow up with Dr. Guerrero at the [facility] for [facility]rology at office ([phone]) to confirm/schedule appointment.         SECONDARY DISCHARGE DIAGNOSES  Diagnosis: Oropharyngeal dysphagia  Assessment and Plan of Treatment: Tolerating Jevity 1.5 TF at goal.    Diagnosis: Ventilator dependent  Assessment and Plan of Treatment: Patient with Tracheostomy at baseline ( 2015)   Continue Mechanical Ventilation  Patient does not tolerate weaning due to advanced ALS  Continue Chest PT / Suctioning PRN / Metaneb.    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      Diagnosis: ALS (amyotrophic lateral sclerosis)  Assessment and Plan of Treatment: Continue Rilutek 50mg bid  Continue Valium 1.5 am and 2.5 pm   Continue Zoloft 50mg daily.    Diagnosis: Arterial stenosis  Assessment and Plan of Treatment: LLE Doppler 11/25: Flow Limiting stenosis of Distal and Left Superficial femoral and Popliteal artery   Xarelto resumed after   Procedure for DVT prophylaxis     PRINCIPAL DISCHARGE DIAGNOSIS  Diagnosis: Septic shock  Assessment and Plan of Treatment: Patient admitted with Sepsis 2/2 Bacteremia   Blood cx 11/22: Morganella Morganii, Repeat Bld CX 11/25: No growth   CT Chest / ABD/ Pelvis 11/30: Left Lower Lobe Collapse, 2mm Nonobstructive Left ureteral stone, left renal calculi and mild bilateral hydronephrosis   Urine Cx 12/1: No growth   Sputum Cx 11/30: Stenotrophomonas ( Likely Colonized / Not treated)   S/p Ureteroscopy, Laser Lithotripsy and stent placement on 12/10    Patient Completed full course of Meropenem on 12/11    stent to be removed by Home PCP ( ) on 12/17   Patient will require outpatient follow up with Dr. Guerrero at the [facility] for [facility] at office ([phone]) to confirm/schedule appointment.         SECONDARY DISCHARGE DIAGNOSES  Diagnosis: Ventilator dependent  Assessment and Plan of Treatment: Patient with Tracheostomy at baseline ( 2015)   Continue Mechanical Ventilation  Patient does not tolerate weaning due to advanced ALS  Continue Chest PT q 6 hrs with Bed  / Suctioning PRN    Diagnosis: ALS (amyotrophic lateral sclerosis)  Assessment and Plan of Treatment: Continue Rilutek 50mg bid  Continue Valium 1.5 am and 2.5 pm   Continue Zoloft 50mg daily    Diagnosis: Oropharyngeal dysphagia  Assessment and Plan of Treatment: Tolerating Jevity 1.5 TF at goal rate    Diagnosis: Arterial stenosis  Assessment and Plan of Treatment: Flow Limiting stenosis of Distal and Left Superficial femoral and Popliteal artery  Patient was on Xarelto from home for DVT PPX  Continue Xarelto 10 mg Daily      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        PRINCIPAL DISCHARGE DIAGNOSIS  Diagnosis: Septic shock  Assessment and Plan of Treatment: Patient admitted with Sepsis 2/2 Bacteremia   Blood cx 11/22: Morganella Morganii, Repeat Bld CX 11/25: No growth   CT Chest / ABD/ Pelvis 11/30: Left Lower Lobe Collapse, 2mm Nonobstructive Left ureteral stone, left renal calculi and mild bilateral hydronephrosis   Urine Cx 12/1: No growth   Sputum Cx 11/30: Stenotrophomonas ( Likely Colonized / Not treated)   S/p Ureteroscopy, Laser Lithotripsy and stent placement on 12/10    Patient Completed full course of Meropenem on 12/11   Patient currently remains with  stent strings secured with tegaderm to lower abdomen currently    stent to be removed by Home PCP ( ) on 12/17   Patient will require outpatient follow up with Dr. Guerrero at the [facility] for [facility] at office ([phone]) to confirm/schedule appointment.         SECONDARY DISCHARGE DIAGNOSES  Diagnosis: Ventilator dependent  Assessment and Plan of Treatment: Patient with Tracheostomy at baseline ( 2015)   Continue Mechanical Ventilation  Patient does not tolerate weaning due to advanced ALS  Continue Chest PT q 6 hrs with Bed  / Suctioning PRN    Diagnosis: ALS (amyotrophic lateral sclerosis)  Assessment and Plan of Treatment: Continue Rilutek 50mg bid  Continue Valium 1.5 am and 2.5 pm   Continue Zoloft 50mg daily    Diagnosis: Oropharyngeal dysphagia  Assessment and Plan of Treatment: Tolerating Jevity 1.5 TF at goal rate    Diagnosis: Arterial stenosis  Assessment and Plan of Treatment: Flow Limiting stenosis of Distal and Left Superficial femoral and Popliteal artery  Patient was on Xarelto from home for DVT PPX  Continue Xarelto 10 mg Daily      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

## 2020-12-12 NOTE — DISCHARGE NOTE PROVIDER - HOSPITAL COURSE
65 F with advanced ALS, chronic hypoxic/ hypercapnic resp failure, vent dependent, s/p trach 2015 and PEG, now presenting with septic shock and Morganella bacteremia 2/2 bacterial PNA + UTI.  Patient was BIBEMS from home for fevers. Pt's 24hr home health nurse reported over the past few days the pt has appeared more lethargic, has been tachycardic, and running fevers with decreased Urination. She was empirically started on meropenum and vancomycin in ED.  Was managed for Septic shock s/p fluid resuscitation, IV pressors, and Transferred to MICU. Patients abx were switched to Cefepime after Bcx 11/22 revealed Morganella morganii.  Surveillance Bcxs from 11/25 remain NGTD.  LLE doppler study revealed stenosis of femoral and popliteal arteries causing cool, pale LLE without cyanosis. Patient was weaned off vasopressors and placed on Midodrine. Patient was Transferred to RCU on 11/28. Trach changed to #7 distal xlt lise by ENT due to air leak on 11/28. Patient had CT abd pelvis performed which revealed 2 mm non obstructive left ureteral stone and multiple renal calculi with mild bilateral hydronephrosis.  S/p Ureteroscopy, Laser Lithotripsy and stent placement on 12/10. Patient is pending removal of Stent planned for this week prior to discharge. Patient remains Ventilator dependent and does not tolerate weaning due to advanced ALS. She will be discharged back to home with 24 /7 care.  Awaiting Home Care Services to be reinstated and Supplies delivery pending coordination with Social work team. Patient advised to follow up with Dr. Guerrero at the [place] for Urology at office ([phone]) to confirm/schedule appointment.    65 F with advanced ALS, chronic hypoxic/ hypercapnic resp failure, vent dependent, s/p trach 2015 and PEG, now presenting with septic shock and Morganella bacteremia 2/2 bacterial PNA + UTI.  Patient was BIBEMS from home for fevers. Pt's 24hr home health nurse reported over the past few days the pt has appeared more lethargic, has been tachycardic, and running fevers with decreased Urination. She was empirically started on meropenum and vancomycin in ED.  Was managed for Septic shock s/p fluid resuscitation, IV pressors, and Transferred to MICU. Patients abx were switched to Cefepime after Bcx 11/22 revealed Morganella morganii.  Surveillance Bcxs from 11/25 remain NGTD.  LLE doppler study revealed stenosis of femoral and popliteal arteries causing cool, pale LLE without cyanosis. Patient was weaned off vasopressors and placed on Midodrine. Patient was Transferred to RCU on 11/28. Trach changed to #7 distal xlt lise by ENT due to air leak on 11/28. Patient had CT abd pelvis performed which revealed 2 mm non obstructive left ureteral stone and multiple renal calculi with mild bilateral hydronephrosis.  S/p Ureteroscopy, Laser Lithotripsy and stent placement on 12/10. Completed Meropenum 12/11. Patient is pending removal of Stent planned for this week prior to discharge. Patient remains Ventilator dependent and does not tolerate weaning due to advanced ALS. She will be discharged back to home with 24 /7 care. She is awaiting Home Care Services to be reinstated and Supplies delivery pending coordination with Social work team. Patient advised to follow up with Dr. Guerrero at the [place] for Urology at office ([phone]) to confirm/schedule appointment.      Patient 65 year old Female with Advanced ALS, Chronic Hypoxic/ Hypercapnic Respiratory failure, Vent dependent, S/p Tracheostomy and PEG in 2015, who presented with septic shock due to Morganella bacteremia 2/2 UTI. Patient was BIBEMS from home for fevers. Pt's 24hr home health nurse reported over the prior days to admission pt appeared more lethargic, had been tachycardic, and febrile with decreased Urine output. Patient was admitted to the MICU in setting of sepsis and was empirically started on meropenem and vancomycin in the ED. Patient was managed for Septic shock with fluid resuscitation, IV pressors, and abx. Patients abx were switched to Meropenem after Bcx 11/22 revealed Morganella morganii.  Repeat Surveillance Bcxs from 11/25 remain NGTD.  During admission patient noted to have decreased pulse of LLE doppler study revealed stenosis of femoral and popliteal arteries causing cool, pale LLE without cyanosis ( No intervention performed). Patient previously on Xarelto from home and was continued during admission. Patient was weaned off vasopressors and placed on Midodrine in the ICU. Patient Clinically stabilized and was Transferred to RCU on 11/28. Trach changed to #7 Distal XLT [place] by ENT due to air leak on 11/28. Patient had CT abd pelvis performed on 11/30 in setting of Fevers and Leukocytosis which revealed 2 mm non obstructive left ureteral stone and multiple renal calculi with mild bilateral hydronephrosis. Patient Underwent Ureteroscopy, Laser Lithotripsy and stent placement on 12/10 by    ( ). Patient completed full course of Meropenem on 12/11. Patient will be discharged home with 24/7 VNS Services. Patients Private PMD Scheduled to removed  Stent on 12/17. Patient advised to follow up with Dr. Guerrero at the [place] [facility] for Urology at office ([phone]) to confirm/schedule appointment.

## 2020-12-12 NOTE — DISCHARGE NOTE PROVIDER - CARE PROVIDER_API CALL
Jani Guerrero  UROLOGY  [address] [place] [address]  Phone: [phone]  Fax: [phone]  Follow Up Time:    Jani Guerrero  UROLOGY  [address] [facility], [address]  [place] [address]  Phone: [phone]  Fax: [phone]  Follow Up Time:     Pranav Newman  CRITICAL CARE MEDICINE  [address] [facility], [address]  [place] [address]  Phone: [phone]  Fax: [phone]  Follow Up Time:

## 2020-12-12 NOTE — DISCHARGE NOTE PROVIDER - PROVIDER TOKENS
PROVIDER:[TOKEN:[[id number]]] PROVIDER:[TOKEN:[[id number]]],PROVIDER:[TOKEN:[[id number]]]

## 2020-12-12 NOTE — DISCHARGE NOTE PROVIDER - NSDCMRMEDTOKEN_GEN_ALL_CORE_FT
#8 Cuffed Shiley Tracheostomy :   #8 Shiley Tracheostomy Inner Cannula: 7.6 mm Inner Diameter  12.2 mm Outer Diameter  81 mm Length  diazepam 2 mg oral tablet: 1 tab(s) by gastrostomy tube 3 times a day  Eyemycin 0.5% ophthalmic ointment: 1 application to each affected eye 2 times a day, continue for 1 week   ipratropium 500 mcg/2.5 mL inhalation solution: 2.5 milliliter(s) inhaled every 6 hours  levalbuterol 0.63 mg/3 mL inhalation solution: 3 milliliter(s) inhaled every 6 hours  nystatin 100,000 units/g topical powder: 1 application topically 2 times a day to groin MDD:2 michael. Continue till resolution  ocular lubricant ophthalmic solution: 1 drop(s) to each affected eye 3 times a day  polyethylene glycol 3350 oral powder for reconstitution: 17 gram(s) orally 2 times a day  riluzole 50 mg oral tablet: 1 tab(s) by gastrostomy tube 2 times a day (before meals)  senna 8.8 mg/5 mL oral syrup: 2.5 milliliter(s) orally once a day  sertraline 50 mg oral tablet: 1 tab(s) by gastrostomy tube once a day  Xarelto 10 mg oral tablet: 1 tab(s) orally once a day   #8 Cuffed Shiley Tracheostomy :   #8 Shiley Tracheostomy Inner Cannula: 7.6 mm Inner Diameter  12.2 mm Outer Diameter  81 mm Length  Chronic Respiratory Failure : Chronic Respiratory Failure 2/2 ALS   ICD10 Code: J96.10 / G12.21    # 7 Distal XLT Shiley Cuffed / Dispense: 1  30 Inner Cannulas     Refills: 5   diazepam 2 mg oral tablet: 1 tab(s) by gastrostomy tube 3 times a day  Eyemycin 0.5% ophthalmic ointment: 1 application to each affected eye 2 times a day, continue for 1 week   ipratropium 500 mcg/2.5 mL inhalation solution: 2.5 milliliter(s) inhaled every 6 hours  levalbuterol 0.63 mg/3 mL inhalation solution: 3 milliliter(s) inhaled every 6 hours  nystatin 100,000 units/g topical powder: 1 application topically 2 times a day to groin MDD:2 michael. Continue till resolution  ocular lubricant ophthalmic solution: 1 drop(s) to each affected eye 3 times a day  polyethylene glycol 3350 oral powder for reconstitution: 17 gram(s) orally 2 times a day  riluzole 50 mg oral tablet: 1 tab(s) by gastrostomy tube 2 times a day (before meals)  senna 8.8 mg/5 mL oral syrup: 2.5 milliliter(s) orally once a day  sertraline 50 mg oral tablet: 1 tab(s) by gastrostomy tube once a day  Xarelto 10 mg oral tablet: 1 tab(s) orally once a day   Chronic Respiratory Failure : Chronic Respiratory Failure 2/2 ALS   ICD10 Code: J96.10 / G12.21    # 7 Distal XLT Rowenalinda Cuffed / Dispense: 1  30 Inner Cannulas     Refills: 5   diazePAM 5 mg/5 mL oral solution: 1.5 milliliter(s) by gastrostomy tube once a day at 8 am   diazePAM 5 mg/5 mL oral solution: 2.5 milliliter(s) by gastrostomy tube once a day at 9 pm   Eyemycin 0.5% ophthalmic ointment: 1 application to both eyes 6 times a day  To be given at (02:00, 06:00, 10:00, 14:00, 18:00 and 22:00)   ipratropium 500 mcg/2.5 mL inhalation solution: 2.5 milliliter(s) inhaled every 6 hours  Lacri-Lube S.O.P. ophthalmic ointment: 1 application to each affected eye 6 times a day  To be given at ( 00:00, 04:00, 08:00,12:00,16:00, 20:00)    levalbuterol 0.63 mg/3 mL inhalation solution: 3 milliliter(s) inhaled every 6 hours  Multiple Vitamins oral tablet: 1 tab(s) by gastrostomy tube once a day  nystatin 100,000 units/g topical powder: 1 application topically 2 times a day to groin MDD:2 michael. Continue till resolution  ocular lubricant ophthalmic solution: 1 drop(s) to each affected eye every 6 hours  ofloxacin 0.3% ophthalmic solution: 1 drop(s) to each both eyes 4 times a day  polyethylene glycol 3350 oral powder for reconstitution: 17 gram(s) by gastrostomy tube 2 times a day  riluzole 50 mg oral tablet: 1 tab(s) by gastrostomy tube 2 times a day (before meals)  senna 8.8 mg/5 mL oral syrup: 2.5 milliliter(s) by gastrostomy tube once a day  sertraline 50 mg oral tablet: 1 tab(s) by gastrostomy tube once a day  Xarelto 10 mg oral tablet: 1 tab(s) by gastrostomy tube once a day

## 2020-12-12 NOTE — DISCHARGE NOTE PROVIDER - NSDCHHATTENDCERT_GEN_ALL_CORE
97.4 My signature below certifies that the above stated patient is homebound and upon completion of the Face-To-Face encounter, has the need for intermittent skilled nursing, physical therapy and/or speech or occupational therapy services in their home for their current diagnosis as outlined in their initial plan of care. These services will continue to be monitored by myself or another physician.

## 2020-12-12 NOTE — DISCHARGE NOTE PROVIDER - NSFOLLOWUPCLINICS_GEN_ALL_ED_FT
[facility]  Ophthalmology  [address] [place] [address] [address]  [place] [address]  Phone: [phone]  Fax:   Follow Up Time:

## 2020-12-12 NOTE — DISCHARGE NOTE PROVIDER - NSRESEARCHGRANT_PROPHYLAXISRECOMFT_GEN_A_CORE
Rivaroxaban 10 mg oral tablet: 1 tab orally once a day for 30 days IMPROVE-DD Application Not Available

## 2020-12-12 NOTE — PROGRESS NOTE ADULT - SUBJECTIVE AND OBJECTIVE BOX
Patient is a 65y old  Female who presents with a chief complaint of sepsis (11 Dec 2020 14:45)      Interval Events:    REVIEW OF SYSTEMS:  [ ] Positive  [ ] All other systems negative  [ ] Unable to assess ROS because ________    Vital Signs Last 24 Hrs  T(C): 36.9 (12-12-20 @ 04:05), Max: 37 (12-11-20 @ 14:24)  T(F): 98.4 (12-12-20 @ 04:05), Max: 98.6 (12-11-20 @ 14:24)  HR: 96 (12-12-20 @ 05:31) (85 - 110)  BP: 102/62 (12-12-20 @ 04:05) (102/62 - 114/71)  RR: 14 (12-12-20 @ 04:05) (14 - 20)  SpO2: 94% (12-12-20 @ 05:31) (92% - 98%)PHYSICAL EXAM:  HEENT:   [ ]Tracheostomy:  [ ]Pupils equal  [ ]No oral lesions  [ ]Abnormal        SKIN  [ ]No Rash  [ ] Abnormal  [ ] pressure    CARDIAC  [ ]Regular  [ ]Abnormal    PULMONARY  [ ]Bilateral Clear Breath Sounds  [ ]Normal Excursion  [ ]Abnormal    GI  [ ]PEG      [ ] +BS		              [ ]Soft, nondistended, nontender	  [ ]Abnormal    MUSCULOSKELETAL                                   [ ]Bedbound                 [ ]Abnormal    [ ]Ambulatory/OOB to chair                           EXTREMITIES                                         [ ]Normal  [ ]Edema                           NEUROLOGIC  [ ] Normal, non focal  [ ] Focal findings:    PSYCHIATRIC  [ ]Alert and appropriate  [ ] Sedated	 [ ]Agitated    :  Newman: [ ] Yes, if yes: Date of Placement:                   [  ] No    LINES: Central Lines [ ] Yes, if yes: Date of Placement                                     [  ] No    HOSPITAL MEDICATIONS:  MEDICATIONS  (STANDING):  albuterol/ipratropium for Nebulization. 3 milliLiter(s) Nebulizer every 6 hours  artificial  tears Solution 1 Drop(s) Both EYES every 6 hours  chlorhexidine 0.12% Liquid 15 milliLiter(s) Oral Mucosa every 12 hours  diazepam   Solution 1.5 milliGRAM(s) Oral <User Schedule>  diazepam   Solution 2.5 milliGRAM(s) Oral <User Schedule>  erythromycin   Ointment 1 Application(s) Both EYES <User Schedule>  heparin   Injectable 5000 Unit(s) SubCutaneous every 12 hours  influenza   Vaccine 0.5 milliLiter(s) IntraMuscular once  multivitamin 1 Tablet(s) Oral daily  ofloxacin 0.3% Solution 1 Drop(s) Both EYES four times a day  petrolatum Ophthalmic Ointment 1 Application(s) Both EYES <User Schedule>  polyethylene glycol 3350 17 Gram(s) Oral two times a day  riluzole 50 milliGRAM(s) Oral two times a day  senna 1 Tablet(s) Oral daily  sertraline 50 milliGRAM(s) Oral daily    MEDICATIONS  (PRN):      LABS:                        10.8   7.24  )-----------( 428      ( 11 Dec 2020 06:51 )             35.4     12-12    137  |  103  |  11  ----------------------------<  101<H>  4.4   |  23  |  <0.30<L>    Ca    9.5      12 Dec 2020 06:36  Phos  2.2     12-12  Mg     2.2     12-12              CAPILLARY BLOOD GLUCOSE    MICROBIOLOGY:     RADIOLOGY:  [ ] Reviewed and interpreted by me    Mode: AC/ CMV (Assist Control/ Continuous Mandatory Ventilation)  RR (machine): 14  TV (machine): 450  FiO2: 21  PEEP: 5  ITime: 1  MAP: 10  PIP: 25   Patient is a 65y old  Female who presents with a chief complaint of sepsis (11 Dec 2020 14:45)      Interval Events: No overnight events     REVIEW OF SYSTEMS:  [ ] Positive  [ ] All other systems negative  [ ] Unable to assess ROS because __Nonverbal     Vital Signs Last 24 Hrs  T(C): 36.9 (12-12-20 @ 04:05), Max: 37 (12-11-20 @ 14:24)  T(F): 98.4 (12-12-20 @ 04:05), Max: 98.6 (12-11-20 @ 14:24)  HR: 96 (12-12-20 @ 05:31) (85 - 110)  BP: 102/62 (12-12-20 @ 04:05) (102/62 - 114/71)  RR: 14 (12-12-20 @ 04:05) (14 - 20)  SpO2: 94% (12-12-20 @ 05:31) (92% - 98%)    PHYSICAL EXAM:    HEENT:   [x]Tracheostomy: # 7 distal XLT Shiley, cuffed    [x]Pupils equal  [ ]No oral lesions  [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      SKIN  [x]No Rash  [ ] Abnormal  [ ] pressure    CARDIAC  [x]Regular  [ ]Abnormal:     PULMONARY  [ ]Bilateral Clear Breath Sounds  [ ]Normal Excursion  [x]Abnormal: Mild Bilateral Coarse Breath sounds    GI  [x]PEG      [x] +BS		              [x]Soft, nondistended, nontender	  [ ]Abnormal    MUSCULOSKELETAL                                   [x]Bedbound                 [ ]Abnormal    [ ]Ambulatory/OOB to chair                           EXTREMITIES                                         [x] Decrease Dorsalis pedal pulse on left lower extremity, Warm to touch   [ ]Edema                           NEUROLOGIC  [ ] Normal, non focal  [x] Focal findings: Awake, Nonvervbal, not following commands;  Functional Quadriplegic 2/2 ALS     PSYCHIATRIC  [x ] Unable to assess, Nonverbal  [ ] Sedated	 [ ]Agitated    :  Newman: [ ] Yes, if yes: Date of Placement:                   [x] No    LINES: Central Lines [ ] Yes, if yes: Date of Placement                                     [x] No      HOSPITAL MEDICATIONS:  MEDICATIONS  (STANDING):  albuterol/ipratropium for Nebulization. 3 milliLiter(s) Nebulizer every 6 hours  artificial  tears Solution 1 Drop(s) Both EYES every 6 hours  chlorhexidine 0.12% Liquid 15 milliLiter(s) Oral Mucosa every 12 hours  diazepam   Solution 1.5 milliGRAM(s) Oral <User Schedule>  diazepam   Solution 2.5 milliGRAM(s) Oral <User Schedule>  erythromycin   Ointment 1 Application(s) Both EYES <User Schedule>  heparin   Injectable 5000 Unit(s) SubCutaneous every 12 hours  influenza   Vaccine 0.5 milliLiter(s) IntraMuscular once  multivitamin 1 Tablet(s) Oral daily  ofloxacin 0.3% Solution 1 Drop(s) Both EYES four times a day  petrolatum Ophthalmic Ointment 1 Application(s) Both EYES <User Schedule>  polyethylene glycol 3350 17 Gram(s) Oral two times a day  riluzole 50 milliGRAM(s) Oral two times a day  senna 1 Tablet(s) Oral daily  sertraline 50 milliGRAM(s) Oral daily    MEDICATIONS  (PRN):      LABS:                        10.8   7.24  )-----------( 428      ( 11 Dec 2020 06:51 )             35.4     12-12    137  |  103  |  11  ----------------------------<  101<H>  4.4   |  23  |  <0.30<L>    Ca    9.5      12 Dec 2020 06:36  Phos  2.2     12-12  Mg     2.2     12-12              CAPILLARY BLOOD GLUCOSE    MICROBIOLOGY:     RADIOLOGY:  [ ] Reviewed and interpreted by me    Mode: AC/ CMV (Assist Control/ Continuous Mandatory Ventilation)  RR (machine): 14  TV (machine): 450  FiO2: 21  PEEP: 5  ITime: 1  MAP: 10  PIP: 25

## 2020-12-12 NOTE — PROGRESS NOTE ADULT - ASSESSMENT
65 F with advanced ALS, chronic hypoxic/ hypercapnic resp failure, vent dependent, s/p trach 2015 and PEG, now presenting with septic shock and Morganella bacteremia 2/2 bacterial PNA + UTI.  Patient was BIBEMS from home for fevers. Pt's 24hr home health nurse reported over the past few days the pt has appeared more lethargic, has been tachycardic, and running fevers with decreased Urination. She was empirically started on meropenum and vancomycin in ED.  Was managed for Septic shock s/p fluid resuscitation, IV pressors, and Transferred to MICU. Patients abx were switched to Cefepime after Bcx 11/22 revealed Morganella morganii.  Surveillance Bcxs from 11/25 remain NGTD.  LLE doppler study revealed stenosis of femoral and popliteal arteries causing cool, pale LLE without cyanosis. Patient was weaned off vasopressors and placed on Midodrine. Patient was Transferred to RCU on 11/28. Trach changed to #7 distal xlt lise by ENT due to air leak on 11/28. Patient had CT abd pelvis performed which revealed 2 mm non obstructive left ureteral stone and multiple renal calculi with mild bilateral hydronephrosis.     12/10: Patient scheduled for Ureteroscopy, Laser Lithotripsy and stent placement this morning. Meropenem extended yesterday in setting of  Procedure today. Patients IVF were continued for decreased urine output yesterday, Bun /creat remain stable on morning BMP. Patient did not require straight catheterization overnight.   12/11: Last Day of antibiotics today, Decreased UOP, RN to place primifit to monitor I/O's. Remains Afebrile,  WBC and Vitals Stable.  following and planned for stent removal next week.   12/12:    65 F with advanced ALS, chronic hypoxic/ hypercapnic resp failure, vent dependent, s/p trach 2015 and PEG, now presenting with septic shock and Morganella bacteremia 2/2 bacterial PNA + UTI.  Patient was BIBEMS from home for fevers. Pt's 24hr home health nurse reported over the past few days the pt has appeared more lethargic, has been tachycardic, and running fevers with decreased Urination. She was empirically started on meropenum and vancomycin in ED.  Was managed for Septic shock s/p fluid resuscitation, IV pressors, and Transferred to MICU. Patients abx were switched to Cefepime after Bcx 11/22 revealed Morganella morganii.  Surveillance Bcxs from 11/25 remain NGTD.  LLE doppler study revealed stenosis of femoral and popliteal arteries causing cool, pale LLE without cyanosis. Patient was weaned off vasopressors and placed on Midodrine. Patient was Transferred to RCU on 11/28. Trach changed to #7 distal xlt lise by ENT due to air leak on 11/28. Patient had CT abd pelvis performed which revealed 2 mm non obstructive left ureteral stone and multiple renal calculi with mild bilateral hydronephrosis.     12/10: Patient scheduled for Ureteroscopy, Laser Lithotripsy and stent placement this morning. Meropenem extended yesterday in setting of  Procedure today. Patients IVF were continued for decreased urine output yesterday, Bun /creat remain stable on morning BMP. Patient did not require straight catheterization overnight.   12/11: Last Day of antibiotics today, Decreased UOP, RN to place primifit to monitor I/O's. Remains Afebrile,  WBC and Vitals Stable.  following and planned for stent removal next week.   12/12: CR Stable; Remains Afebrile: discuss with  if cleared to restart Xarelto

## 2020-12-13 LAB
ANION GAP SERPL CALC-SCNC: 11 MMOL/L — SIGNIFICANT CHANGE UP (ref 5–17)
BUN SERPL-MCNC: 16 MG/DL — SIGNIFICANT CHANGE UP (ref 7–23)
CALCIUM SERPL-MCNC: 9.5 MG/DL — SIGNIFICANT CHANGE UP (ref 8.4–10.5)
CHLORIDE SERPL-SCNC: 103 MMOL/L — SIGNIFICANT CHANGE UP (ref 96–108)
CO2 SERPL-SCNC: 23 MMOL/L — SIGNIFICANT CHANGE UP (ref 22–31)
CREAT SERPL-MCNC: <0.3 MG/DL — LOW (ref 0.5–1.3)
GLUCOSE SERPL-MCNC: 93 MG/DL — SIGNIFICANT CHANGE UP (ref 70–99)
HCT VFR BLD CALC: 38 % — SIGNIFICANT CHANGE UP (ref 34.5–45)
HGB BLD-MCNC: 11.6 G/DL — SIGNIFICANT CHANGE UP (ref 11.5–15.5)
MAGNESIUM SERPL-MCNC: 2.2 MG/DL — SIGNIFICANT CHANGE UP (ref 1.6–2.6)
MCHC RBC-ENTMCNC: 28.6 PG — SIGNIFICANT CHANGE UP (ref 27–34)
MCHC RBC-ENTMCNC: 30.5 GM/DL — LOW (ref 32–36)
MCV RBC AUTO: 93.6 FL — SIGNIFICANT CHANGE UP (ref 80–100)
NRBC # BLD: 0 /100 WBCS — SIGNIFICANT CHANGE UP (ref 0–0)
PHOSPHATE SERPL-MCNC: 3.1 MG/DL — SIGNIFICANT CHANGE UP (ref 2.5–4.5)
PLATELET # BLD AUTO: 283 K/UL — SIGNIFICANT CHANGE UP (ref 150–400)
POTASSIUM SERPL-MCNC: 4.9 MMOL/L — SIGNIFICANT CHANGE UP (ref 3.5–5.3)
POTASSIUM SERPL-SCNC: 4.9 MMOL/L — SIGNIFICANT CHANGE UP (ref 3.5–5.3)
RBC # BLD: 4.06 M/UL — SIGNIFICANT CHANGE UP (ref 3.8–5.2)
RBC # FLD: 17.4 % — HIGH (ref 10.3–14.5)
SODIUM SERPL-SCNC: 137 MMOL/L — SIGNIFICANT CHANGE UP (ref 135–145)
WBC # BLD: 6.99 K/UL — SIGNIFICANT CHANGE UP (ref 3.8–10.5)
WBC # FLD AUTO: 6.99 K/UL — SIGNIFICANT CHANGE UP (ref 3.8–10.5)

## 2020-12-13 PROCEDURE — 99233 SBSQ HOSP IP/OBS HIGH 50: CPT | Mod: GC

## 2020-12-13 RX ADMIN — Medication 2.5 MILLIGRAM(S): at 20:31

## 2020-12-13 RX ADMIN — Medication 1 DROP(S): at 11:48

## 2020-12-13 RX ADMIN — RIVAROXABAN 10 MILLIGRAM(S): KIT at 21:37

## 2020-12-13 RX ADMIN — Medication 1 APPLICATION(S): at 00:16

## 2020-12-13 RX ADMIN — Medication 1 DROP(S): at 23:43

## 2020-12-13 RX ADMIN — Medication 1 TABLET(S): at 11:48

## 2020-12-13 RX ADMIN — SENNA PLUS 1 TABLET(S): 8.6 TABLET ORAL at 11:50

## 2020-12-13 RX ADMIN — Medication 1 APPLICATION(S): at 13:16

## 2020-12-13 RX ADMIN — Medication 1 DROP(S): at 17:20

## 2020-12-13 RX ADMIN — Medication 1 DROP(S): at 17:22

## 2020-12-13 RX ADMIN — Medication 1 APPLICATION(S): at 19:50

## 2020-12-13 RX ADMIN — Medication 1 APPLICATION(S): at 10:50

## 2020-12-13 RX ADMIN — Medication 1 APPLICATION(S): at 04:21

## 2020-12-13 RX ADMIN — Medication 1 APPLICATION(S): at 05:18

## 2020-12-13 RX ADMIN — Medication 1 APPLICATION(S): at 17:21

## 2020-12-13 RX ADMIN — Medication 1 DROP(S): at 00:15

## 2020-12-13 RX ADMIN — CHLORHEXIDINE GLUCONATE 15 MILLILITER(S): 213 SOLUTION TOPICAL at 17:22

## 2020-12-13 RX ADMIN — Medication 1 APPLICATION(S): at 21:37

## 2020-12-13 RX ADMIN — Medication 1.5 MILLIGRAM(S): at 08:29

## 2020-12-13 RX ADMIN — RILUZOLE 50 MILLIGRAM(S): 50 TABLET ORAL at 17:22

## 2020-12-13 RX ADMIN — Medication 3 MILLILITER(S): at 12:15

## 2020-12-13 RX ADMIN — Medication 1 APPLICATION(S): at 08:30

## 2020-12-13 RX ADMIN — RILUZOLE 50 MILLIGRAM(S): 50 TABLET ORAL at 05:18

## 2020-12-13 RX ADMIN — Medication 1 DROP(S): at 05:18

## 2020-12-13 RX ADMIN — CHLORHEXIDINE GLUCONATE 15 MILLILITER(S): 213 SOLUTION TOPICAL at 05:18

## 2020-12-13 RX ADMIN — Medication 3 MILLILITER(S): at 23:06

## 2020-12-13 RX ADMIN — SERTRALINE 50 MILLIGRAM(S): 25 TABLET, FILM COATED ORAL at 11:50

## 2020-12-13 RX ADMIN — Medication 1 APPLICATION(S): at 11:49

## 2020-12-13 RX ADMIN — Medication 1 APPLICATION(S): at 02:23

## 2020-12-13 RX ADMIN — Medication 1 APPLICATION(S): at 15:25

## 2020-12-13 RX ADMIN — POLYETHYLENE GLYCOL 3350 17 GRAM(S): 17 POWDER, FOR SOLUTION ORAL at 17:22

## 2020-12-13 RX ADMIN — Medication 3 MILLILITER(S): at 17:09

## 2020-12-13 RX ADMIN — Medication 3 MILLILITER(S): at 05:16

## 2020-12-13 RX ADMIN — Medication 1 APPLICATION(S): at 23:44

## 2020-12-13 NOTE — PROGRESS NOTE ADULT - PROBLEM SELECTOR PLAN 7
Continue Protonix   As per  patient was on Xarelto for DVT prophylaxis  Will Hold Xarelto for  procedure   Will hold AM  Heparin sub q for  Procedure Continue Protonix   As per  patient was on Xarelto for DVT prophylaxis  Xarelto resumed after  procedure

## 2020-12-13 NOTE — PROGRESS NOTE ADULT - PROBLEM SELECTOR PLAN 5
LLE Doppler 11/25: Flow Limiting stenosis of Distal and Left Superficial femoral and Popliteal artery  Previously was on Xarelto as outpatient for DVT Prophylaxis   Will resume Xarelto post  Procedure LLE Doppler 11/25: Flow Limiting stenosis of Distal and Left Superficial femoral and Popliteal artery  Xarelto resumed 12/12 for DVT Prophylaxis   Will resume Xarelto post  Procedure

## 2020-12-13 NOTE — PROGRESS NOTE ADULT - SUBJECTIVE AND OBJECTIVE BOX
Patient is a 65y old  Female who presents with a chief complaint of sepsis (12 Dec 2020 10:15)      Interval Events:    REVIEW OF SYSTEMS:  [ ] Positive  [ ] All other systems negative  [ ] Unable to assess ROS because ________    Vital Signs Last 24 Hrs  T(C): 36.9 (12-13-20 @ 04:55), Max: 37 (12-12-20 @ 12:00)  T(F): 98.4 (12-13-20 @ 04:55), Max: 98.6 (12-12-20 @ 12:00)  HR: 95 (12-13-20 @ 05:36) (90 - 108)  BP: 120/75 (12-13-20 @ 04:55) (108/78 - 120/75)  RR: 14 (12-13-20 @ 04:55) (14 - 16)  SpO2: 97% (12-13-20 @ 05:36) (95% - 100%)PHYSICAL EXAM:  HEENT:   [ ]Tracheostomy:  [ ]Pupils equal  [ ]No oral lesions  [ ]Abnormal        SKIN  [ ]No Rash  [ ] Abnormal  [ ] pressure    CARDIAC  [ ]Regular  [ ]Abnormal    PULMONARY  [ ]Bilateral Clear Breath Sounds  [ ]Normal Excursion  [ ]Abnormal    GI  [ ]PEG      [ ] +BS		              [ ]Soft, nondistended, nontender	  [ ]Abnormal    MUSCULOSKELETAL                                   [ ]Bedbound                 [ ]Abnormal    [ ]Ambulatory/OOB to chair                           EXTREMITIES                                         [ ]Normal  [ ]Edema                           NEUROLOGIC  [ ] Normal, non focal  [ ] Focal findings:    PSYCHIATRIC  [ ]Alert and appropriate  [ ] Sedated	 [ ]Agitated    :  Newman: [ ] Yes, if yes: Date of Placement:                   [  ] No    LINES: Central Lines [ ] Yes, if yes: Date of Placement                                     [  ] No    HOSPITAL MEDICATIONS:  MEDICATIONS  (STANDING):  albuterol/ipratropium for Nebulization. 3 milliLiter(s) Nebulizer every 6 hours  artificial  tears Solution 1 Drop(s) Both EYES every 6 hours  chlorhexidine 0.12% Liquid 15 milliLiter(s) Oral Mucosa every 12 hours  diazepam   Solution 1.5 milliGRAM(s) Oral <User Schedule>  diazepam   Solution 2.5 milliGRAM(s) Oral <User Schedule>  erythromycin   Ointment 1 Application(s) Both EYES <User Schedule>  influenza   Vaccine 0.5 milliLiter(s) IntraMuscular once  multivitamin 1 Tablet(s) Oral daily  ofloxacin 0.3% Solution 1 Drop(s) Both EYES four times a day  petrolatum Ophthalmic Ointment 1 Application(s) Both EYES <User Schedule>  polyethylene glycol 3350 17 Gram(s) Oral two times a day  riluzole 50 milliGRAM(s) Oral two times a day  rivaroxaban 10 milliGRAM(s) Enteral Tube daily  senna 1 Tablet(s) Oral daily  sertraline 50 milliGRAM(s) Oral daily    MEDICATIONS  (PRN):      LABS:                        11.0   7.59  )-----------( 446      ( 12 Dec 2020 06:36 )             35.8     12-12    137  |  103  |  11  ----------------------------<  101<H>  4.4   |  23  |  <0.30<L>    Ca    9.5      12 Dec 2020 06:36  Phos  2.2     12-12  Mg     2.2     12-12              CAPILLARY BLOOD GLUCOSE    MICROBIOLOGY:     RADIOLOGY:  [ ] Reviewed and interpreted by me    Mode: AC/ CMV (Assist Control/ Continuous Mandatory Ventilation)  RR (machine): 14  TV (machine): 450  FiO2: 21  PEEP: 5  ITime: 1  MAP: 9  PIP: 23   Patient is a 65y old  Female who presents with a chief complaint of sepsis (12 Dec 2020 10:15)      Interval Events: Remains Afebrile with no overnight events    REVIEW OF SYSTEMS:  [ ] Positive  [ ] All other systems negative  [x ] Unable to assess ROS because _Nonverbal in setting of Advanced ALS _____    Vital Signs Last 24 Hrs  T(C): 36.9 (12-13-20 @ 04:55), Max: 37 (12-12-20 @ 12:00)  T(F): 98.4 (12-13-20 @ 04:55), Max: 98.6 (12-12-20 @ 12:00)  HR: 95 (12-13-20 @ 05:36) (90 - 108)  BP: 120/75 (12-13-20 @ 04:55) (108/78 - 120/75)  RR: 14 (12-13-20 @ 04:55) (14 - 16)  SpO2: 97% (12-13-20 @ 05:36) (95% - 100%    PHYSICAL EXAM:    HEENT:   [x]Tracheostomy: # 7 distal XLT Shiley, cuffed    [x]Pupils equal  [ ]No oral lesions  [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      SKIN  [x]No Rash  [ ] Abnormal  [ ] pressure    CARDIAC  [x]Regular  [ ]Abnormal:     PULMONARY  [ ]Bilateral Clear Breath Sounds  [ ]Normal Excursion  [x]Abnormal: Mild Bilateral Coarse Breath sounds    GI  [x]PEG      [x] +BS		              [x]Soft, nondistended, nontender	  [ ]Abnormal    MUSCULOSKELETAL                                   [x]Bedbound                 [ ]Abnormal    [ ]Ambulatory/OOB to chair                           EXTREMITIES                                         [x] Decrease Dorsalis pedal pulse on left lower extremity, Warm to touch   [ ]Edema                           NEUROLOGIC  [ ] Normal, non focal  [x] Focal findings: Awake, Nonverbal; not following commands;  Functional Quadriplegic 2/2 ALS     PSYCHIATRIC  [x ] Unable to assess, Nonverbal  [ ] Sedated	 [ ]Agitated    :  Paty: [ ] Yes, if yes: Date of Placement:                   [x] No    LINES: Central Lines [ ] Yes, if yes: Date of Placement                                     [x] No    HOSPITAL MEDICATIONS:  MEDICATIONS  (STANDING):  albuterol/ipratropium for Nebulization. 3 milliLiter(s) Nebulizer every 6 hours  artificial  tears Solution 1 Drop(s) Both EYES every 6 hours  chlorhexidine 0.12% Liquid 15 milliLiter(s) Oral Mucosa every 12 hours  diazepam   Solution 1.5 milliGRAM(s) Oral <User Schedule>  diazepam   Solution 2.5 milliGRAM(s) Oral <User Schedule>  erythromycin   Ointment 1 Application(s) Both EYES <User Schedule>  influenza   Vaccine 0.5 milliLiter(s) IntraMuscular once  multivitamin 1 Tablet(s) Oral daily  ofloxacin 0.3% Solution 1 Drop(s) Both EYES four times a day  petrolatum Ophthalmic Ointment 1 Application(s) Both EYES <User Schedule>  polyethylene glycol 3350 17 Gram(s) Oral two times a day  riluzole 50 milliGRAM(s) Oral two times a day  rivaroxaban 10 milliGRAM(s) Enteral Tube daily  senna 1 Tablet(s) Oral daily  sertraline 50 milliGRAM(s) Oral daily    MEDICATIONS  (PRN):      LABS:                        11.0   7.59  )-----------( 446      ( 12 Dec 2020 06:36 )             35.8     12-12    137  |  103  |  11  ----------------------------<  101<H>  4.4   |  23  |  <0.30<L>    Ca    9.5      12 Dec 2020 06:36  Phos  2.2     12-12  Mg     2.2     12-12              CAPILLARY BLOOD GLUCOSE    MICROBIOLOGY:     RADIOLOGY:  [ ] Reviewed and interpreted by me    Mode: AC/ CMV (Assist Control/ Continuous Mandatory Ventilation)  RR (machine): 14  TV (machine): 450  FiO2: 21  PEEP: 5  ITime: 1  MAP: 9  PIP: 23

## 2020-12-13 NOTE — PROGRESS NOTE ADULT - PROBLEM SELECTOR PLAN 8
Patient is from home with 24 /7 care   Will need reinstating of home services prior to dc   Patients  aware of medical / discharge plan  Will likely proceed with Dc planning end of next week after  Stent removal Patient is from home with 24 /7 care   Will need reinstating of home services prior to dc   Patients  aware of medical / discharge plan  Will likely proceed with Dc planning end of this week after  Stent removal

## 2020-12-13 NOTE — PROGRESS NOTE ADULT - PROBLEM SELECTOR PLAN 1
Patient admitted with Sepsis 2/2 Bacteremia   Blood cx 11/22: Morganella Morganii, Repeat Bld CX 11/25: No growth   CT Chest / ABD/ Pelvis 11/30: Left Lower Lobe Collapse, 2mm Nonobstructive Left ureteral stone, left renal calculi and mild bilateral hydronephrosis   Urine Cx 12/1: No growth   Sputum Cx 11/30: Stenotrophomonas ( Possible Colonization )   Continue Meropenem until after  procedure performed as per ID  Planned for Cystoscopy, Laser lithotripsy and stent placement today  Stent will be removed the following week prior to discharge Patient admitted with Sepsis 2/2 Bacteremia   Blood cx 11/22: Morganella Morganii, Repeat Bld CX 11/25: No growth   CT Chest / ABD/ Pelvis 11/30: Left Lower Lobe Collapse, 2mm Nonobstructive Left ureteral stone, left renal calculi and mild bilateral hydronephrosis   Urine Cx 12/1: No growth   Sputum Cx 11/30: Stenotrophomonas ( Possible Colonization )   - S/p Ureteroscopy, Laser Lithotripsy and stent placement on 12/10    -Completed Meropenum 12/11 s/p  procedure   -Stent will be removed this week prior to discharge

## 2020-12-13 NOTE — PROGRESS NOTE ADULT - ASSESSMENT
65 F with advanced ALS, chronic hypoxic/ hypercapnic resp failure, vent dependent, s/p trach 2015 and PEG, now presenting with septic shock and Morganella bacteremia 2/2 bacterial PNA + UTI.  Patient was BIBEMS from home for fevers. Pt's 24hr home health nurse reported over the past few days the pt has appeared more lethargic, has been tachycardic, and running fevers with decreased Urination. She was empirically started on meropenum and vancomycin in ED.  Was managed for Septic shock s/p fluid resuscitation, IV pressors, and Transferred to MICU. Patients abx were switched to Cefepime after Bcx 11/22 revealed Morganella morganii.  Surveillance Bcxs from 11/25 remain NGTD.  LLE doppler study revealed stenosis of femoral and popliteal arteries causing cool, pale LLE without cyanosis. Patient was weaned off vasopressors and placed on Midodrine. Patient was Transferred to RCU on 11/28. Trach changed to #7 distal xlt lise by ENT due to air leak on 11/28. Patient had CT abd pelvis performed which revealed 2 mm non obstructive left ureteral stone and multiple renal calculi with mild bilateral hydronephrosis.     12/10: Patient scheduled for Ureteroscopy, Laser Lithotripsy and stent placement this morning. Meropenem extended yesterday in setting of  Procedure today. Patients IVF were continued for decreased urine output yesterday, Bun /creat remain stable on morning BMP. Patient did not require straight catheterization overnight.   12/11: Last Day of antibiotics today, Decreased UOP, RN to place primifit to monitor I/O's. Remains Afebrile,  WBC and Vitals Stable.  following and planned for stent removal next week.   12/12: CR Stable; Remains Afebrile: discuss with  if cleared to restart Xarelto  12/13:    65 F with advanced ALS, chronic hypoxic/ hypercapnic resp failure, vent dependent, s/p trach 2015 and PEG, now presenting with septic shock and Morganella bacteremia 2/2 bacterial PNA + UTI.  Patient was BIBEMS from home for fevers. Pt's 24hr home health nurse reported over the past few days the pt has appeared more lethargic, has been tachycardic, and running fevers with decreased Urination. She was empirically started on meropenum and vancomycin in ED.  Was managed for Septic shock s/p fluid resuscitation, IV pressors, and Transferred to MICU. Patients abx were switched to Cefepime after Bcx 11/22 revealed Morganella morganii.  Surveillance Bcxs from 11/25 remain NGTD.  LLE doppler study revealed stenosis of femoral and popliteal arteries causing cool, pale LLE without cyanosis. Patient was weaned off vasopressors and placed on Midodrine. Patient was Transferred to RCU on 11/28. Trach changed to #7 distal xlt lise by ENT due to air leak on 11/28. Patient had CT abd pelvis performed which revealed 2 mm non obstructive left ureteral stone and multiple renal calculi with mild bilateral hydronephrosis.     12/10: Patient scheduled for Ureteroscopy, Laser Lithotripsy and stent placement this morning. Meropenem extended yesterday in setting of  Procedure today. Patients IVF were continued for decreased urine output yesterday, Bun /creat remain stable on morning BMP. Patient did not require straight catheterization overnight.   12/11: Last Day of antibiotics today, Decreased UOP, RN to place primifit to monitor I/O's. Remains Afebrile,  WBC and Vitals Stable.  following and planned for stent removal next week.   12/12: CR Stable; Remains Afebrile: discuss with  if cleared to restart Xarelto  12/13: Hgb stable after resuming Xarelto yesterday,  Remain afebrile and without Leukocytosis; Pending Stent removal this week and DC planning w/home vent once supplies delivered and Services reinstated

## 2020-12-14 ENCOUNTER — TRANSCRIPTION ENCOUNTER (OUTPATIENT)
Age: 65
End: 2020-12-14

## 2020-12-14 LAB
ANION GAP SERPL CALC-SCNC: 14 MMOL/L — SIGNIFICANT CHANGE UP (ref 5–17)
BASOPHILS # BLD AUTO: 0.07 K/UL — SIGNIFICANT CHANGE UP (ref 0–0.2)
BASOPHILS NFR BLD AUTO: 0.8 % — SIGNIFICANT CHANGE UP (ref 0–2)
BUN SERPL-MCNC: 16 MG/DL — SIGNIFICANT CHANGE UP (ref 7–23)
CALCIUM SERPL-MCNC: 9.7 MG/DL — SIGNIFICANT CHANGE UP (ref 8.4–10.5)
CHLORIDE SERPL-SCNC: 102 MMOL/L — SIGNIFICANT CHANGE UP (ref 96–108)
CO2 SERPL-SCNC: 23 MMOL/L — SIGNIFICANT CHANGE UP (ref 22–31)
CREAT SERPL-MCNC: <0.3 MG/DL — LOW (ref 0.5–1.3)
EOSINOPHIL # BLD AUTO: 0.71 K/UL — HIGH (ref 0–0.5)
EOSINOPHIL NFR BLD AUTO: 7.9 % — HIGH (ref 0–6)
GLUCOSE SERPL-MCNC: 112 MG/DL — HIGH (ref 70–99)
HCT VFR BLD CALC: 39.3 % — SIGNIFICANT CHANGE UP (ref 34.5–45)
HGB BLD-MCNC: 11.9 G/DL — SIGNIFICANT CHANGE UP (ref 11.5–15.5)
IMM GRANULOCYTES NFR BLD AUTO: 0.8 % — SIGNIFICANT CHANGE UP (ref 0–1.5)
LYMPHOCYTES # BLD AUTO: 1.83 K/UL — SIGNIFICANT CHANGE UP (ref 1–3.3)
LYMPHOCYTES # BLD AUTO: 20.4 % — SIGNIFICANT CHANGE UP (ref 13–44)
MAGNESIUM SERPL-MCNC: 2.2 MG/DL — SIGNIFICANT CHANGE UP (ref 1.6–2.6)
MCHC RBC-ENTMCNC: 28.3 PG — SIGNIFICANT CHANGE UP (ref 27–34)
MCHC RBC-ENTMCNC: 30.3 GM/DL — LOW (ref 32–36)
MCV RBC AUTO: 93.6 FL — SIGNIFICANT CHANGE UP (ref 80–100)
MONOCYTES # BLD AUTO: 0.64 K/UL — SIGNIFICANT CHANGE UP (ref 0–0.9)
MONOCYTES NFR BLD AUTO: 7.1 % — SIGNIFICANT CHANGE UP (ref 2–14)
NEUTROPHILS # BLD AUTO: 5.67 K/UL — SIGNIFICANT CHANGE UP (ref 1.8–7.4)
NEUTROPHILS NFR BLD AUTO: 63 % — SIGNIFICANT CHANGE UP (ref 43–77)
NRBC # BLD: 0 /100 WBCS — SIGNIFICANT CHANGE UP (ref 0–0)
PHOSPHATE SERPL-MCNC: 3.2 MG/DL — SIGNIFICANT CHANGE UP (ref 2.5–4.5)
PLATELET # BLD AUTO: 479 K/UL — HIGH (ref 150–400)
POTASSIUM SERPL-MCNC: 4.2 MMOL/L — SIGNIFICANT CHANGE UP (ref 3.5–5.3)
POTASSIUM SERPL-SCNC: 4.2 MMOL/L — SIGNIFICANT CHANGE UP (ref 3.5–5.3)
RBC # BLD: 4.2 M/UL — SIGNIFICANT CHANGE UP (ref 3.8–5.2)
RBC # FLD: 17.3 % — HIGH (ref 10.3–14.5)
SODIUM SERPL-SCNC: 139 MMOL/L — SIGNIFICANT CHANGE UP (ref 135–145)
WBC # BLD: 8.99 K/UL — SIGNIFICANT CHANGE UP (ref 3.8–10.5)
WBC # FLD AUTO: 8.99 K/UL — SIGNIFICANT CHANGE UP (ref 3.8–10.5)

## 2020-12-14 PROCEDURE — 99233 SBSQ HOSP IP/OBS HIGH 50: CPT

## 2020-12-14 RX ORDER — DIAZEPAM 5 MG
2.5 TABLET ORAL
Qty: 0 | Refills: 0 | DISCHARGE
Start: 2020-12-14

## 2020-12-14 RX ORDER — DIAZEPAM 5 MG
1.5 TABLET ORAL
Qty: 0 | Refills: 0 | DISCHARGE
Start: 2020-12-14

## 2020-12-14 RX ORDER — OFLOXACIN 0.3 %
1 DROPS OPHTHALMIC (EYE)
Qty: 120 | Refills: 0
Start: 2020-12-14 | End: 2021-01-12

## 2020-12-14 RX ORDER — DIAZEPAM 5 MG
2 TABLET ORAL
Qty: 0 | Refills: 0 | DISCHARGE
Start: 2020-12-14

## 2020-12-14 RX ORDER — RIVAROXABAN 15 MG-20MG
1 KIT ORAL
Qty: 0 | Refills: 0 | DISCHARGE

## 2020-12-14 RX ORDER — ERYTHROMYCIN BASE 5 MG/GRAM
1 OINTMENT (GRAM) OPHTHALMIC (EYE)
Qty: 180 | Refills: 0
Start: 2020-12-14 | End: 2021-01-12

## 2020-12-14 RX ADMIN — Medication 1 DROP(S): at 17:16

## 2020-12-14 RX ADMIN — CHLORHEXIDINE GLUCONATE 15 MILLILITER(S): 213 SOLUTION TOPICAL at 05:56

## 2020-12-14 RX ADMIN — Medication 1 APPLICATION(S): at 04:21

## 2020-12-14 RX ADMIN — Medication 1 APPLICATION(S): at 11:50

## 2020-12-14 RX ADMIN — RIVAROXABAN 10 MILLIGRAM(S): KIT at 21:06

## 2020-12-14 RX ADMIN — Medication 1 APPLICATION(S): at 21:06

## 2020-12-14 RX ADMIN — Medication 1 APPLICATION(S): at 07:36

## 2020-12-14 RX ADMIN — Medication 1 APPLICATION(S): at 02:06

## 2020-12-14 RX ADMIN — Medication 1 DROP(S): at 17:15

## 2020-12-14 RX ADMIN — Medication 1 APPLICATION(S): at 17:14

## 2020-12-14 RX ADMIN — Medication 3 MILLILITER(S): at 17:56

## 2020-12-14 RX ADMIN — Medication 3 MILLILITER(S): at 05:04

## 2020-12-14 RX ADMIN — Medication 1 TABLET(S): at 14:41

## 2020-12-14 RX ADMIN — Medication 1.5 MILLIGRAM(S): at 08:21

## 2020-12-14 RX ADMIN — Medication 1 DROP(S): at 11:50

## 2020-12-14 RX ADMIN — Medication 2.5 MILLIGRAM(S): at 21:13

## 2020-12-14 RX ADMIN — Medication 1 DROP(S): at 05:55

## 2020-12-14 RX ADMIN — SERTRALINE 50 MILLIGRAM(S): 25 TABLET, FILM COATED ORAL at 17:29

## 2020-12-14 RX ADMIN — Medication 1 APPLICATION(S): at 14:43

## 2020-12-14 RX ADMIN — Medication 1 APPLICATION(S): at 10:06

## 2020-12-14 RX ADMIN — POLYETHYLENE GLYCOL 3350 17 GRAM(S): 17 POWDER, FOR SOLUTION ORAL at 17:16

## 2020-12-14 RX ADMIN — POLYETHYLENE GLYCOL 3350 17 GRAM(S): 17 POWDER, FOR SOLUTION ORAL at 05:56

## 2020-12-14 RX ADMIN — Medication 1 APPLICATION(S): at 17:15

## 2020-12-14 RX ADMIN — Medication 1 APPLICATION(S): at 05:55

## 2020-12-14 RX ADMIN — Medication 3 MILLILITER(S): at 11:43

## 2020-12-14 RX ADMIN — RILUZOLE 50 MILLIGRAM(S): 50 TABLET ORAL at 05:56

## 2020-12-14 RX ADMIN — SENNA PLUS 1 TABLET(S): 8.6 TABLET ORAL at 14:42

## 2020-12-14 RX ADMIN — CHLORHEXIDINE GLUCONATE 15 MILLILITER(S): 213 SOLUTION TOPICAL at 17:15

## 2020-12-14 RX ADMIN — RILUZOLE 50 MILLIGRAM(S): 50 TABLET ORAL at 17:16

## 2020-12-14 NOTE — PROGRESS NOTE ADULT - PROBLEM SELECTOR PLAN 8
Patient is from home with 24 /7 care   Patients  requesting to take patient home early this week as private PCP  willing to remove  Stent at home   SW currently working on resumption of home care services

## 2020-12-14 NOTE — PROGRESS NOTE ADULT - SUBJECTIVE AND OBJECTIVE BOX
UROLOGY Progress Note  TINO MATA    S: Pt seen at bedside, no issues overnight.      O:  Vital Signs Last 24 Hrs  T(C): 36.6 (14 Dec 2020 03:56), Max: 36.6 (14 Dec 2020 03:56)  T(F): 97.8 (14 Dec 2020 03:56), Max: 97.8 (14 Dec 2020 03:56)  HR: 102 (14 Dec 2020 08:50) (91 - 107)  BP: 128/80 (14 Dec 2020 03:56) (120/79 - 129/83)  BP(mean): --  RR: 15 (14 Dec 2020 03:56) (14 - 16)  SpO2: 98% (14 Dec 2020 08:50) (95% - 99%)    12-13-20 @ 07:01  -  12-14-20 @ 07:00  --------------------------------------------------------  IN: 1260 mL / OUT: 725 mL / NET: 535 mL      Physical Exam:  Gen: NAD, eye-patches taped  Resp: No acute respiratory distress, normal effort  Abd: Soft, NT, ND  : primafit in place, stent strings tegadermed to thigh    Labs:  CBC (12-14 @ 07:29)                              11.9                           8.99    )----------------(  479<H>     63.0  % Neutrophils, 20.4  % Lymphocytes, ANC: 5.67                                39.3                CBC (12-13 @ 07:11)                              11.6                           6.99    )----------------(  283        --    % Neutrophils, --    % Lymphocytes, ANC: --                                  38.0                  BMP (12-14 @ 07:28)             139     |  102     |  16    		Ca++ --      Ca 9.7                ---------------------------------( 112<H>		Mg 2.2                4.2     |  23      |  <0.30<L>			Ph 3.2     BMP (12-13 @ 07:10)             137     |  103     |  16    		Ca++ --      Ca 9.5                ---------------------------------( 93    		Mg 2.2                4.9     |  23      |  <0.30<L>			Ph 3.1

## 2020-12-14 NOTE — DISCHARGE NOTE NURSING/CASE MANAGEMENT/SOCIAL WORK - NSDCPEXARELTO_GEN_ALL_CORE
Rivaroxaban/Xarelto - Dietary Advice/Rivaroxaban/Xarelto - Compliance/Rivaroxaban/Xarelto - Potential for adverse drug reactions and interactions/Rivaroxaban/Xarelto - Follow up monitoring

## 2020-12-14 NOTE — PROGRESS NOTE ADULT - PROBLEM SELECTOR PLAN 6
Patient with exposure Keratitis likely to inability to close her eyes  Continue Erythromycin and Ocuflox gtt  Continue Artificial tears and Lacrilube   Tarsorrhaphy D/w patients Son by opthalmology ( Declined)   Eyelids to remain taped closed 24/7

## 2020-12-14 NOTE — DISCHARGE NOTE NURSING/CASE MANAGEMENT/SOCIAL WORK - PATIENT PORTAL LINK FT
You can access the FollowMyHealth Patient Portal offered by [facility] by registering at the following website: [email or web address]. By joining [facility]’s FollowMyHealth portal, you will also be able to view your health information using other applications (apps) compatible with our system.

## 2020-12-14 NOTE — PROGRESS NOTE ADULT - ATTENDING COMMENTS
Agree with plan as outlined above. Patient seen and examined at bedside today. Patient history, laboratory data, and imaging personally reviewed.    Pt is a 65F with PMHx advanced ALS with chronic hypercapnic respiratory failure c/b ventilator dependence presenting with septic shock 2/2 PNA and UTI c/b Morganella bacteremia, now clinically improved.     Pt with chronic hypercapnic respiratory failure 2/2 ALS with ventilator dependence via tracheostomy (#7 XLT.) Doing well on current vent settings, no changes needed to home vent. Airway clearance in place. Trach care and suctioning as per RCU team. Does not tolerate vent weaning 2/2 advanced neuromuscular weakness. On home Rilutek.     While in hospital, pt was found to have renal calculus c/b hydronephrosis s/p ureteroscopy, lithotripsy and stent placement (12/10). Stent with external thread taped to abdomen and can be removed at home by Dr. Newman on 12/17 as per urology team.    On Xarelto for LLE arterial stenosis. On occuflox+erythromycin eye drops for exposure keratitis, eyes taped ATC for now as per opthalmology team. pt tolerating PEG feeds at goal rate. GI ppx in place. DVT ppx with Xarelto.     Plan for d/c home with 24/7 home services. Dispo planning in progress.

## 2020-12-14 NOTE — PROGRESS NOTE ADULT - ASSESSMENT
66yo F with PMH advanced ALS, chronic hypoxia/hypercapneic respiratory failure, vent dependent, s/p trach/peg 2015 with incidentally found 2mm L. distal stone now s/p URS/stent placement.     - Monitor for fevers  - Continue to record UOP (primafit), if concern for low UOP/retention > bladder scan  - Keep stent string secured (tegaderm)  - Will plan to remove one week from procedure,  spoken to who says has RN at home who could remove/ ?PCP lives 2 blocks away and could possibly pull vs. if still IP can do at bedside (no contraindication to discharge from  perspective).   - Patient may f/u with Dr. Guerrero at the [facility].  Please call the office ([phone]) to confirm/schedule appointment.

## 2020-12-14 NOTE — PROGRESS NOTE ADULT - ASSESSMENT
65 F with advanced ALS, chronic hypoxic/ hypercapnic resp failure, vent dependent, s/p trach 2015 and PEG, now presenting with septic shock and Morganella bacteremia 2/2 bacterial PNA + UTI.  Patient was BIBEMS from home for fevers. Pt's 24hr home health nurse reported over the past few days the pt has appeared more lethargic, has been tachycardic, and running fevers with decreased Urination. She was empirically started on meropenum and vancomycin in ED.  Was managed for Septic shock s/p fluid resuscitation, IV pressors, and Transferred to MICU. Patients abx were switched to Cefepime after Bcx 11/22 revealed Morganella morganii.  Surveillance Bcxs from 11/25 remain NGTD.  LLE doppler study revealed stenosis of femoral and popliteal arteries causing cool, pale LLE without cyanosis ( No intervention performed). Patient was weaned off vasopressors and placed on Midodrine in the ICU. Patient Clinically stabilized and was Transferred to RCU on 11/28. Trach changed to #7 distal xlt lise by ENT due to air leak on 11/28. Patient had CT abd pelvis performed on 11/30 in setting of Fevers and Leukocytosis which revealed 2 mm non obstructive left ureteral stone and multiple renal calculi with mild bilateral hydronephrosis. Patient Underwent Ureteroscopy, Laser Lithotripsy and stent placement on 12/10 by  ( ).     12/14: No events reported overnight, Patient remains afebrile and without Leukocytosis. Case d/w Patients  he wishes to take her home as patients home PCP is willing to remove  stent at home. LYNN currently working with [employer] [facility] and DME company to ensure all home supplies are available as well as reinstating 24/7 home care. Respiratory dept through [facility] called by Sw to see if patient will require home vent trial prior to dc as per agency if vent settings were unchanged home vent trial not necessary. Awaiting Home Vent settings,  if vent settings are not the same will place patient on home vent settings and check ABG Today.

## 2020-12-14 NOTE — PROGRESS NOTE ADULT - PROBLEM SELECTOR PLAN 1
Patient admitted with Sepsis 2/2 Bacteremia   Blood cx 11/22: Morganella Morganii, Repeat Bld CX 11/25: No growth   CT Chest / ABD/ Pelvis 11/30: Left Lower Lobe Collapse, 2mm Nonobstructive Left ureteral stone, left renal calculi and mild bilateral hydronephrosis   Urine Cx 12/1: No growth   Sputum Cx 11/30: Stenotrophomonas ( Possible Colonization )   S/p Ureteroscopy, Laser Lithotripsy and stent placement on 12/10    Patient Completed full course of Meropenem on 12/11    stent to be removed by Home PCP ( ) on 12/17

## 2020-12-14 NOTE — PROGRESS NOTE ADULT - PROBLEM SELECTOR PLAN 5
LLE Doppler 11/25: Flow Limiting stenosis of Distal and Left Superficial femoral and Popliteal artery  Patient was on Xarelto from home for DVT PPX  Continue Xarelto 10 mg Daily

## 2020-12-14 NOTE — PROGRESS NOTE ADULT - SUBJECTIVE AND OBJECTIVE BOX
Patient is a 65y old  Female who presents with a chief complaint of sepsis (13 Dec 2020 07:28)      Interval Events: No events reported overnight     REVIEW OF SYSTEMS:  [ ] Positive  [ ] All other systems negative  [x] Unable to assess ROS because patient with advanced ALS and Non-Verbal     Vital Signs Last 24 Hrs  T(C): 36.6 (12-14-20 @ 03:56), Max: 36.6 (12-14-20 @ 03:56)  T(F): 97.8 (12-14-20 @ 03:56), Max: 97.8 (12-14-20 @ 03:56)  HR: 94 (12-14-20 @ 05:12) (91 - 107)  BP: 128/80 (12-14-20 @ 03:56) (120/79 - 129/83)  RR: 15 (12-14-20 @ 03:56) (14 - 16)  SpO2: 98% (12-14-20 @ 05:12) (95% - 99%)    PHYSICAL EXAM:  HEENT:   [x]Tracheostomy: # 7 XLT Shiley   [x]Pupils equal  [ ]No oral lesions  [x]Abnormal: + Injected Conjunctiva RT > Lft     SKIN  [x]No Rash  [ ] Abnormal  [ ] pressure    CARDIAC  [x]Regular  [ ]Abnormal:     PULMONARY  [ ]Bilateral Clear Breath Sounds  [ ]Normal Excursion  [x]Abnormal: Bilateral Coarse Breath sounds    GI  [x]PEG      [x] +BS		              [x]Soft, nondistended, nontender	  [ ]Abnormal    MUSCULOSKELETAL                                   [x]Bedbound                 [ ]Abnormal    [ ]Ambulatory/OOB to chair                           EXTREMITIES                                         [x] Decrease Dorsalis pedal pulse on left lower extremity, Warm to touch   [ ]Edema                           NEUROLOGIC  [ ] Normal, non focal  [x] Focal findings: Functional Quadriplegic 2/2 ALS     PSYCHIATRIC  [ ]Alert and appropriate  [x] Sedated	 [ ]Agitated    :  Newman: [ ] Yes, if yes: Date of Placement:                   [x] No    LINES: Central Lines [ ] Yes, if yes: Date of Placement                                     [x] No    HOSPITAL MEDICATIONS:  MEDICATIONS  (STANDING):  albuterol/ipratropium for Nebulization. 3 milliLiter(s) Nebulizer every 6 hours  artificial  tears Solution 1 Drop(s) Both EYES every 6 hours  chlorhexidine 0.12% Liquid 15 milliLiter(s) Oral Mucosa every 12 hours  diazepam   Solution 1.5 milliGRAM(s) Oral <User Schedule>  diazepam   Solution 2.5 milliGRAM(s) Oral <User Schedule>  erythromycin   Ointment 1 Application(s) Both EYES <User Schedule>  influenza   Vaccine 0.5 milliLiter(s) IntraMuscular once  multivitamin 1 Tablet(s) Oral daily  ofloxacin 0.3% Solution 1 Drop(s) Both EYES four times a day  petrolatum Ophthalmic Ointment 1 Application(s) Both EYES <User Schedule>  polyethylene glycol 3350 17 Gram(s) Oral two times a day  riluzole 50 milliGRAM(s) Oral two times a day  rivaroxaban 10 milliGRAM(s) Enteral Tube daily  senna 1 Tablet(s) Oral daily  sertraline 50 milliGRAM(s) Oral daily    MEDICATIONS  (PRN):      LABS:                        11.9   8.99  )-----------( 479      ( 14 Dec 2020 07:29 )             39.3     12-14    139  |  102  |  16  ----------------------------<  112<H>  4.2   |  23  |  <0.30<L>    Ca    9.7      14 Dec 2020 07:28  Phos  3.2     12-14  Mg     2.2     12-14              CAPILLARY BLOOD GLUCOSE    MICROBIOLOGY:     RADIOLOGY:  [ ] Reviewed and interpreted by me    Mode: AC/ CMV (Assist Control/ Continuous Mandatory Ventilation)  RR (machine): 14  TV (machine): 450  FiO2: 21  PEEP: 5  ITime: 1  MAP: 9  PIP: 22

## 2020-12-15 VITALS — OXYGEN SATURATION: 98 %

## 2020-12-15 LAB — SURGICAL PATHOLOGY STUDY: SIGNIFICANT CHANGE UP

## 2020-12-15 PROCEDURE — 99233 SBSQ HOSP IP/OBS HIGH 50: CPT

## 2020-12-15 RX ADMIN — Medication 1 TABLET(S): at 11:44

## 2020-12-15 RX ADMIN — Medication 1 APPLICATION(S): at 11:38

## 2020-12-15 RX ADMIN — Medication 1 DROP(S): at 05:07

## 2020-12-15 RX ADMIN — Medication 1 DROP(S): at 00:02

## 2020-12-15 RX ADMIN — Medication 1 APPLICATION(S): at 11:36

## 2020-12-15 RX ADMIN — SERTRALINE 50 MILLIGRAM(S): 25 TABLET, FILM COATED ORAL at 11:36

## 2020-12-15 RX ADMIN — RILUZOLE 50 MILLIGRAM(S): 50 TABLET ORAL at 05:06

## 2020-12-15 RX ADMIN — Medication 1 DROP(S): at 05:06

## 2020-12-15 RX ADMIN — POLYETHYLENE GLYCOL 3350 17 GRAM(S): 17 POWDER, FOR SOLUTION ORAL at 05:06

## 2020-12-15 RX ADMIN — Medication 3 MILLILITER(S): at 05:13

## 2020-12-15 RX ADMIN — Medication 1.5 MILLIGRAM(S): at 09:05

## 2020-12-15 RX ADMIN — Medication 1 DROP(S): at 11:38

## 2020-12-15 RX ADMIN — Medication 1 DROP(S): at 11:37

## 2020-12-15 RX ADMIN — SENNA PLUS 1 TABLET(S): 8.6 TABLET ORAL at 11:39

## 2020-12-15 RX ADMIN — Medication 1 APPLICATION(S): at 02:00

## 2020-12-15 RX ADMIN — Medication 1 APPLICATION(S): at 05:06

## 2020-12-15 RX ADMIN — Medication 1 APPLICATION(S): at 07:56

## 2020-12-15 RX ADMIN — Medication 3 MILLILITER(S): at 00:16

## 2020-12-15 RX ADMIN — Medication 3 MILLILITER(S): at 11:28

## 2020-12-15 RX ADMIN — Medication 1 APPLICATION(S): at 03:24

## 2020-12-15 RX ADMIN — CHLORHEXIDINE GLUCONATE 15 MILLILITER(S): 213 SOLUTION TOPICAL at 05:06

## 2020-12-15 RX ADMIN — Medication 1 APPLICATION(S): at 00:02

## 2020-12-15 NOTE — PROGRESS NOTE ADULT - ATTENDING COMMENTS
Agree with plan as outlined above. Patient seen and examined at bedside today. Patient history, laboratory data, and imaging personally reviewed.    Pt is a 65F with PMHx advanced ALS with chronic hypercapnic respiratory failure c/b ventilator dependence presenting with septic shock 2/2 PNA and UTI c/b Morganella bacteremia, now clinically improved.     Pt with chronic hypercapnic respiratory failure 2/2 ALS with ventilator dependence via tracheostomy (#7 XLT). Doing well on current vent settings, no changes needed to home vent. Airway clearance in place. Trach care and suctioning as per RCU team. Does not tolerate vent weaning 2/2 advanced neuromuscular weakness. On home Rilutek.     While in hospital, pt was found to have renal calculus c/b hydronephrosis s/p ureteroscopy, lithotripsy and stent placement (12/10). Stent with external thread taped to abdomen and can be removed at home by Dr. Newman on 12/17 as per urology team.    On Xarelto for LLE arterial stenosis. On occuflox+erythromycin eye drops for exposure keratitis, eyes taped ATC for now as per opthalmology team. pt tolerating PEG feeds at goal rate. GI ppx in place. DVT ppx with Xarelto.     Pt medically optimized for discharge home. Plan for d/c home with 24/7 home services today.

## 2020-12-15 NOTE — PROGRESS NOTE ADULT - PROBLEM SELECTOR PROBLEM 6
No
Exposure keratitis
Prophylactic measure
Exposure keratitis
Prophylactic measure

## 2020-12-15 NOTE — PROGRESS NOTE ADULT - SUBJECTIVE AND OBJECTIVE BOX
Patient is a 65y old  Female who presents with a chief complaint of sepsis (14 Dec 2020 09:11)      Interval Events: No events reported overnight     REVIEW OF SYSTEMS:  [ ] Positive  [ ] All other systems negative  [x] Unable to assess ROS because patient is Non-Verbal due to advanced ALS     Vital Signs Last 24 Hrs  T(C): 36.6 (12-15-20 @ 05:10), Max: 36.9 (12-14-20 @ 20:30)  T(F): 97.9 (12-15-20 @ 05:10), Max: 98.4 (12-14-20 @ 20:30)  HR: 102 (12-15-20 @ 05:16) (90 - 106)  BP: 131/83 (12-15-20 @ 05:10) (131/83 - 137/85)  RR: 15 (12-15-20 @ 05:10) (14 - 16)  SpO2: 98% (12-15-20 @ 07:32) (96% - 100%)    PHYSICAL EXAM:  HEENT:   [x]Tracheostomy: # 7 XLT Shiley   [x]Pupils equal  [ ]No oral lesions  [x]Abnormal: + Injected Conjunctiva RT > Lft     SKIN  [x]No Rash  [ ] Abnormal  [ ] pressure    CARDIAC  [x]Regular  [ ]Abnormal:     PULMONARY  [ ]Bilateral Clear Breath Sounds  [ ]Normal Excursion  [x]Abnormal: Bilateral Coarse Breath sounds    GI  [x]PEG      [x] +BS		              [x]Soft, nondistended, nontender	  [ ]Abnormal    MUSCULOSKELETAL                                   [x]Bedbound                 [ ]Abnormal    [ ]Ambulatory/OOB to chair                           EXTREMITIES                                         [x] Decrease Dorsalis pedal pulse on left lower extremity, Warm to touch   [ ]Edema                           NEUROLOGIC  [ ] Normal, non focal  [x] Focal findings: Functional Quadriplegic 2/2 ALS     PSYCHIATRIC  [ ]Alert and appropriate  [x] Sedated	 [ ]Agitated    :  Newman: [ ] Yes, if yes: Date of Placement:                   [x] No    LINES: Central Lines [ ] Yes, if yes: Date of Placement                                     [x] No      HOSPITAL MEDICATIONS:  MEDICATIONS  (STANDING):  albuterol/ipratropium for Nebulization. 3 milliLiter(s) Nebulizer every 6 hours  artificial  tears Solution 1 Drop(s) Both EYES every 6 hours  chlorhexidine 0.12% Liquid 15 milliLiter(s) Oral Mucosa every 12 hours  diazepam   Solution 1.5 milliGRAM(s) Oral <User Schedule>  diazepam   Solution 2.5 milliGRAM(s) Oral <User Schedule>  erythromycin   Ointment 1 Application(s) Both EYES <User Schedule>  influenza   Vaccine 0.5 milliLiter(s) IntraMuscular once  multivitamin 1 Tablet(s) Oral daily  ofloxacin 0.3% Solution 1 Drop(s) Both EYES four times a day  petrolatum Ophthalmic Ointment 1 Application(s) Both EYES <User Schedule>  polyethylene glycol 3350 17 Gram(s) Oral two times a day  riluzole 50 milliGRAM(s) Oral two times a day  rivaroxaban 10 milliGRAM(s) Enteral Tube daily  senna 1 Tablet(s) Oral daily  sertraline 50 milliGRAM(s) Oral daily    MEDICATIONS  (PRN):      LABS:                        11.9   8.99  )-----------( 479      ( 14 Dec 2020 07:29 )             39.3     12-14    139  |  102  |  16  ----------------------------<  112<H>  4.2   |  23  |  <0.30<L>    Ca    9.7      14 Dec 2020 07:28  Phos  3.2     12-14  Mg     2.2     12-14              CAPILLARY BLOOD GLUCOSE    MICROBIOLOGY:     RADIOLOGY:  [ ] Reviewed and interpreted by me    Mode: AC/ CMV (Assist Control/ Continuous Mandatory Ventilation)  RR (machine): 14  TV (machine): 450  FiO2: 21  PEEP: 5  ITime: 1  MAP: 9  PIP: 25

## 2020-12-15 NOTE — PROGRESS NOTE ADULT - PROBLEM SELECTOR PLAN 5
LLE Doppler 11/25: Flow Limiting stenosis of Distal and Left Superficial femoral and Popliteal artery  Patient was previously on Xarelto from home for DVT PPX, will continue

## 2020-12-15 NOTE — PROGRESS NOTE ADULT - PROBLEM SELECTOR PROBLEM 3
ALS (amyotrophic lateral sclerosis)
Hyponatremia
ALS (amyotrophic lateral sclerosis)
Hyponatremia

## 2020-12-15 NOTE — PROGRESS NOTE ADULT - PROBLEM SELECTOR PROBLEM 2
Ventilator dependent

## 2020-12-15 NOTE — PROGRESS NOTE ADULT - ASSESSMENT
65 F with advanced ALS, chronic hypoxic/ hypercapnic resp failure, vent dependent, s/p trach 2015 and PEG, now presenting with septic shock and Morganella bacteremia 2/2 bacterial PNA + UTI.  Patient was BIBEMS from home for fevers. Pt's 24hr home health nurse reported over the past few days the pt has appeared more lethargic, has been tachycardic, and running fevers with decreased Urination. She was empirically started on meropenum and vancomycin in ED.  Was managed for Septic shock s/p fluid resuscitation, IV pressors, and Transferred to MICU. Patients abx were switched to Cefepime after Bcx 11/22 revealed Morganella morganii.  Surveillance Bcxs from 11/25 remain NGTD.  LLE doppler study revealed stenosis of femoral and popliteal arteries causing cool, pale LLE without cyanosis ( No intervention performed). Patient was weaned off vasopressors and placed on Midodrine in the ICU. Patient Clinically stabilized and was Transferred to RCU on 11/28. Trach changed to #7 distal xlt lise by ENT due to air leak on 11/28. Patient had CT abd pelvis performed on 11/30 in setting of Fevers and Leukocytosis which revealed 2 mm non obstructive left ureteral stone and multiple renal calculi with mild bilateral hydronephrosis. Patient Underwent Ureteroscopy, Laser Lithotripsy and stent placement on 12/10 by  ( ).     12/15: No events reported overnight, Patient remains medically stable for DC home today. Patients home vent settings were confirmed with the home care agency which are the same as inpatient. As per home care agency no need for home vent trial as no adjustments have been made. Patient scheduled for discharge home this morning. [facility] reinstated.

## 2020-12-15 NOTE — PROGRESS NOTE ADULT - NSHPATTENDINGPLANDISCUSS_GEN_ALL_CORE
ID, pulmonary and RCU attending
MICU team
MICU team
RCU
MICU team
MICU team
RCU
team
RCU Team
RCU Team
team
rcu
rcu

## 2020-12-15 NOTE — PROGRESS NOTE ADULT - PROBLEM SELECTOR PLAN 1
Patient admitted with Sepsis 2/2 Bacteremia   Blood cx 11/22: Morganella Morganii, Repeat Bld CX 11/25: No growth   CT Chest / ABD/ Pelvis 11/30: Left Lower Lobe Collapse, 2mm Nonobstructive Left ureteral stone, left renal calculi and mild bilateral hydronephrosis   Urine Cx 12/1: No growth   Sputum Cx 11/30: Stenotrophomonas ( Likely Colonization, Not txd )   S/p Ureteroscopy, Laser Lithotripsy and stent placement on 12/10    Patient Completed full course of Meropenem on 12/11    stent to be removed by Home PCP ( ) on 12/17 as outpatient

## 2020-12-15 NOTE — PROGRESS NOTE ADULT - PROBLEM SELECTOR PLAN 3
Continue Rilutek 50mg bid  Continue Valium 1.5 am and 2.5 pm   Continue Zoloft 50mg daily
Now resolved   serum CR stable
Continue Rilutek 50mg bid  Continue Valium 1.5 am and 2.5 pm   Continue Zoloft 50mg daily
Now resolved   serum CR stable

## 2020-12-15 NOTE — PROGRESS NOTE ADULT - PROBLEM SELECTOR PROBLEM 5
Arterial stenosis
Oropharyngeal dysphagia
Arterial stenosis
Oropharyngeal dysphagia

## 2020-12-15 NOTE — PROGRESS NOTE ADULT - PROBLEM SELECTOR PROBLEM 4
ALS (amyotrophic lateral sclerosis)
Oropharyngeal dysphagia
ALS (amyotrophic lateral sclerosis)

## 2020-12-15 NOTE — PROGRESS NOTE ADULT - PROBLEM SELECTOR PLAN 6
Patient with exposure Keratitis likely to inability to close her eyes  Continue Erythromycin and Ocuflox gtt  Continue Artificial tears and Lacrilube   Tarsorrhaphy D/w patients Son by opthalmology ( Declined)   Eyelids to remain taped closed 24/7  Patient to follow up with opthalmology as outpatient

## 2020-12-15 NOTE — PROGRESS NOTE ADULT - PROBLEM SELECTOR PROBLEM 1
Septic shock with acute organ dysfunction due to anaerobic bacteria

## 2020-12-16 ENCOUNTER — NON-APPOINTMENT (OUTPATIENT)
Age: 65
End: 2020-12-16

## 2020-12-18 LAB — NIDUS STONE QN: SIGNIFICANT CHANGE UP

## 2020-12-20 ENCOUNTER — NON-APPOINTMENT (OUTPATIENT)
Age: 65
End: 2020-12-20

## 2020-12-20 VITALS — OXYGEN SATURATION: 98 %

## 2020-12-20 DIAGNOSIS — H10.9 UNSPECIFIED CONJUNCTIVITIS: ICD-10-CM

## 2020-12-20 NOTE — ASSESSMENT
[FreeTextEntry1] : Status post stent placement and removal.\par Concerned about decrease in urine output will recheck labs and follow-up with urology normal...

## 2020-12-20 NOTE — PHYSICAL EXAM
[No Acute Distress] : no acute distress [Normal Appearance] : normal appearance [No Neck Mass] : no neck mass [Normal Rate/Rhythm] : normal rate/rhythm [Normal S1, S2] : normal s1, s2 [No Murmurs] : no murmurs [No Resp Distress] : no resp distress [Clear to Auscultation Bilaterally] : clear to auscultation bilaterally [No Abnormalities] : no abnormalities [Benign] : benign [Normal Gait] : normal gait [No Clubbing] : no clubbing [No Cyanosis] : no cyanosis [No Edema] : no edema [FROM] : FROM [Normal Color/ Pigmentation] : normal color/ pigmentation [Oriented x3] : oriented x3 [Normal Affect] : normal affect [TextBox_11] : Bilateral conjunctivitis [TextBox_44] : trach site clean [TextBox_132] : unable to move

## 2020-12-20 NOTE — HISTORY OF PRESENT ILLNESS
[TextBox_4] : Hospitalized for sepsis and obstructive uropathy.  Found to have multiple kidney stones.  Underwent stent placement.  Completed course of antibiotics x2.  Had positive blood cultures.\par \par Went to see patient for stent removal.  Home nurses reported dry diapers.  Patient appears to be in no distress outside of issue of conjunctivitis and exposure keratitis.

## 2020-12-20 NOTE — REASON FOR VISIT
[Follow-Up - From Hospitalization] : a follow-up visit after a recent hospitalization [TextBox_44] : sepsis, urinary retention

## 2020-12-23 LAB
BASOPHILS # BLD AUTO: 0.05 K/UL
BASOPHILS NFR BLD AUTO: 0.5 %
EOSINOPHIL # BLD AUTO: 0.39 K/UL
EOSINOPHIL NFR BLD AUTO: 3.5 %
HCT VFR BLD CALC: 37.7 %
HGB BLD-MCNC: 11.3 G/DL
IMM GRANULOCYTES NFR BLD AUTO: 0.7 %
LYMPHOCYTES # BLD AUTO: 1.73 K/UL
LYMPHOCYTES NFR BLD AUTO: 15.7 %
MAN DIFF?: NORMAL
MCHC RBC-ENTMCNC: 28 PG
MCHC RBC-ENTMCNC: 30 GM/DL
MCV RBC AUTO: 93.5 FL
MONOCYTES # BLD AUTO: 0.79 K/UL
MONOCYTES NFR BLD AUTO: 7.2 %
NEUTROPHILS # BLD AUTO: 7.99 K/UL
NEUTROPHILS NFR BLD AUTO: 72.4 %
PLATELET # BLD AUTO: 345 K/UL
RBC # BLD: 4.03 M/UL
RBC # FLD: 16.5 %
WBC # FLD AUTO: 11.03 K/UL

## 2020-12-24 LAB
ALBUMIN SERPL ELPH-MCNC: 4.2 G/DL
ALP BLD-CCNC: 153 U/L
ALT SERPL-CCNC: 27 U/L
ANION GAP SERPL CALC-SCNC: 19 MMOL/L
AST SERPL-CCNC: 23 U/L
BILIRUB SERPL-MCNC: 0.6 MG/DL
BUN SERPL-MCNC: 18 MG/DL
CALCIUM SERPL-MCNC: 9.4 MG/DL
CHLORIDE SERPL-SCNC: 98 MMOL/L
CO2 SERPL-SCNC: 18 MMOL/L
CREAT SERPL-MCNC: 0.25 MG/DL
GLUCOSE SERPL-MCNC: NORMAL
POTASSIUM SERPL-SCNC: NORMAL
PROT SERPL-MCNC: 7.9 G/DL
SODIUM SERPL-SCNC: 135 MMOL/L

## 2020-12-27 DIAGNOSIS — N39.0 URINARY TRACT INFECTION, SITE NOT SPECIFIED: ICD-10-CM

## 2020-12-30 LAB
ALBUMIN SERPL ELPH-MCNC: 3.9 G/DL
ALP BLD-CCNC: 152 U/L
ALT SERPL-CCNC: 22 U/L
ANION GAP SERPL CALC-SCNC: 18 MMOL/L
AST SERPL-CCNC: 19 U/L
BASOPHILS # BLD AUTO: 0.07 K/UL
BASOPHILS NFR BLD AUTO: 0.5 %
BILIRUB SERPL-MCNC: 0.6 MG/DL
BUN SERPL-MCNC: 20 MG/DL
CALCIUM SERPL-MCNC: 9.2 MG/DL
CHLORIDE SERPL-SCNC: 101 MMOL/L
CO2 SERPL-SCNC: 18 MMOL/L
CREAT SERPL-MCNC: 0.26 MG/DL
EOSINOPHIL # BLD AUTO: 0.39 K/UL
EOSINOPHIL NFR BLD AUTO: 2.7 %
GLUCOSE SERPL-MCNC: 87 MG/DL
HCT VFR BLD CALC: 34.5 %
HGB BLD-MCNC: 10.5 G/DL
IMM GRANULOCYTES NFR BLD AUTO: 0.8 %
LYMPHOCYTES # BLD AUTO: 1.5 K/UL
LYMPHOCYTES NFR BLD AUTO: 10.6 %
MAN DIFF?: NORMAL
MCHC RBC-ENTMCNC: 28.5 PG
MCHC RBC-ENTMCNC: 30.4 GM/DL
MCV RBC AUTO: 93.5 FL
MONOCYTES # BLD AUTO: 1.12 K/UL
MONOCYTES NFR BLD AUTO: 7.9 %
NEUTROPHILS # BLD AUTO: 11.01 K/UL
NEUTROPHILS NFR BLD AUTO: 77.5 %
PLATELET # BLD AUTO: 395 K/UL
POTASSIUM SERPL-SCNC: 4.1 MMOL/L
PROT SERPL-MCNC: 7.7 G/DL
RBC # BLD: 3.69 M/UL
RBC # FLD: 16.2 %
SODIUM SERPL-SCNC: 137 MMOL/L
WBC # FLD AUTO: 14.21 K/UL

## 2021-01-02 LAB — BACTERIA UR CULT: ABNORMAL

## 2021-01-02 RX ORDER — AMOXICILLIN AND CLAVULANATE POTASSIUM 600; 42.9 MG/5ML; MG/5ML
600-42.9 FOR SUSPENSION ORAL
Qty: 1 | Refills: 0 | Status: DISCONTINUED | COMMUNITY
Start: 2021-01-02 | End: 2021-01-02

## 2021-01-05 NOTE — DIETITIAN INITIAL EVALUATION ADULT. - ORAL INTAKE PTA/DIET HISTORY
· With hypothermia on presentation    Possibly in setting of limited oral intake  · s/p IV ancef for possible LE cellulitis  · Improved  · TSH wnl DIET HISTORY:   - EN dependent via PEG. Per past admits, previously receiving 6 cans/day Jevity1.2 (2017), changed to 4 cans/day Jevity1.2 (2018, 2019) in setting of excessive weight gain  ALLERGIES: NKFA  NUTRITION SUPPLEMENTS: none noted  OTHER: trach with vent dependence since 2015 DIET HISTORY:   - EN dependent via PEG. Per past admits, previously receiving 6 cans/day Jevity1.2 (2017), changed to 4 cans/day Jevity1.2 (2018, 2019) in setting of excessive weight gain. RN confirmed home regimen of Jevity @ 70 ml/hr x 13 hours overnight (8pm-9am).  ALLERGIES: NKFA  NUTRITION SUPPLEMENTS: none noted  OTHER: trach with vent dependence since 2015

## 2021-01-18 PROCEDURE — 87070 CULTURE OTHR SPECIMN AEROBIC: CPT

## 2021-01-18 PROCEDURE — 94002 VENT MGMT INPAT INIT DAY: CPT

## 2021-01-18 PROCEDURE — 85384 FIBRINOGEN ACTIVITY: CPT

## 2021-01-18 PROCEDURE — 82803 BLOOD GASES ANY COMBINATION: CPT

## 2021-01-18 PROCEDURE — 87150 DNA/RNA AMPLIFIED PROBE: CPT

## 2021-01-18 PROCEDURE — 71045 X-RAY EXAM CHEST 1 VIEW: CPT

## 2021-01-18 PROCEDURE — 84132 ASSAY OF SERUM POTASSIUM: CPT

## 2021-01-18 PROCEDURE — 93926 LOWER EXTREMITY STUDY: CPT

## 2021-01-18 PROCEDURE — 84100 ASSAY OF PHOSPHORUS: CPT

## 2021-01-18 PROCEDURE — 82435 ASSAY OF BLOOD CHLORIDE: CPT

## 2021-01-18 PROCEDURE — 88300 SURGICAL PATH GROSS: CPT

## 2021-01-18 PROCEDURE — 85610 PROTHROMBIN TIME: CPT

## 2021-01-18 PROCEDURE — 82330 ASSAY OF CALCIUM: CPT

## 2021-01-18 PROCEDURE — 87640 STAPH A DNA AMP PROBE: CPT

## 2021-01-18 PROCEDURE — C1889: CPT

## 2021-01-18 PROCEDURE — 99291 CRITICAL CARE FIRST HOUR: CPT | Mod: 25

## 2021-01-18 PROCEDURE — 71260 CT THORAX DX C+: CPT

## 2021-01-18 PROCEDURE — 83735 ASSAY OF MAGNESIUM: CPT

## 2021-01-18 PROCEDURE — 85018 HEMOGLOBIN: CPT

## 2021-01-18 PROCEDURE — 85027 COMPLETE CBC AUTOMATED: CPT

## 2021-01-18 PROCEDURE — 96374 THER/PROPH/DIAG INJ IV PUSH: CPT | Mod: XU

## 2021-01-18 PROCEDURE — 87040 BLOOD CULTURE FOR BACTERIA: CPT

## 2021-01-18 PROCEDURE — 86901 BLOOD TYPING SEROLOGIC RH(D): CPT

## 2021-01-18 PROCEDURE — 86769 SARS-COV-2 COVID-19 ANTIBODY: CPT

## 2021-01-18 PROCEDURE — 81001 URINALYSIS AUTO W/SCOPE: CPT

## 2021-01-18 PROCEDURE — 94003 VENT MGMT INPAT SUBQ DAY: CPT

## 2021-01-18 PROCEDURE — 86850 RBC ANTIBODY SCREEN: CPT

## 2021-01-18 PROCEDURE — U0003: CPT

## 2021-01-18 PROCEDURE — 83605 ASSAY OF LACTIC ACID: CPT

## 2021-01-18 PROCEDURE — 76000 FLUOROSCOPY <1 HR PHYS/QHP: CPT

## 2021-01-18 PROCEDURE — 87641 MR-STAPH DNA AMP PROBE: CPT

## 2021-01-18 PROCEDURE — 85730 THROMBOPLASTIN TIME PARTIAL: CPT

## 2021-01-18 PROCEDURE — C2625: CPT

## 2021-01-18 PROCEDURE — 74177 CT ABD & PELVIS W/CONTRAST: CPT

## 2021-01-18 PROCEDURE — 85362 FIBRIN DEGRADATION PRODUCTS: CPT

## 2021-01-18 PROCEDURE — 94640 AIRWAY INHALATION TREATMENT: CPT

## 2021-01-18 PROCEDURE — 84145 PROCALCITONIN (PCT): CPT

## 2021-01-18 PROCEDURE — 85014 HEMATOCRIT: CPT

## 2021-01-18 PROCEDURE — 96375 TX/PRO/DX INJ NEW DRUG ADDON: CPT | Mod: XU

## 2021-01-18 PROCEDURE — 82365 CALCULUS SPECTROSCOPY: CPT

## 2021-01-18 PROCEDURE — 84295 ASSAY OF SERUM SODIUM: CPT

## 2021-01-18 PROCEDURE — 80202 ASSAY OF VANCOMYCIN: CPT

## 2021-01-18 PROCEDURE — 80048 BASIC METABOLIC PNL TOTAL CA: CPT

## 2021-01-18 PROCEDURE — 82962 GLUCOSE BLOOD TEST: CPT

## 2021-01-18 PROCEDURE — 85025 COMPLETE CBC W/AUTO DIFF WBC: CPT

## 2021-01-18 PROCEDURE — 87186 SC STD MICRODIL/AGAR DIL: CPT

## 2021-01-18 PROCEDURE — 87086 URINE CULTURE/COLONY COUNT: CPT

## 2021-01-18 PROCEDURE — 82947 ASSAY GLUCOSE BLOOD QUANT: CPT

## 2021-01-18 PROCEDURE — C1758: CPT

## 2021-01-18 PROCEDURE — 80053 COMPREHEN METABOLIC PANEL: CPT

## 2021-01-18 PROCEDURE — 93005 ELECTROCARDIOGRAM TRACING: CPT | Mod: XU

## 2021-01-18 PROCEDURE — 86900 BLOOD TYPING SEROLOGIC ABO: CPT

## 2021-01-18 PROCEDURE — C1769: CPT

## 2021-01-18 PROCEDURE — 51701 INSERT BLADDER CATHETER: CPT

## 2021-01-20 LAB
ALBUMIN SERPL ELPH-MCNC: 3.9 G/DL
ALP BLD-CCNC: 130 U/L
ALT SERPL-CCNC: 27 U/L
ANION GAP SERPL CALC-SCNC: 18 MMOL/L
AST SERPL-CCNC: 26 U/L
BASOPHILS # BLD AUTO: 0.04 K/UL
BASOPHILS NFR BLD AUTO: 0.3 %
BILIRUB SERPL-MCNC: 0.5 MG/DL
BUN SERPL-MCNC: 19 MG/DL
CALCIUM SERPL-MCNC: 9.2 MG/DL
CHLORIDE SERPL-SCNC: 100 MMOL/L
CO2 SERPL-SCNC: 18 MMOL/L
CREAT SERPL-MCNC: 0.25 MG/DL
EOSINOPHIL # BLD AUTO: 0.45 K/UL
EOSINOPHIL NFR BLD AUTO: 3.6 %
GLUCOSE SERPL-MCNC: 99 MG/DL
HCT VFR BLD CALC: 36.3 %
HGB BLD-MCNC: 11.2 G/DL
IMM GRANULOCYTES NFR BLD AUTO: 1.3 %
LYMPHOCYTES # BLD AUTO: 1.57 K/UL
LYMPHOCYTES NFR BLD AUTO: 12.4 %
MAN DIFF?: NORMAL
MCHC RBC-ENTMCNC: 28.6 PG
MCHC RBC-ENTMCNC: 30.9 GM/DL
MCV RBC AUTO: 92.8 FL
MONOCYTES # BLD AUTO: 0.73 K/UL
MONOCYTES NFR BLD AUTO: 5.8 %
NEUTROPHILS # BLD AUTO: 9.68 K/UL
NEUTROPHILS NFR BLD AUTO: 76.6 %
PLATELET # BLD AUTO: 345 K/UL
POTASSIUM SERPL-SCNC: 4.1 MMOL/L
PROT SERPL-MCNC: 7.4 G/DL
RBC # BLD: 3.91 M/UL
RBC # FLD: 16.1 %
SODIUM SERPL-SCNC: 136 MMOL/L
WBC # FLD AUTO: 12.64 K/UL

## 2021-01-25 LAB — BACTERIA UR CULT: ABNORMAL

## 2021-01-31 ENCOUNTER — RX RENEWAL (OUTPATIENT)
Age: 66
End: 2021-01-31

## 2021-02-01 ENCOUNTER — RX RENEWAL (OUTPATIENT)
Age: 66
End: 2021-02-01

## 2021-02-08 RX ORDER — ALBUTEROL SULFATE 2.5 MG/3ML
(2.5 MG/3ML) SOLUTION RESPIRATORY (INHALATION)
Qty: 225 | Refills: 6 | Status: DISCONTINUED | COMMUNITY
Start: 2019-03-28 | End: 2021-02-08

## 2021-02-15 DIAGNOSIS — N20.0 CALCULUS OF KIDNEY: ICD-10-CM

## 2021-02-17 LAB
ALBUMIN SERPL ELPH-MCNC: 4 G/DL
ALP BLD-CCNC: 115 U/L
ALT SERPL-CCNC: 27 U/L
ANION GAP SERPL CALC-SCNC: 15 MMOL/L
AST SERPL-CCNC: 25 U/L
BASOPHILS # BLD AUTO: 0.05 K/UL
BASOPHILS NFR BLD AUTO: 0.4 %
BILIRUB SERPL-MCNC: 0.6 MG/DL
BUN SERPL-MCNC: 16 MG/DL
CALCIUM SERPL-MCNC: 9.2 MG/DL
CHLORIDE SERPL-SCNC: 101 MMOL/L
CO2 SERPL-SCNC: 19 MMOL/L
CREAT SERPL-MCNC: 0.2 MG/DL
EOSINOPHIL # BLD AUTO: 0.65 K/UL
EOSINOPHIL NFR BLD AUTO: 4.6 %
GLUCOSE SERPL-MCNC: 108 MG/DL
HCT VFR BLD CALC: 38.9 %
HGB BLD-MCNC: 11.6 G/DL
IMM GRANULOCYTES NFR BLD AUTO: 1.4 %
LYMPHOCYTES # BLD AUTO: 1.85 K/UL
LYMPHOCYTES NFR BLD AUTO: 13.1 %
MAN DIFF?: NORMAL
MCHC RBC-ENTMCNC: 27.6 PG
MCHC RBC-ENTMCNC: 29.8 GM/DL
MCV RBC AUTO: 92.6 FL
MONOCYTES # BLD AUTO: 0.98 K/UL
MONOCYTES NFR BLD AUTO: 6.9 %
NEUTROPHILS # BLD AUTO: 10.38 K/UL
NEUTROPHILS NFR BLD AUTO: 73.6 %
PLATELET # BLD AUTO: 313 K/UL
POTASSIUM SERPL-SCNC: 4.4 MMOL/L
PROT SERPL-MCNC: 7.4 G/DL
RBC # BLD: 4.2 M/UL
RBC # FLD: 16.8 %
SODIUM SERPL-SCNC: 135 MMOL/L
WBC # FLD AUTO: 14.11 K/UL

## 2021-02-18 LAB
SARS-COV-2 IGG SERPL IA-ACNC: <0.1 INDEX
SARS-COV-2 IGG SERPL QL IA: NEGATIVE

## 2021-02-22 LAB — BACTERIA UR CULT: ABNORMAL

## 2021-03-08 ENCOUNTER — RX RENEWAL (OUTPATIENT)
Age: 66
End: 2021-03-08

## 2021-04-05 ENCOUNTER — RX RENEWAL (OUTPATIENT)
Age: 66
End: 2021-04-05

## 2021-04-05 RX ORDER — SODIUM CHLORIDE FOR INHALATION 3 %
3 VIAL, NEBULIZER (ML) INHALATION
Qty: 24000 | Refills: 0 | Status: ACTIVE | COMMUNITY
Start: 2018-08-21 | End: 1900-01-01

## 2021-06-30 DIAGNOSIS — L03.113 CELLULITIS OF RIGHT UPPER LIMB: ICD-10-CM

## 2021-07-23 ENCOUNTER — NON-APPOINTMENT (OUTPATIENT)
Age: 66
End: 2021-07-23

## 2021-07-23 ENCOUNTER — APPOINTMENT (OUTPATIENT)
Dept: PULMONOLOGY | Facility: CLINIC | Age: 66
End: 2021-07-23

## 2021-07-23 DIAGNOSIS — A31.8 OTHER MYCOBACTERIAL INFECTIONS: ICD-10-CM

## 2021-07-23 RX ORDER — CEFDINIR 300 MG/1
300 CAPSULE ORAL
Qty: 28 | Refills: 1 | Status: ACTIVE | COMMUNITY
Start: 2020-12-30 | End: 1900-01-01

## 2021-07-24 LAB
ALBUMIN SERPL ELPH-MCNC: 3.7 G/DL
ALP BLD-CCNC: 111 U/L
ALT SERPL-CCNC: 28 U/L
ANION GAP SERPL CALC-SCNC: 23 MMOL/L
AST SERPL-CCNC: 20 U/L
BASOPHILS # BLD AUTO: 0.04 K/UL
BASOPHILS NFR BLD AUTO: 0.3 %
BILIRUB SERPL-MCNC: 0.6 MG/DL
BUN SERPL-MCNC: 16 MG/DL
CALCIUM SERPL-MCNC: 9.4 MG/DL
CHLORIDE SERPL-SCNC: 98 MMOL/L
CO2 SERPL-SCNC: 19 MMOL/L
CREAT SERPL-MCNC: 0.1 MG/DL
EOSINOPHIL # BLD AUTO: 0.18 K/UL
EOSINOPHIL NFR BLD AUTO: 1.6 %
GLUCOSE SERPL-MCNC: 124 MG/DL
HCT VFR BLD CALC: 35.4 %
HGB BLD-MCNC: 10.6 G/DL
IMM GRANULOCYTES NFR BLD AUTO: 1.4 %
LYMPHOCYTES # BLD AUTO: 1.09 K/UL
LYMPHOCYTES NFR BLD AUTO: 9.4 %
MAN DIFF?: NORMAL
MCHC RBC-ENTMCNC: 27.3 PG
MCHC RBC-ENTMCNC: 29.9 GM/DL
MCV RBC AUTO: 91.2 FL
MONOCYTES # BLD AUTO: 0.97 K/UL
MONOCYTES NFR BLD AUTO: 8.4 %
NEUTROPHILS # BLD AUTO: 9.12 K/UL
NEUTROPHILS NFR BLD AUTO: 78.9 %
PLATELET # BLD AUTO: 298 K/UL
POTASSIUM SERPL-SCNC: 3.9 MMOL/L
PROCALCITONIN SERPL-MCNC: 0.14 NG/ML
PROT SERPL-MCNC: 7.5 G/DL
RBC # BLD: 3.88 M/UL
RBC # FLD: 17.2 %
SODIUM SERPL-SCNC: 140 MMOL/L
WBC # FLD AUTO: 11.56 K/UL

## 2021-07-24 NOTE — HISTORY OF PRESENT ILLNESS
[TextBox_4] : home visit\par Contacted by  fever lethargy on ventilator.\par \par Trach change earlier in week without incident

## 2021-07-25 LAB
RAPID RVP RESULT: NOT DETECTED
SARS-COV-2 RNA PNL RESP NAA+PROBE: NOT DETECTED

## 2021-07-26 ENCOUNTER — RX RENEWAL (OUTPATIENT)
Age: 66
End: 2021-07-26

## 2021-08-23 ENCOUNTER — RX RENEWAL (OUTPATIENT)
Age: 66
End: 2021-08-23

## 2021-09-06 ENCOUNTER — RX RENEWAL (OUTPATIENT)
Age: 66
End: 2021-09-06

## 2021-09-29 NOTE — PATIENT PROFILE ADULT - ...
08-Sep-2019 20:37:00

## 2021-10-05 LAB
ALBUMIN SERPL ELPH-MCNC: 3.9 G/DL
ALP BLD-CCNC: 131 U/L
ALT SERPL-CCNC: 22 U/L
ANION GAP SERPL CALC-SCNC: 18 MMOL/L
AST SERPL-CCNC: 21 U/L
BASOPHILS # BLD AUTO: 0.05 K/UL
BASOPHILS NFR BLD AUTO: 0.4 %
BILIRUB SERPL-MCNC: 0.4 MG/DL
BUN SERPL-MCNC: 17 MG/DL
CALCIUM SERPL-MCNC: 9.2 MG/DL
CHLORIDE SERPL-SCNC: 101 MMOL/L
CO2 SERPL-SCNC: 20 MMOL/L
CREAT SERPL-MCNC: 0.12 MG/DL
EOSINOPHIL # BLD AUTO: 0.47 K/UL
EOSINOPHIL NFR BLD AUTO: 3.9 %
GLUCOSE SERPL-MCNC: 122 MG/DL
HCT VFR BLD CALC: 34.6 %
HGB BLD-MCNC: 10.5 G/DL
IMM GRANULOCYTES NFR BLD AUTO: 3.5 %
LYMPHOCYTES # BLD AUTO: 1.69 K/UL
LYMPHOCYTES NFR BLD AUTO: 14.2 %
MAN DIFF?: NORMAL
MCHC RBC-ENTMCNC: 26.9 PG
MCHC RBC-ENTMCNC: 30.3 GM/DL
MCV RBC AUTO: 88.7 FL
MONOCYTES # BLD AUTO: 0.97 K/UL
MONOCYTES NFR BLD AUTO: 8.1 %
NEUTROPHILS # BLD AUTO: 8.34 K/UL
NEUTROPHILS NFR BLD AUTO: 69.9 %
PLATELET # BLD AUTO: 425 K/UL
POTASSIUM SERPL-SCNC: 4.4 MMOL/L
PROCALCITONIN SERPL-MCNC: 0.1 NG/ML
PROT SERPL-MCNC: 7 G/DL
RBC # BLD: 3.9 M/UL
RBC # FLD: 17.2 %
SODIUM SERPL-SCNC: 139 MMOL/L
WBC # FLD AUTO: 11.94 K/UL

## 2021-10-23 ENCOUNTER — RX RENEWAL (OUTPATIENT)
Age: 66
End: 2021-10-23

## 2021-12-24 ENCOUNTER — RX RENEWAL (OUTPATIENT)
Age: 66
End: 2021-12-24

## 2022-01-24 ENCOUNTER — NON-APPOINTMENT (OUTPATIENT)
Age: 67
End: 2022-01-24

## 2022-02-15 ENCOUNTER — RX RENEWAL (OUTPATIENT)
Age: 67
End: 2022-02-15

## 2022-03-14 ENCOUNTER — RX RENEWAL (OUTPATIENT)
Age: 67
End: 2022-03-14

## 2022-04-04 ENCOUNTER — RX RENEWAL (OUTPATIENT)
Age: 67
End: 2022-04-04

## 2022-04-04 RX ORDER — MUPIROCIN 20 MG/G
2 OINTMENT TOPICAL
Qty: 22 | Refills: 5 | Status: ACTIVE | COMMUNITY
Start: 2018-09-16 | End: 1900-01-01

## 2022-04-24 ENCOUNTER — RX RENEWAL (OUTPATIENT)
Age: 67
End: 2022-04-24

## 2022-05-02 DIAGNOSIS — Z93.0 TRACHEOSTOMY STATUS: ICD-10-CM

## 2022-05-02 DIAGNOSIS — J44.9 CHRONIC OBSTRUCTIVE PULMONARY DISEASE, UNSPECIFIED: ICD-10-CM

## 2022-05-02 DIAGNOSIS — Z99.11 DEPENDENCE ON RESPIRATOR [VENTILATOR] STATUS: ICD-10-CM

## 2022-05-04 LAB
ALBUMIN SERPL ELPH-MCNC: 4.3 G/DL
ALP BLD-CCNC: 111 U/L
ALT SERPL-CCNC: 38 U/L
ANION GAP SERPL CALC-SCNC: 18 MMOL/L
AST SERPL-CCNC: 31 U/L
BACTERIA SPT CULT: ABNORMAL
BASOPHILS # BLD AUTO: 0.07 K/UL
BASOPHILS NFR BLD AUTO: 0.7 %
BILIRUB SERPL-MCNC: 0.6 MG/DL
BUN SERPL-MCNC: 17 MG/DL
CALCIUM SERPL-MCNC: 9.2 MG/DL
CHLORIDE SERPL-SCNC: 100 MMOL/L
CO2 SERPL-SCNC: 18 MMOL/L
CREAT SERPL-MCNC: <0.1 MG/DL
EGFR: 150 ML/MIN/1.73M2
EOSINOPHIL # BLD AUTO: 0.65 K/UL
EOSINOPHIL NFR BLD AUTO: 6.2 %
GLUCOSE SERPL-MCNC: 108 MG/DL
HCT VFR BLD CALC: 40.1 %
HGB BLD-MCNC: 12.5 G/DL
IMM GRANULOCYTES NFR BLD AUTO: 1.7 %
LYMPHOCYTES # BLD AUTO: 1.87 K/UL
LYMPHOCYTES NFR BLD AUTO: 17.7 %
MAN DIFF?: NORMAL
MCHC RBC-ENTMCNC: 27.8 PG
MCHC RBC-ENTMCNC: 31.2 GM/DL
MCV RBC AUTO: 89.1 FL
MONOCYTES # BLD AUTO: 0.9 K/UL
MONOCYTES NFR BLD AUTO: 8.5 %
NEUTROPHILS # BLD AUTO: 6.87 K/UL
NEUTROPHILS NFR BLD AUTO: 65.2 %
PLATELET # BLD AUTO: 301 K/UL
POTASSIUM SERPL-SCNC: 4.2 MMOL/L
PROT SERPL-MCNC: 7.2 G/DL
RBC # BLD: 4.5 M/UL
RBC # FLD: 17.1 %
SODIUM SERPL-SCNC: 136 MMOL/L
WBC # FLD AUTO: 10.54 K/UL

## 2022-05-15 ENCOUNTER — NON-APPOINTMENT (OUTPATIENT)
Age: 67
End: 2022-05-15

## 2022-05-16 VITALS — RESPIRATION RATE: 14 BRPM | OXYGEN SATURATION: 99 % | HEART RATE: 105 BPM

## 2022-05-16 DIAGNOSIS — R00.0 TACHYCARDIA, UNSPECIFIED: ICD-10-CM

## 2022-05-16 LAB
RAPID RVP RESULT: NOT DETECTED
SARS-COV-2 RNA PNL RESP NAA+PROBE: NOT DETECTED

## 2022-05-18 ENCOUNTER — LABORATORY RESULT (OUTPATIENT)
Age: 67
End: 2022-05-18

## 2022-05-18 LAB
ALBUMIN SERPL ELPH-MCNC: 3.9 G/DL
ALP BLD-CCNC: 111 U/L
ALT SERPL-CCNC: 35 U/L
ANION GAP SERPL CALC-SCNC: 20 MMOL/L
AST SERPL-CCNC: 24 U/L
BASOPHILS # BLD AUTO: 0.03 K/UL
BASOPHILS NFR BLD AUTO: 0.3 %
BILIRUB SERPL-MCNC: 0.7 MG/DL
BUN SERPL-MCNC: 15 MG/DL
CALCIUM SERPL-MCNC: 9.1 MG/DL
CHLORIDE SERPL-SCNC: 96 MMOL/L
CO2 SERPL-SCNC: 20 MMOL/L
COVID-19 NUCLEOCAPSID  GAM ANTIBODY INTERPRETATION: NEGATIVE
COVID-19 SPIKE DOMAIN ANTIBODY INTERPRETATION: POSITIVE
CREAT SERPL-MCNC: <0.1 MG/DL
EGFR: 150 ML/MIN/1.73M2
EOSINOPHIL # BLD AUTO: 0.12 K/UL
EOSINOPHIL NFR BLD AUTO: 1.1 %
GLUCOSE SERPL-MCNC: 196 MG/DL
HCT VFR BLD CALC: 31.8 %
HGB BLD-MCNC: 10.3 G/DL
IMM GRANULOCYTES NFR BLD AUTO: 1.5 %
LYMPHOCYTES # BLD AUTO: 0.92 K/UL
LYMPHOCYTES NFR BLD AUTO: 8.4 %
MAN DIFF?: NORMAL
MCHC RBC-ENTMCNC: 28.1 PG
MCHC RBC-ENTMCNC: 32.4 GM/DL
MCV RBC AUTO: 86.6 FL
MONOCYTES # BLD AUTO: 0.96 K/UL
MONOCYTES NFR BLD AUTO: 8.7 %
NEUTROPHILS # BLD AUTO: 8.81 K/UL
NEUTROPHILS NFR BLD AUTO: 80 %
PLATELET # BLD AUTO: 277 K/UL
POTASSIUM SERPL-SCNC: 4 MMOL/L
PROCALCITONIN SERPL-MCNC: 0.29 NG/ML
PROT SERPL-MCNC: 6.9 G/DL
RBC # BLD: 3.67 M/UL
RBC # FLD: 16.1 %
SARS-COV-2 AB SERPL IA-ACNC: >250 U/ML
SARS-COV-2 AB SERPL QL IA: 0.12 INDEX
SODIUM SERPL-SCNC: 135 MMOL/L
WBC # FLD AUTO: 11 K/UL

## 2022-05-25 DIAGNOSIS — J95.851: ICD-10-CM

## 2022-06-16 ENCOUNTER — RX RENEWAL (OUTPATIENT)
Age: 67
End: 2022-06-16

## 2022-07-13 DIAGNOSIS — Z01.818 ENCOUNTER FOR OTHER PREPROCEDURAL EXAMINATION: ICD-10-CM

## 2022-07-20 ENCOUNTER — RX RENEWAL (OUTPATIENT)
Age: 67
End: 2022-07-20

## 2022-07-31 ENCOUNTER — INPATIENT (INPATIENT)
Facility: HOSPITAL | Age: 67
LOS: 1 days | Discharge: HOME | End: 2022-08-02
Attending: THORACIC SURGERY (CARDIOTHORACIC VASCULAR SURGERY) | Admitting: THORACIC SURGERY (CARDIOTHORACIC VASCULAR SURGERY)

## 2022-07-31 ENCOUNTER — RX RENEWAL (OUTPATIENT)
Age: 67
End: 2022-07-31

## 2022-07-31 VITALS
WEIGHT: 112.66 LBS | TEMPERATURE: 97 F | RESPIRATION RATE: 14 BRPM | DIASTOLIC BLOOD PRESSURE: 56 MMHG | HEART RATE: 88 BPM | SYSTOLIC BLOOD PRESSURE: 113 MMHG | OXYGEN SATURATION: 100 %

## 2022-07-31 DIAGNOSIS — G12.21 AMYOTROPHIC LATERAL SCLEROSIS: ICD-10-CM

## 2022-07-31 DIAGNOSIS — Z93.0 TRACHEOSTOMY STATUS: Chronic | ICD-10-CM

## 2022-07-31 DIAGNOSIS — Z43.1 ENCOUNTER FOR ATTENTION TO GASTROSTOMY: Chronic | ICD-10-CM

## 2022-07-31 DIAGNOSIS — J96.00 ACUTE RESPIRATORY FAILURE, UNSPECIFIED WHETHER WITH HYPOXIA OR HYPERCAPNIA: ICD-10-CM

## 2022-07-31 DIAGNOSIS — Z93.1 GASTROSTOMY STATUS: Chronic | ICD-10-CM

## 2022-07-31 LAB
A1C WITH ESTIMATED AVERAGE GLUCOSE RESULT: 5.1 % — SIGNIFICANT CHANGE UP (ref 4–5.6)
ALBUMIN SERPL ELPH-MCNC: 3.6 G/DL — SIGNIFICANT CHANGE UP (ref 3.3–5)
ALP SERPL-CCNC: 104 U/L — SIGNIFICANT CHANGE UP (ref 40–120)
ALT FLD-CCNC: 30 U/L — SIGNIFICANT CHANGE UP (ref 4–33)
ANION GAP SERPL CALC-SCNC: 18 MMOL/L — HIGH (ref 7–14)
APTT BLD: 33.3 SEC — SIGNIFICANT CHANGE UP (ref 27–36.3)
AST SERPL-CCNC: 71 U/L — HIGH (ref 4–32)
B-OH-BUTYR SERPL-SCNC: <0 MMOL/L — SIGNIFICANT CHANGE UP (ref 0–0.4)
BASE EXCESS BLDV CALC-SCNC: -4.1 MMOL/L — LOW (ref -2–3)
BASOPHILS # BLD AUTO: 0.03 K/UL — SIGNIFICANT CHANGE UP (ref 0–0.2)
BASOPHILS NFR BLD AUTO: 0.3 % — SIGNIFICANT CHANGE UP (ref 0–2)
BILIRUB SERPL-MCNC: 0.6 MG/DL — SIGNIFICANT CHANGE UP (ref 0.2–1.2)
BLD GP AB SCN SERPL QL: NEGATIVE — SIGNIFICANT CHANGE UP
BLOOD GAS VENOUS COMPREHENSIVE RESULT: SIGNIFICANT CHANGE UP
BUN SERPL-MCNC: 13 MG/DL — SIGNIFICANT CHANGE UP (ref 7–23)
CALCIUM SERPL-MCNC: 9.2 MG/DL — SIGNIFICANT CHANGE UP (ref 8.4–10.5)
CHLORIDE BLDV-SCNC: 105 MMOL/L — SIGNIFICANT CHANGE UP (ref 96–108)
CHLORIDE SERPL-SCNC: 103 MMOL/L — SIGNIFICANT CHANGE UP (ref 98–107)
CO2 BLDV-SCNC: 22.9 MMOL/L — SIGNIFICANT CHANGE UP (ref 22–26)
CO2 SERPL-SCNC: 17 MMOL/L — LOW (ref 22–31)
CREAT SERPL-MCNC: <0.2 MG/DL — LOW (ref 0.5–1.3)
EGFR: 127 ML/MIN/1.73M2 — SIGNIFICANT CHANGE UP
EOSINOPHIL # BLD AUTO: 0.33 K/UL — SIGNIFICANT CHANGE UP (ref 0–0.5)
EOSINOPHIL NFR BLD AUTO: 3 % — SIGNIFICANT CHANGE UP (ref 0–6)
ESTIMATED AVERAGE GLUCOSE: 100 — SIGNIFICANT CHANGE UP
GAS PNL BLDV: 136 MMOL/L — SIGNIFICANT CHANGE UP (ref 136–145)
GLUCOSE BLDC GLUCOMTR-MCNC: 104 MG/DL — HIGH (ref 70–99)
GLUCOSE BLDC GLUCOMTR-MCNC: 115 MG/DL — HIGH (ref 70–99)
GLUCOSE BLDV-MCNC: 107 MG/DL — HIGH (ref 70–99)
GLUCOSE SERPL-MCNC: 114 MG/DL — HIGH (ref 70–99)
HCO3 BLDV-SCNC: 22 MMOL/L — SIGNIFICANT CHANGE UP (ref 22–29)
HCT VFR BLD CALC: 35.7 % — SIGNIFICANT CHANGE UP (ref 34.5–45)
HCT VFR BLDA CALC: 35 % — SIGNIFICANT CHANGE UP (ref 34.5–46.5)
HGB BLD CALC-MCNC: 11.8 G/DL — SIGNIFICANT CHANGE UP (ref 11.5–15.5)
HGB BLD-MCNC: 10.9 G/DL — LOW (ref 11.5–15.5)
IANC: 8.33 K/UL — HIGH (ref 1.8–7.4)
IMM GRANULOCYTES NFR BLD AUTO: 1.8 % — HIGH (ref 0–1.5)
INR BLD: 1.2 RATIO — HIGH (ref 0.88–1.16)
LACTATE BLDV-MCNC: 3.8 MMOL/L — HIGH (ref 0.5–2)
LYMPHOCYTES # BLD AUTO: 1.46 K/UL — SIGNIFICANT CHANGE UP (ref 1–3.3)
LYMPHOCYTES # BLD AUTO: 13.1 % — SIGNIFICANT CHANGE UP (ref 13–44)
MCHC RBC-ENTMCNC: 26.7 PG — LOW (ref 27–34)
MCHC RBC-ENTMCNC: 30.5 GM/DL — LOW (ref 32–36)
MCV RBC AUTO: 87.3 FL — SIGNIFICANT CHANGE UP (ref 80–100)
MONOCYTES # BLD AUTO: 0.78 K/UL — SIGNIFICANT CHANGE UP (ref 0–0.9)
MONOCYTES NFR BLD AUTO: 7 % — SIGNIFICANT CHANGE UP (ref 2–14)
NEUTROPHILS # BLD AUTO: 8.33 K/UL — HIGH (ref 1.8–7.4)
NEUTROPHILS NFR BLD AUTO: 74.8 % — SIGNIFICANT CHANGE UP (ref 43–77)
NRBC # BLD: 0 /100 WBCS — SIGNIFICANT CHANGE UP
NRBC # FLD: 0 K/UL — SIGNIFICANT CHANGE UP
PCO2 BLDV: 41 MMHG — SIGNIFICANT CHANGE UP (ref 39–42)
PH BLDV: 7.33 — SIGNIFICANT CHANGE UP (ref 7.32–7.43)
PLATELET # BLD AUTO: 309 K/UL — SIGNIFICANT CHANGE UP (ref 150–400)
PO2 BLDV: 58 MMHG — SIGNIFICANT CHANGE UP
POTASSIUM BLDV-SCNC: 4.2 MMOL/L — SIGNIFICANT CHANGE UP (ref 3.5–5.1)
POTASSIUM SERPL-MCNC: 5.6 MMOL/L — HIGH (ref 3.5–5.3)
POTASSIUM SERPL-SCNC: 5.6 MMOL/L — HIGH (ref 3.5–5.3)
PROT SERPL-MCNC: 7.8 G/DL — SIGNIFICANT CHANGE UP (ref 6–8.3)
PROTHROM AB SERPL-ACNC: 14 SEC — HIGH (ref 10.5–13.4)
RBC # BLD: 4.09 M/UL — SIGNIFICANT CHANGE UP (ref 3.8–5.2)
RBC # FLD: 17.4 % — HIGH (ref 10.3–14.5)
RH IG SCN BLD-IMP: POSITIVE — SIGNIFICANT CHANGE UP
SAO2 % BLDV: 89.7 % — SIGNIFICANT CHANGE UP
SARS-COV-2 RNA SPEC QL NAA+PROBE: SIGNIFICANT CHANGE UP
SODIUM SERPL-SCNC: 138 MMOL/L — SIGNIFICANT CHANGE UP (ref 135–145)
TSH SERPL-MCNC: 1.71 UIU/ML — SIGNIFICANT CHANGE UP (ref 0.27–4.2)
WBC # BLD: 11.13 K/UL — HIGH (ref 3.8–10.5)
WBC # FLD AUTO: 11.13 K/UL — HIGH (ref 3.8–10.5)

## 2022-07-31 PROCEDURE — 99233 SBSQ HOSP IP/OBS HIGH 50: CPT

## 2022-07-31 PROCEDURE — 71045 X-RAY EXAM CHEST 1 VIEW: CPT | Mod: 26

## 2022-07-31 PROCEDURE — 93010 ELECTROCARDIOGRAM REPORT: CPT

## 2022-07-31 RX ORDER — IPRATROPIUM/ALBUTEROL SULFATE 18-103MCG
3 AEROSOL WITH ADAPTER (GRAM) INHALATION EVERY 6 HOURS
Refills: 0 | Status: DISCONTINUED | OUTPATIENT
Start: 2022-07-31 | End: 2022-08-01

## 2022-07-31 RX ORDER — SODIUM CHLORIDE 9 MG/ML
4 INJECTION INTRAMUSCULAR; INTRAVENOUS; SUBCUTANEOUS EVERY 12 HOURS
Refills: 0 | Status: DISCONTINUED | OUTPATIENT
Start: 2022-07-31 | End: 2022-08-02

## 2022-07-31 RX ORDER — CHLORHEXIDINE GLUCONATE 213 G/1000ML
15 SOLUTION TOPICAL EVERY 12 HOURS
Refills: 0 | Status: DISCONTINUED | OUTPATIENT
Start: 2022-07-31 | End: 2022-08-02

## 2022-07-31 RX ORDER — SENNA PLUS 8.6 MG/1
5 TABLET ORAL DAILY
Refills: 0 | Status: DISCONTINUED | OUTPATIENT
Start: 2022-07-31 | End: 2022-08-02

## 2022-07-31 RX ORDER — POLYETHYLENE GLYCOL 3350 17 G/17G
17 POWDER, FOR SOLUTION ORAL DAILY
Refills: 0 | Status: DISCONTINUED | OUTPATIENT
Start: 2022-07-31 | End: 2022-08-02

## 2022-07-31 RX ORDER — SODIUM CHLORIDE 9 MG/ML
3 INJECTION INTRAMUSCULAR; INTRAVENOUS; SUBCUTANEOUS EVERY 8 HOURS
Refills: 0 | Status: DISCONTINUED | OUTPATIENT
Start: 2022-07-31 | End: 2022-08-01

## 2022-07-31 RX ORDER — SERTRALINE 25 MG/1
50 TABLET, FILM COATED ORAL DAILY
Refills: 0 | Status: DISCONTINUED | OUTPATIENT
Start: 2022-07-31 | End: 2022-08-02

## 2022-07-31 RX ORDER — DIAZEPAM 5 MG
2 TABLET ORAL AT BEDTIME
Refills: 0 | Status: DISCONTINUED | OUTPATIENT
Start: 2022-07-31 | End: 2022-08-02

## 2022-07-31 RX ORDER — SODIUM CHLORIDE 9 MG/ML
500 INJECTION, SOLUTION INTRAVENOUS ONCE
Refills: 0 | Status: COMPLETED | OUTPATIENT
Start: 2022-07-31 | End: 2022-07-31

## 2022-07-31 RX ADMIN — SODIUM CHLORIDE 500 MILLILITER(S): 9 INJECTION, SOLUTION INTRAVENOUS at 19:55

## 2022-07-31 RX ADMIN — SODIUM CHLORIDE 4 MILLILITER(S): 9 INJECTION INTRAMUSCULAR; INTRAVENOUS; SUBCUTANEOUS at 22:48

## 2022-07-31 RX ADMIN — Medication 3 MILLILITER(S): at 22:57

## 2022-07-31 RX ADMIN — SODIUM CHLORIDE 3 MILLILITER(S): 9 INJECTION INTRAMUSCULAR; INTRAVENOUS; SUBCUTANEOUS at 20:12

## 2022-07-31 RX ADMIN — Medication 2 MILLIGRAM(S): at 22:56

## 2022-07-31 RX ADMIN — CHLORHEXIDINE GLUCONATE 15 MILLILITER(S): 213 SOLUTION TOPICAL at 18:19

## 2022-07-31 NOTE — H&P ADULT - HISTORY OF PRESENT ILLNESS
65 F with advanced ALS, chronic hypoxic/ hypercapnic resp failure, vent dependent, s/p trach 2015 and PEG scheduled from tracheostomy exchange. Patient was BIBEMS from home as a preadmission. Pt's 24hr home health nurse and  with patient with paper chart. Patient on ventilator support via size 7 shiley XLT tracheostomy. Patient seen at bedside, nonverbal appears well. Vitals obtained in ambulance stable and WNL, no recent illness or concerns per  and home health aide.     Of note, patient takes Xarelto 10mg via PEG tube daily. last dose today 7/31/22 @7am

## 2022-07-31 NOTE — PROGRESS NOTE ADULT - SUBJECTIVE AND OBJECTIVE BOX
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 exchange,  planned for tomorrow in OR   Stable hemodynamics and oxygenation, afebrile, pending labs  Vent dependent at home             HISTORY:      Unable to obtain as patient cannot communicate, underlying ALS         PHYSICAL EXAM:      Gen: Comfortable, No acute distress     Eyes: Sclera white, Conjunctiva normal, Eyelids normal, Pupils symmetrical      ENT: Mucous membranes moist,  size 7 XLT cuffed shiley in place    Neck: Trachea midline,  ,  ,  ,  ,  ,       CV: Rate regular, Rhythm regular,  ,  ,       Resp: Breath sounds clear, No accessory muscles use,    Abd: Soft, -distended, Non-tender, Bowel sounds normal,  PEG+     Skin: Warm, No peripheral edema of lower extremities,  ,       : No carter     Neuro: Not moving ext, atrophic muscles    Psych: opens eyes              ASSESSMENT AND PLAN:           NEURO:     ALS- continue Riluzole  Patient on diazepam 5mg solution Q12h via PEG at home, continue  Eyedrops as patient takes at home for exposure keratitis      RESPIRATORY:   Chronic respiratory failure secondary to neuromuscular weakness, underlying ALS - mechanical ventilation AC-/14/40%/5  Monitor Spo2, maintained >95%. Vent bundle. Check CXR. Bronchodilators as neeeded, pulmonary toilet  Has size 7 XLT shiley, planned for trach exchange in OR tomorrow by Dr. Owens.        CARDIOVASCULAR:     Hemodynamically stable - Not on pressors. Continue hemodynamic monitoring.     Telemetry (medical test) - Reviewed by me today independently. Normal sinus rhythm.           RENAL:     Stable - Monitor IOs and electrolytes. Keep K above 4.0 and Mg above 2.0.           GASTROINTESTINAL:     GI prophylaxis not indicated     Zofran and Reglan IV PRN for nausea     PEG tube feeds, bowel regimen               HEMATOLOGIC:     No signs of active bleeding. Monitor Hgb in CBC in AM     DVT prophylaxis with heparin subQ and SCDs. Hold Xarelto. Check PT/PTT.          INFECTIOUS DISEASE:     No signs of active infection. Will monitor for fever and leukocytosis.           ENDOCRINE:     Stable – Monitor glucose fingersticks for goal 120-180.             Pertinent clinical, laboratory, radiographic, hemodynamic,  respiratory data, microbiologic data and chart were reviewed by myself and analyzed frequently throughout the course of the day and night by myself.     Plan discussed at length with the CTICU staff and Attending CT Surgeon -   Dr. Mervin Owens     Patient's status was discussed with patient at bedside.         ________________________________________________           _________________________  VITAL SIGNS:  Vital Signs Last 24 Hrs  T(C): 36 (31 Jul 2022 16:14), Max: 36 (31 Jul 2022 16:14)  T(F): 96.8 (31 Jul 2022 16:14), Max: 96.8 (31 Jul 2022 16:14)  HR: 82 (31 Jul 2022 16:30) (82 - 88)  BP: 113/56 (31 Jul 2022 16:14) (113/56 - 113/56)  BP(mean): 72 (31 Jul 2022 16:14) (72 - 72)  RR: 14 (31 Jul 2022 16:14) (14 - 14)  SpO2: 99% (31 Jul 2022 16:30) (99% - 100%)    Parameters below as of 31 Jul 2022 16:14  Patient On (Oxygen Delivery Method): tracheostomy collar    O2 Concentration (%): 40  I/Os:   I&O's Detail      Mode: AC/ CMV (Assist Control/ Continuous Mandatory Ventilation)  RR (machine): 14  TV (machine): 450  FiO2: 40  PEEP: 5  MAP: 9  PIP: 26      MEDICATIONS:  MEDICATIONS  (STANDING):  albuterol/ipratropium for Nebulization 3 milliLiter(s) Nebulizer every 6 hours  chlorhexidine 0.12% Liquid 15 milliLiter(s) Oral Mucosa every 12 hours  polyethylene glycol 3350 17 Gram(s) Oral daily  senna Syrup 5 milliLiter(s) Oral daily  sodium chloride 0.9% lock flush 3 milliLiter(s) IV Push every 8 hours    MEDICATIONS  (PRN):      LABS:  Laboratory data was independently reviewed by me today.                       RADIOLOGY:   Radiology images were independently reviewed by me today. Reports were reviewed by me today.               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 exchange,  planned for tomorrow in OR   Stable hemodynamics and oxygenation, afebrile, pending labs  Vent dependent at home  Last dose Xarelto 7AM.           HISTORY:      Unable to obtain as patient cannot communicate, underlying ALS         PHYSICAL EXAM:      Gen: Comfortable, No acute distress     Eyes: Sclera white, Conjunctiva normal, Eyelids normal, Pupils symmetrical      ENT: Mucous membranes moist,  size 7 XLT cuffed shiley in place    Neck: Trachea midline,  ,  ,  ,  ,  ,       CV: Rate regular, Rhythm regular,  ,  ,       Resp: Breath sounds clear, No accessory muscles use,    Abd: Soft, -distended, Non-tender, Bowel sounds normal,  PEG+     Skin: Warm, No peripheral edema of lower extremities,  ,       : No carter     Neuro: Not moving ext, atrophic muscles    Psych: opens eyes              ASSESSMENT AND PLAN:           NEURO:     ALS- continue Riluzole  Patient on diazepam 5mg solution Q12h via PEG at home, continue  Eyedrops as patient takes at home for exposure keratitis      RESPIRATORY:   Chronic respiratory failure secondary to neuromuscular weakness, underlying ALS - mechanical ventilation AC-/14/40%/5  Monitor Spo2, maintained >95%. Vent bundle. Check CXR. Bronchodilators as neeeded, pulmonary toilet  Has size 7 XLT shiley, planned for trach exchange in OR tomorrow by Dr. Owens.        CARDIOVASCULAR:     Hemodynamically stable - Not on pressors. Continue hemodynamic monitoring.     Telemetry (medical test) - Reviewed by me today independently. Normal sinus rhythm.           RENAL:     Stable - Monitor IOs and electrolytes. Keep K above 4.0 and Mg above 2.0.           GASTROINTESTINAL:     GI prophylaxis not indicated     Zofran and Reglan IV PRN for nausea     PEG tube feeds, bowel regimen               HEMATOLOGIC:     No signs of active bleeding. Monitor Hgb in CBC in AM     DVT prophylaxis with Xarelto. Last dose was given to her at home this morning. Hold further doses pending OR tomorrow. Check PT/PTT.          INFECTIOUS DISEASE:     No signs of active infection. Will monitor for fever and leukocytosis.           ENDOCRINE:     Stable – Monitor glucose fingersticks for goal 120-180.             Pertinent clinical, laboratory, radiographic, hemodynamic,  respiratory data, microbiologic data and chart were reviewed by myself and analyzed frequently throughout the course of the day and night by myself.     Plan discussed at length with the CTICU staff and Attending CT Surgeon -   Dr. Mervin Owens     Patient's status was discussed with patient at bedside.         ________________________________________________           _________________________  VITAL SIGNS:  Vital Signs Last 24 Hrs  T(C): 36 (31 Jul 2022 16:14), Max: 36 (31 Jul 2022 16:14)  T(F): 96.8 (31 Jul 2022 16:14), Max: 96.8 (31 Jul 2022 16:14)  HR: 82 (31 Jul 2022 16:30) (82 - 88)  BP: 113/56 (31 Jul 2022 16:14) (113/56 - 113/56)  BP(mean): 72 (31 Jul 2022 16:14) (72 - 72)  RR: 14 (31 Jul 2022 16:14) (14 - 14)  SpO2: 99% (31 Jul 2022 16:30) (99% - 100%)    Parameters below as of 31 Jul 2022 16:14  Patient On (Oxygen Delivery Method): tracheostomy collar    O2 Concentration (%): 40  I/Os:   I&O's Detail      Mode: AC/ CMV (Assist Control/ Continuous Mandatory Ventilation)  RR (machine): 14  TV (machine): 450  FiO2: 40  PEEP: 5  MAP: 9  PIP: 26      MEDICATIONS:  MEDICATIONS  (STANDING):  albuterol/ipratropium for Nebulization 3 milliLiter(s) Nebulizer every 6 hours  chlorhexidine 0.12% Liquid 15 milliLiter(s) Oral Mucosa every 12 hours  polyethylene glycol 3350 17 Gram(s) Oral daily  senna Syrup 5 milliLiter(s) Oral daily  sodium chloride 0.9% lock flush 3 milliLiter(s) IV Push every 8 hours    MEDICATIONS  (PRN):      LABS:  Laboratory data was independently reviewed by me today.                       RADIOLOGY:   Radiology images were independently reviewed by me today. Reports were reviewed by me today.

## 2022-07-31 NOTE — H&P ADULT - ASSESSMENT
65 F with advanced ALS, chronic hypoxic/ hypercapnic resp failure, vent dependent, s/p trach 2015 and PEG scheduled from tracheostomy exchange.

## 2022-07-31 NOTE — H&P ADULT - NSHPPHYSICALEXAM_GEN_ALL_CORE
Neuro: Awake and alert  HEENT: PERRL  Neck: Trach in place, erythema noted   CV: regular rate, regular rhythm  Pulm/chest: no accessory muscle use noted  Abd: soft, appears distended, PEG in place  Ext: +pulses  Skin: warm, well perfused

## 2022-07-31 NOTE — PATIENT PROFILE ADULT - NSPROGENSOURCEINFO_GEN_A_NUR
patient trached & nonverbal, no family at bedside at this time/unable to respond patient trached & nonverbal, no family at bedside at this time

## 2022-07-31 NOTE — H&P ADULT - PROBLEM SELECTOR PLAN 1
Admit to CTICU  Admission labs (CBC, cmp, coags, T&S)  CXR   STAT COVID preop  Tube feeds with NPO after MN   Resume  home meds, hold xarelto  IV access  OR tomorrow for trach exchange  Cont with vent support  All above In agreement with Dr Owens

## 2022-07-31 NOTE — H&P ADULT - NSICDXFAMHXNEG_GEN_ALL
asthma/atrial fibrillation/brain aneurysm/cancer/congestive heart failure/COPD/coronary disease/diabetes/emphysema/heart disease/irritable bowel syndrome/kidney disease/stroke/thyroid disease/VTE

## 2022-07-31 NOTE — H&P ADULT - NS ATTEND AMEND GEN_ALL_CORE FT
Patient seen and examined agree with above note as modified, where appropriate, by me. pt for tracheostomy upsizing and bronchoscopy

## 2022-08-01 ENCOUNTER — TRANSCRIPTION ENCOUNTER (OUTPATIENT)
Age: 67
End: 2022-08-01

## 2022-08-01 LAB
ALBUMIN SERPL ELPH-MCNC: 3.8 G/DL — SIGNIFICANT CHANGE UP (ref 3.3–5)
ALP SERPL-CCNC: 101 U/L — SIGNIFICANT CHANGE UP (ref 40–120)
ALT FLD-CCNC: 27 U/L — SIGNIFICANT CHANGE UP (ref 4–33)
ANION GAP SERPL CALC-SCNC: 14 MMOL/L — SIGNIFICANT CHANGE UP (ref 7–14)
AST SERPL-CCNC: 23 U/L — SIGNIFICANT CHANGE UP (ref 4–32)
BILIRUB SERPL-MCNC: 0.5 MG/DL — SIGNIFICANT CHANGE UP (ref 0.2–1.2)
BUN SERPL-MCNC: 14 MG/DL — SIGNIFICANT CHANGE UP (ref 7–23)
CALCIUM SERPL-MCNC: 9.2 MG/DL — SIGNIFICANT CHANGE UP (ref 8.4–10.5)
CHLORIDE SERPL-SCNC: 104 MMOL/L — SIGNIFICANT CHANGE UP (ref 98–107)
CO2 SERPL-SCNC: 21 MMOL/L — LOW (ref 22–31)
CREAT SERPL-MCNC: <0.2 MG/DL — LOW (ref 0.5–1.3)
EGFR: 127 ML/MIN/1.73M2 — SIGNIFICANT CHANGE UP
GLUCOSE BLDC GLUCOMTR-MCNC: 180 MG/DL — HIGH (ref 70–99)
GLUCOSE SERPL-MCNC: 117 MG/DL — HIGH (ref 70–99)
HCT VFR BLD CALC: 37.5 % — SIGNIFICANT CHANGE UP (ref 34.5–45)
HGB BLD-MCNC: 11.4 G/DL — LOW (ref 11.5–15.5)
MAGNESIUM SERPL-MCNC: 2 MG/DL — SIGNIFICANT CHANGE UP (ref 1.6–2.6)
MCHC RBC-ENTMCNC: 26.3 PG — LOW (ref 27–34)
MCHC RBC-ENTMCNC: 30.4 GM/DL — LOW (ref 32–36)
MCV RBC AUTO: 86.6 FL — SIGNIFICANT CHANGE UP (ref 80–100)
NRBC # BLD: 0 /100 WBCS — SIGNIFICANT CHANGE UP
NRBC # FLD: 0 K/UL — SIGNIFICANT CHANGE UP
PHOSPHATE SERPL-MCNC: 4.2 MG/DL — SIGNIFICANT CHANGE UP (ref 2.5–4.5)
PLATELET # BLD AUTO: 351 K/UL — SIGNIFICANT CHANGE UP (ref 150–400)
POTASSIUM SERPL-MCNC: 3.9 MMOL/L — SIGNIFICANT CHANGE UP (ref 3.5–5.3)
POTASSIUM SERPL-SCNC: 3.9 MMOL/L — SIGNIFICANT CHANGE UP (ref 3.5–5.3)
PROT SERPL-MCNC: 7.5 G/DL — SIGNIFICANT CHANGE UP (ref 6–8.3)
RBC # BLD: 4.33 M/UL — SIGNIFICANT CHANGE UP (ref 3.8–5.2)
RBC # FLD: 17.4 % — HIGH (ref 10.3–14.5)
SODIUM SERPL-SCNC: 139 MMOL/L — SIGNIFICANT CHANGE UP (ref 135–145)
WBC # BLD: 12.15 K/UL — HIGH (ref 3.8–10.5)
WBC # FLD AUTO: 12.15 K/UL — HIGH (ref 3.8–10.5)

## 2022-08-01 PROCEDURE — 99223 1ST HOSP IP/OBS HIGH 75: CPT

## 2022-08-01 PROCEDURE — 99233 SBSQ HOSP IP/OBS HIGH 50: CPT

## 2022-08-01 RX ORDER — SODIUM CHLORIDE 9 MG/ML
1000 INJECTION, SOLUTION INTRAVENOUS
Refills: 0 | Status: COMPLETED | OUTPATIENT
Start: 2022-08-01 | End: 2023-06-30

## 2022-08-01 RX ORDER — ACETAMINOPHEN 500 MG
750 TABLET ORAL ONCE
Refills: 0 | Status: COMPLETED | OUTPATIENT
Start: 2022-08-01 | End: 2022-08-01

## 2022-08-01 RX ORDER — INSULIN LISPRO 100/ML
VIAL (ML) SUBCUTANEOUS EVERY 6 HOURS
Refills: 0 | Status: DISCONTINUED | OUTPATIENT
Start: 2022-08-01 | End: 2022-08-02

## 2022-08-01 RX ORDER — LEVALBUTEROL 1.25 MG/.5ML
0.63 SOLUTION, CONCENTRATE RESPIRATORY (INHALATION) EVERY 6 HOURS
Refills: 0 | Status: DISCONTINUED | OUTPATIENT
Start: 2022-08-01 | End: 2022-08-02

## 2022-08-01 RX ORDER — ACETAMINOPHEN 500 MG
750 TABLET ORAL ONCE
Refills: 0 | Status: DISCONTINUED | OUTPATIENT
Start: 2022-08-01 | End: 2022-08-02

## 2022-08-01 RX ORDER — RILUZOLE 50 MG/1
50 TABLET ORAL
Refills: 0 | Status: DISCONTINUED | OUTPATIENT
Start: 2022-08-01 | End: 2022-08-02

## 2022-08-01 RX ORDER — SODIUM CHLORIDE 9 MG/ML
1000 INJECTION, SOLUTION INTRAVENOUS
Refills: 0 | Status: DISCONTINUED | OUTPATIENT
Start: 2022-08-01 | End: 2022-08-02

## 2022-08-01 RX ADMIN — LEVALBUTEROL 0.63 MILLIGRAM(S): 1.25 SOLUTION, CONCENTRATE RESPIRATORY (INHALATION) at 16:37

## 2022-08-01 RX ADMIN — LEVALBUTEROL 0.63 MILLIGRAM(S): 1.25 SOLUTION, CONCENTRATE RESPIRATORY (INHALATION) at 09:50

## 2022-08-01 RX ADMIN — Medication 300 MILLIGRAM(S): at 20:15

## 2022-08-01 RX ADMIN — POLYETHYLENE GLYCOL 3350 17 GRAM(S): 17 POWDER, FOR SOLUTION ORAL at 11:51

## 2022-08-01 RX ADMIN — SENNA PLUS 5 MILLILITER(S): 8.6 TABLET ORAL at 11:51

## 2022-08-01 RX ADMIN — Medication 2 MILLIGRAM(S): at 21:35

## 2022-08-01 RX ADMIN — Medication 1: at 23:51

## 2022-08-01 RX ADMIN — SODIUM CHLORIDE 4 MILLILITER(S): 9 INJECTION INTRAMUSCULAR; INTRAVENOUS; SUBCUTANEOUS at 09:48

## 2022-08-01 RX ADMIN — CHLORHEXIDINE GLUCONATE 15 MILLILITER(S): 213 SOLUTION TOPICAL at 17:47

## 2022-08-01 RX ADMIN — SODIUM CHLORIDE 4 MILLILITER(S): 9 INJECTION INTRAMUSCULAR; INTRAVENOUS; SUBCUTANEOUS at 22:10

## 2022-08-01 RX ADMIN — Medication 750 MILLIGRAM(S): at 22:00

## 2022-08-01 RX ADMIN — CHLORHEXIDINE GLUCONATE 15 MILLILITER(S): 213 SOLUTION TOPICAL at 05:52

## 2022-08-01 RX ADMIN — Medication 3 MILLILITER(S): at 04:58

## 2022-08-01 RX ADMIN — SERTRALINE 50 MILLIGRAM(S): 25 TABLET, FILM COATED ORAL at 11:51

## 2022-08-01 RX ADMIN — LEVALBUTEROL 0.63 MILLIGRAM(S): 1.25 SOLUTION, CONCENTRATE RESPIRATORY (INHALATION) at 22:10

## 2022-08-01 RX ADMIN — RILUZOLE 50 MILLIGRAM(S): 50 TABLET ORAL at 18:30

## 2022-08-01 RX ADMIN — SODIUM CHLORIDE 3 MILLILITER(S): 9 INJECTION INTRAMUSCULAR; INTRAVENOUS; SUBCUTANEOUS at 05:53

## 2022-08-01 RX ADMIN — SODIUM CHLORIDE 30 MILLILITER(S): 9 INJECTION, SOLUTION INTRAVENOUS at 23:45

## 2022-08-01 NOTE — PROGRESS NOTE ADULT - SUBJECTIVE AND OBJECTIVE BOX
CHIEF COMPLAINT: FOLLOW UP IN ICU FOR CHRONIC RESPIRATORY FAILURE ON A VENTILATOR         ISSUES:    Chronic hypercarbic respiratory failure, vent dependent s/p tracheostomy (since 2015)  Gastrostomy tube  Amyotrophic lateral sclerosis (ALS)  Hx of aspiration  S/P PEG Tube (2015)  Peripheral arterial disease, on Xarelto at home  Exposure keratitis          INTERVAL EVENTS:      Admitted for trach. exchange,  planned for tomorrow in OR   Stable hemodynamics and oxygenation, afebrile, pending labs  Vent dependent at home  Last dose Xarelto 7AM.           HISTORY:      Unable to obtain as patient cannot communicate, underlying ALS         PHYSICAL EXAM:      Gen: Comfortable, No acute distress     Eyes: Sclera white, Conjunctiva normal, Eyelids normal, Pupils symmetrical      ENT: Mucous membranes moist,  size 7 XLT cuffed shiley in place    Neck: Trachea midline,  ,  ,  ,  ,  ,       CV: Rate regular, Rhythm regular,  ,  ,       Resp: Breath sounds clear, No accessory muscles use,    Abd: Soft, -distended, Non-tender, Bowel sounds normal,  PEG+     Skin: Warm, No peripheral edema of lower extremities,  ,       : No carter     Neuro: Not moving ext, atrophic muscles    Psych: opens eyes              ASSESSMENT AND PLAN:           NEURO:     ALS - Stable. non formulary medication -- Riluzole. Discussing with pharmacy to resume or will request family to bring in.   Patient on diazepam 5mg solution Q12h via PEG at home, continue  Eyedrops as patient takes at home for exposure keratitis      RESPIRATORY:   Chronic respiratory failure secondary to neuromuscular weakness, underlying ALS - mechanical ventilation AC-/14/40%/5  Monitor Spo2, maintained >95%. Vent bundle. Check CXR. Bronchodilators as neeeded, pulmonary toilet  Has size 7 XLT shiley, planned for trach exchange in OR tomorrow by Dr. Owens.        CARDIOVASCULAR:     Hemodynamically stable - Not on pressors. Continue hemodynamic monitoring.     Telemetry (medical test) - Reviewed by me today independently. Normal sinus rhythm.           RENAL:     Stable - Monitor IOs and electrolytes. Keep K above 4.0 and Mg above 2.0.           GASTROINTESTINAL:     GI prophylaxis not indicated     Zofran and Reglan IV PRN for nausea     PEG tube feeds, bowel regimen               HEMATOLOGIC:     No signs of active bleeding. Monitor Hgb in CBC in AM     DVT prophylaxis with Xarelto. Last dose was given to her 7/31AM. Hold further doses pending OR tomorrow. Check PT/PTT.          INFECTIOUS DISEASE:     No signs of active infection. Will monitor for fever and leukocytosis.           ENDOCRINE:     Stable – Monitor glucose fingersticks for goal 120-180.             Pertinent clinical, laboratory, radiographic, hemodynamic,  respiratory data, microbiologic data and chart were reviewed by myself and analyzed frequently throughout the course of the day and night by myself.     Plan discussed at length with the CTICU staff and Attending CT Surgeon -   Dr. Mervin Owens           ________________________________________________       _________________________  VITAL SIGNS:  Vital Signs Last 24 Hrs  T(C): 35.8 (01 Aug 2022 12:00), Max: 36.8 (01 Aug 2022 08:00)  T(F): 96.4 (01 Aug 2022 12:00), Max: 98.2 (01 Aug 2022 08:00)  HR: 90 (01 Aug 2022 15:24) (80 - 101)  BP: 106/57 (01 Aug 2022 15:00) (92/57 - 159/77)  BP(mean): 72 (01 Aug 2022 15:00) (69 - 123)  RR: 14 (01 Aug 2022 15:00) (12 - 14)  SpO2: 100% (01 Aug 2022 15:24) (96% - 100%)    Parameters below as of 01 Aug 2022 15:00  Patient On (Oxygen Delivery Method): ventilator      I/Os:   I&O's Detail    31 Jul 2022 07:01  -  01 Aug 2022 07:00  --------------------------------------------------------  IN:    Jevity 1.2: 210 mL    Lactated Ringers Bolus: 500 mL  Total IN: 710 mL    OUT:  Total OUT: 0 mL    Total NET: 710 mL      01 Aug 2022 07:01  -  01 Aug 2022 15:52  --------------------------------------------------------  IN:    Jevity 1.2: 560 mL    Oral Fluid: 120 mL  Total IN: 680 mL    OUT:    Incontinent per Collection Bag (mL): 1 mL  Total OUT: 1 mL    Total NET: 679 mL          Mode: AC/ CMV (Assist Control/ Continuous Mandatory Ventilation)  RR (machine): 14  TV (machine): 400  FiO2: 40  PEEP: 5  ITime: 0.64  MAP: 9  PIP: 29      MEDICATIONS:  MEDICATIONS  (STANDING):  chlorhexidine 0.12% Liquid 15 milliLiter(s) Oral Mucosa every 12 hours  diazepam    Tablet 2 milliGRAM(s) Oral at bedtime  levalbuterol Inhalation 0.63 milliGRAM(s) Inhalation every 6 hours  polyethylene glycol 3350 17 Gram(s) Oral daily  senna Syrup 5 milliLiter(s) Oral daily  sertraline 50 milliGRAM(s) Oral daily  sodium chloride 3%  Inhalation 4 milliLiter(s) Inhalation every 12 hours    MEDICATIONS  (PRN):      LABS:  Laboratory data was independently reviewed by me today.                           11.4   12.15 )-----------( 351      ( 01 Aug 2022 03:30 )             37.5     08-01    139  |  104  |  14  ----------------------------<  117<H>  3.9   |  21<L>  |  <0.20<L>    Ca    9.2      01 Aug 2022 03:30  Phos  4.2     08-01  Mg     2.00     08-01    TPro  7.5  /  Alb  3.8  /  TBili  0.5  /  DBili  x   /  AST  23  /  ALT  27  /  AlkPhos  101  08-01    LIVER FUNCTIONS - ( 01 Aug 2022 03:30 )  Alb: 3.8 g/dL / Pro: 7.5 g/dL / ALK PHOS: 101 U/L / ALT: 27 U/L / AST: 23 U/L / GGT: x           PT/INR - ( 31 Jul 2022 16:33 )   PT: 14.0 sec;   INR: 1.20 ratio         PTT - ( 31 Jul 2022 16:33 )  PTT:33.3 sec        RADIOLOGY:   Radiology images were independently reviewed by me today. Reports were reviewed by me today.    Xray Chest 1 View AP/PA:   ACC: [id number] EXAM:  XR CHEST AP OR PA 1V                          PROCEDURE DATE:  07/31/2022          INTERPRETATION:  Chest one view    HISTORY: Tracheostomy    COMPARISON STUDY: 11/30/2020    Frontal expiratory view of the chest shows the heartto be normal in   size. Tracheostomy tube is again noted.    The lungs show small retrocardiac infiltrate and there is no evidence of   pneumothorax nor definite pleural effusion.    IMPRESSION:  Small left infiltrate. Tracheostomy tube present.        Thank you for the courtesy of this referral.    --- End of Report ---            PREETHI GRIMALDO MD; Attending Interventional Radiologist  This document has been electronically signed. Aug  1 2022  3:04PM (07-31-22 @ 18:54)

## 2022-08-01 NOTE — INPATIENT CERTIFICATION FOR MEDICARE PATIENTS - IN ORDER TO MEET MEDICARE REQUIREMENTS.
In order to meet Medicare requirements, the clinical documentation must support the information cited in the admission order.  Please be sure to provide detailed and clear documentation about the following in the admitting note/history and physical:
In order to meet Medicare requirements, the clinical documentation must support the information cited in the admission order.  Please be sure to provide detailed and clear documentation about the following in the admitting note/history and physical:
yes

## 2022-08-01 NOTE — INPATIENT CERTIFICATION FOR MEDICARE PATIENTS - RISKS OF ADVERSE EVENTS
Concern for cardiopulmonary deterioration/Concern for delay in diagnosis and treatment
Concern for cardiopulmonary deterioration

## 2022-08-01 NOTE — CONSULT NOTE ADULT - SUBJECTIVE AND OBJECTIVE BOX
HPI:  65 F with advanced ALS, parkinson's , chronic hypoxic/ hypercapnic resp failure, vent dependent, s/p trach  and PEG scheduled from tracheostomy exchange. Patient was BIBEMS from home as a preadmission. Pt's 24hr home health nurse and  with patient with paper chart. Patient on ventilator support via size 7 shiley XLT tracheostomy. Patient seen at bedside, nonverbal appears well. Vitals obtained in ambulance stable and WNL, no recent illness or concerns per chart per  and home health aide.     Of note, patient takes Xarelto 10mg via PEG tube daily. last dose today 22 @7am (2022 17:06)      REVIEW OF SYSTEMS:  unable to obtain       Medications:   MEDICATIONS  (STANDING):  chlorhexidine 0.12% Liquid 15 milliLiter(s) Oral Mucosa every 12 hours  diazepam    Tablet 2 milliGRAM(s) Oral at bedtime  levalbuterol Inhalation 0.63 milliGRAM(s) Inhalation every 6 hours  polyethylene glycol 3350 17 Gram(s) Oral daily  riluzole 50 milliGRAM(s) Oral two times a day  senna Syrup 5 milliLiter(s) Oral daily  sertraline 50 milliGRAM(s) Oral daily  sodium chloride 3%  Inhalation 4 milliLiter(s) Inhalation every 12 hours    MEDICATIONS  (PRN):      Allergies    Bactrim DS (Rash)    Intolerances        PAST MEDICAL & SURGICAL HISTORY:  ALS (amyotrophic lateral sclerosis)      HLD (hyperlipidemia)      Ventilator dependent  Since       Aspiration into airway      S/P gastrostomy  10/14 for dysphagia  receives jevity 2 cans TID      Dependence on tracheostomy      Encounter for PEG (percutaneous endoscopic gastrostomy)  peg placed          Social :    No smoking       No ETOH use            Vital Signs Last 24 Hrs  T(C): 35.8 (01 Aug 2022 12:00), Max: 36.8 (01 Aug 2022 08:00)  T(F): 96.4 (01 Aug 2022 12:00), Max: 98.2 (01 Aug 2022 08:00)  HR: 104 (01 Aug 2022 17:00) (80 - 104)  BP: 130/65 (01 Aug 2022 17:00) (92/57 - 135/65)  BP(mean): 83 (01 Aug 2022 17:00) (69 - 123)  RR: 14 (01 Aug 2022 17:00) (14 - 14)  SpO2: 100% (01 Aug 2022 17:00) (96% - 100%)    Parameters below as of 01 Aug 2022 17:00  Patient On (Oxygen Delivery Method): ventilator      CAPILLARY BLOOD GLUCOSE      POCT Blood Glucose.: 104 mg/dL (2022 21:58)       @ : @ 07:00  --------------------------------------------------------  IN: 710 mL / OUT: 0 mL / NET: 710 mL     @ : @ 18:13  --------------------------------------------------------  IN: 820 mL / OUT: 1 mL / NET: 819 mL        Physical Exam:    Daily     Daily Weight in k.1 (01 Aug 2022 03:00)  General: nonverbal   HEENT:  trache in place   CV:  RRR, no murmur, no JVD  Lungs:  ronchi B   Abdomen:  Soft, peg in place   Extremities:  no edema   Neuro:  does not follow commands   LABS:                        11.4   12.15 )-----------( 351      ( 01 Aug 2022 03:30 )             37.5     08-    139  |  104  |  14  ----------------------------<  117<H>  3.9   |  21<L>  |  <0.20<L>    Ca    9.2      01 Aug 2022 03:30  Phos  4.2       Mg     2.00         TPro  7.5  /  Alb  3.8  /  TBili  0.5  /  DBili  x   /  AST  23  /  ALT  27  /  AlkPhos  101  08-    PT/INR - ( 2022 16:33 )   PT: 14.0 sec;   INR: 1.20 ratio         PTT - ( 2022 16:33 )  PTT:33.3 sec            RADIOLOGY & ADDITIONAL TESTS:    ---------------------------------------------------------------------------  I personally reviewed: [  ]EKG   [  ]CXR    [  ] CT    [  ]Other  ---------------------------------------------------------------------------

## 2022-08-01 NOTE — CONSULT NOTE ADULT - ASSESSMENT
65 F with advanced ALS, parkinson's , chronic hypoxic/ hypercapnic resp failure, vent dependent, s/p trach 2015 and PEG scheduled from tracheostomy exchange. Patient was BIBEMS from home as a preadmission. Pt's 24hr home health nurse and  with patient with paper chart. Patient on ventilator support via size 7 shiley XLT tracheostomy. Patient seen at bedside, nonverbal appears well. Vitals obtained in ambulance stable and WNL, no recent illness or concerns per chart per  and home health aide.     - Respiratory failure: planned trache exchange... on hold for now .. pt had been on AC     - hx of parkinson's : will clarify with neuro and pmd ... unclear why pt was on AC ..? ppx ?    will discuss with Dr. Ruiz  65 F with advanced ALS, parkinson's , chronic hypoxic/ hypercapnic resp failure, vent dependent, s/p trach 2015 and PEG scheduled from tracheostomy exchange. Patient was BIBEMS from home as a preadmission. Pt's 24hr home health nurse and  with patient with paper chart. Patient on ventilator support via size 7 shiley XLT tracheostomy. Patient seen at bedside, nonverbal appears well. Vitals obtained in ambulance stable and WNL, no recent illness or concerns per chart per  and home health aide.     - Respiratory failure: planned trache exchange... on hold for now .. pt had been on AC   - leukocytosis: high risk for infection .. CXR : ? infiltrate.. would check rectal temp   low threshold for starting abx   will discuss with Dr. Owens  - hx of parkinson's : will clarify with neuro and pmd ... unclear why pt was on AC ..? ppx ?    will discuss with Dr. Ruiz

## 2022-08-02 ENCOUNTER — TRANSCRIPTION ENCOUNTER (OUTPATIENT)
Age: 67
End: 2022-08-02

## 2022-08-02 ENCOUNTER — APPOINTMENT (OUTPATIENT)
Dept: THORACIC SURGERY | Facility: HOSPITAL | Age: 67
End: 2022-08-02

## 2022-08-02 VITALS
HEART RATE: 97 BPM | OXYGEN SATURATION: 100 % | DIASTOLIC BLOOD PRESSURE: 63 MMHG | RESPIRATION RATE: 14 BRPM | SYSTOLIC BLOOD PRESSURE: 122 MMHG

## 2022-08-02 LAB
ANION GAP SERPL CALC-SCNC: 13 MMOL/L — SIGNIFICANT CHANGE UP (ref 7–14)
APTT BLD: 30.6 SEC — SIGNIFICANT CHANGE UP (ref 27–36.3)
BUN SERPL-MCNC: 10 MG/DL — SIGNIFICANT CHANGE UP (ref 7–23)
CALCIUM SERPL-MCNC: 9.4 MG/DL — SIGNIFICANT CHANGE UP (ref 8.4–10.5)
CHLORIDE SERPL-SCNC: 101 MMOL/L — SIGNIFICANT CHANGE UP (ref 98–107)
CO2 SERPL-SCNC: 23 MMOL/L — SIGNIFICANT CHANGE UP (ref 22–31)
CREAT SERPL-MCNC: <0.2 MG/DL — LOW (ref 0.5–1.3)
EGFR: 127 ML/MIN/1.73M2 — SIGNIFICANT CHANGE UP
GLUCOSE BLDC GLUCOMTR-MCNC: 174 MG/DL — HIGH (ref 70–99)
GLUCOSE SERPL-MCNC: 174 MG/DL — HIGH (ref 70–99)
GRAM STN FLD: SIGNIFICANT CHANGE UP
GRAM STN FLD: SIGNIFICANT CHANGE UP
HCT VFR BLD CALC: 40.5 % — SIGNIFICANT CHANGE UP (ref 34.5–45)
HCT VFR BLD CALC: 43.3 % — SIGNIFICANT CHANGE UP (ref 34.5–45)
HGB BLD-MCNC: 11.8 G/DL — SIGNIFICANT CHANGE UP (ref 11.5–15.5)
HGB BLD-MCNC: 13.3 G/DL — SIGNIFICANT CHANGE UP (ref 11.5–15.5)
INR BLD: 1.14 RATIO — SIGNIFICANT CHANGE UP (ref 0.88–1.16)
LMWH PPP CHRO-ACNC: 0.04 IU/ML — LOW (ref 0.5–1)
MAGNESIUM SERPL-MCNC: 2.1 MG/DL — SIGNIFICANT CHANGE UP (ref 1.6–2.6)
MCHC RBC-ENTMCNC: 25.9 PG — LOW (ref 27–34)
MCHC RBC-ENTMCNC: 26.9 PG — LOW (ref 27–34)
MCHC RBC-ENTMCNC: 29.1 GM/DL — LOW (ref 32–36)
MCHC RBC-ENTMCNC: 30.7 GM/DL — LOW (ref 32–36)
MCV RBC AUTO: 87.5 FL — SIGNIFICANT CHANGE UP (ref 80–100)
MCV RBC AUTO: 89 FL — SIGNIFICANT CHANGE UP (ref 80–100)
NIGHT BLUE STAIN TISS: SIGNIFICANT CHANGE UP
NIGHT BLUE STAIN TISS: SIGNIFICANT CHANGE UP
NRBC # BLD: 0 /100 WBCS — SIGNIFICANT CHANGE UP
NRBC # BLD: 0 /100 WBCS — SIGNIFICANT CHANGE UP
NRBC # FLD: 0 K/UL — SIGNIFICANT CHANGE UP
NRBC # FLD: 0 K/UL — SIGNIFICANT CHANGE UP
PHOSPHATE SERPL-MCNC: 2.7 MG/DL — SIGNIFICANT CHANGE UP (ref 2.5–4.5)
PLATELET # BLD AUTO: 360 K/UL — SIGNIFICANT CHANGE UP (ref 150–400)
PLATELET # BLD AUTO: 369 K/UL — SIGNIFICANT CHANGE UP (ref 150–400)
POTASSIUM SERPL-MCNC: 3.9 MMOL/L — SIGNIFICANT CHANGE UP (ref 3.5–5.3)
POTASSIUM SERPL-SCNC: 3.9 MMOL/L — SIGNIFICANT CHANGE UP (ref 3.5–5.3)
PROTHROM AB SERPL-ACNC: 13.3 SEC — SIGNIFICANT CHANGE UP (ref 10.5–13.4)
RBC # BLD: 4.55 M/UL — SIGNIFICANT CHANGE UP (ref 3.8–5.2)
RBC # BLD: 4.95 M/UL — SIGNIFICANT CHANGE UP (ref 3.8–5.2)
RBC # FLD: 17.2 % — HIGH (ref 10.3–14.5)
RBC # FLD: 17.8 % — HIGH (ref 10.3–14.5)
SODIUM SERPL-SCNC: 137 MMOL/L — SIGNIFICANT CHANGE UP (ref 135–145)
SPECIMEN SOURCE: SIGNIFICANT CHANGE UP
WBC # BLD: 19.11 K/UL — HIGH (ref 3.8–10.5)
WBC # BLD: 19.43 K/UL — HIGH (ref 3.8–10.5)
WBC # FLD AUTO: 19.11 K/UL — HIGH (ref 3.8–10.5)
WBC # FLD AUTO: 19.43 K/UL — HIGH (ref 3.8–10.5)

## 2022-08-02 PROCEDURE — 31645 BRNCHSC W/THER ASPIR 1ST: CPT

## 2022-08-02 PROCEDURE — 71045 X-RAY EXAM CHEST 1 VIEW: CPT | Mod: 26

## 2022-08-02 PROCEDURE — 31502 CHANGE OF WINDPIPE AIRWAY: CPT

## 2022-08-02 PROCEDURE — 99233 SBSQ HOSP IP/OBS HIGH 50: CPT

## 2022-08-02 DEVICE — IMPLANTABLE DEVICE: Type: IMPLANTABLE DEVICE | Status: FUNCTIONAL

## 2022-08-02 RX ORDER — ACETAMINOPHEN 500 MG
750 TABLET ORAL ONCE
Refills: 0 | Status: DISCONTINUED | OUTPATIENT
Start: 2022-08-02 | End: 2022-08-02

## 2022-08-02 RX ORDER — LEVOFLOXACIN 5 MG/ML
1 INJECTION, SOLUTION INTRAVENOUS
Qty: 5 | Refills: 0
Start: 2022-08-02 | End: 2022-08-06

## 2022-08-02 RX ORDER — RIVAROXABAN 15 MG-20MG
10 KIT ORAL DAILY
Refills: 0 | Status: DISCONTINUED | OUTPATIENT
Start: 2022-08-02 | End: 2022-08-02

## 2022-08-02 RX ORDER — CLARITHROMYCIN 500 MG
1 TABLET ORAL
Qty: 20 | Refills: 0
Start: 2022-08-02 | End: 2022-08-11

## 2022-08-02 RX ADMIN — CHLORHEXIDINE GLUCONATE 15 MILLILITER(S): 213 SOLUTION TOPICAL at 05:48

## 2022-08-02 RX ADMIN — LEVALBUTEROL 0.63 MILLIGRAM(S): 1.25 SOLUTION, CONCENTRATE RESPIRATORY (INHALATION) at 15:48

## 2022-08-02 RX ADMIN — RILUZOLE 50 MILLIGRAM(S): 50 TABLET ORAL at 06:09

## 2022-08-02 RX ADMIN — Medication 1: at 06:07

## 2022-08-02 NOTE — BRIEF OPERATIVE NOTE - NSICDXBRIEFPROCEDURE_GEN_ALL_CORE_FT
PROCEDURES:  Bronchoscopy, with BAL 02-Aug-2022 09:40:41  Chyna Culp  Replace trach tube 02-Aug-2022 09:41:57 Tracheostomy tube upsized Chyna Culp

## 2022-08-02 NOTE — DISCHARGE NOTE NURSING/CASE MANAGEMENT/SOCIAL WORK - NSDCFUADDAPPT_GEN_ALL_CORE_FT
Please, call Thoracic Surgery office at [phone] and schedule a follow-up appointment with your surgeon-Dr. Owens.

## 2022-08-02 NOTE — DIETITIAN INITIAL EVALUATION ADULT - NUTRITIONGOAL OUTCOME1
pt to resume enteral nutrition support and to continue home TF regimen.  Pt to meet current protein needs by addition of protein module.

## 2022-08-02 NOTE — DISCHARGE NOTE NURSING/CASE MANAGEMENT/SOCIAL WORK - NSPROEXTENSIONSOFSELF_GEN_A_NUR
ENT/Head and Neck Surgical Progress Note    Procedure:  Tracheostomy 9/6/19    Subjective:  Patient doing well overall.  Tolerating PMSV.  Reports increased secretions, but denies shortness of breath.  Eager to start swallowing.    WBC (K/mcL)   Date Value   09/10/2019 5.1      HCT (%)   Date Value   09/10/2019 34.6 (L)       PHYSICAL EXAM:  Visit Vitals  /71 (BP Location: [facility] [place], Patient Position: Sitting)   Pulse 106   Temp 98.3 °F (36.8 °C) (Oral)   Resp 20   Ht 5' 9\" (1.753 m)   Wt 65 kg   SpO2 95%   BMI 21.16 kg/m²       General: Well-developed, well-nourished male. In no acute distress.  Skin: warm with normal turgor  Head: atraumatic and normocephalic  Face:Normal appearance. Facial movement symmetric.   Eyes: eyelids and conjunctiva appear normal, no excessive tearing or discharge. Ocular mobility is normal.  Ears: Assessment of hearing with conversational voice normal. External inspection of the ears normal.   Nose: DHT in place  Neck: Shiley 6 cuffed trach in place with sutures. No bleeding or drainage.  Respiratory:  Symmetric chest movement, no excessive respiratory effort, no use of accessory muscles.    Procedure Name:  Trach tube change  The #6 cuffless trach was prepped.  The sutures were removed and the cuffed trach was removed from the stoma.  The new #6 cuffless trach was placed in the open stoma with the aid of the stylet.  Trach ties were reapplied and the inner cannula was inserted.  No immediate complications.  O2 stats remained >98% the entire procedure.    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 Currently undergoing palliative immunotherapy.    Plan:  1. Trach switched to a #6 cuffless today - will go home with this in place - home health orders placed  2. ST - continue PMSV, bedside swallow eval and likely video wallow to determine what is safe to eat at home, hopefully remove NG tomorrow pending these results  3. RT - routine  trach cares and d/c trach teaching  4. Will continue to follow  5. Prepare for possible d/c Thursday with just trach in place    JACQUE Khan  Supervising physician: Billy Cortes MD  Pager: [phone]     none

## 2022-08-02 NOTE — DISCHARGE NOTE PROVIDER - CARE PROVIDER_API CALL
Mervin Owens)  Surgery; Thoracic Surgery  [phone] [place] [address], [place] [address]  Phone: [phone]  Fax: [phone]  Follow Up Time:

## 2022-08-02 NOTE — DISCHARGE NOTE PROVIDER - NSDCCPCAREPLAN_GEN_ALL_CORE_FT
PRINCIPAL DISCHARGE DIAGNOSIS  Diagnosis: Chronic respiratory failure with hypoxia and hypercapnia  Assessment and Plan of Treatment:

## 2022-08-02 NOTE — DISCHARGE NOTE PROVIDER - HOSPITAL COURSE
65 F with advanced ALS, chronic hypoxic/ hypercapnic resp failure, vent dependent, s/p trach 2015 and PEG scheduled from tracheostomy exchange. Patient was BIBEMS from home as a preadmission. Pt's 24hr home health nurse and  with patient with paper chart. Patient on ventilator support via size 7 shiley XLT tracheostomy. Patient seen at bedside, nonverbal appears well. Vitals obtained in ambulance stable and WNL, no recent illness or concerns per  and home health aide.  Of note, patient takes Xarelto 10mg via PEG tube daily. last dose today 7/31/22 @7am.  Patient underwent Flexible bronchoscopy, bronchoalveolar lavage from right and left bronchial trees and Tracheostomy tube upsized to Shiley 8 XLT cuffed distal with disposable inner cannula. She was discharged home on POD#0 in stable condition with one week course of antibiotic for elevated white count.

## 2022-08-02 NOTE — DIETITIAN INITIAL EVALUATION ADULT - ETIOLOGY
related to increased demand for nutrients-protein related to pt meets criteria for severe malnutrition in the context of chronic illness

## 2022-08-02 NOTE — DISCHARGE NOTE NURSING/CASE MANAGEMENT/SOCIAL WORK - NSDCPEFALRISK_GEN_ALL_CORE
For information on Fall & Injury Prevention, visit: [email or web address] OR  [email or web address] OR  https://www.cdc.gov/steadi/patient.html

## 2022-08-02 NOTE — DIETITIAN INITIAL EVALUATION ADULT - ENTERAL
Resume Jevity 1.2 @70mL/h x12h/d to provide 1008kcal w/46gm protein and add 1 pack no carb prosource/d to increase protein intake to 61gms/d to meet protein needs.

## 2022-08-02 NOTE — DIETITIAN INITIAL EVALUATION ADULT - NSFNSGIIOFT_GEN_A_CORE
08-01-22 @ 07:01  -  08-02-22 @ 07:00  --------------------------------------------------------  OUT:  Total OUT: 0 mL    Total NET: 1050 mL

## 2022-08-02 NOTE — DIETITIAN INITIAL EVALUATION ADULT - PERTINENT LABORATORY DATA
08-02    137  |  101  |  10  ----------------------------<  174<H>  3.9   |  23  |  <0.20<L>    Ca    9.4      02 Aug 2022 04:44  Phos  2.7     08-02  Mg     2.10     08-02    TPro  7.5  /  Alb  3.8  /  TBili  0.5  /  DBili  x   /  AST  23  /  ALT  27  /  AlkPhos  101  08-01  POCT Blood Glucose.: 174 mg/dL (08-02-22 @ 06:04)  A1C with Estimated Average Glucose Result: 5.1 % (07-31-22 @ 16:33)

## 2022-08-02 NOTE — PROGRESS NOTE ADULT - ASSESSMENT
65 F with advanced ALS, parkinson's , chronic hypoxic/ hypercapnic resp failure, vent dependent, s/p trach 2015 and PEG scheduled from tracheostomy exchange. Patient was BIBEMS from home as a preadmission. Pt's 24hr home health nurse and  with patient with paper chart. Patient on ventilator support via size 7 shiley XLT tracheostomy. Patient seen at bedside, nonverbal appears well. Vitals obtained in ambulance stable and WNL, no recent illness or concerns per chart per  and home health aide.     - Respiratory failure: planned trache exchange... discused with Dr. Owens : planned today   - leukocytosis: high risk for infection .. CXR : ? infiltrate.. would check rectal temp, pan culture and consider abx  : defer to primary team /.d/w pul      reports her xarelto was ppx   cont off for now   restart when cleared by Dr. Owens   d/w  with  , Dr. Ruiz , pul and neuro

## 2022-08-02 NOTE — DIETITIAN INITIAL EVALUATION ADULT - SIGNS/SYMPTOMS
as evidenced by tube feeding not meeting protein needs.  as evidenced by hx ALS, TF dependent, loss of muscle mass and fat stores

## 2022-08-02 NOTE — DIETITIAN NUTRITION RISK NOTIFICATION - TREATMENT: THE FOLLOWING DIET HAS BEEN RECOMMENDED
Diet, NPO with Tube Feed:   Tube Feeding Modality: Gastrostomy  Jevity 1.2 Marck (JEVITY1.2RTH)  Total Volume for 24 Hours (mL): 840  Continuous  Starting Tube Feed Rate {mL per Hour}: 70  Until Goal Tube Feed Rate (mL per Hour): 70  Tube Feed Duration (in Hours): 12  Tube Feed Start Time: 20:00  Tube Feed Stop Time: 08:00  No Carb Prosource (1pkg = 15gms Protein)     Qty per Day:  1 (08-02-22 @ 10:33) [Active]

## 2022-08-02 NOTE — DISCHARGE NOTE PROVIDER - NSDCMRMEDTOKEN_GEN_ALL_CORE_FT
Chronic Respiratory Failure : Chronic Respiratory Failure 2/2 ALS   ICD10 Code: J96.10 / G12.21    # 7 Distal XLT Rowenalinda Cuffed / Dispense: 1  30 Inner Cannulas     Refills: 5   diazePAM 5 mg/5 mL oral solution: 1.5 milliliter(s) by gastrostomy tube once a day at 8 am   diazePAM 5 mg/5 mL oral solution: 2.5 milliliter(s) by gastrostomy tube once a day at 9 pm   Eyemycin 0.5% ophthalmic ointment: 1 application to both eyes 6 times a day  To be given at (02:00, 06:00, 10:00, 14:00, 18:00 and 22:00)   ipratropium 500 mcg/2.5 mL inhalation solution: 2.5 milliliter(s) inhaled every 6 hours  Lacri-Lube S.O.P. ophthalmic ointment: 1 application to each affected eye 6 times a day  To be given at ( 00:00, 04:00, 08:00,12:00,16:00, 20:00)    levalbuterol 0.63 mg/3 mL inhalation solution: 3 milliliter(s) inhaled every 6 hours  levoFLOXacin 500 mg oral tablet: 1 tab(s) by gastrostomy tube once a day MDD:1   Multiple Vitamins oral tablet: 1 tab(s) by gastrostomy tube once a day  nystatin 100,000 units/g topical powder: 1 application topically 2 times a day to groin MDD:2 michael. Continue till resolution  ocular lubricant ophthalmic solution: 1 drop(s) to each affected eye every 6 hours  ofloxacin 0.3% ophthalmic solution: 1 drop(s) to each both eyes 4 times a day  polyethylene glycol 3350 oral powder for reconstitution: 17 gram(s) by gastrostomy tube 2 times a day  riluzole 50 mg oral tablet: 1 tab(s) by gastrostomy tube 2 times a day (before meals)  senna 8.8 mg/5 mL oral syrup: 2.5 milliliter(s) by gastrostomy tube once a day  sertraline 50 mg oral tablet: 1 tab(s) by gastrostomy tube once a day  Xarelto 10 mg oral tablet: 1 tab(s) by gastrostomy tube once a day

## 2022-08-02 NOTE — DIETITIAN INITIAL EVALUATION ADULT - ADD RECOMMEND
1) Monitor weights, labs, BM's, skin integrity, FS, tolerance to TF; 2) Add protein module to meet protein needs.

## 2022-08-02 NOTE — PROGRESS NOTE ADULT - SUBJECTIVE AND OBJECTIVE BOX
Date of service: 22 @ 15:48      Patient is a 67y old  Female who presents with a chief complaint of Tracheostomy exchange (02 Aug 2022 14:50)                                                               INTERVAL HPI/OVERNIGHT EVENTS:    REVIEW OF SYSTEMS:   bedbound     nonverbal   does not follow commands                                                                                                                                                                                                                                                                                  Medications:  MEDICATIONS  (STANDING):  acetaminophen   IVPB .. 750 milliGRAM(s) IV Intermittent once  acetaminophen   IVPB .. 750 milliGRAM(s) IV Intermittent once  chlorhexidine 0.12% Liquid 15 milliLiter(s) Oral Mucosa every 12 hours  dextrose 5% + lactated ringers. 1000 milliLiter(s) (30 mL/Hr) IV Continuous <Continuous>  diazepam    Tablet 2 milliGRAM(s) Oral at bedtime  insulin lispro (ADMELOG) corrective regimen sliding scale   SubCutaneous every 6 hours  levalbuterol Inhalation 0.63 milliGRAM(s) Inhalation every 6 hours  polyethylene glycol 3350 17 Gram(s) Oral daily  riluzole 50 milliGRAM(s) Oral two times a day  rivaroxaban 10 milliGRAM(s) Enteral Tube daily  senna Syrup 5 milliLiter(s) Oral daily  sertraline 50 milliGRAM(s) Oral daily  sodium chloride 3%  Inhalation 4 milliLiter(s) Inhalation every 12 hours    MEDICATIONS  (PRN):       Allergies    Bactrim DS (Rash)    Intolerances      Vital Signs Last 24 Hrs  T(C): 36.6 (02 Aug 2022 12:00), Max: 37.5 (01 Aug 2022 20:00)  T(F): 97.8 (02 Aug 2022 12:00), Max: 99.5 (01 Aug 2022 20:00)  HR: 84 (02 Aug 2022 14:00) (81 - 108)  BP: 131/62 (02 Aug 2022 14:00) (110/57 - 181/92)  BP(mean): 81 (02 Aug 2022 14:00) (72 - 115)  RR: 14 (02 Aug 2022 14:00) (14 - 14)  SpO2: 100% (02 Aug 2022 14:00) (97% - 100%)    Parameters below as of 02 Aug 2022 15:00  Patient On (Oxygen Delivery Method): ventilator      CAPILLARY BLOOD GLUCOSE      POCT Blood Glucose.: 174 mg/dL (02 Aug 2022 06:04)  POCT Blood Glucose.: 180 mg/dL (01 Aug 2022 23:27)      08-01 @ 07:  -   @ 07:00  --------------------------------------------------------  IN: 1725 mL / OUT: 1 mL / NET: 1724 mL     @ 07:  -   @ 15:48  --------------------------------------------------------  IN: 375 mL / OUT: 0 mL / NET: 375 mL      Physical Exam:    Daily     Daily Weight in k.2 (02 Aug 2022 07:00)  General: nonverbal   HEENT:  trache in place   CV:  RRR, S1S2   Lungs:  CTA   Abdomen:  peg in place   Extremities: no edema   =                                                                                                                                                                                                                                                                                              LABS:                               13.3   19.11 )-----------( 369      ( 02 Aug 2022 12:57 )             43.3                      08-02    137  |  101  |  10  ----------------------------<  174<H>  3.9   |  23  |  <0.20<L>    Ca    9.4      02 Aug 2022 04:44  Phos  2.7     08-  Mg     2.10         TPro  7.5  /  Alb  3.8  /  TBili  0.5  /  DBili  x   /  AST  23  /  ALT  27  /  AlkPhos  101  08-                       RADIOLOGY & ADDITIONAL TESTS         I personally reviewed: [  ]EKG   [  ]CXR    [  ] CT      A/P:         Discussed with :     [employer] consultants' Notes   Time spent :

## 2022-08-02 NOTE — DIETITIAN INITIAL EVALUATION ADULT - ORAL INTAKE PTA/DIET HISTORY
Information obtained from patient's .  Pt is non-verbal. Interview took place while place receiving a procedure at bedside.  Pt has been enteral feed dependent since 10/2014.  Current home TF regimen includes Jevity 1.2  3.5 cans/d delivered via pump from 8PM-8AM through gastrostomy tube.  Pt has been tolerating TF well w/o issues.  BM reported as regular.  Water flushes provided.  Pt receives daily MVI supplement.  Wt has been stable.

## 2022-08-02 NOTE — DIETITIAN NUTRITION RISK NOTIFICATION - BUCCAL DEPLETION IS
FitKit was given to patient on 11/26/2019 9:41 AM, Verbalized understanding on how to collect specimen and the timeframe to return it and where to return it.  Injection received today, informed to sit in clinic 15 minutes, verbalized understanding.     severe

## 2022-08-02 NOTE — DISCHARGE NOTE NURSING/CASE MANAGEMENT/SOCIAL WORK - PATIENT PORTAL LINK FT
You can access the FollowMyHealth Patient Portal offered by [facility] by registering at the following website: [email or web address]. By joining [facility]s FollowMyHealth portal, you will also be able to view your health information using other applications (apps) compatible with our system.

## 2022-08-02 NOTE — BRIEF OPERATIVE NOTE - OPERATION/FINDINGS
Flexible bronchoscopy, broncheoalveolar lavage from right and left bronchial trees  Tracheostomy tube upsized to Shiley 8 XLT cuffed distal with disposable inner cannula

## 2022-08-02 NOTE — DIETITIAN INITIAL EVALUATION ADULT - OTHER INFO
65 F with advanced ALS, chronic hypoxic/ hypercapnic resp failure, vent dependent, s/p trach 2015 and PEG scheduled from tracheostomy exchange. Patient was BIBEMS from home as a preadmission. Pt's 24hr home health nurse and  with patient with paper chart. Patient on ventilator support via size 7 shiley XLT tracheostomy. Patient seen at bedside, nonverbal appears well. Vitals obtained in ambulance stable and WNL, no recent illness or concerns per  and home health aide  Review of Systems: Hx of ALS  Non verbal  Trach

## 2022-08-02 NOTE — DISCHARGE NOTE PROVIDER - NSDCFUADDAPPT_GEN_ALL_CORE_FT
Please, call Thoracic Surgery office at [phone] and schedule a follow-up appointment with your surgeon-Dr. Owens.

## 2022-08-02 NOTE — PROGRESS NOTE ADULT - NUTRITIONAL ASSESSMENT
This patient has been assessed with a concern for Malnutrition and has been determined to have a diagnosis/diagnoses of Severe protein-calorie malnutrition.    This patient is being managed with:   Diet NPO with Tube Feed-  Tube Feeding Modality: Gastrostomy  Jevity 1.2 Marck (JEVITY1.2RTH)  Total Volume for 24 Hours (mL): 840  Continuous  Starting Tube Feed Rate {mL per Hour}: 70  Until Goal Tube Feed Rate (mL per Hour): 70  Tube Feed Duration (in Hours): 12  Tube Feed Start Time: 20:00  Tube Feed Stop Time: 08:00  No Carb Prosource (1pkg = 15gms Protein)     Qty per Day:  1  Entered: Aug  2 2022 10:33AM

## 2022-08-02 NOTE — DIETITIAN INITIAL EVALUATION ADULT - PERTINENT MEDS FT
MEDICATIONS  (STANDING):  acetaminophen   IVPB .. 750 milliGRAM(s) IV Intermittent once  chlorhexidine 0.12% Liquid 15 milliLiter(s) Oral Mucosa every 12 hours  dextrose 5% + lactated ringers. 1000 milliLiter(s) (30 mL/Hr) IV Continuous <Continuous>  diazepam    Tablet 2 milliGRAM(s) Oral at bedtime  insulin lispro (ADMELOG) corrective regimen sliding scale   SubCutaneous every 6 hours  levalbuterol Inhalation 0.63 milliGRAM(s) Inhalation every 6 hours  polyethylene glycol 3350 17 Gram(s) Oral daily  riluzole 50 milliGRAM(s) Oral two times a day  senna Syrup 5 milliLiter(s) Oral daily  sertraline 50 milliGRAM(s) Oral daily  sodium chloride 3%  Inhalation 4 milliLiter(s) Inhalation every 12 hours    MEDICATIONS  (PRN):

## 2022-08-04 LAB
-  AMIKACIN: SIGNIFICANT CHANGE UP
-  AMIKACIN: SIGNIFICANT CHANGE UP
-  AZTREONAM: SIGNIFICANT CHANGE UP
-  AZTREONAM: SIGNIFICANT CHANGE UP
-  CEFEPIME: SIGNIFICANT CHANGE UP
-  CEFEPIME: SIGNIFICANT CHANGE UP
-  CEFTAZIDIME: SIGNIFICANT CHANGE UP
-  CEFTAZIDIME: SIGNIFICANT CHANGE UP
-  CIPROFLOXACIN: SIGNIFICANT CHANGE UP
-  CIPROFLOXACIN: SIGNIFICANT CHANGE UP
-  GENTAMICIN: SIGNIFICANT CHANGE UP
-  GENTAMICIN: SIGNIFICANT CHANGE UP
-  IMIPENEM: SIGNIFICANT CHANGE UP
-  IMIPENEM: SIGNIFICANT CHANGE UP
-  LEVOFLOXACIN: SIGNIFICANT CHANGE UP
-  LEVOFLOXACIN: SIGNIFICANT CHANGE UP
-  MEROPENEM: SIGNIFICANT CHANGE UP
-  MEROPENEM: SIGNIFICANT CHANGE UP
-  PIPERACILLIN/TAZOBACTAM: SIGNIFICANT CHANGE UP
-  PIPERACILLIN/TAZOBACTAM: SIGNIFICANT CHANGE UP
-  TOBRAMYCIN: SIGNIFICANT CHANGE UP
-  TOBRAMYCIN: SIGNIFICANT CHANGE UP
METHOD TYPE: SIGNIFICANT CHANGE UP
METHOD TYPE: SIGNIFICANT CHANGE UP

## 2022-08-05 LAB
-  AMIKACIN: SIGNIFICANT CHANGE UP
-  AMOXICILLIN/CLAVULANIC ACID: SIGNIFICANT CHANGE UP
-  AMPICILLIN/SULBACTAM: SIGNIFICANT CHANGE UP
-  AMPICILLIN: SIGNIFICANT CHANGE UP
-  AZTREONAM: SIGNIFICANT CHANGE UP
-  CEFAZOLIN: SIGNIFICANT CHANGE UP
-  CEFEPIME: SIGNIFICANT CHANGE UP
-  CEFOXITIN: SIGNIFICANT CHANGE UP
-  CEFTAZIDIME/AVIBACTAM: SIGNIFICANT CHANGE UP
-  CEFTOLOZANE/TAZOBACTAM: SIGNIFICANT CHANGE UP
-  CEFTRIAXONE: SIGNIFICANT CHANGE UP
-  CIPROFLOXACIN: SIGNIFICANT CHANGE UP
-  ERTAPENEM: SIGNIFICANT CHANGE UP
-  GENTAMICIN: SIGNIFICANT CHANGE UP
-  IMIPENEM: SIGNIFICANT CHANGE UP
-  LEVOFLOXACIN: SIGNIFICANT CHANGE UP
-  MEROPENEM: SIGNIFICANT CHANGE UP
-  PIPERACILLIN/TAZOBACTAM: SIGNIFICANT CHANGE UP
-  TOBRAMYCIN: SIGNIFICANT CHANGE UP
-  TRIMETHOPRIM/SULFAMETHOXAZOLE: SIGNIFICANT CHANGE UP
CULTURE RESULTS: SIGNIFICANT CHANGE UP
METHOD TYPE: SIGNIFICANT CHANGE UP
ORGANISM # SPEC MICROSCOPIC CNT: SIGNIFICANT CHANGE UP
SPECIMEN SOURCE: SIGNIFICANT CHANGE UP

## 2022-08-06 LAB
CULTURE RESULTS: SIGNIFICANT CHANGE UP
ORGANISM # SPEC MICROSCOPIC CNT: SIGNIFICANT CHANGE UP
ORGANISM # SPEC MICROSCOPIC CNT: SIGNIFICANT CHANGE UP
SPECIMEN SOURCE: SIGNIFICANT CHANGE UP

## 2022-08-23 ENCOUNTER — RX RENEWAL (OUTPATIENT)
Age: 67
End: 2022-08-23

## 2022-08-31 LAB
CULTURE RESULTS: SIGNIFICANT CHANGE UP
CULTURE RESULTS: SIGNIFICANT CHANGE UP
SPECIMEN SOURCE: SIGNIFICANT CHANGE UP
SPECIMEN SOURCE: SIGNIFICANT CHANGE UP

## 2022-09-05 ENCOUNTER — RX RENEWAL (OUTPATIENT)
Age: 67
End: 2022-09-05

## 2022-09-09 ENCOUNTER — RX RENEWAL (OUTPATIENT)
Age: 67
End: 2022-09-09

## 2022-10-07 RX ORDER — PNEUMOCOCCAL 20-VALENT CONJUGATE VACCINE 2.2; 2.2; 2.2; 2.2; 2.2; 2.2; 2.2; 2.2; 2.2; 2.2; 2.2; 2.2; 2.2; 2.2; 2.2; 2.2; 4.4; 2.2; 2.2; 2.2 UG/.5ML; UG/.5ML; UG/.5ML; UG/.5ML; UG/.5ML; UG/.5ML; UG/.5ML; UG/.5ML; UG/.5ML; UG/.5ML; UG/.5ML; UG/.5ML; UG/.5ML; UG/.5ML; UG/.5ML; UG/.5ML; UG/.5ML; UG/.5ML; UG/.5ML; UG/.5ML
0.5 INJECTION, SUSPENSION INTRAMUSCULAR
Qty: 1 | Refills: 0 | Status: ACTIVE | COMMUNITY
Start: 2022-10-07 | End: 1900-01-01

## 2022-10-07 RX ORDER — MODERNA COVID-19 VACCINE, BIVALENT 25; 25 UG/.5ML; UG/.5ML
50 INJECTION, SUSPENSION INTRAMUSCULAR
Qty: 1 | Refills: 0 | Status: ACTIVE | COMMUNITY
Start: 2022-10-07 | End: 1900-01-01

## 2022-10-07 RX ORDER — INFLUENZA A VIRUS A/VICTORIA/4897/2022 IVR-238 (H1N1) ANTIGEN (FORMALDEHYDE INACTIVATED), INFLUENZA A VIRUS A/DARWIN/9/2021 SAN-010 (H3N2) ANTIGEN (FORMALDEHYDE INACTIVATED), INFLUENZA B VIRUS B/PHUKET/3073/2013 ANTIGEN (FORMALDEHYDE INACTIVATED), AND INFLUENZA B VIRUS B/MICHIGAN/01/2021 ANTIGEN (FORMALDEHYDE INACTIVATED) 60; 60; 60; 60 UG/.7ML; UG/.7ML; UG/.7ML; UG/.7ML
0.7 INJECTION, SUSPENSION INTRAMUSCULAR
Qty: 1 | Refills: 0 | Status: ACTIVE | COMMUNITY
Start: 2022-10-07 | End: 1900-01-01

## 2022-10-12 RX ORDER — ALBUTEROL SULFATE 2.5 MG/3ML
(2.5 MG/3ML) SOLUTION RESPIRATORY (INHALATION)
Qty: 225 | Refills: 5 | Status: ACTIVE | COMMUNITY
Start: 2018-08-21 | End: 1900-01-01

## 2022-10-27 ENCOUNTER — RX RENEWAL (OUTPATIENT)
Age: 67
End: 2022-10-27

## 2022-11-30 DIAGNOSIS — N20.1 CALCULUS OF URETER: ICD-10-CM

## 2022-12-01 LAB
APPEARANCE: CLEAR
BACTERIA: ABNORMAL
BILIRUBIN URINE: NEGATIVE
BLOOD URINE: NEGATIVE
COLOR: YELLOW
GLUCOSE QUALITATIVE U: NEGATIVE
HYALINE CASTS: 0 /LPF
KETONES URINE: NEGATIVE
LEUKOCYTE ESTERASE URINE: NEGATIVE
MICROSCOPIC-UA: NORMAL
NITRITE URINE: NEGATIVE
PH URINE: 7
PROTEIN URINE: NORMAL
RED BLOOD CELLS URINE: 1 /HPF
SPECIFIC GRAVITY URINE: 1.01
SQUAMOUS EPITHELIAL CELLS: 1 /HPF
UROBILINOGEN URINE: NORMAL
WHITE BLOOD CELLS URINE: 2 /HPF

## 2022-12-05 LAB — BACTERIA UR CULT: ABNORMAL

## 2022-12-19 ENCOUNTER — RX RENEWAL (OUTPATIENT)
Age: 67
End: 2022-12-19

## 2022-12-30 RX ORDER — LIDOCAINE HYDROCHLORIDE 20 MG/ML
2 JELLY TOPICAL
Qty: 1 | Refills: 5 | Status: ACTIVE | COMMUNITY
Start: 2019-11-17 | End: 1900-01-01

## 2023-01-18 ENCOUNTER — RX RENEWAL (OUTPATIENT)
Age: 68
End: 2023-01-18

## 2023-02-17 NOTE — PROGRESS NOTE ADULT - PROBLEM SELECTOR PLAN 6
----- Message from Ryne Saha MD sent at 2/17/2023 12:17 PM CST -----  Lipids overall worsened. Recommend Lipitor 10mg daily #30 with 2 refills if agreeable. Repeat lipids in 3 mo.  Schedule appt if pt prefers   Patient with exposure Keratitis likely to inability to close her eyes  Lacrilube and artificial tears changed to standing    Opthalmology called for consult

## 2023-02-18 ENCOUNTER — INPATIENT (INPATIENT)
Facility: HOSPITAL | Age: 68
LOS: 4 days | Discharge: HOME | DRG: 870 | End: 2023-02-23
Attending: SPECIALIST | Admitting: SPECIALIST
Payer: MEDICARE

## 2023-02-18 VITALS
DIASTOLIC BLOOD PRESSURE: 127 MMHG | TEMPERATURE: 100 F | HEART RATE: 128 BPM | SYSTOLIC BLOOD PRESSURE: 162 MMHG | OXYGEN SATURATION: 100 % | RESPIRATION RATE: 19 BRPM

## 2023-02-18 DIAGNOSIS — J96.01 ACUTE RESPIRATORY FAILURE WITH HYPOXIA: ICD-10-CM

## 2023-02-18 DIAGNOSIS — Z93.1 GASTROSTOMY STATUS: Chronic | ICD-10-CM

## 2023-02-18 DIAGNOSIS — Z43.1 ENCOUNTER FOR ATTENTION TO GASTROSTOMY: Chronic | ICD-10-CM

## 2023-02-18 DIAGNOSIS — Z93.0 TRACHEOSTOMY STATUS: Chronic | ICD-10-CM

## 2023-02-18 LAB
ALBUMIN SERPL ELPH-MCNC: 4.1 G/DL — SIGNIFICANT CHANGE UP (ref 3.3–5)
ALBUMIN SERPL ELPH-MCNC: 4.5 G/DL — SIGNIFICANT CHANGE UP (ref 3.3–5)
ALP SERPL-CCNC: 112 U/L — SIGNIFICANT CHANGE UP (ref 40–120)
ALP SERPL-CCNC: 125 U/L — HIGH (ref 40–120)
ALT FLD-CCNC: 59 U/L — HIGH (ref 10–45)
ALT FLD-CCNC: 65 U/L — HIGH (ref 10–45)
ANION GAP SERPL CALC-SCNC: 17 MMOL/L — SIGNIFICANT CHANGE UP (ref 5–17)
ANION GAP SERPL CALC-SCNC: 21 MMOL/L — HIGH (ref 5–17)
ANISOCYTOSIS BLD QL: SLIGHT — SIGNIFICANT CHANGE UP
APPEARANCE UR: ABNORMAL
APTT BLD: 36.1 SEC — HIGH (ref 27.5–35.5)
AST SERPL-CCNC: 69 U/L — HIGH (ref 10–40)
AST SERPL-CCNC: 70 U/L — HIGH (ref 10–40)
BACTERIA # UR AUTO: ABNORMAL
BASE EXCESS BLDV CALC-SCNC: -5.8 MMOL/L — LOW (ref -2–3)
BASOPHILS # BLD AUTO: 0.21 K/UL — HIGH (ref 0–0.2)
BASOPHILS NFR BLD AUTO: 0.8 % — SIGNIFICANT CHANGE UP (ref 0–2)
BILIRUB SERPL-MCNC: 1 MG/DL — SIGNIFICANT CHANGE UP (ref 0.2–1.2)
BILIRUB SERPL-MCNC: 1.1 MG/DL — SIGNIFICANT CHANGE UP (ref 0.2–1.2)
BILIRUB UR-MCNC: NEGATIVE — SIGNIFICANT CHANGE UP
BUN SERPL-MCNC: 16 MG/DL — SIGNIFICANT CHANGE UP (ref 7–23)
BUN SERPL-MCNC: 18 MG/DL — SIGNIFICANT CHANGE UP (ref 7–23)
CA-I SERPL-SCNC: 1.22 MMOL/L — SIGNIFICANT CHANGE UP (ref 1.15–1.33)
CALCIUM SERPL-MCNC: 8.8 MG/DL — SIGNIFICANT CHANGE UP (ref 8.4–10.5)
CALCIUM SERPL-MCNC: 9.6 MG/DL — SIGNIFICANT CHANGE UP (ref 8.4–10.5)
CHLORIDE BLDV-SCNC: 104 MMOL/L — SIGNIFICANT CHANGE UP (ref 96–108)
CHLORIDE SERPL-SCNC: 102 MMOL/L — SIGNIFICANT CHANGE UP (ref 96–108)
CHLORIDE SERPL-SCNC: 102 MMOL/L — SIGNIFICANT CHANGE UP (ref 96–108)
CK SERPL-CCNC: 30 U/L — SIGNIFICANT CHANGE UP (ref 25–170)
CO2 BLDV-SCNC: 26 MMOL/L — SIGNIFICANT CHANGE UP (ref 22–26)
CO2 SERPL-SCNC: 15 MMOL/L — LOW (ref 22–31)
CO2 SERPL-SCNC: 18 MMOL/L — LOW (ref 22–31)
COLOR SPEC: YELLOW — SIGNIFICANT CHANGE UP
CREAT SERPL-MCNC: <0.3 MG/DL — LOW (ref 0.5–1.3)
CREAT SERPL-MCNC: <0.3 MG/DL — LOW (ref 0.5–1.3)
DIFF PNL FLD: NEGATIVE — SIGNIFICANT CHANGE UP
EGFR: 115 ML/MIN/1.73M2 — SIGNIFICANT CHANGE UP
EGFR: 115 ML/MIN/1.73M2 — SIGNIFICANT CHANGE UP
EOSINOPHIL # BLD AUTO: 0.21 K/UL — SIGNIFICANT CHANGE UP (ref 0–0.5)
EOSINOPHIL NFR BLD AUTO: 0.8 % — SIGNIFICANT CHANGE UP (ref 0–6)
EPI CELLS # UR: 0 /HPF — SIGNIFICANT CHANGE UP
GAS PNL BLDA: SIGNIFICANT CHANGE UP
GAS PNL BLDV: 133 MMOL/L — LOW (ref 136–145)
GAS PNL BLDV: SIGNIFICANT CHANGE UP
GAS PNL BLDV: SIGNIFICANT CHANGE UP
GLUCOSE BLDV-MCNC: 282 MG/DL — HIGH (ref 70–99)
GLUCOSE SERPL-MCNC: 196 MG/DL — HIGH (ref 70–99)
GLUCOSE SERPL-MCNC: 260 MG/DL — HIGH (ref 70–99)
GLUCOSE UR QL: ABNORMAL
HCO3 BLDV-SCNC: 24 MMOL/L — SIGNIFICANT CHANGE UP (ref 22–29)
HCT VFR BLD CALC: 43.8 % — SIGNIFICANT CHANGE UP (ref 34.5–45)
HCT VFR BLD CALC: 50 % — HIGH (ref 34.5–45)
HCT VFR BLDA CALC: 44 % — SIGNIFICANT CHANGE UP (ref 34.5–46.5)
HGB BLD CALC-MCNC: 14.5 G/DL — SIGNIFICANT CHANGE UP (ref 11.7–16.1)
HGB BLD-MCNC: 13.8 G/DL — SIGNIFICANT CHANGE UP (ref 11.5–15.5)
HGB BLD-MCNC: 15.2 G/DL — SIGNIFICANT CHANGE UP (ref 11.5–15.5)
HYALINE CASTS # UR AUTO: 5 /LPF — HIGH (ref 0–2)
INR BLD: 1.22 RATIO — HIGH (ref 0.88–1.16)
KETONES UR-MCNC: NEGATIVE — SIGNIFICANT CHANGE UP
LACTATE BLDV-MCNC: 3.2 MMOL/L — HIGH (ref 0.5–2)
LEUKOCYTE ESTERASE UR-ACNC: ABNORMAL
LYMPHOCYTES # BLD AUTO: 18.9 % — SIGNIFICANT CHANGE UP (ref 13–44)
LYMPHOCYTES # BLD AUTO: 4.85 K/UL — HIGH (ref 1–3.3)
MAGNESIUM SERPL-MCNC: 1.6 MG/DL — SIGNIFICANT CHANGE UP (ref 1.6–2.6)
MANUAL SMEAR VERIFICATION: SIGNIFICANT CHANGE UP
MCHC RBC-ENTMCNC: 27 PG — SIGNIFICANT CHANGE UP (ref 27–34)
MCHC RBC-ENTMCNC: 28 PG — SIGNIFICANT CHANGE UP (ref 27–34)
MCHC RBC-ENTMCNC: 30.4 GM/DL — LOW (ref 32–36)
MCHC RBC-ENTMCNC: 31.5 GM/DL — LOW (ref 32–36)
MCV RBC AUTO: 89 FL — SIGNIFICANT CHANGE UP (ref 80–100)
MCV RBC AUTO: 89 FL — SIGNIFICANT CHANGE UP (ref 80–100)
METAMYELOCYTES # FLD: 1.6 % — HIGH (ref 0–0)
MICROCYTES BLD QL: SLIGHT — SIGNIFICANT CHANGE UP
MONOCYTES # BLD AUTO: 1.9 K/UL — HIGH (ref 0–0.9)
MONOCYTES NFR BLD AUTO: 7.4 % — SIGNIFICANT CHANGE UP (ref 2–14)
MYELOCYTES NFR BLD: 1.6 % — HIGH (ref 0–0)
NEUTROPHILS # BLD AUTO: 17.68 K/UL — HIGH (ref 1.8–7.4)
NEUTROPHILS NFR BLD AUTO: 66.4 % — SIGNIFICANT CHANGE UP (ref 43–77)
NEUTS BAND # BLD: 2.5 % — SIGNIFICANT CHANGE UP (ref 0–8)
NITRITE UR-MCNC: NEGATIVE — SIGNIFICANT CHANGE UP
NRBC # BLD: 0 /100 WBCS — SIGNIFICANT CHANGE UP (ref 0–0)
PCO2 BLDV: 66 MMHG — HIGH (ref 39–42)
PH BLDV: 7.17 — CRITICAL LOW (ref 7.32–7.43)
PH UR: 7 — SIGNIFICANT CHANGE UP (ref 5–8)
PHOSPHATE SERPL-MCNC: 4.5 MG/DL — SIGNIFICANT CHANGE UP (ref 2.5–4.5)
PLAT MORPH BLD: ABNORMAL
PLATELET # BLD AUTO: 306 K/UL — SIGNIFICANT CHANGE UP (ref 150–400)
PLATELET # BLD AUTO: 466 K/UL — HIGH (ref 150–400)
PO2 BLDV: 99 MMHG — HIGH (ref 25–45)
POIKILOCYTOSIS BLD QL AUTO: SLIGHT — SIGNIFICANT CHANGE UP
POLYCHROMASIA BLD QL SMEAR: SLIGHT — SIGNIFICANT CHANGE UP
POTASSIUM BLDV-SCNC: 5.5 MMOL/L — HIGH (ref 3.5–5.1)
POTASSIUM SERPL-MCNC: 3.4 MMOL/L — LOW (ref 3.5–5.3)
POTASSIUM SERPL-MCNC: 4.6 MMOL/L — SIGNIFICANT CHANGE UP (ref 3.5–5.3)
POTASSIUM SERPL-SCNC: 3.4 MMOL/L — LOW (ref 3.5–5.3)
POTASSIUM SERPL-SCNC: 4.6 MMOL/L — SIGNIFICANT CHANGE UP (ref 3.5–5.3)
PROCALCITONIN SERPL-MCNC: 1.31 NG/ML — HIGH (ref 0.02–0.1)
PROT SERPL-MCNC: 7.7 G/DL — SIGNIFICANT CHANGE UP (ref 6–8.3)
PROT SERPL-MCNC: 8.4 G/DL — HIGH (ref 6–8.3)
PROT UR-MCNC: >600
PROTHROM AB SERPL-ACNC: 14.1 SEC — HIGH (ref 10.5–13.4)
RAPID RVP RESULT: SIGNIFICANT CHANGE UP
RBC # BLD: 4.92 M/UL — SIGNIFICANT CHANGE UP (ref 3.8–5.2)
RBC # BLD: 5.62 M/UL — HIGH (ref 3.8–5.2)
RBC # FLD: 17.4 % — HIGH (ref 10.3–14.5)
RBC # FLD: 17.8 % — HIGH (ref 10.3–14.5)
RBC BLD AUTO: ABNORMAL
RBC CASTS # UR COMP ASSIST: 5 /HPF — HIGH (ref 0–4)
SAO2 % BLDV: 98.9 % — HIGH (ref 67–88)
SARS-COV-2 RNA SPEC QL NAA+PROBE: SIGNIFICANT CHANGE UP
SODIUM SERPL-SCNC: 137 MMOL/L — SIGNIFICANT CHANGE UP (ref 135–145)
SODIUM SERPL-SCNC: 138 MMOL/L — SIGNIFICANT CHANGE UP (ref 135–145)
SP GR SPEC: 1.02 — SIGNIFICANT CHANGE UP (ref 1.01–1.02)
SPHEROCYTES BLD QL SMEAR: SLIGHT — SIGNIFICANT CHANGE UP
TROPONIN T, HIGH SENSITIVITY RESULT: 101 NG/L — HIGH (ref 0–51)
UROBILINOGEN FLD QL: NEGATIVE — SIGNIFICANT CHANGE UP
WBC # BLD: 25.45 K/UL — HIGH (ref 3.8–10.5)
WBC # BLD: 25.66 K/UL — HIGH (ref 3.8–10.5)
WBC # FLD AUTO: 25.45 K/UL — HIGH (ref 3.8–10.5)
WBC # FLD AUTO: 25.66 K/UL — HIGH (ref 3.8–10.5)
WBC UR QL: 44 /HPF — HIGH (ref 0–5)

## 2023-02-18 PROCEDURE — 71045 X-RAY EXAM CHEST 1 VIEW: CPT | Mod: 26

## 2023-02-18 PROCEDURE — 70450 CT HEAD/BRAIN W/O DYE: CPT | Mod: 26,MA

## 2023-02-18 PROCEDURE — 99285 EMERGENCY DEPT VISIT HI MDM: CPT | Mod: CS,GC

## 2023-02-18 RX ORDER — PIPERACILLIN AND TAZOBACTAM 4; .5 G/20ML; G/20ML
3.38 INJECTION, POWDER, LYOPHILIZED, FOR SOLUTION INTRAVENOUS ONCE
Refills: 0 | Status: COMPLETED | OUTPATIENT
Start: 2023-02-19 | End: 2023-02-19

## 2023-02-18 RX ORDER — SODIUM CHLORIDE 9 MG/ML
1550 INJECTION, SOLUTION INTRAVENOUS ONCE
Refills: 0 | Status: COMPLETED | OUTPATIENT
Start: 2023-02-18 | End: 2023-02-18

## 2023-02-18 RX ORDER — ENOXAPARIN SODIUM 100 MG/ML
40 INJECTION SUBCUTANEOUS EVERY 24 HOURS
Refills: 0 | Status: DISCONTINUED | OUTPATIENT
Start: 2023-02-18 | End: 2023-02-19

## 2023-02-18 RX ORDER — PIPERACILLIN AND TAZOBACTAM 4; .5 G/20ML; G/20ML
3.38 INJECTION, POWDER, LYOPHILIZED, FOR SOLUTION INTRAVENOUS EVERY 8 HOURS
Refills: 0 | Status: DISCONTINUED | OUTPATIENT
Start: 2023-02-19 | End: 2023-02-23

## 2023-02-18 RX ORDER — POLYETHYLENE GLYCOL 3350 17 G/17G
17 POWDER, FOR SOLUTION ORAL ONCE
Refills: 0 | Status: COMPLETED | OUTPATIENT
Start: 2023-02-18 | End: 2023-02-18

## 2023-02-18 RX ORDER — PANTOPRAZOLE SODIUM 20 MG/1
40 TABLET, DELAYED RELEASE ORAL DAILY
Refills: 0 | Status: DISCONTINUED | OUTPATIENT
Start: 2023-02-18 | End: 2023-02-22

## 2023-02-18 RX ORDER — SERTRALINE 25 MG/1
50 TABLET, FILM COATED ORAL DAILY
Refills: 0 | Status: DISCONTINUED | OUTPATIENT
Start: 2023-02-18 | End: 2023-02-23

## 2023-02-18 RX ORDER — CHLORHEXIDINE GLUCONATE 213 G/1000ML
1 SOLUTION TOPICAL
Refills: 0 | Status: DISCONTINUED | OUTPATIENT
Start: 2023-02-18 | End: 2023-02-23

## 2023-02-18 RX ORDER — INSULIN LISPRO 100/ML
VIAL (ML) SUBCUTANEOUS EVERY 6 HOURS
Refills: 0 | Status: DISCONTINUED | OUTPATIENT
Start: 2023-02-18 | End: 2023-02-21

## 2023-02-18 RX ORDER — IPRATROPIUM/ALBUTEROL SULFATE 18-103MCG
3 AEROSOL WITH ADAPTER (GRAM) INHALATION EVERY 6 HOURS
Refills: 0 | Status: DISCONTINUED | OUTPATIENT
Start: 2023-02-18 | End: 2023-02-18

## 2023-02-18 RX ORDER — IPRATROPIUM/ALBUTEROL SULFATE 18-103MCG
3 AEROSOL WITH ADAPTER (GRAM) INHALATION EVERY 6 HOURS
Refills: 0 | Status: DISCONTINUED | OUTPATIENT
Start: 2023-02-18 | End: 2023-02-23

## 2023-02-18 RX ORDER — SENNA PLUS 8.6 MG/1
10 TABLET ORAL AT BEDTIME
Refills: 0 | Status: DISCONTINUED | OUTPATIENT
Start: 2023-02-18 | End: 2023-02-23

## 2023-02-18 RX ORDER — PIPERACILLIN AND TAZOBACTAM 4; .5 G/20ML; G/20ML
3.38 INJECTION, POWDER, LYOPHILIZED, FOR SOLUTION INTRAVENOUS ONCE
Refills: 0 | Status: COMPLETED | OUTPATIENT
Start: 2023-02-18 | End: 2023-02-18

## 2023-02-18 RX ORDER — CHLORHEXIDINE GLUCONATE 213 G/1000ML
15 SOLUTION TOPICAL EVERY 12 HOURS
Refills: 0 | Status: DISCONTINUED | OUTPATIENT
Start: 2023-02-18 | End: 2023-02-23

## 2023-02-18 RX ADMIN — SODIUM CHLORIDE 1550 MILLILITER(S): 9 INJECTION, SOLUTION INTRAVENOUS at 20:51

## 2023-02-18 RX ADMIN — PIPERACILLIN AND TAZOBACTAM 200 GRAM(S): 4; .5 INJECTION, POWDER, LYOPHILIZED, FOR SOLUTION INTRAVENOUS at 21:37

## 2023-02-18 RX ADMIN — ENOXAPARIN SODIUM 40 MILLIGRAM(S): 100 INJECTION SUBCUTANEOUS at 21:37

## 2023-02-18 NOTE — H&P ADULT - NSHPLABSRESULTS_GEN_ALL_CORE
LABS:                        13.8   25.45 )-----------( 306      ( 2023 22:53 )             43.8     Hgb Trend: 13.8<--, 15.2<--      138  |  102  |  16  ----------------------------<  196<H>  3.4<L>   |  15<L>  |  <0.30<L>    Ca    8.8      2023 22:53  Phos  4.5       Mg     1.6         TPro  7.7  /  Alb  4.1  /  TBili  1.1  /  DBili  x   /  AST  70<H>  /  ALT  65<H>  /  AlkPhos  112      Creatinine Trend: <0.30<--, <0.30<--  PT/INR - ( 2023 19:41 )   PT: 14.1 sec;   INR: 1.22 ratio         PTT - ( 2023 19:41 )  PTT:36.1 sec  Urinalysis Basic - ( 2023 19:41 )    Color: Yellow / Appearance: Slightly Turbid / S.017 / pH: x  Gluc: x / Ketone: Negative  / Bili: Negative / Urobili: Negative   Blood: x / Protein: >600 / Nitrite: Negative   Leuk Esterase: Small / RBC: 5 /hpf / WBC 44 /HPF   Sq Epi: x / Non Sq Epi: 0 /hpf / Bacteria: Many      Arterial Blood Gas:   @ 22:47  7.25/46/162/20/100.0/-7.0  ABG lactate: --    Venous Blood Gas:   @ 18:50  7.17/66/99/24/98.9  VBG Lactate: 3.2      MICROBIOLOGY: hx MRSA, hx Pseudomonas (pan sensitive), Morganella morganii (MDR, Zosyn sensitive), Stenotrophomonas (ceftazidine R)    RADIOLOGY & ADDITIONAL TESTS:    Personally reviewed CXR, CTH, EKG  CTH - < from: CT Head No Cont (23 @ 20:35) >    IMPRESSION:  No acute intracranial hemorrhage, vasogenic edema or extra-axial   collection.    Involutional and ischemic gliotic changes appear mildly advanced for the   patient's age.      < end of copied text >    CXR PRELIM - clear lungs

## 2023-02-18 NOTE — CONSULT NOTE ADULT - ASSESSMENT
68 yo f history of advanced ALS, chronic hypoxic/hypercapnic respiratory failure, vent dependent, status post trach in 2015 and PEG here for hypoxia secondary to vent malfunction prior to arrival. Pt reportedly presented to the ED with a #8 shiley distal XLT cuffed trach in place, however not in proper position, which was then removed by ED staff and replaced with a #8 portex cuffed trach which aided in stabilizing her however required overinflation of the cuff ~60 mmhg in order to prevent leaking. ENT was then called to evaluate for trach change. On exam, #8 cuffed portex in place, cuff overinflated, stoma exposed inferiorly with audible leak. Trach was then changed to a new #8 shiley distal XLT cuffed, cuff pressure wnl, tracheoscopy performed which showed trach in good position, pt saturating well, tolerated procedure well.

## 2023-02-18 NOTE — ED PROVIDER NOTE - PHYSICAL EXAMINATION
General: Highly debilitated adult female, 4 extremity contractures appreciated trached, nonverbal  Breath sounds are coarse bilaterally  Cardiac regular rate and rhythm  Abdomen soft nondistended nontender  Extremities: Contracted low muscle mass as previously described  Neurologic: Unable to participate in exam, occasional eye movements

## 2023-02-18 NOTE — H&P ADULT - ATTENDING COMMENTS
65F Hx Advanced ALS (Non-Verbal, Funct Quad), Parkinson, Trach/PEG/Vent Dependency on Home Vent with 24Hr HHA, Chronic HPHC Resp Failure found Unresponsive and Cyanotic after Vent Machine malfunction and BIB EMS to ED.   - Acute on Chronic HPHC Resp Failure on Vent Support  - Partially out Portex XL # 8 Cuffed Trach revised to Reg Trach by ED Service   - Trach Cuff Balloon overinflated with high  balloon pressure 60 cmH2O for air leak    - Hemodynamically stable and unremarkable CXR   - ENT evaluation for reinsertion of Portex XL # 8 Cuffed Trach    - Empiric ABx Coverage for Aspiration and UA +ve UTI   - Enteral Feed plan via PEG   - Closely monitor I&O and Renal Function   - DVT PPx with LVX   - GOC - Full Code per  at bedside     Patient seen and examined with ICU Resident/Fellow at bedside after lab data, medical records and radiology reports reviewed. I have read and agreeable in general with resident's Documented Note, Assessment and Management Plan which reflected my opinions from bedside round and discussion.   Total Critical Care Time = 45 Min excluding teaching and procedure activity.

## 2023-02-18 NOTE — ED ADULT NURSE NOTE - NSIMPLEMENTINTERV_GEN_ALL_ED
Implemented All Fall Risk Interventions:  [place] to call system. Call bell, personal items and telephone within reach. Instruct patient to call for assistance. Room bathroom lighting operational. Non-slip footwear when patient is off stretcher. Physically safe environment: no spills, clutter or unnecessary equipment. Stretcher in lowest position, wheels locked, appropriate side rails in place. Provide visual cue, wrist band, yellow gown, etc. Monitor gait and stability. Monitor for mental status changes and reorient to person, place, and time. Review medications for side effects contributing to fall risk. Reinforce activity limits and safety measures with patient and family.

## 2023-02-18 NOTE — H&P ADULT - VTE RISK ASSESSMENT
Chief complaint:   Chief Complaint   Patient presents with   • Follow-up       Vitals:  Visit Vitals  Pulse 78   Temp 98.4 °F (36.9 °C) (Oral)   Resp 18   Ht 5' 10\" (1.778 m)   Wt 85.5 kg   SpO2 98%   BMI 27.06 kg/m²       HISTORY OF PRESENT ILLNESS     HPI    Other significant problems:  Patient Active Problem List    Diagnosis Date Noted   • Chronic cough 04/14/2017     Priority: Low   • Chronic lymphocytic leukemia (CMS/HCC) 05/04/2016     Priority: Low     Per flow cytometry 5/2016     • Constipation 05/02/2016     Priority: Low   • Adjustment disorder 11/13/2015     Priority: Low     Associated with son with mental health problems.     • Impaired fasting glucose 05/15/2014     Priority: Low   • H/O prostate cancer 11/03/2013     Priority: Low   • GERD (gastroesophageal reflux disease) 06/04/2013     Priority: Low   • Hypogonadism male 05/09/2013     Priority: Low   • Diverticulosis of colon (without mention of hemorrhage) 01/26/2012     Priority: Low   • Unspecified glaucoma 01/26/2012     Priority: Low   • Other and unspecified hyperlipidemia 01/26/2012     Priority: Low   • Impotence of organic origin 01/26/2012     Priority: Low   • Sensorineural hearing loss, unspecified 01/26/2012     Priority: Low       PAST MEDICAL, FAMILY AND SOCIAL HISTORY     Medications:  Current Outpatient Prescriptions   Medication   • sildenafil (VIAGRA) 100 MG tablet   • ANDROGEL PUMP 20.25 MG/ACT (1.62%) gel   • zoster vaccine live (ZOSTAVAX) 53127 UNT/0.65ML injectable suspension   • fluticasone (FLONASE) 50 MCG/ACT nasal spray   • simvastatin (ZOCOR) 20 MG tablet   • Glucosamine-Chondroitin (GLUCOSAMINE CHONDR COMPLEX PO)   • Ascorbic Acid (VITAMIN C) 1000 MG tablet   • ranitidine (ZANTAC) 150 MG capsule     No current facility-administered medications for this visit.        Allergies:  ALLERGIES:  No Known Allergies    Past Medical  History/Surgeries:  Past Medical History:   Diagnosis Date   • Diverticulosis    • Erectile  dysfunction    • Glaucoma    • Hyperlipidemia    • Prostate cancer (CMS/HCC)    • Sensorineural hearing loss, unspecified        Past Surgical History:   Procedure Laterality Date   • PROSTATECTOMY         Family History:  No family history on file.    Social History:  Social History   Substance Use Topics   • Smoking status: Never Smoker   • Smokeless tobacco: Never Used   • Alcohol use 7.0 oz/week     14 drink(s) per week       REVIEW OF SYSTEMS     Review of Systems    PHYSICAL EXAM     Physical Exam  Lab Services on 12/05/2017   Component Date Value Ref Range Status   • WBC 12/05/2017 13.0* 4.2 - 11.0 K/mcL Final   • RBC 12/05/2017 4.67  4.50 - 5.90 mil/mcL Final   • HGB 12/05/2017 15.1  13.0 - 17.0 g/dL Final   • HCT 12/05/2017 43.1  39.0 - 51.0 % Final   • MCV 12/05/2017 92.3  78.0 - 100.0 fl Final   • MCH 12/05/2017 32.3  26.0 - 34.0 pg Final   • MCHC 12/05/2017 35.0  32.0 - 36.5 g/dL Final   • RDW-CV 12/05/2017 13.0  11.0 - 15.0 % Final   • PLT 12/05/2017 159  140 - 450 K/mcL Final   • DIFF TYPE 12/05/2017 MANUAL DIFFERENTIAL   Final   • SEG 12/05/2017 36  % Final   • LYMPH 12/05/2017 44  % Final   • REACTIVE LYMPH 12/05/2017 15* 0 - 5 % Final   • MONO 12/05/2017 5  % Final   • EOS 12/05/2017 0  % Final   • BASO 12/05/2017 0  % Final   • Absolute Neut 12/05/2017 4.7  1.8 - 7.7 K/mcL Final   • Absolute Lymph 12/05/2017 7.7* 1.0 - 4.0 K/mcL Final   • Absolute Mono 12/05/2017 0.7  0.3 - 0.9 K/mcL Final   • Absolute Eos 12/05/2017 0.0* 0.1 - 0.5 K/mcL Final   • Absolute Baso 12/05/2017 0.0  0.0 - 0.3 K/mcL Final   • RBC MORPHOLOGY 12/05/2017 NORMAL  NORMAL Final   • PLATELETS APPEAR 12/05/2017 NORMAL  NORMAL Final   • Toxic Vacuolation 12/05/2017 PRESENT   Final       ASSESSMENT/PLAN     ***   <<--- Click to launch

## 2023-02-18 NOTE — ED PROVIDER NOTE - OBJECTIVE STATEMENT
58-year-old female history of advanced ALS, chronic hypoxic/hypercapnic respiratory failure, vent dependent, status post trach in 2015 and PEG here for hypoxia secondary to vent malfunction prior to arrival.  As per aide at bedside noticed that vent was alarming, could not find any issues within the circuit, O2 sats dropped to 60s, called EMS and began ACLS protocol.  On EMS arrival patient was found to have O2 sat of 68% and end-tidal in the 90s.  Patient bagged with improvement in sats, able to bag without resistance.  As per aide at bedside, patient only interacts through eye movements, is paralyzed caudal to this.  No recent fevers.

## 2023-02-18 NOTE — ED PROVIDER NOTE - PROGRESS NOTE DETAILS
Keshia Flores PGY-3: Pt signed out to me pending labs, imaging. Patient here with acute hypoxemic respiratory distress. Was hypoxic to 60s at home, reported ventilator failure, bagged to 100% via EMS. TBA. MICU consulted. Persistent desats in the ED.  Trach tube hanging 1/2 way out of skin.      Initial trach tube was a #8 XLT distal that was hanging [facility] out of her stoma; the gap was packed with gauze, peak pressures were 50s to 60s with frequent desaturations.  Respiratory therapist removed ~30 cc of air from the cuff before removing it, and it still came out semi-inflated.    This trach was exchanged for a #8 Portex, after which peak pressures decreased to 24, O2 sats maintained 99 to 100%, increased to 80% % FiO2.    Cuff pressure is at 60, but leak is still present at 60.    We suspect the initial trach was overinflated (possibly chronically) prior to ED arrival, and this may have injured the patient's trachea prior to ED arrival.    MICU Attending Dr. Leone made aware of these findings during face-to-face encounter.

## 2023-02-18 NOTE — ED ADULT NURSE NOTE - OBJECTIVE STATEMENT
67y female w/ pmh of ALS, tracheostomy presents to ED w/ respiratory distress. As per EMS pt was picked up from home after aide stated she noticed the vent alarming. As per EMS, aide states she checked vent and did not see any reason for the alarm to be going off. EMS states pt was hypoxic to 68% and tachycardic to 140 on arrival with an end tidal in the 90s. EMS bagged pt through trach which improved oxygen saturation and decreased end tidal CO2. Pt is at baseline; non verbal and communicates with eye movement only.

## 2023-02-18 NOTE — H&P ADULT - ASSESSMENT
65F with advanced ALS (baseline nonverbal, communicates minimally through eye movements, PEG, vent dependent, trach upsized 8/2022 with CT surgery), chronic hypoxic/hypercapnic respiratory failure, HLD; BIBEMS for hypoxia to 66% after vent malfunction at home; found to have sepsis, likely  source; admitted to MICU for further management.     PLAN:    NEURO    # workup for anoxic brain injury  - CTH - no acute finding  - c/w home diazepam  - c/w home sertraline    # advanced ALS  - baseline nonverbal, awake, communication with eye movements has been declining in recent months  - c/w home riluzole    CARDIOVASCULAR  # HLD    RESPIRATORY  # trach   - appreciate ENT recs  - Duonebs    GI/NUTRITION  # PEG  - feeds, site care, bowel reg    # PPX  - pantop 40 IV qD    /RENAL  ***    SKIN  - c/w Lacrilube, f/u home eyedrops/ointment  - f/u home nystatin    ID  # sepsis  - possibly from UTI  - Zosyn  - MRSA/MSSA  - Allergy: rash with Bactrim   - c/w mupirocin 2% ointment ***, triamcinolone 0.5% ointment    ENDOCRINE  # ?DM  - BG 200s, glucosuria    HEMATOLOGIC  # leukocytosis   - iso sepsis  # thrombocytosis  - possibly reactive    DVT PPX  - hold home Xarelto; consider resumption if CTH neg    ETHICS  - spouse considering DNR/DNI documentation in AM. Full code for now.   65F with advanced ALS (baseline nonverbal, communicates minimally through eye movements, PEG, vent dependent, trach upsized 8/2022 with CT surgery), chronic hypoxic/hypercapnic respiratory failure, HLD; BIBEMS for hypoxia to 66% after vent malfunction at home; found to have sepsis, likely  source; admitted to MICU for further management.     PLAN:    NEURO    # workup for anoxic brain injury  - CTH - no acute finding  - c/w home diazepam  - c/w home sertraline    # advanced ALS  - baseline nonverbal, awake, communication with eye movements has been declining in recent months  - c/w home riluzole    CARDIOVASCULAR  - sinus tachycardia iso sepsis    RESPIRATORY  # tracheostomy  - trach changed in ED, additional change by ENT  - Briana    GI/NUTRITION  # PEG  - feeds, site care, bowel reg    # PPX  - pantop 40 IV qD    /RENAL  - No active issues.     SKIN  - c/w Lacrilube, f/u home eyedrops/ointment  - f/u home nystatin    ID  # sepsis  - possibly from UTI  - Zosyn  - MRSA/MSSA  - Allergy: rash with Bactrim   - BCx*2, UCx  - consider sputum Cx if able    ENDOCRINE  # ?DM  - a/w BG 200s, glucosuria  - A1c    HEMATOLOGIC  # leukocytosis   - iso sepsis  # thrombocytosis  - possibly reactive    DVT PPX  - Lovenox 40 sc q24    MISC  - f/u med rec with spouse in AM    ETHICS  - spouse considering DNR/DNI documentation in AM. Full code for now.

## 2023-02-18 NOTE — H&P ADULT - HISTORY OF PRESENT ILLNESS
65F with advanced ALS (baseline nonverbal, communicates minimally through eye movements, PEG, vent dependent, trach upsized 8/2022 with CT surgery), chronic hypoxic/hypercapnic respiratory failure, HLD; BIBEMS for hypoxia to 66% after vent malfunction at home, admitted to MICU for further management.    On EMS arrival, ETCO2 high 90s. EMS provided bag-mask ventilation with improvement of O2 sat to 100% and ETCO2 to 55. In the ED, T 100.4 rectal, HR 110s to low 130s, -186, satting well on vent. W 25.66, Plt 466. Mildly elevated liver enzymes: , AST 69, ALT 59. VBG: pH 7.17 pCO2 66 lactate 3.2. UA +++protein +LE +WBC +++bacteria +hyaline casts ++glucose. Received LR Bolus 1.5L, Zosyn. 65F with advanced ALS (baseline nonverbal, communicates minimally through eye movements, PEG, vent dependent, trach upsized 8/2022 with CT surgery), chronic hypoxic/hypercapnic respiratory failure, HLD; BIBEMS for hypoxia to 66% after vent malfunction at home, admitted to MICU for further management.    On EMS arrival, ETCO2 high 90s. EMS provided bag-mask ventilation with improvement of O2 sat to 100% and ETCO2 to 55. In the ED, T 100.4 rectal, HR 110s to low 130s, -186, satting well on vent. W 25.66, Plt 466. Mildly elevated liver enzymes: , AST 69, ALT 59. VBG: pH 7.17 pCO2 66 lactate 3.2. UA +++protein +LE +WBC +++bacteria +hyaline casts ++glucose. Received LR Bolus 1.5L, Zosyn. Trach changed due to malpositioning.

## 2023-02-18 NOTE — ED PROVIDER NOTE - ATTENDING CONTRIBUTION TO CARE
MD Thomas:  patient seen and evaluated with the resident.  I was present for key portions of the History & Physical, and I agree with the Impression & Plan.    MD Thomas: 58-year-old female, brought into the ED by EMS for evaluation of respiratory failure at home.  Patient is medically complicated, with advanced ALS, trach, ventilator dependent.  Per report, the home ventilator malfunctioned and the patient was in severe respiratory distress, found to have an O2 sat of 68% upon EMS arrival end-tidal CO2 was in the high 90s.    Patient was bagged throughout transport from private residence to the ED.  Upon arrival to the [place] ED patient's sat was 100% end-tidal CO2 55.    I had a long discussion with the patient's  Amor, and he is ready to change her CODE STATUS from full code to DNR/DNI.  He is on his way to the hospital, and will be here in approximately 30 minutes.    Baseline mental status is nonverbal;  feels he can communicate with her via her eye movements which have also been declining over the last few months    Vital signs: Heart rate 120s, normotensive, O2 sat 100% on 100% FiO2, end-tidal CO2 55  General: Highly debilitated adult female, 4 extremity contractures appreciated trached, nonverbal  Breath sounds are coarse bilaterally  Cardiac regular rate and rhythm  Abdomen soft nondistended nontender  Extremities: Contracted low muscle mass as previously described  Neurologic: Unable to participate in exam, but patient appears awake    Impression and plan hypercapnic respiratory failure due to home ventilator malfunction.      Currently with ventilatory status improving status post BVM to the [facility] by EMS.  Primary MD is Dr. Newman    ECG independently reviewed by me and shows sinus tachycardia, rate 117 , QRS 70, QTc 454    Given extent of hypercapnia and hemodynamic instability she will need sepsis work-up    Given extent of hypercapnia and hypoxia she will need CT head to evaluate for signs of anoxic injury.

## 2023-02-18 NOTE — ED PROVIDER NOTE - CLINICAL SUMMARY MEDICAL DECISION MAKING FREE TEXT BOX
Impression and plan hypercapnic respiratory failure due to home ventilator malfunction.      Currently with ventilatory status improving status post BVM to the [facility] by EMS.  Primary MD is Dr. Newman    ECG independently reviewed by me and shows sinus tachycardia, rate 117 , QRS 70, QTc 454    Given extent of hypercapnia and hemodynamic instability she will need sepsis work-up    Given extent of hypercapnia and hypoxia she will need CT head to evaluate for signs of anoxic injury.

## 2023-02-18 NOTE — ED ADULT NURSE NOTE - TEMPLATE
PULMONARY  progress note    KEVIN VILLARREAL  MRN-[id number]    Patient is a 75y old  Male who presents with a chief complaint of Aspiration Pneumonia (28 Feb 2019 16:13)  No cough fever or sob      MEDICATIONS  (STANDING):  ALBUTerol/ipratropium for Nebulization 3 milliLiter(s) Nebulizer every 6 hours  dextrose 5%. 1000 milliLiter(s) (50 mL/Hr) IV Continuous <Continuous>  dextrose 50% Injectable 12.5 Gram(s) IV Push once  dextrose 50% Injectable 25 Gram(s) IV Push once  dextrose 50% Injectable 25 Gram(s) IV Push once  enoxaparin Injectable 40 milliGRAM(s) SubCutaneous daily  insulin lispro (HumaLOG) corrective regimen sliding scale   SubCutaneous every 6 hours  pantoprazole   Suspension 40 milliGRAM(s) Oral daily  piperacillin/tazobactam IVPB. 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  Gastric ulcer  Hyperlipemia  Diabetes mellitus  Hypertension  PEG (percutaneous endoscopic gastrostomy) status  Coronary artery disease involving coronary bypass graft of native heart with angina pectoris  History of eye surgery: endophthalmitis in 2014  H/O aortic root repair  No Past Surgical History           REVIEW OF SYSTEMS: as per RN  CONSTITUTIONAL: No fever, weight loss, or fatigue   NECK: No pain or stiffness or nodes  RESPIRATORY:  cough --  wheezing --  chills--   hemoptysis--  Shortness of Breath--  CARDIOVASCULAR: No chest pain, palpitations, passing out, dizziness, or leg swelling  GASTROINTESTINAL: No abdominal or epigastric pain. No nausea, vomiting, or hematemesis; No diarrhea or constipation. No melena or hematochezia. g/T feeds  GENITOURINARY: No dysuria, frequency, hematuria, or incontinence  NEUROLOGICAL: No headaches, memory loss++,  loss of strength++  SKIN: No itching, burning, rashes, or lesions   LYMPH Nodes: No enlarged glands  HEME/LYMPH: No easy bruising, or bleeding gums  ALLERGY AND IMMUNOLOGIC: No hives or eczema    Vital Signs Last 24 Hrs  T(C): 36.8 (01 Mar 2019 05:11), Max: 36.9 (28 Feb 2019 13:50)  T(F): 98.3 (01 Mar 2019 05:11), Max: 98.4 (28 Feb 2019 13:50)  HR: 102 (01 Mar 2019 05:11) (100 - 110)  BP: 114/66 (01 Mar 2019 05:11) (97/56 - 114/66)  BP(mean): --  RR: 17 (01 Mar 2019 05:11) (17 - 18)  SpO2: 98% (01 Mar 2019 05:11) (95% - 98%)  I&O's Detail      PHYSICAL EXAMINATION:    GENERAL: The patient is a well-developed, well-nourished in no apparent distress.   SKIN no rash ecchymoses or bruises  HEENT: Head is normocephalic and atraumatic  BENJY , Extraocular muscles are intact. Mucous membranes  moist.   Neck supple ,No LN felt JVP not increased  Thyroid not enlarged  Cardiovascular:  S1 S2 heard, RSR, No JVD , systolic  murmur at apex, No gallop or rub  Respiratory: Chest wall symmetrical with good air entry ,Percussion note normal,    Lungs vesicular breathing with no   rales or   wheeze	  ABDOMEN:  Soft, Non-tender,  no hepatomegaly or splenomegaly BS positive	  Extremities: Normal range of motion, No clubbing, cyanosis or edema  Vascular: Peripheral pulses palpable 2+ bilaterally  CNS:  eyes closed, non communicative  B/L dysfunction lt  hemiplegia      LABS:                        10.1   11.75 )-----------( 203      ( 01 Mar 2019 05:57 )             31.7     03-01    142  |  111<H>  |  21<H>  ----------------------------<  187<H>  4.0   |  26  |  0.73    Ca    8.9      01 Mar 2019 05:57  Phos  2.6     03-01  Mg     2.0     03-01      MICROBIOLOGY:    Culture - Blood (collected 02-26-19 @ 11:52)  Source: .Blood  Preliminary Report (02-27-19 @ 12:01):    No growth to date.    Culture - Blood (collected 02-26-19 @ 11:52)  Source: .Blood two aerobic bottles received  Preliminary Report (02-27-19 @ 12:01):    No growth to date. General

## 2023-02-18 NOTE — ED PROVIDER NOTE - CARE PLAN
1 Principal Discharge DX:	Acute respiratory failure with hypoxia  Secondary Diagnosis:	Acute respiratory failure with hypercapnia

## 2023-02-18 NOTE — H&P ADULT - NSHPPHYSICALEXAM_GEN_ALL_CORE
ICU Vital Signs Last 24 Hrs  T(C): 37.9 (18 Feb 2023 22:29), Max: 38 (18 Feb 2023 18:55)  T(F): 100.2 (18 Feb 2023 22:29), Max: 100.4 (18 Feb 2023 18:55)  HR: 106 (18 Feb 2023 23:00) (106 - 131)  BP: 148/69 (18 Feb 2023 23:00) (126/76 - 186/102)  BP(mean): 99 (18 Feb 2023 23:00) (92 - 103)  RR: 14 (18 Feb 2023 23:00) (14 - 19)  SpO2: 100% (18 Feb 2023 23:00) (98% - 100%)    O2 Parameters below as of 18 Feb 2023 22:29  Patient On (Oxygen Delivery Method): ventilator    O2 Concentration (%): 40      Mode: AC/ CMV (Assist Control/ Continuous Mandatory Ventilation), RR (machine): 14, TV (machine): 450, FiO2: 40, PEEP: 5, ITime: 1, MAP: 8, PIP: 24      PHYSICAL EXAM:  General: Intubated/sedated. Alert. Following commands? *****  HEENT: ET tube *****. Pupils constricted, ERRL. Extraocular movements grossly intact.***** No rhinorrhea.   Chest/Lungs: CTAB, no wheezes or crackles appreciated. Symmetric chest rise.  Heart: Regular rate and rhythm. S1/S2. No murmurs appreciated. *****.   Abdomen: Soft, nontender to palpation, all quadrants; no distension. No guarding.  : No suprapubic tenderness.   Extremities: No significant extremity edema. WWP.  Neuro: Alert, oriented.***** Sedated.**** Following commands. Moving extremities spontaneously.  Psych: Sedated, appropriate mood *****    LINES:   . ICU Vital Signs Last 24 Hrs  T(C): 37.9 (18 Feb 2023 22:29), Max: 38 (18 Feb 2023 18:55)  T(F): 100.2 (18 Feb 2023 22:29), Max: 100.4 (18 Feb 2023 18:55)  HR: 106 (18 Feb 2023 23:00) (106 - 131)  BP: 148/69 (18 Feb 2023 23:00) (126/76 - 186/102)  BP(mean): 99 (18 Feb 2023 23:00) (92 - 103)  RR: 14 (18 Feb 2023 23:00) (14 - 19)  SpO2: 100% (18 Feb 2023 23:00) (98% - 100%)    O2 Parameters below as of 18 Feb 2023 22:29  Patient On (Oxygen Delivery Method): ventilator    O2 Concentration (%): 40      Mode: AC/ CMV (Assist Control/ Continuous Mandatory Ventilation), RR (machine): 14, TV (machine): 450, FiO2: 40, PEEP: 5, ITime: 1, MAP: 8, PIP: 24      PHYSICAL EXAM:  Interview/exam limited by patient condition   General: Not responding to voice/sternal rub.  HEENT: NC/AT. PERRL. No rhinorrhea. Trach with secretions.  Chest/Lungs: Anterior CTAB coarse breath sounds b/l.   Heart: Tachycardia, regular rhythm. S1/S2. No murmurs appreciated.   Abdomen: Soft, nontender to palpation, all quadrants; somewhat distended. No guarding.  : No suprapubic tenderness.   Extremities: No significant extremity edema. WWP.  Neuro: Not responding to voice/sternal rub.  Psych: Not responding to voice/sternal rub.

## 2023-02-19 DIAGNOSIS — Z93.0 TRACHEOSTOMY STATUS: ICD-10-CM

## 2023-02-19 LAB
A1C WITH ESTIMATED AVERAGE GLUCOSE RESULT: 4.9 % — SIGNIFICANT CHANGE UP (ref 4–5.6)
B PERT IGG+IGM PNL SER: ABNORMAL
BASE EXCESS BLDV CALC-SCNC: -3.1 MMOL/L — LOW (ref -2–3)
BASE EXCESS BLDV CALC-SCNC: -5.3 MMOL/L — LOW (ref -2–3)
CA-I SERPL-SCNC: 1.12 MMOL/L — LOW (ref 1.15–1.33)
CA-I SERPL-SCNC: 1.16 MMOL/L — SIGNIFICANT CHANGE UP (ref 1.15–1.33)
CHLORIDE BLDV-SCNC: 100 MMOL/L — SIGNIFICANT CHANGE UP (ref 96–108)
CHLORIDE BLDV-SCNC: 101 MMOL/L — SIGNIFICANT CHANGE UP (ref 96–108)
CK MB CFR SERPL CALC: 2.2 NG/ML — SIGNIFICANT CHANGE UP (ref 0–3.8)
CK MB CFR SERPL CALC: 2.7 NG/ML — SIGNIFICANT CHANGE UP (ref 0–3.8)
CK MB CFR SERPL CALC: 2.8 NG/ML — SIGNIFICANT CHANGE UP (ref 0–3.8)
CK SERPL-CCNC: 30 U/L — SIGNIFICANT CHANGE UP (ref 25–170)
CK SERPL-CCNC: 30 U/L — SIGNIFICANT CHANGE UP (ref 25–170)
CO2 BLDV-SCNC: 23 MMOL/L — SIGNIFICANT CHANGE UP (ref 22–26)
CO2 BLDV-SCNC: 25 MMOL/L — SIGNIFICANT CHANGE UP (ref 22–26)
COLOR FLD: SIGNIFICANT CHANGE UP
EOSINOPHIL # FLD: 1 % — SIGNIFICANT CHANGE UP
ESTIMATED AVERAGE GLUCOSE: 94 MG/DL — SIGNIFICANT CHANGE UP (ref 68–114)
FLUID INTAKE SUBSTANCE CLASS: SIGNIFICANT CHANGE UP
GAS PNL BLDA: SIGNIFICANT CHANGE UP
GAS PNL BLDA: SIGNIFICANT CHANGE UP
GAS PNL BLDV: 134 MMOL/L — LOW (ref 136–145)
GAS PNL BLDV: 135 MMOL/L — LOW (ref 136–145)
GAS PNL BLDV: SIGNIFICANT CHANGE UP
GLUCOSE BLDC GLUCOMTR-MCNC: 130 MG/DL — HIGH (ref 70–99)
GLUCOSE BLDC GLUCOMTR-MCNC: 140 MG/DL — HIGH (ref 70–99)
GLUCOSE BLDC GLUCOMTR-MCNC: 149 MG/DL — HIGH (ref 70–99)
GLUCOSE BLDC GLUCOMTR-MCNC: 225 MG/DL — HIGH (ref 70–99)
GLUCOSE BLDV-MCNC: 163 MG/DL — HIGH (ref 70–99)
GLUCOSE BLDV-MCNC: 259 MG/DL — HIGH (ref 70–99)
GRAM STN FLD: SIGNIFICANT CHANGE UP
GRAM STN FLD: SIGNIFICANT CHANGE UP
HCO3 BLDV-SCNC: 22 MMOL/L — SIGNIFICANT CHANGE UP (ref 22–29)
HCO3 BLDV-SCNC: 23 MMOL/L — SIGNIFICANT CHANGE UP (ref 22–29)
HCT VFR BLDA CALC: 40 % — SIGNIFICANT CHANGE UP (ref 34.5–46.5)
HCT VFR BLDA CALC: 45 % — SIGNIFICANT CHANGE UP (ref 34.5–46.5)
HGB BLD CALC-MCNC: 13.2 G/DL — SIGNIFICANT CHANGE UP (ref 11.7–16.1)
HGB BLD CALC-MCNC: 14.9 G/DL — SIGNIFICANT CHANGE UP (ref 11.7–16.1)
HOROWITZ INDEX BLDV+IHG-RTO: 21 — SIGNIFICANT CHANGE UP
HOROWITZ INDEX BLDV+IHG-RTO: SIGNIFICANT CHANGE UP
LACTATE BLDV-MCNC: 2.7 MMOL/L — HIGH (ref 0.5–2)
LACTATE BLDV-MCNC: 3.6 MMOL/L — HIGH (ref 0.5–2)
LACTATE SERPL-SCNC: 2.4 MMOL/L — HIGH (ref 0.5–2)
LYMPHOCYTES # FLD: 1 % — SIGNIFICANT CHANGE UP
MONOS+MACROS # FLD: 3 % — SIGNIFICANT CHANGE UP
MRSA PCR RESULT.: DETECTED
NEUTROPHILS-BODY FLUID: 95 % — SIGNIFICANT CHANGE UP
PCO2 BLDV: 39 MMHG — SIGNIFICANT CHANGE UP (ref 39–42)
PCO2 BLDV: 55 MMHG — HIGH (ref 39–42)
PH BLDV: 7.23 — LOW (ref 7.32–7.43)
PH BLDV: 7.36 — SIGNIFICANT CHANGE UP (ref 7.32–7.43)
PO2 BLDV: 47 MMHG — HIGH (ref 25–45)
PO2 BLDV: 86 MMHG — HIGH (ref 25–45)
POTASSIUM BLDV-SCNC: 3.7 MMOL/L — SIGNIFICANT CHANGE UP (ref 3.5–5.1)
POTASSIUM BLDV-SCNC: 4.2 MMOL/L — SIGNIFICANT CHANGE UP (ref 3.5–5.1)
RCV VOL RI: HIGH /UL (ref 0–0)
S AUREUS DNA NOSE QL NAA+PROBE: DETECTED
SAO2 % BLDV: 84.3 % — SIGNIFICANT CHANGE UP (ref 67–88)
SAO2 % BLDV: 99 % — HIGH (ref 67–88)
SPECIMEN SOURCE: SIGNIFICANT CHANGE UP
SPECIMEN SOURCE: SIGNIFICANT CHANGE UP
TOTAL NUCLEATED CELL COUNT, BODY FLUID: 3688 /UL — SIGNIFICANT CHANGE UP
TROPONIN T, HIGH SENSITIVITY RESULT: 101 NG/L — HIGH (ref 0–51)
TROPONIN T, HIGH SENSITIVITY RESULT: 125 NG/L — HIGH (ref 0–51)
TUBE TYPE: SIGNIFICANT CHANGE UP

## 2023-02-19 PROCEDURE — 31624 DX BRONCHOSCOPE/LAVAGE: CPT | Mod: GC

## 2023-02-19 PROCEDURE — 93308 TTE F-UP OR LMTD: CPT | Mod: 26,GC

## 2023-02-19 PROCEDURE — 76604 US EXAM CHEST: CPT | Mod: 26,GC

## 2023-02-19 PROCEDURE — 99291 CRITICAL CARE FIRST HOUR: CPT | Mod: 25

## 2023-02-19 PROCEDURE — 31645 BRNCHSC W/THER ASPIR 1ST: CPT | Mod: GC

## 2023-02-19 PROCEDURE — 93010 ELECTROCARDIOGRAM REPORT: CPT

## 2023-02-19 PROCEDURE — ZZZZZ: CPT

## 2023-02-19 RX ORDER — RIVAROXABAN 15 MG-20MG
10 KIT ORAL DAILY
Refills: 0 | Status: DISCONTINUED | OUTPATIENT
Start: 2023-02-19 | End: 2023-02-23

## 2023-02-19 RX ORDER — VANCOMYCIN HCL 1 G
1 VIAL (EA) INTRAVENOUS
Refills: 0 | Status: DISCONTINUED | OUTPATIENT
Start: 2023-02-19 | End: 2023-02-20

## 2023-02-19 RX ORDER — RIVAROXABAN 15 MG-20MG
1 KIT ORAL
Qty: 0 | Refills: 0 | DISCHARGE

## 2023-02-19 RX ORDER — MUPIROCIN 20 MG/G
0 OINTMENT TOPICAL
Qty: 0 | Refills: 0 | DISCHARGE

## 2023-02-19 RX ORDER — TOBRAMYCIN SULFATE 40 MG/ML
1 VIAL (ML) INJECTION
Refills: 0 | Status: DISCONTINUED | OUTPATIENT
Start: 2023-02-19 | End: 2023-02-20

## 2023-02-19 RX ORDER — IPRATROPIUM/ALBUTEROL SULFATE 18-103MCG
0 AEROSOL WITH ADAPTER (GRAM) INHALATION
Qty: 0 | Refills: 0 | DISCHARGE

## 2023-02-19 RX ORDER — SERTRALINE 25 MG/1
1 TABLET, FILM COATED ORAL
Qty: 0 | Refills: 0 | DISCHARGE

## 2023-02-19 RX ORDER — POTASSIUM CHLORIDE 20 MEQ
40 PACKET (EA) ORAL ONCE
Refills: 0 | Status: COMPLETED | OUTPATIENT
Start: 2023-02-19 | End: 2023-02-19

## 2023-02-19 RX ORDER — MUPIROCIN 20 MG/G
1 OINTMENT TOPICAL
Refills: 0 | Status: DISCONTINUED | OUTPATIENT
Start: 2023-02-19 | End: 2023-02-23

## 2023-02-19 RX ADMIN — PIPERACILLIN AND TAZOBACTAM 25 GRAM(S): 4; .5 INJECTION, POWDER, LYOPHILIZED, FOR SOLUTION INTRAVENOUS at 07:47

## 2023-02-19 RX ADMIN — Medication 2: at 05:29

## 2023-02-19 RX ADMIN — Medication 1 DROP(S): at 22:11

## 2023-02-19 RX ADMIN — Medication 3 MILLILITER(S): at 11:15

## 2023-02-19 RX ADMIN — CHLORHEXIDINE GLUCONATE 15 MILLILITER(S): 213 SOLUTION TOPICAL at 17:15

## 2023-02-19 RX ADMIN — Medication 1 APPLICATION(S): at 05:28

## 2023-02-19 RX ADMIN — CHLORHEXIDINE GLUCONATE 1 APPLICATION(S): 213 SOLUTION TOPICAL at 05:28

## 2023-02-19 RX ADMIN — Medication 1 DROP(S): at 19:03

## 2023-02-19 RX ADMIN — Medication 3 MILLILITER(S): at 00:13

## 2023-02-19 RX ADMIN — CHLORHEXIDINE GLUCONATE 15 MILLILITER(S): 213 SOLUTION TOPICAL at 05:28

## 2023-02-19 RX ADMIN — PIPERACILLIN AND TAZOBACTAM 25 GRAM(S): 4; .5 INJECTION, POWDER, LYOPHILIZED, FOR SOLUTION INTRAVENOUS at 13:43

## 2023-02-19 RX ADMIN — Medication 1 APPLICATION(S): at 10:56

## 2023-02-19 RX ADMIN — Medication 1 DROP(S): at 18:00

## 2023-02-19 RX ADMIN — PIPERACILLIN AND TAZOBACTAM 25 GRAM(S): 4; .5 INJECTION, POWDER, LYOPHILIZED, FOR SOLUTION INTRAVENOUS at 21:10

## 2023-02-19 RX ADMIN — SENNA PLUS 10 MILLILITER(S): 8.6 TABLET ORAL at 21:11

## 2023-02-19 RX ADMIN — Medication 3 MILLILITER(S): at 23:44

## 2023-02-19 RX ADMIN — Medication 1 APPLICATION(S): at 21:11

## 2023-02-19 RX ADMIN — Medication 40 MILLIEQUIVALENT(S): at 01:42

## 2023-02-19 RX ADMIN — Medication 1 DROP(S): at 17:00

## 2023-02-19 RX ADMIN — SERTRALINE 50 MILLIGRAM(S): 25 TABLET, FILM COATED ORAL at 11:29

## 2023-02-19 RX ADMIN — Medication 3 MILLILITER(S): at 17:14

## 2023-02-19 RX ADMIN — Medication 1 APPLICATION(S): at 01:41

## 2023-02-19 RX ADMIN — PANTOPRAZOLE SODIUM 40 MILLIGRAM(S): 20 TABLET, DELAYED RELEASE ORAL at 11:29

## 2023-02-19 RX ADMIN — Medication 1 DROP(S): at 20:20

## 2023-02-19 RX ADMIN — Medication 3 MILLILITER(S): at 06:09

## 2023-02-19 RX ADMIN — Medication 1 DROP(S): at 23:05

## 2023-02-19 RX ADMIN — PIPERACILLIN AND TAZOBACTAM 25 GRAM(S): 4; .5 INJECTION, POWDER, LYOPHILIZED, FOR SOLUTION INTRAVENOUS at 01:42

## 2023-02-19 RX ADMIN — Medication 1 TABLET(S): at 11:29

## 2023-02-19 RX ADMIN — Medication 1 DROP(S): at 21:06

## 2023-02-19 NOTE — CHART NOTE - NSCHARTNOTEFT_GEN_A_CORE
MICU Transfer Note  ---------------------------    Transfer from: MICU  Transfer to:  (  ) Medicine    (  ) Telemetry    ( x) RCU    (  ) Palliative    (  ) Stroke Unit    (  ) _______________  Accepting Physician:      MICU COURSE    65F with advanced ALS (baseline nonverbal, communicates minimally through eye movements, PEG, vent dependent, trach upsized 8/2022 with CT surgery), chronic hypoxic/hypercapnic respiratory failure, HLD; BIBEMS for hypoxia to 66% after vent malfunction at home, admitted to MICU for further management. Pt was transferred to MICU because no beds were available in RCU.    ENT performed trach change - trach was changed to a new #8 shiley distal XLT cuffed, tracheoscopy showed it was in good position. She is on baseline O2 requirements (FiO2 21). She is being treated empirically with Zosyn to cover for possible aspiration PNA given unclear retrocardiac opacity on CXR with leukocytosis, plan for likely 5 day course. No current ICU needs.      To-Do:    [ ] Complete course of empiric Abx for PNA  [ ] f/u BCx, UCx, MRSA PCR    OBJECTIVE --  Vital Signs Last 24 Hrs  T(C): 37.5 (19 Feb 2023 12:00), Max: 38 (18 Feb 2023 18:55)  T(F): 99.5 (19 Feb 2023 12:00), Max: 100.4 (18 Feb 2023 18:55)  HR: 90 (19 Feb 2023 12:00) (67 - 131)  BP: 99/58 (19 Feb 2023 12:00) (91/55 - 186/102)  BP(mean): 73 (19 Feb 2023 12:00) (68 - 103)  RR: 16 (19 Feb 2023 12:00) (14 - 19)  SpO2: 100% (19 Feb 2023 12:00) (98% - 100%)    Parameters below as of 19 Feb 2023 08:00  Patient On (Oxygen Delivery Method): ventilator    O2 Concentration (%): 21    I&O's Summary    18 Feb 2023 07:01  -  19 Feb 2023 07:00  --------------------------------------------------------  IN: 115 mL / OUT: 600 mL / NET: -485 mL    19 Feb 2023 07:01  -  19 Feb 2023 12:38  --------------------------------------------------------  IN: 120 mL / OUT: 0 mL / NET: 120 mL        MEDICATIONS  (STANDING):  albuterol/ipratropium for Nebulization 3 milliLiter(s) Nebulizer every 6 hours  chlorhexidine 0.12% Liquid 15 milliLiter(s) Oral Mucosa every 12 hours  chlorhexidine 4% Liquid 1 Application(s) Topical <User Schedule>  enoxaparin Injectable 40 milliGRAM(s) SubCutaneous every 24 hours  insulin lispro (ADMELOG) corrective regimen sliding scale   SubCutaneous every 6 hours  multivitamin 1 Tablet(s) Oral daily  pantoprazole  Injectable 40 milliGRAM(s) IV Push daily  petrolatum Ophthalmic Ointment 1 Application(s) Both EYES every 4 hours  piperacillin/tazobactam IVPB.. 3.375 Gram(s) IV Intermittent every 8 hours  senna Syrup 10 milliLiter(s) Oral at bedtime  sertraline 50 milliGRAM(s) Oral daily    MEDICATIONS  (PRN):        LABS                                            13.8                  Neurophils% (auto):   x      (02-18 @ 22:53):    25.45)-----------(306          Lymphocytes% (auto):  x                                             43.8                   Eosinphils% (auto):   x        Manual%: Neutrophils x    ; Lymphocytes x    ; Eosinophils x    ; Bands%: x    ; Blasts x                                    138    |  102    |  16                  Calcium: 8.8   / iCa: x      (02-18 @ 22:53)    ----------------------------<  196       Magnesium: 1.6                              3.4     |  15     |  <0.30            Phosphorous: 4.5      TPro  7.7    /  Alb  4.1    /  TBili  1.1    /  DBili  x      /  AST  70     /  ALT  65     /  AlkPhos  112    18 Feb 2023 22:53    ( 02-18 @ 19:41 )   PT: 14.1 sec;   INR: 1.22 ratio  aPTT: 36.1 sec MICU Transfer Note  ---------------------------    Transfer from: MICU  Transfer to:  (  ) Medicine    (  ) Telemetry    ( x) RCU    (  ) Palliative    (  ) Stroke Unit    (  ) _______________  Accepting Physician:      MICU COURSE    65F with advanced ALS (baseline nonverbal, communicates minimally through eye movements, PEG, vent dependent, trach upsized 8/2022 with CT surgery), chronic hypoxic/hypercapnic respiratory failure, HLD; BIBEMS for hypoxia to 66% after vent malfunction at home, admitted to MICU for further management. Pt was transferred to MICU because no beds were available in RCU.    ENT performed trach change - trach was changed to a new #8 shiley distal XLT cuffed, tracheoscopy showed it was in good position. She is on baseline O2 requirements (FiO2 21). She is being treated empirically with Zosyn to cover for possible aspiration PNA given unclear retrocardiac opacity on CXR with leukocytosis, plan for likely 5 day course. No current ICU needs.      To-Do:    [ ] Complete course of empiric Abx for PNA  [ ] f/u Ophthalmology recs    OBJECTIVE --  Vital Signs Last 24 Hrs  T(C): 37.5 (19 Feb 2023 12:00), Max: 38 (18 Feb 2023 18:55)  T(F): 99.5 (19 Feb 2023 12:00), Max: 100.4 (18 Feb 2023 18:55)  HR: 90 (19 Feb 2023 12:00) (67 - 131)  BP: 99/58 (19 Feb 2023 12:00) (91/55 - 186/102)  BP(mean): 73 (19 Feb 2023 12:00) (68 - 103)  RR: 16 (19 Feb 2023 12:00) (14 - 19)  SpO2: 100% (19 Feb 2023 12:00) (98% - 100%)    Parameters below as of 19 Feb 2023 08:00  Patient On (Oxygen Delivery Method): ventilator    O2 Concentration (%): 21    I&O's Summary    18 Feb 2023 07:01  -  19 Feb 2023 07:00  --------------------------------------------------------  IN: 115 mL / OUT: 600 mL / NET: -485 mL    19 Feb 2023 07:01  -  19 Feb 2023 12:38  --------------------------------------------------------  IN: 120 mL / OUT: 0 mL / NET: 120 mL        MEDICATIONS  (STANDING):  albuterol/ipratropium for Nebulization 3 milliLiter(s) Nebulizer every 6 hours  chlorhexidine 0.12% Liquid 15 milliLiter(s) Oral Mucosa every 12 hours  chlorhexidine 4% Liquid 1 Application(s) Topical <User Schedule>  enoxaparin Injectable 40 milliGRAM(s) SubCutaneous every 24 hours  insulin lispro (ADMELOG) corrective regimen sliding scale   SubCutaneous every 6 hours  multivitamin 1 Tablet(s) Oral daily  pantoprazole  Injectable 40 milliGRAM(s) IV Push daily  petrolatum Ophthalmic Ointment 1 Application(s) Both EYES every 4 hours  piperacillin/tazobactam IVPB.. 3.375 Gram(s) IV Intermittent every 8 hours  senna Syrup 10 milliLiter(s) Oral at bedtime  sertraline 50 milliGRAM(s) Oral daily    MEDICATIONS  (PRN):        LABS                                            13.8                  Neurophils% (auto):   x      (02-18 @ 22:53):    25.45)-----------(306          Lymphocytes% (auto):  x                                             43.8                   Eosinphils% (auto):   x        Manual%: Neutrophils x    ; Lymphocytes x    ; Eosinophils x    ; Bands%: x    ; Blasts x                                    138    |  102    |  16                  Calcium: 8.8   / iCa: x      (02-18 @ 22:53)    ----------------------------<  196       Magnesium: 1.6                              3.4     |  15     |  <0.30            Phosphorous: 4.5      TPro  7.7    /  Alb  4.1    /  TBili  1.1    /  DBili  x      /  AST  70     /  ALT  65     /  AlkPhos  112    18 Feb 2023 22:53    ( 02-18 @ 19:41 )   PT: 14.1 sec;   INR: 1.22 ratio  aPTT: 36.1 sec      A&P:  65F with advanced ALS (baseline nonverbal, communicates minimally through eye movements, PEG, vent dependent, trach upsized 8/2022 with CT surgery), chronic hypoxic/hypercapnic respiratory failure, HLD; BIBEMS for hypoxia to 66% after vent malfunction at home; found to have sepsis, likely  source; admitted to MICU for further management.     PLAN:    NEURO  # workup for anoxic brain injury  - CTH - no acute finding  - c/w home diazepam  - c/w home sertraline    # advanced ALS  - baseline nonverbal, awake, communication with eye movements has been declining in recent months    CARDIOVASCULAR  - sinus tachycardia iso sepsis, now improved    RESPIRATORY  # tracheostomy  - trach changed in ED, additional change by ENT  - Amritbs    GI/NUTRITION  # PEG  - feeds, site care, bowel reg    /RENAL  - No active issues.     SKIN  - c/w Lacrilube, f/u home eyedrops/ointment  - f/u home nystatin    ID  # sepsis  - CXR with retrocardiac opacity, could not rule out underlying infection. UTI also possible source  - F/u BCx, UCx  - MRSA PCR positive - mupirocin  - Allergy: rash with Bactrim   - Zosyn 4/5 days  - consider sputum Cx if able    ENDOCRINE  - Glucose levels controlled  - A1c 4.9    HEMATOLOGIC  # leukocytosis   - iso sepsis, improving  # thrombocytosis  - possibly reactive    DVT PPX  - [id number] QD    OPHTHO  Corneal ulcer  - Concerned for corneal ulcer, ophtho consulted  - eye culture with MRSA and stpah epi  - c/w Vanc, ofloxacin, and erythromycin eyedrops q2hrs.   - d/c tobramycin  - f/u optho    ETHICS  - spouse considering DNR/DNI. Full code for now.    Dispo  - Listed for [facility] MICU Transfer Note  ---------------------------    Transfer from: MICU  Transfer to:  (  ) Medicine    (  ) Telemetry    ( x) RCU    (  ) Palliative    (  ) Stroke Unit    (  ) _______________  Accepting Physician:  Dr. Gil    MICU COURSE    65F with advanced ALS (baseline nonverbal, communicates minimally through eye movements, PEG, vent dependent, trach upsized 8/2022 with CT surgery), chronic hypoxic/hypercapnic respiratory failure, HLD; BIBEMS for hypoxia to 66% after vent malfunction at home, admitted to MICU for further management. Pt was transferred to MICU because no beds were available in RCU.    ENT performed trach change - trach was changed to a new #8 shiley distal XLT cuffed, tracheoscopy showed it was in good position. She is on baseline O2 requirements (FiO2 21). She is being treated empirically with Zosyn to cover for possible aspiration PNA given unclear retrocardiac opacity on CXR with leukocytosis, plan for likely 5 day course. No current ICU needs.      To-Do:    [ ] Complete course of empiric Abx for PNA (zosyn day 4/5 today2/22)  [ ] f/u Ophthalmology recs    OBJECTIVE --  Vital Signs Last 24 Hrs  T(C): 37.5 (19 Feb 2023 12:00), Max: 38 (18 Feb 2023 18:55)  T(F): 99.5 (19 Feb 2023 12:00), Max: 100.4 (18 Feb 2023 18:55)  HR: 90 (19 Feb 2023 12:00) (67 - 131)  BP: 99/58 (19 Feb 2023 12:00) (91/55 - 186/102)  BP(mean): 73 (19 Feb 2023 12:00) (68 - 103)  RR: 16 (19 Feb 2023 12:00) (14 - 19)  SpO2: 100% (19 Feb 2023 12:00) (98% - 100%)    Parameters below as of 19 Feb 2023 08:00  Patient On (Oxygen Delivery Method): ventilator    O2 Concentration (%): 21    I&O's Summary    18 Feb 2023 07:01  -  19 Feb 2023 07:00  --------------------------------------------------------  IN: 115 mL / OUT: 600 mL / NET: -485 mL    19 Feb 2023 07:01  -  19 Feb 2023 12:38  --------------------------------------------------------  IN: 120 mL / OUT: 0 mL / NET: 120 mL        MEDICATIONS  (STANDING):  albuterol/ipratropium for Nebulization 3 milliLiter(s) Nebulizer every 6 hours  chlorhexidine 0.12% Liquid 15 milliLiter(s) Oral Mucosa every 12 hours  chlorhexidine 4% Liquid 1 Application(s) Topical <User Schedule>  enoxaparin Injectable 40 milliGRAM(s) SubCutaneous every 24 hours  insulin lispro (ADMELOG) corrective regimen sliding scale   SubCutaneous every 6 hours  multivitamin 1 Tablet(s) Oral daily  pantoprazole  Injectable 40 milliGRAM(s) IV Push daily  petrolatum Ophthalmic Ointment 1 Application(s) Both EYES every 4 hours  piperacillin/tazobactam IVPB.. 3.375 Gram(s) IV Intermittent every 8 hours  senna Syrup 10 milliLiter(s) Oral at bedtime  sertraline 50 milliGRAM(s) Oral daily    MEDICATIONS  (PRN):        LABS                                            13.8                  Neurophils% (auto):   x      (02-18 @ 22:53):    25.45)-----------(306          Lymphocytes% (auto):  x                                             43.8                   Eosinphils% (auto):   x        Manual%: Neutrophils x    ; Lymphocytes x    ; Eosinophils x    ; Bands%: x    ; Blasts x                                    138    |  102    |  16                  Calcium: 8.8   / iCa: x      (02-18 @ 22:53)    ----------------------------<  196       Magnesium: 1.6                              3.4     |  15     |  <0.30            Phosphorous: 4.5      TPro  7.7    /  Alb  4.1    /  TBili  1.1    /  DBili  x      /  AST  70     /  ALT  65     /  AlkPhos  112    18 Feb 2023 22:53    ( 02-18 @ 19:41 )   PT: 14.1 sec;   INR: 1.22 ratio  aPTT: 36.1 sec      A&P:  65F with advanced ALS (baseline nonverbal, communicates minimally through eye movements, PEG, vent dependent, trach upsized 8/2022 with CT surgery), chronic hypoxic/hypercapnic respiratory failure, HLD; BIBEMS for hypoxia to 66% after vent malfunction at home; found to have sepsis, likely  source; admitted to MICU for further management.     PLAN:    NEURO  # workup for anoxic brain injury  - CTH - no acute finding  - c/w home diazepam  - c/w home sertraline    # advanced ALS  - baseline nonverbal, awake, communication with eye movements has been declining in recent months    CARDIOVASCULAR  - sinus tachycardia iso sepsis, now improved    RESPIRATORY  # tracheostomy  - trach changed in ED, additional change by ENT  - Briana    GI/NUTRITION  # PEG  - feeds, site care, bowel reg    /RENAL  - No active issues.     SKIN  - c/w Lacrilube, f/u home eyedrops/ointment  - f/u home nystatin    ID  # sepsis  - CXR with retrocardiac opacity, could not rule out underlying infection. UTI also possible source  - F/u BCx, UCx  - MRSA PCR positive - mupirocin  - Allergy: rash with Bactrim   - Zosyn 4/5 days  - consider sputum Cx if able    ENDOCRINE  - Glucose levels controlled  - A1c 4.9    HEMATOLOGIC  # leukocytosis   - iso sepsis, improving  # thrombocytosis  - possibly reactive    DVT PPX  - [id number] QD    OPHTHO  Corneal ulcer  - Concerned for corneal ulcer, ophtho consulted  - eye culture with MRSA and stpah epi  - c/w Vanc, ofloxacin, and erythromycin eyedrops q2hrs.   - d/c tobramycin  - f/u optho    ETHICS  - spouse considering DNR/DNI. Full code for now.    Dispo  - Listed for RCU

## 2023-02-19 NOTE — CHART NOTE - NSCHARTNOTEFT_GEN_A_CORE
: Ev Can    INDICATION: critical illness    PROCEDURE:  [x] LIMITED ECHO  [x] LIMITED CHEST  [ ] LIMITED RETROPERITONEAL  [ ] LIMITED ABDOMINAL  [ ] LIMITED DVT  [ ] NEEDLE GUIDANCE VASCULAR  [ ] NEEDLE GUIDANCE THORACENTESIS  [ ] NEEDLE GUIDANCE PARACENTESIS  [ ] NEEDLE GUIDANCE PERICARDIOCENTESIS  [ ] OTHER    FINDINGS:  A line predominant pattern anteriorly  scattered B lines at bases  poor cardiac views  IVC 1cm       INTERPRETATION:  A line predominant aeration pattern     Images uploaded on [employer] : Ev Can    INDICATION: critical illness    PROCEDURE:  [x] LIMITED ECHO  [x] LIMITED CHEST  [ ] LIMITED RETROPERITONEAL  [ ] LIMITED ABDOMINAL  [ ] LIMITED DVT  [ ] NEEDLE GUIDANCE VASCULAR  [ ] NEEDLE GUIDANCE THORACENTESIS  [ ] NEEDLE GUIDANCE PARACENTESIS  [ ] NEEDLE GUIDANCE PERICARDIOCENTESIS  [ ] OTHER    FINDINGS:  A line predominant pattern anteriorly  scattered B lines at bases  poor cardiac views  IVC 1cm       INTERPRETATION:  A line predominant aeration pattern     Images uploaded on [employer]      Attending Addendum:     I was present during the entire procedure and agree with the above findings and interpretation. TIme spent on the procedure was separate from the critical care time spent caring for the patient.     Chaz Molina MD

## 2023-02-19 NOTE — PROGRESS NOTE ADULT - SUBJECTIVE AND OBJECTIVE BOX
INTERVAL HPI/OVERNIGHT EVENTS:    SUBJECTIVE: Patient seen and examined at bedside. Pt does not respond to commands or questions.      VITAL SIGNS:  ICU Vital Signs Last 24 Hrs  T(C): 37.5 (2023 08:00), Max: 38 (2023 18:55)  T(F): 99.5 (2023 08:00), Max: 100.4 (2023 18:55)  HR: 86 (2023 11:00) (67 - 131)  BP: 112/56 (2023 11:00) (91/55 - 186/102)  BP(mean): 76 (2023 11:00) (68 - 103)  ABP: --  ABP(mean): --  RR: 16 (2023 11:00) (14 - 19)  SpO2: 100% (2023 11:00) (98% - 100%)    O2 Parameters below as of 2023 08:00  Patient On (Oxygen Delivery Method): ventilator    O2 Concentration (%): 21      Mode: AC/ CMV (Assist Control/ Continuous Mandatory Ventilation), RR (machine): 16, TV (machine): 380, FiO2: 21, PEEP: 5, ITime: 1, MAP: 8, PIP: 21  Plateau pressure:   P/F ratio:      @ 07:01  -   @ 07:00  --------------------------------------------------------  IN: 115 mL / OUT: 600 mL / NET: -485 mL     @ 07:01  -   @ 11:29  --------------------------------------------------------  IN: 100 mL / OUT: 0 mL / NET: 100 mL      CAPILLARY BLOOD GLUCOSE      POCT Blood Glucose.: 130 mg/dL (2023 11:28)    ECG:    PHYSICAL EXAM:    General: Not responding to voice/sternal rub.  HEENT: Possible corneal ulcer present on R eye  Chest/Lungs: Anterior CTAB coarse breath sounds b/l.   Heart: Tachycardia, regular rhythm. S1/S2. No murmurs appreciated.   Abdomen: Soft, nontender to palpation, all quadrants; somewhat distended. No guarding.  : No suprapubic tenderness.   Extremities: No significant extremity edema. WWP.  Neuro: Not responding to voice/sternal rub.  Psych: Not responding to voice/sternal rub.    MEDICATIONS:  MEDICATIONS  (STANDING):  albuterol/ipratropium for Nebulization 3 milliLiter(s) Nebulizer every 6 hours  chlorhexidine 0.12% Liquid 15 milliLiter(s) Oral Mucosa every 12 hours  chlorhexidine 4% Liquid 1 Application(s) Topical <User Schedule>  enoxaparin Injectable 40 milliGRAM(s) SubCutaneous every 24 hours  insulin lispro (ADMELOG) corrective regimen sliding scale   SubCutaneous every 6 hours  multivitamin 1 Tablet(s) Oral daily  pantoprazole  Injectable 40 milliGRAM(s) IV Push daily  petrolatum Ophthalmic Ointment 1 Application(s) Both EYES every 4 hours  piperacillin/tazobactam IVPB.. 3.375 Gram(s) IV Intermittent every 8 hours  senna Syrup 10 milliLiter(s) Oral at bedtime  sertraline 50 milliGRAM(s) Oral daily    MEDICATIONS  (PRN):      ALLERGIES:  Allergies    Bactrim DS (Rash)    Intolerances        LABS:                        13.8   25.45 )-----------( 306      ( 2023 22:53 )             43.8         138  |  102  |  16  ----------------------------<  196<H>  3.4<L>   |  15<L>  |  <0.30<L>    Ca    8.8      2023 22:53  Phos  4.5       Mg     1.6         TPro  7.7  /  Alb  4.1  /  TBili  1.1  /  DBili  x   /  AST  70<H>  /  ALT  65<H>  /  AlkPhos  112  -    PT/INR - ( 2023 19:41 )   PT: 14.1 sec;   INR: 1.22 ratio         PTT - ( 2023 19:41 )  PTT:36.1 sec  Urinalysis Basic - ( 2023 19:41 )    Color: Yellow / Appearance: Slightly Turbid / S.017 / pH: x  Gluc: x / Ketone: Negative  / Bili: Negative / Urobili: Negative   Blood: x / Protein: >600 / Nitrite: Negative   Leuk Esterase: Small / RBC: 5 /hpf / WBC 44 /HPF   Sq Epi: x / Non Sq Epi: 0 /hpf / Bacteria: Many        RADIOLOGY & ADDITIONAL TESTS: Reviewed.

## 2023-02-19 NOTE — PATIENT PROFILE ADULT - FUNCTIONAL ASSESSMENT - BASIC MOBILITY 3.
Consent: The patient's consent was obtained including but not limited to risks of crusting, scabbing, blistering, scarring, darker or lighter pigmentary change, recurrence, incomplete removal and infection. 1 = Total assistance Number Of Freeze-Thaw Cycles: 1 freeze-thaw cycle Duration Of Freeze Thaw-Cycle (Seconds): 5 Render Note In Bullet Format When Appropriate: No Detail Level: Detailed Post-Care Instructions: I reviewed with the patient in detail post-care instructions. Patient is to wear sunprotection, and avoid picking at any of the treated lesions. Pt may apply Vaseline to crusted or scabbing areas.

## 2023-02-19 NOTE — PROCEDURE NOTE - NSBRONCHPROCDETAILS_GEN_A_CORE_FT
ICU BRONCHOSCOPY PROCEDURE NOTE                                                             : Chaz Landers MD  ANESTHETIC: intratracheal lidocaine   PROCEDURE:  Flexible bronchoscopy  Position:   Supine  Intubation site:  tracheostomy    INDICATION:  Hemoptysis    FINDINGS:  Bronchoscope inserted through tracheostomy. Airway inspection performed. Blood visualized oozing down into lower airways, likely from oozing from tracheostomy site. Main emely sharp. Airways inspected to subsegmental level. Blood visualized pooling in RML (lateral segment), RLL (posterior segment), LLL (superior, medial basal, posterior segment). Therapeutic aspiration of secretions performed. BAL of lingula performed. Final airway inspection revealed no active bleeding. Bronchoscope removed through tracheostomy.     ICU BRONCHOSCOPY PROCEDURE NOTE                                                             : Chaz Landers MD  ANESTHETIC: intratracheal lidocaine   PROCEDURE:  Flexible bronchoscopy  Position:   Supine  Intubation site:  tracheostomy    INDICATION:  clot visualized at main emely on laryngoscope    FINDINGS:  Bronchoscope inserted through tracheostomy. Airway inspection performed. Blood visualized oozing down into lower airways, likely from oozing from tracheostomy site. Main emely sharp. Airways inspected to subsegmental level. Blood visualized pooling in RML (lateral segment), RLL (posterior segment), LLL (superior, medial basal, posterior segment). Therapeutic aspiration of secretions performed. BAL of lingula performed. Final airway inspection revealed no active bleeding. Bronchoscope removed through tracheostomy.     ICU BRONCHOSCOPY PROCEDURE NOTE                                                             : Chaz Landers MD  ANESTHETIC: intratracheal lidocaine   PROCEDURE:  Flexible bronchoscopy  Position:   Supine  Intubation site:  tracheostomy    INDICATION:  clot visualized at main emely on laryngoscope    FINDINGS:  Bronchoscope inserted through tracheostomy. Airway inspection performed. Blood visualized oozing down into lower airways, likely from oozing from tracheostomy site. Main emely sharp. Airways inspected to subsegmental level. Blood visualized pooling in RML (lateral segment), RLL (posterior segment), LLL (superior, medial basal, posterior segment). Therapeutic aspiration of secretions/clots performed. BAL of lower lobe performed. Final airway inspection revealed no active bleeding. Bronchoscope removed through tracheostomy.

## 2023-02-19 NOTE — CONSULT NOTE ADULT - ASSESSMENT
Assessment and Recommendations:  67y female with a past medical history advanced ALS (baseline nonverbal, communicates minimally through eye movements, PEG, vent dependent, trach upsized 8/2022 with CT surgery), chronic hypoxic/hypercapnic respiratory failure, HLD; BIBEMS for hypoxia to 66% after vent malfunction at home, ocular history unknown, ophthalmology consulted to evaluate for corneal ulcer.     Va unable to be assess due to mental status. PERRL no APD. IOP wnl.     #Corneal Ulcer vs Exposure Keratopathy OU  - 6mm of lagophthalmos OU  - OD: Inferior 6mm x 4mm circular area of stromal haze. 0.5x0.5 overlying epi defect.   - OS: Inferior 4mm x 2mm circular area of stromal haze. 0.5mm x0.5mm overlying epi defect.  - No thinning/perforation at this time  - Given extent of stromal haze, will treat as corneal ulcer  - Cultures sent for BOTH eyes  - Recommend fortified vancomycin q2h and fortified tobramycin q2h BOTH eyes, alternating every hour. (order placed by ophthalmology)  - Recommend taping of eyelids AT ALL TIMES, given extent of exposure.     SDW Dr. Joshi, chief    Outpatient Follow-up: Patient should follow-up with his/her ophthalmologist or with [facility] Department of Ophthalmology within 1 week of after discharge at:    [address]  [place] [address]  [phone]    Francheska Canales MD PGY 1  Available on Microsoft Teams Assessment and Recommendations:  67y female with a past medical history advanced ALS (baseline nonverbal, communicates minimally through eye movements, PEG, vent dependent, trach upsized 8/2022 with CT surgery), chronic hypoxic/hypercapnic respiratory failure, HLD; BIBEMS for hypoxia to 66% after vent malfunction at home, ocular history unknown, ophthalmology consulted to evaluate for corneal ulcer.     Va unable to be assess due to mental status. PERRL no APD. IOP wnl.     #Corneal Ulcer vs Exposure Keratopathy OU  - 6mm of lagophthalmos OU  - OD: Inferior 6mm x 4mm circular area of stromal haze. 0.5x0.5 overlying epi defect.   - OS: Inferior 4mm x 2mm circular area of stromal haze. 0.5mm x0.5mm overlying epi defect.  - No thinning/perforation at this time  - Given extent of stromal haze, will treat as corneal ulcer  - Cultures sent for BOTH eyes  - Recommend fortified vancomycin q2h and fortified tobramycin q2h BOTH eyes, alternating every hour. (order placed by ophthalmology)  - Recommend taping of eyelids AT ALL TIMES, given extent of exposure. Instructions given to nurse.     SDW Dr. Joshi, chief    Outpatient Follow-up: Patient should follow-up with his/her ophthalmologist or with [facility] Department of Ophthalmology within 1 week of after discharge at:    [address]  [place] [address] [phone]    Francheska Canales MD PGY 1  Available on [employer] Teams

## 2023-02-19 NOTE — PROGRESS NOTE ADULT - SUBJECTIVE AND OBJECTIVE BOX
PULMONARY PROGRESS NOTE    TINO MATA  MRN-[id number]    Patient is a 67y old  Female who presents with a chief complaint of hypoxia (18 Feb 2023 23:42)      HPI:  well known to me. ALS, vent dependent 7 years. Noncommunicative- actual MS unknown. Acute cyanosis yesterday, possibly related to circuit issue.   Never had loss of pulse, no CPR. Bag ventilated- brought to hospital. Noted increased BP-now better. High WBC count,   -CXR no change from baseline-  -    ROS: could not obtain  -  -    ACTIVE MEDICATION LIST:  MEDICATIONS  (STANDING):  albuterol/ipratropium for Nebulization 3 milliLiter(s) Nebulizer every 6 hours  chlorhexidine 0.12% Liquid 15 milliLiter(s) Oral Mucosa every 12 hours  chlorhexidine 4% Liquid 1 Application(s) Topical <User Schedule>  enoxaparin Injectable 40 milliGRAM(s) SubCutaneous every 24 hours  insulin lispro (ADMELOG) corrective regimen sliding scale   SubCutaneous every 6 hours  multivitamin 1 Tablet(s) Oral daily  pantoprazole  Injectable 40 milliGRAM(s) IV Push daily  petrolatum Ophthalmic Ointment 1 Application(s) Both EYES every 4 hours  piperacillin/tazobactam IVPB.. 3.375 Gram(s) IV Intermittent every 8 hours  senna Syrup 10 milliLiter(s) Oral at bedtime  sertraline 50 milliGRAM(s) Oral daily    MEDICATIONS  (PRN):      EXAM:  Vital Signs Last 24 Hrs  T(C): 37.5 (19 Feb 2023 08:00), Max: 38 (18 Feb 2023 18:55)  T(F): 99.5 (19 Feb 2023 08:00), Max: 100.4 (18 Feb 2023 18:55)  HR: 84 (19 Feb 2023 09:32) (67 - 131)  BP: 122/61 (19 Feb 2023 09:00) (91/55 - 186/102)  BP(mean): 84 (19 Feb 2023 09:00) (68 - 103)  RR: 16 (19 Feb 2023 09:00) (14 - 19)  SpO2: 100% (19 Feb 2023 09:32) (98% - 100%)    Parameters below as of 19 Feb 2023 08:00  Patient On (Oxygen Delivery Method): ventilator    O2 Concentration (%): 21    GENERAL: The patient is eyes open without response    SKIN: Warm, dry, no rashes    LUNGS: Clear to auscultation without wheezing, rales or rhonchi; respirations unlabored    HEART: Regular rate and rhythm without murmur.    ABDOMEN: +BS, Soft, Nontender    EXTREMITIES: No clubbing, cyanosis, edema    ABG - ( 19 Feb 2023 01:25 )  pH, Arterial: 7.44  pH, Blood: x     /  pCO2: 28    /  pO2: 203   / HCO3: 19    / Base Excess: -3.8  /  SaO2: 100.0       CXR- LLL atelectasis (chronic). Tracheal air column dilated due to cuff                      13.8   25.45 )-----------( 306      ( 18 Feb 2023 22:53 )             43.8       02-18    138  |  102  |  16  ----------------------------<  196<H>  3.4<L>   |  15<L>  |  <0.30<L>    Ca    8.8      18 Feb 2023 22:53  Phos  4.5     02-18  Mg     1.6     02-18    TPro  7.7  /  Alb  4.1  /  TBili  1.1  /  DBili  x   /  AST  70<H>  /  ALT  65<H>  /  AlkPhos  112  02-18      LIVER FUNCTIONS - ( 18 Feb 2023 22:53 )  Alb: 4.1 g/dL / Pro: 7.7 g/dL / ALK PHOS: 112 U/L / ALT: 65 U/L / AST: 70 U/L / GGT: x         CT head- no acute finding      PROBLEM LIST:  67y Female with HEALTH ISSUES - PROBLEM Dx:  Tracheostomy in place  ALS  Vent dependent  Loss of ventilation at home- exact etiology unclear        RECS:  unclear if patient suffered any neurological injury from event  elevated WBC likely stress- has chronic urinary colonization, and chronic LLL atelectasis  will ask CT surg to eval trach tube        Pranav Newman MD  [phone]

## 2023-02-19 NOTE — PROCEDURE NOTE - NSBRONCHHISTORY_GEN_A_CORE_FT
66 yo F with hx ALS with tracheostomy exchange overnight for trach dysfunction. Clot seen by ENT at main emely. Bronchoscopy performed for airway evaluation.

## 2023-02-19 NOTE — PROGRESS NOTE ADULT - ASSESSMENT
65F with advanced ALS (baseline nonverbal, communicates minimally through eye movements, PEG, vent dependent, trach upsized 8/2022 with CT surgery), chronic hypoxic/hypercapnic respiratory failure, HLD; BIBEMS for hypoxia to 66% after vent malfunction at home; found to have sepsis, likely  source; admitted to MICU for further management.     PLAN:    NEURO    # workup for anoxic brain injury  - CTH - no acute finding  - c/w home diazepam  - c/w home sertraline    # advanced ALS  - baseline nonverbal, awake, communication with eye movements has been declining in recent months  - c/w home riluzole    CARDIOVASCULAR  - sinus tachycardia iso sepsis    RESPIRATORY  # tracheostomy  - trach changed in ED, additional change by ENT  - Briana    GI/NUTRITION  # PEG  - feeds, site care, bowel reg    # PPX  - pantop 40 IV qD    /RENAL  - No active issues.     SKIN  - c/w Lacrilube, f/u home eyedrops/ointment  - f/u home nystatin    ID  # sepsis  - possibly from UTI  - Zosyn  - MRSA/MSSA  - Allergy: rash with Bactrim   - BCx*2, UCx  - consider sputum Cx if able    ENDOCRINE  # ?DM  - a/w BG 200s, glucosuria  - A1c    HEMATOLOGIC  # leukocytosis   - iso sepsis  # thrombocytosis  - possibly reactive    DVT PPX  - Lovenox 40 sc q24    MISC  - f/u med rec with spouse in AM    ETHICS  - spouse considering DNR/DNI documentation in AM. Full code for now.   65F with advanced ALS (baseline nonverbal, communicates minimally through eye movements, PEG, vent dependent, trach upsized 8/2022 with CT surgery), chronic hypoxic/hypercapnic respiratory failure, HLD; BIBEMS for hypoxia to 66% after vent malfunction at home; found to have sepsis, likely  source; admitted to MICU for further management.     PLAN:    NEURO    # workup for anoxic brain injury  - CTH - no acute finding  - c/w home diazepam  - c/w home sertraline    # advanced ALS  - baseline nonverbal, awake, communication with eye movements has been declining in recent months  - c/w home riluzole    CARDIOVASCULAR  - sinus tachycardia iso sepsis    RESPIRATORY  # tracheostomy  - trach changed in ED, additional change by ENT  - Briana    GI/NUTRITION  # PEG  - feeds, site care, bowel reg    # PPX  - pantop 40 IV qD    /RENAL  - No active issues.     SKIN  - c/w Lacrilube, f/u home eyedrops/ointment  - f/u home nystatin    ID  # sepsis  - possibly from UTI  - Zosyn  - MRSA/MSSA  - Allergy: rash with Bactrim   - BCx*2, UCx  - consider sputum Cx if able    ENDOCRINE  # ?DM  - a/w BG 200s, glucosuria  - A1c    HEMATOLOGIC  # leukocytosis   - iso sepsis  # thrombocytosis  - possibly reactive    DVT PPX  - Lovenox 40 sc q24    OPHTHO  ?Corneal ulcer  - Concerned for corneal ulcer, ophtho consulted    MISC  - f/u med rec with spouse in AM    ETHICS  - spouse considering DNR/DNI documentation in AM. Full code for now.   65F with advanced ALS (baseline nonverbal, communicates minimally through eye movements, PEG, vent dependent, trach upsized 8/2022 with CT surgery), chronic hypoxic/hypercapnic respiratory failure, HLD; BIBEMS for hypoxia to 66% after vent malfunction at home; found to have sepsis, likely  source; admitted to MICU for further management.     PLAN:    NEURO  # workup for anoxic brain injury  - CTH - no acute finding  - c/w home diazepam  - c/w home sertraline    # advanced ALS  - baseline nonverbal, awake, communication with eye movements has been declining in recent months  - c/w home riluzole    CARDIOVASCULAR  - sinus tachycardia iso sepsis, now improved    RESPIRATORY  # tracheostomy  - trach changed in ED, additional change by ENT  - Briana    GI/NUTRITION  # PEG  - feeds, site care, bowel reg    # PPX  - pantop 40 IV qD    /RENAL  - No active issues.     SKIN  - c/w Lacrilube, f/u home eyedrops/ointment  - f/u home nystatin    ID  # sepsis  - CXR with retrocardiac opacity, could not rule out underlying infection. UTI also possible source  - Zosyn (2/18-)  - F/u BCx, UCx  - MRSA/MSSA PCR pending  - Allergy: rash with Bactrim   - consider sputum Cx if able    ENDOCRINE  - Glucose levels controlled  - A1c 4.9    HEMATOLOGIC  # leukocytosis   - iso sepsis  # thrombocytosis  - possibly reactive    DVT PPX  - Lovenox 40 sc q24    OPHTHO  ?Corneal ulcer  - Concerned for corneal ulcer, ophtho consulted    MISC  - f/u med rec with spouse in AM    ETHICS  - spouse considering DNR/DNI documentation in AM. Full code for now.

## 2023-02-19 NOTE — PROCEDURE NOTE - PROCEDURE DATE TIME, MLM
19-Feb-2023 14:23 Recommendations    1. Continue current regular diet.   2. If PO intake <50%, add Boost Glucose Control BID.   3. RD following.    Goals: Continue adequate intake via meals  Nutrition Goal Status: new  Communication of RD Recs: (POC)

## 2023-02-19 NOTE — CHART NOTE - NSCHARTNOTEFT_GEN_A_CORE
ENT called back to evaluate trach tube reportedly falling out of stoma, pt losing tidal volumes and audible leak noted. Upon evaluation noted that velcro tie was too loose, tightened velcro tie so trach is flush against the skin, tidal volumes normal no further leaking noted. Bedside tracheoscopy performed, trach in good position, noted to have blood clot sitting on emely, no purulence, no erythema, no evidence of tracheomalacia.     P:  - Advised team of blood clot on emely, suggested bronch lavage   - HOB elevation  - Suction PRN  - Continue trach care  - Care per primary team

## 2023-02-20 LAB
ALBUMIN SERPL ELPH-MCNC: 3.8 G/DL — SIGNIFICANT CHANGE UP (ref 3.3–5)
ALP SERPL-CCNC: 91 U/L — SIGNIFICANT CHANGE UP (ref 40–120)
ALT FLD-CCNC: 52 U/L — HIGH (ref 10–45)
ANION GAP SERPL CALC-SCNC: 16 MMOL/L — SIGNIFICANT CHANGE UP (ref 5–17)
APTT BLD: 30 SEC — SIGNIFICANT CHANGE UP (ref 27.5–35.5)
AST SERPL-CCNC: 37 U/L — SIGNIFICANT CHANGE UP (ref 10–40)
BASE EXCESS BLDV CALC-SCNC: -1.7 MMOL/L — SIGNIFICANT CHANGE UP (ref -2–3)
BASOPHILS # BLD AUTO: 0.06 K/UL — SIGNIFICANT CHANGE UP (ref 0–0.2)
BASOPHILS NFR BLD AUTO: 0.5 % — SIGNIFICANT CHANGE UP (ref 0–2)
BILIRUB SERPL-MCNC: 1 MG/DL — SIGNIFICANT CHANGE UP (ref 0.2–1.2)
BUN SERPL-MCNC: 17 MG/DL — SIGNIFICANT CHANGE UP (ref 7–23)
CA-I SERPL-SCNC: 1.19 MMOL/L — SIGNIFICANT CHANGE UP (ref 1.15–1.33)
CALCIUM SERPL-MCNC: 8.7 MG/DL — SIGNIFICANT CHANGE UP (ref 8.4–10.5)
CHLORIDE BLDV-SCNC: 103 MMOL/L — SIGNIFICANT CHANGE UP (ref 96–108)
CHLORIDE SERPL-SCNC: 103 MMOL/L — SIGNIFICANT CHANGE UP (ref 96–108)
CO2 BLDV-SCNC: 26 MMOL/L — SIGNIFICANT CHANGE UP (ref 22–26)
CO2 SERPL-SCNC: 19 MMOL/L — LOW (ref 22–31)
CREAT SERPL-MCNC: <0.3 MG/DL — LOW (ref 0.5–1.3)
EGFR: 115 ML/MIN/1.73M2 — SIGNIFICANT CHANGE UP
EOSINOPHIL # BLD AUTO: 0.12 K/UL — SIGNIFICANT CHANGE UP (ref 0–0.5)
EOSINOPHIL NFR BLD AUTO: 1 % — SIGNIFICANT CHANGE UP (ref 0–6)
GAS PNL BLDV: 136 MMOL/L — SIGNIFICANT CHANGE UP (ref 136–145)
GAS PNL BLDV: SIGNIFICANT CHANGE UP
GAS PNL BLDV: SIGNIFICANT CHANGE UP
GLUCOSE BLDC GLUCOMTR-MCNC: 125 MG/DL — HIGH (ref 70–99)
GLUCOSE BLDC GLUCOMTR-MCNC: 131 MG/DL — HIGH (ref 70–99)
GLUCOSE BLDC GLUCOMTR-MCNC: 151 MG/DL — HIGH (ref 70–99)
GLUCOSE BLDC GLUCOMTR-MCNC: 206 MG/DL — HIGH (ref 70–99)
GLUCOSE BLDV-MCNC: 207 MG/DL — HIGH (ref 70–99)
GLUCOSE SERPL-MCNC: 205 MG/DL — HIGH (ref 70–99)
HCO3 BLDV-SCNC: 24 MMOL/L — SIGNIFICANT CHANGE UP (ref 22–29)
HCT VFR BLD CALC: 38.8 % — SIGNIFICANT CHANGE UP (ref 34.5–45)
HCT VFR BLDA CALC: 37 % — SIGNIFICANT CHANGE UP (ref 34.5–46.5)
HGB BLD CALC-MCNC: 12.4 G/DL — SIGNIFICANT CHANGE UP (ref 11.7–16.1)
HGB BLD-MCNC: 12.3 G/DL — SIGNIFICANT CHANGE UP (ref 11.5–15.5)
IMM GRANULOCYTES NFR BLD AUTO: 1 % — HIGH (ref 0–0.9)
INR BLD: 1.16 RATIO — SIGNIFICANT CHANGE UP (ref 0.88–1.16)
LACTATE BLDV-MCNC: 1.6 MMOL/L — SIGNIFICANT CHANGE UP (ref 0.5–2)
LYMPHOCYTES # BLD AUTO: 1.36 K/UL — SIGNIFICANT CHANGE UP (ref 1–3.3)
LYMPHOCYTES # BLD AUTO: 11 % — LOW (ref 13–44)
MAGNESIUM SERPL-MCNC: 2 MG/DL — SIGNIFICANT CHANGE UP (ref 1.6–2.6)
MCHC RBC-ENTMCNC: 27.7 PG — SIGNIFICANT CHANGE UP (ref 27–34)
MCHC RBC-ENTMCNC: 31.7 GM/DL — LOW (ref 32–36)
MCV RBC AUTO: 87.4 FL — SIGNIFICANT CHANGE UP (ref 80–100)
MONOCYTES # BLD AUTO: 1.01 K/UL — HIGH (ref 0–0.9)
MONOCYTES NFR BLD AUTO: 8.2 % — SIGNIFICANT CHANGE UP (ref 2–14)
NEUTROPHILS # BLD AUTO: 9.68 K/UL — HIGH (ref 1.8–7.4)
NEUTROPHILS NFR BLD AUTO: 78.3 % — HIGH (ref 43–77)
NRBC # BLD: 0 /100 WBCS — SIGNIFICANT CHANGE UP (ref 0–0)
PCO2 BLDV: 45 MMHG — HIGH (ref 39–42)
PH BLDV: 7.34 — SIGNIFICANT CHANGE UP (ref 7.32–7.43)
PHOSPHATE SERPL-MCNC: 2.4 MG/DL — LOW (ref 2.5–4.5)
PLATELET # BLD AUTO: 287 K/UL — SIGNIFICANT CHANGE UP (ref 150–400)
PO2 BLDV: 64 MMHG — HIGH (ref 25–45)
POTASSIUM BLDV-SCNC: 2.8 MMOL/L — CRITICAL LOW (ref 3.5–5.1)
POTASSIUM SERPL-MCNC: 3 MMOL/L — LOW (ref 3.5–5.3)
POTASSIUM SERPL-SCNC: 3 MMOL/L — LOW (ref 3.5–5.3)
PROT SERPL-MCNC: 7.1 G/DL — SIGNIFICANT CHANGE UP (ref 6–8.3)
PROTHROM AB SERPL-ACNC: 13.5 SEC — HIGH (ref 10.5–13.4)
RBC # BLD: 4.44 M/UL — SIGNIFICANT CHANGE UP (ref 3.8–5.2)
RBC # FLD: 17.5 % — HIGH (ref 10.3–14.5)
SAO2 % BLDV: 95.6 % — HIGH (ref 67–88)
SODIUM SERPL-SCNC: 138 MMOL/L — SIGNIFICANT CHANGE UP (ref 135–145)
WBC # BLD: 12.35 K/UL — HIGH (ref 3.8–10.5)
WBC # FLD AUTO: 12.35 K/UL — HIGH (ref 3.8–10.5)

## 2023-02-20 PROCEDURE — 99291 CRITICAL CARE FIRST HOUR: CPT

## 2023-02-20 RX ORDER — POTASSIUM CHLORIDE 20 MEQ
40 PACKET (EA) ORAL EVERY 4 HOURS
Refills: 0 | Status: COMPLETED | OUTPATIENT
Start: 2023-02-20 | End: 2023-02-20

## 2023-02-20 RX ORDER — VANCOMYCIN HCL 1 G
1 VIAL (EA) INTRAVENOUS
Refills: 0 | Status: DISCONTINUED | OUTPATIENT
Start: 2023-02-20 | End: 2023-02-21

## 2023-02-20 RX ORDER — TOBRAMYCIN SULFATE 40 MG/ML
1 VIAL (ML) INJECTION
Refills: 0 | Status: DISCONTINUED | OUTPATIENT
Start: 2023-02-20 | End: 2023-02-21

## 2023-02-20 RX ORDER — MORPHINE SULFATE 50 MG/1
2 CAPSULE, EXTENDED RELEASE ORAL EVERY 4 HOURS
Refills: 0 | Status: DISCONTINUED | OUTPATIENT
Start: 2023-02-20 | End: 2023-02-23

## 2023-02-20 RX ORDER — POTASSIUM PHOSPHATE, MONOBASIC POTASSIUM PHOSPHATE, DIBASIC 236; 224 MG/ML; MG/ML
15 INJECTION, SOLUTION INTRAVENOUS ONCE
Refills: 0 | Status: COMPLETED | OUTPATIENT
Start: 2023-02-20 | End: 2023-02-20

## 2023-02-20 RX ORDER — MORPHINE SULFATE 50 MG/1
2 CAPSULE, EXTENDED RELEASE ORAL EVERY 8 HOURS
Refills: 0 | Status: DISCONTINUED | OUTPATIENT
Start: 2023-02-20 | End: 2023-02-23

## 2023-02-20 RX ORDER — ACETAMINOPHEN 500 MG
750 TABLET ORAL ONCE
Refills: 0 | Status: DISCONTINUED | OUTPATIENT
Start: 2023-02-20 | End: 2023-02-20

## 2023-02-20 RX ORDER — ERYTHROMYCIN BASE 5 MG/GRAM
1 OINTMENT (GRAM) OPHTHALMIC (EYE)
Refills: 0 | Status: DISCONTINUED | OUTPATIENT
Start: 2023-02-20 | End: 2023-02-21

## 2023-02-20 RX ADMIN — Medication 3 MILLILITER(S): at 05:41

## 2023-02-20 RX ADMIN — MORPHINE SULFATE 2 MILLIGRAM(S): 50 CAPSULE, EXTENDED RELEASE ORAL at 12:15

## 2023-02-20 RX ADMIN — PIPERACILLIN AND TAZOBACTAM 25 GRAM(S): 4; .5 INJECTION, POWDER, LYOPHILIZED, FOR SOLUTION INTRAVENOUS at 05:06

## 2023-02-20 RX ADMIN — Medication 1 DROP(S): at 04:03

## 2023-02-20 RX ADMIN — Medication 40 MILLIEQUIVALENT(S): at 05:06

## 2023-02-20 RX ADMIN — Medication 1 DROP(S): at 08:14

## 2023-02-20 RX ADMIN — Medication 1 DROP(S): at 18:11

## 2023-02-20 RX ADMIN — Medication 1 TABLET(S): at 12:15

## 2023-02-20 RX ADMIN — MUPIROCIN 1 APPLICATION(S): 20 OINTMENT TOPICAL at 05:03

## 2023-02-20 RX ADMIN — PANTOPRAZOLE SODIUM 40 MILLIGRAM(S): 20 TABLET, DELAYED RELEASE ORAL at 12:16

## 2023-02-20 RX ADMIN — Medication 1 DROP(S): at 10:35

## 2023-02-20 RX ADMIN — Medication 40 MILLIEQUIVALENT(S): at 02:31

## 2023-02-20 RX ADMIN — Medication 1 DROP(S): at 06:35

## 2023-02-20 RX ADMIN — Medication 1 DROP(S): at 16:14

## 2023-02-20 RX ADMIN — MORPHINE SULFATE 2 MILLIGRAM(S): 50 CAPSULE, EXTENDED RELEASE ORAL at 02:48

## 2023-02-20 RX ADMIN — Medication 1 DROP(S): at 09:01

## 2023-02-20 RX ADMIN — Medication 2: at 00:26

## 2023-02-20 RX ADMIN — Medication 1 APPLICATION(S): at 18:01

## 2023-02-20 RX ADMIN — POTASSIUM PHOSPHATE, MONOBASIC POTASSIUM PHOSPHATE, DIBASIC 62.5 MILLIMOLE(S): 236; 224 INJECTION, SOLUTION INTRAVENOUS at 02:31

## 2023-02-20 RX ADMIN — Medication 1 DROP(S): at 14:19

## 2023-02-20 RX ADMIN — CHLORHEXIDINE GLUCONATE 15 MILLILITER(S): 213 SOLUTION TOPICAL at 18:02

## 2023-02-20 RX ADMIN — MORPHINE SULFATE 2 MILLIGRAM(S): 50 CAPSULE, EXTENDED RELEASE ORAL at 12:30

## 2023-02-20 RX ADMIN — PIPERACILLIN AND TAZOBACTAM 25 GRAM(S): 4; .5 INJECTION, POWDER, LYOPHILIZED, FOR SOLUTION INTRAVENOUS at 13:44

## 2023-02-20 RX ADMIN — Medication 3 MILLILITER(S): at 11:02

## 2023-02-20 RX ADMIN — Medication 3 MILLILITER(S): at 17:03

## 2023-02-20 RX ADMIN — Medication 1 DROP(S): at 12:10

## 2023-02-20 RX ADMIN — SENNA PLUS 10 MILLILITER(S): 8.6 TABLET ORAL at 21:42

## 2023-02-20 RX ADMIN — Medication 1: at 05:02

## 2023-02-20 RX ADMIN — Medication 1 DROP(S): at 18:42

## 2023-02-20 RX ADMIN — Medication 1 APPLICATION(S): at 13:45

## 2023-02-20 RX ADMIN — Medication 1 APPLICATION(S): at 10:33

## 2023-02-20 RX ADMIN — RIVAROXABAN 10 MILLIGRAM(S): KIT at 12:16

## 2023-02-20 RX ADMIN — Medication 1 DROP(S): at 00:31

## 2023-02-20 RX ADMIN — Medication 1 DROP(S): at 17:08

## 2023-02-20 RX ADMIN — MORPHINE SULFATE 2 MILLIGRAM(S): 50 CAPSULE, EXTENDED RELEASE ORAL at 16:30

## 2023-02-20 RX ADMIN — Medication 1 DROP(S): at 02:30

## 2023-02-20 RX ADMIN — Medication 1 DROP(S): at 07:14

## 2023-02-20 RX ADMIN — Medication 1 APPLICATION(S): at 21:42

## 2023-02-20 RX ADMIN — Medication 1 APPLICATION(S): at 01:45

## 2023-02-20 RX ADMIN — Medication 1 DROP(S): at 03:30

## 2023-02-20 RX ADMIN — Medication 1 DROP(S): at 22:00

## 2023-02-20 RX ADMIN — Medication 1 DROP(S): at 15:34

## 2023-02-20 RX ADMIN — MORPHINE SULFATE 2 MILLIGRAM(S): 50 CAPSULE, EXTENDED RELEASE ORAL at 16:15

## 2023-02-20 RX ADMIN — MUPIROCIN 1 APPLICATION(S): 20 OINTMENT TOPICAL at 18:00

## 2023-02-20 RX ADMIN — CHLORHEXIDINE GLUCONATE 1 APPLICATION(S): 213 SOLUTION TOPICAL at 05:04

## 2023-02-20 RX ADMIN — Medication 1 DROP(S): at 20:24

## 2023-02-20 RX ADMIN — PIPERACILLIN AND TAZOBACTAM 25 GRAM(S): 4; .5 INJECTION, POWDER, LYOPHILIZED, FOR SOLUTION INTRAVENOUS at 21:42

## 2023-02-20 RX ADMIN — MORPHINE SULFATE 2 MILLIGRAM(S): 50 CAPSULE, EXTENDED RELEASE ORAL at 02:31

## 2023-02-20 RX ADMIN — Medication 1 DROP(S): at 11:06

## 2023-02-20 RX ADMIN — Medication 3 MILLILITER(S): at 23:06

## 2023-02-20 RX ADMIN — Medication 1 APPLICATION(S): at 05:05

## 2023-02-20 RX ADMIN — Medication 1 DROP(S): at 13:25

## 2023-02-20 RX ADMIN — SERTRALINE 50 MILLIGRAM(S): 25 TABLET, FILM COATED ORAL at 12:16

## 2023-02-20 RX ADMIN — Medication 1 DROP(S): at 21:30

## 2023-02-20 RX ADMIN — CHLORHEXIDINE GLUCONATE 15 MILLILITER(S): 213 SOLUTION TOPICAL at 05:06

## 2023-02-20 RX ADMIN — Medication 1 DROP(S): at 05:00

## 2023-02-20 RX ADMIN — Medication 1 DROP(S): at 01:54

## 2023-02-20 NOTE — PROGRESS NOTE ADULT - SUBJECTIVE AND OBJECTIVE BOX
Dayron Berumen, PGY2  Internal Medicine  Pager [phone] ([facility])    OVERNIGHT EVENTS / SUBJECTIVE: Patient seen and examined at bedside.     OBJECTIVE:    VITAL SIGNS:  ICU Vital Signs Last 24 Hrs  T(C): 36.3 (2023 04:00), Max: 37.5 (2023 08:00)  T(F): 97.3 (2023 04:00), Max: 99.5 (2023 08:00)  HR: 84 (2023 07:00) (80 - 96)  BP: 87/53 (2023 07:00) (87/53 - 156/72)  BP(mean): 65 (2023 07:00) (65 - 104)  ABP: --  ABP(mean): --  RR: 16 (2023 07:00) (16 - 20)  SpO2: 96% (2023 07:00) (94% - 100%)    O2 Parameters below as of 2023 05:59  Patient On (Oxygen Delivery Method): ventilator          Mode: AC/ CMV (Assist Control/ Continuous Mandatory Ventilation), RR (machine): 16, TV (machine): 380, FiO2: 21, PEEP: 5, ITime: 1, MAP: 8, PIP: 22    02-19 @ 07:01  -  02-20 @ 07:00  --------------------------------------------------------  IN: 1200 mL / OUT: 650 mL / NET: 550 mL      CAPILLARY BLOOD GLUCOSE      POCT Blood Glucose.: 151 mg/dL (2023 05:01)      PHYSICAL EXAM:    General: NAD  HEENT: NC/AT; PERRL, clear conjunctiva  Neck: supple  Respiratory: CTA b/l  Cardiovascular: +S1/S2; RRR  Abdomen: soft, NT/ND; +BS x4  Extremities: WWP, 2+ peripheral pulses b/l; no LE edema  Skin: normal color and turgor; no rash  Neurological:    MEDICATIONS:  MEDICATIONS  (STANDING):  albuterol/ipratropium for Nebulization 3 milliLiter(s) Nebulizer every 6 hours  chlorhexidine 0.12% Liquid 15 milliLiter(s) Oral Mucosa every 12 hours  chlorhexidine 4% Liquid 1 Application(s) Topical <User Schedule>  insulin lispro (ADMELOG) corrective regimen sliding scale   SubCutaneous every 6 hours  multivitamin 1 Tablet(s) Oral daily  mupirocin 2% Ointment 1 Application(s) Topical two times a day  pantoprazole  Injectable 40 milliGRAM(s) IV Push daily  petrolatum Ophthalmic Ointment 1 Application(s) Both EYES every 4 hours  piperacillin/tazobactam IVPB.. 3.375 Gram(s) IV Intermittent every 8 hours  rivaroxaban 10 milliGRAM(s) Enteral Tube daily  senna Syrup 10 milliLiter(s) Oral at bedtime  sertraline 50 milliGRAM(s) Oral daily  tobramycin 14 mG/mL Fortified Ophthalmic 1 Drop(s) Both EYES every 2 hours  vancomycin 25 mG/mL Fortified Ophthalmic 1 Drop(s) Both EYES every 2 hours    MEDICATIONS  (PRN):  morphine  - Injectable 2 milliGRAM(s) IV Push every 4 hours PRN Mild Pain (1 - 3)  morphine  - Injectable 2 milliGRAM(s) IV Push every 8 hours PRN Moderate Pain (4 - 6)      ALLERGIES:  Allergies    Bactrim DS (Rash)    Intolerances        LABS:                        12.3   12.35 )-----------( 287      ( 2023 00:42 )             38.8     Hemoglobin: 12.3 g/dL ( @ 00:42)  Hemoglobin: 13.8 g/dL ( @ 22:53)  Hemoglobin: 15.2 g/dL ( @ 19:41)    CBC Full  -  ( 2023 00:42 )  WBC Count : 12.35 K/uL  RBC Count : 4.44 M/uL  Hemoglobin : 12.3 g/dL  Hematocrit : 38.8 %  Platelet Count - Automated : 287 K/uL  Mean Cell Volume : 87.4 fl  Mean Cell Hemoglobin : 27.7 pg  Mean Cell Hemoglobin Concentration : 31.7 gm/dL  Auto Neutrophil # : 9.68 K/uL  Auto Lymphocyte # : 1.36 K/uL  Auto Monocyte # : 1.01 K/uL  Auto Eosinophil # : 0.12 K/uL  Auto Basophil # : 0.06 K/uL  Auto Neutrophil % : 78.3 %  Auto Lymphocyte % : 11.0 %  Auto Monocyte % : 8.2 %  Auto Eosinophil % : 1.0 %  Auto Basophil % : 0.5 %        138  |  103  |  17  ----------------------------<  205<H>  3.0<L>   |  19<L>  |  <0.30<L>    Ca    8.7      2023 00:41  Phos  2.4       Mg     2.0         TPro  7.1  /  Alb  3.8  /  TBili  1.0  /  DBili  x   /  AST  37  /  ALT  52<H>  /  AlkPhos  91      Creatinine Trend: <0.30<--, <0.30<--, <0.30<--  LIVER FUNCTIONS - ( 2023 00:41 )  Alb: 3.8 g/dL / Pro: 7.1 g/dL / ALK PHOS: 91 U/L / ALT: 52 U/L / AST: 37 U/L / GGT: x           PT/INR - ( 2023 00:43 )   PT: 13.5 sec;   INR: 1.16 ratio         PTT - ( 2023 00:43 )  PTT:30.0 sec    hs Troponin:                101 <<== 23 @ 05:45                125 <<== 23 @ 01:33                101 <<== 23 @ 22:53    ABG - ( 2023 01:25 )  pH, Arterial: 7.44  pH, Blood: x     /  pCO2: 28    /  pO2: 203   / HCO3: 19    / Base Excess: -3.8  /  SaO2: 100.0               00:11 - VBG - pH: 7.34  | pCO2: 45    | pO2: 64    | Lactate: 1.6        Urinalysis Basic - ( 2023 19:41 )    Color: Yellow / Appearance: Slightly Turbid / S.017 / pH: x  Gluc: x / Ketone: Negative  / Bili: Negative / Urobili: Negative   Blood: x / Protein: >600 / Nitrite: Negative   Leuk Esterase: Small / RBC: 5 /hpf / WBC 44 /HPF   Sq Epi: x / Non Sq Epi: 0 /hpf / Bacteria: Many      CSF:                      EKG:   MICROBIOLOGY:    Culture - Bronchial (collected 2023 15:33)  Source: .Bronchial Bronchial  Gram Stain (2023 20:55):    Few polymorphonuclear leukocytes per low power field    Rare squamous epithelial cells per low power field    No organisms seen    Culture - Sputum (collected 2023 02:28)  Source: .Sputum Sputum  Gram Stain (2023 10:35):    Moderate polymorphonuclear leukocytes seen per low power field    Few Squamous epithelial cells seen per low power field    Moderate Gram positive cocci in pairs seen per oil power field    Moderate Gram Negative Rods seen per oil power field    Few Gram Positive Rods seen per oil power field    Few Gram Positive Cocci in Clusters seen per oil power field    Culture - Blood (collected 2023 18:59)  Source: .Blood Blood-Peripheral  Preliminary Report (2023 02:02):    No growth to date.    Culture - Blood (collected 2023 18:50)  Source: .Blood Blood-Peripheral  Preliminary Report (2023 02:02):    No growth to date.      IMAGING:      Labs, imaging, EKG personally reviewed    RADIOLOGY & ADDITIONAL TESTS: Reviewed. Javier Liu, PGY1   Emergency Medicine    OVERNIGHT EVENTS / SUBJECTIVE: Patient seen and examined at bedside.     OBJECTIVE:    VITAL SIGNS:  ICU Vital Signs Last 24 Hrs  T(C): 36.3 (2023 04:00), Max: 37.5 (2023 08:00)  T(F): 97.3 (2023 04:00), Max: 99.5 (2023 08:00)  HR: 84 (2023 07:00) (80 - 96)  BP: 87/53 (2023 07:00) (87/53 - 156/72)  BP(mean): 65 (2023 07:00) (65 - 104)  ABP: --  ABP(mean): --  RR: 16 (2023 07:00) (16 - 20)  SpO2: 96% (2023 07:00) (94% - 100%)    O2 Parameters below as of 2023 05:59  Patient On (Oxygen Delivery Method): ventilator          Mode: AC/ CMV (Assist Control/ Continuous Mandatory Ventilation), RR (machine): 16, TV (machine): 380, FiO2: 21, PEEP: 5, ITime: 1, MAP: 8, PIP: 22    02-19 @ 07:01  -  02-20 @ 07:00  --------------------------------------------------------  IN: 1200 mL / OUT: 650 mL / NET: 550 mL      CAPILLARY BLOOD GLUCOSE      POCT Blood Glucose.: 151 mg/dL (2023 05:01)      PHYSICAL EXAM:    General: NAD  HEENT: NC/AT; PERRL, clear conjunctiva  Neck: supple  Respiratory: CTA b/l  Cardiovascular: +S1/S2; RRR  Abdomen: soft, NT/ND; +BS x4  Extremities: WWP, 2+ peripheral pulses b/l; no LE edema  Skin: normal color and turgor; no rash  Neurological:    MEDICATIONS:  MEDICATIONS  (STANDING):  albuterol/ipratropium for Nebulization 3 milliLiter(s) Nebulizer every 6 hours  chlorhexidine 0.12% Liquid 15 milliLiter(s) Oral Mucosa every 12 hours  chlorhexidine 4% Liquid 1 Application(s) Topical <User Schedule>  insulin lispro (ADMELOG) corrective regimen sliding scale   SubCutaneous every 6 hours  multivitamin 1 Tablet(s) Oral daily  mupirocin 2% Ointment 1 Application(s) Topical two times a day  pantoprazole  Injectable 40 milliGRAM(s) IV Push daily  petrolatum Ophthalmic Ointment 1 Application(s) Both EYES every 4 hours  piperacillin/tazobactam IVPB.. 3.375 Gram(s) IV Intermittent every 8 hours  rivaroxaban 10 milliGRAM(s) Enteral Tube daily  senna Syrup 10 milliLiter(s) Oral at bedtime  sertraline 50 milliGRAM(s) Oral daily  tobramycin 14 mG/mL Fortified Ophthalmic 1 Drop(s) Both EYES every 2 hours  vancomycin 25 mG/mL Fortified Ophthalmic 1 Drop(s) Both EYES every 2 hours    MEDICATIONS  (PRN):  morphine  - Injectable 2 milliGRAM(s) IV Push every 4 hours PRN Mild Pain (1 - 3)  morphine  - Injectable 2 milliGRAM(s) IV Push every 8 hours PRN Moderate Pain (4 - 6)      ALLERGIES:  Allergies    Bactrim DS (Rash)    Intolerances        LABS:                        12.3   12.35 )-----------( 287      ( 2023 00:42 )             38.8     Hemoglobin: 12.3 g/dL ( @ 00:42)  Hemoglobin: 13.8 g/dL ( @ 22:53)  Hemoglobin: 15.2 g/dL ( @ 19:41)    CBC Full  -  ( 2023 00:42 )  WBC Count : 12.35 K/uL  RBC Count : 4.44 M/uL  Hemoglobin : 12.3 g/dL  Hematocrit : 38.8 %  Platelet Count - Automated : 287 K/uL  Mean Cell Volume : 87.4 fl  Mean Cell Hemoglobin : 27.7 pg  Mean Cell Hemoglobin Concentration : 31.7 gm/dL  Auto Neutrophil # : 9.68 K/uL  Auto Lymphocyte # : 1.36 K/uL  Auto Monocyte # : 1.01 K/uL  Auto Eosinophil # : 0.12 K/uL  Auto Basophil # : 0.06 K/uL  Auto Neutrophil % : 78.3 %  Auto Lymphocyte % : 11.0 %  Auto Monocyte % : 8.2 %  Auto Eosinophil % : 1.0 %  Auto Basophil % : 0.5 %        138  |  103  |  17  ----------------------------<  205<H>  3.0<L>   |  19<L>  |  <0.30<L>    Ca    8.7      2023 00:41  Phos  2.4       Mg     2.0         TPro  7.1  /  Alb  3.8  /  TBili  1.0  /  DBili  x   /  AST  37  /  ALT  52<H>  /  AlkPhos  91      Creatinine Trend: <0.30<--, <0.30<--, <0.30<--  LIVER FUNCTIONS - ( 2023 00:41 )  Alb: 3.8 g/dL / Pro: 7.1 g/dL / ALK PHOS: 91 U/L / ALT: 52 U/L / AST: 37 U/L / GGT: x           PT/INR - ( 2023 00:43 )   PT: 13.5 sec;   INR: 1.16 ratio         PTT - ( 2023 00:43 )  PTT:30.0 sec    hs Troponin:                101 <<== 23 @ 05:45                125 <<== 23 @ 01:33                101 <<== 23 @ 22:53    ABG - ( 2023 01:25 )  pH, Arterial: 7.44  pH, Blood: x     /  pCO2: 28    /  pO2: 203   / HCO3: 19    / Base Excess: -3.8  /  SaO2: 100.0               00:11 - VBG - pH: 7.34  | pCO2: 45    | pO2: 64    | Lactate: 1.6        Urinalysis Basic - ( 2023 19:41 )    Color: Yellow / Appearance: Slightly Turbid / S.017 / pH: x  Gluc: x / Ketone: Negative  / Bili: Negative / Urobili: Negative   Blood: x / Protein: >600 / Nitrite: Negative   Leuk Esterase: Small / RBC: 5 /hpf / WBC 44 /HPF   Sq Epi: x / Non Sq Epi: 0 /hpf / Bacteria: Many      CSF:                      EKG:   MICROBIOLOGY:    Culture - Bronchial (collected 2023 15:33)  Source: .Bronchial Bronchial  Gram Stain (2023 20:55):    Few polymorphonuclear leukocytes per low power field    Rare squamous epithelial cells per low power field    No organisms seen    Culture - Sputum (collected 2023 02:28)  Source: .Sputum Sputum  Gram Stain (2023 10:35):    Moderate polymorphonuclear leukocytes seen per low power field    Few Squamous epithelial cells seen per low power field    Moderate Gram positive cocci in pairs seen per oil power field    Moderate Gram Negative Rods seen per oil power field    Few Gram Positive Rods seen per oil power field    Few Gram Positive Cocci in Clusters seen per oil power field    Culture - Blood (collected 2023 18:59)  Source: .Blood Blood-Peripheral  Preliminary Report (2023 02:02):    No growth to date.    Culture - Blood (collected 2023 18:50)  Source: .Blood Blood-Peripheral  Preliminary Report (2023 02:02):    No growth to date.      IMAGING:      Labs, imaging, EKG personally reviewed    RADIOLOGY & ADDITIONAL TESTS: Reviewed.

## 2023-02-20 NOTE — PROGRESS NOTE ADULT - ASSESSMENT
65F with advanced ALS (baseline nonverbal, communicates minimally through eye movements, PEG, vent dependent, trach upsized 8/2022 with CT surgery), chronic hypoxic/hypercapnic respiratory failure, HLD; BIBEMS for hypoxia to 66% after vent malfunction at home; found to have sepsis, likely  source; admitted to MICU for further management.     PLAN:    NEURO  # workup for anoxic brain injury  - CTH - no acute finding  - c/w home diazepam  - c/w home sertraline    # advanced ALS  - baseline nonverbal, awake, communication with eye movements has been declining in recent months  - c/w home riluzole    CARDIOVASCULAR  - sinus tachycardia iso sepsis, now improved    RESPIRATORY  # tracheostomy  - trach changed in ED, additional change by ENT  - Briana    GI/NUTRITION  # PEG  - feeds, site care, bowel reg    # PPX  - pantop 40 IV qD    /RENAL  - No active issues.     SKIN  - c/w Lacrilube, f/u home eyedrops/ointment  - f/u home nystatin    ID  # sepsis  - CXR with retrocardiac opacity, could not rule out underlying infection. UTI also possible source  - Zosyn (2/18-)  - F/u BCx, UCx  - MRSA/MSSA PCR pending  - Allergy: rash with Bactrim   - consider sputum Cx if able    ENDOCRINE  - Glucose levels controlled  - A1c 4.9    HEMATOLOGIC  # leukocytosis   - iso sepsis  # thrombocytosis  - possibly reactive    DVT PPX  - Lovenox 40 sc q24    OPHTHO  ?Corneal ulcer  - Concerned for corneal ulcer, ophtho consulted    MISC  - f/u med rec with spouse in AM    ETHICS  - spouse considering DNR/DNI documentation in AM. Full code for now.   65F with advanced ALS (baseline nonverbal, communicates minimally through eye movements, PEG, vent dependent, trach upsized 8/2022 with CT surgery), chronic hypoxic/hypercapnic respiratory failure, HLD; BIBEMS for hypoxia to 66% after vent malfunction at home; found to have sepsis, likely  source; admitted to MICU for further management.     PLAN:    NEURO  # workup for anoxic brain injury  - CTH - no acute finding  - c/w home diazepam  - c/w home sertraline    # advanced ALS  - baseline nonverbal, awake, communication with eye movements has been declining in recent months  - c/w home riluzole    CARDIOVASCULAR  - sinus tachycardia iso sepsis, now improved    RESPIRATORY  # tracheostomy  - trach changed in ED, additional change by ENT  - Briana    GI/NUTRITION  # PEG  - feeds, site care, bowel reg    # PPX  - pantop 40 IV qD    /RENAL  - No active issues.     SKIN  - c/w Lacrilube, f/u home eyedrops/ointment  - f/u home nystatin    ID  # sepsis  - CXR with retrocardiac opacity, could not rule out underlying infection. UTI also possible source  - Zosyn (2/18-)  - F/u BCx, UCx  - MRSA/MSSA PCR pending  - Allergy: rash with Bactrim   - consider sputum Cx if able    ENDOCRINE  - Glucose levels controlled  - A1c 4.9    HEMATOLOGIC  # leukocytosis   - iso sepsis, improving  # thrombocytosis  - possibly reactive    DVT PPX  - Apixaban 10mg QD    OPHTHO  Corneal ulcer  - Concerned for corneal ulcer, ophtho consulted  - Vanc and tobramycin eyedrops q2hrs.     ETHICS  - spouse considering DNR/DNI documentation in AM. Full code for now.   65F with advanced ALS (baseline nonverbal, communicates minimally through eye movements, PEG, vent dependent, trach upsized 8/2022 with CT surgery), chronic hypoxic/hypercapnic respiratory failure, HLD; BIBEMS for hypoxia to 66% after vent malfunction at home; found to have sepsis, likely  source; admitted to MICU for further management.     PLAN:    NEURO  # workup for anoxic brain injury  - CTH - no acute finding  - c/w home diazepam  - c/w home sertraline    # advanced ALS  - baseline nonverbal, awake, communication with eye movements has been declining in recent months  - c/w home riluzole    CARDIOVASCULAR  - sinus tachycardia iso sepsis, now improved    RESPIRATORY  # tracheostomy  - trach changed in ED, additional change by ENT  - Briana    GI/NUTRITION  # PEG  - feeds, site care, bowel reg    # PPX  - pantop 40 IV qD    /RENAL  - No active issues.     SKIN  - c/w Lacrilube, f/u home eyedrops/ointment  - f/u home nystatin    ID  # sepsis  - CXR with retrocardiac opacity, could not rule out underlying infection. UTI also possible source  - Zosyn (2/18-)  - F/u BCx, UCx  - MRSA/MSSA PCR pending  - Allergy: rash with Bactrim   - consider sputum Cx if able    ENDOCRINE  - Glucose levels controlled  - A1c 4.9    HEMATOLOGIC  # leukocytosis   - iso sepsis, improving  # thrombocytosis  - possibly reactive    DVT PPX  - Apixaban 10mg QD    OPHTHO  Corneal ulcer  - Concerned for corneal ulcer, ophtho consulted  - Vanc and tobramycin eyedrops q2hrs.     ETHICS  - spouse considering DNR/DNI documentation in AM. Full code for now.    Dispo  - Listed for RCU

## 2023-02-20 NOTE — PROGRESS NOTE ADULT - SUBJECTIVE AND OBJECTIVE BOX
[facility] DEPARTMENT OF OPHTHALMOLOGY  ------------------------------------------------------------------------------    Interval History: AOx0. 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     MEDICATIONS  (STANDING):  albuterol/ipratropium for Nebulization 3 milliLiter(s) Nebulizer every 6 hours  chlorhexidine 0.12% Liquid 15 milliLiter(s) Oral Mucosa every 12 hours  chlorhexidine 4% Liquid 1 Application(s) Topical <User Schedule>  insulin lispro (ADMELOG) corrective regimen sliding scale   SubCutaneous every 6 hours  multivitamin 1 Tablet(s) Oral daily  mupirocin 2% Ointment 1 Application(s) Topical two times a day  pantoprazole  Injectable 40 milliGRAM(s) IV Push daily  petrolatum Ophthalmic Ointment 1 Application(s) Both EYES every 4 hours  piperacillin/tazobactam IVPB.. 3.375 Gram(s) IV Intermittent every 8 hours  rivaroxaban 10 milliGRAM(s) Enteral Tube daily  senna Syrup 10 milliLiter(s) Oral at bedtime  sertraline 50 milliGRAM(s) Oral daily  tobramycin 14 mG/mL Fortified Ophthalmic 1 Drop(s) Both EYES every 2 hours  vancomycin 25 mG/mL Fortified Ophthalmic 1 Drop(s) Both EYES every 2 hours    MEDICATIONS  (PRN):  morphine  - Injectable 2 milliGRAM(s) IV Push every 4 hours PRN Mild Pain (1 - 3)  morphine  - Injectable 2 milliGRAM(s) IV Push every 8 hours PRN Moderate Pain (4 - 6)      VITALS: T(C): 36.3 (02-20-23 @ 20:00)  T(F): 97.3 (02-20-23 @ 20:00), Max: 98.4 (02-20-23 @ 00:00)  HR: 88 (02-20-23 @ 21:00) (80 - 105)  BP: 122/60 (02-20-23 @ 21:00) (87/53 - 164/71)  RR:  (16 - 20)  SpO2:  (94% - 100%)  Wt(kg): --  General: AAO x 3, appropriate mood and affect    Ophthalmology Exam:  Visual acuity (sc): RONIT  Pupils: PERRL OU  Ttono: 20 OU (avoiding scarring)  Extraocular movements (EOMs): RONIT  Confrontational Visual Field (CVF): RONIT    Pen Light Exam (PLE)  External: Flat  Lids/Lashes/Lacrimal Ducts: Flat OU. Eyelid margin hyperemic. 6mm lagophthalmos OU.   Sclera/Conjunctiva: Trace Injection OU. Mucoid discharge OU  Cornea: OD: Inferior 6mm x 6mm circular area of stromal haze with stromal neovascularization. No epi defect. OS: Inferior 4mm x 4mm circular area of stromal haze with neovascularization. No epi defect.  Anterior Chamber: Deep and formed OU.    Iris: Flat OU.  Lens: NS OU.

## 2023-02-20 NOTE — PROGRESS NOTE ADULT - ASSESSMENT
67y female with a past medical history advanced ALS (baseline nonverbal, communicates minimally through eye movements, PEG, vent dependent, trach upsized 8/2022 with CT surgery), chronic hypoxic/hypercapnic respiratory failure, HLD; BIBEMS for hypoxia to 66% after vent malfunction at home, ocular history unknown, ophthalmology consulted to evaluate for corneal ulcer.     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 Stromal neovascularization  - OS: Inferior 4mm x 4mm circular area of stromal haze. 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   - Recommend taping of eyelids AT ALL TIMES, given extent of exposure. Instructions given to nurse.     ROLANDA Joshi, chief    Outpatient Follow-up: Patient should follow-up with his/her ophthalmologist or with [facility] Department of Ophthalmology within 1 week of after discharge at:    [address]  [place] [address]  [phone]

## 2023-02-21 LAB
-  AMIKACIN: SIGNIFICANT CHANGE UP
-  AMOXICILLIN/CLAVULANIC ACID: SIGNIFICANT CHANGE UP
-  AMPICILLIN/SULBACTAM: SIGNIFICANT CHANGE UP
-  AMPICILLIN: SIGNIFICANT CHANGE UP
-  AZTREONAM: SIGNIFICANT CHANGE UP
-  CEFAZOLIN: SIGNIFICANT CHANGE UP
-  CEFEPIME: SIGNIFICANT CHANGE UP
-  CEFOXITIN: SIGNIFICANT CHANGE UP
-  CEFTAZIDIME: SIGNIFICANT CHANGE UP
-  CEFTAZIDIME: SIGNIFICANT CHANGE UP
-  CEFTRIAXONE: SIGNIFICANT CHANGE UP
-  CIPROFLOXACIN: SIGNIFICANT CHANGE UP
-  CLINDAMYCIN: SIGNIFICANT CHANGE UP
-  CLINDAMYCIN: SIGNIFICANT CHANGE UP
-  DAPTOMYCIN: SIGNIFICANT CHANGE UP
-  ERTAPENEM: SIGNIFICANT CHANGE UP
-  ERYTHROMYCIN: SIGNIFICANT CHANGE UP
-  ERYTHROMYCIN: SIGNIFICANT CHANGE UP
-  GENTAMICIN: SIGNIFICANT CHANGE UP
-  IMIPENEM: SIGNIFICANT CHANGE UP
-  IMIPENEM: SIGNIFICANT CHANGE UP
-  LEVOFLOXACIN: SIGNIFICANT CHANGE UP
-  LINEZOLID: SIGNIFICANT CHANGE UP
-  MEROPENEM: SIGNIFICANT CHANGE UP
-  NITROFURANTOIN: SIGNIFICANT CHANGE UP
-  OXACILLIN: SIGNIFICANT CHANGE UP
-  OXACILLIN: SIGNIFICANT CHANGE UP
-  PENICILLIN: SIGNIFICANT CHANGE UP
-  PENICILLIN: SIGNIFICANT CHANGE UP
-  PIPERACILLIN/TAZOBACTAM: SIGNIFICANT CHANGE UP
-  RIFAMPIN: SIGNIFICANT CHANGE UP
-  RIFAMPIN: SIGNIFICANT CHANGE UP
-  TETRACYCLINE: SIGNIFICANT CHANGE UP
-  TETRACYCLINE: SIGNIFICANT CHANGE UP
-  TOBRAMYCIN: SIGNIFICANT CHANGE UP
-  TRIMETHOPRIM/SULFAMETHOXAZOLE: SIGNIFICANT CHANGE UP
-  VANCOMYCIN: SIGNIFICANT CHANGE UP
-  VANCOMYCIN: SIGNIFICANT CHANGE UP
ALBUMIN SERPL ELPH-MCNC: 3.7 G/DL — SIGNIFICANT CHANGE UP (ref 3.3–5)
ALP SERPL-CCNC: 93 U/L — SIGNIFICANT CHANGE UP (ref 40–120)
ALT FLD-CCNC: 42 U/L — SIGNIFICANT CHANGE UP (ref 10–45)
ANION GAP SERPL CALC-SCNC: 17 MMOL/L — SIGNIFICANT CHANGE UP (ref 5–17)
APTT BLD: 31.7 SEC — SIGNIFICANT CHANGE UP (ref 27.5–35.5)
AST SERPL-CCNC: 26 U/L — SIGNIFICANT CHANGE UP (ref 10–40)
BASE EXCESS BLDV CALC-SCNC: -1.3 MMOL/L — SIGNIFICANT CHANGE UP (ref -2–3)
BASE EXCESS BLDV CALC-SCNC: -2.5 MMOL/L — LOW (ref -2–3)
BASE EXCESS BLDV CALC-SCNC: 0 MMOL/L — SIGNIFICANT CHANGE UP (ref -2–3)
BASOPHILS # BLD AUTO: 0.06 K/UL — SIGNIFICANT CHANGE UP (ref 0–0.2)
BASOPHILS NFR BLD AUTO: 0.7 % — SIGNIFICANT CHANGE UP (ref 0–2)
BILIRUB SERPL-MCNC: 0.7 MG/DL — SIGNIFICANT CHANGE UP (ref 0.2–1.2)
BLOOD GAS VENOUS - CREATININE: SIGNIFICANT CHANGE UP MG/DL (ref 0.5–1.3)
BUN SERPL-MCNC: 17 MG/DL — SIGNIFICANT CHANGE UP (ref 7–23)
CA-I SERPL-SCNC: 1.14 MMOL/L — LOW (ref 1.15–1.33)
CA-I SERPL-SCNC: 1.14 MMOL/L — LOW (ref 1.15–1.33)
CA-I SERPL-SCNC: 1.18 MMOL/L — SIGNIFICANT CHANGE UP (ref 1.15–1.33)
CALCIUM SERPL-MCNC: 8.6 MG/DL — SIGNIFICANT CHANGE UP (ref 8.4–10.5)
CHLORIDE BLDV-SCNC: 104 MMOL/L — SIGNIFICANT CHANGE UP (ref 96–108)
CHLORIDE BLDV-SCNC: 110 MMOL/L — HIGH (ref 96–108)
CHLORIDE BLDV-SCNC: 110 MMOL/L — HIGH (ref 96–108)
CHLORIDE SERPL-SCNC: 105 MMOL/L — SIGNIFICANT CHANGE UP (ref 96–108)
CO2 BLDV-SCNC: 26 MMOL/L — SIGNIFICANT CHANGE UP (ref 22–26)
CO2 BLDV-SCNC: 26 MMOL/L — SIGNIFICANT CHANGE UP (ref 22–26)
CO2 BLDV-SCNC: 27 MMOL/L — HIGH (ref 22–26)
CO2 SERPL-SCNC: 19 MMOL/L — LOW (ref 22–31)
CREAT SERPL-MCNC: <0.3 MG/DL — LOW (ref 0.5–1.3)
CULTURE RESULTS: SIGNIFICANT CHANGE UP
EGFR: 115 ML/MIN/1.73M2 — SIGNIFICANT CHANGE UP
EOSINOPHIL # BLD AUTO: 0.4 K/UL — SIGNIFICANT CHANGE UP (ref 0–0.5)
EOSINOPHIL NFR BLD AUTO: 4.4 % — SIGNIFICANT CHANGE UP (ref 0–6)
GAS PNL BLDV: 137 MMOL/L — SIGNIFICANT CHANGE UP (ref 136–145)
GAS PNL BLDV: 142 MMOL/L — SIGNIFICANT CHANGE UP (ref 136–145)
GAS PNL BLDV: 142 MMOL/L — SIGNIFICANT CHANGE UP (ref 136–145)
GAS PNL BLDV: SIGNIFICANT CHANGE UP
GLUCOSE BLDC GLUCOMTR-MCNC: 105 MG/DL — HIGH (ref 70–99)
GLUCOSE BLDC GLUCOMTR-MCNC: 134 MG/DL — HIGH (ref 70–99)
GLUCOSE BLDC GLUCOMTR-MCNC: 137 MG/DL — HIGH (ref 70–99)
GLUCOSE BLDC GLUCOMTR-MCNC: 138 MG/DL — HIGH (ref 70–99)
GLUCOSE BLDC GLUCOMTR-MCNC: 291 MG/DL — HIGH (ref 70–99)
GLUCOSE BLDV-MCNC: 159 MG/DL — HIGH (ref 70–99)
GLUCOSE BLDV-MCNC: 178 MG/DL — HIGH (ref 70–99)
GLUCOSE BLDV-MCNC: 344 MG/DL — HIGH (ref 70–99)
GLUCOSE SERPL-MCNC: 311 MG/DL — HIGH (ref 70–99)
HCO3 BLDV-SCNC: 24 MMOL/L — SIGNIFICANT CHANGE UP (ref 22–29)
HCO3 BLDV-SCNC: 25 MMOL/L — SIGNIFICANT CHANGE UP (ref 22–29)
HCO3 BLDV-SCNC: 25 MMOL/L — SIGNIFICANT CHANGE UP (ref 22–29)
HCT VFR BLD CALC: 38.9 % — SIGNIFICANT CHANGE UP (ref 34.5–45)
HCT VFR BLDA CALC: 35 % — SIGNIFICANT CHANGE UP (ref 34.5–46.5)
HCT VFR BLDA CALC: 36 % — SIGNIFICANT CHANGE UP (ref 34.5–46.5)
HCT VFR BLDA CALC: 37 % — SIGNIFICANT CHANGE UP (ref 34.5–46.5)
HGB BLD CALC-MCNC: 11.8 G/DL — SIGNIFICANT CHANGE UP (ref 11.7–16.1)
HGB BLD CALC-MCNC: 12 G/DL — SIGNIFICANT CHANGE UP (ref 11.7–16.1)
HGB BLD CALC-MCNC: 12.2 G/DL — SIGNIFICANT CHANGE UP (ref 11.7–16.1)
HGB BLD-MCNC: 11.8 G/DL — SIGNIFICANT CHANGE UP (ref 11.5–15.5)
HOROWITZ INDEX BLDV+IHG-RTO: 21 — SIGNIFICANT CHANGE UP
HOROWITZ INDEX BLDV+IHG-RTO: 21 — SIGNIFICANT CHANGE UP
IMM GRANULOCYTES NFR BLD AUTO: 0.9 % — SIGNIFICANT CHANGE UP (ref 0–0.9)
INR BLD: 1.19 RATIO — HIGH (ref 0.88–1.16)
LACTATE BLDV-MCNC: 1.8 MMOL/L — SIGNIFICANT CHANGE UP (ref 0.5–2)
LACTATE BLDV-MCNC: 2 MMOL/L — SIGNIFICANT CHANGE UP (ref 0.5–2)
LACTATE BLDV-MCNC: 2.8 MMOL/L — HIGH (ref 0.5–2)
LYMPHOCYTES # BLD AUTO: 1.25 K/UL — SIGNIFICANT CHANGE UP (ref 1–3.3)
LYMPHOCYTES # BLD AUTO: 13.8 % — SIGNIFICANT CHANGE UP (ref 13–44)
MAGNESIUM SERPL-MCNC: 2 MG/DL — SIGNIFICANT CHANGE UP (ref 1.6–2.6)
MCHC RBC-ENTMCNC: 27.4 PG — SIGNIFICANT CHANGE UP (ref 27–34)
MCHC RBC-ENTMCNC: 30.3 GM/DL — LOW (ref 32–36)
MCV RBC AUTO: 90.5 FL — SIGNIFICANT CHANGE UP (ref 80–100)
METHOD TYPE: SIGNIFICANT CHANGE UP
MONOCYTES # BLD AUTO: 0.8 K/UL — SIGNIFICANT CHANGE UP (ref 0–0.9)
MONOCYTES NFR BLD AUTO: 8.8 % — SIGNIFICANT CHANGE UP (ref 2–14)
NEUTROPHILS # BLD AUTO: 6.48 K/UL — SIGNIFICANT CHANGE UP (ref 1.8–7.4)
NEUTROPHILS NFR BLD AUTO: 71.4 % — SIGNIFICANT CHANGE UP (ref 43–77)
NRBC # BLD: 0 /100 WBCS — SIGNIFICANT CHANGE UP (ref 0–0)
ORGANISM # SPEC MICROSCOPIC CNT: SIGNIFICANT CHANGE UP
PCO2 BLDV: 40 MMHG — SIGNIFICANT CHANGE UP (ref 39–42)
PCO2 BLDV: 49 MMHG — HIGH (ref 39–42)
PCO2 BLDV: 51 MMHG — HIGH (ref 39–42)
PH BLDV: 7.29 — LOW (ref 7.32–7.43)
PH BLDV: 7.32 — SIGNIFICANT CHANGE UP (ref 7.32–7.43)
PH BLDV: 7.4 — SIGNIFICANT CHANGE UP (ref 7.32–7.43)
PHOSPHATE SERPL-MCNC: 2 MG/DL — LOW (ref 2.5–4.5)
PLATELET # BLD AUTO: 255 K/UL — SIGNIFICANT CHANGE UP (ref 150–400)
PO2 BLDV: 43 MMHG — SIGNIFICANT CHANGE UP (ref 25–45)
PO2 BLDV: 48 MMHG — HIGH (ref 25–45)
PO2 BLDV: 55 MMHG — HIGH (ref 25–45)
POTASSIUM BLDV-SCNC: 3.3 MMOL/L — LOW (ref 3.5–5.1)
POTASSIUM BLDV-SCNC: 3.6 MMOL/L — SIGNIFICANT CHANGE UP (ref 3.5–5.1)
POTASSIUM BLDV-SCNC: 4.6 MMOL/L — SIGNIFICANT CHANGE UP (ref 3.5–5.1)
POTASSIUM SERPL-MCNC: 3.7 MMOL/L — SIGNIFICANT CHANGE UP (ref 3.5–5.3)
POTASSIUM SERPL-SCNC: 3.7 MMOL/L — SIGNIFICANT CHANGE UP (ref 3.5–5.3)
PROT SERPL-MCNC: 7.1 G/DL — SIGNIFICANT CHANGE UP (ref 6–8.3)
PROTHROM AB SERPL-ACNC: 13.7 SEC — HIGH (ref 10.5–13.4)
RBC # BLD: 4.3 M/UL — SIGNIFICANT CHANGE UP (ref 3.8–5.2)
RBC # FLD: 18.1 % — HIGH (ref 10.3–14.5)
SAO2 % BLDV: 83.6 % — SIGNIFICANT CHANGE UP (ref 67–88)
SAO2 % BLDV: 84 % — SIGNIFICANT CHANGE UP (ref 67–88)
SAO2 % BLDV: 90.2 % — HIGH (ref 67–88)
SODIUM SERPL-SCNC: 141 MMOL/L — SIGNIFICANT CHANGE UP (ref 135–145)
SPECIMEN SOURCE: SIGNIFICANT CHANGE UP
WBC # BLD: 9.07 K/UL — SIGNIFICANT CHANGE UP (ref 3.8–10.5)
WBC # FLD AUTO: 9.07 K/UL — SIGNIFICANT CHANGE UP (ref 3.8–10.5)

## 2023-02-21 PROCEDURE — 99232 SBSQ HOSP IP/OBS MODERATE 35: CPT

## 2023-02-21 PROCEDURE — 99291 CRITICAL CARE FIRST HOUR: CPT

## 2023-02-21 RX ORDER — POTASSIUM PHOSPHATE, MONOBASIC POTASSIUM PHOSPHATE, DIBASIC 236; 224 MG/ML; MG/ML
15 INJECTION, SOLUTION INTRAVENOUS ONCE
Refills: 0 | Status: COMPLETED | OUTPATIENT
Start: 2023-02-21 | End: 2023-02-21

## 2023-02-21 RX ORDER — VANCOMYCIN HCL 1 G
1 VIAL (EA) INTRAVENOUS
Refills: 0 | Status: DISCONTINUED | OUTPATIENT
Start: 2023-02-21 | End: 2023-02-22

## 2023-02-21 RX ORDER — VANCOMYCIN HCL 1 G
1 VIAL (EA) INTRAVENOUS EVERY 4 HOURS
Refills: 0 | Status: DISCONTINUED | OUTPATIENT
Start: 2023-02-21 | End: 2023-02-21

## 2023-02-21 RX ORDER — INSULIN LISPRO 100/ML
VIAL (ML) SUBCUTANEOUS EVERY 6 HOURS
Refills: 0 | Status: DISCONTINUED | OUTPATIENT
Start: 2023-02-21 | End: 2023-02-23

## 2023-02-21 RX ORDER — OFLOXACIN 0.3 %
1 DROPS OPHTHALMIC (EYE)
Refills: 0 | Status: DISCONTINUED | OUTPATIENT
Start: 2023-02-21 | End: 2023-02-23

## 2023-02-21 RX ORDER — ERYTHROMYCIN BASE 5 MG/GRAM
1 OINTMENT (GRAM) OPHTHALMIC (EYE) AT BEDTIME
Refills: 0 | Status: DISCONTINUED | OUTPATIENT
Start: 2023-02-21 | End: 2023-02-23

## 2023-02-21 RX ORDER — OFLOXACIN 0.3 %
1 DROPS OPHTHALMIC (EYE)
Refills: 0 | Status: DISCONTINUED | OUTPATIENT
Start: 2023-02-21 | End: 2023-02-21

## 2023-02-21 RX ADMIN — Medication 1 DROP(S): at 22:59

## 2023-02-21 RX ADMIN — Medication 1 APPLICATION(S): at 13:52

## 2023-02-21 RX ADMIN — MUPIROCIN 1 APPLICATION(S): 20 OINTMENT TOPICAL at 17:12

## 2023-02-21 RX ADMIN — RIVAROXABAN 10 MILLIGRAM(S): KIT at 11:53

## 2023-02-21 RX ADMIN — MORPHINE SULFATE 2 MILLIGRAM(S): 50 CAPSULE, EXTENDED RELEASE ORAL at 09:38

## 2023-02-21 RX ADMIN — MORPHINE SULFATE 2 MILLIGRAM(S): 50 CAPSULE, EXTENDED RELEASE ORAL at 00:17

## 2023-02-21 RX ADMIN — MUPIROCIN 1 APPLICATION(S): 20 OINTMENT TOPICAL at 05:02

## 2023-02-21 RX ADMIN — MORPHINE SULFATE 2 MILLIGRAM(S): 50 CAPSULE, EXTENDED RELEASE ORAL at 04:20

## 2023-02-21 RX ADMIN — MORPHINE SULFATE 2 MILLIGRAM(S): 50 CAPSULE, EXTENDED RELEASE ORAL at 04:05

## 2023-02-21 RX ADMIN — CHLORHEXIDINE GLUCONATE 15 MILLILITER(S): 213 SOLUTION TOPICAL at 05:01

## 2023-02-21 RX ADMIN — Medication 1 DROP(S): at 17:30

## 2023-02-21 RX ADMIN — Medication 1 DROP(S): at 00:22

## 2023-02-21 RX ADMIN — CHLORHEXIDINE GLUCONATE 1 APPLICATION(S): 213 SOLUTION TOPICAL at 05:02

## 2023-02-21 RX ADMIN — SERTRALINE 50 MILLIGRAM(S): 25 TABLET, FILM COATED ORAL at 11:53

## 2023-02-21 RX ADMIN — Medication 1 DROP(S): at 11:53

## 2023-02-21 RX ADMIN — Medication 1 APPLICATION(S): at 10:11

## 2023-02-21 RX ADMIN — Medication 1 APPLICATION(S): at 02:38

## 2023-02-21 RX ADMIN — Medication 3 MILLILITER(S): at 17:19

## 2023-02-21 RX ADMIN — CHLORHEXIDINE GLUCONATE 15 MILLILITER(S): 213 SOLUTION TOPICAL at 17:12

## 2023-02-21 RX ADMIN — MORPHINE SULFATE 2 MILLIGRAM(S): 50 CAPSULE, EXTENDED RELEASE ORAL at 09:53

## 2023-02-21 RX ADMIN — PIPERACILLIN AND TAZOBACTAM 25 GRAM(S): 4; .5 INJECTION, POWDER, LYOPHILIZED, FOR SOLUTION INTRAVENOUS at 13:00

## 2023-02-21 RX ADMIN — Medication 3: at 00:03

## 2023-02-21 RX ADMIN — Medication 1 APPLICATION(S): at 21:49

## 2023-02-21 RX ADMIN — Medication 1 APPLICATION(S): at 17:12

## 2023-02-21 RX ADMIN — POTASSIUM PHOSPHATE, MONOBASIC POTASSIUM PHOSPHATE, DIBASIC 62.5 MILLIMOLE(S): 236; 224 INJECTION, SOLUTION INTRAVENOUS at 02:43

## 2023-02-21 RX ADMIN — Medication 3 MILLILITER(S): at 23:11

## 2023-02-21 RX ADMIN — Medication 1 APPLICATION(S): at 20:44

## 2023-02-21 RX ADMIN — MORPHINE SULFATE 2 MILLIGRAM(S): 50 CAPSULE, EXTENDED RELEASE ORAL at 17:25

## 2023-02-21 RX ADMIN — Medication 1 DROP(S): at 05:29

## 2023-02-21 RX ADMIN — Medication 1 TABLET(S): at 11:53

## 2023-02-21 RX ADMIN — MORPHINE SULFATE 2 MILLIGRAM(S): 50 CAPSULE, EXTENDED RELEASE ORAL at 00:02

## 2023-02-21 RX ADMIN — PIPERACILLIN AND TAZOBACTAM 25 GRAM(S): 4; .5 INJECTION, POWDER, LYOPHILIZED, FOR SOLUTION INTRAVENOUS at 05:02

## 2023-02-21 RX ADMIN — PIPERACILLIN AND TAZOBACTAM 25 GRAM(S): 4; .5 INJECTION, POWDER, LYOPHILIZED, FOR SOLUTION INTRAVENOUS at 21:44

## 2023-02-21 RX ADMIN — Medication 1 APPLICATION(S): at 21:45

## 2023-02-21 RX ADMIN — Medication 1 APPLICATION(S): at 23:15

## 2023-02-21 RX ADMIN — SENNA PLUS 10 MILLILITER(S): 8.6 TABLET ORAL at 21:45

## 2023-02-21 RX ADMIN — Medication 1 APPLICATION(S): at 05:01

## 2023-02-21 RX ADMIN — Medication 3 MILLILITER(S): at 05:29

## 2023-02-21 RX ADMIN — MORPHINE SULFATE 2 MILLIGRAM(S): 50 CAPSULE, EXTENDED RELEASE ORAL at 17:40

## 2023-02-21 RX ADMIN — PANTOPRAZOLE SODIUM 40 MILLIGRAM(S): 20 TABLET, DELAYED RELEASE ORAL at 11:53

## 2023-02-21 RX ADMIN — Medication 1 APPLICATION(S): at 05:02

## 2023-02-21 RX ADMIN — Medication 1 APPLICATION(S): at 17:11

## 2023-02-21 RX ADMIN — Medication 3 MILLILITER(S): at 11:33

## 2023-02-21 NOTE — PROGRESS NOTE ADULT - SUBJECTIVE AND OBJECTIVE BOX
Javier Liu, PGY1   Emergency Medicine    OVERNIGHT EVENTS / SUBJECTIVE: Patient seen and examined at bedside.     OBJECTIVE:    VITAL SIGNS:  ICU Vital Signs Last 24 Hrs  T(C): 36.5 (21 Feb 2023 04:00), Max: 36.9 (20 Feb 2023 16:00)  T(F): 97.7 (21 Feb 2023 04:00), Max: 98.4 (20 Feb 2023 16:00)  HR: 90 (21 Feb 2023 07:00) (80 - 105)  BP: 101/52 (21 Feb 2023 07:00) (93/51 - 164/71)  BP(mean): 75 (21 Feb 2023 07:00) (66 - 106)  ABP: --  ABP(mean): --  RR: 20 (21 Feb 2023 07:00) (16 - 20)  SpO2: 100% (21 Feb 2023 07:00) (94% - 100%)    O2 Parameters below as of 21 Feb 2023 05:29  Patient On (Oxygen Delivery Method): ventilator          Mode: AC/ CMV (Assist Control/ Continuous Mandatory Ventilation), RR (machine): 20, TV (machine): 380, FiO2: 21, PEEP: 5, ITime: 1, MAP: 9, PIP: 22    02-20 @ 07:01  -  02-21 @ 07:00  --------------------------------------------------------  IN: 1840 mL / OUT: 300 mL / NET: 1540 mL      CAPILLARY BLOOD GLUCOSE      POCT Blood Glucose.: 137 mg/dL (21 Feb 2023 05:00)      PHYSICAL EXAM:    General: NAD  HEENT: NC/AT; PERRL, clear conjunctiva  Neck: supple  Respiratory: CTA b/l  Cardiovascular: +S1/S2; RRR  Abdomen: soft, NT/ND; +BS x4  Extremities: WWP, 2+ peripheral pulses b/l; no LE edema  Skin: normal color and turgor; no rash  Neurological:    MEDICATIONS:  MEDICATIONS  (STANDING):  albuterol/ipratropium for Nebulization 3 milliLiter(s) Nebulizer every 6 hours  chlorhexidine 0.12% Liquid 15 milliLiter(s) Oral Mucosa every 12 hours  chlorhexidine 4% Liquid 1 Application(s) Topical <User Schedule>  erythromycin   Ointment 1 Application(s) Both EYES two times a day  insulin lispro (ADMELOG) corrective regimen sliding scale   SubCutaneous every 6 hours  multivitamin 1 Tablet(s) Oral daily  mupirocin 2% Ointment 1 Application(s) Topical two times a day  pantoprazole  Injectable 40 milliGRAM(s) IV Push daily  petrolatum Ophthalmic Ointment 1 Application(s) Both EYES every 4 hours  piperacillin/tazobactam IVPB.. 3.375 Gram(s) IV Intermittent every 8 hours  rivaroxaban 10 milliGRAM(s) Enteral Tube daily  senna Syrup 10 milliLiter(s) Oral at bedtime  sertraline 50 milliGRAM(s) Oral daily  tobramycin 14 mG/mL Fortified Ophthalmic 1 Drop(s) Both EYES four times a day  vancomycin 25 mG/mL Fortified Ophthalmic 1 Drop(s) Both EYES four times a day    MEDICATIONS  (PRN):  morphine  - Injectable 2 milliGRAM(s) IV Push every 4 hours PRN Mild Pain (1 - 3)  morphine  - Injectable 2 milliGRAM(s) IV Push every 8 hours PRN Moderate Pain (4 - 6)      ALLERGIES:  Allergies    Bactrim DS (Rash)    Intolerances        LABS:                        11.8   9.07  )-----------( 255      ( 21 Feb 2023 00:26 )             38.9     Hemoglobin: 11.8 g/dL (02-21 @ 00:26)  Hemoglobin: 12.3 g/dL (02-20 @ 00:42)  Hemoglobin: 13.8 g/dL (02-18 @ 22:53)  Hemoglobin: 15.2 g/dL (02-18 @ 19:41)    CBC Full  -  ( 21 Feb 2023 00:26 )  WBC Count : 9.07 K/uL  RBC Count : 4.30 M/uL  Hemoglobin : 11.8 g/dL  Hematocrit : 38.9 %  Platelet Count - Automated : 255 K/uL  Mean Cell Volume : 90.5 fl  Mean Cell Hemoglobin : 27.4 pg  Mean Cell Hemoglobin Concentration : 30.3 gm/dL  Auto Neutrophil # : 6.48 K/uL  Auto Lymphocyte # : 1.25 K/uL  Auto Monocyte # : 0.80 K/uL  Auto Eosinophil # : 0.40 K/uL  Auto Basophil # : 0.06 K/uL  Auto Neutrophil % : 71.4 %  Auto Lymphocyte % : 13.8 %  Auto Monocyte % : 8.8 %  Auto Eosinophil % : 4.4 %  Auto Basophil % : 0.7 %    02-21    141  |  105  |  17  ----------------------------<  311<H>  3.7   |  19<L>  |  <0.30<L>    Ca    8.6      21 Feb 2023 00:26  Phos  2.0     02-21  Mg     2.0     02-21    TPro  7.1  /  Alb  3.7  /  TBili  0.7  /  DBili  x   /  AST  26  /  ALT  42  /  AlkPhos  93  02-21    Creatinine Trend: <0.30<--, <0.30<--, <0.30<--, <0.30<--  LIVER FUNCTIONS - ( 21 Feb 2023 00:26 )  Alb: 3.7 g/dL / Pro: 7.1 g/dL / ALK PHOS: 93 U/L / ALT: 42 U/L / AST: 26 U/L / GGT: x           PT/INR - ( 21 Feb 2023 00:26 )   PT: 13.7 sec;   INR: 1.19 ratio         PTT - ( 21 Feb 2023 00:26 )  PTT:31.7 sec    hs Troponin:        03:14 - VBG - pH: 7.29  | pCO2: 51    | pO2: 48    | Lactate: 2.0    23:58 - VBG - pH: 7.32  | pCO2: 49    | pO2: 55    | Lactate: 1.8          CSF:                      EKG:   MICROBIOLOGY:    Culture - Bronchial (collected 19 Feb 2023 15:33)  Source: .Bronchial Bronchial  Gram Stain (19 Feb 2023 20:55):    Few polymorphonuclear leukocytes per low power field    Rare squamous epithelial cells per low power field    No organisms seen  Preliminary Report (20 Feb 2023 15:50):    Few Pseudomonas aeruginosa    Normal Respiratory Kia present    Culture - Fungal, Other (collected 19 Feb 2023 13:56)  Source: .Other  Preliminary Report (20 Feb 2023 09:26):    Testing in progress    Culture - Fungal, Other (collected 19 Feb 2023 13:56)  Source: .Other  Preliminary Report (20 Feb 2023 09:26):    Testing in progress    Culture - Eye (collected 19 Feb 2023 13:56)  Source: .Eye Cornea-Left  Preliminary Report (20 Feb 2023 20:11):    Staphylococcus aureus isolated    Culture - Eye (collected 19 Feb 2023 13:56)  Source: .Eye Cornea-Right  Preliminary Report (20 Feb 2023 22:06):    Staphylococcus epidermidis isolated    Culture - Sputum (collected 19 Feb 2023 02:28)  Source: .Sputum Sputum  Gram Stain (19 Feb 2023 10:35):    Moderate polymorphonuclear leukocytes seen per low power field    Few Squamous epithelial cells seen per low power field    Moderate Gram positive cocci in pairs seen per oil power field    Moderate Gram Negative Rods seen per oil power field    Few Gram Positive Rods seen per oil power field    Few Gram Positive Cocci in Clusters seen per oil power field  Preliminary Report (20 Feb 2023 12:13):    Few Pseudomonas aeruginosa    Normal Respiratory Kia present    Culture - Urine (collected 18 Feb 2023 19:41)  Source: Clean Catch Clean Catch (Midstream)  Preliminary Report (20 Feb 2023 18:45):    >100,000 CFU/ml Serratia marcescens    Culture - Blood (collected 18 Feb 2023 18:59)  Source: .Blood Blood-Peripheral  Preliminary Report (20 Feb 2023 02:02):    No growth to date.    Culture - Blood (collected 18 Feb 2023 18:50)  Source: .Blood Blood-Peripheral  Preliminary Report (20 Feb 2023 02:02):    No growth to date.      IMAGING:      Labs, imaging, EKG personally reviewed    RADIOLOGY & ADDITIONAL TESTS: Reviewed. Javier Liu, PGY1   Emergency Medicine.    OVERNIGHT EVENTS / SUBJECTIVE: Patient seen and examined at bedside.     OBJECTIVE:    VITAL SIGNS:  ICU Vital Signs Last 24 Hrs  T(C): 36.5 (21 Feb 2023 04:00), Max: 36.9 (20 Feb 2023 16:00)  T(F): 97.7 (21 Feb 2023 04:00), Max: 98.4 (20 Feb 2023 16:00)  HR: 90 (21 Feb 2023 07:00) (80 - 105)  BP: 101/52 (21 Feb 2023 07:00) (93/51 - 164/71)  BP(mean): 75 (21 Feb 2023 07:00) (66 - 106)  ABP: --  ABP(mean): --  RR: 20 (21 Feb 2023 07:00) (16 - 20)  SpO2: 100% (21 Feb 2023 07:00) (94% - 100%)    O2 Parameters below as of 21 Feb 2023 05:29  Patient On (Oxygen Delivery Method): ventilator          Mode: AC/ CMV (Assist Control/ Continuous Mandatory Ventilation), RR (machine): 20, TV (machine): 380, FiO2: 21, PEEP: 5, ITime: 1, MAP: 9, PIP: 22    02-20 @ 07:01  -  02-21 @ 07:00  --------------------------------------------------------  IN: 1840 mL / OUT: 300 mL / NET: 1540 mL      CAPILLARY BLOOD GLUCOSE      POCT Blood Glucose.: 137 mg/dL (21 Feb 2023 05:00)      PHYSICAL EXAM:    General: NAD  HEENT: NC/AT; PERRL, clear conjunctiva  Neck: supple  Respiratory: CTA b/l  Cardiovascular: +S1/S2; RRR  Abdomen: soft, NT/ND; +BS x4  Extremities: WWP, 2+ peripheral pulses b/l; no LE edema  Skin: normal color and turgor; no rash  Neurological:    MEDICATIONS:  MEDICATIONS  (STANDING):  albuterol/ipratropium for Nebulization 3 milliLiter(s) Nebulizer every 6 hours  chlorhexidine 0.12% Liquid 15 milliLiter(s) Oral Mucosa every 12 hours  chlorhexidine 4% Liquid 1 Application(s) Topical <User Schedule>  erythromycin   Ointment 1 Application(s) Both EYES two times a day  insulin lispro (ADMELOG) corrective regimen sliding scale   SubCutaneous every 6 hours  multivitamin 1 Tablet(s) Oral daily  mupirocin 2% Ointment 1 Application(s) Topical two times a day  pantoprazole  Injectable 40 milliGRAM(s) IV Push daily  petrolatum Ophthalmic Ointment 1 Application(s) Both EYES every 4 hours  piperacillin/tazobactam IVPB.. 3.375 Gram(s) IV Intermittent every 8 hours  rivaroxaban 10 milliGRAM(s) Enteral Tube daily  senna Syrup 10 milliLiter(s) Oral at bedtime  sertraline 50 milliGRAM(s) Oral daily  tobramycin 14 mG/mL Fortified Ophthalmic 1 Drop(s) Both EYES four times a day  vancomycin 25 mG/mL Fortified Ophthalmic 1 Drop(s) Both EYES four times a day    MEDICATIONS  (PRN):  morphine  - Injectable 2 milliGRAM(s) IV Push every 4 hours PRN Mild Pain (1 - 3)  morphine  - Injectable 2 milliGRAM(s) IV Push every 8 hours PRN Moderate Pain (4 - 6)      ALLERGIES:  Allergies    Bactrim DS (Rash)    Intolerances        LABS:                        11.8   9.07  )-----------( 255      ( 21 Feb 2023 00:26 )             38.9     Hemoglobin: 11.8 g/dL (02-21 @ 00:26)  Hemoglobin: 12.3 g/dL (02-20 @ 00:42)  Hemoglobin: 13.8 g/dL (02-18 @ 22:53)  Hemoglobin: 15.2 g/dL (02-18 @ 19:41)    CBC Full  -  ( 21 Feb 2023 00:26 )  WBC Count : 9.07 K/uL  RBC Count : 4.30 M/uL  Hemoglobin : 11.8 g/dL  Hematocrit : 38.9 %  Platelet Count - Automated : 255 K/uL  Mean Cell Volume : 90.5 fl  Mean Cell Hemoglobin : 27.4 pg  Mean Cell Hemoglobin Concentration : 30.3 gm/dL  Auto Neutrophil # : 6.48 K/uL  Auto Lymphocyte # : 1.25 K/uL  Auto Monocyte # : 0.80 K/uL  Auto Eosinophil # : 0.40 K/uL  Auto Basophil # : 0.06 K/uL  Auto Neutrophil % : 71.4 %  Auto Lymphocyte % : 13.8 %  Auto Monocyte % : 8.8 %  Auto Eosinophil % : 4.4 %  Auto Basophil % : 0.7 %    02-21    141  |  105  |  17  ----------------------------<  311<H>  3.7   |  19<L>  |  <0.30<L>    Ca    8.6      21 Feb 2023 00:26  Phos  2.0     02-21  Mg     2.0     02-21    TPro  7.1  /  Alb  3.7  /  TBili  0.7  /  DBili  x   /  AST  26  /  ALT  42  /  AlkPhos  93  02-21    Creatinine Trend: <0.30<--, <0.30<--, <0.30<--, <0.30<--  LIVER FUNCTIONS - ( 21 Feb 2023 00:26 )  Alb: 3.7 g/dL / Pro: 7.1 g/dL / ALK PHOS: 93 U/L / ALT: 42 U/L / AST: 26 U/L / GGT: x           PT/INR - ( 21 Feb 2023 00:26 )   PT: 13.7 sec;   INR: 1.19 ratio         PTT - ( 21 Feb 2023 00:26 )  PTT:31.7 sec    hs Troponin:        03:14 - VBG - pH: 7.29  | pCO2: 51    | pO2: 48    | Lactate: 2.0    23:58 - VBG - pH: 7.32  | pCO2: 49    | pO2: 55    | Lactate: 1.8          CSF:                      EKG:   MICROBIOLOGY:    Culture - Bronchial (collected 19 Feb 2023 15:33)  Source: .Bronchial Bronchial  Gram Stain (19 Feb 2023 20:55):    Few polymorphonuclear leukocytes per low power field    Rare squamous epithelial cells per low power field    No organisms seen  Preliminary Report (20 Feb 2023 15:50):    Few Pseudomonas aeruginosa    Normal Respiratory Kia present    Culture - Fungal, Other (collected 19 Feb 2023 13:56)  Source: .Other  Preliminary Report (20 Feb 2023 09:26):    Testing in progress    Culture - Fungal, Other (collected 19 Feb 2023 13:56)  Source: .Other  Preliminary Report (20 Feb 2023 09:26):    Testing in progress    Culture - Eye (collected 19 Feb 2023 13:56)  Source: .Eye Cornea-Left  Preliminary Report (20 Feb 2023 20:11):    Staphylococcus aureus isolated    Culture - Eye (collected 19 Feb 2023 13:56)  Source: .Eye Cornea-Right  Preliminary Report (20 Feb 2023 22:06):    Staphylococcus epidermidis isolated    Culture - Sputum (collected 19 Feb 2023 02:28)  Source: .Sputum Sputum  Gram Stain (19 Feb 2023 10:35):    Moderate polymorphonuclear leukocytes seen per low power field    Few Squamous epithelial cells seen per low power field    Moderate Gram positive cocci in pairs seen per oil power field    Moderate Gram Negative Rods seen per oil power field    Few Gram Positive Rods seen per oil power field    Few Gram Positive Cocci in Clusters seen per oil power field  Preliminary Report (20 Feb 2023 12:13):    Few Pseudomonas aeruginosa    Normal Respiratory Kia present    Culture - Urine (collected 18 Feb 2023 19:41)  Source: Clean Catch Clean Catch (Midstream)  Preliminary Report (20 Feb 2023 18:45):    >100,000 CFU/ml Serratia marcescens    Culture - Blood (collected 18 Feb 2023 18:59)  Source: .Blood Blood-Peripheral  Preliminary Report (20 Feb 2023 02:02):    No growth to date.    Culture - Blood (collected 18 Feb 2023 18:50)  Source: .Blood Blood-Peripheral  Preliminary Report (20 Feb 2023 02:02):    No growth to date.      IMAGING:      Labs, imaging, EKG personally reviewed    RADIOLOGY & ADDITIONAL TESTS: Reviewed. Javier Liu, PGY1   Emergency Medicine..    OVERNIGHT EVENTS / SUBJECTIVE: Patient seen and examined at bedside.     OBJECTIVE:    VITAL SIGNS:  ICU Vital Signs Last 24 Hrs  T(C): 36.5 (21 Feb 2023 04:00), Max: 36.9 (20 Feb 2023 16:00)  T(F): 97.7 (21 Feb 2023 04:00), Max: 98.4 (20 Feb 2023 16:00)  HR: 90 (21 Feb 2023 07:00) (80 - 105)  BP: 101/52 (21 Feb 2023 07:00) (93/51 - 164/71)  BP(mean): 75 (21 Feb 2023 07:00) (66 - 106)  ABP: --  ABP(mean): --  RR: 20 (21 Feb 2023 07:00) (16 - 20)  SpO2: 100% (21 Feb 2023 07:00) (94% - 100%)    O2 Parameters below as of 21 Feb 2023 05:29  Patient On (Oxygen Delivery Method): ventilator          Mode: AC/ CMV (Assist Control/ Continuous Mandatory Ventilation), RR (machine): 20, TV (machine): 380, FiO2: 21, PEEP: 5, ITime: 1, MAP: 9, PIP: 22    02-20 @ 07:01  -  02-21 @ 07:00  --------------------------------------------------------  IN: 1840 mL / OUT: 300 mL / NET: 1540 mL      CAPILLARY BLOOD GLUCOSE      POCT Blood Glucose.: 137 mg/dL (21 Feb 2023 05:00)      PHYSICAL EXAM:    General: NAD  HEENT: NC/AT; PERRL, clear conjunctiva  Neck: supple  Respiratory: CTA b/l  Cardiovascular: +S1/S2; RRR  Abdomen: soft, NT/ND; +BS x4  Extremities: WWP, 2+ peripheral pulses b/l; no LE edema  Skin: normal color and turgor; no rash  Neurological:    MEDICATIONS:  MEDICATIONS  (STANDING):  albuterol/ipratropium for Nebulization 3 milliLiter(s) Nebulizer every 6 hours  chlorhexidine 0.12% Liquid 15 milliLiter(s) Oral Mucosa every 12 hours  chlorhexidine 4% Liquid 1 Application(s) Topical <User Schedule>  erythromycin   Ointment 1 Application(s) Both EYES two times a day  insulin lispro (ADMELOG) corrective regimen sliding scale   SubCutaneous every 6 hours  multivitamin 1 Tablet(s) Oral daily  mupirocin 2% Ointment 1 Application(s) Topical two times a day  pantoprazole  Injectable 40 milliGRAM(s) IV Push daily  petrolatum Ophthalmic Ointment 1 Application(s) Both EYES every 4 hours  piperacillin/tazobactam IVPB.. 3.375 Gram(s) IV Intermittent every 8 hours  rivaroxaban 10 milliGRAM(s) Enteral Tube daily  senna Syrup 10 milliLiter(s) Oral at bedtime  sertraline 50 milliGRAM(s) Oral daily  tobramycin 14 mG/mL Fortified Ophthalmic 1 Drop(s) Both EYES four times a day  vancomycin 25 mG/mL Fortified Ophthalmic 1 Drop(s) Both EYES four times a day    MEDICATIONS  (PRN):  morphine  - Injectable 2 milliGRAM(s) IV Push every 4 hours PRN Mild Pain (1 - 3)  morphine  - Injectable 2 milliGRAM(s) IV Push every 8 hours PRN Moderate Pain (4 - 6)      ALLERGIES:  Allergies    Bactrim DS (Rash)    Intolerances        LABS:                        11.8   9.07  )-----------( 255      ( 21 Feb 2023 00:26 )             38.9     Hemoglobin: 11.8 g/dL (02-21 @ 00:26)  Hemoglobin: 12.3 g/dL (02-20 @ 00:42)  Hemoglobin: 13.8 g/dL (02-18 @ 22:53)  Hemoglobin: 15.2 g/dL (02-18 @ 19:41)    CBC Full  -  ( 21 Feb 2023 00:26 )  WBC Count : 9.07 K/uL  RBC Count : 4.30 M/uL  Hemoglobin : 11.8 g/dL  Hematocrit : 38.9 %  Platelet Count - Automated : 255 K/uL  Mean Cell Volume : 90.5 fl  Mean Cell Hemoglobin : 27.4 pg  Mean Cell Hemoglobin Concentration : 30.3 gm/dL  Auto Neutrophil # : 6.48 K/uL  Auto Lymphocyte # : 1.25 K/uL  Auto Monocyte # : 0.80 K/uL  Auto Eosinophil # : 0.40 K/uL  Auto Basophil # : 0.06 K/uL  Auto Neutrophil % : 71.4 %  Auto Lymphocyte % : 13.8 %  Auto Monocyte % : 8.8 %  Auto Eosinophil % : 4.4 %  Auto Basophil % : 0.7 %    02-21    141  |  105  |  17  ----------------------------<  311<H>  3.7   |  19<L>  |  <0.30<L>    Ca    8.6      21 Feb 2023 00:26  Phos  2.0     02-21  Mg     2.0     02-21    TPro  7.1  /  Alb  3.7  /  TBili  0.7  /  DBili  x   /  AST  26  /  ALT  42  /  AlkPhos  93  02-21    Creatinine Trend: <0.30<--, <0.30<--, <0.30<--, <0.30<--  LIVER FUNCTIONS - ( 21 Feb 2023 00:26 )  Alb: 3.7 g/dL / Pro: 7.1 g/dL / ALK PHOS: 93 U/L / ALT: 42 U/L / AST: 26 U/L / GGT: x           PT/INR - ( 21 Feb 2023 00:26 )   PT: 13.7 sec;   INR: 1.19 ratio         PTT - ( 21 Feb 2023 00:26 )  PTT:31.7 sec    hs Troponin:        03:14 - VBG - pH: 7.29  | pCO2: 51    | pO2: 48    | Lactate: 2.0    23:58 - VBG - pH: 7.32  | pCO2: 49    | pO2: 55    | Lactate: 1.8          CSF:                      EKG:   MICROBIOLOGY:    Culture - Bronchial (collected 19 Feb 2023 15:33)  Source: .Bronchial Bronchial  Gram Stain (19 Feb 2023 20:55):    Few polymorphonuclear leukocytes per low power field    Rare squamous epithelial cells per low power field    No organisms seen  Preliminary Report (20 Feb 2023 15:50):    Few Pseudomonas aeruginosa    Normal Respiratory Kia present    Culture - Fungal, Other (collected 19 Feb 2023 13:56)  Source: .Other  Preliminary Report (20 Feb 2023 09:26):    Testing in progress    Culture - Fungal, Other (collected 19 Feb 2023 13:56)  Source: .Other  Preliminary Report (20 Feb 2023 09:26):    Testing in progress    Culture - Eye (collected 19 Feb 2023 13:56)  Source: .Eye Cornea-Left  Preliminary Report (20 Feb 2023 20:11):    Staphylococcus aureus isolated    Culture - Eye (collected 19 Feb 2023 13:56)  Source: .Eye Cornea-Right  Preliminary Report (20 Feb 2023 22:06):    Staphylococcus epidermidis isolated    Culture - Sputum (collected 19 Feb 2023 02:28)  Source: .Sputum Sputum  Gram Stain (19 Feb 2023 10:35):    Moderate polymorphonuclear leukocytes seen per low power field    Few Squamous epithelial cells seen per low power field    Moderate Gram positive cocci in pairs seen per oil power field    Moderate Gram Negative Rods seen per oil power field    Few Gram Positive Rods seen per oil power field    Few Gram Positive Cocci in Clusters seen per oil power field  Preliminary Report (20 Feb 2023 12:13):    Few Pseudomonas aeruginosa    Normal Respiratory Kia present    Culture - Urine (collected 18 Feb 2023 19:41)  Source: Clean Catch Clean Catch (Midstream)  Preliminary Report (20 Feb 2023 18:45):    >100,000 CFU/ml Serratia marcescens    Culture - Blood (collected 18 Feb 2023 18:59)  Source: .Blood Blood-Peripheral  Preliminary Report (20 Feb 2023 02:02):    No growth to date.    Culture - Blood (collected 18 Feb 2023 18:50)  Source: .Blood Blood-Peripheral  Preliminary Report (20 Feb 2023 02:02):    No growth to date.      IMAGING:      Labs, imaging, EKG personally reviewed    RADIOLOGY & ADDITIONAL TESTS: Reviewed.

## 2023-02-21 NOTE — CHART NOTE - NSCHARTNOTEFT_GEN_A_CORE
Palliative consult received for GOC discussion as per family request. Chart reviewed, case discussed with primary team today. LCSW placed call to patient's spouse Tyson, who states that patient is pending downgrade to RCU and notes wishing to see patient's status in the coming days prior to meeting with GAP. Tyson states that he is unsure at present if patient is truly decompensating or if this is an acute crisis that will resolve. Tyson requests GAP to hold consult for now, and states that he will reach out to writer if and when he wishes for GAP involvement. Primary team made aware.    GAP signing off, but remains available for reconsult if needed.  (phone) [phone]  (pager) [phone]

## 2023-02-21 NOTE — PROGRESS NOTE ADULT - SUBJECTIVE AND OBJECTIVE BOX
[facility] DEPARTMENT OF OPHTHALMOLOGY  ------------------------------------------------------------------------------  Darin Richard MD PGY-3  Pager: [phone], also available on teams  ------------------------------------------------------------------------------    Interval History: No acute events overnight.     MEDICATIONS  (STANDING):  albuterol/ipratropium for Nebulization 3 milliLiter(s) Nebulizer every 6 hours  chlorhexidine 0.12% Liquid 15 milliLiter(s) Oral Mucosa every 12 hours  chlorhexidine 4% Liquid 1 Application(s) Topical <User Schedule>  erythromycin   Ointment 1 Application(s) Both EYES at bedtime  insulin lispro (ADMELOG) corrective regimen sliding scale   SubCutaneous every 6 hours  multivitamin 1 Tablet(s) Oral daily  mupirocin 2% Ointment 1 Application(s) Topical two times a day  pantoprazole  Injectable 40 milliGRAM(s) IV Push daily  petrolatum Ophthalmic Ointment 1 Application(s) Both EYES every 4 hours  piperacillin/tazobactam IVPB.. 3.375 Gram(s) IV Intermittent every 8 hours  rivaroxaban 10 milliGRAM(s) Enteral Tube daily  senna Syrup 10 milliLiter(s) Oral at bedtime  sertraline 50 milliGRAM(s) Oral daily    MEDICATIONS  (PRN):  morphine  - Injectable 2 milliGRAM(s) IV Push every 8 hours PRN Moderate Pain (4 - 6)  morphine  - Injectable 2 milliGRAM(s) IV Push every 4 hours PRN Mild Pain (1 - 3)      VITALS: T(C): 37 (02-21-23 @ 16:00)  T(F): 98.6 (02-21-23 @ 16:00), Max: 98.6 (02-21-23 @ 08:00)  HR: 88 (02-21-23 @ 18:00) (83 - 105)  BP: 117/56 (02-21-23 @ 18:00) (93/50 - 158/74)  RR:  (16 - 22)  SpO2:  (97% - 100%)  Wt(kg): --  General: AAO x 0, trached, non verbal not following commands    Ophthalmology Exam:  Visual acuity (sc): RONIT d/t AMS  Pupils: PERRL OU  Extraocular movements (EOMs): RONIT d/t AMS  Confrontational Visual Field (CVF): RONIT d/t AMS    Pen Light Exam (PLE)  External: Flat  Lids/Lashes/Lacrimal Ducts: Flat OU. Eyelid margin hyperemic. 6mm lagophthalmos OU.   Sclera/Conjunctiva: Trace Injection OU. Mucoid discharge OU  Cornea: OD: Inferior 6mm x 6mm circular area of stromal haze with stromal neovascularization. pinpoint epi defect. OS: Inferior 4mm x 4mm circular area of stromal haze with neovascularization. 1x2mm inferior epi defect.   Anterior Chamber: Deep and formed OU.    Iris: Flat OU.  Lens: NS OU.      Fundus Exam: dilated with 1% tropicamide and 2.5% phenylephrine  Approval obtained from primary team for dilation  Patient aware that pupils can remained dilated for at least 4-6 hours.  Exam performed with 20 D lens    Vitreous: wnl OU Clear view to posterior pole OU.   Disc, cup/disc: sharp and pink, 0.55 OU  Macula: Flat OU. Mac drusen OU.   Vessels: wnl OU  Periphery: Limited exam. Flat OU    Labs:  Culture - Eye (02.19.23 @ 13:56)    Specimen Source: .Eye Cornea-Left    Culture Results:   Staphylococcus aureus isolated    Culture - Eye (02.19.23 @ 13:56)    Specimen Source: .Eye Cornea-Right    Culture Results:   Staphylococcus epidermidis isolated

## 2023-02-21 NOTE — PROGRESS NOTE ADULT - ASSESSMENT
65F with advanced ALS (baseline nonverbal, communicates minimally through eye movements, PEG, vent dependent, trach upsized 8/2022 with CT surgery), chronic hypoxic/hypercapnic respiratory failure, HLD; BIBEMS for hypoxia to 66% after vent malfunction at home; found to have sepsis, likely  source; admitted to MICU for further management.     PLAN:    NEURO  # workup for anoxic brain injury  - CTH - no acute finding  - c/w home diazepam  - c/w home sertraline    # advanced ALS  - baseline nonverbal, awake, communication with eye movements has been declining in recent months  - c/w home riluzole    CARDIOVASCULAR  - sinus tachycardia iso sepsis, now improved    RESPIRATORY  # tracheostomy  - trach changed in ED, additional change by ENT  - Briana    GI/NUTRITION  # PEG  - feeds, site care, bowel reg    # PPX  - pantop 40 IV qD    /RENAL  - No active issues.     SKIN  - c/w Lacrilube, f/u home eyedrops/ointment  - f/u home nystatin    ID  # sepsis  - CXR with retrocardiac opacity, could not rule out underlying infection. UTI also possible source  - Zosyn (2/18-)  - F/u BCx, UCx  - MRSA/MSSA PCR pending  - Allergy: rash with Bactrim   - consider sputum Cx if able    ENDOCRINE  - Glucose levels controlled  - A1c 4.9    HEMATOLOGIC  # leukocytosis   - iso sepsis, improving  # thrombocytosis  - possibly reactive    DVT PPX  - Apixaban 10mg QD    OPHTHO  Corneal ulcer  - Concerned for corneal ulcer, ophtho consulted  - Vanc and tobramycin eyedrops q2hrs.     ETHICS  - spouse considering DNR/DNI documentation in AM. Full code for now.    Dispo  - Listed for RCU   65F with advanced ALS (baseline nonverbal, communicates minimally through eye movements, PEG, vent dependent, trach upsized 8/2022 with CT surgery), chronic hypoxic/hypercapnic respiratory failure, HLD; BIBEMS for hypoxia to 66% after vent malfunction at home; found to have sepsis, likely  source; admitted to MICU for further management.     PLAN:    NEURO  # workup for anoxic brain injury  - CTH - no acute finding  - c/w home diazepam  - c/w home sertraline    # advanced ALS  - baseline nonverbal, awake, communication with eye movements has been declining in recent months  - c/w home riluzole    CARDIOVASCULAR  - sinus tachycardia iso sepsis, now improved    RESPIRATORY  # tracheostomy  - trach changed in ED, additional change by ENT  - Briana    GI/NUTRITION  # PEG  - feeds, site care, bowel reg    # PPX  - pantop 40 IV qD    /RENAL  - No active issues.     SKIN  - c/w Lacrilube, f/u home eyedrops/ointment  - f/u home nystatin    ID  # sepsis  - CXR with retrocardiac opacity, could not rule out underlying infection. UTI also possible source  - Zosyn (2/18-)  - F/u BCx, UCx  - MRSA/MSSA PCR pending  - Allergy: rash with Bactrim   - consider sputum Cx if able    ENDOCRINE  - Glucose levels controlled  - A1c 4.9    HEMATOLOGIC  # leukocytosis   - iso sepsis, improving  # thrombocytosis  - possibly reactive    DVT PPX  - Apixaban 10mg QD    OPHTHO  Corneal ulcer  - Concerned for corneal ulcer, ophtho consulted  - Vanc, tobramycin, and erythromycin eyedrops q2hrs.     ETHICS  - spouse considering DNR/DNI documentation in AM. Full code for now.    Dispo  - Listed for RCU   65F with advanced ALS (baseline nonverbal, communicates minimally through eye movements, PEG, vent dependent, trach upsized 8/2022 with CT surgery), chronic hypoxic/hypercapnic respiratory failure, HLD; BIBEMS for hypoxia to 66% after vent malfunction at home; found to have sepsis, likely  source; admitted to MICU for further management.     PLAN:    NEURO  # workup for anoxic brain injury  - CTH - no acute finding  - c/w home diazepam  - c/w home sertraline    # advanced ALS  - baseline nonverbal, awake, communication with eye movements has been declining in recent months    CARDIOVASCULAR  - sinus tachycardia iso sepsis, now improved    RESPIRATORY  # tracheostomy  - trach changed in ED, additional change by ENT  - Briana    GI/NUTRITION  # PEG  - feeds, site care, bowel reg    # PPX  - pantop 40 IV qD    /RENAL  - No active issues.     SKIN  - c/w Lacrilube, f/u home eyedrops/ointment  - f/u home nystatin    ID  # sepsis  - CXR with retrocardiac opacity, could not rule out underlying infection. UTI also possible source  - F/u BCx, UCx  - MRSA/MSSA PCR pending  - Allergy: rash with Bactrim   - Zosyn 3/5 days  - consider sputum Cx if able    ENDOCRINE  - Glucose levels controlled  - A1c 4.9    HEMATOLOGIC  # leukocytosis   - iso sepsis, improving  # thrombocytosis  - possibly reactive    DVT PPX  - Apixaban 10mg QD    OPHTHO  Corneal ulcer  - Concerned for corneal ulcer, ophtho consulted  - Vanc, tobramycin, and erythromycin eyedrops q2hrs.     ETHICS  - spouse considering DNR/DNI documentation in AM. Full code for now.    Dispo  - Listed for RCU

## 2023-02-21 NOTE — PROGRESS NOTE ADULT - ASSESSMENT
67y female with a past medical history advanced ALS (baseline nonverbal, communicates minimally through eye movements, PEG, vent dependent, trach upsized 8/2022 with CT surgery), chronic hypoxic/hypercapnic respiratory failure, HLD; BIBEMS for hypoxia to 66% after vent malfunction at home, ocular history unknown, ophthalmology consulted to evaluate for corneal ulcer.     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   - Cultures sent for BOTH eyes, growing staph au and staph epi - likely contaminate.   - STOP fortified vancomycin and fortified tobramycin  - Start oflox qid to both eyes  - Start lacrilube q2h to both eyes  - erythromycin ointment qhs both eyes.   - Recommend taping of eyelids AT ALL TIMES, given extent of exposure. Instructions given to nurse.     Pt seen and discussed with Nasir Whitney    Outpatient Follow-up: Patient should follow-up with his/her ophthalmologist or with [facility] Department of Ophthalmology within 1 week of after discharge at:    [address]  [place] [address]  [phone]

## 2023-02-22 LAB
ALBUMIN SERPL ELPH-MCNC: 3.7 G/DL — SIGNIFICANT CHANGE UP (ref 3.3–5)
ALP SERPL-CCNC: 92 U/L — SIGNIFICANT CHANGE UP (ref 40–120)
ALT FLD-CCNC: 35 U/L — SIGNIFICANT CHANGE UP (ref 10–45)
ANION GAP SERPL CALC-SCNC: 16 MMOL/L — SIGNIFICANT CHANGE UP (ref 5–17)
APTT BLD: 29.5 SEC — SIGNIFICANT CHANGE UP (ref 27.5–35.5)
AST SERPL-CCNC: 26 U/L — SIGNIFICANT CHANGE UP (ref 10–40)
BASE EXCESS BLDV CALC-SCNC: 0.5 MMOL/L — SIGNIFICANT CHANGE UP (ref -2–3)
BASOPHILS # BLD AUTO: 0.03 K/UL — SIGNIFICANT CHANGE UP (ref 0–0.2)
BASOPHILS NFR BLD AUTO: 0.3 % — SIGNIFICANT CHANGE UP (ref 0–2)
BILIRUB SERPL-MCNC: 0.7 MG/DL — SIGNIFICANT CHANGE UP (ref 0.2–1.2)
BUN SERPL-MCNC: 17 MG/DL — SIGNIFICANT CHANGE UP (ref 7–23)
CA-I SERPL-SCNC: 1.19 MMOL/L — SIGNIFICANT CHANGE UP (ref 1.15–1.33)
CALCIUM SERPL-MCNC: 8.8 MG/DL — SIGNIFICANT CHANGE UP (ref 8.4–10.5)
CHLORIDE BLDV-SCNC: 108 MMOL/L — SIGNIFICANT CHANGE UP (ref 96–108)
CHLORIDE SERPL-SCNC: 107 MMOL/L — SIGNIFICANT CHANGE UP (ref 96–108)
CO2 BLDV-SCNC: 27 MMOL/L — HIGH (ref 22–26)
CO2 SERPL-SCNC: 19 MMOL/L — LOW (ref 22–31)
CREAT SERPL-MCNC: <0.3 MG/DL — LOW (ref 0.5–1.3)
EGFR: 115 ML/MIN/1.73M2 — SIGNIFICANT CHANGE UP
EOSINOPHIL # BLD AUTO: 0.42 K/UL — SIGNIFICANT CHANGE UP (ref 0–0.5)
EOSINOPHIL NFR BLD AUTO: 4.6 % — SIGNIFICANT CHANGE UP (ref 0–6)
GAS PNL BLDV: 141 MMOL/L — SIGNIFICANT CHANGE UP (ref 136–145)
GAS PNL BLDV: SIGNIFICANT CHANGE UP
GAS PNL BLDV: SIGNIFICANT CHANGE UP
GLUCOSE BLDC GLUCOMTR-MCNC: 116 MG/DL — HIGH (ref 70–99)
GLUCOSE BLDC GLUCOMTR-MCNC: 124 MG/DL — HIGH (ref 70–99)
GLUCOSE BLDC GLUCOMTR-MCNC: 127 MG/DL — HIGH (ref 70–99)
GLUCOSE BLDV-MCNC: 140 MG/DL — HIGH (ref 70–99)
GLUCOSE SERPL-MCNC: 146 MG/DL — HIGH (ref 70–99)
HCO3 BLDV-SCNC: 25 MMOL/L — SIGNIFICANT CHANGE UP (ref 22–29)
HCT VFR BLD CALC: 37.1 % — SIGNIFICANT CHANGE UP (ref 34.5–45)
HCT VFR BLDA CALC: 35 % — SIGNIFICANT CHANGE UP (ref 34.5–46.5)
HGB BLD CALC-MCNC: 11.7 G/DL — SIGNIFICANT CHANGE UP (ref 11.7–16.1)
HGB BLD-MCNC: 11.4 G/DL — LOW (ref 11.5–15.5)
HOROWITZ INDEX BLDV+IHG-RTO: 21 — SIGNIFICANT CHANGE UP
IMM GRANULOCYTES NFR BLD AUTO: 1.2 % — HIGH (ref 0–0.9)
INR BLD: 1.1 RATIO — SIGNIFICANT CHANGE UP (ref 0.88–1.16)
LACTATE BLDV-MCNC: 1.9 MMOL/L — SIGNIFICANT CHANGE UP (ref 0.5–2)
LYMPHOCYTES # BLD AUTO: 1.64 K/UL — SIGNIFICANT CHANGE UP (ref 1–3.3)
LYMPHOCYTES # BLD AUTO: 18 % — SIGNIFICANT CHANGE UP (ref 13–44)
MAGNESIUM SERPL-MCNC: 2 MG/DL — SIGNIFICANT CHANGE UP (ref 1.6–2.6)
MCHC RBC-ENTMCNC: 27.3 PG — SIGNIFICANT CHANGE UP (ref 27–34)
MCHC RBC-ENTMCNC: 30.7 GM/DL — LOW (ref 32–36)
MCV RBC AUTO: 89 FL — SIGNIFICANT CHANGE UP (ref 80–100)
MONOCYTES # BLD AUTO: 0.78 K/UL — SIGNIFICANT CHANGE UP (ref 0–0.9)
MONOCYTES NFR BLD AUTO: 8.6 % — SIGNIFICANT CHANGE UP (ref 2–14)
NEUTROPHILS # BLD AUTO: 6.14 K/UL — SIGNIFICANT CHANGE UP (ref 1.8–7.4)
NEUTROPHILS NFR BLD AUTO: 67.3 % — SIGNIFICANT CHANGE UP (ref 43–77)
NRBC # BLD: 0 /100 WBCS — SIGNIFICANT CHANGE UP (ref 0–0)
PCO2 BLDV: 41 MMHG — SIGNIFICANT CHANGE UP (ref 39–42)
PH BLDV: 7.4 — SIGNIFICANT CHANGE UP (ref 7.32–7.43)
PHOSPHATE SERPL-MCNC: 1.8 MG/DL — LOW (ref 2.5–4.5)
PLATELET # BLD AUTO: 271 K/UL — SIGNIFICANT CHANGE UP (ref 150–400)
PO2 BLDV: 68 MMHG — HIGH (ref 25–45)
POTASSIUM BLDV-SCNC: 3.9 MMOL/L — SIGNIFICANT CHANGE UP (ref 3.5–5.1)
POTASSIUM SERPL-MCNC: 4 MMOL/L — SIGNIFICANT CHANGE UP (ref 3.5–5.3)
POTASSIUM SERPL-SCNC: 4 MMOL/L — SIGNIFICANT CHANGE UP (ref 3.5–5.3)
PROT SERPL-MCNC: 6.8 G/DL — SIGNIFICANT CHANGE UP (ref 6–8.3)
PROTHROM AB SERPL-ACNC: 12.8 SEC — SIGNIFICANT CHANGE UP (ref 10.5–13.4)
RBC # BLD: 4.17 M/UL — SIGNIFICANT CHANGE UP (ref 3.8–5.2)
RBC # FLD: 18.1 % — HIGH (ref 10.3–14.5)
SAO2 % BLDV: 96.9 % — HIGH (ref 67–88)
SODIUM SERPL-SCNC: 142 MMOL/L — SIGNIFICANT CHANGE UP (ref 135–145)
WBC # BLD: 9.12 K/UL — SIGNIFICANT CHANGE UP (ref 3.8–10.5)
WBC # FLD AUTO: 9.12 K/UL — SIGNIFICANT CHANGE UP (ref 3.8–10.5)

## 2023-02-22 PROCEDURE — 99233 SBSQ HOSP IP/OBS HIGH 50: CPT | Mod: GC

## 2023-02-22 PROCEDURE — 99221 1ST HOSP IP/OBS SF/LOW 40: CPT

## 2023-02-22 PROCEDURE — 99232 SBSQ HOSP IP/OBS MODERATE 35: CPT

## 2023-02-22 RX ORDER — POTASSIUM PHOSPHATE, MONOBASIC POTASSIUM PHOSPHATE, DIBASIC 236; 224 MG/ML; MG/ML
15 INJECTION, SOLUTION INTRAVENOUS ONCE
Refills: 0 | Status: COMPLETED | OUTPATIENT
Start: 2023-02-22 | End: 2023-02-22

## 2023-02-22 RX ADMIN — Medication 3 MILLILITER(S): at 05:40

## 2023-02-22 RX ADMIN — Medication 1 APPLICATION(S): at 04:48

## 2023-02-22 RX ADMIN — MORPHINE SULFATE 2 MILLIGRAM(S): 50 CAPSULE, EXTENDED RELEASE ORAL at 01:03

## 2023-02-22 RX ADMIN — Medication 1 DROP(S): at 12:08

## 2023-02-22 RX ADMIN — POTASSIUM PHOSPHATE, MONOBASIC POTASSIUM PHOSPHATE, DIBASIC 62.5 MILLIMOLE(S): 236; 224 INJECTION, SOLUTION INTRAVENOUS at 05:26

## 2023-02-22 RX ADMIN — Medication 1 APPLICATION(S): at 21:32

## 2023-02-22 RX ADMIN — Medication 1 TABLET(S): at 12:07

## 2023-02-22 RX ADMIN — CHLORHEXIDINE GLUCONATE 15 MILLILITER(S): 213 SOLUTION TOPICAL at 18:16

## 2023-02-22 RX ADMIN — MUPIROCIN 1 APPLICATION(S): 20 OINTMENT TOPICAL at 18:16

## 2023-02-22 RX ADMIN — Medication 1 DROP(S): at 14:16

## 2023-02-22 RX ADMIN — Medication 1 APPLICATION(S): at 10:05

## 2023-02-22 RX ADMIN — Medication 1 APPLICATION(S): at 22:30

## 2023-02-22 RX ADMIN — Medication 1 APPLICATION(S): at 20:45

## 2023-02-22 RX ADMIN — Medication 1 DROP(S): at 00:13

## 2023-02-22 RX ADMIN — MORPHINE SULFATE 2 MILLIGRAM(S): 50 CAPSULE, EXTENDED RELEASE ORAL at 15:52

## 2023-02-22 RX ADMIN — MORPHINE SULFATE 2 MILLIGRAM(S): 50 CAPSULE, EXTENDED RELEASE ORAL at 06:00

## 2023-02-22 RX ADMIN — Medication 1 DROP(S): at 20:45

## 2023-02-22 RX ADMIN — MORPHINE SULFATE 2 MILLIGRAM(S): 50 CAPSULE, EXTENDED RELEASE ORAL at 07:21

## 2023-02-22 RX ADMIN — Medication 3 MILLILITER(S): at 11:03

## 2023-02-22 RX ADMIN — PIPERACILLIN AND TAZOBACTAM 25 GRAM(S): 4; .5 INJECTION, POWDER, LYOPHILIZED, FOR SOLUTION INTRAVENOUS at 21:29

## 2023-02-22 RX ADMIN — Medication 3 MILLILITER(S): at 23:06

## 2023-02-22 RX ADMIN — Medication 1 DROP(S): at 10:05

## 2023-02-22 RX ADMIN — Medication 1 APPLICATION(S): at 16:02

## 2023-02-22 RX ADMIN — Medication 1 APPLICATION(S): at 12:08

## 2023-02-22 RX ADMIN — Medication 1 APPLICATION(S): at 08:03

## 2023-02-22 RX ADMIN — Medication 1 DROP(S): at 05:26

## 2023-02-22 RX ADMIN — PIPERACILLIN AND TAZOBACTAM 25 GRAM(S): 4; .5 INJECTION, POWDER, LYOPHILIZED, FOR SOLUTION INTRAVENOUS at 05:26

## 2023-02-22 RX ADMIN — Medication 1 APPLICATION(S): at 18:16

## 2023-02-22 RX ADMIN — Medication 1 DROP(S): at 02:57

## 2023-02-22 RX ADMIN — MORPHINE SULFATE 2 MILLIGRAM(S): 50 CAPSULE, EXTENDED RELEASE ORAL at 00:48

## 2023-02-22 RX ADMIN — Medication 1 APPLICATION(S): at 05:26

## 2023-02-22 RX ADMIN — MORPHINE SULFATE 2 MILLIGRAM(S): 50 CAPSULE, EXTENDED RELEASE ORAL at 17:40

## 2023-02-22 RX ADMIN — Medication 1 DROP(S): at 08:03

## 2023-02-22 RX ADMIN — Medication 3 MILLILITER(S): at 17:01

## 2023-02-22 RX ADMIN — MUPIROCIN 1 APPLICATION(S): 20 OINTMENT TOPICAL at 05:27

## 2023-02-22 RX ADMIN — RIVAROXABAN 10 MILLIGRAM(S): KIT at 12:07

## 2023-02-22 RX ADMIN — SERTRALINE 50 MILLIGRAM(S): 25 TABLET, FILM COATED ORAL at 12:07

## 2023-02-22 RX ADMIN — Medication 1 DROP(S): at 04:48

## 2023-02-22 RX ADMIN — PIPERACILLIN AND TAZOBACTAM 25 GRAM(S): 4; .5 INJECTION, POWDER, LYOPHILIZED, FOR SOLUTION INTRAVENOUS at 14:15

## 2023-02-22 RX ADMIN — MORPHINE SULFATE 2 MILLIGRAM(S): 50 CAPSULE, EXTENDED RELEASE ORAL at 17:10

## 2023-02-22 RX ADMIN — Medication 1 APPLICATION(S): at 02:57

## 2023-02-22 RX ADMIN — Medication 1 DROP(S): at 00:59

## 2023-02-22 RX ADMIN — CHLORHEXIDINE GLUCONATE 1 APPLICATION(S): 213 SOLUTION TOPICAL at 05:55

## 2023-02-22 RX ADMIN — Medication 1 DROP(S): at 18:16

## 2023-02-22 RX ADMIN — MORPHINE SULFATE 2 MILLIGRAM(S): 50 CAPSULE, EXTENDED RELEASE ORAL at 14:26

## 2023-02-22 RX ADMIN — Medication 1 DROP(S): at 22:30

## 2023-02-22 RX ADMIN — Medication 1 DROP(S): at 05:54

## 2023-02-22 RX ADMIN — CHLORHEXIDINE GLUCONATE 15 MILLILITER(S): 213 SOLUTION TOPICAL at 05:26

## 2023-02-22 RX ADMIN — Medication 1 APPLICATION(S): at 14:16

## 2023-02-22 RX ADMIN — Medication 1 DROP(S): at 16:02

## 2023-02-22 NOTE — CONSULT NOTE ADULT - PROBLEM SELECTOR RECOMMENDATION 9
- HOB elevation  - Suction PRN  - Continue trach care  - Care per primary team  - Call with questions or concerns
s/p trach changed by ENT to #8 shiley distal XLT cuffed  Pt with audible leak around tracheostomy but saturating well on current vent settings  Pt seen and examined with Dr. Dunbar, No acute thoracic surgery intervention   Continue Trach care   Care as per MICU team

## 2023-02-22 NOTE — PROGRESS NOTE ADULT - ASSESSMENT
65F with advanced ALS (baseline nonverbal, communicates minimally through eye movements, PEG, vent dependent, trach upsized 8/2022 with CT surgery), chronic hypoxic/hypercapnic respiratory failure, HLD; BIBEMS for hypoxia to 66% after vent malfunction at home; found to have sepsis, likely  source; admitted to MICU for further management.     PLAN:    NEURO  # workup for anoxic brain injury  - CTH - no acute finding  - c/w home diazepam  - c/w home sertraline    # advanced ALS  - baseline nonverbal, awake, communication with eye movements has been declining in recent months    CARDIOVASCULAR  - sinus tachycardia iso sepsis, now improved    RESPIRATORY  # tracheostomy  - trach changed in ED, additional change by ENT  - Briana    GI/NUTRITION  # PEG  - feeds, site care, bowel reg    # PPX  - pantop 40 IV qD    /RENAL  - No active issues.     SKIN  - c/w Lacrilube, f/u home eyedrops/ointment  - f/u home nystatin    ID  # sepsis  - CXR with retrocardiac opacity, could not rule out underlying infection. UTI also possible source  - F/u BCx, UCx  - MRSA/MSSA PCR pending  - Allergy: rash with Bactrim   - Zosyn 3/5 days  - consider sputum Cx if able    ENDOCRINE  - Glucose levels controlled  - A1c 4.9    HEMATOLOGIC  # leukocytosis   - iso sepsis, improving  # thrombocytosis  - possibly reactive    DVT PPX  - Apixaban 10mg QD    OPHTHO  Corneal ulcer  - Concerned for corneal ulcer, ophtho consulted  - Vanc, tobramycin, and erythromycin eyedrops q2hrs.     ETHICS  - spouse considering DNR/DNI documentation in AM. Full code for now.    Dispo  - Listed for RCU   65F with advanced ALS (baseline nonverbal, communicates minimally through eye movements, PEG, vent dependent, trach upsized 8/2022 with CT surgery), chronic hypoxic/hypercapnic respiratory failure, HLD; BIBEMS for hypoxia to 66% after vent malfunction at home; found to have sepsis, likely  source; admitted to MICU for further management.     PLAN:    NEURO  # workup for anoxic brain injury  - CTH - no acute finding  - c/w home diazepam  - c/w home sertraline    # advanced ALS  - baseline nonverbal, awake, communication with eye movements has been declining in recent months    CARDIOVASCULAR  - sinus tachycardia iso sepsis, now improved    RESPIRATORY  # tracheostomy  - trach changed in ED, additional change by ENT  - Briana    GI/NUTRITION  # PEG  - feeds, site care, bowel reg    # PPX  - pantop 40 IV qD    /RENAL  - No active issues.     SKIN  - c/w Lacrilube, f/u home eyedrops/ointment  - f/u home nystatin    ID  # sepsis  - CXR with retrocardiac opacity, could not rule out underlying infection. UTI also possible source  - F/u BCx, UCx  - MRSA/MSSA PCR pending  - Allergy: rash with Bactrim   - Zosyn 3/5 days  - consider sputum Cx if able    ENDOCRINE  - Glucose levels controlled  - A1c 4.9    HEMATOLOGIC  # leukocytosis   - iso sepsis, improving  # thrombocytosis  - possibly reactive    DVT PPX  - [id number] QD    OPHTHO  Corneal ulcer  - Concerned for corneal ulcer, ophtho consulted  - Vanc, tobramycin, and erythromycin eyedrops q2hrs.     ETHICS  - spouse considering DNR/DNI documentation in AM. Full code for now.    Dispo  - Listed for RCU   65F with advanced ALS (baseline nonverbal, communicates minimally through eye movements, PEG, vent dependent, trach upsized 8/2022 with CT surgery), chronic hypoxic/hypercapnic respiratory failure, HLD; BIBEMS for hypoxia to 66% after vent malfunction at home; found to have sepsis, likely  source; admitted to MICU for further management.     PLAN:    NEURO  # workup for anoxic brain injury  - CTH - no acute finding  - c/w home diazepam  - c/w home sertraline    # advanced ALS  - baseline nonverbal, awake, communication with eye movements has been declining in recent months    CARDIOVASCULAR  - sinus tachycardia iso sepsis, now improved    RESPIRATORY  # tracheostomy  - trach changed in ED, additional change by ENT  - Briana    GI/NUTRITION  # PEG  - feeds, site care, bowel reg    # PPX  - pantop 40 IV qD    /RENAL  - No active issues.     SKIN  - c/w Lacrilube, f/u home eyedrops/ointment  - f/u home nystatin    ID  # sepsis  - CXR with retrocardiac opacity, could not rule out underlying infection. UTI also possible source  - F/u BCx, UCx  - MRSA PCR positive - mupirocin  - Allergy: rash with Bactrim   - Zosyn 4/5 days  - consider sputum Cx if able    ENDOCRINE  - Glucose levels controlled  - A1c 4.9    HEMATOLOGIC  # leukocytosis   - iso sepsis, improving  # thrombocytosis  - possibly reactive    DVT PPX  - [id number] QD    OPHTHO  Corneal ulcer  - Concerned for corneal ulcer, ophtho consulted  - Vanc, tobramycin, and erythromycin eyedrops q2hrs.     ETHICS  - spouse considering DNR/DNI documentation in AM. Full code for now.    Dispo  - Listed for RCU   65F with advanced ALS (baseline nonverbal, communicates minimally through eye movements, PEG, vent dependent, trach upsized 8/2022 with CT surgery), chronic hypoxic/hypercapnic respiratory failure, HLD; BIBEMS for hypoxia to 66% after vent malfunction at home; found to have sepsis, likely  source; admitted to MICU for further management.     PLAN:    NEURO  # workup for anoxic brain injury  - CTH - no acute finding  - c/w home diazepam  - c/w home sertraline    # advanced ALS  - baseline nonverbal, awake, communication with eye movements has been declining in recent months    CARDIOVASCULAR  - sinus tachycardia iso sepsis, now improved    RESPIRATORY  # tracheostomy  - trach changed in ED, additional change by ENT  - Briaan    GI/NUTRITION  # PEG  - feeds, site care, bowel reg    # PPX  - pantop 40 IV qD    /RENAL  - No active issues.     SKIN  - c/w Lacrilube, f/u home eyedrops/ointment  - f/u home nystatin    ID  # sepsis  - CXR with retrocardiac opacity, could not rule out underlying infection. UTI also possible source  - F/u BCx, UCx  - MRSA PCR positive - mupirocin  - Allergy: rash with Bactrim   - Zosyn 4/5 days  - consider sputum Cx if able    ENDOCRINE  - Glucose levels controlled  - A1c 4.9    HEMATOLOGIC  # leukocytosis   - iso sepsis, improving  # thrombocytosis  - possibly reactive    DVT PPX  - [id number] QD    OPHTHO  Corneal ulcer  - Concerned for corneal ulcer, ophtho consulted  - Vanc, tobramycin, and erythromycin eyedrops q2hrs.     ETHICS  - spouse considering DNR/DNI. Full code for now.    Dispo  - Listed for RCU   65F with advanced ALS (baseline nonverbal, communicates minimally through eye movements, PEG, vent dependent, trach upsized 8/2022 with CT surgery), chronic hypoxic/hypercapnic respiratory failure, HLD; BIBEMS for hypoxia to 66% after vent malfunction at home; found to have sepsis, likely  source; admitted to MICU for further management.     PLAN:    NEURO  # workup for anoxic brain injury  - CTH - no acute finding  - c/w home diazepam  - c/w home sertraline    # advanced ALS  - baseline nonverbal, awake, communication with eye movements has been declining in recent months    CARDIOVASCULAR  - sinus tachycardia iso sepsis, now improved    RESPIRATORY  # tracheostomy  - trach changed in ED, additional change by ENT  - Briana    GI/NUTRITION  # PEG  - feeds, site care, bowel reg    # PPX  - pantop 40 IV qD    /RENAL  - No active issues.     SKIN  - c/w Lacrilube, f/u home eyedrops/ointment  - f/u home nystatin    ID  # sepsis  - CXR with retrocardiac opacity, could not rule out underlying infection. UTI also possible source  - F/u BCx, UCx  - MRSA PCR positive - mupirocin  - Allergy: rash with Bactrim   - Zosyn 4/5 days  - consider sputum Cx if able    ENDOCRINE  - Glucose levels controlled  - A1c 4.9    HEMATOLOGIC  # leukocytosis   - iso sepsis, improving  # thrombocytosis  - possibly reactive    DVT PPX  - [id number] QD    OPHTHO  Corneal ulcer  - Concerned for corneal ulcer, ophtho consulted  - eye culture with MRSA and stpah epi  - c/w Vanc, ofloxacin, and erythromycin eyedrops q2hrs.   - d/c tobramycin  - f/u optho    ETHICS  - spouse considering DNR/DNI. Full code for now.    Dispo  - Listed for RCU   65F with advanced ALS (baseline nonverbal, communicates minimally through eye movements, PEG, vent dependent, trach upsized 8/2022 with CT surgery), chronic hypoxic/hypercapnic respiratory failure, HLD; BIBEMS for hypoxia to 66% after vent malfunction at home; found to have sepsis, likely  source; admitted to MICU for further management.     PLAN:    NEURO  # workup for anoxic brain injury  - CTH - no acute finding  - c/w home diazepam  - c/w home sertraline    # advanced ALS  - baseline nonverbal, awake, communication with eye movements has been declining in recent months    CARDIOVASCULAR  - sinus tachycardia iso sepsis, now improved    RESPIRATORY  # tracheostomy  - trach changed in ED, additional change by ENT  - Briana    GI/NUTRITION  # PEG  - feeds, site care, bowel reg    /RENAL  - No active issues.     SKIN  - c/w Lacrilube, f/u home eyedrops/ointment  - f/u home nystatin    ID  # sepsis  - CXR with retrocardiac opacity, could not rule out underlying infection. UTI also possible source  - F/u BCx, UCx  - MRSA PCR positive - mupirocin  - Allergy: rash with Bactrim   - Zosyn 4/5 days  - consider sputum Cx if able    ENDOCRINE  - Glucose levels controlled  - A1c 4.9    HEMATOLOGIC  # leukocytosis   - iso sepsis, improving  # thrombocytosis  - possibly reactive    DVT PPX  - [id number] QD    OPHTHO  Corneal ulcer  - Concerned for corneal ulcer, ophtho consulted  - eye culture with MRSA and stpah epi  - c/w Vanc, ofloxacin, and erythromycin eyedrops q2hrs.   - d/c tobramycin  - f/u optho    ETHICS  - spouse considering DNR/DNI. Full code for now.    Dispo  - Listed for RCU

## 2023-02-22 NOTE — DIETITIAN INITIAL EVALUATION ADULT - PERTINENT LABORATORY DATA
02-22    142  |  107  |  17  ----------------------------<  146<H>  4.0   |  19<L>  |  <0.30<L>    Ca    8.8      22 Feb 2023 00:21  Phos  1.8     02-22  Mg     2.0     02-22    TPro  6.8  /  Alb  3.7  /  TBili  0.7  /  DBili  x   /  AST  26  /  ALT  35  /  AlkPhos  92  02-22  POCT Blood Glucose.: 124 mg/dL (02-22-23 @ 05:28)  A1C with Estimated Average Glucose Result: 4.9 % (02-19-23 @ 01:33)  A1C with Estimated Average Glucose Result: 5.1 % (07-31-22 @ 16:33)

## 2023-02-22 NOTE — DIETITIAN INITIAL EVALUATION ADULT - ORAL INTAKE PTA/DIET HISTORY
Pt on enteral nutrition only via PEG; Jevity 1.2 starting at 70 mL/hr at 20:00 running until total volume 1540 mL achieved.  Pt on enteral nutrition only via PEG; Jevity 1.2 starting at 70 mL/hr at 20:00 for total volume 1540 mL (3-4 cans daily, alternating days). Regimen provides total volume: 1540 mL, 1848 kcals, 85 gm protein, and 1243 mL free water. Free water boluses: 120 mL (06:45, 12:00, 15:00, 18:00, 00:00), 40 mL/hr during feeds.  Pt on enteral nutrition only via PEG; Jevity 1.2 starting at 70 mL/hr x12 hours (starting at 20:00, 3-4 cans daily, alternating days). Regimen provides total volume: 840 mL, 1008 kcals, 47 gm protein, and 678 mL free water. Free water boluses: 120 mL (06:45, 12:00, 15:00, 18:00, 00:00), 40 mL/hr during feeds.

## 2023-02-22 NOTE — DIETITIAN INITIAL EVALUATION ADULT - ADD RECOMMEND
Continue to trend labs, weight, skin integrity, and intake. As medically feasible, provide multivitamin.

## 2023-02-22 NOTE — DIETITIAN INITIAL EVALUATION ADULT - PERTINENT MEDS FT
MEDICATIONS  (STANDING):  albuterol/ipratropium for Nebulization 3 milliLiter(s) Nebulizer every 6 hours  chlorhexidine 0.12% Liquid 15 milliLiter(s) Oral Mucosa every 12 hours  chlorhexidine 4% Liquid 1 Application(s) Topical <User Schedule>  erythromycin   Ointment 1 Application(s) Both EYES at bedtime  insulin lispro (ADMELOG) corrective regimen sliding scale   SubCutaneous every 6 hours  multivitamin 1 Tablet(s) Oral daily  mupirocin 2% Ointment 1 Application(s) Topical two times a day  ofloxacin 0.3% Solution 1 Drop(s) Right EYE every 2 hours  pantoprazole  Injectable 40 milliGRAM(s) IV Push daily  petrolatum Ophthalmic Ointment 1 Application(s) Both EYES every 2 hours  piperacillin/tazobactam IVPB.. 3.375 Gram(s) IV Intermittent every 8 hours  rivaroxaban 10 milliGRAM(s) Enteral Tube daily  senna Syrup 10 milliLiter(s) Oral at bedtime  sertraline 50 milliGRAM(s) Oral daily  vancomycin 25 mG/mL Fortified Ophthalmic 1 Drop(s) Left EYE four times a day    MEDICATIONS  (PRN):  morphine  - Injectable 2 milliGRAM(s) IV Push every 4 hours PRN Mild Pain (1 - 3)  morphine  - Injectable 2 milliGRAM(s) IV Push every 8 hours PRN Moderate Pain (4 - 6)

## 2023-02-22 NOTE — CONSULT NOTE ADULT - ASSESSMENT
65F with advanced ALS (baseline nonverbal, communicates minimally through eye movements, PEG, vent dependent, s/p trach 7/2015 with Dr. Owens last upsized on 8/2022, chronic hypoxic/hypercapnic respiratory failure, HLD; BIBEMS for hypoxia to 66% after vent malfunction at home, admitted to MICU for further management. ENT performed trach change - trach was changed to a new #8 shiley distal XLT cuffed, tracheoscopy showed it was in good position. She is on baseline O2 requirements (FiO2 21). She is being treated empirically with Zosyn to cover for possible aspiration PNA given unclear retrocardiac opacity on CXR with leukocytosis, plan for likely 5 day course. Pt to be transferred to RCU. Thoracic surgery called to evaluated leak around tracheostomy.

## 2023-02-22 NOTE — CONSULT NOTE ADULT - NS ATTEND AMEND GEN_ALL_CORE FT
Continue present care.  If unable to maintain adequate ventilation would replace the current tracheostomy with Bivona trach

## 2023-02-22 NOTE — PROGRESS NOTE ADULT - SUBJECTIVE AND OBJECTIVE BOX
INTERVAL HPI/OVERNIGHT EVENTS:    SUBJECTIVE: Patient seen and examined at bedside.     CONSTITUTIONAL: No weakness, fevers or chills  EYES/ENT: No visual changes;  No vertigo or throat pain   NECK: No pain or stiffness  RESPIRATORY: No cough, wheezing, hemoptysis; No shortness of breath  CARDIOVASCULAR: No chest pain or palpitations  GASTROINTESTINAL: No abdominal or epigastric pain. No nausea, vomiting, or hematemesis; No diarrhea or constipation. No melena or hematochezia.  GENITOURINARY: No dysuria, frequency or hematuria  NEUROLOGICAL: No numbness or weakness  SKIN: No itching, rashes    OBJECTIVE:    VITAL SIGNS:  ICU Vital Signs Last 24 Hrs  T(C): 36.5 (22 Feb 2023 04:00), Max: 37 (21 Feb 2023 08:00)  T(F): 97.7 (22 Feb 2023 04:00), Max: 98.6 (21 Feb 2023 08:00)  HR: 79 (22 Feb 2023 06:00) (77 - 101)  BP: 139/73 (22 Feb 2023 06:00) (93/50 - 158/76)  BP(mean): 100 (22 Feb 2023 06:00) (68 - 109)  ABP: --  ABP(mean): --  RR: 20 (22 Feb 2023 06:00) (20 - 24)  SpO2: 100% (22 Feb 2023 06:00) (97% - 100%)    O2 Parameters below as of 22 Feb 2023 05:40  Patient On (Oxygen Delivery Method): ventilator          Mode: AC/ CMV (Assist Control/ Continuous Mandatory Ventilation), RR (machine): 20, TV (machine): 380, FiO2: 21, PEEP: 5, ITime: 1, MAP: 9, PIP: 23    02-20 @ 07:01  -  02-21 @ 07:00  --------------------------------------------------------  IN: 1840 mL / OUT: 300 mL / NET: 1540 mL    02-21 @ 07:01  -  02-22 @ 06:57  --------------------------------------------------------  IN: 1792.5 mL / OUT: 350 mL / NET: 1442.5 mL      CAPILLARY BLOOD GLUCOSE      POCT Blood Glucose.: 124 mg/dL (22 Feb 2023 05:28)      PHYSICAL EXAM:    General: NAD  HEENT: NC/AT; PERRL, clear conjunctiva  Neck: supple  Respiratory: CTA b/l  Cardiovascular: +S1/S2; RRR  Abdomen: soft, NT/ND; +BS x4  Extremities: WWP, 2+ peripheral pulses b/l; no LE edema  Skin: normal color and turgor; no rash  Neurological:    MEDICATIONS:  MEDICATIONS  (STANDING):  albuterol/ipratropium for Nebulization 3 milliLiter(s) Nebulizer every 6 hours  chlorhexidine 0.12% Liquid 15 milliLiter(s) Oral Mucosa every 12 hours  chlorhexidine 4% Liquid 1 Application(s) Topical <User Schedule>  erythromycin   Ointment 1 Application(s) Both EYES at bedtime  insulin lispro (ADMELOG) corrective regimen sliding scale   SubCutaneous every 6 hours  multivitamin 1 Tablet(s) Oral daily  mupirocin 2% Ointment 1 Application(s) Topical two times a day  ofloxacin 0.3% Solution 1 Drop(s) Right EYE every 2 hours  pantoprazole  Injectable 40 milliGRAM(s) IV Push daily  petrolatum Ophthalmic Ointment 1 Application(s) Both EYES every 2 hours  piperacillin/tazobactam IVPB.. 3.375 Gram(s) IV Intermittent every 8 hours  rivaroxaban 10 milliGRAM(s) Enteral Tube daily  senna Syrup 10 milliLiter(s) Oral at bedtime  sertraline 50 milliGRAM(s) Oral daily  vancomycin 25 mG/mL Fortified Ophthalmic 1 Drop(s) Left EYE four times a day    MEDICATIONS  (PRN):  morphine  - Injectable 2 milliGRAM(s) IV Push every 4 hours PRN Mild Pain (1 - 3)  morphine  - Injectable 2 milliGRAM(s) IV Push every 8 hours PRN Moderate Pain (4 - 6)      ALLERGIES:  Allergies    Bactrim DS (Rash)    Intolerances        LABS:                        11.4   9.12  )-----------( 271      ( 22 Feb 2023 00:21 )             37.1     02-22    142  |  107  |  17  ----------------------------<  146<H>  4.0   |  19<L>  |  <0.30<L>    Ca    8.8      22 Feb 2023 00:21  Phos  1.8     02-22  Mg     2.0     02-22    TPro  6.8  /  Alb  3.7  /  TBili  0.7  /  DBili  x   /  AST  26  /  ALT  35  /  AlkPhos  92  02-22    PT/INR - ( 22 Feb 2023 00:21 )   PT: 12.8 sec;   INR: 1.10 ratio         PTT - ( 22 Feb 2023 00:21 )  PTT:29.5 sec      RADIOLOGY & ADDITIONAL TESTS: Reviewed. INTERVAL HPI/OVERNIGHT EVENTS:    SUBJECTIVE: Patient seen and examined at bedside. Patient seen with eyes closed this. Unable to provide ROS>     OBJECTIVE:    VITAL SIGNS:  ICU Vital Signs Last 24 Hrs  T(C): 36.5 (22 Feb 2023 04:00), Max: 37 (21 Feb 2023 08:00)  T(F): 97.7 (22 Feb 2023 04:00), Max: 98.6 (21 Feb 2023 08:00)  HR: 79 (22 Feb 2023 06:00) (77 - 101)  BP: 139/73 (22 Feb 2023 06:00) (93/50 - 158/76)  BP(mean): 100 (22 Feb 2023 06:00) (68 - 109)  ABP: --  ABP(mean): --  RR: 20 (22 Feb 2023 06:00) (20 - 24)  SpO2: 100% (22 Feb 2023 06:00) (97% - 100%)    O2 Parameters below as of 22 Feb 2023 05:40  Patient On (Oxygen Delivery Method): ventilator          Mode: AC/ CMV (Assist Control/ Continuous Mandatory Ventilation), RR (machine): 20, TV (machine): 380, FiO2: 21, PEEP: 5, ITime: 1, MAP: 9, PIP: 23    02-20 @ 07:01 - 02-21 @ 07:00  --------------------------------------------------------  IN: 1840 mL / OUT: 300 mL / NET: 1540 mL    02-21 @ 07:01 - 02-22 @ 06:57  --------------------------------------------------------  IN: 1792.5 mL / OUT: 350 mL / NET: 1442.5 mL      CAPILLARY BLOOD GLUCOSE      POCT Blood Glucose.: 124 mg/dL (22 Feb 2023 05:28)      PHYSICAL EXAM:    General: NAD  HEENT: NC/AT; PERRL, clear conjunctiva  Neck: supple  Respiratory: CTA b/l  Cardiovascular: +S1/S2; RRR  Abdomen: soft, NT/ND; +BS x4  Extremities: WWP, 2+ peripheral pulses b/l; no LE edema  Skin: normal color and turgor; no rash  Neurological:    MEDICATIONS:  MEDICATIONS  (STANDING):  albuterol/ipratropium for Nebulization 3 milliLiter(s) Nebulizer every 6 hours  chlorhexidine 0.12% Liquid 15 milliLiter(s) Oral Mucosa every 12 hours  chlorhexidine 4% Liquid 1 Application(s) Topical <User Schedule>  erythromycin   Ointment 1 Application(s) Both EYES at bedtime  insulin lispro (ADMELOG) corrective regimen sliding scale   SubCutaneous every 6 hours  multivitamin 1 Tablet(s) Oral daily  mupirocin 2% Ointment 1 Application(s) Topical two times a day  ofloxacin 0.3% Solution 1 Drop(s) Right EYE every 2 hours  pantoprazole  Injectable 40 milliGRAM(s) IV Push daily  petrolatum Ophthalmic Ointment 1 Application(s) Both EYES every 2 hours  piperacillin/tazobactam IVPB.. 3.375 Gram(s) IV Intermittent every 8 hours  rivaroxaban 10 milliGRAM(s) Enteral Tube daily  senna Syrup 10 milliLiter(s) Oral at bedtime  sertraline 50 milliGRAM(s) Oral daily  vancomycin 25 mG/mL Fortified Ophthalmic 1 Drop(s) Left EYE four times a day    MEDICATIONS  (PRN):  morphine  - Injectable 2 milliGRAM(s) IV Push every 4 hours PRN Mild Pain (1 - 3)  morphine  - Injectable 2 milliGRAM(s) IV Push every 8 hours PRN Moderate Pain (4 - 6)      ALLERGIES:  Allergies    Bactrim DS (Rash)    Intolerances        LABS:                        11.4   9.12  )-----------( 271      ( 22 Feb 2023 00:21 )             37.1     02-22    142  |  107  |  17  ----------------------------<  146<H>  4.0   |  19<L>  |  <0.30<L>    Ca    8.8      22 Feb 2023 00:21  Phos  1.8     02-22  Mg     2.0     02-22    TPro  6.8  /  Alb  3.7  /  TBili  0.7  /  DBili  x   /  AST  26  /  ALT  35  /  AlkPhos  92  02-22    PT/INR - ( 22 Feb 2023 00:21 )   PT: 12.8 sec;   INR: 1.10 ratio         PTT - ( 22 Feb 2023 00:21 )  PTT:29.5 sec      RADIOLOGY & ADDITIONAL TESTS: Reviewed. INTERVAL HPI/OVERNIGHT EVENTS:    SUBJECTIVE: Patient seen and examined at bedside. Patient seen with eyes closed this. Unable to provide ROS>     OBJECTIVE:    VITAL SIGNS:  ICU Vital Signs Last 24 Hrs  T(C): 36.5 (22 Feb 2023 04:00), Max: 37 (21 Feb 2023 08:00)  T(F): 97.7 (22 Feb 2023 04:00), Max: 98.6 (21 Feb 2023 08:00)  HR: 79 (22 Feb 2023 06:00) (77 - 101)  BP: 139/73 (22 Feb 2023 06:00) (93/50 - 158/76)  BP(mean): 100 (22 Feb 2023 06:00) (68 - 109)  ABP: --  ABP(mean): --  RR: 20 (22 Feb 2023 06:00) (20 - 24)  SpO2: 100% (22 Feb 2023 06:00) (97% - 100%)    O2 Parameters below as of 22 Feb 2023 05:40  Patient On (Oxygen Delivery Method): ventilator          Mode: AC/ CMV (Assist Control/ Continuous Mandatory Ventilation), RR (machine): 20, TV (machine): 380, FiO2: 21, PEEP: 5, ITime: 1, MAP: 9, PIP: 23    02-20 @ 07:01 - 02-21 @ 07:00  --------------------------------------------------------  IN: 1840 mL / OUT: 300 mL / NET: 1540 mL    02-21 @ 07:01 - 02-22 @ 06:57  --------------------------------------------------------  IN: 1792.5 mL / OUT: 350 mL / NET: 1442.5 mL      CAPILLARY BLOOD GLUCOSE      POCT Blood Glucose.: 124 mg/dL (22 Feb 2023 05:28)      PHYSICAL EXAM:    General: NAD  HEENT: NC/AT; PERRL, clear conjunctiva  Neck: supple  Respiratory: CTA b/l, trach  Cardiovascular: +S1/S2; RRR  Abdomen: soft, NT/ND; +BS x4  Extremities: WWP, 2+ peripheral pulses b/l; no LE edema  Skin: normal color and turgor; no rash  Neurological: AOx0, eyes closed, baseline nonverbal    MEDICATIONS:  MEDICATIONS  (STANDING):  albuterol/ipratropium for Nebulization 3 milliLiter(s) Nebulizer every 6 hours  chlorhexidine 0.12% Liquid 15 milliLiter(s) Oral Mucosa every 12 hours  chlorhexidine 4% Liquid 1 Application(s) Topical <User Schedule>  erythromycin   Ointment 1 Application(s) Both EYES at bedtime  insulin lispro (ADMELOG) corrective regimen sliding scale   SubCutaneous every 6 hours  multivitamin 1 Tablet(s) Oral daily  mupirocin 2% Ointment 1 Application(s) Topical two times a day  ofloxacin 0.3% Solution 1 Drop(s) Right EYE every 2 hours  pantoprazole  Injectable 40 milliGRAM(s) IV Push daily  petrolatum Ophthalmic Ointment 1 Application(s) Both EYES every 2 hours  piperacillin/tazobactam IVPB.. 3.375 Gram(s) IV Intermittent every 8 hours  rivaroxaban 10 milliGRAM(s) Enteral Tube daily  senna Syrup 10 milliLiter(s) Oral at bedtime  sertraline 50 milliGRAM(s) Oral daily  vancomycin 25 mG/mL Fortified Ophthalmic 1 Drop(s) Left EYE four times a day    MEDICATIONS  (PRN):  morphine  - Injectable 2 milliGRAM(s) IV Push every 4 hours PRN Mild Pain (1 - 3)  morphine  - Injectable 2 milliGRAM(s) IV Push every 8 hours PRN Moderate Pain (4 - 6)      ALLERGIES:  Allergies    Bactrim DS (Rash)    Intolerances        LABS:                        11.4   9.12  )-----------( 271      ( 22 Feb 2023 00:21 )             37.1     02-22    142  |  107  |  17  ----------------------------<  146<H>  4.0   |  19<L>  |  <0.30<L>    Ca    8.8      22 Feb 2023 00:21  Phos  1.8     02-22  Mg     2.0     02-22    TPro  6.8  /  Alb  3.7  /  TBili  0.7  /  DBili  x   /  AST  26  /  ALT  35  /  AlkPhos  92  02-22    PT/INR - ( 22 Feb 2023 00:21 )   PT: 12.8 sec;   INR: 1.10 ratio         PTT - ( 22 Feb 2023 00:21 )  PTT:29.5 sec      RADIOLOGY & ADDITIONAL TESTS: Reviewed. INTERVAL HPI/OVERNIGHT EVENTS:    SUBJECTIVE: Patient seen and examined at bedside. Patient seen with eyes closed this. Unable to provide ROS. On trach to vent.    OBJECTIVE:    VITAL SIGNS:  ICU Vital Signs Last 24 Hrs  T(C): 36.5 (22 Feb 2023 04:00), Max: 37 (21 Feb 2023 08:00)  T(F): 97.7 (22 Feb 2023 04:00), Max: 98.6 (21 Feb 2023 08:00)  HR: 79 (22 Feb 2023 06:00) (77 - 101)  BP: 139/73 (22 Feb 2023 06:00) (93/50 - 158/76)  BP(mean): 100 (22 Feb 2023 06:00) (68 - 109)  ABP: --  ABP(mean): --  RR: 20 (22 Feb 2023 06:00) (20 - 24)  SpO2: 100% (22 Feb 2023 06:00) (97% - 100%)    O2 Parameters below as of 22 Feb 2023 05:40  Patient On (Oxygen Delivery Method): ventilator          Mode: AC/ CMV (Assist Control/ Continuous Mandatory Ventilation), RR (machine): 20, TV (machine): 380, FiO2: 21, PEEP: 5, ITime: 1, MAP: 9, PIP: 23    02-20 @ 07:01  -  02-21 @ 07:00  --------------------------------------------------------  IN: 1840 mL / OUT: 300 mL / NET: 1540 mL    02-21 @ 07:01  -  02-22 @ 06:57  --------------------------------------------------------  IN: 1792.5 mL / OUT: 350 mL / NET: 1442.5 mL      CAPILLARY BLOOD GLUCOSE      POCT Blood Glucose.: 124 mg/dL (22 Feb 2023 05:28)      PHYSICAL EXAM:    General: NAD  HEENT: eyes covered in dressing with abx  Neck: supple  Respiratory: CTA b/l, trach  Cardiovascular: +S1/S2; RRR  Abdomen: soft, NT/ND; +BS x4  Extremities: WWP, 2+ peripheral pulses b/l; no LE edema  Skin: normal color and turgor; no rash  Neurological: AOx0, eyes closed, baseline nonverbal    MEDICATIONS:  MEDICATIONS  (STANDING):  albuterol/ipratropium for Nebulization 3 milliLiter(s) Nebulizer every 6 hours  chlorhexidine 0.12% Liquid 15 milliLiter(s) Oral Mucosa every 12 hours  chlorhexidine 4% Liquid 1 Application(s) Topical <User Schedule>  erythromycin   Ointment 1 Application(s) Both EYES at bedtime  insulin lispro (ADMELOG) corrective regimen sliding scale   SubCutaneous every 6 hours  multivitamin 1 Tablet(s) Oral daily  mupirocin 2% Ointment 1 Application(s) Topical two times a day  ofloxacin 0.3% Solution 1 Drop(s) Right EYE every 2 hours  pantoprazole  Injectable 40 milliGRAM(s) IV Push daily  petrolatum Ophthalmic Ointment 1 Application(s) Both EYES every 2 hours  piperacillin/tazobactam IVPB.. 3.375 Gram(s) IV Intermittent every 8 hours  rivaroxaban 10 milliGRAM(s) Enteral Tube daily  senna Syrup 10 milliLiter(s) Oral at bedtime  sertraline 50 milliGRAM(s) Oral daily  vancomycin 25 mG/mL Fortified Ophthalmic 1 Drop(s) Left EYE four times a day    MEDICATIONS  (PRN):  morphine  - Injectable 2 milliGRAM(s) IV Push every 4 hours PRN Mild Pain (1 - 3)  morphine  - Injectable 2 milliGRAM(s) IV Push every 8 hours PRN Moderate Pain (4 - 6)      ALLERGIES:  Allergies    Bactrim DS (Rash)    Intolerances        LABS:                        11.4   9.12  )-----------( 271      ( 22 Feb 2023 00:21 )             37.1     02-22    142  |  107  |  17  ----------------------------<  146<H>  4.0   |  19<L>  |  <0.30<L>    Ca    8.8      22 Feb 2023 00:21  Phos  1.8     02-22  Mg     2.0     02-22    TPro  6.8  /  Alb  3.7  /  TBili  0.7  /  DBili  x   /  AST  26  /  ALT  35  /  AlkPhos  92  02-22    PT/INR - ( 22 Feb 2023 00:21 )   PT: 12.8 sec;   INR: 1.10 ratio         PTT - ( 22 Feb 2023 00:21 )  PTT:29.5 sec      RADIOLOGY & ADDITIONAL TESTS: Reviewed.

## 2023-02-22 NOTE — PROGRESS NOTE ADULT - SUBJECTIVE AND OBJECTIVE BOX
[facility] DEPARTMENT OF OPHTHALMOLOGY  ------------------------------------------------------------------------------  Darin Richard MD PGY-3  Pager: [phone], also available on teams  ------------------------------------------------------------------------------    Interval History: No acute events overnight.     MEDICATIONS  (STANDING):  albuterol/ipratropium for Nebulization 3 milliLiter(s) Nebulizer every 6 hours  chlorhexidine 0.12% Liquid 15 milliLiter(s) Oral Mucosa every 12 hours  chlorhexidine 4% Liquid 1 Application(s) Topical <User Schedule>  erythromycin   Ointment 1 Application(s) Both EYES at bedtime  insulin lispro (ADMELOG) corrective regimen sliding scale   SubCutaneous every 6 hours  multivitamin 1 Tablet(s) Oral daily  mupirocin 2% Ointment 1 Application(s) Topical two times a day  ofloxacin 0.3% Solution 1 Drop(s) Right EYE every 2 hours  petrolatum Ophthalmic Ointment 1 Application(s) Both EYES every 2 hours  piperacillin/tazobactam IVPB.. 3.375 Gram(s) IV Intermittent every 8 hours  rivaroxaban 10 milliGRAM(s) Enteral Tube daily  senna Syrup 10 milliLiter(s) Oral at bedtime  sertraline 50 milliGRAM(s) Oral daily    MEDICATIONS  (PRN):  morphine  - Injectable 2 milliGRAM(s) IV Push every 4 hours PRN Mild Pain (1 - 3)  morphine  - Injectable 2 milliGRAM(s) IV Push every 8 hours PRN Moderate Pain (4 - 6)      VITALS: T(C): 37 (02-22-23 @ 16:00)  T(F): 98.6 (02-22-23 @ 16:00), Max: 98.6 (02-21-23 @ 20:00)  HR: 91 (02-22-23 @ 19:00) (77 - 101)  BP: 102/58 (02-22-23 @ 19:00) (98/63 - 158/76)  RR:  (20 - 24)  SpO2:  (98% - 100%)  Wt(kg): --  General: AAO x 3, appropriate mood and affect      Ophthalmology Exam:  Visual acuity (sc): RONIT d/t AMS  Pupils: PERRL OU  Extraocular movements (EOMs): RONIT d/t AMS  Confrontational Visual Field (CVF): RONIT d/t AMS    Pen Light Exam (PLE)  External: Flat  Lids/Lashes/Lacrimal Ducts: Flat OU. Eyelid margin hyperemic. 6mm lagophthalmos OU.   Sclera/Conjunctiva: Trace Injection OU. Mucoid discharge OU  Cornea: OD: Inferior 6mm x 6mm circular area of stromal haze with stromal neovascularization. no epi defect. OS: Inferior 4mm x 4mm circular area of stromal haze with neovascularization.  no epi defect.   Anterior Chamber: Deep and formed OU.    Iris: Flat OU.  Lens: NS OU.    Labs:    Culture - Eye (02.19.23 @ 13:56)    -  Trimethoprim/Sulfamethoxazole: S <=0.5/9.5    -  Vancomycin: S 1    -  Penicillin: R >8    -  Rifampin: S <=1 Should not be used as monotherapy    -  Tetracycline: S <=1    -  Oxacillin: R >2    -  Linezolid: S 2    -  Erythromycin: R >4    -  Clindamycin: S <=0.25    -  Daptomycin: S 0.5    -  Gentamicin: S <=1 Should not be used as monotherapy    -  Ampicillin/Sulbactam: R <=8/4    -  Cefazolin: R <=4    Specimen Source: .Eye Cornea-Left    Culture Results:   Methicillin Resistant Staphylococcus aureus isolated    Organism Identification: Methicillin resistant Staphylococcus aureus    Organism: Methicillin resistant Staphylococcus aureus    Method Type: KULDIP    Culture - Eye (02.19.23 @ 13:56)    -  Cefazolin: R <=4    -  Ampicillin/Sulbactam: R <=8/4    -  Erythromycin: R >4    -  Gentamicin: S <=1 Should not be used as monotherapy    -  Clindamycin: S 0.5    -  Oxacillin: R >2    -  Tetracycline: R >8    -  Rifampin: S <=1 Should not be used as monotherapy    -  Penicillin: R 8    -  Vancomycin: S 2    -  Trimethoprim/Sulfamethoxazole: R >2/38    Specimen Source: .Eye Cornea-Right    Culture Results:   Staphylococcus epidermidis isolated    Organism Identification: Staphylococcus epidermidis    Organism: Staphylococcus epidermidis    Method Type: KULDIP

## 2023-02-22 NOTE — DIETITIAN INITIAL EVALUATION ADULT - [id number]_CORE
02-21-23 @ 07:01  -  02-22-23 @ 07:00  --------------------------------------------------------  OUT:  Total OUT: 0 mL    Total NET: 1190 mL

## 2023-02-22 NOTE — DIETITIAN INITIAL EVALUATION ADULT - OTHER INFO
Wt Hx:   Dosing wt 52 kG/114.6 lbs. Daily wt 110.6 lbs (2/18), 114.6 lbs (2/22).   UBW ~110 lbs with no changes in wt PTA. Ht: 62 inches (confirmed by )  IBW: 110 lbs   IBW%: 100.5% (based on wt from 2/18)  Wt Hx per HIE (lbs): 106 (9/8/19), 118 (12/11/20), 121 (2/18/23 ?accuracy).    Nutrition-Related Concerns:   - Advanced ALS  - Tracheostomy  - Sepsis   - Low K 2/18 + 2/20, now WNL. Low Phos last 3 days s/p KPhosphate   - Elevated BG during admission; ordered for sliding scale of insulin

## 2023-02-22 NOTE — CONSULT NOTE ADULT - SUBJECTIVE AND OBJECTIVE BOX
[facility] DEPARTMENT OF OPHTHALMOLOGY - INITIAL ADULT CONSULT  -----------------------------------------------------------------------------------------------------------------  Francheska Canales MD PGY 2  Contact via Cell [employer] Teams  -----------------------------------------------------------------------------------------------------------------    HPI: Per Primary team  65F with advanced ALS (baseline nonverbal, communicates minimally through eye movements, PEG, vent dependent, trach upsized 8/2022 with CT surgery), chronic hypoxic/hypercapnic respiratory failure, HLD; BIBEMS for hypoxia to 66% after vent malfunction at home, admitted to MICU for further management.    On EMS arrival, ETCO2 high 90s. EMS provided bag-mask ventilation with improvement of O2 sat to 100% and ETCO2 to 55. In the ED, T 100.4 rectal, HR 110s to low 130s, -186, satting well on vent. W 25.66, Plt 466. Mildly elevated liver enzymes: , AST 69, ALT 59. VBG: pH 7.17 pCO2 66 lactate 3.2. UA +++protein +LE +WBC +++bacteria +hyaline casts ++glucose. Received LR Bolus 1.5L, Zosyn. Trach changed due to malpositioning. (18 Feb 2023 23:42)    Interval History: Ophthalmology consulted due to concern for corneal ulcers. Pt is nonverbal.     PAST MEDICAL & SURGICAL HISTORY:  ALS (amyotrophic lateral sclerosis)      HLD (hyperlipidemia)      Ventilator dependent  Since 2015      Aspiration into airway      S/P gastrostomy  10/14 for dysphagia  receives jevity 2 cans TID      Dependence on tracheostomy      Encounter for PEG (percutaneous endoscopic gastrostomy)  peg placed        Past Ocular History: unknown    Family History:  FAMILY HISTORY:  No pertinent family history in first degree relatives    Ophthalmic Medications: Lacrilube    MEDICATIONS  (STANDING):  albuterol/ipratropium for Nebulization 3 milliLiter(s) Nebulizer every 6 hours  chlorhexidine 0.12% Liquid 15 milliLiter(s) Oral Mucosa every 12 hours  chlorhexidine 4% Liquid 1 Application(s) Topical <User Schedule>  insulin lispro (ADMELOG) corrective regimen sliding scale   SubCutaneous every 6 hours  multivitamin 1 Tablet(s) Oral daily  pantoprazole  Injectable 40 milliGRAM(s) IV Push daily  petrolatum Ophthalmic Ointment 1 Application(s) Both EYES every 4 hours  piperacillin/tazobactam IVPB.. 3.375 Gram(s) IV Intermittent every 8 hours  rivaroxaban 10 milliGRAM(s) Enteral Tube daily  senna Syrup 10 milliLiter(s) Oral at bedtime  sertraline 50 milliGRAM(s) Oral daily    MEDICATIONS  (PRN):    Allergies & Intolerances:   Bactrim DS (Rash)    Review of Systems: Unable to obtain due to mental status    VITALS: T(C): 37.5 (02-19-23 @ 12:00)  T(F): 99.5 (02-19-23 @ 12:00), Max: 100.4 (02-18-23 @ 18:55)  HR: 90 (02-19-23 @ 14:00) (67 - 131)  BP: 95/53 (02-19-23 @ 14:00) (91/55 - 186/102)  RR:  (14 - 19)  SpO2:  (98% - 100%)  Wt(kg): --  General: AAO x0.    Ophthalmology Exam:  Visual acuity (sc): Unable to assess.  Pupils: PERRL OU, no APD  Tonometry:  10 OU  Extraocular movements (EOMs): Unable to assess  Confrontational Visual Field (CVF): Unable to assess  Color Plates: Unable to assess    Pen Light Exam (PLE)  External: Limited use of facial muscles.   Lids/Lashes/Lacrimal Ducts: Flat OU. Eyelid margin hyperemic. 6mm lagophthalmos OU.   Sclera/Conjunctiva: Trace Injection OU. Mucoid discharge OU  Cornea: OD: Inferior 6mm x 4mm circular area of stromal haze. 0.5x0.5 overlying epi defect. OS: Inferior 4mm x 2mm circular area of stromal haze. 0.5mm x0.5mm overlying epi defect.  Anterior Chamber: Deep and formed OU.    Iris: Flat OU.  Lens: NS OU.    Fundus Exam: dilated with 1% tropicamide and 2.5% phenylephrine  Approval obtained from primary team for dilation  Patient aware that pupils can remained dilated for at least 4-6 hours.  Exam performed with 20 D lens    Vitreous: wnl OU Clear view to posterior pole OU.   Disc, cup/disc: sharp and pink, 0.55 OU  Macula: Flat OU. Mac drusen OU.   Vessels: wnl OU  Periphery: Limited exam. Flat OU  
CC: trach change     HPI: 68 yo f history of advanced ALS, chronic hypoxic/hypercapnic respiratory failure, vent dependent, status post trach in 2015 and PEG here for hypoxia secondary to vent malfunction prior to arrival.  As per aide at bedside noticed that vent was alarming, could not find any issues within the circuit, O2 sats dropped to 60s, called EMS and began ACLS protocol.  On EMS arrival patient was found to have O2 sat of 68% and end-tidal in the 90s.  Patient bagged with improvement in sats, able to bag without resistance.  As per aide at bedside, patient only interacts through eye movements, is paralyzed caudal to this. No recent fevers. In the ED, #8 shiley distal XLT cuffed trach was removed and replaced with a #8 portex cuffed trach which aided in stabilizing her however required overinflation of the cuff ~60 mmhg in order to prevent leaking. ENT was then called to evaluate for trach change.       PAST MEDICAL & SURGICAL HISTORY:  ALS (amyotrophic lateral sclerosis)      HLD (hyperlipidemia)      Ventilator dependent  Since 2015      Aspiration into airway      S/P gastrostomy  10/14 for dysphagia  receives jevity 2 cans TID      Dependence on tracheostomy      Encounter for PEG (percutaneous endoscopic gastrostomy)  peg placed        Allergies    Bactrim DS (Rash)    Intolerances      MEDICATIONS  (STANDING):  albuterol/ipratropium for Nebulization 3 milliLiter(s) Nebulizer every 6 hours  chlorhexidine 0.12% Liquid 15 milliLiter(s) Oral Mucosa every 12 hours  chlorhexidine 4% Liquid 1 Application(s) Topical <User Schedule>  enoxaparin Injectable 40 milliGRAM(s) SubCutaneous every 24 hours  insulin lispro (ADMELOG) corrective regimen sliding scale   SubCutaneous every 6 hours  multivitamin 1 Tablet(s) Oral daily  pantoprazole  Injectable 40 milliGRAM(s) IV Push daily  petrolatum Ophthalmic Ointment 1 Application(s) Both EYES every 4 hours  senna Syrup 10 milliLiter(s) Oral at bedtime  sertraline 50 milliGRAM(s) Oral daily    MEDICATIONS  (PRN):      FAMILY HISTORY:  No pertinent family history in first degree relatives. No pertinent family history of: asthma, atrial fibrillation, brain aneurysm, cancer, congestive heart failure, COPD, coronary disease, diabetes, emphysema, heart disease, irritable bowel syndrome, kidney disease, stroke, thyroid disease and VTE.     Social History:  Social History (marital status, living situation, occupation, and sexual history): Marital status:   Lives with:   Ventilator dependent      ROS:   unable to obtain due to pts clinical condition     Vital Signs Last 24 Hrs  T(C): 37.9 (18 Feb 2023 22:29), Max: 38 (18 Feb 2023 18:55)  T(F): 100.2 (18 Feb 2023 22:29), Max: 100.4 (18 Feb 2023 18:55)  HR: 106 (18 Feb 2023 23:00) (106 - 131)  BP: 148/69 (18 Feb 2023 23:00) (126/76 - 186/102)  BP(mean): 99 (18 Feb 2023 23:00) (92 - 103)  RR: 14 (18 Feb 2023 23:00) (14 - 19)  SpO2: 100% (18 Feb 2023 23:00) (98% - 100%)    Parameters below as of 18 Feb 2023 22:29  Patient On (Oxygen Delivery Method): ventilator    O2 Concentration (%): 40                          13.8   25.45 )-----------( 306      ( 18 Feb 2023 22:53 )             43.8    02-18    138  |  102  |  16  ----------------------------<  196<H>  3.4<L>   |  15<L>  |  <0.30<L>    Ca    8.8      18 Feb 2023 22:53  Phos  4.5     02-18  Mg     1.6     02-18    TPro  7.7  /  Alb  4.1  /  TBili  1.1  /  DBili  x   /  AST  70<H>  /  ALT  65<H>  /  AlkPhos  112  02-18   PT/INR - ( 18 Feb 2023 19:41 )   PT: 14.1 sec;   INR: 1.22 ratio         PTT - ( 18 Feb 2023 19:41 )  PTT:36.1 sec    PHYSICAL EXAM:  Gen: NAD  Skin: No rashes, bruises, or lesions  Head: Normocephalic, Atraumatic  Face: no edema, erythema, or fluctuance. Parotid glands soft without mass  Eyes: no scleral injection  Nose: Nares bilaterally patent, no discharge  Mouth: No Stridor. Mucosa moist, tongue/uvula midline, oropharynx clear  Neck: #8 portex cuffed tracheostomy in place, cuff overinflated, leak noted from exposed stoma inferiorly, secured with velcro tie. Flat, supple, no lymphadenopathy, trachea midline, no masses  Lymphatic: No lymphadenopathy  Resp: on vent saturating well   CV: no peripheral edema/cyanosis  GI: nondistended   Peripheral vascular: no JVD or edema  Neuro: facial nerve intact, no facial droop      Trach change:  Risks and benefits discussed with pt. Then, pt was placed in a supine position with neck extended. A 10 CC syringe used to completely remove any remaining air in the trach cuff. #8 portex cuffed trach tube was removed and replaced with a #8 shiley XLT cuffed. Minimal bleeding. Clear secretions suctioned from stoma. Bedside tracheoscopy performed, emely visualized, no purulence, no erythema, no evidence of tracheomalacia. Pt tolerated the procedure well without complications.        
History of Present Illness:  65F with advanced ALS (baseline nonverbal, communicates minimally through eye movements, PEG, vent dependent, trach upsized 8/2022 with CT surgery), chronic hypoxic/hypercapnic respiratory failure, HLD; BIBEMS for hypoxia to 66% after vent malfunction at home, admitted to MICU for further management.  On EMS arrival, ETCO2 high 90s. EMS provided bag-mask ventilation with improvement of O2 sat to 100% and ETCO2 to 55. In the ED, T 100.4 rectal, HR 110s to low 130s, -186, satting well on vent. W 25.66, Plt 466. Mildly elevated liver enzymes: , AST 69, ALT 59. VBG: pH 7.17 pCO2 66 lactate 3.2. UA +++protein +LE +WBC +++bacteria +hyaline casts ++glucose. Received LR Bolus 1.5L, Zosyn. Trach changed due to malpositioning. (18 Feb 2023 23:42)       Past Medical History  No pertinent past medical history    ALS (amyotrophic lateral sclerosis)    Parkinson disease    HLD (hyperlipidemia)    Ventilator dependent  Since 2015    Aspiration into airway        Past Surgical History  S/P gastrostomy  10/14 for dysphagia  receives jevity 2 cans TID    Dependence on tracheostomy    Encounter for PEG (percutaneous endoscopic gastrostomy)  peg placed        MEDICATIONS  (STANDING):  albuterol/ipratropium for Nebulization 3 milliLiter(s) Nebulizer every 6 hours  chlorhexidine 0.12% Liquid 15 milliLiter(s) Oral Mucosa every 12 hours  chlorhexidine 4% Liquid 1 Application(s) Topical <User Schedule>  erythromycin   Ointment 1 Application(s) Both EYES at bedtime  insulin lispro (ADMELOG) corrective regimen sliding scale   SubCutaneous every 6 hours  multivitamin 1 Tablet(s) Oral daily  mupirocin 2% Ointment 1 Application(s) Topical two times a day  ofloxacin 0.3% Solution 1 Drop(s) Right EYE every 2 hours  petrolatum Ophthalmic Ointment 1 Application(s) Both EYES every 2 hours  piperacillin/tazobactam IVPB.. 3.375 Gram(s) IV Intermittent every 8 hours  rivaroxaban 10 milliGRAM(s) Enteral Tube daily  senna Syrup 10 milliLiter(s) Oral at bedtime  sertraline 50 milliGRAM(s) Oral daily    MEDICATIONS  (PRN):  morphine  - Injectable 2 milliGRAM(s) IV Push every 4 hours PRN Mild Pain (1 - 3)  morphine  - Injectable 2 milliGRAM(s) IV Push every 8 hours PRN Moderate Pain (4 - 6)      Vital Signs Last 24 Hrs  T(C): 37 (02-22-23 @ 16:00), Max: 37 (02-21-23 @ 20:00)  T(F): 98.6 (02-22-23 @ 16:00), Max: 98.6 (02-21-23 @ 20:00)  HR: 91 (02-22-23 @ 19:00) (77 - 101)  BP: 102/58 (02-22-23 @ 19:00) (98/63 - 158/76)  RR: 20 (02-22-23 @ 19:00) (20 - 24)  SpO2: 100% (02-22-23 @ 19:00) (98% - 100%)             Height (cm): 157.5   Weight (kg): 52    BMI (kg/m2): 21   (22 Feb 2023 02:00)      Mode: AC/ CMV (Assist Control/ Continuous Mandatory Ventilation), RR (machine): 20, TV (machine): 380, FiO2: 21, PEEP: 5, ITime: 1, MAP: 9, PIP: 23      Allergies: Bactrim DS (Rash)      FAMILY HISTORY:  No pertinent family history in first degree relatives        Review of Systems  Unable to assess, trach to vent      PHYSICAL EXAM  General: NAD  HEENT: eyes covered in dressing with abx  Neck: supple  Respiratory: CTA b/l, trach to vent  Cardiovascular: +S1/S2; RRR  Abdomen: soft, NT/ND; +BS x4  Extremities: WWP, 2+ peripheral pulses b/l; no LE edema  Skin: normal color and turgor; no rash  Neurological: AOx0, eyes closed, baseline nonverbal                                                              LABS:                        11.4   9.12  )-----------( 271      ( 22 Feb 2023 00:21 )             37.1     142  |  107  |  17  ----------------------------<  146<H>  4.0   |  19<L>  |  <0.30<L>    AST  26  /  ALT  35  /  AlkPhos  92  02-22    PT/INR/PTT - ( 22 Feb 2023 00:21 )   PT: 12.8 sec;   INR: 1.10 ratio   PTT:29.5 sec

## 2023-02-22 NOTE — DIETITIAN INITIAL EVALUATION ADULT - NS FNS DIET ORDER
Diet, NPO:   Tube Feeding Modality: Gastrostomy  Jevity 1.2 Marck (JEVITY1.2RTH)  Total Volume for 24 Hours (mL): 1260  Intermittent  Starting Tube Feed Rate {mL per Hour}: 10  Increase Tube Feed Rate by (mL): 10    Every 4 hours  Until Goal Tube Feed Rate (mL per Hour): 70  Tube Feeding Hours ON: 18  Tube Feeding OFF (Hours): 6  Tube Feed Start Time: 01:30 (02-19-23 @ 01:10)

## 2023-02-22 NOTE — DIETITIAN INITIAL EVALUATION ADULT - ENTERAL
Jevity 1.2 at goal rate 55 mL/hr x18 hrs to provide total volume 990 mL, 1188 kcals, 55 gm protein, and 799 mL free water. Meets ~24 kcals/kG and 1.1 gm protein/kG based on daily wt in 50.2 kG (2/18).

## 2023-02-22 NOTE — DIETITIAN INITIAL EVALUATION ADULT - REASON INDICATOR FOR ASSESSMENT
Pt seen for length of stay on MICU  Source: Medical record, RN, and pt's  via phone call (pt nonverbal)

## 2023-02-22 NOTE — PROGRESS NOTE ADULT - ASSESSMENT
67y female with a past medical history advanced ALS (baseline nonverbal, communicates minimally through eye movements, PEG, vent dependent, trach upsized 8/2022 with CT surgery), chronic hypoxic/hypercapnic respiratory failure, HLD; BIBEMS for hypoxia to 66% after vent malfunction at home, ocular history unknown, ophthalmology consulted to evaluate for corneal ulcer.     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   - Cultures sent for BOTH eyes, growing MRSA and staph epi - likely contaminate.   - STOP fortified vancomycin and fortified tobramycin  - Start oflox qid to both eyes  - Start Erythromycin q2h to both eyes  - Recommend taping of eyelids AT ALL TIMES WITH Tegaderm, creating a moister chamber, given extent of exposure. Instructions given to nurse.     Pt seen and discussed with Dr. Flowers (cornea)    Outpatient Follow-up: Patient should follow-up with his/her ophthalmologist or with [facility] Department of Ophthalmology within 1 week of after discharge at:    [address] [facility]  [place] [address]  [phone]

## 2023-02-22 NOTE — CHART NOTE - NSCHARTNOTEFT_GEN_A_CORE
MICU Transfer Note  ---------------------------    Transfer from: MICU  Transfer to:  (  ) Medicine    (  ) Telemetry    ( x) RCU    (  ) Palliative    (  ) Stroke Unit    (  ) _______________  Accepting Physician:  Dr. Gil    MICU COURSE    65F with advanced ALS (baseline nonverbal, communicates minimally through eye movements, PEG, vent dependent, trach upsized 8/2022 with CT surgery), chronic hypoxic/hypercapnic respiratory failure, HLD; BIBEMS for hypoxia to 66% after vent malfunction at home, admitted to MICU for further management. Pt was transferred to MICU because no beds were available in RCU.    ENT performed trach change - trach was changed to a new #8 shiley distal XLT cuffed, tracheoscopy showed it was in good position. She is on baseline O2 requirements (FiO2 21). She is being treated empirically with Zosyn to cover for possible aspiration PNA given unclear retrocardiac opacity on CXR with leukocytosis, plan for likely 5 day course. No current ICU needs.      To-Do:    [ ] Complete course of empiric Abx for PNA (zosyn day 4/5 today2/22)  [ ] f/u Ophthalmology recs    OBJECTIVE --  Vital Signs Last 24 Hrs  T(C): 37.5 (19 Feb 2023 12:00), Max: 38 (18 Feb 2023 18:55)  T(F): 99.5 (19 Feb 2023 12:00), Max: 100.4 (18 Feb 2023 18:55)  HR: 90 (19 Feb 2023 12:00) (67 - 131)  BP: 99/58 (19 Feb 2023 12:00) (91/55 - 186/102)  BP(mean): 73 (19 Feb 2023 12:00) (68 - 103)  RR: 16 (19 Feb 2023 12:00) (14 - 19)  SpO2: 100% (19 Feb 2023 12:00) (98% - 100%)    Parameters below as of 19 Feb 2023 08:00  Patient On (Oxygen Delivery Method): ventilator    O2 Concentration (%): 21    I&O's Summary    18 Feb 2023 07:01  -  19 Feb 2023 07:00  --------------------------------------------------------  IN: 115 mL / OUT: 600 mL / NET: -485 mL    19 Feb 2023 07:01  -  19 Feb 2023 12:38  --------------------------------------------------------  IN: 120 mL / OUT: 0 mL / NET: 120 mL        MEDICATIONS  (STANDING):  albuterol/ipratropium for Nebulization 3 milliLiter(s) Nebulizer every 6 hours  chlorhexidine 0.12% Liquid 15 milliLiter(s) Oral Mucosa every 12 hours  chlorhexidine 4% Liquid 1 Application(s) Topical <User Schedule>  enoxaparin Injectable 40 milliGRAM(s) SubCutaneous every 24 hours  insulin lispro (ADMELOG) corrective regimen sliding scale   SubCutaneous every 6 hours  multivitamin 1 Tablet(s) Oral daily  pantoprazole  Injectable 40 milliGRAM(s) IV Push daily  petrolatum Ophthalmic Ointment 1 Application(s) Both EYES every 4 hours  piperacillin/tazobactam IVPB.. 3.375 Gram(s) IV Intermittent every 8 hours  senna Syrup 10 milliLiter(s) Oral at bedtime  sertraline 50 milliGRAM(s) Oral daily    MEDICATIONS  (PRN):        LABS                                            13.8                  Neurophils% (auto):   x      (02-18 @ 22:53):    25.45)-----------(306          Lymphocytes% (auto):  x                                             43.8                   Eosinphils% (auto):   x        Manual%: Neutrophils x    ; Lymphocytes x    ; Eosinophils x    ; Bands%: x    ; Blasts x                                    138    |  102    |  16                  Calcium: 8.8   / iCa: x      (02-18 @ 22:53)    ----------------------------<  196       Magnesium: 1.6                              3.4     |  15     |  <0.30            Phosphorous: 4.5      TPro  7.7    /  Alb  4.1    /  TBili  1.1    /  DBili  x      /  AST  70     /  ALT  65     /  AlkPhos  112    18 Feb 2023 22:53    ( 02-18 @ 19:41 )   PT: 14.1 sec;   INR: 1.22 ratio  aPTT: 36.1 sec      A&P:  65F with advanced ALS (baseline nonverbal, communicates minimally through eye movements, PEG, vent dependent, trach upsized 8/2022 with CT surgery), chronic hypoxic/hypercapnic respiratory failure, HLD; BIBEMS for hypoxia to 66% after vent malfunction at home; found to have sepsis, likely  source; admitted to MICU for further management.     PLAN:    NEURO  # workup for anoxic brain injury  - CTH - no acute finding  - c/w home diazepam  - c/w home sertraline    # advanced ALS  - baseline nonverbal, awake, communication with eye movements has been declining in recent months    CARDIOVASCULAR  - sinus tachycardia iso sepsis, now improved    RESPIRATORY  # tracheostomy  - trach changed in ED, additional change by ENT  - Briana    GI/NUTRITION  # PEG  - feeds, site care, bowel reg    /RENAL  - No active issues.     SKIN  - c/w Lacrilube, f/u home eyedrops/ointment  - f/u home nystatin    ID  # sepsis  - CXR with retrocardiac opacity, could not rule out underlying infection. UTI also possible source  - F/u BCx, UCx  - MRSA PCR positive - mupirocin  - Allergy: rash with Bactrim   - Zosyn 4/5 days  - consider sputum Cx if able    ENDOCRINE  - Glucose levels controlled  - A1c 4.9    HEMATOLOGIC  # leukocytosis   - iso sepsis, improving  # thrombocytosis  - possibly reactive    DVT PPX  - [id number] QD    OPHTHO  Corneal ulcer  - Concerned for corneal ulcer, ophtho consulted  - eye culture with MRSA and stpah epi  - c/w Vanc, ofloxacin, and erythromycin eyedrops q2hrs.   - d/c tobramycin  - f/u optho    ETHICS  - spouse considering DNR/DNI. Full code for now.    Dispo  - Listed for RCU.

## 2023-02-22 NOTE — DIETITIAN INITIAL EVALUATION ADULT - ENERGY INTAKE
Ordered for Jevity 1.2 at goal rate 70 mL/hr x18 hrs. Current Rate: on hold per 18 hr policy, was at 70 mL/hr.

## 2023-02-23 ENCOUNTER — TRANSCRIPTION ENCOUNTER (OUTPATIENT)
Age: 68
End: 2023-02-23

## 2023-02-23 VITALS
HEART RATE: 87 BPM | SYSTOLIC BLOOD PRESSURE: 123 MMHG | DIASTOLIC BLOOD PRESSURE: 60 MMHG | OXYGEN SATURATION: 100 % | RESPIRATION RATE: 20 BRPM

## 2023-02-23 LAB
ALBUMIN SERPL ELPH-MCNC: 3.7 G/DL — SIGNIFICANT CHANGE UP (ref 3.3–5)
ALP SERPL-CCNC: 90 U/L — SIGNIFICANT CHANGE UP (ref 40–120)
ALT FLD-CCNC: 33 U/L — SIGNIFICANT CHANGE UP (ref 10–45)
ANION GAP SERPL CALC-SCNC: 14 MMOL/L — SIGNIFICANT CHANGE UP (ref 5–17)
AST SERPL-CCNC: 25 U/L — SIGNIFICANT CHANGE UP (ref 10–40)
BASOPHILS # BLD AUTO: 0.05 K/UL — SIGNIFICANT CHANGE UP (ref 0–0.2)
BASOPHILS NFR BLD AUTO: 0.5 % — SIGNIFICANT CHANGE UP (ref 0–2)
BILIRUB SERPL-MCNC: 0.6 MG/DL — SIGNIFICANT CHANGE UP (ref 0.2–1.2)
BUN SERPL-MCNC: 15 MG/DL — SIGNIFICANT CHANGE UP (ref 7–23)
CALCIUM SERPL-MCNC: 8.7 MG/DL — SIGNIFICANT CHANGE UP (ref 8.4–10.5)
CHLORIDE SERPL-SCNC: 105 MMOL/L — SIGNIFICANT CHANGE UP (ref 96–108)
CO2 SERPL-SCNC: 19 MMOL/L — LOW (ref 22–31)
CREAT SERPL-MCNC: <0.3 MG/DL — LOW (ref 0.5–1.3)
EGFR: 115 ML/MIN/1.73M2 — SIGNIFICANT CHANGE UP
EOSINOPHIL # BLD AUTO: 0.67 K/UL — HIGH (ref 0–0.5)
EOSINOPHIL NFR BLD AUTO: 6.4 % — HIGH (ref 0–6)
GAS PNL BLDA: SIGNIFICANT CHANGE UP
GLUCOSE BLDC GLUCOMTR-MCNC: 112 MG/DL — HIGH (ref 70–99)
GLUCOSE BLDC GLUCOMTR-MCNC: 158 MG/DL — HIGH (ref 70–99)
GLUCOSE SERPL-MCNC: 156 MG/DL — HIGH (ref 70–99)
HCT VFR BLD CALC: 40 % — SIGNIFICANT CHANGE UP (ref 34.5–45)
HGB BLD-MCNC: 12 G/DL — SIGNIFICANT CHANGE UP (ref 11.5–15.5)
IMM GRANULOCYTES NFR BLD AUTO: 0.9 % — SIGNIFICANT CHANGE UP (ref 0–0.9)
LYMPHOCYTES # BLD AUTO: 1.68 K/UL — SIGNIFICANT CHANGE UP (ref 1–3.3)
LYMPHOCYTES # BLD AUTO: 16 % — SIGNIFICANT CHANGE UP (ref 13–44)
MAGNESIUM SERPL-MCNC: 2 MG/DL — SIGNIFICANT CHANGE UP (ref 1.6–2.6)
MCHC RBC-ENTMCNC: 27.1 PG — SIGNIFICANT CHANGE UP (ref 27–34)
MCHC RBC-ENTMCNC: 30 GM/DL — LOW (ref 32–36)
MCV RBC AUTO: 90.3 FL — SIGNIFICANT CHANGE UP (ref 80–100)
MONOCYTES # BLD AUTO: 0.79 K/UL — SIGNIFICANT CHANGE UP (ref 0–0.9)
MONOCYTES NFR BLD AUTO: 7.5 % — SIGNIFICANT CHANGE UP (ref 2–14)
NEUTROPHILS # BLD AUTO: 7.2 K/UL — SIGNIFICANT CHANGE UP (ref 1.8–7.4)
NEUTROPHILS NFR BLD AUTO: 68.7 % — SIGNIFICANT CHANGE UP (ref 43–77)
NRBC # BLD: 0 /100 WBCS — SIGNIFICANT CHANGE UP (ref 0–0)
PHOSPHATE SERPL-MCNC: 2.9 MG/DL — SIGNIFICANT CHANGE UP (ref 2.5–4.5)
PLATELET # BLD AUTO: 279 K/UL — SIGNIFICANT CHANGE UP (ref 150–400)
POTASSIUM SERPL-MCNC: 4.2 MMOL/L — SIGNIFICANT CHANGE UP (ref 3.5–5.3)
POTASSIUM SERPL-SCNC: 4.2 MMOL/L — SIGNIFICANT CHANGE UP (ref 3.5–5.3)
PROT SERPL-MCNC: 6.8 G/DL — SIGNIFICANT CHANGE UP (ref 6–8.3)
RBC # BLD: 4.43 M/UL — SIGNIFICANT CHANGE UP (ref 3.8–5.2)
RBC # FLD: 18 % — HIGH (ref 10.3–14.5)
SARS-COV-2 RNA SPEC QL NAA+PROBE: SIGNIFICANT CHANGE UP
SODIUM SERPL-SCNC: 138 MMOL/L — SIGNIFICANT CHANGE UP (ref 135–145)
WBC # BLD: 10.48 K/UL — SIGNIFICANT CHANGE UP (ref 3.8–10.5)
WBC # FLD AUTO: 10.48 K/UL — SIGNIFICANT CHANGE UP (ref 3.8–10.5)

## 2023-02-23 PROCEDURE — 82565 ASSAY OF CREATININE: CPT

## 2023-02-23 PROCEDURE — 82550 ASSAY OF CK (CPK): CPT

## 2023-02-23 PROCEDURE — 70450 CT HEAD/BRAIN W/O DYE: CPT | Mod: MA

## 2023-02-23 PROCEDURE — 84295 ASSAY OF SERUM SODIUM: CPT

## 2023-02-23 PROCEDURE — 94640 AIRWAY INHALATION TREATMENT: CPT

## 2023-02-23 PROCEDURE — 82435 ASSAY OF BLOOD CHLORIDE: CPT

## 2023-02-23 PROCEDURE — 87070 CULTURE OTHR SPECIMN AEROBIC: CPT

## 2023-02-23 PROCEDURE — 36415 COLL VENOUS BLD VENIPUNCTURE: CPT

## 2023-02-23 PROCEDURE — 81001 URINALYSIS AUTO W/SCOPE: CPT

## 2023-02-23 PROCEDURE — 82803 BLOOD GASES ANY COMBINATION: CPT

## 2023-02-23 PROCEDURE — 84145 PROCALCITONIN (PCT): CPT

## 2023-02-23 PROCEDURE — 85610 PROTHROMBIN TIME: CPT

## 2023-02-23 PROCEDURE — 89051 BODY FLUID CELL COUNT: CPT

## 2023-02-23 PROCEDURE — 87102 FUNGUS ISOLATION CULTURE: CPT

## 2023-02-23 PROCEDURE — 87077 CULTURE AEROBIC IDENTIFY: CPT

## 2023-02-23 PROCEDURE — 87186 SC STD MICRODIL/AGAR DIL: CPT

## 2023-02-23 PROCEDURE — 85730 THROMBOPLASTIN TIME PARTIAL: CPT

## 2023-02-23 PROCEDURE — 83605 ASSAY OF LACTIC ACID: CPT

## 2023-02-23 PROCEDURE — 84100 ASSAY OF PHOSPHORUS: CPT

## 2023-02-23 PROCEDURE — 85014 HEMATOCRIT: CPT

## 2023-02-23 PROCEDURE — 0225U NFCT DS DNA&RNA 21 SARSCOV2: CPT

## 2023-02-23 PROCEDURE — 94002 VENT MGMT INPAT INIT DAY: CPT

## 2023-02-23 PROCEDURE — U0003: CPT

## 2023-02-23 PROCEDURE — 82962 GLUCOSE BLOOD TEST: CPT

## 2023-02-23 PROCEDURE — 84132 ASSAY OF SERUM POTASSIUM: CPT

## 2023-02-23 PROCEDURE — 99291 CRITICAL CARE FIRST HOUR: CPT

## 2023-02-23 PROCEDURE — 85025 COMPLETE CBC W/AUTO DIFF WBC: CPT

## 2023-02-23 PROCEDURE — 96374 THER/PROPH/DIAG INJ IV PUSH: CPT

## 2023-02-23 PROCEDURE — 82553 CREATINE MB FRACTION: CPT

## 2023-02-23 PROCEDURE — 87040 BLOOD CULTURE FOR BACTERIA: CPT

## 2023-02-23 PROCEDURE — 99223 1ST HOSP IP/OBS HIGH 75: CPT

## 2023-02-23 PROCEDURE — 87640 STAPH A DNA AMP PROBE: CPT

## 2023-02-23 PROCEDURE — 83036 HEMOGLOBIN GLYCOSYLATED A1C: CPT

## 2023-02-23 PROCEDURE — 87641 MR-STAPH DNA AMP PROBE: CPT

## 2023-02-23 PROCEDURE — 83735 ASSAY OF MAGNESIUM: CPT

## 2023-02-23 PROCEDURE — 82330 ASSAY OF CALCIUM: CPT

## 2023-02-23 PROCEDURE — 99233 SBSQ HOSP IP/OBS HIGH 50: CPT | Mod: GC

## 2023-02-23 PROCEDURE — 71045 X-RAY EXAM CHEST 1 VIEW: CPT

## 2023-02-23 PROCEDURE — 85018 HEMOGLOBIN: CPT

## 2023-02-23 PROCEDURE — 87086 URINE CULTURE/COLONY COUNT: CPT

## 2023-02-23 PROCEDURE — U0005: CPT

## 2023-02-23 PROCEDURE — 93005 ELECTROCARDIOGRAM TRACING: CPT

## 2023-02-23 PROCEDURE — 84484 ASSAY OF TROPONIN QUANT: CPT

## 2023-02-23 PROCEDURE — 94003 VENT MGMT INPAT SUBQ DAY: CPT

## 2023-02-23 PROCEDURE — 82947 ASSAY GLUCOSE BLOOD QUANT: CPT

## 2023-02-23 PROCEDURE — 80053 COMPREHEN METABOLIC PANEL: CPT

## 2023-02-23 RX ORDER — ERYTHROMYCIN BASE 5 MG/GRAM
1 OINTMENT (GRAM) OPHTHALMIC (EYE)
Qty: 1 | Refills: 0
Start: 2023-02-23 | End: 2023-03-15

## 2023-02-23 RX ORDER — OFLOXACIN 0.3 %
1 DROPS OPHTHALMIC (EYE)
Qty: 8 | Refills: 0
Start: 2023-02-23 | End: 2023-03-15

## 2023-02-23 RX ORDER — ERYTHROMYCIN BASE 5 MG/GRAM
1 OINTMENT (GRAM) OPHTHALMIC (EYE)
Qty: 10 | Refills: 0
Start: 2023-02-23 | End: 2023-03-15

## 2023-02-23 RX ADMIN — Medication 1 TABLET(S): at 11:48

## 2023-02-23 RX ADMIN — Medication 1 DROP(S): at 11:48

## 2023-02-23 RX ADMIN — MORPHINE SULFATE 2 MILLIGRAM(S): 50 CAPSULE, EXTENDED RELEASE ORAL at 10:59

## 2023-02-23 RX ADMIN — Medication 1 DROP(S): at 06:07

## 2023-02-23 RX ADMIN — MORPHINE SULFATE 2 MILLIGRAM(S): 50 CAPSULE, EXTENDED RELEASE ORAL at 11:40

## 2023-02-23 RX ADMIN — Medication 1 DROP(S): at 07:53

## 2023-02-23 RX ADMIN — Medication 1 APPLICATION(S): at 10:04

## 2023-02-23 RX ADMIN — Medication 1 APPLICATION(S): at 07:53

## 2023-02-23 RX ADMIN — CHLORHEXIDINE GLUCONATE 15 MILLILITER(S): 213 SOLUTION TOPICAL at 05:08

## 2023-02-23 RX ADMIN — Medication 1 APPLICATION(S): at 01:41

## 2023-02-23 RX ADMIN — RIVAROXABAN 10 MILLIGRAM(S): KIT at 11:48

## 2023-02-23 RX ADMIN — MORPHINE SULFATE 2 MILLIGRAM(S): 50 CAPSULE, EXTENDED RELEASE ORAL at 08:15

## 2023-02-23 RX ADMIN — Medication 1 APPLICATION(S): at 06:06

## 2023-02-23 RX ADMIN — MORPHINE SULFATE 2 MILLIGRAM(S): 50 CAPSULE, EXTENDED RELEASE ORAL at 02:00

## 2023-02-23 RX ADMIN — Medication 3 MILLILITER(S): at 11:20

## 2023-02-23 RX ADMIN — Medication 1 DROP(S): at 00:40

## 2023-02-23 RX ADMIN — Medication 3 MILLILITER(S): at 05:44

## 2023-02-23 RX ADMIN — Medication 1 APPLICATION(S): at 11:49

## 2023-02-23 RX ADMIN — Medication 2: at 05:12

## 2023-02-23 RX ADMIN — Medication 1 DROP(S): at 01:41

## 2023-02-23 RX ADMIN — CHLORHEXIDINE GLUCONATE 1 APPLICATION(S): 213 SOLUTION TOPICAL at 05:40

## 2023-02-23 RX ADMIN — Medication 1 APPLICATION(S): at 00:39

## 2023-02-23 RX ADMIN — PIPERACILLIN AND TAZOBACTAM 25 GRAM(S): 4; .5 INJECTION, POWDER, LYOPHILIZED, FOR SOLUTION INTRAVENOUS at 05:06

## 2023-02-23 RX ADMIN — MORPHINE SULFATE 2 MILLIGRAM(S): 50 CAPSULE, EXTENDED RELEASE ORAL at 01:43

## 2023-02-23 RX ADMIN — Medication 2: at 01:40

## 2023-02-23 RX ADMIN — MORPHINE SULFATE 2 MILLIGRAM(S): 50 CAPSULE, EXTENDED RELEASE ORAL at 07:53

## 2023-02-23 RX ADMIN — Medication 1 DROP(S): at 10:03

## 2023-02-23 RX ADMIN — Medication 1 APPLICATION(S): at 04:52

## 2023-02-23 RX ADMIN — SERTRALINE 50 MILLIGRAM(S): 25 TABLET, FILM COATED ORAL at 11:48

## 2023-02-23 RX ADMIN — MUPIROCIN 1 APPLICATION(S): 20 OINTMENT TOPICAL at 05:07

## 2023-02-23 RX ADMIN — Medication 1 DROP(S): at 04:52

## 2023-02-23 NOTE — DISCHARGE NOTE PROVIDER - CARE PROVIDERS DIRECT ADDRESSES
[email or web address]

## 2023-02-23 NOTE — PROGRESS NOTE ADULT - TIME BILLING
review of laboratory data, radiology results, discussion with primary team\patient, and monitoring for potential decompensation. Interventions were performed as documented above
review of laboratory data, radiology results, discussion with primary team\patient, and monitoring for potential decompensation. Interventions were performed as documented above

## 2023-02-23 NOTE — DISCHARGE NOTE PROVIDER - NSDCMRMEDTOKEN_GEN_ALL_CORE_FT
albuterol 2.5 mg/3 mL (0.083%) inhalation solution: 3 milliliter(s) inhaled every 6 hours, As Needed  diazePAM 5 mg/5 mL oral solution: 2 milliliter(s) by gastrostomy tube 4 times a day, As Needed  Multiple Vitamins oral tablet: 1 tab(s) by gastrostomy tube once a day  ocular lubricant ophthalmic solution: 1 drop(s) to each affected eye every 6 hours  riluzole 50 mg oral tablet: 1 tab(s) by gastrostomy tube 2 times a day (before meals)  sertraline 50 mg oral tablet: 1 tab(s) by gastrostomy tube once a day  Sodium Chloride, Inhalation 3% inhalation solution: 1 puff(s) inhaled 4 times a day, As Needed  Xarelto 10 mg oral tablet: 1 tab(s) by gastrostomy tube once a day   albuterol 2.5 mg/3 mL (0.083%) inhalation solution: 3 milliliter(s) inhaled every 6 hours, As Needed  diazePAM 5 mg/5 mL oral solution: 2 milliliter(s) by gastrostomy tube 4 times a day, As Needed  erythromycin 0.5% ophthalmic ointment: 1 application to each affected eye once a day (at bedtime)  erythromycin 0.5% ophthalmic ointment: 1 application to each affected eye every 2 hours   Multiple Vitamins oral tablet: 1 tab(s) by gastrostomy tube once a day  ocular lubricant ophthalmic solution: 1 drop(s) to each affected eye every 2 hours   ofloxacin 0.3% ophthalmic solution: 1 drop(s) to each affected eye 4 times a day   riluzole 50 mg oral tablet: 1 tab(s) by gastrostomy tube 2 times a day (before meals)  sertraline 50 mg oral tablet: 1 tab(s) by gastrostomy tube once a day  Sodium Chloride, Inhalation 3% inhalation solution: 1 puff(s) inhaled 4 times a day, As Needed  Xarelto 10 mg oral tablet: 1 tab(s) by gastrostomy tube once a day

## 2023-02-23 NOTE — DISCHARGE NOTE PROVIDER - NSFOLLOWUPCLINICS_GEN_ALL_ED_FT
[facility]  Ophthalmology  [address] [place] [address]  Phone: [phone]  Fax:     [facility]  Ophthalmology  [address] [address]  Phone: [phone]  Fax:

## 2023-02-23 NOTE — DISCHARGE NOTE PROVIDER - NSDCCPTREATMENT_GEN_ALL_CORE_FT
PRINCIPAL PROCEDURE  Procedure: CT head wo con  Findings and Treatment: COMPARISON EXAMINATION: None.  FINDINGS:  VENTRICLES, SULCI AND BASAL CISTERNS:  Symmetric prominence of the ventricles and sulci in the setting of cortical volume loss in the   frontotemporal predominant pattern. These findings are advanced for the patient's age.  INTRA-AXIAL:  Confluent bihemispheric white matter microvascular changes.   No mass, hemorrhage, or vasogenic edema.  EXTRA-AXIAL:  No mass or collection is seen.  VISUALIZED SINUSES:  Near complete opacification of left sphenoid sinus and its lateral recess.  VISUALIZED MASTOIDS:  Near complete opacification of bilateral mastoid air cells.  CALVARIUM:  Intact.  ORBITS: Unremarkable.  IMPRESSION:  No acute intracranial hemorrhage, vasogenic edema or extra-axial   collection.  Involutional and ischemic gliotic changes appear mildly advanced for the patient's age

## 2023-02-23 NOTE — DISCHARGE NOTE NURSING/CASE MANAGEMENT/SOCIAL WORK - PATIENT PORTAL LINK FT
You can access the FollowMyHealth Patient Portal offered by [facility] by registering at the following website: [email or web address]. By joining [employer]’s FollowMyHealth portal, you will also be able to view your health information using other applications (apps) compatible with our system.

## 2023-02-23 NOTE — DISCHARGE NOTE PROVIDER - NSDCCAREPROVSEEN_GEN_ALL_CORE_FT
[facility]  [facility] Opthalmology  [facility] Thoracic Surgery  [facility] ENT

## 2023-02-23 NOTE — PROGRESS NOTE ADULT - SUBJECTIVE AND OBJECTIVE BOX
Javier Liu, PGY1   Emergency Medicine    OVERNIGHT EVENTS / SUBJECTIVE: Patient seen and examined at bedside.     OBJECTIVE:    VITAL SIGNS:  ICU Vital Signs Last 24 Hrs  T(C): 36.9 (23 Feb 2023 04:00), Max: 37 (22 Feb 2023 16:00)  T(F): 98.4 (23 Feb 2023 04:00), Max: 98.6 (22 Feb 2023 16:00)  HR: 83 (23 Feb 2023 06:00) (77 - 101)  BP: 110/66 (23 Feb 2023 06:00) (102/58 - 132/70)  BP(mean): 83 (23 Feb 2023 06:00) (72 - 98)  ABP: --  ABP(mean): --  RR: 20 (23 Feb 2023 06:00) (20 - 49)  SpO2: 100% (23 Feb 2023 06:00) (98% - 100%)    O2 Parameters below as of 23 Feb 2023 05:44  Patient On (Oxygen Delivery Method): ventilator          Mode: AC/ CMV (Assist Control/ Continuous Mandatory Ventilation), RR (machine): 20, TV (machine): 380, FiO2: 21, PEEP: 5, ITime: 1, MAP: 8, PIP: 20    02-22 @ 07:01  -  02-23 @ 07:00  --------------------------------------------------------  IN: 1752.5 mL / OUT: 470 mL / NET: 1282.5 mL      CAPILLARY BLOOD GLUCOSE      POCT Blood Glucose.: 158 mg/dL (23 Feb 2023 05:11)      PHYSICAL EXAM:    General: NAD  HEENT: NC/AT; PERRL, clear conjunctiva  Neck: supple  Respiratory: CTA b/l  Cardiovascular: +S1/S2; RRR  Abdomen: soft, NT/ND; +BS x4  Extremities: WWP, 2+ peripheral pulses b/l; no LE edema  Skin: normal color and turgor; no rash  Neurological:    MEDICATIONS:  MEDICATIONS  (STANDING):  albuterol/ipratropium for Nebulization 3 milliLiter(s) Nebulizer every 6 hours  chlorhexidine 0.12% Liquid 15 milliLiter(s) Oral Mucosa every 12 hours  chlorhexidine 4% Liquid 1 Application(s) Topical <User Schedule>  erythromycin   Ointment 1 Application(s) Both EYES at bedtime  insulin lispro (ADMELOG) corrective regimen sliding scale   SubCutaneous every 6 hours  multivitamin 1 Tablet(s) Oral daily  mupirocin 2% Ointment 1 Application(s) Topical two times a day  ofloxacin 0.3% Solution 1 Drop(s) Right EYE every 2 hours  petrolatum Ophthalmic Ointment 1 Application(s) Both EYES every 2 hours  piperacillin/tazobactam IVPB.. 3.375 Gram(s) IV Intermittent every 8 hours  rivaroxaban 10 milliGRAM(s) Enteral Tube daily  senna Syrup 10 milliLiter(s) Oral at bedtime  sertraline 50 milliGRAM(s) Oral daily    MEDICATIONS  (PRN):  morphine  - Injectable 2 milliGRAM(s) IV Push every 4 hours PRN Mild Pain (1 - 3)  morphine  - Injectable 2 milliGRAM(s) IV Push every 8 hours PRN Moderate Pain (4 - 6)      ALLERGIES:  Allergies    Bactrim DS (Rash)    Intolerances        LABS:                        12.0   10.48 )-----------( 279      ( 23 Feb 2023 00:56 )             40.0     Hemoglobin: 12.0 g/dL (02-23 @ 00:56)  Hemoglobin: 11.4 g/dL (02-22 @ 00:21)  Hemoglobin: 11.8 g/dL (02-21 @ 00:26)  Hemoglobin: 12.3 g/dL (02-20 @ 00:42)  Hemoglobin: 13.8 g/dL (02-18 @ 22:53)    CBC Full  -  ( 23 Feb 2023 00:56 )  WBC Count : 10.48 K/uL  RBC Count : 4.43 M/uL  Hemoglobin : 12.0 g/dL  Hematocrit : 40.0 %  Platelet Count - Automated : 279 K/uL  Mean Cell Volume : 90.3 fl  Mean Cell Hemoglobin : 27.1 pg  Mean Cell Hemoglobin Concentration : 30.0 gm/dL  Auto Neutrophil # : 7.20 K/uL  Auto Lymphocyte # : 1.68 K/uL  Auto Monocyte # : 0.79 K/uL  Auto Eosinophil # : 0.67 K/uL  Auto Basophil # : 0.05 K/uL  Auto Neutrophil % : 68.7 %  Auto Lymphocyte % : 16.0 %  Auto Monocyte % : 7.5 %  Auto Eosinophil % : 6.4 %  Auto Basophil % : 0.5 %    02-23    138  |  105  |  15  ----------------------------<  156<H>  4.2   |  19<L>  |  <0.30<L>    Ca    8.7      23 Feb 2023 00:57  Phos  2.9     02-23  Mg     2.0     02-23    TPro  6.8  /  Alb  3.7  /  TBili  0.6  /  DBili  x   /  AST  25  /  ALT  33  /  AlkPhos  90  02-23    Creatinine Trend: <0.30<--, <0.30<--, <0.30<--, <0.30<--, <0.30<--, <0.30<--  LIVER FUNCTIONS - ( 23 Feb 2023 00:57 )  Alb: 3.7 g/dL / Pro: 6.8 g/dL / ALK PHOS: 90 U/L / ALT: 33 U/L / AST: 25 U/L / GGT: x           PT/INR - ( 22 Feb 2023 00:21 )   PT: 12.8 sec;   INR: 1.10 ratio         PTT - ( 22 Feb 2023 00:21 )  PTT:29.5 sec    hs Troponin:    ABG - ( 23 Feb 2023 00:50 )  pH, Arterial: 7.38  pH, Blood: x     /  pCO2: 42    /  pO2: 121   / HCO3: 25    / Base Excess: -0.4  /  SaO2: 99.4                00:50 - ABG - pH: 7.38  |  pCO2: 42    |  pO2: 121   | Lactate:       | BE: -0.4         CSF:                      EKG:   MICROBIOLOGY:    IMAGING:      Labs, imaging, EKG personally reviewed    RADIOLOGY & ADDITIONAL TESTS: Reviewed.

## 2023-02-23 NOTE — DISCHARGE NOTE NURSING/CASE MANAGEMENT/SOCIAL WORK - NSDCPEFALRISK_GEN_ALL_CORE
For information on Fall & Injury Prevention, visit: [email or web address] OR  [email or web address] OR  https://www.cdc.gov/steadi/patient.html

## 2023-02-23 NOTE — PROGRESS NOTE ADULT - ASSESSMENT
65F with advanced ALS (baseline nonverbal, communicates minimally through eye movements, PEG, vent dependent, trach upsized 8/2022 with CT surgery), chronic hypoxic/hypercapnic respiratory failure, HLD; BIBEMS for hypoxia to 66% after vent malfunction at home; found to have sepsis, likely  source; admitted to MICU for further management.     PLAN:    NEURO  # workup for anoxic brain injury  - CTH - no acute finding  - c/w home diazepam  - c/w home sertraline    # advanced ALS  - baseline nonverbal, awake, communication with eye movements has been declining in recent months    CARDIOVASCULAR  - sinus tachycardia iso sepsis, now improved    RESPIRATORY  # tracheostomy  - trach changed in ED, additional change by ENT  - Briana    GI/NUTRITION  # PEG  - feeds, site care, bowel reg    /RENAL  - No active issues.     SKIN  - c/w Lacrilube, f/u home eyedrops/ointment  - f/u home nystatin    ID  # sepsis  - CXR with retrocardiac opacity, could not rule out underlying infection. UTI also possible source  - F/u BCx, UCx  - MRSA PCR positive - mupirocin  - Allergy: rash with Bactrim   - Zosyn 4/5 days  - consider sputum Cx if able    ENDOCRINE  - Glucose levels controlled  - A1c 4.9    HEMATOLOGIC  # leukocytosis   - iso sepsis, improving  # thrombocytosis  - possibly reactive    DVT PPX  - [id number] QD    OPHTHO  Corneal ulcer  - Concerned for corneal ulcer, ophtho consulted  - eye culture with MRSA and stpah epi  - c/w Vanc, ofloxacin, and erythromycin eyedrops q2hrs.   - d/c tobramycin  - f/u optho    ETHICS  - spouse considering DNR/DNI. Full code for now.    Dispo  - Listed for RCU   65F with advanced ALS (baseline nonverbal, communicates minimally through eye movements, PEG, vent dependent, trach upsized 8/2022 with CT surgery), chronic hypoxic/hypercapnic respiratory failure, HLD; BIBEMS for hypoxia to 66% after vent malfunction at home; found to have sepsis, likely  source; admitted to MICU for further management.     PLAN:    NEURO  # workup for anoxic brain injury  - CTH - no acute finding  - c/w home diazepam  - c/w home sertraline    # advanced ALS  - baseline nonverbal, awake, communication with eye movements has been declining in recent months    CARDIOVASCULAR  - sinus tachycardia iso sepsis, now improved    RESPIRATORY  # tracheostomy  - trach changed in ED, additional change by ENT  - Briana    GI/NUTRITION  # PEG  - feeds, site care, bowel reg    /RENAL  - No active issues.     SKIN  - c/w Lacrilube, f/u home eyedrops/ointment  - f/u home nystatin    ID  # sepsis  - CXR with retrocardiac opacity, could not rule out underlying infection. UTI also possible source  - F/u BCx, UCx  - MRSA PCR positive - mupirocin  - Allergy: rash with Bactrim   - d/c Zosyn s/p 5/5?  - consider sputum Cx if able    ENDOCRINE  - Glucose levels controlled  - A1c 4.9    HEMATOLOGIC  # leukocytosis   - iso sepsis, improving  # thrombocytosis  - possibly reactive    DVT PPX  - [id number] QD    OPHTHO  Corneal ulcer  - Concerned for corneal ulcer, ophtho consulted  - eye culture with MRSA and stpah epi  -    ETHICS  - spouse considering DNR/DNI. Full code for now.    Dispo  - Listed for RCU   65F with advanced ALS (baseline nonverbal, communicates minimally through eye movements, PEG, vent dependent, trach upsized 8/2022 with CT surgery), chronic hypoxic/hypercapnic respiratory failure, HLD; BIBEMS for hypoxia to 66% after vent malfunction at home; found to have sepsis, likely  source; admitted to MICU for further management.     PLAN:    NEURO  # workup for anoxic brain injury  - CTH - no acute finding  - c/w home diazepam  - c/w home sertraline    # advanced ALS  - baseline nonverbal, awake, communication with eye movements has been declining in recent months    CARDIOVASCULAR  - sinus tachycardia iso sepsis, now improved    RESPIRATORY  # tracheostomy  - trach changed in ED, additional change by ENT  - Briana    GI/NUTRITION  # PEG  - feeds, site care, bowel reg    /RENAL  - No active issues.     SKIN  - c/w Lacrilube, f/u home eyedrops/ointment  - f/u home nystatin    ID  # sepsis  - CXR with retrocardiac opacity, could not rule out underlying infection. UTI also possible source  - F/u BCx, UCx  - MRSA PCR positive - mupirocin  - Allergy: rash with Bactrim   - d/c Zosyn s/p 5/5?  - consider sputum Cx if able    ENDOCRINE  - Glucose levels controlled  - A1c 4.9    HEMATOLOGIC  # leukocytosis   - iso sepsis, improving  # thrombocytosis  - possibly reactive    DVT PPX  - [id number] QD    OPHTHO  Corneal ulcer  - Concerned for corneal ulcer, ophtho consulted  - eye culture with MRSA and stpah epi  - STOP fortified vancomycin and fortified tobramycin  - Start oflox qid to both eyes  - Start Erythromycin q2h to both eyes    ETHICS  - spouse considering DNR/DNI. Full code for now.    Dispo  - Listed for RCU   65F with advanced ALS (baseline nonverbal, communicates minimally through eye movements, PEG, vent dependent, trach upsized 8/2022 with CT surgery), chronic hypoxic/hypercapnic respiratory failure, HLD; BIBEMS for hypoxia to 66% after vent malfunction at home; found to have sepsis, likely  source; admitted to MICU for further management.     PLAN:    NEURO  # workup for anoxic brain injury  - CTH - no acute finding  - c/w home diazepam  - c/w home sertraline    # advanced ALS  - baseline nonverbal, awake, communication with eye movements has been declining in recent months    CARDIOVASCULAR  - sinus tachycardia iso sepsis, now improved    RESPIRATORY  # tracheostomy  - trach changed in ED, additional change by ENT  - Briana    GI/NUTRITION  # PEG  - feeds, site care, bowel reg    /RENAL  - No active issues.     SKIN  - c/w Lacrilube, f/u home eyedrops/ointment  - f/u home nystatin    ID  # sepsis  - CXR with retrocardiac opacity, could not rule out underlying infection. UTI also possible source  - F/u BCx, UCx  - MRSA PCR positive - mupirocin  - Allergy: rash with Bactrim   - d/c Zosyn s/p 5/5  - consider sputum Cx if able    ENDOCRINE  - Glucose levels controlled  - A1c 4.9    HEMATOLOGIC  # leukocytosis   - iso sepsis, improving  # thrombocytosis  - possibly reactive    DVT PPX  - [id number] QD    OPHTHO  Corneal ulcer  - Concerned for corneal ulcer, ophtho consulted  - eye culture with MRSA and stpah epi  - STOP fortified vancomycin and fortified tobramycin  - Start oflox qid to both eyes  - Start Erythromycin q2h to both eyes  - f/u outpatient in 1 week    ETHICS  - spouse considering DNR/DNI. Full code for now.    Dispo  - Listed for RCU, possible dc today.

## 2023-02-23 NOTE — DISCHARGE NOTE PROVIDER - CARE PROVIDER_API CALL
Pranav Newman  CRITICAL CARE MEDICINE  [address], [address]  [place] [address]  Phone: [phone]  Fax: [phone]  Follow Up Time:     Marck Dunbar)  Surgery; Thoracic Surgery  [address], [place] [address]  Phone: [phone]  Fax: [phone]  Follow Up Time:

## 2023-02-23 NOTE — PROGRESS NOTE ADULT - ATTENDING COMMENTS
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 Plan as above. F/u cultures.
65F Hx Advanced ALS (Non-Verbal, Funct Quad), Parkinson, Trach/PEG/Vent Dependency on Home Vent with 24Hr HHA, Chronic HPHC Resp Failure found Unresponsive and Cyanotic after Vent Machine malfunction and BIB EMS to ED.     - Acute on Chronic HPHC Resp Failure on Vent Support  - Trache issues, changed to a 8 shiley XLT by ENT team. Thoracic surgery (Dr. Owens) to manage.   - Continue complete ventilator support for acute on chronic respiratory failure  - Bronchoscopy yesterday revealed large clots, therapeutically aspirated. Probably traumatic in the setting of the acute events.   - Empiric ABx Coverage for Aspiration and UA +ve UTI   - Enteral Feed plan via PEG   - Corneal abrasion, opathalmology cx, eye drops recommended. Also recommended to keep eyes closed at all times as of now.   - Closely monitor I&O and Renal Function   - DVT PPx with LVX   - GOC - Full Code per  at bedside .
65F Hx Advanced ALS (Non-Verbal, Funct Quad), Parkinson, Trach/PEG/Vent Dependency on Home Vent with 24Hr HHA, Chronic HPHC Resp Failure found Unresponsive and Cyanotic after Vent Machine malfunction and BIB EMS to ED.     - Acute on Chronic HPHC Resp Failure on Vent Support  - Trache issues, changed to a 8 shiley XLT by ENT team. Thoracic surgery (Dr. Owens) to manage.   - Empiric ABx Coverage for Aspiration and UA +ve UTI   - Enteral Feed plan via PEG   - Corneal abrasion, opathalmology cx.   - Closely monitor I&O and Renal Function   - DVT PPx with LVX   - GOC - Full Code per  at bedside
65F Hx Advanced ALS (Non-Verbal, Funct Quad), Parkinson, Trach/PEG/Vent Dependency on Home Vent, Resp Failure found Unresponsive and cyanotic after Vent  malfunction.    - Acute on Chronic hypoxic Resp Failure on vent, cont support, titrate to maintain sat>90% and ph in goal range  - s/p 8 shiley XLT by ENT team on admission  - s/p Bronchoscopy - revealed clots initially, no further bleeding noted  - BAL w/ PsA, completed zosyn course    - minimal suctioning requirements now  -  cont enteral Feed plan via PEG   - Corneal abrasion, keratitis, f/u ophthalmology reccs - cont eye drops and eye closure   - DVT PPx     Pt planned for d/c home - family and home nurse have been managing pt care for many years w/o difficulty  Planned for outpt Optho f/u in 1 week. Pt also has home visits by Pulm and Thoracic as outpt and they will cont to follow on d/c
65F Hx Advanced ALS (Non-Verbal, Funct Quad), Parkinson, Trach/PEG/Vent Dependency on Home Vent, Resp Failure found Unresponsive and cyanotic after Vent  malfunction.    - Acute on Chronic hypoxic Resp Failure on vent, cont support, titrate to maintain sat>90% and ph in goal range  - s/p 8 shiley XLT by ENT team on admission  - s/p Bronchoscopy  revealed clots, no active bleeding now, only blood tinged secretions  - BAL w/ PsA, f/u sensitivites, cont zosyn for now  -  cont enteral Feed plan via PEG   - Corneal abrasion, keratitis, f/u ophthalmology reccs - cont eye drops and eye closure    - Ucx + serratia, f/u sensitivities   - Closely monitor I&O and Renal Function   - DVT PPx
65F Hx Advanced ALS (Non-Verbal, Funct Quad), Parkinson, Trach/PEG/Vent Dependency on Home Vent, Resp Failure found Unresponsive and cyanotic after Vent  malfunction.    - Acute on Chronic hypoxic Resp Failure on vent, cont support, titrate to maintain sat>90% and ph in goal range  - s/p 8 shiley XLT by ENT team on admission  - will f/u ENT but likely no further interventions needed  - s/p Bronchoscopy - revealed clots initially, no further bleeding noted  - BAL w/ PsA, complete zosyn course tomorrow  -  cont enteral Feed plan via PEG   - Corneal abrasion, keratitis, f/u ophthalmology reccs - cont eye drops and eye closure    - Ucx + serratia   - Closely monitor I&O and Renal Function   - DVT PPx

## 2023-02-23 NOTE — DISCHARGE NOTE PROVIDER - HOSPITAL COURSE
HPI:  65F with advanced ALS (baseline nonverbal, communicates minimally through eye movements, PEG, vent dependent, trach upsized 8/2022 with CT surgery), chronic hypoxic/hypercapnic respiratory failure, HLD; BIBEMS for hypoxia to 66% after vent malfunction at home, admitted to MICU for further management.    On EMS arrival, ETCO2 high 90s. EMS provided bag-mask ventilation with improvement of O2 sat to 100% and ETCO2 to 55. In the ED, T 100.4 rectal, HR 110s to low 130s, -186, satting well on vent. W 25.66, Plt 466. Mildly elevated liver enzymes: , AST 69, ALT 59. VBG: pH 7.17 pCO2 66 lactate 3.2. UA +++protein +LE +WBC +++bacteria +hyaline casts ++glucose. Received LR Bolus 1.5L, Zosyn. Trach changed due to malpositioning. (18 Feb 2023 23:42)   HPI:  65F with advanced ALS (baseline nonverbal, communicates minimally through eye movements, PEG, vent dependent, trach upsized 8/2022 with CT surgery), chronic hypoxic/hypercapnic respiratory failure, HLD; BIBEMS for hypoxia to 66% after vent malfunction at home, admitted to MICU for further management.    On EMS arrival, ETCO2 high 90s. EMS provided bag-mask ventilation with improvement of O2 sat to 100% and ETCO2 to 55. In the ED, T 100.4 rectal, HR 110s to low 130s, -186, satting well on vent. W 25.66, Plt 466. Mildly elevated liver enzymes: , AST 69, ALT 59. VBG: pH 7.17 pCO2 66 lactate 3.2. UA +++protein +LE +WBC +++bacteria +hyaline casts ++glucose. Received LR Bolus 1.5L, Zosyn. Trach changed due to malpositioning. (18 Feb 2023 23:42)    Hospital/MICU Course  Patient treated with 5 day course of antibiotics for possible UTI (Serratia cultured from urine) and PsA in sputum. 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 They recommended taping of eyes as well as around the clock eye drops with close follow-up with Opthalmology. Patient was bedboarded to the RCU but given no bed was available after patient completed 5d of antibiotics she was cleared for discharge by Optho with close Optho follow-up. This was discussed with the patient's spouse who reports has optho that may see patient at her home.    She is hemodynamically stable and ready for discharge.

## 2023-02-23 NOTE — DISCHARGE NOTE PROVIDER - NSDCCPCAREPLAN_GEN_ALL_CORE_FT
PRINCIPAL DISCHARGE DIAGNOSIS  Diagnosis: Acute respiratory failure with hypoxia  Assessment and Plan of Treatment: You presented to the hospital with low oxygen levels after vent malfunction at home. Your tracheostomy was evaluated by the ENT doctors and thoracic surgeons. Some bleeding was noted that resolved with hemospray. You were given a 5d course of antibiotics for possible pneumonia.      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 You were seen by the eye doctors. Please see the exam and recommendations below per the eye doctors.  - 6mm of lagophthalmos OU with inferior stromal scarring and neovascularization, no thinning  - Small epithelial defects in both eye healed today, clinically appear exposure related and not infectious/ulcer.   - Cultures sent for BOTH eyes, growing MRSA and staph epi - likely contaminate.   - Start oflox qid to both eyes  - Start Erythromycin q2h to both eyes  - Recommend taping of eyelids AT ALL TIMES WITH Tegaderm, creating a moister chamber, given extent of exposure. Instructions given to nurse.

## 2023-03-01 ENCOUNTER — RX RENEWAL (OUTPATIENT)
Age: 68
End: 2023-03-01

## 2023-03-08 ENCOUNTER — RX RENEWAL (OUTPATIENT)
Age: 68
End: 2023-03-08

## 2023-03-16 ENCOUNTER — RX RENEWAL (OUTPATIENT)
Age: 68
End: 2023-03-16

## 2023-03-16 RX ORDER — SERTRALINE HYDROCHLORIDE 50 MG/1
50 TABLET, FILM COATED ORAL
Qty: 90 | Refills: 1 | Status: ACTIVE | COMMUNITY
Start: 2018-11-04 | End: 1900-01-01

## 2023-04-05 NOTE — HISTORY OF PRESENT ILLNESS
[TextBox_4] : Patient seen at home for urgent visit.  Change in pulmonary status increased congestion tachycardia fever to 102.  Rapid COVID test done at bedside negative.   had COVID last week.  Had recent mild exacerbation of symptoms which resolved spontaneously.\par \par Remains ventilator dependent and dependent on tube feeding

## 2023-04-05 NOTE — PHYSICAL EXAM
[TextBox_2] : Noncommunicative eyes open appears distressed [TextBox_68] : Diffuse bilateral rhonchi

## 2023-04-10 ENCOUNTER — RX RENEWAL (OUTPATIENT)
Age: 68
End: 2023-04-10

## 2023-04-10 RX ORDER — NEOMYCIN SULFATE AND POLYMYXIN B SULFATE, BACITRACIN ZINC AND HYDROCORTISONE 3.5; 10000; 400; 1 MG/G; [USP'U]/G; [USP'U]/G; MG/G
1 OINTMENT OPHTHALMIC
Qty: 3.5 | Refills: 5 | Status: ACTIVE | COMMUNITY
Start: 2023-04-10 | End: 1900-01-01

## 2023-04-11 ENCOUNTER — RX RENEWAL (OUTPATIENT)
Age: 68
End: 2023-04-11

## 2023-04-11 RX ORDER — NEOMYCIN SULFATE AND POLYMYXIN B SULFATE, BACITRACIN ZINC AND HYDROCORTISONE 3.5; 10000; 400; 1 MG/G; [USP'U]/G; [USP'U]/G; MG/G
1 OINTMENT OPHTHALMIC
Qty: 1 | Refills: 0 | Status: ACTIVE | COMMUNITY
Start: 2019-11-17 | End: 1900-01-01

## 2023-04-11 RX ORDER — ERYTHROMYCIN 5 MG/G
5 OINTMENT OPHTHALMIC
Qty: 3.5 | Refills: 0 | Status: ACTIVE | COMMUNITY
Start: 2023-04-11 | End: 1900-01-01

## 2023-04-24 ENCOUNTER — RX RENEWAL (OUTPATIENT)
Age: 68
End: 2023-04-24

## 2023-05-04 RX ORDER — CIPROFLOXACIN 500 MG/5ML
500 MG/5ML KIT ORAL
Qty: 1 | Refills: 0 | Status: ACTIVE | COMMUNITY
Start: 2022-05-16 | End: 1900-01-01

## 2023-05-09 ENCOUNTER — RX RENEWAL (OUTPATIENT)
Age: 68
End: 2023-05-09

## 2023-07-24 ENCOUNTER — INPATIENT (INPATIENT)
Facility: HOSPITAL | Age: 68
LOS: 10 days | Discharge: HOME HEALTH | DRG: 870 | End: 2023-08-04
Attending: INTERNAL MEDICINE | Admitting: SPECIALIST
Payer: MEDICARE

## 2023-07-24 VITALS — HEART RATE: 107 BPM | OXYGEN SATURATION: 100 %

## 2023-07-24 DIAGNOSIS — Z93.0 TRACHEOSTOMY STATUS: Chronic | ICD-10-CM

## 2023-07-24 DIAGNOSIS — Z93.1 GASTROSTOMY STATUS: Chronic | ICD-10-CM

## 2023-07-24 DIAGNOSIS — Z43.1 ENCOUNTER FOR ATTENTION TO GASTROSTOMY: Chronic | ICD-10-CM

## 2023-07-24 LAB
APPEARANCE UR: ABNORMAL
APTT BLD: 30.1 SEC — SIGNIFICANT CHANGE UP (ref 27.5–35.5)
BILIRUB UR-MCNC: NEGATIVE — SIGNIFICANT CHANGE UP
COLOR SPEC: YELLOW — SIGNIFICANT CHANGE UP
DIFF PNL FLD: NEGATIVE — SIGNIFICANT CHANGE UP
GAS PNL BLDV: SIGNIFICANT CHANGE UP
GLUCOSE UR QL: NEGATIVE — SIGNIFICANT CHANGE UP
HCT VFR BLD CALC: 49.4 % — HIGH (ref 34.5–45)
HGB BLD-MCNC: 16.2 G/DL — HIGH (ref 11.5–15.5)
INR BLD: 1.05 RATIO — SIGNIFICANT CHANGE UP (ref 0.88–1.16)
KETONES UR-MCNC: NEGATIVE — SIGNIFICANT CHANGE UP
LEUKOCYTE ESTERASE UR-ACNC: NEGATIVE — SIGNIFICANT CHANGE UP
MCHC RBC-ENTMCNC: 27.3 PG — SIGNIFICANT CHANGE UP (ref 27–34)
MCHC RBC-ENTMCNC: 32.8 GM/DL — SIGNIFICANT CHANGE UP (ref 32–36)
MCV RBC AUTO: 83.3 FL — SIGNIFICANT CHANGE UP (ref 80–100)
NITRITE UR-MCNC: NEGATIVE — SIGNIFICANT CHANGE UP
PH UR: 7 — SIGNIFICANT CHANGE UP (ref 5–8)
PLATELET # BLD AUTO: 402 K/UL — HIGH (ref 150–400)
PROT UR-MCNC: >600
PROTHROM AB SERPL-ACNC: 12.1 SEC — SIGNIFICANT CHANGE UP (ref 10.5–13.4)
RBC # BLD: 5.93 M/UL — HIGH (ref 3.8–5.2)
RBC # FLD: 17.5 % — HIGH (ref 10.3–14.5)
SP GR SPEC: 1.01 — SIGNIFICANT CHANGE UP (ref 1.01–1.02)
UROBILINOGEN FLD QL: NEGATIVE — SIGNIFICANT CHANGE UP
WBC # BLD: 23.73 K/UL — HIGH (ref 3.8–10.5)
WBC # FLD AUTO: 23.73 K/UL — HIGH (ref 3.8–10.5)

## 2023-07-24 PROCEDURE — 99285 EMERGENCY DEPT VISIT HI MDM: CPT | Mod: GC

## 2023-07-24 PROCEDURE — 71045 X-RAY EXAM CHEST 1 VIEW: CPT | Mod: 26

## 2023-07-24 RX ORDER — SODIUM CHLORIDE 9 MG/ML
1500 INJECTION, SOLUTION INTRAVENOUS ONCE
Refills: 0 | Status: COMPLETED | OUTPATIENT
Start: 2023-07-24 | End: 2023-07-24

## 2023-07-24 RX ORDER — CEFEPIME 1 G/1
2000 INJECTION, POWDER, FOR SOLUTION INTRAMUSCULAR; INTRAVENOUS ONCE
Refills: 0 | Status: COMPLETED | OUTPATIENT
Start: 2023-07-24 | End: 2023-07-24

## 2023-07-24 RX ORDER — CHLORHEXIDINE GLUCONATE 213 G/1000ML
15 SOLUTION TOPICAL EVERY 12 HOURS
Refills: 0 | Status: DISCONTINUED | OUTPATIENT
Start: 2023-07-24 | End: 2023-08-04

## 2023-07-24 RX ORDER — ACETAMINOPHEN 500 MG
675 TABLET ORAL ONCE
Refills: 0 | Status: COMPLETED | OUTPATIENT
Start: 2023-07-24 | End: 2023-07-24

## 2023-07-24 RX ORDER — VANCOMYCIN HCL 1 G
1000 VIAL (EA) INTRAVENOUS ONCE
Refills: 0 | Status: COMPLETED | OUTPATIENT
Start: 2023-07-24 | End: 2023-07-24

## 2023-07-24 RX ADMIN — CEFEPIME 100 MILLIGRAM(S): 1 INJECTION, POWDER, FOR SOLUTION INTRAMUSCULAR; INTRAVENOUS at 23:40

## 2023-07-24 RX ADMIN — Medication 270 MILLIGRAM(S): at 23:30

## 2023-07-24 NOTE — ED PROVIDER NOTE - CLINICAL SUMMARY MEDICAL DECISION MAKING FREE TEXT BOX
68-year-old female past medical history ALS nonverbal at baseline Parkinson's trach PEG vent dependent on home vent brought in by EMS for  increased heart rate and lethargy for few days.   Cardiac to 100s febrile 100.3 other vital signs stable.   On focal exam.  Concern for infectious etiology UTI versus URI versus PNA.  Plan Labs UA CXR EKG IVF ABX meds MICU consult and admission.

## 2023-07-24 NOTE — ED PROVIDER NOTE - PHYSICAL EXAMINATION
Gen: NAD, non-toxic appearing  Head: normal appearing  HEENT: normal conjunctiva, oral mucosa moist  Lung: no respiratory distress, CTA b/l     CV: tachy 100s and rhythm, no murmurs  Abd: soft, non distended, non tender   MSK: no visible deformities  Neuro: No focal deficits  Skin: Warm

## 2023-07-24 NOTE — ED PROVIDER NOTE - WR ORDER DATE AND TIME 1
Patient is admitted from our clinic for sotalol loading.    Past medical history significant for appendectomy cholecystectomy and knee surgery and esophageal stricture.  He also has atrial fibrillation status post AV A. fib ablation.  Was also implanted with a watchman device because of recurrent falls he is off anticoagulation    Patient History: Home Medications  psyllium (Metamucil) 1.7 g, PO, Wafer, qDay  07/29/2020 13:44  carbidopa-levodopa (carbidopa-levodopa 25 mg-100 mg oral tablet) 1 tab(s), PO, TID, # 270 tab(s), Tab  07/29/2020 13:35  docusate (Colace 100 mg oral capsule) 100 mg = 1 cap, PO, Capsule, qDay, PRN for constipation, # 20 cap  07/29/2020 13:35  polyethylene glycol 3350 (MiraLax 17 g oral powder) 17 g = 1 packet, PO, Packet, qDay, dissolve in water before taking  07/29/2020 13:35  melatonin (Melatonin 1 mg oral tablet) 1 mg = 1 tab, PO, Tablet, qHS, PRN for insomnia, stop now per anesthesia guidelines, # 90 tab  07/11/2018 15:30    Review of systems unremarkable    Social history is noncontributory    On physical examination  Vital Signs (last 24 hrs)_____ Last Charted___________Minimum____________ Maximum____________  Temp    L 97.5 (JUL 29 15:41) L 97.5 (JUL 29 15:41) 97.8  (JUL 29 11:54)  Heart Rate   67  (JUL 29 15:41) 64  (JUL 29 11:54) 67  (JUL 29 15:41)  Resp Rate       18  (JUL 29 15:41) 16  (JUL 29 11:54) 18  (JUL 29 15:41)  SBP    139  (JUL 29 15:41) 139  (JUL 29 15:41) H 148 (JUL 29 11:54)  DBP    74  (JUL 29 15:41) 70  (JUL 29 11:54) 74  (JUL 29 15:41)  Lungs are clear    Card examinations unremarkable  Abdominal central nervous system examination normal  Labs (Last four charted values)  WBC                  5.0 (JUL 29)   Hgb                  13.7 (JUL 29)   Hct                  41 (JUL 29)   Plt                  L 146 (JUL 29)   Na                   141 (JUL 29)   K                    4.2 (JUL 29)   CO2                  22 (JUL 29)   Cl                   107 (JUL 29)   Cr                    0.94 (JUL 29)   BUN                  19 (JUL 29)   Glucose              83 (JUL 29)   Ca                   9.3 (JUL 29)   PT                   10.6 (JUL 29)   INR                  1.0 (JUL 29)   PTT                  28 (JUL 29)     EKG shows a paced V sensed rhythm with right bundle branch block QT and QTCs are within normal limits    For details please see my clinic note    Plan is for patient to be started on sotalol at 120 mg p.o. twice daily and continue to monitor QT and QTC intervals           Electronically Signed On 07.29.2020 17:21  ___________________________________________________   Charli Kaye MD     24-Jul-2023 22:43

## 2023-07-24 NOTE — ED ADULT NURSE NOTE - NSFALLRISKINTERV_ED_ALL_ED

## 2023-07-24 NOTE — ED PROVIDER NOTE - OBJECTIVE STATEMENT
68-year-old female past medical history ALS nonverbal at baseline Parkinson's trach PEG vent dependent on home vent brought in by EMS for  increased heart rate and lethargy for few days.  Patient was seen by outpatient pulmonologist and was noted to be tachycardic and was sent to ED for further work-up.  Per  at bedside patient's been more lethargic over the last several days.  Has had symptoms similar to this in the past secondary to urinary tract infections.   Limited history.

## 2023-07-24 NOTE — ED PROVIDER NOTE - ATTENDING CONTRIBUTION TO CARE
65F w/ hx of Advanced ALS (Non-Verbal, Funct Quad), Parkinson, Trach/PEG/Vent Dependency on Home Vent, presents to the ER w/ increased lethargy and tachycardia, was seen by Dr. Kelly and referred to the ER for further management w/ concern for infection pt w/ trach no increased vent settings. pt w/ increased HR at home in the 110s, pt w/ prior hx of urinary sepsis which family  and aide at bedside think is the eitiology of her symtpoms. pt w/ PEG tube, has trach inplace,   on exam, pt is w/ eyes open, not following commands, she has diminished breath sounds b/l, she has PEG tube and no surrounding erythema w/ soft abdomen, has intact passive ROM Of the arms and legs, pt w/ no lower leg edema. plan for labs imaging and reassessment concern for urinary sepsis, PT tba for further management of tachycardia. 65F w/ hx of Advanced ALS (Non-Verbal, Funct Quad), Parkinson, Trach/PEG/Vent Dependency on Home Vent, presents to the ER w/ increased lethargy and tachycardia, was seen by Dr. Kelly and referred to the ER for further management w/ concern for infection pt w/ trach no increased vent settings. pt w/ increased HR at home in the 110s, pt w/ prior hx of urinary sepsis which family  and aide at bedside think is the eitiology of her symtpoms. pt w/ PEG tube, has trach inplace,   on exam, pt is w/ eyes open, not following commands, she has diminished breath sounds b/l, she has PEG tube and no surrounding erythema w/ soft abdomen, has intact passive ROM Of the arms and legs, pt w/ no lower leg edema. plan for labs imaging and reassessment concern for urinary sepsis, PT tba for further management of tachycardia. pt found to be rectally febrile 100.3,

## 2023-07-24 NOTE — ED ADULT NURSE NOTE - OBJECTIVE STATEMENT
patient is a 69 y/o F with hx of ALS (ventilator dependent), parkinsons, s/p peg tube placement BIBEMS from home c/o rapid heart beat. patient accompanied by son at bedside to provide history. per EMS and patient son patient has become increasingly lethargic with increased HR to 110s and was directed to ED by PCP for concern for urosepsis, patient with history of UTIs. per EMS patient hypotensive to 80s/40s and patient received ~300 cc NS en route.  patient on chronic vent with peg tube in place. patient awake however not able to follow commands, strong peripheral pulses present. patient nonverbal. abdomen soft and nondistended. skin intact. b/l 20 G IVs inserted. placed on CM in NSR. MD Solomonosian at bedside to assess patient. patient and family at bedside updated on plan of care. comfort and safety maintained

## 2023-07-24 NOTE — ED ADULT NURSE REASSESSMENT NOTE - NS ED NURSE REASSESS COMMENT FT1
Patient straight cathed for urine using sterile technique. Second RN present to confirm sterility. Explained procedure as it was being done - Pt tolerated procedure well. Sterile specimens collected and sent to lab as ordered. drained aprox 500 ml of urine. Comfort and safety provided.

## 2023-07-25 DIAGNOSIS — R53.83 OTHER FATIGUE: ICD-10-CM

## 2023-07-25 LAB
ALBUMIN SERPL ELPH-MCNC: 2.9 G/DL — LOW (ref 3.3–5)
ALBUMIN SERPL ELPH-MCNC: 3.2 G/DL — LOW (ref 3.3–5)
ALBUMIN SERPL ELPH-MCNC: 3.3 G/DL — SIGNIFICANT CHANGE UP (ref 3.3–5)
ALBUMIN SERPL ELPH-MCNC: 3.4 G/DL — SIGNIFICANT CHANGE UP (ref 3.3–5)
ALBUMIN SERPL ELPH-MCNC: 3.8 G/DL — SIGNIFICANT CHANGE UP (ref 3.3–5)
ALP SERPL-CCNC: 103 U/L — SIGNIFICANT CHANGE UP (ref 40–120)
ALP SERPL-CCNC: 106 U/L — SIGNIFICANT CHANGE UP (ref 40–120)
ALP SERPL-CCNC: 151 U/L — HIGH (ref 40–120)
ALP SERPL-CCNC: 93 U/L — SIGNIFICANT CHANGE UP (ref 40–120)
ALP SERPL-CCNC: 94 U/L — SIGNIFICANT CHANGE UP (ref 40–120)
ALT FLD-CCNC: 37 U/L — SIGNIFICANT CHANGE UP (ref 10–45)
ALT FLD-CCNC: 37 U/L — SIGNIFICANT CHANGE UP (ref 10–45)
ALT FLD-CCNC: 40 U/L — SIGNIFICANT CHANGE UP (ref 10–45)
ALT FLD-CCNC: 41 U/L — SIGNIFICANT CHANGE UP (ref 10–45)
ALT FLD-CCNC: 62 U/L — HIGH (ref 10–45)
ANION GAP SERPL CALC-SCNC: 12 MMOL/L — SIGNIFICANT CHANGE UP (ref 5–17)
ANION GAP SERPL CALC-SCNC: 13 MMOL/L — SIGNIFICANT CHANGE UP (ref 5–17)
ANION GAP SERPL CALC-SCNC: 23 MMOL/L — HIGH (ref 5–17)
APTT BLD: 28.2 SEC — SIGNIFICANT CHANGE UP (ref 27.5–35.5)
AST SERPL-CCNC: 105 U/L — HIGH (ref 10–40)
AST SERPL-CCNC: 27 U/L — SIGNIFICANT CHANGE UP (ref 10–40)
AST SERPL-CCNC: 32 U/L — SIGNIFICANT CHANGE UP (ref 10–40)
AST SERPL-CCNC: 35 U/L — SIGNIFICANT CHANGE UP (ref 10–40)
AST SERPL-CCNC: 39 U/L — SIGNIFICANT CHANGE UP (ref 10–40)
BASE EXCESS BLDV CALC-SCNC: -0.5 MMOL/L — SIGNIFICANT CHANGE UP (ref -2–3)
BASE EXCESS BLDV CALC-SCNC: -1.7 MMOL/L — SIGNIFICANT CHANGE UP (ref -2–3)
BASE EXCESS BLDV CALC-SCNC: -1.8 MMOL/L — SIGNIFICANT CHANGE UP (ref -2–3)
BASOPHILS # BLD AUTO: 0 K/UL — SIGNIFICANT CHANGE UP (ref 0–0.2)
BASOPHILS NFR BLD AUTO: 0 % — SIGNIFICANT CHANGE UP (ref 0–2)
BILIRUB SERPL-MCNC: 0.7 MG/DL — SIGNIFICANT CHANGE UP (ref 0.2–1.2)
BILIRUB SERPL-MCNC: 0.8 MG/DL — SIGNIFICANT CHANGE UP (ref 0.2–1.2)
BILIRUB SERPL-MCNC: 0.8 MG/DL — SIGNIFICANT CHANGE UP (ref 0.2–1.2)
BILIRUB SERPL-MCNC: 0.9 MG/DL — SIGNIFICANT CHANGE UP (ref 0.2–1.2)
BILIRUB SERPL-MCNC: 1.1 MG/DL — SIGNIFICANT CHANGE UP (ref 0.2–1.2)
BUN SERPL-MCNC: 16 MG/DL — SIGNIFICANT CHANGE UP (ref 7–23)
BUN SERPL-MCNC: 16 MG/DL — SIGNIFICANT CHANGE UP (ref 7–23)
BUN SERPL-MCNC: 17 MG/DL — SIGNIFICANT CHANGE UP (ref 7–23)
BUN SERPL-MCNC: 18 MG/DL — SIGNIFICANT CHANGE UP (ref 7–23)
BUN SERPL-MCNC: 18 MG/DL — SIGNIFICANT CHANGE UP (ref 7–23)
CA-I SERPL-SCNC: 1.11 MMOL/L — LOW (ref 1.15–1.33)
CA-I SERPL-SCNC: 1.13 MMOL/L — LOW (ref 1.15–1.33)
CA-I SERPL-SCNC: 1.19 MMOL/L — SIGNIFICANT CHANGE UP (ref 1.15–1.33)
CALCIUM SERPL-MCNC: 8.6 MG/DL — SIGNIFICANT CHANGE UP (ref 8.4–10.5)
CALCIUM SERPL-MCNC: 8.8 MG/DL — SIGNIFICANT CHANGE UP (ref 8.4–10.5)
CALCIUM SERPL-MCNC: 9 MG/DL — SIGNIFICANT CHANGE UP (ref 8.4–10.5)
CALCIUM SERPL-MCNC: 9.3 MG/DL — SIGNIFICANT CHANGE UP (ref 8.4–10.5)
CALCIUM SERPL-MCNC: 9.5 MG/DL — SIGNIFICANT CHANGE UP (ref 8.4–10.5)
CHLORIDE BLDV-SCNC: 91 MMOL/L — LOW (ref 96–108)
CHLORIDE BLDV-SCNC: 93 MMOL/L — LOW (ref 96–108)
CHLORIDE BLDV-SCNC: 95 MMOL/L — LOW (ref 96–108)
CHLORIDE SERPL-SCNC: 88 MMOL/L — LOW (ref 96–108)
CHLORIDE SERPL-SCNC: 91 MMOL/L — LOW (ref 96–108)
CHLORIDE SERPL-SCNC: 93 MMOL/L — LOW (ref 96–108)
CHLORIDE SERPL-SCNC: 94 MMOL/L — LOW (ref 96–108)
CHLORIDE SERPL-SCNC: 95 MMOL/L — LOW (ref 96–108)
CO2 BLDV-SCNC: 25 MMOL/L — SIGNIFICANT CHANGE UP (ref 22–26)
CO2 BLDV-SCNC: 25 MMOL/L — SIGNIFICANT CHANGE UP (ref 22–26)
CO2 BLDV-SCNC: 28 MMOL/L — HIGH (ref 22–26)
CO2 SERPL-SCNC: 14 MMOL/L — LOW (ref 22–31)
CO2 SERPL-SCNC: 18 MMOL/L — LOW (ref 22–31)
CO2 SERPL-SCNC: 19 MMOL/L — LOW (ref 22–31)
CO2 SERPL-SCNC: 20 MMOL/L — LOW (ref 22–31)
CO2 SERPL-SCNC: 20 MMOL/L — LOW (ref 22–31)
CREAT SERPL-MCNC: <0.3 MG/DL — LOW (ref 0.5–1.3)
EGFR: 114 ML/MIN/1.73M2 — SIGNIFICANT CHANGE UP
EOSINOPHIL # BLD AUTO: 0 K/UL — SIGNIFICANT CHANGE UP (ref 0–0.5)
EOSINOPHIL NFR BLD AUTO: 0 % — SIGNIFICANT CHANGE UP (ref 0–6)
GAS PNL BLDA: SIGNIFICANT CHANGE UP
GAS PNL BLDV: 120 MMOL/L — CRITICAL LOW (ref 136–145)
GAS PNL BLDV: 121 MMOL/L — LOW (ref 136–145)
GAS PNL BLDV: 123 MMOL/L — LOW (ref 136–145)
GAS PNL BLDV: SIGNIFICANT CHANGE UP
GLUCOSE BLDV-MCNC: 104 MG/DL — HIGH (ref 70–99)
GLUCOSE BLDV-MCNC: 117 MG/DL — HIGH (ref 70–99)
GLUCOSE BLDV-MCNC: 206 MG/DL — HIGH (ref 70–99)
GLUCOSE SERPL-MCNC: 102 MG/DL — HIGH (ref 70–99)
GLUCOSE SERPL-MCNC: 131 MG/DL — HIGH (ref 70–99)
GLUCOSE SERPL-MCNC: 178 MG/DL — HIGH (ref 70–99)
GLUCOSE SERPL-MCNC: 193 MG/DL — HIGH (ref 70–99)
GLUCOSE SERPL-MCNC: 209 MG/DL — HIGH (ref 70–99)
GRAM STN FLD: SIGNIFICANT CHANGE UP
HCO3 BLDV-SCNC: 24 MMOL/L — SIGNIFICANT CHANGE UP (ref 22–29)
HCO3 BLDV-SCNC: 24 MMOL/L — SIGNIFICANT CHANGE UP (ref 22–29)
HCO3 BLDV-SCNC: 26 MMOL/L — SIGNIFICANT CHANGE UP (ref 22–29)
HCT VFR BLD CALC: 42 % — SIGNIFICANT CHANGE UP (ref 34.5–45)
HCT VFR BLDA CALC: 37 % — SIGNIFICANT CHANGE UP (ref 34.5–46.5)
HCT VFR BLDA CALC: 38 % — SIGNIFICANT CHANGE UP (ref 34.5–46.5)
HCT VFR BLDA CALC: 45 % — SIGNIFICANT CHANGE UP (ref 34.5–46.5)
HGB BLD CALC-MCNC: 12.4 G/DL — SIGNIFICANT CHANGE UP (ref 11.7–16.1)
HGB BLD CALC-MCNC: 13.7 G/DL — SIGNIFICANT CHANGE UP (ref 11.7–16.1)
HGB BLD CALC-MCNC: 15.1 G/DL — SIGNIFICANT CHANGE UP (ref 11.7–16.1)
HGB BLD-MCNC: 13.7 G/DL — SIGNIFICANT CHANGE UP (ref 11.5–15.5)
INR BLD: 1.21 RATIO — HIGH (ref 0.88–1.16)
LACTATE BLDV-MCNC: 2.5 MMOL/L — HIGH (ref 0.5–2)
LACTATE BLDV-MCNC: 2.7 MMOL/L — HIGH (ref 0.5–2)
LACTATE BLDV-MCNC: 3.1 MMOL/L — HIGH (ref 0.5–2)
LIDOCAIN IGE QN: 30 U/L — SIGNIFICANT CHANGE UP (ref 7–60)
LYMPHOCYTES # BLD AUTO: 0.21 K/UL — LOW (ref 1–3.3)
LYMPHOCYTES # BLD AUTO: 0.9 % — LOW (ref 13–44)
MAGNESIUM SERPL-MCNC: 1.7 MG/DL — SIGNIFICANT CHANGE UP (ref 1.6–2.6)
MAGNESIUM SERPL-MCNC: 1.8 MG/DL — SIGNIFICANT CHANGE UP (ref 1.6–2.6)
MAGNESIUM SERPL-MCNC: 2 MG/DL — SIGNIFICANT CHANGE UP (ref 1.6–2.6)
MAGNESIUM SERPL-MCNC: 2.1 MG/DL — SIGNIFICANT CHANGE UP (ref 1.6–2.6)
MCHC RBC-ENTMCNC: 27.5 PG — SIGNIFICANT CHANGE UP (ref 27–34)
MCHC RBC-ENTMCNC: 32.6 GM/DL — SIGNIFICANT CHANGE UP (ref 32–36)
MCV RBC AUTO: 84.2 FL — SIGNIFICANT CHANGE UP (ref 80–100)
MONOCYTES # BLD AUTO: 0.85 K/UL — SIGNIFICANT CHANGE UP (ref 0–0.9)
MONOCYTES NFR BLD AUTO: 3.6 % — SIGNIFICANT CHANGE UP (ref 2–14)
MRSA PCR RESULT.: SIGNIFICANT CHANGE UP
NEUTROPHILS # BLD AUTO: 22.02 K/UL — HIGH (ref 1.8–7.4)
NEUTROPHILS NFR BLD AUTO: 92.8 % — HIGH (ref 43–77)
NRBC # BLD: 0 /100 WBCS — SIGNIFICANT CHANGE UP (ref 0–0)
OSMOLALITY UR: 248 MOS/KG — LOW (ref 300–900)
OTHER CELLS CSF MANUAL: 12.9 ML/DL — LOW (ref 18–22)
PCO2 BLDV: 42 MMHG — SIGNIFICANT CHANGE UP (ref 39–42)
PCO2 BLDV: 42 MMHG — SIGNIFICANT CHANGE UP (ref 39–42)
PCO2 BLDV: 51 MMHG — HIGH (ref 39–42)
PH BLDV: 7.32 — SIGNIFICANT CHANGE UP (ref 7.32–7.43)
PH BLDV: 7.36 — SIGNIFICANT CHANGE UP (ref 7.32–7.43)
PH BLDV: 7.36 — SIGNIFICANT CHANGE UP (ref 7.32–7.43)
PHOSPHATE SERPL-MCNC: 1.6 MG/DL — LOW (ref 2.5–4.5)
PHOSPHATE SERPL-MCNC: 1.8 MG/DL — LOW (ref 2.5–4.5)
PHOSPHATE SERPL-MCNC: 4.2 MG/DL — SIGNIFICANT CHANGE UP (ref 2.5–4.5)
PHOSPHATE SERPL-MCNC: 6.1 MG/DL — HIGH (ref 2.5–4.5)
PLATELET # BLD AUTO: 271 K/UL — SIGNIFICANT CHANGE UP (ref 150–400)
PO2 BLDV: 34 MMHG — SIGNIFICANT CHANGE UP (ref 25–45)
PO2 BLDV: 52 MMHG — HIGH (ref 25–45)
PO2 BLDV: 89 MMHG — HIGH (ref 25–45)
POTASSIUM BLDV-SCNC: 2.3 MMOL/L — CRITICAL LOW (ref 3.5–5.1)
POTASSIUM BLDV-SCNC: 3.1 MMOL/L — LOW (ref 3.5–5.1)
POTASSIUM BLDV-SCNC: 3.3 MMOL/L — LOW (ref 3.5–5.1)
POTASSIUM SERPL-MCNC: 2.7 MMOL/L — CRITICAL LOW (ref 3.5–5.3)
POTASSIUM SERPL-MCNC: 2.9 MMOL/L — CRITICAL LOW (ref 3.5–5.3)
POTASSIUM SERPL-MCNC: 4.5 MMOL/L — SIGNIFICANT CHANGE UP (ref 3.5–5.3)
POTASSIUM SERPL-MCNC: 5.9 MMOL/L — HIGH (ref 3.5–5.3)
POTASSIUM SERPL-MCNC: 5.9 MMOL/L — HIGH (ref 3.5–5.3)
POTASSIUM SERPL-SCNC: 2.7 MMOL/L — CRITICAL LOW (ref 3.5–5.3)
POTASSIUM SERPL-SCNC: 2.9 MMOL/L — CRITICAL LOW (ref 3.5–5.3)
POTASSIUM SERPL-SCNC: 4.5 MMOL/L — SIGNIFICANT CHANGE UP (ref 3.5–5.3)
POTASSIUM SERPL-SCNC: 5.9 MMOL/L — HIGH (ref 3.5–5.3)
POTASSIUM SERPL-SCNC: 5.9 MMOL/L — HIGH (ref 3.5–5.3)
PROT SERPL-MCNC: 6.4 G/DL — SIGNIFICANT CHANGE UP (ref 6–8.3)
PROT SERPL-MCNC: 6.6 G/DL — SIGNIFICANT CHANGE UP (ref 6–8.3)
PROT SERPL-MCNC: 6.6 G/DL — SIGNIFICANT CHANGE UP (ref 6–8.3)
PROT SERPL-MCNC: 6.8 G/DL — SIGNIFICANT CHANGE UP (ref 6–8.3)
PROT SERPL-MCNC: 8.6 G/DL — HIGH (ref 6–8.3)
PROTHROM AB SERPL-ACNC: 13.9 SEC — HIGH (ref 10.5–13.4)
RAPID RVP RESULT: SIGNIFICANT CHANGE UP
RBC # BLD: 4.99 M/UL — SIGNIFICANT CHANGE UP (ref 3.8–5.2)
RBC # FLD: 16.7 % — HIGH (ref 10.3–14.5)
S AUREUS DNA NOSE QL NAA+PROBE: SIGNIFICANT CHANGE UP
SAO2 % BLDV: 69.2 % — SIGNIFICANT CHANGE UP (ref 67–88)
SAO2 % BLDV: 91.2 % — HIGH (ref 67–88)
SAO2 % BLDV: 99 % — HIGH (ref 67–88)
SARS-COV-2 RNA SPEC QL NAA+PROBE: SIGNIFICANT CHANGE UP
SODIUM SERPL-SCNC: 123 MMOL/L — LOW (ref 135–145)
SODIUM SERPL-SCNC: 124 MMOL/L — LOW (ref 135–145)
SODIUM SERPL-SCNC: 125 MMOL/L — LOW (ref 135–145)
SODIUM SERPL-SCNC: 127 MMOL/L — LOW (ref 135–145)
SODIUM SERPL-SCNC: 127 MMOL/L — LOW (ref 135–145)
SODIUM UR-SCNC: 26 MMOL/L — SIGNIFICANT CHANGE UP
SPECIMEN SOURCE: SIGNIFICANT CHANGE UP
TROPONIN T, HIGH SENSITIVITY RESULT: 77 NG/L — HIGH (ref 0–51)
TSH SERPL-MCNC: 4.3 UIU/ML — HIGH (ref 0.27–4.2)
WBC # BLD: 17.35 K/UL — HIGH (ref 3.8–10.5)
WBC # FLD AUTO: 17.35 K/UL — HIGH (ref 3.8–10.5)

## 2023-07-25 PROCEDURE — 71260 CT THORAX DX C+: CPT | Mod: 26

## 2023-07-25 PROCEDURE — 99223 1ST HOSP IP/OBS HIGH 75: CPT

## 2023-07-25 PROCEDURE — 93010 ELECTROCARDIOGRAM REPORT: CPT

## 2023-07-25 PROCEDURE — 74177 CT ABD & PELVIS W/CONTRAST: CPT | Mod: 26

## 2023-07-25 PROCEDURE — 99291 CRITICAL CARE FIRST HOUR: CPT | Mod: 25

## 2023-07-25 PROCEDURE — 99292 CRITICAL CARE ADDL 30 MIN: CPT

## 2023-07-25 PROCEDURE — 76705 ECHO EXAM OF ABDOMEN: CPT | Mod: 26

## 2023-07-25 RX ORDER — VANCOMYCIN HCL 1 G
1 VIAL (EA) INTRAVENOUS EVERY 4 HOURS
Refills: 0 | Status: DISCONTINUED | OUTPATIENT
Start: 2023-07-25 | End: 2023-08-01

## 2023-07-25 RX ORDER — SODIUM CHLORIDE 9 MG/ML
1000 INJECTION, SOLUTION INTRAVENOUS
Refills: 0 | Status: DISCONTINUED | OUTPATIENT
Start: 2023-07-25 | End: 2023-07-25

## 2023-07-25 RX ORDER — CALCIUM GLUCONATE 100 MG/ML
1 VIAL (ML) INTRAVENOUS ONCE
Refills: 0 | Status: COMPLETED | OUTPATIENT
Start: 2023-07-25 | End: 2023-07-25

## 2023-07-25 RX ORDER — TOBRAMYCIN SULFATE 40 MG/ML
1 VIAL (ML) INJECTION EVERY 4 HOURS
Refills: 0 | Status: DISCONTINUED | OUTPATIENT
Start: 2023-07-25 | End: 2023-08-01

## 2023-07-25 RX ORDER — SODIUM CHLORIDE 9 MG/ML
500 INJECTION, SOLUTION INTRAVENOUS ONCE
Refills: 0 | Status: COMPLETED | OUTPATIENT
Start: 2023-07-25 | End: 2023-07-25

## 2023-07-25 RX ORDER — POTASSIUM CHLORIDE 20 MEQ
40 PACKET (EA) ORAL ONCE
Refills: 0 | Status: COMPLETED | OUTPATIENT
Start: 2023-07-25 | End: 2023-07-25

## 2023-07-25 RX ORDER — POTASSIUM CHLORIDE 20 MEQ
20 PACKET (EA) ORAL ONCE
Refills: 0 | Status: DISCONTINUED | OUTPATIENT
Start: 2023-07-25 | End: 2023-07-25

## 2023-07-25 RX ORDER — MAGNESIUM SULFATE 500 MG/ML
1 VIAL (ML) INJECTION ONCE
Refills: 0 | Status: COMPLETED | OUTPATIENT
Start: 2023-07-25 | End: 2023-07-25

## 2023-07-25 RX ORDER — RILUZOLE 50 MG/1
50 TABLET ORAL
Refills: 0 | Status: DISCONTINUED | OUTPATIENT
Start: 2023-07-25 | End: 2023-08-04

## 2023-07-25 RX ORDER — DIAZEPAM 5 MG
2 TABLET ORAL EVERY 6 HOURS
Refills: 0 | Status: DISCONTINUED | OUTPATIENT
Start: 2023-07-25 | End: 2023-07-25

## 2023-07-25 RX ORDER — MIDODRINE HYDROCHLORIDE 2.5 MG/1
20 TABLET ORAL EVERY 8 HOURS
Refills: 0 | Status: DISCONTINUED | OUTPATIENT
Start: 2023-07-25 | End: 2023-07-27

## 2023-07-25 RX ORDER — PIPERACILLIN AND TAZOBACTAM 4; .5 G/20ML; G/20ML
3.38 INJECTION, POWDER, LYOPHILIZED, FOR SOLUTION INTRAVENOUS EVERY 8 HOURS
Refills: 0 | Status: DISCONTINUED | OUTPATIENT
Start: 2023-07-25 | End: 2023-08-01

## 2023-07-25 RX ORDER — PIPERACILLIN AND TAZOBACTAM 4; .5 G/20ML; G/20ML
3.38 INJECTION, POWDER, LYOPHILIZED, FOR SOLUTION INTRAVENOUS ONCE
Refills: 0 | Status: COMPLETED | OUTPATIENT
Start: 2023-07-25 | End: 2023-07-25

## 2023-07-25 RX ORDER — MIDODRINE HYDROCHLORIDE 2.5 MG/1
10 TABLET ORAL EVERY 8 HOURS
Refills: 0 | Status: DISCONTINUED | OUTPATIENT
Start: 2023-07-25 | End: 2023-07-25

## 2023-07-25 RX ORDER — POTASSIUM CHLORIDE 20 MEQ
40 PACKET (EA) ORAL ONCE
Refills: 0 | Status: DISCONTINUED | OUTPATIENT
Start: 2023-07-25 | End: 2023-07-25

## 2023-07-25 RX ORDER — POLYETHYLENE GLYCOL 3350 17 G/17G
17 POWDER, FOR SOLUTION ORAL DAILY
Refills: 0 | Status: DISCONTINUED | OUTPATIENT
Start: 2023-07-25 | End: 2023-07-30

## 2023-07-25 RX ORDER — POTASSIUM CHLORIDE 20 MEQ
10 PACKET (EA) ORAL
Refills: 0 | Status: COMPLETED | OUTPATIENT
Start: 2023-07-25 | End: 2023-07-25

## 2023-07-25 RX ORDER — ERYTHROMYCIN BASE 5 MG/GRAM
1 OINTMENT (GRAM) OPHTHALMIC (EYE)
Refills: 0 | Status: DISCONTINUED | OUTPATIENT
Start: 2023-07-25 | End: 2023-08-01

## 2023-07-25 RX ORDER — SERTRALINE 25 MG/1
50 TABLET, FILM COATED ORAL DAILY
Refills: 0 | Status: DISCONTINUED | OUTPATIENT
Start: 2023-07-25 | End: 2023-07-25

## 2023-07-25 RX ORDER — IPRATROPIUM/ALBUTEROL SULFATE 18-103MCG
3 AEROSOL WITH ADAPTER (GRAM) INHALATION EVERY 6 HOURS
Refills: 0 | Status: DISCONTINUED | OUTPATIENT
Start: 2023-07-25 | End: 2023-08-04

## 2023-07-25 RX ORDER — SODIUM CHLORIDE 9 MG/ML
250 INJECTION INTRAMUSCULAR; INTRAVENOUS; SUBCUTANEOUS ONCE
Refills: 0 | Status: COMPLETED | OUTPATIENT
Start: 2023-07-25 | End: 2023-07-25

## 2023-07-25 RX ORDER — SODIUM CHLORIDE 9 MG/ML
1000 INJECTION, SOLUTION INTRAVENOUS ONCE
Refills: 0 | Status: COMPLETED | OUTPATIENT
Start: 2023-07-25 | End: 2023-07-25

## 2023-07-25 RX ORDER — SENNA PLUS 8.6 MG/1
2 TABLET ORAL AT BEDTIME
Refills: 0 | Status: DISCONTINUED | OUTPATIENT
Start: 2023-07-25 | End: 2023-07-30

## 2023-07-25 RX ORDER — DIAZEPAM 5 MG
2 TABLET ORAL EVERY 6 HOURS
Refills: 0 | Status: DISCONTINUED | OUTPATIENT
Start: 2023-07-25 | End: 2023-07-27

## 2023-07-25 RX ORDER — ALBUMIN HUMAN 25 %
250 VIAL (ML) INTRAVENOUS ONCE
Refills: 0 | Status: COMPLETED | OUTPATIENT
Start: 2023-07-25 | End: 2023-07-25

## 2023-07-25 RX ORDER — RIVAROXABAN 15 MG-20MG
10 KIT ORAL DAILY
Refills: 0 | Status: DISCONTINUED | OUTPATIENT
Start: 2023-07-25 | End: 2023-08-01

## 2023-07-25 RX ORDER — SODIUM CHLORIDE 9 MG/ML
1000 INJECTION INTRAMUSCULAR; INTRAVENOUS; SUBCUTANEOUS ONCE
Refills: 0 | Status: DISCONTINUED | OUTPATIENT
Start: 2023-07-25 | End: 2023-07-25

## 2023-07-25 RX ORDER — SERTRALINE 25 MG/1
75 TABLET, FILM COATED ORAL DAILY
Refills: 0 | Status: DISCONTINUED | OUTPATIENT
Start: 2023-07-25 | End: 2023-08-04

## 2023-07-25 RX ORDER — CHLORHEXIDINE GLUCONATE 213 G/1000ML
1 SOLUTION TOPICAL
Refills: 0 | Status: DISCONTINUED | OUTPATIENT
Start: 2023-07-25 | End: 2023-08-04

## 2023-07-25 RX ORDER — CEFTRIAXONE 500 MG/1
1000 INJECTION, POWDER, FOR SOLUTION INTRAMUSCULAR; INTRAVENOUS EVERY 24 HOURS
Refills: 0 | Status: DISCONTINUED | OUTPATIENT
Start: 2023-07-25 | End: 2023-07-25

## 2023-07-25 RX ADMIN — Medication 1 DROP(S): at 19:18

## 2023-07-25 RX ADMIN — SODIUM CHLORIDE 1500 MILLILITER(S): 9 INJECTION, SOLUTION INTRAVENOUS at 00:17

## 2023-07-25 RX ADMIN — Medication 250 MILLIGRAM(S): at 01:15

## 2023-07-25 RX ADMIN — Medication 100 MILLIEQUIVALENT(S): at 09:55

## 2023-07-25 RX ADMIN — Medication 1 APPLICATION(S): at 23:11

## 2023-07-25 RX ADMIN — Medication 1 APPLICATION(S): at 23:10

## 2023-07-25 RX ADMIN — Medication 1 APPLICATION(S): at 20:06

## 2023-07-25 RX ADMIN — RILUZOLE 50 MILLIGRAM(S): 50 TABLET ORAL at 17:13

## 2023-07-25 RX ADMIN — SODIUM CHLORIDE 500 MILLILITER(S): 9 INJECTION, SOLUTION INTRAVENOUS at 01:17

## 2023-07-25 RX ADMIN — CHLORHEXIDINE GLUCONATE 15 MILLILITER(S): 213 SOLUTION TOPICAL at 17:14

## 2023-07-25 RX ADMIN — Medication 100 GRAM(S): at 04:52

## 2023-07-25 RX ADMIN — PIPERACILLIN AND TAZOBACTAM 200 GRAM(S): 4; .5 INJECTION, POWDER, LYOPHILIZED, FOR SOLUTION INTRAVENOUS at 09:00

## 2023-07-25 RX ADMIN — Medication 3 MILLILITER(S): at 17:24

## 2023-07-25 RX ADMIN — CEFTRIAXONE 100 MILLIGRAM(S): 500 INJECTION, POWDER, FOR SOLUTION INTRAMUSCULAR; INTRAVENOUS at 03:30

## 2023-07-25 RX ADMIN — Medication 3 MILLILITER(S): at 23:02

## 2023-07-25 RX ADMIN — Medication 1 DROP(S): at 20:06

## 2023-07-25 RX ADMIN — Medication 40 MILLIEQUIVALENT(S): at 09:55

## 2023-07-25 RX ADMIN — Medication 1 DROP(S): at 06:15

## 2023-07-25 RX ADMIN — Medication 100 GRAM(S): at 07:33

## 2023-07-25 RX ADMIN — Medication 85 MILLIMOLE(S): at 09:56

## 2023-07-25 RX ADMIN — RIVAROXABAN 10 MILLIGRAM(S): KIT at 14:00

## 2023-07-25 RX ADMIN — PIPERACILLIN AND TAZOBACTAM 25 GRAM(S): 4; .5 INJECTION, POWDER, LYOPHILIZED, FOR SOLUTION INTRAVENOUS at 21:06

## 2023-07-25 RX ADMIN — Medication 1 APPLICATION(S): at 20:08

## 2023-07-25 RX ADMIN — Medication 3 MILLILITER(S): at 11:24

## 2023-07-25 RX ADMIN — Medication 1 DROP(S): at 17:14

## 2023-07-25 RX ADMIN — CHLORHEXIDINE GLUCONATE 15 MILLILITER(S): 213 SOLUTION TOPICAL at 06:15

## 2023-07-25 RX ADMIN — SODIUM CHLORIDE 500 MILLILITER(S): 9 INJECTION INTRAMUSCULAR; INTRAVENOUS; SUBCUTANEOUS at 04:00

## 2023-07-25 RX ADMIN — SENNA PLUS 2 TABLET(S): 8.6 TABLET ORAL at 21:01

## 2023-07-25 RX ADMIN — SODIUM CHLORIDE 1000 MILLILITER(S): 9 INJECTION, SOLUTION INTRAVENOUS at 11:59

## 2023-07-25 RX ADMIN — CHLORHEXIDINE GLUCONATE 1 APPLICATION(S): 213 SOLUTION TOPICAL at 06:16

## 2023-07-25 RX ADMIN — Medication 100 MILLIEQUIVALENT(S): at 09:00

## 2023-07-25 RX ADMIN — SODIUM CHLORIDE 1000 MILLILITER(S): 9 INJECTION, SOLUTION INTRAVENOUS at 23:00

## 2023-07-25 RX ADMIN — SERTRALINE 75 MILLIGRAM(S): 25 TABLET, FILM COATED ORAL at 12:01

## 2023-07-25 RX ADMIN — MIDODRINE HYDROCHLORIDE 10 MILLIGRAM(S): 2.5 TABLET ORAL at 12:00

## 2023-07-25 RX ADMIN — Medication 125 MILLILITER(S): at 21:14

## 2023-07-25 RX ADMIN — Medication 100 MILLIEQUIVALENT(S): at 07:49

## 2023-07-25 RX ADMIN — Medication 1 APPLICATION(S): at 23:53

## 2023-07-25 RX ADMIN — SODIUM CHLORIDE 1000 MILLILITER(S): 9 INJECTION, SOLUTION INTRAVENOUS at 16:45

## 2023-07-25 RX ADMIN — Medication 1 DROP(S): at 21:01

## 2023-07-25 RX ADMIN — POLYETHYLENE GLYCOL 3350 17 GRAM(S): 17 POWDER, FOR SOLUTION ORAL at 11:59

## 2023-07-25 RX ADMIN — Medication 1 DROP(S): at 23:53

## 2023-07-25 RX ADMIN — MIDODRINE HYDROCHLORIDE 20 MILLIGRAM(S): 2.5 TABLET ORAL at 21:01

## 2023-07-25 RX ADMIN — Medication 40 MILLIEQUIVALENT(S): at 06:15

## 2023-07-25 RX ADMIN — SODIUM CHLORIDE 75 MILLILITER(S): 9 INJECTION, SOLUTION INTRAVENOUS at 05:00

## 2023-07-25 RX ADMIN — Medication 1 APPLICATION(S): at 21:00

## 2023-07-25 RX ADMIN — PIPERACILLIN AND TAZOBACTAM 25 GRAM(S): 4; .5 INJECTION, POWDER, LYOPHILIZED, FOR SOLUTION INTRAVENOUS at 12:02

## 2023-07-25 RX ADMIN — RILUZOLE 50 MILLIGRAM(S): 50 TABLET ORAL at 06:15

## 2023-07-25 NOTE — CONSULT NOTE ADULT - SUBJECTIVE AND OBJECTIVE BOX
Health system DEPARTMENT OF OPHTHALMOLOGY - INITIAL ADULT CONSULT  -----------------------------------------------------------------------------------------------------------------  Dedrick Brewster MD, PGY3  Available on [employer] Teams  -----------------------------------------------------------------------------------------------------------------    HPI:  68-year-old female past medical history ALS nonverbal at baseline Parkinson's trach PEG vent dependent on home vent brought in by EMS for  increased heart rate and lethargy for few days.  Patient was seen by outpatient pulmonologist and was noted to be tachycardic and was sent to ED for further work-up.  History from the  was obtained by the ED. Per   patient's been more lethargic over the last several days.  Has had symptoms similar to this in the past secondary to urinary tract infections.     In the ED patient noted to have a rectal temp of 100.3, and tachycardic,  wbc 23.73, lactate of 4.5, ph 7.38, urinalysis + for bacteria, and a chest x ray showing Left-sided retrocardiac opacity which may represent atelectasis and/or pneumonia. patient given vanc cefepime x1, 2 L fluid bolus, and IV acetaminophen for fever.   (25 Jul 2023 01:50)    Interval History: patient's sister in law at bedside provides history. Patient has been having significant issue with eyes, told that she has an infection and has been on drops outpatient. Also has been struggling to keep eyes closed, family tapes eyes shut. Follow with Dr. Kline? outpatient     Past Medical History: ALS (nonverbal), Parkinsons, trach, PEG, vent dependent at home, PNA  Past Ocular History: eye infection?  Drops: ofloxacin?  Allergies: Bactrim DS  Family History: denies  Surgical History: denies  Outpatient Ophthalmologist: Dr. Camejo?      Review of Systems:  RONIT 2/2 AMS    Vital Signs: T(C): 36.5 (07-25-23 @ 12:00)  T(F): 97.7 (07-25-23 @ 12:00), Max: 100.3 (07-24-23 @ 23:19)  HR: 88 (07-25-23 @ 18:00) (80 - 107)  BP: 118/58 (07-25-23 @ 18:00) (74/46 - 143/79)  RR:  (15 - 36)  SpO2:  (95% - 100%)  Wt(kg): --  AAOx0, nonverbal at baseline    Ophthalmology Exam:  Visual acuity (cc): RONIT 2/2 AMS  Pupils: PERRL OU, no APD  Intraocular Pressure:  STP OU  Extraocular movements (EOMs): RONIT 2/2 AMS  Confrontational Visual Field (CVF): RONIT 2/2 AMS  Color Plates: RONIT 2/2 AMS    Pen Light Exam (PLE)  External: Normal OU.  Lids/Lashes/Lacrimal Ducts: severe lagophthalmos OU, essentially unable to close both eyes on her own, lids feel taut   Sclera/Conjunctiva: 1-2+ injection OU  Cornea: inferior corneal epithelial defect measuring 8mmHx3.5mmV, several layers of stromal scarring with neovascularization underneath active epi defect, +stromal cell, discharge overlying, 10-20% thinning OD inferior corneal epithelial defect measuring 4.2ocAw6lvA, stromal cell, stromal scarring and mild neovascularization underneath  Anterior Chamber: Deep and formed OU.    Iris: Flat OU.  Lens: NS OU.    Fundus Exam: dilated with 1% tropicamide and 2.5% phenylephrine  Approval obtained from primary team for dilation  Patient aware that pupils can remained dilated for at least 4-6 hours.  Exam performed with 20 D lens    Vitreous: wnl OU  Disc, cup/disc: sharp and pink, 0.4 OU  Macula: macular drusen OU  Vessels: wnl OU  Periphery: wnl OU    Labs/Imaging:

## 2023-07-25 NOTE — H&P ADULT - ASSESSMENT
68-year-old female past medical history ALS nonverbal at baseline, Parkinson's trach PEG vent dependent on home vent , and history of Urosepsis brought in by EMS for increased heart rate (110s) and lethargy for few days. Patient has rectal temp of 100.3 and urinalysis + for bacteria. Concern for Urosepsis. Patient sent to MICU overnight due to lack of RCU beds       NEURO  # advanced ALS  - baseline nonverbal, awake, communicates with eye movements  -continue home rilizole 50 mg bid by peg tube  -c/w home diazepam 5mg/5ml 4x a day     CARDIOVASCULAR  #sinus tachycardia 2/2 ?urosepsis  -CXR showing possible atlectasis vs pna  -Urinalysis + for bacteria  -lactate 4.5, wbc 23  -s/p vanc cefepime x1  -f/u urine and blood cultures, procalc      RESPIRATORY  # tracheostomy  - Duonebs    #Vent Dependent  -vent settings VC, , RR 16, PEEP 5, Fio2 40     GI/NUTRITION  # PEG  - feeds, site care, bowel reg    /RENAL  #Urosepsis   -as above    SKIN  no active issues    ID  #urosepsis  -as above    ENDOCRINE  no active issues    HEMATOLOGIC  no active issues     DVT PPX  - [id number] QD    Psych  #depression  c/w home sertraline 50 mg    ETHICS  - Full code.    Dispo  - Listed for RCU.   68-year-old female past medical history ALS nonverbal at baseline, Parkinson's trach PEG vent dependent on home vent , and history of Urosepsis brought in by EMS for increased heart rate (110s) and lethargy for few days. Patient has rectal temp of 100.3 and urinalysis + for bacteria. Concern for Urosepsis. Patient sent to MICU overnight due to lack of RCU beds       NEURO  # advanced ALS  - baseline nonverbal, awake, communicates with eye movements  -continue home rilizole 50 mg bid by peg tube  -c/w home diazepam 5mg/5ml 4x a day     CARDIOVASCULAR  #sinus tachycardia 2/2 ?urosepsis  -CXR showing possible atlectasis vs pna  -Urinalysis + for bacteria  -lactate 4.5, wbc 23  -s/p vanc cefepime x1  -s/p 1.5 L fluid bolus  -start ceftriaxone   -IV D5LR at 75cc/H  -f/u urine and blood cultures, procal        RESPIRATORY  # tracheostomy  - Duonebs    #Vent Dependent  -vent settings VC, , RR 16, PEEP 5, Fio2 40     GI/NUTRITION  # PEG  - enteral feeds, site care, bowel reg    /RENAL  #Urosepsis   -as above    #Electrolyte disturbances  -hyponatremia Na 125, Cl 88  -IV D5LR at 75cc/H    SKIN  no active issues    ID  #urosepsis  -as above    ENDOCRINE  no active issues    HEMATOLOGIC  no active issues     DVT PPX  - [id number] QD    Psych  #depression  c/w home sertraline 50 mg    ETHICS  - Full code.    Dispo  - Listed for RCU.   68-year-old female past medical history ALS nonverbal at baseline, Parkinson's trach PEG vent dependent on home vent , and history of Urosepsis brought in by EMS for increased heart rate (110s) and lethargy for few days. Patient has rectal temp of 100.3 and urinalysis + for bacteria. Concern for Urosepsis. Patient sent to MICU overnight due to lack of RCU beds       NEURO  # advanced ALS  - baseline nonverbal, awake, communicates with eye movements  -continue home rilizole 50 mg bid by peg tube  -c/w home diazepam 5mg/5ml 4x a day     CARDIOVASCULAR  #sinus tachycardia 2/2 ?urosepsis vs PNA  -CXR showing possible atlectasis vs pna  -Urinalysis + for bacteria  -lactate 4.5, wbc 23  -s/p vanc cefepime x1  -s/p 2 L fluid bolus  -start ceftriaxone   -IV D5LR at 75cc/H  -f/u urine and blood cultures, procal      RESPIRATORY  # tracheostomy  - Duonebs    #Vent Dependent  -vent settings VC, , RR 16, PEEP 5, Fio2 40     GI/NUTRITION  # PEG  - enteral feeds, site care, bowel reg    /RENAL  #Urosepsis   -as above    #Electrolyte disturbances  -hyponatremia Na 125, Cl 88  -IV D5LR at 75cc/H    SKIN  no active issues    ID  #urosepsis  -as above    ENDOCRINE  no active issues    HEMATOLOGIC  no active issues     DVT PPX  - [id number] QD    Psych  #depression  c/w home sertraline 50 mg    ETHICS  - Full code.    Dispo  - Listed for RCU.   68-year-old female past medical history ALS nonverbal at baseline, Parkinson's trach PEG vent dependent on home vent , and history of Urosepsis brought in by EMS for increased heart rate (110s) and lethargy for few days. Patient has rectal temp of 100.3 and urinalysis + for bacteria. Concern for Urosepsis. Patient sent to MICU overnight due to lack of RCU beds       NEURO  # advanced ALS  - baseline nonverbal, awake, communicates with eye movements  -continue home rilizole 50 mg bid by peg tube  -c/w home diazepam 5mg/5ml 4x a day     CARDIOVASCULAR  #sinus tachycardia 2/2 ?urosepsis vs PNA  -CXR showing possible atlectasis vs pna  -Urinalysis + for bacteria  -lactate 4.5, wbc 23  -s/p vanc cefepime x1  -s/p 2 L fluid bolus  -start ceftriaxone   -IV LR at 75cc/H  -f/u urine and blood cultures, procal      RESPIRATORY  # tracheostomy  - Duonebs    #Vent Dependent  -vent settings VC, , RR 16, PEEP 5, Fio2 40     GI/NUTRITION  # PEG  - enteral feeds, site care, bowel reg    /RENAL  #Urosepsis   -as above    #Electrolyte disturbances  -hyponatremia Na 125, Cl 88  -IV D5LR at 75cc/H    SKIN  no active issues    ID  #urosepsis  -as above    ENDOCRINE  no active issues    HEMATOLOGIC  no active issues     DVT PPX  - [id number] QD    Psych  #depression  c/w home sertraline 50 mg    ETHICS  - Full code.    Dispo  - Listed for RCU.   68-year-old female past medical history ALS nonverbal at baseline, Parkinson's trach PEG vent dependent on home vent , and history of Urosepsis brought in by EMS for increased heart rate (110s) and lethargy for few days. Patient has rectal temp of 100.3 and urinalysis + for bacteria. Concern for Urosepsis. Patient sent to MICU overnight due to lack of RCU beds       NEURO  # advanced ALS  - baseline nonverbal, awake, communicates with eye movements  -continue home rilizole 50 mg bid by peg tube  -c/w home diazepam 5mg/5ml 4x a day     CARDIOVASCULAR  #sinus tachycardia 2/2 ?urosepsis vs PNA  -CXR showing possible atlectasis vs pna  -Urinalysis + for bacteria  -lactate 2.7<4.5, wbc 23  -s/p vanc cefepime x1  -s/p 2 L fluid bolus  -start ceftriaxone   -IV LR at 75cc/H  -f/u urine and blood cultures, procal      RESPIRATORY  # tracheostomy  - Duonebs    #Vent Dependent  -vent settings VC, , RR 16, PEEP 5, Fio2 40     GI/NUTRITION  # PEG  - enteral feeds, site care, bowel reg  -NPO with tube feeds  -consult nutrition regarding feeds    /RENAL  #Urosepsis   -as above    #Electrolyte disturbances  -hyponatremia Na 125, Cl 88  -IV D5LR at 75cc/H    SKIN  no active issues    ID  #urosepsis  -as above    ENDOCRINE  no active issues    HEMATOLOGIC  no active issues     DVT PPX  - [id number] QD    Psych  #depression  c/w home sertraline 50 mg    ETHICS  - Full code.    Dispo  - Listed for RCU.   68-year-old female past medical history ALS nonverbal at baseline, Parkinson's trach PEG vent dependent on home vent , and history of Urosepsis brought in by EMS for increased heart rate (110s) and lethargy for few days. Patient has rectal temp of 100.3 and urinalysis + for bacteria. Concern for Urosepsis. Patient sent to MICU overnight due to lack of RCU beds       NEURO  # advanced ALS  - baseline nonverbal, awake, communicates with eye movements  -continue home rilizole 50 mg bid by peg tube  -c/w home diazepam 5mg/5ml 4x a day     CARDIOVASCULAR  #sinus tachycardia 2/2 ?urosepsis vs PNA  -CXR showing possible atlectasis vs pna  -Urinalysis + for bacteria  -lactate 2.7<4.5, wbc 23  -s/p vanc cefepime x1  -s/p 2 L fluid bolus  -start ceftriaxone   -IV LR at 75cc/H  -f/u urine and blood cultures, procal      RESPIRATORY  # tracheostomy  - Duonebs    #Vent Dependent  -vent settings VC, , RR 16, PEEP 5, Fio2 40     GI/NUTRITION  # PEG  - enteral feeds, site care, bowel reg  -NPO with tube feeds  -consult nutrition regarding feeds    /RENAL  #Urosepsis   -as above    #Electrolyte disturbances  -hyponatremia Na 125, Cl 88  -IV LR at 75cc/H    SKIN  no active issues    ID  #urosepsis  -as above    ENDOCRINE  no active issues    HEMATOLOGIC  no active issues     DVT PPX  - [id number] QD    Psych  #depression  c/w home sertraline 50 mg    ETHICS  - Full code.    Dispo  - Listed for RCU.   68-year-old female past medical history ALS nonverbal at baseline, Parkinson's trach PEG vent dependent on home vent , and history of Urosepsis brought in by EMS for increased heart rate (110s) and lethargy for few days. Patient has rectal temp of 100.3 and urinalysis + for bacteria. Concern for Urosepsis. Patient sent to MICU overnight due to lack of RCU beds       NEURO  # advanced ALS  - baseline nonverbal, awake, communicates with eye movements  -hold home riluzole 50 mg bid (not on pharmacy)  -c/w home diazepam 5mg/5ml 4x a day     CARDIOVASCULAR  #sinus tachycardia 2/2 ?urosepsis vs PNA  -CXR showing possible atlectasis vs pna  -Urinalysis + for bacteria  -lactate 2.7<4.5, wbc 23  -s/p vanc cefepime x1  -s/p 2 L fluid bolus  -start ceftriaxone   -IV LR at 75cc/H  -f/u urine and blood cultures, procal      RESPIRATORY  # tracheostomy  - Duonebs    #Vent Dependent  -vent settings VC, , RR 16, PEEP 5, Fio2 40     GI/NUTRITION  # PEG  - enteral feeds, site care, bowel reg  -NPO with tube feeds  -consult nutrition regarding feeds    /RENAL  #Urosepsis   -as above    #Electrolyte disturbances  -hyponatremia Na 125, Cl 88  -IV LR at 75cc/H    SKIN  no active issues    ID  #urosepsis  -as above    ENDOCRINE  no active issues    HEMATOLOGIC  no active issues     DVT PPX  - [id number] QD    Psych  #depression  c/w home sertraline 50 mg    ETHICS  - Full code.    Dispo  - Listed for RCU.   68-year-old female past medical history ALS nonverbal at baseline, Parkinson's trach PEG vent dependent on home vent , and history of Urosepsis brought in by EMS for increased heart rate (110s) and lethargy for few days. Patient has rectal temp of 100.3 and urinalysis + for bacteria. Concern for Urosepsis. Patient sent to MICU overnight due to lack of RCU beds       NEURO  # advanced ALS  - baseline nonverbal, awake, communicates with eye movements  -hold home riluzole 50 mg bid (not on pharmacy)  -c/w home diazepam 5mg/5ml 4x a day PRN     CARDIOVASCULAR  #sinus tachycardia 2/2 ?urosepsis vs PNA  -CXR showing possible atlectasis vs pna  -CT chest showing consolidation in the posterior segment of the right lower lobe and patchy ground glass   opacities in the left upper lobe concerning for asp PNA  -Urinalysis + for bacteria  -lactate 2.7<4.5, wbc 23  -s/p vanc cefepime x1  -s/p 2 L fluid bolus  -start ceftriaxone   -IV LR at 75cc/H  -f/u urine and blood cultures, procal      RESPIRATORY    #Vent Dependent  -vent settings VC, , RR 16, PEEP 5, Fio2 40   -tracheostomy       GI/NUTRITION  # PEG  - enteral feeds, site care, bowel reg  -NPO with tube feeds  -consult nutrition regarding feeds    /RENAL  #Urosepsis   -as above    #Electrolyte disturbances  -hyponatremia Na 125, Cl 88  -IV LR at 75cc/H    SKIN  no active issues    ID  #urosepsis  -as above    ENDOCRINE  no active issues    HEMATOLOGIC  no active issues     DVT PPX  - [id number] QD    Psych  #depression  c/w home sertraline 50 mg    ETHICS  - Full code.    Dispo  - Listed for RCU.   68-year-old female past medical history ALS nonverbal at baseline, Parkinson's trach PEG vent dependent on home vent , and history of Urosepsis brought in by EMS for increased heart rate (110s) and lethargy for few days. Patient has rectal temp of 100.3 and urinalysis + for bacteria. Concern for Urosepsis. Patient sent to MICU overnight due to lack of RCU beds       NEURO  # advanced ALS  - baseline nonverbal, awake, communicates with eye movements  -continue home riluzole 50 mg bid   -c/w home diazepam 2 mg 4x a day PRN     CARDIOVASCULAR  #sinus tachycardia 2/2 ?urosepsis vs PNA  -CXR showing possible atlectasis vs pna  -CT chest showing consolidation in the posterior segment of the right lower lobe and patchy ground glass   opacities in the left upper lobe concerning for asp PNA  -Urinalysis + for bacteria  -lactate 2.7<4.5, wbc 23  -s/p vanc cefepime x1  -s/p 2 L fluid bolus  -start ceftriaxone   -IV LR at 75cc/H  -f/u urine and blood cultures, procal      RESPIRATORY    #Vent Dependent  -vent settings VC, , RR 16, PEEP 5, Fio2 40   -tracheostomy       GI/NUTRITION  # PEG  - enteral feeds, site care, bowel reg  -NPO with tube feeds  -consult nutrition regarding feeds    /RENAL  #Urosepsis   -as above    #Electrolyte disturbances  -hyponatremia Na 125, Cl 88  -IV LR at 75cc/H    SKIN  no active issues    ID  #urosepsis  -as above    ENDOCRINE  no active issues    HEMATOLOGIC  no active issues     DVT PPX  - [id number] QD    Psych  #depression  -hold home sertraline 4ml (80mg equivalent)  -start setraline 75 mg     ETHICS  - Full code.    Dispo  - Listed for RCU.   68-year-old female past medical history ALS nonverbal at baseline, Parkinson's trach PEG vent dependent on home vent , and history of Urosepsis brought in by EMS for increased heart rate (110s) and lethargy for few days. Patient has rectal temp of 100.3 and urinalysis + for bacteria. Concern for Urosepsis. Patient sent to MICU overnight due to lack of RCU beds       NEURO  # advanced ALS  - baseline nonverbal, awake, communicates with eye movements  -continue home riluzole 50 mg bid   -c/w home diazepam 2 mg 4x a day PRN     CARDIOVASCULAR  #sinus tachycardia 2/2 ?urosepsis vs PNA  -CXR showing possible atlectasis vs pna  -CT chest showing consolidation in the posterior segment of the right lower lobe and patchy ground glass   opacities in the left upper lobe concerning for asp PNA  -Urinalysis + for bacteria  -lactate 2.7<4.5, wbc 23  -s/p vanc cefepime x1  -s/p 2 L fluid bolus  -start ceftriaxone   -IV LR at 75cc/H  -f/u urine and blood cultures, procal      RESPIRATORY    #Vent Dependent  -vent settings VC, , RR 16, PEEP 5, Fio2 40   -tracheostomy       GI/NUTRITION  # PEG  - enteral feeds, site care, bowel reg  -NPO with tube feeds  -consult nutrition regarding feeds    /RENAL  #Urosepsis   -as above    #Electrolyte disturbances  -hyponatremia Na 125, Cl 88  -IV LR at 75cc/H    SKIN  no active issues    ID  #urosepsis  -as above    ENDOCRINE  no active issues    HEMATOLOGIC  no active issues     DVT PPX  - [id number] QD    Psych  #depression  -hold home sertraline 4ml (80mg equivalent)  -start setraline 75 mg     ETHICS  - Full code.  -son Heber #[phone]    Dispo  - Listed for RCU.

## 2023-07-25 NOTE — DIETITIAN INITIAL EVALUATION ADULT - ENTERAL
Jevity 1.2 at goal rate 75 mL/hr x12 hrs to provide total volume 900 mL, 1080 kcals, 50 gm protein, and 726 mL free water. Meets ~22 kcals/kG and 1.0 gm protein/kG based on UBW 49.8 kG (2/18).

## 2023-07-25 NOTE — CONSULT NOTE ADULT - ATTENDING COMMENTS
68y female with a past medical history/ocular history of ALS (nonverbal), Parkinsons, trach, PEG, vent dependent at home, PNA consulted for eye discharge and possible infection. 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 Start fortified vancomycin and fortified tobramycin q 2hrs, erythromycin ointment q1 hr OU. Lid taping does not appear to be working as pt's eyes remain open and tight when tape is applied. Discussed with pt's sister-in-law that tarsorrhaphy is recommended given risk of worsening exposure, corneal ulcer and possible performation. Reports they will need to have family discussion. Will follow.

## 2023-07-25 NOTE — DIETITIAN INITIAL EVALUATION ADULT - PERTINENT MEDS FT
MEDICATIONS  (STANDING):  albuterol/ipratropium for Nebulization 3 milliLiter(s) Nebulizer every 6 hours  artificial  tears Solution 1 Drop(s) Both EYES two times a day  chlorhexidine 0.12% Liquid 15 milliLiter(s) Oral Mucosa every 12 hours  chlorhexidine 4% Liquid 1 Application(s) Topical <User Schedule>  midodrine 10 milliGRAM(s) Oral every 8 hours  piperacillin/tazobactam IVPB.. 3.375 Gram(s) IV Intermittent every 8 hours  polyethylene glycol 3350 17 Gram(s) Oral daily  riluzole 50 milliGRAM(s) Oral two times a day  rivaroxaban 10 milliGRAM(s) Oral daily  senna 2 Tablet(s) Oral at bedtime  sertraline 75 milliGRAM(s) Oral daily    MEDICATIONS  (PRN):  diazepam  Injectable 2 milliGRAM(s) IV Push every 6 hours PRN spasm

## 2023-07-25 NOTE — H&P ADULT - NSHPPHYSICALEXAM_GEN_ALL_CORE
General: intubated not following commands.   HENT: head normocephalic atraumatic  ears:normal pinna  Nose: normal nasal mucosa  Throat: supple  CV:heart sounds ausculted, difficult to make clear s1 and s2 due to vent  pulm: lungs clear to auscultation  MSK: no pitting edema  GI: abdomen soft, no mass palpated  skin: no lesions

## 2023-07-25 NOTE — H&P ADULT - ATTENDING COMMENTS
68F Hx ALS, Non-Verbal, Bedbound, Parkinson, Trach/PEG/Vent Support p/w ED  from Home for Tachycardia 110s, T*100.3*F with UA +ve need RCU Admission will be boarded in MICU overnight..   - A&O x 0, FC x 0, Non-Verbal, Bedbound Quadriplegic   - Trach/ Vent Support via Shiley # 8 XLT with home Vent 16/400/40%    - Hemodynamically stable   - Restart Enteral Feed via PEG   - Serial CBC, CMP, PT/INR, Procalcitonin, Lactate, CPK,    - Panculture and add Empiric ABx coverage for UTI vs. CAP   - L'Ytes reviewed Hypovolemic Hyponatremia with HAG-MA   - IV D5LR at 75cc/Hr and monitor I&O and Renal Function   - DVT PPx - SCD and Xarelto   - GOC - Full Code - Transfer to RCU when bed available 68F Hx ALS, Non-Verbal, Bedbound, Parkinson, Trach/PEG/Vent Support p/w ED  from Home for Tachycardia 110s, T*100.3*F with UA +ve need RCU Admission will be boarded in MICU overnight..   - A&O x 0, FC x 0, Non-Verbal, Bedbound Quadriplegic   - Trach/ Vent Support via Shiley # 8 XLT with home Vent 16/400/40%    - Hemodynamically stable   - Restart Enteral Feed via PEG   - Serial CBC, CMP, PT/INR, Procalcitonin, Lactate, CPK,    - Panculture and add Empiric ABx coverage for UTI vs. CAP   - L'Alisa reviewed Hypovolemic Hyponatremia with HAG-MA   - IV D5LR at 75cc/Hr and monitor I&O and Renal Function   - DVT PPx - SCD and Xarelto   - GOC - Full Code - Transfer to RCU when bed available       Patient seen and examined with ICU Resident/Fellow at bedside after lab data, medical records and radiology reports reviewed. I have read and agreeable in general with resident's Documented Note, Assessment and Management Plan which reflected my opinions from bedside round and discussion.   Total Critical Care Time = 45 Min excluding teaching and procedure activity.

## 2023-07-25 NOTE — DIETITIAN INITIAL EVALUATION ADULT - PERTINENT LABORATORY DATA
07-25    123<L>  |  91<L>  |  18  ----------------------------<  131<H>  2.9<LL>   |  20<L>  |  <0.30<L>    Ca    9.5      25 Jul 2023 06:25  Phos  1.8     07-25  Mg     1.8     07-25    TPro  6.8  /  Alb  3.3  /  TBili  0.8  /  DBili  x   /  AST  35  /  ALT  41  /  AlkPhos  106  07-25  A1C with Estimated Average Glucose Result: 4.9 % (02-19-23 @ 01:33)  A1C with Estimated Average Glucose Result: 5.1 % (07-31-22 @ 16:33)

## 2023-07-25 NOTE — DIETITIAN INITIAL EVALUATION ADULT - HEIGHT FOR BMI (FEET)
ONCOLOGY INTAKE: Records Information      APPT INFORMATION:  Referring provider:  Dr. Tiara Zafar MD  Referring provider s clinic:   ONCOLOGY ADULT  Reason for visit/diagnosis:  Sarcoma (H)  Has patient been notified of appointment date and time?: na, patient will have wife call back to schedule    RECORDS INFORMATION:  Were the records received with the referral (via [employer])? No, internal referral    Has patient been seen for any external appt for this diagnosis? no    If yes, where? naq    Has patient had any imaging or procedures outside of [facility] for this condition? no      If Yes, where? na    ADDITIONAL INFORMATION:  na     5

## 2023-07-25 NOTE — CHART NOTE - NSCHARTNOTEFT_GEN_A_CORE
Patient seen and examined on rounds this AM and throughout the day. Patient is critically ill requiring frequent bedside visits with therapy change. Patient is a 68F with extensive history including ALS and Parkinson's with chronic nonverbal and bedbound state who is vent dependent presenting for evaluation of tachycardia and lethargy noted at home. On presentation patient was noted to be febrile and tachycardic with a leukocytosis. She has a prior history of UTI (Serratia) and tracheitis (Pseudomonas). On imaging she is found to have a multifocal pneumonia, chronic LLL bronchiectasis, and possible colitis.     She was admitted to the MICU for further monitoring and care    Gen: not following commands  Vitals: reviewed   HEENT: eyes taped, NCAT, MMM, tracheostomy in place  CVS: tachycardia, S1S2  Lungs: expiratory wheeze, rhonchi L base, coarse BS R base  Abd: soft, NT, +PEG  Ext: no edema    Labs: reviewed  Cultures in lab    68F extensive medical history including ALS, nonverbal and bedbound, Parkinsons, chronic respiratory failure requiring trach and vent, chronic oropharyngeal dysphagia with PEG presenting for lethargy and tachycardia. She is found to be febrile and admitted for further workup of sepsis    #Neuro - ALS with advanced neurologic disease - continue home diazepam and Riluzole  - Parkinson's  - Depression - Sertraline  #Pulmonary - acute on chronic respiratory failure requiring mechanical ventilation  - ABG noted and appropriate ventilation. Maintain O2 sat > 90%  - dilated trachea noted on CT scan appears chronic - monitor cuff balloon pressure - has required upsize of tracheostomy in past with CTS  - Multifocal pneumonia noted on CT  - Chronic LLL bronchiectasis - bronchodilators and airway clearance  #Cardiovascular - hypotension in setting of sepsis  - IVF resuscitation and start Midodrine with goal MAP > 60  - If unable to maintain MAP with hydration may require vasopressors  #Infectious Disease - CT noted with concern for pneumonia and colitis and patient with + UA and history of UTI  - Zosyn to cover bugs based on prior sensitivities  - Follow-up cultures: blood, urine, and sputum. check nasal MRSA  #Gastro - oropharyngeal dysphagia - with PEG - on 12 hour feeds at home  - Nutrition evaluation  - Monitor BM  #Nephrology - hyponatremia noted on admission labs - check urine studies  - Repeat BMP  - Monitor potassium - was hypokalemic on presentation  - Monitor urine output  #DVT proph - on Xarelto 10mg daily at home    GOC: Full Code  DVT proph: Xarelto  POCUS: Not in shock on admission    I have provided 60 minutes of noncontinuous critical care time independent of procedures and/or teaching services. If patient remains hemodynamically stable will eventually require transfer to RCU for further management and care. Long term prognosis guarded.

## 2023-07-25 NOTE — H&P ADULT - NSHPLABSRESULTS_GEN_ALL_CORE
16.2   23.73 )-----------( 402      ( 2023 23:21 )             49.4           125<L>  |  88<L>  |  17  ----------------------------<  209<H>  4.5   |  14<L>  |  <0.30<L>    Ca    9.3      2023 23:21  Mg     1.9         TPro  8.6<H>  /  Alb  3.8  /  TBili  1.1  /  DBili  x   /  AST  105<H>  /  ALT  62<H>  /  AlkPhos  151<H>                Urinalysis Basic - ( 2023 23:22 )    Color: Yellow / Appearance: Slightly Turbid / S.015 / pH: x  Gluc: x / Ketone: Negative  / Bili: Negative / Urobili: Negative   Blood: x / Protein: >600 / Nitrite: Negative   Leuk Esterase: Negative / RBC: 5 /hpf / WBC 7 /HPF   Sq Epi: x / Non Sq Epi: x / Bacteria: Many        PT/INR - ( 2023 23:21 )   PT: 12.1 sec;   INR: 1.05 ratio         PTT - ( 2023 23:21 )  PTT:30.1 sec          CAPILLARY BLOOD GLUCOSE            Xray Chest 1 View AP/PA:   ******PRELIMINARY REPORT******      ******PRELIMINARY REPORT******         ACC: [id number] EXAM:  XR CHEST AP OR PA 1V   ORDERED BY: AJIT CARLSON     PROCEDURE DATE:  2023    ******PRELIMINARY REPORT******      ******PRELIMINARY REPORT******          INTERPRETATION:  CLINICAL INFORMATION: Sepsis.    TECHNIQUE: Frontal radiograph of the chest.    COMPARISON: Chest x-ray 2023    IMPRESSION:  Left-sided retrocardiac opacity which may represent atelectasis and/or   pneumonia.  Bilateral perihilar prominence, likely related to pulmonary vasculature.  Tracheostomy with tip above the emely.  No pleural effusion or pneumothorax.  Cardiomediastinal silhouette size is within normal limits.  No acute osseous abnormality.          ******PRELIMINARY REPORT******      ******PRELIMINARY REPORT******        ТАТЬЯНА COSTA MD; Resident Radiologist  This document is a PRELIMINARY interpretation and is pending final   attending approval. 2023 11:44PM (23 @ 23:25) 16.2   23.73 )-----------( 402      ( 2023 23:21 )             49.4           125<L>  |  88<L>  |  17  ----------------------------<  209<H>  4.5   |  14<L>  |  <0.30<L>    Ca    9.3      2023 23:21  Mg     1.9         TPro  8.6<H>  /  Alb  3.8  /  TBili  1.1  /  DBili  x   /  AST  105<H>  /  ALT  62<H>  /  AlkPhos  151<H>                Urinalysis Basic - ( 2023 23:22 )    Color: Yellow / Appearance: Slightly Turbid / S.015 / pH: x  Gluc: x / Ketone: Negative  / Bili: Negative / Urobili: Negative   Blood: x / Protein: >600 / Nitrite: Negative   Leuk Esterase: Negative / RBC: 5 /hpf / WBC 7 /HPF   Sq Epi: x / Non Sq Epi: x / Bacteria: Many        PT/INR - ( 2023 23:21 )   PT: 12.1 sec;   INR: 1.05 ratio         PTT - ( 2023 23:21 )  PTT:30.1 sec          CAPILLARY BLOOD GLUCOSE            Xray Chest 1 View AP/PA:   ******PRELIMINARY REPORT******      ******PRELIMINARY REPORT******         ACC: [id number] EXAM:  XR CHEST AP OR PA 1V   ORDERED BY: AJIT CARLSON     PROCEDURE DATE:  2023        INTERPRETATION:  CLINICAL INFORMATION: Sepsis.    TECHNIQUE: Frontal radiograph of the chest.    COMPARISON: Chest x-ray 2023    IMPRESSION:  Left-sided retrocardiac opacity which may represent atelectasis and/or   pneumonia.  Bilateral perihilar prominence, likely related to pulmonary vasculature.  Tracheostomy with tip above the emely.  No pleural effusion or pneumothorax.  Cardiomediastinal silhouette size is within normal limits.  No acute osseous abnormality.    CT ABD/PELVIS / CT CHEST w contrast     IMPRESSION:  Consolidation in the posterior segment of the right lower lobe,   concerning for aspiration pneumonia. Additional patchy ground glass   opacities in the left upper lobe reflect additional areas of infection.   Small left and trace right pleural effusions.    Trachea appears dilated, possibly secondary to overinflated tracheostomy   cuff.    Mild wall thickening of the ascending colon concerning for colitis,   possibly infectious or inflammatory in etiology.    Cholelithiasis. No CT evidence of acute cholecystitis.    Hepatic steatosis.

## 2023-07-25 NOTE — DIETITIAN INITIAL EVALUATION ADULT - OTHER INFO
Wt Hx:   Dosing wt 55.9 kG/123.2 lbs (? accuracy)  Daily wts during previous admission (lbs): 110.6 (2/18), 114.6 (2/22).   UBW ~110 lbs per previous RD assessment 2/22/23.   Ht: 62 inches (confirmed by )  IBW: 110 lbs   IBW%: 112%  Wt Hx per HIE (lbs): 106 (9/8/19), 118 (12/11/20), 121 (2/18/23 ?accuracy), 118 (2/20/23), 113 (2/21/23).    Nutrition-Related Concerns:   - Advanced ALS  - Tracheostomy & PEG  - Urosepsis   - Hyponatremia s/p NaCl bolus   - Low K & Phos; s/p NaPhosphate & KCl

## 2023-07-25 NOTE — H&P ADULT - HISTORY OF PRESENT ILLNESS
68-year-old female past medical history ALS nonverbal at baseline Parkinson's trach PEG vent dependent on home vent brought in by EMS for  increased heart rate and lethargy for few days.  Patient was seen by outpatient pulmonologist and was noted to be tachycardic and was sent to ED for further work-up.  Per  at bedside patient's been more lethargic over the last several days.  Has had symptoms similar to this in the past secondary to urinary tract infections.   Limited history.    In the ED patient noted to have a rectal temp of 100.3, and tachycardic,  wbc 23.73, lactate of 4.5, ph 7.38, urinalysis + for bacteria, and a chest x ray showing Left-sided retrocardiac opacity which may represent atelectasis and/or pneumonia. patient given vanc cefepime x1, 1.5 L fluid bolus, and IV acetaminophen for fever.   68-year-old female past medical history ALS nonverbal at baseline Parkinson's trach PEG vent dependent on home vent brought in by EMS for  increased heart rate and lethargy for few days.  Patient was seen by outpatient pulmonologist and was noted to be tachycardic and was sent to ED for further work-up.  Per  at bedside patient's been more lethargic over the last several days.  Has had symptoms similar to this in the past secondary to urinary tract infections.   Limited history.    In the ED patient noted to have a rectal temp of 100.3, and tachycardic,  wbc 23.73, lactate of 4.5, ph 7.38, urinalysis + for bacteria, and a chest x ray showing Left-sided retrocardiac opacity which may represent atelectasis and/or pneumonia. patient given vanc cefepime x1, 2 L fluid bolus, and IV acetaminophen for fever.   68-year-old female past medical history ALS nonverbal at baseline Parkinson's trach PEG vent dependent on home vent brought in by EMS for  increased heart rate and lethargy for few days.  Patient was seen by outpatient pulmonologist and was noted to be tachycardic and was sent to ED for further work-up.  History from the  was obtained by the ED. Per   patient's been more lethargic over the last several days.  Has had symptoms similar to this in the past secondary to urinary tract infections.     In the ED patient noted to have a rectal temp of 100.3, and tachycardic,  wbc 23.73, lactate of 4.5, ph 7.38, urinalysis + for bacteria, and a chest x ray showing Left-sided retrocardiac opacity which may represent atelectasis and/or pneumonia. patient given vanc cefepime x1, 2 L fluid bolus, and IV acetaminophen for fever.

## 2023-07-25 NOTE — DIETITIAN INITIAL EVALUATION ADULT - NSFNSGIIOFT_GEN_A_CORE
07-25-23 @ 07:01  -  07-25-23 @ 14:00  --------------------------------------------------------  OUT:  Total OUT: 0 mL    Total NET: 30 mL

## 2023-07-25 NOTE — DIETITIAN INITIAL EVALUATION ADULT - ADD RECOMMEND
As medically feasible, add multivitamin for micronutrient coverage. Continue to trend labs, weight, skin integrity, and intake.

## 2023-07-25 NOTE — PATIENT PROFILE ADULT - FALL HARM RISK - HARM RISK INTERVENTIONS

## 2023-07-25 NOTE — DIETITIAN INITIAL EVALUATION ADULT - ORAL INTAKE PTA/DIET HISTORY
Pt on enteral nutrition only via PEG; Jevity 1.2 starting at 70 mL/hr x12 hours (starting at 20:00, 3-4 cans daily, alternating days). Regimen provides total volume: 840 mL, 1008 kcals, 47 gm protein, and 678 mL free water. Free water boluses: 120 mL (06:45, 12:00, 15:00, 18:00, 00:00), 40 mL/hr during feeds. impaired R Knee ROM/bilateral upper extremity Active ROM was WFL (within functional limits)/Left LE Active ROM was WFL (within functional limits)

## 2023-07-25 NOTE — DIETITIAN INITIAL EVALUATION ADULT - NS FNS DIET ORDER
Diet, NPO with Tube Feed:   Tube Feeding Modality: Gastrostomy  Jevity 1.2 Marck (JEVITY1.2RTH)  Total Volume for 24 Hours (mL): 840  Continuous  Starting Tube Feed Rate {mL per Hour}: 10  Increase Tube Feed Rate by (mL): 10     Every 4 hours  Until Goal Tube Feed Rate (mL per Hour): 70  Tube Feed Duration (in Hours): 12  Tube Feed Start Time: 06:00  Tube Feed Stop Time: 00:00  No Carb Prosource (1pkg = 15gms Protein)     Qty per Day:  1 (07-25-23 @ 08:49)

## 2023-07-25 NOTE — ED ADULT NURSE REASSESSMENT NOTE - NS ED NURSE REASSESS COMMENT FT1
patient taken to CT and MICU by RN, EDT, RT, and MD Donnelly on ventilator. MICU LASHAWN guerra at bedside to assume care of patient

## 2023-07-25 NOTE — DIETITIAN INITIAL EVALUATION ADULT - REASON INDICATOR FOR ASSESSMENT
Consult for Enteral/Parenteral nutrition prior to admission, MST Score 2 or > and assessment/tube feeding  Source: Medical record, previous RD notes, and daughter in law at bedside.

## 2023-07-25 NOTE — CONSULT NOTE ADULT - ASSESSMENT
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, OU  - patient has Parkinson's and ALS, has been being treated outpatient for infection in both eyes, however cannot close own eyes requires taping  - family noting that eyes are producing more discharge and more red than prior  - unable to assess subjective aspects of visual exam 2/2 AMS/averbal  - no rAPD appreciated  - inferior corneal findings with significant stromal cell, prior stromal scarring, neovascularization large epithelial defects with stromal cell and mild discharge OD > OS  - OD also with 10-20% thinning  - cultures of discharge obtained and sent to lab  - start fortified vancomyin and fortified tobramycin q2 hours both eyes (alternate drops vanc at 12, 4, 8 and tobra at 2, 6, 10 etc..)  - start erythromycin ointment q1 hour both eyes  - as patient does not blink at all, may be able to just maintain eye open with NO tape (as gauze and tape have also likely been scraping and damaging cornea - just continue to apply ointment every hour  - discussed need for tarsorrhaphy with sister in law, however family discussion needs to be had in regards to the GOC  - ophtho to follow daily    Seen and discussed with Dr. Ovalle, attending.     Outpatient Follow-up: Patient should follow-up with his/her ophthalmologist or with [facility] Department of Ophthalmology within 1 week of after discharge at:    [address]  [place] [address]  [phone]    Dedrick Brewster MD, PGY3  Also available on [employer] Teams

## 2023-07-26 LAB
ALBUMIN SERPL ELPH-MCNC: 3.3 G/DL — SIGNIFICANT CHANGE UP (ref 3.3–5)
ALP SERPL-CCNC: 80 U/L — SIGNIFICANT CHANGE UP (ref 40–120)
ALT FLD-CCNC: 34 U/L — SIGNIFICANT CHANGE UP (ref 10–45)
ANION GAP SERPL CALC-SCNC: 14 MMOL/L — SIGNIFICANT CHANGE UP (ref 5–17)
APTT BLD: 29.8 SEC — SIGNIFICANT CHANGE UP (ref 24.5–35.6)
AST SERPL-CCNC: 25 U/L — SIGNIFICANT CHANGE UP (ref 10–40)
BASE EXCESS BLDV CALC-SCNC: -4.2 MMOL/L — LOW (ref -2–3)
BILIRUB SERPL-MCNC: 0.8 MG/DL — SIGNIFICANT CHANGE UP (ref 0.2–1.2)
BUN SERPL-MCNC: 14 MG/DL — SIGNIFICANT CHANGE UP (ref 7–23)
CA-I SERPL-SCNC: 1.19 MMOL/L — SIGNIFICANT CHANGE UP (ref 1.15–1.33)
CALCIUM SERPL-MCNC: 9 MG/DL — SIGNIFICANT CHANGE UP (ref 8.4–10.5)
CHLORIDE BLDV-SCNC: 97 MMOL/L — SIGNIFICANT CHANGE UP (ref 96–108)
CHLORIDE SERPL-SCNC: 101 MMOL/L — SIGNIFICANT CHANGE UP (ref 96–108)
CO2 BLDV-SCNC: 25 MMOL/L — SIGNIFICANT CHANGE UP (ref 22–26)
CO2 SERPL-SCNC: 18 MMOL/L — LOW (ref 22–31)
CORTIS AM PEAK SERPL-MCNC: 24.7 UG/DL — HIGH (ref 6–18.4)
CREAT SERPL-MCNC: <0.3 MG/DL — LOW (ref 0.5–1.3)
EGFR: 114 ML/MIN/1.73M2 — SIGNIFICANT CHANGE UP
GAS PNL BLDA: SIGNIFICANT CHANGE UP
GAS PNL BLDV: 127 MMOL/L — LOW (ref 136–145)
GAS PNL BLDV: SIGNIFICANT CHANGE UP
GLUCOSE BLDV-MCNC: 253 MG/DL — HIGH (ref 70–99)
GLUCOSE SERPL-MCNC: 164 MG/DL — HIGH (ref 70–99)
HCO3 BLDV-SCNC: 23 MMOL/L — SIGNIFICANT CHANGE UP (ref 22–29)
HCT VFR BLD CALC: 37.2 % — SIGNIFICANT CHANGE UP (ref 34.5–45)
HCT VFR BLDA CALC: 38 % — SIGNIFICANT CHANGE UP (ref 34.5–46.5)
HGB BLD CALC-MCNC: 12.6 G/DL — SIGNIFICANT CHANGE UP (ref 11.7–16.1)
HGB BLD-MCNC: 12.3 G/DL — SIGNIFICANT CHANGE UP (ref 11.5–15.5)
HOROWITZ INDEX BLDV+IHG-RTO: 30 — SIGNIFICANT CHANGE UP
INR BLD: 1.21 RATIO — HIGH (ref 0.85–1.18)
LACTATE BLDV-MCNC: 3.1 MMOL/L — HIGH (ref 0.5–2)
MAGNESIUM SERPL-MCNC: 2.1 MG/DL — SIGNIFICANT CHANGE UP (ref 1.6–2.6)
MCHC RBC-ENTMCNC: 27.7 PG — SIGNIFICANT CHANGE UP (ref 27–34)
MCHC RBC-ENTMCNC: 33.1 GM/DL — SIGNIFICANT CHANGE UP (ref 32–36)
MCV RBC AUTO: 83.8 FL — SIGNIFICANT CHANGE UP (ref 80–100)
NRBC # BLD: 0 /100 WBCS — SIGNIFICANT CHANGE UP (ref 0–0)
OSMOLALITY SERPL: 285 MOSMOL/KG — SIGNIFICANT CHANGE UP (ref 280–301)
PCO2 BLDV: 52 MMHG — HIGH (ref 39–42)
PH BLDV: 7.26 — LOW (ref 7.32–7.43)
PHOSPHATE SERPL-MCNC: 3.1 MG/DL — SIGNIFICANT CHANGE UP (ref 2.5–4.5)
PLATELET # BLD AUTO: 254 K/UL — SIGNIFICANT CHANGE UP (ref 150–400)
PO2 BLDV: 34 MMHG — SIGNIFICANT CHANGE UP (ref 25–45)
POTASSIUM BLDV-SCNC: 4.9 MMOL/L — SIGNIFICANT CHANGE UP (ref 3.5–5.1)
POTASSIUM SERPL-MCNC: 4.4 MMOL/L — SIGNIFICANT CHANGE UP (ref 3.5–5.3)
POTASSIUM SERPL-SCNC: 4.4 MMOL/L — SIGNIFICANT CHANGE UP (ref 3.5–5.3)
PROT SERPL-MCNC: 6.4 G/DL — SIGNIFICANT CHANGE UP (ref 6–8.3)
PROTHROM AB SERPL-ACNC: 14.1 SEC — HIGH (ref 9.5–13)
RBC # BLD: 4.44 M/UL — SIGNIFICANT CHANGE UP (ref 3.8–5.2)
RBC # FLD: 17.2 % — HIGH (ref 10.3–14.5)
SAO2 % BLDV: 55.1 % — LOW (ref 67–88)
SODIUM SERPL-SCNC: 133 MMOL/L — LOW (ref 135–145)
WBC # BLD: 14.64 K/UL — HIGH (ref 3.8–10.5)
WBC # FLD AUTO: 14.64 K/UL — HIGH (ref 3.8–10.5)

## 2023-07-26 PROCEDURE — 99221 1ST HOSP IP/OBS SF/LOW 40: CPT

## 2023-07-26 PROCEDURE — 99233 SBSQ HOSP IP/OBS HIGH 50: CPT | Mod: GC

## 2023-07-26 RX ADMIN — Medication 3 MILLILITER(S): at 17:20

## 2023-07-26 RX ADMIN — Medication 3 MILLILITER(S): at 11:10

## 2023-07-26 RX ADMIN — Medication 1 APPLICATION(S): at 08:49

## 2023-07-26 RX ADMIN — SENNA PLUS 2 TABLET(S): 8.6 TABLET ORAL at 21:10

## 2023-07-26 RX ADMIN — Medication 1 APPLICATION(S): at 03:24

## 2023-07-26 RX ADMIN — Medication 1 DROP(S): at 23:02

## 2023-07-26 RX ADMIN — Medication 1 APPLICATION(S): at 11:16

## 2023-07-26 RX ADMIN — Medication 1 APPLICATION(S): at 02:01

## 2023-07-26 RX ADMIN — Medication 1 DROP(S): at 03:24

## 2023-07-26 RX ADMIN — Medication 1 DROP(S): at 17:15

## 2023-07-26 RX ADMIN — Medication 1 APPLICATION(S): at 09:15

## 2023-07-26 RX ADMIN — Medication 1 APPLICATION(S): at 19:34

## 2023-07-26 RX ADMIN — Medication 1 APPLICATION(S): at 17:14

## 2023-07-26 RX ADMIN — Medication 1 APPLICATION(S): at 21:10

## 2023-07-26 RX ADMIN — Medication 1 DROP(S): at 08:15

## 2023-07-26 RX ADMIN — CHLORHEXIDINE GLUCONATE 1 APPLICATION(S): 213 SOLUTION TOPICAL at 05:02

## 2023-07-26 RX ADMIN — Medication 1 APPLICATION(S): at 14:30

## 2023-07-26 RX ADMIN — RIVAROXABAN 10 MILLIGRAM(S): KIT at 11:16

## 2023-07-26 RX ADMIN — Medication 1 DROP(S): at 19:34

## 2023-07-26 RX ADMIN — POLYETHYLENE GLYCOL 3350 17 GRAM(S): 17 POWDER, FOR SOLUTION ORAL at 11:16

## 2023-07-26 RX ADMIN — PIPERACILLIN AND TAZOBACTAM 25 GRAM(S): 4; .5 INJECTION, POWDER, LYOPHILIZED, FOR SOLUTION INTRAVENOUS at 05:01

## 2023-07-26 RX ADMIN — Medication 1 APPLICATION(S): at 03:23

## 2023-07-26 RX ADMIN — Medication 1 DROP(S): at 05:01

## 2023-07-26 RX ADMIN — RILUZOLE 50 MILLIGRAM(S): 50 TABLET ORAL at 05:01

## 2023-07-26 RX ADMIN — Medication 1 APPLICATION(S): at 05:02

## 2023-07-26 RX ADMIN — Medication 1 APPLICATION(S): at 20:24

## 2023-07-26 RX ADMIN — Medication 1 APPLICATION(S): at 05:00

## 2023-07-26 RX ADMIN — Medication 1 DROP(S): at 11:16

## 2023-07-26 RX ADMIN — PIPERACILLIN AND TAZOBACTAM 25 GRAM(S): 4; .5 INJECTION, POWDER, LYOPHILIZED, FOR SOLUTION INTRAVENOUS at 13:26

## 2023-07-26 RX ADMIN — Medication 1 APPLICATION(S): at 23:45

## 2023-07-26 RX ADMIN — Medication 1 APPLICATION(S): at 06:25

## 2023-07-26 RX ADMIN — Medication 1 APPLICATION(S): at 15:20

## 2023-07-26 RX ADMIN — MIDODRINE HYDROCHLORIDE 20 MILLIGRAM(S): 2.5 TABLET ORAL at 05:01

## 2023-07-26 RX ADMIN — PIPERACILLIN AND TAZOBACTAM 25 GRAM(S): 4; .5 INJECTION, POWDER, LYOPHILIZED, FOR SOLUTION INTRAVENOUS at 23:02

## 2023-07-26 RX ADMIN — Medication 3 MILLILITER(S): at 05:22

## 2023-07-26 RX ADMIN — Medication 1 APPLICATION(S): at 04:05

## 2023-07-26 RX ADMIN — MIDODRINE HYDROCHLORIDE 20 MILLIGRAM(S): 2.5 TABLET ORAL at 21:09

## 2023-07-26 RX ADMIN — CHLORHEXIDINE GLUCONATE 15 MILLILITER(S): 213 SOLUTION TOPICAL at 05:01

## 2023-07-26 RX ADMIN — Medication 1 APPLICATION(S): at 13:25

## 2023-07-26 RX ADMIN — Medication 1 DROP(S): at 05:02

## 2023-07-26 RX ADMIN — Medication 1 APPLICATION(S): at 23:02

## 2023-07-26 RX ADMIN — Medication 1 DROP(S): at 16:12

## 2023-07-26 RX ADMIN — CHLORHEXIDINE GLUCONATE 15 MILLILITER(S): 213 SOLUTION TOPICAL at 17:15

## 2023-07-26 RX ADMIN — MIDODRINE HYDROCHLORIDE 20 MILLIGRAM(S): 2.5 TABLET ORAL at 13:28

## 2023-07-26 RX ADMIN — Medication 1 DROP(S): at 10:21

## 2023-07-26 RX ADMIN — Medication 1 DROP(S): at 21:10

## 2023-07-26 RX ADMIN — Medication 1 APPLICATION(S): at 11:06

## 2023-07-26 RX ADMIN — Medication 1 APPLICATION(S): at 16:12

## 2023-07-26 RX ADMIN — Medication 1 DROP(S): at 02:01

## 2023-07-26 RX ADMIN — SERTRALINE 75 MILLIGRAM(S): 25 TABLET, FILM COATED ORAL at 11:15

## 2023-07-26 RX ADMIN — Medication 1 DROP(S): at 13:27

## 2023-07-26 RX ADMIN — RILUZOLE 50 MILLIGRAM(S): 50 TABLET ORAL at 17:14

## 2023-07-26 RX ADMIN — Medication 1 APPLICATION(S): at 10:21

## 2023-07-26 NOTE — PROGRESS NOTE ADULT - SUBJECTIVE AND OBJECTIVE BOX
Patient is a 68y old  Female who presents with a chief complaint of Other fatigue     (25 Jul 2023 12:21)      24 hour events: ***    REVIEW OF SYSTEMS:  Constitutional: [ ] fevers [ ] chills [ ] weight loss [ ] weight gain  HEENT: [ ] dry eyes [ ] eye irritation [ ] postnasal drip [ ] nasal congestion  CV: [ ] chest pain [ ] orthopnea [ ] palpitations [ ] murmur  Resp: [ ] cough [ ] shortness of breath [ ] dyspnea [ ] wheezing [ ] sputum [ ] hemoptysis  GI: [ ] nausea [ ] vomiting [ ] diarrhea [ ] constipation [ ] abd pain [ ] dysphagia   : [ ] dysuria [ ] nocturia [ ] hematuria [ ] increased urinary frequency  Musculoskeletal: [ ] back pain [ ] myalgias [ ] arthralgias [ ] fracture  Skin: [ ] rash [ ] itch  Neurological: [ ] headache [ ] dizziness [ ] syncope [ ] weakness [ ] numbness  Psychiatric: [ ] anxiety [ ] depression  Endocrine: [ ] diabetes [ ] thyroid problem  Hematologic/Lymphatic: [ ] anemia [ ] bleeding problem  Allergic/Immunologic: [ ] itchy eyes [ ] nasal discharge [ ] hives [ ] angioedema  [ ] All other systems negative  [ ] Unable to assess ROS because ________    OBJECTIVE:  ICU Vital Signs Last 24 Hrs  T(C): 36.6 (26 Jul 2023 04:00), Max: 37.3 (25 Jul 2023 08:00)  T(F): 97.9 (26 Jul 2023 04:00), Max: 99.1 (25 Jul 2023 08:00)  HR: 86 (26 Jul 2023 06:00) (80 - 105)  BP: 129/56 (26 Jul 2023 06:00) (80/47 - 131/58)  BP(mean): 81 (26 Jul 2023 06:00) (57 - 87)  ABP: --  ABP(mean): --  RR: 16 (26 Jul 2023 06:00) (15 - 24)  SpO2: 91% (26 Jul 2023 06:00) (91% - 100%)    O2 Parameters below as of 26 Jul 2023 05:40  Patient On (Oxygen Delivery Method): ventilator          Mode: AC/ CMV (Assist Control/ Continuous Mandatory Ventilation), RR (machine): 16, TV (machine): 400, FiO2: 30, PEEP: 5, ITime: 1, MAP: 11, PIP: 35    07-25 @ 07:01  -  07-26 @ 07:00  --------------------------------------------------------  IN: 3986.5 mL / OUT: 2800 mL / NET: 1186.5 mL      CAPILLARY BLOOD GLUCOSE          PHYSICAL EXAM:  GENERAL: NAD, well-developed  HEAD:  Atraumatic, Normocephalic  EYES: EOMI, PERRLA, conjunctiva and sclera clear  NECK: Supple, No JVD, Thyroid not palpable, non tender, Trachea midline  CHEST/LUNG: ( )ETT in place, ( )Tracheostomy in place, ( )no chest deformity,  ( )  Normal expansion/effort/palpation,  ( )Normal percussion/auscultation,  Clear to auscultation bilaterally; No wheeze  HEART: Regular rate and rhythm; No murmurs, rubs, or gallops, ( ) No JVD, ( )Normal Pulses, ( )Edema   ABDOMEN: Soft, Nontender, Nondistended; Bowel sounds presen, ( ) No Masses, (  ) No organomegaly,  (  ) Non-tender normal BS   : Hackett in Place, Voiding freely  Musculoskeletal/EXTREMITIES:(  ) Normal strength, movement, and tone, (  ) No focal atropy, (  ) Normal ROM, (  ) Normal digits and nails,  2+ Peripheral Pulses, No clubbing, cyanosis, or edema  PSYCH: AAOx3, (  ) Normal mood/affect/judgment/insight, (  ) Intact memory,   NEUROLOGY: non-focal, exam  SKIN: No rashes or lesions      LINES:    HOSPITAL MEDICATIONS:  MEDICATIONS  (STANDING):  albuterol/ipratropium for Nebulization 3 milliLiter(s) Nebulizer every 6 hours  artificial  tears Solution 1 Drop(s) Both EYES two times a day  chlorhexidine 0.12% Liquid 15 milliLiter(s) Oral Mucosa every 12 hours  chlorhexidine 4% Liquid 1 Application(s) Topical <User Schedule>  erythromycin   Ointment 1 Application(s) Both EYES <User Schedule>  midodrine 20 milliGRAM(s) Oral every 8 hours  piperacillin/tazobactam IVPB.. 3.375 Gram(s) IV Intermittent every 8 hours  polyethylene glycol 3350 17 Gram(s) Oral daily  riluzole 50 milliGRAM(s) Oral two times a day  rivaroxaban 10 milliGRAM(s) Oral daily  senna 2 Tablet(s) Oral at bedtime  sertraline 75 milliGRAM(s) Oral daily  tobramycin 14 mG/mL Fortified Ophthalmic 1 Drop(s) Both EYES every 4 hours  vancomycin 25 mG/mL Fortified Ophthalmic 1 Drop(s) Both EYES every 4 hours    MEDICATIONS  (PRN):  diazepam  Injectable 2 milliGRAM(s) IV Push every 6 hours PRN spasm      LABS:                        12.3   14.64 )-----------( 254      ( 26 Jul 2023 00:35 )             37.2     Hgb Trend: 12.3<--, 13.7<--, 16.2<--  07-26    133<L>  |  101  |  14  ----------------------------<  164<H>  4.4   |  18<L>  |  <0.30<L>    Ca    9.0      26 Jul 2023 00:35  Phos  3.1     07-26  Mg     2.1     07-26    TPro  6.4  /  Alb  3.3  /  TBili  0.8  /  DBili  x   /  AST  25  /  ALT  34  /  AlkPhos  80  07-26    Creatinine Trend: <0.30<--, <0.30<--, <0.30<--, <0.30<--, <0.30<--, <0.30<--  PT/INR - ( 26 Jul 2023 00:35 )   PT: 14.1 sec;   INR: 1.21 ratio         PTT - ( 26 Jul 2023 00:35 )  PTT:29.8 sec  Urinalysis Basic - ( 26 Jul 2023 00:35 )    Color: x / Appearance: x / SG: x / pH: x  Gluc: 164 mg/dL / Ketone: x  / Bili: x / Urobili: x   Blood: x / Protein: x / Nitrite: x   Leuk Esterase: x / RBC: x / WBC x   Sq Epi: x / Non Sq Epi: x / Bacteria: x      Arterial Blood Gas:  07-26 @ 00:15  7.37/39/97/22/99.3/-2.5  ABG lactate: --  Arterial Blood Gas:  07-25 @ 06:16  7.43/33/174/22/99.8/-1.7  ABG lactate: --    Venous Blood Gas:  07-26 @ 00:15  7.26/52/34/23/55.1  VBG Lactate: 3.1  Venous Blood Gas:  07-25 @ 04:50  7.36/42/89/24/99.0  VBG Lactate: 2.5  Venous Blood Gas:  07-25 @ 04:01  7.32/51/34/26/69.2  VBG Lactate: 2.7  Venous Blood Gas:  07-25 @ 02:04  7.36/42/52/24/91.2  VBG Lactate: 3.1  Venous Blood Gas:  07-24 @ 22:44  7.38/43/64/25/95.6  VBG Lactate: 4.5      EKG:    MICROBIOLOGY:     RADIOLOGY:  [ ] Reviewed and interpreted by me    EKG:  MICROBIOLOGY:     Radiology: ***    Bedside lung ultrasound: ***    Bedside ECHO: ***    EKG:    CENTRAL LINE: Y/N          DATE INSERTED:              REMOVE: Y/N    HACKETT: Y/N                        DATE INSERTED:              REMOVE: Y/N    A-LINE: Y/N                       DATE INSERTED:              REMOVE: Y/N    GLOBAL ISSUE/BEST PRACTICE:  Analgesia:  Sedation:  HOB elevation: yes  Stress ulcer prophylaxis:  VTE prophylaxis:  Glycemic control:  Nutrition:    CODE STATUS: ***  [facility] discussion: Y     Patient is a 68y old  Female who presents with a chief complaint of Other fatigue     (25 Jul 2023 12:21)      24 hour events: MAP decreased to 54 ovn, s/p 1500 LR, 250 NS, 250 5% albumin bolus, midodrine increased to 20mg q8h. BP improved after boluses. NAEO otherwise.    REVIEW OF SYSTEMS:  Constitutional: [ ] fevers [ ] chills [ ] weight loss [ ] weight gain  HEENT: [ ] dry eyes [ ] eye irritation [ ] postnasal drip [ ] nasal congestion  CV: [ ] chest pain [ ] orthopnea [ ] palpitations [ ] murmur  Resp: [ ] cough [ ] shortness of breath [ ] dyspnea [ ] wheezing [ ] sputum [ ] hemoptysis  GI: [ ] nausea [ ] vomiting [ ] diarrhea [ ] constipation [ ] abd pain [ ] dysphagia   : [ ] dysuria [ ] nocturia [ ] hematuria [ ] increased urinary frequency  Musculoskeletal: [ ] back pain [ ] myalgias [ ] arthralgias [ ] fracture  Skin: [ ] rash [ ] itch  Neurological: [ ] headache [ ] dizziness [ ] syncope [ ] weakness [ ] numbness  Psychiatric: [ ] anxiety [ ] depression  Endocrine: [ ] diabetes [ ] thyroid problem  Hematologic/Lymphatic: [ ] anemia [ ] bleeding problem  Allergic/Immunologic: [ ] itchy eyes [ ] nasal discharge [ ] hives [ ] angioedema  [ ] All other systems negative  [ ] Unable to assess ROS because ________    OBJECTIVE:  ICU Vital Signs Last 24 Hrs  T(C): 36.6 (26 Jul 2023 04:00), Max: 37.3 (25 Jul 2023 08:00)  T(F): 97.9 (26 Jul 2023 04:00), Max: 99.1 (25 Jul 2023 08:00)  HR: 86 (26 Jul 2023 06:00) (80 - 105)  BP: 129/56 (26 Jul 2023 06:00) (80/47 - 131/58)  BP(mean): 81 (26 Jul 2023 06:00) (57 - 87)  ABP: --  ABP(mean): --  RR: 16 (26 Jul 2023 06:00) (15 - 24)  SpO2: 91% (26 Jul 2023 06:00) (91% - 100%)    O2 Parameters below as of 26 Jul 2023 05:40  Patient On (Oxygen Delivery Method): ventilator          Mode: AC/ CMV (Assist Control/ Continuous Mandatory Ventilation), RR (machine): 16, TV (machine): 400, FiO2: 30, PEEP: 5, ITime: 1, MAP: 11, PIP: 35    07-25 @ 07:01  -  07-26 @ 07:00  --------------------------------------------------------  IN: 3986.5 mL / OUT: 2800 mL / NET: 1186.5 mL      CAPILLARY BLOOD GLUCOSE          PHYSICAL EXAM:  GENERAL: NAD, well-developed  HEAD:  Atraumatic, Normocephalic  EYES: EOMI, PERRLA, conjunctiva and sclera clear  NECK: Supple, No JVD, Thyroid not palpable, non tender, Trachea midline  CHEST/LUNG: ( )ETT in place, ( )Tracheostomy in place, ( )no chest deformity,  ( )  Normal expansion/effort/palpation,  ( )Normal percussion/auscultation,  Clear to auscultation bilaterally; No wheeze  HEART: Regular rate and rhythm; No murmurs, rubs, or gallops, ( ) No JVD, ( )Normal Pulses, ( )Edema   ABDOMEN: Soft, Nontender, Nondistended; Bowel sounds presen, ( ) No Masses, (  ) No organomegaly,  (  ) Non-tender normal BS   : Hackett in Place, Voiding freely  Musculoskeletal/EXTREMITIES:(  ) Normal strength, movement, and tone, (  ) No focal atropy, (  ) Normal ROM, (  ) Normal digits and nails,  2+ Peripheral Pulses, No clubbing, cyanosis, or edema  PSYCH: AAOx3, (  ) Normal mood/affect/judgment/insight, (  ) Intact memory,   NEUROLOGY: non-focal, exam  SKIN: No rashes or lesions      LINES:    HOSPITAL MEDICATIONS:  MEDICATIONS  (STANDING):  albuterol/ipratropium for Nebulization 3 milliLiter(s) Nebulizer every 6 hours  artificial  tears Solution 1 Drop(s) Both EYES two times a day  chlorhexidine 0.12% Liquid 15 milliLiter(s) Oral Mucosa every 12 hours  chlorhexidine 4% Liquid 1 Application(s) Topical <User Schedule>  erythromycin   Ointment 1 Application(s) Both EYES <User Schedule>  midodrine 20 milliGRAM(s) Oral every 8 hours  piperacillin/tazobactam IVPB.. 3.375 Gram(s) IV Intermittent every 8 hours  polyethylene glycol 3350 17 Gram(s) Oral daily  riluzole 50 milliGRAM(s) Oral two times a day  rivaroxaban 10 milliGRAM(s) Oral daily  senna 2 Tablet(s) Oral at bedtime  sertraline 75 milliGRAM(s) Oral daily  tobramycin 14 mG/mL Fortified Ophthalmic 1 Drop(s) Both EYES every 4 hours  vancomycin 25 mG/mL Fortified Ophthalmic 1 Drop(s) Both EYES every 4 hours    MEDICATIONS  (PRN):  diazepam  Injectable 2 milliGRAM(s) IV Push every 6 hours PRN spasm      LABS:                        12.3   14.64 )-----------( 254      ( 26 Jul 2023 00:35 )             37.2     Hgb Trend: 12.3<--, 13.7<--, 16.2<--  07-26    133<L>  |  101  |  14  ----------------------------<  164<H>  4.4   |  18<L>  |  <0.30<L>    Ca    9.0      26 Jul 2023 00:35  Phos  3.1     07-26  Mg     2.1     07-26    TPro  6.4  /  Alb  3.3  /  TBili  0.8  /  DBili  x   /  AST  25  /  ALT  34  /  AlkPhos  80  07-26    Creatinine Trend: <0.30<--, <0.30<--, <0.30<--, <0.30<--, <0.30<--, <0.30<--  PT/INR - ( 26 Jul 2023 00:35 )   PT: 14.1 sec;   INR: 1.21 ratio         PTT - ( 26 Jul 2023 00:35 )  PTT:29.8 sec  Urinalysis Basic - ( 26 Jul 2023 00:35 )    Color: x / Appearance: x / SG: x / pH: x  Gluc: 164 mg/dL / Ketone: x  / Bili: x / Urobili: x   Blood: x / Protein: x / Nitrite: x   Leuk Esterase: x / RBC: x / WBC x   Sq Epi: x / Non Sq Epi: x / Bacteria: x      Arterial Blood Gas:  07-26 @ 00:15  7.37/39/97/22/99.3/-2.5  ABG lactate: --  Arterial Blood Gas:  07-25 @ 06:16  7.43/33/174/22/99.8/-1.7  ABG lactate: --    Venous Blood Gas:  07-26 @ 00:15  7.26/52/34/23/55.1  VBG Lactate: 3.1  Venous Blood Gas:  07-25 @ 04:50  7.36/42/89/24/99.0  VBG Lactate: 2.5  Venous Blood Gas:  07-25 @ 04:01  7.32/51/34/26/69.2  VBG Lactate: 2.7  Venous Blood Gas:  07-25 @ 02:04  7.36/42/52/24/91.2  VBG Lactate: 3.1  Venous Blood Gas:  07-24 @ 22:44  7.38/43/64/25/95.6  VBG Lactate: 4.5      EKG:    MICROBIOLOGY:     RADIOLOGY:  [ ] Reviewed and interpreted by me    EKG:  MICROBIOLOGY:     Radiology: ***    Bedside lung ultrasound: ***    Bedside ECHO: ***    EKG:    CENTRAL LINE: Y/N          DATE INSERTED:              REMOVE: Y/N    HACKETT: Y/N                        DATE INSERTED:              REMOVE: Y/N    A-LINE: Y/N                       DATE INSERTED:              REMOVE: Y/N    GLOBAL ISSUE/BEST PRACTICE:  Analgesia:  Sedation:  HOB elevation: yes  Stress ulcer prophylaxis:  VTE prophylaxis:  Glycemic control:  Nutrition:    CODE STATUS: ***  [facility] discussion: Y     Patient is a 68y old  Female who presents with a chief complaint of Other fatigue     (25 Jul 2023 12:21)      24 hour events: MAP decreased to 54 ovn, s/p 1500 LR, 250 NS, 250 5% albumin bolus, midodrine increased to 20mg q8h. BP improved after boluses. NAEO otherwise.    REVIEW OF SYSTEMS:  Constitutional: [ ] fevers [ ] chills [ ] weight loss [ ] weight gain  HEENT: [ ] dry eyes [ ] eye irritation [ ] postnasal drip [ ] nasal congestion  CV: [ ] chest pain [ ] orthopnea [ ] palpitations [ ] murmur  Resp: [ ] cough [ ] shortness of breath [ ] dyspnea [ ] wheezing [ ] sputum [ ] hemoptysis  GI: [ ] nausea [ ] vomiting [ ] diarrhea [ ] constipation [ ] abd pain [ ] dysphagia   : [ ] dysuria [ ] nocturia [ ] hematuria [ ] increased urinary frequency  Musculoskeletal: [ ] back pain [ ] myalgias [ ] arthralgias [ ] fracture  Skin: [ ] rash [ ] itch  Neurological: [ ] headache [ ] dizziness [ ] syncope [ ] weakness [ ] numbness  Psychiatric: [ ] anxiety [ ] depression  Endocrine: [ ] diabetes [ ] thyroid problem  Hematologic/Lymphatic: [ ] anemia [ ] bleeding problem  Allergic/Immunologic: [ ] itchy eyes [ ] nasal discharge [ ] hives [ ] angioedema  [ ] All other systems negative  [X ] Unable to assess ROS because __nonverbal______    OBJECTIVE:  ICU Vital Signs Last 24 Hrs  T(C): 36.6 (26 Jul 2023 04:00), Max: 37.3 (25 Jul 2023 08:00)  T(F): 97.9 (26 Jul 2023 04:00), Max: 99.1 (25 Jul 2023 08:00)  HR: 86 (26 Jul 2023 06:00) (80 - 105)  BP: 129/56 (26 Jul 2023 06:00) (80/47 - 131/58)  BP(mean): 81 (26 Jul 2023 06:00) (57 - 87)  ABP: --  ABP(mean): --  RR: 16 (26 Jul 2023 06:00) (15 - 24)  SpO2: 91% (26 Jul 2023 06:00) (91% - 100%)    O2 Parameters below as of 26 Jul 2023 05:40  Patient On (Oxygen Delivery Method): ventilator          Mode: AC/ CMV (Assist Control/ Continuous Mandatory Ventilation), RR (machine): 16, TV (machine): 400, FiO2: 30, PEEP: 5, ITime: 1, MAP: 11, PIP: 35    07-25 @ 07:01  -  07-26 @ 07:00  --------------------------------------------------------  IN: 3986.5 mL / OUT: 2800 mL / NET: 1186.5 mL      CAPILLARY BLOOD GLUCOSE          PHYSICAL EXAM:  GENERAL: NAD, well-developed  HEAD:  Atraumatic, Normocephalic  EYES: eyes were  NECK: Supple, No JVD, Thyroid not palpable, non tender, Trachea midline  CHEST/LUNG: ( )ETT in place, ( )Tracheostomy in place, ( )no chest deformity,  ( )  Normal expansion/effort/palpation,  ( )Normal percussion/auscultation,  Clear to auscultation bilaterally; No wheeze  HEART: Regular rate and rhythm; No murmurs, rubs, or gallops, ( ) No JVD, ( )Normal Pulses, ( )Edema   ABDOMEN: Soft, Nontender, Nondistended; Bowel sounds presen, ( ) No Masses, (  ) No organomegaly,  (  ) Non-tender normal BS   : Hackett in Place, Voiding freely  Musculoskeletal/EXTREMITIES:(  ) Normal strength, movement, and tone, (  ) No focal atropy, (  ) Normal ROM, (  ) Normal digits and nails,  2+ Peripheral Pulses, No clubbing, cyanosis, or edema  PSYCH: AAOx3, (  ) Normal mood/affect/judgment/insight, (  ) Intact memory,   NEUROLOGY: non-focal, exam  SKIN: No rashes or lesions      LINES:    HOSPITAL MEDICATIONS:  MEDICATIONS  (STANDING):  albuterol/ipratropium for Nebulization 3 milliLiter(s) Nebulizer every 6 hours  artificial  tears Solution 1 Drop(s) Both EYES two times a day  chlorhexidine 0.12% Liquid 15 milliLiter(s) Oral Mucosa every 12 hours  chlorhexidine 4% Liquid 1 Application(s) Topical <User Schedule>  erythromycin   Ointment 1 Application(s) Both EYES <User Schedule>  midodrine 20 milliGRAM(s) Oral every 8 hours  piperacillin/tazobactam IVPB.. 3.375 Gram(s) IV Intermittent every 8 hours  polyethylene glycol 3350 17 Gram(s) Oral daily  riluzole 50 milliGRAM(s) Oral two times a day  rivaroxaban 10 milliGRAM(s) Oral daily  senna 2 Tablet(s) Oral at bedtime  sertraline 75 milliGRAM(s) Oral daily  tobramycin 14 mG/mL Fortified Ophthalmic 1 Drop(s) Both EYES every 4 hours  vancomycin 25 mG/mL Fortified Ophthalmic 1 Drop(s) Both EYES every 4 hours    MEDICATIONS  (PRN):  diazepam  Injectable 2 milliGRAM(s) IV Push every 6 hours PRN spasm      LABS:                        12.3   14.64 )-----------( 254      ( 26 Jul 2023 00:35 )             37.2     Hgb Trend: 12.3<--, 13.7<--, 16.2<--  07-26    133<L>  |  101  |  14  ----------------------------<  164<H>  4.4   |  18<L>  |  <0.30<L>    Ca    9.0      26 Jul 2023 00:35  Phos  3.1     07-26  Mg     2.1     07-26    TPro  6.4  /  Alb  3.3  /  TBili  0.8  /  DBili  x   /  AST  25  /  ALT  34  /  AlkPhos  80  07-26    Creatinine Trend: <0.30<--, <0.30<--, <0.30<--, <0.30<--, <0.30<--, <0.30<--  PT/INR - ( 26 Jul 2023 00:35 )   PT: 14.1 sec;   INR: 1.21 ratio         PTT - ( 26 Jul 2023 00:35 )  PTT:29.8 sec  Urinalysis Basic - ( 26 Jul 2023 00:35 )    Color: x / Appearance: x / SG: x / pH: x  Gluc: 164 mg/dL / Ketone: x  / Bili: x / Urobili: x   Blood: x / Protein: x / Nitrite: x   Leuk Esterase: x / RBC: x / WBC x   Sq Epi: x / Non Sq Epi: x / Bacteria: x      Arterial Blood Gas:  07-26 @ 00:15  7.37/39/97/22/99.3/-2.5  ABG lactate: --  Arterial Blood Gas:  07-25 @ 06:16  7.43/33/174/22/99.8/-1.7  ABG lactate: --    Venous Blood Gas:  07-26 @ 00:15  7.26/52/34/23/55.1  VBG Lactate: 3.1  Venous Blood Gas:  07-25 @ 04:50  7.36/42/89/24/99.0  VBG Lactate: 2.5  Venous Blood Gas:  07-25 @ 04:01  7.32/51/34/26/69.2  VBG Lactate: 2.7  Venous Blood Gas:  07-25 @ 02:04  7.36/42/52/24/91.2  VBG Lactate: 3.1  Venous Blood Gas:  07-24 @ 22:44  7.38/43/64/25/95.6  VBG Lactate: 4.5      EKG:    MICROBIOLOGY:     RADIOLOGY:  [ ] Reviewed and interpreted by me    EKG:  MICROBIOLOGY:     Radiology: ***    Bedside lung ultrasound: ***    Bedside ECHO: ***    EKG:    CENTRAL LINE: Y/N          DATE INSERTED:              REMOVE: Y/N    HACKETT: Y/N                        DATE INSERTED:              REMOVE: Y/N    A-LINE: Y/N                       DATE INSERTED:              REMOVE: Y/N    GLOBAL ISSUE/BEST PRACTICE:  Analgesia:  Sedation:  HOB elevation: yes  Stress ulcer prophylaxis:  VTE prophylaxis:  Glycemic control:  Nutrition:    CODE STATUS: ***  [facility] discussion: Y     Patient is a 68y old  Female who presents with a chief complaint of Other fatigue     (25 Jul 2023 12:21)      24 hour events: MAP decreased to 54 ovn, s/p 1500 LR, 250 NS, 250 5% albumin bolus, midodrine increased to 20mg q8h. BP improved after boluses. NAEO otherwise.    REVIEW OF SYSTEMS:  Constitutional: [ ] fevers [ ] chills [ ] weight loss [ ] weight gain  HEENT: [ ] dry eyes [ ] eye irritation [ ] postnasal drip [ ] nasal congestion  CV: [ ] chest pain [ ] orthopnea [ ] palpitations [ ] murmur  Resp: [ ] cough [ ] shortness of breath [ ] dyspnea [ ] wheezing [ ] sputum [ ] hemoptysis  GI: [ ] nausea [ ] vomiting [ ] diarrhea [ ] constipation [ ] abd pain [ ] dysphagia   : [ ] dysuria [ ] nocturia [ ] hematuria [ ] increased urinary frequency  Musculoskeletal: [ ] back pain [ ] myalgias [ ] arthralgias [ ] fracture  Skin: [ ] rash [ ] itch  Neurological: [ ] headache [ ] dizziness [ ] syncope [ ] weakness [ ] numbness  Psychiatric: [ ] anxiety [ ] depression  Endocrine: [ ] diabetes [ ] thyroid problem  Hematologic/Lymphatic: [ ] anemia [ ] bleeding problem  Allergic/Immunologic: [ ] itchy eyes [ ] nasal discharge [ ] hives [ ] angioedema  [ ] All other systems negative  [X ] Unable to assess ROS because __nonverbal______    OBJECTIVE:  ICU Vital Signs Last 24 Hrs  T(C): 36.6 (26 Jul 2023 04:00), Max: 37.3 (25 Jul 2023 08:00)  T(F): 97.9 (26 Jul 2023 04:00), Max: 99.1 (25 Jul 2023 08:00)  HR: 86 (26 Jul 2023 06:00) (80 - 105)  BP: 129/56 (26 Jul 2023 06:00) (80/47 - 131/58)  BP(mean): 81 (26 Jul 2023 06:00) (57 - 87)  ABP: --  ABP(mean): --  RR: 16 (26 Jul 2023 06:00) (15 - 24)  SpO2: 91% (26 Jul 2023 06:00) (91% - 100%)    O2 Parameters below as of 26 Jul 2023 05:40  Patient On (Oxygen Delivery Method): ventilator          Mode: AC/ CMV (Assist Control/ Continuous Mandatory Ventilation), RR (machine): 16, TV (machine): 400, FiO2: 30, PEEP: 5, ITime: 1, MAP: 11, PIP: 35    07-25 @ 07:01  -  07-26 @ 07:00  --------------------------------------------------------  IN: 3986.5 mL / OUT: 2800 mL / NET: 1186.5 mL      CAPILLARY BLOOD GLUCOSE          PHYSICAL EXAM:  GENERAL: NAD, well-developed  HEAD:  Atraumatic, Normocephalic  EYES: Eyes were taped shut covered with gauze  NECK: Supple, No JVD  CHEST/LUNG: Tracheostomy in place, b/l rhonchi, no wheezes, no crakles  HEART: Regular rate and rhythm; No murmurs, rubs, or gallops  ABDOMEN: Soft, Nontender, Nondistended; Bowel sounds present  : Hackett in Place, Voiding freely  Musculoskeletal/EXTREMITIES: no peripheral edema, peripheral pulses 2+, extremities warm  PSYCH: Baseline nonverbal  NEUROLOGY: Baseline nonverbal  SKIN: No rashes or lesions      LINES:    HOSPITAL MEDICATIONS:  MEDICATIONS  (STANDING):  albuterol/ipratropium for Nebulization 3 milliLiter(s) Nebulizer every 6 hours  artificial  tears Solution 1 Drop(s) Both EYES two times a day  chlorhexidine 0.12% Liquid 15 milliLiter(s) Oral Mucosa every 12 hours  chlorhexidine 4% Liquid 1 Application(s) Topical <User Schedule>  erythromycin   Ointment 1 Application(s) Both EYES <User Schedule>  midodrine 20 milliGRAM(s) Oral every 8 hours  piperacillin/tazobactam IVPB.. 3.375 Gram(s) IV Intermittent every 8 hours  polyethylene glycol 3350 17 Gram(s) Oral daily  riluzole 50 milliGRAM(s) Oral two times a day  rivaroxaban 10 milliGRAM(s) Oral daily  senna 2 Tablet(s) Oral at bedtime  sertraline 75 milliGRAM(s) Oral daily  tobramycin 14 mG/mL Fortified Ophthalmic 1 Drop(s) Both EYES every 4 hours  vancomycin 25 mG/mL Fortified Ophthalmic 1 Drop(s) Both EYES every 4 hours    MEDICATIONS  (PRN):  diazepam  Injectable 2 milliGRAM(s) IV Push every 6 hours PRN spasm      LABS:                        12.3   14.64 )-----------( 254      ( 26 Jul 2023 00:35 )             37.2     Hgb Trend: 12.3<--, 13.7<--, 16.2<--  07-26    133<L>  |  101  |  14  ----------------------------<  164<H>  4.4   |  18<L>  |  <0.30<L>    Ca    9.0      26 Jul 2023 00:35  Phos  3.1     07-26  Mg     2.1     07-26    TPro  6.4  /  Alb  3.3  /  TBili  0.8  /  DBili  x   /  AST  25  /  ALT  34  /  AlkPhos  80  07-26    Creatinine Trend: <0.30<--, <0.30<--, <0.30<--, <0.30<--, <0.30<--, <0.30<--  PT/INR - ( 26 Jul 2023 00:35 )   PT: 14.1 sec;   INR: 1.21 ratio         PTT - ( 26 Jul 2023 00:35 )  PTT:29.8 sec  Urinalysis Basic - ( 26 Jul 2023 00:35 )    Color: x / Appearance: x / SG: x / pH: x  Gluc: 164 mg/dL / Ketone: x  / Bili: x / Urobili: x   Blood: x / Protein: x / Nitrite: x   Leuk Esterase: x / RBC: x / WBC x   Sq Epi: x / Non Sq Epi: x / Bacteria: x      Arterial Blood Gas:  07-26 @ 00:15  7.37/39/97/22/99.3/-2.5  ABG lactate: --  Arterial Blood Gas:  07-25 @ 06:16  7.43/33/174/22/99.8/-1.7  ABG lactate: --    Venous Blood Gas:  07-26 @ 00:15  7.26/52/34/23/55.1  VBG Lactate: 3.1  Venous Blood Gas:  07-25 @ 04:50  7.36/42/89/24/99.0  VBG Lactate: 2.5  Venous Blood Gas:  07-25 @ 04:01  7.32/51/34/26/69.2  VBG Lactate: 2.7  Venous Blood Gas:  07-25 @ 02:04  7.36/42/52/24/91.2  VBG Lactate: 3.1  Venous Blood Gas:  07-24 @ 22:44  7.38/43/64/25/95.6  VBG Lactate: 4.5      EKG:    MICROBIOLOGY:     RADIOLOGY:  [ ] Reviewed and interpreted by me    EKG:  MICROBIOLOGY:     Radiology: ***    Bedside lung ultrasound: ***    Bedside ECHO: ***    EKG:    CENTRAL LINE: Y/N          DATE INSERTED:              REMOVE: Y/N    HACKETT: Y/N                        DATE INSERTED:              REMOVE: Y/N    A-LINE: Y/N                       DATE INSERTED:              REMOVE: Y/N    GLOBAL ISSUE/BEST PRACTICE:  Analgesia:  Sedation:  HOB elevation: yes  Stress ulcer prophylaxis:  VTE prophylaxis:  Glycemic control:  Nutrition:    CODE STATUS: ***  GO discussion: Y

## 2023-07-26 NOTE — PROGRESS NOTE ADULT - ASSESSMENT
68-year-old female past medical history ALS nonverbal at baseline, Parkinson's trach PEG vent dependent on home vent , and history of Urosepsis brought in by EMS for increased heart rate (110s) and lethargy for few days. Patient has rectal temp of 100.3 and urinalysis + for bacteria. Concern for Urosepsis. Patient sent to MICU overnight due to lack of [facility] beds       NEURO  # advanced ALS  - baseline nonverbal, awake, communicates with eye movements  -continue home riluzole 50 mg bid   -c/w home diazepam 2 mg 4x a day PRN     CARDIOVASCULAR  #sinus tachycardia 2/2 ?urosepsis vs PNA  -CXR showing possible atlectasis vs pna  -CT chest showing consolidation in the posterior segment of the right lower lobe and patchy ground glass   opacities in the left upper lobe concerning for asp PNA  -Urinalysis + for bacteria  -lactate 2.7<4.5, wbc 23  -s/p vanc cefepime x1  -s/p 2 L fluid bolus  -start ceftriaxone   -IV LR at 75cc/H  -f/u urine and blood cultures, procal      RESPIRATORY    #Vent Dependent  -vent settings VC, , RR 16, PEEP 5, Fio2 40   -tracheostomy       GI/NUTRITION  # PEG  - enteral feeds, site care, bowel reg  -NPO with tube feeds  -consult nutrition regarding feeds    /RENAL  #Urosepsis   -as above    #Electrolyte disturbances  -hyponatremia Na 125, Cl 88  -IV LR at 75cc/H    SKIN  no active issues    ID  #urosepsis  -as above    ENDOCRINE  no active issues    HEMATOLOGIC  no active issues     DVT PPX  - [id number] QD    Psych  #depression  -hold home sertraline 4ml (80mg equivalent)  -start setraline 75 mg     ETHICS  - Full code.  -son [facility] #[phone]    Dispo  - Listed for RCU.   68-year-old female past medical history ALS nonverbal at baseline, Parkinson's trach PEG vent dependent on home vent , and history of Urosepsis brought in by EMS for increased heart rate (110s) and lethargy for few days. Patient has rectal temp of 100.3 and urinalysis + for bacteria. Concern for Urosepsis. Patient sent to MICU overnight due to lack of [facility] beds       ====Neuro====  # advanced ALS  - baseline nonverbal, awake, communicates with eye movements  -continue home riluzole 50 mg bid   -c/w home diazepam 2 mg 4x a day PRN     ====Cardiovascular====  #sinus tachycardia 2/2 ?urosepsis vs PNA  -CXR showing possible atlectasis vs pna  -CT chest showing consolidation in the posterior segment of the right lower lobe and patchy ground glass   opacities in the left upper lobe concerning for asp PNA  -Urinalysis + for bacteria  -lactate 2.7<4.5, wbc 23  -s/p vanc cefepime x1 and ceftriaxone  -c/w Zosyn  -f/u ucx, bcx, sputum cx, procal      RESPIRATORY    #Vent Dependent  -vent settings VC, , RR 16, PEEP 5, Fio2 40   -tracheostomy       GI/NUTRITION  # PEG  - enteral feeds, site care, bowel reg  -NPO with tube feeds  -consult nutrition regarding feeds    /RENAL  #Urosepsis   -as above    #Electrolyte disturbances  -hyponatremia Na 125, Cl 88  -IV LR at 75cc/H    SKIN  no active issues    ID  #urosepsis  -as above    ENDOCRINE  no active issues    HEMATOLOGIC  no active issues     DVT PPX  - [id number] QD    Psych  #depression  -hold home sertraline 4ml (80mg equivalent)  -start setraline 75 mg     ETHICS  - Full code.  -son He[facility] #[phone]    Dispo  - Listed for RCU.   68-year-old female past medical history ALS nonverbal at baseline, Parkinson's trach PEG vent dependent on home vent , and history of Urosepsis brought in by EMS for increased heart rate (110s) and lethargy for few days. Patient has rectal temp of 100.3 and urinalysis + for bacteria. Concern for Urosepsis. Patient sent to MICU overnight due to lack of [facility] beds       ====Neuro====  # advanced ALS  - baseline nonverbal, awake, communicates with eye movements  -continue home riluzole 50 mg bid   -c/w home diazepam 2 mg 4x a day PRN     ====Cardiovascular====  #sinus tachycardia 2/2 ?urosepsis vs PNA  -CXR showing possible atlectasis vs pna  -CT chest showing consolidation in the posterior segment of the right lower lobe and patchy ground glass   opacities in the left upper lobe concerning for asp PNA  -Urinalysis + for bacteria  -lactate 2.7<4.5, wbc 23  -s/p vanc cefepime x1 and ceftriaxone  -c/w Zosyn  -f/u ucx, bcx 7/24 NGTD  -sputum cx 7/25 -ve  -procal?      RESPIRATORY    #Vent Dependent  -vent settings VC, , RR 16, PEEP 5, Fio2 40   -tracheostomy       GI/NUTRITION  # PEG  - enteral feeds, site care, bowel reg  -NPO with tube feeds  -consult nutrition regarding feeds    /RENAL  #Urosepsis   -as above    #Hyponatremia  -Na 133, improving  -Clinically euvolemic  -Holding mIVF    SKIN  no active issues    ID  #urosepsis  -as above    ENDOCRINE  no active issues    HEMATOLOGIC  no active issues     DVT PPX  - [id number] QD    Psych  #depression  -hold home sertraline 4ml (80mg equivalent)  -start setraline 75 mg     ETHICS  - Full code.  -son Heber #[phone]    Dispo  - Listed for RCU.   68-year-old female past medical history ALS nonverbal at baseline, Parkinson's trach PEG vent dependent on home vent , and history of Urosepsis brought in by EMS for increased heart rate (110s) and lethargy for few days. Patient has rectal temp of 100.3 and urinalysis + for bacteria. Concern for Urosepsis. Patient sent to MICU overnight due to lack of [facility] beds       ====Neuro====  # advanced ALS  - baseline nonverbal, awake, communicates with eye movements  -continue home riluzole 50 mg bid   -c/w home diazepam 2 mg 4x a day PRN     ====Cardiovascular====  #sinus tachycardia 2/2 ?urosepsis vs PNA  -CXR showing possible atlectasis vs pna  -CT chest showing consolidation in the posterior segment of the right lower lobe and patchy ground glass   opacities in the left upper lobe concerning for asp PNA  -Urinalysis + for bacteria  -lactate 2.7<4.5, wbc 23  -s/p vanc cefepime x1 and ceftriaxone  -c/w Zosyn  -f/u ucx, bcx 7/24 NGTD  -sputum cx 7/25 -ve  -procal?      RESPIRATORY    #Vent Dependent  -vent settings VC, , RR 16, PEEP 5, Fio2 40   -tracheostomy       GI/NUTRITION  # PEG  - enteral feeds, site care, bowel reg  -NPO with tube feeds  -consult nutrition regarding feeds    /RENAL  #Urosepsis   -as above    #Hyponatremia  -Na 133, improving  -SIADH vs. Hypothyroidism vs. Adrenal in  -Clinically euvolemic  -Holding mIVF    ====SKIN====  no active issuees    ====ID====  #urosepsis  - c/w zosyn  - f/u sputum cx, bcx, ucx  - procal?    #Chronic eye infection  - vanc/tobramycin alternate, appreciate ophthal recc  - tape eye shut  - consider __    ====ENDOCRINE====  no active issues    ====HEMATOLOGIC====  #DVT PPX  - [id number] QD    ====Psych====  #depression  -hold home sertraline 4ml (80mg equivalent)  -c/w setraline 75 mg     ETHICS  - Full code.  -manas Buck #[phone]    Dispo  - Listed for RCU.   68-year-old female past medical history ALS nonverbal at baseline, Parkinson's trach PEG vent dependent on home vent , and history of Urosepsis brought in by EMS for increased heart rate (110s) and lethargy for few days. Patient has rectal temp of 100.3 and urinalysis + for bacteria. Concern for Urosepsis. Patient sent to MICU overnight due to lack of [facility] beds       ====Neuro====  # advanced ALS  - baseline nonverbal, awake, communicates with eye movements  -continue home riluzole 50 mg bid   -c/w home diazepam 2 mg 4x a day PRN     ====Cardiovascular====  #sinus tachycardia 2/2 ?urosepsis vs PNA  -CXR showing possible atlectasis vs pna  -CT chest showing consolidation in the posterior segment of the right lower lobe and patchy ground glass   opacities in the left upper lobe concerning for asp PNA  -Urinalysis + for bacteria  -lactate 2.7<4.5, wbc 23  -s/p vanc cefepime x1 and ceftriaxone  -c/w Zosyn  -f/u ucx, bcx 7/24 NGTD  -sputum cx 7/25 -ve      ====Pulmonary====  #Vent Dependent  -vent settings VC, , RR 16, PEEP 5, Fio2 40   -tracheostomy       ====Gastro====  # PEG  - enteral feeds, site care, bowel reg  -NPO with tube feeds  -consult nutrition regarding feeds    /RENAL  #Urosepsis   -as above    #Hyponatremia  -Na 133, improving  -SIADH vs. Hypothyroidism vs. Adrenal in  -Clinically euvolemic  -Holding mIVF    ====SKIN====  no active issuees    ====ID====  #urosepsis  - c/w zosyn  - f/u sputum cx, bcx, ucx  - procal?    #Chronic eye infection  - vanc/tobramycin alternate, appreciate ophthal recc  - tape eye shut  - consider __    ====ENDOCRINE====  no active issues    ====HEMATOLOGIC====  #DVT PPX  - [id number] QD    ====Psych====  #depression  -hold home sertraline 4ml (80mg equivalent)  -c/w setraline 75 mg     ETHICS  - Full code.  -manas Buck #[phone]    Dispo  - Listed for RCU.   68-year-old female past medical history ALS nonverbal at baseline, Parkinson's trach PEG vent dependent on home vent , and history of Urosepsis brought in by EMS for increased heart rate (110s) and lethargy for few days. Patient has rectal temp of 100.3 and urinalysis + for bacteria. Concern for Urosepsis. Patient sent to MICU overnight due to lack of [facility] beds       ====Neuro====  # advanced ALS  - baseline nonverbal, awake, communicates with eye movements  -continue home riluzole 50 mg bid   -c/w home diazepam 2 mg 4x a day PRN     ====Cardiovascular====  #sinus tachycardia 2/2 ?urosepsis vs PNA  -CXR showing possible atlectasis vs pna  -CT chest showing consolidation in the posterior segment of the right lower lobe and patchy ground glass   opacities in the left upper lobe concerning for asp PNA  -Urinalysis + for bacteria  -lactate 2.7<4.5, wbc 23  -s/p vanc cefepime x1 and ceftriaxone  -c/w Zosyn  -f/u ucx, bcx 7/24 NGTD  -sputum cx 7/25 -ve  - c/w Midodrine 20mg q8h, titrate to maintain MAP > 60      ====Pulmonary====  #Vent Dependent  -vent settings VC, , RR 16, PEEP 5, Fio2 40   -tracheostomy       ====Gastroenterology====  # PEG  - enteral feeds, site care, bowel reg  -NPO with tube feeds  -consult nutrition regarding feeds    ====/Renal====  #UTI/Urosepsis  -c/w Zosyn  -f/u ucx, bcx 7/24 NGTD  -sputum cx 7/25 -ve  - c/w Midodrine 20mg q8h, titrate to maintain MAP > 60    #Hyponatremia  - Na 133, improving  - Uosm 248, Orly 26  - SIADH vs. Hypothyroidism vs. Adrenal insufficiency  - TSH 4.3  - f/u AM cortisol  - Clinically euvolemic  - Holding mIVF    ====SKIN====  - No active issues    ====ID====  #urosepsis  -c/w Zosyn  -f/u ucx, bcx 7/24 NGTD  -sputum cx 7/25 -ve  - c/w Midodrine 20mg q8h, titrate to maintain MAP > 60    #Chronic eye infection  - Appreciate ophthal recc  - c/w fortified vancomyin and fortified tobramycin q2 hours both eyes (alternate drops vanc at 12, 4, 8 and tobra at 2, 6, 10 etc..)  - c/w erythromycin ointment q1 hour both eyes  - as patient does not blink at all, may be able to just maintain eye open with NO tape (as gauze and tape have also likely been scraping and damaging cornea - just continue to apply ointment every hour      ====ENDOCRINE====  - No active issues    ====HEMATOLOGIC====  #DVT PPX  - [id number] QD    ====Psych====  #depression  -hold home sertraline 4ml (80mg equivalent)  -c/w setraline 75 mg     ETHICS  - Full code.  -manas Buck #[phone]    Dispo  - Listed for RCU.

## 2023-07-26 NOTE — CHART NOTE - NSCHARTNOTEFT_GEN_A_CORE
MICU DOWN GRADE NOTE      Patient is a 68y old  Female who presents with a chief complaint of UTI/Urosepsis (26 Jul 2023 07:47)      HPI:    68-year-old female PMH for ALS nonverbal at baseline Parkinson's trach PEG vent dependent on home vent brought in by EMS for  increased heart rate and lethargy for few days.  Patient was seen by outpatient pulmonologist and was noted to be tachycardic and was sent to ED for further work-up. Per , patient's been more lethargic over the last several days.  Has had symptoms similar to this in the past secondary to urinary tract infections. In the ED, vitals were significant for rectal temp of 100.3 and HR 98,  wbc 23.73, lactate of 4.5, ph 7.38, urinalysis + for bacteria. CXR showed left retrocardiac     MICU Course:            REVIEW OF SYSTEMS:  CONSTITUTIONAL: No fever, chills  HEENT:  No blurry vision No sinus or throat pain  NECK: No pain or stiffness  RESPIRATORY: No cough, wheezing, chills or hemoptysis; No shortness of breath  CARDIOVASCULAR: No chest pain, palpitations  GASTROINTESTINAL: No abdominal pain. No nausea, vomiting, or diarrhea  GENITOURINARY: No dysuria  NEUROLOGICAL: No HA, No focal weakness  SKIN: No itching, burning, rashes, or lesions   MUSCULOSKELETAL: No joint pain or swelling; No muscle, back, or extremity pain    MEDICATIONS:  albuterol/ipratropium for Nebulization 3 milliLiter(s) Nebulizer every 6 hours  artificial  tears Solution 1 Drop(s) Both EYES two times a day  chlorhexidine 0.12% Liquid 15 milliLiter(s) Oral Mucosa every 12 hours  chlorhexidine 4% Liquid 1 Application(s) Topical <User Schedule>  diazepam  Injectable 2 milliGRAM(s) IV Push every 6 hours PRN  erythromycin   Ointment 1 Application(s) Both EYES <User Schedule>  midodrine 20 milliGRAM(s) Oral every 8 hours  piperacillin/tazobactam IVPB.. 3.375 Gram(s) IV Intermittent every 8 hours  polyethylene glycol 3350 17 Gram(s) Oral daily  riluzole 50 milliGRAM(s) Oral two times a day  rivaroxaban 10 milliGRAM(s) Oral daily  senna 2 Tablet(s) Oral at bedtime  sertraline 75 milliGRAM(s) Oral daily  tobramycin 14 mG/mL Fortified Ophthalmic 1 Drop(s) Both EYES every 4 hours  vancomycin 25 mG/mL Fortified Ophthalmic 1 Drop(s) Both EYES every 4 hours      T(C): 36.3 (07-26-23 @ 20:00), Max: 36.6 (07-26-23 @ 04:00)  HR: 86 (07-26-23 @ 21:00) (74 - 98)  BP: 113/57 (07-26-23 @ 21:00) (83/48 - 158/68)  RR: 16 (07-26-23 @ 21:00) (16 - 24)  SpO2: 100% (07-26-23 @ 21:00) (91% - 100%)  Wt(kg): --Vital Signs Last 24 Hrs  T(C): 36.3 (26 Jul 2023 20:00), Max: 36.6 (26 Jul 2023 04:00)  T(F): 97.3 (26 Jul 2023 20:00), Max: 97.9 (26 Jul 2023 04:00)  HR: 86 (26 Jul 2023 21:00) (74 - 98)  BP: 113/57 (26 Jul 2023 21:00) (83/48 - 158/68)  BP(mean): 82 (26 Jul 2023 21:00) (57 - 98)  RR: 16 (26 Jul 2023 21:00) (16 - 24)  SpO2: 100% (26 Jul 2023 21:00) (91% - 100%)    Parameters below as of 26 Jul 2023 20:00  Patient On (Oxygen Delivery Method): ventilator    O2 Concentration (%): 30    PHYSICAL EXAM:  GENERAL: NAD, well-groomed, well-developed  HEAD:  Atraumatic, Normocephalic  EYES: EOMI, PERRLA, conjunctiva and sclera clear  ENMT:  Moist mucous membranes  NECK: Supple, No JVD,  CHEST/LUNG: Clear to auscultation bilaterally; No rales, rhonchi, wheezing, or rubs  HEART: Regular rate and rhythm; No murmurs, rubs, or gallops  ABDOMEN: Soft, Nontender, Nondistended; Bowel sounds present  NEURO: Alert & Oriented X3  EXTREMITIES: No LE edema, no calf tenderness  LYMPH: No lymphadenopathy noted  SKIN: No rashes or lesions    Consultant(s) Notes Reviewed:  [x ] YES  [ ] NO  Care Discussed with Consultants/Other Providers [ x] YES  [ ] NO    LABS:                        12.3   14.64 )-----------( 254      ( 26 Jul 2023 00:35 )             37.2     07-26    133<L>  |  101  |  14  ----------------------------<  164<H>  4.4   |  18<L>  |  <0.30<L>    Ca    9.0      26 Jul 2023 00:35  Phos  3.1     07-26  Mg     2.1     07-26    TPro  6.4  /  Alb  3.3  /  TBili  0.8  /  DBili  x   /  AST  25  /  ALT  34  /  AlkPhos  80  07-26    PT/INR - ( 26 Jul 2023 00:35 )   PT: 14.1 sec;   INR: 1.21 ratio         PTT - ( 26 Jul 2023 00:35 )  PTT:29.8 sec  Urinalysis Basic - ( 26 Jul 2023 00:35 )    Color: x / Appearance: x / SG: x / pH: x  Gluc: 164 mg/dL / Ketone: x  / Bili: x / Urobili: x   Blood: x / Protein: x / Nitrite: x   Leuk Esterase: x / RBC: x / WBC x   Sq Epi: x / Non Sq Epi: x / Bacteria: x      CAPILLARY BLOOD GLUCOSE          ABG - ( 26 Jul 2023 00:15 )  pH, Arterial: 7.37  pH, Blood: x     /  pCO2: 39    /  pO2: 97    / HCO3: 22    / Base Excess: -2.5  /  SaO2: 99.3              Urinalysis Basic - ( 26 Jul 2023 00:35 )    Color: x / Appearance: x / SG: x / pH: x  Gluc: 164 mg/dL / Ketone: x  / Bili: x / Urobili: x   Blood: x / Protein: x / Nitrite: x   Leuk Esterase: x / RBC: x / WBC x   Sq Epi: x / Non Sq Epi: x / Bacteria: x        RADIOLOGY & ADDITIONAL TESTS:    Imaging Personally Reviewed:  [x ] YES  [ ] NO MICU DOWN GRADE NOTE      Patient is a 68y old  Female who presents with a chief complaint of UTI/Urosepsis (26 Jul 2023 07:47)      HPI:    68-year-old female PMH for ALS nonverbal at baseline Parkinson's trach PEG vent dependent on home vent brought in by EMS for  increased heart rate and lethargy for few days.  Patient was seen by outpatient pulmonologist and was noted to be tachycardic and was sent to ED for further work-up. Per , patient's been more lethargic over the last several days.  Has had symptoms similar to this in the past secondary to urinary tract infections. In the ED, vitals were significant for rectal temp of 100.3 and HR 98,  wbc 23.73, lactate of 4.5, ph 7.38, urinalysis + for bacteria. CXR showed left retrocardiac opacity and CT c/a/p showed consolidation in the posterior segment of the right lower lobe concerning for aspiration pneumonia and  mild wall thickening of the ascending colon concerning for colitis.        MICU Course:  Patient was started on zoysn     Check list:            REVIEW OF SYSTEMS:  CONSTITUTIONAL: No fever, chills  HEENT:  No blurry vision No sinus or throat pain  NECK: No pain or stiffness  RESPIRATORY: No cough, wheezing, chills or hemoptysis; No shortness of breath  CARDIOVASCULAR: No chest pain, palpitations  GASTROINTESTINAL: No abdominal pain. No nausea, vomiting, or diarrhea  GENITOURINARY: No dysuria  NEUROLOGICAL: No HA, No focal weakness  SKIN: No itching, burning, rashes, or lesions   MUSCULOSKELETAL: No joint pain or swelling; No muscle, back, or extremity pain    MEDICATIONS:  albuterol/ipratropium for Nebulization 3 milliLiter(s) Nebulizer every 6 hours  artificial  tears Solution 1 Drop(s) Both EYES two times a day  chlorhexidine 0.12% Liquid 15 milliLiter(s) Oral Mucosa every 12 hours  chlorhexidine 4% Liquid 1 Application(s) Topical <User Schedule>  diazepam  Injectable 2 milliGRAM(s) IV Push every 6 hours PRN  erythromycin   Ointment 1 Application(s) Both EYES <User Schedule>  midodrine 20 milliGRAM(s) Oral every 8 hours  piperacillin/tazobactam IVPB.. 3.375 Gram(s) IV Intermittent every 8 hours  polyethylene glycol 3350 17 Gram(s) Oral daily  riluzole 50 milliGRAM(s) Oral two times a day  rivaroxaban 10 milliGRAM(s) Oral daily  senna 2 Tablet(s) Oral at bedtime  sertraline 75 milliGRAM(s) Oral daily  tobramycin 14 mG/mL Fortified Ophthalmic 1 Drop(s) Both EYES every 4 hours  vancomycin 25 mG/mL Fortified Ophthalmic 1 Drop(s) Both EYES every 4 hours      T(C): 36.3 (07-26-23 @ 20:00), Max: 36.6 (07-26-23 @ 04:00)  HR: 86 (07-26-23 @ 21:00) (74 - 98)  BP: 113/57 (07-26-23 @ 21:00) (83/48 - 158/68)  RR: 16 (07-26-23 @ 21:00) (16 - 24)  SpO2: 100% (07-26-23 @ 21:00) (91% - 100%)  Wt(kg): --Vital Signs Last 24 Hrs  T(C): 36.3 (26 Jul 2023 20:00), Max: 36.6 (26 Jul 2023 04:00)  T(F): 97.3 (26 Jul 2023 20:00), Max: 97.9 (26 Jul 2023 04:00)  HR: 86 (26 Jul 2023 21:00) (74 - 98)  BP: 113/57 (26 Jul 2023 21:00) (83/48 - 158/68)  BP(mean): 82 (26 Jul 2023 21:00) (57 - 98)  RR: 16 (26 Jul 2023 21:00) (16 - 24)  SpO2: 100% (26 Jul 2023 21:00) (91% - 100%)    Parameters below as of 26 Jul 2023 20:00  Patient On (Oxygen Delivery Method): ventilator    O2 Concentration (%): 30    PHYSICAL EXAM:  GENERAL: NAD, well-groomed, well-developed  HEAD:  Atraumatic, Normocephalic  EYES: EOMI, PERRLA, conjunctiva and sclera clear  ENMT:  Moist mucous membranes  NECK: Supple, No JVD,  CHEST/LUNG: Clear to auscultation bilaterally; No rales, rhonchi, wheezing, or rubs  HEART: Regular rate and rhythm; No murmurs, rubs, or gallops  ABDOMEN: Soft, Nontender, Nondistended; Bowel sounds present  NEURO: Alert & Oriented X3  EXTREMITIES: No LE edema, no calf tenderness  LYMPH: No lymphadenopathy noted  SKIN: No rashes or lesions    Consultant(s) Notes Reviewed:  [x ] YES  [ ] NO  Care Discussed with Consultants/Other Providers [ x] YES  [ ] NO    LABS:                        12.3   14.64 )-----------( 254      ( 26 Jul 2023 00:35 )             37.2     07-26    133<L>  |  101  |  14  ----------------------------<  164<H>  4.4   |  18<L>  |  <0.30<L>    Ca    9.0      26 Jul 2023 00:35  Phos  3.1     07-26  Mg     2.1     07-26    TPro  6.4  /  Alb  3.3  /  TBili  0.8  /  DBili  x   /  AST  25  /  ALT  34  /  AlkPhos  80  07-26    PT/INR - ( 26 Jul 2023 00:35 )   PT: 14.1 sec;   INR: 1.21 ratio         PTT - ( 26 Jul 2023 00:35 )  PTT:29.8 sec  Urinalysis Basic - ( 26 Jul 2023 00:35 )    Color: x / Appearance: x / SG: x / pH: x  Gluc: 164 mg/dL / Ketone: x  / Bili: x / Urobili: x   Blood: x / Protein: x / Nitrite: x   Leuk Esterase: x / RBC: x / WBC x   Sq Epi: x / Non Sq Epi: x / Bacteria: x      CAPILLARY BLOOD GLUCOSE          ABG - ( 26 Jul 2023 00:15 )  pH, Arterial: 7.37  pH, Blood: x     /  pCO2: 39    /  pO2: 97    / HCO3: 22    / Base Excess: -2.5  /  SaO2: 99.3              Urinalysis Basic - ( 26 Jul 2023 00:35 )    Color: x / Appearance: x / SG: x / pH: x  Gluc: 164 mg/dL / Ketone: x  / Bili: x / Urobili: x   Blood: x / Protein: x / Nitrite: x   Leuk Esterase: x / RBC: x / WBC x   Sq Epi: x / Non Sq Epi: x / Bacteria: x        RADIOLOGY & ADDITIONAL TESTS:    Imaging Personally Reviewed:  [x ] YES  [ ] NO MICU DOWN GRADE NOTE      Patient is a 68y old  Female who presents with a chief complaint of UTI/Urosepsis (26 Jul 2023 07:47)      HPI:    68-year-old female PMH for ALS nonverbal at baseline Parkinson's trach PEG vent dependent on home vent brought in by EMS for  increased heart rate and lethargy for few days.  Patient was seen by outpatient pulmonologist and was noted to be tachycardic and was sent to ED for further work-up. Per , patient's been more lethargic over the last several days.  Has had symptoms similar to this in the past secondary to urinary tract infections. In the ED, vitals were significant for rectal temp of 100.3 and HR 98,  wbc 23.73, lactate of 4.5, ph 7.38, urinalysis + for bacteria. CXR showed left retrocardiac opacity and CT c/a/p showed consolidation in the posterior segment of the right lower lobe concerning for aspiration pneumonia and  mild wall thickening of the ascending colon concerning for colitis.        MICU Course:  Patient was started on zoysn 7/25. Overnight, MAP decreased to 54 and midodrine increased to 20mg q8h.      Check list:  -f/u ucx, bcx  -titrate midorine to maintain MAP > 60    -c/w zoysn  -c/w fortified vancomyin and fortified tobramycin q2 hours both eyes (alternate drops vanc at 12, 4, 8 and tobra at 2, 6, 10 etc..) and erythromycin ointment q1 hour both eyes for chronic eye infections  -c/w home vent settings: , RR 16, PEEP 5, Fio2 40               REVIEW OF SYSTEMS:  CONSTITUTIONAL: No fever, chills  HEENT:  No blurry vision No sinus or throat pain  NECK: No pain or stiffness  RESPIRATORY: No cough, wheezing, chills or hemoptysis; No shortness of breath  CARDIOVASCULAR: No chest pain, palpitations  GASTROINTESTINAL: No abdominal pain. No nausea, vomiting, or diarrhea  GENITOURINARY: No dysuria  NEUROLOGICAL: No HA, No focal weakness  SKIN: No itching, burning, rashes, or lesions   MUSCULOSKELETAL: No joint pain or swelling; No muscle, back, or extremity pain    MEDICATIONS:  albuterol/ipratropium for Nebulization 3 milliLiter(s) Nebulizer every 6 hours  artificial  tears Solution 1 Drop(s) Both EYES two times a day  chlorhexidine 0.12% Liquid 15 milliLiter(s) Oral Mucosa every 12 hours  chlorhexidine 4% Liquid 1 Application(s) Topical <User Schedule>  diazepam  Injectable 2 milliGRAM(s) IV Push every 6 hours PRN  erythromycin   Ointment 1 Application(s) Both EYES <User Schedule>  midodrine 20 milliGRAM(s) Oral every 8 hours  piperacillin/tazobactam IVPB.. 3.375 Gram(s) IV Intermittent every 8 hours  polyethylene glycol 3350 17 Gram(s) Oral daily  riluzole 50 milliGRAM(s) Oral two times a day  rivaroxaban 10 milliGRAM(s) Oral daily  senna 2 Tablet(s) Oral at bedtime  sertraline 75 milliGRAM(s) Oral daily  tobramycin 14 mG/mL Fortified Ophthalmic 1 Drop(s) Both EYES every 4 hours  vancomycin 25 mG/mL Fortified Ophthalmic 1 Drop(s) Both EYES every 4 hours      T(C): 36.3 (07-26-23 @ 20:00), Max: 36.6 (07-26-23 @ 04:00)  HR: 86 (07-26-23 @ 21:00) (74 - 98)  BP: 113/57 (07-26-23 @ 21:00) (83/48 - 158/68)  RR: 16 (07-26-23 @ 21:00) (16 - 24)  SpO2: 100% (07-26-23 @ 21:00) (91% - 100%)  Wt(kg): --Vital Signs Last 24 Hrs  T(C): 36.3 (26 Jul 2023 20:00), Max: 36.6 (26 Jul 2023 04:00)  T(F): 97.3 (26 Jul 2023 20:00), Max: 97.9 (26 Jul 2023 04:00)  HR: 86 (26 Jul 2023 21:00) (74 - 98)  BP: 113/57 (26 Jul 2023 21:00) (83/48 - 158/68)  BP(mean): 82 (26 Jul 2023 21:00) (57 - 98)  RR: 16 (26 Jul 2023 21:00) (16 - 24)  SpO2: 100% (26 Jul 2023 21:00) (91% - 100%)    Parameters below as of 26 Jul 2023 20:00  Patient On (Oxygen Delivery Method): ventilator    O2 Concentration (%): 30    PHYSICAL EXAM:  GENERAL: NAD, well-groomed, well-developed  HEAD:  Atraumatic, Normocephalic  EYES: EOMI, PERRLA, conjunctiva and sclera clear  ENMT:  Moist mucous membranes  NECK: Supple, No JVD,  CHEST/LUNG: Clear to auscultation bilaterally; No rales, rhonchi, wheezing, or rubs  HEART: Regular rate and rhythm; No murmurs, rubs, or gallops  ABDOMEN: Soft, Nontender, Nondistended; Bowel sounds present  NEURO: Alert & Oriented X3  EXTREMITIES: No LE edema, no calf tenderness  LYMPH: No lymphadenopathy noted  SKIN: No rashes or lesions    Consultant(s) Notes Reviewed:  [x ] YES  [ ] NO  Care Discussed with Consultants/Other Providers [ x] YES  [ ] NO    LABS:                        12.3   14.64 )-----------( 254      ( 26 Jul 2023 00:35 )             37.2     07-26    133<L>  |  101  |  14  ----------------------------<  164<H>  4.4   |  18<L>  |  <0.30<L>    Ca    9.0      26 Jul 2023 00:35  Phos  3.1     07-26  Mg     2.1     07-26    TPro  6.4  /  Alb  3.3  /  TBili  0.8  /  DBili  x   /  AST  25  /  ALT  34  /  AlkPhos  80  07-26    PT/INR - ( 26 Jul 2023 00:35 )   PT: 14.1 sec;   INR: 1.21 ratio         PTT - ( 26 Jul 2023 00:35 )  PTT:29.8 sec  Urinalysis Basic - ( 26 Jul 2023 00:35 )    Color: x / Appearance: x / SG: x / pH: x  Gluc: 164 mg/dL / Ketone: x  / Bili: x / Urobili: x   Blood: x / Protein: x / Nitrite: x   Leuk Esterase: x / RBC: x / WBC x   Sq Epi: x / Non Sq Epi: x / Bacteria: x      CAPILLARY BLOOD GLUCOSE          ABG - ( 26 Jul 2023 00:15 )  pH, Arterial: 7.37  pH, Blood: x     /  pCO2: 39    /  pO2: 97    / HCO3: 22    / Base Excess: -2.5  /  SaO2: 99.3              Urinalysis Basic - ( 26 Jul 2023 00:35 )    Color: x / Appearance: x / SG: x / pH: x  Gluc: 164 mg/dL / Ketone: x  / Bili: x / Urobili: x   Blood: x / Protein: x / Nitrite: x   Leuk Esterase: x / RBC: x / WBC x   Sq Epi: x / Non Sq Epi: x / Bacteria: x        RADIOLOGY & ADDITIONAL TESTS:    Imaging Personally Reviewed:  [x ] YES  [ ] NO MICU DOWN GRADE NOTE      Patient is a 68y old  Female who presents with a chief complaint of UTI/Urosepsis (26 Jul 2023 07:47)      HPI:    68-year-old female PMH for ALS nonverbal at baseline Parkinson's trach PEG vent dependent on home vent brought in by EMS for  increased heart rate and lethargy for few days.  Patient was seen by outpatient pulmonologist and was noted to be tachycardic and was sent to ED for further work-up. Per , patient's been more lethargic over the last several days.  Has had symptoms similar to this in the past secondary to urinary tract infections. In the ED, vitals were significant for rectal temp of 100.3 and HR 98,  wbc 23.73, lactate of 4.5, ph 7.38, urinalysis + for bacteria. CXR showed left retrocardiac opacity and CT c/a/p showed consolidation in the posterior segment of the right lower lobe concerning for aspiration pneumonia and  mild wall thickening of the ascending colon concerning for colitis.        MICU Course:  Patient was started on zoysn 7/25. Overnight, MAP decreased to 54 and midodrine increased to 20mg q8h.      Check list:  -f/u ucx, bcx  -titrate midorine to maintain MAP > 60    -c/w zoysn  -c/w fortified vancomyin and fortified tobramycin q2 hours both eyes (alternate drops vanc at 12, 4, 8 and tobra at 2, 6, 10 etc..) and erythromycin ointment q1 hour both eyes for chronic eye infections  -c/w home vent settings: , RR 16, PEEP 5, Fio2 40               REVIEW OF SYSTEMS: unable to assess as pt is nonverbal    MEDICATIONS:  albuterol/ipratropium for Nebulization 3 milliLiter(s) Nebulizer every 6 hours  artificial  tears Solution 1 Drop(s) Both EYES two times a day  chlorhexidine 0.12% Liquid 15 milliLiter(s) Oral Mucosa every 12 hours  chlorhexidine 4% Liquid 1 Application(s) Topical <User Schedule>  diazepam  Injectable 2 milliGRAM(s) IV Push every 6 hours PRN  erythromycin   Ointment 1 Application(s) Both EYES <User Schedule>  midodrine 20 milliGRAM(s) Oral every 8 hours  piperacillin/tazobactam IVPB.. 3.375 Gram(s) IV Intermittent every 8 hours  polyethylene glycol 3350 17 Gram(s) Oral daily  riluzole 50 milliGRAM(s) Oral two times a day  rivaroxaban 10 milliGRAM(s) Oral daily  senna 2 Tablet(s) Oral at bedtime  sertraline 75 milliGRAM(s) Oral daily  tobramycin 14 mG/mL Fortified Ophthalmic 1 Drop(s) Both EYES every 4 hours  vancomycin 25 mG/mL Fortified Ophthalmic 1 Drop(s) Both EYES every 4 hours      T(C): 36.3 (07-26-23 @ 20:00), Max: 36.6 (07-26-23 @ 04:00)  HR: 86 (07-26-23 @ 21:00) (74 - 98)  BP: 113/57 (07-26-23 @ 21:00) (83/48 - 158/68)  RR: 16 (07-26-23 @ 21:00) (16 - 24)  SpO2: 100% (07-26-23 @ 21:00) (91% - 100%)  Wt(kg): --Vital Signs Last 24 Hrs  T(C): 36.3 (26 Jul 2023 20:00), Max: 36.6 (26 Jul 2023 04:00)  T(F): 97.3 (26 Jul 2023 20:00), Max: 97.9 (26 Jul 2023 04:00)  HR: 86 (26 Jul 2023 21:00) (74 - 98)  BP: 113/57 (26 Jul 2023 21:00) (83/48 - 158/68)  BP(mean): 82 (26 Jul 2023 21:00) (57 - 98)  RR: 16 (26 Jul 2023 21:00) (16 - 24)  SpO2: 100% (26 Jul 2023 21:00) (91% - 100%)    Parameters below as of 26 Jul 2023 20:00  Patient On (Oxygen Delivery Method): ventilator    O2 Concentration (%): 30    PHYSICAL EXAM:  GENERAL: NAD, well-groomed, well-developed  HEAD:  Atraumatic, Normocephalic  EYES: Eyes were taped shut  NECK: Supple, No JVD,  CHEST/LUNG: Tracheostomy in place, no wheezes or crackles b/l   HEART: Regular rate and rhythm; No murmurs, rubs, or gallops  ABDOMEN: Soft, Nontender, Nondistended  NEURO: Baseline is nonverbal  EXTREMITIES: No LE edema, no calf tenderness  LYMPH: No lymphadenopathy noted  SKIN: No rashes or lesions    Consultant(s) Notes Reviewed:  [x ] YES  [ ] NO  Care Discussed with Consultants/Other Providers [ x] YES  [ ] NO    LABS:                        12.3   14.64 )-----------( 254      ( 26 Jul 2023 00:35 )             37.2     07-26    133<L>  |  101  |  14  ----------------------------<  164<H>  4.4   |  18<L>  |  <0.30<L>    Ca    9.0      26 Jul 2023 00:35  Phos  3.1     07-26  Mg     2.1     07-26    TPro  6.4  /  Alb  3.3  /  TBili  0.8  /  DBili  x   /  AST  25  /  ALT  34  /  AlkPhos  80  07-26    PT/INR - ( 26 Jul 2023 00:35 )   PT: 14.1 sec;   INR: 1.21 ratio         PTT - ( 26 Jul 2023 00:35 )  PTT:29.8 sec  Urinalysis Basic - ( 26 Jul 2023 00:35 )    Color: x / Appearance: x / SG: x / pH: x  Gluc: 164 mg/dL / Ketone: x  / Bili: x / Urobili: x   Blood: x / Protein: x / Nitrite: x   Leuk Esterase: x / RBC: x / WBC x   Sq Epi: x / Non Sq Epi: x / Bacteria: x      CAPILLARY BLOOD GLUCOSE          ABG - ( 26 Jul 2023 00:15 )  pH, Arterial: 7.37  pH, Blood: x     /  pCO2: 39    /  pO2: 97    / HCO3: 22    / Base Excess: -2.5  /  SaO2: 99.3              Urinalysis Basic - ( 26 Jul 2023 00:35 )    Color: x / Appearance: x / SG: x / pH: x  Gluc: 164 mg/dL / Ketone: x  / Bili: x / Urobili: x   Blood: x / Protein: x / Nitrite: x   Leuk Esterase: x / RBC: x / WBC x   Sq Epi: x / Non Sq Epi: x / Bacteria: x        RADIOLOGY & ADDITIONAL TESTS:    Imaging Personally Reviewed:  [x ] YES  [ ] NO

## 2023-07-26 NOTE — PROGRESS NOTE ADULT - ASSESSMENT
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, OU  - patient has Parkinson's and ALS, has been being treated outpatient for infection in both eyes, however cannot close own eyes requires taping  - family noting that eyes are producing more discharge and more red than prior  - unable to assess subjective aspects of visual exam 2/2 AMS/averbal  - no rAPD appreciated  - inferior corneal findings with significant stromal cell, prior stromal scarring, neovascularization large epithelial defects with stromal cell and mild discharge OD > OS  - OD also with 10-20% thinning  - cultures of discharge obtained and sent to lab  - start fortified vancomyin and fortified tobramycin q2 hours both eyes (alternate drops vanc at 12, 4, 8 and tobra at 2, 6, 10 etc..)  - start erythromycin ointment q1 hour both eyes  - as patient does not blink at all, may be able to just maintain eye open with NO tape (as gauze and tape have also likely been scraping and damaging cornea - just continue to apply ointment every hour  - discussed need for tarsorrhaphy with sister in law, however family discussion needs to be had in regards to the GOC  - ophtho to follow daily    Seen and discussed with  ***    Outpatient Follow-up: Patient should follow-up with his/her ophthalmologist or with [facility] Department of Ophthalmology within 1 week of after discharge at:    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, OU  - patient has Parkinson's and ALS, has been being treated outpatient for infection in both eyes, however cannot close own eyes requires taping  - family noting that eyes are producing more discharge and more red than prior  - unable to assess subjective aspects of visual exam 2/2 AMS/averbal  - no rAPD appreciated  - inferior corneal findings with significant stromal cell, prior stromal scarring, neovascularization large epithelial defects with stromal cell and mild discharge OD > OS  - OD also with 10-20% thinning  - cultures of discharge obtained and sent to lab  - start fortified vancomyin and fortified tobramycin q2 hours both eyes (alternate drops vanc at 12, 4, 8 and tobra at 2, 6, 10 etc..)  - start erythromycin ointment q1 hour both eyes  - as patient does not blink at all, may be able to just maintain eye open with NO tape (as gauze and tape have also likely been scraping and damaging cornea - just continue to apply ointment every hour  - discussed need for tarsorrhaphy with sister in law, however family discussion needs to be had in regards to the GOC  - ophtho to follow daily    Seen and discussed with Dr. Flowers    Outpatient Follow-up: Patient should follow-up with his/her ophthalmologist or with [facility] Department of Ophthalmology within 1 week of after discharge at:    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, OU  - patient has Parkinson's and ALS, has been being treated outpatient for infection in both eyes, however cannot close own eyes requires taping  - family noting that eyes are producing more discharge and more red than prior  - unable to assess subjective aspects of visual exam 2/2 AMS/averbal  - no rAPD appreciated  - inferior corneal findings with significant stromal cell, prior stromal scarring, neovascularization large epithelial defects with stromal cell and mild discharge OD > OS  - OD also with 10-20% thinning  - cultures of discharge obtained and sent to lab - pending  - continue fortified vancomyin and fortified tobramycin q2 hours both eyes (alternate drops vanc at 12, 4, 8 and tobra at 2, 6, 10 etc..)  - continue erythromycin ointment q1 hour both eyes  - please tape eyes shut, ensure that gauze is not scraping corneal surface as this will make ulceration worse  - discussed need for tarsorrhaphy with sister in law, however family discussion needs to be had in regards to the GOC - will call  to discuss  - ophtho to follow daily    Seen and discussed with Dr. Flowers, attending.    Outpatient Follow-up: Patient should follow-up with his/her ophthalmologist or with [facility] Department of Ophthalmology within 1 week of after discharge at:    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, OU  - patient has Parkinson's and ALS, has been being treated outpatient for infection in both eyes, however cannot close own eyes requires taping  - family noting that eyes are producing more discharge and more red than prior  - unable to assess subjective aspects of visual exam 2/2 AMS/averbal  - no rAPD appreciated  - inferior corneal findings with significant stromal cell, prior stromal scarring, neovascularization large epithelial defects with stromal cell and mild discharge OD > OS  - OD also with 10-20% thinning  - cultures of discharge obtained and sent to lab - pending  - continue fortified vancomyin and fortified tobramycin q2 hours both eyes (alternate drops vanc at 12, 4, 8 and tobra at 2, 6, 10 etc..)  - continue erythromycin ointment q1 hour both eyes  - please tape eyes shut, ensure that gauze is not scraping corneal surface as this will make ulceration worse  - discussed need for tarsorrhaphy with sister in law, however family discussion needs to be had in regards to the GOC  - discussed with  Dr. Mehta today. mentioned how patient has had informal testing at home in regards to ALS and is still registering through eyes and hearing. Explained the risk and benefit of the procedure of tarsorrhaphy, how the eye may continue to have defects, thin, ultimately ulcerate and result in vision loss or loss of the eye.  understand risks, wishes to hold off on procedure at this time in order to preserve senses that wife has left. will continue aggressive management with antibiotics and taping  - ophtho to follow daily    Seen and discussed with Dr. Flowers, attending.    Outpatient Follow-up: Patient should follow-up with his/her ophthalmologist or with [facility] Department of Ophthalmology within 1 week of after discharge at:    [address]  [place], [place] [address] [phone]    Dedrick Brewster MD, PGY3  Also available on [employer] Teams

## 2023-07-26 NOTE — PROGRESS NOTE ADULT - SUBJECTIVE AND OBJECTIVE BOX
[facility] DEPARTMENT OF OPHTHALMOLOGY  ------------------------------------------------------------------------------  Dedrick Brewster MD, PGY3  Also available on [employer] Teams  ------------------------------------------------------------------------------    Interval History: No acute events overnight    MEDICATIONS  (STANDING):  albuterol/ipratropium for Nebulization 3 milliLiter(s) Nebulizer every 6 hours  artificial  tears Solution 1 Drop(s) Both EYES two times a day  chlorhexidine 0.12% Liquid 15 milliLiter(s) Oral Mucosa every 12 hours  chlorhexidine 4% Liquid 1 Application(s) Topical <User Schedule>  erythromycin   Ointment 1 Application(s) Both EYES <User Schedule>  midodrine 20 milliGRAM(s) Oral every 8 hours  piperacillin/tazobactam IVPB.. 3.375 Gram(s) IV Intermittent every 8 hours  polyethylene glycol 3350 17 Gram(s) Oral daily  riluzole 50 milliGRAM(s) Oral two times a day  rivaroxaban 10 milliGRAM(s) Oral daily  senna 2 Tablet(s) Oral at bedtime  sertraline 75 milliGRAM(s) Oral daily  tobramycin 14 mG/mL Fortified Ophthalmic 1 Drop(s) Both EYES every 4 hours  vancomycin 25 mG/mL Fortified Ophthalmic 1 Drop(s) Both EYES every 4 hours    MEDICATIONS  (PRN):  diazepam  Injectable 2 milliGRAM(s) IV Push every 6 hours PRN spasm      VITALS: T(C): 36.1 (07-26-23 @ 11:00)  T(F): 97 (07-26-23 @ 11:00), Max: 98.2 (07-25-23 @ 20:00)  HR: 85 (07-26-23 @ 13:00) (80 - 99)  BP: 114/59 (07-26-23 @ 13:00) (80/47 - 158/68)  RR:  (15 - 24)  SpO2:  (91% - 100%)  Wt(kg): --  AAOx0, nonverbal at baseline    Ophthalmology Exam:  Visual acuity (cc): RNOIT 2/2 AMS  Pupils: PERRL OU, no APD  Intraocular Pressure:  STP OU  Extraocular movements (EOMs): RONIT 2/2 AMS  Confrontational Visual Field (CVF): RONIT 2/2 AMS  Color Plates: RONIT 2/2 AMS    Pen Light Exam (PLE)  External: Normal OU.  Lids/Lashes/Lacrimal Ducts: severe lagophthalmos OU, essentially unable to close both eyes on her own, lids feel taut   Sclera/Conjunctiva: 1-2+ injection OU  Cornea: inferior corneal epithelial defect measuring 8mmHx3.5mmV, several layers of stromal scarring with neovascularization underneath active epi defect, +stromal cell, discharge overlying, 10-20% thinning OD inferior corneal epithelial defect measuring [id number], stromal cell, stromal scarring and mild neovascularization underneath  Anterior Chamber: Deep and formed OU.    Iris: Flat OU.  Lens: NS OU. [facility] DEPARTMENT OF OPHTHALMOLOGY  ------------------------------------------------------------------------------  Dedrick Brewster MD, PGY3  Also available on [employer] Teams  ------------------------------------------------------------------------------    Interval History: No acute events overnight    MEDICATIONS  (STANDING):  albuterol/ipratropium for Nebulization 3 milliLiter(s) Nebulizer every 6 hours  artificial  tears Solution 1 Drop(s) Both EYES two times a day  chlorhexidine 0.12% Liquid 15 milliLiter(s) Oral Mucosa every 12 hours  chlorhexidine 4% Liquid 1 Application(s) Topical <User Schedule>  erythromycin   Ointment 1 Application(s) Both EYES <User Schedule>  midodrine 20 milliGRAM(s) Oral every 8 hours  piperacillin/tazobactam IVPB.. 3.375 Gram(s) IV Intermittent every 8 hours  polyethylene glycol 3350 17 Gram(s) Oral daily  riluzole 50 milliGRAM(s) Oral two times a day  rivaroxaban 10 milliGRAM(s) Oral daily  senna 2 Tablet(s) Oral at bedtime  sertraline 75 milliGRAM(s) Oral daily  tobramycin 14 mG/mL Fortified Ophthalmic 1 Drop(s) Both EYES every 4 hours  vancomycin 25 mG/mL Fortified Ophthalmic 1 Drop(s) Both EYES every 4 hours    MEDICATIONS  (PRN):  diazepam  Injectable 2 milliGRAM(s) IV Push every 6 hours PRN spasm      VITALS: T(C): 36.1 (07-26-23 @ 11:00)  T(F): 97 (07-26-23 @ 11:00), Max: 98.2 (07-25-23 @ 20:00)  HR: 85 (07-26-23 @ 13:00) (80 - 99)  BP: 114/59 (07-26-23 @ 13:00) (80/47 - 158/68)  RR:  (15 - 24)  SpO2:  (91% - 100%)  Wt(kg): --  AAOx0, nonverbal at baseline    Ophthalmology Exam:  Visual acuity (cc): RONIT 2/2 AMS  Pupils: PERRL OU, no APD  Intraocular Pressure:  STP OU  Extraocular movements (EOMs): RONIT 2/2 AMS  Confrontational Visual Field (CVF): RONIT 2/2 AMS  Color Plates: RONIT 2/2 AMS    Pen Light Exam (PLE)  External: Normal OU.  Lids/Lashes/Lacrimal Ducts: severe lagophthalmos OU, essentially unable to close both eyes on her own, lids feel taut   Sclera/Conjunctiva: 2+ injection OU  Cornea: inferior corneal epithelial defect measuring 8mmHx3.5mmV,  stromal scarring with neovascularization, +stromal cell, +discharge, 10-20% thinning OD, inferior corneal epithelial defect measuring [id number], stromal cell, stromal scarring and mild neovascularization  Anterior Chamber: Deep and formed OU.    Iris: Flat OU.  Lens: NS OU. [facility] DEPARTMENT OF OPHTHALMOLOGY  ------------------------------------------------------------------------------  Dedrick Brewster MD, PGY3  Also available on [employer] Teams  ------------------------------------------------------------------------------    Interval History: No acute events overnight    MEDICATIONS  (STANDING):  albuterol/ipratropium for Nebulization 3 milliLiter(s) Nebulizer every 6 hours  artificial  tears Solution 1 Drop(s) Both EYES two times a day  chlorhexidine 0.12% Liquid 15 milliLiter(s) Oral Mucosa every 12 hours  chlorhexidine 4% Liquid 1 Application(s) Topical <User Schedule>  erythromycin   Ointment 1 Application(s) Both EYES <User Schedule>  midodrine 20 milliGRAM(s) Oral every 8 hours  piperacillin/tazobactam IVPB.. 3.375 Gram(s) IV Intermittent every 8 hours  polyethylene glycol 3350 17 Gram(s) Oral daily  riluzole 50 milliGRAM(s) Oral two times a day  rivaroxaban 10 milliGRAM(s) Oral daily  senna 2 Tablet(s) Oral at bedtime  sertraline 75 milliGRAM(s) Oral daily  tobramycin 14 mG/mL Fortified Ophthalmic 1 Drop(s) Both EYES every 4 hours  vancomycin 25 mG/mL Fortified Ophthalmic 1 Drop(s) Both EYES every 4 hours    MEDICATIONS  (PRN):  diazepam  Injectable 2 milliGRAM(s) IV Push every 6 hours PRN spasm      VITALS: T(C): 36.1 (07-26-23 @ 11:00)  T(F): 97 (07-26-23 @ 11:00), Max: 98.2 (07-25-23 @ 20:00)  HR: 85 (07-26-23 @ 13:00) (80 - 99)  BP: 114/59 (07-26-23 @ 13:00) (80/47 - 158/68)  RR:  (15 - 24)  SpO2:  (91% - 100%)  Wt(kg): --  AAOx0, nonverbal at baseline    Ophthalmology Exam:  Visual acuity (cc): RONIT 2/2 AMS  Pupils: PERRL OU, no APD  Intraocular Pressure:  STP OU  Extraocular movements (EOMs): RONIT 2/2 AMS  Confrontational Visual Field (CVF): RONIT 2/2 AMS  Color Plates: RONIT 2/2 AMS    Pen Light Exam (PLE)  External: Normal OU.  Lids/Lashes/Lacrimal Ducts: severe lagophthalmos OU, essentially unable to close both eyes on her own, lids feel taut   Sclera/Conjunctiva: 2+ injection OU  Cornea:  no corneal epithelial defect, significant stromal scarring with neovascularization, +stromal cell, +discharge, 10-20% thinning OD, inferior corneal epithelial defect improved, stromal cell, stromal scarring and mild neovascularization  Anterior Chamber: Deep and formed OU.    Iris: Flat OU.  Lens: NS OU.

## 2023-07-27 DIAGNOSIS — R47.02 DYSPHASIA: ICD-10-CM

## 2023-07-27 DIAGNOSIS — H02.209 UNSPECIFIED LAGOPHTHALMOS UNSPECIFIED EYE, UNSPECIFIED EYELID: ICD-10-CM

## 2023-07-27 DIAGNOSIS — E87.1 HYPO-OSMOLALITY AND HYPONATREMIA: ICD-10-CM

## 2023-07-27 DIAGNOSIS — N39.0 URINARY TRACT INFECTION, SITE NOT SPECIFIED: ICD-10-CM

## 2023-07-27 DIAGNOSIS — G12.21 AMYOTROPHIC LATERAL SCLEROSIS: ICD-10-CM

## 2023-07-27 DIAGNOSIS — J95.851 VENTILATOR ASSOCIATED PNEUMONIA: ICD-10-CM

## 2023-07-27 DIAGNOSIS — Z29.9 ENCOUNTER FOR PROPHYLACTIC MEASURES, UNSPECIFIED: ICD-10-CM

## 2023-07-27 LAB
-  AMIKACIN: SIGNIFICANT CHANGE UP
-  AZTREONAM: SIGNIFICANT CHANGE UP
-  CEFEPIME: SIGNIFICANT CHANGE UP
-  CEFTAZIDIME: SIGNIFICANT CHANGE UP
-  CIPROFLOXACIN: SIGNIFICANT CHANGE UP
-  GENTAMICIN: SIGNIFICANT CHANGE UP
-  IMIPENEM: SIGNIFICANT CHANGE UP
-  LEVOFLOXACIN: SIGNIFICANT CHANGE UP
-  MEROPENEM: SIGNIFICANT CHANGE UP
-  PIPERACILLIN/TAZOBACTAM: SIGNIFICANT CHANGE UP
-  TOBRAMYCIN: SIGNIFICANT CHANGE UP
CULTURE RESULTS: SIGNIFICANT CHANGE UP
METHOD TYPE: SIGNIFICANT CHANGE UP
ORGANISM # SPEC MICROSCOPIC CNT: SIGNIFICANT CHANGE UP
ORGANISM # SPEC MICROSCOPIC CNT: SIGNIFICANT CHANGE UP
SPECIMEN SOURCE: SIGNIFICANT CHANGE UP

## 2023-07-27 PROCEDURE — 99233 SBSQ HOSP IP/OBS HIGH 50: CPT

## 2023-07-27 RX ORDER — MIDODRINE HYDROCHLORIDE 2.5 MG/1
20 TABLET ORAL EVERY 8 HOURS
Refills: 0 | Status: DISCONTINUED | OUTPATIENT
Start: 2023-07-27 | End: 2023-07-28

## 2023-07-27 RX ADMIN — Medication 1 APPLICATION(S): at 00:29

## 2023-07-27 RX ADMIN — SERTRALINE 75 MILLIGRAM(S): 25 TABLET, FILM COATED ORAL at 12:16

## 2023-07-27 RX ADMIN — Medication 1 APPLICATION(S): at 09:32

## 2023-07-27 RX ADMIN — Medication 1 APPLICATION(S): at 20:03

## 2023-07-27 RX ADMIN — Medication 1 APPLICATION(S): at 04:35

## 2023-07-27 RX ADMIN — Medication 1 DROP(S): at 14:28

## 2023-07-27 RX ADMIN — Medication 1 APPLICATION(S): at 13:26

## 2023-07-27 RX ADMIN — Medication 1 DROP(S): at 15:45

## 2023-07-27 RX ADMIN — Medication 1 DROP(S): at 23:59

## 2023-07-27 RX ADMIN — Medication 1 DROP(S): at 10:17

## 2023-07-27 RX ADMIN — Medication 1 DROP(S): at 12:15

## 2023-07-27 RX ADMIN — CHLORHEXIDINE GLUCONATE 15 MILLILITER(S): 213 SOLUTION TOPICAL at 17:10

## 2023-07-27 RX ADMIN — Medication 1 APPLICATION(S): at 02:20

## 2023-07-27 RX ADMIN — Medication 1 APPLICATION(S): at 21:32

## 2023-07-27 RX ADMIN — Medication 1 APPLICATION(S): at 10:16

## 2023-07-27 RX ADMIN — PIPERACILLIN AND TAZOBACTAM 25 GRAM(S): 4; .5 INJECTION, POWDER, LYOPHILIZED, FOR SOLUTION INTRAVENOUS at 21:33

## 2023-07-27 RX ADMIN — Medication 1 APPLICATION(S): at 01:24

## 2023-07-27 RX ADMIN — Medication 1 APPLICATION(S): at 14:27

## 2023-07-27 RX ADMIN — RILUZOLE 50 MILLIGRAM(S): 50 TABLET ORAL at 08:40

## 2023-07-27 RX ADMIN — Medication 1 APPLICATION(S): at 17:04

## 2023-07-27 RX ADMIN — Medication 1 APPLICATION(S): at 16:35

## 2023-07-27 RX ADMIN — Medication 1 DROP(S): at 22:36

## 2023-07-27 RX ADMIN — RILUZOLE 50 MILLIGRAM(S): 50 TABLET ORAL at 17:11

## 2023-07-27 RX ADMIN — PIPERACILLIN AND TAZOBACTAM 25 GRAM(S): 4; .5 INJECTION, POWDER, LYOPHILIZED, FOR SOLUTION INTRAVENOUS at 14:27

## 2023-07-27 RX ADMIN — Medication 3 MILLILITER(S): at 17:02

## 2023-07-27 RX ADMIN — Medication 1 APPLICATION(S): at 08:40

## 2023-07-27 RX ADMIN — Medication 1 APPLICATION(S): at 12:15

## 2023-07-27 RX ADMIN — Medication 1 DROP(S): at 05:56

## 2023-07-27 RX ADMIN — Medication 1 APPLICATION(S): at 21:02

## 2023-07-27 RX ADMIN — Medication 1 DROP(S): at 17:11

## 2023-07-27 RX ADMIN — Medication 1 APPLICATION(S): at 23:59

## 2023-07-27 RX ADMIN — Medication 1 APPLICATION(S): at 23:02

## 2023-07-27 RX ADMIN — Medication 1 DROP(S): at 04:35

## 2023-07-27 RX ADMIN — Medication 3 MILLILITER(S): at 05:29

## 2023-07-27 RX ADMIN — Medication 1 APPLICATION(S): at 15:45

## 2023-07-27 RX ADMIN — Medication 1 APPLICATION(S): at 18:56

## 2023-07-27 RX ADMIN — POLYETHYLENE GLYCOL 3350 17 GRAM(S): 17 POWDER, FOR SOLUTION ORAL at 12:17

## 2023-07-27 RX ADMIN — Medication 1 APPLICATION(S): at 06:02

## 2023-07-27 RX ADMIN — CHLORHEXIDINE GLUCONATE 1 APPLICATION(S): 213 SOLUTION TOPICAL at 06:03

## 2023-07-27 RX ADMIN — SENNA PLUS 2 TABLET(S): 8.6 TABLET ORAL at 21:31

## 2023-07-27 RX ADMIN — Medication 1 APPLICATION(S): at 03:11

## 2023-07-27 RX ADMIN — PIPERACILLIN AND TAZOBACTAM 25 GRAM(S): 4; .5 INJECTION, POWDER, LYOPHILIZED, FOR SOLUTION INTRAVENOUS at 06:03

## 2023-07-27 RX ADMIN — Medication 1 DROP(S): at 20:03

## 2023-07-27 RX ADMIN — RIVAROXABAN 10 MILLIGRAM(S): KIT at 12:15

## 2023-07-27 RX ADMIN — Medication 3 MILLILITER(S): at 00:42

## 2023-07-27 RX ADMIN — Medication 1 DROP(S): at 08:39

## 2023-07-27 RX ADMIN — Medication 1 APPLICATION(S): at 07:57

## 2023-07-27 RX ADMIN — Medication 3 MILLILITER(S): at 23:36

## 2023-07-27 RX ADMIN — Medication 3 MILLILITER(S): at 11:21

## 2023-07-27 RX ADMIN — Medication 1 APPLICATION(S): at 11:40

## 2023-07-27 RX ADMIN — Medication 1 APPLICATION(S): at 05:55

## 2023-07-27 RX ADMIN — Medication 1 DROP(S): at 02:23

## 2023-07-27 RX ADMIN — CHLORHEXIDINE GLUCONATE 15 MILLILITER(S): 213 SOLUTION TOPICAL at 06:03

## 2023-07-27 NOTE — PROGRESS NOTE ADULT - ASSESSMENT
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, OU  - patient has Parkinson's and ALS, has been being treated outpatient for infection in both eyes, however cannot close own eyes requires taping  - family noting that eyes are producing more discharge and more red than prior  - unable to assess subjective aspects of visual exam 2/2 AMS/averbal  - no rAPD appreciated  - inferior corneal findings with significant stromal cell, prior stromal scarring, neovascularization large epithelial defects with stromal cell and mild discharge OD > OS  - OD also with 10-20% thinning  - cultures of discharge obtained and sent to lab - pending  - continue fortified vancomyin and fortified tobramycin q2 hours both eyes (alternate drops vanc at 12, 4, 8 and tobra at 2, 6, 10 etc..)  - continue erythromycin ointment q1 hour both eyes  - please tape eyes shut, DO NOT USE gauze is not scraping corneal surface as this will make ulceration worse  - discussed need for tarsorrhaphy with sister in law, however family discussion needs to be had in regards to the GOC  - discussed with  Dr. Mehta today. mentioned how patient has had informal testing at home in regards to ALS and is still registering through eyes and hearing. Explained the risk and benefit of the procedure of tarsorrhaphy, how the eye may continue to have defects, thin, ultimately ulcerate and result in vision loss or loss of the eye.  understand risks, wishes to hold off on procedure at this time in order to preserve senses that wife has left. will continue aggressive management with antibiotics and taping  - ophtho to follow daily    Seen and discussed with Dr. Tao, attending.    Outpatient Follow-up: Patient should follow-up with his/her ophthalmologist or with [facility] Department of Ophthalmology within 1 week of after discharge at:    [address]  [place] [address]  [phone]    Dedrick Brewster MD, PGY3  Also available on [employer] Teams

## 2023-07-27 NOTE — PROGRESS NOTE ADULT - SUBJECTIVE AND OBJECTIVE BOX
[facility] DEPARTMENT OF OPHTHALMOLOGY  ------------------------------------------------------------------------------  Dedrick Brewster MD, PGY3  Also available on [employer] Teams  ------------------------------------------------------------------------------    Interval History: No acute events overnight.     MEDICATIONS  (STANDING):  albuterol/ipratropium for Nebulization 3 milliLiter(s) Nebulizer every 6 hours  artificial  tears Solution 1 Drop(s) Both EYES two times a day  chlorhexidine 0.12% Liquid 15 milliLiter(s) Oral Mucosa every 12 hours  chlorhexidine 4% Liquid 1 Application(s) Topical <User Schedule>  erythromycin   Ointment 1 Application(s) Both EYES <User Schedule>  midodrine 20 milliGRAM(s) Oral every 8 hours  piperacillin/tazobactam IVPB.. 3.375 Gram(s) IV Intermittent every 8 hours  polyethylene glycol 3350 17 Gram(s) Oral daily  riluzole 50 milliGRAM(s) Oral two times a day  rivaroxaban 10 milliGRAM(s) Oral daily  senna 2 Tablet(s) Oral at bedtime  sertraline 75 milliGRAM(s) Oral daily  tobramycin 14 mG/mL Fortified Ophthalmic 1 Drop(s) Both EYES every 4 hours  vancomycin 25 mG/mL Fortified Ophthalmic 1 Drop(s) Both EYES every 4 hours    MEDICATIONS  (PRN):      VITALS: T(C): 36.4 (07-27-23 @ 10:16)  T(F): 97.5 (07-27-23 @ 10:16), Max: 98 (07-26-23 @ 22:16)  HR: 84 (07-27-23 @ 10:16) (74 - 86)  BP: 131/60 (07-27-23 @ 10:16) (83/48 - 140/78)  RR:  (16 - 18)  SpO2:  (96% - 100%)  Wt(kg): --    Ophthalmology Exam:  Visual acuity (sc): 20/20 OU  Pupils: PERRL OU, no APD.  Intraocular Pressure:  Extraocular movements (EOMs): Full OU, no pain, no diplopia  Confrontational Visual Field (CVF): Full OU    Pen Light Exam (PLE)  External: Flat OU.  Lids/Lashes/Lacrimal Ducts: Flat OU.   Sclera/Conjunctiva: White and quiet OU.  Cornea: Clear OU.  Anterior Chamber: Deep and formed OU.    Iris: Flat OU.  Lens: Clear OU.   [facility] DEPARTMENT OF OPHTHALMOLOGY  ------------------------------------------------------------------------------  Dedrick Brewster MD, PGY3  Also available on [employer] Teams  ------------------------------------------------------------------------------    Interval History: No acute events overnight.     MEDICATIONS  (STANDING):  albuterol/ipratropium for Nebulization 3 milliLiter(s) Nebulizer every 6 hours  artificial  tears Solution 1 Drop(s) Both EYES two times a day  chlorhexidine 0.12% Liquid 15 milliLiter(s) Oral Mucosa every 12 hours  chlorhexidine 4% Liquid 1 Application(s) Topical <User Schedule>  erythromycin   Ointment 1 Application(s) Both EYES <User Schedule>  midodrine 20 milliGRAM(s) Oral every 8 hours  piperacillin/tazobactam IVPB.. 3.375 Gram(s) IV Intermittent every 8 hours  polyethylene glycol 3350 17 Gram(s) Oral daily  riluzole 50 milliGRAM(s) Oral two times a day  rivaroxaban 10 milliGRAM(s) Oral daily  senna 2 Tablet(s) Oral at bedtime  sertraline 75 milliGRAM(s) Oral daily  tobramycin 14 mG/mL Fortified Ophthalmic 1 Drop(s) Both EYES every 4 hours  vancomycin 25 mG/mL Fortified Ophthalmic 1 Drop(s) Both EYES every 4 hours    MEDICATIONS  (PRN):      VITALS: T(C): 36.4 (07-27-23 @ 10:16)  T(F): 97.5 (07-27-23 @ 10:16), Max: 98 (07-26-23 @ 22:16)  HR: 84 (07-27-23 @ 10:16) (74 - 86)  BP: 131/60 (07-27-23 @ 10:16) (83/48 - 140/78)  RR:  (16 - 18)  SpO2:  (96% - 100%)  Wt(kg): --  AAOx0, nonverbal at baseline    Ophthalmology Exam:  Visual acuity (cc): RONIT 2/2 AMS  Pupils: PERRL OU, no APD  Intraocular Pressure:  STP OU  Extraocular movements (EOMs): RONIT 2/2 AMS  Confrontational Visual Field (CVF): RONIT 2/2 AMS  Color Plates: RONIT 2/2 AMS    Pen Light Exam (PLE)  External: Normal OU.  Lids/Lashes/Lacrimal Ducts: severe lagophthalmos OU, essentially unable to close both eyes on her own, lids feel taut   Sclera/Conjunctiva: 2+ injection OU  Cornea:  no corneal epithelial defect, significant stromal scarring with neovascularization, +stromal cell, +discharge, 10-20% thinning OD, inferior corneal epithelial defect improved, stromal cell, stromal scarring and mild neovascularization  Anterior Chamber: Deep and formed OU.    Iris: Flat OU.  Lens: NS OU.

## 2023-07-27 NOTE — PROGRESS NOTE ADULT - SUBJECTIVE AND OBJECTIVE BOX
Patient is a 68y old  Female who presents with a chief complaint of UTI/Urosepsis (26 Jul 2023 07:47)    HPI:  68-year-old female past medical history ALS nonverbal at baseline Parkinson's trach PEG vent dependent on home vent brought in by EMS for  increased heart rate and lethargy for few days.  Patient was seen by outpatient pulmonologist and was noted to be tachycardic and was sent to ED for further work-up.  History from the  was obtained by the ED. Per   patient's been more lethargic over the last several days.  Has had symptoms similar to this in the past secondary to urinary tract infections.   In the ED patient noted to have a rectal temp of 100.3, and tachycardic,  wbc 23.73, lactate of 4.5, ph 7.38, urinalysis + for bacteria, and a chest x ray showing Left-sided retrocardiac opacity which may represent atelectasis and/or pneumonia. patient given vanc cefepime x1, 2 L fluid bolus, and IV acetaminophen for fever. (25 Jul 2023 01:50)    Interval Events:    REVIEW OF SYSTEMS:  [ ] Positive  [ ] All other systems negative  [ ] Unable to assess ROS because ________    Vital Signs Last 24 Hrs  T(C): 36.6 (07-27-23 @ 04:04), Max: 36.7 (07-26-23 @ 22:16)  T(F): 97.8 (07-27-23 @ 04:04), Max: 98 (07-26-23 @ 22:16)  HR: 82 (07-27-23 @ 05:29) (74 - 89)  BP: 140/76 (07-27-23 @ 04:04) (83/48 - 140/76)  RR: 16 (07-27-23 @ 04:04) (16 - 18)  SpO2: 100% (07-27-23 @ 05:29) (97% - 100%)    PHYSICAL EXAM:  HEENT:   [ ]Tracheostomy:  [ ]Pupils equal  [ ]No oral lesions  [ ]Abnormal    SKIN  [ ] No Rash  [ ] Abnormal  [ ] pressure    CARDIAC  [ ]Regular  [ ]Abnormal    PULMONARY  [ ]Bilateral Clear Breath Sounds  [ ]Normal Excursion  [ ]Abnormal    GI  [ ]PEG      [ ] +BS		              [ ]Soft, nondistended, nontender	  [ ]Abnormal    MUSCULOSKELETAL                                   [ ]Bedbound                 [ ]Abnormal    [ ]Ambulatory/OOB to chair                           EXTREMITIES                                         [ ]Normal  [ ]Edema                           NEUROLOGIC  [ ] Normal, non focal  [ ] Focal findings:    PSYCHIATRIC  [ ]Alert and appropriate  [ ] Sedated	 [ ]Agitated    :  Paty: [ ] Yes, if yes: Date of Placement:                   [  ] No    LINES: Central Lines [ ] Yes, if yes: Date of Placement                                     [  ] No    HOSPITAL MEDICATIONS:  MEDICATIONS  (STANDING):  albuterol/ipratropium for Nebulization 3 milliLiter(s) Nebulizer every 6 hours  artificial  tears Solution 1 Drop(s) Both EYES two times a day  chlorhexidine 0.12% Liquid 15 milliLiter(s) Oral Mucosa every 12 hours  chlorhexidine 4% Liquid 1 Application(s) Topical <User Schedule>  erythromycin   Ointment 1 Application(s) Both EYES <User Schedule>  midodrine 20 milliGRAM(s) Oral every 8 hours  piperacillin/tazobactam IVPB.. 3.375 Gram(s) IV Intermittent every 8 hours  polyethylene glycol 3350 17 Gram(s) Oral daily  riluzole 50 milliGRAM(s) Oral two times a day  rivaroxaban 10 milliGRAM(s) Oral daily  senna 2 Tablet(s) Oral at bedtime  sertraline 75 milliGRAM(s) Oral daily  tobramycin 14 mG/mL Fortified Ophthalmic 1 Drop(s) Both EYES every 4 hours  vancomycin 25 mG/mL Fortified Ophthalmic 1 Drop(s) Both EYES every 4 hours    MEDICATIONS  (PRN):  diazepam  Injectable 2 milliGRAM(s) IV Push every 6 hours PRN spasm      LABS:                        12.3   14.64 )-----------( 254      ( 26 Jul 2023 00:35 )             37.2     07-26    133<L>  |  101  |  14  ----------------------------<  164<H>  4.4   |  18<L>  |  <0.30<L>    Ca    9.0      26 Jul 2023 00:35  Phos  3.1     07-26  Mg     2.1     07-26    TPro  6.4  /  Alb  3.3  /  TBili  0.8  /  DBili  x   /  AST  25  /  ALT  34  /  AlkPhos  80  07-26    PT/INR - ( 26 Jul 2023 00:35 )   PT: 14.1 sec;   INR: 1.21 ratio         PTT - ( 26 Jul 2023 00:35 )  PTT:29.8 sec  Urinalysis Basic - ( 26 Jul 2023 00:35 )    Color: x / Appearance: x / SG: x / pH: x  Gluc: 164 mg/dL / Ketone: x  / Bili: x / Urobili: x   Blood: x / Protein: x / Nitrite: x   Leuk Esterase: x / RBC: x / WBC x   Sq Epi: x / Non Sq Epi: x / Bacteria: x      Arterial Blood Gas:  07-26 @ 00:15  7.37/39/97/22/99.3/-2.5  ABG lactate: --    Venous Blood Gas:  07-26 @ 00:15  7.26/52/34/23/55.1  VBG Lactate: 3.1      Mode: AC/ CMV (Assist Control/ Continuous Mandatory Ventilation)  RR (machine): 16  TV (machine): 400  FiO2: 30  PEEP: 5  ITime: 1  MAP: 10  PIP: 33     Patient is a 68y old  Female who presents with a chief complaint of UTI/Urosepsis (26 Jul 2023 07:47)    HPI:  68-year-old female past medical history ALS nonverbal at baseline Parkinson's trach PEG vent dependent on home vent brought in by EMS for  increased heart rate and lethargy for few days.  Patient was seen by outpatient pulmonologist and was noted to be tachycardic and was sent to ED for further work-up.  History from the  was obtained by the ED. Per   patient's been more lethargic over the last several days.  Has had symptoms similar to this in the past secondary to urinary tract infections.   In the ED patient noted to have a rectal temp of 100.3, and tachycardic,  wbc 23.73, lactate of 4.5, ph 7.38, urinalysis + for bacteria, and a chest x ray showing Left-sided retrocardiac opacity which may represent atelectasis and/or pneumonia. patient given vanc cefepime x1, 2 L fluid bolus, and IV acetaminophen for fever. (25 Jul 2023 01:50)    Interval Events: Transferred from MICU overnight    REVIEW OF SYSTEMS:  [ ] Positive  [ ] All other systems negative  [x ] Unable to assess ROS because patient is NON-verbal    Vital Signs Last 24 Hrs  T(C): 36.6 (07-27-23 @ 04:04), Max: 36.7 (07-26-23 @ 22:16)  T(F): 97.8 (07-27-23 @ 04:04), Max: 98 (07-26-23 @ 22:16)  HR: 82 (07-27-23 @ 05:29) (74 - 89)  BP: 140/76 (07-27-23 @ 04:04) (83/48 - 140/76)  RR: 16 (07-27-23 @ 04:04) (16 - 18)  SpO2: 100% (07-27-23 @ 05:29) (97% - 100%)    PHYSICAL EXAM:  HEENT:   [ ]Tracheostomy: #8 distal XLT Shiley  [ ]Pupils equal  [ ]No oral lesions  [ ]Abnormal    SKIN  [ x] No Rash  [ ] Abnormal  [ ] pressure    CARDIAC  [x ]Regular  [ ]Abnormal    PULMONARY  [x ]Bilateral Clear Breath Sounds  [ ]Normal Excursion  [ ]Abnormal    GI  [x ]PEG      [x ] +BS		              [x ]Soft, nondistended, nontender	  [ ]Abnormal    MUSCULOSKELETAL                                   [ x]Bedbound                 [ ]Abnormal    [ ]Ambulatory/OOB to chair                           EXTREMITIES                                         [ ]Normal  [x ]Contracted                          NEUROLOGIC  [ ] Normal, non focal  [ x] Focal findings: Advanced neurological disease, contracted, non-verbal.    PSYCHIATRIC  [ x]Unable to assess    :  Newman: [ ] Yes, if yes: Date of Placement:                   [ x ] No    LINES: Central Lines [ ] Yes, if yes: Date of Placement                                     [ x ] No    HOSPITAL MEDICATIONS:  MEDICATIONS  (STANDING):  albuterol/ipratropium for Nebulization 3 milliLiter(s) Nebulizer every 6 hours  artificial  tears Solution 1 Drop(s) Both EYES two times a day  chlorhexidine 0.12% Liquid 15 milliLiter(s) Oral Mucosa every 12 hours  chlorhexidine 4% Liquid 1 Application(s) Topical <User Schedule>  erythromycin   Ointment 1 Application(s) Both EYES <User Schedule>  midodrine 20 milliGRAM(s) Oral every 8 hours  piperacillin/tazobactam IVPB.. 3.375 Gram(s) IV Intermittent every 8 hours  polyethylene glycol 3350 17 Gram(s) Oral daily  riluzole 50 milliGRAM(s) Oral two times a day  rivaroxaban 10 milliGRAM(s) Oral daily  senna 2 Tablet(s) Oral at bedtime  sertraline 75 milliGRAM(s) Oral daily  tobramycin 14 mG/mL Fortified Ophthalmic 1 Drop(s) Both EYES every 4 hours  vancomycin 25 mG/mL Fortified Ophthalmic 1 Drop(s) Both EYES every 4 hours    MEDICATIONS  (PRN):  diazepam  Injectable 2 milliGRAM(s) IV Push every 6 hours PRN spasm      LABS:                        12.3   14.64 )-----------( 254      ( 26 Jul 2023 00:35 )             37.2     07-26    133<L>  |  101  |  14  ----------------------------<  164<H>  4.4   |  18<L>  |  <0.30<L>    Ca    9.0      26 Jul 2023 00:35  Phos  3.1     07-26  Mg     2.1     07-26    TPro  6.4  /  Alb  3.3  /  TBili  0.8  /  DBili  x   /  AST  25  /  ALT  34  /  AlkPhos  80  07-26    PT/INR - ( 26 Jul 2023 00:35 )   PT: 14.1 sec;   INR: 1.21 ratio         PTT - ( 26 Jul 2023 00:35 )  PTT:29.8 sec  Urinalysis Basic - ( 26 Jul 2023 00:35 )    Color: x / Appearance: x / SG: x / pH: x  Gluc: 164 mg/dL / Ketone: x  / Bili: x / Urobili: x   Blood: x / Protein: x / Nitrite: x   Leuk Esterase: x / RBC: x / WBC x   Sq Epi: x / Non Sq Epi: x / Bacteria: x      Arterial Blood Gas:  07-26 @ 00:15  7.37/39/97/22/99.3/-2.5  ABG lactate: --    Venous Blood Gas:  07-26 @ 00:15  7.26/52/34/23/55.1  VBG Lactate: 3.1      Mode: AC/ CMV (Assist Control/ Continuous Mandatory Ventilation)  RR (machine): 16  TV (machine): 400  FiO2: 30  PEEP: 5  ITime: 1  MAP: 10  PIP: 33

## 2023-07-27 NOTE — PROGRESS NOTE ADULT - PROBLEM SELECTOR PLAN 2
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    - F/U cultures of discharge  - Continue fortified vancomycin and fortified tobramycin q2 hours both eyes  - Continue erythromycin ointment q1 hour both eyes  - Patient does not blink at all, NO tape (as gauze and tape have also likely been scraping and damaging cornea  - Ophthalmology discussed need for tarsorrhaphy with sister in law, family discussion needs to be had in regards to the GOC.

## 2023-07-27 NOTE — PROGRESS NOTE ADULT - ASSESSMENT
68F extensive medical history including ALS, nonverbal and bedbound, Parkinsons, chronic respiratory failure requiring trach and vent, chronic oropharyngeal dysphagia with PEG presenting for lethargy and tachycardia. She is found to be febrile and admitted for further workup of sepsis due to pneumonia and UTI.   68F extensive medical history including ALS, nonverbal and bedbound, Parkinsons, chronic respiratory failure requiring trach and vent, chronic oropharyngeal dysphagia with PEG presenting for lethargy and tachycardia. She is found to be febrile and admitted for further workup of sepsis due to pneumonia and UTI.

## 2023-07-28 LAB
-  AMPICILLIN: SIGNIFICANT CHANGE UP
-  CIPROFLOXACIN: SIGNIFICANT CHANGE UP
-  DAPTOMYCIN: SIGNIFICANT CHANGE UP
-  LEVOFLOXACIN: SIGNIFICANT CHANGE UP
-  LINEZOLID: SIGNIFICANT CHANGE UP
-  NITROFURANTOIN: SIGNIFICANT CHANGE UP
-  TETRACYCLINE: SIGNIFICANT CHANGE UP
-  VANCOMYCIN: SIGNIFICANT CHANGE UP
ANION GAP SERPL CALC-SCNC: 13 MMOL/L — SIGNIFICANT CHANGE UP (ref 5–17)
APTT BLD: 24.5 SEC — SIGNIFICANT CHANGE UP (ref 24.5–35.6)
BUN SERPL-MCNC: 14 MG/DL — SIGNIFICANT CHANGE UP (ref 7–23)
CALCIUM SERPL-MCNC: 9 MG/DL — SIGNIFICANT CHANGE UP (ref 8.4–10.5)
CHLORIDE SERPL-SCNC: 99 MMOL/L — SIGNIFICANT CHANGE UP (ref 96–108)
CO2 SERPL-SCNC: 23 MMOL/L — SIGNIFICANT CHANGE UP (ref 22–31)
CREAT SERPL-MCNC: <0.3 MG/DL — LOW (ref 0.5–1.3)
CULTURE RESULTS: SIGNIFICANT CHANGE UP
EGFR: 114 ML/MIN/1.73M2 — SIGNIFICANT CHANGE UP
GLUCOSE SERPL-MCNC: 159 MG/DL — HIGH (ref 70–99)
GRAM STN FLD: SIGNIFICANT CHANGE UP
HCT VFR BLD CALC: 41.3 % — SIGNIFICANT CHANGE UP (ref 34.5–45)
HGB BLD-MCNC: 13.2 G/DL — SIGNIFICANT CHANGE UP (ref 11.5–15.5)
INR BLD: 1.21 RATIO — HIGH (ref 0.85–1.18)
MAGNESIUM SERPL-MCNC: 2 MG/DL — SIGNIFICANT CHANGE UP (ref 1.6–2.6)
MCHC RBC-ENTMCNC: 27.7 PG — SIGNIFICANT CHANGE UP (ref 27–34)
MCHC RBC-ENTMCNC: 32 GM/DL — SIGNIFICANT CHANGE UP (ref 32–36)
MCV RBC AUTO: 86.8 FL — SIGNIFICANT CHANGE UP (ref 80–100)
METHOD TYPE: SIGNIFICANT CHANGE UP
METHOD TYPE: SIGNIFICANT CHANGE UP
NRBC # BLD: 0 /100 WBCS — SIGNIFICANT CHANGE UP (ref 0–0)
ORGANISM # SPEC MICROSCOPIC CNT: SIGNIFICANT CHANGE UP
ORGANISM # SPEC MICROSCOPIC CNT: SIGNIFICANT CHANGE UP
PHOSPHATE SERPL-MCNC: 2.5 MG/DL — SIGNIFICANT CHANGE UP (ref 2.5–4.5)
PLATELET # BLD AUTO: 250 K/UL — SIGNIFICANT CHANGE UP (ref 150–400)
POTASSIUM SERPL-MCNC: 4.1 MMOL/L — SIGNIFICANT CHANGE UP (ref 3.5–5.3)
POTASSIUM SERPL-SCNC: 4.1 MMOL/L — SIGNIFICANT CHANGE UP (ref 3.5–5.3)
PROTHROM AB SERPL-ACNC: 12.6 SEC — SIGNIFICANT CHANGE UP (ref 9.5–13)
RBC # BLD: 4.76 M/UL — SIGNIFICANT CHANGE UP (ref 3.8–5.2)
RBC # FLD: 17.3 % — HIGH (ref 10.3–14.5)
SODIUM SERPL-SCNC: 135 MMOL/L — SIGNIFICANT CHANGE UP (ref 135–145)
SPECIMEN SOURCE: SIGNIFICANT CHANGE UP
WBC # BLD: 11.85 K/UL — HIGH (ref 3.8–10.5)
WBC # FLD AUTO: 11.85 K/UL — HIGH (ref 3.8–10.5)

## 2023-07-28 PROCEDURE — 99233 SBSQ HOSP IP/OBS HIGH 50: CPT

## 2023-07-28 RX ORDER — MIDODRINE HYDROCHLORIDE 2.5 MG/1
10 TABLET ORAL EVERY 8 HOURS
Refills: 0 | Status: DISCONTINUED | OUTPATIENT
Start: 2023-07-28 | End: 2023-07-28

## 2023-07-28 RX ORDER — NYSTATIN CREAM 100000 [USP'U]/G
1 CREAM TOPICAL
Refills: 0 | Status: DISCONTINUED | OUTPATIENT
Start: 2023-07-28 | End: 2023-08-04

## 2023-07-28 RX ADMIN — Medication 1 APPLICATION(S): at 16:26

## 2023-07-28 RX ADMIN — Medication 1 DROP(S): at 07:55

## 2023-07-28 RX ADMIN — Medication 1 APPLICATION(S): at 12:30

## 2023-07-28 RX ADMIN — CHLORHEXIDINE GLUCONATE 1 APPLICATION(S): 213 SOLUTION TOPICAL at 07:56

## 2023-07-28 RX ADMIN — Medication 1 DROP(S): at 14:08

## 2023-07-28 RX ADMIN — PIPERACILLIN AND TAZOBACTAM 25 GRAM(S): 4; .5 INJECTION, POWDER, LYOPHILIZED, FOR SOLUTION INTRAVENOUS at 22:10

## 2023-07-28 RX ADMIN — Medication 1 APPLICATION(S): at 14:08

## 2023-07-28 RX ADMIN — Medication 3 MILLILITER(S): at 05:39

## 2023-07-28 RX ADMIN — Medication 1 APPLICATION(S): at 02:11

## 2023-07-28 RX ADMIN — Medication 1 DROP(S): at 05:46

## 2023-07-28 RX ADMIN — Medication 1 APPLICATION(S): at 10:19

## 2023-07-28 RX ADMIN — Medication 1 DROP(S): at 02:11

## 2023-07-28 RX ADMIN — Medication 1 APPLICATION(S): at 01:06

## 2023-07-28 RX ADMIN — Medication 1 DROP(S): at 16:27

## 2023-07-28 RX ADMIN — Medication 1 APPLICATION(S): at 11:34

## 2023-07-28 RX ADMIN — Medication 1 APPLICATION(S): at 07:55

## 2023-07-28 RX ADMIN — Medication 1 APPLICATION(S): at 22:09

## 2023-07-28 RX ADMIN — Medication 1 APPLICATION(S): at 13:30

## 2023-07-28 RX ADMIN — Medication 3 MILLILITER(S): at 11:12

## 2023-07-28 RX ADMIN — Medication 1 DROP(S): at 22:09

## 2023-07-28 RX ADMIN — Medication 3 MILLILITER(S): at 23:29

## 2023-07-28 RX ADMIN — Medication 1 DROP(S): at 20:08

## 2023-07-28 RX ADMIN — POLYETHYLENE GLYCOL 3350 17 GRAM(S): 17 POWDER, FOR SOLUTION ORAL at 12:48

## 2023-07-28 RX ADMIN — SENNA PLUS 2 TABLET(S): 8.6 TABLET ORAL at 22:09

## 2023-07-28 RX ADMIN — RILUZOLE 50 MILLIGRAM(S): 50 TABLET ORAL at 05:47

## 2023-07-28 RX ADMIN — Medication 1 APPLICATION(S): at 18:55

## 2023-07-28 RX ADMIN — Medication 1 APPLICATION(S): at 23:50

## 2023-07-28 RX ADMIN — RIVAROXABAN 10 MILLIGRAM(S): KIT at 12:47

## 2023-07-28 RX ADMIN — Medication 1 APPLICATION(S): at 05:07

## 2023-07-28 RX ADMIN — Medication 1 DROP(S): at 17:34

## 2023-07-28 RX ADMIN — SERTRALINE 75 MILLIGRAM(S): 25 TABLET, FILM COATED ORAL at 12:48

## 2023-07-28 RX ADMIN — Medication 1 APPLICATION(S): at 20:59

## 2023-07-28 RX ADMIN — Medication 1 APPLICATION(S): at 09:18

## 2023-07-28 RX ADMIN — Medication 1 APPLICATION(S): at 22:49

## 2023-07-28 RX ADMIN — CHLORHEXIDINE GLUCONATE 15 MILLILITER(S): 213 SOLUTION TOPICAL at 05:47

## 2023-07-28 RX ADMIN — Medication 3 MILLILITER(S): at 17:03

## 2023-07-28 RX ADMIN — Medication 1 APPLICATION(S): at 16:28

## 2023-07-28 RX ADMIN — Medication 1 APPLICATION(S): at 03:18

## 2023-07-28 RX ADMIN — Medication 1 APPLICATION(S): at 04:01

## 2023-07-28 RX ADMIN — NYSTATIN CREAM 1 APPLICATION(S): 100000 CREAM TOPICAL at 17:36

## 2023-07-28 RX ADMIN — Medication 1 DROP(S): at 10:19

## 2023-07-28 RX ADMIN — Medication 1 APPLICATION(S): at 06:44

## 2023-07-28 RX ADMIN — RILUZOLE 50 MILLIGRAM(S): 50 TABLET ORAL at 17:33

## 2023-07-28 RX ADMIN — Medication 1 APPLICATION(S): at 17:32

## 2023-07-28 RX ADMIN — Medication 1 DROP(S): at 04:01

## 2023-07-28 RX ADMIN — PIPERACILLIN AND TAZOBACTAM 25 GRAM(S): 4; .5 INJECTION, POWDER, LYOPHILIZED, FOR SOLUTION INTRAVENOUS at 05:47

## 2023-07-28 RX ADMIN — Medication 1 APPLICATION(S): at 20:08

## 2023-07-28 RX ADMIN — CHLORHEXIDINE GLUCONATE 15 MILLILITER(S): 213 SOLUTION TOPICAL at 17:33

## 2023-07-28 RX ADMIN — Medication 1 DROP(S): at 23:50

## 2023-07-28 RX ADMIN — PIPERACILLIN AND TAZOBACTAM 25 GRAM(S): 4; .5 INJECTION, POWDER, LYOPHILIZED, FOR SOLUTION INTRAVENOUS at 14:08

## 2023-07-28 RX ADMIN — Medication 1 APPLICATION(S): at 05:46

## 2023-07-28 RX ADMIN — Medication 1 DROP(S): at 12:47

## 2023-07-28 RX ADMIN — Medication 1 DROP(S): at 05:48

## 2023-07-28 NOTE — PROGRESS NOTE ADULT - ASSESSMENT
68F extensive medical history including ALS, nonverbal and bedbound, Parkinsons, chronic respiratory failure requiring trach and vent, chronic oropharyngeal dysphagia with PEG presenting for lethargy and tachycardia. She is found to be febrile and admitted for further workup of sepsis due to pneumonia and UTI.

## 2023-07-28 NOTE — PROGRESS NOTE ADULT - SUBJECTIVE AND OBJECTIVE BOX
[facility] DEPARTMENT OF OPHTHALMOLOGY  ------------------------------------------------------------------------------  Dedrick Brewster MD, PGY3  Also available on [employer] Teams  ------------------------------------------------------------------------------    Interval History: No acute events overnight.    MEDICATIONS  (STANDING):  albuterol/ipratropium for Nebulization 3 milliLiter(s) Nebulizer every 6 hours  artificial  tears Solution 1 Drop(s) Both EYES two times a day  chlorhexidine 0.12% Liquid 15 milliLiter(s) Oral Mucosa every 12 hours  chlorhexidine 4% Liquid 1 Application(s) Topical <User Schedule>  erythromycin   Ointment 1 Application(s) Both EYES <User Schedule>  nystatin Powder 1 Application(s) Topical two times a day  piperacillin/tazobactam IVPB.. 3.375 Gram(s) IV Intermittent every 8 hours  polyethylene glycol 3350 17 Gram(s) Oral daily  riluzole 50 milliGRAM(s) Oral two times a day  rivaroxaban 10 milliGRAM(s) Oral daily  senna 2 Tablet(s) Oral at bedtime  sertraline 75 milliGRAM(s) Oral daily  tobramycin 14 mG/mL Fortified Ophthalmic 1 Drop(s) Both EYES every 4 hours  vancomycin 25 mG/mL Fortified Ophthalmic 1 Drop(s) Both EYES every 4 hours    MEDICATIONS  (PRN):      VITALS: T(C): 36.3 (07-28-23 @ 10:29)  T(F): 97.4 (07-28-23 @ 10:29), Max: 98.1 (07-28-23 @ 04:39)  HR: 85 (07-28-23 @ 11:28) (85 - 113)  BP: 165/83 (07-28-23 @ 11:19) (144/81 - 186/92)  RR:  (16 - 20)  SpO2:  (97% - 100%)  Wt(kg): --  AAOx0, nonverbal at baseline    Ophthalmology Exam:  Visual acuity (cc): RONIT 2/2 AMS  Pupils: PERRL OU, no APD  Intraocular Pressure:  STP OU  Extraocular movements (EOMs): RONIT 2/2 AMS  Confrontational Visual Field (CVF): RONIT 2/2 AMS  Color Plates: RONIT 2/2 AMS    Pen Light Exam (PLE)  External: Normal OU.  Lids/Lashes/Lacrimal Ducts: severe lagophthalmos OU, essentially unable to close both eyes on her own, lids feel taut   Sclera/Conjunctiva: 2+ injection OU  Cornea:  no corneal epithelial defect, significant stromal scarring with neovascularization, +stromal cell, +discharge, 10-20% thinning OD, inferior corneal epithelial defect, stromal cell, stromal scarring and mild neovascularization  Anterior Chamber: Deep and formed OU.    Iris: Flat OU.  Lens: NS OU.

## 2023-07-28 NOTE — PROGRESS NOTE ADULT - SUBJECTIVE AND OBJECTIVE BOX
Patient is a 68y old  Female who presents with a chief complaint of UTI/Urosepsis (26 Jul 2023 07:47)    HPI:  68-year-old female past medical history ALS nonverbal at baseline Parkinson's trach PEG vent dependent on home vent brought in by EMS for  increased heart rate and lethargy for few days.  Patient was seen by outpatient pulmonologist and was noted to be tachycardic and was sent to ED for further work-up.  History from the  was obtained by the ED. Per   patient's been more lethargic over the last several days.  Has had symptoms similar to this in the past secondary to urinary tract infections.   In the ED patient noted to have a rectal temp of 100.3, and tachycardic,  wbc 23.73, lactate of 4.5, ph 7.38, urinalysis + for bacteria, and a chest x ray showing Left-sided retrocardiac opacity which may represent atelectasis and/or pneumonia. patient given vanc cefepime x1, 2 L fluid bolus, and IV acetaminophen for fever. (25 Jul 2023 01:50)    Interval Events: Transferred from MICU overnight    REVIEW OF SYSTEMS:  [ ] Positive  [ ] All other systems negative  [x ] Unable to assess ROS because patient is NON-verbal    Vital Signs Last 24 Hrs  T(C): 36.6 (07-27-23 @ 04:04), Max: 36.7 (07-26-23 @ 22:16)  T(F): 97.8 (07-27-23 @ 04:04), Max: 98 (07-26-23 @ 22:16)  HR: 82 (07-27-23 @ 05:29) (74 - 89)  BP: 140/76 (07-27-23 @ 04:04) (83/48 - 140/76)  RR: 16 (07-27-23 @ 04:04) (16 - 18)  SpO2: 100% (07-27-23 @ 05:29) (97% - 100%)    PHYSICAL EXAM:  HEENT:   [ ]Tracheostomy: #8 distal XLT Shiley  [ ]Pupils equal  [ ]No oral lesions  [ ]Abnormal    SKIN  [ x] No Rash  [ ] Abnormal  [ ] pressure    CARDIAC  [x ]Regular  [ ]Abnormal    PULMONARY  [x ]Bilateral Clear Breath Sounds  [ ]Normal Excursion  [ ]Abnormal    GI  [x ]PEG      [x ] +BS		              [x ]Soft, nondistended, nontender	  [ ]Abnormal    MUSCULOSKELETAL                                   [ x]Bedbound                 [ ]Abnormal    [ ]Ambulatory/OOB to chair                           EXTREMITIES                                         [ ]Normal  [x ]Contracted                          NEUROLOGIC  [ ] Normal, non focal  [ x] Focal findings: Advanced neurological disease, contracted, non-verbal.    PSYCHIATRIC  [ x]Unable to assess    :  Newman: [ ] Yes, if yes: Date of Placement:                   [ x ] No    LINES: Central Lines [ ] Yes, if yes: Date of Placement                                     [ x ] No    HOSPITAL MEDICATIONS:  MEDICATIONS  (STANDING):  albuterol/ipratropium for Nebulization 3 milliLiter(s) Nebulizer every 6 hours  artificial  tears Solution 1 Drop(s) Both EYES two times a day  chlorhexidine 0.12% Liquid 15 milliLiter(s) Oral Mucosa every 12 hours  chlorhexidine 4% Liquid 1 Application(s) Topical <User Schedule>  erythromycin   Ointment 1 Application(s) Both EYES <User Schedule>  midodrine 20 milliGRAM(s) Oral every 8 hours  piperacillin/tazobactam IVPB.. 3.375 Gram(s) IV Intermittent every 8 hours  polyethylene glycol 3350 17 Gram(s) Oral daily  riluzole 50 milliGRAM(s) Oral two times a day  rivaroxaban 10 milliGRAM(s) Oral daily  senna 2 Tablet(s) Oral at bedtime  sertraline 75 milliGRAM(s) Oral daily  tobramycin 14 mG/mL Fortified Ophthalmic 1 Drop(s) Both EYES every 4 hours  vancomycin 25 mG/mL Fortified Ophthalmic 1 Drop(s) Both EYES every 4 hours    MEDICATIONS  (PRN):  diazepam  Injectable 2 milliGRAM(s) IV Push every 6 hours PRN spasm      LABS:                        12.3   14.64 )-----------( 254      ( 26 Jul 2023 00:35 )             37.2     07-26    133<L>  |  101  |  14  ----------------------------<  164<H>  4.4   |  18<L>  |  <0.30<L>    Ca    9.0      26 Jul 2023 00:35  Phos  3.1     07-26  Mg     2.1     07-26    TPro  6.4  /  Alb  3.3  /  TBili  0.8  /  DBili  x   /  AST  25  /  ALT  34  /  AlkPhos  80  07-26    PT/INR - ( 26 Jul 2023 00:35 )   PT: 14.1 sec;   INR: 1.21 ratio         PTT - ( 26 Jul 2023 00:35 )  PTT:29.8 sec  Urinalysis Basic - ( 26 Jul 2023 00:35 )    Color: x / Appearance: x / SG: x / pH: x  Gluc: 164 mg/dL / Ketone: x  / Bili: x / Urobili: x   Blood: x / Protein: x / Nitrite: x   Leuk Esterase: x / RBC: x / WBC x   Sq Epi: x / Non Sq Epi: x / Bacteria: x      Arterial Blood Gas:  07-26 @ 00:15  7.37/39/97/22/99.3/-2.5  ABG lactate: --    Venous Blood Gas:  07-26 @ 00:15  7.26/52/34/23/55.1  VBG Lactate: 3.1      Mode: AC/ CMV (Assist Control/ Continuous Mandatory Ventilation)  RR (machine): 16  TV (machine): 400  FiO2: 30  PEEP: 5  ITime: 1  MAP: 10  PIP: 33     Patient is a 68y old  Female who presents with a chief complaint of UTI/Urosepsis (26 Jul 2023 07:47)    HPI:  68-year-old female past medical history ALS nonverbal at baseline Parkinson's trach PEG vent dependent on home vent brought in by EMS for  increased heart rate and lethargy for few days.  Patient was seen by outpatient pulmonologist and was noted to be tachycardic and was sent to ED for further work-up.  History from the  was obtained by the ED. Per   patient's been more lethargic over the last several days.  Has had symptoms similar to this in the past secondary to urinary tract infections.   In the ED patient noted to have a rectal temp of 100.3, and tachycardic,  wbc 23.73, lactate of 4.5, ph 7.38, urinalysis + for bacteria, and a chest x ray showing Left-sided retrocardiac opacity which may represent atelectasis and/or pneumonia. patient given vanc cefepime x1, 2 L fluid bolus, and IV acetaminophen for fever. (25 Jul 2023 01:50)    Interval Events: Positive blood cx reported- GM+ cocci custers    REVIEW OF SYSTEMS:  [ ] Positive  [ ] All other systems negative  [x ] Unable to assess ROS because patient is NON-verbal    Vital Signs Last 24 Hrs  T(C): 36.6 (07-27-23 @ 04:04), Max: 36.7 (07-26-23 @ 22:16)  T(F): 97.8 (07-27-23 @ 04:04), Max: 98 (07-26-23 @ 22:16)  HR: 82 (07-27-23 @ 05:29) (74 - 89)  BP: 140/76 (07-27-23 @ 04:04) (83/48 - 140/76)  RR: 16 (07-27-23 @ 04:04) (16 - 18)  SpO2: 100% (07-27-23 @ 05:29) (97% - 100%)    PHYSICAL EXAM:  HEENT:   [ ]Tracheostomy: #8 distal XLT Shiley   full vent support   [ ]Pupils equal  [ ]No oral lesions  [ ]Abnormal    SKIN  [ x] No Rash  [ ] Abnormal  [ ] pressure    CARDIAC  [x ]Regular  [ ]Abnormal    PULMONARY  [x ]Bilateral Clear Breath Sounds  [ ]Normal Excursion  [ ]Abnormal    GI  [x ]PEG      [x ] +BS		              [x ]Soft, nondistended, nontender	  [ ]Abnormal    MUSCULOSKELETAL                                   [ x]Bedbound                 [ ]Abnormal    [ ]Ambulatory/OOB to chair                           EXTREMITIES                                         [ ]Normal  [x ]Contracted                          NEUROLOGIC  [ ] Normal, non focal  [ x] Focal findings: Advanced neurological disease, contracted, non-verbal.    PSYCHIATRIC  [ x]Unable to assess    :  Newman: [ ] Yes, if yes: Date of Placement:                   [ x ] No    LINES: Central Lines [ ] Yes, if yes: Date of Placement                                     [ x ] No    HOSPITAL MEDICATIONS:  MEDICATIONS  (STANDING):  albuterol/ipratropium for Nebulization 3 milliLiter(s) Nebulizer every 6 hours  artificial  tears Solution 1 Drop(s) Both EYES two times a day  chlorhexidine 0.12% Liquid 15 milliLiter(s) Oral Mucosa every 12 hours  chlorhexidine 4% Liquid 1 Application(s) Topical <User Schedule>  erythromycin   Ointment 1 Application(s) Both EYES <User Schedule>  midodrine 20 milliGRAM(s) Oral every 8 hours  piperacillin/tazobactam IVPB.. 3.375 Gram(s) IV Intermittent every 8 hours  polyethylene glycol 3350 17 Gram(s) Oral daily  riluzole 50 milliGRAM(s) Oral two times a day  rivaroxaban 10 milliGRAM(s) Oral daily  senna 2 Tablet(s) Oral at bedtime  sertraline 75 milliGRAM(s) Oral daily  tobramycin 14 mG/mL Fortified Ophthalmic 1 Drop(s) Both EYES every 4 hours  vancomycin 25 mG/mL Fortified Ophthalmic 1 Drop(s) Both EYES every 4 hours    MEDICATIONS  (PRN):  diazepam  Injectable 2 milliGRAM(s) IV Push every 6 hours PRN spasm      LABS:                        12.3   14.64 )-----------( 254      ( 26 Jul 2023 00:35 )             37.2     07-26    133<L>  |  101  |  14  ----------------------------<  164<H>  4.4   |  18<L>  |  <0.30<L>    Ca    9.0      26 Jul 2023 00:35  Phos  3.1     07-26  Mg     2.1     07-26    TPro  6.4  /  Alb  3.3  /  TBili  0.8  /  DBili  x   /  AST  25  /  ALT  34  /  AlkPhos  80  07-26    PT/INR - ( 26 Jul 2023 00:35 )   PT: 14.1 sec;   INR: 1.21 ratio         PTT - ( 26 Jul 2023 00:35 )  PTT:29.8 sec  Urinalysis Basic - ( 26 Jul 2023 00:35 )    Color: x / Appearance: x / SG: x / pH: x  Gluc: 164 mg/dL / Ketone: x  / Bili: x / Urobili: x   Blood: x / Protein: x / Nitrite: x   Leuk Esterase: x / RBC: x / WBC x   Sq Epi: x / Non Sq Epi: x / Bacteria: x      Arterial Blood Gas:  07-26 @ 00:15  7.37/39/97/22/99.3/-2.5  ABG lactate: --    Venous Blood Gas:  07-26 @ 00:15  7.26/52/34/23/55.1  VBG Lactate: 3.1      Mode: AC/ CMV (Assist Control/ Continuous Mandatory Ventilation)  RR (machine): 16  TV (machine): 400  FiO2: 30  PEEP: 5  ITime: 1  MAP: 10  PIP: 33     Patient is a 68y old  Female who presents with a chief complaint of UTI/Urosepsis (26 Jul 2023 07:47)    HPI:  68-year-old female past medical history ALS nonverbal at baseline Parkinson's trach PEG vent dependent on home vent brought in by EMS for  increased heart rate and lethargy for few days.  Patient was seen by outpatient pulmonologist and was noted to be tachycardic and was sent to ED for further work-up.  History from the  was obtained by the ED. Per   patient's been more lethargic over the last several days.  Has had symptoms similar to this in the past secondary to urinary tract infections.   In the ED patient noted to have a rectal temp of 100.3, and tachycardic,  wbc 23.73, lactate of 4.5, ph 7.38, urinalysis + for bacteria, and a chest x ray showing Left-sided retrocardiac opacity which may represent atelectasis and/or pneumonia. patient given vanc cefepime x1, 2 L fluid bolus, and IV acetaminophen for fever. (25 Jul 2023 01:50)    Interval Events: Positive blood cx reported- GM+ cocci custers-     REVIEW OF SYSTEMS:  [ ] Positive  [ ] All other systems negative  [x ] Unable to assess ROS because patient is NON-verbal    Vital Signs Last 24 Hrs  T(C): 36.6 (07-27-23 @ 04:04), Max: 36.7 (07-26-23 @ 22:16)  T(F): 97.8 (07-27-23 @ 04:04), Max: 98 (07-26-23 @ 22:16)  HR: 82 (07-27-23 @ 05:29) (74 - 89)  BP: 140/76 (07-27-23 @ 04:04) (83/48 - 140/76)  RR: 16 (07-27-23 @ 04:04) (16 - 18)  SpO2: 100% (07-27-23 @ 05:29) (97% - 100%)    PHYSICAL EXAM:  HEENT:   [ ]Tracheostomy: #8 distal XLT Shiley   full vent support   [ ]Pupils equal  [ ]No oral lesions  [ ]Abnormal    SKIN  [ x] No Rash  [ ] Abnormal  [ ] pressure    CARDIAC  [x ]Regular  [ ]Abnormal    PULMONARY  [x ]Bilateral Clear Breath Sounds  [ ]Normal Excursion  [ ]Abnormal    GI  [x ]PEG      [x ] +BS		              [x ]Soft, nondistended, nontender	  [ ]Abnormal    MUSCULOSKELETAL                                   [ x]Bedbound                 [ ]Abnormal    [ ]Ambulatory/OOB to chair                           EXTREMITIES                                         [ ]Normal  [x ]Contracted                          NEUROLOGIC  [ ] Normal, non focal  [ x] Focal findings: Advanced neurological disease, contracted, non-verbal.    PSYCHIATRIC  [ x]Unable to assess    :  Newman: [ ] Yes, if yes: Date of Placement:                   [ x ] No    LINES: Central Lines [ ] Yes, if yes: Date of Placement                                     [ x ] No    HOSPITAL MEDICATIONS:  MEDICATIONS  (STANDING):  albuterol/ipratropium for Nebulization 3 milliLiter(s) Nebulizer every 6 hours  artificial  tears Solution 1 Drop(s) Both EYES two times a day  chlorhexidine 0.12% Liquid 15 milliLiter(s) Oral Mucosa every 12 hours  chlorhexidine 4% Liquid 1 Application(s) Topical <User Schedule>  erythromycin   Ointment 1 Application(s) Both EYES <User Schedule>  midodrine 20 milliGRAM(s) Oral every 8 hours  piperacillin/tazobactam IVPB.. 3.375 Gram(s) IV Intermittent every 8 hours  polyethylene glycol 3350 17 Gram(s) Oral daily  riluzole 50 milliGRAM(s) Oral two times a day  rivaroxaban 10 milliGRAM(s) Oral daily  senna 2 Tablet(s) Oral at bedtime  sertraline 75 milliGRAM(s) Oral daily  tobramycin 14 mG/mL Fortified Ophthalmic 1 Drop(s) Both EYES every 4 hours  vancomycin 25 mG/mL Fortified Ophthalmic 1 Drop(s) Both EYES every 4 hours    MEDICATIONS  (PRN):  diazepam  Injectable 2 milliGRAM(s) IV Push every 6 hours PRN spasm      LABS:                        12.3   14.64 )-----------( 254      ( 26 Jul 2023 00:35 )             37.2     07-26    133<L>  |  101  |  14  ----------------------------<  164<H>  4.4   |  18<L>  |  <0.30<L>    Ca    9.0      26 Jul 2023 00:35  Phos  3.1     07-26  Mg     2.1     07-26    TPro  6.4  /  Alb  3.3  /  TBili  0.8  /  DBili  x   /  AST  25  /  ALT  34  /  AlkPhos  80  07-26    PT/INR - ( 26 Jul 2023 00:35 )   PT: 14.1 sec;   INR: 1.21 ratio         PTT - ( 26 Jul 2023 00:35 )  PTT:29.8 sec  Urinalysis Basic - ( 26 Jul 2023 00:35 )    Color: x / Appearance: x / SG: x / pH: x  Gluc: 164 mg/dL / Ketone: x  / Bili: x / Urobili: x   Blood: x / Protein: x / Nitrite: x   Leuk Esterase: x / RBC: x / WBC x   Sq Epi: x / Non Sq Epi: x / Bacteria: x      Arterial Blood Gas:  07-26 @ 00:15  7.37/39/97/22/99.3/-2.5  ABG lactate: --    Venous Blood Gas:  07-26 @ 00:15  7.26/52/34/23/55.1  VBG Lactate: 3.1      Mode: AC/ CMV (Assist Control/ Continuous Mandatory Ventilation)  RR (machine): 16  TV (machine): 400  FiO2: 30  PEEP: 5  ITime: 1  MAP: 10  PIP: 33     Patient is a 68y old  Female who presents with a chief complaint of UTI/Urosepsis (26 Jul 2023 07:47)    HPI:  68-year-old female past medical history ALS nonverbal at baseline Parkinson's trach PEG vent dependent on home vent brought in by EMS for  increased heart rate and lethargy for few days.  Patient was seen by outpatient pulmonologist and was noted to be tachycardic and was sent to ED for further work-up.  History from the  was obtained by the ED. Per   patient's been more lethargic over the last several days.  Has had symptoms similar to this in the past secondary to urinary tract infections.   In the ED patient noted to have a rectal temp of 100.3, and tachycardic,  wbc 23.73, lactate of 4.5, ph 7.38, urinalysis + for bacteria, and a chest x ray showing Left-sided retrocardiac opacity which may represent atelectasis and/or pneumonia. patient given vanc cefepime x1, 2 L fluid bolus, and IV acetaminophen for fever. (25 Jul 2023 01:50)    Interval Events: Positive blood cx reported- GM+ cocci custers-     REVIEW OF SYSTEMS:  [ ] Positive  [ ] All other systems negative  [x ] Unable to assess ROS because patient is NON-verbal    Vital Signs Last 24 Hrs  T(C): 36.6 (07-27-23 @ 04:04), Max: 36.7 (07-26-23 @ 22:16)  T(F): 97.8 (07-27-23 @ 04:04), Max: 98 (07-26-23 @ 22:16)  HR: 82 (07-27-23 @ 05:29) (74 - 89)  BP: 140/76 (07-27-23 @ 04:04) (83/48 - 140/76)  RR: 16 (07-27-23 @ 04:04) (16 - 18)  SpO2: 100% (07-27-23 @ 05:29) (97% - 100%)    PHYSICAL EXAM:  HEENT:   [ ]Tracheostomy: #8 distal XLT Shiley   full vent support   [ ]Pupils equal   eyes taped   [ ]No oral lesions  [ ]Abnormal    SKIN  [ x] No Rash  [ ] Abnormal  [ ] pressure    CARDIAC  [x ]Regular  [ ]Abnormal    PULMONARY  [x ]Bilateral Clear Breath Sounds  [ ]Normal Excursion  [ ]Abnormal    GI  [x ]PEG      [x ] +BS		              [x ]Soft, nondistended, nontender	  [ ]Abnormal    MUSCULOSKELETAL                                   [ x]Bedbound                 [ ]Abnormal    [ ]Ambulatory/OOB to chair                           EXTREMITIES                                         [ ]Normal  [x ]Contracted                          NEUROLOGIC  [ ] Normal, non focal  [ x] Focal findings: Advanced neurological disease, contracted, non-verbal.    PSYCHIATRIC  [ x]Unable to assess    :  Newman: [ ] Yes, if yes: Date of Placement:                   [ x ] No    LINES: Central Lines [ ] Yes, if yes: Date of Placement                                     [ x ] No    HOSPITAL MEDICATIONS:  MEDICATIONS  (STANDING):  albuterol/ipratropium for Nebulization 3 milliLiter(s) Nebulizer every 6 hours  artificial  tears Solution 1 Drop(s) Both EYES two times a day  chlorhexidine 0.12% Liquid 15 milliLiter(s) Oral Mucosa every 12 hours  chlorhexidine 4% Liquid 1 Application(s) Topical <User Schedule>  erythromycin   Ointment 1 Application(s) Both EYES <User Schedule>  midodrine 20 milliGRAM(s) Oral every 8 hours  piperacillin/tazobactam IVPB.. 3.375 Gram(s) IV Intermittent every 8 hours  polyethylene glycol 3350 17 Gram(s) Oral daily  riluzole 50 milliGRAM(s) Oral two times a day  rivaroxaban 10 milliGRAM(s) Oral daily  senna 2 Tablet(s) Oral at bedtime  sertraline 75 milliGRAM(s) Oral daily  tobramycin 14 mG/mL Fortified Ophthalmic 1 Drop(s) Both EYES every 4 hours  vancomycin 25 mG/mL Fortified Ophthalmic 1 Drop(s) Both EYES every 4 hours    MEDICATIONS  (PRN):  diazepam  Injectable 2 milliGRAM(s) IV Push every 6 hours PRN spasm      LABS:                        12.3   14.64 )-----------( 254      ( 26 Jul 2023 00:35 )             37.2     07-26    133<L>  |  101  |  14  ----------------------------<  164<H>  4.4   |  18<L>  |  <0.30<L>    Ca    9.0      26 Jul 2023 00:35  Phos  3.1     07-26  Mg     2.1     07-26    TPro  6.4  /  Alb  3.3  /  TBili  0.8  /  DBili  x   /  AST  25  /  ALT  34  /  AlkPhos  80  07-26    PT/INR - ( 26 Jul 2023 00:35 )   PT: 14.1 sec;   INR: 1.21 ratio         PTT - ( 26 Jul 2023 00:35 )  PTT:29.8 sec  Urinalysis Basic - ( 26 Jul 2023 00:35 )    Color: x / Appearance: x / SG: x / pH: x  Gluc: 164 mg/dL / Ketone: x  / Bili: x / Urobili: x   Blood: x / Protein: x / Nitrite: x   Leuk Esterase: x / RBC: x / WBC x   Sq Epi: x / Non Sq Epi: x / Bacteria: x      Arterial Blood Gas:  07-26 @ 00:15  7.37/39/97/22/99.3/-2.5  ABG lactate: --    Venous Blood Gas:  07-26 @ 00:15  7.26/52/34/23/55.1  VBG Lactate: 3.1      Mode: AC/ CMV (Assist Control/ Continuous Mandatory Ventilation)  RR (machine): 16  TV (machine): 400  FiO2: 30  PEEP: 5  ITime: 1  MAP: 10  PIP: 33

## 2023-07-28 NOTE — PROGRESS NOTE ADULT - ASSESSMENT
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, OU  - patient has Parkinson's and ALS, has been being treated outpatient for infection in both eyes, however cannot close own eyes requires taping  - family noting that eyes are producing more discharge and more red than prior  - unable to assess subjective aspects of visual exam 2/2 AMS/averbal  - no rAPD appreciated  - inferior corneal findings with significant stromal cell, prior stromal scarring, neovascularization large epithelial defects with stromal cell and mild discharge OD > OS  - OD also with 10-20% thinning  - cultures of discharge likely contaminant coag negative staph  - continue fortified vancomyin and fortified tobramycin q2 hours both eyes (alternate drops vanc at 12, 4, 8 and tobra at 2, 6, 10 etc..)  - continue erythromycin ointment q1 hour both eyes  - please tape eyes shut, DO NOT USE gauze is not scraping corneal surface as this will make ulceration worse - but make sure eyes are actually CLOSED with tape, not just covering for moisture chamber  - discussed need for tarsorrhaphy with sister in law, however family discussion needs to be had in regards to the GOC  - discussed with  Dr. Mehta 7/26. mentioned how patient has had informal testing at home in regards to ALS and is still registering through eyes and hearing. Explained the risk and benefit of the procedure of tarsorrhaphy, how the eye may continue to have defects, thin, ultimately ulcerate and result in vision loss or loss of the eye.  understand risks, wishes to hold off on procedure at this time in order to preserve senses that wife has left. will continue aggressive management with antibiotics and taping  - ophtho to follow daily      Outpatient Follow-up: Patient should follow-up with his/her ophthalmologist or with [facility] Department of Ophthalmology within 1 week of after discharge at:    [address]  [place] [address] [phone]    Dedrick Brewster MD, PGY3  Also available on [employer] Teams

## 2023-07-28 NOTE — PROVIDER CONTACT NOTE (CRITICAL VALUE NOTIFICATION) - SITUATION
blood cx done on 7/24/23 is positive, gram positive cocci in clusters in anaerobic bottle, pt is on Zosyn, afebrile

## 2023-07-29 LAB
ANION GAP SERPL CALC-SCNC: 14 MMOL/L — SIGNIFICANT CHANGE UP (ref 5–17)
APTT BLD: 32.6 SEC — SIGNIFICANT CHANGE UP (ref 24.5–35.6)
BUN SERPL-MCNC: 12 MG/DL — SIGNIFICANT CHANGE UP (ref 7–23)
CALCIUM SERPL-MCNC: 9 MG/DL — SIGNIFICANT CHANGE UP (ref 8.4–10.5)
CHLORIDE SERPL-SCNC: 98 MMOL/L — SIGNIFICANT CHANGE UP (ref 96–108)
CO2 SERPL-SCNC: 20 MMOL/L — LOW (ref 22–31)
CREAT SERPL-MCNC: <0.3 MG/DL — LOW (ref 0.5–1.3)
EGFR: 114 ML/MIN/1.73M2 — SIGNIFICANT CHANGE UP
GLUCOSE SERPL-MCNC: 146 MG/DL — HIGH (ref 70–99)
HCT VFR BLD CALC: 41.1 % — SIGNIFICANT CHANGE UP (ref 34.5–45)
HGB BLD-MCNC: 13.1 G/DL — SIGNIFICANT CHANGE UP (ref 11.5–15.5)
INR BLD: 1.2 RATIO — HIGH (ref 0.85–1.18)
MAGNESIUM SERPL-MCNC: 1.9 MG/DL — SIGNIFICANT CHANGE UP (ref 1.6–2.6)
MCHC RBC-ENTMCNC: 27.7 PG — SIGNIFICANT CHANGE UP (ref 27–34)
MCHC RBC-ENTMCNC: 31.9 GM/DL — LOW (ref 32–36)
MCV RBC AUTO: 86.9 FL — SIGNIFICANT CHANGE UP (ref 80–100)
NRBC # BLD: 0 /100 WBCS — SIGNIFICANT CHANGE UP (ref 0–0)
PHOSPHATE SERPL-MCNC: 2.3 MG/DL — LOW (ref 2.5–4.5)
PLATELET # BLD AUTO: 282 K/UL — SIGNIFICANT CHANGE UP (ref 150–400)
POTASSIUM SERPL-MCNC: 4.2 MMOL/L — SIGNIFICANT CHANGE UP (ref 3.5–5.3)
POTASSIUM SERPL-SCNC: 4.2 MMOL/L — SIGNIFICANT CHANGE UP (ref 3.5–5.3)
PROTHROM AB SERPL-ACNC: 12.5 SEC — SIGNIFICANT CHANGE UP (ref 9.5–13)
RBC # BLD: 4.73 M/UL — SIGNIFICANT CHANGE UP (ref 3.8–5.2)
RBC # FLD: 17 % — HIGH (ref 10.3–14.5)
SODIUM SERPL-SCNC: 132 MMOL/L — LOW (ref 135–145)
WBC # BLD: 15 K/UL — HIGH (ref 3.8–10.5)
WBC # FLD AUTO: 15 K/UL — HIGH (ref 3.8–10.5)

## 2023-07-29 PROCEDURE — 74018 RADEX ABDOMEN 1 VIEW: CPT | Mod: 26

## 2023-07-29 PROCEDURE — 99233 SBSQ HOSP IP/OBS HIGH 50: CPT | Mod: FS

## 2023-07-29 RX ORDER — DIAZEPAM 5 MG
2 TABLET ORAL EVERY 6 HOURS
Refills: 0 | Status: DISCONTINUED | OUTPATIENT
Start: 2023-07-29 | End: 2023-08-04

## 2023-07-29 RX ORDER — SODIUM,POTASSIUM PHOSPHATES 278-250MG
1 POWDER IN PACKET (EA) ORAL
Refills: 0 | Status: COMPLETED | OUTPATIENT
Start: 2023-07-29 | End: 2023-07-30

## 2023-07-29 RX ADMIN — Medication 1 DROP(S): at 16:08

## 2023-07-29 RX ADMIN — Medication 1 DROP(S): at 08:09

## 2023-07-29 RX ADMIN — RILUZOLE 50 MILLIGRAM(S): 50 TABLET ORAL at 05:14

## 2023-07-29 RX ADMIN — Medication 1 APPLICATION(S): at 15:11

## 2023-07-29 RX ADMIN — Medication 1 DROP(S): at 13:54

## 2023-07-29 RX ADMIN — RILUZOLE 50 MILLIGRAM(S): 50 TABLET ORAL at 17:18

## 2023-07-29 RX ADMIN — Medication 1 APPLICATION(S): at 08:08

## 2023-07-29 RX ADMIN — Medication 3 MILLILITER(S): at 05:42

## 2023-07-29 RX ADMIN — Medication 1 APPLICATION(S): at 02:55

## 2023-07-29 RX ADMIN — Medication 1 APPLICATION(S): at 19:12

## 2023-07-29 RX ADMIN — Medication 3 MILLILITER(S): at 17:25

## 2023-07-29 RX ADMIN — Medication 1 APPLICATION(S): at 06:06

## 2023-07-29 RX ADMIN — Medication 1 APPLICATION(S): at 23:13

## 2023-07-29 RX ADMIN — Medication 1 DROP(S): at 02:55

## 2023-07-29 RX ADMIN — Medication 1 DROP(S): at 05:14

## 2023-07-29 RX ADMIN — Medication 1 APPLICATION(S): at 17:17

## 2023-07-29 RX ADMIN — Medication 1 DROP(S): at 20:01

## 2023-07-29 RX ADMIN — Medication 1 APPLICATION(S): at 11:26

## 2023-07-29 RX ADMIN — Medication 1 APPLICATION(S): at 14:00

## 2023-07-29 RX ADMIN — Medication 1 APPLICATION(S): at 13:00

## 2023-07-29 RX ADMIN — Medication 1 DROP(S): at 17:17

## 2023-07-29 RX ADMIN — Medication 1 APPLICATION(S): at 20:01

## 2023-07-29 RX ADMIN — Medication 1 APPLICATION(S): at 01:51

## 2023-07-29 RX ADMIN — Medication 1 DROP(S): at 05:15

## 2023-07-29 RX ADMIN — Medication 1 DROP(S): at 17:18

## 2023-07-29 RX ADMIN — Medication 1 DROP(S): at 09:08

## 2023-07-29 RX ADMIN — PIPERACILLIN AND TAZOBACTAM 25 GRAM(S): 4; .5 INJECTION, POWDER, LYOPHILIZED, FOR SOLUTION INTRAVENOUS at 13:52

## 2023-07-29 RX ADMIN — Medication 1 APPLICATION(S): at 21:17

## 2023-07-29 RX ADMIN — Medication 3 MILLILITER(S): at 12:03

## 2023-07-29 RX ADMIN — CHLORHEXIDINE GLUCONATE 15 MILLILITER(S): 213 SOLUTION TOPICAL at 05:14

## 2023-07-29 RX ADMIN — Medication 1 DROP(S): at 23:13

## 2023-07-29 RX ADMIN — Medication 1 APPLICATION(S): at 04:12

## 2023-07-29 RX ADMIN — Medication 1 TABLET(S): at 12:11

## 2023-07-29 RX ADMIN — Medication 1 APPLICATION(S): at 10:42

## 2023-07-29 RX ADMIN — CHLORHEXIDINE GLUCONATE 1 APPLICATION(S): 213 SOLUTION TOPICAL at 10:41

## 2023-07-29 RX ADMIN — NYSTATIN CREAM 1 APPLICATION(S): 100000 CREAM TOPICAL at 17:19

## 2023-07-29 RX ADMIN — CHLORHEXIDINE GLUCONATE 15 MILLILITER(S): 213 SOLUTION TOPICAL at 17:17

## 2023-07-29 RX ADMIN — Medication 1 PACKET(S): at 17:18

## 2023-07-29 RX ADMIN — Medication 1 APPLICATION(S): at 01:17

## 2023-07-29 RX ADMIN — Medication 1 APPLICATION(S): at 16:08

## 2023-07-29 RX ADMIN — Medication 1 APPLICATION(S): at 06:54

## 2023-07-29 RX ADMIN — Medication 1 APPLICATION(S): at 09:08

## 2023-07-29 RX ADMIN — Medication 1 DROP(S): at 21:17

## 2023-07-29 RX ADMIN — NYSTATIN CREAM 1 APPLICATION(S): 100000 CREAM TOPICAL at 05:15

## 2023-07-29 RX ADMIN — Medication 1 APPLICATION(S): at 04:55

## 2023-07-29 RX ADMIN — SERTRALINE 75 MILLIGRAM(S): 25 TABLET, FILM COATED ORAL at 12:11

## 2023-07-29 RX ADMIN — PIPERACILLIN AND TAZOBACTAM 25 GRAM(S): 4; .5 INJECTION, POWDER, LYOPHILIZED, FOR SOLUTION INTRAVENOUS at 21:22

## 2023-07-29 RX ADMIN — RIVAROXABAN 10 MILLIGRAM(S): KIT at 12:11

## 2023-07-29 RX ADMIN — PIPERACILLIN AND TAZOBACTAM 25 GRAM(S): 4; .5 INJECTION, POWDER, LYOPHILIZED, FOR SOLUTION INTRAVENOUS at 05:14

## 2023-07-29 RX ADMIN — POLYETHYLENE GLYCOL 3350 17 GRAM(S): 17 POWDER, FOR SOLUTION ORAL at 12:11

## 2023-07-29 RX ADMIN — Medication 1 DROP(S): at 12:00

## 2023-07-29 RX ADMIN — Medication 1 DROP(S): at 04:12

## 2023-07-29 RX ADMIN — Medication 1 APPLICATION(S): at 12:18

## 2023-07-29 RX ADMIN — Medication 1 APPLICATION(S): at 22:10

## 2023-07-29 NOTE — PROGRESS NOTE ADULT - SUBJECTIVE AND OBJECTIVE BOX
Patient is a 68y old  Female who presents with a chief complaint of UTI/Urosepsis (26 Jul 2023 07:47)    HPI:  68-year-old female past medical history ALS nonverbal at baseline Parkinson's trach PEG vent dependent on home vent brought in by EMS for  increased heart rate and lethargy for few days.  Patient was seen by outpatient pulmonologist and was noted to be tachycardic and was sent to ED for further work-up.  History from the  was obtained by the ED. Per   patient's been more lethargic over the last several days.  Has had symptoms similar to this in the past secondary to urinary tract infections.   In the ED patient noted to have a rectal temp of 100.3, and tachycardic,  wbc 23.73, lactate of 4.5, ph 7.38, urinalysis + for bacteria, and a chest x ray showing Left-sided retrocardiac opacity which may represent atelectasis and/or pneumonia. patient given vanc cefepime x1, 2 L fluid bolus, and IV acetaminophen for fever. (25 Jul 2023 01:50)    Interval Events: Positive blood cx reported- GM+ cocci custers-     REVIEW OF SYSTEMS:  [ ] Positive  [ ] All other systems negative  [x ] Unable to assess ROS because patient is NON-verbal    Vital Signs Last 24 Hrs  T(C): 36.6 (07-27-23 @ 04:04), Max: 36.7 (07-26-23 @ 22:16)  T(F): 97.8 (07-27-23 @ 04:04), Max: 98 (07-26-23 @ 22:16)  HR: 82 (07-27-23 @ 05:29) (74 - 89)  BP: 140/76 (07-27-23 @ 04:04) (83/48 - 140/76)  RR: 16 (07-27-23 @ 04:04) (16 - 18)  SpO2: 100% (07-27-23 @ 05:29) (97% - 100%)    PHYSICAL EXAM:  HEENT:   [ ]Tracheostomy: #8 distal XLT Shiley   full vent support   [ ]Pupils equal   eyes taped   [ ]No oral lesions  [ ]Abnormal    SKIN  [ x] No Rash  [ ] Abnormal  [ ] pressure    CARDIAC  [x ]Regular  [ ]Abnormal    PULMONARY  [x ]Bilateral Clear Breath Sounds  [ ]Normal Excursion  [ ]Abnormal    GI  [x ]PEG      [x ] +BS		              [x ]Soft, nondistended, nontender	  [ ]Abnormal    MUSCULOSKELETAL                                   [ x]Bedbound                 [ ]Abnormal    [ ]Ambulatory/OOB to chair                           EXTREMITIES                                         [ ]Normal  [x ]Contracted                          NEUROLOGIC  [ ] Normal, non focal  [ x] Focal findings: Advanced neurological disease, contracted, non-verbal.    PSYCHIATRIC  [ x]Unable to assess    :  Newman: [ ] Yes, if yes: Date of Placement:                   [ x ] No    LINES: Central Lines [ ] Yes, if yes: Date of Placement                                     [ x ] No    HOSPITAL MEDICATIONS:  MEDICATIONS  (STANDING):  albuterol/ipratropium for Nebulization 3 milliLiter(s) Nebulizer every 6 hours  artificial  tears Solution 1 Drop(s) Both EYES two times a day  chlorhexidine 0.12% Liquid 15 milliLiter(s) Oral Mucosa every 12 hours  chlorhexidine 4% Liquid 1 Application(s) Topical <User Schedule>  erythromycin   Ointment 1 Application(s) Both EYES <User Schedule>  midodrine 20 milliGRAM(s) Oral every 8 hours  piperacillin/tazobactam IVPB.. 3.375 Gram(s) IV Intermittent every 8 hours  polyethylene glycol 3350 17 Gram(s) Oral daily  riluzole 50 milliGRAM(s) Oral two times a day  rivaroxaban 10 milliGRAM(s) Oral daily  senna 2 Tablet(s) Oral at bedtime  sertraline 75 milliGRAM(s) Oral daily  tobramycin 14 mG/mL Fortified Ophthalmic 1 Drop(s) Both EYES every 4 hours  vancomycin 25 mG/mL Fortified Ophthalmic 1 Drop(s) Both EYES every 4 hours    MEDICATIONS  (PRN):  diazepam  Injectable 2 milliGRAM(s) IV Push every 6 hours PRN spasm      LABS:                        12.3   14.64 )-----------( 254      ( 26 Jul 2023 00:35 )             37.2     07-26    133<L>  |  101  |  14  ----------------------------<  164<H>  4.4   |  18<L>  |  <0.30<L>    Ca    9.0      26 Jul 2023 00:35  Phos  3.1     07-26  Mg     2.1     07-26    TPro  6.4  /  Alb  3.3  /  TBili  0.8  /  DBili  x   /  AST  25  /  ALT  34  /  AlkPhos  80  07-26    PT/INR - ( 26 Jul 2023 00:35 )   PT: 14.1 sec;   INR: 1.21 ratio         PTT - ( 26 Jul 2023 00:35 )  PTT:29.8 sec  Urinalysis Basic - ( 26 Jul 2023 00:35 )    Color: x / Appearance: x / SG: x / pH: x  Gluc: 164 mg/dL / Ketone: x  / Bili: x / Urobili: x   Blood: x / Protein: x / Nitrite: x   Leuk Esterase: x / RBC: x / WBC x   Sq Epi: x / Non Sq Epi: x / Bacteria: x      Arterial Blood Gas:  07-26 @ 00:15  7.37/39/97/22/99.3/-2.5  ABG lactate: --    Venous Blood Gas:  07-26 @ 00:15  7.26/52/34/23/55.1  VBG Lactate: 3.1      Mode: AC/ CMV (Assist Control/ Continuous Mandatory Ventilation)  RR (machine): 16  TV (machine): 400  FiO2: 30  PEEP: 5  ITime: 1  MAP: 10  PIP: 33     Patient is a 68y old  Female who presents with a chief complaint of UTI/Urosepsis (26 Jul 2023 07:47)    HPI:  68-year-old female past medical history ALS nonverbal at baseline Parkinson's trach PEG vent dependent on home vent brought in by EMS for  increased heart rate and lethargy for few days.  Patient was seen by outpatient pulmonologist and was noted to be tachycardic and was sent to ED for further work-up.  History from the  was obtained by the ED. Per   patient's been more lethargic over the last several days.  Has had symptoms similar to this in the past secondary to urinary tract infections.   In the ED patient noted to have a rectal temp of 100.3, and tachycardic,  wbc 23.73, lactate of 4.5, ph 7.38, urinalysis + for bacteria, and a chest x ray showing Left-sided retrocardiac opacity which may represent atelectasis and/or pneumonia. patient given vanc cefepime x1, 2 L fluid bolus, and IV acetaminophen for fever. (25 Jul 2023 01:50)    Interval Events: no issues overnight     REVIEW OF SYSTEMS:  [ ] Positive  [ ] All other systems negative  [x ] Unable to assess ROS because patient is NON-verbal    Vital Signs Last 24 Hrs  T(C): 36.6 (07-27-23 @ 04:04), Max: 36.7 (07-26-23 @ 22:16)  T(F): 97.8 (07-27-23 @ 04:04), Max: 98 (07-26-23 @ 22:16)  HR: 82 (07-27-23 @ 05:29) (74 - 89)  BP: 140/76 (07-27-23 @ 04:04) (83/48 - 140/76)  RR: 16 (07-27-23 @ 04:04) (16 - 18)  SpO2: 100% (07-27-23 @ 05:29) (97% - 100%)    PHYSICAL EXAM:  HEENT:   [ ]Tracheostomy: #8 distal XLT Shiley   full vent support   [ ]Pupils equal   eyes taped   [ ]No oral lesions  [ ]Abnormal    SKIN  [ x] No Rash  [ ] Abnormal  [ ] pressure    CARDIAC  [x ]Regular  [ ]Abnormal    PULMONARY   ]Bilateral Clear Breath Sounds  [ ]Normal Excursion  [x ]Abnormal  rhonchi    GI  [x ]PEG      [x ] +BS		              [x ]Soft, nondistended, nontender	  [ ]Abnormal    MUSCULOSKELETAL                                   [ x]Bedbound                 [ ]Abnormal    [ ]Ambulatory/OOB to chair                           EXTREMITIES                                         [ ]Normal  [x ]Contracted                          NEUROLOGIC  [ ] Normal, non focal  [ x] Focal findings: Advanced neurological disease, contracted, non-verbal.    PSYCHIATRIC  [ x]Unable to assess    :  Newman: [ ] Yes, if yes: Date of Placement:                   [ x ] No    LINES: Central Lines [ ] Yes, if yes: Date of Placement                                     [ x ] No    HOSPITAL MEDICATIONS:  MEDICATIONS  (STANDING):  albuterol/ipratropium for Nebulization 3 milliLiter(s) Nebulizer every 6 hours  artificial  tears Solution 1 Drop(s) Both EYES two times a day  chlorhexidine 0.12% Liquid 15 milliLiter(s) Oral Mucosa every 12 hours  chlorhexidine 4% Liquid 1 Application(s) Topical <User Schedule>  erythromycin   Ointment 1 Application(s) Both EYES <User Schedule>  midodrine 20 milliGRAM(s) Oral every 8 hours  piperacillin/tazobactam IVPB.. 3.375 Gram(s) IV Intermittent every 8 hours  polyethylene glycol 3350 17 Gram(s) Oral daily  riluzole 50 milliGRAM(s) Oral two times a day  rivaroxaban 10 milliGRAM(s) Oral daily  senna 2 Tablet(s) Oral at bedtime  sertraline 75 milliGRAM(s) Oral daily  tobramycin 14 mG/mL Fortified Ophthalmic 1 Drop(s) Both EYES every 4 hours  vancomycin 25 mG/mL Fortified Ophthalmic 1 Drop(s) Both EYES every 4 hours    MEDICATIONS  (PRN):  diazepam  Injectable 2 milliGRAM(s) IV Push every 6 hours PRN spasm      LABS:                        12.3   14.64 )-----------( 254      ( 26 Jul 2023 00:35 )             37.2     07-26    133<L>  |  101  |  14  ----------------------------<  164<H>  4.4   |  18<L>  |  <0.30<L>    Ca    9.0      26 Jul 2023 00:35  Phos  3.1     07-26  Mg     2.1     07-26    TPro  6.4  /  Alb  3.3  /  TBili  0.8  /  DBili  x   /  AST  25  /  ALT  34  /  AlkPhos  80  07-26    PT/INR - ( 26 Jul 2023 00:35 )   PT: 14.1 sec;   INR: 1.21 ratio         PTT - ( 26 Jul 2023 00:35 )  PTT:29.8 sec  Urinalysis Basic - ( 26 Jul 2023 00:35 )    Color: x / Appearance: x / SG: x / pH: x  Gluc: 164 mg/dL / Ketone: x  / Bili: x / Urobili: x   Blood: x / Protein: x / Nitrite: x   Leuk Esterase: x / RBC: x / WBC x   Sq Epi: x / Non Sq Epi: x / Bacteria: x      Arterial Blood Gas:  07-26 @ 00:15  7.37/39/97/22/99.3/-2.5  ABG lactate: --    Venous Blood Gas:  07-26 @ 00:15  7.26/52/34/23/55.1  VBG Lactate: 3.1      Mode: AC/ CMV (Assist Control/ Continuous Mandatory Ventilation)  RR (machine): 16  TV (machine): 400  FiO2: 30  PEEP: 5  ITime: 1  MAP: 10  PIP: 33     Patient is a 68y old  Female who presents with a chief complaint of UTI/Urosepsis (26 Jul 2023 07:47)    HPI:  68-year-old female past medical history ALS nonverbal at baseline Parkinson's trach PEG vent dependent on home vent brought in by EMS for  increased heart rate and lethargy for few days.  Patient was seen by outpatient pulmonologist and was noted to be tachycardic and was sent to ED for further work-up.  History from the  was obtained by the ED. Per   patient's been more lethargic over the last several days.  Has had symptoms similar to this in the past secondary to urinary tract infections.   In the ED patient noted to have a rectal temp of 100.3, and tachycardic,  wbc 23.73, lactate of 4.5, ph 7.38, urinalysis + for bacteria, and a chest x ray showing Left-sided retrocardiac opacity which may represent atelectasis and/or pneumonia. patient given vanc cefepime x1, 2 L fluid bolus, and IV acetaminophen for fever. (25 Jul 2023 01:50)    Interval Events: no issues overnight     REVIEW OF SYSTEMS:  [ ] Positive  [ ] All other systems negative  [x ] Unable to assess ROS because patient is NON-verbal    Vital Signs Last 24 Hrs  T(C): 36.6 (07-27-23 @ 04:04), Max: 36.7 (07-26-23 @ 22:16)  T(F): 97.8 (07-27-23 @ 04:04), Max: 98 (07-26-23 @ 22:16)  HR: 82 (07-27-23 @ 05:29) (74 - 89)  BP: 140/76 (07-27-23 @ 04:04) (83/48 - 140/76)  RR: 16 (07-27-23 @ 04:04) (16 - 18)  SpO2: 100% (07-27-23 @ 05:29) (97% - 100%)    PHYSICAL EXAM:  HEENT:   [ ]Tracheostomy: #8 distal XLT Shiley   full vent support   [ ]Pupils equal   eyes taped   [ ]No oral lesions  [ ]Abnormal    SKIN  [ x] No Rash      flushed face  [ ] Abnormal  [ ] pressure    CARDIAC  [x ]Regular  [ ]Abnormal    PULMONARY   ]Bilateral Clear Breath Sounds  [ ]Normal Excursion  [x ]Abnormal  rhonchi    GI  [x ]PEG      [x ] +BS		              [x ]Soft, nondistended, nontender	  [ ]Abnormal    MUSCULOSKELETAL                                   [ x]Bedbound                 [ ]Abnormal    [ ]Ambulatory/OOB to chair                           EXTREMITIES                                         [ ]Normal  [x ]Contracted                          NEUROLOGIC  [ ] Normal, non focal  [ x] Focal findings: Advanced neurological disease, contracted, non-verbal.    PSYCHIATRIC  [ x]Unable to assess    :  Newman: [ ] Yes, if yes: Date of Placement:                   [ x ] No    LINES: Central Lines [ ] Yes, if yes: Date of Placement                                     [ x ] No    HOSPITAL MEDICATIONS:  MEDICATIONS  (STANDING):  albuterol/ipratropium for Nebulization 3 milliLiter(s) Nebulizer every 6 hours  artificial  tears Solution 1 Drop(s) Both EYES two times a day  chlorhexidine 0.12% Liquid 15 milliLiter(s) Oral Mucosa every 12 hours  chlorhexidine 4% Liquid 1 Application(s) Topical <User Schedule>  erythromycin   Ointment 1 Application(s) Both EYES <User Schedule>  midodrine 20 milliGRAM(s) Oral every 8 hours  piperacillin/tazobactam IVPB.. 3.375 Gram(s) IV Intermittent every 8 hours  polyethylene glycol 3350 17 Gram(s) Oral daily  riluzole 50 milliGRAM(s) Oral two times a day  rivaroxaban 10 milliGRAM(s) Oral daily  senna 2 Tablet(s) Oral at bedtime  sertraline 75 milliGRAM(s) Oral daily  tobramycin 14 mG/mL Fortified Ophthalmic 1 Drop(s) Both EYES every 4 hours  vancomycin 25 mG/mL Fortified Ophthalmic 1 Drop(s) Both EYES every 4 hours    MEDICATIONS  (PRN):  diazepam  Injectable 2 milliGRAM(s) IV Push every 6 hours PRN spasm      LABS:                        12.3   14.64 )-----------( 254      ( 26 Jul 2023 00:35 )             37.2     07-26    133<L>  |  101  |  14  ----------------------------<  164<H>  4.4   |  18<L>  |  <0.30<L>    Ca    9.0      26 Jul 2023 00:35  Phos  3.1     07-26  Mg     2.1     07-26    TPro  6.4  /  Alb  3.3  /  TBili  0.8  /  DBili  x   /  AST  25  /  ALT  34  /  AlkPhos  80  07-26    PT/INR - ( 26 Jul 2023 00:35 )   PT: 14.1 sec;   INR: 1.21 ratio         PTT - ( 26 Jul 2023 00:35 )  PTT:29.8 sec  Urinalysis Basic - ( 26 Jul 2023 00:35 )    Color: x / Appearance: x / SG: x / pH: x  Gluc: 164 mg/dL / Ketone: x  / Bili: x / Urobili: x   Blood: x / Protein: x / Nitrite: x   Leuk Esterase: x / RBC: x / WBC x   Sq Epi: x / Non Sq Epi: x / Bacteria: x      Arterial Blood Gas:  07-26 @ 00:15  7.37/39/97/22/99.3/-2.5  ABG lactate: --    Venous Blood Gas:  07-26 @ 00:15  7.26/52/34/23/55.1  VBG Lactate: 3.1      Mode: AC/ CMV (Assist Control/ Continuous Mandatory Ventilation)  RR (machine): 16  TV (machine): 400  FiO2: 30  PEEP: 5  ITime: 1  MAP: 10  PIP: 33

## 2023-07-29 NOTE — PROGRESS NOTE ADULT - SUBJECTIVE AND OBJECTIVE BOX
[facility] DEPARTMENT OF OPHTHALMOLOGY  ------------------------------------------------------------------------------  Mitchell Ruiz MD, PGY-2  Available on teams  ------------------------------------------------------------------------------    Interval History: No acute events overnight. Patient AOx0 and unable to provide history. Eyelids taped adequately at bedside.   MEDICATIONS  (STANDING):  albuterol/ipratropium for Nebulization 3 milliLiter(s) Nebulizer every 6 hours  artificial  tears Solution 1 Drop(s) Both EYES two times a day  chlorhexidine 0.12% Liquid 15 milliLiter(s) Oral Mucosa every 12 hours  chlorhexidine 4% Liquid 1 Application(s) Topical <User Schedule>  erythromycin   Ointment 1 Application(s) Both EYES <User Schedule>  multivitamin 1 Tablet(s) Oral daily  nystatin Powder 1 Application(s) Topical two times a day  piperacillin/tazobactam IVPB.. 3.375 Gram(s) IV Intermittent every 8 hours  polyethylene glycol 3350 17 Gram(s) Oral daily  potassium phosphate / sodium phosphate Powder (PHOS-NaK) 1 Packet(s) Oral two times a day  riluzole 50 milliGRAM(s) Oral two times a day  rivaroxaban 10 milliGRAM(s) Oral daily  senna 2 Tablet(s) Oral at bedtime  sertraline 75 milliGRAM(s) Oral daily  tobramycin 14 mG/mL Fortified Ophthalmic 1 Drop(s) Both EYES every 4 hours  vancomycin 25 mG/mL Fortified Ophthalmic 1 Drop(s) Both EYES every 4 hours    MEDICATIONS  (PRN):  diazepam    Tablet 2 milliGRAM(s) Oral every 6 hours PRN elevated  bp systolic over 150      VITALS: T(C): 36.7 (07-29-23 @ 14:28)  T(F): 98.1 (07-29-23 @ 14:28), Max: 98.3 (07-29-23 @ 04:43)  HR: 103 (07-29-23 @ 17:26) (83 - 105)  BP: 116/64 (07-29-23 @ 14:28) (116/64 - 177/91)  RR:  (16 - 19)  SpO2:  (96% - 99%)  Wt(kg): --  General: AAO x 3, appropriate mood and affect    Ophthalmology Exam:  Visual acuity (sc): RONIT 2/2 AMS  Pupils: PERRL OU, no APD  Ttono: STP OU   Extraocular movements (EOMs): RONIT 2/2 AMS  Confrontational Visual Field (CVF): RONIT 2/2 AMS    Pen Light Exam (PLE)  External: Normal OU.  Lids/Lashes/Lacrimal Ducts: severe lagophthalmos OU, essentially unable to close both eyes on her own, lids feel taut   Sclera/Conjunctiva: 2+ injection OU  Cornea:  no corneal epithelial defect, significant stromal scarring with neovascularization, +stromal cell, +discharge, 10-20% thinning OD, inferior corneal epithelial defect, stromal cell, stromal scarring and mild neovascularization  Anterior Chamber: Deep and formed OU.    Iris: Flat OU.  Lens: NS OU.

## 2023-07-29 NOTE — PROGRESS NOTE ADULT - ASSESSMENT
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, OU  - patient has Parkinson's and ALS, has been being treated outpatient for infection in both eyes, however cannot close own eyes requires taping  - family noting that eyes are producing more discharge and more red than prior  - unable to assess subjective aspects of visual exam 2/2 AMS/averbal  - no rAPD appreciated  - inferior corneal findings with significant stromal cell, prior stromal scarring, neovascularization large epithelial defects with stromal cell and mild discharge OD > OS  - OD also with 10-20% thinning  - cultures of discharge likely contaminant coag negative staph  - continue fortified vancomyin and fortified tobramycin q2 hours both eyes (alternate drops vanc at 12, 4, 8 and tobra at 2, 6, 10 etc..)  - continue erythromycin ointment q1 hour both eyes  - please tape eyes shut, DO NOT USE gauze is not scraping corneal surface as this will make ulceration worse - but make sure eyes are actually CLOSED with tape, not just covering for moisture chamber  - discussed need for tarsorrhaphy with sister in law, however family discussion needs to be had in regards to the GOC  - discussed with  Dr. Mehta 7/26. mentioned how patient has had informal testing at home in regards to ALS and is still registering through eyes and hearing. Explained the risk and benefit of the procedure of tarsorrhaphy, how the eye may continue to have defects, thin, ultimately ulcerate and result in vision loss or loss of the eye.  understand risks, wishes to hold off on procedure at this time in order to preserve senses that wife has left. will continue aggressive management with antibiotics and taping  - ophtho to follow daily      Outpatient Follow-up: Patient should follow-up with his/her ophthalmologist or with [facility] Department of Ophthalmology within 1 week of after discharge at:    [address]  [place], [place] [id number] [phone]

## 2023-07-29 NOTE — PROGRESS NOTE ADULT - ASSESSMENT
68F extensive medical history including ALS, nonverbal and bedbound, Parkinsons, chronic respiratory failure requiring trach and vent, chronic oropharyngeal dysphagia with PEG presenting for lethargy and tachycardia. She is found to be febrile and admitted for further workup of sepsis due to pneumonia and UTI.     68F extensive medical history including ALS, nonverbal and bedbound, Parkinsons, chronic respiratory failure requiring trach and vent, chronic oropharyngeal dysphagia with PEG presenting for lethargy and tachycardia. She is found to be febrile and admitted for further workup of sepsis due to pneumonia and UTI.   Vancomycin for sputum with Pseudomonas

## 2023-07-29 NOTE — CHART NOTE - NSCHARTNOTEFT_GEN_A_CORE
Nutrition Follow Up Note  Patient seen for: consult for "feeds ordered for 12 hours on -   please re evaluate for incresed time"    Chart reviewed, events noted.    Source: [] Patient       [x] Medical Record        [x] RN        [] Family at bedside       [] Other:    -If unable to interview patient: [x] Trach/Vent/BiPAP  [] Disoriented/confused/inappropriate to interview    Diet Order:   Diet, NPO with Tube Feed:   Tube Feeding Modality: Gastrostomy  Jevity 1.2 Marck (JEVITY1.2RTH)  Total Volume for 24 Hours (mL): 840  Continuous  Starting Tube Feed Rate {mL per Hour}: 10  Increase Tube Feed Rate by (mL): 10     Every 4 hours  Until Goal Tube Feed Rate (mL per Hour): 70  Tube Feed Duration (in Hours): 12  Tube Feed Start Time: 06:00  Tube Feed Stop Time: 00:00  No Carb Prosource (1pkg = 15gms Protein)     Qty per Day:  1 (23)    EN Order Provides + Protein Modular: total volume 840 mL, 1048 kcals, 58 gm protein, and 678 mL free water. Meets 21 kcals/kG and 1.2 gm protein/kG based on UBW/ lbs/49.8 kG.   Current Pump Rate: 70 mL/hr  4-Day EN Average Provision per RN flowsheet + protein modular (feed started ): 912.5 mL, 1135 kcals, and 62 gm protein    Is current diet order appropriate/adequate? See recommendations below    PO intake :   [] >75%  Adequate    [] 50-75%  Fair       [] <50%  Poor   [x] N/A    Nutrition-related concerns:  - Advanced ALS  - Tracheostomy & PEG  - Hyponatremia s/p NaCl bolus   - Low Phos ; varying K levels, last drawn WNL    GI:  Last BM .   Bowel Regimen? [x] Yes   [] No    Weights:   Daily Weight in k.8 (-)  RD obtained wt: 59.5 kG (objects on bed)  Wt Hx per HIE (lbs): 106 (19), 118 (20), 121 (23 ?accuracy), 118 (23), 113 (23).   UBW: 110 lbs (more consistent with daily wt from )   Limited nutrition focused physical exam conducted with no significant findings to shoulders and clavicles.     Drug Dosing Weight  Height (cm): 165.1 (2023 02:48)  Weight (kg): 55.9 (2023 02:48)  BMI (kg/m2): 20.5 (2023 02:48)    MEDICATIONS  (STANDING):  piperacillin/tazobactam IVPB.. 3.375 Gram(s) IV Intermittent every 8 hours  polyethylene glycol 3350 17 Gram(s) Oral daily  riluzole 50 milliGRAM(s) Oral two times a day  rivaroxaban 10 milliGRAM(s) Oral daily  senna 2 Tablet(s) Oral at bedtime  sertraline 75 milliGRAM(s) Oral daily    Pertinent Labs:  @ 07:03: Na 132<L>, BUN 12, Cr <0.30<L>, <H>, K+ 4.2, Phos 2.3<L>, Mg 1.9   @ 11:00: Na 135, BUN 14, Cr <0.30<L>, <H>, K+ 4.1, Phos 2.5, Mg 2.0    A1C with Estimated Average Glucose Result: 4.9 % (23 @ 01:33)    Skin per nursing documentation: No pressure injuries noted.  Edema per nursing documentation: None noted     Based on UBW/ lbs/49.8 kG  Estimated Energy Needs: (20-25 kcals/kG) 996-1245 kcals  Estimated Protein Needs: (1.0-1.2 gm/kG) 49.8-59.76 gm  Fluid needs deferred to provider.     Previous Nutrition Diagnosis: No active nutrition diagnosis identified at this time  Nutrition Diagnosis is: [x] ongoing  [] resolved [] not applicable     Nutrition Care Plan:  [x] In Progress  [] Achieved  [] Not applicable    New Nutrition Diagnosis: [x] Not applicable    Nutrition Interventions:     Education Provided:       [] Yes:  [x] No: Not applicable    Recommendations:      1) Jevity 1.2 at goal rate 75 mL/hr x12 hrs to provide total volume 900 mL, 1080 kcals, 50 gm protein, and 726 mL free water. Meets ~22 kcals/kG and 1.0 gm protein/kG based on IBW/UBW 49.8 kG ().   -> Recommend continue 12 hour feeds during admission as consistent with home regimen. Pt with no reported Hx of intolerance to 12 hr regimen per family.   2) As medically feasible, add multivitamin for micronutrient coverage.     Monitoring and Evaluation:   Continue to monitor nutritional intake, tolerance to diet prescription, weights, labs, skin integrity    RD remains available upon request and will follow up per protocol  Radha Aaron RD, MS, CDN, [facility] Pager #[phone]

## 2023-07-30 LAB
ANION GAP SERPL CALC-SCNC: 14 MMOL/L — SIGNIFICANT CHANGE UP (ref 5–17)
APTT BLD: 31.8 SEC — SIGNIFICANT CHANGE UP (ref 24.5–35.6)
BUN SERPL-MCNC: 14 MG/DL — SIGNIFICANT CHANGE UP (ref 7–23)
CALCIUM SERPL-MCNC: 9.3 MG/DL — SIGNIFICANT CHANGE UP (ref 8.4–10.5)
CHLORIDE SERPL-SCNC: 99 MMOL/L — SIGNIFICANT CHANGE UP (ref 96–108)
CO2 SERPL-SCNC: 23 MMOL/L — SIGNIFICANT CHANGE UP (ref 22–31)
CREAT SERPL-MCNC: <0.3 MG/DL — LOW (ref 0.5–1.3)
CULTURE RESULTS: SIGNIFICANT CHANGE UP
EGFR: 114 ML/MIN/1.73M2 — SIGNIFICANT CHANGE UP
GLUCOSE SERPL-MCNC: 159 MG/DL — HIGH (ref 70–99)
HCT VFR BLD CALC: 41.1 % — SIGNIFICANT CHANGE UP (ref 34.5–45)
HGB BLD-MCNC: 12.8 G/DL — SIGNIFICANT CHANGE UP (ref 11.5–15.5)
INR BLD: 1.09 RATIO — SIGNIFICANT CHANGE UP (ref 0.85–1.18)
MAGNESIUM SERPL-MCNC: 2 MG/DL — SIGNIFICANT CHANGE UP (ref 1.6–2.6)
MCHC RBC-ENTMCNC: 27.5 PG — SIGNIFICANT CHANGE UP (ref 27–34)
MCHC RBC-ENTMCNC: 31.1 GM/DL — LOW (ref 32–36)
MCV RBC AUTO: 88.4 FL — SIGNIFICANT CHANGE UP (ref 80–100)
NRBC # BLD: 0 /100 WBCS — SIGNIFICANT CHANGE UP (ref 0–0)
PHOSPHATE SERPL-MCNC: 2.8 MG/DL — SIGNIFICANT CHANGE UP (ref 2.5–4.5)
PLATELET # BLD AUTO: 295 K/UL — SIGNIFICANT CHANGE UP (ref 150–400)
POTASSIUM SERPL-MCNC: 4.3 MMOL/L — SIGNIFICANT CHANGE UP (ref 3.5–5.3)
POTASSIUM SERPL-SCNC: 4.3 MMOL/L — SIGNIFICANT CHANGE UP (ref 3.5–5.3)
PROTHROM AB SERPL-ACNC: 12 SEC — SIGNIFICANT CHANGE UP (ref 9.5–13)
RBC # BLD: 4.65 M/UL — SIGNIFICANT CHANGE UP (ref 3.8–5.2)
RBC # FLD: 17.4 % — HIGH (ref 10.3–14.5)
SODIUM SERPL-SCNC: 136 MMOL/L — SIGNIFICANT CHANGE UP (ref 135–145)
SPECIMEN SOURCE: SIGNIFICANT CHANGE UP
WBC # BLD: 19.09 K/UL — HIGH (ref 3.8–10.5)
WBC # FLD AUTO: 19.09 K/UL — HIGH (ref 3.8–10.5)

## 2023-07-30 PROCEDURE — 99223 1ST HOSP IP/OBS HIGH 75: CPT | Mod: GC

## 2023-07-30 PROCEDURE — 99233 SBSQ HOSP IP/OBS HIGH 50: CPT | Mod: FS

## 2023-07-30 RX ORDER — VANCOMYCIN HCL 1 G
125 VIAL (EA) INTRAVENOUS EVERY 6 HOURS
Refills: 0 | Status: DISCONTINUED | OUTPATIENT
Start: 2023-07-30 | End: 2023-08-01

## 2023-07-30 RX ADMIN — PIPERACILLIN AND TAZOBACTAM 25 GRAM(S): 4; .5 INJECTION, POWDER, LYOPHILIZED, FOR SOLUTION INTRAVENOUS at 06:07

## 2023-07-30 RX ADMIN — Medication 1 APPLICATION(S): at 13:14

## 2023-07-30 RX ADMIN — Medication 1 APPLICATION(S): at 08:05

## 2023-07-30 RX ADMIN — RIVAROXABAN 10 MILLIGRAM(S): KIT at 11:25

## 2023-07-30 RX ADMIN — Medication 1 APPLICATION(S): at 20:10

## 2023-07-30 RX ADMIN — Medication 1 APPLICATION(S): at 00:06

## 2023-07-30 RX ADMIN — RILUZOLE 50 MILLIGRAM(S): 50 TABLET ORAL at 06:10

## 2023-07-30 RX ADMIN — Medication 1 DROP(S): at 08:05

## 2023-07-30 RX ADMIN — Medication 1 TABLET(S): at 11:24

## 2023-07-30 RX ADMIN — Medication 1 APPLICATION(S): at 05:27

## 2023-07-30 RX ADMIN — PIPERACILLIN AND TAZOBACTAM 25 GRAM(S): 4; .5 INJECTION, POWDER, LYOPHILIZED, FOR SOLUTION INTRAVENOUS at 21:24

## 2023-07-30 RX ADMIN — Medication 1 APPLICATION(S): at 21:50

## 2023-07-30 RX ADMIN — Medication 1 DROP(S): at 15:06

## 2023-07-30 RX ADMIN — Medication 1 DROP(S): at 01:58

## 2023-07-30 RX ADMIN — Medication 1 APPLICATION(S): at 15:06

## 2023-07-30 RX ADMIN — Medication 1 APPLICATION(S): at 06:07

## 2023-07-30 RX ADMIN — Medication 1 APPLICATION(S): at 03:01

## 2023-07-30 RX ADMIN — CHLORHEXIDINE GLUCONATE 15 MILLILITER(S): 213 SOLUTION TOPICAL at 17:19

## 2023-07-30 RX ADMIN — Medication 125 MILLIGRAM(S): at 17:21

## 2023-07-30 RX ADMIN — Medication 3 MILLILITER(S): at 23:34

## 2023-07-30 RX ADMIN — Medication 1 APPLICATION(S): at 10:36

## 2023-07-30 RX ADMIN — Medication 1 APPLICATION(S): at 21:24

## 2023-07-30 RX ADMIN — Medication 1 DROP(S): at 20:11

## 2023-07-30 RX ADMIN — Medication 1 APPLICATION(S): at 11:24

## 2023-07-30 RX ADMIN — Medication 1 DROP(S): at 06:07

## 2023-07-30 RX ADMIN — Medication 1 DROP(S): at 21:51

## 2023-07-30 RX ADMIN — Medication 1 DROP(S): at 09:10

## 2023-07-30 RX ADMIN — Medication 1 DROP(S): at 13:15

## 2023-07-30 RX ADMIN — Medication 1 DROP(S): at 17:18

## 2023-07-30 RX ADMIN — Medication 1 APPLICATION(S): at 01:17

## 2023-07-30 RX ADMIN — Medication 3 MILLILITER(S): at 17:12

## 2023-07-30 RX ADMIN — Medication 1 APPLICATION(S): at 16:59

## 2023-07-30 RX ADMIN — Medication 1 APPLICATION(S): at 17:21

## 2023-07-30 RX ADMIN — Medication 1 APPLICATION(S): at 14:35

## 2023-07-30 RX ADMIN — SERTRALINE 75 MILLIGRAM(S): 25 TABLET, FILM COATED ORAL at 11:25

## 2023-07-30 RX ADMIN — Medication 1 APPLICATION(S): at 12:03

## 2023-07-30 RX ADMIN — Medication 1 DROP(S): at 11:24

## 2023-07-30 RX ADMIN — Medication 1 APPLICATION(S): at 04:08

## 2023-07-30 RX ADMIN — Medication 3 MILLILITER(S): at 11:22

## 2023-07-30 RX ADMIN — RILUZOLE 50 MILLIGRAM(S): 50 TABLET ORAL at 17:19

## 2023-07-30 RX ADMIN — Medication 3 MILLILITER(S): at 06:14

## 2023-07-30 RX ADMIN — Medication 1 DROP(S): at 06:08

## 2023-07-30 RX ADMIN — Medication 1 APPLICATION(S): at 22:59

## 2023-07-30 RX ADMIN — Medication 1 PACKET(S): at 06:09

## 2023-07-30 RX ADMIN — CHLORHEXIDINE GLUCONATE 15 MILLILITER(S): 213 SOLUTION TOPICAL at 06:10

## 2023-07-30 RX ADMIN — Medication 1 APPLICATION(S): at 06:48

## 2023-07-30 RX ADMIN — PIPERACILLIN AND TAZOBACTAM 25 GRAM(S): 4; .5 INJECTION, POWDER, LYOPHILIZED, FOR SOLUTION INTRAVENOUS at 13:18

## 2023-07-30 RX ADMIN — NYSTATIN CREAM 1 APPLICATION(S): 100000 CREAM TOPICAL at 06:08

## 2023-07-30 RX ADMIN — Medication 1 APPLICATION(S): at 01:58

## 2023-07-30 RX ADMIN — Medication 1 DROP(S): at 04:08

## 2023-07-30 RX ADMIN — CHLORHEXIDINE GLUCONATE 1 APPLICATION(S): 213 SOLUTION TOPICAL at 06:18

## 2023-07-30 RX ADMIN — Medication 3 MILLILITER(S): at 00:31

## 2023-07-30 RX ADMIN — Medication 1 APPLICATION(S): at 09:10

## 2023-07-30 RX ADMIN — Medication 1 APPLICATION(S): at 18:01

## 2023-07-30 RX ADMIN — NYSTATIN CREAM 1 APPLICATION(S): 100000 CREAM TOPICAL at 17:18

## 2023-07-30 NOTE — PROGRESS NOTE ADULT - ASSESSMENT
68F extensive medical history including ALS, nonverbal and bedbound, Parkinsons, chronic respiratory failure requiring trach and vent, chronic oropharyngeal dysphagia with PEG presenting for lethargy and tachycardia. She is found to be febrile and admitted for further workup of sepsis due to pneumonia and UTI.   Vancomycin for sputum with Pseudomonas

## 2023-07-30 NOTE — CONSULT NOTE ADULT - NS ATTEND AMEND GEN_ALL_CORE FT
68F with PMHx advanced ALS (nonverbal and bedbound at baseline) with chronic hypercapnic respiratory failure and ventilatory dependence via tracheostomy at baseline and Parkinson's dz presenting to [facility] on 7/25/23 with septic shock 2/2 ventilator associated PNA +/- UTI  now with uptrending WBC.    100.3 on admission  WBC on admission 17 (down to 11 now trended up to 19)  7/24 ua wbc 7 many bacteria Urine cx VRE(S to Linezolid, dapto, amp)  7/24 bld cx 1/2 GPC in clusters from anaerobic bottle  7/25 Pseudomonas in trach aspirate(R to Cipro/levaq)  7/28 Bld cx x 2 neg  7/25 CT C/A/P- RLL consolidation + patch ground glass opacities COREY, Mild wall thickening of ascending colon concerning for colitis, cholilithiasis no acute nolberto  7/25-MRSA/MSSA neg  7/29 5 episodes of stooling documented. GI PCR/CDiff not sent  Currently on 7/25 Zosyn (received vanc 1gm on 7/24 and cefepime 2gx 1 on 7/25)      Overall CoNS bacteremia, pneumonia, pseudomonas infection, rising leucocytosis, tachycardia, Sepsis.       Plan  Continue Zosyn for PSA pna  Start po vanco 125 po qid empirically for c diff   d/c laxatives  send stool GI PC   if diarrhea persists even after stopping laxatives, then check c diff.   Continue to trend WBC to normal  U/A with minimal pyuria, urine cx with VRE, ? significance, covered by zosyn already.       Plan discussed with consulting team.     Roslaie Garduno  Please contact through MS Teams   If no response or past 5 pm/weekend call [phone].

## 2023-07-30 NOTE — PROGRESS NOTE ADULT - SUBJECTIVE AND OBJECTIVE BOX
[facility] DEPARTMENT OF OPHTHALMOLOGY  ------------------------------------------------------------------------------  Mitchell Ruiz MD, PGY-2  Available on teams  ------------------------------------------------------------------------------    Interval History: No acute events overnight. Patient unable to provide history 2/2 AMS, eyes well taped on encounter.     MEDICATIONS  (STANDING):  albuterol/ipratropium for Nebulization 3 milliLiter(s) Nebulizer every 6 hours  artificial  tears Solution 1 Drop(s) Both EYES two times a day  chlorhexidine 0.12% Liquid 15 milliLiter(s) Oral Mucosa every 12 hours  chlorhexidine 4% Liquid 1 Application(s) Topical <User Schedule>  erythromycin   Ointment 1 Application(s) Both EYES <User Schedule>  multivitamin 1 Tablet(s) Oral daily  nystatin Powder 1 Application(s) Topical two times a day  piperacillin/tazobactam IVPB.. 3.375 Gram(s) IV Intermittent every 8 hours  riluzole 50 milliGRAM(s) Oral two times a day  rivaroxaban 10 milliGRAM(s) Oral daily  sertraline 75 milliGRAM(s) Oral daily  tobramycin 14 mG/mL Fortified Ophthalmic 1 Drop(s) Both EYES every 4 hours  vancomycin    Solution 125 milliGRAM(s) Enteral Tube every 6 hours  vancomycin 25 mG/mL Fortified Ophthalmic 1 Drop(s) Both EYES every 4 hours    MEDICATIONS  (PRN):  diazepam    Tablet 2 milliGRAM(s) Oral every 6 hours PRN elevated  bp systolic over 150      VITALS: T(C): 36.4 (07-30-23 @ 10:53)  T(F): 97.6 (07-30-23 @ 10:53), Max: 98.6 (07-29-23 @ 19:10)  HR: 102 (07-30-23 @ 11:40) (68 - 105)  BP: 117/65 (07-30-23 @ 10:53) (101/51 - 132/78)  RR:  (16 - 19)  SpO2:  (92% - 98%)  Wt(kg): --  General: AAO x 3, appropriate mood and affect    Ophthalmology Exam:  Visual acuity (sc): RONIT 2/2 AMS  Pupils: PERRL OU, no APD  Ttono: STP OU   Extraocular movements (EOMs): RONIT 2/2 AMS  Confrontational Visual Field (CVF): RONIT 2/2 AMS    Pen Light Exam (PLE)  External: Normal OU.  Lids/Lashes/Lacrimal Ducts: severe lagophthalmos OU, essentially unable to close both eyes on her own, lids feel taut   Sclera/Conjunctiva: 2+ injection OU  Cornea:  no corneal epithelial defect, significant stromal scarring with neovascularization, +stromal cell, +discharge, 10-20% thinning OD, inferior corneal epithelial defect, stromal cell, stromal scarring and mild neovascularization  Anterior Chamber: Deep and formed OU.    Iris: Flat OU.  Lens: NS OU.

## 2023-07-30 NOTE — PROGRESS NOTE ADULT - SUBJECTIVE AND OBJECTIVE BOX
Interval Events:    REVIEW OF SYSTEMS:  [ ] Positive  [ ] All other systems negative  [ ] Unable to assess ROS because ________    Vital Signs Last 24 Hrs  T(C): 36.8 (07-30-23 @ 04:43), Max: 37 (07-29-23 @ 19:10)  T(F): 98.3 (07-30-23 @ 04:43), Max: 98.6 (07-29-23 @ 19:10)  HR: 100 (07-30-23 @ 06:29) (68 - 105)  BP: 120/72 (07-30-23 @ 04:43) (101/51 - 132/78)  RR: 16 (07-30-23 @ 04:43) (16 - 19)  SpO2: 98% (07-30-23 @ 06:29) (92% - 99%)    07-29-23 @ 07:01  -  07-30-23 @ 07:00  --------------------------------------------------------  IN: 1610 mL / OUT: 1100 mL / NET: 510 mL      PHYSICAL EXAM:  HEENT:   [ ]Tracheostomy:  [ ]Pupils equal  [ ]No oral lesions  [ ]Abnormal    SKIN  [ ]No Rash  [ ] Abnormal  [ ] pressure    CARDIAC  [ ]Regular  [ ]Abnormal    PULMONARY  Mode: AC/ CMV (Assist Control/ Continuous Mandatory Ventilation), RR (machine): 16, TV (machine): 400, FiO2: 30, PEEP: 5, ITime: 1, MAP: 18, PIP: 34  [ ]Bilateral Clear Breath Sounds  [ ]Normal Excursion  [ ]Abnormal    GI  [ ]PEG      [ ] +BS		              [ ]Soft, nondistended, nontender	  [ ]Abnormal    MUSCULOSKELETAL                                   [ ]Bedbound                 [ ]Abnormal    [ ]Ambulatory/OOB to chair                           EXTREMITIES                                         [ ]Normal  [ ]Edema                           NEUROLOGIC  [ ] Normal, non focal  [ ] Focal findings:    PSYCHIATRIC  [ ]Alert and appropriate  [ ] Sedated	 [ ]Agitated    :      LINES:    HOSPITAL MEDICATIONS:  MEDICATIONS  (STANDING):  albuterol/ipratropium for Nebulization 3 milliLiter(s) Nebulizer every 6 hours  artificial  tears Solution 1 Drop(s) Both EYES two times a day  chlorhexidine 0.12% Liquid 15 milliLiter(s) Oral Mucosa every 12 hours  chlorhexidine 4% Liquid 1 Application(s) Topical <User Schedule>  erythromycin   Ointment 1 Application(s) Both EYES <User Schedule>  multivitamin 1 Tablet(s) Oral daily  nystatin Powder 1 Application(s) Topical two times a day  piperacillin/tazobactam IVPB.. 3.375 Gram(s) IV Intermittent every 8 hours  polyethylene glycol 3350 17 Gram(s) Oral daily  riluzole 50 milliGRAM(s) Oral two times a day  rivaroxaban 10 milliGRAM(s) Oral daily  senna 2 Tablet(s) Oral at bedtime  sertraline 75 milliGRAM(s) Oral daily  tobramycin 14 mG/mL Fortified Ophthalmic 1 Drop(s) Both EYES every 4 hours  vancomycin 25 mG/mL Fortified Ophthalmic 1 Drop(s) Both EYES every 4 hours    MEDICATIONS  (PRN):  diazepam    Tablet 2 milliGRAM(s) Oral every 6 hours PRN elevated  bp systolic over 150      LABS:                        13.1   15.00 )-----------( 282      ( 29 Jul 2023 07:04 )             41.1     07-29    132<L>  |  98  |  12  ----------------------------<  146<H>  4.2   |  20<L>  |  <0.30<L>    Ca    9.0      29 Jul 2023 07:03  Phos  2.3     07-29  Mg     1.9     07-29      PT/INR - ( 29 Jul 2023 07:05 )   PT: 12.5 sec;   INR: 1.20 ratio         PTT - ( 29 Jul 2023 07:05 )  PTT:32.6 sec  Urinalysis Basic - ( 29 Jul 2023 07:03 )    Color: x / Appearance: x / SG: x / pH: x  Gluc: 146 mg/dL / Ketone: x  / Bili: x / Urobili: x   Blood: x / Protein: x / Nitrite: x   Leuk Esterase: x / RBC: x / WBC x   Sq Epi: x / Non Sq Epi: x / Bacteria: x          CAPILLARY BLOOD GLUCOSE    MICROBIOLOGY:     RADIOLOGY:          Nallely Macedo [facility]- [place] [address]   Interval Events:  68-year-old female past medical history ALS nonverbal at baseline Parkinson's trach PEG vent dependent on home vent brought in by EMS for  increased heart rate and lethargy for few days.  On admission to the hospital the patient was found to be febrile, with (+) lactate of 4.5 (+) UA and possible PNA. Urine culture with VRE E. fecalis, sputum cx with few pseudomonas. Currently on Piptazo. (+) eye cx with coag neg staph.     Overnight: No active issues     REVIEW OF SYSTEMS:  [ ] Positive  [ ] All other systems negative  [x ] Unable to assess ROS because mechanical vent to tracheal tube     Vital Signs Last 24 Hrs  T(C): 36.8 (07-30-23 @ 04:43), Max: 37 (07-29-23 @ 19:10)  T(F): 98.3 (07-30-23 @ 04:43), Max: 98.6 (07-29-23 @ 19:10)  HR: 100 (07-30-23 @ 06:29) (68 - 105)  BP: 120/72 (07-30-23 @ 04:43) (101/51 - 132/78)  RR: 16 (07-30-23 @ 04:43) (16 - 19)  SpO2: 98% (07-30-23 @ 06:29) (92% - 99%)    07-29-23 @ 07:01  -  07-30-23 @ 07:00  --------------------------------------------------------  IN: 1610 mL / OUT: 1100 mL / NET: 510 mL      PHYSICAL EXAM:  HEENT:   [x ]Tracheostomy:  [ ]Pupils equal  [ ]No oral lesions  [ ]Abnormal    SKIN  [ ]No Rash  [ ] Abnormal  [ ] pressure    CARDIAC  [ ]Regular  [ ]Abnormal    PULMONARY  Mode: AC/ CMV (Assist Control/ Continuous Mandatory Ventilation), RR (machine): 16, TV (machine): 400, FiO2: 30, PEEP: 5, ITime: 1, MAP: 18, PIP: 34  [ ]Bilateral Clear Breath Sounds  [ ]Normal Excursion  [ ]Abnormal    GI  [ ]PEG      [ ] +BS		              [ ]Soft, nondistended, nontender	  [ ]Abnormal    MUSCULOSKELETAL                                   [ ]Bedbound                 [ ]Abnormal    [ ]Ambulatory/OOB to chair                           EXTREMITIES                                         [ ]Normal  [ ]Edema                           NEUROLOGIC  [ ] Normal, non focal  [ ] Focal findings:    PSYCHIATRIC  [ ]Alert and appropriate  [ ] Sedated	 [ ]Agitated    :      LINES:    HOSPITAL MEDICATIONS:  MEDICATIONS  (STANDING):  albuterol/ipratropium for Nebulization 3 milliLiter(s) Nebulizer every 6 hours  artificial  tears Solution 1 Drop(s) Both EYES two times a day  chlorhexidine 0.12% Liquid 15 milliLiter(s) Oral Mucosa every 12 hours  chlorhexidine 4% Liquid 1 Application(s) Topical <User Schedule>  erythromycin   Ointment 1 Application(s) Both EYES <User Schedule>  multivitamin 1 Tablet(s) Oral daily  nystatin Powder 1 Application(s) Topical two times a day  piperacillin/tazobactam IVPB.. 3.375 Gram(s) IV Intermittent every 8 hours  polyethylene glycol 3350 17 Gram(s) Oral daily  riluzole 50 milliGRAM(s) Oral two times a day  rivaroxaban 10 milliGRAM(s) Oral daily  senna 2 Tablet(s) Oral at bedtime  sertraline 75 milliGRAM(s) Oral daily  tobramycin 14 mG/mL Fortified Ophthalmic 1 Drop(s) Both EYES every 4 hours  vancomycin 25 mG/mL Fortified Ophthalmic 1 Drop(s) Both EYES every 4 hours    MEDICATIONS  (PRN):  diazepam    Tablet 2 milliGRAM(s) Oral every 6 hours PRN elevated  bp systolic over 150      LABS:                        13.1   15.00 )-----------( 282      ( 29 Jul 2023 07:04 )             41.1     07-29    132<L>  |  98  |  12  ----------------------------<  146<H>  4.2   |  20<L>  |  <0.30<L>    Ca    9.0      29 Jul 2023 07:03  Phos  2.3     07-29  Mg     1.9     07-29      PT/INR - ( 29 Jul 2023 07:05 )   PT: 12.5 sec;   INR: 1.20 ratio         PTT - ( 29 Jul 2023 07:05 )  PTT:32.6 sec  Urinalysis Basic - ( 29 Jul 2023 07:03 )    Color: x / Appearance: x / SG: x / pH: x  Gluc: 146 mg/dL / Ketone: x  / Bili: x / Urobili: x   Blood: x / Protein: x / Nitrite: x   Leuk Esterase: x / RBC: x / WBC x   Sq Epi: x / Non Sq Epi: x / Bacteria: x    (P) labs for this am     CAPILLARY BLOOD GLUCOSE    MICROBIOLOGY:   culrCulture - Blood (07.28.23 @ 10:55)   Specimen Source: .Blood Blood-Peripheral  Culture Results:   No growth at 24 hours      Historical Values  Culture - Blood (07.28.23 @ 10:55)   Specimen Source: .Blood Blood-Peripheral  Culture Results:   No growth at 24 hours  Culture - Blood (07.28.23 @ 08:30)   Specimen Source: .Blood Blood-Peripheral  Culture Results:   No growth at 24 hours  Culture - Blood (07.24.23 @ 22:44)   Specimen Source: .Blood Blood-Peripheral  Culture Results:   No growth at 5 daysCulture - Blood (07.28.23 @ 08:30)   Specimen Source: .Blood Blood-Peripheral  Culture Results:   No growth at 24 hours      Historical Values  Culture - Blood (07.28.23 @ 10:55)   Specimen Source: .Blood Blood-Peripheral  Culture Results:   No growth at 24 hours  Culture - Blood (07.28.23 @ 08:30)   Specimen Source: .Blood Blood-Peripheral  Culture Results:   No growth at 24 hours  Culture - Blood (07.24.23 @ 22:44)   Specimen Source: .Blood Blood-Peripheral  Culture Results:   No growth at 5 days  Culture - Blood (07.24.23 @ 22:22)   Gram Stain:   Growth in anaerobic bottle: Gram Positive Cocci in Clusters  Specimen Source: .Blood Blood-Peripheral  Organism: Blood Culture PCR  Culture Results: Culture - Blood (07.28.23 @ 08:30)   Specimen Source: .Blood Blood-Peripheral  Culture Results:   No growth at 24 hours      Historical Values  Culture - Blood (07.28.23 @ 10:55)   Specimen Source: .Blood Blood-Peripheral  Culture Results:   No growth at 24 hours  Culture - Blood (07.28.23 @ 08:30)   Specimen Source: .Blood Blood-Peripheral  Culture Results:   No growth at 24 hours  Culture - Blood (07.24.23 @ 22:44)   Specimen Source: .Blood Blood-Peripheral  Culture Results:   No growth at 5 days  Culture - Blood (07.24.23 @ 22:22)   Gram Stain:   Growth in anaerobic bottle: Gram Positive Cocci in Clusters  Specimen Source: .Blood Blood-Peripheral  Organism: Blood Culture PCR  Culture Results: Culture - Eye (07.25.23 @ 16:11)   Specimen Source: .Eye Eye-Right  Culture Results:   Rare Coag Negative Staphylococcus "Susceptibilities not performed"      Historical Values  Culture - Eye (07.25.23 @ 16:11)   Specimen Source: .Eye Eye-Right  Culture Results:   Rare Coag Negative Staphylococcus "Susceptibilities not performed"  Culture - Eye (07.25.23 @ 16:11)   Specimen Source: .Eye Eye-Right  Culture Results:   Rare Coag Negative Staphylococcus "Susceptibilities not performed"  Culture - Eye (02.19.23 @ 13:56)   - Vancomycin: S 1  Culture - Eye (07.25.23 @ 16:11)   Specimen Source: .Eye Eye-Right  Culture Results:   Rare Coag Negative Staphylococcus "Susceptibilities not performed"      Historical Values  Culture - Eye (07.25.23 @ 16:11)   Specimen Source: .Eye Eye-Right  Culture Results:   Rare Coag Negative Staphylococcus "Susceptibilities not performed"  Culture - Eye (07.25.23 @ 16:11)   Specimen Source: .Eye Eye-Right  Culture Results:   Rare Coag Negative Staphylococcus "Susceptibilities not performed"Culture - Sputum . (07.25.23 @ 12:33)   - Ciprofloxacin: R 2  - Gentamicin: I 8  - Imipenem: S 2  - Levofloxacin: R 4  - Meropenem: S <=1  - Piperacillin/Tazobactam: S <=8  - Tobramycin: S <=2  Gram Stain:   Few polymorphonuclear leukocytes per low power field   Rare Squamous epithelial cells per low power field   Rare Gram Variable Rods per oil power field  - Amikacin: S <=16  - Aztreonam: S <=4  - Cefepime: S 4  - Ceftazidime: S <=1  Specimen Source: ET Tube ET Tube  Culture Results:   Few Pseudomonas aeruginosa   Normal Respiratory Kia present  Organism Identification: Pseudomonas aeruginosa  Organism: Pseudomonas aeruginosa  Method Type: KULDIP  RADIOLOGY:  < from: Xray Abdomen 1 View PORTABLE -Routine (Xray Abdomen 1 View PORTABLE -Routine in AM.) (07.29.23 @ 09:42) >  ACC: [id number] EXAM:  XR ABDOMEN PORTABLE ROUTINE 1V   ORDERED BY: AYO MURRAY     PROCEDURE DATE:  07/29/2023          INTERPRETATION:  CLINICAL INDICATION: Abdominal distention.    TECHNIQUE: Single frontal view of the abdomen    COMPARISON: CT abdomen 7/25/2023.    FINDINGS:  Nonobstructive bowel gas pattern. No evidence of pneumoperitoneum.  Partially visualized gastrostomy tube    IMPRESSION:  Nonobstructive bowel gas pattern.    --- End of Report ---        < end of copied text >  < from: US Abdomen Upper Quadrant Right (07.25.23 @ 14:34) >  Cholelithiasis without evidence of bile duct dilatation or cholecystitis.  Hepatic steatosis.      < end of copied text >  < from: CT Chest w/ IV Cont (07.25.23 @ 02:52) >  Consolidation in the posterior segment of the right lower lobe,   concerning for aspiration pneumonia. Additional patchy ground glass   opacities in the left upper lobe reflect additional areas of infection.   Small left and trace right pleural effusions.    Trachea appears dilated, possibly secondary to overinflated tracheostomy   cuff.    Mild wall thickening of the ascending colon concerning for colitis,   possibly infectious or inflammatory in etiology.    Cholelithiasis. No CT evidence of acute cholecystitis.    Hepatic steatosis.    --- End of Report     < end of copied text >  < from: CT Abdomen and Pelvis w/ IV Cont (07.25.23 @ 02:40) >  Consolidation in the posterior segment of the right lower lobe,   concerning for aspiration pneumonia. Additional patchy ground glass   opacities in the left upper lobe reflect additional areas of infection.   Small left and trace right pleural effusions.    Trachea appears dilated, possibly secondary to overinflated tracheostomy   cuff.    Mild wall thickening of the ascending colon concerning for colitis,   possibly infectious or inflammatory in etiology.    Cholelithiasis. No CT evidence of acute cholecystitis.    Hepatic steatosis.    < end of copied text >  elizabeth Macedo [facility]- [place] [address]   Interval Events:  68-year-old female past medical history ALS nonverbal at baseline Parkinson's trach PEG vent dependent on home vent brought in by EMS for  increased heart rate and lethargy for few days.  On admission to the hospital the patient was found to be febrile, with (+) lactate of 4.5 (+) UA and possible PNA. Urine culture with VRE E. fecalis, sputum cx with few pseudomonas. Currently on Piptazo. (+) eye cx with coag neg staph.     Overnight: No active issues (+) diarrhea     REVIEW OF SYSTEMS:  [ ] Positive  [ ] All other systems negative  [x ] Unable to assess ROS because mechanical vent to tracheal tube     Vital Signs Last 24 Hrs  T(C): 36.8 (07-30-23 @ 04:43), Max: 37 (07-29-23 @ 19:10)  T(F): 98.3 (07-30-23 @ 04:43), Max: 98.6 (07-29-23 @ 19:10)  HR: 100 (07-30-23 @ 06:29) (68 - 105)  BP: 120/72 (07-30-23 @ 04:43) (101/51 - 132/78)  RR: 16 (07-30-23 @ 04:43) (16 - 19)  SpO2: 98% (07-30-23 @ 06:29) (92% - 99%)    07-29-23 @ 07:01  -  07-30-23 @ 07:00  --------------------------------------------------------  IN: 1610 mL / OUT: 1100 mL / NET: 510 mL      PHYSICAL EXAM:  HEENT:   [x ]Tracheostomy:  [ ]Pupils equal  [ ]No oral lesions  [ ]Abnormal    SKIN  [ ]No Rash  [ ] Abnormal  [ ] pressure    CARDIAC  [ ]Regular  [ ]Abnormal    PULMONARY  Mode: AC/ CMV (Assist Control/ Continuous Mandatory Ventilation), RR (machine): 16, TV (machine): 400, FiO2: 30, PEEP: 5, ITime: 1, MAP: 18, PIP: 34  [ ]Bilateral Clear Breath Sounds  [ ]Normal Excursion  [ ]Abnormal    GI  [ ]PEG      [ ] +BS		              [ ]Soft, nondistended, nontender	  [ ]Abnormal    MUSCULOSKELETAL                                   [ ]Bedbound                 [ ]Abnormal    [ ]Ambulatory/OOB to chair                           EXTREMITIES                                         [ ]Normal  [ ]Edema                           NEUROLOGIC  [ ] Normal, non focal  [ ] Focal findings:    PSYCHIATRIC  [ ]Alert and appropriate  [ ] Sedated	 [ ]Agitated    :      LINES:    HOSPITAL MEDICATIONS:  MEDICATIONS  (STANDING):  albuterol/ipratropium for Nebulization 3 milliLiter(s) Nebulizer every 6 hours  artificial  tears Solution 1 Drop(s) Both EYES two times a day  chlorhexidine 0.12% Liquid 15 milliLiter(s) Oral Mucosa every 12 hours  chlorhexidine 4% Liquid 1 Application(s) Topical <User Schedule>  erythromycin   Ointment 1 Application(s) Both EYES <User Schedule>  multivitamin 1 Tablet(s) Oral daily  nystatin Powder 1 Application(s) Topical two times a day  piperacillin/tazobactam IVPB.. 3.375 Gram(s) IV Intermittent every 8 hours  polyethylene glycol 3350 17 Gram(s) Oral daily  riluzole 50 milliGRAM(s) Oral two times a day  rivaroxaban 10 milliGRAM(s) Oral daily  senna 2 Tablet(s) Oral at bedtime  sertraline 75 milliGRAM(s) Oral daily  tobramycin 14 mG/mL Fortified Ophthalmic 1 Drop(s) Both EYES every 4 hours  vancomycin 25 mG/mL Fortified Ophthalmic 1 Drop(s) Both EYES every 4 hours    MEDICATIONS  (PRN):  diazepam    Tablet 2 milliGRAM(s) Oral every 6 hours PRN elevated  bp systolic over 150      LABS:                        13.1   15.00 )-----------( 282      ( 29 Jul 2023 07:04 )             41.1     07-29    132<L>  |  98  |  12  ----------------------------<  146<H>  4.2   |  20<L>  |  <0.30<L>    Ca    9.0      29 Jul 2023 07:03  Phos  2.3     07-29  Mg     1.9     07-29      PT/INR - ( 29 Jul 2023 07:05 )   PT: 12.5 sec;   INR: 1.20 ratio         PTT - ( 29 Jul 2023 07:05 )  PTT:32.6 sec  Urinalysis Basic - ( 29 Jul 2023 07:03 )    Color: x / Appearance: x / SG: x / pH: x  Gluc: 146 mg/dL / Ketone: x  / Bili: x / Urobili: x   Blood: x / Protein: x / Nitrite: x   Leuk Esterase: x / RBC: x / WBC x   Sq Epi: x / Non Sq Epi: x / Bacteria: x    (P) labs for this am     CAPILLARY BLOOD GLUCOSE    MICROBIOLOGY:   culrCulture - Blood (07.28.23 @ 10:55)   Specimen Source: .Blood Blood-Peripheral  Culture Results:   No growth at 24 hours      Historical Values  Culture - Blood (07.28.23 @ 10:55)   Specimen Source: .Blood Blood-Peripheral  Culture Results:   No growth at 24 hours  Culture - Blood (07.28.23 @ 08:30)   Specimen Source: .Blood Blood-Peripheral  Culture Results:   No growth at 24 hours  Culture - Blood (07.24.23 @ 22:44)   Specimen Source: .Blood Blood-Peripheral  Culture Results:   No growth at 5 daysCulture - Blood (07.28.23 @ 08:30)   Specimen Source: .Blood Blood-Peripheral  Culture Results:   No growth at 24 hours      Historical Values  Culture - Blood (07.28.23 @ 10:55)   Specimen Source: .Blood Blood-Peripheral  Culture Results:   No growth at 24 hours  Culture - Blood (07.28.23 @ 08:30)   Specimen Source: .Blood Blood-Peripheral  Culture Results:   No growth at 24 hours  Culture - Blood (07.24.23 @ 22:44)   Specimen Source: .Blood Blood-Peripheral  Culture Results:   No growth at 5 days  Culture - Blood (07.24.23 @ 22:22)   Gram Stain:   Growth in anaerobic bottle: Gram Positive Cocci in Clusters  Specimen Source: .Blood Blood-Peripheral  Organism: Blood Culture PCR  Culture Results: Culture - Blood (07.28.23 @ 08:30)   Specimen Source: .Blood Blood-Peripheral  Culture Results:   No growth at 24 hours      Historical Values  Culture - Blood (07.28.23 @ 10:55)   Specimen Source: .Blood Blood-Peripheral  Culture Results:   No growth at 24 hours  Culture - Blood (07.28.23 @ 08:30)   Specimen Source: .Blood Blood-Peripheral  Culture Results:   No growth at 24 hours  Culture - Blood (07.24.23 @ 22:44)   Specimen Source: .Blood Blood-Peripheral  Culture Results:   No growth at 5 days  Culture - Blood (07.24.23 @ 22:22)   Gram Stain:   Growth in anaerobic bottle: Gram Positive Cocci in Clusters  Specimen Source: .Blood Blood-Peripheral  Organism: Blood Culture PCR  Culture Results: Culture - Eye (07.25.23 @ 16:11)   Specimen Source: .Eye Eye-Right  Culture Results:   Rare Coag Negative Staphylococcus "Susceptibilities not performed"      Historical Values  Culture - Eye (07.25.23 @ 16:11)   Specimen Source: .Eye Eye-Right  Culture Results:   Rare Coag Negative Staphylococcus "Susceptibilities not performed"  Culture - Eye (07.25.23 @ 16:11)   Specimen Source: .Eye Eye-Right  Culture Results:   Rare Coag Negative Staphylococcus "Susceptibilities not performed"  Culture - Eye (02.19.23 @ 13:56)   - Vancomycin: S 1  Culture - Eye (07.25.23 @ 16:11)   Specimen Source: .Eye Eye-Right  Culture Results:   Rare Coag Negative Staphylococcus "Susceptibilities not performed"      Historical Values  Culture - Eye (07.25.23 @ 16:11)   Specimen Source: .Eye Eye-Right  Culture Results:   Rare Coag Negative Staphylococcus "Susceptibilities not performed"  Culture - Eye (07.25.23 @ 16:11)   Specimen Source: .Eye Eye-Right  Culture Results:   Rare Coag Negative Staphylococcus "Susceptibilities not performed"Culture - Sputum . (07.25.23 @ 12:33)   - Ciprofloxacin: R 2  - Gentamicin: I 8  - Imipenem: S 2  - Levofloxacin: R 4  - Meropenem: S <=1  - Piperacillin/Tazobactam: S <=8  - Tobramycin: S <=2  Gram Stain:   Few polymorphonuclear leukocytes per low power field   Rare Squamous epithelial cells per low power field   Rare Gram Variable Rods per oil power field  - Amikacin: S <=16  - Aztreonam: S <=4  - Cefepime: S 4  - Ceftazidime: S <=1  Specimen Source: ET Tube ET Tube  Culture Results:   Few Pseudomonas aeruginosa   Normal Respiratory Kia present  Organism Identification: Pseudomonas aeruginosa  Organism: Pseudomonas aeruginosa  Method Type: KULDIP  RADIOLOGY:  < from: Xray Abdomen 1 View PORTABLE -Routine (Xray Abdomen 1 View PORTABLE -Routine in AM.) (07.29.23 @ 09:42) >  ACC: [id number] EXAM:  XR ABDOMEN PORTABLE ROUTINE 1V   ORDERED BY: AYO MURRAY     PROCEDURE DATE:  07/29/2023          INTERPRETATION:  CLINICAL INDICATION: Abdominal distention.    TECHNIQUE: Single frontal view of the abdomen    COMPARISON: CT abdomen 7/25/2023.    FINDINGS:  Nonobstructive bowel gas pattern. No evidence of pneumoperitoneum.  Partially visualized gastrostomy tube    IMPRESSION:  Nonobstructive bowel gas pattern.    --- End of Report ---        < end of copied text >  < from: US Abdomen Upper Quadrant Right (07.25.23 @ 14:34) >  Cholelithiasis without evidence of bile duct dilatation or cholecystitis.  Hepatic steatosis.      < end of copied text >  < from: CT Chest w/ IV Cont (07.25.23 @ 02:52) >  Consolidation in the posterior segment of the right lower lobe,   concerning for aspiration pneumonia. Additional patchy ground glass   opacities in the left upper lobe reflect additional areas of infection.   Small left and trace right pleural effusions.    Trachea appears dilated, possibly secondary to overinflated tracheostomy   cuff.    Mild wall thickening of the ascending colon concerning for colitis,   possibly infectious or inflammatory in etiology.    Cholelithiasis. No CT evidence of acute cholecystitis.    Hepatic steatosis.    --- End of Report     < end of copied text >  < from: CT Abdomen and Pelvis w/ IV Cont (07.25.23 @ 02:40) >  Consolidation in the posterior segment of the right lower lobe,   concerning for aspiration pneumonia. Additional patchy ground glass   opacities in the left upper lobe reflect additional areas of infection.   Small left and trace right pleural effusions.    Trachea appears dilated, possibly secondary to overinflated tracheostomy   cuff.    Mild wall thickening of the ascending colon concerning for colitis,   possibly infectious or inflammatory in etiology.    Cholelithiasis. No CT evidence of acute cholecystitis.    Hepatic steatosis.    < end of copied text >  elizabeth Macedo [facility]- [place] [address]   Interval Events:  68-year-old female past medical history ALS nonverbal at baseline Parkinson's trach PEG vent dependent on home vent brought in by EMS for  increased heart rate and lethargy for few days.  On admission to the hospital the patient was found to be febrile, with (+) lactate of 4.5 (+) UA and possible PNA. Urine culture with VRE E. fecalis, sputum cx with few pseudomonas. Currently on Piptazo. (+) eye cx with coag neg staph.     Overnight: No active issues (+) diarrhea     REVIEW OF SYSTEMS:  [ ] Positive  [ ] All other systems negative  [x ] Unable to assess ROS because mechanical vent to tracheal tube     Vital Signs Last 24 Hrs  T(C): 36.8 (07-30-23 @ 04:43), Max: 37 (07-29-23 @ 19:10)  T(F): 98.3 (07-30-23 @ 04:43), Max: 98.6 (07-29-23 @ 19:10)  HR: 100 (07-30-23 @ 06:29) (68 - 105)  BP: 120/72 (07-30-23 @ 04:43) (101/51 - 132/78)  RR: 16 (07-30-23 @ 04:43) (16 - 19)  SpO2: 98% (07-30-23 @ 06:29) (92% - 99%)    07-29-23 @ 07:01  -  07-30-23 @ 07:00  --------------------------------------------------------  IN: 1610 mL / OUT: 1100 mL / NET: 510 mL      PHYSICAL EXAM:  HEENT:   [x ]Tracheostomy:  [ ]Pupils equal  [ ]No oral lesions  [ ]Abnormal    SKIN  [x ]No Rash  [ ] Abnormal  [ ] pressure    CARDIAC  [x ]Regular  [ ]Abnormal    PULMONARY  Mode: AC/ CMV (Assist Control/ Continuous Mandatory Ventilation), RR (machine): 16, TV (machine): 400, FiO2: 30, PEEP: 5, ITime: 1, MAP: 18, PIP: 34  [ ]Bilateral Clear Breath Sounds  [ ]Normal Excursion  [x ]Abnormal rhonchi     GI  [x ]PEG      [ ] +BS		              [ ]Soft, nondistended, nontender	  [ ]Abnormal    MUSCULOSKELETAL                                   [xBedbound                 [ ]Abnormal    [ ]Ambulatory/OOB to chair                           EXTREMITIES                                         [ ]Normal  [x ]Edema                           NEUROLOGIC  [ ] Normal, non focal  [ x] Focal findings: no spontaneous movements     PSYCHIATRIC  [ ]Alert and appropriate  [ ] Sedated	 [ ]Agitated  - unresponsive     :   [place] fit straight cath     LINES:  PIV ML     HOSPITAL MEDICATIONS:  MEDICATIONS  (STANDING):  albuterol/ipratropium for Nebulization 3 milliLiter(s) Nebulizer every 6 hours  artificial  tears Solution 1 Drop(s) Both EYES two times a day  chlorhexidine 0.12% Liquid 15 milliLiter(s) Oral Mucosa every 12 hours  chlorhexidine 4% Liquid 1 Application(s) Topical <User Schedule>  erythromycin   Ointment 1 Application(s) Both EYES <User Schedule>  multivitamin 1 Tablet(s) Oral daily  nystatin Powder 1 Application(s) Topical two times a day  piperacillin/tazobactam IVPB.. 3.375 Gram(s) IV Intermittent every 8 hours  polyethylene glycol 3350 17 Gram(s) Oral daily  riluzole 50 milliGRAM(s) Oral two times a day  rivaroxaban 10 milliGRAM(s) Oral daily  senna 2 Tablet(s) Oral at bedtime  sertraline 75 milliGRAM(s) Oral daily  tobramycin 14 mG/mL Fortified Ophthalmic 1 Drop(s) Both EYES every 4 hours  vancomycin 25 mG/mL Fortified Ophthalmic 1 Drop(s) Both EYES every 4 hours    MEDICATIONS  (PRN):  diazepam    Tablet 2 milliGRAM(s) Oral every 6 hours PRN elevated  bp systolic over 150      LABS:                        13.1   15.00 )-----------( 282      ( 29 Jul 2023 07:04 )             41.1     07-29    132<L>  |  98  |  12  ----------------------------<  146<H>  4.2   |  20<L>  |  <0.30<L>    Ca    9.0      29 Jul 2023 07:03  Phos  2.3     07-29  Mg     1.9     07-29      PT/INR - ( 29 Jul 2023 07:05 )   PT: 12.5 sec;   INR: 1.20 ratio         PTT - ( 29 Jul 2023 07:05 )  PTT:32.6 sec  Urinalysis Basic - ( 29 Jul 2023 07:03 )    Color: x / Appearance: x / SG: x / pH: x  Gluc: 146 mg/dL / Ketone: x  / Bili: x / Urobili: x   Blood: x / Protein: x / Nitrite: x   Leuk Esterase: x / RBC: x / WBC x   Sq Epi: x / Non Sq Epi: x / Bacteria: x    (P) labs for this am     CAPILLARY BLOOD GLUCOSE    MICROBIOLOGY:   culrCulture - Blood (07.28.23 @ 10:55)   Specimen Source: .Blood Blood-Peripheral  Culture Results:   No growth at 24 hours      Historical Values  Culture - Blood (07.28.23 @ 10:55)   Specimen Source: .Blood Blood-Peripheral  Culture Results:   No growth at 24 hours  Culture - Blood (07.28.23 @ 08:30)   Specimen Source: .Blood Blood-Peripheral  Culture Results:   No growth at 24 hours  Culture - Blood (07.24.23 @ 22:44)   Specimen Source: .Blood Blood-Peripheral  Culture Results:   No growth at 5 daysCulture - Blood (07.28.23 @ 08:30)   Specimen Source: .Blood Blood-Peripheral  Culture Results:   No growth at 24 hours      Historical Values  Culture - Blood (07.28.23 @ 10:55)   Specimen Source: .Blood Blood-Peripheral  Culture Results:   No growth at 24 hours  Culture - Blood (07.28.23 @ 08:30)   Specimen Source: .Blood Blood-Peripheral  Culture Results:   No growth at 24 hours  Culture - Blood (07.24.23 @ 22:44)   Specimen Source: .Blood Blood-Peripheral  Culture Results:   No growth at 5 days  Culture - Blood (07.24.23 @ 22:22)   Gram Stain:   Growth in anaerobic bottle: Gram Positive Cocci in Clusters  Specimen Source: .Blood Blood-Peripheral  Organism: Blood Culture PCR  Culture Results: Culture - Blood (07.28.23 @ 08:30)   Specimen Source: .Blood Blood-Peripheral  Culture Results:   No growth at 24 hours      Historical Values  Culture - Blood (07.28.23 @ 10:55)   Specimen Source: .Blood Blood-Peripheral  Culture Results:   No growth at 24 hours  Culture - Blood (07.28.23 @ 08:30)   Specimen Source: .Blood Blood-Peripheral  Culture Results:   No growth at 24 hours  Culture - Blood (07.24.23 @ 22:44)   Specimen Source: .Blood Blood-Peripheral  Culture Results:   No growth at 5 days  Culture - Blood (07.24.23 @ 22:22)   Gram Stain:   Growth in anaerobic bottle: Gram Positive Cocci in Clusters  Specimen Source: .Blood Blood-Peripheral  Organism: Blood Culture PCR  Culture Results: Culture - Eye (07.25.23 @ 16:11)   Specimen Source: .Eye Eye-Right  Culture Results:   Rare Coag Negative Staphylococcus "Susceptibilities not performed"      Historical Values  Culture - Eye (07.25.23 @ 16:11)   Specimen Source: .Eye Eye-Right  Culture Results:   Rare Coag Negative Staphylococcus "Susceptibilities not performed"  Culture - Eye (07.25.23 @ 16:11)   Specimen Source: .Eye Eye-Right  Culture Results:   Rare Coag Negative Staphylococcus "Susceptibilities not performed"  Culture - Eye (02.19.23 @ 13:56)   - Vancomycin: S 1  Culture - Eye (07.25.23 @ 16:11)   Specimen Source: .Eye Eye-Right  Culture Results:   Rare Coag Negative Staphylococcus "Susceptibilities not performed"      Historical Values  Culture - Eye (07.25.23 @ 16:11)   Specimen Source: .Eye Eye-Right  Culture Results:   Rare Coag Negative Staphylococcus "Susceptibilities not performed"  Culture - Eye (07.25.23 @ 16:11)   Specimen Source: .Eye Eye-Right  Culture Results:   Rare Coag Negative Staphylococcus "Susceptibilities not performed"Culture - Sputum . (07.25.23 @ 12:33)   - Ciprofloxacin: R 2  - Gentamicin: I 8  - Imipenem: S 2  - Levofloxacin: R 4  - Meropenem: S <=1  - Piperacillin/Tazobactam: S <=8  - Tobramycin: S <=2  Gram Stain:   Few polymorphonuclear leukocytes per low power field   Rare Squamous epithelial cells per low power field   Rare Gram Variable Rods per oil power field  - Amikacin: S <=16  - Aztreonam: S <=4  - Cefepime: S 4  - Ceftazidime: S <=1  Specimen Source: ET Tube ET Tube  Culture Results:   Few Pseudomonas aeruginosa   Normal Respiratory Kia present  Organism Identification: Pseudomonas aeruginosa  Organism: Pseudomonas aeruginosa  Method Type: KULDIP  RADIOLOGY:  < from: Xray Abdomen 1 View PORTABLE -Routine (Xray Abdomen 1 View PORTABLE -Routine in AM.) (07.29.23 @ 09:42) >  ACC: [id number] EXAM:  XR ABDOMEN PORTABLE ROUTINE 1V   ORDERED BY: AYO MURRAY     PROCEDURE DATE:  07/29/2023          INTERPRETATION:  CLINICAL INDICATION: Abdominal distention.    TECHNIQUE: Single frontal view of the abdomen    COMPARISON: CT abdomen 7/25/2023.    FINDINGS:  Nonobstructive bowel gas pattern. No evidence of pneumoperitoneum.  Partially visualized gastrostomy tube    IMPRESSION:  Nonobstructive bowel gas pattern.    --- End of Report ---        < end of copied text >  < from: US Abdomen Upper Quadrant Right (07.25.23 @ 14:34) >  Cholelithiasis without evidence of bile duct dilatation or cholecystitis.  Hepatic steatosis.      < end of copied text >  < from: CT Chest w/ IV Cont (07.25.23 @ 02:52) >  Consolidation in the posterior segment of the right lower lobe,   concerning for aspiration pneumonia. Additional patchy ground glass   opacities in the left upper lobe reflect additional areas of infection.   Small left and trace right pleural effusions.    Trachea appears dilated, possibly secondary to overinflated tracheostomy   cuff.    Mild wall thickening of the ascending colon concerning for colitis,   possibly infectious or inflammatory in etiology.    Cholelithiasis. No CT evidence of acute cholecystitis.    Hepatic steatosis.    --- End of Report     < end of copied text >  < from: CT Abdomen and Pelvis w/ IV Cont (07.25.23 @ 02:40) >  Consolidation in the posterior segment of the right lower lobe,   concerning for aspiration pneumonia. Additional patchy ground glass   opacities in the left upper lobe reflect additional areas of infection.   Small left and trace right pleural effusions.    Trachea appears dilated, possibly secondary to overinflated tracheostomy   cuff.    Mild wall thickening of the ascending colon concerning for colitis,   possibly infectious or inflammatory in etiology.    Cholelithiasis. No CT evidence of acute cholecystitis.    Hepatic steatosis.    < end of copied text >  elizabeth Macedo [facility] [place] [id number]

## 2023-07-30 NOTE — CONSULT NOTE ADULT - ASSESSMENT
68F with PMHx advanced ALS (nonverbal and bedbound at baseline) with chronic hypercapnic respiratory failure and ventilatory dependence via tracheostomy at baseline and Parkinson's dz presenting to [facility] on 7/25/23 with septic shock 2/2 ventilator associated PNA +/- UTI  now with uptrending WBC.    100.3 on admission  WBC on admission 17 (down to 11 now trended up to 19)  7/24 ua wbc 7 many bacteria Urine cx VRE(S to Linezolid, dapto, amp)  7/24 bld cx 1/2 GPC in clusters from anaerobic bottle  7/25 Pseudomonas in trach aspirate(R to Cipro/levaq)  7/28 Bld cx x 2 neg  7/25 CT C/A/P- RLL consolidation + patch ground glass opacities COREY, Mild wall thickening of ascending colon concerning for colitis, cholilithiasis no acute nolberto  7/25-MRSA/MSSA neg  7/29 % episodes of stooling documented 68F with PMHx advanced ALS (nonverbal and bedbound at baseline) with chronic hypercapnic respiratory failure and ventilatory dependence via tracheostomy at baseline and Parkinson's dz presenting to [facility] on 7/25/23 with septic shock 2/2 ventilator associated PNA +/- UTI  now with uptrending WBC.    100.3 on admission  WBC on admission 17 (down to 11 now trended up to 19)  7/24 ua wbc 7 many bacteria Urine cx VRE(S to Linezolid, dapto, amp)  7/24 bld cx 1/2 GPC in clusters from anaerobic bottle  7/25 Pseudomonas in trach aspirate(R to Cipro/levaq)  7/28 Bld cx x 2 neg  7/25 CT C/A/P- RLL consolidation + patch ground glass opacities COREY, Mild wall thickening of ascending colon concerning for colitis, cholilithiasis no acute nolberto  7/25-MRSA/MSSA neg  7/29 5 episodes of stooling documented. GI PCR/CDiff not sent  Currently on Zosyn (received vanc 1gm on 7/24 and cefepime 2gx 1 on 7/25)    Plan 68F with PMHx advanced ALS (nonverbal and bedbound at baseline) with chronic hypercapnic respiratory failure and ventilatory dependence via tracheostomy at baseline and Parkinson's dz presenting to [facility] on 7/25/23 with septic shock 2/2 ventilator associated PNA +/- UTI  now with uptrending WBC.    100.3 on admission  WBC on admission 17 (down to 11 now trended up to 19)  7/24 ua wbc 7 many bacteria Urine cx VRE(S to Linezolid, dapto, amp)  7/24 bld cx 1/2 GPC in clusters from anaerobic bottle  7/25 Pseudomonas in trach aspirate(R to Cipro/levaq)  7/28 Bld cx x 2 neg  7/25 CT C/A/P- RLL consolidation + patch ground glass opacities COREY, Mild wall thickening of ascending colon concerning for colitis, cholilithiasis no acute nolberto  7/25-MRSA/MSSA neg  7/29 5 episodes of stooling documented. GI PCR/CDiff not sent  Currently on Zosyn (received vanc 1gm on 7/24 and cefepime 2gx 1 on 7/25)    Plan 68F with PMHx advanced ALS (nonverbal and bedbound at baseline) with chronic hypercapnic respiratory failure and ventilatory dependence via tracheostomy at baseline and Parkinson's dz presenting to [facility] on 7/25/23 with septic shock 2/2 ventilator associated PNA +/- UTI  now with uptrending WBC.    100.3 on admission  WBC on admission 17 (down to 11 now trended up to 19)  7/24 ua wbc 7 many bacteria Urine cx VRE(S to Linezolid, dapto, amp)  7/24 bld cx 1/2 GPC in clusters from anaerobic bottle  7/25 Pseudomonas in trach aspirate(R to Cipro/levaq)  7/28 Bld cx x 2 neg  7/25 CT C/A/P- RLL consolidation + patch ground glass opacities COREY, Mild wall thickening of ascending colon concerning for colitis, cholilithiasis no acute nolberto  7/25-MRSA/MSSA neg  7/29 5 episodes of stooling documented. GI PCR/CDiff not sent  Currently on Zosyn (received vanc 1gm on 7/24 and cefepime 2gx 1 on 7/25)    Plan  Start po vanco 125 po qid  d/c laxatives  send stool GI PC and cdiff when able  Continue to trend WBC to normal  Monitor and trend temps  Plan d/w Dr Butt 68F with PMHx advanced ALS (nonverbal and bedbound at baseline) with chronic hypercapnic respiratory failure and ventilatory dependence via tracheostomy at baseline and Parkinson's dz presenting to [facility] on 7/25/23 with septic shock 2/2 ventilator associated PNA +/- UTI  now with uptrending WBC.    100.3 on admission  WBC on admission 17 (down to 11 now trended up to 19)  7/24 ua wbc 7 many bacteria Urine cx VRE(S to Linezolid, dapto, amp)  7/24 bld cx 1/2 GPC in clusters from anaerobic bottle  7/25 Pseudomonas in trach aspirate(R to Cipro/levaq)  7/28 Bld cx x 2 neg  7/25 CT C/A/P- RLL consolidation + patch ground glass opacities COREY, Mild wall thickening of ascending colon concerning for colitis, cholilithiasis no acute nolberto  7/25-MRSA/MSSA neg  7/29 5 episodes of stooling documented. GI PCR/CDiff not sent  Currently on 7/25 Zosyn (received vanc 1gm on 7/24 and cefepime 2gx 1 on 7/25)    Plan  Continue Zosyn for PSA pna for now  Start po vanco 125 po qid  d/c laxatives  send stool GI PC and cdiff when able  Continue to trend WBC to normal  Monitor and trend temps  Plan d/w Dr Butt

## 2023-07-30 NOTE — PROGRESS NOTE ADULT - ASSESSMENT
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, OU  - patient has Parkinson's and ALS, has been being treated outpatient for infection in both eyes, however cannot close own eyes requires taping  - family noting that eyes are producing more discharge and more red than prior  - unable to assess subjective aspects of visual exam 2/2 AMS/averbal  - no rAPD appreciated  - inferior corneal findings with significant stromal cell, prior stromal scarring, neovascularization large epithelial defects with stromal cell and mild discharge OD > OS  - OD also with 10-20% thinning  - cultures of discharge likely contaminant coag negative staph  - continue fortified vancomyin and fortified tobramycin q2 hours both eyes (alternate drops vanc at 12, 4, 8 and tobra at 2, 6, 10 etc..)  - continue erythromycin ointment q1 hour both eyes  - please tape eyes shut, DO NOT USE gauze is not scraping corneal surface as this will make ulceration worse - but make sure eyes are actually CLOSED with tape, not just covering for moisture chamber  - discussed need for tarsorrhaphy with sister in law, however family discussion needs to be had in regards to the GOC  - discussed with  Dr. Mehta 7/26. mentioned how patient has had informal testing at home in regards to ALS and is still registering through eyes and hearing. Explained the risk and benefit of the procedure of tarsorrhaphy, how the eye may continue to have defects, thin, ultimately ulcerate and result in vision loss or loss of the eye.  understand risks, wishes to hold off on procedure at this time in order to preserve senses that wife has left. will continue aggressive management with antibiotics and taping  - ophtho to follow daily    Outpatient Follow-up: Patient should follow-up with his/her ophthalmologist or with [facility] Department of Ophthalmology within 1 week of after discharge at:    [address]  [place], [place] [id number] [phone]

## 2023-07-30 NOTE — PROGRESS NOTE ADULT - PROBLEM SELECTOR PLAN 6
- Uosm 248, Orly 26  - TSH 4.3  - f/u AM cortisol - Uosm 248, Orly 26  - TSH 4.3  - f/u AM cortisol  - will continue to monitor for now / if worsening will repeat urine osm and NA

## 2023-07-30 NOTE — PROGRESS NOTE ADULT - PROBLEM SELECTOR PLAN 4
- F/U urine culture sensitivities  - Continue Zosyn  - Repeat UA now (-) 7/29 - F/U urine culture sensitivities  - Continue Zosyn x 7 day total

## 2023-07-30 NOTE — PROGRESS NOTE ADULT - PROBLEM SELECTOR PLAN 5
Chronic PEG  - enteral feeds  - bowel regimen Chronic PEG  - enteral feeds  - bowel regimen d/sindy   - check GI PCR and C diff in 48hrs Chronic PEG  - enteral feeds  - bowel regimen d/sindy   - check GI PCR and C diff in 48hrs  - vanco PO initiated   - isolation precautions

## 2023-07-30 NOTE — PROGRESS NOTE ADULT - TIME BILLING
review of laboratory data, radiology results, discussion with primary team\patient, and monitoring for potential decompensation. Interventions were performed as documented above
review of records/results/imaging, medical evaluation/management/documentation, and discussion with family and medical team
review of laboratory data, radiology results, discussion with primary team\patient, and monitoring for potential decompensation. Interventions were performed as documented above

## 2023-07-30 NOTE — CONSULT NOTE ADULT - SUBJECTIVE AND OBJECTIVE BOX
Patient is a 68y old  Female who presents with a chief complaint of UTI/Urosepsis (26 Jul 2023 07:47)    HPI:  68-year-old female past medical history ALS nonverbal at baseline Parkinson's trach PEG vent dependent on home vent brought in by EMS for  increased heart rate and lethargy for few days.  Patient was seen by outpatient pulmonologist and was noted to be tachycardic and was sent to ED for further work-up.  History from the  was obtained by the ED. Per   patient's been more lethargic over the last several days.  Has had symptoms similar to this in the past secondary to urinary tract infections.     In the ED patient noted to have a rectal temp of 100.3, and tachycardic,  wbc 23.73, lactate of 4.5, ph 7.38, urinalysis + for bacteria, and a chest x ray showing Left-sided retrocardiac opacity which may represent atelectasis and/or pneumonia. patient given vanc cefepime x1, 2 L fluid bolus, and IV acetaminophen for fever.   (25 Jul 2023 01:50)     REVIEW OF SYSTEMS  [  ] ROS unobtainable because:    [ x ] All other systems negative except as noted below  Constitutional:  [ ] fever [ ] chills  [ ] weight loss  [ ]night sweat  [ ]poor appetite/PO intake [ ]fatigue   Skin:  [ ] rash [ ] phlebitis	  Eyes: [ ] icterus [ ] pain  [ ] discharge	  ENMT: [ ] sore throat  [ ] thrush [ ] ulcers [ ] exudates [ ]anosmia  Respiratory: [ ] dyspnea [ ] hemoptysis [ ] cough [ ] sputum	  Cardiovascular:  [ ] chest pain [ ] palpitations [ ] edema	  Gastrointestinal:  [ ] nausea [ ] vomiting [ ] diarrhea [ ] constipation [ ] pain	  Genitourinary:  [ ] dysuria [ ] frequency [ ] hematuria [ ] discharge [ ] flank pain  [ ] incontinence  Musculoskeletal:  [ ] myalgias [ ] arthralgias [ ] arthritis  [ ] back pain  Neurological:  [ ] headache [ ] weakness [ ] seizures  [ ] confusion/altered mental status  prior hospital charts reviewed [V]  primary team notes reviewed [V]  other consultant notes reviewed [V]    PAST MEDICAL & SURGICAL HISTORY:  ALS (amyotrophic lateral sclerosis)  HLD (hyperlipidemia)  Ventilator dependent  Since 2015  Aspiration into airway  S/P gastrostomy  10/14 for dysphagia  receives jevity 2 cans TID  Dependence on tracheostomy  Encounter for PEG (percutaneous endoscopic gastrostomy)  peg placed    SOCIAL HISTORY:  - Denied smoking/vaping/alcohol/recreational drug use    FAMILY HISTORY:  No pertinent family history in first degree relatives    Allergies  Bactrim DS (Rash)        ANTIMICROBIALS:  piperacillin/tazobactam IVPB.. 3.375 every 8 hours  tobramycin 14 mG/mL Fortified Ophthalmic 1 every 4 hours  vancomycin 25 mG/mL Fortified Ophthalmic 1 every 4 hours      ANTIMICROBIALS (past 90 days):  MEDICATIONS  (STANDING):  cefepime   IVPB   100 mL/Hr IV Intermittent (07-24-23 @ 23:40)    cefTRIAXone   IVPB   100 mL/Hr IV Intermittent (07-25-23 @ 03:30)    piperacillin/tazobactam IVPB.   200 mL/Hr IV Intermittent (07-25-23 @ 09:00)    piperacillin/tazobactam IVPB.-   25 mL/Hr IV Intermittent (07-25-23 @ 12:02)    piperacillin/tazobactam IVPB..   25 mL/Hr IV Intermittent (07-30-23 @ 06:07)   25 mL/Hr IV Intermittent (07-29-23 @ 21:22)   25 mL/Hr IV Intermittent (07-29-23 @ 13:52)   25 mL/Hr IV Intermittent (07-29-23 @ 05:14)   25 mL/Hr IV Intermittent (07-28-23 @ 22:10)   25 mL/Hr IV Intermittent (07-28-23 @ 14:08)   25 mL/Hr IV Intermittent (07-28-23 @ 05:47)   25 mL/Hr IV Intermittent (07-27-23 @ 21:33)   25 mL/Hr IV Intermittent (07-27-23 @ 14:27)   25 mL/Hr IV Intermittent (07-27-23 @ 06:03)   25 mL/Hr IV Intermittent (07-26-23 @ 23:02)   25 mL/Hr IV Intermittent (07-26-23 @ 13:26)   25 mL/Hr IV Intermittent (07-26-23 @ 05:01)   25 mL/Hr IV Intermittent (07-25-23 @ 21:06)    tobramycin 14 mG/mL Fortified Ophthalmic   1 Drop(s) Both EYES (07-30-23 @ 04:08)   1 Drop(s) Both EYES (07-29-23 @ 23:13)   1 Drop(s) Both EYES (07-29-23 @ 20:01)   1 Drop(s) Both EYES (07-29-23 @ 16:08)   1 Drop(s) Both EYES (07-29-23 @ 12:00)   1 Drop(s) Both EYES (07-29-23 @ 08:09)   1 Drop(s) Both EYES (07-29-23 @ 04:12)   1 Drop(s) Both EYES (07-28-23 @ 23:50)   1 Drop(s) Both EYES (07-28-23 @ 20:08)   1 Drop(s) Both EYES (07-28-23 @ 16:27)   1 Drop(s) Both EYES (07-28-23 @ 12:47)   1 Drop(s) Both EYES (07-28-23 @ 07:55)   1 Drop(s) Both EYES (07-28-23 @ 04:01)   1 Drop(s) Both EYES (07-27-23 @ 23:59)   1 Drop(s) Both EYES (07-27-23 @ 20:03)   1 Drop(s) Both EYES (07-27-23 @ 15:45)   1 Drop(s) Both EYES (07-27-23 @ 12:15)   1 Drop(s) Both EYES (07-27-23 @ 08:39)   1 Drop(s) Both EYES (07-27-23 @ 04:35)   1 Drop(s) Both EYES (07-26-23 @ 23:02)   1 Drop(s) Both EYES (07-26-23 @ 19:34)   1 Drop(s) Both EYES (07-26-23 @ 16:12)   1 Drop(s) Both EYES (07-26-23 @ 11:16)   1 Drop(s) Both EYES (07-26-23 @ 08:15)   1 Drop(s) Both EYES (07-26-23 @ 03:24)   1 Drop(s) Both EYES (07-25-23 @ 23:53)   1 Drop(s) Both EYES (07-25-23 @ 20:06)    vancomycin  IVPB.   250 mL/Hr IV Intermittent (07-25-23 @ 01:15)    vancomycin 25 mG/mL Fortified Ophthalmic   1 Drop(s) Both EYES (07-30-23 @ 06:07)   1 Drop(s) Both EYES (07-30-23 @ 01:58)   1 Drop(s) Both EYES (07-29-23 @ 21:17)   1 Drop(s) Both EYES (07-29-23 @ 17:17)   1 Drop(s) Both EYES (07-29-23 @ 13:54)   1 Drop(s) Both EYES (07-29-23 @ 09:08)   1 Drop(s) Both EYES (07-29-23 @ 05:14)   1 Drop(s) Both EYES (07-29-23 @ 02:55)   1 Drop(s) Both EYES (07-28-23 @ 22:09)   1 Drop(s) Both EYES (07-28-23 @ 17:34)   1 Drop(s) Both EYES (07-28-23 @ 14:08)   1 Drop(s) Both EYES (07-28-23 @ 10:19)   1 Drop(s) Both EYES (07-28-23 @ 05:46)   1 Drop(s) Both EYES (07-28-23 @ 02:11)   1 Drop(s) Both EYES (07-27-23 @ 22:36)   1 Drop(s) Both EYES (07-27-23 @ 17:11)   1 Drop(s) Both EYES (07-27-23 @ 14:28)   1 Drop(s) Both EYES (07-27-23 @ 10:17)   1 Drop(s) Both EYES (07-27-23 @ 05:56)   1 Drop(s) Both EYES (07-27-23 @ 02:23)   1 Drop(s) Both EYES (07-26-23 @ 21:10)   1 Drop(s) Both EYES (07-26-23 @ 17:15)   1 Drop(s) Both EYES (07-26-23 @ 13:27)   1 Drop(s) Both EYES (07-26-23 @ 10:21)   1 Drop(s) Both EYES (07-26-23 @ 05:02)   1 Drop(s) Both EYES (07-26-23 @ 02:01)   1 Drop(s) Both EYES (07-25-23 @ 21:01)   1 Drop(s) Both EYES (07-25-23 @ 19:18)        OTHER MEDS:   MEDICATIONS  (STANDING):  albuterol/ipratropium for Nebulization 3 every 6 hours  diazepam    Tablet 2 every 6 hours PRN  polyethylene glycol 3350 17 daily  riluzole 50 two times a day  rivaroxaban 10 daily  senna 2 at bedtime  sertraline 75 daily      VITALS:  Vital Signs Last 24 Hrs  T(F): 98.3 (07-30-23 @ 04:43), Max: 100.3 (07-24-23 @ 23:19)    Vital Signs Last 24 Hrs  HR: 100 (07-30-23 @ 06:29) (68 - 105)  BP: 120/72 (07-30-23 @ 04:43) (101/51 - 132/78)  RR: 16 (07-30-23 @ 04:43)  SpO2: 98% (07-30-23 @ 06:29) (92% - 99%)  Wt(kg): --    EXAM:    GA: NAD, AOx3  HEENT: oral cavity no lesion  CV: nl S1/S2, no RMG  Lungs: CTAB, No distress  Abd: BS+, soft, nontender, no rebounding pain  Ext: no edema  Neuro: No focal deficits  Skin: Intact  IV: no phlebitis  Labs:                        13.1   15.00 )-----------( 282      ( 29 Jul 2023 07:04 )             41.1     07-29    132<L>  |  98  |  12  ----------------------------<  146<H>  4.2   |  20<L>  |  <0.30<L>    Ca    9.0      29 Jul 2023 07:03  Phos  2.3     07-29  Mg     1.9     07-29      WBC Trend:  WBC Count: 15.00 (07-29-23 @ 07:04)  WBC Count: 11.85 (07-28-23 @ 11:00)  WBC Count: 14.64 (07-26-23 @ 00:35)    Auto Neutrophil #: 22.02 K/uL (07-24-23 @ 23:21)  Auto Neutrophil #: 7.20 K/uL (02-23-23 @ 00:56)  Auto Neutrophil #: 6.14 K/uL (02-22-23 @ 00:21)  Auto Neutrophil #: 6.48 K/uL (02-21-23 @ 00:26)  Auto Neutrophil #: 9.68 K/uL (02-20-23 @ 00:42)    Creatine Trend:  Creatinine: <0.30 (07-29)  Creatinine: <0.30 (07-28)  Creatinine: <0.30 (07-26)  Creatinine: <0.30 (07-25)    Liver Biochemical Testing Trend:  Alanine Aminotransferase (ALT/SGPT): 34 (07-26)  Alanine Aminotransferase (ALT/SGPT): 37 (07-25)  Alanine Aminotransferase (ALT/SGPT): 37 (07-25)  Alanine Aminotransferase (ALT/SGPT): 41 (07-25)  Alanine Aminotransferase (ALT/SGPT): 40 (07-25)  Aspartate Aminotransferase (AST/SGOT): 25 (07-26-23 @ 00:35)  Aspartate Aminotransferase (AST/SGOT): 27 (07-25-23 @ 15:16)  Aspartate Aminotransferase (AST/SGOT): 32 (07-25-23 @ 13:55)  Aspartate Aminotransferase (AST/SGOT): 35 (07-25-23 @ 06:25)  Aspartate Aminotransferase (AST/SGOT): 39 (07-25-23 @ 04:57)  Bilirubin Total: 0.8 (07-26)  Bilirubin Total: 0.8 (07-25)  Bilirubin Total: 0.7 (07-25)  Bilirubin Total: 0.8 (07-25)  Bilirubin Total: 0.9 (07-25)    Trend LDH      Urinalysis Basic - ( 29 Jul 2023 07:03 )    Color: x / Appearance: x / SG: x / pH: x  Gluc: 146 mg/dL / Ketone: x  / Bili: x / Urobili: x   Blood: x / Protein: x / Nitrite: x   Leuk Esterase: x / RBC: x / WBC x   Sq Epi: x / Non Sq Epi: x / Bacteria: x      MICROBIOLOGY:    MRSA PCR Result.: NotDetec (07-25-23 @ 12:33)  MRSA PCR Result.: Detected (02-18-23 @ 22:53)      Culture - Blood (collected 28 Jul 2023 10:55)  Source: .Blood Blood-Peripheral  Preliminary Report:    No growth at 24 hours    Culture - Blood (collected 28 Jul 2023 08:30)  Source: .Blood Blood-Peripheral  Preliminary Report:    No growth at 24 hours    Culture - Eye (collected 25 Jul 2023 16:11)  Source: .Eye Eye-Right  Preliminary Report:    Rare Coag Negative Staphylococcus "Susceptibilities not performed"    Culture - Eye (collected 25 Jul 2023 16:11)  Source: .Eye Eye-Right  Preliminary Report:    Rare Coag Negative Staphylococcus "Susceptibilities not performed"    Culture - Sputum (collected 25 Jul 2023 12:33)  Source: ET Tube ET Tube  Final Report:    Few Pseudomonas aeruginosa    Normal Respiratory Flavia present  Organism: Pseudomonas aeruginosa  Organism: Pseudomonas aeruginosa    Sensitivities:      Method Type: KULDIP      -  Amikacin: S <=16      -  Aztreonam: S <=4      -  Cefepime: S 4      -  Ceftazidime: S <=1      -  Ciprofloxacin: R 2      -  Gentamicin: I 8      -  Imipenem: S 2      -  Levofloxacin: R 4      -  Meropenem: S <=1      -  Piperacillin/Tazobactam: S <=8      -  Tobramycin: S <=2    Culture - Urine (collected 24 Jul 2023 23:22)  Source: Clean Catch Clean Catch (Midstream)  Final Report:    >100,000 CFU/ml Enterococcus faecalis (vancomycin resistant)    <10,000 CFU/ml Normal Urogenital flavia present  Organism: Enterococcus faecalis (vancomycin resistant)  Organism: Enterococcus faecalis (vancomycin resistant)    Sensitivities:      Method Type: KULDIP      -  Ampicillin: S <=2 Predicts results to ampicillin/sulbactam, amoxacillin-clavulanate and  piperacillin-tazobactam.      -  Ciprofloxacin: R >2      -  Daptomycin: S 1      -  Levofloxacin: R >4      -  Linezolid: S 1      -  Nitrofurantoin: S <=32 Should not be used to treat pyelonephritis.      -  Tetracycline: R >8      -  Vancomycin: R >16    Culture - Blood (collected 24 Jul 2023 22:44)  Source: .Blood Blood-Peripheral  Final Report:    No growth at 5 days    Culture - Blood (collected 24 Jul 2023 22:22)  Source: .Blood Blood-Peripheral  Preliminary Report:    Growth in anaerobic bottle: Gram Positive Cocci in Clusters    Direct identification is available within approximately 3-5    hours either by Blood Panel Multiplexed PCR or Direct    MALDI-TOF. Details: [email or web address]  Organism: Blood Culture PCR  Organism: Blood Culture PCR    Sensitivities:      Method Type: PCR    Culture - Bronchial (collected 19 Feb 2023 15:33)  Source: .Bronchial Bronchial  Final Report:    Few Pseudomonas aeruginosa    Normal Respiratory Flavia present  Organism: Pseudomonas aeruginosa  Organism: Pseudomonas aeruginosa    Sensitivities:      Method Type: KULDIP      -  Amikacin: S <=16      -  Aztreonam: S <=4      -  Cefepime: S 4      -  Ceftazidime: S <=1      -  Ciprofloxacin: S 0.5      -  Gentamicin: S 4      -  Imipenem: S <=1      -  Levofloxacin: S 1      -  Meropenem: S <=1      -  Piperacillin/Tazobactam: S <=8      -  Tobramycin: S <=2    Culture - Fungal, Other (collected 19 Feb 2023 13:56)  Source: .Other  Final Report:    No fungus isolated at 4 weeks.    Rapid RVP Result: NotDetec (07-24 @ 23:18)  Troponin T, High Sensitivity Result: 77 (07-24)  A1C with Estimated Average Glucose Result: 4.9 % (02-19-23 @ 01:33)  CSF:    RADIOLOGY:  < from: US Abdomen Upper Quadrant Right (07.25.23 @ 14:34) >  IMPRESSION:  Cholelithiasis without evidence of bile duct dilatation or cholecystitis.  Hepatic steatosis.    < end of copied text >  < from: CT Chest w/ IV Cont (07.25.23 @ 02:52) >  IMPRESSION:  Consolidation in the posterior segment of the right lower lobe,   concerning for aspiration pneumonia. Additional patchy ground glass   opacities in the left upper lobe reflect additional areas of infection.   Small left and trace right pleural effusions.    Trachea appears dilated, possibly secondary to overinflated tracheostomy   cuff.    Mild wall thickening of the ascending colon concerning for colitis,   possibly infectious or inflammatory in etiology.    Cholelithiasis. No CT evidence of acute cholecystitis.    Hepatic steatosis.    < end of copied text >   Patient is a 68y old  Female who presents with a chief complaint of UTI/Urosepsis (26 Jul 2023 07:47)    HPI:  68-year-old female past medical history ALS nonverbal at baseline Parkinson's trach PEG vent dependent on home vent brought in by EMS for  increased heart rate and lethargy for few days.  Patient was seen by outpatient pulmonologist and was noted to be tachycardic and was sent to ED for further work-up.  History from the  was obtained by the ED. Per   patient's been more lethargic over the last several days.  Has had symptoms similar to this in the past secondary to urinary tract infections.     In the ED patient noted to have a rectal temp of 100.3, and tachycardic,  wbc 23.73, lactate of 4.5, ph 7.38, urinalysis + for bacteria, and a chest x ray showing Left-sided retrocardiac opacity which may represent atelectasis and/or pneumonia. patient given vanc cefepime x1, 2 L fluid bolus, and IV acetaminophen for fever.   (25 Jul 2023 01:50)     REVIEW OF SYSTEMS  [  x] ROS unobtainable because:  Trach vented and non verbal at baseline  [ ] All other systems negative except as noted below  Constitutional:  [ ] fever [ ] chills  [ ] weight loss  [ ]night sweat  [ ]poor appetite/PO intake [ ]fatigue   Skin:  [ ] rash [ ] phlebitis 	  Eyes: [ ] icterus [ ] pain  [ ] discharge	  ENMT: [ ] sore throat  [ ] thrush [ ] ulcers [ ] exudates [ ]anosmia  Respiratory: [ ] dyspnea [ ] hemoptysis [ ] cough [ ] sputum	  Cardiovascular:  [ ] chest pain [ ] palpitations [ ] edema	  Gastrointestinal:  [ ] nausea [ ] vomiting [ ] diarrhea [ ] constipation [ ] pain	  Genitourinary:  [ ] dysuria [ ] frequency [ ] hematuria [ ] discharge [ ] flank pain  [ ] incontinence  Musculoskeletal:  [ ] myalgias [ ] arthralgias [ ] arthritis  [ ] back pain  Neurological:  [ ] headache [ ] weakness [ ] seizures  [ ] confusion/altered mental status  prior hospital charts reviewed [V]  primary team notes reviewed [V]  other consultant notes reviewed [V]    PAST MEDICAL & SURGICAL HISTORY:  ALS (amyotrophic lateral sclerosis)  HLD (hyperlipidemia)  Ventilator dependent  Since 2015  Aspiration into airway  S/P gastrostomy  10/14 for dysphagia  receives jevity 2 cans TID  Dependence on tracheostomy  Encounter for PEG (percutaneous endoscopic gastrostomy)  peg placed    SOCIAL HISTORY:  - Denied smoking/vaping/alcohol/recreational drug use    FAMILY HISTORY:  No pertinent family history in first degree relatives    Allergies  Bactrim DS (Rash)        ANTIMICROBIALS:  piperacillin/tazobactam IVPB.. 3.375 every 8 hours  tobramycin 14 mG/mL Fortified Ophthalmic 1 every 4 hours  vancomycin 25 mG/mL Fortified Ophthalmic 1 every 4 hours      ANTIMICROBIALS (past 90 days):  MEDICATIONS  (STANDING):  cefepime   IVPB   100 mL/Hr IV Intermittent (07-24-23 @ 23:40)    cefTRIAXone   IVPB   100 mL/Hr IV Intermittent (07-25-23 @ 03:30)    piperacillin/tazobactam IVPB.   200 mL/Hr IV Intermittent (07-25-23 @ 09:00)    piperacillin/tazobactam IVPB.-   25 mL/Hr IV Intermittent (07-25-23 @ 12:02)    piperacillin/tazobactam IVPB..   25 mL/Hr IV Intermittent (07-30-23 @ 06:07)   25 mL/Hr IV Intermittent (07-29-23 @ 21:22)   25 mL/Hr IV Intermittent (07-29-23 @ 13:52)   25 mL/Hr IV Intermittent (07-29-23 @ 05:14)   25 mL/Hr IV Intermittent (07-28-23 @ 22:10)   25 mL/Hr IV Intermittent (07-28-23 @ 14:08)   25 mL/Hr IV Intermittent (07-28-23 @ 05:47)   25 mL/Hr IV Intermittent (07-27-23 @ 21:33)   25 mL/Hr IV Intermittent (07-27-23 @ 14:27)   25 mL/Hr IV Intermittent (07-27-23 @ 06:03)   25 mL/Hr IV Intermittent (07-26-23 @ 23:02)   25 mL/Hr IV Intermittent (07-26-23 @ 13:26)   25 mL/Hr IV Intermittent (07-26-23 @ 05:01)   25 mL/Hr IV Intermittent (07-25-23 @ 21:06)    tobramycin 14 mG/mL Fortified Ophthalmic   1 Drop(s) Both EYES (07-30-23 @ 04:08)   1 Drop(s) Both EYES (07-29-23 @ 23:13)   1 Drop(s) Both EYES (07-29-23 @ 20:01)   1 Drop(s) Both EYES (07-29-23 @ 16:08)   1 Drop(s) Both EYES (07-29-23 @ 12:00)   1 Drop(s) Both EYES (07-29-23 @ 08:09)   1 Drop(s) Both EYES (07-29-23 @ 04:12)   1 Drop(s) Both EYES (07-28-23 @ 23:50)   1 Drop(s) Both EYES (07-28-23 @ 20:08)   1 Drop(s) Both EYES (07-28-23 @ 16:27)   1 Drop(s) Both EYES (07-28-23 @ 12:47)   1 Drop(s) Both EYES (07-28-23 @ 07:55)   1 Drop(s) Both EYES (07-28-23 @ 04:01)   1 Drop(s) Both EYES (07-27-23 @ 23:59)   1 Drop(s) Both EYES (07-27-23 @ 20:03)   1 Drop(s) Both EYES (07-27-23 @ 15:45)   1 Drop(s) Both EYES (07-27-23 @ 12:15)   1 Drop(s) Both EYES (07-27-23 @ 08:39)   1 Drop(s) Both EYES (07-27-23 @ 04:35)   1 Drop(s) Both EYES (07-26-23 @ 23:02)   1 Drop(s) Both EYES (07-26-23 @ 19:34)   1 Drop(s) Both EYES (07-26-23 @ 16:12)   1 Drop(s) Both EYES (07-26-23 @ 11:16)   1 Drop(s) Both EYES (07-26-23 @ 08:15)   1 Drop(s) Both EYES (07-26-23 @ 03:24)   1 Drop(s) Both EYES (07-25-23 @ 23:53)   1 Drop(s) Both EYES (07-25-23 @ 20:06)    vancomycin  IVPB.   250 mL/Hr IV Intermittent (07-25-23 @ 01:15)    vancomycin 25 mG/mL Fortified Ophthalmic   1 Drop(s) Both EYES (07-30-23 @ 06:07)   1 Drop(s) Both EYES (07-30-23 @ 01:58)   1 Drop(s) Both EYES (07-29-23 @ 21:17)   1 Drop(s) Both EYES (07-29-23 @ 17:17)   1 Drop(s) Both EYES (07-29-23 @ 13:54)   1 Drop(s) Both EYES (07-29-23 @ 09:08)   1 Drop(s) Both EYES (07-29-23 @ 05:14)   1 Drop(s) Both EYES (07-29-23 @ 02:55)   1 Drop(s) Both EYES (07-28-23 @ 22:09)   1 Drop(s) Both EYES (07-28-23 @ 17:34)   1 Drop(s) Both EYES (07-28-23 @ 14:08)   1 Drop(s) Both EYES (07-28-23 @ 10:19)   1 Drop(s) Both EYES (07-28-23 @ 05:46)   1 Drop(s) Both EYES (07-28-23 @ 02:11)   1 Drop(s) Both EYES (07-27-23 @ 22:36)   1 Drop(s) Both EYES (07-27-23 @ 17:11)   1 Drop(s) Both EYES (07-27-23 @ 14:28)   1 Drop(s) Both EYES (07-27-23 @ 10:17)   1 Drop(s) Both EYES (07-27-23 @ 05:56)   1 Drop(s) Both EYES (07-27-23 @ 02:23)   1 Drop(s) Both EYES (07-26-23 @ 21:10)   1 Drop(s) Both EYES (07-26-23 @ 17:15)   1 Drop(s) Both EYES (07-26-23 @ 13:27)   1 Drop(s) Both EYES (07-26-23 @ 10:21)   1 Drop(s) Both EYES (07-26-23 @ 05:02)   1 Drop(s) Both EYES (07-26-23 @ 02:01)   1 Drop(s) Both EYES (07-25-23 @ 21:01)   1 Drop(s) Both EYES (07-25-23 @ 19:18)        OTHER MEDS:   MEDICATIONS  (STANDING):  albuterol/ipratropium for Nebulization 3 every 6 hours  diazepam    Tablet 2 every 6 hours PRN  polyethylene glycol 3350 17 daily  riluzole 50 two times a day  rivaroxaban 10 daily  senna 2 at bedtime  sertraline 75 daily      VITALS:  Vital Signs Last 24 Hrs  T(F): 98.3 (07-30-23 @ 04:43), Max: 100.3 (07-24-23 @ 23:19)    Vital Signs Last 24 Hrs  HR: 100 (07-30-23 @ 06:29) (68 - 105)  BP: 120/72 (07-30-23 @ 04:43) (101/51 - 132/78)  RR: 16 (07-30-23 @ 04:43)  SpO2: 98% (07-30-23 @ 06:29) (92% - 99%)  Wt(kg): --    EXAM:    GA: NAD,   HEENT: oral cavity no lesion, Trach site area with redness   CV: nl S1/S2, no RMG  Lungs: Rhonchi b/l on vent, No distress  Abd: BS+, soft, + distended, nontender, no rebounding pain + peg site c/d/i  Ext: no edema  Neuro: No focal deficits  Skin: Intact IAD to buttocks  IV: no phlebitis  Labs:                        13.1   15.00 )-----------( 282      ( 29 Jul 2023 07:04 )             41.1     07-29    132<L>  |  98  |  12  ----------------------------<  146<H>  4.2   |  20<L>  |  <0.30<L>    Ca    9.0      29 Jul 2023 07:03  Phos  2.3     07-29  Mg     1.9     07-29      WBC Trend:  WBC Count: 15.00 (07-29-23 @ 07:04)  WBC Count: 11.85 (07-28-23 @ 11:00)  WBC Count: 14.64 (07-26-23 @ 00:35)    Auto Neutrophil #: 22.02 K/uL (07-24-23 @ 23:21)  Auto Neutrophil #: 7.20 K/uL (02-23-23 @ 00:56)  Auto Neutrophil #: 6.14 K/uL (02-22-23 @ 00:21)  Auto Neutrophil #: 6.48 K/uL (02-21-23 @ 00:26)  Auto Neutrophil #: 9.68 K/uL (02-20-23 @ 00:42)    Creatine Trend:  Creatinine: <0.30 (07-29)  Creatinine: <0.30 (07-28)  Creatinine: <0.30 (07-26)  Creatinine: <0.30 (07-25)    Liver Biochemical Testing Trend:  Alanine Aminotransferase (ALT/SGPT): 34 (07-26)  Alanine Aminotransferase (ALT/SGPT): 37 (07-25)  Alanine Aminotransferase (ALT/SGPT): 37 (07-25)  Alanine Aminotransferase (ALT/SGPT): 41 (07-25)  Alanine Aminotransferase (ALT/SGPT): 40 (07-25)  Aspartate Aminotransferase (AST/SGOT): 25 (07-26-23 @ 00:35)  Aspartate Aminotransferase (AST/SGOT): 27 (07-25-23 @ 15:16)  Aspartate Aminotransferase (AST/SGOT): 32 (07-25-23 @ 13:55)  Aspartate Aminotransferase (AST/SGOT): 35 (07-25-23 @ 06:25)  Aspartate Aminotransferase (AST/SGOT): 39 (07-25-23 @ 04:57)  Bilirubin Total: 0.8 (07-26)  Bilirubin Total: 0.8 (07-25)  Bilirubin Total: 0.7 (07-25)  Bilirubin Total: 0.8 (07-25)  Bilirubin Total: 0.9 (07-25)    Trend LDH      Urinalysis Basic - ( 29 Jul 2023 07:03 )    Color: x / Appearance: x / SG: x / pH: x  Gluc: 146 mg/dL / Ketone: x  / Bili: x / Urobili: x   Blood: x / Protein: x / Nitrite: x   Leuk Esterase: x / RBC: x / WBC x   Sq Epi: x / Non Sq Epi: x / Bacteria: x      MICROBIOLOGY:    MRSA PCR Result.: NotDetec (07-25-23 @ 12:33)  MRSA PCR Result.: Detected (02-18-23 @ 22:53)      Culture - Blood (collected 28 Jul 2023 10:55)  Source: .Blood Blood-Peripheral  Preliminary Report:    No growth at 24 hours    Culture - Blood (collected 28 Jul 2023 08:30)  Source: .Blood Blood-Peripheral  Preliminary Report:    No growth at 24 hours    Culture - Eye (collected 25 Jul 2023 16:11)  Source: .Eye Eye-Right  Preliminary Report:    Rare Coag Negative Staphylococcus "Susceptibilities not performed"    Culture - Eye (collected 25 Jul 2023 16:11)  Source: .Eye Eye-Right  Preliminary Report:    Rare Coag Negative Staphylococcus "Susceptibilities not performed"    Culture - Sputum (collected 25 Jul 2023 12:33)  Source: ET Tube ET Tube  Final Report:    Few Pseudomonas aeruginosa    Normal Respiratory Flavia present  Organism: Pseudomonas aeruginosa  Organism: Pseudomonas aeruginosa    Sensitivities:      Method Type: KULDIP      -  Amikacin: S <=16      -  Aztreonam: S <=4      -  Cefepime: S 4      -  Ceftazidime: S <=1      -  Ciprofloxacin: R 2      -  Gentamicin: I 8      -  Imipenem: S 2      -  Levofloxacin: R 4      -  Meropenem: S <=1      -  Piperacillin/Tazobactam: S <=8      -  Tobramycin: S <=2    Culture - Urine (collected 24 Jul 2023 23:22)  Source: Clean Catch Clean Catch (Midstream)  Final Report:    >100,000 CFU/ml Enterococcus faecalis (vancomycin resistant)    <10,000 CFU/ml Normal Urogenital flavia present  Organism: Enterococcus faecalis (vancomycin resistant)  Organism: Enterococcus faecalis (vancomycin resistant)    Sensitivities:      Method Type: KULDIP      -  Ampicillin: S <=2 Predicts results to ampicillin/sulbactam, amoxacillin-clavulanate and  piperacillin-tazobactam.      -  Ciprofloxacin: R >2      -  Daptomycin: S 1      -  Levofloxacin: R >4      -  Linezolid: S 1      -  Nitrofurantoin: S <=32 Should not be used to treat pyelonephritis.      -  Tetracycline: R >8      -  Vancomycin: R >16    Culture - Blood (collected 24 Jul 2023 22:44)  Source: .Blood Blood-Peripheral  Final Report:    No growth at 5 days    Culture - Blood (collected 24 Jul 2023 22:22)  Source: .Blood Blood-Peripheral  Preliminary Report:    Growth in anaerobic bottle: Gram Positive Cocci in Clusters    Direct identification is available within approximately 3-5    hours either by Blood Panel Multiplexed PCR or Direct    MALDI-TOF. Details: [email or web address]  Organism: Blood Culture PCR  Organism: Blood Culture PCR    Sensitivities:      Method Type: PCR    Culture - Bronchial (collected 19 Feb 2023 15:33)  Source: .Bronchial Bronchial  Final Report:    Few Pseudomonas aeruginosa    Normal Respiratory Flavia present  Organism: Pseudomonas aeruginosa  Organism: Pseudomonas aeruginosa    Sensitivities:      Method Type: KULDIP      -  Amikacin: S <=16      -  Aztreonam: S <=4      -  Cefepime: S 4      -  Ceftazidime: S <=1      -  Ciprofloxacin: S 0.5      -  Gentamicin: S 4      -  Imipenem: S <=1      -  Levofloxacin: S 1      -  Meropenem: S <=1      -  Piperacillin/Tazobactam: S <=8      -  Tobramycin: S <=2    Culture - Fungal, Other (collected 19 Feb 2023 13:56)  Source: .Other  Final Report:    No fungus isolated at 4 weeks.    Rapid RVP Result: NotDetec (07-24 @ 23:18)  Troponin T, High Sensitivity Result: 77 (07-24)  A1C with Estimated Average Glucose Result: 4.9 % (02-19-23 @ 01:33)  CSF:    RADIOLOGY:  < from: US Abdomen Upper Quadrant Right (07.25.23 @ 14:34) >  IMPRESSION:  Cholelithiasis without evidence of bile duct dilatation or cholecystitis.  Hepatic steatosis.    < end of copied text >  < from: CT Chest w/ IV Cont (07.25.23 @ 02:52) >  IMPRESSION:  Consolidation in the posterior segment of the right lower lobe,   concerning for aspiration pneumonia. Additional patchy ground glass   opacities in the left upper lobe reflect additional areas of infection.   Small left and trace right pleural effusions.    Trachea appears dilated, possibly secondary to overinflated tracheostomy   cuff.    Mild wall thickening of the ascending colon concerning for colitis,   possibly infectious or inflammatory in etiology.    Cholelithiasis. No CT evidence of acute cholecystitis.    Hepatic steatosis.    < end of copied text >   Patient is a 68y old  Female who presents with a chief complaint of UTI/Urosepsis (26 Jul 2023 07:47)    HPI:  68-year-old female past medical history ALS nonverbal at baseline Parkinson's trach PEG vent dependent on home vent brought in by EMS for  increased heart rate and lethargy for few days.  Patient was seen by outpatient pulmonologist and was noted to be tachycardic and was sent to ED for further work-up.  History from the  was obtained by the ED. Per   patient's been more lethargic over the last several days.  Has had symptoms similar to this in the past secondary to urinary tract infections.     In the ED patient noted to have a rectal temp of 100.3, and tachycardic,  wbc 23.73, lactate of 4.5, ph 7.38, urinalysis + for bacteria, and a chest x ray showing Left-sided retrocardiac opacity which may represent atelectasis and/or pneumonia. patient given vanc cefepime x1, 2 L fluid bolus, and IV acetaminophen for fever.   (25 Jul 2023 01:50)     REVIEW OF SYSTEMS  [  x] ROS unobtainable because:  Trach vented and non verbal at baseline  [ ] All other systems negative except as noted below      Constitutional:  [ ] fever [ ] chills  [ ] weight loss  [ ]night sweat  [ ]poor appetite/PO intake [ ]fatigue   Skin:  [ ] rash [ ] phlebitis 	  Eyes: [ ] icterus [ ] pain  [ ] discharge	  ENMT: [ ] sore throat  [ ] thrush [ ] ulcers [ ] exudates [ ]anosmia  Respiratory: [ ] dyspnea [ ] hemoptysis [ ] cough [ ] sputum	  Cardiovascular:  [ ] chest pain [ ] palpitations [ ] edema	  Gastrointestinal:  [ ] nausea [ ] vomiting [ ] diarrhea [ ] constipation [ ] pain	  Genitourinary:  [ ] dysuria [ ] frequency [ ] hematuria [ ] discharge [ ] flank pain  [ ] incontinence  Musculoskeletal:  [ ] myalgias [ ] arthralgias [ ] arthritis  [ ] back pain  Neurological:  [ ] headache [ ] weakness [ ] seizures  [ ] confusion/altered mental status      prior hospital charts reviewed [V]  primary team notes reviewed [V]  other consultant notes reviewed [V]      PAST MEDICAL & SURGICAL HISTORY:  ALS (amyotrophic lateral sclerosis)  HLD (hyperlipidemia)  Ventilator dependent  Since 2015  Aspiration into airway  S/P gastrostomy  10/14 for dysphagia  receives jevity 2 cans TID  Dependence on tracheostomy  Encounter for PEG (percutaneous endoscopic gastrostomy)  peg placed      SOCIAL HISTORY:  - from home.       FAMILY HISTORY:  Unable to obtain due to pt's underlying mental status.         Allergies  Bactrim DS (Rash)        ANTIMICROBIALS:  piperacillin/tazobactam IVPB.. 3.375 every 8 hours  tobramycin 14 mG/mL Fortified Ophthalmic 1 every 4 hours  vancomycin 25 mG/mL Fortified Ophthalmic 1 every 4 hours      ANTIMICROBIALS (past 90 days):  MEDICATIONS  (STANDING):  cefepime   IVPB   100 mL/Hr IV Intermittent (07-24-23 @ 23:40)    cefTRIAXone   IVPB   100 mL/Hr IV Intermittent (07-25-23 @ 03:30)    piperacillin/tazobactam IVPB.   200 mL/Hr IV Intermittent (07-25-23 @ 09:00)    piperacillin/tazobactam IVPB.-   25 mL/Hr IV Intermittent (07-25-23 @ 12:02)    piperacillin/tazobactam IVPB..    tobramycin 14 mG/mL Fortified Ophthalmic    vancomycin  IVPB.   250 mL/Hr IV Intermittent (07-25-23 @ 01:15)    vancomycin 25 mG/mL Fortified Ophthalmic          OTHER MEDS:   MEDICATIONS  (STANDING):  albuterol/ipratropium for Nebulization 3 every 6 hours  diazepam    Tablet 2 every 6 hours PRN  polyethylene glycol 3350 17 daily  riluzole 50 two times a day  rivaroxaban 10 daily  senna 2 at bedtime  sertraline 75 daily      VITALS:  Vital Signs Last 24 Hrs  T(F): 98.3 (07-30-23 @ 04:43), Max: 100.3 (07-24-23 @ 23:19)    Vital Signs Last 24 Hrs  HR: 100 (07-30-23 @ 06:29) (68 - 105)  BP: 120/72 (07-30-23 @ 04:43) (101/51 - 132/78)  RR: 16 (07-30-23 @ 04:43)  SpO2: 98% (07-30-23 @ 06:29) (92% - 99%)  Wt(kg): --      EXAM:    GA: NAD,   EYES: Both eyes covered  ENT: oral cavity no lesion, Trach site area with redness   CV: nl S1/S2,   Lungs: Rhonchi b/l on vent, No distress  Abd: BS+, soft, + distended, + peg site c/d/i  : No carter   Vascular: palpable pulses   Ext: no edema  MSK: No joint swelling   Skin: Intact IAD to buttocks  IV: no phlebitis        Labs:                            13.1   15.00 )-----------( 282      ( 29 Jul 2023 07:04 )             41.1     07-29    132<L>  |  98  |  12  ----------------------------<  146<H>  4.2   |  20<L>  |  <0.30<L>    Ca    9.0      29 Jul 2023 07:03  Phos  2.3     07-29  Mg     1.9     07-29      WBC Trend:  WBC Count: 15.00 (07-29-23 @ 07:04)  WBC Count: 11.85 (07-28-23 @ 11:00)  WBC Count: 14.64 (07-26-23 @ 00:35)    Auto Neutrophil #: 22.02 K/uL (07-24-23 @ 23:21)  Auto Neutrophil #: 7.20 K/uL (02-23-23 @ 00:56)  Auto Neutrophil #: 6.14 K/uL (02-22-23 @ 00:21)  Auto Neutrophil #: 6.48 K/uL (02-21-23 @ 00:26)  Auto Neutrophil #: 9.68 K/uL (02-20-23 @ 00:42)    Creatine Trend:  Creatinine: <0.30 (07-29)  Creatinine: <0.30 (07-28)  Creatinine: <0.30 (07-26)  Creatinine: <0.30 (07-25)    Liver Biochemical Testing Trend:  Alanine Aminotransferase (ALT/SGPT): 34 (07-26)  Alanine Aminotransferase (ALT/SGPT): 37 (07-25)  Alanine Aminotransferase (ALT/SGPT): 37 (07-25)  Alanine Aminotransferase (ALT/SGPT): 41 (07-25)  Alanine Aminotransferase (ALT/SGPT): 40 (07-25)  Aspartate Aminotransferase (AST/SGOT): 25 (07-26-23 @ 00:35)  Aspartate Aminotransferase (AST/SGOT): 27 (07-25-23 @ 15:16)  Aspartate Aminotransferase (AST/SGOT): 32 (07-25-23 @ 13:55)  Aspartate Aminotransferase (AST/SGOT): 35 (07-25-23 @ 06:25)  Aspartate Aminotransferase (AST/SGOT): 39 (07-25-23 @ 04:57)  Bilirubin Total: 0.8 (07-26)  Bilirubin Total: 0.8 (07-25)  Bilirubin Total: 0.7 (07-25)  Bilirubin Total: 0.8 (07-25)  Bilirubin Total: 0.9 (07-25)    Trend LDH      Urinalysis Basic - ( 29 Jul 2023 07:03 )    Color: x / Appearance: x / SG: x / pH: x  Gluc: 146 mg/dL / Ketone: x  / Bili: x / Urobili: x   Blood: x / Protein: x / Nitrite: x   Leuk Esterase: x / RBC: x / WBC x   Sq Epi: x / Non Sq Epi: x / Bacteria: x      MICROBIOLOGY:    MRSA PCR Result.: NotDetec (07-25-23 @ 12:33)  MRSA PCR Result.: Detected (02-18-23 @ 22:53)      Culture - Blood (collected 28 Jul 2023 10:55)  Source: .Blood Blood-Peripheral  Preliminary Report:    No growth at 24 hours    Culture - Blood (collected 28 Jul 2023 08:30)  Source: .Blood Blood-Peripheral  Preliminary Report:    No growth at 24 hours    Culture - Eye (collected 25 Jul 2023 16:11)  Source: .Eye Eye-Right  Preliminary Report:    Rare Coag Negative Staphylococcus "Susceptibilities not performed"    Culture - Eye (collected 25 Jul 2023 16:11)  Source: .Eye Eye-Right  Preliminary Report:    Rare Coag Negative Staphylococcus "Susceptibilities not performed"    Culture - Sputum (collected 25 Jul 2023 12:33)  Source: ET Tube ET Tube  Final Report:    Few Pseudomonas aeruginosa    Normal Respiratory Flavia present  Organism: Pseudomonas aeruginosa  Organism: Pseudomonas aeruginosa    Sensitivities:      Method Type: KULDIP      -  Amikacin: S <=16      -  Aztreonam: S <=4      -  Cefepime: S 4      -  Ceftazidime: S <=1      -  Ciprofloxacin: R 2      -  Gentamicin: I 8      -  Imipenem: S 2      -  Levofloxacin: R 4      -  Meropenem: S <=1      -  Piperacillin/Tazobactam: S <=8      -  Tobramycin: S <=2    Culture - Urine (collected 24 Jul 2023 23:22)  Source: Clean Catch Clean Catch (Midstream)  Final Report:    >100,000 CFU/ml Enterococcus faecalis (vancomycin resistant)    <10,000 CFU/ml Normal Urogenital flavia present  Organism: Enterococcus faecalis (vancomycin resistant)  Organism: Enterococcus faecalis (vancomycin resistant)    Sensitivities:      Method Type: KULDIP      -  Ampicillin: S <=2 Predicts results to ampicillin/sulbactam, amoxacillin-clavulanate and  piperacillin-tazobactam.      -  Ciprofloxacin: R >2      -  Daptomycin: S 1      -  Levofloxacin: R >4      -  Linezolid: S 1      -  Nitrofurantoin: S <=32 Should not be used to treat pyelonephritis.      -  Tetracycline: R >8      -  Vancomycin: R >16    Culture - Blood (collected 24 Jul 2023 22:44)  Source: .Blood Blood-Peripheral  Final Report:    No growth at 5 days    Culture - Blood (collected 24 Jul 2023 22:22)  Source: .Blood Blood-Peripheral  Preliminary Report:    Growth in anaerobic bottle: Gram Positive Cocci in Clusters    Direct identification is available within approximately 3-5    hours either by Blood Panel Multiplexed PCR or Direct    MALDI-TOF. Details: [email or web address]  Organism: Blood Culture PCR  Organism: Blood Culture PCR    Sensitivities:      Method Type: PCR    Culture - Bronchial (collected 19 Feb 2023 15:33)  Source: .Bronchial Bronchial  Final Report:    Few Pseudomonas aeruginosa    Normal Respiratory Flavia present  Organism: Pseudomonas aeruginosa  Organism: Pseudomonas aeruginosa    Sensitivities:      Method Type: KULDIP      -  Amikacin: S <=16      -  Aztreonam: S <=4      -  Cefepime: S 4      -  Ceftazidime: S <=1      -  Ciprofloxacin: S 0.5      -  Gentamicin: S 4      -  Imipenem: S <=1      -  Levofloxacin: S 1      -  Meropenem: S <=1      -  Piperacillin/Tazobactam: S <=8      -  Tobramycin: S <=2    Culture - Fungal, Other (collected 19 Feb 2023 13:56)  Source: .Other  Final Report:    No fungus isolated at 4 weeks.    Rapid RVP Result: NotDetec (07-24 @ 23:18)  Troponin T, High Sensitivity Result: 77 (07-24)  A1C with Estimated Average Glucose Result: 4.9 % (02-19-23 @ 01:33)  CSF:    RADIOLOGY: Imaging reviewed personally and interpretation as mentioned below except U/S.     < from: US Abdomen Upper Quadrant Right (07.25.23 @ 14:34) >  IMPRESSION:  Cholelithiasis without evidence of bile duct dilatation or cholecystitis.  Hepatic steatosis.      < from: CT Chest w/ IV Cont (07.25.23 @ 02:52) >  IMPRESSION:  Consolidation in the posterior segment of the right lower lobe,   concerning for aspiration pneumonia. Additional patchy ground glass   opacities in the left upper lobe reflect additional areas of infection.   Small left and trace right pleural effusions.    Trachea appears dilated, possibly secondary to overinflated tracheostomy   cuff.    Mild wall thickening of the ascending colon concerning for colitis,   possibly infectious or inflammatory in etiology.    Cholelithiasis. No CT evidence of acute cholecystitis.    Hepatic steatosis.

## 2023-07-31 LAB
ANION GAP SERPL CALC-SCNC: 14 MMOL/L — SIGNIFICANT CHANGE UP (ref 5–17)
APTT BLD: 31.9 SEC — SIGNIFICANT CHANGE UP (ref 24.5–35.6)
BUN SERPL-MCNC: 14 MG/DL — SIGNIFICANT CHANGE UP (ref 7–23)
CALCIUM SERPL-MCNC: 8.9 MG/DL — SIGNIFICANT CHANGE UP (ref 8.4–10.5)
CHLORIDE SERPL-SCNC: 97 MMOL/L — SIGNIFICANT CHANGE UP (ref 96–108)
CO2 SERPL-SCNC: 22 MMOL/L — SIGNIFICANT CHANGE UP (ref 22–31)
CREAT SERPL-MCNC: <0.3 MG/DL — LOW (ref 0.5–1.3)
EGFR: 114 ML/MIN/1.73M2 — SIGNIFICANT CHANGE UP
GLUCOSE SERPL-MCNC: 104 MG/DL — HIGH (ref 70–99)
HCT VFR BLD CALC: 38.1 % — SIGNIFICANT CHANGE UP (ref 34.5–45)
HGB BLD-MCNC: 11.9 G/DL — SIGNIFICANT CHANGE UP (ref 11.5–15.5)
INR BLD: 1.17 RATIO — SIGNIFICANT CHANGE UP (ref 0.85–1.18)
MAGNESIUM SERPL-MCNC: 2 MG/DL — SIGNIFICANT CHANGE UP (ref 1.6–2.6)
MCHC RBC-ENTMCNC: 27.7 PG — SIGNIFICANT CHANGE UP (ref 27–34)
MCHC RBC-ENTMCNC: 31.2 GM/DL — LOW (ref 32–36)
MCV RBC AUTO: 88.6 FL — SIGNIFICANT CHANGE UP (ref 80–100)
NRBC # BLD: 0 /100 WBCS — SIGNIFICANT CHANGE UP (ref 0–0)
PHOSPHATE SERPL-MCNC: 2.6 MG/DL — SIGNIFICANT CHANGE UP (ref 2.5–4.5)
PLATELET # BLD AUTO: 348 K/UL — SIGNIFICANT CHANGE UP (ref 150–400)
POTASSIUM SERPL-MCNC: 4.2 MMOL/L — SIGNIFICANT CHANGE UP (ref 3.5–5.3)
POTASSIUM SERPL-SCNC: 4.2 MMOL/L — SIGNIFICANT CHANGE UP (ref 3.5–5.3)
PROTHROM AB SERPL-ACNC: 12.8 SEC — SIGNIFICANT CHANGE UP (ref 9.5–13)
RBC # BLD: 4.3 M/UL — SIGNIFICANT CHANGE UP (ref 3.8–5.2)
RBC # FLD: 17.2 % — HIGH (ref 10.3–14.5)
SODIUM SERPL-SCNC: 133 MMOL/L — LOW (ref 135–145)
WBC # BLD: 16.04 K/UL — HIGH (ref 3.8–10.5)
WBC # FLD AUTO: 16.04 K/UL — HIGH (ref 3.8–10.5)

## 2023-07-31 PROCEDURE — 99232 SBSQ HOSP IP/OBS MODERATE 35: CPT | Mod: FS

## 2023-07-31 PROCEDURE — 71045 X-RAY EXAM CHEST 1 VIEW: CPT | Mod: 26

## 2023-07-31 PROCEDURE — 99232 SBSQ HOSP IP/OBS MODERATE 35: CPT

## 2023-07-31 RX ORDER — ACETAMINOPHEN 500 MG
1000 TABLET ORAL ONCE
Refills: 0 | Status: COMPLETED | OUTPATIENT
Start: 2023-07-31 | End: 2023-07-31

## 2023-07-31 RX ADMIN — Medication 1 DROP(S): at 09:55

## 2023-07-31 RX ADMIN — SERTRALINE 75 MILLIGRAM(S): 25 TABLET, FILM COATED ORAL at 11:58

## 2023-07-31 RX ADMIN — Medication 1 APPLICATION(S): at 08:52

## 2023-07-31 RX ADMIN — Medication 1 APPLICATION(S): at 05:05

## 2023-07-31 RX ADMIN — Medication 3 MILLILITER(S): at 11:06

## 2023-07-31 RX ADMIN — RILUZOLE 50 MILLIGRAM(S): 50 TABLET ORAL at 17:36

## 2023-07-31 RX ADMIN — Medication 1 APPLICATION(S): at 23:06

## 2023-07-31 RX ADMIN — CHLORHEXIDINE GLUCONATE 15 MILLILITER(S): 213 SOLUTION TOPICAL at 17:11

## 2023-07-31 RX ADMIN — Medication 1 APPLICATION(S): at 20:16

## 2023-07-31 RX ADMIN — PIPERACILLIN AND TAZOBACTAM 25 GRAM(S): 4; .5 INJECTION, POWDER, LYOPHILIZED, FOR SOLUTION INTRAVENOUS at 14:08

## 2023-07-31 RX ADMIN — NYSTATIN CREAM 1 APPLICATION(S): 100000 CREAM TOPICAL at 17:12

## 2023-07-31 RX ADMIN — Medication 400 MILLIGRAM(S): at 22:40

## 2023-07-31 RX ADMIN — Medication 1 DROP(S): at 11:58

## 2023-07-31 RX ADMIN — Medication 1 DROP(S): at 03:55

## 2023-07-31 RX ADMIN — Medication 1 APPLICATION(S): at 16:05

## 2023-07-31 RX ADMIN — Medication 1 DROP(S): at 16:05

## 2023-07-31 RX ADMIN — Medication 1 DROP(S): at 22:40

## 2023-07-31 RX ADMIN — Medication 1 DROP(S): at 05:05

## 2023-07-31 RX ADMIN — Medication 1 DROP(S): at 05:06

## 2023-07-31 RX ADMIN — Medication 1 APPLICATION(S): at 14:09

## 2023-07-31 RX ADMIN — PIPERACILLIN AND TAZOBACTAM 25 GRAM(S): 4; .5 INJECTION, POWDER, LYOPHILIZED, FOR SOLUTION INTRAVENOUS at 22:41

## 2023-07-31 RX ADMIN — Medication 1 APPLICATION(S): at 03:54

## 2023-07-31 RX ADMIN — Medication 1 DROP(S): at 00:10

## 2023-07-31 RX ADMIN — CHLORHEXIDINE GLUCONATE 1 APPLICATION(S): 213 SOLUTION TOPICAL at 06:17

## 2023-07-31 RX ADMIN — Medication 1 APPLICATION(S): at 00:10

## 2023-07-31 RX ADMIN — Medication 3 MILLILITER(S): at 17:35

## 2023-07-31 RX ADMIN — Medication 3 MILLILITER(S): at 23:27

## 2023-07-31 RX ADMIN — Medication 1 APPLICATION(S): at 09:55

## 2023-07-31 RX ADMIN — RILUZOLE 50 MILLIGRAM(S): 50 TABLET ORAL at 05:06

## 2023-07-31 RX ADMIN — Medication 1 DROP(S): at 20:17

## 2023-07-31 RX ADMIN — Medication 1 APPLICATION(S): at 01:16

## 2023-07-31 RX ADMIN — Medication 1 TABLET(S): at 11:58

## 2023-07-31 RX ADMIN — CHLORHEXIDINE GLUCONATE 15 MILLILITER(S): 213 SOLUTION TOPICAL at 05:06

## 2023-07-31 RX ADMIN — RIVAROXABAN 10 MILLIGRAM(S): KIT at 11:59

## 2023-07-31 RX ADMIN — Medication 1 DROP(S): at 17:11

## 2023-07-31 RX ADMIN — Medication 125 MILLIGRAM(S): at 05:06

## 2023-07-31 RX ADMIN — Medication 125 MILLIGRAM(S): at 17:36

## 2023-07-31 RX ADMIN — Medication 1 APPLICATION(S): at 03:16

## 2023-07-31 RX ADMIN — Medication 1 APPLICATION(S): at 06:17

## 2023-07-31 RX ADMIN — Medication 1 APPLICATION(S): at 13:10

## 2023-07-31 RX ADMIN — Medication 1 APPLICATION(S): at 21:28

## 2023-07-31 RX ADMIN — Medication 1 APPLICATION(S): at 06:16

## 2023-07-31 RX ADMIN — Medication 1000 MILLIGRAM(S): at 22:52

## 2023-07-31 RX ADMIN — Medication 125 MILLIGRAM(S): at 00:09

## 2023-07-31 RX ADMIN — PIPERACILLIN AND TAZOBACTAM 25 GRAM(S): 4; .5 INJECTION, POWDER, LYOPHILIZED, FOR SOLUTION INTRAVENOUS at 05:06

## 2023-07-31 RX ADMIN — Medication 1 APPLICATION(S): at 11:05

## 2023-07-31 RX ADMIN — Medication 1 DROP(S): at 07:50

## 2023-07-31 RX ADMIN — Medication 1 DROP(S): at 14:09

## 2023-07-31 RX ADMIN — Medication 1 APPLICATION(S): at 19:01

## 2023-07-31 RX ADMIN — Medication 1 APPLICATION(S): at 07:49

## 2023-07-31 RX ADMIN — Medication 1 APPLICATION(S): at 15:05

## 2023-07-31 RX ADMIN — NYSTATIN CREAM 1 APPLICATION(S): 100000 CREAM TOPICAL at 05:06

## 2023-07-31 RX ADMIN — Medication 125 MILLIGRAM(S): at 12:09

## 2023-07-31 RX ADMIN — Medication 1 APPLICATION(S): at 02:11

## 2023-07-31 RX ADMIN — Medication 1 DROP(S): at 02:12

## 2023-07-31 RX ADMIN — Medication 1 APPLICATION(S): at 17:10

## 2023-07-31 RX ADMIN — Medication 1 APPLICATION(S): at 12:04

## 2023-07-31 RX ADMIN — Medication 3 MILLILITER(S): at 06:11

## 2023-07-31 RX ADMIN — Medication 1 APPLICATION(S): at 22:40

## 2023-07-31 NOTE — PROGRESS NOTE ADULT - ASSESSMENT
68F with PMHx advanced ALS (nonverbal and bedbound at baseline) with chronic hypercapnic respiratory failure and ventilatory dependence via tracheostomy at baseline and Parkinson's dz presenting to [facility] on 7/25/23 with septic shock 2/2 ventilator associated PNA +/- UTI  now with uptrending WBC.    100.3 on admission  WBC on admission 17 (down to 11 now trended up to 19)  7/24 ua wbc 7 many bacteria Urine cx VRE(S to Linezolid, dapto, amp)  7/24 bld cx 1/2 GPC in clusters from anaerobic bottle  7/25 Pseudomonas in trach aspirate(R to Cipro/levaq)  7/28 Bld cx x 2 neg  7/25 CT C/A/P- RLL consolidation + patch ground glass opacities COREY, Mild wall thickening of ascending colon concerning for colitis, cholilithiasis no acute nolberto  7/25-MRSA/MSSA neg  7/29 5 episodes of stooling documented. GI PCR/CDiff not sent  Currently on 7/25 Zosyn (received vanc 1gm on 7/24 and cefepime 2gx 1 on 7/25)      Overall CoNS bacteremia, pneumonia, pseudomonas infection, + leucocytosis, tachycardia,  + urinary retention.     Plan  Continue Zosyn for PSA pna  On po vanco 125 po qid empirically for c diff   d/c laxatives  send stool GI PCR  if diarrhea persists check c diff.   Continue to trend WBC, + leucocytosis   ? could leucocytosis be contributed by urinary retention.   U/A with minimal pyuria, urine cx with VRE, ? significance, covered by zosyn already.   repeat blood cx NTD.       Plan discussed with RCU ACP.     Rosalie Garduno  Please contact through MS Teams   If no response or past 5 pm/weekend call [phone].

## 2023-07-31 NOTE — PROGRESS NOTE ADULT - PROBLEM SELECTOR PLAN 7
Referral Specialst-    Patient informed of Dr. White's response.  She still wants this test, US EXTREMITY VENOUS DUPLEX INSUFFICIENCY BILATERAL, but she is wondering if there's other facilities or clinics instead of [facility]'s that she could have it done at, that would be cheaper.      Please call patient back.       - Continue Xarelto

## 2023-07-31 NOTE — PROGRESS NOTE ADULT - ASSESSMENT
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, OU  - patient has Parkinson's and ALS, has been being treated outpatient for infection in both eyes, however cannot close own eyes requires taping  - family noting that eyes are producing more discharge and more red than prior  - unable to assess subjective aspects of visual exam 2/2 AMS/averbal  - no rAPD appreciated  - inferior corneal findings with significant stromal cell, prior stromal scarring, neovascularization large epithelial defects with stromal cell and mild discharge OD > OS  - OD also with 10-20% thinning  - cultures of discharge likely contaminant coag negative staph  - continue fortified vancomyin and fortified tobramycin q2 hours both eyes (alternate drops vanc at 12, 4, 8 and tobra at 2, 6, 10 etc..)  - continue erythromycin ointment q1 hour both eyes  - please tape eyes shut, DO NOT USE gauze is not scraping corneal surface as this will make ulceration worse - but make sure eyes are actually CLOSED with tape, not just covering for moisture chamber  - discussed need for tarsorrhaphy with sister in law, however family discussion needs to be had in regards to the GOC  - discussed with  Dr. Mehta 7/26. mentioned how patient has had informal testing at home in regards to ALS and is still registering through eyes and hearing. Explained the risk and benefit of the procedure of tarsorrhaphy, how the eye may continue to have defects, thin, ultimately ulcerate and result in vision loss or loss of the eye.  understand risks, wishes to hold off on procedure at this time in order to preserve senses that wife has left. will continue aggressive management with antibiotics and taping  - ophtho to follow daily    Outpatient Follow-up: Patient should follow-up with his/her ophthalmologist or with [facility] Department of Ophthalmology within 1 week of after discharge at:    [address]  [place], [place] [id number] [phone]

## 2023-07-31 NOTE — PROGRESS NOTE ADULT - SUBJECTIVE AND OBJECTIVE BOX
68yPatient is a 68y old  Female who presents with a chief complaint of AMS (31 Jul 2023 07:11)      Interval history:  Afebrile, + diarrhea per pt's private RN at bedside, not much secretions. + urinary retention.       Allergies:   Bactrim DS (Rash)    Antimicrobials:  piperacillin/tazobactam IVPB.. 3.375 Gram(s) IV Intermittent every 8 hours  tobramycin 14 mG/mL Fortified Ophthalmic 1 Drop(s) Both EYES every 4 hours  vancomycin    Solution 125 milliGRAM(s) Enteral Tube every 6 hours  vancomycin 25 mG/mL Fortified Ophthalmic 1 Drop(s) Both EYES every 4 hours      REVIEW OF SYSTEMS:  Unable to obtain due to pt's underlying mental status.       Vital Signs Last 24 Hrs  T(C): 36.6 (07-31-23 @ 12:22), Max: 36.7 (07-30-23 @ 18:50)  T(F): 97.9 (07-31-23 @ 12:22), Max: 98.1 (07-31-23 @ 04:11)  HR: 94 (07-31-23 @ 15:12) (90 - 114)  BP: 133/70 (07-31-23 @ 12:22) (97/64 - 140/80)  BP(mean): --  RR: 18 (07-31-23 @ 12:22) (16 - 18)  SpO2: 98% (07-31-23 @ 15:12) (97% - 99%)      PHYSICAL EXAM:  Pt in no acute distress,  eyes taped.   + trach   + peg, distended abdomen  no carter   no phlebitis                               11.9   16.04 )-----------( 348      ( 31 Jul 2023 06:45 )             38.1   07-31    133<L>  |  97  |  14  ----------------------------<  104<H>  4.2   |  22  |  <0.30<L>    Ca    8.9      31 Jul 2023 06:46  Phos  2.6     07-31  Mg     2.0     07-31          Culture - Blood (07.28.23 @ 10:55)   Specimen Source: .Blood Blood-Peripheral  Culture Results:   No growth at 72 Hours    Radiology:  < from: Xray Abdomen 1 View PORTABLE -Routine (Xray Abdomen 1 View PORTABLE -Routine in AM.) (07.29.23 @ 09:42) >  IMPRESSION:  Nonobstructive bowel gas pattern.

## 2023-07-31 NOTE — PROGRESS NOTE ADULT - SUBJECTIVE AND OBJECTIVE BOX
[facility] DEPARTMENT OF OPHTHALMOLOGY  ------------------------------------------------------------------------------  Maximus Kruger MD PGY-3  ------------------------------------------------------------------------------    Interval History: RONIT due to mental status    MEDICATIONS  (STANDING):  albuterol/ipratropium for Nebulization 3 milliLiter(s) Nebulizer every 6 hours  artificial  tears Solution 1 Drop(s) Both EYES two times a day  chlorhexidine 0.12% Liquid 15 milliLiter(s) Oral Mucosa every 12 hours  chlorhexidine 4% Liquid 1 Application(s) Topical <User Schedule>  erythromycin   Ointment 1 Application(s) Both EYES <User Schedule>  multivitamin 1 Tablet(s) Oral daily  nystatin Powder 1 Application(s) Topical two times a day  piperacillin/tazobactam IVPB.. 3.375 Gram(s) IV Intermittent every 8 hours  riluzole 50 milliGRAM(s) Oral two times a day  rivaroxaban 10 milliGRAM(s) Oral daily  sertraline 75 milliGRAM(s) Oral daily  tobramycin 14 mG/mL Fortified Ophthalmic 1 Drop(s) Both EYES every 4 hours  vancomycin    Solution 125 milliGRAM(s) Enteral Tube every 6 hours  vancomycin 25 mG/mL Fortified Ophthalmic 1 Drop(s) Both EYES every 4 hours    MEDICATIONS  (PRN):  diazepam    Tablet 2 milliGRAM(s) Oral every 6 hours PRN elevated  bp systolic over 150      VITALS: T(C): 36.6 (07-31-23 @ 12:22)  T(F): 97.9 (07-31-23 @ 12:22), Max: 98.1 (07-31-23 @ 04:11)  HR: 94 (07-31-23 @ 15:12) (90 - 114)  BP: 133/70 (07-31-23 @ 12:22) (97/64 - 140/80)  RR:  (16 - 18)  SpO2:  (97% - 99%)  Wt(kg): --  General: AAO x 0    Ophthalmology Exam:  Ophthalmology Exam:  Visual acuity (sc): RONIT 2/2 AMS  Pupils: PERRL OU, no APD  Ttono: STP OU   Extraocular movements (EOMs): RONIT 2/2 AMS  Confrontational Visual Field (CVF): RONIT 2/2 AMS    Pen Light Exam (PLE)  External: Normal OU.  Lids/Lashes/Lacrimal Ducts: severe lagophthalmos OU, essentially unable to close both eyes on her own, lids feel taut   Sclera/Conjunctiva: 2+ injection OU  Cornea:  OD: no corneal epithelial defect, significant stromal scarring with neovascularization, +stromal cell, +discharge, 10-20% thinning 10mm wide 5mmH; OS stromal cell, stromal scarring and mild neovascularization  Anterior Chamber: Deep and formed OU.    Iris: Flat OU.  Lens: NS OU.

## 2023-07-31 NOTE — PROGRESS NOTE ADULT - PROBLEM SELECTOR PLAN 2
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    - F/U cultures of discharge  - Continue fortified vancomycin and fortified tobramycin q2 hours both eyes  - Continue erythromycin ointment q1 hour both eyes  - Patient does not blink at all, NO tape (as gauze and tape have also likely been scraping and damaging cornea  - Ophthalmology discussed need for tarsorrhaphy with sister in law, family discussion needs to be had in regards to the GOC. 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    - F/U cultures of discharge  - Continue fortified vancomycin and fortified tobramycin q2 hours both eyes  - Continue erythromycin ointment q1 hour both eyes  - Patient does not blink at all, NO tape (as gauze and tape have also likely been scraping and damaging cornea  - Ophthalmology discussed need for tarsorrhaphy with sister in law, family discussion no planned procedure.

## 2023-07-31 NOTE — PROGRESS NOTE ADULT - SUBJECTIVE AND OBJECTIVE BOX
Interval Events:  68-year-old female past medical history ALS nonverbal at baseline Parkinson's trach PEG vent dependent on home vent brought in by EMS for  increased heart rate and lethargy for few days.  On admission to the hospital the patient was found to be febrile, with (+) lactate of 4.5 (+) UA and possible PNA. Urine culture with VRE E. fecalis, sputum cx with few pseudomonas. Currently on Piptazo. (+) eye cx with coag neg staph.     Overnight: No active issues (+) diarrhea     REVIEW OF SYSTEMS:  [ ] Positive  [ ] All other systems negative  [x ] Unable to assess ROS because mechanical vent to tracheal tube     Vital Signs Last 24 Hrs  T(C): 36.8 (07-30-23 @ 04:43), Max: 37 (07-29-23 @ 19:10)  T(F): 98.3 (07-30-23 @ 04:43), Max: 98.6 (07-29-23 @ 19:10)  HR: 100 (07-30-23 @ 06:29) (68 - 105)  BP: 120/72 (07-30-23 @ 04:43) (101/51 - 132/78)  RR: 16 (07-30-23 @ 04:43) (16 - 19)  SpO2: 98% (07-30-23 @ 06:29) (92% - 99%)    07-29-23 @ 07:01  -  07-30-23 @ 07:00  --------------------------------------------------------  IN: 1610 mL / OUT: 1100 mL / NET: 510 mL      PHYSICAL EXAM:  HEENT:   [x ]Tracheostomy:  [ ]Pupils equal  [ ]No oral lesions  [ ]Abnormal    SKIN  [x ]No Rash  [ ] Abnormal  [ ] pressure    CARDIAC  [x ]Regular  [ ]Abnormal    PULMONARY  Mode: AC/ CMV (Assist Control/ Continuous Mandatory Ventilation), RR (machine): 16, TV (machine): 400, FiO2: 30, PEEP: 5, ITime: 1, MAP: 18, PIP: 34  [ ]Bilateral Clear Breath Sounds  [ ]Normal Excursion  [x ]Abnormal rhonchi     GI  [x ]PEG      [ ] +BS		              [ ]Soft, nondistended, nontender	  [ ]Abnormal    MUSCULOSKELETAL                                   [xBedbound                 [ ]Abnormal    [ ]Ambulatory/OOB to chair                           EXTREMITIES                                         [ ]Normal  [x ]Edema                           NEUROLOGIC  [ ] Normal, non focal  [ x] Focal findings: no spontaneous movements     PSYCHIATRIC  [ ]Alert and appropriate  [ ] Sedated	 [ ]Agitated  - unresponsive     :   [place] fit straight cath     LINES:  PIV ML     HOSPITAL MEDICATIONS:  MEDICATIONS  (STANDING):  albuterol/ipratropium for Nebulization 3 milliLiter(s) Nebulizer every 6 hours  artificial  tears Solution 1 Drop(s) Both EYES two times a day  chlorhexidine 0.12% Liquid 15 milliLiter(s) Oral Mucosa every 12 hours  chlorhexidine 4% Liquid 1 Application(s) Topical <User Schedule>  erythromycin   Ointment 1 Application(s) Both EYES <User Schedule>  multivitamin 1 Tablet(s) Oral daily  nystatin Powder 1 Application(s) Topical two times a day  piperacillin/tazobactam IVPB.. 3.375 Gram(s) IV Intermittent every 8 hours  polyethylene glycol 3350 17 Gram(s) Oral daily  riluzole 50 milliGRAM(s) Oral two times a day  rivaroxaban 10 milliGRAM(s) Oral daily  senna 2 Tablet(s) Oral at bedtime  sertraline 75 milliGRAM(s) Oral daily  tobramycin 14 mG/mL Fortified Ophthalmic 1 Drop(s) Both EYES every 4 hours  vancomycin 25 mG/mL Fortified Ophthalmic 1 Drop(s) Both EYES every 4 hours    MEDICATIONS  (PRN):  diazepam    Tablet 2 milliGRAM(s) Oral every 6 hours PRN elevated  bp systolic over 150      LABS:                        13.1   15.00 )-----------( 282      ( 29 Jul 2023 07:04 )             41.1     07-29    132<L>  |  98  |  12  ----------------------------<  146<H>  4.2   |  20<L>  |  <0.30<L>    Ca    9.0      29 Jul 2023 07:03  Phos  2.3     07-29  Mg     1.9     07-29      PT/INR - ( 29 Jul 2023 07:05 )   PT: 12.5 sec;   INR: 1.20 ratio         PTT - ( 29 Jul 2023 07:05 )  PTT:32.6 sec  Urinalysis Basic - ( 29 Jul 2023 07:03 )    Color: x / Appearance: x / SG: x / pH: x  Gluc: 146 mg/dL / Ketone: x  / Bili: x / Urobili: x   Blood: x / Protein: x / Nitrite: x   Leuk Esterase: x / RBC: x / WBC x   Sq Epi: x / Non Sq Epi: x / Bacteria: x    (P) labs for this am     CAPILLARY BLOOD GLUCOSE    MICROBIOLOGY:   culrCulture - Blood (07.28.23 @ 10:55)   Specimen Source: .Blood Blood-Peripheral  Culture Results:   No growth at 24 hours      Historical Values  Culture - Blood (07.28.23 @ 10:55)   Specimen Source: .Blood Blood-Peripheral  Culture Results:   No growth at 24 hours  Culture - Blood (07.28.23 @ 08:30)   Specimen Source: .Blood Blood-Peripheral  Culture Results:   No growth at 24 hours  Culture - Blood (07.24.23 @ 22:44)   Specimen Source: .Blood Blood-Peripheral  Culture Results:   No growth at 5 daysCulture - Blood (07.28.23 @ 08:30)   Specimen Source: .Blood Blood-Peripheral  Culture Results:   No growth at 24 hours      Historical Values  Culture - Blood (07.28.23 @ 10:55)   Specimen Source: .Blood Blood-Peripheral  Culture Results:   No growth at 24 hours  Culture - Blood (07.28.23 @ 08:30)   Specimen Source: .Blood Blood-Peripheral  Culture Results:   No growth at 24 hours  Culture - Blood (07.24.23 @ 22:44)   Specimen Source: .Blood Blood-Peripheral  Culture Results:   No growth at 5 days  Culture - Blood (07.24.23 @ 22:22)   Gram Stain:   Growth in anaerobic bottle: Gram Positive Cocci in Clusters  Specimen Source: .Blood Blood-Peripheral  Organism: Blood Culture PCR  Culture Results: Culture - Blood (07.28.23 @ 08:30)   Specimen Source: .Blood Blood-Peripheral  Culture Results:   No growth at 24 hours      Historical Values  Culture - Blood (07.28.23 @ 10:55)   Specimen Source: .Blood Blood-Peripheral  Culture Results:   No growth at 24 hours  Culture - Blood (07.28.23 @ 08:30)   Specimen Source: .Blood Blood-Peripheral  Culture Results:   No growth at 24 hours  Culture - Blood (07.24.23 @ 22:44)   Specimen Source: .Blood Blood-Peripheral  Culture Results:   No growth at 5 days  Culture - Blood (07.24.23 @ 22:22)   Gram Stain:   Growth in anaerobic bottle: Gram Positive Cocci in Clusters  Specimen Source: .Blood Blood-Peripheral  Organism: Blood Culture PCR  Culture Results: Culture - Eye (07.25.23 @ 16:11)   Specimen Source: .Eye Eye-Right  Culture Results:   Rare Coag Negative Staphylococcus "Susceptibilities not performed"      Historical Values  Culture - Eye (07.25.23 @ 16:11)   Specimen Source: .Eye Eye-Right  Culture Results:   Rare Coag Negative Staphylococcus "Susceptibilities not performed"  Culture - Eye (07.25.23 @ 16:11)   Specimen Source: .Eye Eye-Right  Culture Results:   Rare Coag Negative Staphylococcus "Susceptibilities not performed"  Culture - Eye (02.19.23 @ 13:56)   - Vancomycin: S 1  Culture - Eye (07.25.23 @ 16:11)   Specimen Source: .Eye Eye-Right  Culture Results:   Rare Coag Negative Staphylococcus "Susceptibilities not performed"      Historical Values  Culture - Eye (07.25.23 @ 16:11)   Specimen Source: .Eye Eye-Right  Culture Results:   Rare Coag Negative Staphylococcus "Susceptibilities not performed"  Culture - Eye (07.25.23 @ 16:11)   Specimen Source: .Eye Eye-Right  Culture Results:   Rare Coag Negative Staphylococcus "Susceptibilities not performed"Culture - Sputum . (07.25.23 @ 12:33)   - Ciprofloxacin: R 2  - Gentamicin: I 8  - Imipenem: S 2  - Levofloxacin: R 4  - Meropenem: S <=1  - Piperacillin/Tazobactam: S <=8  - Tobramycin: S <=2  Gram Stain:   Few polymorphonuclear leukocytes per low power field   Rare Squamous epithelial cells per low power field   Rare Gram Variable Rods per oil power field  - Amikacin: S <=16  - Aztreonam: S <=4  - Cefepime: S 4  - Ceftazidime: S <=1  Specimen Source: ET Tube ET Tube  Culture Results:   Few Pseudomonas aeruginosa   Normal Respiratory Kia present  Organism Identification: Pseudomonas aeruginosa  Organism: Pseudomonas aeruginosa  Method Type: KULDIP  RADIOLOGY:  < from: Xray Abdomen 1 View PORTABLE -Routine (Xray Abdomen 1 View PORTABLE -Routine in AM.) (07.29.23 @ 09:42) >  ACC: [id number] EXAM:  XR ABDOMEN PORTABLE ROUTINE 1V   ORDERED BY: AYO MURRAY     PROCEDURE DATE:  07/29/2023          INTERPRETATION:  CLINICAL INDICATION: Abdominal distention.    TECHNIQUE: Single frontal view of the abdomen    COMPARISON: CT abdomen 7/25/2023.    FINDINGS:  Nonobstructive bowel gas pattern. No evidence of pneumoperitoneum.  Partially visualized gastrostomy tube    IMPRESSION:  Nonobstructive bowel gas pattern.    --- End of Report ---        < end of copied text >  < from: US Abdomen Upper Quadrant Right (07.25.23 @ 14:34) >  Cholelithiasis without evidence of bile duct dilatation or cholecystitis.  Hepatic steatosis.      < end of copied text >  < from: CT Chest w/ IV Cont (07.25.23 @ 02:52) >  Consolidation in the posterior segment of the right lower lobe,   concerning for aspiration pneumonia. Additional patchy ground glass   opacities in the left upper lobe reflect additional areas of infection.   Small left and trace right pleural effusions.    Trachea appears dilated, possibly secondary to overinflated tracheostomy   cuff.    Mild wall thickening of the ascending colon concerning for colitis,   possibly infectious or inflammatory in etiology.    Cholelithiasis. No CT evidence of acute cholecystitis.    Hepatic steatosis.    --- End of Report     < end of copied text >  < from: CT Abdomen and Pelvis w/ IV Cont (07.25.23 @ 02:40) >  Consolidation in the posterior segment of the right lower lobe,   concerning for aspiration pneumonia. Additional patchy ground glass   opacities in the left upper lobe reflect additional areas of infection.   Small left and trace right pleural effusions.    Trachea appears dilated, possibly secondary to overinflated tracheostomy   cuff.    Mild wall thickening of the ascending colon concerning for colitis,   possibly infectious or inflammatory in etiology.    Cholelithiasis. No CT evidence of acute cholecystitis.    Hepatic steatosis.    < end of copied text >  elizabeth Macedo [facility] [place] [id number]

## 2023-07-31 NOTE — PROGRESS NOTE ADULT - PROBLEM SELECTOR PLAN 5
Chronic PEG  - enteral feeds  - bowel regimen d/sindy   - check GI PCR and C diff in 48hrs  - vanco PO initiated   - isolation precautions

## 2023-07-31 NOTE — CHART NOTE - NSCHARTNOTEFT_GEN_A_CORE
MEDICINE PA EPISODIC NOTE    Notified by RN patient with temperature 101.4F rectally.       VITAL SIGNS:  T(C): 38.6 (07-31-23 @ 21:18), Max: 38.6 (07-31-23 @ 21:18)  HR: 112 (07-31-23 @ 21:18) (90 - 120)  BP: 147/75 (07-31-23 @ 21:18) (97/64 - 147/75)  RR: 16 (07-31-23 @ 21:18) (16 - 18)  SpO2: 97% (07-31-23 @ 21:18) (97% - 99%)  Wt(kg): --      LABORATORY:                        11.9   16.04 )-----------( 348      ( 31 Jul 2023 06:45 )             38.1       07-31    133<L>  |  97  |  14  ----------------------------<  104<H>  4.2   |  22  |  <0.30<L>    Ca    8.9      31 Jul 2023 06:46  Phos  2.6     07-31  Mg     2.0     07-31      PHYSICAL EXAM:  Constitutional: eyes taped, non-verbal at baseline  Respiratory: + trach on vent  Cardiovascular: S1 S2.   Gastrointestinal: +PEG      ASSESSMENT/PLAN:   68F with PMHx advanced ALS (nonverbal and bedbound at baseline) with chronic hypercapnic respiratory failure and ventilatory dependence via tracheostomy at baseline and Parkinson's dz presenting [facility] on [facility]23 with septic shock 2/2 ventilator associated PNA +/- UTI. Now with fever 101.4F.     1. Fever  - Tylenol and cooling measures prn for pyrexia.  - BC x2, UA   - CXR  - Zosyn for PSA pna  - On po vanco 125 po qid empirically for c diff  - pt with continued loose stool, on isolation, c diff cultures to be sent  - Continue to trend fever curve, monitor patient's clinical status and vital signs  - endorse to primary team in AM      Magalie Guerrero PA-C  Department of Medicine

## 2023-07-31 NOTE — PROGRESS NOTE ADULT - PROBLEM SELECTOR PLAN 6
- Uosm 248, Orly 26  - TSH 4.3  - f/u AM cortisol  - will continue to monitor for now / if worsening will repeat urine osm and NA

## 2023-08-01 LAB
ANION GAP SERPL CALC-SCNC: 13 MMOL/L — SIGNIFICANT CHANGE UP (ref 5–17)
APPEARANCE UR: CLEAR — SIGNIFICANT CHANGE UP
APTT BLD: 32.9 SEC — SIGNIFICANT CHANGE UP (ref 24.5–35.6)
BACTERIA # UR AUTO: NEGATIVE — SIGNIFICANT CHANGE UP
BILIRUB UR-MCNC: NEGATIVE — SIGNIFICANT CHANGE UP
BUN SERPL-MCNC: 16 MG/DL — SIGNIFICANT CHANGE UP (ref 7–23)
CALCIUM SERPL-MCNC: 9.1 MG/DL — SIGNIFICANT CHANGE UP (ref 8.4–10.5)
CHLORIDE SERPL-SCNC: 100 MMOL/L — SIGNIFICANT CHANGE UP (ref 96–108)
CO2 SERPL-SCNC: 22 MMOL/L — SIGNIFICANT CHANGE UP (ref 22–31)
COLOR SPEC: YELLOW — SIGNIFICANT CHANGE UP
CREAT SERPL-MCNC: <0.3 MG/DL — LOW (ref 0.5–1.3)
DIFF PNL FLD: NEGATIVE — SIGNIFICANT CHANGE UP
EGFR: 114 ML/MIN/1.73M2 — SIGNIFICANT CHANGE UP
EPI CELLS # UR: 1 /HPF — SIGNIFICANT CHANGE UP
GI PCR PANEL: SIGNIFICANT CHANGE UP
GLUCOSE SERPL-MCNC: 111 MG/DL — HIGH (ref 70–99)
GLUCOSE UR QL: ABNORMAL
HCT VFR BLD CALC: 36.8 % — SIGNIFICANT CHANGE UP (ref 34.5–45)
HGB BLD-MCNC: 12.1 G/DL — SIGNIFICANT CHANGE UP (ref 11.5–15.5)
HYALINE CASTS # UR AUTO: 1 /LPF — SIGNIFICANT CHANGE UP (ref 0–2)
INR BLD: 1.38 RATIO — HIGH (ref 0.85–1.18)
KETONES UR-MCNC: NEGATIVE — SIGNIFICANT CHANGE UP
LEUKOCYTE ESTERASE UR-ACNC: NEGATIVE — SIGNIFICANT CHANGE UP
MAGNESIUM SERPL-MCNC: 2.1 MG/DL — SIGNIFICANT CHANGE UP (ref 1.6–2.6)
MCHC RBC-ENTMCNC: 28.7 PG — SIGNIFICANT CHANGE UP (ref 27–34)
MCHC RBC-ENTMCNC: 32.9 GM/DL — SIGNIFICANT CHANGE UP (ref 32–36)
MCV RBC AUTO: 87.2 FL — SIGNIFICANT CHANGE UP (ref 80–100)
NITRITE UR-MCNC: NEGATIVE — SIGNIFICANT CHANGE UP
NRBC # BLD: 0 /100 WBCS — SIGNIFICANT CHANGE UP (ref 0–0)
PH UR: 7.5 — SIGNIFICANT CHANGE UP (ref 5–8)
PHOSPHATE SERPL-MCNC: 2.7 MG/DL — SIGNIFICANT CHANGE UP (ref 2.5–4.5)
PLATELET # BLD AUTO: 306 K/UL — SIGNIFICANT CHANGE UP (ref 150–400)
POTASSIUM SERPL-MCNC: 3.7 MMOL/L — SIGNIFICANT CHANGE UP (ref 3.5–5.3)
POTASSIUM SERPL-SCNC: 3.7 MMOL/L — SIGNIFICANT CHANGE UP (ref 3.5–5.3)
PROT UR-MCNC: ABNORMAL
PROTHROM AB SERPL-ACNC: 14.4 SEC — HIGH (ref 9.5–13)
RAPID RVP RESULT: SIGNIFICANT CHANGE UP
RBC # BLD: 4.22 M/UL — SIGNIFICANT CHANGE UP (ref 3.8–5.2)
RBC # FLD: 16.8 % — HIGH (ref 10.3–14.5)
RBC CASTS # UR COMP ASSIST: 1 /HPF — SIGNIFICANT CHANGE UP (ref 0–4)
SARS-COV-2 RNA SPEC QL NAA+PROBE: SIGNIFICANT CHANGE UP
SODIUM SERPL-SCNC: 135 MMOL/L — SIGNIFICANT CHANGE UP (ref 135–145)
SP GR SPEC: 1.01 — SIGNIFICANT CHANGE UP (ref 1.01–1.02)
UROBILINOGEN FLD QL: NEGATIVE — SIGNIFICANT CHANGE UP
WBC # BLD: 15.59 K/UL — HIGH (ref 3.8–10.5)
WBC # FLD AUTO: 15.59 K/UL — HIGH (ref 3.8–10.5)
WBC UR QL: 2 /HPF — SIGNIFICANT CHANGE UP (ref 0–5)

## 2023-08-01 PROCEDURE — 99232 SBSQ HOSP IP/OBS MODERATE 35: CPT

## 2023-08-01 PROCEDURE — 99232 SBSQ HOSP IP/OBS MODERATE 35: CPT | Mod: FS

## 2023-08-01 RX ORDER — PIPERACILLIN AND TAZOBACTAM 4; .5 G/20ML; G/20ML
3.38 INJECTION, POWDER, LYOPHILIZED, FOR SOLUTION INTRAVENOUS EVERY 8 HOURS
Refills: 0 | Status: DISCONTINUED | OUTPATIENT
Start: 2023-08-01 | End: 2023-08-04

## 2023-08-01 RX ORDER — RIVAROXABAN 15 MG-20MG
10 KIT ORAL DAILY
Refills: 0 | Status: DISCONTINUED | OUTPATIENT
Start: 2023-08-01 | End: 2023-08-04

## 2023-08-01 RX ORDER — ERYTHROMYCIN BASE 5 MG/GRAM
1 OINTMENT (GRAM) OPHTHALMIC (EYE)
Refills: 0 | Status: DISCONTINUED | OUTPATIENT
Start: 2023-08-01 | End: 2023-08-04

## 2023-08-01 RX ADMIN — RILUZOLE 50 MILLIGRAM(S): 50 TABLET ORAL at 17:48

## 2023-08-01 RX ADMIN — Medication 1 APPLICATION(S): at 19:00

## 2023-08-01 RX ADMIN — Medication 1 APPLICATION(S): at 00:29

## 2023-08-01 RX ADMIN — Medication 1 DROP(S): at 17:49

## 2023-08-01 RX ADMIN — NYSTATIN CREAM 1 APPLICATION(S): 100000 CREAM TOPICAL at 06:23

## 2023-08-01 RX ADMIN — CHLORHEXIDINE GLUCONATE 1 APPLICATION(S): 213 SOLUTION TOPICAL at 06:23

## 2023-08-01 RX ADMIN — Medication 1 APPLICATION(S): at 11:05

## 2023-08-01 RX ADMIN — Medication 1 APPLICATION(S): at 20:18

## 2023-08-01 RX ADMIN — RIVAROXABAN 10 MILLIGRAM(S): KIT at 13:35

## 2023-08-01 RX ADMIN — Medication 1 APPLICATION(S): at 01:16

## 2023-08-01 RX ADMIN — PIPERACILLIN AND TAZOBACTAM 25 GRAM(S): 4; .5 INJECTION, POWDER, LYOPHILIZED, FOR SOLUTION INTRAVENOUS at 06:22

## 2023-08-01 RX ADMIN — Medication 1 APPLICATION(S): at 15:06

## 2023-08-01 RX ADMIN — Medication 1 APPLICATION(S): at 10:30

## 2023-08-01 RX ADMIN — Medication 1 APPLICATION(S): at 13:56

## 2023-08-01 RX ADMIN — Medication 1 DROP(S): at 02:13

## 2023-08-01 RX ADMIN — NYSTATIN CREAM 1 APPLICATION(S): 100000 CREAM TOPICAL at 17:48

## 2023-08-01 RX ADMIN — Medication 1 APPLICATION(S): at 05:04

## 2023-08-01 RX ADMIN — Medication 1 DROP(S): at 06:22

## 2023-08-01 RX ADMIN — Medication 3 MILLILITER(S): at 11:29

## 2023-08-01 RX ADMIN — Medication 3 MILLILITER(S): at 05:02

## 2023-08-01 RX ADMIN — PIPERACILLIN AND TAZOBACTAM 25 GRAM(S): 4; .5 INJECTION, POWDER, LYOPHILIZED, FOR SOLUTION INTRAVENOUS at 13:36

## 2023-08-01 RX ADMIN — Medication 1 APPLICATION(S): at 12:58

## 2023-08-01 RX ADMIN — Medication 125 MILLIGRAM(S): at 06:22

## 2023-08-01 RX ADMIN — SERTRALINE 75 MILLIGRAM(S): 25 TABLET, FILM COATED ORAL at 12:58

## 2023-08-01 RX ADMIN — Medication 1 APPLICATION(S): at 09:43

## 2023-08-01 RX ADMIN — PIPERACILLIN AND TAZOBACTAM 25 GRAM(S): 4; .5 INJECTION, POWDER, LYOPHILIZED, FOR SOLUTION INTRAVENOUS at 22:02

## 2023-08-01 RX ADMIN — Medication 1 APPLICATION(S): at 21:09

## 2023-08-01 RX ADMIN — Medication 125 MILLIGRAM(S): at 00:30

## 2023-08-01 RX ADMIN — Medication 1 APPLICATION(S): at 06:21

## 2023-08-01 RX ADMIN — Medication 3 MILLILITER(S): at 17:15

## 2023-08-01 RX ADMIN — Medication 1 APPLICATION(S): at 22:01

## 2023-08-01 RX ADMIN — Medication 1 DROP(S): at 06:23

## 2023-08-01 RX ADMIN — Medication 1 APPLICATION(S): at 07:02

## 2023-08-01 RX ADMIN — CHLORHEXIDINE GLUCONATE 15 MILLILITER(S): 213 SOLUTION TOPICAL at 06:24

## 2023-08-01 RX ADMIN — Medication 1 APPLICATION(S): at 16:06

## 2023-08-01 RX ADMIN — CHLORHEXIDINE GLUCONATE 15 MILLILITER(S): 213 SOLUTION TOPICAL at 17:48

## 2023-08-01 RX ADMIN — Medication 1 TABLET(S): at 12:59

## 2023-08-01 RX ADMIN — Medication 3 MILLILITER(S): at 23:30

## 2023-08-01 RX ADMIN — Medication 1 APPLICATION(S): at 11:12

## 2023-08-01 RX ADMIN — RILUZOLE 50 MILLIGRAM(S): 50 TABLET ORAL at 06:24

## 2023-08-01 RX ADMIN — Medication 1 DROP(S): at 00:29

## 2023-08-01 RX ADMIN — Medication 1 DROP(S): at 04:37

## 2023-08-01 RX ADMIN — Medication 1 APPLICATION(S): at 02:13

## 2023-08-01 RX ADMIN — Medication 1 APPLICATION(S): at 03:03

## 2023-08-01 RX ADMIN — Medication 1 APPLICATION(S): at 04:37

## 2023-08-01 RX ADMIN — Medication 1 APPLICATION(S): at 23:16

## 2023-08-01 RX ADMIN — Medication 1 APPLICATION(S): at 17:48

## 2023-08-01 NOTE — PROGRESS NOTE ADULT - PROBLEM SELECTOR PLAN 2
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    - F/U cultures of discharge  - Continue fortified vancomycin and fortified tobramycin q2 hours both eyes  - Continue erythromycin ointment q1 hour both eyes  - Patient does not blink at all, NO tape (as gauze and tape have also likely been scraping and damaging cornea  - Ophthalmology discussed need for tarsorrhaphy with sister in law, family discussion no planned procedure.

## 2023-08-01 NOTE — PROGRESS NOTE ADULT - PROBLEM SELECTOR PLAN 5
Chronic PEG  - enteral feeds  - bowel regimen d/sindy   - check GI PCR and C diff in 48hrs  - vanco PO initiated   - isolation precautions Chronic PEG  - enteral feeds  - bowel regimen d/sindy   - check GI PCR and C diff in 48hrs  - vanco PO initiated  stopped- Dr Cid agrees   - isolation precautions

## 2023-08-01 NOTE — PROGRESS NOTE ADULT - SUBJECTIVE AND OBJECTIVE BOX
68yPatient is a 68y old  Female who presents with a chief complaint of AMS (01 Aug 2023 06:58)      Interval history:  febrile, 3 BM documented overnight.       Allergies:   Bactrim DS (Rash)      Antimicrobials:  piperacillin/tazobactam IVPB.. 3.375 Gram(s) IV Intermittent every 8 hours      REVIEW OF SYSTEMS:  Unable to obtain due to pt's underlying mental status.       Vital Signs Last 24 Hrs  T(C): 36.6 (08-01-23 @ 10:19), Max: 38.6 (07-31-23 @ 21:18)  T(F): 97.9 (08-01-23 @ 10:19), Max: 101.4 (07-31-23 @ 21:18)  HR: 91 (08-01-23 @ 15:08) (91 - 120)  BP: 126/69 (08-01-23 @ 10:19) (101/42 - 147/75)  BP(mean): --  RR: 20 (08-01-23 @ 15:07) (16 - 20)  SpO2: 98% (08-01-23 @ 15:08) (95% - 99%)      PHYSICAL EXAM:  Pt in no acute distress,  eyes covered.   + trach   + peg, distended abdomen  no carter   no phlebitis                             12.1   15.59 )-----------( 306      ( 01 Aug 2023 06:59 )             36.8   08-01    135  |  100  |  16  ----------------------------<  111<H>  3.7   |  22  |  <0.30<L>    Ca    9.1      01 Aug 2023 06:59  Phos  2.7     08-01  Mg     2.1     08-01        < from: Xray Chest 1 View- PORTABLE-Urgent (Xray Chest 1 View- PORTABLE-Urgent .) (07.31.23 @ 23:34) >  IMPRESSION:  Interval increase in right lower lung patchy and streaky opacities likely   atelectasis or pneumonia.    Unchanged left retrocardiacand lower lung opacities, possibly   atelectasis, pneumonia and effusion.

## 2023-08-01 NOTE — PROGRESS NOTE ADULT - ASSESSMENT
68F extensive medical history including ALS, nonverbal and bedbound, Parkinsons, chronic respiratory failure requiring trach and vent, chronic oropharyngeal dysphagia with PEG presenting for lethargy and tachycardia. She is found to be febrile and admitted for further workup of sepsis due to pneumonia and UTI.   Vancomycin for sputum with Pseudomonas     68F extensive medical history including ALS, nonverbal and bedbound, Parkinsons, chronic respiratory failure requiring trach and vent, chronic oropharyngeal dysphagia with PEG presenting for lethargy and tachycardia. She is found to be febrile and admitted for further workup of sepsis due to pneumonia and UTI.   Vancomycin for sputum with Pseudomonas  febrile overnight- cultures resent. Also considered c diff however since cathartics stopped stool back to normal.  subsequently vancomycin stopped, continue zosyn.     68F extensive medical history including ALS, nonverbal and bedbound, Parkinsons, chronic respiratory failure requiring trach and vent, chronic oropharyngeal dysphagia with PEG presenting for lethargy and tachycardia. She is found to be febrile and admitted for further workup of sepsis due to pneumonia and UTI.   Vancomycin for sputum with Pseudomonas  febrile overnight- cultures resent. Also considered c diff however since cathartics stopped stool back to normal.  subsequently vancomycin stopped, continue zosyn.    Opthalmology discussed need for tarsorrhaphy with - will defer at this time.

## 2023-08-01 NOTE — PROGRESS NOTE ADULT - ASSESSMENT
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, OU  - patient has Parkinson's and ALS, has been being treated outpatient for infection in both eyes, however cannot close own eyes requires taping  - family noting that eyes are producing more discharge and more red than prior  - unable to assess subjective aspects of visual exam 2/2 AMS/averbal  - no rAPD appreciated  - inferior corneal findings with significant stromal cell, prior stromal scarring, neovascularization large epithelial defects with stromal cell and mild discharge OD > OS  - OD also with 10-20% thinning  - cultures of discharge likely contaminant coag negative staph  - continue fortified vancomyin and fortified tobramycin q2 hours both eyes (alternate drops vanc at 12, 4, 8 and tobra at 2, 6, 10 etc..)  - continue erythromycin ointment q1 hour both eyes  - please tape eyes shut, DO NOT USE gauze is not scraping corneal surface as this will make ulceration worse - but make sure eyes are actually CLOSED with tape, not just covering for moisture chamber  - discussed need for tarsorrhaphy with sister in law, however family discussion needs to be had in regards to the GOC  - discussed with  Dr. Mehta 7/26. mentioned how patient has had informal testing at home in regards to ALS and is still registering through eyes and hearing. Explained the risk and benefit of the procedure of tarsorrhaphy, how the eye may continue to have defects, thin, ultimately ulcerate and result in vision loss or loss of the eye.  understand risks, wishes to hold off on procedure at this time in order to preserve senses that wife has left. will continue aggressive management with antibiotics and taping  - ophtho to follow daily    Outpatient Follow-up: Patient should follow-up with his/her ophthalmologist or with [facility] Department of Ophthalmology within 1 week of after discharge at:    [address] [facility] [facility]  [place], [place] [id number] [phone]

## 2023-08-01 NOTE — PROGRESS NOTE ADULT - SUBJECTIVE AND OBJECTIVE BOX
Interval Events:  68-year-old female past medical history ALS nonverbal at baseline Parkinson's trach PEG vent dependent on home vent brought in by EMS for  increased heart rate and lethargy for few days.  On admission to the hospital the patient was found to be febrile, with (+) lactate of 4.5 (+) UA and possible PNA. Urine culture with VRE E. fecalis, sputum cx with few pseudomonas. Currently on Piptazo. (+) eye cx with coag neg staph.     Overnight: No active issues (+) diarrhea     REVIEW OF SYSTEMS:  [ ] Positive  [ ] All other systems negative  [x ] Unable to assess ROS because mechanical vent to tracheal tube     Vital Signs Last 24 Hrs  T(C): 36.8 (07-30-23 @ 04:43), Max: 37 (07-29-23 @ 19:10)  T(F): 98.3 (07-30-23 @ 04:43), Max: 98.6 (07-29-23 @ 19:10)  HR: 100 (07-30-23 @ 06:29) (68 - 105)  BP: 120/72 (07-30-23 @ 04:43) (101/51 - 132/78)  RR: 16 (07-30-23 @ 04:43) (16 - 19)  SpO2: 98% (07-30-23 @ 06:29) (92% - 99%)    07-29-23 @ 07:01  -  07-30-23 @ 07:00  --------------------------------------------------------  IN: 1610 mL / OUT: 1100 mL / NET: 510 mL      PHYSICAL EXAM:  HEENT:   [x ]Tracheostomy:  [ ]Pupils equal  [ ]No oral lesions  [ ]Abnormal    SKIN  [x ]No Rash  [ ] Abnormal  [ ] pressure    CARDIAC  [x ]Regular  [ ]Abnormal    PULMONARY  Mode: AC/ CMV (Assist Control/ Continuous Mandatory Ventilation), RR (machine): 16, TV (machine): 400, FiO2: 30, PEEP: 5, ITime: 1, MAP: 18, PIP: 34  [ ]Bilateral Clear Breath Sounds  [ ]Normal Excursion  [x ]Abnormal rhonchi     GI  [x ]PEG      [ ] +BS		              [ ]Soft, nondistended, nontender	  [ ]Abnormal    MUSCULOSKELETAL                                   [xBedbound                 [ ]Abnormal    [ ]Ambulatory/OOB to chair                           EXTREMITIES                                         [ ]Normal  [x ]Edema                           NEUROLOGIC  [ ] Normal, non focal  [ x] Focal findings: no spontaneous movements     PSYCHIATRIC  [ ]Alert and appropriate  [ ] Sedated	 [ ]Agitated  - unresponsive     :   [place] fit straight cath     LINES:  PIV ML     HOSPITAL MEDICATIONS:  MEDICATIONS  (STANDING):  albuterol/ipratropium for Nebulization 3 milliLiter(s) Nebulizer every 6 hours  artificial  tears Solution 1 Drop(s) Both EYES two times a day  chlorhexidine 0.12% Liquid 15 milliLiter(s) Oral Mucosa every 12 hours  chlorhexidine 4% Liquid 1 Application(s) Topical <User Schedule>  erythromycin   Ointment 1 Application(s) Both EYES <User Schedule>  multivitamin 1 Tablet(s) Oral daily  nystatin Powder 1 Application(s) Topical two times a day  piperacillin/tazobactam IVPB.. 3.375 Gram(s) IV Intermittent every 8 hours  polyethylene glycol 3350 17 Gram(s) Oral daily  riluzole 50 milliGRAM(s) Oral two times a day  rivaroxaban 10 milliGRAM(s) Oral daily  senna 2 Tablet(s) Oral at bedtime  sertraline 75 milliGRAM(s) Oral daily  tobramycin 14 mG/mL Fortified Ophthalmic 1 Drop(s) Both EYES every 4 hours  vancomycin 25 mG/mL Fortified Ophthalmic 1 Drop(s) Both EYES every 4 hours    MEDICATIONS  (PRN):  diazepam    Tablet 2 milliGRAM(s) Oral every 6 hours PRN elevated  bp systolic over 150      LABS:                        13.1   15.00 )-----------( 282      ( 29 Jul 2023 07:04 )             41.1     07-29    132<L>  |  98  |  12  ----------------------------<  146<H>  4.2   |  20<L>  |  <0.30<L>    Ca    9.0      29 Jul 2023 07:03  Phos  2.3     07-29  Mg     1.9     07-29      PT/INR - ( 29 Jul 2023 07:05 )   PT: 12.5 sec;   INR: 1.20 ratio         PTT - ( 29 Jul 2023 07:05 )  PTT:32.6 sec  Urinalysis Basic - ( 29 Jul 2023 07:03 )    Color: x / Appearance: x / SG: x / pH: x  Gluc: 146 mg/dL / Ketone: x  / Bili: x / Urobili: x   Blood: x / Protein: x / Nitrite: x   Leuk Esterase: x / RBC: x / WBC x   Sq Epi: x / Non Sq Epi: x / Bacteria: x    (P) labs for this am     CAPILLARY BLOOD GLUCOSE    MICROBIOLOGY:   culrCulture - Blood (07.28.23 @ 10:55)   Specimen Source: .Blood Blood-Peripheral  Culture Results:   No growth at 24 hours      Historical Values  Culture - Blood (07.28.23 @ 10:55)   Specimen Source: .Blood Blood-Peripheral  Culture Results:   No growth at 24 hours  Culture - Blood (07.28.23 @ 08:30)   Specimen Source: .Blood Blood-Peripheral  Culture Results:   No growth at 24 hours  Culture - Blood (07.24.23 @ 22:44)   Specimen Source: .Blood Blood-Peripheral  Culture Results:   No growth at 5 daysCulture - Blood (07.28.23 @ 08:30)   Specimen Source: .Blood Blood-Peripheral  Culture Results:   No growth at 24 hours      Historical Values  Culture - Blood (07.28.23 @ 10:55)   Specimen Source: .Blood Blood-Peripheral  Culture Results:   No growth at 24 hours  Culture - Blood (07.28.23 @ 08:30)   Specimen Source: .Blood Blood-Peripheral  Culture Results:   No growth at 24 hours  Culture - Blood (07.24.23 @ 22:44)   Specimen Source: .Blood Blood-Peripheral  Culture Results:   No growth at 5 days  Culture - Blood (07.24.23 @ 22:22)   Gram Stain:   Growth in anaerobic bottle: Gram Positive Cocci in Clusters  Specimen Source: .Blood Blood-Peripheral  Organism: Blood Culture PCR  Culture Results: Culture - Blood (07.28.23 @ 08:30)   Specimen Source: .Blood Blood-Peripheral  Culture Results:   No growth at 24 hours      Historical Values  Culture - Blood (07.28.23 @ 10:55)   Specimen Source: .Blood Blood-Peripheral  Culture Results:   No growth at 24 hours  Culture - Blood (07.28.23 @ 08:30)   Specimen Source: .Blood Blood-Peripheral  Culture Results:   No growth at 24 hours  Culture - Blood (07.24.23 @ 22:44)   Specimen Source: .Blood Blood-Peripheral  Culture Results:   No growth at 5 days  Culture - Blood (07.24.23 @ 22:22)   Gram Stain:   Growth in anaerobic bottle: Gram Positive Cocci in Clusters  Specimen Source: .Blood Blood-Peripheral  Organism: Blood Culture PCR  Culture Results: Culture - Eye (07.25.23 @ 16:11)   Specimen Source: .Eye Eye-Right  Culture Results:   Rare Coag Negative Staphylococcus "Susceptibilities not performed"      Historical Values  Culture - Eye (07.25.23 @ 16:11)   Specimen Source: .Eye Eye-Right  Culture Results:   Rare Coag Negative Staphylococcus "Susceptibilities not performed"  Culture - Eye (07.25.23 @ 16:11)   Specimen Source: .Eye Eye-Right  Culture Results:   Rare Coag Negative Staphylococcus "Susceptibilities not performed"  Culture - Eye (02.19.23 @ 13:56)   - Vancomycin: S 1  Culture - Eye (07.25.23 @ 16:11)   Specimen Source: .Eye Eye-Right  Culture Results:   Rare Coag Negative Staphylococcus "Susceptibilities not performed"      Historical Values  Culture - Eye (07.25.23 @ 16:11)   Specimen Source: .Eye Eye-Right  Culture Results:   Rare Coag Negative Staphylococcus "Susceptibilities not performed"  Culture - Eye (07.25.23 @ 16:11)   Specimen Source: .Eye Eye-Right  Culture Results:   Rare Coag Negative Staphylococcus "Susceptibilities not performed"Culture - Sputum . (07.25.23 @ 12:33)   - Ciprofloxacin: R 2  - Gentamicin: I 8  - Imipenem: S 2  - Levofloxacin: R 4  - Meropenem: S <=1  - Piperacillin/Tazobactam: S <=8  - Tobramycin: S <=2  Gram Stain:   Few polymorphonuclear leukocytes per low power field   Rare Squamous epithelial cells per low power field   Rare Gram Variable Rods per oil power field  - Amikacin: S <=16  - Aztreonam: S <=4  - Cefepime: S 4  - Ceftazidime: S <=1  Specimen Source: ET Tube ET Tube  Culture Results:   Few Pseudomonas aeruginosa   Normal Respiratory Kia present  Organism Identification: Pseudomonas aeruginosa  Organism: Pseudomonas aeruginosa  Method Type: KULDIP  RADIOLOGY:  < from: Xray Abdomen 1 View PORTABLE -Routine (Xray Abdomen 1 View PORTABLE -Routine in AM.) (07.29.23 @ 09:42) >  ACC: [id number] EXAM:  XR ABDOMEN PORTABLE ROUTINE 1V   ORDERED BY: AYO MURRAY     PROCEDURE DATE:  07/29/2023          INTERPRETATION:  CLINICAL INDICATION: Abdominal distention.    TECHNIQUE: Single frontal view of the abdomen    COMPARISON: CT abdomen 7/25/2023.    FINDINGS:  Nonobstructive bowel gas pattern. No evidence of pneumoperitoneum.  Partially visualized gastrostomy tube    IMPRESSION:  Nonobstructive bowel gas pattern.    --- End of Report ---        < end of copied text >  < from: US Abdomen Upper Quadrant Right (07.25.23 @ 14:34) >  Cholelithiasis without evidence of bile duct dilatation or cholecystitis.  Hepatic steatosis.      < end of copied text >  < from: CT Chest w/ IV Cont (07.25.23 @ 02:52) >  Consolidation in the posterior segment of the right lower lobe,   concerning for aspiration pneumonia. Additional patchy ground glass   opacities in the left upper lobe reflect additional areas of infection.   Small left and trace right pleural effusions.    Trachea appears dilated, possibly secondary to overinflated tracheostomy   cuff.    Mild wall thickening of the ascending colon concerning for colitis,   possibly infectious or inflammatory in etiology.    Cholelithiasis. No CT evidence of acute cholecystitis.    Hepatic steatosis.    --- End of Report     < end of copied text >  < from: CT Abdomen and Pelvis w/ IV Cont (07.25.23 @ 02:40) >  Consolidation in the posterior segment of the right lower lobe,   concerning for aspiration pneumonia. Additional patchy ground glass   opacities in the left upper lobe reflect additional areas of infection.   Small left and trace right pleural effusions.    Trachea appears dilated, possibly secondary to overinflated tracheostomy   cuff.    Mild wall thickening of the ascending colon concerning for colitis,   possibly infectious or inflammatory in etiology.    Cholelithiasis. No CT evidence of acute cholecystitis.    Hepatic steatosis.    < end of copied text >  elizabeth Macedo [facility]- [place] [address]   Interval Events:  68-year-old female past medical history ALS nonverbal at baseline Parkinson's trach PEG vent dependent on home vent brought in by EMS for  increased heart rate and lethargy for few days.  On admission to the hospital the patient was found to be febrile, with (+) lactate of 4.5 (+) UA and possible PNA. Urine culture with VRE E. fecalis, sputum cx with few pseudomonas. Currently on Piptazo. (+) eye cx with coag neg staph.     Overnight:    Febrile 101.4   2118    REVIEW OF SYSTEMS:  [ ] Positive  [ ] All other systems negative  [x ] Unable to assess ROS because mechanical vent to tracheal tube     Vital Signs Last 24 Hrs  T(C): 36.8 (07-30-23 @ 04:43), Max: 37 (07-29-23 @ 19:10)  T(F): 98.3 (07-30-23 @ 04:43), Max: 98.6 (07-29-23 @ 19:10)  HR: 100 (07-30-23 @ 06:29) (68 - 105)  BP: 120/72 (07-30-23 @ 04:43) (101/51 - 132/78)  RR: 16 (07-30-23 @ 04:43) (16 - 19)  SpO2: 98% (07-30-23 @ 06:29) (92% - 99%)    07-29-23 @ 07:01  -  07-30-23 @ 07:00  --------------------------------------------------------  IN: 1610 mL / OUT: 1100 mL / NET: 510 mL      PHYSICAL EXAM:  HEENT:   [x ]Tracheostomy:      no wean        air added to trach cuff  [ ]Pupils equal      jose eyes taped closed   [ ]No oral lesions  [ ]Abnormal    SKIN  [x ]No Rash  [ ] Abnormal  [ ] pressure    CARDIAC  [x ]Regular  [ ]Abnormal    PULMONARY  Mode: AC/ CMV (Assist Control/ Continuous Mandatory Ventilation), RR (machine): 16, TV (machine): 400, FiO2: 30, PEEP: 5, ITime: 1, MAP: 18, PIP: 34  [ ]Bilateral Clear Breath Sounds  [ ]Normal Excursion  [x ]Abnormal rhonchi     GI  [x ]PEG      [ ] +BS	BMs soft, sl watery	              [ ]Soft, nondistended, nontender	  [ ]Abnormal    MUSCULOSKELETAL                                   [xBedbound                 [ ]Abnormal    [ ]Ambulatory/OOB to chair                           EXTREMITIES                                         [ ]Normal  [x ]Edema                           NEUROLOGIC  [ ] Normal, non focal  [ x] Focal findings: no spontaneous movements     PSYCHIATRIC  [ ]Alert and appropriate      unable to determine   [ ] Sedated	 [ ]Agitated  - unresponsive     :   [place] fit straight cath     LINES:  PIV ML     HOSPITAL MEDICATIONS:  MEDICATIONS  (STANDING):  albuterol/ipratropium for Nebulization 3 milliLiter(s) Nebulizer every 6 hours  artificial  tears Solution 1 Drop(s) Both EYES two times a day  chlorhexidine 0.12% Liquid 15 milliLiter(s) Oral Mucosa every 12 hours  chlorhexidine 4% Liquid 1 Application(s) Topical <User Schedule>  erythromycin   Ointment 1 Application(s) Both EYES <User Schedule>  multivitamin 1 Tablet(s) Oral daily  nystatin Powder 1 Application(s) Topical two times a day  piperacillin/tazobactam IVPB.. 3.375 Gram(s) IV Intermittent every 8 hours  polyethylene glycol 3350 17 Gram(s) Oral daily  riluzole 50 milliGRAM(s) Oral two times a day  rivaroxaban 10 milliGRAM(s) Oral daily  senna 2 Tablet(s) Oral at bedtime  sertraline 75 milliGRAM(s) Oral daily  tobramycin 14 mG/mL Fortified Ophthalmic 1 Drop(s) Both EYES every 4 hours  vancomycin 25 mG/mL Fortified Ophthalmic 1 Drop(s) Both EYES every 4 hours    MEDICATIONS  (PRN):  diazepam    Tablet 2 milliGRAM(s) Oral every 6 hours PRN elevated  bp systolic over 150      LABS:                        13.1   15.00 )-----------( 282      ( 29 Jul 2023 07:04 )             41.1     07-29    132<L>  |  98  |  12  ----------------------------<  146<H>  4.2   |  20<L>  |  <0.30<L>    Ca    9.0      29 Jul 2023 07:03  Phos  2.3     07-29  Mg     1.9     07-29      PT/INR - ( 29 Jul 2023 07:05 )   PT: 12.5 sec;   INR: 1.20 ratio         PTT - ( 29 Jul 2023 07:05 )  PTT:32.6 sec  Urinalysis Basic - ( 29 Jul 2023 07:03 )    Color: x / Appearance: x / SG: x / pH: x  Gluc: 146 mg/dL / Ketone: x  / Bili: x / Urobili: x   Blood: x / Protein: x / Nitrite: x   Leuk Esterase: x / RBC: x / WBC x   Sq Epi: x / Non Sq Epi: x / Bacteria: x    (P) labs for this am     CAPILLARY BLOOD GLUCOSE    MICROBIOLOGY:   culrCulture - Blood (07.28.23 @ 10:55)   Specimen Source: .Blood Blood-Peripheral  Culture Results:   No growth at 24 hours      Historical Values  Culture - Blood (07.28.23 @ 10:55)   Specimen Source: .Blood Blood-Peripheral  Culture Results:   No growth at 24 hours  Culture - Blood (07.28.23 @ 08:30)   Specimen Source: .Blood Blood-Peripheral  Culture Results:   No growth at 24 hours  Culture - Blood (07.24.23 @ 22:44)   Specimen Source: .Blood Blood-Peripheral  Culture Results:   No growth at 5 daysCulture - Blood (07.28.23 @ 08:30)   Specimen Source: .Blood Blood-Peripheral  Culture Results:   No growth at 24 hours      Historical Values  Culture - Blood (07.28.23 @ 10:55)   Specimen Source: .Blood Blood-Peripheral  Culture Results:   No growth at 24 hours  Culture - Blood (07.28.23 @ 08:30)   Specimen Source: .Blood Blood-Peripheral  Culture Results:   No growth at 24 hours  Culture - Blood (07.24.23 @ 22:44)   Specimen Source: .Blood Blood-Peripheral  Culture Results:   No growth at 5 days  Culture - Blood (07.24.23 @ 22:22)   Gram Stain:   Growth in anaerobic bottle: Gram Positive Cocci in Clusters  Specimen Source: .Blood Blood-Peripheral  Organism: Blood Culture PCR  Culture Results: Culture - Blood (07.28.23 @ 08:30)   Specimen Source: .Blood Blood-Peripheral  Culture Results:   No growth at 24 hours      Historical Values  Culture - Blood (07.28.23 @ 10:55)   Specimen Source: .Blood Blood-Peripheral  Culture Results:   No growth at 24 hours  Culture - Blood (07.28.23 @ 08:30)   Specimen Source: .Blood Blood-Peripheral  Culture Results:   No growth at 24 hours  Culture - Blood (07.24.23 @ 22:44)   Specimen Source: .Blood Blood-Peripheral  Culture Results:   No growth at 5 days  Culture - Blood (07.24.23 @ 22:22)   Gram Stain:   Growth in anaerobic bottle: Gram Positive Cocci in Clusters  Specimen Source: .Blood Blood-Peripheral  Organism: Blood Culture PCR  Culture Results: Culture - Eye (07.25.23 @ 16:11)   Specimen Source: .Eye Eye-Right  Culture Results:   Rare Coag Negative Staphylococcus "Susceptibilities not performed"      Historical Values  Culture - Eye (07.25.23 @ 16:11)   Specimen Source: .Eye Eye-Right  Culture Results:   Rare Coag Negative Staphylococcus "Susceptibilities not performed"  Culture - Eye (07.25.23 @ 16:11)   Specimen Source: .Eye Eye-Right  Culture Results:   Rare Coag Negative Staphylococcus "Susceptibilities not performed"  Culture - Eye (02.19.23 @ 13:56)   - Vancomycin: S 1  Culture - Eye (07.25.23 @ 16:11)   Specimen Source: .Eye Eye-Right  Culture Results:   Rare Coag Negative Staphylococcus "Susceptibilities not performed"      Historical Values  Culture - Eye (07.25.23 @ 16:11)   Specimen Source: .Eye Eye-Right  Culture Results:   Rare Coag Negative Staphylococcus "Susceptibilities not performed"  Culture - Eye (07.25.23 @ 16:11)   Specimen Source: .Eye Eye-Right  Culture Results:   Rare Coag Negative Staphylococcus "Susceptibilities not performed"Culture - Sputum . (07.25.23 @ 12:33)   - Ciprofloxacin: R 2  - Gentamicin: I 8  - Imipenem: S 2  - Levofloxacin: R 4  - Meropenem: S <=1  - Piperacillin/Tazobactam: S <=8  - Tobramycin: S <=2  Gram Stain:   Few polymorphonuclear leukocytes per low power field   Rare Squamous epithelial cells per low power field   Rare Gram Variable Rods per oil power field  - Amikacin: S <=16  - Aztreonam: S <=4  - Cefepime: S 4  - Ceftazidime: S <=1  Specimen Source: ET Tube ET Tube  Culture Results:   Few Pseudomonas aeruginosa   Normal Respiratory Kia present  Organism Identification: Pseudomonas aeruginosa  Organism: Pseudomonas aeruginosa  Method Type: KLUDIP  RADIOLOGY:  < from: Xray Abdomen 1 View PORTABLE -Routine (Xray Abdomen 1 View PORTABLE -Routine in AM.) (07.29.23 @ 09:42) >  ACC: [id number] EXAM:  XR ABDOMEN PORTABLE ROUTINE 1V   ORDERED BY: AYO MURRAY     PROCEDURE DATE:  07/29/2023          INTERPRETATION:  CLINICAL INDICATION: Abdominal distention.    TECHNIQUE: Single frontal view of the abdomen    COMPARISON: CT abdomen 7/25/2023.    FINDINGS:  Nonobstructive bowel gas pattern. No evidence of pneumoperitoneum.  Partially visualized gastrostomy tube    IMPRESSION:  Nonobstructive bowel gas pattern.    --- End of Report ---        < end of copied text >  < from: US Abdomen Upper Quadrant Right (07.25.23 @ 14:34) >  Cholelithiasis without evidence of bile duct dilatation or cholecystitis.  Hepatic steatosis.      < end of copied text >  < from: CT Chest w/ IV Cont (07.25.23 @ 02:52) >  Consolidation in the posterior segment of the right lower lobe,   concerning for aspiration pneumonia. Additional patchy ground glass   opacities in the left upper lobe reflect additional areas of infection.   Small left and trace right pleural effusions.    Trachea appears dilated, possibly secondary to overinflated tracheostomy   cuff.    Mild wall thickening of the ascending colon concerning for colitis,   possibly infectious or inflammatory in etiology.    Cholelithiasis. No CT evidence of acute cholecystitis.    Hepatic steatosis.    --- End of Report     < end of copied text >  < from: CT Abdomen and Pelvis w/ IV Cont (07.25.23 @ 02:40) >  Consolidation in the posterior segment of the right lower lobe,   concerning for aspiration pneumonia. Additional patchy ground glass   opacities in the left upper lobe reflect additional areas of infection.   Small left and trace right pleural effusions.    Trachea appears dilated, possibly secondary to overinflated tracheostomy   cuff.    Mild wall thickening of the ascending colon concerning for colitis,   possibly infectious or inflammatory in etiology.    Cholelithiasis. No CT evidence of acute cholecystitis.    Hepatic steatosis.    < end of copied text >  elizabeth Macedo [facility]- [place] [address]   Interval Events:  68-year-old female past medical history ALS nonverbal at baseline Parkinson's trach PEG vent dependent on home vent brought in by EMS for  increased heart rate and lethargy for few days.  On admission to the hospital the patient was found to be febrile, with (+) lactate of 4.5 (+) UA and possible PNA. Urine culture with VRE E. fecalis, sputum cx with few pseudomonas. Currently on Piptazo. (+) eye cx with coag neg staph.     Overnight:    Febrile 101.4   2118    REVIEW OF SYSTEMS:  [ ] Positive  [ ] All other systems negative  [x ] Unable to assess ROS because mechanical vent to tracheal tube     Vital Signs Last 24 Hrs  T(C): 36.8 (07-30-23 @ 04:43), Max: 37 (07-29-23 @ 19:10)  T(F): 98.3 (07-30-23 @ 04:43), Max: 98.6 (07-29-23 @ 19:10)  HR: 100 (07-30-23 @ 06:29) (68 - 105)  BP: 120/72 (07-30-23 @ 04:43) (101/51 - 132/78)  RR: 16 (07-30-23 @ 04:43) (16 - 19)  SpO2: 98% (07-30-23 @ 06:29) (92% - 99%)    07-29-23 @ 07:01  -  07-30-23 @ 07:00  --------------------------------------------------------  IN: 1610 mL / OUT: 1100 mL / NET: 510 mL      PHYSICAL EXAM:  HEENT:   [x ]Tracheostomy:      no wean        air added to trach cuff  [ ]Pupils equal      jose eyes taped closed   [ ]No oral lesions  [ ]Abnormal    SKIN  [x ]No Rash  [ ] Abnormal  [ ] pressure    CARDIAC  [x ]Regular  [ ]Abnormal    PULMONARY  Mode: AC/ CMV (Assist Control/ Continuous Mandatory Ventilation), RR (machine): 16, TV (machine): 400, FiO2: 30, PEEP: 5, ITime: 1, MAP: 18, PIP: 34  [ ]Bilateral Clear Breath Sounds  [ ]Normal Excursion  [x ]Abnormal rhonchi     GI  [x ]PEG      [ ] +BS	BMs soft, sl watery	              [ ]Soft, nondistended, nontender	  [ ]Abnormal    MUSCULOSKELETAL                                   [xBedbound                 [ ]Abnormal    [ ]Ambulatory/OOB to chair                           EXTREMITIES                                         [ ]Normal  [x ]Edema                           NEUROLOGIC  [ ] Normal, non focal  [ x] Focal findings: no spontaneous movements     PSYCHIATRIC  [ ]Alert and appropriate      unable to determine   [ ] Sedated	 [ ]Agitated  - unresponsive     :   [place] fit straight cath     LINES:  PIV ML     HOSPITAL MEDICATIONS:  MEDICATIONS  (STANDING):  albuterol/ipratropium for Nebulization 3 milliLiter(s) Nebulizer every 6 hours  artificial  tears Solution 1 Drop(s) Both EYES two times a day  chlorhexidine 0.12% Liquid 15 milliLiter(s) Oral Mucosa every 12 hours  chlorhexidine 4% Liquid 1 Application(s) Topical <User Schedule>  erythromycin   Ointment 1 Application(s) Both EYES <User Schedule>  multivitamin 1 Tablet(s) Oral daily  nystatin Powder 1 Application(s) Topical two times a day  piperacillin/tazobactam IVPB.. 3.375 Gram(s) IV Intermittent every 8 hours  polyethylene glycol 3350 17 Gram(s) Oral daily  riluzole 50 milliGRAM(s) Oral two times a day  rivaroxaban 10 milliGRAM(s) Oral daily  senna 2 Tablet(s) Oral at bedtime  sertraline 75 milliGRAM(s) Oral daily  tobramycin 14 mG/mL Fortified Ophthalmic 1 Drop(s) Both EYES every 4 hours  vancomycin 25 mG/mL Fortified Ophthalmic 1 Drop(s) Both EYES every 4 hours    MEDICATIONS  (PRN):  diazepam    Tablet 2 milliGRAM(s) Oral every 6 hours PRN elevated  bp systolic over 150      LABS:                        13.1   15.00 )-----------( 282      ( 29 Jul 2023 07:04 )             41.1     WBC Count: 15.59 K/uL (08-01-23 @ 06:59)  WBC Count: 16.04 K/uL (07-31-23 @ 06:45)  WBC Count: 19.09 K/uL (07-30-23 @ 07:04)  WBC Count: 15.00 K/uL (07-29-23 @ 07:04)  WBC Count: 11.85 K/uL (07-28-23 @ 11:00)    07-29    132<L>  |  98  |  12  ----------------------------<  146<H>  4.2   |  20<L>  |  <0.30<L>    Ca    9.0      29 Jul 2023 07:03  Phos  2.3     07-29  Mg     1.9     07-29      PT/INR - ( 29 Jul 2023 07:05 )   PT: 12.5 sec;   INR: 1.20 ratio         PTT - ( 29 Jul 2023 07:05 )  PTT:32.6 sec  Urinalysis Basic - ( 29 Jul 2023 07:03 )    Color: x / Appearance: x / SG: x / pH: x  Gluc: 146 mg/dL / Ketone: x  / Bili: x / Urobili: x   Blood: x / Protein: x / Nitrite: x   Leuk Esterase: x / RBC: x / WBC x   Sq Epi: x / Non Sq Epi: x / Bacteria: x    (P) labs for this am     CAPILLARY BLOOD GLUCOSE    MICROBIOLOGY:   culrCulture - Blood (07.28.23 @ 10:55)   Specimen Source: .Blood Blood-Peripheral  Culture Results:   No growth at 24 hours      Historical Values  Culture - Blood (07.28.23 @ 10:55)   Specimen Source: .Blood Blood-Peripheral  Culture Results:   No growth at 24 hours  Culture - Blood (07.28.23 @ 08:30)   Specimen Source: .Blood Blood-Peripheral  Culture Results:   No growth at 24 hours  Culture - Blood (07.24.23 @ 22:44)   Specimen Source: .Blood Blood-Peripheral  Culture Results:   No growth at 5 daysCulture - Blood (07.28.23 @ 08:30)   Specimen Source: .Blood Blood-Peripheral  Culture Results:   No growth at 24 hours      Historical Values  Culture - Blood (07.28.23 @ 10:55)   Specimen Source: .Blood Blood-Peripheral  Culture Results:   No growth at 24 hours  Culture - Blood (07.28.23 @ 08:30)   Specimen Source: .Blood Blood-Peripheral  Culture Results:   No growth at 24 hours  Culture - Blood (07.24.23 @ 22:44)   Specimen Source: .Blood Blood-Peripheral  Culture Results:   No growth at 5 days  Culture - Blood (07.24.23 @ 22:22)   Gram Stain:   Growth in anaerobic bottle: Gram Positive Cocci in Clusters  Specimen Source: .Blood Blood-Peripheral  Organism: Blood Culture PCR  Culture Results: Culture - Blood (07.28.23 @ 08:30)   Specimen Source: .Blood Blood-Peripheral  Culture Results:   No growth at 24 hours      Historical Values  Culture - Blood (07.28.23 @ 10:55)   Specimen Source: .Blood Blood-Peripheral  Culture Results:   No growth at 24 hours  Culture - Blood (07.28.23 @ 08:30)   Specimen Source: .Blood Blood-Peripheral  Culture Results:   No growth at 24 hours  Culture - Blood (07.24.23 @ 22:44)   Specimen Source: .Blood Blood-Peripheral  Culture Results:   No growth at 5 days  Culture - Blood (07.24.23 @ 22:22)   Gram Stain:   Growth in anaerobic bottle: Gram Positive Cocci in Clusters  Specimen Source: .Blood Blood-Peripheral  Organism: Blood Culture PCR  Culture Results: Culture - Eye (07.25.23 @ 16:11)   Specimen Source: .Eye Eye-Right  Culture Results:   Rare Coag Negative Staphylococcus "Susceptibilities not performed"      Historical Values  Culture - Eye (07.25.23 @ 16:11)   Specimen Source: .Eye Eye-Right  Culture Results:   Rare Coag Negative Staphylococcus "Susceptibilities not performed"  Culture - Eye (07.25.23 @ 16:11)   Specimen Source: .Eye Eye-Right  Culture Results:   Rare Coag Negative Staphylococcus "Susceptibilities not performed"  Culture - Eye (02.19.23 @ 13:56)   - Vancomycin: S 1  Culture - Eye (07.25.23 @ 16:11)   Specimen Source: .Eye Eye-Right  Culture Results:   Rare Coag Negative Staphylococcus "Susceptibilities not performed"      Historical Values  Culture - Eye (07.25.23 @ 16:11)   Specimen Source: .Eye Eye-Right  Culture Results:   Rare Coag Negative Staphylococcus "Susceptibilities not performed"  Culture - Eye (07.25.23 @ 16:11)   Specimen Source: .Eye Eye-Right  Culture Results:   Rare Coag Negative Staphylococcus "Susceptibilities not performed"Culture - Sputum . (07.25.23 @ 12:33)   - Ciprofloxacin: R 2  - Gentamicin: I 8  - Imipenem: S 2  - Levofloxacin: R 4  - Meropenem: S <=1  - Piperacillin/Tazobactam: S <=8  - Tobramycin: S <=2  Gram Stain:   Few polymorphonuclear leukocytes per low power field   Rare Squamous epithelial cells per low power field   Rare Gram Variable Rods per oil power field  - Amikacin: S <=16  - Aztreonam: S <=4  - Cefepime: S 4  - Ceftazidime: S <=1  Specimen Source: ET Tube ET Tube  Culture Results:   Few Pseudomonas aeruginosa   Normal Respiratory Kia present  Organism Identification: Pseudomonas aeruginosa  Organism: Pseudomonas aeruginosa  Method Type: KULDIP  RADIOLOGY:  < from: Xray Abdomen 1 View PORTABLE -Routine (Xray Abdomen 1 View PORTABLE -Routine in AM.) (07.29.23 @ 09:42) >  ACC: [id number] EXAM:  XR ABDOMEN PORTABLE ROUTINE 1V   ORDERED BY: AYO MURRAY     PROCEDURE DATE:  07/29/2023          INTERPRETATION:  CLINICAL INDICATION: Abdominal distention.    TECHNIQUE: Single frontal view of the abdomen    COMPARISON: CT abdomen 7/25/2023.    FINDINGS:  Nonobstructive bowel gas pattern. No evidence of pneumoperitoneum.  Partially visualized gastrostomy tube    IMPRESSION:  Nonobstructive bowel gas pattern.    --- End of Report ---        < end of copied text >  < from: US Abdomen Upper Quadrant Right (07.25.23 @ 14:34) >  Cholelithiasis without evidence of bile duct dilatation or cholecystitis.  Hepatic steatosis.      < end of copied text >  < from: CT Chest w/ IV Cont (07.25.23 @ 02:52) >  Consolidation in the posterior segment of the right lower lobe,   concerning for aspiration pneumonia. Additional patchy ground glass   opacities in the left upper lobe reflect additional areas of infection.   Small left and trace right pleural effusions.    Trachea appears dilated, possibly secondary to overinflated tracheostomy   cuff.    Mild wall thickening of the ascending colon concerning for colitis,   possibly infectious or inflammatory in etiology.    Cholelithiasis. No CT evidence of acute cholecystitis.    Hepatic steatosis.    --- End of Report     < end of copied text >  < from: CT Abdomen and Pelvis w/ IV Cont (07.25.23 @ 02:40) >  Consolidation in the posterior segment of the right lower lobe,   concerning for aspiration pneumonia. Additional patchy ground glass   opacities in the left upper lobe reflect additional areas of infection.   Small left and trace right pleural effusions.    Trachea appears dilated, possibly secondary to overinflated tracheostomy   cuff.    Mild wall thickening of the ascending colon concerning for colitis,   possibly infectious or inflammatory in etiology.    Cholelithiasis. No CT evidence of acute cholecystitis.    Hepatic steatosis.    < end of copied text >  elizabeth [facility] [id number]

## 2023-08-01 NOTE — PROGRESS NOTE ADULT - ASSESSMENT
68F with PMHx advanced ALS (nonverbal and bedbound at baseline) with chronic hypercapnic respiratory failure and ventilatory dependence via tracheostomy at baseline and Parkinson's dz presenting to [facility] on 7/25/23 with septic shock 2/2 ventilator associated PNA +/- UTI  now with uptrending WBC.    100.3 on admission  WBC on admission 17 (down to 11 now trended up to 19)  7/24 ua wbc 7 many bacteria Urine cx VRE(S to Linezolid, dapto, amp)  7/24 bld cx 1/2 GPC in clusters from anaerobic bottle  7/25 Pseudomonas in trach aspirate(R to Cipro/levaq)  7/28 Bld cx x 2 neg  7/25 CT C/A/P- RLL consolidation + patch ground glass opacities COREY, Mild wall thickening of ascending colon concerning for colitis, cholilithiasis no acute nolberto  7/25-MRSA/MSSA neg  7/29 5 episodes of stooling documented. GI PCR/CDiff not sent  Currently on 7/25 Zosyn (received vanc 1gm on 7/24 and cefepime 2gx 1 on 7/25)      Overall CoNS bacteremia, pneumonia, pseudomonas infection, + leucocytosis, tachycardia,  + urinary retention.   + fever     Plan  Continue Zosyn for PSA pna  repeat blood cx in lab   seems like pt with persistent diarrhea,   send c diff especially given pt still with leucocytosis and fever.   GI PCR negative  Continue to trend WBC, + leucocytosis   U/A with minimal pyuria, urine cx with VRE, ? significance, covered by zosyn already.       Plan discussed with RCU ACP.     Rosalie Garduno  Please contact through MS Teams   If no response or past 5 pm/weekend call [phone].

## 2023-08-02 LAB
ANION GAP SERPL CALC-SCNC: 16 MMOL/L — SIGNIFICANT CHANGE UP (ref 5–17)
APTT BLD: 31 SEC — SIGNIFICANT CHANGE UP (ref 24.5–35.6)
BUN SERPL-MCNC: 14 MG/DL — SIGNIFICANT CHANGE UP (ref 7–23)
C DIFF GDH STL QL: SIGNIFICANT CHANGE UP
C DIFF GDH STL QL: SIGNIFICANT CHANGE UP
CALCIUM SERPL-MCNC: 9.3 MG/DL — SIGNIFICANT CHANGE UP (ref 8.4–10.5)
CHLORIDE SERPL-SCNC: 99 MMOL/L — SIGNIFICANT CHANGE UP (ref 96–108)
CO2 SERPL-SCNC: 23 MMOL/L — SIGNIFICANT CHANGE UP (ref 22–31)
CREAT SERPL-MCNC: <0.3 MG/DL — LOW (ref 0.5–1.3)
CULTURE RESULTS: SIGNIFICANT CHANGE UP
CULTURE RESULTS: SIGNIFICANT CHANGE UP
EGFR: 114 ML/MIN/1.73M2 — SIGNIFICANT CHANGE UP
GLUCOSE SERPL-MCNC: 146 MG/DL — HIGH (ref 70–99)
HCT VFR BLD CALC: 37.1 % — SIGNIFICANT CHANGE UP (ref 34.5–45)
HGB BLD-MCNC: 11.6 G/DL — SIGNIFICANT CHANGE UP (ref 11.5–15.5)
INR BLD: 1.39 RATIO — HIGH (ref 0.85–1.18)
MAGNESIUM SERPL-MCNC: 2 MG/DL — SIGNIFICANT CHANGE UP (ref 1.6–2.6)
MCHC RBC-ENTMCNC: 27.4 PG — SIGNIFICANT CHANGE UP (ref 27–34)
MCHC RBC-ENTMCNC: 31.3 GM/DL — LOW (ref 32–36)
MCV RBC AUTO: 87.5 FL — SIGNIFICANT CHANGE UP (ref 80–100)
NRBC # BLD: 0 /100 WBCS — SIGNIFICANT CHANGE UP (ref 0–0)
PHOSPHATE SERPL-MCNC: 2.6 MG/DL — SIGNIFICANT CHANGE UP (ref 2.5–4.5)
PLATELET # BLD AUTO: 359 K/UL — SIGNIFICANT CHANGE UP (ref 150–400)
POTASSIUM SERPL-MCNC: 3.7 MMOL/L — SIGNIFICANT CHANGE UP (ref 3.5–5.3)
POTASSIUM SERPL-SCNC: 3.7 MMOL/L — SIGNIFICANT CHANGE UP (ref 3.5–5.3)
PROTHROM AB SERPL-ACNC: 15.1 SEC — HIGH (ref 9.5–13)
RBC # BLD: 4.24 M/UL — SIGNIFICANT CHANGE UP (ref 3.8–5.2)
RBC # FLD: 16.6 % — HIGH (ref 10.3–14.5)
SODIUM SERPL-SCNC: 138 MMOL/L — SIGNIFICANT CHANGE UP (ref 135–145)
SPECIMEN SOURCE: SIGNIFICANT CHANGE UP
SPECIMEN SOURCE: SIGNIFICANT CHANGE UP
WBC # BLD: 17.85 K/UL — HIGH (ref 3.8–10.5)
WBC # FLD AUTO: 17.85 K/UL — HIGH (ref 3.8–10.5)

## 2023-08-02 PROCEDURE — 99231 SBSQ HOSP IP/OBS SF/LOW 25: CPT

## 2023-08-02 PROCEDURE — 99233 SBSQ HOSP IP/OBS HIGH 50: CPT

## 2023-08-02 RX ORDER — ACETAMINOPHEN 500 MG
650 TABLET ORAL EVERY 6 HOURS
Refills: 0 | Status: DISCONTINUED | OUTPATIENT
Start: 2023-08-02 | End: 2023-08-04

## 2023-08-02 RX ADMIN — PIPERACILLIN AND TAZOBACTAM 25 GRAM(S): 4; .5 INJECTION, POWDER, LYOPHILIZED, FOR SOLUTION INTRAVENOUS at 14:26

## 2023-08-02 RX ADMIN — SERTRALINE 75 MILLIGRAM(S): 25 TABLET, FILM COATED ORAL at 12:00

## 2023-08-02 RX ADMIN — PIPERACILLIN AND TAZOBACTAM 25 GRAM(S): 4; .5 INJECTION, POWDER, LYOPHILIZED, FOR SOLUTION INTRAVENOUS at 22:11

## 2023-08-02 RX ADMIN — Medication 1 APPLICATION(S): at 04:22

## 2023-08-02 RX ADMIN — NYSTATIN CREAM 1 APPLICATION(S): 100000 CREAM TOPICAL at 17:43

## 2023-08-02 RX ADMIN — Medication 1 APPLICATION(S): at 18:45

## 2023-08-02 RX ADMIN — Medication 3 MILLILITER(S): at 17:51

## 2023-08-02 RX ADMIN — CHLORHEXIDINE GLUCONATE 15 MILLILITER(S): 213 SOLUTION TOPICAL at 05:01

## 2023-08-02 RX ADMIN — Medication 1 APPLICATION(S): at 13:17

## 2023-08-02 RX ADMIN — Medication 1 APPLICATION(S): at 16:23

## 2023-08-02 RX ADMIN — Medication 1 APPLICATION(S): at 20:52

## 2023-08-02 RX ADMIN — Medication 1 TABLET(S): at 12:00

## 2023-08-02 RX ADMIN — Medication 1 APPLICATION(S): at 00:30

## 2023-08-02 RX ADMIN — Medication 1 APPLICATION(S): at 01:20

## 2023-08-02 RX ADMIN — Medication 1 APPLICATION(S): at 07:29

## 2023-08-02 RX ADMIN — Medication 1 APPLICATION(S): at 02:14

## 2023-08-02 RX ADMIN — Medication 650 MILLIGRAM(S): at 05:12

## 2023-08-02 RX ADMIN — PIPERACILLIN AND TAZOBACTAM 25 GRAM(S): 4; .5 INJECTION, POWDER, LYOPHILIZED, FOR SOLUTION INTRAVENOUS at 05:02

## 2023-08-02 RX ADMIN — Medication 3 MILLILITER(S): at 23:27

## 2023-08-02 RX ADMIN — Medication 3 MILLILITER(S): at 11:36

## 2023-08-02 RX ADMIN — Medication 1 DROP(S): at 05:03

## 2023-08-02 RX ADMIN — Medication 1 APPLICATION(S): at 22:52

## 2023-08-02 RX ADMIN — Medication 1 APPLICATION(S): at 08:32

## 2023-08-02 RX ADMIN — Medication 1 APPLICATION(S): at 09:27

## 2023-08-02 RX ADMIN — Medication 1 APPLICATION(S): at 14:28

## 2023-08-02 RX ADMIN — NYSTATIN CREAM 1 APPLICATION(S): 100000 CREAM TOPICAL at 05:03

## 2023-08-02 RX ADMIN — CHLORHEXIDINE GLUCONATE 15 MILLILITER(S): 213 SOLUTION TOPICAL at 18:47

## 2023-08-02 RX ADMIN — Medication 1 APPLICATION(S): at 23:31

## 2023-08-02 RX ADMIN — CHLORHEXIDINE GLUCONATE 1 APPLICATION(S): 213 SOLUTION TOPICAL at 05:10

## 2023-08-02 RX ADMIN — RILUZOLE 50 MILLIGRAM(S): 50 TABLET ORAL at 05:01

## 2023-08-02 RX ADMIN — Medication 1 APPLICATION(S): at 22:11

## 2023-08-02 RX ADMIN — Medication 1 APPLICATION(S): at 03:14

## 2023-08-02 RX ADMIN — Medication 1 APPLICATION(S): at 06:11

## 2023-08-02 RX ADMIN — Medication 3 MILLILITER(S): at 05:46

## 2023-08-02 RX ADMIN — Medication 1 APPLICATION(S): at 05:00

## 2023-08-02 RX ADMIN — RILUZOLE 50 MILLIGRAM(S): 50 TABLET ORAL at 17:43

## 2023-08-02 RX ADMIN — Medication 1 APPLICATION(S): at 10:29

## 2023-08-02 RX ADMIN — Medication 1 APPLICATION(S): at 15:17

## 2023-08-02 RX ADMIN — Medication 1 DROP(S): at 17:45

## 2023-08-02 RX ADMIN — Medication 1 APPLICATION(S): at 12:00

## 2023-08-02 RX ADMIN — Medication 650 MILLIGRAM(S): at 05:11

## 2023-08-02 RX ADMIN — Medication 1 APPLICATION(S): at 11:50

## 2023-08-02 RX ADMIN — RIVAROXABAN 10 MILLIGRAM(S): KIT at 12:01

## 2023-08-02 RX ADMIN — Medication 1 APPLICATION(S): at 17:44

## 2023-08-02 NOTE — PROGRESS NOTE ADULT - SUBJECTIVE AND OBJECTIVE BOX
Patient is a 68y old  Female who presents with a chief complaint of AMS (01 Aug 2023 06:58)      Interval Events:    REVIEW OF SYSTEMS:  [ ] Positive  [ ] All other systems negative  [ ] Unable to assess ROS because ________    Vital Signs Last 24 Hrs  T(C): 38.3 (08-02-23 @ 04:48), Max: 38.3 (08-02-23 @ 04:48)  T(F): 100.9 (08-02-23 @ 04:48), Max: 100.9 (08-02-23 @ 04:48)  HR: 102 (08-02-23 @ 06:02) (89 - 104)  BP: 138/83 (08-02-23 @ 04:48) (126/69 - 174/93)  RR: 16 (08-02-23 @ 04:48) (16 - 20)  SpO2: 98% (08-02-23 @ 06:02) (95% - 100%)    PHYSICAL EXAM:  HEENT:   [x ]Tracheostomy:      on vent no wean          [ ]Pupils equal      jose eyes taped closed   [ ]No oral lesions  [ ]Abnormal    SKIN  [x ]No Rash  [ ] Abnormal  [ ] pressure    CARDIAC  [x ]Regular  [ ]Abnormal    PULMONARY  Mode: AC/ CMV (Assist Control/ Continuous Mandatory Ventilation), RR (machine): 16, TV (machine): 400, FiO2: 30, PEEP: 5, ITime: 1, MAP: 18, PIP: 34  [ ]Bilateral Clear Breath Sounds  [ ]Normal Excursion  [x ]Abnormal rhonchi     GI  [x ]PEG      [ ] +BS	BMs soft, sl watery	              [x ]Soft, mildly distended, nontender	  [ ]Abnormal    MUSCULOSKELETAL                                   [xBedbound                 [ ]Abnormal    [ ]Ambulatory/OOB to chair                           EXTREMITIES                                         [ ]Normal  [x ]Edema                           NEUROLOGIC  [ ] Normal, non focal  [ x] Focal findings: no spontaneous movements     PSYCHIATRIC  [ ]Alert and appropriate      unable to determine   [ ] Sedated	 [ ]Agitated  - unresponsive     :   [place] fit straight cath     LINES:  PIV ML     HOSPITAL MEDICATIONS:  MEDICATIONS  (STANDING):  albuterol/ipratropium for Nebulization 3 milliLiter(s) Nebulizer every 6 hours  artificial  tears Solution 1 Drop(s) Both EYES two times a day  chlorhexidine 0.12% Liquid 15 milliLiter(s) Oral Mucosa every 12 hours  chlorhexidine 4% Liquid 1 Application(s) Topical <User Schedule>  erythromycin   Ointment 1 Application(s) Both EYES <User Schedule>  multivitamin 1 Tablet(s) Oral daily  nystatin Powder 1 Application(s) Topical two times a day  piperacillin/tazobactam IVPB.. 3.375 Gram(s) IV Intermittent every 8 hours  riluzole 50 milliGRAM(s) Oral two times a day  rivaroxaban 10 milliGRAM(s) Enteral Tube daily  sertraline 75 milliGRAM(s) Oral daily    MEDICATIONS  (PRN):  acetaminophen   Oral Liquid .. 650 milliGRAM(s) Oral every 6 hours PRN Temp greater or equal to 38C (100.4F), Mild Pain (1 - 3)  diazepam    Tablet 2 milliGRAM(s) Oral every 6 hours PRN elevated  bp systolic over 150      LABS:                        12.1   15.59 )-----------( 306      ( 01 Aug 2023 06:59 )             36.8     08-01    135  |  100  |  16  ----------------------------<  111<H>  3.7   |  22  |  <0.30<L>    Ca    9.1      01 Aug 2023 06:59  Phos  2.7     08-01  Mg     2.1     08-01      PT/INR - ( 01 Aug 2023 06:59 )   PT: 14.4 sec;   INR: 1.38 ratio         PTT - ( 01 Aug 2023 06:59 )  PTT:32.9 sec  Urinalysis Basic - ( 01 Aug 2023 06:59 )    Color: x / Appearance: x / SG: x / pH: x  Gluc: 111 mg/dL / Ketone: x  / Bili: x / Urobili: x   Blood: x / Protein: x / Nitrite: x   Leuk Esterase: x / RBC: x / WBC x   Sq Epi: x / Non Sq Epi: x / Bacteria: x          CAPILLARY BLOOD GLUCOSE    MICROBIOLOGY:     RADIOLOGY:  [ ] Reviewed and interpreted by me    Mode: AC/ CMV (Assist Control/ Continuous Mandatory Ventilation)  RR (machine): 16  TV (machine): 400  FiO2: 30  PEEP: 5  ITime: 1  MAP: 10  PIP: 30

## 2023-08-02 NOTE — PROGRESS NOTE ADULT - ASSESSMENT
68F extensive medical history including ALS, nonverbal and bedbound, Parkinsons, chronic respiratory failure requiring trach and vent, chronic oropharyngeal dysphagia with PEG presenting for lethargy and tachycardia. She is found to be febrile and admitted for further workup of sepsis due to pneumonia and UTI.   Vancomycin for sputum with Pseudomonas  febrile overnight- cultures resent. Also considered c diff however since cathartics stopped stool back to normal.  subsequently vancomycin stopped, continue zosyn.    Opthalmology discussed need for tarsorrhaphy with - will defer at this time.     8/2 Still with intermittent fevers unclear etiology remains on zosyn for PNA and VRE UTI

## 2023-08-02 NOTE — DIETITIAN NUTRITION RISK NOTIFICATION - TREATMENT: THE FOLLOWING DIET HAS BEEN RECOMMENDED
Diet, NPO with Tube Feed:   Tube Feeding Modality: Gastrostomy  Jevity 1.2 Marck (JEVITY1.2RTH)  Total Volume for 24 Hours (mL): 900  Continuous  Starting Tube Feed Rate {mL per Hour}: 10  Increase Tube Feed Rate by (mL): 10     Every 4 hours  Until Goal Tube Feed Rate (mL per Hour): 75  Tube Feed Duration (in Hours): 12  Tube Feed Start Time: 06:00  Tube Feed Stop Time: 00:00  No Carb Prosource (1pkg = 15gms Protein)     Qty per Day:  1 (07-29-23 @ 10:29) [Active]

## 2023-08-02 NOTE — PROGRESS NOTE ADULT - ASSESSMENT
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, OU  - patient has Parkinson's and ALS, has been being treated outpatient for infection in both eyes, however cannot close own eyes requires taping  - family noting that eyes are producing more discharge and more red than prior  - unable to assess subjective aspects of visual exam 2/2 AMS/averbal  - no rAPD appreciated  - inferior corneal findings with significant stromal cell, prior stromal scarring, neovascularization small epithelial defects with stromal cell and mild discharge OD > OS  - OD also with 10-20% thinning  - cultures of discharge likely contaminant coag negative staph  - continue fortified vancomyin and fortified tobramycin q2 hours both eyes (alternate drops vanc at 12, 4, 8 and tobra at 2, 6, 10 etc..)  - continue erythromycin ointment q1 hour both eyes  - please tape eyes shut, DO NOT USE gauze is not scraping corneal surface as this will make ulceration worse - but make sure eyes are actually CLOSED with tape, not just covering for moisture chamber  - discussed need for tarsorrhaphy with sister in law, however family discussion needs to be had in regards to the GOC  - discussed with  Dr. Mehta 7/26. mentioned how patient has had informal testing at home in regards to ALS and is still registering through eyes and hearing. Explained the risk and benefit of the procedure of tarsorrhaphy, how the eye may continue to have defects, thin, ultimately ulcerate and result in vision loss or loss of the eye.  understand risks, wishes to hold off on procedure at this time in order to preserve senses that wife has left. will continue aggressive management with antibiotics and taping  - ophtho to follow daily    Outpatient Follow-up: Patient should follow-up with his/her ophthalmologist or with [facility] Department of Ophthalmology within 1 week of after discharge at:    [address]  [place] [address]  [phone]    Seen and discussed with Dr. Cayla Flowers

## 2023-08-02 NOTE — CHART NOTE - NSCHARTNOTEFT_GEN_A_CORE
Nutrition Follow Up Note  Patient seen for: follow up    Per chart: 68F extensive medical history including ALS, nonverbal and bedbound, Parkinsons, chronic respiratory failure requiring trach and vent, chronic oropharyngeal dysphagia with PEG presenting for lethargy and tachycardia. She is found to be febrile and admitted for further workup of sepsis due to pneumonia and UTI.      Source: [] Patient       [x] Medical Record        [] RN        [] Family at bedside       [] Other:    -If unable to interview patient: [x] Trach/Vent/BiPAP  [] Disoriented/confused/inappropriate to interview    Diet Order:   Diet, NPO with Tube Feed:   Tube Feeding Modality: Gastrostomy  Jevity 1.2 Marck (JEVITY1.2RTH)  Total Volume for 24 Hours (mL): 900  Continuous  Starting Tube Feed Rate {mL per Hour}: 10  Increase Tube Feed Rate by (mL): 10     Every 4 hours  Until Goal Tube Feed Rate (mL per Hour): 75  Tube Feed Duration (in Hours): 12  Tube Feed Start Time: 06:00  Tube Feed Stop Time: 00:00  No Carb Prosource (1pkg = 15gms Protein)     Qty per Day:  1 (23)    Current regimen providing if 100% provision: 1120kcal (22kcal/kg) and 61g protein (1.2g/kg)  EN provision: ~1266kcal/daily on average over the past 5 days per nursing documentation on flow sheets.     - Is current order appropriate/adequate? see below for recommendations     GI:  Last BM  - small per flow sheets. Bowel Regimen? [] Yes   [x] No    Weights:   Daily Weight in k.1 () (110 pounds), Daily Weight in k.8 () (118 pounds)  This indicates a 8 pound / 6.7% loss since admission.   no edema noted , 1+ edema noted today (see below) which is likely masking further loss.   BMI using most recent weight and EMR height 165.1cm: 18.4    Nutritionally Pertinent MEDICATIONS  (STANDING):  multivitamin  piperacillin/tazobactam IVPB.    Pertinent Labs:  @ 06:39: Na 138, BUN 14, Cr <0.30<L>, <H>, K+ 3.7, Phos 2.6, Mg 2.0, Alk Phos --, ALT/SGPT --, AST/SGOT --, HbA1c --    A1C with Estimated Average Glucose Result: 4.9 % (23 @ 01:33)    Skin per nursing documentation: No pressure injuries noted.  Edema per nursing documentation: 1+ generalized per flow sheets    Based on current weight 50.1kg  Estimated Energy Needs: (25-30 kcals/kG) 5696-4969 kcals  Estimated Protein Needs: (1.1-1.3 gm/kG) 55-65gm  Fluid needs deferred to provider.     Previous Nutrition Diagnosis: No active nutrition diagnosis identified at this time  Nutrition Diagnosis is: [] ongoing  [] resolved [x] not applicable     Nutrition Care Plan:  [x] In Progress  [] Achieved  [] Not applicable    New Nutrition Diagnosis:   1) acute moderate malnutrition related to acuteness of illness as evidenced by 1+ edema, weight loss >5% x 1 week.   2) underweight related to acuteness of illness as evidenced by BMI 18.4    Nutrition Interventions:     Education Provided   [] Yes:  [x] No:     Recommendations:      -Consider increasing tube feeding regimen to better meet needs. consider 85ml/hr x 12 hours and prosource daily to provide a total volume of 1020ml, 1264kcal (25kcal/kg), 67g protein (~1.3g/kg) and 823ml free water. This is ~4.3 cans/daily.   -defer free water flush to team  -nutrition risk placed in chart  -Continue Multivitamin to prevent micronutrient deficiencies     Monitoring and Evaluation:   Continue to monitor nutritional intake, tolerance to diet prescription, weights, labs, skin integrity    RD remains available upon request and will follow up per protocol  Lyn Boss, MS, RD, CDN

## 2023-08-02 NOTE — PROGRESS NOTE ADULT - SUBJECTIVE AND OBJECTIVE BOX
[facility] DEPARTMENT OF OPHTHALMOLOGY  ------------------------------------------------------------------------------  Maximus Kruger MD PGY-3  ------------------------------------------------------------------------------    Interval History: No acute events overnight. Patient not able to respond to questions.     MEDICATIONS  (STANDING):  albuterol/ipratropium for Nebulization 3 milliLiter(s) Nebulizer every 6 hours  artificial  tears Solution 1 Drop(s) Both EYES two times a day  chlorhexidine 0.12% Liquid 15 milliLiter(s) Oral Mucosa every 12 hours  chlorhexidine 4% Liquid 1 Application(s) Topical <User Schedule>  erythromycin   Ointment 1 Application(s) Both EYES <User Schedule>  multivitamin 1 Tablet(s) Oral daily  nystatin Powder 1 Application(s) Topical two times a day  piperacillin/tazobactam IVPB.. 3.375 Gram(s) IV Intermittent every 8 hours  riluzole 50 milliGRAM(s) Oral two times a day  rivaroxaban 10 milliGRAM(s) Enteral Tube daily  sertraline 75 milliGRAM(s) Oral daily    MEDICATIONS  (PRN):  acetaminophen   Oral Liquid .. 650 milliGRAM(s) Oral every 6 hours PRN Temp greater or equal to 38C (100.4F), Mild Pain (1 - 3)  diazepam    Tablet 2 milliGRAM(s) Oral every 6 hours PRN elevated  bp systolic over 150      VITALS: T(C): 36.3 (08-02-23 @ 18:59)  T(F): 97.4 (08-02-23 @ 18:59), Max: 100.9 (08-02-23 @ 04:48)  HR: 89 (08-02-23 @ 21:00) (87 - 104)  BP: 107/91 (08-02-23 @ 18:59) (107/91 - 174/93)  RR:  (16 - 16)  SpO2:  (98% - 100%)  Wt(kg): --  General: AAO x 0    Ophthalmology Exam:  Ophthalmology Exam:  Visual acuity (sc): RONIT 2/2 AMS  Pupils: PERRL OU, no APD  Ttono: STP OU   Extraocular movements (EOMs): RONIT 2/2 AMS  Confrontational Visual Field (CVF): RONIT 2/2 AMS    Pen Light Exam (PLE)  External: Normal OU.  Lids/Lashes/Lacrimal Ducts: severe lagophthalmos OU, essentially unable to close both eyes on her own, lids feel taut   Sclera/Conjunctiva: 2+ injection OU  Cornea:  OD: 3mmx1.5mm corneal epithelial defect, significant stromal scarring with neovascularization, +stromal cell, +discharge, 10-20% thinning 10mm wide 5mmH; OS 2mmx1 mm epidefect, stromal cell, stromal scarring and mild neovascularization  Anterior Chamber: Deep and formed OU.    Iris: Flat OU.  Lens: NS OU.

## 2023-08-02 NOTE — PROGRESS NOTE ADULT - ATTENDING COMMENTS
I have seen and examined patient by bedside today and agree with the above. 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 Discussed possible tarsorrhaphy with family but family hesitant at this time given that patient relies on her hearing and seeing. our ophthalmology team discussed with family that given chronic exposure, risk for corneal perforation, worsening of corneal infection can occur. However family does not want it at this time. Will continue to monitor patient.
I have seen and examined the patient and agree with the note based on resident findings above. Ophthalmology will continue to follow. We recommended tarsorrhaphy but patient's  is a physician who endorses understanding risks of chronic exposure and would like to hold off on definitive treatment at this time.
agree with plan as above. Continue fortified antibiotics and lid taping. Tarsorrhaphy recommended if progressive worsening in ulcer
68F extensive medical history including ALS, nonverbal and bedbound, Parkinsons, chronic respiratory failure requiring trach and vent, chronic oropharyngeal dysphagia with PEG presenting for lethargy and tachycardia. She is found to be febrile and admitted for further workup of sepsis    #Neuro - ALS with advanced neurologic disease - continue home diazepam and Riluzole  - Parkinson's  - Depression - Sertraline  #Pulmonary - acute on chronic respiratory failure requiring mechanical ventilation  - ABG noted and appropriate ventilation. Maintain O2 sat > 90%  - dilated trachea noted on CT scan appears chronic - monitor cuff balloon pressure - has required upsize of tracheostomy in past with CTS  - Consolidation noted on CT  - Chronic LLL bronchiectasis - bronchodilators and airway clearance  #Cardiovascular - hypotension in setting of sepsis  - Continue Midodrine with goal MAP > 60 - BP improved today  - If unable to maintain MAP with hydration may require vasopressors - though has remained off thus far  #Infectious Disease - CT noted with concern for pneumonia and colitis and patient with + UA and history of UTI  - Zosyn to cover bugs based on prior sensitivities  - Follow-up cultures: blood, urine, and sputum. Nasal MRSA negative  #Gastro - oropharyngeal dysphagia - with PEG - on 12 hour feeds at home  - Nutrition evaluation  #Nephrology - hyponatremia improved  - Monitor urine output  #Ophtho evaluation appreciated  - Continue eye drops  - Family not interested in ophthalmic procedures at this time and requests that eyes remain untaped during the days while they are visiting and are agreeable to taping at nighttime. They are aware of the risks.  #DVT proph - on Xarelto 10mg daily at home    Patient is medically stable for transfer to the RCU once bed available. I spoke with patient's son at bedside.    My time in evaluation and management of patient is independent of procedures and/or teaching services.

## 2023-08-02 NOTE — PROGRESS NOTE ADULT - PROBLEM SELECTOR PLAN 5
Chronic PEG  - enteral feeds  - bowel regimen d/sindy   - stools back to normal  -  isolation precautions

## 2023-08-03 DIAGNOSIS — A04.72 ENTEROCOLITIS DUE TO CLOSTRIDIUM DIFFICILE, NOT SPECIFIED AS RECURRENT: ICD-10-CM

## 2023-08-03 DIAGNOSIS — R33.9 RETENTION OF URINE, UNSPECIFIED: ICD-10-CM

## 2023-08-03 LAB
ANION GAP SERPL CALC-SCNC: 15 MMOL/L — SIGNIFICANT CHANGE UP (ref 5–17)
APTT BLD: 31 SEC — SIGNIFICANT CHANGE UP (ref 24.5–35.6)
BUN SERPL-MCNC: 15 MG/DL — SIGNIFICANT CHANGE UP (ref 7–23)
CALCIUM SERPL-MCNC: 9.5 MG/DL — SIGNIFICANT CHANGE UP (ref 8.4–10.5)
CHLORIDE SERPL-SCNC: 98 MMOL/L — SIGNIFICANT CHANGE UP (ref 96–108)
CO2 SERPL-SCNC: 23 MMOL/L — SIGNIFICANT CHANGE UP (ref 22–31)
CREAT SERPL-MCNC: <0.3 MG/DL — LOW (ref 0.5–1.3)
EGFR: 114 ML/MIN/1.73M2 — SIGNIFICANT CHANGE UP
GLUCOSE SERPL-MCNC: 144 MG/DL — HIGH (ref 70–99)
HCT VFR BLD CALC: 35.9 % — SIGNIFICANT CHANGE UP (ref 34.5–45)
HGB BLD-MCNC: 11.3 G/DL — LOW (ref 11.5–15.5)
INR BLD: 1.3 RATIO — HIGH (ref 0.85–1.18)
MAGNESIUM SERPL-MCNC: 2 MG/DL — SIGNIFICANT CHANGE UP (ref 1.6–2.6)
MCHC RBC-ENTMCNC: 27.4 PG — SIGNIFICANT CHANGE UP (ref 27–34)
MCHC RBC-ENTMCNC: 31.5 GM/DL — LOW (ref 32–36)
MCV RBC AUTO: 87.1 FL — SIGNIFICANT CHANGE UP (ref 80–100)
NRBC # BLD: 0 /100 WBCS — SIGNIFICANT CHANGE UP (ref 0–0)
PHOSPHATE SERPL-MCNC: 3.3 MG/DL — SIGNIFICANT CHANGE UP (ref 2.5–4.5)
PLATELET # BLD AUTO: 364 K/UL — SIGNIFICANT CHANGE UP (ref 150–400)
POTASSIUM SERPL-MCNC: 3.8 MMOL/L — SIGNIFICANT CHANGE UP (ref 3.5–5.3)
POTASSIUM SERPL-SCNC: 3.8 MMOL/L — SIGNIFICANT CHANGE UP (ref 3.5–5.3)
PROTHROM AB SERPL-ACNC: 13.5 SEC — HIGH (ref 9.5–13)
RBC # BLD: 4.12 M/UL — SIGNIFICANT CHANGE UP (ref 3.8–5.2)
RBC # FLD: 16.6 % — HIGH (ref 10.3–14.5)
SODIUM SERPL-SCNC: 136 MMOL/L — SIGNIFICANT CHANGE UP (ref 135–145)
WBC # BLD: 17.29 K/UL — HIGH (ref 3.8–10.5)
WBC # FLD AUTO: 17.29 K/UL — HIGH (ref 3.8–10.5)

## 2023-08-03 PROCEDURE — 99233 SBSQ HOSP IP/OBS HIGH 50: CPT | Mod: FS

## 2023-08-03 PROCEDURE — 99232 SBSQ HOSP IP/OBS MODERATE 35: CPT

## 2023-08-03 RX ORDER — BACITRACIN ZINC 500 UNIT/G
1 OINTMENT IN PACKET (EA) TOPICAL
Refills: 0 | Status: DISCONTINUED | OUTPATIENT
Start: 2023-08-03 | End: 2023-08-04

## 2023-08-03 RX ORDER — VANCOMYCIN HCL 1 G
125 VIAL (EA) INTRAVENOUS EVERY 6 HOURS
Refills: 0 | Status: DISCONTINUED | OUTPATIENT
Start: 2023-08-03 | End: 2023-08-04

## 2023-08-03 RX ADMIN — Medication 1 APPLICATION(S): at 10:42

## 2023-08-03 RX ADMIN — Medication 3 MILLILITER(S): at 23:10

## 2023-08-03 RX ADMIN — RIVAROXABAN 10 MILLIGRAM(S): KIT at 11:12

## 2023-08-03 RX ADMIN — Medication 1 APPLICATION(S): at 14:15

## 2023-08-03 RX ADMIN — Medication 1 APPLICATION(S): at 08:36

## 2023-08-03 RX ADMIN — NYSTATIN CREAM 1 APPLICATION(S): 100000 CREAM TOPICAL at 17:52

## 2023-08-03 RX ADMIN — Medication 1 APPLICATION(S): at 12:35

## 2023-08-03 RX ADMIN — Medication 1 APPLICATION(S): at 07:00

## 2023-08-03 RX ADMIN — Medication 1 APPLICATION(S): at 16:59

## 2023-08-03 RX ADMIN — CHLORHEXIDINE GLUCONATE 15 MILLILITER(S): 213 SOLUTION TOPICAL at 05:56

## 2023-08-03 RX ADMIN — Medication 1 APPLICATION(S): at 22:00

## 2023-08-03 RX ADMIN — RILUZOLE 50 MILLIGRAM(S): 50 TABLET ORAL at 05:57

## 2023-08-03 RX ADMIN — Medication 3 MILLILITER(S): at 06:06

## 2023-08-03 RX ADMIN — Medication 1 APPLICATION(S): at 01:15

## 2023-08-03 RX ADMIN — Medication 125 MILLIGRAM(S): at 13:49

## 2023-08-03 RX ADMIN — Medication 1 APPLICATION(S): at 23:12

## 2023-08-03 RX ADMIN — Medication 3 MILLILITER(S): at 17:32

## 2023-08-03 RX ADMIN — Medication 1 APPLICATION(S): at 17:50

## 2023-08-03 RX ADMIN — RILUZOLE 50 MILLIGRAM(S): 50 TABLET ORAL at 17:48

## 2023-08-03 RX ADMIN — Medication 1 DROP(S): at 05:58

## 2023-08-03 RX ADMIN — PIPERACILLIN AND TAZOBACTAM 25 GRAM(S): 4; .5 INJECTION, POWDER, LYOPHILIZED, FOR SOLUTION INTRAVENOUS at 13:36

## 2023-08-03 RX ADMIN — Medication 1 APPLICATION(S): at 09:31

## 2023-08-03 RX ADMIN — Medication 3 MILLILITER(S): at 11:10

## 2023-08-03 RX ADMIN — PIPERACILLIN AND TAZOBACTAM 25 GRAM(S): 4; .5 INJECTION, POWDER, LYOPHILIZED, FOR SOLUTION INTRAVENOUS at 21:32

## 2023-08-03 RX ADMIN — Medication 1 APPLICATION(S): at 11:13

## 2023-08-03 RX ADMIN — Medication 125 MILLIGRAM(S): at 17:55

## 2023-08-03 RX ADMIN — Medication 1 APPLICATION(S): at 13:30

## 2023-08-03 RX ADMIN — Medication 1 DROP(S): at 17:51

## 2023-08-03 RX ADMIN — NYSTATIN CREAM 1 APPLICATION(S): 100000 CREAM TOPICAL at 05:56

## 2023-08-03 RX ADMIN — Medication 1 APPLICATION(S): at 02:28

## 2023-08-03 RX ADMIN — Medication 1 APPLICATION(S): at 21:31

## 2023-08-03 RX ADMIN — Medication 1 APPLICATION(S): at 05:56

## 2023-08-03 RX ADMIN — SERTRALINE 75 MILLIGRAM(S): 25 TABLET, FILM COATED ORAL at 11:12

## 2023-08-03 RX ADMIN — Medication 1 APPLICATION(S): at 03:03

## 2023-08-03 RX ADMIN — Medication 125 MILLIGRAM(S): at 23:13

## 2023-08-03 RX ADMIN — CHLORHEXIDINE GLUCONATE 1 APPLICATION(S): 213 SOLUTION TOPICAL at 05:55

## 2023-08-03 RX ADMIN — Medication 1 APPLICATION(S): at 19:14

## 2023-08-03 RX ADMIN — Medication 125 MILLIGRAM(S): at 05:56

## 2023-08-03 RX ADMIN — Medication 1 APPLICATION(S): at 00:17

## 2023-08-03 RX ADMIN — Medication 1 APPLICATION(S): at 21:16

## 2023-08-03 RX ADMIN — Medication 1 APPLICATION(S): at 06:09

## 2023-08-03 RX ADMIN — CHLORHEXIDINE GLUCONATE 15 MILLILITER(S): 213 SOLUTION TOPICAL at 17:57

## 2023-08-03 RX ADMIN — Medication 1 APPLICATION(S): at 20:21

## 2023-08-03 RX ADMIN — Medication 1 APPLICATION(S): at 04:02

## 2023-08-03 RX ADMIN — Medication 1 APPLICATION(S): at 15:31

## 2023-08-03 RX ADMIN — PIPERACILLIN AND TAZOBACTAM 25 GRAM(S): 4; .5 INJECTION, POWDER, LYOPHILIZED, FOR SOLUTION INTRAVENOUS at 05:57

## 2023-08-03 RX ADMIN — Medication 1 TABLET(S): at 11:12

## 2023-08-03 NOTE — PROGRESS NOTE ADULT - PROBLEM SELECTOR PLAN 4
- F/U urine culture sensitivities  - Continue Zosyn per ID  - continue with straight cath - Likely from urinary retention  - Zosyn 7/25 -->8/3 (10 day treatment)

## 2023-08-03 NOTE — PROGRESS NOTE ADULT - PROBLEM SELECTOR PLAN 6
- Uosm 248, Orly 26  - TSH 4.3  - f/u AM cortisol  - will continue to monitor for now / if worsening will repeat urine osm and NA - Likely was having overflow incontinence at home.    - Required intermittent straight caths during hospitalization.  -  Family willing to continue straight caths at home, instructed to perform 3 times a day (every 8 hours)

## 2023-08-03 NOTE — PROGRESS NOTE ADULT - PROBLEM SELECTOR PLAN 7
- Continue Xarelto Chronic PEG  - enteral feeds  - bowel regimen d/sindy   - stools back to normal  -  isolation precautions

## 2023-08-03 NOTE — PROGRESS NOTE ADULT - ASSESSMENT
68F extensive medical history including ALS, nonverbal and bedbound, Parkinsons, chronic respiratory failure requiring trach and vent, chronic oropharyngeal dysphagia with PEG presenting for lethargy and tachycardia. She is found to be febrile and admitted for further workup of sepsis due to pneumonia and UTI.   Vancomycin for sputum with Pseudomonas  febrile overnight- cultures resent. Also considered c diff however since cathartics stopped stool back to normal.  subsequently vancomycin stopped, continue zosyn.    Opthalmology discussed need for tarsorrhaphy with - will defer at this time.     8/2 Still with intermittent fevers unclear etiology remains on zosyn for PNA and VRE UTI 68F extensive medical history including ALS, nonverbal and bedbound, Parkinsons, chronic respiratory failure requiring trach and vent, chronic oropharyngeal dysphagia with PEG presenting for lethargy and tachycardia. She is found to be febrile and admitted for further workup of sepsis due to pneumonia and UTI.   Vancomycin for sputum with Pseudomonas  febrile overnight- cultures resent. Also considered c diff however since cathartics stopped stool back to normal.  subsequently vancomycin stopped, continue zosyn.    Opthalmology discussed need for tarsorrhaphy with - will defer at this time.     8/2 Still with intermittent fevers unclear etiology remains on zosyn for PNA and VRE UTI  8/3: Last fever 100.9F yesterday morning.  Leukocytosis stable at 17.  Cdiff positive.  As discussed with ID will treat with Vancomycin PO for 14 days.  Patient's family advised to perform intermittent straight catheterizations throughout the day as it has been required during her hospitalization.   As explained patient likely was having overflow incontinence at home and not fully voiding.  Staff will teach patient's  how to straight cath.

## 2023-08-03 NOTE — PROVIDER CONTACT NOTE (OTHER) - SITUATION
Pt BP taken @4:11 is 97/64; BP retaken @5:15 140/80 and HR 98
patient tested positive for C.diff with sample sent earlier in night

## 2023-08-03 NOTE — PROGRESS NOTE ADULT - SUBJECTIVE AND OBJECTIVE BOX
Patient is a 68y old  Female who presents with a chief complaint of AMS (01 Aug 2023 06:58)      Interval Events:    REVIEW OF SYSTEMS:  [ ] Positive  [ ] All other systems negative  [ ] Unable to assess ROS because ________    Vital Signs Last 24 Hrs  T(C): 36.7 (08-03-23 @ 04:39), Max: 36.9 (08-02-23 @ 11:11)  T(F): 98.1 (08-03-23 @ 04:39), Max: 98.4 (08-02-23 @ 11:11)  HR: 100 (08-03-23 @ 06:06) (87 - 109)  BP: 108/68 (08-03-23 @ 04:39) (107/91 - 146/77)  RR: 16 (08-03-23 @ 04:39) (16 - 16)  SpO2: 100% (08-03-23 @ 06:06) (98% - 100%)    PHYSICAL EXAM:  HEENT:   [ ]Tracheostomy:  [ ]Pupils equal  [ ]No oral lesions  [ ]Abnormal    SKIN  [ ]No Rash  [ ] Abnormal  [ ] pressure    CARDIAC  [ ]Regular  [ ]Abnormal    PULMONARY  [ ]Bilateral Clear Breath Sounds  [ ]Normal Excursion  [ ]Abnormal    GI  [ ]PEG      [ ] +BS		              [ ]Soft, nondistended, nontender	  [ ]Abnormal    MUSCULOSKELETAL                                   [ ]Bedbound                 [ ]Abnormal    [ ]Ambulatory/OOB to chair                           EXTREMITIES                                         [ ]Normal  [ ]Edema                           NEUROLOGIC  [ ] Normal, non focal  [ ] Focal findings:    PSYCHIATRIC  [ ]Alert and appropriate  [ ] Sedated	 [ ]Agitated    :  Newman: [ ] Yes, if yes: Date of Placement:                   [  ] No    LINES: Central Lines [ ] Yes, if yes: Date of Placement                                     [  ] No    HOSPITAL MEDICATIONS:  MEDICATIONS  (STANDING):  albuterol/ipratropium for Nebulization 3 milliLiter(s) Nebulizer every 6 hours  artificial  tears Solution 1 Drop(s) Both EYES two times a day  chlorhexidine 0.12% Liquid 15 milliLiter(s) Oral Mucosa every 12 hours  chlorhexidine 4% Liquid 1 Application(s) Topical <User Schedule>  erythromycin   Ointment 1 Application(s) Both EYES <User Schedule>  multivitamin 1 Tablet(s) Oral daily  nystatin Powder 1 Application(s) Topical two times a day  piperacillin/tazobactam IVPB.. 3.375 Gram(s) IV Intermittent every 8 hours  riluzole 50 milliGRAM(s) Oral two times a day  rivaroxaban 10 milliGRAM(s) Enteral Tube daily  sertraline 75 milliGRAM(s) Oral daily  vancomycin    Solution 125 milliGRAM(s) Oral every 6 hours    MEDICATIONS  (PRN):  acetaminophen   Oral Liquid .. 650 milliGRAM(s) Oral every 6 hours PRN Temp greater or equal to 38C (100.4F), Mild Pain (1 - 3)  diazepam    Tablet 2 milliGRAM(s) Oral every 6 hours PRN elevated  bp systolic over 150      LABS:                        11.3   17.29 )-----------( 364      ( 03 Aug 2023 06:36 )             35.9     08-03    136  |  98  |  15  ----------------------------<  144<H>  3.8   |  23  |  <0.30<L>    Ca    9.5      03 Aug 2023 06:36  Phos  3.3     08-03  Mg     2.0     08-03      PT/INR - ( 03 Aug 2023 06:46 )   PT: 13.5 sec;   INR: 1.30 ratio         PTT - ( 03 Aug 2023 06:46 )  PTT:31.0 sec  Urinalysis Basic - ( 03 Aug 2023 06:36 )    Color: x / Appearance: x / SG: x / pH: x  Gluc: 144 mg/dL / Ketone: x  / Bili: x / Urobili: x   Blood: x / Protein: x / Nitrite: x   Leuk Esterase: x / RBC: x / WBC x   Sq Epi: x / Non Sq Epi: x / Bacteria: x          CAPILLARY BLOOD GLUCOSE    MICROBIOLOGY:     RADIOLOGY:  [ ] Reviewed and interpreted by me    Mode: AC/ CMV (Assist Control/ Continuous Mandatory Ventilation)  RR (machine): 16  TV (machine): 400  FiO2: 30  PEEP: 5  ITime: 1  MAP: 10  PIP: 29   Patient is a 68y old  Female who presents with a chief complaint of AMS (01 Aug 2023 06:58)      Interval Events: No events reported over night    REVIEW OF SYSTEMS:  [ ] Positive  [ ] All other systems negative  [X] Unable to assess ROS because _____Lacks capacity to communicate    Vital Signs Last 24 Hrs  T(C): 36.7 (08-03-23 @ 04:39), Max: 36.9 (08-02-23 @ 11:11)  T(F): 98.1 (08-03-23 @ 04:39), Max: 98.4 (08-02-23 @ 11:11)  HR: 100 (08-03-23 @ 06:06) (87 - 109)  BP: 108/68 (08-03-23 @ 04:39) (107/91 - 146/77)  RR: 16 (08-03-23 @ 04:39) (16 - 16)  SpO2: 100% (08-03-23 @ 06:06) (98% - 100%)    PHYSICAL EXAM:  HEENT:   [X] Tracheostomy: #8 distal XLT Portex  [X] PERRL B/L  [X] No oral lesions  [ ] Abnormal    SKIN  [ ] No Rash  [X] Abnormal:  Right upper forearm blanchable, erythematous flat  localized  [ ] pressure    CARDIAC  [X] Regular  [ ] Abnormal    PULMONARY  [X] Bilateral Clear Breath Sounds  [ ] Normal Excursion  [ ] Abnormal    GI  [X] PEG      [X] +BS		              [X] Soft, nondistended, nontender	  [ ] Abnormal    MUSCULOSKELETAL                                   [X] Bedbound                 [ ] Abnormal    [ ] Ambulatory/OOB to chair                           EXTREMITIES                                         [X] Normal  [ ]Edema                           NEUROLOGIC  [ ] Normal, non focal  [X] Focal findings:  Alert however unable to communicate; non-verbal;     PSYCHIATRIC  [ ]Alert and appropriate  [ ] Sedated	 [ ]Agitated    :  Newman: [ ] Yes, if yes: Date of Placement:                   [  ] No    LINES: Central Lines [ ] Yes, if yes: Date of Placement                                     [  ] No    HOSPITAL MEDICATIONS:  MEDICATIONS  (STANDING):  albuterol/ipratropium for Nebulization 3 milliLiter(s) Nebulizer every 6 hours  artificial  tears Solution 1 Drop(s) Both EYES two times a day  chlorhexidine 0.12% Liquid 15 milliLiter(s) Oral Mucosa every 12 hours  chlorhexidine 4% Liquid 1 Application(s) Topical <User Schedule>  erythromycin   Ointment 1 Application(s) Both EYES <User Schedule>  multivitamin 1 Tablet(s) Oral daily  nystatin Powder 1 Application(s) Topical two times a day  piperacillin/tazobactam IVPB.. 3.375 Gram(s) IV Intermittent every 8 hours  riluzole 50 milliGRAM(s) Oral two times a day  rivaroxaban 10 milliGRAM(s) Enteral Tube daily  sertraline 75 milliGRAM(s) Oral daily  vancomycin    Solution 125 milliGRAM(s) Oral every 6 hours    MEDICATIONS  (PRN):  acetaminophen   Oral Liquid .. 650 milliGRAM(s) Oral every 6 hours PRN Temp greater or equal to 38C (100.4F), Mild Pain (1 - 3)  diazepam    Tablet 2 milliGRAM(s) Oral every 6 hours PRN elevated  bp systolic over 150      LABS:                        11.3   17.29 )-----------( 364      ( 03 Aug 2023 06:36 )             35.9     08-03    136  |  98  |  15  ----------------------------<  144<H>  3.8   |  23  |  <0.30<L>    Ca    9.5      03 Aug 2023 06:36  Phos  3.3     08-03  Mg     2.0     08-03      PT/INR - ( 03 Aug 2023 06:46 )   PT: 13.5 sec;   INR: 1.30 ratio         PTT - ( 03 Aug 2023 06:46 )  PTT:31.0 sec  Urinalysis Basic - ( 03 Aug 2023 06:36 )    Color: x / Appearance: x / SG: x / pH: x  Gluc: 144 mg/dL / Ketone: x  / Bili: x / Urobili: x   Blood: x / Protein: x / Nitrite: x   Leuk Esterase: x / RBC: x / WBC x   Sq Epi: x / Non Sq Epi: x / Bacteria: x          CAPILLARY BLOOD GLUCOSE    MICROBIOLOGY:     RADIOLOGY:  [ ] Reviewed and interpreted by me    Mode: AC/ CMV (Assist Control/ Continuous Mandatory Ventilation)  RR (machine): 16  TV (machine): 400  FiO2: 30  PEEP: 5  ITime: 1  MAP: 10  PIP: 29   Patient is a 68y old  Female who presents with a chief complaint of AMS (01 Aug 2023 06:58)      Interval Events: No events reported over night    REVIEW OF SYSTEMS:  [ ] Positive  [ ] All other systems negative  [X] Unable to assess ROS because _____Lacks capacity to communicate    Vital Signs Last 24 Hrs  T(C): 36.7 (08-03-23 @ 04:39), Max: 36.9 (08-02-23 @ 11:11)  T(F): 98.1 (08-03-23 @ 04:39), Max: 98.4 (08-02-23 @ 11:11)  HR: 100 (08-03-23 @ 06:06) (87 - 109)  BP: 108/68 (08-03-23 @ 04:39) (107/91 - 146/77)  RR: 16 (08-03-23 @ 04:39) (16 - 16)  SpO2: 100% (08-03-23 @ 06:06) (98% - 100%)    PHYSICAL EXAM:  HEENT:   [X] Tracheostomy: #8 distal XLT Portex  [X] PERRL B/L  [X] No oral lesions  [ ] Abnormal    SKIN  [ ] No Rash  [X] Abnormal:  Right upper forearm blanchable, erythematous flat  localized  [ ] pressure    CARDIAC  [X] Regular  [ ] Abnormal    PULMONARY  [X] Bilateral Clear Breath Sounds  [ ] Normal Excursion  [ ] Abnormal    GI  [X] PEG      [X] +BS		              [X] Soft, nondistended, nontender	  [ ] Abnormal    MUSCULOSKELETAL                                   [X] Bedbound                 [ ] Abnormal    [ ] Ambulatory/OOB to chair                           EXTREMITIES                                         [X] Normal  [ ]Edema                           NEUROLOGIC  [ ] Normal, non focal  [X] Focal findings:  Alert however unable to communicate; +gag reflex; non-verbal; no active movement of limbs observed.     PSYCHIATRIC  [X] Alert however unable to assess further  [ ] Sedated	 [ ]Agitated    :  Newman: [X] Yes, if yes: Date of Placement:                   [  ] No    LINES: Central Lines [ ] Yes, if yes: Date of Placement                                     [X] No    HOSPITAL MEDICATIONS:  MEDICATIONS  (STANDING):  albuterol/ipratropium for Nebulization 3 milliLiter(s) Nebulizer every 6 hours  artificial  tears Solution 1 Drop(s) Both EYES two times a day  chlorhexidine 0.12% Liquid 15 milliLiter(s) Oral Mucosa every 12 hours  chlorhexidine 4% Liquid 1 Application(s) Topical <User Schedule>  erythromycin   Ointment 1 Application(s) Both EYES <User Schedule>  multivitamin 1 Tablet(s) Oral daily  nystatin Powder 1 Application(s) Topical two times a day  piperacillin/tazobactam IVPB.. 3.375 Gram(s) IV Intermittent every 8 hours  riluzole 50 milliGRAM(s) Oral two times a day  rivaroxaban 10 milliGRAM(s) Enteral Tube daily  sertraline 75 milliGRAM(s) Oral daily  vancomycin    Solution 125 milliGRAM(s) Oral every 6 hours    MEDICATIONS  (PRN):  acetaminophen   Oral Liquid .. 650 milliGRAM(s) Oral every 6 hours PRN Temp greater or equal to 38C (100.4F), Mild Pain (1 - 3)  diazepam    Tablet 2 milliGRAM(s) Oral every 6 hours PRN elevated  bp systolic over 150      LABS:                        11.3   17.29 )-----------( 364      ( 03 Aug 2023 06:36 )             35.9     08-03    136  |  98  |  15  ----------------------------<  144<H>  3.8   |  23  |  <0.30<L>    Ca    9.5      03 Aug 2023 06:36  Phos  3.3     08-03  Mg     2.0     08-03      PT/INR - ( 03 Aug 2023 06:46 )   PT: 13.5 sec;   INR: 1.30 ratio         PTT - ( 03 Aug 2023 06:46 )  PTT:31.0 sec  Urinalysis Basic - ( 03 Aug 2023 06:36 )    Color: x / Appearance: x / SG: x / pH: x  Gluc: 144 mg/dL / Ketone: x  / Bili: x / Urobili: x   Blood: x / Protein: x / Nitrite: x   Leuk Esterase: x / RBC: x / WBC x   Sq Epi: x / Non Sq Epi: x / Bacteria: x          CAPILLARY BLOOD GLUCOSE    MICROBIOLOGY:     RADIOLOGY:  [ ] Reviewed and interpreted by me    Mode: AC/ CMV (Assist Control/ Continuous Mandatory Ventilation)  RR (machine): 16  TV (machine): 400  FiO2: 30  PEEP: 5  ITime: 1  MAP: 10  PIP: 29

## 2023-08-03 NOTE — PROGRESS NOTE ADULT - SUBJECTIVE AND OBJECTIVE BOX
68yPatient is a 68y old  Female who presents with a chief complaint of AMS (01 Aug 2023 06:58)      Interval history:  Afebrile, not much secretions.       Allergies:   Bactrim DS (Rash)    Antimicrobials:  piperacillin/tazobactam IVPB.. 3.375 Gram(s) IV Intermittent every 8 hours  vancomycin    Solution 125 milliGRAM(s) Oral every 6 hours      REVIEW OF SYSTEMS:  Unable to obtain due to pt's underlying mental status.       Vital Signs Last 24 Hrs  T(C): 37.1 (08-03-23 @ 22:00), Max: 37.1 (08-03-23 @ 22:00)  T(F): 98.7 (08-03-23 @ 22:00), Max: 98.7 (08-03-23 @ 22:00)  HR: 105 (08-03-23 @ 22:00) (92 - 111)  BP: 149/91 (08-03-23 @ 22:00) (108/68 - 149/91)  BP(mean): --  RR: 16 (08-03-23 @ 22:00) (16 - 23)  SpO2: 100% (08-03-23 @ 22:00) (97% - 100%)      PHYSICAL EXAM:  Pt in no acute distress,  eyes covered.   + trach   + peg, distended abdomen  no carter   no phlebitis                             11.3   17.29 )-----------( 364      ( 03 Aug 2023 06:36 )             35.9   08-03    136  |  98  |  15  ----------------------------<  144<H>  3.8   |  23  |  <0.30<L>    Ca    9.5      03 Aug 2023 06:36  Phos  3.3     08-03  Mg     2.0     08-03      Culture - Blood (07.31.23 @ 22:14)   Specimen Source: .Blood Blood-Peripheral  Culture Results:   No growth at 48 Hours    Clostridium difficile Toxin by PCR (08.02.23 @ 21:50)   C Diff by PCR Result: Detected:

## 2023-08-03 NOTE — PROGRESS NOTE ADULT - ASSESSMENT
68F with PMHx advanced ALS (nonverbal and bedbound at baseline) with chronic hypercapnic respiratory failure and ventilatory dependence via tracheostomy at baseline and Parkinson's dz presenting to [facility] on 7/25/23 with septic shock 2/2 ventilator associated PNA +/- UTI  now with uptrending WBC.    100.3 on admission  WBC on admission 17 (down to 11 now trended up to 19)  7/24 ua wbc 7 many bacteria Urine cx VRE(S to Linezolid, dapto, amp)  7/24 bld cx 1/2 GPC in clusters from anaerobic bottle  7/25 Pseudomonas in trach aspirate(R to Cipro/levaq)  7/28 Bld cx x 2 neg  7/25 CT C/A/P- RLL consolidation + patch ground glass opacities COREY, Mild wall thickening of ascending colon concerning for colitis, cholilithiasis no acute nolberto  7/25-MRSA/MSSA neg  7/29 5 episodes of stooling documented. GI PCR/CDiff not sent  Currently on 7/25 Zosyn (received vanc 1gm on 7/24 and cefepime 2gx 1 on 7/25)      Overall CoNS bacteremia, pneumonia, pseudomonas infection, + leucocytosis, tachycardia,  fever now resolved   c diff colitis     Plan  s/p completion of Zosyn for PSA pna  stopped zosyn especially in setting of c difff colitis   s/p almost 9 days of abx.   repeat blood cx NTD   GI PCR negative  Continue to trend WBC, + leucocytosis   needs 14 days of therapy with po vanco       Plan discussed with [facility] ACP.     Will sign off, please call with questions.     Rosalie Garduno  Please contact through MS Teams   If no response or past 5 pm/weekend call [phone].

## 2023-08-03 NOTE — PROGRESS NOTE ADULT - PROBLEM SELECTOR PLAN 5
Chronic PEG  - enteral feeds  - bowel regimen d/sindy   - stools back to normal  -  isolation precautions - diarrhea, C diff positive  - Vancomycin 125mg PO Q6H 8/3 --> 8/16 (14 day treatment)

## 2023-08-04 ENCOUNTER — TRANSCRIPTION ENCOUNTER (OUTPATIENT)
Age: 68
End: 2023-08-04

## 2023-08-04 VITALS — HEART RATE: 90 BPM | OXYGEN SATURATION: 97 %

## 2023-08-04 DIAGNOSIS — R13.10 DYSPHAGIA, UNSPECIFIED: ICD-10-CM

## 2023-08-04 DIAGNOSIS — A04.72 ENTEROCOLITIS DUE TO CLOSTRIDIUM DIFFICILE, NOT SPECIFIED AS RECURRENT: ICD-10-CM

## 2023-08-04 LAB
ANION GAP SERPL CALC-SCNC: 14 MMOL/L — SIGNIFICANT CHANGE UP (ref 5–17)
BUN SERPL-MCNC: 12 MG/DL — SIGNIFICANT CHANGE UP (ref 7–23)
CALCIUM SERPL-MCNC: 9.3 MG/DL — SIGNIFICANT CHANGE UP (ref 8.4–10.5)
CHLORIDE SERPL-SCNC: 101 MMOL/L — SIGNIFICANT CHANGE UP (ref 96–108)
CO2 SERPL-SCNC: 21 MMOL/L — LOW (ref 22–31)
CREAT SERPL-MCNC: <0.3 MG/DL — LOW (ref 0.5–1.3)
CULTURE RESULTS: SIGNIFICANT CHANGE UP
EGFR: 114 ML/MIN/1.73M2 — SIGNIFICANT CHANGE UP
GLUCOSE SERPL-MCNC: 104 MG/DL — HIGH (ref 70–99)
HCT VFR BLD CALC: 35 % — SIGNIFICANT CHANGE UP (ref 34.5–45)
HGB BLD-MCNC: 11 G/DL — LOW (ref 11.5–15.5)
MAGNESIUM SERPL-MCNC: 2.2 MG/DL — SIGNIFICANT CHANGE UP (ref 1.6–2.6)
MCHC RBC-ENTMCNC: 27.2 PG — SIGNIFICANT CHANGE UP (ref 27–34)
MCHC RBC-ENTMCNC: 31.4 GM/DL — LOW (ref 32–36)
MCV RBC AUTO: 86.6 FL — SIGNIFICANT CHANGE UP (ref 80–100)
NRBC # BLD: 0 /100 WBCS — SIGNIFICANT CHANGE UP (ref 0–0)
ORGANISM # SPEC MICROSCOPIC CNT: SIGNIFICANT CHANGE UP
ORGANISM # SPEC MICROSCOPIC CNT: SIGNIFICANT CHANGE UP
PHOSPHATE SERPL-MCNC: 3 MG/DL — SIGNIFICANT CHANGE UP (ref 2.5–4.5)
PLATELET # BLD AUTO: 358 K/UL — SIGNIFICANT CHANGE UP (ref 150–400)
POTASSIUM SERPL-MCNC: 3.8 MMOL/L — SIGNIFICANT CHANGE UP (ref 3.5–5.3)
POTASSIUM SERPL-SCNC: 3.8 MMOL/L — SIGNIFICANT CHANGE UP (ref 3.5–5.3)
RBC # BLD: 4.04 M/UL — SIGNIFICANT CHANGE UP (ref 3.8–5.2)
RBC # FLD: 16.3 % — HIGH (ref 10.3–14.5)
SODIUM SERPL-SCNC: 136 MMOL/L — SIGNIFICANT CHANGE UP (ref 135–145)
SPECIMEN SOURCE: SIGNIFICANT CHANGE UP
WBC # BLD: 12.81 K/UL — HIGH (ref 3.8–10.5)
WBC # FLD AUTO: 12.81 K/UL — HIGH (ref 3.8–10.5)

## 2023-08-04 PROCEDURE — 87070 CULTURE OTHR SPECIMN AEROBIC: CPT

## 2023-08-04 PROCEDURE — 85610 PROTHROMBIN TIME: CPT

## 2023-08-04 PROCEDURE — 96374 THER/PROPH/DIAG INJ IV PUSH: CPT

## 2023-08-04 PROCEDURE — 87150 DNA/RNA AMPLIFIED PROBE: CPT

## 2023-08-04 PROCEDURE — 84100 ASSAY OF PHOSPHORUS: CPT

## 2023-08-04 PROCEDURE — 74177 CT ABD & PELVIS W/CONTRAST: CPT | Mod: MA

## 2023-08-04 PROCEDURE — 87641 MR-STAPH DNA AMP PROBE: CPT

## 2023-08-04 PROCEDURE — 85025 COMPLETE CBC W/AUTO DIFF WBC: CPT

## 2023-08-04 PROCEDURE — 87077 CULTURE AEROBIC IDENTIFY: CPT

## 2023-08-04 PROCEDURE — 85027 COMPLETE CBC AUTOMATED: CPT

## 2023-08-04 PROCEDURE — 84484 ASSAY OF TROPONIN QUANT: CPT

## 2023-08-04 PROCEDURE — 87324 CLOSTRIDIUM AG IA: CPT

## 2023-08-04 PROCEDURE — 93005 ELECTROCARDIOGRAM TRACING: CPT

## 2023-08-04 PROCEDURE — 83935 ASSAY OF URINE OSMOLALITY: CPT

## 2023-08-04 PROCEDURE — 99239 HOSP IP/OBS DSCHRG MGMT >30: CPT

## 2023-08-04 PROCEDURE — 82947 ASSAY GLUCOSE BLOOD QUANT: CPT

## 2023-08-04 PROCEDURE — 85730 THROMBOPLASTIN TIME PARTIAL: CPT

## 2023-08-04 PROCEDURE — 84443 ASSAY THYROID STIM HORMONE: CPT

## 2023-08-04 PROCEDURE — 71260 CT THORAX DX C+: CPT | Mod: MA

## 2023-08-04 PROCEDURE — 87186 SC STD MICRODIL/AGAR DIL: CPT

## 2023-08-04 PROCEDURE — 87449 NOS EACH ORGANISM AG IA: CPT

## 2023-08-04 PROCEDURE — 87640 STAPH A DNA AMP PROBE: CPT

## 2023-08-04 PROCEDURE — 84300 ASSAY OF URINE SODIUM: CPT

## 2023-08-04 PROCEDURE — 87040 BLOOD CULTURE FOR BACTERIA: CPT

## 2023-08-04 PROCEDURE — 94640 AIRWAY INHALATION TREATMENT: CPT

## 2023-08-04 PROCEDURE — 96375 TX/PRO/DX INJ NEW DRUG ADDON: CPT

## 2023-08-04 PROCEDURE — 82803 BLOOD GASES ANY COMBINATION: CPT

## 2023-08-04 PROCEDURE — 85014 HEMATOCRIT: CPT

## 2023-08-04 PROCEDURE — 0225U NFCT DS DNA&RNA 21 SARSCOV2: CPT

## 2023-08-04 PROCEDURE — 94003 VENT MGMT INPAT SUBQ DAY: CPT

## 2023-08-04 PROCEDURE — 76705 ECHO EXAM OF ABDOMEN: CPT

## 2023-08-04 PROCEDURE — 82533 TOTAL CORTISOL: CPT

## 2023-08-04 PROCEDURE — 87507 IADNA-DNA/RNA PROBE TQ 12-25: CPT

## 2023-08-04 PROCEDURE — 87493 C DIFF AMPLIFIED PROBE: CPT

## 2023-08-04 PROCEDURE — 83605 ASSAY OF LACTIC ACID: CPT

## 2023-08-04 PROCEDURE — P9045: CPT

## 2023-08-04 PROCEDURE — 82435 ASSAY OF BLOOD CHLORIDE: CPT

## 2023-08-04 PROCEDURE — 87086 URINE CULTURE/COLONY COUNT: CPT

## 2023-08-04 PROCEDURE — 94799 UNLISTED PULMONARY SVC/PX: CPT

## 2023-08-04 PROCEDURE — 80048 BASIC METABOLIC PNL TOTAL CA: CPT

## 2023-08-04 PROCEDURE — 82330 ASSAY OF CALCIUM: CPT

## 2023-08-04 PROCEDURE — 83735 ASSAY OF MAGNESIUM: CPT

## 2023-08-04 PROCEDURE — 81001 URINALYSIS AUTO W/SCOPE: CPT

## 2023-08-04 PROCEDURE — 84132 ASSAY OF SERUM POTASSIUM: CPT

## 2023-08-04 PROCEDURE — 74018 RADEX ABDOMEN 1 VIEW: CPT

## 2023-08-04 PROCEDURE — 85018 HEMOGLOBIN: CPT

## 2023-08-04 PROCEDURE — 36415 COLL VENOUS BLD VENIPUNCTURE: CPT

## 2023-08-04 PROCEDURE — 99285 EMERGENCY DEPT VISIT HI MDM: CPT | Mod: 25

## 2023-08-04 PROCEDURE — 80053 COMPREHEN METABOLIC PANEL: CPT

## 2023-08-04 PROCEDURE — 84295 ASSAY OF SERUM SODIUM: CPT

## 2023-08-04 PROCEDURE — 94002 VENT MGMT INPAT INIT DAY: CPT

## 2023-08-04 PROCEDURE — 71045 X-RAY EXAM CHEST 1 VIEW: CPT

## 2023-08-04 PROCEDURE — 83930 ASSAY OF BLOOD OSMOLALITY: CPT

## 2023-08-04 PROCEDURE — 83690 ASSAY OF LIPASE: CPT

## 2023-08-04 RX ORDER — VANCOMYCIN HCL 1 G
5 VIAL (EA) INTRAVENOUS
Qty: 1 | Refills: 0
Start: 2023-08-04 | End: 2023-08-16

## 2023-08-04 RX ORDER — ALBUTEROL 90 UG/1
3 AEROSOL, METERED ORAL
Qty: 0 | Refills: 0 | DISCHARGE

## 2023-08-04 RX ORDER — SODIUM CHLORIDE 9 MG/ML
1 INJECTION INTRAMUSCULAR; INTRAVENOUS; SUBCUTANEOUS
Qty: 0 | Refills: 0 | DISCHARGE

## 2023-08-04 RX ORDER — ERYTHROMYCIN BASE 5 MG/GRAM
1 OINTMENT (GRAM) OPHTHALMIC (EYE)
Qty: 1 | Refills: 2
Start: 2023-08-04 | End: 2023-11-01

## 2023-08-04 RX ORDER — VANCOMYCIN HYDROCHLORIDE 250 MG/1
250 CAPSULE ORAL
Qty: 28 | Refills: 0 | Status: ACTIVE | COMMUNITY
Start: 2023-08-04 | End: 1900-01-01

## 2023-08-04 RX ADMIN — Medication 1 APPLICATION(S): at 06:14

## 2023-08-04 RX ADMIN — Medication 1 APPLICATION(S): at 04:07

## 2023-08-04 RX ADMIN — Medication 1 APPLICATION(S): at 09:29

## 2023-08-04 RX ADMIN — Medication 3 MILLILITER(S): at 05:25

## 2023-08-04 RX ADMIN — Medication 125 MILLIGRAM(S): at 05:27

## 2023-08-04 RX ADMIN — RIVAROXABAN 10 MILLIGRAM(S): KIT at 12:00

## 2023-08-04 RX ADMIN — NYSTATIN CREAM 1 APPLICATION(S): 100000 CREAM TOPICAL at 05:26

## 2023-08-04 RX ADMIN — Medication 1 APPLICATION(S): at 05:26

## 2023-08-04 RX ADMIN — RILUZOLE 50 MILLIGRAM(S): 50 TABLET ORAL at 05:26

## 2023-08-04 RX ADMIN — CHLORHEXIDINE GLUCONATE 1 APPLICATION(S): 213 SOLUTION TOPICAL at 06:14

## 2023-08-04 RX ADMIN — CHLORHEXIDINE GLUCONATE 15 MILLILITER(S): 213 SOLUTION TOPICAL at 05:27

## 2023-08-04 RX ADMIN — Medication 1 TABLET(S): at 12:00

## 2023-08-04 RX ADMIN — Medication 1 APPLICATION(S): at 03:05

## 2023-08-04 RX ADMIN — Medication 1 APPLICATION(S): at 06:58

## 2023-08-04 RX ADMIN — Medication 1 APPLICATION(S): at 13:31

## 2023-08-04 RX ADMIN — Medication 1 APPLICATION(S): at 08:37

## 2023-08-04 RX ADMIN — Medication 1 APPLICATION(S): at 14:34

## 2023-08-04 RX ADMIN — Medication 1 APPLICATION(S): at 11:04

## 2023-08-04 RX ADMIN — Medication 3 MILLILITER(S): at 11:29

## 2023-08-04 RX ADMIN — Medication 1 APPLICATION(S): at 12:00

## 2023-08-04 RX ADMIN — Medication 1 DROP(S): at 05:27

## 2023-08-04 RX ADMIN — Medication 1 APPLICATION(S): at 00:57

## 2023-08-04 RX ADMIN — Medication 1 APPLICATION(S): at 11:03

## 2023-08-04 RX ADMIN — SERTRALINE 75 MILLIGRAM(S): 25 TABLET, FILM COATED ORAL at 11:58

## 2023-08-04 RX ADMIN — Medication 1 APPLICATION(S): at 00:03

## 2023-08-04 RX ADMIN — Medication 1 APPLICATION(S): at 02:06

## 2023-08-04 RX ADMIN — Medication 1 APPLICATION(S): at 05:17

## 2023-08-04 RX ADMIN — Medication 125 MILLIGRAM(S): at 12:00

## 2023-08-04 NOTE — PROGRESS NOTE ADULT - ASSESSMENT
68F extensive medical history including ALS, nonverbal and bedbound, Parkinsons, chronic respiratory failure requiring trach and vent, chronic oropharyngeal dysphagia with PEG presenting for lethargy and tachycardia. She is found to be febrile and admitted for further workup of sepsis due to pneumonia and UTI.   Vancomycin for sputum with Pseudomonas  febrile overnight- cultures resent. Also considered c diff however since cathartics stopped stool back to normal.  subsequently vancomycin stopped, continue zosyn.    Opthalmology discussed need for tarsorrhaphy with - will defer at this time.     8/2 Still with intermittent fevers unclear etiology remains on zosyn for PNA and VRE UTI  8/3: Last fever 100.9F yesterday morning.  Leukocytosis stable at 17.  Cdiff positive.  As discussed with ID will treat with Vancomycin PO for 14 days.  Patient's family advised to perform intermittent straight catheterizations throughout the day as it has been required during her hospitalization.   As explained patient likely was having overflow incontinence at home and not fully voiding.  Staff will teach patient's  how to straight cath.

## 2023-08-04 NOTE — PROGRESS NOTE ADULT - PROVIDER SPECIALTY LIST ADULT
Infectious Disease
Ophthalmology
Ophthalmology
Pulmonology
Pulmonology
Infectious Disease
Infectious Disease
Ophthalmology
Ophthalmology
MICU
Ophthalmology
Pulmonology

## 2023-08-04 NOTE — DISCHARGE NOTE PROVIDER - NSFOLLOWUPCLINICS_GEN_ALL_ED_FT
- Ophthalmology  Ophthalmology  [address], [facility] [address]  [place] [address]  Phone: [phone]  Fax:   Follow Up Time: 1 week

## 2023-08-04 NOTE — PROGRESS NOTE ADULT - PROBLEM SELECTOR PLAN 6
- Likely was having overflow incontinence at home.    - Required intermittent straight caths during hospitalization.  -  Family willing to continue straight caths at home, instructed to perform 3 times a day (every 8 hours)

## 2023-08-04 NOTE — PROGRESS NOTE ADULT - NS ATTEND AMEND GEN_ALL_CORE FT
69 yo F hx ALS nonverbal at baseline, Parkinson's, trach, vent dependent on home vent  p/w sepsis from PNA and UTI and sent to ICU.  Now in RCU    Neuro  at baseline nonverbal, immobile but on occasion  can use her eyes to interact, as per son she has not done that recently ue to her concurrent illness  cont riluzole    Pulm  cont vent support, on minimal settings  cont airway clearance and trach care  dilated trachea on CT noted, monitoring cuff pressures    CV  cont midodrine for BP support, titrate down as BP improves    ID  complete course of zosyn for pna and uti    Optho   cont eye drops for lagopthalmos (has been worsening as her condition has deteriorated)  f/u optho reccs  taping of eyes     Heme  on xarelto at home for dvt ppx
68F w/ ALS, chronic respiratory failure requiring trach and vent, s/p PEG for oropharyngeal dysphagia. Admitted for sepsis due to pneumonia and UTI.     Trach to vent, vent dependent.   Remains on zosyn, today is day 8 for treatment of pseudomonas in sputum. Known VRE in urine, thought to be colonizer. There was suspicion for C diff but stool has become more formed since stopping bowel regimen. Continues to spike low grade fevers w/ persistent WBC count ~17k. F/U ID regarding further abx. COnsider stopping and will monitor off.   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
Agree with above  ALS with trache to vent at home p/w urosepsis  Urine culture positive VRE, Sputum positive Pseudomonas, on 7 day course of Zosyn  OD cultured, positive for CoNS (vanco / sandy / erythromycin gtt) with exposure keratopathy  c/w full vent support  Episode of diarrhea, started on empiric PO vancomycin yesterday, and now less diarrhea after stopping bowel regimen. Will send stool for C. difficile. but appears to already be improving.  Dispo pending completion of antibiotics
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    Pt with known advanced progressive ALS with most recent baseline known to be nonverbal and bedbound with functional quadriplegia and complete ADL dependence. She can intermittently interact with her eyes, but has been unresponsive since acutely ill. Will c/w home riluzole, home diazepam added prn given periods of tachycardia/hypertension and concern for withdrawal.     Pt with acute on chronic hypercapnic respiratory failure 2/2 neuromuscular weakness in the setting of advanced ALS with chronic ventilatory dependence with tracheostomy found to have multifocal VAP.   C/w current vent support, pt on minimal settings. Does not tolerate PSV trials. c/w airway clearance and trach care as per RCU team. Dilated trachea on CT noted, monitoring cuff pressures, pt with appropriate TVs. Wean O2 supplementation for goal O2 saturation 90-95%.     Pt initially p/w VAP with septic shock. Pt's initial shock resolved, midodrine held 7/28, and pt remains hemodynamically stable and afebrile but now with uptrending WBC. Initial cx (+) Pseudomonas in trach aspirate, for which pt is on Zosyn. UCx from 7/24 now (+) VRE faecalis sensitive only to linezolid/daptomycin. Given uptrending leukocytosis and concern for suboptimal coverage, will discuss potential need to broaden Abx with ID team. 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 Now on vancomycin +tobramycin eye drops. Optho recs appreciated. Possible need for tarsorrhaphy.    Discharge pending medical optimization. Pt full code. On NOAC for DVT ppx. will continue to discuss with pt's family.
67 yo F hx ALS nonverbal at baseline, Parkinson's, trach, vent dependent on home vent  p/w sepsis from PNA and UTI and sent to ICU.  Now in RCU    Neuro  at baseline nonverbal, immobile but on occasion  can use her eyes to interact, as per son she has not done that recently ue to her concurrent illness  cont riluzole    Pulm  cont vent support, on minimal settings  cont airway clearance and trach care  dilated trachea on CT noted, monitoring cuff pressures    CV  d/c midodrine as BP elevated today    ID  clinically improved w/ abx  complete course of zosyn for pna and uti  bcx now showing gpc poss contaminant, f/u results  repeat bcx ordered    Optho   cont eye drops for lagopthalmos (has been worsening as her condition has deteriorated)  f/u optho reccs  taping of eyes     Heme  on xarelto at home for dvt ppx.
Agree with above  Afebrile, vital signs stable.  Leukocytosis resolving.  Remains on full vent (baseline) 16/400/30%/+5  ?C. difficile positive: started on PO vanco. While tests are equivocal, ID recommending 14 days of PO vancomycin, which is a reasonable approach, given resolution of symptoms.  - s/p course of Zosyn for Pseudomonas in sputum.  - Patient has consistently had urinary retention while in hospital. Per family, patient makes urine at home, but this may actually be overflow incontinence, which, if true, may explain recurrent UTIs given baseline urinary stasis due to autonomic dysfunction. Will therefore discharge with plans to straight cath on a regular basis (either Q12 or Q8h). If no urine is being drained with regular straight cath, then they will no longer need to perform.  - c/w vancomycin and tobramycin gtt OU with erythromycin ointment for lagophthalmos.  - Patient clinically stable and appropriate for discharge home with services.
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    Pt with known advanced progressive ALS with most recent baseline known to be nonverbal and bedbound with functional quadriplegia and complete ADL dependence. She can intermittently interact with her eyes, but has been unresponsive since acutely ill. Will c/w home riluzole, home diazepam added prn given periods of tachycardia/hypertension and concern for withdrawal with improvement in BP.    Pt with acute on chronic hypercapnic respiratory failure 2/2 neuromuscular weakness in the setting of advanced ALS with chronic ventilatory dependence with tracheostomy found to have multifocal VAP. Will c/w current vent support, pt on minimal settings. Does not tolerate PSV trials, remains completely passive on ventilator. Will c/w airway clearance and trach care as per RCU team. Dilated trachea on CT noted, monitoring cuff pressures, pt with appropriate TVs. Wean O2 supplementation for goal O2 saturation 90-95%.     Pt initially p/w VAP with septic shock. Pt's initial shock resolved, midodrine held 7/28, and pt remains hemodynamically stable and afebrile but now with uptrending WBC. Initial cx (+) Pseudomonas in trach aspirate, for which pt is on Zosyn. UCx from 7/24 (+) VRE faecalis sensitive only to linezolid/daptomycin. Now with diarrhea, laxatives held. Given uptrending leukocytosis and concern for suboptimal coverage, will discuss potential need to broaden Abx with ID team. Recommend initiation of empiric coverage with PO Vancomycin given high risk. 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 Now on vancomycin +tobramycin eye drops. Optho recs appreciated. Possible need for tarsorrhaphy, will need to continue discussions with family.    Discharge pending medical optimization. Pt full code. On NOAC for DVT ppx. will continue to discuss with pt's family.
Agree with above  Febrile overnight (Tm 101.4°F)  Remains on appropriate abx (Zosyn) for Pseudomonal pneumonia. No increase in oxygen requirements or signs of clinical decompensation. Leukocytosis downtrending.  Stool now formed after cessation of bowel regimen, D/C PO vancomycin. Can still send stool tomorrow (GI PCR negative)  c/w full vent support (16/400/30%/+5), her baseline  c/w straight cath as needed, still with post-void residuals (?ALS); per family, patient does not get straight cath at home, as she voids, but this may be overflow incontinence with retention. Family may need to cath at home in order to determine if patient is, in fact, retaining, as this may cause recurrent UTIs.  Dispo planning: back home once completed course of Zosyn if remains clinically stable
Agree with above  Low-grade fever yesterday (100.9°F)  Remains on full vent (baseline) 16/400/30%/+5  ?C. difficile positive: started on PO vanco. However, tests are equivocal, as they are positive for presence of C. difficile by PCR, but negative for toxin (colonization appears more likely). Will need to F/U confirmation PCR for toxigenic gene markers. If not positive, then may not need treatment. Bowel movements are largely formed at present. F/U with ID re: continuation of PO vanco after confirmation test (if positive, will continue 14 days of PO vancomycin, which can be done at home as well).  - completing course of Zosyn for Pseudomonas in sputum.  - Patient has consistently had urinary retention while in hospital. Per family, patient makes urine at home, but this may actually be overflow incontinence, which, if true, may explain recurrent UTIs given baseline urinary stasis. It may benefit patient to have straight cath Q8h, given her level of autonomic dysfunction at baseline, as this may decrease incidence of UTIs and also provide more comfort. Will consider urology consult to confirm, as well as to potentially setup F/U as O/P.  - Dispo: plan for home, with resumption of services.

## 2023-08-04 NOTE — PROGRESS NOTE ADULT - PROBLEM SELECTOR PROBLEM 1
ALS (amyotrophic lateral sclerosis)

## 2023-08-04 NOTE — DISCHARGE NOTE PROVIDER - NSDCMRMEDTOKEN_GEN_ALL_CORE_FT
albuterol 2.5 mg/3 mL (0.083%) inhalation solution: 3 milliliter(s) inhaled every 6 hours, As Needed  diazePAM 5 mg/5 mL oral solution: 2 milliliter(s) by gastrostomy tube 4 times a day, As Needed  erythromycin 0.5% ophthalmic ointment: 1 application to each affected eye once a day (at bedtime)  erythromycin 0.5% ophthalmic ointment: 1 application to each affected eye every 2 hours   Intermittent catheter tray touchless plus closed system tip 14Fr without balloon vinyl: Every 8 hours  Multiple Vitamins oral tablet: 1 tab(s) by gastrostomy tube once a day  ocular lubricant ophthalmic solution: 1 drop(s) to each affected eye every 2 hours   ofloxacin 0.3% ophthalmic solution: 1 drop(s) to each affected eye 4 times a day   riluzole 50 mg oral tablet: 1 tab(s) by gastrostomy tube 2 times a day (before meals)  sertraline 50 mg oral tablet: 1 tab(s) by gastrostomy tube once a day  Sodium Chloride, Inhalation 3% inhalation solution: 1 puff(s) inhaled 4 times a day, As Needed  Xarelto 10 mg oral tablet: 1 tab(s) by gastrostomy tube once a day   albuterol 2.5 mg/3 mL (0.083%) inhalation solution: 3 milliliter(s) inhaled every 6 hours as needed for  shortness of breath and/or wheezing  diazePAM 5 mg/5 mL oral solution: 2 milliliter(s) by gastrostomy tube 4 times a day as needed for -  erythromycin 0.5% ophthalmic ointment: 1 application to each affected eye every hour apply to both eyes  Firvanq 25 mg/mL oral liquid: 5 milliliter(s) by gastrostomy tube 4 times a day  Intermittent catheter tray touchless plus closed system tip 14Fr without balloon vinyl: Every 8 hours  Multiple Vitamins oral tablet: 1 tab(s) by gastrostomy tube once a day  ocular lubricant ophthalmic solution: 1 drop(s) to each affected eye every 2 hours   riluzole 50 mg oral tablet: 1 tab(s) by gastrostomy tube 2 times a day (before meals)  sertraline 50 mg oral tablet: 1 tab(s) by gastrostomy tube once a day  Sodium Chloride, Inhalation 3% inhalation solution: 1 puff(s) inhaled 4 times a day as needed for  congestion  Xarelto 10 mg oral tablet: 1 tab(s) by gastrostomy tube once a day

## 2023-08-04 NOTE — DISCHARGE NOTE PROVIDER - NSDCCPCAREPLAN_GEN_ALL_CORE_FT
PRINCIPAL DISCHARGE DIAGNOSIS  Diagnosis: E. coli UTI (urinary tract infection)  Assessment and Plan of Treatment: -  VRE in urine culutre, likely from urinary retention  - Zosyn 7/25 -->8/3 (10 day treatment).        SECONDARY DISCHARGE DIAGNOSES  Diagnosis: Lagophthalmos  Assessment and Plan of Treatment: -Severe lagophthalmos with bilateral corneal ulcers (worse in right eye) secondary to  exposure keratopathy.  - Continue fortified vancomycin and fortified tobramycin q2 hours both eyes  - Continue erythromycin ointment q1 hour both eyes  - Patient does not blink at all, NO tape (as gauze and tape have also likely been scraping and damaging cornea  - Ophthalmology discussed need for tarsorrhaphy family however family defers procedure currently.    Diagnosis: Ventilator associated pneumonia  Assessment and Plan of Treatment: - Psuedomonas in sputum culture  - Treated with Vancomycin and Zosyn    Diagnosis: Clostridium difficile diarrhea  Assessment and Plan of Treatment: diarrhea, C diff positive  - Vancomycin 125mg PO Q6H 8/3 --> 8/16 (14 day treatment).    Diagnosis: Urinary retention  Assessment and Plan of Treatment: Likely was having overflow incontinence at home.    - Required intermittent straight caths during hospitalization.  -  Family willing to continue straight caths at home, instructed to perform 3 times a day (every 8 hours).  However if they notice that on consecutive catheterizations there is no urinary output than likely patient has been voiding on her own and no longer requires catherization.       Diagnosis: Prophylactic measure  Assessment and Plan of Treatment: DVT prophylaxis:  continue home Xarelto    Diagnosis: ALS (amyotrophic lateral sclerosis)  Assessment and Plan of Treatment: Advanced ALS  - baseline nonverbal, awake, communicates with eye movements  - Continue home riluzole 50 mg bid   - home diazepam 2 mg 4x a day PRN.  - Chronic ventilator dependent: Trach size #8 distal XLT Portex,    Vent: Volume AC Tidal Volume 400  Resp rate 16  PEEP 5  FiO2 30%

## 2023-08-04 NOTE — PROGRESS NOTE ADULT - PROBLEM SELECTOR PLAN 3
Patient is chronic tracheostomy/vent dependent at home  -CT chest showing consolidation in the posterior segment of the right lower lobe and patchy ground glass   opacities in the left upper lobe concerning for asp PNA    -s/p vancomycin, cefepime x1 and ceftriaxone  - currently on  Zosyn  (+) pseudomonal PNA    -vent settings VC, , RR 16, PEEP 5, Fio2 30   - Chronic tracheostomy  - Duo nebs chest PT/ suction and tracheal tube care per RCU protocol
Patient is chronic tracheostomy/vent dependent at home  -CT chest showing consolidation in the posterior segment of the right lower lobe and patchy ground glass   opacities in the left upper lobe concerning for asp PNA    -s/p vancomycin, cefepime x1 and ceftriaxone  - Antibiotics broadened to Zosyn  - Follow-up blood and urine cultures  - Continue Midodrine 20mg q8h, titrate to maintain MAP >65  -vent settings VC, , RR 16, PEEP 5, Fio2 40   - Chronic tracheostomy
Patient is chronic tracheostomy/vent dependent at home  -CT chest showing consolidation in the posterior segment of the right lower lobe and patchy ground glass   opacities in the left upper lobe concerning for asp PNA    -s/p vancomycin, cefepime x1 and ceftriaxone  - currently on  Zosyn  (+) pseudomonal PNA    -vent settings VC, , RR 16, PEEP 5, Fio2 40   - Chronic tracheostomy  - Duo nebs chest PT/ suction and tracheal tube care per RCU protocol
Patient is chronic tracheostomy/vent dependent at home  -CT chest showing consolidation in the posterior segment of the right lower lobe and patchy ground glass   opacities in the left upper lobe concerning for asp PNA    -s/p vancomycin, cefepime x1 and ceftriaxone  - currently on  Zosyn  (+) pseudomonal PNA    -vent settings VC, , RR 16, PEEP 5, Fio2 40   - Chronic tracheostomy  - Duo nebs chest PT/ suction and tracheal tube care per RCU protocol
Patient is chronic tracheostomy/vent dependent at home  -CT chest showing consolidation in the posterior segment of the right lower lobe and patchy ground glass   opacities in the left upper lobe concerning for asp PNA    -s/p vancomycin, cefepime x1 and ceftriaxone  - Antibiotics broadened to Zosyn  - Follow-up blood and urine cultures  - Continue Midodrine 20mg q8h, titrate to maintain MAP >65  -vent settings VC, , RR 16, PEEP 5, Fio2 40   - Chronic tracheostomy
Patient is chronic tracheostomy/vent dependent at home  -CT chest showing consolidation in the posterior segment of the right lower lobe and patchy ground glass   opacities in the left upper lobe concerning for asp PNA    -s/p vancomycin, cefepime x1 and ceftriaxone  - Antibiotics broadened to Zosyn  - Follow-up blood and urine cultures  - Continue Midodrine 20mg q8h, titrate to maintain MAP >65  -vent settings VC, , RR 16, PEEP 5, Fio2 40   - Chronic tracheostomy
Patient is chronic tracheostomy/vent dependent at home  -CT chest showing consolidation in the posterior segment of the right lower lobe and patchy ground glass   opacities in the left upper lobe concerning for asp PNA    -s/p vancomycin, cefepime x1 and ceftriaxone  - currently on  Zosyn  (+) pseudomonal PNA    -vent settings VC, , RR 16, PEEP 5, Fio2 30   - Chronic tracheostomy  - Duo nebs chest PT/ suction and tracheal tube care per RCU protocol
Patient is chronic tracheostomy/vent dependent at home  -CT chest showing consolidation in the posterior segment of the right lower lobe and patchy ground glass   opacities in the left upper lobe concerning for asp PNA    -s/p vancomycin, cefepime x1 and ceftriaxone  - currently on  Zosyn  (+) pseudomonal PNA    -vent settings VC, , RR 16, PEEP 5, Fio2 40   - Chronic tracheostomy  - Duo nebs chest PT/ suction and tracheal tube care per RCU protocol
Patient is chronic tracheostomy/vent dependent at home  -CT chest showing consolidation in the posterior segment of the right lower lobe and patchy ground glass   opacities in the left upper lobe concerning for asp PNA    -s/p vancomycin, cefepime x1 and ceftriaxone  - currently on  Zosyn  (+) pseudomonal PNA    -vent settings VC, , RR 16, PEEP 5, Fio2 30   - Chronic tracheostomy  - Duo nebs chest PT/ suction and tracheal tube care per RCU protocol

## 2023-08-04 NOTE — PROGRESS NOTE ADULT - PROBLEM SELECTOR PROBLEM 3
Ventilator associated pneumonia

## 2023-08-04 NOTE — PROGRESS NOTE ADULT - PROBLEM SELECTOR PROBLEM 2
Lagophthalmos

## 2023-08-04 NOTE — DISCHARGE NOTE NURSING/CASE MANAGEMENT/SOCIAL WORK - NSDCPEFALRISK_GEN_ALL_CORE
For information on Fall & Injury Prevention, visit: [email or web address] OR  [email or web address] OR  https://www.cdc.gov/steadi/patient.html

## 2023-08-04 NOTE — PROGRESS NOTE ADULT - PROBLEM SELECTOR PLAN 1
Advanced ALS    - baseline nonverbal, awake, communicates with eye movements  - Continue home riluzole 50 mg bid   - home diazepam 2 mg 4x a day PRN

## 2023-08-04 NOTE — DISCHARGE NOTE PROVIDER - NS AS DC PROVIDER CONTACT Y/N MULTI
Attempted to call patient, but her mailbox is full and unable to leave a message. If patient calls back, please inform her she can take Tylenol as previously directed with the prescribed Diclofenac tablets. If she has not had any recent falls or trauma to the knee, she should not need any additional X-rays. She should initiate the physical therapy ordered during her office visit. She may also try the gel injections next if she would like, but she should be aware the earliest this can be given is 8/14/23 since she had a steroid injection to the left knee on 6/14/23, and these are normally spaced out 2 months from each other. She may give us a call if she would like us to order these or if she has any other questions or concerns. If she does want gel injections: we need to know if she wants the 1 or 3-time injections, pain score from 0-10, and if she has had relief from CSI. Yes

## 2023-08-04 NOTE — DISCHARGE NOTE PROVIDER - HOSPITAL COURSE
Ms. Velasquez is a 68 year old woman from home with an extensive medical history including ALS, nonverbal and bedbound, Parkinson's, chronic respiratory failure requiring trach and vent, chronic oropharyngeal dysphagia with PEG presenting for lethargy and tachycardia. She is found to be febrile and admitted for further workup of sepsis due to pneumonia and UTI.  Treated with zosyn while in MICU.  Opthalmology consulted and found to have corneal ulcer secondary to exposure keratopathy.  Opthalmology discussed need for tarsorrhaphy with  however procedure deferred at the time.  Course complicated by diarrhea, fever, leukocytosis and positive C diff pcr.  Initiated C. diff treatment with vancomycin oral solution to be continued for 14 days.  Due to concern for potential overflow incontinence by patient and the need for intermittent straight catherizations (ISC) during hospitalization the primary      Ms. Velasquez is a 68 year old woman from home with an extensive medical history including ALS, nonverbal and bedbound, Parkinson's, chronic respiratory failure requiring trach and vent, chronic oropharyngeal dysphagia with PEG presenting for lethargy and tachycardia. She is found to be febrile and admitted for further workup of sepsis due to pneumonia and UTI.  Treated with zosyn while in MICU.  Opthalmology consulted and found to have corneal ulcer secondary to exposure keratopathy.  Opthalmology discussed need for tarsorrhaphy with  however procedure deferred at the time.  Course complicated by diarrhea, fever, leukocytosis and positive C diff pcr.  Initiated C. diff treatment with vancomycin oral solution to be continued for 14 days.  Due to concern for potential overflow incontinence by patient and the need for intermittent straight catherizations (ISC) during hospitalization the primary team recommends to continue ISC at home.  Supplies arranged to be sent to the home.  Patient's "" will learn how to straight cath prior to patient discharge.  Patient deemed medically stable to be discharged home with resumption of home care services.

## 2023-08-04 NOTE — DISCHARGE NOTE PROVIDER - DETAILS OF MALNUTRITION DIAGNOSIS/DIAGNOSES
This patient has been assessed with a concern for Malnutrition and was treated during this hospitalization for the following Nutrition diagnosis/diagnoses:     -  08/02/2023: Moderate protein-calorie malnutrition   -  08/02/2023: Underweight (BMI < 19)

## 2023-08-04 NOTE — DISCHARGE NOTE NURSING/CASE MANAGEMENT/SOCIAL WORK - PATIENT PORTAL LINK FT
You can access the FollowMyHealth Patient Portal offered by [facility] by registering at the following website: [email or web address]. By joining [facility]’s FollowMyHealth portal, you will also be able to view your health information using other applications (apps) compatible with our system.

## 2023-08-04 NOTE — PROGRESS NOTE ADULT - SUBJECTIVE AND OBJECTIVE BOX
Patient is a 68y old  Female who presents with a chief complaint of AMS (03 Aug 2023 07:29)      Interval Events:    REVIEW OF SYSTEMS:  [ ] Positive  [ ] All other systems negative  [ ] Unable to assess ROS because ________    Vital Signs Last 24 Hrs  T(C): 36.8 (08-04-23 @ 04:16), Max: 37.1 (08-03-23 @ 22:00)  T(F): 98.2 (08-04-23 @ 04:16), Max: 98.7 (08-03-23 @ 22:00)  HR: 103 (08-04-23 @ 06:00) (89 - 111)  BP: 130/67 (08-04-23 @ 04:16) (123/58 - 149/91)  RR: 16 (08-04-23 @ 06:00) (16 - 23)  SpO2: 99% (08-04-23 @ 06:00) (97% - 100%)    PHYSICAL EXAM:  HEENT:   [ ]Tracheostomy:  [ ]Pupils equal  [ ]No oral lesions  [ ]Abnormal    SKIN  [ ]No Rash  [ ] Abnormal  [ ] pressure    CARDIAC  [ ]Regular  [ ]Abnormal    PULMONARY  [ ]Bilateral Clear Breath Sounds  [ ]Normal Excursion  [ ]Abnormal    GI  [ ]PEG      [ ] +BS		              [ ]Soft, nondistended, nontender	  [ ]Abnormal    MUSCULOSKELETAL                                   [ ]Bedbound                 [ ]Abnormal    [ ]Ambulatory/OOB to chair                           EXTREMITIES                                         [ ]Normal  [ ]Edema                           NEUROLOGIC  [ ] Normal, non focal  [ ] Focal findings:    PSYCHIATRIC  [ ]Alert and appropriate  [ ] Sedated	 [ ]Agitated    :  Newman: [ ] Yes, if yes: Date of Placement:                   [  ] No    LINES: Central Lines [ ] Yes, if yes: Date of Placement                                     [  ] No    HOSPITAL MEDICATIONS:  MEDICATIONS  (STANDING):  albuterol/ipratropium for Nebulization 3 milliLiter(s) Nebulizer every 6 hours  artificial  tears Solution 1 Drop(s) Both EYES two times a day  bacitracin   Ointment 1 Application(s) Topical <User Schedule>  chlorhexidine 0.12% Liquid 15 milliLiter(s) Oral Mucosa every 12 hours  chlorhexidine 4% Liquid 1 Application(s) Topical <User Schedule>  erythromycin   Ointment 1 Application(s) Both EYES <User Schedule>  multivitamin 1 Tablet(s) Oral daily  nystatin Powder 1 Application(s) Topical two times a day  riluzole 50 milliGRAM(s) Oral two times a day  rivaroxaban 10 milliGRAM(s) Enteral Tube daily  sertraline 75 milliGRAM(s) Oral daily  vancomycin    Solution 125 milliGRAM(s) Oral every 6 hours    MEDICATIONS  (PRN):  acetaminophen   Oral Liquid .. 650 milliGRAM(s) Oral every 6 hours PRN Temp greater or equal to 38C (100.4F), Mild Pain (1 - 3)  diazepam    Tablet 2 milliGRAM(s) Oral every 6 hours PRN elevated  bp systolic over 150      LABS:                        11.0   12.81 )-----------( 358      ( 04 Aug 2023 06:29 )             35.0     08-04    136  |  101  |  12  ----------------------------<  104<H>  3.8   |  21<L>  |  <0.30<L>    Ca    9.3      04 Aug 2023 06:29  Phos  3.0     08-04  Mg     2.2     08-04      PT/INR - ( 03 Aug 2023 06:46 )   PT: 13.5 sec;   INR: 1.30 ratio         PTT - ( 03 Aug 2023 06:46 )  PTT:31.0 sec  Urinalysis Basic - ( 04 Aug 2023 06:29 )    Color: x / Appearance: x / SG: x / pH: x  Gluc: 104 mg/dL / Ketone: x  / Bili: x / Urobili: x   Blood: x / Protein: x / Nitrite: x   Leuk Esterase: x / RBC: x / WBC x   Sq Epi: x / Non Sq Epi: x / Bacteria: x          CAPILLARY BLOOD GLUCOSE    MICROBIOLOGY:     RADIOLOGY:  [ ] Reviewed and interpreted by me    Mode: AC/ CMV (Assist Control/ Continuous Mandatory Ventilation)  RR (machine): 16  TV (machine): 400  FiO2: 30  PEEP: 5  ITime: 0.77  MAP: 9  PIP: 25   Patient is a 68y old  Female who presents with a chief complaint of AMS (03 Aug 2023 07:29)      Interval Events: No events over night    REVIEW OF SYSTEMS:  [ ] Positive  [ ] All other systems negative  [X] Unable to assess ROS because _____Lacks capacity to communicate    Vital Signs Last 24 Hrs  T(C): 36.8 (08-04-23 @ 04:16), Max: 37.1 (08-03-23 @ 22:00)  T(F): 98.2 (08-04-23 @ 04:16), Max: 98.7 (08-03-23 @ 22:00)  HR: 103 (08-04-23 @ 06:00) (89 - 111)  BP: 130/67 (08-04-23 @ 04:16) (123/58 - 149/91)  RR: 16 (08-04-23 @ 06:00) (16 - 23)  SpO2: 99% (08-04-23 @ 06:00) (97% - 100%)      PHYSICAL EXAM:  HEENT:   [X] Tracheostomy: #8 distal XLT Portex  [X] PERRL B/L  [X] No oral lesions  [ ] Abnormal    SKIN  [ ] No Rash  [X] Abnormal:  Right upper forearm blanchable, erythematous flat  localized  [ ] pressure    CARDIAC  [X] Regular  [ ] Abnormal    PULMONARY  [X] Bilateral Clear Breath Sounds  [ ] Normal Excursion  [ ] Abnormal    GI  [X] PEG      [X] +BS		              [X] Soft, nondistended, nontender	  [ ] Abnormal    MUSCULOSKELETAL                                   [X] Bedbound                 [ ] Abnormal    [ ] Ambulatory/OOB to chair                           EXTREMITIES                                         [X] Normal  [ ]Edema                           NEUROLOGIC  [ ] Normal, non focal  [X] Focal findings:  Alert however unable to communicate; +gag reflex; non-verbal; no active movement of limbs observed.     PSYCHIATRIC  [X] Alert however unable to assess further  [ ] Sedated	 [ ]Agitated    :  Newman: [X] Yes, if yes: Date of Placement:                   [  ] No    LINES: Central Lines [ ] Yes, if yes: Date of Placement                                     [X] No    HOSPITAL MEDICATIONS:  MEDICATIONS  (STANDING):  albuterol/ipratropium for Nebulization 3 milliLiter(s) Nebulizer every 6 hours  artificial  tears Solution 1 Drop(s) Both EYES two times a day  bacitracin   Ointment 1 Application(s) Topical <User Schedule>  chlorhexidine 0.12% Liquid 15 milliLiter(s) Oral Mucosa every 12 hours  chlorhexidine 4% Liquid 1 Application(s) Topical <User Schedule>  erythromycin   Ointment 1 Application(s) Both EYES <User Schedule>  multivitamin 1 Tablet(s) Oral daily  nystatin Powder 1 Application(s) Topical two times a day  riluzole 50 milliGRAM(s) Oral two times a day  rivaroxaban 10 milliGRAM(s) Enteral Tube daily  sertraline 75 milliGRAM(s) Oral daily  vancomycin    Solution 125 milliGRAM(s) Oral every 6 hours    MEDICATIONS  (PRN):  acetaminophen   Oral Liquid .. 650 milliGRAM(s) Oral every 6 hours PRN Temp greater or equal to 38C (100.4F), Mild Pain (1 - 3)  diazepam    Tablet 2 milliGRAM(s) Oral every 6 hours PRN elevated  bp systolic over 150      LABS:                        11.0   12.81 )-----------( 358      ( 04 Aug 2023 06:29 )             35.0     08-04    136  |  101  |  12  ----------------------------<  104<H>  3.8   |  21<L>  |  <0.30<L>    Ca    9.3      04 Aug 2023 06:29  Phos  3.0     08-04  Mg     2.2     08-04      PT/INR - ( 03 Aug 2023 06:46 )   PT: 13.5 sec;   INR: 1.30 ratio         PTT - ( 03 Aug 2023 06:46 )  PTT:31.0 sec  Urinalysis Basic - ( 04 Aug 2023 06:29 )    Color: x / Appearance: x / SG: x / pH: x  Gluc: 104 mg/dL / Ketone: x  / Bili: x / Urobili: x   Blood: x / Protein: x / Nitrite: x   Leuk Esterase: x / RBC: x / WBC x   Sq Epi: x / Non Sq Epi: x / Bacteria: x          CAPILLARY BLOOD GLUCOSE    MICROBIOLOGY:     RADIOLOGY:  [ ] Reviewed and interpreted by me    Mode: AC/ CMV (Assist Control/ Continuous Mandatory Ventilation)  RR (machine): 16  TV (machine): 400  FiO2: 30  PEEP: 5  ITime: 0.77  MAP: 9  PIP: 25

## 2023-08-09 ENCOUNTER — RX RENEWAL (OUTPATIENT)
Age: 68
End: 2023-08-09

## 2023-08-11 ENCOUNTER — TRANSCRIPTION ENCOUNTER (OUTPATIENT)
Age: 68
End: 2023-08-11

## 2023-09-06 ENCOUNTER — RX RENEWAL (OUTPATIENT)
Age: 68
End: 2023-09-06

## 2023-09-06 RX ORDER — RILUZOLE 50 MG/1
50 TABLET, FILM COATED ORAL
Qty: 480 | Refills: 3 | Status: ACTIVE | COMMUNITY
Start: 2018-12-02 | End: 1900-01-01

## 2023-10-30 ENCOUNTER — RX RENEWAL (OUTPATIENT)
Age: 68
End: 2023-10-30

## 2023-10-30 RX ORDER — IPRATROPIUM BROMIDE AND ALBUTEROL SULFATE 2.5; .5 MG/3ML; MG/3ML
0.5-2.5 (3) SOLUTION RESPIRATORY (INHALATION)
Qty: 360 | Refills: 4 | Status: ACTIVE | COMMUNITY
Start: 2019-09-15 | End: 1900-01-01

## 2023-11-09 RX ORDER — DOXYCYCLINE HYCLATE 100 MG/1
100 CAPSULE ORAL
Qty: 20 | Refills: 0 | Status: ACTIVE | COMMUNITY
Start: 2023-11-09 | End: 1900-01-01

## 2023-12-03 ENCOUNTER — RX RENEWAL (OUTPATIENT)
Age: 68
End: 2023-12-03

## 2024-01-10 ENCOUNTER — RX RENEWAL (OUTPATIENT)
Age: 69
End: 2024-01-10

## 2024-01-23 DIAGNOSIS — J98.11 ATELECTASIS: ICD-10-CM

## 2024-01-26 ENCOUNTER — NON-APPOINTMENT (OUTPATIENT)
Age: 69
End: 2024-01-26

## 2024-01-27 ENCOUNTER — NON-APPOINTMENT (OUTPATIENT)
Age: 69
End: 2024-01-27

## 2024-01-28 DIAGNOSIS — Z93.1 GASTROSTOMY STATUS: ICD-10-CM

## 2024-01-28 DIAGNOSIS — G12.20 MOTOR NEURON DISEASE, UNSPECIFIED: ICD-10-CM

## 2024-01-28 DIAGNOSIS — J96.11 CHRONIC RESPIRATORY FAILURE WITH HYPOXIA: ICD-10-CM

## 2024-01-28 DIAGNOSIS — G12.21 AMYOTROPHIC LATERAL SCLEROSIS: ICD-10-CM

## 2024-01-28 LAB
ALBUMIN SERPL ELPH-MCNC: 4.1 G/DL
ALP BLD-CCNC: 170 U/L
ALT SERPL-CCNC: 29 U/L
ANION GAP SERPL CALC-SCNC: 17 MMOL/L
AST SERPL-CCNC: 25 U/L
BASOPHILS # BLD AUTO: 0.05 K/UL
BASOPHILS NFR BLD AUTO: 0.4 %
BILIRUB SERPL-MCNC: 0.8 MG/DL
BUN SERPL-MCNC: 14 MG/DL
CALCIUM SERPL-MCNC: 9.1 MG/DL
CHLORIDE SERPL-SCNC: 99 MMOL/L
CO2 SERPL-SCNC: 19 MMOL/L
CREAT SERPL-MCNC: 0.12 MG/DL
EGFR: 142 ML/MIN/1.73M2
EOSINOPHIL # BLD AUTO: 0.28 K/UL
EOSINOPHIL NFR BLD AUTO: 2.2 %
GLUCOSE SERPL-MCNC: 146 MG/DL
HCT VFR BLD CALC: 37.8 %
HGB BLD-MCNC: 11.4 G/DL
IMM GRANULOCYTES NFR BLD AUTO: 1.4 %
LYMPHOCYTES # BLD AUTO: 1.35 K/UL
LYMPHOCYTES NFR BLD AUTO: 10.4 %
MAN DIFF?: NORMAL
MCHC RBC-ENTMCNC: 27.1 PG
MCHC RBC-ENTMCNC: 30.2 GM/DL
MCV RBC AUTO: 90 FL
MONOCYTES # BLD AUTO: 0.91 K/UL
MONOCYTES NFR BLD AUTO: 7 %
NEUTROPHILS # BLD AUTO: 10.25 K/UL
NEUTROPHILS NFR BLD AUTO: 78.6 %
NT-PROBNP SERPL-MCNC: 130 PG/ML
PLATELET # BLD AUTO: 418 K/UL
POTASSIUM SERPL-SCNC: 4.9 MMOL/L
PROT SERPL-MCNC: 7.7 G/DL
RBC # BLD: 4.2 M/UL
RBC # FLD: 16.2 %
SODIUM SERPL-SCNC: 136 MMOL/L
TSH SERPL-ACNC: 2.17 UIU/ML
WBC # FLD AUTO: 13.02 K/UL

## 2024-01-28 RX ORDER — DOXYCYCLINE 100 MG/1
100 CAPSULE ORAL
Qty: 20 | Refills: 0 | Status: ACTIVE | COMMUNITY
Start: 2024-01-28 | End: 1900-01-01

## 2024-02-01 ENCOUNTER — RX RENEWAL (OUTPATIENT)
Age: 69
End: 2024-02-01

## 2024-02-01 RX ORDER — MUPIROCIN 20 MG/G
2 OINTMENT TOPICAL
Qty: 15 | Refills: 5 | Status: ACTIVE | COMMUNITY
Start: 2021-06-30 | End: 1900-01-01

## 2024-02-01 RX ORDER — TRIAMCINOLONE ACETONIDE 5 MG/G
0.5 OINTMENT TOPICAL TWICE DAILY
Qty: 15 | Refills: 5 | Status: ACTIVE | COMMUNITY
Start: 2018-10-21 | End: 1900-01-01

## 2024-02-01 NOTE — PATIENT PROFILE ADULT - FUNCTIONAL ASSESSMENT - DAILY ACTIVITY 4.
[Fever] : no fever [Eye Discharge] : eye discharge [Nasal Discharge] : nasal discharge [Nasal Congestion] : nasal congestion [Negative] : Genitourinary 1 = Total assistance

## 2024-02-05 ENCOUNTER — RX RENEWAL (OUTPATIENT)
Age: 69
End: 2024-02-05

## 2024-02-05 RX ORDER — RIVAROXABAN 10 MG/1
10 TABLET, FILM COATED ORAL
Qty: 30 | Refills: 5 | Status: ACTIVE | COMMUNITY
Start: 2022-01-25 | End: 1900-01-01

## 2024-02-12 ENCOUNTER — RX RENEWAL (OUTPATIENT)
Age: 69
End: 2024-02-12

## 2024-03-19 ENCOUNTER — RX RENEWAL (OUTPATIENT)
Age: 69
End: 2024-03-19

## 2024-03-19 RX ORDER — SERTRALINE HYDROCHLORIDE 20 MG/ML
20 SOLUTION ORAL
Qty: 120 | Refills: 3 | Status: ACTIVE | COMMUNITY
Start: 2023-06-09 | End: 1900-01-01

## 2024-03-20 VITALS
SYSTOLIC BLOOD PRESSURE: 130 MMHG | DIASTOLIC BLOOD PRESSURE: 80 MMHG | HEART RATE: 90 BPM | OXYGEN SATURATION: 99 % | RESPIRATION RATE: 14 BRPM

## 2024-03-20 PROBLEM — G12.21 AMYOTROPHIC LATERAL SCLEROSIS (ALS): Status: ACTIVE | Noted: 2018-07-01

## 2024-03-20 PROBLEM — J96.11 CHRONIC RESPIRATORY FAILURE WITH HYPOXIA: Status: ACTIVE | Noted: 2018-07-16

## 2024-03-20 PROBLEM — Z93.1 PEG (PERCUTANEOUS ENDOSCOPIC GASTROSTOMY) STATUS: Status: ACTIVE | Noted: 2020-01-02

## 2024-03-20 NOTE — HISTORY OF PRESENT ILLNESS
[TextBox_4] : 68-year-old woman with history of ALS and ventilatory failure now ventilator dependent for several years.  Gets periodic trach changes.  Tracheotomy site has become progressively larger requiring larger and larger trach's.  Continues to require ongoing suctioning and is 100% dependent on ventilator and on feeding tube.  Patient is noncommunicative.

## 2024-03-20 NOTE — ASSESSMENT
[FreeTextEntry1] : Remains ventilator and feeding tube dependent. Renew supplies for enteral nutrition

## 2024-03-20 NOTE — PHYSICAL EXAM
[TextBox_2] : Noncommunicative eyes open appearsIn no distress [TextBox_68] : Diffuse bilateral rhonchi [TextBox_89] : Feeding tube in position

## 2024-04-05 ENCOUNTER — RX RENEWAL (OUTPATIENT)
Age: 69
End: 2024-04-05

## 2024-04-05 RX ORDER — DIAZEPAM ORAL 5 MG/5ML
5 SOLUTION ORAL
Qty: 480 | Refills: 0 | Status: ACTIVE | COMMUNITY
Start: 2018-07-01 | End: 1900-01-01

## 2024-05-08 ENCOUNTER — LABORATORY RESULT (OUTPATIENT)
Age: 69
End: 2024-05-08

## 2024-05-09 RX ORDER — DOXYCYCLINE HYCLATE 100 MG/1
100 CAPSULE ORAL
Qty: 20 | Refills: 0 | Status: ACTIVE | COMMUNITY
Start: 2024-05-09 | End: 1900-01-01

## 2024-05-29 NOTE — ED PROVIDER NOTE - MDM ORDERS SUBMITTED SELECTION
Addended by: GERARDO HUNG on: 5/29/2024 09:37 AM     Modules accepted: Orders     Labs/EKG/Imaging Studies/Medications

## 2024-06-20 NOTE — DISCHARGE NOTE PROVIDER - YES NO FOR MLM POSITIVE OR NEGATIVE COVID RESULT
Spoke with patient informed them per Dr Lucas,   Prescription sent   lidocaine (LIDODERM) 5 % patch  tiZANidine (ZANAFLEX) 4 MG tablet  to  [facility] - [place], [place]  Patient states understanding and no further questions at this time.   ,

## 2024-07-04 ENCOUNTER — INPATIENT (INPATIENT)
Facility: HOSPITAL | Age: 69
LOS: 55 days | Discharge: HOME HEALTH | End: 2024-08-29
Attending: STUDENT IN AN ORGANIZED HEALTH CARE EDUCATION/TRAINING PROGRAM | Admitting: STUDENT IN AN ORGANIZED HEALTH CARE EDUCATION/TRAINING PROGRAM
Payer: MEDICARE

## 2024-07-04 VITALS
HEART RATE: 81 BPM | RESPIRATION RATE: 24 BRPM | OXYGEN SATURATION: 100 % | TEMPERATURE: 98 F | SYSTOLIC BLOOD PRESSURE: 112 MMHG | DIASTOLIC BLOOD PRESSURE: 73 MMHG

## 2024-07-04 DIAGNOSIS — Z93.1 GASTROSTOMY STATUS: Chronic | ICD-10-CM

## 2024-07-04 DIAGNOSIS — R06.89 OTHER ABNORMALITIES OF BREATHING: ICD-10-CM

## 2024-07-04 DIAGNOSIS — Z93.0 TRACHEOSTOMY STATUS: Chronic | ICD-10-CM

## 2024-07-04 DIAGNOSIS — Z43.1 ENCOUNTER FOR ATTENTION TO GASTROSTOMY: Chronic | ICD-10-CM

## 2024-07-04 LAB
ANION GAP SERPL CALC-SCNC: 16 MMOL/L — HIGH (ref 7–14)
BASE EXCESS BLDV CALC-SCNC: -2.9 MMOL/L — LOW (ref -2–3)
BASOPHILS # BLD AUTO: 0.05 K/UL — SIGNIFICANT CHANGE UP (ref 0–0.2)
BASOPHILS NFR BLD AUTO: 0.3 % — SIGNIFICANT CHANGE UP (ref 0–2)
BLOOD GAS VENOUS COMPREHENSIVE RESULT: SIGNIFICANT CHANGE UP
BUN SERPL-MCNC: 17 MG/DL — SIGNIFICANT CHANGE UP (ref 7–23)
CALCIUM SERPL-MCNC: 8.9 MG/DL — SIGNIFICANT CHANGE UP (ref 8.4–10.5)
CHLORIDE BLDV-SCNC: 104 MMOL/L — SIGNIFICANT CHANGE UP (ref 96–108)
CHLORIDE SERPL-SCNC: 103 MMOL/L — SIGNIFICANT CHANGE UP (ref 98–107)
CO2 BLDV-SCNC: 23.9 MMOL/L — SIGNIFICANT CHANGE UP (ref 22–26)
CO2 SERPL-SCNC: 19 MMOL/L — LOW (ref 22–31)
CREAT SERPL-MCNC: <0.2 MG/DL — LOW (ref 0.5–1.3)
EGFR: 126 ML/MIN/1.73M2 — SIGNIFICANT CHANGE UP
EOSINOPHIL # BLD AUTO: 0.24 K/UL — SIGNIFICANT CHANGE UP (ref 0–0.5)
EOSINOPHIL NFR BLD AUTO: 1.6 % — SIGNIFICANT CHANGE UP (ref 0–6)
GAS PNL BLDV: 135 MMOL/L — LOW (ref 136–145)
GLUCOSE BLDV-MCNC: 121 MG/DL — HIGH (ref 70–99)
GLUCOSE SERPL-MCNC: 113 MG/DL — HIGH (ref 70–99)
HCO3 BLDV-SCNC: 23 MMOL/L — SIGNIFICANT CHANGE UP (ref 22–29)
HCT VFR BLD CALC: 40.9 % — SIGNIFICANT CHANGE UP (ref 34.5–45)
HCT VFR BLDA CALC: 42 % — SIGNIFICANT CHANGE UP (ref 34.5–46.5)
HGB BLD CALC-MCNC: 14.1 G/DL — SIGNIFICANT CHANGE UP (ref 11.7–16.1)
HGB BLD-MCNC: 13.1 G/DL — SIGNIFICANT CHANGE UP (ref 11.5–15.5)
IANC: 12.23 K/UL — HIGH (ref 1.8–7.4)
IMM GRANULOCYTES NFR BLD AUTO: 1 % — HIGH (ref 0–0.9)
LACTATE BLDV-MCNC: 3.5 MMOL/L — HIGH (ref 0.5–2)
LYMPHOCYTES # BLD AUTO: 1.83 K/UL — SIGNIFICANT CHANGE UP (ref 1–3.3)
LYMPHOCYTES # BLD AUTO: 11.8 % — LOW (ref 13–44)
MCHC RBC-ENTMCNC: 26.8 PG — LOW (ref 27–34)
MCHC RBC-ENTMCNC: 32 GM/DL — SIGNIFICANT CHANGE UP (ref 32–36)
MCV RBC AUTO: 83.6 FL — SIGNIFICANT CHANGE UP (ref 80–100)
MONOCYTES # BLD AUTO: 0.96 K/UL — HIGH (ref 0–0.9)
MONOCYTES NFR BLD AUTO: 6.2 % — SIGNIFICANT CHANGE UP (ref 2–14)
NEUTROPHILS # BLD AUTO: 12.23 K/UL — HIGH (ref 1.8–7.4)
NEUTROPHILS NFR BLD AUTO: 79.1 % — HIGH (ref 43–77)
NRBC # BLD: 0 /100 WBCS — SIGNIFICANT CHANGE UP (ref 0–0)
NRBC # FLD: 0 K/UL — SIGNIFICANT CHANGE UP (ref 0–0)
PCO2 BLDV: 41 MMHG — SIGNIFICANT CHANGE UP (ref 39–52)
PH BLDV: 7.35 — SIGNIFICANT CHANGE UP (ref 7.32–7.43)
PLATELET # BLD AUTO: 308 K/UL — SIGNIFICANT CHANGE UP (ref 150–400)
PO2 BLDV: 61 MMHG — HIGH (ref 25–45)
POTASSIUM BLDV-SCNC: 4.7 MMOL/L — SIGNIFICANT CHANGE UP (ref 3.5–5.1)
POTASSIUM SERPL-MCNC: 4.4 MMOL/L — SIGNIFICANT CHANGE UP (ref 3.5–5.3)
POTASSIUM SERPL-SCNC: 4.4 MMOL/L — SIGNIFICANT CHANGE UP (ref 3.5–5.3)
RBC # BLD: 4.89 M/UL — SIGNIFICANT CHANGE UP (ref 3.8–5.2)
RBC # FLD: 17.6 % — HIGH (ref 10.3–14.5)
SAO2 % BLDV: 91.6 % — HIGH (ref 67–88)
SODIUM SERPL-SCNC: 138 MMOL/L — SIGNIFICANT CHANGE UP (ref 135–145)
WBC # BLD: 15.47 K/UL — HIGH (ref 3.8–10.5)
WBC # FLD AUTO: 15.47 K/UL — HIGH (ref 3.8–10.5)

## 2024-07-04 PROCEDURE — 99223 1ST HOSP IP/OBS HIGH 75: CPT

## 2024-07-04 PROCEDURE — 99285 EMERGENCY DEPT VISIT HI MDM: CPT | Mod: GC

## 2024-07-04 RX ORDER — CHLORHEXIDINE GLUCONATE 40 MG/ML
15 SOLUTION TOPICAL EVERY 12 HOURS
Refills: 0 | Status: DISCONTINUED | OUTPATIENT
Start: 2024-07-04 | End: 2024-08-09

## 2024-07-04 NOTE — ED ADULT NURSE REASSESSMENT NOTE - NS ED NURSE REASSESS COMMENT FT1
Report received from LASHAWN Trevizo. Pt resting in stretcher, trach to vent noted, NSR on monitor, respiratory at bedside, safety maintained, comfort provided, care plan ongoing.

## 2024-07-04 NOTE — CONSULT NOTE ADULT - NS ATTEND AMEND GEN_ALL_CORE FT
Patient seen and examined agree with above note as modified, where appropriate, by me. ALS with chronic respiratory failure. Will plan for tracheostomy exchange

## 2024-07-04 NOTE — H&P ADULT - NSHPPHYSICALEXAM_GEN_ALL_CORE
GENERAL: NAD, well-groomed, well-developed  HEAD:  Atraumatic, Normocephalic  NECK: Supple,   CHEST/LUNG: Tracheostomy in place, no wheezes or crackles b/l. No expiratory sounds from trach.  HEART: Regular rate and rhythm; No murmurs, rubs, or gallops  ABDOMEN: Soft, Nontender, Nondistended  NEURO: Baseline is nonverbal. AAOx0  EXTREMITIES: No LE edema, no calf tenderness  LYMPH: No lymphadenopathy noted  SKIN: No rashes or lesions

## 2024-07-04 NOTE — H&P ADULT - HISTORY OF PRESENT ILLNESS
Ms. Velasquez is a 68 year old woman with a past medical history including ALS, nonverbal and bedbound, Parkinson's, chronic respiratory failure requiring trach and vent, chronic oropharyngeal dysphagia with PEG who presents as per request of his pulmonologist with concerns for air leak. Today pt was seen by pulmonologist who noted that she was having air leakage from her trach, tidal volume 400 exhaled minute volume 250-290.  Pulmonologist changed tracheostomy tube without improvement. Pt otherwise at baseline. No recent fevers or acute complaints. Pt brought to [facility] for CT surgery evaluation for trach evaluation.

## 2024-07-04 NOTE — ED ADULT NURSE NOTE - CHIEF COMPLAINT QUOTE
pt notification. pt with ALS and having a leak to the trach. size 8.0 shiley xct. pt brought directly to [facility]

## 2024-07-04 NOTE — CONSULT NOTE ADULT - NS_MD_PANP_GEN_ALL_CORE
Patient currently has appt scheduled for November 1st with Dr. Ramirez. There is no sooner availability. Will reach out to MD to advise if currently scheduled appt is fine or if patient should be squeezed in sooner.    Attending and PA/NP shared services statement (NON-critical care):

## 2024-07-04 NOTE — CONSULT NOTE ADULT - NS ATTEND BILL GEN_ALL_CORE
Attending to bill Quality 226: Preventive Care And Screening: Tobacco Use: Screening And Cessation Intervention: Patient screened for tobacco use and is an ex/non-smoker Quality 130: Documentation Of Current Medications In The Medical Record: Current Medications Documented Detail Level: Detailed Quality 110: Preventive Care And Screening: Influenza Immunization: Influenza Immunization previously received during influenza season

## 2024-07-04 NOTE — H&P ADULT - PROBLEM SELECTOR PLAN 2
Patient with advanced ALS    - baseline nonverbal, awake, communicates with eye movements  - Continue home riluzole 50 mg bid   - on home liquid diazepam bid at home, continue diazepam 2 mg bid

## 2024-07-04 NOTE — H&P ADULT - ATTENDING COMMENTS
69yo F with PMHx of ALS, Parkinson, bedbound, trach/peg with constant vent support who presented with concern for possible air leak of her trach    1) Tracheostomy leak  - Concern for outpatient leak of the tracheostomy site. Currently with a distal XLT 8.0 Shiley  - No air leak currently seen. Possibly positional?  - Plan for re-evaluation and possible upsizing via CT surgery in the AM    2) ALS  - Continue home riluzole and home 2mg BID of liquid diazepam    3) Need for nutrition  - Continue home feeds    4) Ethics  - Full Code

## 2024-07-04 NOTE — H&P ADULT - NSHPLABSRESULTS_GEN_ALL_CORE
13.1   15.47 )-----------( 308      ( 04 Jul 2024 19:26 )             40.9     07-04    138  |  103  |  17  ----------------------------<  113<H>  4.4   |  19<L>  |  <0.20<L>    Ca    8.9      04 Jul 2024 19:26              Urinalysis Basic - ( 04 Jul 2024 19:26 )    Color: x / Appearance: x / SG: x / pH: x  Gluc: 113 mg/dL / Ketone: x  / Bili: x / Urobili: x   Blood: x / Protein: x / Nitrite: x   Leuk Esterase: x / RBC: x / WBC x   Sq Epi: x / Non Sq Epi: x / Bacteria: x

## 2024-07-04 NOTE — CONSULT NOTE ADULT - SUBJECTIVE AND OBJECTIVE BOX
HPI:  This 68 year old woman with a h/o ALS, Parkinson's, chronic respiratory failure (vent dependent chanelle 2015), chronic oropharyngeal dysphagia with PEG is nonverbal and bedbound.  She was sent to the ER by her pulmonologist Dr Newman with concerns for an air leak at her trach. He had seen her at home and noticed a leak of air around the trach and that the expiratory TV was only in the 200s to 300s despite the vent setting of 400.  He changed the trach to a Shiley cuffed 8 XLT and still noted a leak and so she was sent to the ER.  IN the ER, she has been maintaining her TV.      PAST MEDICAL & SURGICAL HISTORY:  ALS (amyotrophic lateral sclerosis)  HLD (hyperlipidemia)  Ventilator dependence (since 2015)  Aspiration PNA  PEG 10/2014 for dysphagia (on tube feeds)  Trach placed 2015  Excision of granulation tissue and trach change 2016  Trach exchange to a Bivona 6 XLT and PEG exchange 2018  Trach upsize to to an 8 XLT 2022    REVIEW OF SYSTEMS (Per )  General: No Weight change, still A&O x 0	  Skin/Breast: No Rashes	  Ophthalmologic: glasses but unable to assess vision	  ENMT: Unable to swallow	  Respiratory and Thorax: No Cough, Wheezing, SOB, or  Hemoptysis	  Cardiovascular: No Diaphoresis  Gastrointestinal: No Vomiting or Constipation; tolerating tube feeds	  Genitourinary: No Hematuria  Musculoskeletal: Bedbound	  Neurological: No Seizures	  Psychiatric: A&O x 0  Hematology/Lymphatics: No hx of bleeding or Edema	  Endocrine: No DM  Allergic/Immunologic: No Anaphylaxis    MEDICATIONS ([facility]):  artificial  tears Solution 1 Drop(s) Both EYES every 12 hours  chlorhexidine 0.12% Liquid 15 milliLiter(s) Oral Mucosa every 12 hours  diazepam    Tablet 2 milliGRAM(s) Oral every 12 hours  multivitamin 1 Tablet(s) Oral daily  sertraline 75 milliGRAM(s) Oral daily  sodium chloride 3%  Inhalation 4 milliLiter(s) Inhalation every 12 hours PRN for congestion    Additional Home Med: Xarelto    Allergies  Bactrim DS (Rash)    SOCIAL HISTORY:  , bedbound    FAMILY HISTORY:  No pertinent family history in first degree relatives    Vital Signs Last 24 Hrs  T(C): 37.4 (05 Jul 2024 00:00), Max: 37.4 (05 Jul 2024 00:00)  T(F): 99.4 (05 Jul 2024 00:00), Max: 99.4 (05 Jul 2024 00:00)  HR: 83 (05 Jul 2024 03:00) (81 - 91)  BP: 97/54 (05 Jul 2024 03:00) (95/54 - 165/89)  BP(mean): 67 (05 Jul 2024 03:00) (67 - 92)  RR: 16 (05 Jul 2024 03:00) (16 - 24)  SpO2: 100% (05 Jul 2024 03:00) (99% - 100%) on vent 30%    General: Deconditioned  Neurology: A&O x 0  Eyes: Non-icteric sclerae  ENT:  no stridor  Neck: Trachea at midline, No obvious air leak   Respiratory: Good air exchange; No wheezing  CV: RRR, S1S2,  Abdominal: Soft, PEG  Extremities: No edema  Skin: No obvious Rashes  Psych: Oriented x 0    LABS:                        13.1   18.40 )-----------( 311      ( 05 Jul 2024 02:00 )             39.6     07-05    138  |  104  |  16  ----------------------------<  114<H>  3.9   |  19<L>  |  <0.20<L>    Ca    9.2      05 Jul 2024 02:00  Phos  2.7     07-05  Mg     2.00     07-05    Urinalysis Basic - ( 05 Jul 2024 02:00 )  Color: x / Appearance: x / SG: x / pH: x  Gluc: 114 mg/dL / Ketone: x  / Bili: x / Urobili: x   Blood: x / Protein: x / Nitrite: x   Leuk Esterase: x / RBC: x / WBC x   Sq Epi: x / Non Sq Epi: x / Bacteria: x    ASSESSMENT:   On vent with new trach in place- maintaining TV of 400    PLAN:  Observation overnight  Dr Owens to evaluate in AM    Case discussd with Dr Ovalle

## 2024-07-04 NOTE — H&P ADULT - PROBLEM SELECTOR PLAN 1
Pt seen by pulmonologist with concern for air leakage from her trach, tidal volume 400 with exhaled minute  volume 250-290.  Pulmonologist changed tracheostomy tube without improvement.  Pt with tracheostomy shiley 8.0 distal xlt  Pt sent to [facility] for CT surgery evaluation of trach  Pt currently stable on home vent settings  No obvious leak on exam     c/w mechanical ventilation: ac v tv 400 rr 16, fio2 30, peep 5  CTS consulted, appreciate recs  Plan to monitor in RCU ON and scope in AM

## 2024-07-04 NOTE — ED PROVIDER NOTE - CLINICAL SUMMARY MEDICAL DECISION MAKING FREE TEXT BOX
Patient with ALS aspiration pneumonia in the past ventilator dependent, PEEP 5 FiO2 30 tidal volume around 450, patient coming in at the request of his pulmonologist as there was an air leak.  It was changed today.  XLT 8 Shiley. shil was placed but tidal volumes were on expiration appeared that there was a leak.  Arriving w EMS,  patient stable.  Vital signs stable.  Patient with exhalatory noises coming from the trach.  Satting 100%.  Deconditioned, lying in bed.  Not in extremis.  Alert and orient x 0 which is her baseline.    Plan for basic lab work, calling cardiothoracic surgery as cardiothoracic surgery placed before.  Cardiothoracic surgery will see the patient. pend the dispo.

## 2024-07-04 NOTE — ED ADULT NURSE NOTE - NSFALLHARMRISKINTERV_ED_ALL_ED

## 2024-07-04 NOTE — H&P ADULT - PROBLEM SELECTOR PLAN 3
- on home liquid diazepam bid at home, continue diazepam 2 mg bid   - on home sertraline 4ml (80mg equivalent)  - sertraline 75 mg while inpatient

## 2024-07-04 NOTE — ED PROVIDER NOTE - ATTENDING CONTRIBUTION TO CARE
Brief HPI:  34-year-old male 68-year-old female past medical history of ALS, trach and vent dependent, Parkinson syndrome, presents from LTAC at the request of pulmonologist for concern for air leak from trach.  Per EMS and discussed with pulmonologist, he states that patient's tidal volumes were approximately 200 cc consistently when ventilator setting is for 400.  He said he appreciated an air leak and inserted a 8 oh Shiley XL cuffed trach, but this did not correct the issue.  Per EMS, vitals have been within normal limits without hypoxemia.  Patient appears at baseline alertness and neurological status.    Vitals:   Reviewed    Exam:    GEN:  Non-toxic appearing, , Alert, nonverbal  HEENT:  NCAT, neck supple, EOMI, PERRLA, sclera anicteric, no conjunctival pallor or injection, no stridor, no tonsillar exudate, uvula midline, Trach in place without bleeding, discharge, or audible air leak  CV:  regular rhythm and rate, s1/s2 audible, no murmurs, rubs or gallops, peripheral pulses 2+ and symmetric  PULM:  non-labored respirations, lungs clear to auscultation bilaterally, no wheezes, crackles or rales  ABD:  non distended, non-tender, no rebound, no guarding, negative Hammonds's sign, bowel sounds normal, no cvat  MSK:  no gross deformity, non-tender extremities and joints, range of motion grossly normal appearing, no extremity edema, extremities warm and well perfused   NEURO:  Alert, limited exam  SKIN:  warm, dry, no rash or vesicles     A/P:  34-year-old male 68-year-old female past medical history of ALS, trach and vent dependent, Parkinson syndrome, presents from LTAC at the request of pulmonologist for concern for air leak from trach.   On arrival vital stable.  On vent with approximately 400 cc of recorded tidal volume per respiration consistent with setting.  Unclear etiology of reported low tidal volume at home.  No appreciable air leak at this time.  As patient is known to CT surgery service, will obtain consult.  Disposition pending.

## 2024-07-04 NOTE — H&P ADULT - ASSESSMENT
Ms. Velasquez is a 68 year old woman with a past medical history including ALS, nonverbal and bedbound, Parkinson's, chronic respiratory failure requiring trach and vent, chronic oropharyngeal dysphagia with PEG who presents as per request of his pulmonologist with concerns for air leak. Currently, stable without signs of obvious air leak on exam. Pt to be monitored in RCU ON.

## 2024-07-04 NOTE — ED ADULT NURSE NOTE - OBJECTIVE STATEMENT
Arianna RN SONUP note: Pt Notification  received to Trc. Pt arrives from home with Home RN at bedside. Pt chronic trach related to ALS arrives with trach to vent #[id number] cuffed. Arrives on [id number]% Peep 5/.  As per EMT + audible air leak on expiration   sent by Pulmonologists for eval of air leak. Trach was changed today, air leak persist. Plan to place larger size shiley.  Pt placed on cm ,cont pulse oxy in place, vss see flow sheet charting. pox 100%.  HOB elevated.  Pt has gastric tube in place , site no reddness. Handoff to Primary RN Joseline

## 2024-07-04 NOTE — H&P ADULT - PROBLEM SELECTOR PLAN 4
DVT prophylaxis: at home on Xarelto 10 mg, will hold pending CTS procedure in AM  Diet: NPO with tube feeds via PEG (Jevity, total 1540 @ 70 ml/hr)  Dispo: RCU for monitoring

## 2024-07-05 DIAGNOSIS — Z29.9 ENCOUNTER FOR PROPHYLACTIC MEASURES, UNSPECIFIED: ICD-10-CM

## 2024-07-05 DIAGNOSIS — J93.82 OTHER AIR LEAK: ICD-10-CM

## 2024-07-05 DIAGNOSIS — F41.9 ANXIETY DISORDER, UNSPECIFIED: ICD-10-CM

## 2024-07-05 DIAGNOSIS — G12.21 AMYOTROPHIC LATERAL SCLEROSIS: ICD-10-CM

## 2024-07-05 LAB
ANION GAP SERPL CALC-SCNC: 15 MMOL/L — HIGH (ref 7–14)
BASE EXCESS BLDV CALC-SCNC: -1 MMOL/L — SIGNIFICANT CHANGE UP (ref -2–3)
BASOPHILS # BLD AUTO: 0.05 K/UL — SIGNIFICANT CHANGE UP (ref 0–0.2)
BASOPHILS NFR BLD AUTO: 0.3 % — SIGNIFICANT CHANGE UP (ref 0–2)
BLOOD GAS VENOUS COMPREHENSIVE RESULT: SIGNIFICANT CHANGE UP
BUN SERPL-MCNC: 16 MG/DL — SIGNIFICANT CHANGE UP (ref 7–23)
CALCIUM SERPL-MCNC: 9.2 MG/DL — SIGNIFICANT CHANGE UP (ref 8.4–10.5)
CHLORIDE BLDV-SCNC: 105 MMOL/L — SIGNIFICANT CHANGE UP (ref 96–108)
CHLORIDE SERPL-SCNC: 104 MMOL/L — SIGNIFICANT CHANGE UP (ref 98–107)
CO2 BLDV-SCNC: 23 MMOL/L — SIGNIFICANT CHANGE UP (ref 22–26)
CO2 SERPL-SCNC: 19 MMOL/L — LOW (ref 22–31)
CREAT SERPL-MCNC: <0.2 MG/DL — LOW (ref 0.5–1.3)
EGFR: 126 ML/MIN/1.73M2 — SIGNIFICANT CHANGE UP
EOSINOPHIL # BLD AUTO: 0.14 K/UL — SIGNIFICANT CHANGE UP (ref 0–0.5)
EOSINOPHIL NFR BLD AUTO: 0.8 % — SIGNIFICANT CHANGE UP (ref 0–6)
GAS PNL BLDV: 134 MMOL/L — LOW (ref 136–145)
GLUCOSE BLDV-MCNC: 120 MG/DL — HIGH (ref 70–99)
GLUCOSE SERPL-MCNC: 114 MG/DL — HIGH (ref 70–99)
GRAM STN FLD: ABNORMAL
HCO3 BLDV-SCNC: 22 MMOL/L — SIGNIFICANT CHANGE UP (ref 22–29)
HCT VFR BLD CALC: 39.6 % — SIGNIFICANT CHANGE UP (ref 34.5–45)
HCT VFR BLDA CALC: 41 % — SIGNIFICANT CHANGE UP (ref 34.5–46.5)
HGB BLD CALC-MCNC: 13.5 G/DL — SIGNIFICANT CHANGE UP (ref 11.7–16.1)
HGB BLD-MCNC: 13.1 G/DL — SIGNIFICANT CHANGE UP (ref 11.5–15.5)
IANC: 15.09 K/UL — HIGH (ref 1.8–7.4)
IMM GRANULOCYTES NFR BLD AUTO: 0.8 % — SIGNIFICANT CHANGE UP (ref 0–0.9)
LACTATE BLDV-MCNC: 2.1 MMOL/L — HIGH (ref 0.5–2)
LYMPHOCYTES # BLD AUTO: 1.86 K/UL — SIGNIFICANT CHANGE UP (ref 1–3.3)
LYMPHOCYTES # BLD AUTO: 10.1 % — LOW (ref 13–44)
MAGNESIUM SERPL-MCNC: 2 MG/DL — SIGNIFICANT CHANGE UP (ref 1.6–2.6)
MCHC RBC-ENTMCNC: 27.5 PG — SIGNIFICANT CHANGE UP (ref 27–34)
MCHC RBC-ENTMCNC: 33.1 GM/DL — SIGNIFICANT CHANGE UP (ref 32–36)
MCV RBC AUTO: 83.2 FL — SIGNIFICANT CHANGE UP (ref 80–100)
MONOCYTES # BLD AUTO: 1.12 K/UL — HIGH (ref 0–0.9)
MONOCYTES NFR BLD AUTO: 6.1 % — SIGNIFICANT CHANGE UP (ref 2–14)
NEUTROPHILS # BLD AUTO: 15.09 K/UL — HIGH (ref 1.8–7.4)
NEUTROPHILS NFR BLD AUTO: 81.9 % — HIGH (ref 43–77)
NRBC # BLD: 0 /100 WBCS — SIGNIFICANT CHANGE UP (ref 0–0)
NRBC # FLD: 0 K/UL — SIGNIFICANT CHANGE UP (ref 0–0)
PCO2 BLDV: 31 MMHG — LOW (ref 39–52)
PH BLDV: 7.46 — HIGH (ref 7.32–7.43)
PHOSPHATE SERPL-MCNC: 2.7 MG/DL — SIGNIFICANT CHANGE UP (ref 2.5–4.5)
PLATELET # BLD AUTO: 311 K/UL — SIGNIFICANT CHANGE UP (ref 150–400)
PO2 BLDV: 71 MMHG — HIGH (ref 25–45)
POTASSIUM BLDV-SCNC: 3.7 MMOL/L — SIGNIFICANT CHANGE UP (ref 3.5–5.1)
POTASSIUM SERPL-MCNC: 3.9 MMOL/L — SIGNIFICANT CHANGE UP (ref 3.5–5.3)
POTASSIUM SERPL-SCNC: 3.9 MMOL/L — SIGNIFICANT CHANGE UP (ref 3.5–5.3)
RBC # BLD: 4.76 M/UL — SIGNIFICANT CHANGE UP (ref 3.8–5.2)
RBC # FLD: 17.1 % — HIGH (ref 10.3–14.5)
SAO2 % BLDV: 97.9 % — HIGH (ref 67–88)
SODIUM SERPL-SCNC: 138 MMOL/L — SIGNIFICANT CHANGE UP (ref 135–145)
SPECIMEN SOURCE: SIGNIFICANT CHANGE UP
WBC # BLD: 18.4 K/UL — HIGH (ref 3.8–10.5)
WBC # FLD AUTO: 18.4 K/UL — HIGH (ref 3.8–10.5)

## 2024-07-05 PROCEDURE — 71045 X-RAY EXAM CHEST 1 VIEW: CPT | Mod: 26

## 2024-07-05 PROCEDURE — 99233 SBSQ HOSP IP/OBS HIGH 50: CPT

## 2024-07-05 RX ORDER — ENOXAPARIN SODIUM 100 MG/ML
40 INJECTION SUBCUTANEOUS EVERY 24 HOURS
Refills: 0 | Status: DISCONTINUED | OUTPATIENT
Start: 2024-07-05 | End: 2024-08-10

## 2024-07-05 RX ORDER — RILUZOLE 5 MG/ML
50 LIQUID ORAL
Refills: 0 | Status: DISCONTINUED | OUTPATIENT
Start: 2024-07-05 | End: 2024-08-29

## 2024-07-05 RX ORDER — CHLORHEXIDINE GLUCONATE 40 MG/ML
1 SOLUTION TOPICAL DAILY
Refills: 0 | Status: DISCONTINUED | OUTPATIENT
Start: 2024-07-05 | End: 2024-08-29

## 2024-07-05 RX ORDER — SODIUM CHLORIDE 9 MG/ML
4 INJECTION INTRAMUSCULAR; INTRAVENOUS; SUBCUTANEOUS EVERY 12 HOURS
Refills: 0 | Status: DISCONTINUED | OUTPATIENT
Start: 2024-07-05 | End: 2024-07-23

## 2024-07-05 RX ORDER — POVIDONE, PROPYLENE GLYCOL 6.8; 3 MG/ML; MG/ML
1 LIQUID OPHTHALMIC EVERY 12 HOURS
Refills: 0 | Status: DISCONTINUED | OUTPATIENT
Start: 2024-07-05 | End: 2024-07-10

## 2024-07-05 RX ORDER — DIAZEPAM 10 MG
2 TABLET ORAL
Refills: 0 | Status: DISCONTINUED | OUTPATIENT
Start: 2024-07-05 | End: 2024-07-05

## 2024-07-05 RX ORDER — SERTRALINE HYDROCHLORIDE 50 MG/1
75 TABLET, FILM COATED ORAL DAILY
Refills: 0 | Status: DISCONTINUED | OUTPATIENT
Start: 2024-07-05 | End: 2024-08-29

## 2024-07-05 RX ORDER — DIAZEPAM 10 MG
2 TABLET ORAL ONCE
Refills: 0 | Status: DISCONTINUED | OUTPATIENT
Start: 2024-07-05 | End: 2024-07-05

## 2024-07-05 RX ORDER — DIAZEPAM 10 MG
2 TABLET ORAL EVERY 12 HOURS
Refills: 0 | Status: DISCONTINUED | OUTPATIENT
Start: 2024-07-05 | End: 2024-07-11

## 2024-07-05 RX ORDER — DIAZEPAM 10 MG
2 TABLET ORAL EVERY 12 HOURS
Refills: 0 | Status: DISCONTINUED | OUTPATIENT
Start: 2024-07-05 | End: 2024-07-05

## 2024-07-05 RX ORDER — SERTRALINE HYDROCHLORIDE 50 MG/1
75 TABLET, FILM COATED ORAL
Refills: 0 | DISCHARGE

## 2024-07-05 RX ADMIN — POVIDONE, PROPYLENE GLYCOL 1 DROP(S): 6.8; 3 LIQUID OPHTHALMIC at 05:09

## 2024-07-05 RX ADMIN — Medication 1 TABLET(S): at 12:38

## 2024-07-05 RX ADMIN — Medication 2 MILLIGRAM(S): at 12:59

## 2024-07-05 RX ADMIN — Medication 2 MILLIGRAM(S): at 17:21

## 2024-07-05 RX ADMIN — RILUZOLE 50 MILLIGRAM(S): 5 LIQUID ORAL at 17:28

## 2024-07-05 RX ADMIN — CHLORHEXIDINE GLUCONATE 15 MILLILITER(S): 40 SOLUTION TOPICAL at 17:21

## 2024-07-05 RX ADMIN — CHLORHEXIDINE GLUCONATE 15 MILLILITER(S): 40 SOLUTION TOPICAL at 05:08

## 2024-07-05 RX ADMIN — POVIDONE, PROPYLENE GLYCOL 1 DROP(S): 6.8; 3 LIQUID OPHTHALMIC at 17:21

## 2024-07-05 RX ADMIN — SERTRALINE HYDROCHLORIDE 75 MILLIGRAM(S): 50 TABLET, FILM COATED ORAL at 12:38

## 2024-07-05 NOTE — DIETITIAN INITIAL EVALUATION ADULT - ADD RECOMMEND
Recommend Jevity 1.2 via PEG at goal rate 53 mL/hr x18 hrs to provide 954 mL formula, 1145 kcal, 53 gm protein, 769 mL formula daily (meets 20.4 kcal/kg, 0.94 gm protein/kg @ dosing weight 56.1 kg). Additional provision of free water per medical discretion.  Recommend liquid multivitamin with mineral to provide micronutrient coverage.

## 2024-07-05 NOTE — PATIENT PROFILE ADULT - FUNCTIONAL ASSESSMENT - BASIC MOBILITY 6.
1-calculated by average/Not able to assess (calculate score using [facility] averaging method)

## 2024-07-05 NOTE — CHART NOTE - NSCHARTNOTEFT_GEN_A_CORE
Pt seen with DR Owens. Pt's  at bedside.   Pt currently stable on VENT  Will plan to take pt to OR on Monday as an add on  for Bronch, Trach revision/Trach upsize with Dr. Owens.   Continue to hold Xarelto  No objection to Heparin gtt if needed  Will continue to follow

## 2024-07-05 NOTE — PROGRESS NOTE ADULT - ASSESSMENT
=====Neuro=====    =====Resp=====    =====Cardio=====    =====GI=====    =====ID=====    =====Renal=====    =====Heme=====    =====Endo=====     Ms. Velasquez is a 68 year old woman with a past medical history including ALS, nonverbal and bedbound, Parkinson's, chronic respiratory failure requiring trach and vent, chronic oropharyngeal dysphagia with PEG who presents as per request of his pulmonologist with concerns for air leak. Currently, stable without signs of obvious air leak on exam. Pt to be monitored in RCU ON.       =====Neuro=====    =====Resp=====    =====Cardio=====    =====GI=====    =====ID=====    =====Renal=====    =====Heme=====    =====Endo=====     Ms. Velasquez is a 68 year old woman with a past medical history including ALS, nonverbal and bedbound, Parkinson's, chronic respiratory failure requiring trach and vent, chronic oropharyngeal dysphagia with PEG who presents as per request of his pulmonologist with concerns for air leak. Currently, stable without signs of obvious air leak on exam. Pt to be monitored in RCU ON.       =====Neuro=====  #ALS  - continue riluzole     =====Resp=====    =====Cardio=====    =====GI=====    =====ID=====    =====Renal=====    =====Heme=====    =====Endo=====     Ms. Velasquez is a 68 year old woman with a past medical history including ALS, nonverbal and bedbound, Parkinson's, chronic respiratory failure requiring trach and vent, chronic oropharyngeal dysphagia with PEG who presents as per request of his pulmonologist with concerns for air leak. Currently, stable without signs of obvious air leak on exam. Pt to be monitored in RCU ON.       =====Neuro=====  #ALS  #Anxiety   #Depression  - baseline nonverbal, awake, communicates with eye movements  - Continue home riluzole 50 mg bid   - on liquid diazepam bid at home, continue diazepam 2 mg bid.  - on home liquid diazepam bid at home, continue diazepam 2 mg bid   - on home sertraline 4ml (80mg equivalent)  - sertraline 75 mg while inpatient.      =====Resp=====    =====Cardio=====    =====GI=====    =====ID=====    =====Renal=====    =====Heme=====    =====Endo=====     Ms. Velasquez is a 68 year old woman with a past medical history including ALS, nonverbal and bedbound, Parkinson's, chronic respiratory failure requiring trach and vent, chronic oropharyngeal dysphagia with PEG who presents as per request of his pulmonologist with concerns for air leak from trach.     =====Neuro=====  #ALS  #Anxiety   #Depression  - baseline nonverbal, awake, communicates with eye movements  - Continue home riluzole 50 mg bid   - Continue home diazepam 2 mg bid  - Continue sertraline 75 mg while inpatient (on 4ml/80mg at home)    =====Resp=====  #Tracheostomy w/ Air Leak  - at home pt was noted w/ expiratory TV in 200-300s despite TV on vent being set to 400.  Pt's pulmonologist Dr. Newman changed the trach to a cuffed size 8XLT Shiley however leak persisted despite this and pt was sent to ED.    - vent settings: Vol AC 16/400/5/21% - pt has been maintaining set TV since admission  - CT surgery consulted, plan is to take pt to OR on Monday as an add on for Bronch, Trach revision/Trach upsize with Dr. Owens.   - f/up SCx, mrsa pcr  - continue trach care    =====Cardio=====  - hemodynamically stable, no  active issues    =====GI=====  #PEG  - cont TFs per nutrition recs  - monitor for BMs    =====ID=====  #Leukocytosis  - CXR clear  - check SCx, BCx, mrsa pcr, UA  - CTM off abx for now    =====Renal=====  - no active issues  - monitor strict i/o's    =====Heme=====  - no active issues  - H/H stable, CTM CBC  - DVT PPX: SQ heparin, continue to hold Xarelto pending OR Monday    =====Endo=====  - no active issues    =====Ethics====  - Full Code  -  Dr. Tyson Velasquez was been updated during morning rounds     Ms. Velasquez is a 68 year old woman with a past medical history including ALS, nonverbal and bedbound, Parkinson's, chronic respiratory failure requiring trach and vent, chronic oropharyngeal dysphagia with PEG who presents as per request of his pulmonologist with concerns for air leak from trach.     =====Neuro=====  #ALS  #Anxiety   #Depression  - baseline nonverbal, awake, communicates with eye movements  - Continue home riluzole 50 mg bid   - Continue home diazepam 2 mg bid  - Continue sertraline 75 mg while inpatient (on 4ml/80mg at home)    =====Resp=====  #Tracheostomy w/ Air Leak  - at home pt was noted w/ expiratory TV in 200-300s despite TV on vent being set to 400.  Pt's pulmonologist Dr. Newman changed the trach to a cuffed size 8XLT Shiley however leak persisted despite this and pt was sent to ED.    - vent settings: Vol AC 16/400/5/21% - pt has been maintaining set TV since admission  - CT surgery consulted, plan is to take pt to OR on Monday as an add on for Bronch, Trach revision/Trach upsize with Dr. Owens.   - f/up SCx, mrsa pcr  - continue trach care    =====Cardio=====  - hemodynamically stable, no  active issues    =====GI=====  #PEG  - cont TFs per nutrition recs  - monitor for BMs    =====ID=====  #Leukocytosis  - CXR clear  - check SCx, BCx, mrsa pcr, UA  - CTM off abx for now    =====Renal=====  - no active issues  - monitor strict i/o's    =====Heme=====  - no active issues  - H/H stable, CTM CBC  - DVT PPX: SQ Lovenox, continue to hold Xarelto pending OR Monday    =====Endo=====  - no active issues    =====Ethics====  - Full Code  -  Dr. Tyson Velasquez was been updated during morning rounds

## 2024-07-05 NOTE — PROGRESS NOTE ADULT - NS ATTEND AMEND GEN_ALL_CORE FT
68 year old woman with a past medical history including ALS, nonverbal and bedbound, Parkinson's, chronic respiratory failure requiring trach and vent, chronic oropharyngeal dysphagia with PEG who presents as per request of his pulmonologist with concerns for air leak from trach.  On exam has positionall airleak and alrge stoma with thick bloody mucus.  Not ohypoic on room air via vent, not hyper capnic but does have chronic vent dependence.   WBC elvation and thick sputum but no other infectious signs will monitor  Change from xarelto to HSQ for DVT ppx  Pending surgery eval for possible upsizing and assesment of trach.  Can be down graded to RCU if needed does need close monitoring of very positional trach.

## 2024-07-05 NOTE — DIETITIAN INITIAL EVALUATION ADULT - OTHER INFO
Per chart, pt is 68 year old female PMH ALS (nonverbal, bedbound), Parkinson's, chronic respiratory failure requiring trach/vent, s/p PEG presenting for concern of air leak.     Spoke with pt's  at bedside, whom pt lives at home with. JOSSY. Pt is PEG dependent. Regimen PTA inclusive of Jevity 1.2 via pump @ 70 mL/hr to provide 948 mL formula, 1138 kcal, 52.6 gm protein, 765 mL formula daily. Additionally provided liquid multivitamin with mineral daily.     Plan to initiate EN today. No current GI distress, no BM thus far.

## 2024-07-05 NOTE — DIETITIAN INITIAL EVALUATION ADULT - OTHER CALCULATIONS
Dosing weight (7/5) 123.6 lbs / 56.1 kg.  No recent weight history available via chart, denies recent significant changes to weight status.  Ideal Body Weight: 120 lbs / 54.5 kg +/-10%

## 2024-07-05 NOTE — PROGRESS NOTE ADULT - CRITICAL CARE ATTENDING COMMENT
68 year old woman with a past medical history including ALS, nonverbal and bedbound, Parkinson's, chronic respiratory failure requiring trach and vent, chronic oropharyngeal dysphagia with PEG who presents as per request of his pulmonologist with concerns for air leak from trach.  On exam has positionall airleak and alrge stoma with thick bloody mucus.  Not ohypoic on room air via vent, not hyper capnic but does have chronic vent dependence.   WBC elvation and thick sputum but no other infectious signs will monitor  Change from xarelto to HSQ for DVT ppx  Pending surgery eval for possible upsizing and assesment of trach.

## 2024-07-05 NOTE — CHART NOTE - NSCHARTNOTEFT_GEN_A_CORE
RCU Transfer Note    Transfer from: MICU    Transfer to: (  ) Medicine    (  ) Telemetry     ( x  ) RCU        (    ) Palliative         (   ) Stroke Unit          (   ) __________________    Accepting Physician:  Signout given to:     HPI/MICU COURSE:    Ms. Velasquez is a 68 year old woman with a past medical history including ALS, nonverbal and bedbound, Parkinson's, chronic respiratory failure requiring trach and vent, chronic oropharyngeal dysphagia with PEG who presented to ED as per request of pt's pulmonologist Dr. Newman. Per , pt was evaluated by pulmonologist at home, found w/ return TV of only 250-290 on vent despite set TV of 400. Pulmonologist changed tracheostomy tube without improvement and pt was recommended to go to ED for suspected air leak. Pt was admitted to MICU given no availability for RCU beds. Pt's has been pulling appropriate TV on vent thus far during admission. Pt was evaluated by CT surgery and plan is for OR w/ Dr. Owens Monday 7/8. Pt is hemodynamically stable for transfer to RCU.      ASSESSMENT & PLAN:      Ms. Velasquez is a 68 year old woman with a past medical history including ALS, nonverbal and bedbound, Parkinson's, chronic respiratory failure requiring trach and vent, chronic oropharyngeal dysphagia with PEG who presents as per request of his pulmonologist with concerns for air leak from trach.     =====Neuro=====  #ALS  #Anxiety   #Depression  - baseline nonverbal, awake, communicates with eye movements  - Continue home riluzole 50 mg bid   - Continue home diazepam 2 mg bid  - Continue sertraline 75 mg while inpatient (on 4ml/80mg at home)    =====Resp=====  #Tracheostomy w/ Air Leak  - at home pt was noted w/ expiratory TV in 200-300s despite TV on vent being set to 400.  Pt's pulmonologist Dr. Newman changed the trach to a cuffed size 8XLT Shiley however leak persisted despite this and pt was sent to ED.    - vent settings: Vol AC 16/400/5/21% - pt has been maintaining set TV since admission  - CT surgery consulted, plan is to take pt to OR on Monday as an add on for Bronch, Trach revision/Trach upsize with Dr. Owens.   - f/up SCx, mrsa pcr  - continue trach care    =====Cardio=====  - hemodynamically stable, no  active issues    =====GI=====  #PEG  - cont TFs per nutrition recs  - monitor for BMs    =====ID=====  #Leukocytosis  - CXR clear  - check SCx, BCx, mrsa pcr, UA  - CTM off abx for now    =====Renal=====  - no active issues  - monitor strict i/o's    =====Heme=====  - no active issues  - H/H stable, CTM CBC  - DVT PPX: SQ Lovenox, continue to hold Xarelto pending OR Monday    =====Endo=====  - no active issues    =====Ethics====  - Full Code  -  Dr. Tyson Velasquez was been updated during morning rounds        FOR FOLLOW UP:  [ ] CT surgery consulted, plan is to take pt to OR on Monday as an add on for Bronch, Trach revision/Trach upsize with Dr. Owens.

## 2024-07-05 NOTE — PROGRESS NOTE ADULT - SUBJECTIVE AND OBJECTIVE BOX
INTERVAL HPI/OVERNIGHT EVENTS:    SUBJECTIVE: Patient seen and examined at bedside.       VITAL SIGNS:  ICU Vital Signs Last 24 Hrs  T(C): 36.9 (05 Jul 2024 04:00), Max: 37.4 (05 Jul 2024 00:00)  T(F): 98.5 (05 Jul 2024 04:00), Max: 99.4 (05 Jul 2024 00:00)  HR: 78 (05 Jul 2024 06:00) (78 - 91)  BP: 92/54 (05 Jul 2024 06:00) (92/54 - 165/89)  BP(mean): 65 (05 Jul 2024 06:00) (65 - 92)  ABP: --  ABP(mean): --  RR: 16 (05 Jul 2024 06:00) (16 - 24)  SpO2: 100% (05 Jul 2024 06:00) (99% - 100%)    O2 Parameters below as of 05 Jul 2024 06:00  Patient On (Oxygen Delivery Method): ventilator    O2 Concentration (%): 30      Mode: AC/ CMV (Assist Control/ Continuous Mandatory Ventilation), RR (machine): 16, TV (machine): 400, FiO2: 30, PEEP: 5, ITime: 0.8, MAP: 10, PIP: 33  Plateau pressure:   P/F ratio:     07-04 @ 07:01  -  07-05 @ 06:55  --------------------------------------------------------  IN: 0 mL / OUT: 300 mL / NET: -300 mL      CAPILLARY BLOOD GLUCOSE        ECG:    PHYSICAL EXAM:    General:   HEENT:   Neck:   Respiratory:   Cardiovascular:   Abdomen:   Extremities:  Neurological:    MEDICATIONS:  MEDICATIONS  (STANDING):  artificial  tears Solution 1 Drop(s) Both EYES every 12 hours  chlorhexidine 0.12% Liquid 15 milliLiter(s) Oral Mucosa every 12 hours  diazepam    Tablet 2 milliGRAM(s) Oral every 12 hours  multivitamin 1 Tablet(s) Oral daily  sertraline 75 milliGRAM(s) Oral daily    MEDICATIONS  (PRN):  sodium chloride 3%  Inhalation 4 milliLiter(s) Inhalation every 12 hours PRN for congestion      ALLERGIES:  Allergies    Bactrim DS (Rash)    Intolerances        LABS:                        13.1   18.40 )-----------( 311      ( 05 Jul 2024 02:00 )             39.6     07-05    138  |  104  |  16  ----------------------------<  114<H>  3.9   |  19<L>  |  <0.20<L>    Ca    9.2      05 Jul 2024 02:00  Phos  2.7     07-05  Mg     2.00     07-05        Urinalysis Basic - ( 05 Jul 2024 02:00 )    Color: x / Appearance: x / SG: x / pH: x  Gluc: 114 mg/dL / Ketone: x  / Bili: x / Urobili: x   Blood: x / Protein: x / Nitrite: x   Leuk Esterase: x / RBC: x / WBC x   Sq Epi: x / Non Sq Epi: x / Bacteria: x        RADIOLOGY & ADDITIONAL TESTS: Reviewed.   INTERVAL HPI/OVERNIGHT EVENTS: admitted to micu overnight given no available RCU beds.     ROS:  Constitutional Symptoms: No weakness, fevers, chills, weight loss  Eyes: No visual changes, headache, eye pain, double vision  Ears, Nose, Mouth, Throat: No runny nose, sinus pain, ear pain, tinnitus, sore throat, dysphagia, odynophagia  Cardiovascular: No chest pain, palpitations, edema  Respiratory: No cough, wheezing, hemoptysis, shortness of breath  Gastrointestinal: No abdominal pain, dysphagia, anorexia, nausea/vomiting, diarrhea/constipation, hematemesis, BRBPR, melena  Genitourinary: No dysuria, frequency, hematuria  Musculoskeletal: No joint pain, joint swelling, decreased ROM  Skin: No pruritus, rashes, lesions, wounds  Neurologic:  No seizures, headache, paraesthesia, numbness, limb weakness  Psychiatric: No depression, anxiety, difficulty concentrating, anhedonia, lack of energy  Endocrine: No heat/cold intolerance, mood swings, sweats, polydipsia, polyuria  Hematologic/lymphatic: No purpura, petechia, prolonged or excessive bleeding after dental extraction / injury, use of anticoagulant and antiplatelet drugs (including aspirin)  Allergic/Immunologic: No anaphylaxis, allergic response to materials, foods, animals    Positives and pertinent negatives noted and all other systems negative.    UNABLE TO OBTAIN GIVEN PT TRACHED W/ ALS [X]      VITAL SIGNS:  ICU Vital Signs Last 24 Hrs  T(C): 36.9 (05 Jul 2024 04:00), Max: 37.4 (05 Jul 2024 00:00)  T(F): 98.5 (05 Jul 2024 04:00), Max: 99.4 (05 Jul 2024 00:00)  HR: 78 (05 Jul 2024 06:00) (78 - 91)  BP: 92/54 (05 Jul 2024 06:00) (92/54 - 165/89)  BP(mean): 65 (05 Jul 2024 06:00) (65 - 92)  ABP: --  ABP(mean): --  RR: 16 (05 Jul 2024 06:00) (16 - 24)  SpO2: 100% (05 Jul 2024 06:00) (99% - 100%)    O2 Parameters below as of 05 Jul 2024 06:00  Patient On (Oxygen Delivery Method): ventilator    O2 Concentration (%): 30      Mode: AC/ CMV (Assist Control/ Continuous Mandatory Ventilation), RR (machine): 16, TV (machine): 400, FiO2: 30, PEEP: 5, ITime: 0.8, MAP: 10, PIP: 33  Plateau pressure:   P/F ratio:     07-04 @ 07:01  -  07-05 @ 06:55  --------------------------------------------------------  IN: 0 mL / OUT: 300 mL / NET: -300 mL      CAPILLARY BLOOD GLUCOSE    PHYSICAL EXAM:  GENERAL: NAD, well-groomed, well-developed  HEAD:  Atraumatic, Normocephalic  NECK: Supple,   CHEST/LUNG: Tracheostomy in place, no wheezes or crackles b/l. No expiratory sounds from trach.  HEART: Regular rate and rhythm; No murmurs, rubs, or gallops  ABDOMEN: Soft, Nontender, Nondistended  NEURO: Baseline is nonverbal.   EXTREMITIES: No LE edema, no calf tenderness  LYMPH: No lymphadenopathy noted  SKIN: No rashes or lesions      MEDICATIONS:  MEDICATIONS  (STANDING):  artificial  tears Solution 1 Drop(s) Both EYES every 12 hours  chlorhexidine 0.12% Liquid 15 milliLiter(s) Oral Mucosa every 12 hours  diazepam    Tablet 2 milliGRAM(s) Oral every 12 hours  multivitamin 1 Tablet(s) Oral daily  sertraline 75 milliGRAM(s) Oral daily    MEDICATIONS  (PRN):  sodium chloride 3%  Inhalation 4 milliLiter(s) Inhalation every 12 hours PRN for congestion      ALLERGIES:  Allergies    Bactrim DS (Rash)    Intolerances      LABS:                        13.1   18.40 )-----------( 311      ( 05 Jul 2024 02:00 )             39.6     07-05    138  |  104  |  16  ----------------------------<  114<H>  3.9   |  19<L>  |  <0.20<L>    Ca    9.2      05 Jul 2024 02:00  Phos  2.7     07-05  Mg     2.00     07-05        Urinalysis Basic - ( 05 Jul 2024 02:00 )    Color: x / Appearance: x / SG: x / pH: x  Gluc: 114 mg/dL / Ketone: x  / Bili: x / Urobili: x   Blood: x / Protein: x / Nitrite: x   Leuk Esterase: x / RBC: x / WBC x   Sq Epi: x / Non Sq Epi: x / Bacteria: x        RADIOLOGY & ADDITIONAL TESTS: Reviewed.

## 2024-07-05 NOTE — CHART NOTE - NSCHARTNOTEFT_GEN_A_CORE
RCU Acceptance Note     HOSPITAL COURSE   67 YO Female with PMHx of ALS, Parkinson,, nonverbal, bed bound, chronic respiratory failure with tracheostomy and vent dependence, and oropharyngeal dysphagia with PEG who presented from home with tracheostomy leak. She was noted with vent alarm and poor TVe (250-290) at home. Seen by Pulmonologist and tracheostomy changed to 80XLTCD with no improvement in air leak and ultimately brought in for CTSx evaluation. Patient admitted to MICU for vent support pending RCU bed on 7/4.     While in MICU, patient noted to be stable with no concern for air leakage from her tracheostomy with hyperinflated cuff. CXR with persistent atelectasis. WBC 18, however per trended noted with baseline leukocytosis. No fever or chills and pending MRSA, SCx and BCx. Concern noted for possible tracheomegaly, however pending thoracic surgery bronchoscopy for formal airway evaluation and possible tracheostomy change vs up size. Patient accepted to RCU bed 640. Charge nurse aware. Bed board called to assign bed.     FULL PROGRESS NOTE TO FOLLOW     BRUNO Marie, PA-C  Department of Medicine/ RCU  In house RCU [facility] [phone]  In house Medicine Beeper [phone]  Reachable via teams RCU Acceptance Note     HOSPITAL COURSE   67 YO Female with PMHx of ALS, Parkinson,, nonverbal, bed bound, chronic respiratory failure with tracheostomy and vent dependence, and oropharyngeal dysphagia with PEG who presented from home with tracheostomy leak. She was noted with vent alarm and poor TVe (250-290) at home. Seen by Pulmonologist and tracheostomy changed to 80XLTCD with no improvement in air leak and ultimately brought in for CTSx evaluation. Patient admitted to MICU for vent support pending RCU bed on 7/4.     While in MICU, patient noted to be stable with no concern for air leakage from her tracheostomy with hyperinflated cuff. CXR with persistent atelectasis. WBC 18, however per trended noted with baseline leukocytosis. No fever or chills and pending MRSA, SCx and BCx. Concern noted for possible tracheomegaly, however pending thoracic surgery bronchoscopy for formal airway evaluation and possible tracheostomy change vs up size. Patient accepted to RCU bed 640. Charge nurse aware. Bed board called to assign bed.     OBJECTIVE:  ICU Vital Signs Last 24 Hrs  T(C): 37.7 (05 Jul 2024 16:00), Max: 37.7 (05 Jul 2024 16:00)  T(F): 99.8 (05 Jul 2024 16:00), Max: 99.8 (05 Jul 2024 16:00)  HR: 93 (05 Jul 2024 18:00) (78 - 100)  BP: 101/66 (05 Jul 2024 18:00) (91/59 - 165/89)  BP(mean): 75 (05 Jul 2024 18:00) (65 - 95)  ABP: --  ABP(mean): --  RR: 16 (05 Jul 2024 18:00) (16 - 24)  SpO2: 97% (05 Jul 2024 18:00) (94% - 100%)    O2 Parameters below as of 05 Jul 2024 18:00  Patient On (Oxygen Delivery Method): ventilator, AC    O2 Concentration (%): 30      Mode: AC/ CMV (Assist Control/ Continuous Mandatory Ventilation), RR (machine): 16, TV (machine): 400, FiO2: 21, PEEP: 5, ITime: 0.64, MAP: 10, PIP: 32    07-04 @ 07:01  -  07-05 @ 07:00  --------------------------------------------------------  IN: 0 mL / OUT: 300 mL / NET: -300 mL    07-05 @ 07:01  -  07-05 @ 18:20  --------------------------------------------------------  IN: 233 mL / OUT: 0 mL / NET: 233 mL      CAPILLARY BLOOD GLUCOSE          HOSPITAL MEDICATIONS:  MEDICATIONS  (STANDING):  artificial  tears Solution 1 Drop(s) Both EYES every 12 hours  chlorhexidine 0.12% Liquid 15 milliLiter(s) Oral Mucosa every 12 hours  chlorhexidine 2% Cloths 1 Application(s) Topical daily  diazepam    Tablet 2 milliGRAM(s) Oral every 12 hours  enoxaparin Injectable 40 milliGRAM(s) SubCutaneous every 24 hours  multivitamin 1 Tablet(s) Oral daily  riluzole 50 milliGRAM(s) Oral two times a day  sertraline 75 milliGRAM(s) Oral daily    MEDICATIONS  (PRN):  sodium chloride 3%  Inhalation 4 milliLiter(s) Inhalation every 12 hours PRN for congestion    LABS:                        13.1   18.40 )-----------( 311      ( 05 Jul 2024 02:00 )             39.6     07-05    138  |  104  |  16  ----------------------------<  114<H>  3.9   |  19<L>  |  <0.20<L>    Ca    9.2      05 Jul 2024 02:00  Phos  2.7     07-05  Mg     2.00     07-05        Urinalysis Basic - ( 05 Jul 2024 02:00 )    Color: x / Appearance: x / SG: x / pH: x  Gluc: 114 mg/dL / Ketone: x  / Bili: x / Urobili: x   Blood: x / Protein: x / Nitrite: x   Leuk Esterase: x / RBC: x / WBC x   Sq Epi: x / Non Sq Epi: x / Bacteria: x        Venous Blood Gas:  07-05 @ 02:00  7.46/31/71/22/97.9  VBG Lactate: 2.1  Venous Blood Gas:  07-04 @ 19:28  7.35/41/61/23/91.6  VBG Lactate: 3.5      PAST MEDICAL & SURGICAL HISTORY:  ALS (amyotrophic lateral sclerosis)      HLD (hyperlipidemia)      Ventilator dependent  Since 2015      Aspiration into airway      S/P gastrostomy  10/14 for dysphagia  receives jevity 2 cans TID      Dependence on tracheostomy      Encounter for PEG (percutaneous endoscopic gastrostomy)  peg placed          FAMILY HISTORY:  No pertinent family history in first degree relatives        Social History:      RADIOLOGY:  [ ] Reviewed and interpreted by me    PULMONARY FUNCTION TESTS:    EKG:    ASSESSMENT AND PLAN:   67 YO Female with PMHx of ALS, Parkinson,, nonverbal, bed bound, chronic respiratory failure with tracheostomy and vent dependence, and oropharyngeal dysphagia with PEG who presented from home with tracheostomy leak. She was noted with vent alarm and poor TVe (250-290) at home. Trach changed at home with no improvement and sent in for thoracic evaluation. While in MICU no air leak noted with hyperinflated cuff. Transferred to RCU on 7/5    NEUROLOGY  # ALS   - Baseline nonverbal, awake, and communicates with eye movement.   - Continue on home rilutek 50mg BID     PSYCH   # Anxiety and Depression   - Continue on home valium 2mg BID   - Continue on zoloft 75mg QD while in house (on 4cc/ 80mg QD at home)     CARDIOVASCULAR  - No acute issues   - Monitor HR and BP     RESPIRATORY  # Tracheostomy leak   - At home noted with TVe 200-300s despite tracheostomy change to 80XLTCD.   - No airleak noted in house with hyperinflated cuff likely tracheomegaly?   - Continue on vent 16/400/5/21   - Continue HTS PRN   - Thoracic to take pt to OR on Monday as an add on for Bronch, Trach revision/Trach upsize with Dr. Owens.     GI  # Dysphagia   - Continue on PEG-TF   - Monitor BMs     RENAL  - No acute issues   - Monitor renal function and UOP     INFECTIOUS DISEASE  # Leukocytosis   - No fever or chills and noted prior leukocytosis   - CXR with prior atelectasis and no acute findings   - Pending SCx, BCx, UA and MRSA PCR   - Monitor off ABX for now     HEME  - On Xarelto 10mg at home likely for DVT PPX?   - Hold home Xarelto in prep for OR   - DVT PPX with LVX for now     ENDOCRINE  - No hx of DM2   - Monitor BG     SKIN  - Basic trach and PEG care ordered     ETHICS/ GOC    - FULL CODE   - Dr Tyson Velasquez,  updated on plan by MICU team     DISPO - Back home with  when able.     Enrico Piña, JONASS, PA-C  Department of Medicine/ RCU  In house [facility] [id number]  In house Medicine Bee[facility] [phone]  Reachable via teams

## 2024-07-05 NOTE — DIETITIAN INITIAL EVALUATION ADULT - NS FNS DIET ORDER
NPO with Tube Feed: Jevity 1.2 Marck via Gastrostomy initiated at 20 mL/hr increased by 10 mL q2 hrs to goal rate 70 mL/hr x18 hrs + No Carb Prosource 1 packet daily

## 2024-07-06 LAB
ANION GAP SERPL CALC-SCNC: 16 MMOL/L — HIGH (ref 7–14)
APPEARANCE UR: ABNORMAL
BACTERIA # UR AUTO: ABNORMAL /HPF
BASE EXCESS BLDV CALC-SCNC: -1.8 MMOL/L — SIGNIFICANT CHANGE UP (ref -2–3)
BASE EXCESS BLDV CALC-SCNC: -3.9 MMOL/L — LOW (ref -2–3)
BASOPHILS # BLD AUTO: 0.03 K/UL — SIGNIFICANT CHANGE UP (ref 0–0.2)
BASOPHILS NFR BLD AUTO: 0.3 % — SIGNIFICANT CHANGE UP (ref 0–2)
BILIRUB UR-MCNC: ABNORMAL
BLOOD GAS VENOUS COMPREHENSIVE RESULT: SIGNIFICANT CHANGE UP
BLOOD GAS VENOUS COMPREHENSIVE RESULT: SIGNIFICANT CHANGE UP
BUN SERPL-MCNC: 20 MG/DL — SIGNIFICANT CHANGE UP (ref 7–23)
CALCIUM SERPL-MCNC: 9.3 MG/DL — SIGNIFICANT CHANGE UP (ref 8.4–10.5)
CAST: 1 /LPF — SIGNIFICANT CHANGE UP (ref 0–4)
CHLORIDE BLDV-SCNC: 105 MMOL/L — SIGNIFICANT CHANGE UP (ref 96–108)
CHLORIDE BLDV-SCNC: 106 MMOL/L — SIGNIFICANT CHANGE UP (ref 96–108)
CHLORIDE SERPL-SCNC: 104 MMOL/L — SIGNIFICANT CHANGE UP (ref 98–107)
CO2 BLDV-SCNC: 22.9 MMOL/L — SIGNIFICANT CHANGE UP (ref 22–26)
CO2 BLDV-SCNC: 25.4 MMOL/L — SIGNIFICANT CHANGE UP (ref 22–26)
CO2 SERPL-SCNC: 21 MMOL/L — LOW (ref 22–31)
COD CRY URNS QL: PRESENT
COLOR SPEC: SIGNIFICANT CHANGE UP
CREAT SERPL-MCNC: <0.2 MG/DL — LOW (ref 0.5–1.3)
DIFF PNL FLD: NEGATIVE — SIGNIFICANT CHANGE UP
EGFR: 126 ML/MIN/1.73M2 — SIGNIFICANT CHANGE UP
EOSINOPHIL # BLD AUTO: 0.16 K/UL — SIGNIFICANT CHANGE UP (ref 0–0.5)
EOSINOPHIL NFR BLD AUTO: 1.5 % — SIGNIFICANT CHANGE UP (ref 0–6)
GAS PNL BLDV: 136 MMOL/L — SIGNIFICANT CHANGE UP (ref 136–145)
GAS PNL BLDV: 138 MMOL/L — SIGNIFICANT CHANGE UP (ref 136–145)
GLUCOSE BLDV-MCNC: 156 MG/DL — HIGH (ref 70–99)
GLUCOSE BLDV-MCNC: 178 MG/DL — HIGH (ref 70–99)
GLUCOSE SERPL-MCNC: 149 MG/DL — HIGH (ref 70–99)
GLUCOSE UR QL: NEGATIVE MG/DL — SIGNIFICANT CHANGE UP
HCO3 BLDV-SCNC: 22 MMOL/L — SIGNIFICANT CHANGE UP (ref 22–29)
HCO3 BLDV-SCNC: 24 MMOL/L — SIGNIFICANT CHANGE UP (ref 22–29)
HCT VFR BLD CALC: 39.8 % — SIGNIFICANT CHANGE UP (ref 34.5–45)
HCT VFR BLD CALC: 44.3 % — SIGNIFICANT CHANGE UP (ref 34.5–45)
HCT VFR BLDA CALC: 38 % — SIGNIFICANT CHANGE UP (ref 34.5–46.5)
HCT VFR BLDA CALC: 42 % — SIGNIFICANT CHANGE UP (ref 34.5–46.5)
HGB BLD CALC-MCNC: 12.6 G/DL — SIGNIFICANT CHANGE UP (ref 11.7–16.1)
HGB BLD CALC-MCNC: 14 G/DL — SIGNIFICANT CHANGE UP (ref 11.7–16.1)
HGB BLD-MCNC: 12.7 G/DL — SIGNIFICANT CHANGE UP (ref 11.5–15.5)
HGB BLD-MCNC: 14 G/DL — SIGNIFICANT CHANGE UP (ref 11.5–15.5)
IANC: 6.92 K/UL — SIGNIFICANT CHANGE UP (ref 1.8–7.4)
IMM GRANULOCYTES NFR BLD AUTO: 0.8 % — SIGNIFICANT CHANGE UP (ref 0–0.9)
KETONES UR-MCNC: NEGATIVE MG/DL — SIGNIFICANT CHANGE UP
LACTATE BLDV-MCNC: 2.4 MMOL/L — HIGH (ref 0.5–2)
LACTATE BLDV-MCNC: 4.4 MMOL/L — CRITICAL HIGH (ref 0.5–2)
LEUKOCYTE ESTERASE UR-ACNC: ABNORMAL
LYMPHOCYTES # BLD AUTO: 2.64 K/UL — SIGNIFICANT CHANGE UP (ref 1–3.3)
LYMPHOCYTES # BLD AUTO: 24.9 % — SIGNIFICANT CHANGE UP (ref 13–44)
MAGNESIUM SERPL-MCNC: 2.2 MG/DL — SIGNIFICANT CHANGE UP (ref 1.6–2.6)
MCHC RBC-ENTMCNC: 27 PG — SIGNIFICANT CHANGE UP (ref 27–34)
MCHC RBC-ENTMCNC: 27.5 PG — SIGNIFICANT CHANGE UP (ref 27–34)
MCHC RBC-ENTMCNC: 31.6 GM/DL — LOW (ref 32–36)
MCHC RBC-ENTMCNC: 31.9 GM/DL — LOW (ref 32–36)
MCV RBC AUTO: 84.5 FL — SIGNIFICANT CHANGE UP (ref 80–100)
MCV RBC AUTO: 86.9 FL — SIGNIFICANT CHANGE UP (ref 80–100)
MONOCYTES # BLD AUTO: 0.77 K/UL — SIGNIFICANT CHANGE UP (ref 0–0.9)
MONOCYTES NFR BLD AUTO: 7.3 % — SIGNIFICANT CHANGE UP (ref 2–14)
MRSA PCR RESULT.: DETECTED
NEUTROPHILS # BLD AUTO: 6.92 K/UL — SIGNIFICANT CHANGE UP (ref 1.8–7.4)
NEUTROPHILS NFR BLD AUTO: 65.2 % — SIGNIFICANT CHANGE UP (ref 43–77)
NITRITE UR-MCNC: POSITIVE
NRBC # BLD: 0 /100 WBCS — SIGNIFICANT CHANGE UP (ref 0–0)
NRBC # BLD: 0 /100 WBCS — SIGNIFICANT CHANGE UP (ref 0–0)
NRBC # FLD: 0 K/UL — SIGNIFICANT CHANGE UP (ref 0–0)
NRBC # FLD: 0 K/UL — SIGNIFICANT CHANGE UP (ref 0–0)
PCO2 BLDV: 33 MMHG — LOW (ref 39–52)
PCO2 BLDV: 53 MMHG — HIGH (ref 39–52)
PH BLDV: 7.26 — LOW (ref 7.32–7.43)
PH BLDV: 7.43 — SIGNIFICANT CHANGE UP (ref 7.32–7.43)
PH UR: 6 — SIGNIFICANT CHANGE UP (ref 5–8)
PHOSPHATE SERPL-MCNC: 3.2 MG/DL — SIGNIFICANT CHANGE UP (ref 2.5–4.5)
PLATELET # BLD AUTO: 282 K/UL — SIGNIFICANT CHANGE UP (ref 150–400)
PLATELET # BLD AUTO: 282 K/UL — SIGNIFICANT CHANGE UP (ref 150–400)
PO2 BLDV: 40 MMHG — SIGNIFICANT CHANGE UP (ref 25–45)
PO2 BLDV: 77 MMHG — HIGH (ref 25–45)
POTASSIUM BLDV-SCNC: 3.6 MMOL/L — SIGNIFICANT CHANGE UP (ref 3.5–5.1)
POTASSIUM BLDV-SCNC: 3.8 MMOL/L — SIGNIFICANT CHANGE UP (ref 3.5–5.1)
POTASSIUM SERPL-MCNC: 3.9 MMOL/L — SIGNIFICANT CHANGE UP (ref 3.5–5.3)
POTASSIUM SERPL-SCNC: 3.9 MMOL/L — SIGNIFICANT CHANGE UP (ref 3.5–5.3)
PROCALCITONIN SERPL-MCNC: 0.13 NG/ML — HIGH (ref 0.02–0.1)
PROT UR-MCNC: 30 MG/DL
RBC # BLD: 4.71 M/UL — SIGNIFICANT CHANGE UP (ref 3.8–5.2)
RBC # BLD: 5.1 M/UL — SIGNIFICANT CHANGE UP (ref 3.8–5.2)
RBC # FLD: 17.2 % — HIGH (ref 10.3–14.5)
RBC # FLD: 17.2 % — HIGH (ref 10.3–14.5)
RBC CASTS # UR COMP ASSIST: 7 /HPF — HIGH (ref 0–4)
REVIEW: SIGNIFICANT CHANGE UP
S AUREUS DNA NOSE QL NAA+PROBE: DETECTED
SAO2 % BLDV: 60.4 % — LOW (ref 67–88)
SAO2 % BLDV: 97.1 % — HIGH (ref 67–88)
SODIUM SERPL-SCNC: 141 MMOL/L — SIGNIFICANT CHANGE UP (ref 135–145)
SP GR SPEC: 1.02 — SIGNIFICANT CHANGE UP (ref 1–1.03)
SQUAMOUS # UR AUTO: 8 /HPF — HIGH (ref 0–5)
UROBILINOGEN FLD QL: 1 MG/DL — SIGNIFICANT CHANGE UP (ref 0.2–1)
WBC # BLD: 10.6 K/UL — HIGH (ref 3.8–10.5)
WBC # BLD: 11.43 K/UL — HIGH (ref 3.8–10.5)
WBC # FLD AUTO: 10.6 K/UL — HIGH (ref 3.8–10.5)
WBC # FLD AUTO: 11.43 K/UL — HIGH (ref 3.8–10.5)
WBC UR QL: 94 /HPF — HIGH (ref 0–5)

## 2024-07-06 PROCEDURE — 99233 SBSQ HOSP IP/OBS HIGH 50: CPT | Mod: FS

## 2024-07-06 RX ORDER — MUPIROCIN 2 %
1 OINTMENT (GRAM) TOPICAL EVERY 12 HOURS
Refills: 0 | Status: COMPLETED | OUTPATIENT
Start: 2024-07-06 | End: 2024-07-11

## 2024-07-06 RX ADMIN — CHLORHEXIDINE GLUCONATE 15 MILLILITER(S): 40 SOLUTION TOPICAL at 05:06

## 2024-07-06 RX ADMIN — POVIDONE, PROPYLENE GLYCOL 1 DROP(S): 6.8; 3 LIQUID OPHTHALMIC at 17:53

## 2024-07-06 RX ADMIN — CHLORHEXIDINE GLUCONATE 1 APPLICATION(S): 40 SOLUTION TOPICAL at 12:05

## 2024-07-06 RX ADMIN — RILUZOLE 50 MILLIGRAM(S): 5 LIQUID ORAL at 17:54

## 2024-07-06 RX ADMIN — POVIDONE, PROPYLENE GLYCOL 1 DROP(S): 6.8; 3 LIQUID OPHTHALMIC at 05:07

## 2024-07-06 RX ADMIN — CHLORHEXIDINE GLUCONATE 15 MILLILITER(S): 40 SOLUTION TOPICAL at 17:53

## 2024-07-06 RX ADMIN — Medication 1000 MILLILITER(S): at 07:25

## 2024-07-06 RX ADMIN — RILUZOLE 50 MILLIGRAM(S): 5 LIQUID ORAL at 05:06

## 2024-07-06 RX ADMIN — Medication 2 MILLIGRAM(S): at 05:07

## 2024-07-06 RX ADMIN — Medication 1 APPLICATION(S): at 17:58

## 2024-07-06 RX ADMIN — ENOXAPARIN SODIUM 40 MILLIGRAM(S): 100 INJECTION SUBCUTANEOUS at 05:06

## 2024-07-06 RX ADMIN — Medication 2 MILLIGRAM(S): at 17:57

## 2024-07-06 RX ADMIN — Medication 1 TABLET(S): at 12:05

## 2024-07-06 RX ADMIN — SERTRALINE HYDROCHLORIDE 75 MILLIGRAM(S): 50 TABLET, FILM COATED ORAL at 12:05

## 2024-07-06 NOTE — PROGRESS NOTE ADULT - ASSESSMENT
** INCOMPLETE NOTE **  ASSESSMENT AND PLAN:   69 YO Female with PMHx of ALS, Parkinson,, nonverbal, bed bound, chronic respiratory failure with tracheostomy and vent dependence, and oropharyngeal dysphagia with PEG who presented from home with tracheostomy leak. She was noted with vent alarm and poor TVe (250-290) at home. Trach changed at home with no improvement and sent in for thoracic evaluation. While in MICU no air leak noted with hyperinflated cuff. Transferred to RCU on 7/5    NEUROLOGY  # ALS   - Baseline nonverbal, awake, and communicates with eye movement.   - Continue on home rilutek 50mg BID     PSYCH   # Anxiety and Depression   - Continue on home valium 2mg BID   - Continue on zoloft 75mg QD while in house (on 4cc/ 80mg QD at home)     CARDIOVASCULAR  - No acute issues   - Monitor HR and BP     RESPIRATORY  # Tracheostomy leak   - At home noted with TVe 200-300s despite tracheostomy change to 80XLTCD.   - No airleak noted in house with hyperinflated cuff likely tracheomegaly?   - Continue on vent 16/400/5/21   - Continue HTS PRN   - Thoracic to take pt to OR on Monday as an add on for Bronch, Trach revision/Trach upsize with Dr. Owens.     GI  # Dysphagia   - Continue on PEG-TF   - Monitor BMs   - Abdomen mildly distended & monitoring for now     RENAL  - No acute issues   - Monitor renal function and UOP     #Hyperlactatemia  - Lactate 3.5 on admission, downtrended , then back up to 4.4   - S/P 1L LR bolus with improvement in lactate (7/6)    INFECTIOUS DISEASE  # Leukocytosis   - No fever or chills and noted prior leukocytosis   - CXR with prior atelectasis and no acute findings   - Uptrending Lactate so UA sent returning with POSITIVE Nitrites (7/6) but given no fever and improving leukocytosis will hold of ABX   - SCx (7/5) NEG  - Pending BCx & UCX  - MRSA PCR (7/6) POSITIVE for both MRSA/MSSA - Start Bactroban (7/6 - 7/10)    HEME  - On Xarelto 10mg at home likely for DVT PPX?   - Hold home Xarelto in prep for OR   - DVT PPX with LVX for now     ENDOCRINE  - No hx of DM2   - Monitor BG     SKIN  - Basic trach and PEG care ordered     ETHICS/ GOC    - FULL CODE   - Dr Tyson Velasquez,  involved in care    DISPO - Back home with  when able.

## 2024-07-06 NOTE — PROGRESS NOTE ADULT - NS ATTEND AMEND GEN_ALL_CORE FT
agree with above  tx from MICU  hx noted  plan for OR with thoracic on Monday  respiratory status is stable  UA is positive, but afebrile, wbc is now down to normal.

## 2024-07-06 NOTE — PROGRESS NOTE ADULT - SUBJECTIVE AND OBJECTIVE BOX
CHIEF COMPLAINT:    INTERVAL EVENTS:     ROS: Seen by bedside during AM rounds     OBJECTIVE:  ICU Vital Signs Last 24 Hrs  T(C): 37.2 (06 Jul 2024 04:00), Max: 37.7 (05 Jul 2024 16:00)  T(F): 99 (06 Jul 2024 04:00), Max: 99.8 (05 Jul 2024 16:00)  HR: 85 (06 Jul 2024 04:00) (83 - 100)  BP: 128/72 (06 Jul 2024 04:00) (91/59 - 142/76)  BP(mean): 75 (05 Jul 2024 18:00) (69 - 95)  ABP: --  ABP(mean): --  RR: 16 (06 Jul 2024 04:00) (16 - 16)  SpO2: 96% (06 Jul 2024 04:00) (94% - 100%)    O2 Parameters below as of 06 Jul 2024 04:00  Patient On (Oxygen Delivery Method): ventilator, AC    O2 Concentration (%): 30      Mode: AC/ CMV (Assist Control/ Continuous Mandatory Ventilation), RR (machine): 16, TV (machine): 400, FiO2: 21, PEEP: 5, ITime: 0.64, MAP: 11, PIP: 34    07-05 @ 07:01  -  07-06 @ 07:00  --------------------------------------------------------  IN: 869 mL / OUT: 300 mL / NET: 569 mL      CAPILLARY BLOOD GLUCOSE          PHYSICAL EXAM:  General:   HEENT:   Lymph Nodes:  Neck:   Respiratory:   Cardiovascular:   Abdomen:   Extremities:   Skin:   Neurological:  Psychiatry:    Mode: AC/ CMV (Assist Control/ Continuous Mandatory Ventilation)  RR (machine): 16  TV (machine): 400  FiO2: 21  PEEP: 5  ITime: 0.64  MAP: 11  PIP: 34      HOSPITAL MEDICATIONS:  MEDICATIONS  (STANDING):  artificial  tears Solution 1 Drop(s) Both EYES every 12 hours  chlorhexidine 0.12% Liquid 15 milliLiter(s) Oral Mucosa every 12 hours  chlorhexidine 2% Cloths 1 Application(s) Topical daily  diazepam    Tablet 2 milliGRAM(s) Oral every 12 hours  enoxaparin Injectable 40 milliGRAM(s) SubCutaneous every 24 hours  lactated ringers Bolus 1000 milliLiter(s) IV Bolus once  multivitamin 1 Tablet(s) Oral daily  riluzole 50 milliGRAM(s) Oral two times a day  sertraline 75 milliGRAM(s) Oral daily    MEDICATIONS  (PRN):  sodium chloride 3%  Inhalation 4 milliLiter(s) Inhalation every 12 hours PRN for congestion      LABS:                        14.0   x     )-----------( 282      ( 06 Jul 2024 06:30 )             44.3     07-05    138  |  104  |  16  ----------------------------<  114<H>  3.9   |  19<L>  |  <0.20<L>    Ca    9.2      05 Jul 2024 02:00  Phos  2.7     07-05  Mg     2.00     07-05        Urinalysis Basic - ( 05 Jul 2024 02:00 )    Color: x / Appearance: x / SG: x / pH: x  Gluc: 114 mg/dL / Ketone: x  / Bili: x / Urobili: x   Blood: x / Protein: x / Nitrite: x   Leuk Esterase: x / RBC: x / WBC x   Sq Epi: x / Non Sq Epi: x / Bacteria: x        Venous Blood Gas:  07-06 @ 06:30  7.26/53/40/24/60.4  VBG Lactate: 4.4  Venous Blood Gas:  07-05 @ 02:00  7.46/31/71/22/97.9  VBG Lactate: 2.1  Venous Blood Gas:  07-04 @ 19:28  7.35/41/61/23/91.6  VBG Lactate: 3.5   CHIEF COMPLAINT:Patient is a 68y old  Female who presents with a chief complaint of Other abnormality of breathing     (05 Jul 2024 10:47)      INTERVAL EVENTS:     ROS: Seen by bedside during AM rounds     OBJECTIVE:  ICU Vital Signs Last 24 Hrs  T(C): 37.2 (06 Jul 2024 04:00), Max: 37.7 (05 Jul 2024 16:00)  T(F): 99 (06 Jul 2024 04:00), Max: 99.8 (05 Jul 2024 16:00)  HR: 85 (06 Jul 2024 04:00) (83 - 100)  BP: 128/72 (06 Jul 2024 04:00) (91/59 - 142/76)  BP(mean): 75 (05 Jul 2024 18:00) (69 - 95)  ABP: --  ABP(mean): --  RR: 16 (06 Jul 2024 04:00) (16 - 16)  SpO2: 96% (06 Jul 2024 04:00) (94% - 100%)    O2 Parameters below as of 06 Jul 2024 04:00  Patient On (Oxygen Delivery Method): ventilator, AC    O2 Concentration (%): 30      Mode: AC/ CMV (Assist Control/ Continuous Mandatory Ventilation), RR (machine): 16, TV (machine): 400, FiO2: 21, PEEP: 5, ITime: 0.64, MAP: 11, PIP: 34    07-05 @ 07:01  -  07-06 @ 07:00  --------------------------------------------------------  IN: 869 mL / OUT: 300 mL / NET: 569 mL      CAPILLARY BLOOD GLUCOSE          PHYSICAL EXAM:  General: NAD   Neck: (+) Trach tube noted, site c/d/i.  Cards: S1/S2, no murmurs   Pulm: rhonchorous b/l. No resp distress. VTs 400-410 with pPeaks 37-40.  Abdomen: Abd slightly distended. Soft, NT. (+) PEG noted, site c/d/i.   Extremities: Trace b/l LE edema. Extremities cool to touch. Intact distal pulses.   Neurology: Awake/eyes open. Nonverbal. Not following commands. Not tracking. Extremities nonrigid.   Skin: warm to touch, color appropriate for ethnicity. Refer to RN assessment for further details.      Mode: AC/ CMV (Assist Control/ Continuous Mandatory Ventilation)  RR (machine): 16  TV (machine): 400  FiO2: 21  PEEP: 5  ITime: 0.64  MAP: 11  PIP: 34      HOSPITAL MEDICATIONS:  MEDICATIONS  (STANDING):  artificial  tears Solution 1 Drop(s) Both EYES every 12 hours  chlorhexidine 0.12% Liquid 15 milliLiter(s) Oral Mucosa every 12 hours  chlorhexidine 2% Cloths 1 Application(s) Topical daily  diazepam    Tablet 2 milliGRAM(s) Oral every 12 hours  enoxaparin Injectable 40 milliGRAM(s) SubCutaneous every 24 hours  lactated ringers Bolus 1000 milliLiter(s) IV Bolus once  multivitamin 1 Tablet(s) Oral daily  riluzole 50 milliGRAM(s) Oral two times a day  sertraline 75 milliGRAM(s) Oral daily    MEDICATIONS  (PRN):  sodium chloride 3%  Inhalation 4 milliLiter(s) Inhalation every 12 hours PRN for congestion      LABS:                        14.0   x     )-----------( 282      ( 06 Jul 2024 06:30 )             44.3     07-05    138  |  104  |  16  ----------------------------<  114<H>  3.9   |  19<L>  |  <0.20<L>    Ca    9.2      05 Jul 2024 02:00  Phos  2.7     07-05  Mg     2.00     07-05        Urinalysis Basic - ( 05 Jul 2024 02:00 )    Color: x / Appearance: x / SG: x / pH: x  Gluc: 114 mg/dL / Ketone: x  / Bili: x / Urobili: x   Blood: x / Protein: x / Nitrite: x   Leuk Esterase: x / RBC: x / WBC x   Sq Epi: x / Non Sq Epi: x / Bacteria: x        Venous Blood Gas:  07-06 @ 06:30  7.26/53/40/24/60.4  VBG Lactate: 4.4  Venous Blood Gas:  07-05 @ 02:00  7.46/31/71/22/97.9  VBG Lactate: 2.1  Venous Blood Gas:  07-04 @ 19:28  7.35/41/61/23/91.6  VBG Lactate: 3.5   CHIEF COMPLAINT:Patient is a 68y old  Female who presents with a chief complaint of Other abnormality of breathing     (05 Jul 2024 10:47)      INTERVAL EVENTS: no acute overnight events. No telemetric events.    ROS: Unable to obtain ROS given patient is nonverbal at baseline.    OBJECTIVE:  ICU Vital Signs Last 24 Hrs  T(C): 37.2 (06 Jul 2024 04:00), Max: 37.7 (05 Jul 2024 16:00)  T(F): 99 (06 Jul 2024 04:00), Max: 99.8 (05 Jul 2024 16:00)  HR: 85 (06 Jul 2024 04:00) (83 - 100)  BP: 128/72 (06 Jul 2024 04:00) (91/59 - 142/76)  BP(mean): 75 (05 Jul 2024 18:00) (69 - 95)  ABP: --  ABP(mean): --  RR: 16 (06 Jul 2024 04:00) (16 - 16)  SpO2: 96% (06 Jul 2024 04:00) (94% - 100%)    O2 Parameters below as of 06 Jul 2024 04:00  Patient On (Oxygen Delivery Method): ventilator, AC    O2 Concentration (%): 30      Mode: AC/ CMV (Assist Control/ Continuous Mandatory Ventilation), RR (machine): 16, TV (machine): 400, FiO2: 21, PEEP: 5, ITime: 0.64, MAP: 11, PIP: 34    07-05 @ 07:01  -  07-06 @ 07:00  --------------------------------------------------------  IN: 869 mL / OUT: 300 mL / NET: 569 mL      CAPILLARY BLOOD GLUCOSE          PHYSICAL EXAM:  General: NAD   Neck: (+) Trach tube noted, site c/d/i.  Cards: S1/S2, no murmurs   Pulm: rhonchorous b/l. No resp distress. On AC Mode ventilation. VTs 400-410 with pPeaks 37-40.  Abdomen: Abd slightly distended. Soft, NT. (+) PEG noted, site c/d/i.   Extremities: Trace b/l LE edema. Extremities cool to touch. Intact distal pulses.   Neurology: Awake/eyes open. Nonverbal. Not following commands. Not tracking. Extremities nonrigid.   Skin: warm to touch, color appropriate for ethnicity. Refer to RN assessment for further details.      Mode: AC/ CMV (Assist Control/ Continuous Mandatory Ventilation)  RR (machine): 16  TV (machine): 400  FiO2: 21  PEEP: 5  ITime: 0.64  MAP: 11  PIP: 34      HOSPITAL MEDICATIONS:  MEDICATIONS  (STANDING):  artificial  tears Solution 1 Drop(s) Both EYES every 12 hours  chlorhexidine 0.12% Liquid 15 milliLiter(s) Oral Mucosa every 12 hours  chlorhexidine 2% Cloths 1 Application(s) Topical daily  diazepam    Tablet 2 milliGRAM(s) Oral every 12 hours  enoxaparin Injectable 40 milliGRAM(s) SubCutaneous every 24 hours  lactated ringers Bolus 1000 milliLiter(s) IV Bolus once  multivitamin 1 Tablet(s) Oral daily  riluzole 50 milliGRAM(s) Oral two times a day  sertraline 75 milliGRAM(s) Oral daily    MEDICATIONS  (PRN):  sodium chloride 3%  Inhalation 4 milliLiter(s) Inhalation every 12 hours PRN for congestion      LABS:                        14.0   x     )-----------( 282      ( 06 Jul 2024 06:30 )             44.3     07-05    138  |  104  |  16  ----------------------------<  114<H>  3.9   |  19<L>  |  <0.20<L>    Ca    9.2      05 Jul 2024 02:00  Phos  2.7     07-05  Mg     2.00     07-05        Urinalysis Basic - ( 05 Jul 2024 02:00 )    Color: x / Appearance: x / SG: x / pH: x  Gluc: 114 mg/dL / Ketone: x  / Bili: x / Urobili: x   Blood: x / Protein: x / Nitrite: x   Leuk Esterase: x / RBC: x / WBC x   Sq Epi: x / Non Sq Epi: x / Bacteria: x        Venous Blood Gas:  07-06 @ 06:30  7.26/53/40/24/60.4  VBG Lactate: 4.4  Venous Blood Gas:  07-05 @ 02:00  7.46/31/71/22/97.9  VBG Lactate: 2.1  Venous Blood Gas:  07-04 @ 19:28  7.35/41/61/23/91.6  VBG Lactate: 3.5

## 2024-07-07 LAB
-  AZTREONAM: SIGNIFICANT CHANGE UP
-  CEFEPIME: SIGNIFICANT CHANGE UP
-  CEFTAZIDIME: SIGNIFICANT CHANGE UP
-  CIPROFLOXACIN: SIGNIFICANT CHANGE UP
-  IMIPENEM: SIGNIFICANT CHANGE UP
-  LEVOFLOXACIN: SIGNIFICANT CHANGE UP
-  MEROPENEM: SIGNIFICANT CHANGE UP
-  PIPERACILLIN/TAZOBACTAM: SIGNIFICANT CHANGE UP
ANION GAP SERPL CALC-SCNC: 14 MMOL/L — SIGNIFICANT CHANGE UP (ref 7–14)
BASE EXCESS BLDV CALC-SCNC: -1.1 MMOL/L — SIGNIFICANT CHANGE UP (ref -2–3)
BASOPHILS # BLD AUTO: 0.03 K/UL — SIGNIFICANT CHANGE UP (ref 0–0.2)
BASOPHILS NFR BLD AUTO: 0.2 % — SIGNIFICANT CHANGE UP (ref 0–2)
BLOOD GAS VENOUS COMPREHENSIVE RESULT: SIGNIFICANT CHANGE UP
BUN SERPL-MCNC: 14 MG/DL — SIGNIFICANT CHANGE UP (ref 7–23)
CALCIUM SERPL-MCNC: 9.2 MG/DL — SIGNIFICANT CHANGE UP (ref 8.4–10.5)
CHLORIDE BLDV-SCNC: 105 MMOL/L — SIGNIFICANT CHANGE UP (ref 96–108)
CHLORIDE SERPL-SCNC: 104 MMOL/L — SIGNIFICANT CHANGE UP (ref 98–107)
CO2 BLDV-SCNC: 26.2 MMOL/L — HIGH (ref 22–26)
CO2 SERPL-SCNC: 22 MMOL/L — SIGNIFICANT CHANGE UP (ref 22–31)
CREAT SERPL-MCNC: <0.2 MG/DL — LOW (ref 0.5–1.3)
CULTURE RESULTS: ABNORMAL
EGFR: 126 ML/MIN/1.73M2 — SIGNIFICANT CHANGE UP
EOSINOPHIL # BLD AUTO: 0.23 K/UL — SIGNIFICANT CHANGE UP (ref 0–0.5)
EOSINOPHIL NFR BLD AUTO: 1.8 % — SIGNIFICANT CHANGE UP (ref 0–6)
GAS PNL BLDV: 136 MMOL/L — SIGNIFICANT CHANGE UP (ref 136–145)
GLUCOSE BLDV-MCNC: 144 MG/DL — HIGH (ref 70–99)
GLUCOSE SERPL-MCNC: 137 MG/DL — HIGH (ref 70–99)
HCO3 BLDV-SCNC: 25 MMOL/L — SIGNIFICANT CHANGE UP (ref 22–29)
HCT VFR BLD CALC: 39.2 % — SIGNIFICANT CHANGE UP (ref 34.5–45)
HCT VFR BLDA CALC: 38 % — SIGNIFICANT CHANGE UP (ref 34.5–46.5)
HGB BLD CALC-MCNC: 12.7 G/DL — SIGNIFICANT CHANGE UP (ref 11.7–16.1)
HGB BLD-MCNC: 12.3 G/DL — SIGNIFICANT CHANGE UP (ref 11.5–15.5)
IANC: 9.62 K/UL — HIGH (ref 1.8–7.4)
IMM GRANULOCYTES NFR BLD AUTO: 0.6 % — SIGNIFICANT CHANGE UP (ref 0–0.9)
LACTATE BLDV-MCNC: 2.9 MMOL/L — HIGH (ref 0.5–2)
LYMPHOCYTES # BLD AUTO: 1.83 K/UL — SIGNIFICANT CHANGE UP (ref 1–3.3)
LYMPHOCYTES # BLD AUTO: 14.3 % — SIGNIFICANT CHANGE UP (ref 13–44)
MAGNESIUM SERPL-MCNC: 2 MG/DL — SIGNIFICANT CHANGE UP (ref 1.6–2.6)
MCHC RBC-ENTMCNC: 26.9 PG — LOW (ref 27–34)
MCHC RBC-ENTMCNC: 31.4 GM/DL — LOW (ref 32–36)
MCV RBC AUTO: 85.6 FL — SIGNIFICANT CHANGE UP (ref 80–100)
METHOD TYPE: SIGNIFICANT CHANGE UP
MONOCYTES # BLD AUTO: 0.99 K/UL — HIGH (ref 0–0.9)
MONOCYTES NFR BLD AUTO: 7.7 % — SIGNIFICANT CHANGE UP (ref 2–14)
NEUTROPHILS # BLD AUTO: 9.62 K/UL — HIGH (ref 1.8–7.4)
NEUTROPHILS NFR BLD AUTO: 75.4 % — SIGNIFICANT CHANGE UP (ref 43–77)
NRBC # BLD: 0 /100 WBCS — SIGNIFICANT CHANGE UP (ref 0–0)
NRBC # FLD: 0 K/UL — SIGNIFICANT CHANGE UP (ref 0–0)
ORGANISM # SPEC MICROSCOPIC CNT: ABNORMAL
ORGANISM # SPEC MICROSCOPIC CNT: ABNORMAL
PCO2 BLDV: 45 MMHG — SIGNIFICANT CHANGE UP (ref 39–52)
PH BLDV: 7.35 — SIGNIFICANT CHANGE UP (ref 7.32–7.43)
PHOSPHATE SERPL-MCNC: 3.4 MG/DL — SIGNIFICANT CHANGE UP (ref 2.5–4.5)
PLATELET # BLD AUTO: 319 K/UL — SIGNIFICANT CHANGE UP (ref 150–400)
PO2 BLDV: 78 MMHG — HIGH (ref 25–45)
POTASSIUM BLDV-SCNC: 4.4 MMOL/L — SIGNIFICANT CHANGE UP (ref 3.5–5.1)
POTASSIUM SERPL-MCNC: 4.3 MMOL/L — SIGNIFICANT CHANGE UP (ref 3.5–5.3)
POTASSIUM SERPL-SCNC: 4.3 MMOL/L — SIGNIFICANT CHANGE UP (ref 3.5–5.3)
RBC # BLD: 4.58 M/UL — SIGNIFICANT CHANGE UP (ref 3.8–5.2)
RBC # FLD: 16.9 % — HIGH (ref 10.3–14.5)
SAO2 % BLDV: 97.1 % — HIGH (ref 67–88)
SODIUM SERPL-SCNC: 140 MMOL/L — SIGNIFICANT CHANGE UP (ref 135–145)
SPECIMEN SOURCE: SIGNIFICANT CHANGE UP
WBC # BLD: 12.78 K/UL — HIGH (ref 3.8–10.5)
WBC # FLD AUTO: 12.78 K/UL — HIGH (ref 3.8–10.5)

## 2024-07-07 PROCEDURE — 99231 SBSQ HOSP IP/OBS SF/LOW 25: CPT

## 2024-07-07 PROCEDURE — 99233 SBSQ HOSP IP/OBS HIGH 50: CPT | Mod: FS

## 2024-07-07 RX ORDER — PSYLLIUM HUSK 0.4 G
15 CAPSULE ORAL DAILY
Refills: 0 | Status: DISCONTINUED | OUTPATIENT
Start: 2024-07-07 | End: 2024-08-29

## 2024-07-07 RX ORDER — ACETAMINOPHEN 325 MG/1
650 TABLET ORAL EVERY 6 HOURS
Refills: 0 | Status: DISCONTINUED | OUTPATIENT
Start: 2024-07-07 | End: 2024-07-10

## 2024-07-07 RX ADMIN — CHLORHEXIDINE GLUCONATE 1 APPLICATION(S): 40 SOLUTION TOPICAL at 11:12

## 2024-07-07 RX ADMIN — Medication 2 MILLIGRAM(S): at 17:06

## 2024-07-07 RX ADMIN — POVIDONE, PROPYLENE GLYCOL 1 DROP(S): 6.8; 3 LIQUID OPHTHALMIC at 05:06

## 2024-07-07 RX ADMIN — Medication 15 MILLILITER(S): at 11:12

## 2024-07-07 RX ADMIN — CHLORHEXIDINE GLUCONATE 15 MILLILITER(S): 40 SOLUTION TOPICAL at 17:06

## 2024-07-07 RX ADMIN — ENOXAPARIN SODIUM 40 MILLIGRAM(S): 100 INJECTION SUBCUTANEOUS at 05:06

## 2024-07-07 RX ADMIN — Medication 1 APPLICATION(S): at 17:07

## 2024-07-07 RX ADMIN — Medication 100 MILLILITER(S): at 12:23

## 2024-07-07 RX ADMIN — CHLORHEXIDINE GLUCONATE 15 MILLILITER(S): 40 SOLUTION TOPICAL at 05:06

## 2024-07-07 RX ADMIN — RILUZOLE 50 MILLIGRAM(S): 5 LIQUID ORAL at 17:06

## 2024-07-07 RX ADMIN — ACETAMINOPHEN 650 MILLIGRAM(S): 325 TABLET ORAL at 02:30

## 2024-07-07 RX ADMIN — ACETAMINOPHEN 650 MILLIGRAM(S): 325 TABLET ORAL at 01:54

## 2024-07-07 RX ADMIN — RILUZOLE 50 MILLIGRAM(S): 5 LIQUID ORAL at 05:07

## 2024-07-07 RX ADMIN — POVIDONE, PROPYLENE GLYCOL 1 DROP(S): 6.8; 3 LIQUID OPHTHALMIC at 17:07

## 2024-07-07 RX ADMIN — Medication 1 APPLICATION(S): at 05:08

## 2024-07-07 RX ADMIN — SERTRALINE HYDROCHLORIDE 75 MILLIGRAM(S): 50 TABLET, FILM COATED ORAL at 11:13

## 2024-07-07 RX ADMIN — Medication 2 MILLIGRAM(S): at 05:07

## 2024-07-07 NOTE — PROGRESS NOTE ADULT - ASSESSMENT
ASSESSMENT AND PLAN:   69 YO Female with PMHx of ALS, Parkinson,, nonverbal, bed bound, chronic respiratory failure with tracheostomy and vent dependence, and oropharyngeal dysphagia with PEG who presented from home with tracheostomy leak. She was noted with vent alarm and poor TVe (250-290) at home. Trach changed at home with no improvement and sent in for thoracic evaluation. While in MICU no air leak noted with hyperinflated cuff. Transferred to RCU on 7/5    NEUROLOGY  # ALS   - Baseline nonverbal, awake, and communicates with eye movement.   - Continue on home rilutek 50mg BID     PSYCH   # Anxiety and Depression   - Continue on home valium 2mg BID   - Continue on zoloft 75mg QD while in house (on 4cc/ 80mg QD at home)     CARDIOVASCULAR  - No acute issues   - Monitor HR and BP     RESPIRATORY  # Tracheostomy leak   - At home noted with TVe 200-300s despite tracheostomy change to 80XLTCD.   - No airleak noted in house with hyperinflated cuff likely tracheomegaly?   - Continue on vent 16/400/5/21   - Continue HTS PRN   - Thoracic to take pt to OR on Monday as an add on for Bronch, Trach revision/Trach upsize with Dr. Owens.     GI  # Dysphagia   - Continue on PEG-TF   - Monitor BMs   - Abdomen mildly distended & monitoring for now     RENAL  - No acute issues   - Monitor renal function and UOP     #Hyperlactatemia  - Lactate 3.5 on admission, downtrended , then back up to 4.4   - S/P 1L LR bolus with improvement in lactate (7/6)    INFECTIOUS DISEASE  # Leukocytosis   - No fever or chills and noted prior leukocytosis   - CXR with prior atelectasis and no acute findings   - Uptrending Lactate so UA sent returning with POSITIVE Nitrites (7/6) but given no fever and improving leukocytosis will hold of ABX   - SCx (7/5) NEG  - Pending BCx & UCX  - MRSA PCR (7/6) POSITIVE for both MRSA/MSSA - Start Bactroban (7/6 - 7/10)    HEME  - On Xarelto 10mg at home likely for DVT PPX?   - Hold home Xarelto in prep for OR   - DVT PPX with LVX for now     ENDOCRINE  - No hx of DM2   - Monitor BG     SKIN  - Basic trach and PEG care ordered     ETHICS/ GOC    - FULL CODE   - Dr Tyson Velasquez,  involved in care    DISPO - Back home with  when able.    ASSESSMENT AND PLAN:   69 YO Female with PMHx of ALS, Parkinson,, nonverbal, bed bound, chronic respiratory failure with tracheostomy and vent dependence, and oropharyngeal dysphagia with PEG who presented from home with tracheostomy leak. She was noted with vent alarm and poor TVe (250-290) at home. Trach changed at home with no improvement and sent in for thoracic evaluation. While in MICU no air leak noted with hyperinflated cuff. Transferred to RCU on 7/5    NEUROLOGY  # ALS   - Baseline nonverbal, awake, and communicates with eye movement.   - Continue on home rilutek 50mg BID     PSYCH   # Anxiety and Depression   - Continue on home valium 2mg BID   - Continue on zoloft 75mg QD while in house (on 4cc/ 80mg QD at home)     CARDIOVASCULAR  - No acute issues   - Monitor HR and BP     RESPIRATORY  # Tracheostomy leak   - At home noted with TVe 200-300s despite tracheostomy change to 80XLTCD.   - No airleak noted in house with hyperinflated cuff likely tracheomegaly?   - Continue on vent 16/400/5/21   - Continue HTS PRN   - Thoracic to take pt to OR on Monday as an add on for Bronch, Trach revision/Trach upsize with Dr. Owens.     GI  # Dysphagia   - Continue on PEG-TF   - Monitor BMs   - Abdomen mildly distended & monitoring for now     RENAL  - No acute issues   - Monitor renal function and UOP     #Hyperlactatemia  - Lactate 3.5 on admission, downtrended , then back up to 4.4   - S/P 1L LR with improvement in lactate (7/6 & 7/7)    INFECTIOUS DISEASE  # Leukocytosis   - No fever or chills and noted prior leukocytosis   - CXR with prior atelectasis and no acute findings   - Uptrending Lactate so UA sent returning with POSITIVE Nitrites (7/6) but given no fever and improving leukocytosis will hold of ABX   - SCx (7/5) NEG  - Pending BCx & UCX  - MRSA PCR (7/6) POSITIVE for both MRSA/MSSA - Start Bactroban (7/6 - 7/10)    HEME  - On Xarelto 10mg at home likely for DVT PPX?   - Hold home Xarelto in prep for OR   - DVT PPX with LVX for now     ENDOCRINE  - No hx of DM2   - Monitor BG     SKIN  - Basic trach and PEG care ordered     ETHICS/ GOC    - FULL CODE   - Dr Tyson Velasquez,  involved in care    DISPO - Back home with  when able.

## 2024-07-07 NOTE — PROGRESS NOTE ADULT - SUBJECTIVE AND OBJECTIVE BOX
Subjective & objective:  PT was seen and examined by thoracic surgery  Pt currently stable on VENT, non responsive    Vital Signs:  Vital Signs Last 24 Hrs  T(C): 37 (07-07-24 @ 16:00), Max: 37 (07-07-24 @ 16:00)  T(F): 98.6 (07-07-24 @ 16:00), Max: 98.6 (07-07-24 @ 16:00)  HR: 97 (07-07-24 @ 16:00) (84 - 109)  BP: 162/82 (07-07-24 @ 16:00) (105/69 - 162/82)  RR: 16 (07-07-24 @ 16:00) (16 - 16)  SpO2: 91% (07-07-24 @ 16:00) (91% - 99%) on (O2)    PE:  General: NAD   Neck: (+) Trach tube noted, site c/d/i.  Cards: S1/S2  Pulm: No resp distress. On AC/VC Mode ventilation.  Abdomen: Abd slightly distended. Soft, NT. (+) PEG noted, site c/d/i.   Extremities: Trace b/l LE edema. Extremities cool to touch. Intact distal pulses.   Neurology: Awake/eyes open. Nonverbal. Not following commands. Not tracking.      Relevant labs, radiology and Medications reviewed                        12.3   12.78 )-----------( 319      ( 07 Jul 2024 05:20 )             39.2     07-07    140  |  104  |  14  ----------------------------<  137<H>  4.3   |  22  |  <0.20<L>    Ca    9.2      07 Jul 2024 05:20  Phos  3.4     07-07  Mg     2.00     07-07      MEDICATIONS  (STANDING):  artificial  tears Solution 1 Drop(s) Both EYES every 12 hours  chlorhexidine 0.12% Liquid 15 milliLiter(s) Oral Mucosa every 12 hours  chlorhexidine 2% Cloths 1 Application(s) Topical daily  diazepam    Tablet 2 milliGRAM(s) Oral every 12 hours  enoxaparin Injectable 40 milliGRAM(s) SubCutaneous every 24 hours  lactated ringers. 1000 milliLiter(s) (100 mL/Hr) IV Continuous <Continuous>  multivitamin/minerals/iron Oral Solution (CENTRUM) 15 milliLiter(s) Oral daily  mupirocin 2% Ointment 1 Application(s) Topical every 12 hours  riluzole 50 milliGRAM(s) Oral two times a day  sertraline 75 milliGRAM(s) Oral daily    MEDICATIONS  (PRN):  acetaminophen     Tablet .. 650 milliGRAM(s) Oral every 6 hours PRN Temp greater or equal to 38C (100.4F), Mild Pain (1 - 3)  sodium chloride 3%  Inhalation 4 milliLiter(s) Inhalation every 12 hours PRN for congestion    Pertinent Physical Exam  I&O's Summary    06 Jul 2024 07:01  -  07 Jul 2024 07:00  --------------------------------------------------------  IN: 1060 mL / OUT: 1130 mL / NET: -70 mL      Assessment  68 year old woman with a past medical history including ALS, nonverbal and bedbound, Parkinson's, chronic respiratory failure requiring trach and vent, chronic oropharyngeal dysphagia with PEG who presents as per request of his pulmonologist with concerns for air leak. Pt otherwise at baseline. No recent fevers or acute complaints. Pt brought to [facility] for CT surgery evaluation for trach evaluation.       PLAN  Pt was booked for OR tomorrow with Dr. Owens as add-on for Bronch, Trach exchange with upsize   Continue to hold Xarelto  Optimize patient for OR tomorrow  Type and screen, coags for tomorrow  NPO at [place]  Will continue to follow

## 2024-07-07 NOTE — PROGRESS NOTE ADULT - NS ATTEND AMEND GEN_ALL_CORE FT
agree with above  stable on vent, minute ventilation is good  planned for OR trach change/revision tomorrow  positive ua, but she is afebrile, and wbc has been in the same range looking back to July and August 2023. hold on abx. low threshhold if spikes

## 2024-07-07 NOTE — PROGRESS NOTE ADULT - SUBJECTIVE AND OBJECTIVE BOX
CHIEF COMPLAINT:Patient is a 68y old  Female who presents with a chief complaint of Other abnormality of breathing     (2024 10:47)      INTERVAL EVENTS:     ROS: Seen by bedside during AM rounds     OBJECTIVE:  ICU Vital Signs Last 24 Hrs  T(C): 36.4 (2024 04:18), Max: 36.8 (2024 00:00)  T(F): 97.5 (2024 04:18), Max: 98.2 (2024 00:00)  HR: 93 (2024 04:18) (84 - 109)  BP: 112/71 (2024 04:18) (108/64 - 157/77)  BP(mean): --  ABP: --  ABP(mean): --  RR: 16 (2024 04:18) (16 - 16)  SpO2: 92% (2024 04:18) (92% - 99%)    O2 Parameters below as of 2024 04:18  Patient On (Oxygen Delivery Method): ventilator          Mode: AC/ CMV (Assist Control/ Continuous Mandatory Ventilation), RR (machine): 16, TV (machine): 400, FiO2: 21, PEEP: 5, ITime: 0.8, MAP: 9, PIP: 27    07 @ 07:  -   @ 07:00  --------------------------------------------------------  IN: 869 mL / OUT: 300 mL / NET: 569 mL     @ 07:  -   @ 06:57  --------------------------------------------------------  IN: 1060 mL / OUT: 1130 mL / NET: -70 mL      CAPILLARY BLOOD GLUCOSE          PHYSICAL EXAM:  General:   HEENT:   Lymph Nodes:  Neck:   Respiratory:   Cardiovascular:   Abdomen:   Extremities:   Skin:   Neurological:  Psychiatry:    Mode: AC/ CMV (Assist Control/ Continuous Mandatory Ventilation)  RR (machine): 16  TV (machine): 400  FiO2: 21  PEEP: 5  ITime: 0.8  MAP: 9  PIP: 27      HOSPITAL MEDICATIONS:  MEDICATIONS  (STANDING):  artificial  tears Solution 1 Drop(s) Both EYES every 12 hours  chlorhexidine 0.12% Liquid 15 milliLiter(s) Oral Mucosa every 12 hours  chlorhexidine 2% Cloths 1 Application(s) Topical daily  diazepam    Tablet 2 milliGRAM(s) Oral every 12 hours  enoxaparin Injectable 40 milliGRAM(s) SubCutaneous every 24 hours  multivitamin 1 Tablet(s) Oral daily  mupirocin 2% Ointment 1 Application(s) Topical every 12 hours  riluzole 50 milliGRAM(s) Oral two times a day  sertraline 75 milliGRAM(s) Oral daily    MEDICATIONS  (PRN):  acetaminophen     Tablet .. 650 milliGRAM(s) Oral every 6 hours PRN Temp greater or equal to 38C (100.4F), Mild Pain (1 - 3)  sodium chloride 3%  Inhalation 4 milliLiter(s) Inhalation every 12 hours PRN for congestion      LABS:                        12.7   11.43 )-----------( 282      ( 2024 20:12 )             39.8     -    141  |  104  |  20  ----------------------------<  149<H>  3.9   |  21<L>  |  <0.20<L>    Ca    9.3      2024 06:30  Phos  3.2     -  Mg     2.20     -        Urinalysis Basic - ( 2024 10:14 )    Color: Dark Yellow / Appearance: Cloudy / S.021 / pH: x  Gluc: x / Ketone: Negative mg/dL  / Bili: Small / Urobili: 1.0 mg/dL   Blood: x / Protein: 30 mg/dL / Nitrite: Positive   Leuk Esterase: Large / RBC: 7 /HPF / WBC 94 /HPF   Sq Epi: x / Non Sq Epi: 8 /HPF / Bacteria: Many /HPF        Venous Blood Gas:   @ 10:14  7.43/33/77/22/97.1  VBG Lactate: 2.4  Venous Blood Gas:   @ 06:30  7.26/53/40/24/60.4  VBG Lactate: 4.4   CHIEF COMPLAINT:Patient is a 68y old  Female who presents with a chief complaint of Other abnormality of breathing     (2024 10:47)      INTERVAL EVENTS:     ROS: Seen by bedside during AM rounds     OBJECTIVE:  ICU Vital Signs Last 24 Hrs  T(C): 36.4 (2024 04:18), Max: 36.8 (2024 00:00)  T(F): 97.5 (2024 04:18), Max: 98.2 (2024 00:00)  HR: 93 (2024 04:18) (84 - 109)  BP: 112/71 (2024 04:18) (108/64 - 157/77)  BP(mean): --  ABP: --  ABP(mean): --  RR: 16 (2024 04:18) (16 - 16)  SpO2: 92% (:18) (92% - 99%)    O2 Parameters below as of 2024 04:18  Patient On (Oxygen Delivery Method): ventilator          Mode: AC/ CMV (Assist Control/ Continuous Mandatory Ventilation), RR (machine): 16, TV (machine): 400, FiO2: 21, PEEP: 5, ITime: 0.8, MAP: 9, PIP: 27    07 @ 07:  -   @ 07:00  --------------------------------------------------------  IN: 869 mL / OUT: 300 mL / NET: 569 mL     @ 07:  -   @ 06:57  --------------------------------------------------------  IN: 1060 mL / OUT: 1130 mL / NET: -70 mL      CAPILLARY BLOOD GLUCOSE          PHYSICAL EXAM:  General: NAD   Neck: (+) Trach tube noted, site c/d/i.  Cards: S1/S2, no murmurs   Pulm: rhonchorous b/l. No resp distress. On AC Mode ventilation. VTs 390-410 with pPeaks 25-26.  Abdomen: Abd slightly distended. Soft, NT. (+) PEG noted, site c/d/i.   Extremities: Trace b/l LE edema. Extremities cool to touch. Intact distal pulses.   Neurology: Awake/eyes open. Nonverbal. Not following commands. Not tracking. Occasional grimacing noted even without painful stimulus. Not withdrawing to pain. Extremities nonrigid.   Skin: warm to touch, color appropriate for ethnicity. Refer to RN assessment for further details.    Mode: AC/ CMV (Assist Control/ Continuous Mandatory Ventilation)  RR (machine): 16  TV (machine): 400  FiO2: 21  PEEP: 5  ITime: 0.8  MAP: 9  PIP: 27      HOSPITAL MEDICATIONS:  MEDICATIONS  (STANDING):  artificial  tears Solution 1 Drop(s) Both EYES every 12 hours  chlorhexidine 0.12% Liquid 15 milliLiter(s) Oral Mucosa every 12 hours  chlorhexidine 2% Cloths 1 Application(s) Topical daily  diazepam    Tablet 2 milliGRAM(s) Oral every 12 hours  enoxaparin Injectable 40 milliGRAM(s) SubCutaneous every 24 hours  multivitamin 1 Tablet(s) Oral daily  mupirocin 2% Ointment 1 Application(s) Topical every 12 hours  riluzole 50 milliGRAM(s) Oral two times a day  sertraline 75 milliGRAM(s) Oral daily    MEDICATIONS  (PRN):  acetaminophen     Tablet .. 650 milliGRAM(s) Oral every 6 hours PRN Temp greater or equal to 38C (100.4F), Mild Pain (1 - 3)  sodium chloride 3%  Inhalation 4 milliLiter(s) Inhalation every 12 hours PRN for congestion      LABS:                        12.7   11.43 )-----------( 282      ( 2024 20:12 )             39.8     07-06    141  |  104  |  20  ----------------------------<  149<H>  3.9   |  21<L>  |  <0.20<L>    Ca    9.3      2024 06:30  Phos  3.2     07-06  Mg     2.20     07-06        Urinalysis Basic - ( 2024 10:14 )    Color: Dark Yellow / Appearance: Cloudy / S.021 / pH: x  Gluc: x / Ketone: Negative mg/dL  / Bili: Small / Urobili: 1.0 mg/dL   Blood: x / Protein: 30 mg/dL / Nitrite: Positive   Leuk Esterase: Large / RBC: 7 /HPF / WBC 94 /HPF   Sq Epi: x / Non Sq Epi: 8 /HPF / Bacteria: Many /HPF        Venous Blood Gas:   @ 10:14  7.43/33/77/22/97.1  VBG Lactate: 2.4  Venous Blood Gas:   @ 06:30  7.26/53/40/24/60.4  VBG Lactate: 4.4   CHIEF COMPLAINT:Patient is a 68y old  Female who presents with a chief complaint of Other abnormality of breathing     (2024 10:47)      INTERVAL EVENTS:  no overnight event noted.  No telemetric events noted. Lactate responded to fluid yesterday, uptrending again, will rehydrate again. OR pending tomorrow.     ROS: Seen by bedside during AM rounds     OBJECTIVE:  ICU Vital Signs Last 24 Hrs  T(C): 36.4 (2024 04:18), Max: 36.8 (2024 00:00)  T(F): 97.5 (2024 04:18), Max: 98.2 (2024 00:00)  HR: 93 (:18) (84 - 109)  BP: 112/71 (2024 04:18) (108/64 - 157/77)  BP(mean): --  ABP: --  ABP(mean): --  RR: 16 (2024 04:18) (16 - 16)  SpO2: 92% (2024 04:18) (92% - 99%)    O2 Parameters below as of 2024 04:18  Patient On (Oxygen Delivery Method): ventilator          Mode: AC/ CMV (Assist Control/ Continuous Mandatory Ventilation), RR (machine): 16, TV (machine): 400, FiO2: 21, PEEP: 5, ITime: 0.8, MAP: 9, PIP: 27    07 @ 07:  -  06 @ 07:00  --------------------------------------------------------  IN: 869 mL / OUT: 300 mL / NET: 569 mL     @ 07:  -   @ 06:57  --------------------------------------------------------  IN: 1060 mL / OUT: 1130 mL / NET: -70 mL      CAPILLARY BLOOD GLUCOSE          PHYSICAL EXAM:  General: NAD   Neck: (+) Trach tube noted, site c/d/i.  Cards: S1/S2, no murmurs   Pulm: rhonchorous b/l. No resp distress. On AC Mode ventilation. VTs 390-410 with pPeaks 25-26.  Abdomen: Abd slightly distended. Soft, NT. (+) PEG noted, site c/d/i.   Extremities: Trace b/l LE edema. Extremities cool to touch. Intact distal pulses.   Neurology: Awake/eyes open. Nonverbal. Not following commands. Not tracking. Occasional grimacing noted even without painful stimulus. Not withdrawing to pain. Extremities nonrigid.   Skin: warm to touch, color appropriate for ethnicity. Refer to RN assessment for further details.    Mode: AC/ CMV (Assist Control/ Continuous Mandatory Ventilation)  RR (machine): 16  TV (machine): 400  FiO2: 21  PEEP: 5  ITime: 0.8  MAP: 9  PIP: 27      HOSPITAL MEDICATIONS:  MEDICATIONS  (STANDING):  artificial  tears Solution 1 Drop(s) Both EYES every 12 hours  chlorhexidine 0.12% Liquid 15 milliLiter(s) Oral Mucosa every 12 hours  chlorhexidine 2% Cloths 1 Application(s) Topical daily  diazepam    Tablet 2 milliGRAM(s) Oral every 12 hours  enoxaparin Injectable 40 milliGRAM(s) SubCutaneous every 24 hours  multivitamin 1 Tablet(s) Oral daily  mupirocin 2% Ointment 1 Application(s) Topical every 12 hours  riluzole 50 milliGRAM(s) Oral two times a day  sertraline 75 milliGRAM(s) Oral daily    MEDICATIONS  (PRN):  acetaminophen     Tablet .. 650 milliGRAM(s) Oral every 6 hours PRN Temp greater or equal to 38C (100.4F), Mild Pain (1 - 3)  sodium chloride 3%  Inhalation 4 milliLiter(s) Inhalation every 12 hours PRN for congestion      LABS:                        12.7   11.43 )-----------( 282      ( 2024 20:12 )             39.8     07-    141  |  104  |  20  ----------------------------<  149<H>  3.9   |  21<L>  |  <0.20<L>    Ca    9.3      2024 06:30  Phos  3.2     07-06  Mg     2.20     07-06        Urinalysis Basic - ( 2024 10:14 )    Color: Dark Yellow / Appearance: Cloudy / S.021 / pH: x  Gluc: x / Ketone: Negative mg/dL  / Bili: Small / Urobili: 1.0 mg/dL   Blood: x / Protein: 30 mg/dL / Nitrite: Positive   Leuk Esterase: Large / RBC: 7 /HPF / WBC 94 /HPF   Sq Epi: x / Non Sq Epi: 8 /HPF / Bacteria: Many /HPF        Venous Blood Gas:   @ 10:14  7.43/33/77/22/97.1  VBG Lactate: 2.4  Venous Blood Gas:   @ 06:30  7.26/53/40/24/60.4  VBG Lactate: 4.4

## 2024-07-08 LAB
ANION GAP SERPL CALC-SCNC: 14 MMOL/L — SIGNIFICANT CHANGE UP (ref 7–14)
APTT BLD: 33.4 SEC — SIGNIFICANT CHANGE UP (ref 24.5–35.6)
BASOPHILS # BLD AUTO: 0.04 K/UL — SIGNIFICANT CHANGE UP (ref 0–0.2)
BASOPHILS NFR BLD AUTO: 0.3 % — SIGNIFICANT CHANGE UP (ref 0–2)
BLD GP AB SCN SERPL QL: NEGATIVE — SIGNIFICANT CHANGE UP
BUN SERPL-MCNC: 12 MG/DL — SIGNIFICANT CHANGE UP (ref 7–23)
CALCIUM SERPL-MCNC: 9.3 MG/DL — SIGNIFICANT CHANGE UP (ref 8.4–10.5)
CHLORIDE SERPL-SCNC: 101 MMOL/L — SIGNIFICANT CHANGE UP (ref 98–107)
CO2 SERPL-SCNC: 24 MMOL/L — SIGNIFICANT CHANGE UP (ref 22–31)
CREAT SERPL-MCNC: <0.2 MG/DL — LOW (ref 0.5–1.3)
CULTURE RESULTS: ABNORMAL
EGFR: 126 ML/MIN/1.73M2 — SIGNIFICANT CHANGE UP
EOSINOPHIL # BLD AUTO: 0.19 K/UL — SIGNIFICANT CHANGE UP (ref 0–0.5)
EOSINOPHIL NFR BLD AUTO: 1.3 % — SIGNIFICANT CHANGE UP (ref 0–6)
GLUCOSE SERPL-MCNC: 133 MG/DL — HIGH (ref 70–99)
HCT VFR BLD CALC: 40.2 % — SIGNIFICANT CHANGE UP (ref 34.5–45)
HGB BLD-MCNC: 12.4 G/DL — SIGNIFICANT CHANGE UP (ref 11.5–15.5)
IANC: 11.55 K/UL — HIGH (ref 1.8–7.4)
IMM GRANULOCYTES NFR BLD AUTO: 0.6 % — SIGNIFICANT CHANGE UP (ref 0–0.9)
INR BLD: 1.16 RATIO — SIGNIFICANT CHANGE UP (ref 0.85–1.18)
LYMPHOCYTES # BLD AUTO: 1.55 K/UL — SIGNIFICANT CHANGE UP (ref 1–3.3)
LYMPHOCYTES # BLD AUTO: 10.8 % — LOW (ref 13–44)
MAGNESIUM SERPL-MCNC: 1.9 MG/DL — SIGNIFICANT CHANGE UP (ref 1.6–2.6)
MCHC RBC-ENTMCNC: 26.4 PG — LOW (ref 27–34)
MCHC RBC-ENTMCNC: 30.8 GM/DL — LOW (ref 32–36)
MCV RBC AUTO: 85.5 FL — SIGNIFICANT CHANGE UP (ref 80–100)
MONOCYTES # BLD AUTO: 0.92 K/UL — HIGH (ref 0–0.9)
MONOCYTES NFR BLD AUTO: 6.4 % — SIGNIFICANT CHANGE UP (ref 2–14)
NEUTROPHILS # BLD AUTO: 11.55 K/UL — HIGH (ref 1.8–7.4)
NEUTROPHILS NFR BLD AUTO: 80.6 % — HIGH (ref 43–77)
NRBC # BLD: 0 /100 WBCS — SIGNIFICANT CHANGE UP (ref 0–0)
NRBC # FLD: 0 K/UL — SIGNIFICANT CHANGE UP (ref 0–0)
PHOSPHATE SERPL-MCNC: 2.6 MG/DL — SIGNIFICANT CHANGE UP (ref 2.5–4.5)
PLATELET # BLD AUTO: 317 K/UL — SIGNIFICANT CHANGE UP (ref 150–400)
POTASSIUM SERPL-MCNC: 3.9 MMOL/L — SIGNIFICANT CHANGE UP (ref 3.5–5.3)
POTASSIUM SERPL-SCNC: 3.9 MMOL/L — SIGNIFICANT CHANGE UP (ref 3.5–5.3)
PROTHROM AB SERPL-ACNC: 12.9 SEC — SIGNIFICANT CHANGE UP (ref 9.5–13)
RBC # BLD: 4.7 M/UL — SIGNIFICANT CHANGE UP (ref 3.8–5.2)
RBC # FLD: 16.6 % — HIGH (ref 10.3–14.5)
RH IG SCN BLD-IMP: POSITIVE — SIGNIFICANT CHANGE UP
SODIUM SERPL-SCNC: 139 MMOL/L — SIGNIFICANT CHANGE UP (ref 135–145)
SPECIMEN SOURCE: SIGNIFICANT CHANGE UP
WBC # BLD: 14.33 K/UL — HIGH (ref 3.8–10.5)
WBC # FLD AUTO: 14.33 K/UL — HIGH (ref 3.8–10.5)

## 2024-07-08 PROCEDURE — 99233 SBSQ HOSP IP/OBS HIGH 50: CPT | Mod: FS

## 2024-07-08 PROCEDURE — 71045 X-RAY EXAM CHEST 1 VIEW: CPT | Mod: 26

## 2024-07-08 RX ORDER — PIPERACILLIN SODIUM AND TAZOBACTAM SODIUM 3; .375 G/15ML; G/15ML
3.38 INJECTION, POWDER, FOR SOLUTION INTRAVENOUS ONCE
Refills: 0 | Status: COMPLETED | OUTPATIENT
Start: 2024-07-08 | End: 2024-07-08

## 2024-07-08 RX ORDER — PIPERACILLIN SODIUM AND TAZOBACTAM SODIUM 3; .375 G/15ML; G/15ML
3.38 INJECTION, POWDER, FOR SOLUTION INTRAVENOUS ONCE
Refills: 0 | Status: DISCONTINUED | OUTPATIENT
Start: 2024-07-08 | End: 2024-07-08

## 2024-07-08 RX ORDER — PIPERACILLIN SODIUM AND TAZOBACTAM SODIUM 3; .375 G/15ML; G/15ML
3.38 INJECTION, POWDER, FOR SOLUTION INTRAVENOUS EVERY 8 HOURS
Refills: 0 | Status: DISCONTINUED | OUTPATIENT
Start: 2024-07-08 | End: 2024-07-14

## 2024-07-08 RX ADMIN — Medication 1 APPLICATION(S): at 17:16

## 2024-07-08 RX ADMIN — PIPERACILLIN SODIUM AND TAZOBACTAM SODIUM 25 GRAM(S): 3; .375 INJECTION, POWDER, FOR SOLUTION INTRAVENOUS at 23:28

## 2024-07-08 RX ADMIN — Medication 2 MILLIGRAM(S): at 17:13

## 2024-07-08 RX ADMIN — POVIDONE, PROPYLENE GLYCOL 1 DROP(S): 6.8; 3 LIQUID OPHTHALMIC at 05:01

## 2024-07-08 RX ADMIN — SERTRALINE HYDROCHLORIDE 75 MILLIGRAM(S): 50 TABLET, FILM COATED ORAL at 11:28

## 2024-07-08 RX ADMIN — POVIDONE, PROPYLENE GLYCOL 1 DROP(S): 6.8; 3 LIQUID OPHTHALMIC at 17:15

## 2024-07-08 RX ADMIN — Medication 1 APPLICATION(S): at 05:01

## 2024-07-08 RX ADMIN — Medication 15 MILLILITER(S): at 11:27

## 2024-07-08 RX ADMIN — PIPERACILLIN SODIUM AND TAZOBACTAM SODIUM 200 GRAM(S): 3; .375 INJECTION, POWDER, FOR SOLUTION INTRAVENOUS at 07:46

## 2024-07-08 RX ADMIN — PIPERACILLIN SODIUM AND TAZOBACTAM SODIUM 25 GRAM(S): 3; .375 INJECTION, POWDER, FOR SOLUTION INTRAVENOUS at 17:13

## 2024-07-08 RX ADMIN — RILUZOLE 50 MILLIGRAM(S): 5 LIQUID ORAL at 05:01

## 2024-07-08 RX ADMIN — CHLORHEXIDINE GLUCONATE 1 APPLICATION(S): 40 SOLUTION TOPICAL at 11:28

## 2024-07-08 RX ADMIN — CHLORHEXIDINE GLUCONATE 15 MILLILITER(S): 40 SOLUTION TOPICAL at 17:14

## 2024-07-08 RX ADMIN — CHLORHEXIDINE GLUCONATE 15 MILLILITER(S): 40 SOLUTION TOPICAL at 05:01

## 2024-07-08 RX ADMIN — RILUZOLE 50 MILLIGRAM(S): 5 LIQUID ORAL at 17:14

## 2024-07-08 RX ADMIN — Medication 2 MILLIGRAM(S): at 05:01

## 2024-07-08 NOTE — PROGRESS NOTE ADULT - NS ATTEND AMEND GEN_ALL_CORE FT
agree with above  zosyn added for increase wbc, tracheal secretions, pseud  stable on vent  plan for OR with thoracic

## 2024-07-08 NOTE — PROGRESS NOTE ADULT - ASSESSMENT
ASSESSMENT AND PLAN:   69 YO Female with PMHx of ALS, Parkinson,, nonverbal, bed bound, chronic respiratory failure with tracheostomy and vent dependence, and oropharyngeal dysphagia with PEG who presented from home with tracheostomy leak. She was noted with vent alarm and poor TVe (250-290) at home. Trach changed at home with no improvement and sent in for thoracic evaluation. While in MICU no air leak noted with hyperinflated cuff. Transferred to [facility] on 7/5    NEUROLOGY  # ALS   - Baseline nonverbal, awake, and communicates with eye movement.   - Continue on home rilutek 50mg BID     PSYCH   # Anxiety and Depression   - Continue on home valium 2mg BID   - Continue on zoloft 75mg QD while in house (on 4cc/ 80mg QD at home)     CARDIOVASCULAR  - No acute issues   - Monitor HR and BP     RESPIRATORY  # Tracheostomy leak   - At home noted with TVe 200-300s despite tracheostomy change to 80XLTCD.   - No airleak noted in house with hyperinflated cuff likely tracheomegaly?   - Continue on vent 16/400/5/21   - Continue HTS PRN   - Thoracic to take pt to OR on Monday as an add on for Bronch, Trach revision/Trach upsize with Dr. Owens.     GI  # Dysphagia   - Continue on PEG-TF   - Monitor BMs   - Abdomen mildly distended & monitoring for now     RENAL  - No acute issues   - Monitor renal function and UOP     #Hyperlactatemia  - Lactate 3.5 on admission, downtrended , then back up to 4.4   - S/P 1L LR with improvement in lactate (7/6 & 7/7)    INFECTIOUS DISEASE  # Leukocytosis   - No fever or chills and noted prior leukocytosis   - CXR with prior atelectasis and no acute findings   - Uptrending Lactate so UA sent returning with POSITIVE Nitrites (7/6) but given no fever and improving leukocytosis will hold of ABX   - SCx (7/5) NEG  - Pending BCx & UCX  - MRSA PCR (7/6) POSITIVE for both MRSA/MSSA - Start Bactroban (7/6 - 7/10)    HEME  - On Xarelto 10mg at home likely for DVT PPX?   - Hold home Xarelto in prep for OR   - DVT PPX with LVX for now     ENDOCRINE  - No hx of DM2   - Monitor BG     SKIN  - Basic trach and PEG care ordered     ETHICS/ GOC    - FULL CODE   - Dr Tyson Velasquez,  involved in care    DISPO - Back home with  when able.    ASSESSMENT AND PLAN:   67 YO Female with PMHx of ALS, Parkinson,, nonverbal, bed bound, chronic respiratory failure with tracheostomy and vent dependence, and oropharyngeal dysphagia with PEG who presented from home with tracheostomy leak. She was noted with vent alarm and poor TVe (250-290) at home. Trach changed at home with no improvement and sent in for thoracic evaluation. While in MICU no air leak noted with hyperinflated cuff. Transferred to RCU on 7/5    NEUROLOGY  # ALS   - Baseline nonverbal, awake, and communicates with eye movement.   - Continue on home rilutek 50mg BID     PSYCH   # Anxiety and Depression   - Continue on home valium 2mg BID   - Continue on zoloft 75mg QD while in house (on 4cc/ 80mg QD at home)     CARDIOVASCULAR  - No acute issues   - Monitor HR and BP     RESPIRATORY  # Tracheostomy leak   - At home noted with TVe 200-300s despite tracheostomy change to 80XLTCD.   - No airleak noted in house with hyperinflated cuff likely tracheomegaly?   - Continue on vent 16/400/5/21   - Continue HTS PRN   - Initial Thoracic plan to take pt to OR on Monday as an add on for Bronch, Trach revision/Trach upsize rescheduled for 7/9 given lacko f appropriately sized trach tube     GI  # Dysphagia   - Continue on PEG-TF   - Monitor BMs   - Abdomen mildly distended & monitoring for now     RENAL  - No acute issues   - Monitor renal function and UOP     #Hyperlactatemia  - Lactate 3.5 on admission, downtrended , then back up to 4.4   - S/P 1L LR with improvement in lactate (7/6 & 7/7)    INFECTIOUS DISEASE  # Leukocytosis   - No fever or chills and noted prior leukocytosis   - CXR with prior atelectasis and no acute findings   - Uptrending Lactate so UA sent returning with POSITIVE Nitrites (7/6) but given no fever and improving leukocytosis will hold of ABX   - SCx (7/5) grew pansensitive PSA  - Pending BCx & UCX  - MRSA PCR (7/6) POSITIVE for both MRSA/MSSA - Start Bactroban (7/6 - 7/10)  - Given uptrending WBC count, start of thick yellow secretions, and known positive UA, will start empiric Zosyn (7/8 - ) for UTI (+/-) possible trachitiis.     HEME  - On Xarelto 10mg at home likely for DVT PPX?   - Hold home Xarelto in prep for OR   - DVT PPX with LVX for now     ENDOCRINE  - No hx of DM2   - Monitor BG     SKIN  - Basic trach and PEG care ordered     ETHICS/ GOC    - FULL CODE   - Dr Tyson Velasquez,  involved in care    DISPO - Back home with  when able.

## 2024-07-08 NOTE — PROGRESS NOTE ADULT - SUBJECTIVE AND OBJECTIVE BOX
CHIEF COMPLAINT:Patient is a 68y old  Female who presents with a chief complaint of Other abnormality of breathing     (05 Jul 2024 10:47)      INTERVAL EVENTS:     ROS: Seen by bedside during AM rounds     OBJECTIVE:  ICU Vital Signs Last 24 Hrs  T(C): 36.4 (08 Jul 2024 04:00), Max: 37 (07 Jul 2024 16:00)  T(F): 97.6 (08 Jul 2024 04:00), Max: 98.6 (07 Jul 2024 16:00)  HR: 99 (08 Jul 2024 05:02) (84 - 100)  BP: 141/86 (08 Jul 2024 04:00) (105/69 - 162/82)  BP(mean): --  ABP: --  ABP(mean): --  RR: 17 (08 Jul 2024 04:00) (16 - 19)  SpO2: 100% (08 Jul 2024 05:02) (91% - 100%)    O2 Parameters below as of 08 Jul 2024 04:00  Patient On (Oxygen Delivery Method): ventilator    O2 Concentration (%): 30      Mode: AC/ CMV (Assist Control/ Continuous Mandatory Ventilation), RR (machine): 16, TV (machine): 400, FiO2: 21, PEEP: 5, ITime: 0.77, MAP: 9, PIP: 28    07-07 @ 07:01  -  07-08 @ 07:00  --------------------------------------------------------  IN: 1465 mL / OUT: 1350 mL / NET: 115 mL      CAPILLARY BLOOD GLUCOSE          PHYSICAL EXAM:  General:   HEENT:   Lymph Nodes:  Neck:   Respiratory:   Cardiovascular:   Abdomen:   Extremities:   Skin:   Neurological:  Psychiatry:    Mode: AC/ CMV (Assist Control/ Continuous Mandatory Ventilation)  RR (machine): 16  TV (machine): 400  FiO2: 21  PEEP: 5  ITime: 0.77  MAP: 9  PIP: 28      HOSPITAL MEDICATIONS:  MEDICATIONS  (STANDING):  artificial  tears Solution 1 Drop(s) Both EYES every 12 hours  chlorhexidine 0.12% Liquid 15 milliLiter(s) Oral Mucosa every 12 hours  chlorhexidine 2% Cloths 1 Application(s) Topical daily  diazepam    Tablet 2 milliGRAM(s) Oral every 12 hours  enoxaparin Injectable 40 milliGRAM(s) SubCutaneous every 24 hours  lactated ringers. 1000 milliLiter(s) (100 mL/Hr) IV Continuous <Continuous>  multivitamin/minerals/iron Oral Solution (CENTRUM) 15 milliLiter(s) Oral daily  mupirocin 2% Ointment 1 Application(s) Topical every 12 hours  riluzole 50 milliGRAM(s) Oral two times a day  sertraline 75 milliGRAM(s) Oral daily    MEDICATIONS  (PRN):  acetaminophen     Tablet .. 650 milliGRAM(s) Oral every 6 hours PRN Temp greater or equal to 38C (100.4F), Mild Pain (1 - 3)  sodium chloride 3%  Inhalation 4 milliLiter(s) Inhalation every 12 hours PRN for congestion      LABS:                        12.3   12.78 )-----------( 319      ( 07 Jul 2024 05:20 )             39.2     07-07    140  |  104  |  14  ----------------------------<  137<H>  4.3   |  22  |  <0.20<L>    Ca    9.2      07 Jul 2024 05:20  Phos  3.4     07-07  Mg     2.00     07-07        Urinalysis Basic - ( 07 Jul 2024 05:20 )    Color: x / Appearance: x / SG: x / pH: x  Gluc: 137 mg/dL / Ketone: x  / Bili: x / Urobili: x   Blood: x / Protein: x / Nitrite: x   Leuk Esterase: x / RBC: x / WBC x   Sq Epi: x / Non Sq Epi: x / Bacteria: x        Venous Blood Gas:  07-07 @ 05:20  7.35/45/78/25/97.1  VBG Lactate: 2.9  Venous Blood Gas:  07-06 @ 10:14  7.43/33/77/22/97.1  VBG Lactate: 2.4   CHIEF COMPLAINT:Patient is a 68y old  Female who presents with a chief complaint of Other abnormality of breathing     (05 Jul 2024 10:47)      INTERVAL EVENTS:     ROS: Seen by bedside during AM rounds     OBJECTIVE:  ICU Vital Signs Last 24 Hrs  T(C): 36.4 (08 Jul 2024 04:00), Max: 37 (07 Jul 2024 16:00)  T(F): 97.6 (08 Jul 2024 04:00), Max: 98.6 (07 Jul 2024 16:00)  HR: 99 (08 Jul 2024 05:02) (84 - 100)  BP: 141/86 (08 Jul 2024 04:00) (105/69 - 162/82)  BP(mean): --  ABP: --  ABP(mean): --  RR: 17 (08 Jul 2024 04:00) (16 - 19)  SpO2: 100% (08 Jul 2024 05:02) (91% - 100%)    O2 Parameters below as of 08 Jul 2024 04:00  Patient On (Oxygen Delivery Method): ventilator    O2 Concentration (%): 30      Mode: AC/ CMV (Assist Control/ Continuous Mandatory Ventilation), RR (machine): 16, TV (machine): 400, FiO2: 21, PEEP: 5, ITime: 0.77, MAP: 9, PIP: 28    07-07 @ 07:01  -  07-08 @ 07:00  --------------------------------------------------------  IN: 1465 mL / OUT: 1350 mL / NET: 115 mL      CAPILLARY BLOOD GLUCOSE          PHYSICAL EXAM:  General: NAD   Neck: (+) Trach tube noted, site c/d/i.  Cards: S1/S2, no murmurs   Pulm: rhonchorous b/l. Pooling of thick yellow secretions at trach stoma. No resp distress. On AC Mode ventilation. VTs 390-400 with pPeaks 26-27.  Abdomen: Abd slightly distended. Soft, NT. (+) PEG noted, site c/d/i.   Extremities: Trace b/l LE edema and b/l hand edema Extremities cool to touch. Intact distal pulses.   Neurology: Awake/eyes open. Nonverbal. Not following commands. Not tracking. Not withdrawing to pain. Extremities nonrigid.   Skin: warm to touch, color appropriate for ethnicity. Refer to RN assessment for further details.      Mode: AC/ CMV (Assist Control/ Continuous Mandatory Ventilation)  RR (machine): 16  TV (machine): 400  FiO2: 21  PEEP: 5  ITime: 0.77  MAP: 9  PIP: 28      HOSPITAL MEDICATIONS:  MEDICATIONS  (STANDING):  artificial  tears Solution 1 Drop(s) Both EYES every 12 hours  chlorhexidine 0.12% Liquid 15 milliLiter(s) Oral Mucosa every 12 hours  chlorhexidine 2% Cloths 1 Application(s) Topical daily  diazepam    Tablet 2 milliGRAM(s) Oral every 12 hours  enoxaparin Injectable 40 milliGRAM(s) SubCutaneous every 24 hours  lactated ringers. 1000 milliLiter(s) (100 mL/Hr) IV Continuous <Continuous>  multivitamin/minerals/iron Oral Solution (CENTRUM) 15 milliLiter(s) Oral daily  mupirocin 2% Ointment 1 Application(s) Topical every 12 hours  riluzole 50 milliGRAM(s) Oral two times a day  sertraline 75 milliGRAM(s) Oral daily    MEDICATIONS  (PRN):  acetaminophen     Tablet .. 650 milliGRAM(s) Oral every 6 hours PRN Temp greater or equal to 38C (100.4F), Mild Pain (1 - 3)  sodium chloride 3%  Inhalation 4 milliLiter(s) Inhalation every 12 hours PRN for congestion      LABS:                        12.3   12.78 )-----------( 319      ( 07 Jul 2024 05:20 )             39.2     07-07    140  |  104  |  14  ----------------------------<  137<H>  4.3   |  22  |  <0.20<L>    Ca    9.2      07 Jul 2024 05:20  Phos  3.4     07-07  Mg     2.00     07-07        Urinalysis Basic - ( 07 Jul 2024 05:20 )    Color: x / Appearance: x / SG: x / pH: x  Gluc: 137 mg/dL / Ketone: x  / Bili: x / Urobili: x   Blood: x / Protein: x / Nitrite: x   Leuk Esterase: x / RBC: x / WBC x   Sq Epi: x / Non Sq Epi: x / Bacteria: x        Venous Blood Gas:  07-07 @ 05:20  7.35/45/78/25/97.1  VBG Lactate: 2.9  Venous Blood Gas:  07-06 @ 10:14  7.43/33/77/22/97.1  VBG Lactate: 2.4   CHIEF COMPLAINT:Patient is a 68y old  Female who presents with a chief complaint of Other abnormality of breathing     (05 Jul 2024 10:47)      INTERVAL EVENTS: no overnight events noted. Plan for trach revision/upsize canceled d/t lack of appropriately sized trach tube. Rescheduled for TMW. Uptrending WBC count with thick secretions & also with known positive UA, Zosyn started.     ROS: Seen by bedside during AM rounds     OBJECTIVE:  ICU Vital Signs Last 24 Hrs  T(C): 36.4 (08 Jul 2024 04:00), Max: 37 (07 Jul 2024 16:00)  T(F): 97.6 (08 Jul 2024 04:00), Max: 98.6 (07 Jul 2024 16:00)  HR: 99 (08 Jul 2024 05:02) (84 - 100)  BP: 141/86 (08 Jul 2024 04:00) (105/69 - 162/82)  BP(mean): --  ABP: --  ABP(mean): --  RR: 17 (08 Jul 2024 04:00) (16 - 19)  SpO2: 100% (08 Jul 2024 05:02) (91% - 100%)    O2 Parameters below as of 08 Jul 2024 04:00  Patient On (Oxygen Delivery Method): ventilator    O2 Concentration (%): 30      Mode: AC/ CMV (Assist Control/ Continuous Mandatory Ventilation), RR (machine): 16, TV (machine): 400, FiO2: 21, PEEP: 5, ITime: 0.77, MAP: 9, PIP: 28    07-07 @ 07:01  -  07-08 @ 07:00  --------------------------------------------------------  IN: 1465 mL / OUT: 1350 mL / NET: 115 mL      CAPILLARY BLOOD GLUCOSE          PHYSICAL EXAM:  General: NAD   Neck: (+) Trach tube noted, site c/d/i.  Cards: S1/S2, no murmurs   Pulm: rhonchorous b/l. Pooling of thick yellow secretions at trach stoma. No resp distress. On AC Mode ventilation. VTs 390-400 with pPeaks 26-27.  Abdomen: Abd slightly distended. Soft, NT. (+) PEG noted, site c/d/i.   Extremities: Trace b/l LE edema and b/l hand edema Extremities cool to touch. Intact distal pulses.   Neurology: Awake/eyes open. Nonverbal. Not following commands. Not tracking. Not withdrawing to pain. Extremities nonrigid.   Skin: warm to touch, color appropriate for ethnicity. Refer to RN assessment for further details.      Mode: AC/ CMV (Assist Control/ Continuous Mandatory Ventilation)  RR (machine): 16  TV (machine): 400  FiO2: 21  PEEP: 5  ITime: 0.77  MAP: 9  PIP: 28      HOSPITAL MEDICATIONS:  MEDICATIONS  (STANDING):  artificial  tears Solution 1 Drop(s) Both EYES every 12 hours  chlorhexidine 0.12% Liquid 15 milliLiter(s) Oral Mucosa every 12 hours  chlorhexidine 2% Cloths 1 Application(s) Topical daily  diazepam    Tablet 2 milliGRAM(s) Oral every 12 hours  enoxaparin Injectable 40 milliGRAM(s) SubCutaneous every 24 hours  lactated ringers. 1000 milliLiter(s) (100 mL/Hr) IV Continuous <Continuous>  multivitamin/minerals/iron Oral Solution (CENTRUM) 15 milliLiter(s) Oral daily  mupirocin 2% Ointment 1 Application(s) Topical every 12 hours  riluzole 50 milliGRAM(s) Oral two times a day  sertraline 75 milliGRAM(s) Oral daily    MEDICATIONS  (PRN):  acetaminophen     Tablet .. 650 milliGRAM(s) Oral every 6 hours PRN Temp greater or equal to 38C (100.4F), Mild Pain (1 - 3)  sodium chloride 3%  Inhalation 4 milliLiter(s) Inhalation every 12 hours PRN for congestion      LABS:                        12.3   12.78 )-----------( 319      ( 07 Jul 2024 05:20 )             39.2     07-07    140  |  104  |  14  ----------------------------<  137<H>  4.3   |  22  |  <0.20<L>    Ca    9.2      07 Jul 2024 05:20  Phos  3.4     07-07  Mg     2.00     07-07        Urinalysis Basic - ( 07 Jul 2024 05:20 )    Color: x / Appearance: x / SG: x / pH: x  Gluc: 137 mg/dL / Ketone: x  / Bili: x / Urobili: x   Blood: x / Protein: x / Nitrite: x   Leuk Esterase: x / RBC: x / WBC x   Sq Epi: x / Non Sq Epi: x / Bacteria: x        Venous Blood Gas:  07-07 @ 05:20  7.35/45/78/25/97.1  VBG Lactate: 2.9  Venous Blood Gas:  07-06 @ 10:14  7.43/33/77/22/97.1  VBG Lactate: 2.4

## 2024-07-08 NOTE — CHART NOTE - NSCHARTNOTEFT_GEN_A_CORE
Patient's trach exchange in the OR will be postponed until tomorrow as the new, larger tracheostomy is not available.  You can resume her feeds for now and we will plan do replace the trach tomorrow 7/9. Please keep NPO after midnight.   Discussed with Dr. Owens and the [facility] ACP.

## 2024-07-09 LAB
ANION GAP SERPL CALC-SCNC: 16 MMOL/L — HIGH (ref 7–14)
APTT BLD: 31.2 SEC — SIGNIFICANT CHANGE UP (ref 24.5–35.6)
BASOPHILS # BLD AUTO: 0.05 K/UL — SIGNIFICANT CHANGE UP (ref 0–0.2)
BASOPHILS NFR BLD AUTO: 0.4 % — SIGNIFICANT CHANGE UP (ref 0–2)
BUN SERPL-MCNC: 14 MG/DL — SIGNIFICANT CHANGE UP (ref 7–23)
CALCIUM SERPL-MCNC: 9.2 MG/DL — SIGNIFICANT CHANGE UP (ref 8.4–10.5)
CHLORIDE SERPL-SCNC: 103 MMOL/L — SIGNIFICANT CHANGE UP (ref 98–107)
CO2 SERPL-SCNC: 22 MMOL/L — SIGNIFICANT CHANGE UP (ref 22–31)
CREAT SERPL-MCNC: <0.2 MG/DL — LOW (ref 0.5–1.3)
EGFR: 126 ML/MIN/1.73M2 — SIGNIFICANT CHANGE UP
EOSINOPHIL # BLD AUTO: 0.37 K/UL — SIGNIFICANT CHANGE UP (ref 0–0.5)
EOSINOPHIL NFR BLD AUTO: 3.1 % — SIGNIFICANT CHANGE UP (ref 0–6)
GLUCOSE SERPL-MCNC: 115 MG/DL — HIGH (ref 70–99)
HCT VFR BLD CALC: 38.2 % — SIGNIFICANT CHANGE UP (ref 34.5–45)
HGB BLD-MCNC: 12.2 G/DL — SIGNIFICANT CHANGE UP (ref 11.5–15.5)
IANC: 8.82 K/UL — HIGH (ref 1.8–7.4)
IMM GRANULOCYTES NFR BLD AUTO: 0.6 % — SIGNIFICANT CHANGE UP (ref 0–0.9)
INR BLD: 1.18 RATIO — SIGNIFICANT CHANGE UP (ref 0.85–1.18)
LYMPHOCYTES # BLD AUTO: 1.45 K/UL — SIGNIFICANT CHANGE UP (ref 1–3.3)
LYMPHOCYTES # BLD AUTO: 12.3 % — LOW (ref 13–44)
MAGNESIUM SERPL-MCNC: 2 MG/DL — SIGNIFICANT CHANGE UP (ref 1.6–2.6)
MCHC RBC-ENTMCNC: 26.9 PG — LOW (ref 27–34)
MCHC RBC-ENTMCNC: 31.9 GM/DL — LOW (ref 32–36)
MCV RBC AUTO: 84.1 FL — SIGNIFICANT CHANGE UP (ref 80–100)
MONOCYTES # BLD AUTO: 0.99 K/UL — HIGH (ref 0–0.9)
MONOCYTES NFR BLD AUTO: 8.4 % — SIGNIFICANT CHANGE UP (ref 2–14)
NEUTROPHILS # BLD AUTO: 8.82 K/UL — HIGH (ref 1.8–7.4)
NEUTROPHILS NFR BLD AUTO: 75.2 % — SIGNIFICANT CHANGE UP (ref 43–77)
NRBC # BLD: 0 /100 WBCS — SIGNIFICANT CHANGE UP (ref 0–0)
NRBC # FLD: 0 K/UL — SIGNIFICANT CHANGE UP (ref 0–0)
PHOSPHATE SERPL-MCNC: 3.7 MG/DL — SIGNIFICANT CHANGE UP (ref 2.5–4.5)
PLATELET # BLD AUTO: 312 K/UL — SIGNIFICANT CHANGE UP (ref 150–400)
POTASSIUM SERPL-MCNC: 3.8 MMOL/L — SIGNIFICANT CHANGE UP (ref 3.5–5.3)
POTASSIUM SERPL-SCNC: 3.8 MMOL/L — SIGNIFICANT CHANGE UP (ref 3.5–5.3)
PROTHROM AB SERPL-ACNC: 13.3 SEC — HIGH (ref 9.5–13)
RBC # BLD: 4.54 M/UL — SIGNIFICANT CHANGE UP (ref 3.8–5.2)
RBC # FLD: 16.6 % — HIGH (ref 10.3–14.5)
SODIUM SERPL-SCNC: 141 MMOL/L — SIGNIFICANT CHANGE UP (ref 135–145)
WBC # BLD: 11.75 K/UL — HIGH (ref 3.8–10.5)
WBC # FLD AUTO: 11.75 K/UL — HIGH (ref 3.8–10.5)

## 2024-07-09 PROCEDURE — 99233 SBSQ HOSP IP/OBS HIGH 50: CPT | Mod: FS

## 2024-07-09 RX ORDER — HYDRALAZINE HCL 50 MG
5 TABLET ORAL ONCE
Refills: 0 | Status: COMPLETED | OUTPATIENT
Start: 2024-07-09 | End: 2024-07-09

## 2024-07-09 RX ORDER — HYDRALAZINE HCL 50 MG
10 TABLET ORAL ONCE
Refills: 0 | Status: COMPLETED | OUTPATIENT
Start: 2024-07-09 | End: 2024-07-09

## 2024-07-09 RX ORDER — ACETAMINOPHEN 325 MG/1
1000 TABLET ORAL ONCE
Refills: 0 | Status: COMPLETED | OUTPATIENT
Start: 2024-07-09 | End: 2024-07-09

## 2024-07-09 RX ADMIN — RILUZOLE 50 MILLIGRAM(S): 5 LIQUID ORAL at 17:06

## 2024-07-09 RX ADMIN — CHLORHEXIDINE GLUCONATE 15 MILLILITER(S): 40 SOLUTION TOPICAL at 17:05

## 2024-07-09 RX ADMIN — CHLORHEXIDINE GLUCONATE 1 APPLICATION(S): 40 SOLUTION TOPICAL at 11:10

## 2024-07-09 RX ADMIN — Medication 15 MILLILITER(S): at 11:10

## 2024-07-09 RX ADMIN — Medication 5 MILLIGRAM(S): at 22:35

## 2024-07-09 RX ADMIN — Medication 10 MILLIGRAM(S): at 21:05

## 2024-07-09 RX ADMIN — Medication 2 MILLIGRAM(S): at 05:13

## 2024-07-09 RX ADMIN — POVIDONE, PROPYLENE GLYCOL 1 DROP(S): 6.8; 3 LIQUID OPHTHALMIC at 05:13

## 2024-07-09 RX ADMIN — CHLORHEXIDINE GLUCONATE 15 MILLILITER(S): 40 SOLUTION TOPICAL at 05:13

## 2024-07-09 RX ADMIN — PIPERACILLIN SODIUM AND TAZOBACTAM SODIUM 25 GRAM(S): 3; .375 INJECTION, POWDER, FOR SOLUTION INTRAVENOUS at 17:04

## 2024-07-09 RX ADMIN — PIPERACILLIN SODIUM AND TAZOBACTAM SODIUM 25 GRAM(S): 3; .375 INJECTION, POWDER, FOR SOLUTION INTRAVENOUS at 08:08

## 2024-07-09 RX ADMIN — Medication 1 APPLICATION(S): at 05:14

## 2024-07-09 RX ADMIN — POVIDONE, PROPYLENE GLYCOL 1 DROP(S): 6.8; 3 LIQUID OPHTHALMIC at 17:05

## 2024-07-09 RX ADMIN — RILUZOLE 50 MILLIGRAM(S): 5 LIQUID ORAL at 05:13

## 2024-07-09 RX ADMIN — SERTRALINE HYDROCHLORIDE 75 MILLIGRAM(S): 50 TABLET, FILM COATED ORAL at 11:11

## 2024-07-09 RX ADMIN — Medication 1 APPLICATION(S): at 17:05

## 2024-07-09 RX ADMIN — Medication 2 MILLIGRAM(S): at 17:04

## 2024-07-09 RX ADMIN — ACETAMINOPHEN 400 MILLIGRAM(S): 325 TABLET ORAL at 23:53

## 2024-07-09 NOTE — PROGRESS NOTE ADULT - ASSESSMENT
ASSESSMENT AND PLAN:   69 YO Female with PMHx of ALS, Parkinson,, nonverbal, bed bound, chronic respiratory failure with tracheostomy and vent dependence, and oropharyngeal dysphagia with PEG who presented from home with tracheostomy leak. She was noted with vent alarm and poor TVe (250-290) at home. Trach changed at home with no improvement and sent in for thoracic evaluation. While in MICU no air leak noted with hyperinflated cuff. Transferred to RCU on 7/5    NEUROLOGY  # ALS   - Baseline nonverbal, awake, and communicates with eye movement.   - Continue on home rilutek 50mg BID     PSYCH   # Anxiety and Depression   - Continue on home valium 2mg BID   - Continue on zoloft 75mg QD while in house (on 4cc/ 80mg QD at home)     CARDIOVASCULAR  - No acute issues   - Monitor HR and BP     RESPIRATORY  # Tracheostomy leak   - At home noted with TVe 200-300s despite tracheostomy change to 80XLTCD.   - No airleak noted in house with hyperinflated cuff likely tracheomegaly?   - Continue on vent 16/400/5/21   - Continue HTS PRN   - Initial Thoracic plan to take pt to OR on Monday as an add on for Bronch, Trach revision/Trach upsize rescheduled for 7/9 given lacko f appropriately sized trach tube     GI  # Dysphagia   - Continue on PEG-TF   - Monitor BMs   - Abdomen mildly distended & monitoring for now     RENAL  - No acute issues   - Monitor renal function and UOP     #Hyperlactatemia  - Lactate 3.5 on admission, downtrended , then back up to 4.4   - S/P 1L LR with improvement in lactate (7/6 & 7/7)    INFECTIOUS DISEASE  # Leukocytosis   - No fever or chills and noted prior leukocytosis   - CXR with prior atelectasis and no acute findings   - Uptrending Lactate so UA sent returning with POSITIVE Nitrites (7/6) but given no fever and improving leukocytosis will hold of ABX   - SCx (7/5) grew pansensitive PSA  - Pending BCx & UCX  - MRSA PCR (7/6) POSITIVE for both MRSA/MSSA - Start Bactroban (7/6 - 7/10)  - Given uptrending WBC count, start of thick yellow secretions, and known positive UA, will start empiric Zosyn (7/8 - ) for UTI (+/-) possible trachitiis.     HEME  - On Xarelto 10mg at home likely for DVT PPX?   - Hold home Xarelto in prep for OR   - DVT PPX with LVX for now     ENDOCRINE  - No hx of DM2   - Monitor BG     SKIN  - Basic trach and PEG care ordered     ETHICS/ GOC    - FULL CODE   - Dr Tyson Velasquez,  involved in care    DISPO - Back home with  when able.    ASSESSMENT AND PLAN:   69 YO Female with PMHx of ALS, Parkinson,, nonverbal, bed bound, chronic respiratory failure with tracheostomy and vent dependence, and oropharyngeal dysphagia with PEG who presented from home with tracheostomy leak. She was noted with vent alarm and poor TVe (250-290) at home. Trach changed at home with no improvement and sent in for thoracic evaluation. While in MICU no air leak noted with hyperinflated cuff. Transferred to RCU on 7/5    NEUROLOGY  # ALS   - Baseline nonverbal, awake, and communicates with eye movement.   - Continue on home rilutek 50mg BID     PSYCH   # Anxiety and Depression   - Continue on home valium 2mg BID   - Continue on zoloft 75mg QD while in house (on 4cc/ 80mg QD at home)     CARDIOVASCULAR  - No acute issues   - Monitor HR and BP     RESPIRATORY  # Tracheostomy leak   - At home noted with TVe 200-300s despite tracheostomy change to 80XLTCD.   - No airleak noted in house with hyperinflated cuff likely tracheomegaly?   - Continue on vent 16/400/5/21   - Continue HTS PRN   - Initial Thoracic plan to take pt to OR on Monday as an add on for Bronch, Trach revision/Trach upsize rescheduled for 7/9 given lacko f appropriately sized trach tube     GI  # Dysphagia   - Continue on PEG-TF   - Monitor BMs   - Abdomen mildly distended & monitoring for now     RENAL  - No acute issues   - Monitor renal function and UOP     #Hyperlactatemia  - Lactate 3.5 on admission, downtrended , then back up to 4.4   - S/P 1L LR with improvement in lactate (7/6 & 7/7)    INFECTIOUS DISEASE  # Leukocytosis   - No fever or chills and noted prior leukocytosis   - CXR with prior atelectasis and no acute findings   - Uptrending Lactate so UA sent returning with POSITIVE Nitrites (7/6) but given no fever and improving leukocytosis will hold of ABX   - SCx (7/5) grew pansensitive PSA  - BCx (7/6) x 2 NGTD, UCx with coag neg staph (susceptibilities not performed)   - MRSA PCR (7/6) POSITIVE for both MRSA/MSSA - Start Bactroban (7/6 - 7/10)  - Given uptrending WBC count, start of thick yellow secretions, and known positive UA, will start empiric Zosyn (7/8 - ) for UTI (+/-) possible trachitiis.     HEME  - On Xarelto 10mg at home likely for DVT PPX?   - Hold home Xarelto in prep for OR   - DVT PPX with LVX for now     ENDOCRINE  - No hx of DM2   - Monitor BG     SKIN  - Basic trach and PEG care ordered     ETHICS/ GOC    - FULL CODE   - Dr Tyson Velasquez,  involved in care    DISPO - Back home with  when able.    ASSESSMENT AND PLAN:   67 YO Female with PMHx of ALS, Parkinson,, nonverbal, bed bound, chronic respiratory failure with tracheostomy and vent dependence, and oropharyngeal dysphagia with PEG who presented from home with tracheostomy leak. She was noted with vent alarm and poor TVe (250-290) at home. Trach changed at home with no improvement and sent in for thoracic evaluation. While in MICU no air leak noted with hyperinflated cuff. Transferred to RCU on 7/5    NEUROLOGY  # ALS   - Baseline nonverbal, awake, and communicates with eye movement.   - Continue on home rilutek 50mg BID     PSYCH   # Anxiety and Depression   - Continue on home valium 2mg BID   - Continue on zoloft 75mg QD while in house (on 4cc/ 80mg QD at home)     CARDIOVASCULAR  - No acute issues   - Monitor HR and BP     RESPIRATORY  # Tracheostomy leak   - At home noted with TVe 200-300s despite tracheostomy change to 80XLTCD.   - No airleak noted in house with hyperinflated cuff likely tracheomegaly?   - Continue on vent 16/400/5/21   - Continue HTS PRN   - Thoracic was planning on taking pt to OR for trach revision/upsizing 7/8 then 7/9 but due to 9XLT trach on back order, plan for sometime in the future (potentially 7/15 or so)     GI  # Dysphagia   - Continue on PEG-TF   - Monitor BMs   - Abdomen mildly distended & monitoring for now     RENAL  - No acute issues   - Monitor renal function and UOP     #Hyperlactatemia  - Lactate 3.5 on admission, downtrended , then back up to 4.4   - S/P 1L LR with improvement in lactate (7/6 & 7/7)    INFECTIOUS DISEASE  # Leukocytosis   - No fever or chills and noted prior leukocytosis   - CXR with prior atelectasis and no acute findings   - Uptrending Lactate so UA sent returning with POSITIVE Nitrites (7/6) but given no fever and improving leukocytosis will hold of ABX   - SCx (7/5) grew pansensitive PSA  - BCx (7/6) x 2 NGTD, UCx with coag neg staph (susceptibilities not performed)   - MRSA PCR (7/6) POSITIVE for both MRSA/MSSA - Start Bactroban (7/6 - 7/10)  - Given uptrending WBC count, start of thick yellow secretions, and known positive UA, will start empiric Zosyn (7/8 - ) for UTI (+/-) possible trachitiis.     HEME  - On Xarelto 10mg at home likely for DVT PPX?   - Hold home Xarelto in prep for OR   - DVT PPX with LVX for now     ENDOCRINE  - No hx of DM2   - Monitor BG     SKIN  - Basic trach and PEG care ordered     ETHICS/ GOC    - FULL CODE   - Dr Tyson Velasquez,  involved in care    DISPO - Back home with  when able.

## 2024-07-09 NOTE — CHART NOTE - NSCHARTNOTEFT_GEN_A_CORE
No plans for OR with Thoracic surgery today  9XLT Trach ordered but on back order until around 7/15.  Will reschedule pt for Trach exchange once Trach available.   Above d/w RCU team.

## 2024-07-09 NOTE — PROGRESS NOTE ADULT - NS ATTEND AMEND GEN_ALL_CORE FT
69 yo F w/ PMH of ALS, chronic respiratory failure w/ tracheostomy and vent dependence, oropharyngeal dysphagia w/ PEG, Parkinson's disease, non verbal at baseline presents w/ tracheostomy air leak.   w/ set volume at 400. Tracheostomy changed at home with no improvement and so presented for further evaluation.     - baseline mental status, on home regimen: Rilutek 50 mg bid, Valium 2 mg bid, Zolof 75 mg daily   - TV today ~260. On VC//16/+5/30%, RR 16, PIP 20, most recent gas wnl, will repeat   - Thoracic surgery to take patient to OR for trach revision/upsizing. Procedure delayed due to 9XLT trach on back order, (potentially rescheduled to7/15)  - continue tube feeds via PEG   - leukocytosis, tx for UTI and possible tracheitis. On Zosyn 7/8, fu UCx sensitivities   - sputum cx pansensitive pseudomonas.      - rest of plan as above

## 2024-07-09 NOTE — PROGRESS NOTE ADULT - SUBJECTIVE AND OBJECTIVE BOX
CHIEF COMPLAINT:    INTERVAL EVENTS:     ROS: Seen by bedside during AM rounds     OBJECTIVE:  ICU Vital Signs Last 24 Hrs  T(C): 35.9 (09 Jul 2024 04:00), Max: 37 (08 Jul 2024 08:00)  T(F): 96.7 (09 Jul 2024 04:00), Max: 98.6 (08 Jul 2024 08:00)  HR: 94 (09 Jul 2024 04:00) (87 - 105)  BP: 128/72 (09 Jul 2024 04:00) (121/75 - 166/85)  BP(mean): --  ABP: --  ABP(mean): --  RR: 18 (09 Jul 2024 04:00) (16 - 18)  SpO2: 99% (09 Jul 2024 04:00) (96% - 100%)    O2 Parameters below as of 09 Jul 2024 04:00  Patient On (Oxygen Delivery Method): ventilator    O2 Concentration (%): 30      Mode: AC/ CMV (Assist Control/ Continuous Mandatory Ventilation), RR (machine): 16, TV (machine): 400, FiO2: 30, PEEP: 5, ITime: 0.76, MAP: 9, PIP: 26    07-08 @ 07:01  -  07-09 @ 07:00  --------------------------------------------------------  IN: 424 mL / OUT: 900 mL / NET: -476 mL      CAPILLARY BLOOD GLUCOSE          PHYSICAL EXAM:  General:   HEENT:   Lymph Nodes:  Neck:   Respiratory:   Cardiovascular:   Abdomen:   Extremities:   Skin:   Neurological:  Psychiatry:    Mode: AC/ CMV (Assist Control/ Continuous Mandatory Ventilation)  RR (machine): 16  TV (machine): 400  FiO2: 30  PEEP: 5  ITime: 0.76  MAP: 9  PIP: 26      HOSPITAL MEDICATIONS:  MEDICATIONS  (STANDING):  artificial  tears Solution 1 Drop(s) Both EYES every 12 hours  chlorhexidine 0.12% Liquid 15 milliLiter(s) Oral Mucosa every 12 hours  chlorhexidine 2% Cloths 1 Application(s) Topical daily  diazepam    Tablet 2 milliGRAM(s) Oral every 12 hours  enoxaparin Injectable 40 milliGRAM(s) SubCutaneous every 24 hours  lactated ringers. 1000 milliLiter(s) (100 mL/Hr) IV Continuous <Continuous>  multivitamin/minerals/iron Oral Solution (CENTRUM) 15 milliLiter(s) Oral daily  mupirocin 2% Ointment 1 Application(s) Topical every 12 hours  piperacillin/tazobactam IVPB.. 3.375 Gram(s) IV Intermittent every 8 hours  riluzole 50 milliGRAM(s) Oral two times a day  sertraline 75 milliGRAM(s) Oral daily    MEDICATIONS  (PRN):  acetaminophen     Tablet .. 650 milliGRAM(s) Oral every 6 hours PRN Temp greater or equal to 38C (100.4F), Mild Pain (1 - 3)  sodium chloride 3%  Inhalation 4 milliLiter(s) Inhalation every 12 hours PRN for congestion      LABS:                        12.2   11.75 )-----------( 312      ( 09 Jul 2024 05:30 )             38.2     07-08    139  |  101  |  12  ----------------------------<  133<H>  3.9   |  24  |  <0.20<L>    Ca    9.3      08 Jul 2024 05:40  Phos  2.6     07-08  Mg     1.90     07-08      PT/INR - ( 09 Jul 2024 05:30 )   PT: 13.3 sec;   INR: 1.18 ratio         PTT - ( 09 Jul 2024 05:30 )  PTT:31.2 sec  Urinalysis Basic - ( 08 Jul 2024 05:40 )    Color: x / Appearance: x / SG: x / pH: x  Gluc: 133 mg/dL / Ketone: x  / Bili: x / Urobili: x   Blood: x / Protein: x / Nitrite: x   Leuk Esterase: x / RBC: x / WBC x   Sq Epi: x / Non Sq Epi: x / Bacteria: x         CHIEF COMPLAINT:    INTERVAL EVENTS:     ROS: Seen by bedside during AM rounds     OBJECTIVE:  ICU Vital Signs Last 24 Hrs  T(C): 35.9 (09 Jul 2024 04:00), Max: 37 (08 Jul 2024 08:00)  T(F): 96.7 (09 Jul 2024 04:00), Max: 98.6 (08 Jul 2024 08:00)  HR: 94 (09 Jul 2024 04:00) (87 - 105)  BP: 128/72 (09 Jul 2024 04:00) (121/75 - 166/85)  BP(mean): --  ABP: --  ABP(mean): --  RR: 18 (09 Jul 2024 04:00) (16 - 18)  SpO2: 99% (09 Jul 2024 04:00) (96% - 100%)    O2 Parameters below as of 09 Jul 2024 04:00  Patient On (Oxygen Delivery Method): ventilator    O2 Concentration (%): 30      Mode: AC/ CMV (Assist Control/ Continuous Mandatory Ventilation), RR (machine): 16, TV (machine): 400, FiO2: 30, PEEP: 5, ITime: 0.76, MAP: 9, PIP: 26    07-08 @ 07:01  -  07-09 @ 07:00  --------------------------------------------------------  IN: 424 mL / OUT: 900 mL / NET: -476 mL      CAPILLARY BLOOD GLUCOSE          PHYSICAL EXAM:  General: NAD, secretions noted to be draining from right side of mouth- suctioned  Neck: (+) Trach tube noted, site c/d/i.  Cards: S1/S2, no murmurs   Pulm: rhonchorous b/l. Pooling of thick yellow secretions at trach stoma. No resp distress. On AC Mode ventilation. VTs 390-400 with pPeaks 26-27.  Abdomen: Abd slightly distended. Soft, NT. (+) PEG noted, site c/d/i.   Extremities: Trace b/l LE edema and b/l hand edema Extremities cool to touch. Intact distal pulses.   Neurology: Awake/eyes open. Nonverbal. Not following commands. Not tracking. Not withdrawing to pain. Extremities nonrigid.   Skin: warm to touch, color appropriate for ethnicity. Refer to RN assessment for further details.      Mode: AC/ CMV (Assist Control/ Continuous Mandatory Ventilation)  RR (machine): 16  TV (machine): 400  FiO2: 30  PEEP: 5  ITime: 0.76  MAP: 9  PIP: 26      HOSPITAL MEDICATIONS:  MEDICATIONS  (STANDING):  artificial  tears Solution 1 Drop(s) Both EYES every 12 hours  chlorhexidine 0.12% Liquid 15 milliLiter(s) Oral Mucosa every 12 hours  chlorhexidine 2% Cloths 1 Application(s) Topical daily  diazepam    Tablet 2 milliGRAM(s) Oral every 12 hours  enoxaparin Injectable 40 milliGRAM(s) SubCutaneous every 24 hours  lactated ringers. 1000 milliLiter(s) (100 mL/Hr) IV Continuous <Continuous>  multivitamin/minerals/iron Oral Solution (CENTRUM) 15 milliLiter(s) Oral daily  mupirocin 2% Ointment 1 Application(s) Topical every 12 hours  piperacillin/tazobactam IVPB.. 3.375 Gram(s) IV Intermittent every 8 hours  riluzole 50 milliGRAM(s) Oral two times a day  sertraline 75 milliGRAM(s) Oral daily    MEDICATIONS  (PRN):  acetaminophen     Tablet .. 650 milliGRAM(s) Oral every 6 hours PRN Temp greater or equal to 38C (100.4F), Mild Pain (1 - 3)  sodium chloride 3%  Inhalation 4 milliLiter(s) Inhalation every 12 hours PRN for congestion      LABS:                        12.2   11.75 )-----------( 312      ( 09 Jul 2024 05:30 )             38.2     07-08    139  |  101  |  12  ----------------------------<  133<H>  3.9   |  24  |  <0.20<L>    Ca    9.3      08 Jul 2024 05:40  Phos  2.6     07-08  Mg     1.90     07-08      PT/INR - ( 09 Jul 2024 05:30 )   PT: 13.3 sec;   INR: 1.18 ratio         PTT - ( 09 Jul 2024 05:30 )  PTT:31.2 sec  Urinalysis Basic - ( 08 Jul 2024 05:40 )    Color: x / Appearance: x / SG: x / pH: x  Gluc: 133 mg/dL / Ketone: x  / Bili: x / Urobili: x   Blood: x / Protein: x / Nitrite: x   Leuk Esterase: x / RBC: x / WBC x   Sq Epi: x / Non Sq Epi: x / Bacteria: x         INTERVAL EVENTS: PJ HANEY: Seen by bedside during AM rounds     OBJECTIVE:  ICU Vital Signs Last 24 Hrs  T(C): 35.9 (09 Jul 2024 04:00), Max: 37 (08 Jul 2024 08:00)  T(F): 96.7 (09 Jul 2024 04:00), Max: 98.6 (08 Jul 2024 08:00)  HR: 94 (09 Jul 2024 04:00) (87 - 105)  BP: 128/72 (09 Jul 2024 04:00) (121/75 - 166/85)  BP(mean): --  ABP: --  ABP(mean): --  RR: 18 (09 Jul 2024 04:00) (16 - 18)  SpO2: 99% (09 Jul 2024 04:00) (96% - 100%)    O2 Parameters below as of 09 Jul 2024 04:00  Patient On (Oxygen Delivery Method): ventilator    O2 Concentration (%): 30      Mode: AC/ CMV (Assist Control/ Continuous Mandatory Ventilation), RR (machine): 16, TV (machine): 400, FiO2: 30, PEEP: 5, ITime: 0.76, MAP: 9, PIP: 26    07-08 @ 07:01  -  07-09 @ 07:00  --------------------------------------------------------  IN: 424 mL / OUT: 900 mL / NET: -476 mL      CAPILLARY BLOOD GLUCOSE          PHYSICAL EXAM:  General: NAD, secretions noted to be draining from right side of mouth- suctioned  Neck: (+) Trach tube noted, site c/d/i.  Cards: S1/S2, no murmurs   Pulm: rhonchorous b/l. Pooling of thick yellow secretions at trach stoma. No resp distress. On AC Mode ventilation. VTs 390-400 with pPeaks 26-27.  Abdomen: Abd slightly distended. Soft, NT. (+) PEG noted, site c/d/i.   Extremities: Trace b/l LE edema and b/l hand edema Extremities cool to touch. Intact distal pulses.   Neurology: Awake/eyes open. Nonverbal. Not following commands. Not tracking. Not withdrawing to pain. Extremities nonrigid.   Skin: warm to touch, color appropriate for ethnicity. Refer to RN assessment for further details.      Mode: AC/ CMV (Assist Control/ Continuous Mandatory Ventilation)  RR (machine): 16  TV (machine): 400  FiO2: 30  PEEP: 5  ITime: 0.76  MAP: 9  PIP: 26      HOSPITAL MEDICATIONS:  MEDICATIONS  (STANDING):  artificial  tears Solution 1 Drop(s) Both EYES every 12 hours  chlorhexidine 0.12% Liquid 15 milliLiter(s) Oral Mucosa every 12 hours  chlorhexidine 2% Cloths 1 Application(s) Topical daily  diazepam    Tablet 2 milliGRAM(s) Oral every 12 hours  enoxaparin Injectable 40 milliGRAM(s) SubCutaneous every 24 hours  lactated ringers. 1000 milliLiter(s) (100 mL/Hr) IV Continuous <Continuous>  multivitamin/minerals/iron Oral Solution (CENTRUM) 15 milliLiter(s) Oral daily  mupirocin 2% Ointment 1 Application(s) Topical every 12 hours  piperacillin/tazobactam IVPB.. 3.375 Gram(s) IV Intermittent every 8 hours  riluzole 50 milliGRAM(s) Oral two times a day  sertraline 75 milliGRAM(s) Oral daily    MEDICATIONS  (PRN):  acetaminophen     Tablet .. 650 milliGRAM(s) Oral every 6 hours PRN Temp greater or equal to 38C (100.4F), Mild Pain (1 - 3)  sodium chloride 3%  Inhalation 4 milliLiter(s) Inhalation every 12 hours PRN for congestion      LABS:                        12.2   11.75 )-----------( 312      ( 09 Jul 2024 05:30 )             38.2     07-08    139  |  101  |  12  ----------------------------<  133<H>  3.9   |  24  |  <0.20<L>    Ca    9.3      08 Jul 2024 05:40  Phos  2.6     07-08  Mg     1.90     07-08      PT/INR - ( 09 Jul 2024 05:30 )   PT: 13.3 sec;   INR: 1.18 ratio         PTT - ( 09 Jul 2024 05:30 )  PTT:31.2 sec  Urinalysis Basic - ( 08 Jul 2024 05:40 )    Color: x / Appearance: x / SG: x / pH: x  Gluc: 133 mg/dL / Ketone: x  / Bili: x / Urobili: x   Blood: x / Protein: x / Nitrite: x   Leuk Esterase: x / RBC: x / WBC x   Sq Epi: x / Non Sq Epi: x / Bacteria: x

## 2024-07-10 LAB
ANION GAP SERPL CALC-SCNC: 14 MMOL/L — SIGNIFICANT CHANGE UP (ref 7–14)
BASE EXCESS BLDV CALC-SCNC: 3.5 MMOL/L — HIGH (ref -2–3)
BASOPHILS # BLD AUTO: 0.04 K/UL — SIGNIFICANT CHANGE UP (ref 0–0.2)
BASOPHILS NFR BLD AUTO: 0.3 % — SIGNIFICANT CHANGE UP (ref 0–2)
BLOOD GAS VENOUS COMPREHENSIVE RESULT: SIGNIFICANT CHANGE UP
BUN SERPL-MCNC: 15 MG/DL — SIGNIFICANT CHANGE UP (ref 7–23)
CALCIUM SERPL-MCNC: 9.2 MG/DL — SIGNIFICANT CHANGE UP (ref 8.4–10.5)
CHLORIDE BLDV-SCNC: 105 MMOL/L — SIGNIFICANT CHANGE UP (ref 96–108)
CHLORIDE SERPL-SCNC: 103 MMOL/L — SIGNIFICANT CHANGE UP (ref 98–107)
CO2 BLDV-SCNC: 29 MMOL/L — HIGH (ref 22–26)
CO2 SERPL-SCNC: 24 MMOL/L — SIGNIFICANT CHANGE UP (ref 22–31)
CREAT SERPL-MCNC: <0.2 MG/DL — LOW (ref 0.5–1.3)
EGFR: 126 ML/MIN/1.73M2 — SIGNIFICANT CHANGE UP
EOSINOPHIL # BLD AUTO: 0.18 K/UL — SIGNIFICANT CHANGE UP (ref 0–0.5)
EOSINOPHIL NFR BLD AUTO: 1.5 % — SIGNIFICANT CHANGE UP (ref 0–6)
GAS PNL BLDV: 138 MMOL/L — SIGNIFICANT CHANGE UP (ref 136–145)
GLUCOSE BLDV-MCNC: 197 MG/DL — HIGH (ref 70–99)
GLUCOSE SERPL-MCNC: 182 MG/DL — HIGH (ref 70–99)
HCO3 BLDV-SCNC: 28 MMOL/L — SIGNIFICANT CHANGE UP (ref 22–29)
HCT VFR BLD CALC: 39.5 % — SIGNIFICANT CHANGE UP (ref 34.5–45)
HCT VFR BLDA CALC: 38 % — SIGNIFICANT CHANGE UP (ref 34.5–46.5)
HGB BLD CALC-MCNC: 12.7 G/DL — SIGNIFICANT CHANGE UP (ref 11.7–16.1)
HGB BLD-MCNC: 12.3 G/DL — SIGNIFICANT CHANGE UP (ref 11.5–15.5)
IANC: 9.39 K/UL — HIGH (ref 1.8–7.4)
IMM GRANULOCYTES NFR BLD AUTO: 0.7 % — SIGNIFICANT CHANGE UP (ref 0–0.9)
LACTATE BLDV-MCNC: 1.8 MMOL/L — SIGNIFICANT CHANGE UP (ref 0.5–2)
LYMPHOCYTES # BLD AUTO: 1.27 K/UL — SIGNIFICANT CHANGE UP (ref 1–3.3)
LYMPHOCYTES # BLD AUTO: 10.9 % — LOW (ref 13–44)
MAGNESIUM SERPL-MCNC: 2 MG/DL — SIGNIFICANT CHANGE UP (ref 1.6–2.6)
MCHC RBC-ENTMCNC: 26.4 PG — LOW (ref 27–34)
MCHC RBC-ENTMCNC: 31.1 GM/DL — LOW (ref 32–36)
MCV RBC AUTO: 84.8 FL — SIGNIFICANT CHANGE UP (ref 80–100)
MONOCYTES # BLD AUTO: 0.67 K/UL — SIGNIFICANT CHANGE UP (ref 0–0.9)
MONOCYTES NFR BLD AUTO: 5.8 % — SIGNIFICANT CHANGE UP (ref 2–14)
NEUTROPHILS # BLD AUTO: 9.39 K/UL — HIGH (ref 1.8–7.4)
NEUTROPHILS NFR BLD AUTO: 80.8 % — HIGH (ref 43–77)
NRBC # BLD: 0 /100 WBCS — SIGNIFICANT CHANGE UP (ref 0–0)
NRBC # FLD: 0 K/UL — SIGNIFICANT CHANGE UP (ref 0–0)
PCO2 BLDV: 40 MMHG — SIGNIFICANT CHANGE UP (ref 39–52)
PH BLDV: 7.45 — HIGH (ref 7.32–7.43)
PHOSPHATE SERPL-MCNC: 2.8 MG/DL — SIGNIFICANT CHANGE UP (ref 2.5–4.5)
PLATELET # BLD AUTO: 357 K/UL — SIGNIFICANT CHANGE UP (ref 150–400)
PO2 BLDV: 107 MMHG — HIGH (ref 25–45)
POTASSIUM BLDV-SCNC: 3.7 MMOL/L — SIGNIFICANT CHANGE UP (ref 3.5–5.1)
POTASSIUM SERPL-MCNC: 3.7 MMOL/L — SIGNIFICANT CHANGE UP (ref 3.5–5.3)
POTASSIUM SERPL-SCNC: 3.7 MMOL/L — SIGNIFICANT CHANGE UP (ref 3.5–5.3)
RBC # BLD: 4.66 M/UL — SIGNIFICANT CHANGE UP (ref 3.8–5.2)
RBC # FLD: 16.6 % — HIGH (ref 10.3–14.5)
SAO2 % BLDV: 99.9 % — HIGH (ref 67–88)
SODIUM SERPL-SCNC: 141 MMOL/L — SIGNIFICANT CHANGE UP (ref 135–145)
WBC # BLD: 11.63 K/UL — HIGH (ref 3.8–10.5)
WBC # FLD AUTO: 11.63 K/UL — HIGH (ref 3.8–10.5)

## 2024-07-10 PROCEDURE — 99233 SBSQ HOSP IP/OBS HIGH 50: CPT | Mod: FS

## 2024-07-10 RX ORDER — ACETAMINOPHEN 325 MG/1
650 TABLET ORAL EVERY 6 HOURS
Refills: 0 | Status: DISCONTINUED | OUTPATIENT
Start: 2024-07-10 | End: 2024-08-29

## 2024-07-10 RX ORDER — ACETAMINOPHEN 325 MG/1
1000 TABLET ORAL ONCE
Refills: 0 | Status: COMPLETED | OUTPATIENT
Start: 2024-07-10 | End: 2024-07-11

## 2024-07-10 RX ORDER — ACETAMINOPHEN 325 MG/1
1000 TABLET ORAL ONCE
Refills: 0 | Status: DISCONTINUED | OUTPATIENT
Start: 2024-07-10 | End: 2024-07-10

## 2024-07-10 RX ORDER — POVIDONE, PROPYLENE GLYCOL 6.8; 3 MG/ML; MG/ML
1 LIQUID OPHTHALMIC EVERY 6 HOURS
Refills: 0 | Status: DISCONTINUED | OUTPATIENT
Start: 2024-07-10 | End: 2024-07-25

## 2024-07-10 RX ORDER — POVIDONE, PROPYLENE GLYCOL 6.8; 3 MG/ML; MG/ML
1 LIQUID OPHTHALMIC EVERY 6 HOURS
Refills: 0 | Status: ACTIVE | OUTPATIENT
Start: 2024-07-10 | End: 2025-06-08

## 2024-07-10 RX ADMIN — POVIDONE, PROPYLENE GLYCOL 1 DROP(S): 6.8; 3 LIQUID OPHTHALMIC at 00:30

## 2024-07-10 RX ADMIN — POVIDONE, PROPYLENE GLYCOL 1 DROP(S): 6.8; 3 LIQUID OPHTHALMIC at 05:04

## 2024-07-10 RX ADMIN — ACETAMINOPHEN 1000 MILLIGRAM(S): 325 TABLET ORAL at 00:30

## 2024-07-10 RX ADMIN — Medication 2 MILLIGRAM(S): at 16:59

## 2024-07-10 RX ADMIN — RILUZOLE 50 MILLIGRAM(S): 5 LIQUID ORAL at 05:04

## 2024-07-10 RX ADMIN — CHLORHEXIDINE GLUCONATE 15 MILLILITER(S): 40 SOLUTION TOPICAL at 05:04

## 2024-07-10 RX ADMIN — RILUZOLE 50 MILLIGRAM(S): 5 LIQUID ORAL at 16:58

## 2024-07-10 RX ADMIN — PIPERACILLIN SODIUM AND TAZOBACTAM SODIUM 25 GRAM(S): 3; .375 INJECTION, POWDER, FOR SOLUTION INTRAVENOUS at 07:58

## 2024-07-10 RX ADMIN — SERTRALINE HYDROCHLORIDE 75 MILLIGRAM(S): 50 TABLET, FILM COATED ORAL at 11:05

## 2024-07-10 RX ADMIN — Medication 2 MILLIGRAM(S): at 05:05

## 2024-07-10 RX ADMIN — POVIDONE, PROPYLENE GLYCOL 1 DROP(S): 6.8; 3 LIQUID OPHTHALMIC at 16:58

## 2024-07-10 RX ADMIN — CHLORHEXIDINE GLUCONATE 1 APPLICATION(S): 40 SOLUTION TOPICAL at 11:06

## 2024-07-10 RX ADMIN — Medication 1 APPLICATION(S): at 16:59

## 2024-07-10 RX ADMIN — POVIDONE, PROPYLENE GLYCOL 1 DROP(S): 6.8; 3 LIQUID OPHTHALMIC at 18:16

## 2024-07-10 RX ADMIN — PIPERACILLIN SODIUM AND TAZOBACTAM SODIUM 25 GRAM(S): 3; .375 INJECTION, POWDER, FOR SOLUTION INTRAVENOUS at 00:11

## 2024-07-10 RX ADMIN — CHLORHEXIDINE GLUCONATE 15 MILLILITER(S): 40 SOLUTION TOPICAL at 16:59

## 2024-07-10 RX ADMIN — ENOXAPARIN SODIUM 40 MILLIGRAM(S): 100 INJECTION SUBCUTANEOUS at 05:04

## 2024-07-10 RX ADMIN — Medication 1 APPLICATION(S): at 05:04

## 2024-07-10 RX ADMIN — PIPERACILLIN SODIUM AND TAZOBACTAM SODIUM 25 GRAM(S): 3; .375 INJECTION, POWDER, FOR SOLUTION INTRAVENOUS at 16:53

## 2024-07-10 RX ADMIN — Medication 15 MILLILITER(S): at 11:06

## 2024-07-10 NOTE — PROGRESS NOTE ADULT - ASSESSMENT
ASSESSMENT AND PLAN:   69 YO Female with PMHx of ALS, Parkinson,, nonverbal, bed bound, chronic respiratory failure with tracheostomy and vent dependence, and oropharyngeal dysphagia with PEG who presented from home with tracheostomy leak. She was noted with vent alarm and poor TVe (250-290) at home. Trach changed at home with no improvement and sent in for thoracic evaluation. While in MICU no air leak noted with hyperinflated cuff. Transferred to RCU on 7/5    NEUROLOGY  # ALS   - Baseline nonverbal, awake, and communicates with eye movement.   - Continue on home rilutek 50mg BID     PSYCH   # Anxiety and Depression   - Continue on home valium 2mg BID   - Continue on zoloft 75mg QD while in house (on 4cc/ 80mg QD at home)     CARDIOVASCULAR  - No acute issues   - Monitor HR and BP     RESPIRATORY  # Tracheostomy leak   - At home noted with TVe 200-300s despite tracheostomy change to 80XLTCD.   - No airleak noted in house with hyperinflated cuff likely tracheomegaly?   - Continue on vent 16/400/5/21   - Continue HTS PRN   - Thoracic was planning on taking pt to OR for trach revision/upsizing 7/8 then 7/9 but due to 9XLT trach on back order, plan for sometime in the future (potentially 7/15 or so)     GI  # Dysphagia   - Continue on PEG-TF   - Monitor BMs   - Abdomen mildly distended & monitoring for now     RENAL  - No acute issues   - Monitor renal function and UOP     #Hyperlactatemia  - Lactate 3.5 on admission, downtrended , then back up to 4.4   - S/P 1L LR with improvement in lactate (7/6 & 7/7)    INFECTIOUS DISEASE  # Leukocytosis   - No fever or chills and noted prior leukocytosis   - CXR with prior atelectasis and no acute findings   - Uptrending Lactate so UA sent returning with POSITIVE Nitrites (7/6) but given no fever and improving leukocytosis will hold of ABX   - SCx (7/5) grew pansensitive PSA  - BCx (7/6) x 2 NGTD, UCx with coag neg staph (susceptibilities not performed)   - MRSA PCR (7/6) POSITIVE for both MRSA/MSSA - Start Bactroban (7/6 - 7/10)  - Given uptrending WBC count, start of thick yellow secretions, and known positive UA, will start empiric Zosyn (7/8 - ) for UTI (+/-) possible trachitiis.     HEME  - On Xarelto 10mg at home likely for DVT PPX?   - Hold home Xarelto in prep for OR   - DVT PPX with LVX for now     ENDOCRINE  - No hx of DM2   - Monitor BG     SKIN  - Basic trach and PEG care ordered     ETHICS/ GOC    - FULL CODE   - Dr Tyson Velasquez,  involved in care    DISPO - Back home with  when able.

## 2024-07-10 NOTE — PROGRESS NOTE ADULT - NS ATTEND AMEND GEN_ALL_CORE FT
69 yo F w/ PMH of ALS, chronic respiratory failure w/ tracheostomy and vent dependence, oropharyngeal dysphagia w/ PEG, Parkinson's disease, non verbal at baseline presents w/ tracheostomy air leak.   w/ set volume at 400. Tracheostomy changed at home with no improvement and so presented for further evaluation.     - baseline mental status, on home regimen: Rilutek 50 mg bid, Valium 2 mg bid, Zolof 75 mg daily   - On VC//16/+5/30%, RR 16   - ABG today without significant hypercapnea despite air leak  - Thoracic surgery to take patient to OR for trach revision/upsizing. Procedure delayed due to 9XLT trach on back order, (potentially rescheduled to7/15)  - continue tube feeds via PEG   - leukocytosis, tx for UTI and possible tracheitis. On Zosyn 7/8 -   - sputum cx pansensitive pseudomonas.

## 2024-07-10 NOTE — CHART NOTE - NSCHARTNOTEFT_GEN_A_CORE
Nutrition Follow-Up/Chart Note         Source: Patient A&Ox 2   Family [ ]     RN [x ]    Chart [x ]     Diet, NPO with Tube Feed:   Tube Feeding Modality: Gastrostomy  Jevity 1.2 Marck (JEVITY1.2RTH)  Total Volume for 24 Hours (mL): 954  Continuous  Starting Tube Feed Rate {mL per Hour}: 20  Increase Tube Feed Rate by (mL): 10     Every 2 hours  Until Goal Tube Feed Rate (mL per Hour): 53  Tube Feed Duration (in Hours): 18  Tube Feed Start Time: 11:00  Tube Feed Stop Time: 05:00 (07-05-24 @ 11:17)      Anthropometrics:   Height (cm): 162.6 (07-05)  Weight (kg): 58.2 (07-07)  BMI (kg/m2): 22 (07-07)  IBW: 120 lbs/54.5kg +/- 10%    Weight Assessment: per RN flowsheet in KG  7/7 - 58.2kg  7/6 - n/a  7/5 - 56.1kg  Hospital Course:    Per chart review, Patient is a 67 YO Female with PMHx of ALS, Parkinson,, nonverbal, bed bound, chronic respiratory failure with tracheostomy and vent dependence, and oropharyngeal dysphagia with PEG who presented from home with tracheostomy leak. She was noted with vent alarm and poor TVe (250-290) at home. Trach changed at home with no improvement and sent in for thoracic evaluation. While in MICU no air leak noted with hyperinflated cuff. Transferred to RCU on 7/5.      Nutrition Course:  Patient is being re-evaluation 2/2 ICU stay on EN feeding, per protocol.  Patient was noted alert, non-verbal at baseline, difficult to participate in nutrition interview at time of visit.  No family at bedside.  Noted EN feeding Jevity 1.2 via PEG running at goal rate of 53ml x 18 hrs, providing 954ml formula day.  This is providing 1144.8 kcal, 52.9 gm protein, 769 mL free water from 954ml formula daily (meets 20.4 kcal/kg, 0.94 gm protein/kg @ dosing weight 56.1 kg). Additional provision of free water per medical discretion.  This EN feeding is also providing the almost equivalent amount of calorie/protein vs Patient's home EN regimen (948ml/day=4 bottles/day).  Patient has no N/V/D/C, last BM on 7/10 with fecal incontinences per RN flowsheet.  Noted tolerating EN feeding well per discussion with RN.  Please consider to lower rate at 40ml/hr x 24 hrs, providing 960ml/day s/p downgraded from ICU stay (pt to be on 18 hrs feeds per protocol).  Recommend EN feeding will provide 1152 kcal, 53.2gm protein, and 774ml free water/day.  Water flushes per medical discretion.  Patient may resume back to home regimen (@70ml/hr) when medically feasible to be discharged home.  RDN to remain available for further nutrition intervention as indicated.       __________________ Pertinent Medications__________________   MEDICATIONS  (STANDING):  artificial  tears Solution 1 Drop(s) Both EYES every 12 [id number] 0.12% Liquid 15 milliLiter(s) Oral Mucosa every 12 hours  chlorhexidine 2% Cloths 1 Application(s) Topical daily  diazepam    Tablet 2 milliGRAM(s) Oral every 12 hours  enoxaparin Injectable 40 milliGRAM(s) SubCutaneous every 24 hours  lactated ringers. 1000 milliLiter(s) (100 mL/Hr) IV Continuous <Continuous>  multivitamin/minerals/iron Oral Solution (CENTRUM) 15 milliLiter(s) Oral daily  mupirocin 2% Ointment 1 Application(s) Topical every 12 hours  piperacillin/tazobactam IVPB.. 3.375 Gram(s) IV Intermittent every 8 hours  riluzole 50 milliGRAM(s) Oral two times a day  sertraline 75 milliGRAM(s) Oral daily    MEDICATIONS  (PRN):  acetaminophen   Oral Liquid .. 650 milliGRAM(s) Oral every 6 hours PRN Temp greater or equal to 38C (100.4F), Mild Pain (1 - 3)  artificial tears (preservative free) Ophthalmic Solution 1 Drop(s) Both EYES every 6 hours PRN Dry Eyes  sodium chloride 3%  Inhalation 4 milliLiter(s) Inhalation every 12 hours PRN for congestion      __________________ Pertinent Labs__________________   07-10 Na141 mmol/L Glu 182 mg/dL<H> K+ 3.7 mmol/L Cr  <0.20 mg/dL<L> BUN 15 mg/dL 07-10 Phos 2.8 mg/dL    Edema: 1+ generalized edema per RN flowsheet    Skin: Incontinence Associated Dermatitis per RN flowsheet    Estimated Needs Assessment: [  ] No change in need assessment    Weight Used: recalculated via IBW kg   Estimated Energy: 5521-5047 Kcal/kg/day (20-25 kcal/kg) given pt is bed-bound and requiring less energy needs  Estimated Protein: 43.6-54.5 gm/kg/day ( 0.8-1 gm/kg)  Estimated Fluid: per MD and team discretion.    Previous Nutrition Diagnosis:   No active nutrition diagnosis identified at this time      Nutrition Diagnosis is : [ x ] ongoing      New Nutrition Diagnosis :  [ x ] not applicable     Education:  [ x ] Not warranted at present    Recommendations:  1. Recommend Jevity 1.2 at 40ml/hr x 24 hrs, providing 960ml/day s/p downgraded from ICU stay (pt to be on 18 hrs feeds per protocol).  Water flushes per medical discretion.  2. Continue on multivitamin/minerals/iron solution.  3. Patient may resume back to home regimen (@70ml/hr) when medically feasible to be discharged home.      Monitoring and Evaluation:   [ x ] Monitor EN tolerance, skin integrity, bowel regimen, and nutrition pertinent labs.  [ x ] Tolerance to diet prescription [x ] weights [ x] follow up per protocol

## 2024-07-10 NOTE — CHART NOTE - NSCHARTNOTEFT_GEN_A_CORE
9XLT Trach ordered but on back order until around 7/15.  Will reschedule pt for Trach exchange once Trach available.   Above d/w RCU team.

## 2024-07-10 NOTE — PROGRESS NOTE ADULT - SUBJECTIVE AND OBJECTIVE BOX
CHIEF COMPLAINT: Patient is a 68y old  Female who presents with a chief complaint of Other abnormality of breathing     (05 Jul 2024 10:47)      Interval Events:    REVIEW OF SYSTEMS:  [ ] All other systems negative  [ ] Unable to assess ROS because ________    Mode: AC/ CMV (Assist Control/ Continuous Mandatory Ventilation), RR (machine): 16, TV (machine): 400, FiO2: 30, PEEP: 5, ITime: 0.75, MAP: 10, PIP: 23      OBJECTIVE:  ICU Vital Signs Last 24 Hrs  T(C): 37.1 (10 Jul 2024 04:00), Max: 37.1 (10 Jul 2024 04:00)  T(F): 98.8 (10 Jul 2024 04:00), Max: 98.8 (10 Jul 2024 04:00)  HR: 91 (10 Jul 2024 04:00) (87 - 106)  BP: 122/70 (10 Jul 2024 04:00) (105/73 - 185/100)  BP(mean): 86 (10 Jul 2024 04:00) (86 - 124)  ABP: --  ABP(mean): --  RR: 16 (10 Jul 2024 04:00) (16 - 17)  SpO2: 100% (10 Jul 2024 04:00) (90% - 100%)    O2 Parameters below as of 10 Jul 2024 04:00  Patient On (Oxygen Delivery Method): ventilator    O2 Concentration (%): 30      Mode: AC/ CMV (Assist Control/ Continuous Mandatory Ventilation), RR (machine): 16, TV (machine): 400, FiO2: 30, PEEP: 5, ITime: 0.75, MAP: 10, PIP: 23    07-09 @ 07:01  -  07-10 @ 07:00  --------------------------------------------------------  IN: 1200 mL / OUT: 1400 mL / NET: -200 mL      CAPILLARY BLOOD GLUCOSE          HOSPITAL MEDICATIONS:  MEDICATIONS  (STANDING):  artificial  tears Solution 1 Drop(s) Both EYES every 12 hours  chlorhexidine 0.12% Liquid 15 milliLiter(s) Oral Mucosa every 12 hours  chlorhexidine 2% Cloths 1 Application(s) Topical daily  diazepam    Tablet 2 milliGRAM(s) Oral every 12 hours  enoxaparin Injectable 40 milliGRAM(s) SubCutaneous every 24 hours  lactated ringers. 1000 milliLiter(s) (100 mL/Hr) IV Continuous <Continuous>  multivitamin/minerals/iron Oral Solution (CENTRUM) 15 milliLiter(s) Oral daily  mupirocin 2% Ointment 1 Application(s) Topical every 12 hours  piperacillin/tazobactam IVPB.. 3.375 Gram(s) IV Intermittent every 8 hours  riluzole 50 milliGRAM(s) Oral two times a day  sertraline 75 milliGRAM(s) Oral daily    MEDICATIONS  (PRN):  acetaminophen     Tablet .. 650 milliGRAM(s) Oral every 6 hours PRN Temp greater or equal to 38C (100.4F), Mild Pain (1 - 3)  artificial tears (preservative free) Ophthalmic Solution 1 Drop(s) Both EYES every 6 hours PRN Dry Eyes  sodium chloride 3%  Inhalation 4 milliLiter(s) Inhalation every 12 hours PRN for congestion      LABS:                        12.3   11.63 )-----------( 357      ( 10 Jul 2024 03:04 )             39.5     07-10    141  |  103  |  15  ----------------------------<  182<H>  3.7   |  24  |  <0.20<L>    Ca    9.2      10 Jul 2024 03:04  Phos  2.8     07-10  Mg     2.00     07-10      PT/INR - ( 09 Jul 2024 05:30 )   PT: 13.3 sec;   INR: 1.18 ratio         PTT - ( 09 Jul 2024 05:30 )  PTT:31.2 sec  Urinalysis Basic - ( 10 Jul 2024 03:04 )    Color: x / Appearance: x / SG: x / pH: x  Gluc: 182 mg/dL / Ketone: x  / Bili: x / Urobili: x   Blood: x / Protein: x / Nitrite: x   Leuk Esterase: x / RBC: x / WBC x   Sq Epi: x / Non Sq Epi: x / Bacteria: x        Venous Blood Gas:  07-10 @ 03:04  7.45/40/107/28/99.9  VBG Lactate: 1.8      PAST MEDICAL & SURGICAL HISTORY:  ALS (amyotrophic lateral sclerosis)      HLD (hyperlipidemia)      Ventilator dependent  Since 2015      Aspiration into airway      S/P gastrostomy  10/14 for dysphagia  receives jevity 2 cans TID      Dependence on tracheostomy      Encounter for PEG (percutaneous endoscopic gastrostomy)  peg placed          FAMILY HISTORY:  No pertinent family history in first degree relatives        Social History:      RADIOLOGY:  [ ] Reviewed and interpreted by me    PULMONARY FUNCTION TESTS:    EKG: CHIEF COMPLAINT: Patient is a 68y old  Female who presents with a chief complaint of Other abnormality of breathing     (05 Jul 2024 10:47)      Interval Events: No acute overnight events    REVIEW OF SYSTEMS:  [x ] Unable to assess ROS because trach    Mode: AC/ CMV (Assist Control/ Continuous Mandatory Ventilation), RR (machine): 16, TV (machine): 400, FiO2: 30, PEEP: 5, ITime: 0.75, MAP: 10, PIP: 23      OBJECTIVE:  ICU Vital Signs Last 24 Hrs  T(C): 37.1 (10 Jul 2024 04:00), Max: 37.1 (10 Jul 2024 04:00)  T(F): 98.8 (10 Jul 2024 04:00), Max: 98.8 (10 Jul 2024 04:00)  HR: 91 (10 Jul 2024 04:00) (87 - 106)  BP: 122/70 (10 Jul 2024 04:00) (105/73 - 185/100)  BP(mean): 86 (10 Jul 2024 04:00) (86 - 124)  ABP: --  ABP(mean): --  RR: 16 (10 Jul 2024 04:00) (16 - 17)  SpO2: 100% (10 Jul 2024 04:00) (90% - 100%)    O2 Parameters below as of 10 Jul 2024 04:00  Patient On (Oxygen Delivery Method): ventilator    O2 Concentration (%): 30      Mode: AC/ CMV (Assist Control/ Continuous Mandatory Ventilation), RR (machine): 16, TV (machine): 400, FiO2: 30, PEEP: 5, ITime: 0.75, MAP: 10, PIP: 23    07-09 @ 07:01  -  07-10 @ 07:00  --------------------------------------------------------  IN: 1200 mL / OUT: 1400 mL / NET: -200 mL      CAPILLARY BLOOD GLUCOSE          HOSPITAL MEDICATIONS:  MEDICATIONS  (STANDING):  artificial  tears Solution 1 Drop(s) Both EYES every 12 hours  chlorhexidine 0.12% Liquid 15 milliLiter(s) Oral Mucosa every 12 hours  chlorhexidine 2% Cloths 1 Application(s) Topical daily  diazepam    Tablet 2 milliGRAM(s) Oral every 12 hours  enoxaparin Injectable 40 milliGRAM(s) SubCutaneous every 24 hours  lactated ringers. 1000 milliLiter(s) (100 mL/Hr) IV Continuous <Continuous>  multivitamin/minerals/iron Oral Solution (CENTRUM) 15 milliLiter(s) Oral daily  mupirocin 2% Ointment 1 Application(s) Topical every 12 hours  piperacillin/tazobactam IVPB.. 3.375 Gram(s) IV Intermittent every 8 hours  riluzole 50 milliGRAM(s) Oral two times a day  sertraline 75 milliGRAM(s) Oral daily    MEDICATIONS  (PRN):  acetaminophen     Tablet .. 650 milliGRAM(s) Oral every 6 hours PRN Temp greater or equal to 38C (100.4F), Mild Pain (1 - 3)  artificial tears (preservative free) Ophthalmic Solution 1 Drop(s) Both EYES every 6 hours PRN Dry Eyes  sodium chloride 3%  Inhalation 4 milliLiter(s) Inhalation every 12 hours PRN for congestion      LABS:                        12.3   11.63 )-----------( 357      ( 10 Jul 2024 03:04 )             39.5     07-10    141  |  103  |  15  ----------------------------<  182<H>  3.7   |  24  |  <0.20<L>    Ca    9.2      10 Jul 2024 03:04  Phos  2.8     07-10  Mg     2.00     07-10      PT/INR - ( 09 Jul 2024 05:30 )   PT: 13.3 sec;   INR: 1.18 ratio         PTT - ( 09 Jul 2024 05:30 )  PTT:31.2 sec  Urinalysis Basic - ( 10 Jul 2024 03:04 )    Color: x / Appearance: x / SG: x / pH: x  Gluc: 182 mg/dL / Ketone: x  / Bili: x / Urobili: x   Blood: x / Protein: x / Nitrite: x   Leuk Esterase: x / RBC: x / WBC x   Sq Epi: x / Non Sq Epi: x / Bacteria: x        Venous Blood Gas:  07-10 @ 03:04  7.45/40/107/28/99.9  VBG Lactate: 1.8      PAST MEDICAL & SURGICAL HISTORY:  ALS (amyotrophic lateral sclerosis)      HLD (hyperlipidemia)      Ventilator dependent  Since 2015      Aspiration into airway      S/P gastrostomy  10/14 for dysphagia  receives jevity 2 cans TID      Dependence on tracheostomy      Encounter for PEG (percutaneous endoscopic gastrostomy)  peg placed          FAMILY HISTORY:  No pertinent family history in first degree relatives        Social History:      RADIOLOGY:  [ ] Reviewed and interpreted by me    PULMONARY FUNCTION TESTS:    EKG:

## 2024-07-11 LAB
ANION GAP SERPL CALC-SCNC: 12 MMOL/L — SIGNIFICANT CHANGE UP (ref 7–14)
BASOPHILS # BLD AUTO: 0.04 K/UL — SIGNIFICANT CHANGE UP (ref 0–0.2)
BASOPHILS NFR BLD AUTO: 0.3 % — SIGNIFICANT CHANGE UP (ref 0–2)
BUN SERPL-MCNC: 15 MG/DL — SIGNIFICANT CHANGE UP (ref 7–23)
CALCIUM SERPL-MCNC: 9.3 MG/DL — SIGNIFICANT CHANGE UP (ref 8.4–10.5)
CHLORIDE SERPL-SCNC: 100 MMOL/L — SIGNIFICANT CHANGE UP (ref 98–107)
CO2 SERPL-SCNC: 25 MMOL/L — SIGNIFICANT CHANGE UP (ref 22–31)
CREAT SERPL-MCNC: <0.2 MG/DL — LOW (ref 0.5–1.3)
CULTURE RESULTS: SIGNIFICANT CHANGE UP
CULTURE RESULTS: SIGNIFICANT CHANGE UP
EGFR: 126 ML/MIN/1.73M2 — SIGNIFICANT CHANGE UP
EOSINOPHIL # BLD AUTO: 0.39 K/UL — SIGNIFICANT CHANGE UP (ref 0–0.5)
EOSINOPHIL NFR BLD AUTO: 3.1 % — SIGNIFICANT CHANGE UP (ref 0–6)
GLUCOSE SERPL-MCNC: 147 MG/DL — HIGH (ref 70–99)
HCT VFR BLD CALC: 38.4 % — SIGNIFICANT CHANGE UP (ref 34.5–45)
HGB BLD-MCNC: 12.1 G/DL — SIGNIFICANT CHANGE UP (ref 11.5–15.5)
IANC: 9.09 K/UL — HIGH (ref 1.8–7.4)
IMM GRANULOCYTES NFR BLD AUTO: 0.9 % — SIGNIFICANT CHANGE UP (ref 0–0.9)
LYMPHOCYTES # BLD AUTO: 1.97 K/UL — SIGNIFICANT CHANGE UP (ref 1–3.3)
LYMPHOCYTES # BLD AUTO: 15.5 % — SIGNIFICANT CHANGE UP (ref 13–44)
MAGNESIUM SERPL-MCNC: 2 MG/DL — SIGNIFICANT CHANGE UP (ref 1.6–2.6)
MCHC RBC-ENTMCNC: 27 PG — SIGNIFICANT CHANGE UP (ref 27–34)
MCHC RBC-ENTMCNC: 31.5 GM/DL — LOW (ref 32–36)
MCV RBC AUTO: 85.7 FL — SIGNIFICANT CHANGE UP (ref 80–100)
MONOCYTES # BLD AUTO: 1.07 K/UL — HIGH (ref 0–0.9)
MONOCYTES NFR BLD AUTO: 8.4 % — SIGNIFICANT CHANGE UP (ref 2–14)
NEUTROPHILS # BLD AUTO: 9.09 K/UL — HIGH (ref 1.8–7.4)
NEUTROPHILS NFR BLD AUTO: 71.8 % — SIGNIFICANT CHANGE UP (ref 43–77)
NRBC # BLD: 0 /100 WBCS — SIGNIFICANT CHANGE UP (ref 0–0)
NRBC # FLD: 0 K/UL — SIGNIFICANT CHANGE UP (ref 0–0)
PHOSPHATE SERPL-MCNC: 3.1 MG/DL — SIGNIFICANT CHANGE UP (ref 2.5–4.5)
PLATELET # BLD AUTO: 368 K/UL — SIGNIFICANT CHANGE UP (ref 150–400)
POTASSIUM SERPL-MCNC: 4.1 MMOL/L — SIGNIFICANT CHANGE UP (ref 3.5–5.3)
POTASSIUM SERPL-SCNC: 4.1 MMOL/L — SIGNIFICANT CHANGE UP (ref 3.5–5.3)
RBC # BLD: 4.48 M/UL — SIGNIFICANT CHANGE UP (ref 3.8–5.2)
RBC # FLD: 16.4 % — HIGH (ref 10.3–14.5)
SODIUM SERPL-SCNC: 137 MMOL/L — SIGNIFICANT CHANGE UP (ref 135–145)
SPECIMEN SOURCE: SIGNIFICANT CHANGE UP
SPECIMEN SOURCE: SIGNIFICANT CHANGE UP
WBC # BLD: 12.67 K/UL — HIGH (ref 3.8–10.5)
WBC # FLD AUTO: 12.67 K/UL — HIGH (ref 3.8–10.5)

## 2024-07-11 PROCEDURE — 99233 SBSQ HOSP IP/OBS HIGH 50: CPT | Mod: FS

## 2024-07-11 RX ORDER — DIAZEPAM 10 MG
2 TABLET ORAL EVERY 12 HOURS
Refills: 0 | Status: DISCONTINUED | OUTPATIENT
Start: 2024-07-11 | End: 2024-07-18

## 2024-07-11 RX ORDER — POVIDONE, PROPYLENE GLYCOL 6.8; 3 MG/ML; MG/ML
1 LIQUID OPHTHALMIC AT BEDTIME
Refills: 0 | Status: DISCONTINUED | OUTPATIENT
Start: 2024-07-11 | End: 2024-07-25

## 2024-07-11 RX ADMIN — PIPERACILLIN SODIUM AND TAZOBACTAM SODIUM 25 GRAM(S): 3; .375 INJECTION, POWDER, FOR SOLUTION INTRAVENOUS at 07:52

## 2024-07-11 RX ADMIN — POVIDONE, PROPYLENE GLYCOL 1 APPLICATION(S): 6.8; 3 LIQUID OPHTHALMIC at 23:56

## 2024-07-11 RX ADMIN — PIPERACILLIN SODIUM AND TAZOBACTAM SODIUM 25 GRAM(S): 3; .375 INJECTION, POWDER, FOR SOLUTION INTRAVENOUS at 23:51

## 2024-07-11 RX ADMIN — ACETAMINOPHEN 1000 MILLIGRAM(S): 325 TABLET ORAL at 00:54

## 2024-07-11 RX ADMIN — POVIDONE, PROPYLENE GLYCOL 1 DROP(S): 6.8; 3 LIQUID OPHTHALMIC at 11:12

## 2024-07-11 RX ADMIN — CHLORHEXIDINE GLUCONATE 15 MILLILITER(S): 40 SOLUTION TOPICAL at 17:21

## 2024-07-11 RX ADMIN — POVIDONE, PROPYLENE GLYCOL 1 DROP(S): 6.8; 3 LIQUID OPHTHALMIC at 00:29

## 2024-07-11 RX ADMIN — PIPERACILLIN SODIUM AND TAZOBACTAM SODIUM 25 GRAM(S): 3; .375 INJECTION, POWDER, FOR SOLUTION INTRAVENOUS at 16:09

## 2024-07-11 RX ADMIN — POVIDONE, PROPYLENE GLYCOL 1 DROP(S): 6.8; 3 LIQUID OPHTHALMIC at 05:13

## 2024-07-11 RX ADMIN — SERTRALINE HYDROCHLORIDE 75 MILLIGRAM(S): 50 TABLET, FILM COATED ORAL at 11:10

## 2024-07-11 RX ADMIN — Medication 2 MILLIGRAM(S): at 05:12

## 2024-07-11 RX ADMIN — Medication 2 MILLIGRAM(S): at 17:21

## 2024-07-11 RX ADMIN — Medication 1 APPLICATION(S): at 05:13

## 2024-07-11 RX ADMIN — POVIDONE, PROPYLENE GLYCOL 1 DROP(S): 6.8; 3 LIQUID OPHTHALMIC at 17:29

## 2024-07-11 RX ADMIN — ENOXAPARIN SODIUM 40 MILLIGRAM(S): 100 INJECTION SUBCUTANEOUS at 05:13

## 2024-07-11 RX ADMIN — Medication 15 MILLILITER(S): at 11:10

## 2024-07-11 RX ADMIN — CHLORHEXIDINE GLUCONATE 1 APPLICATION(S): 40 SOLUTION TOPICAL at 11:11

## 2024-07-11 RX ADMIN — RILUZOLE 50 MILLIGRAM(S): 5 LIQUID ORAL at 17:21

## 2024-07-11 RX ADMIN — ACETAMINOPHEN 400 MILLIGRAM(S): 325 TABLET ORAL at 00:22

## 2024-07-11 RX ADMIN — RILUZOLE 50 MILLIGRAM(S): 5 LIQUID ORAL at 05:13

## 2024-07-11 RX ADMIN — POVIDONE, PROPYLENE GLYCOL 1 DROP(S): 6.8; 3 LIQUID OPHTHALMIC at 23:52

## 2024-07-11 RX ADMIN — PIPERACILLIN SODIUM AND TAZOBACTAM SODIUM 25 GRAM(S): 3; .375 INJECTION, POWDER, FOR SOLUTION INTRAVENOUS at 00:32

## 2024-07-11 RX ADMIN — CHLORHEXIDINE GLUCONATE 15 MILLILITER(S): 40 SOLUTION TOPICAL at 05:13

## 2024-07-11 NOTE — PROGRESS NOTE ADULT - SUBJECTIVE AND OBJECTIVE BOX
INTERVAL EVENTS: PJ HANEY: Seen by bedside during AM rounds     OBJECTIVE:  ICU Vital Signs Last 24 Hrs  T(C): 36.1 (11 Jul 2024 04:00), Max: 36.9 (10 Jul 2024 08:00)  T(F): 97 (11 Jul 2024 04:00), Max: 98.4 (10 Jul 2024 08:00)  HR: 104 (11 Jul 2024 04:00) (80 - 106)  BP: 112/80 (11 Jul 2024 04:00) (112/80 - 145/80)  BP(mean): 91 (11 Jul 2024 04:00) (88 - 95)  ABP: --  ABP(mean): --  RR: 16 (11 Jul 2024 04:00) (16 - 16)  SpO2: 99% (11 Jul 2024 04:00) (98% - 100%)    O2 Parameters below as of 11 Jul 2024 04:00  Patient On (Oxygen Delivery Method): ventilator    O2 Concentration (%): 30      Mode: AC/ CMV (Assist Control/ Continuous Mandatory Ventilation), RR (machine): 16, TV (machine): 400, FiO2: 30, PEEP: 5, ITime: 0.78, MAP: 9, PIP: 28    07-09 @ 07:01  -  07-10 @ 07:00  --------------------------------------------------------  IN: 1200 mL / OUT: 1400 mL / NET: -200 mL    07-10 @ 07:01 - 07-11 @ 06:58  --------------------------------------------------------  IN: 1219 mL / OUT: 1000 mL / NET: 219 mL      CAPILLARY BLOOD GLUCOSE          PHYSICAL EXAM:  General:   HEENT:   Lymph Nodes:  Neck:   Respiratory:   Cardiovascular:   Abdomen:   Extremities:   Skin:   Neurological:  Psychiatry:    Mode: AC/ CMV (Assist Control/ Continuous Mandatory Ventilation)  RR (machine): 16  TV (machine): 400  FiO2: 30  PEEP: 5  ITime: 0.78  MAP: 9  PIP: 28      HOSPITAL MEDICATIONS:  MEDICATIONS  (STANDING):  artificial  tears Solution 1 Drop(s) Both EYES every 6 hours  chlorhexidine 0.12% Liquid 15 milliLiter(s) Oral Mucosa every 12 hours  chlorhexidine 2% Cloths 1 Application(s) Topical daily  diazepam    Tablet 2 milliGRAM(s) Oral every 12 hours  enoxaparin Injectable 40 milliGRAM(s) SubCutaneous every 24 hours  lactated ringers. 1000 milliLiter(s) (100 mL/Hr) IV Continuous <Continuous>  multivitamin/minerals/iron Oral Solution (CENTRUM) 15 milliLiter(s) Oral daily  piperacillin/tazobactam IVPB.. 3.375 Gram(s) IV Intermittent every 8 hours  riluzole 50 milliGRAM(s) Oral two times a day  sertraline 75 milliGRAM(s) Oral daily    MEDICATIONS  (PRN):  acetaminophen   Oral Liquid .. 650 milliGRAM(s) Oral every 6 hours PRN Temp greater or equal to 38C (100.4F), Mild Pain (1 - 3)  artificial tears (preservative free) Ophthalmic Solution 1 Drop(s) Both EYES every 6 hours PRN Dry Eyes  sodium chloride 3%  Inhalation 4 milliLiter(s) Inhalation every 12 hours PRN for congestion      LABS:                        12.3   11.63 )-----------( 357      ( 10 Jul 2024 03:04 )             39.5     07-10    141  |  103  |  15  ----------------------------<  182<H>  3.7   |  24  |  <0.20<L>    Ca    9.2      10 Jul 2024 03:04  Phos  2.8     07-10  Mg     2.00     07-10        Urinalysis Basic - ( 10 Jul 2024 03:04 )    Color: x / Appearance: x / SG: x / pH: x  Gluc: 182 mg/dL / Ketone: x  / Bili: x / Urobili: x   Blood: x / Protein: x / Nitrite: x   Leuk Esterase: x / RBC: x / WBC x   Sq Epi: x / Non Sq Epi: x / Bacteria: x        Venous Blood Gas:  07-10 @ 03:04  7.45/40/107/28/99.9  VBG Lactate: 1.8   INTERVAL EVENTS: PJ HANEY: Seen by bedside during AM rounds     OBJECTIVE:  ICU Vital Signs Last 24 Hrs  T(C): 36.1 (11 Jul 2024 04:00), Max: 36.9 (10 Jul 2024 08:00)  T(F): 97 (11 Jul 2024 04:00), Max: 98.4 (10 Jul 2024 08:00)  HR: 104 (11 Jul 2024 04:00) (80 - 106)  BP: 112/80 (11 Jul 2024 04:00) (112/80 - 145/80)  BP(mean): 91 (11 Jul 2024 04:00) (88 - 95)  ABP: --  ABP(mean): --  RR: 16 (11 Jul 2024 04:00) (16 - 16)  SpO2: 99% (11 Jul 2024 04:00) (98% - 100%)    O2 Parameters below as of 11 Jul 2024 04:00  Patient On (Oxygen Delivery Method): ventilator    O2 Concentration (%): 30      Mode: AC/ CMV (Assist Control/ Continuous Mandatory Ventilation), RR (machine): 16, TV (machine): 400, FiO2: 30, PEEP: 5, ITime: 0.78, MAP: 9, PIP: 28    07-09 @ 07:01  -  07-10 @ 07:00  --------------------------------------------------------  IN: 1200 mL / OUT: 1400 mL / NET: -200 mL    07-10 @ 07:01 - 07-11 @ 06:58  --------------------------------------------------------  IN: 1219 mL / OUT: 1000 mL / NET: 219 mL      CAPILLARY BLOOD GLUCOSE          PHYSICAL EXAM:  General: NAD, laying in bed with eyes open  Neck: (+) Trach tube noted, site c/d/i.  Cards: S1/S2, no murmurs   Pulm: rhonchorous b/l, right worse than left. No resp distress. On AC Mode ventilation. VTs 390-400 with pPeaks 24-26  Abdomen: Abd slightly distended. Soft, NT. (+) PEG noted, site c/d/i.   Extremities: Extremities cool to touch. Intact distal pulses.   Neurology: Awake/eyes open. Nonverbal. Not following commands. Not tracking. Not withdrawing to pain. Extremities nonrigid.   Skin: warm to touch, color appropriate for ethnicity. Refer to RN assessment for further details.    Mode: AC/ CMV (Assist Control/ Continuous Mandatory Ventilation)  RR (machine): 16  TV (machine): 400  FiO2: 30  PEEP: 5  ITime: 0.78  MAP: 9  PIP: 28      HOSPITAL MEDICATIONS:  MEDICATIONS  (STANDING):  artificial  tears Solution 1 Drop(s) Both EYES every 6 hours  chlorhexidine 0.12% Liquid 15 milliLiter(s) Oral Mucosa every 12 hours  chlorhexidine 2% Cloths 1 Application(s) Topical daily  diazepam    Tablet 2 milliGRAM(s) Oral every 12 hours  enoxaparin Injectable 40 milliGRAM(s) SubCutaneous every 24 hours  lactated ringers. 1000 milliLiter(s) (100 mL/Hr) IV Continuous <Continuous>  multivitamin/minerals/iron Oral Solution (CENTRUM) 15 milliLiter(s) Oral daily  piperacillin/tazobactam IVPB.. 3.375 Gram(s) IV Intermittent every 8 hours  riluzole 50 milliGRAM(s) Oral two times a day  sertraline 75 milliGRAM(s) Oral daily    MEDICATIONS  (PRN):  acetaminophen   Oral Liquid .. 650 milliGRAM(s) Oral every 6 hours PRN Temp greater or equal to 38C (100.4F), Mild Pain (1 - 3)  artificial tears (preservative free) Ophthalmic Solution 1 Drop(s) Both EYES every 6 hours PRN Dry Eyes  sodium chloride 3%  Inhalation 4 milliLiter(s) Inhalation every 12 hours PRN for congestion      LABS:                        12.3   11.63 )-----------( 357      ( 10 Jul 2024 03:04 )             39.5     07-10    141  |  103  |  15  ----------------------------<  182<H>  3.7   |  24  |  <0.20<L>    Ca    9.2      10 Jul 2024 03:04  Phos  2.8     07-10  Mg     2.00     07-10        Urinalysis Basic - ( 10 Jul 2024 03:04 )    Color: x / Appearance: x / SG: x / pH: x  Gluc: 182 mg/dL / Ketone: x  / Bili: x / Urobili: x   Blood: x / Protein: x / Nitrite: x   Leuk Esterase: x / RBC: x / WBC x   Sq Epi: x / Non Sq Epi: x / Bacteria: x        Venous Blood Gas:  07-10 @ 03:04  7.45/40/107/28/99.9  VBG Lactate: 1.8

## 2024-07-11 NOTE — PROGRESS NOTE ADULT - ASSESSMENT
ASSESSMENT AND PLAN:   67 YO Female with PMHx of ALS, Parkinson,, nonverbal, bed bound, chronic respiratory failure with tracheostomy and vent dependence, and oropharyngeal dysphagia with PEG who presented from home with tracheostomy leak. She was noted with vent alarm and poor TVe (250-290) at home. Trach changed at home with no improvement and sent in for thoracic evaluation. While in MICU no air leak noted with hyperinflated cuff. Transferred to [facility] on 7/5    NEUROLOGY  # ALS   - Baseline nonverbal, awake, and communicates with eye movement.   - Continue on home rilutek 50mg BID     PSYCH   # Anxiety and Depression   - Continue on home valium 2mg BID   - Continue on zoloft 75mg QD while in house (on 4cc/ 80mg QD at home)     CARDIOVASCULAR  - No acute issues   - Monitor HR and BP     RESPIRATORY  # Tracheostomy leak   - At home noted with TVe 200-300s despite tracheostomy change to 80XLTCD.   - No airleak noted in house with hyperinflated cuff likely tracheomegaly?   - Continue on vent 16/400/5/21   - Continue HTS PRN   - Thoracic was planning on taking pt to OR for trach revision/upsizing 7/8 then 7/9 but due to 9XLT trach on back order, plan for sometime in the future (potentially 7/15 or so)     GI  # Dysphagia   - Continue on PEG-TF   - Monitor BMs   - Abdomen mildly distended & monitoring for now     RENAL  - No acute issues   - Monitor renal function and UOP     #Hyperlactatemia  - Lactate 3.5 on admission, downtrended , then back up to 4.4   - S/P 1L LR with improvement in lactate (7/6 & 7/7)    INFECTIOUS DISEASE  # Leukocytosis   - No fever or chills and noted prior leukocytosis   - CXR with prior atelectasis and no acute findings   - Uptrending Lactate so UA sent returning with POSITIVE Nitrites (7/6) but given no fever and improving leukocytosis will hold of ABX   - SCx (7/5) grew pansensitive PSA  - BCx (7/6) x 2 NGTD, UCx with coag neg staph (susceptibilities not performed)   - MRSA PCR (7/6) POSITIVE for both MRSA/MSSA - Start Bactroban (7/6 - 7/10)  - Given uptrending WBC count, start of thick yellow secretions, and known positive UA, will start empiric Zosyn (7/8 - ) for UTI (+/-) possible trachitiis.     HEME  - On Xarelto 10mg at home likely for DVT PPX?   - Hold home Xarelto in prep for OR   - DVT PPX with LVX for now     ENDOCRINE  - No hx of DM2   - Monitor BG     SKIN  - Basic trach and PEG care ordered     ETHICS/ GOC    - FULL CODE   - Dr Tyson Velasquez,  involved in care    DISPO - Back home with  when able.

## 2024-07-11 NOTE — PROGRESS NOTE ADULT - NS ATTEND AMEND GEN_ALL_CORE FT
67 yo F w/ PMH of ALS, chronic respiratory failure w/ tracheostomy and vent dependence, oropharyngeal dysphagia w/ PEG, Parkinson's disease, non verbal at baseline presents w/ tracheostomy air leak.   w/ set volume at 400. Tracheostomy changed at home with no improvement and so presented for further evaluation.     - baseline mental status, on home regimen: Rilutek 50 mg bid, Valium 2 mg bid, Zolof 75 mg daily   - On VC//16/+5/30%, RR 16   - ABG today without significant hypercapnea despite air leak  - Thoracic surgery to take patient to OR for trach revision/upsizing. Procedure delayed due to 9XLT trach on back order, (potentially rescheduled to7/15)  - continue tube feeds via PEG   - leukocytosis, tx for UTI and possible tracheitis. On Zosyn 7/8 -   - sputum cx pansensitive pseudomonas.

## 2024-07-12 LAB
ANION GAP SERPL CALC-SCNC: 15 MMOL/L — HIGH (ref 7–14)
BASOPHILS # BLD AUTO: 0.03 K/UL — SIGNIFICANT CHANGE UP (ref 0–0.2)
BASOPHILS NFR BLD AUTO: 0.3 % — SIGNIFICANT CHANGE UP (ref 0–2)
BUN SERPL-MCNC: 16 MG/DL — SIGNIFICANT CHANGE UP (ref 7–23)
CALCIUM SERPL-MCNC: 9.4 MG/DL — SIGNIFICANT CHANGE UP (ref 8.4–10.5)
CHLORIDE SERPL-SCNC: 102 MMOL/L — SIGNIFICANT CHANGE UP (ref 98–107)
CO2 SERPL-SCNC: 23 MMOL/L — SIGNIFICANT CHANGE UP (ref 22–31)
CREAT SERPL-MCNC: <0.2 MG/DL — LOW (ref 0.5–1.3)
EGFR: 126 ML/MIN/1.73M2 — SIGNIFICANT CHANGE UP
EOSINOPHIL # BLD AUTO: 0.31 K/UL — SIGNIFICANT CHANGE UP (ref 0–0.5)
EOSINOPHIL NFR BLD AUTO: 2.9 % — SIGNIFICANT CHANGE UP (ref 0–6)
GLUCOSE SERPL-MCNC: 157 MG/DL — HIGH (ref 70–99)
HCT VFR BLD CALC: 39.8 % — SIGNIFICANT CHANGE UP (ref 34.5–45)
HGB BLD-MCNC: 12.4 G/DL — SIGNIFICANT CHANGE UP (ref 11.5–15.5)
IANC: 7.93 K/UL — HIGH (ref 1.8–7.4)
IMM GRANULOCYTES NFR BLD AUTO: 0.7 % — SIGNIFICANT CHANGE UP (ref 0–0.9)
LYMPHOCYTES # BLD AUTO: 1.65 K/UL — SIGNIFICANT CHANGE UP (ref 1–3.3)
LYMPHOCYTES # BLD AUTO: 15.2 % — SIGNIFICANT CHANGE UP (ref 13–44)
MAGNESIUM SERPL-MCNC: 2.1 MG/DL — SIGNIFICANT CHANGE UP (ref 1.6–2.6)
MCHC RBC-ENTMCNC: 26.7 PG — LOW (ref 27–34)
MCHC RBC-ENTMCNC: 31.2 GM/DL — LOW (ref 32–36)
MCV RBC AUTO: 85.8 FL — SIGNIFICANT CHANGE UP (ref 80–100)
MONOCYTES # BLD AUTO: 0.82 K/UL — SIGNIFICANT CHANGE UP (ref 0–0.9)
MONOCYTES NFR BLD AUTO: 7.6 % — SIGNIFICANT CHANGE UP (ref 2–14)
NEUTROPHILS # BLD AUTO: 7.93 K/UL — HIGH (ref 1.8–7.4)
NEUTROPHILS NFR BLD AUTO: 73.3 % — SIGNIFICANT CHANGE UP (ref 43–77)
NRBC # BLD: 0 /100 WBCS — SIGNIFICANT CHANGE UP (ref 0–0)
NRBC # FLD: 0 K/UL — SIGNIFICANT CHANGE UP (ref 0–0)
PHOSPHATE SERPL-MCNC: 3.6 MG/DL — SIGNIFICANT CHANGE UP (ref 2.5–4.5)
PLATELET # BLD AUTO: 333 K/UL — SIGNIFICANT CHANGE UP (ref 150–400)
POTASSIUM SERPL-MCNC: 4.1 MMOL/L — SIGNIFICANT CHANGE UP (ref 3.5–5.3)
POTASSIUM SERPL-SCNC: 4.1 MMOL/L — SIGNIFICANT CHANGE UP (ref 3.5–5.3)
RBC # BLD: 4.64 M/UL — SIGNIFICANT CHANGE UP (ref 3.8–5.2)
RBC # FLD: 16.2 % — HIGH (ref 10.3–14.5)
SODIUM SERPL-SCNC: 140 MMOL/L — SIGNIFICANT CHANGE UP (ref 135–145)
WBC # BLD: 10.82 K/UL — HIGH (ref 3.8–10.5)
WBC # FLD AUTO: 10.82 K/UL — HIGH (ref 3.8–10.5)

## 2024-07-12 PROCEDURE — 99233 SBSQ HOSP IP/OBS HIGH 50: CPT | Mod: FS

## 2024-07-12 RX ADMIN — Medication 15 MILLILITER(S): at 11:43

## 2024-07-12 RX ADMIN — SERTRALINE HYDROCHLORIDE 75 MILLIGRAM(S): 50 TABLET, FILM COATED ORAL at 11:43

## 2024-07-12 RX ADMIN — POVIDONE, PROPYLENE GLYCOL 1 APPLICATION(S): 6.8; 3 LIQUID OPHTHALMIC at 23:08

## 2024-07-12 RX ADMIN — POVIDONE, PROPYLENE GLYCOL 1 DROP(S): 6.8; 3 LIQUID OPHTHALMIC at 11:41

## 2024-07-12 RX ADMIN — Medication 2 MILLIGRAM(S): at 17:24

## 2024-07-12 RX ADMIN — CHLORHEXIDINE GLUCONATE 15 MILLILITER(S): 40 SOLUTION TOPICAL at 17:23

## 2024-07-12 RX ADMIN — RILUZOLE 50 MILLIGRAM(S): 5 LIQUID ORAL at 17:24

## 2024-07-12 RX ADMIN — POVIDONE, PROPYLENE GLYCOL 1 DROP(S): 6.8; 3 LIQUID OPHTHALMIC at 17:24

## 2024-07-12 RX ADMIN — POVIDONE, PROPYLENE GLYCOL 1 DROP(S): 6.8; 3 LIQUID OPHTHALMIC at 23:08

## 2024-07-12 RX ADMIN — PIPERACILLIN SODIUM AND TAZOBACTAM SODIUM 25 GRAM(S): 3; .375 INJECTION, POWDER, FOR SOLUTION INTRAVENOUS at 15:57

## 2024-07-12 RX ADMIN — RILUZOLE 50 MILLIGRAM(S): 5 LIQUID ORAL at 05:06

## 2024-07-12 RX ADMIN — CHLORHEXIDINE GLUCONATE 15 MILLILITER(S): 40 SOLUTION TOPICAL at 05:06

## 2024-07-12 RX ADMIN — PIPERACILLIN SODIUM AND TAZOBACTAM SODIUM 25 GRAM(S): 3; .375 INJECTION, POWDER, FOR SOLUTION INTRAVENOUS at 23:08

## 2024-07-12 RX ADMIN — Medication 2 MILLIGRAM(S): at 05:06

## 2024-07-12 RX ADMIN — ENOXAPARIN SODIUM 40 MILLIGRAM(S): 100 INJECTION SUBCUTANEOUS at 05:05

## 2024-07-12 RX ADMIN — POVIDONE, PROPYLENE GLYCOL 1 DROP(S): 6.8; 3 LIQUID OPHTHALMIC at 05:06

## 2024-07-12 RX ADMIN — PIPERACILLIN SODIUM AND TAZOBACTAM SODIUM 25 GRAM(S): 3; .375 INJECTION, POWDER, FOR SOLUTION INTRAVENOUS at 07:53

## 2024-07-12 RX ADMIN — CHLORHEXIDINE GLUCONATE 1 APPLICATION(S): 40 SOLUTION TOPICAL at 11:42

## 2024-07-12 NOTE — PROGRESS NOTE ADULT - SUBJECTIVE AND OBJECTIVE BOX
INTERVAL EVENTS: PJ HANEY: Seen by bedside during AM rounds    OBJECTIVE:  ICU Vital Signs Last 24 Hrs  T(C): 36.7 (12 Jul 2024 04:00), Max: 36.9 (11 Jul 2024 20:00)  T(F): 98 (12 Jul 2024 04:00), Max: 98.4 (11 Jul 2024 20:00)  HR: 97 (12 Jul 2024 04:00) (85 - 103)  BP: 115/72 (12 Jul 2024 04:00) (100/62 - 122/73)  BP(mean): 84 (12 Jul 2024 04:00) (82 - 85)  ABP: --  ABP(mean): --  RR: 16 (12 Jul 2024 04:00) (16 - 16)  SpO2: 99% (12 Jul 2024 04:00) (96% - 100%)    O2 Parameters below as of 12 Jul 2024 04:00  Patient On (Oxygen Delivery Method): ventilator    O2 Concentration (%): 30      Mode: AC/ CMV (Assist Control/ Continuous Mandatory Ventilation), RR (machine): 16, TV (machine): 400, FiO2: 30, PEEP: 5, ITime: 0.78, MAP: 9, PIP: 26    07-10 @ 07:01 - 07-11 @ 07:00  --------------------------------------------------------  IN: 1219 mL / OUT: 1000 mL / NET: 219 mL    07-11 @ 07:01 - 07-12 @ 06:37  --------------------------------------------------------  IN: 836 mL / OUT: 1250 mL / NET: -414 mL      CAPILLARY BLOOD GLUCOSE          PHYSICAL EXAM:  General:   HEENT:   Lymph Nodes:  Neck:   Respiratory:   Cardiovascular:   Abdomen:   Extremities:   Skin:   Neurological:  Psychiatry:    Mode: AC/ CMV (Assist Control/ Continuous Mandatory Ventilation)  RR (machine): 16  TV (machine): 400  FiO2: 30  PEEP: 5  ITime: 0.78  MAP: 9  PIP: 26      HOSPITAL MEDICATIONS:  MEDICATIONS  (STANDING):  artificial  tears Solution 1 Drop(s) Both EYES every 6 hours  chlorhexidine 0.12% Liquid 15 milliLiter(s) Oral Mucosa every 12 hours  chlorhexidine 2% Cloths 1 Application(s) Topical daily  diazepam    Tablet 2 milliGRAM(s) Oral every 12 hours  enoxaparin Injectable 40 milliGRAM(s) SubCutaneous every 24 hours  lactated ringers. 1000 milliLiter(s) (100 mL/Hr) IV Continuous <Continuous>  multivitamin/minerals/iron Oral Solution (CENTRUM) 15 milliLiter(s) Oral daily  petrolatum Ophthalmic Ointment 1 Application(s) Both EYES at bedtime  piperacillin/tazobactam IVPB.. 3.375 Gram(s) IV Intermittent every 8 hours  riluzole 50 milliGRAM(s) Oral two times a day  sertraline 75 milliGRAM(s) Oral daily    MEDICATIONS  (PRN):  acetaminophen   Oral Liquid .. 650 milliGRAM(s) Oral every 6 hours PRN Temp greater or equal to 38C (100.4F), Mild Pain (1 - 3)  sodium chloride 3%  Inhalation 4 milliLiter(s) Inhalation every 12 hours PRN for congestion      LABS:                        12.4   10.82 )-----------( 333      ( 12 Jul 2024 03:18 )             39.8     07-12    140  |  102  |  16  ----------------------------<  157<H>  4.1   |  23  |  <0.20<L>    Ca    9.4      12 Jul 2024 03:18  Phos  3.6     07-12  Mg     2.10     07-12        Urinalysis Basic - ( 12 Jul 2024 03:18 )    Color: x / Appearance: x / SG: x / pH: x  Gluc: 157 mg/dL / Ketone: x  / Bili: x / Urobili: x   Blood: x / Protein: x / Nitrite: x   Leuk Esterase: x / RBC: x / WBC x   Sq Epi: x / Non Sq Epi: x / Bacteria: x         INTERVAL EVENTS: PJ HANEY: Seen by bedside during AM rounds    OBJECTIVE:  ICU Vital Signs Last 24 Hrs  T(C): 36.7 (12 Jul 2024 04:00), Max: 36.9 (11 Jul 2024 20:00)  T(F): 98 (12 Jul 2024 04:00), Max: 98.4 (11 Jul 2024 20:00)  HR: 97 (12 Jul 2024 04:00) (85 - 103)  BP: 115/72 (12 Jul 2024 04:00) (100/62 - 122/73)  BP(mean): 84 (12 Jul 2024 04:00) (82 - 85)  ABP: --  ABP(mean): --  RR: 16 (12 Jul 2024 04:00) (16 - 16)  SpO2: 99% (12 Jul 2024 04:00) (96% - 100%)    O2 Parameters below as of 12 Jul 2024 04:00  Patient On (Oxygen Delivery Method): ventilator    O2 Concentration (%): 30      Mode: AC/ CMV (Assist Control/ Continuous Mandatory Ventilation), RR (machine): 16, TV (machine): 400, FiO2: 30, PEEP: 5, ITime: 0.78, MAP: 9, PIP: 26    07-10 @ 07:01 - 07-11 @ 07:00  --------------------------------------------------------  IN: 1219 mL / OUT: 1000 mL / NET: 219 mL    07-11 @ 07:01 - 07-12 @ 06:37  --------------------------------------------------------  IN: 836 mL / OUT: 1250 mL / NET: -414 mL      CAPILLARY BLOOD GLUCOSE    PHYSICAL EXAM:  General: NAD, laying in bed with eyes open  Neck: (+) Trach tube noted, site c/d/i.  Cards: S1/S2, no murmurs   Pulm: rhonchorous b/l, right worse than left. No resp distress. On AC Mode ventilation. VTs 390-400 with pPeaks 24-26  Abdomen: Abd slightly distended. Soft, NT. (+) PEG noted, site c/d/i.   Extremities: Extremities cool to touch. Intact distal pulses.   Neurology: Awake/eyes open. Nonverbal. Not following commands. Not tracking. Not withdrawing to pain. Extremities nonrigid.   Skin: warm to touch, color appropriate for ethnicity. Refer to RN assessment for further details.        Mode: AC/ CMV (Assist Control/ Continuous Mandatory Ventilation)  RR (machine): 16  TV (machine): 400  FiO2: 30  PEEP: 5  ITime: 0.78  MAP: 9  PIP: 26      HOSPITAL MEDICATIONS:  MEDICATIONS  (STANDING):  artificial  tears Solution 1 Drop(s) Both EYES every 6 hours  chlorhexidine 0.12% Liquid 15 milliLiter(s) Oral Mucosa every 12 hours  chlorhexidine 2% Cloths 1 Application(s) Topical daily  diazepam    Tablet 2 milliGRAM(s) Oral every 12 hours  enoxaparin Injectable 40 milliGRAM(s) SubCutaneous every 24 hours  lactated ringers. 1000 milliLiter(s) (100 mL/Hr) IV Continuous <Continuous>  multivitamin/minerals/iron Oral Solution (CENTRUM) 15 milliLiter(s) Oral daily  petrolatum Ophthalmic Ointment 1 Application(s) Both EYES at bedtime  piperacillin/tazobactam IVPB.. 3.375 Gram(s) IV Intermittent every 8 hours  riluzole 50 milliGRAM(s) Oral two times a day  sertraline 75 milliGRAM(s) Oral daily    MEDICATIONS  (PRN):  acetaminophen   Oral Liquid .. 650 milliGRAM(s) Oral every 6 hours PRN Temp greater or equal to 38C (100.4F), Mild Pain (1 - 3)  sodium chloride 3%  Inhalation 4 milliLiter(s) Inhalation every 12 hours PRN for congestion      LABS:                        12.4   10.82 )-----------( 333      ( 12 Jul 2024 03:18 )             39.8     07-12    140  |  102  |  16  ----------------------------<  157<H>  4.1   |  23  |  <0.20<L>    Ca    9.4      12 Jul 2024 03:18  Phos  3.6     07-12  Mg     2.10     07-12        Urinalysis Basic - ( 12 Jul 2024 03:18 )    Color: x / Appearance: x / SG: x / pH: x  Gluc: 157 mg/dL / Ketone: x  / Bili: x / Urobili: x   Blood: x / Protein: x / Nitrite: x   Leuk Esterase: x / RBC: x / WBC x   Sq Epi: x / Non Sq Epi: x / Bacteria: x

## 2024-07-12 NOTE — PROGRESS NOTE ADULT - NS ATTEND AMEND GEN_ALL_CORE FT
67 yo F w/ PMH of ALS, chronic respiratory failure w/ tracheostomy and vent dependence, oropharyngeal dysphagia w/ PEG, Parkinson's disease, non verbal at baseline presents w/ tracheostomy air leak.   w/ set volume at 400. Tracheostomy changed at home with no improvement and so presented for further evaluation.     - baseline mental status, on home regimen: Rilutek 50 mg bid, Valium 2 mg bid, Zolof 75 mg daily   - On VC//16/+5/30%, RR 16   - ABG  without significant hypercapnea despite air leak  - Thoracic surgery to take patient to OR for trach revision/upsizing. Procedure delayed due to 9XLT trach on back order, (potentially rescheduled to7/15)  - continue tube feeds via PEG   - leukocytosis, tx for UTI and possible tracheitis. On Zosyn 7/8 -   - sputum cx pansensitive pseudomonas.

## 2024-07-13 LAB
ANION GAP SERPL CALC-SCNC: 15 MMOL/L — HIGH (ref 7–14)
BASOPHILS # BLD AUTO: 0.05 K/UL — SIGNIFICANT CHANGE UP (ref 0–0.2)
BASOPHILS NFR BLD AUTO: 0.4 % — SIGNIFICANT CHANGE UP (ref 0–2)
BUN SERPL-MCNC: 14 MG/DL — SIGNIFICANT CHANGE UP (ref 7–23)
CALCIUM SERPL-MCNC: 9.6 MG/DL — SIGNIFICANT CHANGE UP (ref 8.4–10.5)
CHLORIDE SERPL-SCNC: 100 MMOL/L — SIGNIFICANT CHANGE UP (ref 98–107)
CO2 SERPL-SCNC: 23 MMOL/L — SIGNIFICANT CHANGE UP (ref 22–31)
CREAT SERPL-MCNC: <0.2 MG/DL — LOW (ref 0.5–1.3)
EGFR: 126 ML/MIN/1.73M2 — SIGNIFICANT CHANGE UP
EOSINOPHIL # BLD AUTO: 0.41 K/UL — SIGNIFICANT CHANGE UP (ref 0–0.5)
EOSINOPHIL NFR BLD AUTO: 3.2 % — SIGNIFICANT CHANGE UP (ref 0–6)
GLUCOSE SERPL-MCNC: 118 MG/DL — HIGH (ref 70–99)
HCT VFR BLD CALC: 43.3 % — SIGNIFICANT CHANGE UP (ref 34.5–45)
HGB BLD-MCNC: 13.2 G/DL — SIGNIFICANT CHANGE UP (ref 11.5–15.5)
IANC: 9.72 K/UL — HIGH (ref 1.8–7.4)
IMM GRANULOCYTES NFR BLD AUTO: 1.3 % — HIGH (ref 0–0.9)
LYMPHOCYTES # BLD AUTO: 1.48 K/UL — SIGNIFICANT CHANGE UP (ref 1–3.3)
LYMPHOCYTES # BLD AUTO: 11.7 % — LOW (ref 13–44)
MAGNESIUM SERPL-MCNC: 2.3 MG/DL — SIGNIFICANT CHANGE UP (ref 1.6–2.6)
MCHC RBC-ENTMCNC: 26 PG — LOW (ref 27–34)
MCHC RBC-ENTMCNC: 30.5 GM/DL — LOW (ref 32–36)
MCV RBC AUTO: 85.4 FL — SIGNIFICANT CHANGE UP (ref 80–100)
MONOCYTES # BLD AUTO: 0.86 K/UL — SIGNIFICANT CHANGE UP (ref 0–0.9)
MONOCYTES NFR BLD AUTO: 6.8 % — SIGNIFICANT CHANGE UP (ref 2–14)
NEUTROPHILS # BLD AUTO: 9.72 K/UL — HIGH (ref 1.8–7.4)
NEUTROPHILS NFR BLD AUTO: 76.6 % — SIGNIFICANT CHANGE UP (ref 43–77)
NRBC # BLD: 0 /100 WBCS — SIGNIFICANT CHANGE UP (ref 0–0)
NRBC # FLD: 0 K/UL — SIGNIFICANT CHANGE UP (ref 0–0)
PHOSPHATE SERPL-MCNC: 3.5 MG/DL — SIGNIFICANT CHANGE UP (ref 2.5–4.5)
PLATELET # BLD AUTO: 369 K/UL — SIGNIFICANT CHANGE UP (ref 150–400)
POTASSIUM SERPL-MCNC: 4.1 MMOL/L — SIGNIFICANT CHANGE UP (ref 3.5–5.3)
POTASSIUM SERPL-SCNC: 4.1 MMOL/L — SIGNIFICANT CHANGE UP (ref 3.5–5.3)
RBC # BLD: 5.07 M/UL — SIGNIFICANT CHANGE UP (ref 3.8–5.2)
RBC # FLD: 16.2 % — HIGH (ref 10.3–14.5)
SODIUM SERPL-SCNC: 138 MMOL/L — SIGNIFICANT CHANGE UP (ref 135–145)
WBC # BLD: 12.69 K/UL — HIGH (ref 3.8–10.5)
WBC # FLD AUTO: 12.69 K/UL — HIGH (ref 3.8–10.5)

## 2024-07-13 PROCEDURE — 99233 SBSQ HOSP IP/OBS HIGH 50: CPT | Mod: FS

## 2024-07-13 RX ADMIN — ENOXAPARIN SODIUM 40 MILLIGRAM(S): 100 INJECTION SUBCUTANEOUS at 05:04

## 2024-07-13 RX ADMIN — PIPERACILLIN SODIUM AND TAZOBACTAM SODIUM 25 GRAM(S): 3; .375 INJECTION, POWDER, FOR SOLUTION INTRAVENOUS at 16:13

## 2024-07-13 RX ADMIN — RILUZOLE 50 MILLIGRAM(S): 5 LIQUID ORAL at 05:04

## 2024-07-13 RX ADMIN — PIPERACILLIN SODIUM AND TAZOBACTAM SODIUM 25 GRAM(S): 3; .375 INJECTION, POWDER, FOR SOLUTION INTRAVENOUS at 08:56

## 2024-07-13 RX ADMIN — Medication 2 MILLIGRAM(S): at 05:04

## 2024-07-13 RX ADMIN — SERTRALINE HYDROCHLORIDE 75 MILLIGRAM(S): 50 TABLET, FILM COATED ORAL at 11:22

## 2024-07-13 RX ADMIN — PIPERACILLIN SODIUM AND TAZOBACTAM SODIUM 25 GRAM(S): 3; .375 INJECTION, POWDER, FOR SOLUTION INTRAVENOUS at 23:58

## 2024-07-13 RX ADMIN — POVIDONE, PROPYLENE GLYCOL 1 DROP(S): 6.8; 3 LIQUID OPHTHALMIC at 05:04

## 2024-07-13 RX ADMIN — Medication 2 MILLIGRAM(S): at 18:05

## 2024-07-13 RX ADMIN — CHLORHEXIDINE GLUCONATE 15 MILLILITER(S): 40 SOLUTION TOPICAL at 18:03

## 2024-07-13 RX ADMIN — RILUZOLE 50 MILLIGRAM(S): 5 LIQUID ORAL at 18:03

## 2024-07-13 RX ADMIN — POVIDONE, PROPYLENE GLYCOL 1 DROP(S): 6.8; 3 LIQUID OPHTHALMIC at 11:21

## 2024-07-13 RX ADMIN — POVIDONE, PROPYLENE GLYCOL 1 APPLICATION(S): 6.8; 3 LIQUID OPHTHALMIC at 23:58

## 2024-07-13 RX ADMIN — Medication 15 MILLILITER(S): at 11:23

## 2024-07-13 RX ADMIN — CHLORHEXIDINE GLUCONATE 1 APPLICATION(S): 40 SOLUTION TOPICAL at 11:21

## 2024-07-13 RX ADMIN — CHLORHEXIDINE GLUCONATE 15 MILLILITER(S): 40 SOLUTION TOPICAL at 05:04

## 2024-07-13 RX ADMIN — POVIDONE, PROPYLENE GLYCOL 1 DROP(S): 6.8; 3 LIQUID OPHTHALMIC at 23:58

## 2024-07-13 RX ADMIN — POVIDONE, PROPYLENE GLYCOL 1 DROP(S): 6.8; 3 LIQUID OPHTHALMIC at 18:03

## 2024-07-13 NOTE — PROGRESS NOTE ADULT - ASSESSMENT
ASSESSMENT AND PLAN:   67 YO Female with PMHx of ALS, Parkinson,, nonverbal, bed bound, chronic respiratory failure with tracheostomy and vent dependence, and oropharyngeal dysphagia with PEG who presented from home with tracheostomy leak. She was noted with vent alarm and poor TVe (250-290) at home. Trach changed at home with no improvement and sent in for thoracic evaluation. While in MICU no air leak noted with hyperinflated cuff. Transferred to [facility] on 7/5    NEUROLOGY  # ALS   - Baseline nonverbal, awake, and communicates with eye movement.   - Continue on home rilutek 50mg BID     PSYCH   # Anxiety and Depression   - Continue on home valium 2mg BID   - Continue on zoloft 75mg QD while in house (on 4cc/ 80mg QD at home)     CARDIOVASCULAR  - No acute issues   - Monitor HR and BP     RESPIRATORY  # Tracheostomy leak   - At home noted with TVe 200-300s despite tracheostomy change to 80XLTCD.   - No airleak noted in house with hyperinflated cuff likely tracheomegaly?   - Continue on vent 16/400/5/21   - Continue HTS PRN   - Thoracic was planning on taking pt to OR for trach revision/upsizing 7/8 then 7/9 but due to 9XLT trach on back order, plan for sometime in the future (potentially 7/15 or so)   - Spoke to [facility] on Friday - no trach delivered as of yet     GI  # Dysphagia   - Continue on PEG-TF   - Monitor BMs   - Abdomen mildly distended & monitoring for now     RENAL  - No acute issues   - Monitor renal function and UOP     #Hyperlactatemia  - Lactate 3.5 on admission, downtrended , then back up to 4.4   - S/P 1L LR with improvement in lactate (7/6 & 7/7)    INFECTIOUS DISEASE  # Leukocytosis   - No fever or chills and noted prior leukocytosis   - CXR with prior atelectasis and no acute findings   - Uptrending Lactate so UA sent returning with POSITIVE Nitrites (7/6) but given no fever and improving leukocytosis will hold of ABX   - SCx (7/5) grew pansensitive PSA  - BCx (7/6) x 2 NGTD, UCx with coag neg staph (susceptibilities not performed)   - MRSA PCR (7/6) POSITIVE for both MRSA/MSSA - Start Bactroban (7/6 - 7/10)  - Given uptrending WBC count, start of thick yellow secretions, and known positive UA, will start empiric Zosyn (7/8 - ) for UTI (+/-) possible trachitiis.     HEME  - On Xarelto 10mg at home likely for DVT PPX?   - Hold home Xarelto in prep for OR   - DVT PPX with LVX for now     ENDOCRINE  - No hx of DM2   - Monitor BG     SKIN  - Basic trach and PEG care ordered     ETHICS/ GOC    - FULL CODE   - Dr Tyson Velasquez,  involved in care    DISPO - Back home with  when able.

## 2024-07-13 NOTE — PROGRESS NOTE ADULT - NS ATTEND AMEND GEN_ALL_CORE FT
69 yo F w/ PMH of ALS, chronic respiratory failure w/ tracheostomy and vent dependence, oropharyngeal dysphagia w/ PEG, Parkinson's disease, non verbal at baseline presents w/ tracheostomy air leak. Receiving TV ~250 on set volume at 400. Tracheostomy changed at home with no improvement and so presented for further evaluation.     - baseline mental status, on home regimen: Rilutek 50 mg bid, Valium 2 mg bid, Zoloft 75 mg daily   - On VC//16/+5/30%, RR 16   - labs overall stable. Most recent VBG from 7/10 without significant hypercapnia (7.45/40/107)  - Thoracic surgery to take patient to OR for trach revision/upsizing. Procedure delayed due to 9XLT trach on back order, (potentially rescheduled to7/15)  - continue tube feeds via PEG   - leukocytosis, tx for UTI and possible tracheitis. On Zosyn 7/8 -   - sputum cx pansensitive pseudomonas. 67 yo F w/ PMH of ALS, chronic respiratory failure w/ tracheostomy and vent dependence, oropharyngeal dysphagia w/ PEG, Parkinson's disease, non verbal at baseline presents w/ tracheostomy air leak. Receiving TV ~250 on set volume at 400. Tracheostomy changed at home with no improvement and so presented for further evaluation.     - baseline mental status, on home regimen: Rilutek 50 mg bid, Valium 2 mg bid, Zoloft 75 mg daily   - On VC//16/+5/30%, RR 16   - labs overall stable. Most recent VBG from 7/10 without significant hypercapnia (7.45/40/107)  - Thoracic surgery to take patient to OR for trach revision/upsizing. Procedure delayed due to 9XLT trach on back order, (potentially rescheduled to7/15)  [ ] Tentative plan for thoracic trach revision on Monday, will confirm with thoracic    - continue tube feeds via PEG   - leukocytosis, tx for UTI and possible tracheitis. On Zosyn 7/8 -   - sputum cx pansensitive pseudomonas. 69 yo F w/ PMH of ALS, chronic respiratory failure w/ tracheostomy and vent dependence, oropharyngeal dysphagia w/ PEG, Parkinson's disease, non verbal at baseline presents w/ tracheostomy air leak. Receiving TV ~250 on set volume at 400. Tracheostomy changed at home with no improvement and so presented for further evaluation.     - baseline mental status, on home regimen: Rilutek 50 mg bid, Valium 2 mg bid, Zoloft 75 mg daily   - On VC//16/+5/30%, RR 16; VTe ~200-250s without signs of respiratory distress on exam. Daily evaluation of cuff pressures to reduce risk of tracheal injury  - labs overall stable. Most recent VBG from 7/10 without significant hypercapnia (7.45/40/107)  - Thoracic surgery to take patient to OR for trach revision/upsizing. Procedure delayed due to 9XLT trach on back order, (potentially rescheduled to7/15)  - continue tube feeds via PEG   - leukocytosis, tx for UTI and possible tracheitis. On Zosyn since 7/8. Plan for 5-7 day course.   - sputum cx pansensitive pseudomonas on 7/5

## 2024-07-13 NOTE — PROGRESS NOTE ADULT - SUBJECTIVE AND OBJECTIVE BOX
CHIEF COMPLAINT: Trach exchange     INTERVAL EVENTS: No acute overnight events     ROS: Seen by bedside during AM rounds     OBJECTIVE:  ICU Vital Signs Last 24 Hrs  T(C): 36.9 (13 Jul 2024 04:00), Max: 37.1 (13 Jul 2024 00:00)  T(F): 98.5 (13 Jul 2024 04:00), Max: 98.7 (13 Jul 2024 00:00)  HR: 84 (13 Jul 2024 06:26) (84 - 97)  BP: 154/81 (13 Jul 2024 04:00) (119/70 - 169/80)  BP(mean): --  ABP: --  ABP(mean): --  RR: 16 (13 Jul 2024 04:00) (16 - 16)  SpO2: 99% (13 Jul 2024 06:26) (97% - 99%)    O2 Parameters below as of 13 Jul 2024 04:00  Patient On (Oxygen Delivery Method): ventilator    O2 Concentration (%): 30      Mode: AC/ CMV (Assist Control/ Continuous Mandatory Ventilation), RR (machine): 16, TV (machine): 400, FiO2: 30, PEEP: 5, ITime: 0.64, MAP: 9, PIP: 23    07-11 @ 07:01 - 07-12 @ 07:00  --------------------------------------------------------  IN: 836 mL / OUT: 1250 mL / NET: -414 mL    07-12 @ 07:01 - 07-13 @ 06:54  --------------------------------------------------------  IN: 0 mL / OUT: 600 mL / NET: -600 mL      CAPILLARY BLOOD GLUCOSE          PHYSICAL EXAM:  General:   HEENT:   Lymph Nodes:  Neck:   Respiratory:   Cardiovascular:   Abdomen:   Extremities:   Skin:   Neurological:  Psychiatry:    Mode: AC/ CMV (Assist Control/ Continuous Mandatory Ventilation)  RR (machine): 16  TV (machine): 400  FiO2: 30  PEEP: 5  ITime: 0.64  MAP: 9  PIP: 23      HOSPITAL MEDICATIONS:  MEDICATIONS  (STANDING):  artificial  tears Solution 1 Drop(s) Both EYES every 6 hours  chlorhexidine 0.12% Liquid 15 milliLiter(s) Oral Mucosa every 12 hours  chlorhexidine 2% Cloths 1 Application(s) Topical daily  diazepam    Tablet 2 milliGRAM(s) Oral every 12 hours  enoxaparin Injectable 40 milliGRAM(s) SubCutaneous every 24 hours  lactated ringers. 1000 milliLiter(s) (100 mL/Hr) IV Continuous <Continuous>  multivitamin/minerals/iron Oral Solution (CENTRUM) 15 milliLiter(s) Oral daily  petrolatum Ophthalmic Ointment 1 Application(s) Both EYES at bedtime  piperacillin/tazobactam IVPB.. 3.375 Gram(s) IV Intermittent every 8 hours  riluzole 50 milliGRAM(s) Oral two times a day  sertraline 75 milliGRAM(s) Oral daily    MEDICATIONS  (PRN):  acetaminophen   Oral Liquid .. 650 milliGRAM(s) Oral every 6 hours PRN Temp greater or equal to 38C (100.4F), Mild Pain (1 - 3)  sodium chloride 3%  Inhalation 4 milliLiter(s) Inhalation every 12 hours PRN for congestion      LABS:                        13.2   12.69 )-----------( 369      ( 13 Jul 2024 04:15 )             43.3     07-12    140  |  102  |  16  ----------------------------<  157<H>  4.1   |  23  |  <0.20<L>    Ca    9.4      12 Jul 2024 03:18  Phos  3.6     07-12  Mg     2.10     07-12        Urinalysis Basic - ( 12 Jul 2024 03:18 )    Color: x / Appearance: x / SG: x / pH: x  Gluc: 157 mg/dL / Ketone: x  / Bili: x / Urobili: x   Blood: x / Protein: x / Nitrite: x   Leuk Esterase: x / RBC: x / WBC x   Sq Epi: x / Non Sq Epi: x / Bacteria: x         CHIEF COMPLAINT: Trach exchange     INTERVAL EVENTS: No acute overnight events     ROS: Seen by bedside during AM rounds     OBJECTIVE:  ICU Vital Signs Last 24 Hrs  T(C): 36.9 (13 Jul 2024 04:00), Max: 37.1 (13 Jul 2024 00:00)  T(F): 98.5 (13 Jul 2024 04:00), Max: 98.7 (13 Jul 2024 00:00)  HR: 84 (13 Jul 2024 06:26) (84 - 97)  BP: 154/81 (13 Jul 2024 04:00) (119/70 - 169/80)  BP(mean): --  ABP: --  ABP(mean): --  RR: 16 (13 Jul 2024 04:00) (16 - 16)  SpO2: 99% (13 Jul 2024 06:26) (97% - 99%)    O2 Parameters below as of 13 Jul 2024 04:00  Patient On (Oxygen Delivery Method): ventilator    O2 Concentration (%): 30      Mode: AC/ CMV (Assist Control/ Continuous Mandatory Ventilation), RR (machine): 16, TV (machine): 400, FiO2: 30, PEEP: 5, ITime: 0.64, MAP: 9, PIP: 23    07-11 @ 07:01 - 07-12 @ 07:00  --------------------------------------------------------  IN: 836 mL / OUT: 1250 mL / NET: -414 mL    07-12 @ 07:01 - 07-13 @ 06:54  --------------------------------------------------------  IN: 0 mL / OUT: 600 mL / NET: -600 mL      CAPILLARY BLOOD GLUCOSE    PHYSICAL EXAM:  General: NAD, laying in bed with eyes open  Neck: (+) Trach tube noted, site c/d/i.  Cards: S1/S2, no murmurs   Pulm: rhonchorous b/l, right worse than left. No resp distress. On AC Mode ventilation. VTs 390-400 with pPeaks 24-26  Abdomen: Abd slightly distended. Soft, NT. (+) PEG noted, site c/d/i.   Extremities: Extremities cool to touch. Intact distal pulses.   Neurology: Awake/eyes open. Nonverbal. Not following commands. Not tracking. Not withdrawing to pain. Extremities nonrigid.   Skin: warm to touch, color appropriate for ethnicity. Refer to RN assessment for further details.        Mode: AC/ CMV (Assist Control/ Continuous Mandatory Ventilation)  RR (machine): 16  TV (machine): 400  FiO2: 30  PEEP: 5  ITime: 0.64  MAP: 9  PIP: 23      HOSPITAL MEDICATIONS:  MEDICATIONS  (STANDING):  artificial  tears Solution 1 Drop(s) Both EYES every 6 hours  chlorhexidine 0.12% Liquid 15 milliLiter(s) Oral Mucosa every 12 hours  chlorhexidine 2% Cloths 1 Application(s) Topical daily  diazepam    Tablet 2 milliGRAM(s) Oral every 12 hours  enoxaparin Injectable 40 milliGRAM(s) SubCutaneous every 24 hours  lactated ringers. 1000 milliLiter(s) (100 mL/Hr) IV Continuous <Continuous>  multivitamin/minerals/iron Oral Solution (CENTRUM) 15 milliLiter(s) Oral daily  petrolatum Ophthalmic Ointment 1 Application(s) Both EYES at bedtime  piperacillin/tazobactam IVPB.. 3.375 Gram(s) IV Intermittent every 8 hours  riluzole 50 milliGRAM(s) Oral two times a day  sertraline 75 milliGRAM(s) Oral daily    MEDICATIONS  (PRN):  acetaminophen   Oral Liquid .. 650 milliGRAM(s) Oral every 6 hours PRN Temp greater or equal to 38C (100.4F), Mild Pain (1 - 3)  sodium chloride 3%  Inhalation 4 milliLiter(s) Inhalation every 12 hours PRN for congestion      LABS:                        13.2   12.69 )-----------( 369      ( 13 Jul 2024 04:15 )             43.3     07-12    140  |  102  |  16  ----------------------------<  157<H>  4.1   |  23  |  <0.20<L>    Ca    9.4      12 Jul 2024 03:18  Phos  3.6     07-12  Mg     2.10     07-12        Urinalysis Basic - ( 12 Jul 2024 03:18 )    Color: x / Appearance: x / SG: x / pH: x  Gluc: 157 mg/dL / Ketone: x  / Bili: x / Urobili: x   Blood: x / Protein: x / Nitrite: x   Leuk Esterase: x / RBC: x / WBC x   Sq Epi: x / Non Sq Epi: x / Bacteria: x

## 2024-07-13 NOTE — PROGRESS NOTE ADULT - TIME BILLING
Reviewing the EMR, vitals, imaging, medication list recent labs, prior records, and coordinating care with medical providers. This time excludes procedures and teaching.

## 2024-07-14 PROCEDURE — 99233 SBSQ HOSP IP/OBS HIGH 50: CPT | Mod: FS

## 2024-07-14 RX ORDER — PIPERACILLIN SODIUM AND TAZOBACTAM SODIUM 3; .375 G/15ML; G/15ML
3.38 INJECTION, POWDER, FOR SOLUTION INTRAVENOUS ONCE
Refills: 0 | Status: COMPLETED | OUTPATIENT
Start: 2024-07-15 | End: 2024-07-15

## 2024-07-14 RX ORDER — HYDRALAZINE HCL 50 MG
2.5 TABLET ORAL ONCE
Refills: 0 | Status: COMPLETED | OUTPATIENT
Start: 2024-07-14 | End: 2024-07-14

## 2024-07-14 RX ADMIN — Medication 2 MILLIGRAM(S): at 05:17

## 2024-07-14 RX ADMIN — Medication 2.5 MILLIGRAM(S): at 01:02

## 2024-07-14 RX ADMIN — ENOXAPARIN SODIUM 40 MILLIGRAM(S): 100 INJECTION SUBCUTANEOUS at 05:16

## 2024-07-14 RX ADMIN — POVIDONE, PROPYLENE GLYCOL 1 DROP(S): 6.8; 3 LIQUID OPHTHALMIC at 11:47

## 2024-07-14 RX ADMIN — CHLORHEXIDINE GLUCONATE 15 MILLILITER(S): 40 SOLUTION TOPICAL at 17:05

## 2024-07-14 RX ADMIN — CHLORHEXIDINE GLUCONATE 15 MILLILITER(S): 40 SOLUTION TOPICAL at 05:17

## 2024-07-14 RX ADMIN — POVIDONE, PROPYLENE GLYCOL 1 DROP(S): 6.8; 3 LIQUID OPHTHALMIC at 17:04

## 2024-07-14 RX ADMIN — PIPERACILLIN SODIUM AND TAZOBACTAM SODIUM 25 GRAM(S): 3; .375 INJECTION, POWDER, FOR SOLUTION INTRAVENOUS at 17:04

## 2024-07-14 RX ADMIN — SERTRALINE HYDROCHLORIDE 75 MILLIGRAM(S): 50 TABLET, FILM COATED ORAL at 11:46

## 2024-07-14 RX ADMIN — CHLORHEXIDINE GLUCONATE 1 APPLICATION(S): 40 SOLUTION TOPICAL at 11:46

## 2024-07-14 RX ADMIN — PIPERACILLIN SODIUM AND TAZOBACTAM SODIUM 25 GRAM(S): 3; .375 INJECTION, POWDER, FOR SOLUTION INTRAVENOUS at 08:25

## 2024-07-14 RX ADMIN — RILUZOLE 50 MILLIGRAM(S): 5 LIQUID ORAL at 17:04

## 2024-07-14 RX ADMIN — RILUZOLE 50 MILLIGRAM(S): 5 LIQUID ORAL at 05:17

## 2024-07-14 RX ADMIN — Medication 15 MILLILITER(S): at 11:47

## 2024-07-14 RX ADMIN — POVIDONE, PROPYLENE GLYCOL 1 DROP(S): 6.8; 3 LIQUID OPHTHALMIC at 05:18

## 2024-07-14 RX ADMIN — Medication 2 MILLIGRAM(S): at 17:04

## 2024-07-14 NOTE — PROGRESS NOTE ADULT - SUBJECTIVE AND OBJECTIVE BOX
CHIEF COMPLAINT:Patient is a 68y old  Female who presents with a chief complaint of Other abnormality of breathing     (05 Jul 2024 10:47)      INTERVAL EVENTS:     ROS: Seen by bedside during AM rounds     OBJECTIVE:  ICU Vital Signs Last 24 Hrs  T(C): 36.9 (14 Jul 2024 01:55), Max: 36.9 (13 Jul 2024 16:00)  T(F): 98.5 (14 Jul 2024 01:55), Max: 98.5 (14 Jul 2024 00:00)  HR: 89 (14 Jul 2024 06:56) (84 - 106)  BP: 144/69 (14 Jul 2024 01:55) (134/72 - 174/98)  BP(mean): 97 (13 Jul 2024 16:00) (91 - 97)  ABP: --  ABP(mean): --  RR: 16 (14 Jul 2024 01:55) (16 - 16)  SpO2: 95% (14 Jul 2024 06:56) (95% - 100%)    O2 Parameters below as of 14 Jul 2024 01:55  Patient On (Oxygen Delivery Method): ventilator    O2 Concentration (%): 30      Mode: AC/ CMV (Assist Control/ Continuous Mandatory Ventilation), RR (machine): 16, TV (machine): 400, FiO2: 21, PEEP: 5, ITime: 0.53, MAP: 8, PIP: 25    07-13 @ 07:01  -  07-14 @ 07:00  --------------------------------------------------------  IN: 540 mL / OUT: 600 mL / NET: -60 mL      CAPILLARY BLOOD GLUCOSE          PHYSICAL EXAM:  General:   HEENT:   Lymph Nodes:  Neck:   Respiratory:   Cardiovascular:   Abdomen:   Extremities:   Skin:   Neurological:  Psychiatry:    Mode: AC/ CMV (Assist Control/ Continuous Mandatory Ventilation)  RR (machine): 16  TV (machine): 400  FiO2: 21  PEEP: 5  ITime: 0.53  MAP: 8  PIP: 25      HOSPITAL MEDICATIONS:  MEDICATIONS  (STANDING):  artificial  tears Solution 1 Drop(s) Both EYES every 6 hours  chlorhexidine 0.12% Liquid 15 milliLiter(s) Oral Mucosa every 12 hours  chlorhexidine 2% Cloths 1 Application(s) Topical daily  diazepam    Tablet 2 milliGRAM(s) Oral every 12 hours  enoxaparin Injectable 40 milliGRAM(s) SubCutaneous every 24 hours  lactated ringers. 1000 milliLiter(s) (100 mL/Hr) IV Continuous <Continuous>  multivitamin/minerals/iron Oral Solution (CENTRUM) 15 milliLiter(s) Oral daily  petrolatum Ophthalmic Ointment 1 Application(s) Both EYES at bedtime  piperacillin/tazobactam IVPB.. 3.375 Gram(s) IV Intermittent every 8 hours  riluzole 50 milliGRAM(s) Oral two times a day  sertraline 75 milliGRAM(s) Oral daily    MEDICATIONS  (PRN):  acetaminophen   Oral Liquid .. 650 milliGRAM(s) Oral every 6 hours PRN Temp greater or equal to 38C (100.4F), Mild Pain (1 - 3)  sodium chloride 3%  Inhalation 4 milliLiter(s) Inhalation every 12 hours PRN for congestion      LABS:                        13.2   12.69 )-----------( 369      ( 13 Jul 2024 04:15 )             43.3     07-13    138  |  100  |  14  ----------------------------<  118<H>  4.1   |  23  |  <0.20<L>    Ca    9.6      13 Jul 2024 04:15  Phos  3.5     07-13  Mg     2.30     07-13        Urinalysis Basic - ( 13 Jul 2024 04:15 )    Color: x / Appearance: x / SG: x / pH: x  Gluc: 118 mg/dL / Ketone: x  / Bili: x / Urobili: x   Blood: x / Protein: x / Nitrite: x   Leuk Esterase: x / RBC: x / WBC x   Sq Epi: x / Non Sq Epi: x / Bacteria: x         CHIEF COMPLAINT:Patient is a 68y old  Female who presents with a chief complaint of Other abnormality of breathing     (05 Jul 2024 10:47)      INTERVAL EVENTS:     ROS: Seen by bedside during AM rounds     OBJECTIVE:  ICU Vital Signs Last 24 Hrs  T(C): 36.9 (14 Jul 2024 01:55), Max: 36.9 (13 Jul 2024 16:00)  T(F): 98.5 (14 Jul 2024 01:55), Max: 98.5 (14 Jul 2024 00:00)  HR: 89 (14 Jul 2024 06:56) (84 - 106)  BP: 144/69 (14 Jul 2024 01:55) (134/72 - 174/98)  BP(mean): 97 (13 Jul 2024 16:00) (91 - 97)  ABP: --  ABP(mean): --  RR: 16 (14 Jul 2024 01:55) (16 - 16)  SpO2: 95% (14 Jul 2024 06:56) (95% - 100%)    O2 Parameters below as of 14 Jul 2024 01:55  Patient On (Oxygen Delivery Method): ventilator    O2 Concentration (%): 30      Mode: AC/ CMV (Assist Control/ Continuous Mandatory Ventilation), RR (machine): 16, TV (machine): 400, FiO2: 21, PEEP: 5, ITime: 0.53, MAP: 8, PIP: 25    07-13 @ 07:01  -  07-14 @ 07:00  --------------------------------------------------------  IN: 540 mL / OUT: 600 mL / NET: -60 mL      CAPILLARY BLOOD GLUCOSE          PHYSICAL EXAM:  General: NAD   Neck: (+) Trach tube noted, site c/d/i.  Cards: S1/S2, no murmurs   Pulm: CTA b/l with mild end inspiratory rhonchi b/l. -230mls.  Abdomen: Abdomen firm to palpation, NTND. (+) PEG noted, site c/d/i.   Extremities: Scant b/l LE pitting edema. Extremities warm to touch. Intact distal pulses.   Neurology: Awake, eyes open. Nonverbal. Not tracking or following commands or withdrawing to pain.  Skin: warm to touch, color appropriate for ethnicity. Refer to RN assessment for further details.      Mode: AC/ CMV (Assist Control/ Continuous Mandatory Ventilation)  RR (machine): 16  TV (machine): 400  FiO2: 21  PEEP: 5  ITime: 0.53  MAP: 8  PIP: 25      HOSPITAL MEDICATIONS:  MEDICATIONS  (STANDING):  artificial  tears Solution 1 Drop(s) Both EYES every 6 hours  chlorhexidine 0.12% Liquid 15 milliLiter(s) Oral Mucosa every 12 hours  chlorhexidine 2% Cloths 1 Application(s) Topical daily  diazepam    Tablet 2 milliGRAM(s) Oral every 12 hours  enoxaparin Injectable 40 milliGRAM(s) SubCutaneous every 24 hours  lactated ringers. 1000 milliLiter(s) (100 mL/Hr) IV Continuous <Continuous>  multivitamin/minerals/iron Oral Solution (CENTRUM) 15 milliLiter(s) Oral daily  petrolatum Ophthalmic Ointment 1 Application(s) Both EYES at bedtime  piperacillin/tazobactam IVPB.. 3.375 Gram(s) IV Intermittent every 8 hours  riluzole 50 milliGRAM(s) Oral two times a day  sertraline 75 milliGRAM(s) Oral daily    MEDICATIONS  (PRN):  acetaminophen   Oral Liquid .. 650 milliGRAM(s) Oral every 6 hours PRN Temp greater or equal to 38C (100.4F), Mild Pain (1 - 3)  sodium chloride 3%  Inhalation 4 milliLiter(s) Inhalation every 12 hours PRN for congestion      LABS:                        13.2   12.69 )-----------( 369      ( 13 Jul 2024 04:15 )             43.3     07-13    138  |  100  |  14  ----------------------------<  118<H>  4.1   |  23  |  <0.20<L>    Ca    9.6      13 Jul 2024 04:15  Phos  3.5     07-13  Mg     2.30     07-13        Urinalysis Basic - ( 13 Jul 2024 04:15 )    Color: x / Appearance: x / SG: x / pH: x  Gluc: 118 mg/dL / Ketone: x  / Bili: x / Urobili: x   Blood: x / Protein: x / Nitrite: x   Leuk Esterase: x / RBC: x / WBC x   Sq Epi: x / Non Sq Epi: x / Bacteria: x         CHIEF COMPLAINT:Patient is a 68y old  Female who presents with a chief complaint of Other abnormality of breathing     (05 Jul 2024 10:47)      INTERVAL EVENTS: no acute overnight events noted. No telemetric events noted.     ROS: Seen by bedside during AM rounds     OBJECTIVE:  ICU Vital Signs Last 24 Hrs  T(C): 36.9 (14 Jul 2024 01:55), Max: 36.9 (13 Jul 2024 16:00)  T(F): 98.5 (14 Jul 2024 01:55), Max: 98.5 (14 Jul 2024 00:00)  HR: 89 (14 Jul 2024 06:56) (84 - 106)  BP: 144/69 (14 Jul 2024 01:55) (134/72 - 174/98)  BP(mean): 97 (13 Jul 2024 16:00) (91 - 97)  ABP: --  ABP(mean): --  RR: 16 (14 Jul 2024 01:55) (16 - 16)  SpO2: 95% (14 Jul 2024 06:56) (95% - 100%)    O2 Parameters below as of 14 Jul 2024 01:55  Patient On (Oxygen Delivery Method): ventilator    O2 Concentration (%): 30      Mode: AC/ CMV (Assist Control/ Continuous Mandatory Ventilation), RR (machine): 16, TV (machine): 400, FiO2: 21, PEEP: 5, ITime: 0.53, MAP: 8, PIP: 25    07-13 @ 07:01  -  07-14 @ 07:00  --------------------------------------------------------  IN: 540 mL / OUT: 600 mL / NET: -60 mL      CAPILLARY BLOOD GLUCOSE          PHYSICAL EXAM:  General: NAD   Neck: (+) Trach tube noted, site c/d/i.  Cards: S1/S2, no murmurs   Pulm: CTA b/l with mild end inspiratory rhonchi b/l. -230mls.  Abdomen: Abdomen firm to palpation, NTND. (+) PEG noted, site c/d/i.   Extremities: Scant b/l LE pitting edema. Extremities warm to touch. Intact distal pulses.   Neurology: Awake, eyes open. Nonverbal. Not tracking or following commands or withdrawing to pain.  Skin: warm to touch, color appropriate for ethnicity. Refer to RN assessment for further details.      Mode: AC/ CMV (Assist Control/ Continuous Mandatory Ventilation)  RR (machine): 16  TV (machine): 400  FiO2: 21  PEEP: 5  ITime: 0.53  MAP: 8  PIP: 25      HOSPITAL MEDICATIONS:  MEDICATIONS  (STANDING):  artificial  tears Solution 1 Drop(s) Both EYES every 6 hours  chlorhexidine 0.12% Liquid 15 milliLiter(s) Oral Mucosa every 12 hours  chlorhexidine 2% Cloths 1 Application(s) Topical daily  diazepam    Tablet 2 milliGRAM(s) Oral every 12 hours  enoxaparin Injectable 40 milliGRAM(s) SubCutaneous every 24 hours  lactated ringers. 1000 milliLiter(s) (100 mL/Hr) IV Continuous <Continuous>  multivitamin/minerals/iron Oral Solution (CENTRUM) 15 milliLiter(s) Oral daily  petrolatum Ophthalmic Ointment 1 Application(s) Both EYES at bedtime  piperacillin/tazobactam IVPB.. 3.375 Gram(s) IV Intermittent every 8 hours  riluzole 50 milliGRAM(s) Oral two times a day  sertraline 75 milliGRAM(s) Oral daily    MEDICATIONS  (PRN):  acetaminophen   Oral Liquid .. 650 milliGRAM(s) Oral every 6 hours PRN Temp greater or equal to 38C (100.4F), Mild Pain (1 - 3)  sodium chloride 3%  Inhalation 4 milliLiter(s) Inhalation every 12 hours PRN for congestion      LABS:                        13.2   12.69 )-----------( 369      ( 13 Jul 2024 04:15 )             43.3     07-13    138  |  100  |  14  ----------------------------<  118<H>  4.1   |  23  |  <0.20<L>    Ca    9.6      13 Jul 2024 04:15  Phos  3.5     07-13  Mg     2.30     07-13        Urinalysis Basic - ( 13 Jul 2024 04:15 )    Color: x / Appearance: x / SG: x / pH: x  Gluc: 118 mg/dL / Ketone: x  / Bili: x / Urobili: x   Blood: x / Protein: x / Nitrite: x   Leuk Esterase: x / RBC: x / WBC x   Sq Epi: x / Non Sq Epi: x / Bacteria: x

## 2024-07-14 NOTE — PROGRESS NOTE ADULT - NS ATTEND AMEND GEN_ALL_CORE FT
67 yo F w/ PMH of ALS, chronic respiratory failure w/ tracheostomy and vent dependence, oropharyngeal dysphagia w/ PEG, Parkinson's disease, non verbal at baseline presents w/ tracheostomy air leak. Receiving TV ~250 on set volume at 400. Tracheostomy changed at home with no improvement and so presented for further evaluation.     - baseline mental status, on home regimen: Rilutek 50 mg bid, Valium 2 mg bid, Zoloft 75 mg daily   - On VC//16/+5/30%, RR 16; VTe ~200-250s without signs of respiratory distress on exam. Daily evaluation of cuff pressures to reduce risk of tracheal injury  - labs overall stable. Most recent VBG from 7/10 without significant hypercapnia (7.45/40/107)  - Thoracic surgery to take patient to OR for trach revision/upsizing. Procedure delayed due to 9XLT trach on back order, (potentially rescheduled to7/15)  - continue tube feeds via PEG   - leukocytosis, tx for UTI and possible tracheitis. sputum cx pansensitive pseudomonas on 7/5. On Zosyn since 7/8. Plan for 5-7 day course  - Will discuss with thoracic today regarding trach revision and planning 67 yo F w/ PMH of ALS, chronic respiratory failure w/ tracheostomy and vent dependence, oropharyngeal dysphagia w/ PEG, Parkinson's disease, non verbal at baseline presents w/ tracheostomy air leak. Receiving TV ~250 on set volume at 400. Tracheostomy changed at home with no improvement and so presented for further evaluation.     - baseline mental status, on home regimen: Rilutek 50 mg bid, Valium 2 mg bid, Zoloft 75 mg daily   - On VC//16/+5/30%, RR 16; VTe ~200-250s without signs of respiratory distress on exam. Daily evaluation of cuff pressures to reduce risk of tracheal injury  - labs overall stable. Most recent VBG from 7/10 without significant hypercapnia (7.45/40/107)  - Thoracic surgery to take patient to OR for trach revision/upsizing. Procedure delayed due to 9XLT trach on back order, (potentially rescheduled to7/15)  - continue tube feeds via PEG   - leukocytosis, tx for UTI and possible tracheitis. sputum cx pansensitive pseudomonas on 7/5. On Zosyn since 7/8. No significant secretions. Will complete zosyn today 7/14.   - Will discuss with thoracic today regarding trach revision and planning 67 yo F w/ PMH of ALS, chronic respiratory failure w/ tracheostomy and vent dependence, oropharyngeal dysphagia w/ PEG, Parkinson's disease, non verbal at baseline presents w/ tracheostomy air leak. Receiving TV ~250 on set volume at 400. Tracheostomy changed at home with no improvement and so presented for further evaluation.     - baseline mental status, on home regimen: Rilutek 50 mg bid, Valium 2 mg bid, Zoloft 75 mg daily   - On VC//16/+5/30%, RR 16; VTe ~200-250s without signs of respiratory distress on exam. Daily evaluation of cuff pressures to reduce risk of tracheal injury  - labs overall stable. Most recent VBG from 7/10 without significant hypercapnia (7.45/40/107)  - Thoracic surgery to take patient to OR for trach revision/upsizing. Procedure delayed due to 9XLT trach on back order, (potentially rescheduled to 7/15)  - continue tube feeds via PEG   - leukocytosis, tx for UTI and possible tracheitis. sputum cx pansensitive pseudomonas on 7/5. On Zosyn since 7/8. No significant secretions. Will complete zosyn today 7/14.   - Will discuss with thoracic today regarding trach revision and planning

## 2024-07-14 NOTE — PROGRESS NOTE ADULT - ASSESSMENT
ASSESSMENT AND PLAN:   67 YO Female with PMHx of ALS, Parkinson,, nonverbal, bed bound, chronic respiratory failure with tracheostomy and vent dependence, and oropharyngeal dysphagia with PEG who presented from home with tracheostomy leak. She was noted with vent alarm and poor TVe (250-290) at home. Trach changed at home with no improvement and sent in for thoracic evaluation. While in MICU no air leak noted with hyperinflated cuff. Transferred to R[facility] on 7/5    NEUROLOGY  # ALS   - Baseline nonverbal, awake, and communicates with eye movement.   - Continue on home rilutek 50mg BID     PSYCH   # Anxiety and Depression   - Continue on home valium 2mg BID   - Continue on zoloft 75mg QD while in house (on 4cc/ 80mg QD at home)     CARDIOVASCULAR  - No acute issues   - Monitor HR and BP     RESPIRATORY  # Tracheostomy leak   - At home noted with TVe 200-300s despite tracheostomy change to 80XLTCD.   - No airleak noted in house with hyperinflated cuff likely tracheomegaly?   - Continue on vent 16/400/5/21   - Continue HTS PRN   - Thoracic was planning on taking pt to OR for trach revision/upsizing 7/8 then 7/9 but due to 9XLT trach on back order, plan for sometime in the future (potentially 7/15 or so)   - Spoke to [facility] on Friday - no trach delivered as of yet     GI  # Dysphagia   - Continue on PEG-TF   - Monitor BMs   - Abdomen mildly distended & monitoring for now     RENAL  - No acute issues   - Monitor renal function and UOP     #Hyperlactatemia  - Lactate 3.5 on admission, downtrended , then back up to 4.4   - S/P 1L LR with improvement in lactate (7/6 & 7/7)    INFECTIOUS DISEASE  # Leukocytosis   - No fever or chills and noted prior leukocytosis   - CXR with prior atelectasis and no acute findings   - Uptrending Lactate so UA sent returning with POSITIVE Nitrites (7/6) but given no fever and improving leukocytosis will hold of ABX   - SCx (7/5) grew pansensitive PSA  - BCx (7/6) x 2 NGTD, UCx with coag neg staph (susceptibilities not performed)   - MRSA PCR (7/6) POSITIVE for both MRSA/MSSA - Start Bactroban (7/6 - 7/10)  - Given uptrending WBC count, start of thick yellow secretions, and known positive UA, will start empiric Zosyn (7/8 - ) for UTI (+/-) possible trachitiis.     HEME  - On Xarelto 10mg at home likely for DVT PPX?   - Hold home Xarelto in prep for OR   - DVT PPX with LVX for now     ENDOCRINE  - No hx of DM2   - Monitor BG     SKIN  - Basic trach and PEG care ordered     ETHICS/ GOC    - FULL CODE   - Dr Tyson Velasquez,  involved in care    DISPO - Back home with  when able.    ASSESSMENT AND PLAN:   67 YO Female with PMHx of ALS, Parkinson,, nonverbal, bed bound, chronic respiratory failure with tracheostomy and vent dependence, and oropharyngeal dysphagia with PEG who presented from home with tracheostomy leak. She was noted with vent alarm and poor TVe (250-290) at home. Trach changed at home with no improvement and sent in for thoracic evaluation. While in MICU no air leak noted with hyperinflated cuff. Transferred to RCU on 7/5    NEUROLOGY  # ALS   - Baseline nonverbal, awake, and communicates with eye movement.   - Continue on home rilutek 50mg BID     PSYCH   # Anxiety and Depression   - Continue on home valium 2mg BID   - Continue on zoloft 75mg QD while in house (on 4cc/ 80mg QD at home)     CARDIOVASCULAR  - No acute issues   - Monitor HR and BP     RESPIRATORY  # Tracheostomy leak   - At home noted with TVe 200-300s despite tracheostomy change to 80XLTCD.   - No airleak noted in house with hyperinflated cuff likely tracheomegaly?   - Continue on vent 16/400/5/21   - Continue HTS PRN   - Thoracic was planning on taking pt to OR for trach revision/upsizing 7/8 then 7/9 but due to 9XLT trach on back order, plan for sometime in the future (potentially 7/15 or so)   - Spoke to [facility] on Friday - no trach delivered as of yet     GI  # Dysphagia   - Continue on PEG-TF   - Monitor BMs   - Abdomen mildly distended & monitoring for now     RENAL  - No acute issues   - Monitor renal function and UOP     #Hyperlactatemia  - Lactate 3.5 on admission, downtrended , then back up to 4.4   - S/P 1L LR with improvement in lactate (7/6 & 7/7)    INFECTIOUS DISEASE  # Leukocytosis   - No fever or chills and noted prior leukocytosis   - CXR with prior atelectasis and no acute findings   - Uptrending Lactate so UA sent returning with POSITIVE Nitrites (7/6) but given no fever and improving leukocytosis will hold of ABX   - SCx (7/5) grew pansensitive PSA  - BCx (7/6) x 2 NGTD, UCx with coag neg staph (susceptibilities not performed)   - MRSA PCR (7/6) POSITIVE for both MRSA/MSSA - Start Bactroban (7/6 - 7/10)  - Given uptrending WBC count, start of thick yellow secretions, and known positive UA, will start empiric Zosyn (7/8 - 7/14) for UTI (+/-) possible trachitiis.     HEME  - On Xarelto 10mg at home likely for DVT PPX?   - Hold home Xarelto in prep for OR   - DVT PPX with LVX for now     ENDOCRINE  - No hx of DM2   - Monitor BG     SKIN  - Basic trach and PEG care ordered     ETHICS/ GOC    - FULL CODE   - Dr Tyson Velasquez,  involved in care    DISPO - Back home with  when able.

## 2024-07-15 LAB
ANION GAP SERPL CALC-SCNC: 14 MMOL/L — SIGNIFICANT CHANGE UP (ref 7–14)
BASOPHILS # BLD AUTO: 0.06 K/UL — SIGNIFICANT CHANGE UP (ref 0–0.2)
BASOPHILS NFR BLD AUTO: 0.3 % — SIGNIFICANT CHANGE UP (ref 0–2)
BUN SERPL-MCNC: 19 MG/DL — SIGNIFICANT CHANGE UP (ref 7–23)
CALCIUM SERPL-MCNC: 9.4 MG/DL — SIGNIFICANT CHANGE UP (ref 8.4–10.5)
CHLORIDE SERPL-SCNC: 99 MMOL/L — SIGNIFICANT CHANGE UP (ref 98–107)
CO2 SERPL-SCNC: 24 MMOL/L — SIGNIFICANT CHANGE UP (ref 22–31)
CREAT SERPL-MCNC: <0.2 MG/DL — LOW (ref 0.5–1.3)
EGFR: 126 ML/MIN/1.73M2 — SIGNIFICANT CHANGE UP
EOSINOPHIL # BLD AUTO: 0.17 K/UL — SIGNIFICANT CHANGE UP (ref 0–0.5)
EOSINOPHIL NFR BLD AUTO: 0.9 % — SIGNIFICANT CHANGE UP (ref 0–6)
GLUCOSE SERPL-MCNC: 141 MG/DL — HIGH (ref 70–99)
HCT VFR BLD CALC: 41.3 % — SIGNIFICANT CHANGE UP (ref 34.5–45)
HGB BLD-MCNC: 12.8 G/DL — SIGNIFICANT CHANGE UP (ref 11.5–15.5)
IANC: 15.51 K/UL — HIGH (ref 1.8–7.4)
IMM GRANULOCYTES NFR BLD AUTO: 1.1 % — HIGH (ref 0–0.9)
LYMPHOCYTES # BLD AUTO: 1.8 K/UL — SIGNIFICANT CHANGE UP (ref 1–3.3)
LYMPHOCYTES # BLD AUTO: 9.5 % — LOW (ref 13–44)
MAGNESIUM SERPL-MCNC: 2 MG/DL — SIGNIFICANT CHANGE UP (ref 1.6–2.6)
MCHC RBC-ENTMCNC: 26.3 PG — LOW (ref 27–34)
MCHC RBC-ENTMCNC: 31 GM/DL — LOW (ref 32–36)
MCV RBC AUTO: 85 FL — SIGNIFICANT CHANGE UP (ref 80–100)
MONOCYTES # BLD AUTO: 1.27 K/UL — HIGH (ref 0–0.9)
MONOCYTES NFR BLD AUTO: 6.7 % — SIGNIFICANT CHANGE UP (ref 2–14)
NEUTROPHILS # BLD AUTO: 15.51 K/UL — HIGH (ref 1.8–7.4)
NEUTROPHILS NFR BLD AUTO: 81.5 % — HIGH (ref 43–77)
NRBC # BLD: 0 /100 WBCS — SIGNIFICANT CHANGE UP (ref 0–0)
NRBC # FLD: 0 K/UL — SIGNIFICANT CHANGE UP (ref 0–0)
PHOSPHATE SERPL-MCNC: 2.5 MG/DL — SIGNIFICANT CHANGE UP (ref 2.5–4.5)
PLATELET # BLD AUTO: 387 K/UL — SIGNIFICANT CHANGE UP (ref 150–400)
POTASSIUM SERPL-MCNC: 3.8 MMOL/L — SIGNIFICANT CHANGE UP (ref 3.5–5.3)
POTASSIUM SERPL-SCNC: 3.8 MMOL/L — SIGNIFICANT CHANGE UP (ref 3.5–5.3)
RBC # BLD: 4.86 M/UL — SIGNIFICANT CHANGE UP (ref 3.8–5.2)
RBC # FLD: 16.1 % — HIGH (ref 10.3–14.5)
SODIUM SERPL-SCNC: 137 MMOL/L — SIGNIFICANT CHANGE UP (ref 135–145)
WBC # BLD: 19.02 K/UL — HIGH (ref 3.8–10.5)
WBC # FLD AUTO: 19.02 K/UL — HIGH (ref 3.8–10.5)

## 2024-07-15 PROCEDURE — 99233 SBSQ HOSP IP/OBS HIGH 50: CPT | Mod: FS

## 2024-07-15 RX ADMIN — CHLORHEXIDINE GLUCONATE 15 MILLILITER(S): 40 SOLUTION TOPICAL at 05:34

## 2024-07-15 RX ADMIN — POVIDONE, PROPYLENE GLYCOL 1 DROP(S): 6.8; 3 LIQUID OPHTHALMIC at 05:34

## 2024-07-15 RX ADMIN — POVIDONE, PROPYLENE GLYCOL 1 DROP(S): 6.8; 3 LIQUID OPHTHALMIC at 00:38

## 2024-07-15 RX ADMIN — RILUZOLE 50 MILLIGRAM(S): 5 LIQUID ORAL at 17:03

## 2024-07-15 RX ADMIN — POVIDONE, PROPYLENE GLYCOL 1 DROP(S): 6.8; 3 LIQUID OPHTHALMIC at 11:51

## 2024-07-15 RX ADMIN — Medication 2 MILLIGRAM(S): at 05:34

## 2024-07-15 RX ADMIN — ENOXAPARIN SODIUM 40 MILLIGRAM(S): 100 INJECTION SUBCUTANEOUS at 05:34

## 2024-07-15 RX ADMIN — POVIDONE, PROPYLENE GLYCOL 1 APPLICATION(S): 6.8; 3 LIQUID OPHTHALMIC at 21:25

## 2024-07-15 RX ADMIN — SERTRALINE HYDROCHLORIDE 75 MILLIGRAM(S): 50 TABLET, FILM COATED ORAL at 11:50

## 2024-07-15 RX ADMIN — CHLORHEXIDINE GLUCONATE 15 MILLILITER(S): 40 SOLUTION TOPICAL at 17:03

## 2024-07-15 RX ADMIN — RILUZOLE 50 MILLIGRAM(S): 5 LIQUID ORAL at 05:34

## 2024-07-15 RX ADMIN — Medication 2 MILLIGRAM(S): at 17:02

## 2024-07-15 RX ADMIN — PIPERACILLIN SODIUM AND TAZOBACTAM SODIUM 25 GRAM(S): 3; .375 INJECTION, POWDER, FOR SOLUTION INTRAVENOUS at 00:38

## 2024-07-15 RX ADMIN — CHLORHEXIDINE GLUCONATE 1 APPLICATION(S): 40 SOLUTION TOPICAL at 11:50

## 2024-07-15 RX ADMIN — Medication 15 MILLILITER(S): at 11:51

## 2024-07-15 RX ADMIN — ACETAMINOPHEN 650 MILLIGRAM(S): 325 TABLET ORAL at 00:38

## 2024-07-15 RX ADMIN — POVIDONE, PROPYLENE GLYCOL 1 APPLICATION(S): 6.8; 3 LIQUID OPHTHALMIC at 00:38

## 2024-07-15 RX ADMIN — POVIDONE, PROPYLENE GLYCOL 1 DROP(S): 6.8; 3 LIQUID OPHTHALMIC at 17:02

## 2024-07-15 NOTE — PROGRESS NOTE ADULT - NS ATTEND AMEND GEN_ALL_CORE FT
67 yo F w/ PMH of ALS, chronic respiratory failure w/ tracheostomy and vent dependence, oropharyngeal dysphagia w/ PEG, Parkinson's disease, non verbal at baseline presents w/ tracheostomy air leak.   w/ set volume at 400. Tracheostomy changed at home with no improvement and so presented for further evaluation.     - baseline mental status, on home regimen: Rilutek 50 mg bid, Valium 2 mg bid, Zolof 75 mg daily   - On VC//16/+5/30%, RR 16   - ABG today without significant hypercapnea despite air leak  - Thoracic surgery to take patient to OR for trach revision/upsizing. Procedure delayed due to 9XLT trach on back order, (potentially rescheduled to 7/16)  - continue tube feeds via PEG   - leukocytosis, tx for UTI and possible tracheitis. On Zosyn 7/8 - 7/14 7 day course. Today with worsening leukocytosis but no other localizing signs of infection. Will monitor off abx for now   - sputum cx pansensitive pseudomonas.

## 2024-07-15 NOTE — CHART NOTE - NSCHARTNOTEFT_GEN_A_CORE
Pt seen for Follow up ICU    Medical Course:  69 YO Female with PMHx of ALS, Parkinson,, nonverbal, bed bound, chronic respiratory failure with tracheostomy and vent dependence, and oropharyngeal dysphagia with PEG who presented from home with tracheostomy leak. She was noted with vent alarm and poor TVe (250-290) at home. Trach changed at home with no improvement and sent in for thoracic evaluation. While in MICU no air leak noted with hyperinflated cuff. Transferred to RCU on 7/5    Nutrition Course:   Unable to conduct nutrition interview 2/2 decreased cognition. Information obtained from comprehensive chart review and per RN today:  No recent episodes of nausea, vomiting, diarrhea, or constipation. Last BM noted yesterday per RN. Pt remains NPO TF only as sole source of nutrition and hydration; Jevity 1.2 @ 40ml/hr x18hrs. Previous RD saw Pt and recommended 40ml/hr x24hrs on 7/10. Will recommend change TF to Jevity 1.2 @ 55ml/hr x22hrs (1210ml, 1452kcal, 67 gm protein, 974ml free water from formula). 22hour feeds 2/2 oral levothyroxine.     Diet Prescription: Diet, NPO with Tube Feed:   Tube Feeding Modality: Gastrostomy  Jevity 1.2 Marck (JEVITY1.2RTH)  Total Volume for 24 Hours (mL): 720  Continuous  Starting Tube Feed Rate {mL per Hour}: 20  Increase Tube Feed Rate by (mL): 10     Every 2 hours  Until Goal Tube Feed Rate (mL per Hour): 40  Tube Feed Duration (in Hours): 18 (07-12-24 @ 05:55)    Pertinent Medications: MEDICATIONS  (STANDING):  artificial  tears Solution 1 Drop(s) Both EYES every 6 hours  chlorhexidine 0.12% Liquid 15 milliLiter(s) Oral Mucosa every 12 hours  chlorhexidine 2% Cloths 1 Application(s) Topical daily  diazepam    Tablet 2 milliGRAM(s) Oral every 12 hours  enoxaparin Injectable 40 milliGRAM(s) SubCutaneous every 24 hours  lactated ringers. 1000 milliLiter(s) (100 mL/Hr) IV Continuous <Continuous>  multivitamin/minerals/iron Oral Solution (CENTRUM) 15 milliLiter(s) Oral daily  petrolatum Ophthalmic Ointment 1 Application(s) Both EYES at bedtime  riluzole 50 milliGRAM(s) Oral two times a day  sertraline 75 milliGRAM(s) Oral daily    MEDICATIONS  (PRN):  acetaminophen   Oral Liquid .. 650 milliGRAM(s) Oral every 6 hours PRN Temp greater or equal to 38C (100.4F), Mild Pain (1 - 3)  sodium chloride 3%  Inhalation 4 milliLiter(s) Inhalation every 12 hours PRN for congestion    Pertinent Labs: 07-15 Na137 mmol/L Glu 141 mg/dL<H> K+ 3.8 mmol/L Cr  <0.20 mg/dL<L> BUN 19 mg/dL 07-15 Phos 2.5 mg/dL        Weight: Height (cm): 162.6 (07-05 @ 00:00)  Weight (kg): 58.2 (07-07 @ 00:04)  BMI (kg/m2): 22 (07-07 @ 00:04)  Weight Assessment: No new weight to assess      Physical Assessment, per flowsheets  Edema: generalized 1+   skin: intact     Estimated Needs:   [X] No change since previous assessment, based on #/ 54.5kg   7013-7452 Kcal/kg/day (20-25 kcal/kg), 43.6-54.5 gm/kg/day ( 0.8-1 gm/kg)      Previous Nutrition Diagnosis: No active nutrition diagnosis identified at this time      Education:  [ x ] Not warranted 2/2 decreased cognition    Interventions:   1) Change TF to Jevity 1.2 @ 55ml/hr x22hrs   2) Continue to provide multivitamin w/ minerals once daily for micronutrient and mineral provisions   3) RD to f/u prn     Monitor & Evaluate:  Tolerance to diet/supplement, nutrition related lab values, weight trends, BMs/GI distress, hydration status, skin integrity.    Keshia Mcneal MS, RDN (Pager #[phone]) | Also available on TEAMS Pt seen for Follow up ICU    Medical Course:  67 YO Female with PMHx of ALS, Parkinson,, nonverbal, bed bound, chronic respiratory failure with tracheostomy and vent dependence, and oropharyngeal dysphagia with PEG who presented from home with tracheostomy leak. She was noted with vent alarm and poor TVe (250-290) at home. Trach changed at home with no improvement and sent in for thoracic evaluation. While in MICU no air leak noted with hyperinflated cuff. Transferred to RCU on 7/5    Nutrition Course:   Unable to conduct nutrition interview 2/2 decreased cognition. Information obtained from comprehensive chart review and per RN today:  No recent episodes of nausea, vomiting, diarrhea, or constipation. Last BM noted yesterday per RN. Pt remains NPO TF only as sole source of nutrition and hydration; Jevity 1.2 @ 40ml/hr x18hrs. Previous RD saw Pt and recommended 40ml/hr x24hrs on 7/10. Will recommend change TF to Jevity 1.2 @ 50ml/hr x24hrs (1200ml, 1440 kcal, 67 gm protein, 972ml free water from formula).     Diet Prescription: Diet, NPO with Tube Feed:   Tube Feeding Modality: Gastrostomy  Jevity 1.2 Marck (JEVITY1.2RTH)  Total Volume for 24 Hours (mL): 720  Continuous  Starting Tube Feed Rate {mL per Hour}: 20  Increase Tube Feed Rate by (mL): 10     Every 2 hours  Until Goal Tube Feed Rate (mL per Hour): 40  Tube Feed Duration (in Hours): 18 (07-12-24 @ 05:55)    Pertinent Medications: MEDICATIONS  (STANDING):  artificial  tears Solution 1 Drop(s) Both EYES every 6 hours  chlorhexidine 0.12% Liquid 15 milliLiter(s) Oral Mucosa every 12 hours  chlorhexidine 2% Cloths 1 Application(s) Topical daily  diazepam    Tablet 2 milliGRAM(s) Oral every 12 hours  enoxaparin Injectable 40 milliGRAM(s) SubCutaneous every 24 hours  lactated ringers. 1000 milliLiter(s) (100 mL/Hr) IV Continuous <Continuous>  multivitamin/minerals/iron Oral Solution (CENTRUM) 15 milliLiter(s) Oral daily  petrolatum Ophthalmic Ointment 1 Application(s) Both EYES at bedtime  riluzole 50 milliGRAM(s) Oral two times a day  sertraline 75 milliGRAM(s) Oral daily    MEDICATIONS  (PRN):  acetaminophen   Oral Liquid .. 650 milliGRAM(s) Oral every 6 hours PRN Temp greater or equal to 38C (100.4F), Mild Pain (1 - 3)  sodium chloride 3%  Inhalation 4 milliLiter(s) Inhalation every 12 hours PRN for congestion    Pertinent Labs: 07-15 Na137 mmol/L Glu 141 mg/dL<H> K+ 3.8 mmol/L Cr  <0.20 mg/dL<L> BUN 19 mg/dL 07-15 Phos 2.5 mg/dL        Weight: Height (cm): 162.6 (07-05 @ 00:00)  Weight (kg): 58.2 (07-07 @ 00:04)  BMI (kg/m2): 22 (07-07 @ 00:04)  Weight Assessment: No new weight to assess      Physical Assessment, per flowsheets  Edema: generalized 1+   skin: intact     Estimated Needs:   [X] No change since previous assessment, based on #/ 54.5kg   5758-6210 Kcal/kg/day (20-25 kcal/kg), 43.6-54.5 gm/kg/day ( 0.8-1 gm/kg)      Previous Nutrition Diagnosis: No active nutrition diagnosis identified at this time      Education:  [ x ] Not warranted 2/2 decreased cognition    Interventions:   1) Change TF to Jevity 1.2 @ 50ml/hr x24hrs   2) Continue to provide multivitamin w/ minerals once daily for micronutrient and mineral provisions   3) RD to f/u prn     Monitor & Evaluate:  Tolerance to diet/supplement, nutrition related lab values, weight trends, BMs/GI distress, hydration status, skin integrity.    Keshia Mcneal MS, RDN (Pager #[phone]) | Also available on TEAMS

## 2024-07-15 NOTE — PROGRESS NOTE ADULT - ASSESSMENT
ASSESSMENT AND PLAN:   67 YO Female with PMHx of ALS, Parkinson,, nonverbal, bed bound, chronic respiratory failure with tracheostomy and vent dependence, and oropharyngeal dysphagia with PEG who presented from home with tracheostomy leak. She was noted with vent alarm and poor TVe (250-290) at home. Trach changed at home with no improvement and sent in for thoracic evaluation. While in MICU no air leak noted with hyperinflated cuff. Transferred to RCU on 7/5    NEUROLOGY  # ALS   - Baseline nonverbal, awake, and communicates with eye movement.   - Continue on home rilutek 50mg BID     PSYCH   # Anxiety and Depression   - Continue on home valium 2mg BID   - Continue on zoloft 75mg QD while in house (on 4cc/ 80mg QD at home)     CARDIOVASCULAR  - No acute issues   - Monitor HR and BP     RESPIRATORY  # Tracheostomy leak   - At home noted with TVe 200-300s despite tracheostomy change to 80XLTCD.   - No airleak noted in house with hyperinflated cuff likely tracheomegaly?   - Continue on vent 16/400/5/21   - Continue HTS PRN   - Thoracic was planning on taking pt to OR for trach revision/upsizing 7/8 then 7/9 but due to 9XLT trach on back order, plan for sometime in the future (potentially 7/15 or so)   - Spoke to [facility] on Friday - no trach delivered as of yet     GI  # Dysphagia   - Continue on PEG-TF   - Monitor BMs   - Abdomen mildly distended & monitoring for now     RENAL  - No acute issues   - Monitor renal function and UOP     #Hyperlactatemia  - Lactate 3.5 on admission, downtrended , then back up to 4.4   - S/P 1L LR with improvement in lactate (7/6 & 7/7)    INFECTIOUS DISEASE  # Leukocytosis   - No fever or chills and noted prior leukocytosis   - CXR with prior atelectasis and no acute findings   - Uptrending Lactate so UA sent returning with POSITIVE Nitrites (7/6) but given no fever and improving leukocytosis will hold of ABX   - SCx (7/5) grew pansensitive PSA  - BCx (7/6) x 2 NGTD, UCx with coag neg staph (susceptibilities not performed)   - MRSA PCR (7/6) POSITIVE for both MRSA/MSSA - Start Bactroban (7/6 - 7/10)  - Given uptrending WBC count, start of thick yellow secretions, and known positive UA, will start empiric Zosyn (7/8 - 7/14) for UTI (+/-) possible trachitiis.     HEME  - On Xarelto 10mg at home likely for DVT PPX?   - Hold home Xarelto in prep for OR   - DVT PPX with LVX for now     ENDOCRINE  - No hx of DM2   - Monitor BG     SKIN  - Basic trach and PEG care ordered     ETHICS/ GOC    - FULL CODE   - Dr Tyson Velasquez,  involved in care    DISPO - Back home with  when able.    ASSESSMENT AND PLAN:   67 YO Female with PMHx of ALS, Parkinson,, nonverbal, bed bound, chronic respiratory failure with tracheostomy and vent dependence, and oropharyngeal dysphagia with PEG who presented from home with tracheostomy leak. She was noted with vent alarm and poor TVe (250-290) at home. Trach changed at home with no improvement and sent in for thoracic evaluation. While in MICU no air leak noted with hyperinflated cuff. Transferred to RCU on 7/5    NEUROLOGY  # ALS   - Baseline nonverbal, awake, and communicates with eye movement.   - Continue on home rilutek 50mg BID     PSYCH   # Anxiety and Depression   - Continue on home valium 2mg BID   - Continue on zoloft 75mg QD while in house (on 4cc/ 80mg QD at home)     CARDIOVASCULAR  - No acute issues   - Monitor HR and BP     RESPIRATORY  # Tracheostomy leak   - At home noted with TVe 200-300s despite tracheostomy change to 80XLTCD.   - No airleak noted in house with hyperinflated cuff likely tracheomegaly?   - Continue on vent 16/400/5/21   - Continue HTS PRN   - Thoracic was planning on taking pt to OR for trach revision/upsizing 7/8 then 7/9 but due to 9XLT trach on back order, plan for sometime in the future (potentially 7/15 or so)   - Spoke to [facility] on Friday - no trach delivered as of yet   - Per thoracic: prepare for OR tomorrow (7/16) -- (NPO, CBC, BMP w/ Mag and Phos, PT/INR, PTT, Type and Screen x 2)  - Tentative plan for Flex bronch, trach exchange w/ upsize    GI  # Dysphagia   - Continue on PEG-TF   - Monitor BMs   - Abdomen mildly distended & monitoring for now     RENAL  - No acute issues   - Monitor renal function and UOP     #Hyperlactatemia  - Lactate 3.5 on admission, downtrended , then back up to 4.4   - S/P 1L LR with improvement in lactate (7/6 & 7/7)    INFECTIOUS DISEASE  # Leukocytosis   - No fever or chills and noted prior leukocytosis   - CXR with prior atelectasis and no acute findings   - Uptrending Lactate so UA sent returning with POSITIVE Nitrites (7/6) but given no fever and improving leukocytosis will hold of ABX   - SCx (7/5) grew pansensitive PSA  - BCx (7/6) x 2 NGTD, UCx with coag neg staph (susceptibilities not performed)   - MRSA PCR (7/6) POSITIVE for both MRSA/MSSA - Start Bactroban (7/6 - 7/10)  - Given uptrending WBC count, start of thick yellow secretions, and known positive UA, will start empiric Zosyn (7/8 - 7/14) for UTI (+/-) possible trachitiis.   - WBC count uptrending (19.02 on 7/15), will hold off on abx and continue to monitor.     HEME  - On Xarelto 10mg at home likely for DVT PPX?   - Hold home Xarelto in prep for OR   - DVT PPX with LVX for now     ENDOCRINE  - No hx of DM2   - Monitor BG     SKIN  - Basic trach and PEG care ordered     ETHICS/ GOC    - FULL CODE   - Dr Tyson Velasquez,  involved in care    DISPO - Back home with  when able.    ASSESSMENT AND PLAN:   69 YO Female with PMHx of ALS, Parkinson,, nonverbal, bed bound, chronic respiratory failure with tracheostomy and vent dependence, and oropharyngeal dysphagia with PEG who presented from home with tracheostomy leak. She was noted with vent alarm and poor TVe (250-290) at home. Trach changed at home with no improvement and sent in for thoracic evaluation. While in MICU no air leak noted with hyperinflated cuff. Transferred to RCU on 7/5    NEUROLOGY  # ALS   - Baseline nonverbal, awake, and communicates with eye movement.   - Continue on home rilutek 50mg BID     PSYCH   # Anxiety and Depression   - Continue on home valium 2mg BID   - Continue on zoloft 75mg QD while in house (on 4cc/ 80mg QD at home)     CARDIOVASCULAR  - No acute issues   - Monitor HR and BP     RESPIRATORY  # Tracheostomy leak   - At home noted with TVe 200-300s despite tracheostomy change to 80XLTCD.   - No airleak noted in house with hyperinflated cuff likely tracheomegaly?   - Continue on vent 16/400/5/21   - Continue HTS PRN   - Thoracic was planning on taking pt to OR for trach revision/upsizing 7/8 then 7/9 but due to 9XLT trach on back order, plan for sometime in the future (potentially 7/15 or so)   - Tentative plan for Flex bronch, trach exchange w/ upsize  - Plan for OR on 7/17     GI  # Dysphagia   - Continue on PEG-TF   - Monitor BMs   - Abdomen mildly distended & monitoring for now     RENAL  - No acute issues   - Monitor renal function and UOP     #Hyperlactatemia  - Lactate 3.5 on admission, downtrended , then back up to 4.4   - S/P 1L LR with improvement in lactate (7/6 & 7/7)    INFECTIOUS DISEASE  # Leukocytosis   - No fever or chills and noted prior leukocytosis   - CXR with prior atelectasis and no acute findings   - Uptrending Lactate so UA sent returning with POSITIVE Nitrites (7/6) but given no fever and improving leukocytosis initially held off ABX   - SCx (7/5) grew pansensitive PSA  - BCx (7/6) x 2 NGTD, UCx with coag neg staph (susceptibilities not performed)   - MRSA PCR (7/6) POSITIVE for both MRSA/MSSA - Start Bactroban (7/6 - 7/10)  - Given uptrending WBC count, start of thick yellow secretions, and known positive UA, will start empiric Zosyn (7/8 - 7/14) for UTI (+/-) possible trachitiis.   - WBC count uptrending (19.02 on 7/15), will hold off on abx and continue to monitor.     HEME  - On Xarelto 10mg at home likely for DVT PPX?   - Hold home Xarelto in prep for OR   - DVT PPX with LVX for now     ENDOCRINE  - No hx of DM2   - Monitor BG     SKIN  - Basic trach and PEG care ordered     ETHICS/ GOC    - FULL CODE   - Dr Tyson Velasquez,  involved in care    DISPO - Back home with  when able.

## 2024-07-15 NOTE — PROGRESS NOTE ADULT - SUBJECTIVE AND OBJECTIVE BOX
Thoracic Surgery Progress Note    SUBJECTIVE: Patient seen and examined at bedside with surgical team. Pt nonverbal, not in any apparent acute distress.     Vital Signs Last 24 Hrs  T(C): 36.2 (15 Jul 2024 04:00), Max: 36.9 (14 Jul 2024 16:00)  T(F): 97.2 (15 Jul 2024 04:00), Max: 98.5 (14 Jul 2024 16:00)  HR: 101 (15 Jul 2024 07:08) (91 - 102)  BP: 113/66 (15 Jul 2024 04:00) (113/66 - 152/75)  BP(mean): 79 (15 Jul 2024 04:00) (72 - 98)  RR: 16 (15 Jul 2024 04:00) (16 - 16)  SpO2: 97% (15 Jul 2024 07:08) (94% - 99%)    Parameters below as of 15 Jul 2024 04:00  Patient On (Oxygen Delivery Method): ventilator    O2 Concentration (%): 30I&O's Detail    14 Jul 2024 07:01  -  15 Jul 2024 07:00  --------------------------------------------------------  IN:    Enteral Tube Flush: 200 mL    Jevity 1.2: 960 mL  Total IN: 1160 mL    OUT:    Voided (mL): 1050 mL  Total OUT: 1050 mL    Total NET: 110 mL        Medications  MEDICATIONS  (STANDING):  artificial  tears Solution 1 Drop(s) Both EYES every 6 hours  chlorhexidine 0.12% Liquid 15 milliLiter(s) Oral Mucosa every 12 hours  chlorhexidine 2% Cloths 1 Application(s) Topical daily  diazepam    Tablet 2 milliGRAM(s) Oral every 12 hours  enoxaparin Injectable 40 milliGRAM(s) SubCutaneous every 24 hours  lactated ringers. 1000 milliLiter(s) (100 mL/Hr) IV Continuous <Continuous>  multivitamin/minerals/iron Oral Solution (CENTRUM) 15 milliLiter(s) Oral daily  petrolatum Ophthalmic Ointment 1 Application(s) Both EYES at bedtime  riluzole 50 milliGRAM(s) Oral two times a day  sertraline 75 milliGRAM(s) Oral daily    MEDICATIONS  (PRN):  acetaminophen   Oral Liquid .. 650 milliGRAM(s) Oral every 6 hours PRN Temp greater or equal to 38C (100.4F), Mild Pain (1 - 3)  sodium chloride 3%  Inhalation 4 milliLiter(s) Inhalation every 12 hours PRN for congestion      Physical Exam  Constitutional: NAD  Respiratory: On ventilator pulling good volumes. Mode: AC/ CMV (Assist Control/ Continuous Mandatory Ventilation)  RR (machine): 16, TV (machine): 400, FiO2: 21, PEEP: 5, ITime: 0.68, MAP: 9, PIP: 29  Cardiac: S1, S2  Gastrointestinal: Soft nontender, nondistended  Extremities: No edema    LABS:                        12.8   19.02 )-----------( 387      ( 15 Jul 2024 07:16 )             41.3     07-15    137  |  99  |  19  ----------------------------<  141<H>  3.8   |  24  |  <0.20<L>    Ca    9.4      15 Jul 2024 07:16  Phos  2.5     07-15  Mg     2.00     07-15          Urinalysis Basic - ( 15 Jul 2024 07:16 )    Color: x / Appearance: x / SG: x / pH: x  Gluc: 141 mg/dL / Ketone: x  / Bili: x / Urobili: x   Blood: x / Protein: x / Nitrite: x   Leuk Esterase: x / RBC: x / WBC x   Sq Epi: x / Non Sq Epi: x / Bacteria: x

## 2024-07-15 NOTE — PROGRESS NOTE ADULT - ASSESSMENT
Assessment: 69 YO Female with PMHx of ALS, Parkinson,, nonverbal, bed bound, chronic respiratory failure with tracheostomy and vent dependence, and oropharyngeal dysphagia with PEG who presented from home with tracheostomy leak now pending trache exchange.    PLAN  - Please Preop for OR Tomorrow(NPO, CBC, BMP w/ Mag and Phos, PT/INR, PTT, Type and Screen x 2)  - Tentative plan for Flex bronch, trache exchange w/ upsize  - Rest of care per primary team    Thoracic Surgery  [id number]

## 2024-07-15 NOTE — PROGRESS NOTE ADULT - SUBJECTIVE AND OBJECTIVE BOX
CHIEF COMPLAINT:Patient is a 68y old  Female who presents with a chief complaint of Other abnormality of breathing     (05 Jul 2024 10:47)      INTERVAL EVENTS:     ROS: Seen by bedside during AM rounds     OBJECTIVE:  ICU Vital Signs Last 24 Hrs  T(C): 36.2 (15 Jul 2024 04:00), Max: 36.9 (14 Jul 2024 16:00)  T(F): 97.2 (15 Jul 2024 04:00), Max: 98.5 (14 Jul 2024 16:00)  HR: 101 (15 Jul 2024 07:08) (91 - 102)  BP: 113/66 (15 Jul 2024 04:00) (113/66 - 152/75)  BP(mean): 79 (15 Jul 2024 04:00) (72 - 98)  ABP: --  ABP(mean): --  RR: 16 (15 Jul 2024 04:00) (16 - 16)  SpO2: 97% (15 Jul 2024 07:08) (94% - 99%)    O2 Parameters below as of 15 Jul 2024 04:00  Patient On (Oxygen Delivery Method): ventilator    O2 Concentration (%): 30      Mode: AC/ CMV (Assist Control/ Continuous Mandatory Ventilation), RR (machine): 16, TV (machine): 400, FiO2: 21, PEEP: 5, ITime: 0.68, MAP: 9, PIP: 31    07-14 @ 07:01  -  07-15 @ 07:00  --------------------------------------------------------  IN: 1160 mL / OUT: 1050 mL / NET: 110 mL      CAPILLARY BLOOD GLUCOSE          PHYSICAL EXAM:  General:   HEENT:   Lymph Nodes:  Neck:   Respiratory:   Cardiovascular:   Abdomen:   Extremities:   Skin:   Neurological:  Psychiatry:    Mode: AC/ CMV (Assist Control/ Continuous Mandatory Ventilation)  RR (machine): 16  TV (machine): 400  FiO2: 21  PEEP: 5  ITime: 0.68  MAP: 9  PIP: 31      HOSPITAL MEDICATIONS:  MEDICATIONS  (STANDING):  artificial  tears Solution 1 Drop(s) Both EYES every 6 hours  chlorhexidine 0.12% Liquid 15 milliLiter(s) Oral Mucosa every 12 hours  chlorhexidine 2% Cloths 1 Application(s) Topical daily  diazepam    Tablet 2 milliGRAM(s) Oral every 12 hours  enoxaparin Injectable 40 milliGRAM(s) SubCutaneous every 24 hours  lactated ringers. 1000 milliLiter(s) (100 mL/Hr) IV Continuous <Continuous>  multivitamin/minerals/iron Oral Solution (CENTRUM) 15 milliLiter(s) Oral daily  petrolatum Ophthalmic Ointment 1 Application(s) Both EYES at bedtime  riluzole 50 milliGRAM(s) Oral two times a day  sertraline 75 milliGRAM(s) Oral daily    MEDICATIONS  (PRN):  acetaminophen   Oral Liquid .. 650 milliGRAM(s) Oral every 6 hours PRN Temp greater or equal to 38C (100.4F), Mild Pain (1 - 3)  sodium chloride 3%  Inhalation 4 milliLiter(s) Inhalation every 12 hours PRN for congestion      LABS:                        12.8   19.02 )-----------( 387      ( 15 Jul 2024 07:16 )             41.3     07-15    137  |  99  |  19  ----------------------------<  141<H>  3.8   |  24  |  <0.20<L>    Ca    9.4      15 Jul 2024 07:16  Phos  2.5     07-15  Mg     2.00     07-15        Urinalysis Basic - ( 15 Jul 2024 07:16 )    Color: x / Appearance: x / SG: x / pH: x  Gluc: 141 mg/dL / Ketone: x  / Bili: x / Urobili: x   Blood: x / Protein: x / Nitrite: x   Leuk Esterase: x / RBC: x / WBC x   Sq Epi: x / Non Sq Epi: x / Bacteria: x         CHIEF COMPLAINT:Patient is a 68y old  Female who presents with a chief complaint of Other abnormality of breathing     (05 Jul 2024 10:47)      INTERVAL EVENTS:     ROS: Seen by bedside during AM rounds     OBJECTIVE:  ICU Vital Signs Last 24 Hrs  T(C): 36.2 (15 Jul 2024 04:00), Max: 36.9 (14 Jul 2024 16:00)  T(F): 97.2 (15 Jul 2024 04:00), Max: 98.5 (14 Jul 2024 16:00)  HR: 101 (15 Jul 2024 07:08) (91 - 102)  BP: 113/66 (15 Jul 2024 04:00) (113/66 - 152/75)  BP(mean): 79 (15 Jul 2024 04:00) (72 - 98)  ABP: --  ABP(mean): --  RR: 16 (15 Jul 2024 04:00) (16 - 16)  SpO2: 97% (15 Jul 2024 07:08) (94% - 99%)    O2 Parameters below as of 15 Jul 2024 04:00  Patient On (Oxygen Delivery Method): ventilator    O2 Concentration (%): 30      Mode: AC/ CMV (Assist Control/ Continuous Mandatory Ventilation), RR (machine): 16, TV (machine): 400, FiO2: 21, PEEP: 5, ITime: 0.68, MAP: 9, PIP: 31    07-14 @ 07:01  -  07-15 @ 07:00  --------------------------------------------------------  IN: 1160 mL / OUT: 1050 mL / NET: 110 mL      CAPILLARY BLOOD GLUCOSE          PHYSICAL EXAM:  General: NAD   Neck: (+) Trach tube noted, site c/d/i.  Cards: S1/S2, no murmurs   Pulm: CTA bilaterally. No wheezes. -380 with pPeaks mid 30s.  Abdomen: Soft, NTND. (+) PEG noted, site c/d/i.   Extremities: 1+ b/l UE pitting edema. Scant pedal edema. Extremities warm to touch. Intact distal pulses.   Neurology: Awake, eyes open. Nonverbal . Not following commands. Not withdrawing to pain or tracking.   Skin: warm to touch, color appropriate for ethnicity. Refer to RN assessment for further details.      Mode: AC/ CMV (Assist Control/ Continuous Mandatory Ventilation)  RR (machine): 16  TV (machine): 400  FiO2: 21  PEEP: 5  ITime: 0.68  MAP: 9  PIP: 31      HOSPITAL MEDICATIONS:  MEDICATIONS  (STANDING):  artificial  tears Solution 1 Drop(s) Both EYES every 6 hours  chlorhexidine 0.12% Liquid 15 milliLiter(s) Oral Mucosa every 12 hours  chlorhexidine 2% Cloths 1 Application(s) Topical daily  diazepam    Tablet 2 milliGRAM(s) Oral every 12 hours  enoxaparin Injectable 40 milliGRAM(s) SubCutaneous every 24 hours  lactated ringers. 1000 milliLiter(s) (100 mL/Hr) IV Continuous <Continuous>  multivitamin/minerals/iron Oral Solution (CENTRUM) 15 milliLiter(s) Oral daily  petrolatum Ophthalmic Ointment 1 Application(s) Both EYES at bedtime  riluzole 50 milliGRAM(s) Oral two times a day  sertraline 75 milliGRAM(s) Oral daily    MEDICATIONS  (PRN):  acetaminophen   Oral Liquid .. 650 milliGRAM(s) Oral every 6 hours PRN Temp greater or equal to 38C (100.4F), Mild Pain (1 - 3)  sodium chloride 3%  Inhalation 4 milliLiter(s) Inhalation every 12 hours PRN for congestion      LABS:                        12.8   19.02 )-----------( 387      ( 15 Jul 2024 07:16 )             41.3     07-15    137  |  99  |  19  ----------------------------<  141<H>  3.8   |  24  |  <0.20<L>    Ca    9.4      15 Jul 2024 07:16  Phos  2.5     07-15  Mg     2.00     07-15        Urinalysis Basic - ( 15 Jul 2024 07:16 )    Color: x / Appearance: x / SG: x / pH: x  Gluc: 141 mg/dL / Ketone: x  / Bili: x / Urobili: x   Blood: x / Protein: x / Nitrite: x   Leuk Esterase: x / RBC: x / WBC x   Sq Epi: x / Non Sq Epi: x / Bacteria: x         CHIEF COMPLAINT:Patient is a 68y old  Female who presents with a chief complaint of Other abnormality of breathing     (05 Jul 2024 10:47)      INTERVAL EVENTS: No acute clinical events overnight. Pt is nonverbal.     ROS: Seen by bedside during AM rounds     OBJECTIVE:  ICU Vital Signs Last 24 Hrs  T(C): 36.2 (15 Jul 2024 04:00), Max: 36.9 (14 Jul 2024 16:00)  T(F): 97.2 (15 Jul 2024 04:00), Max: 98.5 (14 Jul 2024 16:00)  HR: 101 (15 Jul 2024 07:08) (91 - 102)  BP: 113/66 (15 Jul 2024 04:00) (113/66 - 152/75)  BP(mean): 79 (15 Jul 2024 04:00) (72 - 98)  ABP: --  ABP(mean): --  RR: 16 (15 Jul 2024 04:00) (16 - 16)  SpO2: 97% (15 Jul 2024 07:08) (94% - 99%)    O2 Parameters below as of 15 Jul 2024 04:00  Patient On (Oxygen Delivery Method): ventilator    O2 Concentration (%): 30      Mode: AC/ CMV (Assist Control/ Continuous Mandatory Ventilation), RR (machine): 16, TV (machine): 400, FiO2: 21, PEEP: 5, ITime: 0.68, MAP: 9, PIP: 31    07-14 @ 07:01  -  07-15 @ 07:00  --------------------------------------------------------  IN: 1160 mL / OUT: 1050 mL / NET: 110 mL      CAPILLARY BLOOD GLUCOSE          PHYSICAL EXAM:  General: NAD   Neck: (+) Trach tube noted, site c/d/i.  Cards: S1/S2, no murmurs   Pulm: CTA bilaterally. No wheezes. -380 with pPeaks mid 30s.  Abdomen: Soft, NTND. (+) PEG noted, site c/d/i.   Extremities: 1+ b/l UE pitting edema. Scant pedal edema. Extremities warm to touch. Intact distal pulses.   Neurology: Awake, eyes open. Nonverbal . Not following commands. Not withdrawing to pain or tracking.   Skin: warm to touch, color appropriate for ethnicity. Refer to RN assessment for further details.      Mode: AC/ CMV (Assist Control/ Continuous Mandatory Ventilation)  RR (machine): 16  TV (machine): 400  FiO2: 21  PEEP: 5  ITime: 0.68  MAP: 9  PIP: 31      HOSPITAL MEDICATIONS:  MEDICATIONS  (STANDING):  artificial  tears Solution 1 Drop(s) Both EYES every 6 hours  chlorhexidine 0.12% Liquid 15 milliLiter(s) Oral Mucosa every 12 hours  chlorhexidine 2% Cloths 1 Application(s) Topical daily  diazepam    Tablet 2 milliGRAM(s) Oral every 12 hours  enoxaparin Injectable 40 milliGRAM(s) SubCutaneous every 24 hours  lactated ringers. 1000 milliLiter(s) (100 mL/Hr) IV Continuous <Continuous>  multivitamin/minerals/iron Oral Solution (CENTRUM) 15 milliLiter(s) Oral daily  petrolatum Ophthalmic Ointment 1 Application(s) Both EYES at bedtime  riluzole 50 milliGRAM(s) Oral two times a day  sertraline 75 milliGRAM(s) Oral daily    MEDICATIONS  (PRN):  acetaminophen   Oral Liquid .. 650 milliGRAM(s) Oral every 6 hours PRN Temp greater or equal to 38C (100.4F), Mild Pain (1 - 3)  sodium chloride 3%  Inhalation 4 milliLiter(s) Inhalation every 12 hours PRN for congestion      LABS:                        12.8   19.02 )-----------( 387      ( 15 Jul 2024 07:16 )             41.3     07-15    137  |  99  |  19  ----------------------------<  141<H>  3.8   |  24  |  <0.20<L>    Ca    9.4      15 Jul 2024 07:16  Phos  2.5     07-15  Mg     2.00     07-15        Urinalysis Basic - ( 15 Jul 2024 07:16 )    Color: x / Appearance: x / SG: x / pH: x  Gluc: 141 mg/dL / Ketone: x  / Bili: x / Urobili: x   Blood: x / Protein: x / Nitrite: x   Leuk Esterase: x / RBC: x / WBC x   Sq Epi: x / Non Sq Epi: x / Bacteria: x

## 2024-07-16 ENCOUNTER — TRANSCRIPTION ENCOUNTER (OUTPATIENT)
Age: 69
End: 2024-07-16

## 2024-07-16 LAB
ANION GAP SERPL CALC-SCNC: 17 MMOL/L — HIGH (ref 7–14)
BASOPHILS # BLD AUTO: 0.07 K/UL — SIGNIFICANT CHANGE UP (ref 0–0.2)
BASOPHILS NFR BLD AUTO: 0.4 % — SIGNIFICANT CHANGE UP (ref 0–2)
BUN SERPL-MCNC: 19 MG/DL — SIGNIFICANT CHANGE UP (ref 7–23)
CALCIUM SERPL-MCNC: 9.4 MG/DL — SIGNIFICANT CHANGE UP (ref 8.4–10.5)
CHLORIDE SERPL-SCNC: 97 MMOL/L — LOW (ref 98–107)
CO2 SERPL-SCNC: 23 MMOL/L — SIGNIFICANT CHANGE UP (ref 22–31)
CREAT SERPL-MCNC: <0.2 MG/DL — LOW (ref 0.5–1.3)
EGFR: 126 ML/MIN/1.73M2 — SIGNIFICANT CHANGE UP
EOSINOPHIL # BLD AUTO: 0.18 K/UL — SIGNIFICANT CHANGE UP (ref 0–0.5)
EOSINOPHIL NFR BLD AUTO: 1.1 % — SIGNIFICANT CHANGE UP (ref 0–6)
GLUCOSE SERPL-MCNC: 163 MG/DL — HIGH (ref 70–99)
HCT VFR BLD CALC: 41.1 % — SIGNIFICANT CHANGE UP (ref 34.5–45)
HGB BLD-MCNC: 13.1 G/DL — SIGNIFICANT CHANGE UP (ref 11.5–15.5)
IANC: 13.17 K/UL — HIGH (ref 1.8–7.4)
IMM GRANULOCYTES NFR BLD AUTO: 0.9 % — SIGNIFICANT CHANGE UP (ref 0–0.9)
LYMPHOCYTES # BLD AUTO: 12.2 % — LOW (ref 13–44)
LYMPHOCYTES # BLD AUTO: 2 K/UL — SIGNIFICANT CHANGE UP (ref 1–3.3)
MAGNESIUM SERPL-MCNC: 2.2 MG/DL — SIGNIFICANT CHANGE UP (ref 1.6–2.6)
MCHC RBC-ENTMCNC: 26.8 PG — LOW (ref 27–34)
MCHC RBC-ENTMCNC: 31.9 GM/DL — LOW (ref 32–36)
MCV RBC AUTO: 84.2 FL — SIGNIFICANT CHANGE UP (ref 80–100)
MONOCYTES # BLD AUTO: 0.84 K/UL — SIGNIFICANT CHANGE UP (ref 0–0.9)
MONOCYTES NFR BLD AUTO: 5.1 % — SIGNIFICANT CHANGE UP (ref 2–14)
NEUTROPHILS # BLD AUTO: 13.17 K/UL — HIGH (ref 1.8–7.4)
NEUTROPHILS NFR BLD AUTO: 80.3 % — HIGH (ref 43–77)
NRBC # BLD: 0 /100 WBCS — SIGNIFICANT CHANGE UP (ref 0–0)
NRBC # FLD: 0 K/UL — SIGNIFICANT CHANGE UP (ref 0–0)
PHOSPHATE SERPL-MCNC: 3 MG/DL — SIGNIFICANT CHANGE UP (ref 2.5–4.5)
PLATELET # BLD AUTO: 357 K/UL — SIGNIFICANT CHANGE UP (ref 150–400)
POTASSIUM SERPL-MCNC: 4 MMOL/L — SIGNIFICANT CHANGE UP (ref 3.5–5.3)
POTASSIUM SERPL-SCNC: 4 MMOL/L — SIGNIFICANT CHANGE UP (ref 3.5–5.3)
RBC # BLD: 4.88 M/UL — SIGNIFICANT CHANGE UP (ref 3.8–5.2)
RBC # FLD: 16.2 % — HIGH (ref 10.3–14.5)
SODIUM SERPL-SCNC: 137 MMOL/L — SIGNIFICANT CHANGE UP (ref 135–145)
WBC # BLD: 16.41 K/UL — HIGH (ref 3.8–10.5)
WBC # FLD AUTO: 16.41 K/UL — HIGH (ref 3.8–10.5)

## 2024-07-16 PROCEDURE — 99231 SBSQ HOSP IP/OBS SF/LOW 25: CPT

## 2024-07-16 PROCEDURE — 99233 SBSQ HOSP IP/OBS HIGH 50: CPT | Mod: FS

## 2024-07-16 RX ADMIN — SERTRALINE HYDROCHLORIDE 75 MILLIGRAM(S): 50 TABLET, FILM COATED ORAL at 11:28

## 2024-07-16 RX ADMIN — ACETAMINOPHEN 650 MILLIGRAM(S): 325 TABLET ORAL at 01:08

## 2024-07-16 RX ADMIN — RILUZOLE 50 MILLIGRAM(S): 5 LIQUID ORAL at 17:08

## 2024-07-16 RX ADMIN — POVIDONE, PROPYLENE GLYCOL 1 DROP(S): 6.8; 3 LIQUID OPHTHALMIC at 05:06

## 2024-07-16 RX ADMIN — POVIDONE, PROPYLENE GLYCOL 1 DROP(S): 6.8; 3 LIQUID OPHTHALMIC at 11:28

## 2024-07-16 RX ADMIN — POVIDONE, PROPYLENE GLYCOL 1 DROP(S): 6.8; 3 LIQUID OPHTHALMIC at 17:09

## 2024-07-16 RX ADMIN — Medication 2 MILLIGRAM(S): at 17:08

## 2024-07-16 RX ADMIN — RILUZOLE 50 MILLIGRAM(S): 5 LIQUID ORAL at 05:07

## 2024-07-16 RX ADMIN — CHLORHEXIDINE GLUCONATE 15 MILLILITER(S): 40 SOLUTION TOPICAL at 17:08

## 2024-07-16 RX ADMIN — CHLORHEXIDINE GLUCONATE 1 APPLICATION(S): 40 SOLUTION TOPICAL at 11:25

## 2024-07-16 RX ADMIN — Medication 2 MILLIGRAM(S): at 05:07

## 2024-07-16 RX ADMIN — ENOXAPARIN SODIUM 40 MILLIGRAM(S): 100 INJECTION SUBCUTANEOUS at 05:06

## 2024-07-16 RX ADMIN — Medication 15 MILLILITER(S): at 11:28

## 2024-07-16 RX ADMIN — POVIDONE, PROPYLENE GLYCOL 1 DROP(S): 6.8; 3 LIQUID OPHTHALMIC at 00:28

## 2024-07-16 RX ADMIN — CHLORHEXIDINE GLUCONATE 15 MILLILITER(S): 40 SOLUTION TOPICAL at 05:07

## 2024-07-16 NOTE — PROGRESS NOTE ADULT - SUBJECTIVE AND OBJECTIVE BOX
Pt seen in RCU. REsting in bed.  Alert. On Vent.   No events overnight.     Vital Signs Last 24 Hrs  T(C): 36.6 (16 Jul 2024 12:00), Max: 37.4 (16 Jul 2024 00:00)  T(F): 97.8 (16 Jul 2024 12:00), Max: 99.4 (16 Jul 2024 00:00)  HR: 101 (16 Jul 2024 15:03) (90 - 110)  BP: 115/77 (16 Jul 2024 12:00) (109/67 - 124/73)  BP(mean): 88 (16 Jul 2024 04:00) (81 - 88)  RR: 16 (16 Jul 2024 12:00) (16 - 19)  SpO2: 97% (16 Jul 2024 15:03) (95% - 99%)    Parameters below as of 16 Jul 2024 12:00  Patient On (Oxygen Delivery Method): ventilator    O2 Concentration (%): 20    Alert  Unable to communicate  On Vent  Trach site c/d/i  Hold TV and O2 sats.  No s/s of leaking      A/P: Assessment: 69 YO Female with PMHx of ALS, Parkinson,, nonverbal, bed bound, chronic respiratory failure with tracheostomy and vent dependence, and oropharyngeal dysphagia with PEG who presented from home with tracheostomy leak now pending trach exchange.    Plan OR tomorrow with Dr. Owens for Trach exchange  NPO after 12mn tonight/Hold TF  Coags, Type and screen  Above d/w RCU team and pt's aide at bedside.

## 2024-07-16 NOTE — DISCHARGE NOTE PROVIDER - CARE PROVIDER_API CALL
Pranav Newman  Critical Care Medicine  [address] [facility] [address]  [place] [address]  Phone: [phone]  Fax: [phone]  Follow Up Time:

## 2024-07-16 NOTE — DISCHARGE NOTE PROVIDER - NSDCMRMEDTOKEN_GEN_ALL_CORE_FT
diazePAM 5 mg/5 mL oral solution: 2 milliliter(s) by gastrostomy tube 4 times a day as needed for -  Intermittent catheter tray touchless plus closed system tip 14Fr without balloon vinyl: Every 8 hours  Multiple Vitamins oral tablet: 1 tab(s) by gastrostomy tube once a day  ocular lubricant ophthalmic solution: 1 drop(s) to each affected eye every 2 hours   riluzole 50 mg oral tablet: 1 tab(s) by gastrostomy tube 2 times a day (before meals)  Sertraline: 75 milligram(s) by PEG tube once a day  Sodium Chloride, Inhalation 3% inhalation solution: 1 puff(s) inhaled 4 times a day as needed for  congestion  Xarelto 10 mg oral tablet: 1 tab(s) by gastrostomy tube once a day   diazePAM 5 mg/5 mL oral solution: 2 milliliter(s) by gastrostomy tube 4 times a day as needed for -  erythromycin 0.5% ophthalmic ointment: 1 application in each eye every 3 hours while awake apply lg amount to each eye and cover with tegaderm as directed  ipratropium-albuterol 0.5 mg-2.5 mg/3 mL inhalation solution: 3 milliliter(s) inhaled every 6 hours  levoFLOXacin 500 mg oral tablet: 1 tab(s) orally once a day  Multiple Vitamins oral tablet: 1 tab(s) by gastrostomy tube once a day  ocular lubricant ophthalmic solution: 1 drop(s) to each affected eye every hour  ofloxacin 0.3% ophthalmic solution: 1 drop(s) in each eye 2 times a day  riluzole 50 mg oral tablet: 1 tab(s) by gastrostomy tube 2 times a day (before meals)  Sertraline: 75 milligram(s) by PEG tube once a day  Sodium Chloride, Inhalation 3% inhalation solution: 1 puff(s) inhaled 4 times a day as needed for  congestion  Xarelto 10 mg oral tablet: 1 tab(s) by gastrostomy tube once a day

## 2024-07-16 NOTE — PROGRESS NOTE ADULT - ASSESSMENT
ASSESSMENT AND PLAN:   69 YO Female with PMHx of ALS, Parkinson,, nonverbal, bed bound, chronic respiratory failure with tracheostomy and vent dependence, and oropharyngeal dysphagia with PEG who presented from home with tracheostomy leak. She was noted with vent alarm and poor TVe (250-290) at home. Trach changed at home with no improvement and sent in for thoracic evaluation. While in MICU no air leak noted with hyperinflated cuff. Transferred to RCU on 7/5    NEUROLOGY  # ALS   - Baseline nonverbal, awake, and communicates with eye movement.   - Continue on home rilutek 50mg BID     PSYCH   # Anxiety and Depression   - Continue on home valium 2mg BID   - Continue on zoloft 75mg QD while in house (on 4cc/ 80mg QD at home)     CARDIOVASCULAR  - No acute issues   - Monitor HR and BP     RESPIRATORY  # Tracheostomy leak   - At home noted with TVe 200-300s despite tracheostomy change to 80XLTCD.   - No airleak noted in house with hyperinflated cuff likely tracheomegaly?   - Continue on vent 16/400/5/21   - Continue HTS PRN   - Thoracic was planning on taking pt to OR for trach revision/upsizing 7/8 then 7/9 but due to 9XLT trach on back order, plan for sometime in the future (potentially 7/15 or so)   - Tentative plan for Flex bronch, trach exchange w/ upsize  - Plan for OR on 7/17     GI  # Dysphagia   - Continue on PEG-TF   - Monitor BMs   - Abdomen mildly distended & monitoring for now     RENAL  - No acute issues   - Monitor renal function and UOP     #Hyperlactatemia  - Lactate 3.5 on admission, downtrended , then back up to 4.4   - S/P 1L LR with improvement in lactate (7/6 & 7/7)    INFECTIOUS DISEASE  # Leukocytosis   - No fever or chills and noted prior leukocytosis   - CXR with prior atelectasis and no acute findings   - Uptrending Lactate so UA sent returning with POSITIVE Nitrites (7/6) but given no fever and improving leukocytosis initially held off ABX   - SCx (7/5) grew pansensitive PSA  - BCx (7/6) x 2 NGTD, UCx with coag neg staph (susceptibilities not performed)   - MRSA PCR (7/6) POSITIVE for both MRSA/MSSA - Start Bactroban (7/6 - 7/10)  - Given uptrending WBC count, start of thick yellow secretions, and known positive UA, will start empiric Zosyn (7/8 - 7/14) for UTI (+/-) possible trachitiis.   - WBC count uptrending (19.02 on 7/15), will hold off on abx and continue to monitor.     HEME  - On Xarelto 10mg at home likely for DVT PPX?   - Hold home Xarelto in prep for OR   - DVT PPX with LVX for now     ENDOCRINE  - No hx of DM2   - Monitor BG     SKIN  - Basic trach and PEG care ordered     ETHICS/ GOC    - FULL CODE   - Dr Tyson Velasquez,  involved in care    DISPO - Back home with  when able.    ASSESSMENT AND PLAN:   69 YO Female with PMHx of ALS, Parkinson,, nonverbal, bed bound, chronic respiratory failure with tracheostomy and vent dependence, and oropharyngeal dysphagia with PEG who presented from home with tracheostomy leak. She was noted with vent alarm and poor TVe (250-290) at home. Trach changed at home with no improvement and sent in for thoracic evaluation. While in MICU no air leak noted with hyperinflated cuff. Transferred to RCU on 7/5    NEUROLOGY  # ALS   - Baseline nonverbal, awake, and communicates with eye movement.   - Continue on home rilutek 50mg BID     PSYCH   # Anxiety and Depression   - Continue on home valium 2mg BID   - Continue on zoloft 75mg QD while in house (on 4cc/ 80mg QD at home)     CARDIOVASCULAR  - No acute issues   - Monitor HR and BP     RESPIRATORY  # Tracheostomy leak   - At home noted with TVe 200-300s despite tracheostomy change to 80XLTCD.   - No airleak noted in house with hyperinflated cuff likely tracheomegaly?   - Continue on vent 16/400/5/21   - Continue HTS PRN   - Thoracic was planning on taking pt to OR for trach revision/upsizing 7/8 then 7/9 but due to 9XLT trach on back order, it was postponed  - Plan for OR on 7/17 for flex bronch, trach tube upsizing with CTSx    GI  # Dysphagia   - Continue on PEG-TF   - Monitor BMs   - Abdomen mildly distended & monitoring for now     RENAL  - No acute issues   - Monitor renal function and UOP     #Hyperlactatemia  - Lactate 3.5 on admission, downtrended , then back up to 4.4   - S/P 1L LR with improvement in lactate (7/6 & 7/7)    INFECTIOUS DISEASE  # Leukocytosis   - No fever or chills and noted prior leukocytosis   - CXR with prior atelectasis and no acute findings   - Uptrending Lactate so UA sent returning with POSITIVE Nitrites (7/6) but given no fever and improving leukocytosis initially held off ABX   - SCx (7/5) grew pansensitive PSA  - BCx (7/6) x 2 NGTD, UCx with coag neg staph (susceptibilities not performed)   - MRSA PCR (7/6) POSITIVE for both MRSA/MSSA s/p Bactroban (7/6 - 7/10)  - Given uptrending WBC count, development of thick yellow secretions, and known positive UA, given empiric Zosyn (7/8 - 7/14) for UTI (+/-) possible trachitiis.   - WBC uptrending but will monitor off ABX for now    HEME  - On Xarelto 10mg at home likely for DVT PPX?   - Hold home Xarelto in prep for OR   - DVT PPX with LVX for now     ENDOCRINE  - No hx of DM2   - Monitor BG     SKIN  - Basic trach and PEG care ordered     ETHICS/ GOC    - FULL CODE   - Dr Tyson Velasquez,  involved in care    DISPO - Back home with  when able.

## 2024-07-16 NOTE — PROGRESS NOTE ADULT - NS ATTEND AMEND GEN_ALL_CORE FT
67 yo F w/ PMH of ALS, chronic respiratory failure w/ tracheostomy and vent dependence, oropharyngeal dysphagia w/ PEG, Parkinson's disease, non verbal at baseline presents w/ tracheostomy air leak.   w/ set volume at 400. Tracheostomy changed at home with no improvement and so presented for further evaluation.     - baseline mental status, on home regimen: Rilutek 50 mg bid, Valium 2 mg bid, Zolof 75 mg daily   - On VC//16/+5/30%, RR 16   - Thoracic surgery to take patient to OR for trach revision/upsizing. Procedure delayed due to 9XLT trach on back order, (potentially rescheduled to this week 7/17?  - continue tube feeds via PEG, will hold NPO due to possible procedure tomorrow  - leukocytosis, tx for UTI and possible tracheitis. On Zosyn 7/8 - 7/14 7 day course. Leukocytosis but no other localizing signs of infection. Will monitor off abx for now   - sputum cx pansensitive pseudomonas.

## 2024-07-16 NOTE — DISCHARGE NOTE PROVIDER - NSDCCPCAREPLAN_GEN_ALL_CORE_FT
PRINCIPAL DISCHARGE DIAGNOSIS  Diagnosis: Ventilatory defect  Assessment and Plan of Treatment: You were admitted for air leak via your tracheostomy tube while on mechanical ventilation. You underwent...... with Thoracic Surgery. Continue mechanical ventilation per home settings and trach tube care.      SECONDARY DISCHARGE DIAGNOSES  Diagnosis: Acute respiratory infection  Assessment and Plan of Treatment: Your WBC count was elevated and you had thick secretions suggesting trachitiis (infection of the trachea). You also were found to have a positive urinalysis indicating possible UTI. You completed antibiotics in house.     PRINCIPAL DISCHARGE DIAGNOSIS  Diagnosis: Ventilatory defect  Assessment and Plan of Treatment: You were admitted for air leak via your tracheostomy tube while on mechanical ventilation. You underwent trach exchange  with Thoracic Surgery.  You were changed to a 9mm Bivona trach   Continue mechanical ventilation per home settings and trach tube care.  AC  RR 16 PEEP 5 and Fi02 30%      SECONDARY DISCHARGE DIAGNOSES  Diagnosis: Acute respiratory infection  Assessment and Plan of Treatment: Your WBC count was elevated and you had thick secretions suggesting trachitiis (infection of the trachea). You also were found to have a positive urinalysis indicating possible UTI. You completed antibiotics in house.     PRINCIPAL DISCHARGE DIAGNOSIS  Diagnosis: Ventilatory defect  Assessment and Plan of Treatment: You were admitted for air leak via your tracheostomy tube while on mechanical ventilation. You underwent trach exchange  with Thoracic Surgery.  You were changed to a 9mm Bivona trach   Continue mechanical ventilation per home settings and trach tube care.  AC  RR 16 PEEP 5 and Fi02 30%      SECONDARY DISCHARGE DIAGNOSES  Diagnosis: Acute respiratory infection  Assessment and Plan of Treatment: Your WBC count was elevated and you had thick secretions suggesting trachitiis (infection of the trachea). You also were found to have a positive urinalysis indicating possible UTI. You completed antibiotics in house.    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 Nurses shown how to make at bedside. The tegaderm should not be placed tightly overlying the eye, and there should be ointment covering the entirety of the exposed eye. Place moisturizing ointment around areas of face where tegaderm is to stick so that patient does not develop skin irritation    - CW Artificial tears every 1 hour while awake to both eyes prior to ointment   - Can have 6 hour period during day during which should continue with ointment/drop regimen but without tegaderm        PRINCIPAL DISCHARGE DIAGNOSIS  Diagnosis: Ventilatory defect  Assessment and Plan of Treatment: You were admitted for air leak via your tracheostomy tube while on mechanical ventilation. You underwent trach exchange  with Thoracic Surgery.  You were changed to a 9.5mm Bivona trach   Information for trach -ICU Medical/Bivona RepRadhika [phone]  Continue mechanical ventilation per home settings and trach tube care.  AC  RR 16 PEEP 5 and Fi02 30%      SECONDARY DISCHARGE DIAGNOSES  Diagnosis: Acute respiratory infection  Assessment and Plan of Treatment: Your WBC count was elevated and you had thick secretions suggesting trachitiis (infection of the trachea). You also were found to have a positive urinalysis indicating possible UTI. You completed antibiotics in house.    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 Nurses shown how to make at bedside. The tegaderm should not be placed tightly overlying the eye, and there should be ointment covering the entirety of the exposed eye. Place moisturizing ointment around areas of face where tegaderm is to stick so that patient does not develop skin irritation    - CW Artificial tears every 1 hour while awake to both eyes prior to ointment   - Can have 6 hour period during day during which should continue with ointment/drop regimen but without tegaderm

## 2024-07-16 NOTE — DISCHARGE NOTE PROVIDER - HOSPITAL COURSE
69 YO Female with PMHx of ALS, Parkinson,, nonverbal, bed bound, chronic respiratory failure with tracheostomy and vent dependence, and oropharyngeal dysphagia with PEG who presented from home with tracheostomy leak. She was noted with vent alarm and poor TVe (250-290) at home. Trach changed at home with no improvement and sent in for thoracic evaluation. While in MICU no air leak noted with hyperinflated cuff. Eventually transferred to the RCU on 7/5. VTs remained stable while admitted as well as serial blood gas measurements. Course c/b leukocytosis, thick secretions and POSITIVE UA so was treated with 7 day course of Zosyn empirically for trachiitis (+/-) UTI. SCX grew PSA and UCX grew CoNS. Discussed case with Thoracic Surgery, plan for tracheal exchange and upsize however needed tracheostomy tube was on backorder delaying procedure.     Patient underwent treacheostomy tube exchange on 7/7.....      ** INCOMPLETE NOTE ** 67 YO Female with PMHx of ALS, Parkinson,, nonverbal, bed bound, chronic respiratory failure with tracheostomy and vent dependence, and oropharyngeal dysphagia with PEG who presented from home with tracheostomy leak. She was noted with vent alarm and poor TVe (250-290) at home. Trach changed at home with no improvement and sent in for thoracic evaluation. While in MICU no air leak noted with hyperinflated cuff. Eventually transferred to the RCU on 7/5. VTs remained stable while admitted as well as serial blood gas measurements. Course c/b leukocytosis, thick secretions and POSITIVE UA so was treated with 7 day course of Zosyn empirically for trachiitis (+/-) UTI. SCX grew PSA and UCX grew CoNS. Discussed case with Thoracic Surgery, plan for tracheal exchange and upsize however needed tracheostomy tube was on backorder delaying procedure.     Patient underwent treacheostomy tube exchange on 7/17 to # 9mm Bivona   Pt seen and cleared by Dr. Vyas on July , 2024   Dispo: Home with nursing 67 YO Female with PMHx of ALS, Parkinson,, nonverbal, bed bound, chronic respiratory failure with tracheostomy and vent dependence, and oropharyngeal dysphagia with PEG who presented from home with tracheostomy leak. She was noted with vent alarm and poor TVe (250-290) at home. Trach changed at home with no improvement and sent in for thoracic evaluation. While in MICU no air leak noted with hyperinflated cuff. Eventually transferred to the RCU on 7/5. VTs remained stable while admitted as well as serial blood gas measurements. Course c/b leukocytosis, thick secretions and POSITIVE UA so was treated with 7 day course of Zosyn empirically for trachiitis (+/-) UTI. SCX grew PSA and UCX grew CoNS. Discussed case with Thoracic Surgery, plan for tracheal exchange and upsize however needed tracheostomy tube was on backorder delaying procedure.     Patient underwent tracheostomy tube exchange on 7/17 to # 9mm Bivona   Pt seen and cleared by Dr. Vyas on July 18 , 2024   Dispo: Home with private duty nursing 67 YO Female with PMHx of ALS, Parkinson,, nonverbal, bed bound, chronic respiratory failure with tracheostomy and vent dependence, and oropharyngeal dysphagia with PEG who presented from home with tracheostomy leak. She was noted with vent alarm and poor TVe (250-290) at home. Trach changed at home with no improvement and sent in for thoracic evaluation. While in MICU no air leak noted with hyperinflated cuff. Eventually transferred to the RCU on 7/5. VTs remained stable while admitted as well as serial blood gas measurements. Course c/b leukocytosis, thick secretions and POSITIVE UA so was treated with 7 day course of Zosyn empirically for trachiitis (+/-) UTI. SCX grew PSA and UCX grew CoNS. Discussed case with Thoracic Surgery, plan for tracheal exchange and upsize however needed tracheostomy tube was on backorder delaying procedure.     Patient underwent tracheostomy tube exchange on 7/17 to # 9mm Bivona however continued with a large air leak  Custom trach was ordered by Dr. Owens - thoracic surgerya and took weeks for delivery and replacement spare trach was defective New trachs ordered and one of two trachs were defective after sterilization leaving one spare to be sent with patient 67 YO Female with PMHx of ALS, Parkinson,, nonverbal, bed bound, chronic respiratory failure with tracheostomy and vent dependence, and oropharyngeal dysphagia with PEG who presented from home with tracheostomy leak. She was noted with vent alarm and poor TVe (250-290) at home. Trach changed at home with no improvement and sent in for thoracic evaluation. While in MICU no air leak noted with hyperinflated cuff. Eventually transferred to the RCU on 7/5. VTs remained stable while admitted as well as serial blood gas measurements. Course c/b leukocytosis, thick secretions and POSITIVE UA so was treated with 7 day course of Zosyn empirically for trachiitis (+/-) UTI. SCX grew PSA and UCX grew CoNS. Discussed case with Thoracic Surgery, plan for tracheal exchange and upsize however needed tracheostomy tube was on backorder delaying procedure.     Patient underwent tracheostomy tube exchange on 7/17 to # 9mm Bivona however continued with a large air leak  Custom trach was ordered by Dr. Owens - thoracic surgerya and took weeks for delivery and replacement spare trach was defective New trachs ordered and one of two trachs were defective after sterilization leaving one spare to be sent with patient   Pt had trach replaced with a custom trach 9.5mm Bovana on 8/12 We have been waiting for replacement trachs to be sent with patient for future changes and need to have spare available 67 YO Female with PMHx of ALS, Parkinson,, nonverbal, bed bound, chronic respiratory failure with tracheostomy and vent dependence, and oropharyngeal dysphagia with PEG who presented from home with tracheostomy leak. She was noted with vent alarm and poor TVe (250-290) at home. Trach changed at home with no improvement and sent in for thoracic evaluation. While in MICU no air leak noted with hyperinflated cuff. Eventually transferred to the RCU on 7/5. VTs remained stable while admitted as well as serial blood gas measurements. Course c/b leukocytosis, thick secretions and POSITIVE UA so was treated with 7 day course of Zosyn empirically for trachiitis (+/-) UTI. SCX grew PSA and UCX grew CoNS. Discussed case with Thoracic Surgery, plan for tracheal exchange and upsize however needed tracheostomy tube was on backorder delaying procedure.     Patient underwent tracheostomy tube exchange on 7/17 to # 9mm Bivona however continued with a large air leak  Custom trach was ordered by Dr. Owens - thoracic surgerya and took weeks for delivery and replacement spare trach was defective New trachs ordered and one of two trachs were defective after sterilization leaving one spare to be sent with patient   Pt had trach replaced with a custom trach 9.5mm Bovana on 8/12 We have been waiting for replacement trachs which arrived 8/29  Pt seen and cleared for dc home by Dr. Rae  Dispo: Home with HOme RN/aide

## 2024-07-16 NOTE — DISCHARGE NOTE PROVIDER - INSTRUCTIONS
TF resumed at 50cc/hr post op    3. Patient may resume back to home regimen (@70ml/hr) when medically feasible to be discharged home.

## 2024-07-16 NOTE — DISCHARGE NOTE PROVIDER - NSDCFUSCHEDAPPT_GEN_ALL_CORE_FT
Mervin Owens  [facility] Physician [facility] [phone]   Scheduled Appointment: 07/17/2024

## 2024-07-16 NOTE — PROGRESS NOTE ADULT - SUBJECTIVE AND OBJECTIVE BOX
CHIEF COMPLAINT:Patient is a 68y old  Female who presents with a chief complaint of Other abnormality of breathing     (05 Jul 2024 10:47)      INTERVAL EVENTS:     ROS: Seen by bedside during AM rounds     OBJECTIVE:  ICU Vital Signs Last 24 Hrs  T(C): 36.4 (16 Jul 2024 04:00), Max: 37.4 (16 Jul 2024 00:00)  T(F): 97.5 (16 Jul 2024 04:00), Max: 99.4 (16 Jul 2024 00:00)  HR: 104 (16 Jul 2024 04:00) (90 - 110)  BP: 124/73 (16 Jul 2024 04:00) (100/65 - 124/73)  BP(mean): 88 (16 Jul 2024 04:00) (81 - 88)  ABP: --  ABP(mean): --  RR: 16 (16 Jul 2024 04:00) (16 - 19)  SpO2: 95% (16 Jul 2024 04:00) (95% - 99%)    O2 Parameters below as of 16 Jul 2024 04:00  Patient On (Oxygen Delivery Method): ventilator    O2 Concentration (%): 21      Mode: AC/ CMV (Assist Control/ Continuous Mandatory Ventilation), RR (machine): 16, TV (machine): 400, FiO2: 21, PEEP: 5, ITime: 0.67, MAP: 8, PIP: 28    07-15 @ 07:01  -  07-16 @ 07:00  --------------------------------------------------------  IN: 1090 mL / OUT: 900 mL / NET: 190 mL      CAPILLARY BLOOD GLUCOSE          PHYSICAL EXAM:  General:   HEENT:   Lymph Nodes:  Neck:   Respiratory:   Cardiovascular:   Abdomen:   Extremities:   Skin:   Neurological:  Psychiatry:    Mode: AC/ CMV (Assist Control/ Continuous Mandatory Ventilation)  RR (machine): 16  TV (machine): 400  FiO2: 21  PEEP: 5  ITime: 0.67  MAP: 8  PIP: 28      HOSPITAL MEDICATIONS:  MEDICATIONS  (STANDING):  artificial  tears Solution 1 Drop(s) Both EYES every 6 hours  chlorhexidine 0.12% Liquid 15 milliLiter(s) Oral Mucosa every 12 hours  chlorhexidine 2% Cloths 1 Application(s) Topical daily  diazepam    Tablet 2 milliGRAM(s) Oral every 12 hours  enoxaparin Injectable 40 milliGRAM(s) SubCutaneous every 24 hours  lactated ringers. 1000 milliLiter(s) (100 mL/Hr) IV Continuous <Continuous>  multivitamin/minerals/iron Oral Solution (CENTRUM) 15 milliLiter(s) Oral daily  petrolatum Ophthalmic Ointment 1 Application(s) Both EYES at bedtime  riluzole 50 milliGRAM(s) Oral two times a day  sertraline 75 milliGRAM(s) Oral daily    MEDICATIONS  (PRN):  acetaminophen   Oral Liquid .. 650 milliGRAM(s) Oral every 6 hours PRN Temp greater or equal to 38C (100.4F), Mild Pain (1 - 3)  sodium chloride 3%  Inhalation 4 milliLiter(s) Inhalation every 12 hours PRN for congestion      LABS:                        12.8   19.02 )-----------( 387      ( 15 Jul 2024 07:16 )             41.3     07-15    137  |  99  |  19  ----------------------------<  141<H>  3.8   |  24  |  <0.20<L>    Ca    9.4      15 Jul 2024 07:16  Phos  2.5     07-15  Mg     2.00     07-15        Urinalysis Basic - ( 15 Jul 2024 07:16 )    Color: x / Appearance: x / SG: x / pH: x  Gluc: 141 mg/dL / Ketone: x  / Bili: x / Urobili: x   Blood: x / Protein: x / Nitrite: x   Leuk Esterase: x / RBC: x / WBC x   Sq Epi: x / Non Sq Epi: x / Bacteria: x         CHIEF COMPLAINT:Patient is a 68y old  Female who presents with a chief complaint of Other abnormality of breathing     (05 Jul 2024 10:47)      INTERVAL EVENTS:     ROS: Seen by bedside during AM rounds     OBJECTIVE:  ICU Vital Signs Last 24 Hrs  T(C): 36.4 (16 Jul 2024 04:00), Max: 37.4 (16 Jul 2024 00:00)  T(F): 97.5 (16 Jul 2024 04:00), Max: 99.4 (16 Jul 2024 00:00)  HR: 104 (16 Jul 2024 04:00) (90 - 110)  BP: 124/73 (16 Jul 2024 04:00) (100/65 - 124/73)  BP(mean): 88 (16 Jul 2024 04:00) (81 - 88)  ABP: --  ABP(mean): --  RR: 16 (16 Jul 2024 04:00) (16 - 19)  SpO2: 95% (16 Jul 2024 04:00) (95% - 99%)    O2 Parameters below as of 16 Jul 2024 04:00  Patient On (Oxygen Delivery Method): ventilator    O2 Concentration (%): 21      Mode: AC/ CMV (Assist Control/ Continuous Mandatory Ventilation), RR (machine): 16, TV (machine): 400, FiO2: 21, PEEP: 5, ITime: 0.67, MAP: 8, PIP: 28    07-15 @ 07:01  -  07-16 @ 07:00  --------------------------------------------------------  IN: 1090 mL / OUT: 900 mL / NET: 190 mL      CAPILLARY BLOOD GLUCOSE          PHYSICAL EXAM:  General: NAD   Neck: (+) Trach tube noted, site c/d/i.  Cards: S1/S2, no murmurs   Pulm: Mildly rhonchorous b/l. No resp distress.. -380   Abdomen: Soft, Mildly distended. Nontender. (+) PEG noted, site c/d/i.   Extremities: Scant b/l UE & LE pitting edema. Extremities warm to touch. Intact distal pulses...  Neurology: Awake, eyes open. Nonverbal . Not following commands. Not withdrawing to pain or tracking.   Skin: warm to touch, color appropriate for ethnicity. Refer to RN assessment for further details.      Mode: AC/ CMV (Assist Control/ Continuous Mandatory Ventilation)  RR (machine): 16  TV (machine): 400  FiO2: 21  PEEP: 5  ITime: 0.67  MAP: 8  PIP: 28      HOSPITAL MEDICATIONS:  MEDICATIONS  (STANDING):  artificial  tears Solution 1 Drop(s) Both EYES every 6 hours  chlorhexidine 0.12% Liquid 15 milliLiter(s) Oral Mucosa every 12 hours  chlorhexidine 2% Cloths 1 Application(s) Topical daily  diazepam    Tablet 2 milliGRAM(s) Oral every 12 hours  enoxaparin Injectable 40 milliGRAM(s) SubCutaneous every 24 hours  lactated ringers. 1000 milliLiter(s) (100 mL/Hr) IV Continuous <Continuous>  multivitamin/minerals/iron Oral Solution (CENTRUM) 15 milliLiter(s) Oral daily  petrolatum Ophthalmic Ointment 1 Application(s) Both EYES at bedtime  riluzole 50 milliGRAM(s) Oral two times a day  sertraline 75 milliGRAM(s) Oral daily    MEDICATIONS  (PRN):  acetaminophen   Oral Liquid .. 650 milliGRAM(s) Oral every 6 hours PRN Temp greater or equal to 38C (100.4F), Mild Pain (1 - 3)  sodium chloride 3%  Inhalation 4 milliLiter(s) Inhalation every 12 hours PRN for congestion      LABS:                        12.8   19.02 )-----------( 387      ( 15 Jul 2024 07:16 )             41.3     07-15    137  |  99  |  19  ----------------------------<  141<H>  3.8   |  24  |  <0.20<L>    Ca    9.4      15 Jul 2024 07:16  Phos  2.5     07-15  Mg     2.00     07-15        Urinalysis Basic - ( 15 Jul 2024 07:16 )    Color: x / Appearance: x / SG: x / pH: x  Gluc: 141 mg/dL / Ketone: x  / Bili: x / Urobili: x   Blood: x / Protein: x / Nitrite: x   Leuk Esterase: x / RBC: x / WBC x   Sq Epi: x / Non Sq Epi: x / Bacteria: x         CHIEF COMPLAINT:Patient is a 68y old  Female who presents with a chief complaint of Other abnormality of breathing     (05 Jul 2024 10:47)      INTERVAL EVENTS:     ROS: Seen by bedside during AM rounds     OBJECTIVE:  ICU Vital Signs Last 24 Hrs  T(C): 36.4 (16 Jul 2024 04:00), Max: 37.4 (16 Jul 2024 00:00)  T(F): 97.5 (16 Jul 2024 04:00), Max: 99.4 (16 Jul 2024 00:00)  HR: 104 (16 Jul 2024 04:00) (90 - 110)  BP: 124/73 (16 Jul 2024 04:00) (100/65 - 124/73)  BP(mean): 88 (16 Jul 2024 04:00) (81 - 88)  ABP: --  ABP(mean): --  RR: 16 (16 Jul 2024 04:00) (16 - 19)  SpO2: 95% (16 Jul 2024 04:00) (95% - 99%)    O2 Parameters below as of 16 Jul 2024 04:00  Patient On (Oxygen Delivery Method): ventilator    O2 Concentration (%): 21      Mode: AC/ CMV (Assist Control/ Continuous Mandatory Ventilation), RR (machine): 16, TV (machine): 400, FiO2: 21, PEEP: 5, ITime: 0.67, MAP: 8, PIP: 28    07-15 @ 07:01  -  07-16 @ 07:00  --------------------------------------------------------  IN: 1090 mL / OUT: 900 mL / NET: 190 mL      CAPILLARY BLOOD GLUCOSE          PHYSICAL EXAM:  General: NAD   Neck: (+) Trach tube noted, site c/d/i.  Cards: S1/S2, no murmurs   Pulm: Mildly rhonchorous b/l. No resp distress.. -380   Abdomen: Soft, Mildly distended. Nontender. (+) PEG noted, site c/d/i.   Extremities: Scant b/l UE & LE pitting edema. Extremities warm to touch. Intact distal pulses.  Neurology: Awake, eyes open. Nonverbal . Not following commands. Not withdrawing to pain or tracking.   Skin: warm to touch, color appropriate for ethnicity. Refer to RN assessment for further details.      Mode: AC/ CMV (Assist Control/ Continuous Mandatory Ventilation)  RR (machine): 16  TV (machine): 400  FiO2: 21  PEEP: 5  ITime: 0.67  MAP: 8  PIP: 28      HOSPITAL MEDICATIONS:  MEDICATIONS  (STANDING):  artificial  tears Solution 1 Drop(s) Both EYES every 6 hours  chlorhexidine 0.12% Liquid 15 milliLiter(s) Oral Mucosa every 12 hours  chlorhexidine 2% Cloths 1 Application(s) Topical daily  diazepam    Tablet 2 milliGRAM(s) Oral every 12 hours  enoxaparin Injectable 40 milliGRAM(s) SubCutaneous every 24 hours  lactated ringers. 1000 milliLiter(s) (100 mL/Hr) IV Continuous <Continuous>  multivitamin/minerals/iron Oral Solution (CENTRUM) 15 milliLiter(s) Oral daily  petrolatum Ophthalmic Ointment 1 Application(s) Both EYES at bedtime  riluzole 50 milliGRAM(s) Oral two times a day  sertraline 75 milliGRAM(s) Oral daily    MEDICATIONS  (PRN):  acetaminophen   Oral Liquid .. 650 milliGRAM(s) Oral every 6 hours PRN Temp greater or equal to 38C (100.4F), Mild Pain (1 - 3)  sodium chloride 3%  Inhalation 4 milliLiter(s) Inhalation every 12 hours PRN for congestion      LABS:                        12.8   19.02 )-----------( 387      ( 15 Jul 2024 07:16 )             41.3     07-15    137  |  99  |  19  ----------------------------<  141<H>  3.8   |  24  |  <0.20<L>    Ca    9.4      15 Jul 2024 07:16  Phos  2.5     07-15  Mg     2.00     07-15        Urinalysis Basic - ( 15 Jul 2024 07:16 )    Color: x / Appearance: x / SG: x / pH: x  Gluc: 141 mg/dL / Ketone: x  / Bili: x / Urobili: x   Blood: x / Protein: x / Nitrite: x   Leuk Esterase: x / RBC: x / WBC x   Sq Epi: x / Non Sq Epi: x / Bacteria: x         CHIEF COMPLAINT:Patient is a 68y old  Female who presents with a chief complaint of Other abnormality of breathing     (05 Jul 2024 10:47)      INTERVAL EVENTS: no acute overnight events noted. WBC downtrending. Remains afebrile. VTs appropriate on vent.    ROS: Unable to obtain ROS given patient is nonverbal b/l    OBJECTIVE:  ICU Vital Signs Last 24 Hrs  T(C): 36.4 (16 Jul 2024 04:00), Max: 37.4 (16 Jul 2024 00:00)  T(F): 97.5 (16 Jul 2024 04:00), Max: 99.4 (16 Jul 2024 00:00)  HR: 104 (16 Jul 2024 04:00) (90 - 110)  BP: 124/73 (16 Jul 2024 04:00) (100/65 - 124/73)  BP(mean): 88 (16 Jul 2024 04:00) (81 - 88)  ABP: --  ABP(mean): --  RR: 16 (16 Jul 2024 04:00) (16 - 19)  SpO2: 95% (16 Jul 2024 04:00) (95% - 99%)    O2 Parameters below as of 16 Jul 2024 04:00  Patient On (Oxygen Delivery Method): ventilator    O2 Concentration (%): 21      Mode: AC/ CMV (Assist Control/ Continuous Mandatory Ventilation), RR (machine): 16, TV (machine): 400, FiO2: 21, PEEP: 5, ITime: 0.67, MAP: 8, PIP: 28    07-15 @ 07:01  -  07-16 @ 07:00  --------------------------------------------------------  IN: 1090 mL / OUT: 900 mL / NET: 190 mL      CAPILLARY BLOOD GLUCOSE          PHYSICAL EXAM:  General: NAD   Neck: (+) Trach tube noted, site c/d/i.  Cards: S1/S2, no murmurs   Pulm: Mildly rhonchorous b/l. No resp distress.. -380   Abdomen: Soft, Mildly distended. Nontender. (+) PEG noted, site c/d/i.   Extremities: Scant b/l UE & LE pitting edema. Extremities warm to touch. Intact distal pulses.  Neurology: Awake, eyes open. Nonverbal . Not following commands. Not withdrawing to pain or tracking.   Skin: warm to touch, color appropriate for ethnicity. Refer to RN assessment for further details.      Mode: AC/ CMV (Assist Control/ Continuous Mandatory Ventilation)  RR (machine): 16  TV (machine): 400  FiO2: 21  PEEP: 5  ITime: 0.67  MAP: 8  PIP: 28      HOSPITAL MEDICATIONS:  MEDICATIONS  (STANDING):  artificial  tears Solution 1 Drop(s) Both EYES every 6 hours  chlorhexidine 0.12% Liquid 15 milliLiter(s) Oral Mucosa every 12 hours  chlorhexidine 2% Cloths 1 Application(s) Topical daily  diazepam    Tablet 2 milliGRAM(s) Oral every 12 hours  enoxaparin Injectable 40 milliGRAM(s) SubCutaneous every 24 hours  lactated ringers. 1000 milliLiter(s) (100 mL/Hr) IV Continuous <Continuous>  multivitamin/minerals/iron Oral Solution (CENTRUM) 15 milliLiter(s) Oral daily  petrolatum Ophthalmic Ointment 1 Application(s) Both EYES at bedtime  riluzole 50 milliGRAM(s) Oral two times a day  sertraline 75 milliGRAM(s) Oral daily    MEDICATIONS  (PRN):  acetaminophen   Oral Liquid .. 650 milliGRAM(s) Oral every 6 hours PRN Temp greater or equal to 38C (100.4F), Mild Pain (1 - 3)  sodium chloride 3%  Inhalation 4 milliLiter(s) Inhalation every 12 hours PRN for congestion      LABS:                        12.8   19.02 )-----------( 387      ( 15 Jul 2024 07:16 )             41.3     07-15    137  |  99  |  19  ----------------------------<  141<H>  3.8   |  24  |  <0.20<L>    Ca    9.4      15 Jul 2024 07:16  Phos  2.5     07-15  Mg     2.00     07-15        Urinalysis Basic - ( 15 Jul 2024 07:16 )    Color: x / Appearance: x / SG: x / pH: x  Gluc: 141 mg/dL / Ketone: x  / Bili: x / Urobili: x   Blood: x / Protein: x / Nitrite: x   Leuk Esterase: x / RBC: x / WBC x   Sq Epi: x / Non Sq Epi: x / Bacteria: x

## 2024-07-16 NOTE — DISCHARGE NOTE PROVIDER - NSDCFUADDAPPT_GEN_ALL_CORE_FT
Pt will need continuous follow up for eye care with:  [facility] Department of Ophthalmology  [address]. [address]  [place] [id number] [phone]

## 2024-07-17 ENCOUNTER — APPOINTMENT (OUTPATIENT)
Dept: THORACIC SURGERY | Facility: HOSPITAL | Age: 69
End: 2024-07-17

## 2024-07-17 LAB
ANION GAP SERPL CALC-SCNC: 17 MMOL/L — HIGH (ref 7–14)
APTT BLD: 33.1 SEC — SIGNIFICANT CHANGE UP (ref 24.5–35.6)
BASOPHILS # BLD AUTO: 0.07 K/UL — SIGNIFICANT CHANGE UP (ref 0–0.2)
BASOPHILS NFR BLD AUTO: 0.4 % — SIGNIFICANT CHANGE UP (ref 0–2)
BLD GP AB SCN SERPL QL: NEGATIVE — SIGNIFICANT CHANGE UP
BUN SERPL-MCNC: 16 MG/DL — SIGNIFICANT CHANGE UP (ref 7–23)
CALCIUM SERPL-MCNC: 9.7 MG/DL — SIGNIFICANT CHANGE UP (ref 8.4–10.5)
CHLORIDE SERPL-SCNC: 101 MMOL/L — SIGNIFICANT CHANGE UP (ref 98–107)
CO2 SERPL-SCNC: 22 MMOL/L — SIGNIFICANT CHANGE UP (ref 22–31)
CREAT SERPL-MCNC: <0.2 MG/DL — LOW (ref 0.5–1.3)
EGFR: 126 ML/MIN/1.73M2 — SIGNIFICANT CHANGE UP
EOSINOPHIL # BLD AUTO: 0.2 K/UL — SIGNIFICANT CHANGE UP (ref 0–0.5)
EOSINOPHIL NFR BLD AUTO: 1.2 % — SIGNIFICANT CHANGE UP (ref 0–6)
GLUCOSE SERPL-MCNC: 123 MG/DL — HIGH (ref 70–99)
HCT VFR BLD CALC: 40.9 % — SIGNIFICANT CHANGE UP (ref 34.5–45)
HGB BLD-MCNC: 12.9 G/DL — SIGNIFICANT CHANGE UP (ref 11.5–15.5)
IANC: 13.3 K/UL — HIGH (ref 1.8–7.4)
IMM GRANULOCYTES NFR BLD AUTO: 0.9 % — SIGNIFICANT CHANGE UP (ref 0–0.9)
INR BLD: 1.18 RATIO — SIGNIFICANT CHANGE UP (ref 0.85–1.18)
LYMPHOCYTES # BLD AUTO: 14.9 % — SIGNIFICANT CHANGE UP (ref 13–44)
LYMPHOCYTES # BLD AUTO: 2.56 K/UL — SIGNIFICANT CHANGE UP (ref 1–3.3)
MAGNESIUM SERPL-MCNC: 2.2 MG/DL — SIGNIFICANT CHANGE UP (ref 1.6–2.6)
MCHC RBC-ENTMCNC: 26.2 PG — LOW (ref 27–34)
MCHC RBC-ENTMCNC: 31.5 GM/DL — LOW (ref 32–36)
MCV RBC AUTO: 83 FL — SIGNIFICANT CHANGE UP (ref 80–100)
MONOCYTES # BLD AUTO: 0.93 K/UL — HIGH (ref 0–0.9)
MONOCYTES NFR BLD AUTO: 5.4 % — SIGNIFICANT CHANGE UP (ref 2–14)
NEUTROPHILS # BLD AUTO: 13.3 K/UL — HIGH (ref 1.8–7.4)
NEUTROPHILS NFR BLD AUTO: 77.2 % — HIGH (ref 43–77)
NRBC # BLD: 0 /100 WBCS — SIGNIFICANT CHANGE UP (ref 0–0)
NRBC # FLD: 0 K/UL — SIGNIFICANT CHANGE UP (ref 0–0)
PHOSPHATE SERPL-MCNC: 3.2 MG/DL — SIGNIFICANT CHANGE UP (ref 2.5–4.5)
PLATELET # BLD AUTO: 366 K/UL — SIGNIFICANT CHANGE UP (ref 150–400)
POTASSIUM SERPL-MCNC: 4.1 MMOL/L — SIGNIFICANT CHANGE UP (ref 3.5–5.3)
POTASSIUM SERPL-SCNC: 4.1 MMOL/L — SIGNIFICANT CHANGE UP (ref 3.5–5.3)
PROTHROM AB SERPL-ACNC: 13.3 SEC — HIGH (ref 9.5–13)
RBC # BLD: 4.93 M/UL — SIGNIFICANT CHANGE UP (ref 3.8–5.2)
RBC # FLD: 16.4 % — HIGH (ref 10.3–14.5)
RH IG SCN BLD-IMP: POSITIVE — SIGNIFICANT CHANGE UP
SODIUM SERPL-SCNC: 140 MMOL/L — SIGNIFICANT CHANGE UP (ref 135–145)
WBC # BLD: 17.21 K/UL — HIGH (ref 3.8–10.5)
WBC # FLD AUTO: 17.21 K/UL — HIGH (ref 3.8–10.5)

## 2024-07-17 PROCEDURE — 71045 X-RAY EXAM CHEST 1 VIEW: CPT | Mod: 26

## 2024-07-17 PROCEDURE — 31502 CHANGE OF WINDPIPE AIRWAY: CPT

## 2024-07-17 DEVICE — IMPLANTABLE DEVICE: Type: IMPLANTABLE DEVICE | Status: FUNCTIONAL

## 2024-07-17 RX ADMIN — RILUZOLE 50 MILLIGRAM(S): 5 LIQUID ORAL at 05:05

## 2024-07-17 RX ADMIN — Medication 500 MILLILITER(S): at 18:39

## 2024-07-17 RX ADMIN — POVIDONE, PROPYLENE GLYCOL 1 DROP(S): 6.8; 3 LIQUID OPHTHALMIC at 22:22

## 2024-07-17 RX ADMIN — ENOXAPARIN SODIUM 40 MILLIGRAM(S): 100 INJECTION SUBCUTANEOUS at 05:05

## 2024-07-17 RX ADMIN — POVIDONE, PROPYLENE GLYCOL 1 DROP(S): 6.8; 3 LIQUID OPHTHALMIC at 00:19

## 2024-07-17 RX ADMIN — SERTRALINE HYDROCHLORIDE 75 MILLIGRAM(S): 50 TABLET, FILM COATED ORAL at 11:22

## 2024-07-17 RX ADMIN — CHLORHEXIDINE GLUCONATE 15 MILLILITER(S): 40 SOLUTION TOPICAL at 17:11

## 2024-07-17 RX ADMIN — POVIDONE, PROPYLENE GLYCOL 1 DROP(S): 6.8; 3 LIQUID OPHTHALMIC at 11:22

## 2024-07-17 RX ADMIN — RILUZOLE 50 MILLIGRAM(S): 5 LIQUID ORAL at 17:12

## 2024-07-17 RX ADMIN — POVIDONE, PROPYLENE GLYCOL 1 DROP(S): 6.8; 3 LIQUID OPHTHALMIC at 05:05

## 2024-07-17 RX ADMIN — POVIDONE, PROPYLENE GLYCOL 1 APPLICATION(S): 6.8; 3 LIQUID OPHTHALMIC at 22:22

## 2024-07-17 RX ADMIN — CHLORHEXIDINE GLUCONATE 15 MILLILITER(S): 40 SOLUTION TOPICAL at 05:05

## 2024-07-17 RX ADMIN — Medication 2 MILLIGRAM(S): at 17:11

## 2024-07-17 RX ADMIN — Medication 15 MILLILITER(S): at 11:22

## 2024-07-17 RX ADMIN — CHLORHEXIDINE GLUCONATE 1 APPLICATION(S): 40 SOLUTION TOPICAL at 11:23

## 2024-07-17 RX ADMIN — POVIDONE, PROPYLENE GLYCOL 1 APPLICATION(S): 6.8; 3 LIQUID OPHTHALMIC at 00:19

## 2024-07-17 RX ADMIN — POVIDONE, PROPYLENE GLYCOL 1 DROP(S): 6.8; 3 LIQUID OPHTHALMIC at 17:11

## 2024-07-17 RX ADMIN — Medication 2 MILLIGRAM(S): at 05:05

## 2024-07-17 NOTE — CHART NOTE - NSCHARTNOTEFT_GEN_A_CORE
Patient s/p Flex Bronch, Trach exchange to [address] [place].  Trach connected to vent, trach site clean and dry.    Vital Signs Last 24 Hrs  T(C): 36.2 (17 Jul 2024 16:00), Max: 36.9 (17 Jul 2024 12:00)  T(F): 97.2 (17 Jul 2024 16:00), Max: 98.5 (17 Jul 2024 12:00)  HR: 89 (17 Jul 2024 16:00) (89 - 110)  BP: 128/84 (17 Jul 2024 16:00) (99/65 - 134/72)  BP(mean): 96 (17 Jul 2024 16:00) (78 - 96)  RR: 16 (17 Jul 2024 16:00) (16 - 17)  SpO2: 100% (17 Jul 2024 16:00) (96% - 100%)    Parameters below as of 17 Jul 2024 16:00  Patient On (Oxygen Delivery Method): ventilator    O2 Concentration (%): 21    Mode: AC/ CMV (Assist Control/ Continuous Mandatory Ventilation)  RR (machine): 16  TV (machine): 400  FiO2: 21  PEEP: 5  ITime: 0.64  MAP: 9  PIP: 23    MEDICATIONS  (STANDING):  artificial  tears Solution 1 Drop(s) Both EYES every 6 hours  chlorhexidine 0.12% Liquid 15 milliLiter(s) Oral Mucosa every 12 hours  chlorhexidine 2% Cloths 1 Application(s) Topical daily  diazepam    Tablet 2 milliGRAM(s) Oral every 12 hours  enoxaparin Injectable 40 milliGRAM(s) SubCutaneous every 24 hours  multivitamin/minerals/iron Oral Solution (CENTRUM) 15 milliLiter(s) Oral daily  petrolatum Ophthalmic Ointment 1 Application(s) Both EYES at bedtime  riluzole 50 milliGRAM(s) Oral two times a day  sertraline 75 milliGRAM(s) Oral daily    A/P: S/P Flex Bronch, Trach Exchange  Trach care as needed   Continue care as per primary team

## 2024-07-17 NOTE — PROGRESS NOTE ADULT - SUBJECTIVE AND OBJECTIVE BOX
CHIEF COMPLAINT: Patient is a 68y old  Female who presents with a chief complaint of Other abnormality of breathing (05 Jul 2024 10:47)      INTERVAL EVENTS:  - No interval events overnight   - Plan for thoracic OR for trach upsize today     REVIEW OF SYSTEMS: Seen by bedside during AM rounds and     Mode: AC/ CMV (Assist Control/ Continuous Mandatory Ventilation), RR (machine): 16, TV (machine): 400, FiO2: 21, PEEP: 5, ITime: 0.7, MAP: 9, PIP: 31      OBJECTIVE:  ICU Vital Signs Last 24 Hrs  T(C): 36.7 (17 Jul 2024 04:00), Max: 36.7 (16 Jul 2024 16:00)  T(F): 98 (17 Jul 2024 04:00), Max: 98.1 (16 Jul 2024 16:00)  HR: 102 (17 Jul 2024 04:40) (96 - 110)  BP: 119/69 (17 Jul 2024 04:00) (99/65 - 135/75)  BP(mean): 85 (17 Jul 2024 04:00) (78 - 92)  ABP: --  ABP(mean): --  RR: 16 (17 Jul 2024 04:00) (16 - 17)  SpO2: 99% (17 Jul 2024 04:40) (95% - 99%)    O2 Parameters below as of 17 Jul 2024 04:00  Patient On (Oxygen Delivery Method): ventilator    O2 Concentration (%): 21      Mode: AC/ CMV (Assist Control/ Continuous Mandatory Ventilation), RR (machine): 16, TV (machine): 400, FiO2: 21, PEEP: 5, ITime: 0.7, MAP: 9, PIP: 31    07-16 @ 07:01  -  07-17 @ 07:00  --------------------------------------------------------  IN: 780 mL / OUT: 900 mL / NET: -120 mL      CAPILLARY BLOOD GLUCOSE          HOSPITAL MEDICATIONS:  MEDICATIONS  (STANDING):  artificial  tears Solution 1 Drop(s) Both EYES every 6 hours  chlorhexidine 0.12% Liquid 15 milliLiter(s) Oral Mucosa every 12 hours  chlorhexidine 2% Cloths 1 Application(s) Topical daily  diazepam    Tablet 2 milliGRAM(s) Oral every 12 hours  enoxaparin Injectable 40 milliGRAM(s) SubCutaneous every 24 hours  multivitamin/minerals/iron Oral Solution (CENTRUM) 15 milliLiter(s) Oral daily  petrolatum Ophthalmic Ointment 1 Application(s) Both EYES at bedtime  riluzole 50 milliGRAM(s) Oral two times a day  sertraline 75 milliGRAM(s) Oral daily    MEDICATIONS  (PRN):  acetaminophen   Oral Liquid .. 650 milliGRAM(s) Oral every 6 hours PRN Temp greater or equal to 38C (100.4F), Mild Pain (1 - 3)  sodium chloride 3%  Inhalation 4 milliLiter(s) Inhalation every 12 hours PRN for congestion      PHYSICAL EXAMINATION    LABS:                        12.9   17.21 )-----------( 366      ( 17 Jul 2024 05:14 )             40.9     07-16    137  |  97<L>  |  19  ----------------------------<  163<H>  4.0   |  23  |  <0.20<L>    Ca    9.4      16 Jul 2024 05:35  Phos  3.0     07-16  Mg     2.20     07-16      PT/INR - ( 17 Jul 2024 05:14 )   PT: 13.3 sec;   INR: 1.18 ratio      PTT - ( 17 Jul 2024 05:14 )  PTT:33.1 sec    Urinalysis Basic - ( 16 Jul 2024 05:35 )  Color: x / Appearance: x / SG: x / pH: x  Gluc: 163 mg/dL / Ketone: x  / Bili: x / Urobili: x   Blood: x / Protein: x / Nitrite: x   Leuk Esterase: x / RBC: x / WBC x   Sq Epi: x / Non Sq Epi: x / Bacteria: x            PAST MEDICAL & SURGICAL HISTORY:  ALS (amyotrophic lateral sclerosis)      HLD (hyperlipidemia)      Ventilator dependent  Since 2015      Aspiration into airway      S/P gastrostomy  10/14 for dysphagia  receives jevity 2 cans TID      Dependence on tracheostomy      Encounter for PEG (percutaneous endoscopic gastrostomy)  peg placed          FAMILY HISTORY:  No pertinent family history in first degree relatives        Social History:      RADIOLOGY:  [ ] Reviewed and interpreted by me    PULMONARY FUNCTION TESTS:    EKG: CHIEF COMPLAINT: Patient is a 68y old  Female who presents with a chief complaint of Other abnormality of breathing (05 Jul 2024 10:47)      INTERVAL EVENTS:  - No interval events overnight   - Plan for thoracic OR for trach upsize today     REVIEW OF SYSTEMS: Seen by bedside during AM rounds and unable to assess ROS as vented     Mode: AC/ CMV (Assist Control/ Continuous Mandatory Ventilation), RR (machine): 16, TV (machine): 400, FiO2: 21, PEEP: 5, ITime: 0.7, MAP: 9, PIP: 31      OBJECTIVE:  ICU Vital Signs Last 24 Hrs  T(C): 36.7 (17 Jul 2024 04:00), Max: 36.7 (16 Jul 2024 16:00)  T(F): 98 (17 Jul 2024 04:00), Max: 98.1 (16 Jul 2024 16:00)  HR: 102 (17 Jul 2024 04:40) (96 - 110)  BP: 119/69 (17 Jul 2024 04:00) (99/65 - 135/75)  BP(mean): 85 (17 Jul 2024 04:00) (78 - 92)  ABP: --  ABP(mean): --  RR: 16 (17 Jul 2024 04:00) (16 - 17)  SpO2: 99% (17 Jul 2024 04:40) (95% - 99%)    O2 Parameters below as of 17 Jul 2024 04:00  Patient On (Oxygen Delivery Method): ventilator    O2 Concentration (%): 21      Mode: AC/ CMV (Assist Control/ Continuous Mandatory Ventilation), RR (machine): 16, TV (machine): 400, FiO2: 21, PEEP: 5, ITime: 0.7, MAP: 9, PIP: 31    07-16 @ 07:01  -  07-17 @ 07:00  --------------------------------------------------------  IN: 780 mL / OUT: 900 mL / NET: -120 mL      CAPILLARY BLOOD GLUCOSE          HOSPITAL MEDICATIONS:  MEDICATIONS  (STANDING):  artificial  tears Solution 1 Drop(s) Both EYES every 6 hours  chlorhexidine 0.12% Liquid 15 milliLiter(s) Oral Mucosa every 12 hours  chlorhexidine 2% Cloths 1 Application(s) Topical daily  diazepam    Tablet 2 milliGRAM(s) Oral every 12 hours  enoxaparin Injectable 40 milliGRAM(s) SubCutaneous every 24 hours  multivitamin/minerals/iron Oral Solution (CENTRUM) 15 milliLiter(s) Oral daily  petrolatum Ophthalmic Ointment 1 Application(s) Both EYES at bedtime  riluzole 50 milliGRAM(s) Oral two times a day  sertraline 75 milliGRAM(s) Oral daily    MEDICATIONS  (PRN):  acetaminophen   Oral Liquid .. 650 milliGRAM(s) Oral every 6 hours PRN Temp greater or equal to 38C (100.4F), Mild Pain (1 - 3)  sodium chloride 3%  Inhalation 4 milliLiter(s) Inhalation every 12 hours PRN for congestion      PHYSICAL EXAMINATION  General: NAD   HEENT: Trach present   Cards: S1/S2, no murmurs   Pulm: Course vent sounds bilaterally. No wheezes.   Abdomen: Soft, nondistended and nontender. BS (+) PEG present.   Extremities: No pedal edema. No active NATIVIDAD of BL upper and lower extremities.  Neurology: Awake with eyes open, however unable to follow commands second to baseline ALS with no acute focal neurological deficits     LABS:                        12.9   17.21 )-----------( 366      ( 17 Jul 2024 05:14 )             40.9     07-16    137  |  97<L>  |  19  ----------------------------<  163<H>  4.0   |  23  |  <0.20<L>    Ca    9.4      16 Jul 2024 05:35  Phos  3.0     07-16  Mg     2.20     07-16      PT/INR - ( 17 Jul 2024 05:14 )   PT: 13.3 sec;   INR: 1.18 ratio      PTT - ( 17 Jul 2024 05:14 )  PTT:33.1 sec    Urinalysis Basic - ( 16 Jul 2024 05:35 )  Color: x / Appearance: x / SG: x / pH: x  Gluc: 163 mg/dL / Ketone: x  / Bili: x / Urobili: x   Blood: x / Protein: x / Nitrite: x   Leuk Esterase: x / RBC: x / WBC x   Sq Epi: x / Non Sq Epi: x / Bacteria: x            PAST MEDICAL & SURGICAL HISTORY:  ALS (amyotrophic lateral sclerosis)      HLD (hyperlipidemia)      Ventilator dependent  Since 2015      Aspiration into airway      S/P gastrostomy  10/14 for dysphagia  receives jevity 2 cans TID      Dependence on tracheostomy      Encounter for PEG (percutaneous endoscopic gastrostomy)  peg placed          FAMILY HISTORY:  No pertinent family history in first degree relatives        Social History:      RADIOLOGY:  [ ] Reviewed and interpreted by me    PULMONARY FUNCTION TESTS:    EKG: CHIEF COMPLAINT: Patient is a 68y old  Female who presents with a chief complaint of Other abnormality of breathing (05 Jul 2024 10:47)      INTERVAL EVENTS:  - No interval events overnight   - Plan for thoracic OR for trach upsize today     REVIEW OF SYSTEMS: Seen by bedside during AM rounds and unable to assess ROS as vented     Mode: AC/ CMV (Assist Control/ Continuous Mandatory Ventilation), RR (machine): 16, TV (machine): 400, FiO2: 21, PEEP: 5, ITime: 0.7, MAP: 9, PIP: 31      OBJECTIVE:  ICU Vital Signs Last 24 Hrs  T(C): 36.7 (17 Jul 2024 04:00), Max: 36.7 (16 Jul 2024 16:00)  T(F): 98 (17 Jul 2024 04:00), Max: 98.1 (16 Jul 2024 16:00)  HR: 102 (17 Jul 2024 04:40) (96 - 110)  BP: 119/69 (17 Jul 2024 04:00) (99/65 - 135/75)  BP(mean): 85 (17 Jul 2024 04:00) (78 - 92)  ABP: --  ABP(mean): --  RR: 16 (17 Jul 2024 04:00) (16 - 17)  SpO2: 99% (17 Jul 2024 04:40) (95% - 99%)    O2 Parameters below as of 17 Jul 2024 04:00  Patient On (Oxygen Delivery Method): ventilator    O2 Concentration (%): 21      Mode: AC/ CMV (Assist Control/ Continuous Mandatory Ventilation), RR (machine): 16, TV (machine): 400, FiO2: 21, PEEP: 5, ITime: 0.7, MAP: 9, PIP: 31    07-16 @ 07:01  -  07-17 @ 07:00  --------------------------------------------------------  IN: 780 mL / OUT: 900 mL / NET: -120 mL      CAPILLARY BLOOD GLUCOSE          HOSPITAL MEDICATIONS:  MEDICATIONS  (STANDING):  artificial  tears Solution 1 Drop(s) Both EYES every 6 hours  chlorhexidine 0.12% Liquid 15 milliLiter(s) Oral Mucosa every 12 hours  chlorhexidine 2% Cloths 1 Application(s) Topical daily  diazepam    Tablet 2 milliGRAM(s) Oral every 12 hours  enoxaparin Injectable 40 milliGRAM(s) SubCutaneous every 24 hours  multivitamin/minerals/iron Oral Solution (CENTRUM) 15 milliLiter(s) Oral daily  petrolatum Ophthalmic Ointment 1 Application(s) Both EYES at bedtime  riluzole 50 milliGRAM(s) Oral two times a day  sertraline 75 milliGRAM(s) Oral daily    MEDICATIONS  (PRN):  acetaminophen   Oral Liquid .. 650 milliGRAM(s) Oral every 6 hours PRN Temp greater or equal to 38C (100.4F), Mild Pain (1 - 3)  sodium chloride 3%  Inhalation 4 milliLiter(s) Inhalation every 12 hours PRN for congestion      PHYSICAL EXAMINATION  General: NAD   HEENT: Trach present   Cards: S1/S2, no murmurs   Pulm: Course vent sounds bilaterally. No wheezes.   Abdomen: Mildly distended abdominal, however soft and nontender. BS (+) PEG present.   Extremities: No pedal edema. No active NATIVIDAD of BL upper and lower extremities.  Neurology: Awake with eyes open, however unable to follow commands second to baseline ALS with no acute focal neurological deficits     LABS:                        12.9   17.21 )-----------( 366      ( 17 Jul 2024 05:14 )             40.9     07-16    137  |  97<L>  |  19  ----------------------------<  163<H>  4.0   |  23  |  <0.20<L>    Ca    9.4      16 Jul 2024 05:35  Phos  3.0     07-16  Mg     2.20     07-16      PT/INR - ( 17 Jul 2024 05:14 )   PT: 13.3 sec;   INR: 1.18 ratio      PTT - ( 17 Jul 2024 05:14 )  PTT:33.1 sec    Urinalysis Basic - ( 16 Jul 2024 05:35 )  Color: x / Appearance: x / SG: x / pH: x  Gluc: 163 mg/dL / Ketone: x  / Bili: x / Urobili: x   Blood: x / Protein: x / Nitrite: x   Leuk Esterase: x / RBC: x / WBC x   Sq Epi: x / Non Sq Epi: x / Bacteria: x            PAST MEDICAL & SURGICAL HISTORY:  ALS (amyotrophic lateral sclerosis)      HLD (hyperlipidemia)      Ventilator dependent  Since 2015      Aspiration into airway      S/P gastrostomy  10/14 for dysphagia  receives jevity 2 cans TID      Dependence on tracheostomy      Encounter for PEG (percutaneous endoscopic gastrostomy)  peg placed          FAMILY HISTORY:  No pertinent family history in first degree relatives        Social History:      RADIOLOGY:  [ ] Reviewed and interpreted by me    PULMONARY FUNCTION TESTS:    EKG:

## 2024-07-17 NOTE — PROGRESS NOTE ADULT - ASSESSMENT
67 YO Female with PMHx of ALS, Parkinson,, nonverbal, bed bound, chronic respiratory failure with tracheostomy and vent dependence, and oropharyngeal dysphagia with PEG who presented from home with tracheostomy leak. She was noted with vent alarm and poor TVe (250-290) at home. Trach changed at home with no improvement and sent in for thoracic evaluation. While in MICU no air leak noted with hyperinflated cuff. Transferred to [facility] on 7/5 with course complicated by leukocytosis second to UTI vs trachitis and completed zosyn course.     NEUROLOGY  # ALS   - Baseline nonverbal, awake, and communicates with eye movement.   - Continue on home rilutek 50mg BID     PSYCH   # Anxiety and Depression   - Continue on home valium 2mg BID   - Continue on zoloft 75mg QD while in house (on 4cc/ 80mg QD at home)     CARDIOVASCULAR  - No acute issues   - Monitor HR and BP     RESPIRATORY  # Tracheostomy leak   - At home noted with TVe 200-300s despite tracheostomy change to 80XLTCD.   - No airleak noted in house with hyperinflated cuff likely tracheomegaly?   - Continue on vent 16/400/5/21 and HTS PRN   - Thoracic plan for OR today for flex bronch and trach tube upsizing to 9XLT    GI  # Dysphagia   - Continue on PEG-TF   - Monitor BMs     RENAL  - No acute issues   - Monitor renal function and UOP     # Hyperlactatemia  - Lactate 3.5 on admission and improved with rehydration.     INFECTIOUS DISEASE  # Leukocytosis   - No fever or chills and noted prior leukocytosis   - CXR with prior atelectasis and no acute findings   - Uptrending Lactate so UA sent returning with POSITIVE Nitrites (7/6) but given no fever and improving leukocytosis initially held off ABX   - SCx (7/5) grew pansensitive PSA  - BCx (7/6) x 2 NGTD, UCx with coag neg staph (susceptibilities not performed)   - MRSA PCR (7/6) POSITIVE for both MRSA/MSSA s/p Bactroban (7/6 - 7/10)  - Leukocytosis noted with development of thick yellow secretions and positive UA s/p empiric Zosyn (7/8 - 7/14), however WBX continues to wax and wane without fever and will monitor off ABX .     HEME  - On Xarelto 10mg at home likely for DVT PPX?   - Hold home Xarelto in prep for OR   - DVT PPX with LVX for now     ENDOCRINE  - No hx of DM2   - Monitor BG     SKIN  - Basic trach and PEG care ordered     ETHICS/ GOC    - FULL CODE   - Dr Tyson Velasquez,  involved in care    DISPO - Back home with  when able.    67 YO Female with PMHx of ALS, Parkinson,, nonverbal, bed bound, chronic respiratory failure with tracheostomy and vent dependence, and oropharyngeal dysphagia with PEG who presented from home with tracheostomy leak. She was noted with vent alarm and poor TVe (250-290) at home. Trach changed at home with no improvement and sent in for thoracic evaluation. While in MICU no air leak noted with hyperinflated cuff. Transferred to [facility] on 7/5 with course complicated by leukocytosis second to UTI vs trachitis and completed zosyn course.     NEUROLOGY  # ALS   - Baseline nonverbal, awake, and communicates with eye movement.   - Continue on home rilutek 50mg BID     PSYCH   # Anxiety and Depression   - Continue on home valium 2mg BID   - Continue on zoloft 75mg QD while in house (on 4cc/ 80mg QD at home)     CARDIOVASCULAR  - No acute issues   - Monitor HR and BP     RESPIRATORY  # Tracheostomy leak   - At home noted with TVe 200-300s despite tracheostomy change to 80XLTCD.   - No airleak noted in house with hyperinflated cuff likely tracheomegaly?   - Continue on chronic vent 16/400/5/21   - Chronic bibasilar atelectasis and continued on HTS PRN with chest PT  - Thoracic plan for OR today for flex bronch and trach tube upsizing to 9XLT    GI  # Dysphagia   - Continue on PEG-TF   - Monitor BMs     RENAL  - No acute issues   - Monitor renal function and UOP     # Hyperlactatemia  - Lactate 3.5 on admission and improved with rehydration.     INFECTIOUS DISEASE  # Leukocytosis likely second to trachitis vs UTI?   - No fever or chills and noted with prior leukocytosis   - CXR with prior atelectasis and no acute findings   - UA (7/6) with positive nitrites (7/6)   - SCx (7/5) with pansensitive PSA  - BCx (7/6) x 2 NGT  - UCx (7/6) with coag neg staph  - MRSA PCR (7/6) POSITIVE for both MRSA/MSSA s/p Bactroban (7/6 - 7/10)  - s/p empiric Zosyn (7/8 - 7/14) however WBC continues to wax and wane without fever and will monitor off ABX .     HEME  - On Xarelto 10mg at home likely for DVT PPX?   - Hold home Xarelto in prep for OR   - DVT PPX with LVX for now     ENDOCRINE  - No hx of DM2   - Monitor BG     SKIN  - Basic trach and PEG care ordered     ETHICS/ GOC    - FULL CODE   - Dr Tyson Velasquez,  involved in care    DISPO - Back home with  when able.    67 YO Female with PMHx of ALS, Parkinson,, nonverbal, bed bound, chronic respiratory failure with tracheostomy and vent dependence, and oropharyngeal dysphagia with PEG who presented from home with tracheostomy leak. She was noted with vent alarm and poor TVe (250-290) at home. Trach changed at home with no improvement and sent in for thoracic evaluation. While in MICU no air leak noted with hyperinflated cuff. Transferred to [facility] on 7/5 with course complicated by leukocytosis second to UTI vs trachitis and completed zosyn course.     NEUROLOGY  # ALS   - Baseline nonverbal, awake, and communicates with eye movement.   - Continue on home rilutek 50mg BID     PSYCH   # Anxiety and Depression   - Continue on home valium 2mg BID   - Continue on zoloft 75mg QD while in house (on 4cc/ 80mg QD at home)     CARDIOVASCULAR  - No acute issues   - Monitor HR and BP     RESPIRATORY  # Tracheostomy leak   - At home noted with TVe 200-300s despite tracheostomy change to 80XLTCD.   - No airleak noted in house with hyperinflated cuff likely tracheomegaly?   - Continue on chronic vent 16/400/5/21   - Chronic bibasilar atelectasis and continued on HTS PRN with chest PT  - Thoracic plan for OR today for flex bronch and trach tube upsizing to 9XLT    GI  # Dysphagia   - Continue on PEG-TF   - Mild abdominal distention, however having good BMs and belly soft, monitor for now    RENAL  - No acute issues   - Monitor renal function and UOP     # Hyperlactatemia  - Lactate 3.5 on admission and improved with rehydration.     INFECTIOUS DISEASE  # Leukocytosis likely second to trachitis vs UTI?   - No fever or chills and noted with prior leukocytosis   - CXR with prior atelectasis and no acute findings   - UA (7/6) with positive nitrites (7/6)   - SCx (7/5) with pansensitive PSA  - BCx (7/6) x 2 NGT  - UCx (7/6) with coag neg staph  - MRSA PCR (7/6) POSITIVE for both MRSA/MSSA s/p Bactroban (7/6 - 7/10)  - s/p empiric Zosyn (7/8 - 7/14) however WBC continues to wax and wane without fever and will monitor off ABX .     HEME  - On Xarelto 10mg at home likely for DVT PPX?   - Hold home Xarelto in prep for OR   - DVT PPX with LVX for now     ENDOCRINE  - No hx of DM2   - Monitor BG     SKIN  - Basic trach and PEG care ordered     ETHICS/ GOC    - FULL CODE   - Dr Tyson Velasquez,  involved in care    DISPO - Back home with  when able.

## 2024-07-17 NOTE — PROGRESS NOTE ADULT - NS ATTEND AMEND GEN_ALL_CORE FT
69 yo F w/ PMH of ALS, chronic respiratory failure w/ tracheostomy and vent dependence, oropharyngeal dysphagia w/ PEG, Parkinson's disease, non verbal at baseline presents w/ tracheostomy air leak.   w/ set volume at 400. Tracheostomy changed at home with no improvement and so presented for further evaluation.     - baseline mental status, on home regimen: Rilutek 50 mg bid, Valium 2 mg bid, Zolof 75 mg daily   - On VC//16/+5/30%, RR 16   - Thoracic surgery to take patient to OR for trach revision/upsizing. Procedure delayed due to 9XLT trach on back order. For OR today   - leukocytosis, tx for UTI and possible tracheitis. On Zosyn 7/8 - 7/14 7 day course. Leukocytosis but no other localizing signs of infection. Will monitor off abx for now   - sputum cx pansensitive pseudomonas.

## 2024-07-18 LAB
ANION GAP SERPL CALC-SCNC: 17 MMOL/L — HIGH (ref 7–14)
BASOPHILS # BLD AUTO: 0.04 K/UL — SIGNIFICANT CHANGE UP (ref 0–0.2)
BASOPHILS NFR BLD AUTO: 0.3 % — SIGNIFICANT CHANGE UP (ref 0–2)
BUN SERPL-MCNC: 18 MG/DL — SIGNIFICANT CHANGE UP (ref 7–23)
CALCIUM SERPL-MCNC: 9.6 MG/DL — SIGNIFICANT CHANGE UP (ref 8.4–10.5)
CHLORIDE SERPL-SCNC: 101 MMOL/L — SIGNIFICANT CHANGE UP (ref 98–107)
CO2 SERPL-SCNC: 21 MMOL/L — LOW (ref 22–31)
CREAT SERPL-MCNC: <0.2 MG/DL — LOW (ref 0.5–1.3)
EGFR: 126 ML/MIN/1.73M2 — SIGNIFICANT CHANGE UP
EOSINOPHIL # BLD AUTO: 0.33 K/UL — SIGNIFICANT CHANGE UP (ref 0–0.5)
EOSINOPHIL NFR BLD AUTO: 2.6 % — SIGNIFICANT CHANGE UP (ref 0–6)
GLUCOSE SERPL-MCNC: 135 MG/DL — HIGH (ref 70–99)
HCT VFR BLD CALC: 40.5 % — SIGNIFICANT CHANGE UP (ref 34.5–45)
HGB BLD-MCNC: 12.8 G/DL — SIGNIFICANT CHANGE UP (ref 11.5–15.5)
IANC: 8.93 K/UL — HIGH (ref 1.8–7.4)
IMM GRANULOCYTES NFR BLD AUTO: 1 % — HIGH (ref 0–0.9)
LYMPHOCYTES # BLD AUTO: 19 % — SIGNIFICANT CHANGE UP (ref 13–44)
LYMPHOCYTES # BLD AUTO: 2.44 K/UL — SIGNIFICANT CHANGE UP (ref 1–3.3)
MAGNESIUM SERPL-MCNC: 2 MG/DL — SIGNIFICANT CHANGE UP (ref 1.6–2.6)
MCHC RBC-ENTMCNC: 26.2 PG — LOW (ref 27–34)
MCHC RBC-ENTMCNC: 31.6 GM/DL — LOW (ref 32–36)
MCV RBC AUTO: 83 FL — SIGNIFICANT CHANGE UP (ref 80–100)
MONOCYTES # BLD AUTO: 0.95 K/UL — HIGH (ref 0–0.9)
MONOCYTES NFR BLD AUTO: 7.4 % — SIGNIFICANT CHANGE UP (ref 2–14)
NEUTROPHILS # BLD AUTO: 8.93 K/UL — HIGH (ref 1.8–7.4)
NEUTROPHILS NFR BLD AUTO: 69.7 % — SIGNIFICANT CHANGE UP (ref 43–77)
NRBC # BLD: 0 /100 WBCS — SIGNIFICANT CHANGE UP (ref 0–0)
NRBC # FLD: 0 K/UL — SIGNIFICANT CHANGE UP (ref 0–0)
PHOSPHATE SERPL-MCNC: 3.5 MG/DL — SIGNIFICANT CHANGE UP (ref 2.5–4.5)
PLATELET # BLD AUTO: 359 K/UL — SIGNIFICANT CHANGE UP (ref 150–400)
POTASSIUM SERPL-MCNC: 3.7 MMOL/L — SIGNIFICANT CHANGE UP (ref 3.5–5.3)
POTASSIUM SERPL-SCNC: 3.7 MMOL/L — SIGNIFICANT CHANGE UP (ref 3.5–5.3)
RBC # BLD: 4.88 M/UL — SIGNIFICANT CHANGE UP (ref 3.8–5.2)
RBC # FLD: 16.2 % — HIGH (ref 10.3–14.5)
SODIUM SERPL-SCNC: 139 MMOL/L — SIGNIFICANT CHANGE UP (ref 135–145)
WBC # BLD: 12.82 K/UL — HIGH (ref 3.8–10.5)
WBC # FLD AUTO: 12.82 K/UL — HIGH (ref 3.8–10.5)

## 2024-07-18 PROCEDURE — 99231 SBSQ HOSP IP/OBS SF/LOW 25: CPT

## 2024-07-18 PROCEDURE — 99233 SBSQ HOSP IP/OBS HIGH 50: CPT | Mod: FS

## 2024-07-18 RX ORDER — DIAZEPAM 10 MG
2 TABLET ORAL EVERY 12 HOURS
Refills: 0 | Status: DISCONTINUED | OUTPATIENT
Start: 2024-07-18 | End: 2024-07-25

## 2024-07-18 RX ORDER — HYDRALAZINE HCL 50 MG
2.5 TABLET ORAL ONCE
Refills: 0 | Status: COMPLETED | OUTPATIENT
Start: 2024-07-18 | End: 2024-07-18

## 2024-07-18 RX ADMIN — Medication 15 MILLILITER(S): at 11:11

## 2024-07-18 RX ADMIN — CHLORHEXIDINE GLUCONATE 1 APPLICATION(S): 40 SOLUTION TOPICAL at 11:10

## 2024-07-18 RX ADMIN — CHLORHEXIDINE GLUCONATE 15 MILLILITER(S): 40 SOLUTION TOPICAL at 05:23

## 2024-07-18 RX ADMIN — SERTRALINE HYDROCHLORIDE 75 MILLIGRAM(S): 50 TABLET, FILM COATED ORAL at 11:11

## 2024-07-18 RX ADMIN — ENOXAPARIN SODIUM 40 MILLIGRAM(S): 100 INJECTION SUBCUTANEOUS at 05:23

## 2024-07-18 RX ADMIN — POVIDONE, PROPYLENE GLYCOL 1 DROP(S): 6.8; 3 LIQUID OPHTHALMIC at 23:08

## 2024-07-18 RX ADMIN — RILUZOLE 50 MILLIGRAM(S): 5 LIQUID ORAL at 05:22

## 2024-07-18 RX ADMIN — POVIDONE, PROPYLENE GLYCOL 1 DROP(S): 6.8; 3 LIQUID OPHTHALMIC at 05:23

## 2024-07-18 RX ADMIN — POVIDONE, PROPYLENE GLYCOL 1 APPLICATION(S): 6.8; 3 LIQUID OPHTHALMIC at 22:41

## 2024-07-18 RX ADMIN — Medication 2.5 MILLIGRAM(S): at 23:07

## 2024-07-18 RX ADMIN — CHLORHEXIDINE GLUCONATE 15 MILLILITER(S): 40 SOLUTION TOPICAL at 17:19

## 2024-07-18 RX ADMIN — POVIDONE, PROPYLENE GLYCOL 1 DROP(S): 6.8; 3 LIQUID OPHTHALMIC at 17:18

## 2024-07-18 RX ADMIN — Medication 2 MILLIGRAM(S): at 05:22

## 2024-07-18 RX ADMIN — POVIDONE, PROPYLENE GLYCOL 1 DROP(S): 6.8; 3 LIQUID OPHTHALMIC at 11:10

## 2024-07-18 RX ADMIN — RILUZOLE 50 MILLIGRAM(S): 5 LIQUID ORAL at 17:19

## 2024-07-18 RX ADMIN — Medication 2 MILLIGRAM(S): at 17:31

## 2024-07-18 NOTE — PROCEDURE NOTE - NSBRONCHHISTORY_GEN_A_CORE_FT
This is a 67 y/o F with PMHx of Parkinson's Ds, s/p Trach who has been ventilator dependent since 2015. She was admitted for a cuff leak requiring placement of a larger, #9 Bivona Tracheostomy. Today the patient stopped returning her volumes and CT surgery was called to the patient's bedside.

## 2024-07-18 NOTE — PROGRESS NOTE ADULT - ASSESSMENT
67 YO Female with PMHx of ALS, Parkinson,, nonverbal, bed bound, chronic respiratory failure with tracheostomy and vent dependence, and oropharyngeal dysphagia with PEG who presented from home with tracheostomy leak. She was noted with vent alarm and poor TVe (250-290) at home. Trach changed at home with no improvement and sent in for thoracic evaluation. While in MICU no air leak noted with hyperinflated cuff. Transferred to [facility] on 7/5 with course complicated by leukocytosis second to UTI vs trachitis and completed zosyn course.     NEUROLOGY  # ALS   - Baseline nonverbal, awake, and communicates with eye movement.   - Continue on home rilutek 50mg BID     PSYCH   # Anxiety and Depression   - Continue on home valium 2mg BID   - Continue on zoloft 75mg QD while in house (on 4cc/ 80mg QD at home)     CARDIOVASCULAR  - No acute issues   - Monitor HR and BP     RESPIRATORY  # Tracheostomy leak   - At home noted with TVe 200-300s despite tracheostomy change to 80XLTCD.   - No airleak noted in house with hyperinflated cuff likely tracheomegaly?   - Continue on chronic vent 16/400/5/21   - Chronic bibasilar atelectasis and continued on HTS PRN with chest PT  -  flex bronch and trach tube upsizing to 9XLT    GI  # Dysphagia   - Continue on PEG-TF   - Mild abdominal distention, however having good BMs and belly soft, monitor for now    RENAL  - No acute issues   - Monitor renal function and UOP     # Hyperlactatemia  - Lactate 3.5 on admission and improved with rehydration.     INFECTIOUS DISEASE  # Leukocytosis likely second to trachitis vs UTI?   - No fever or chills and noted with prior leukocytosis   - CXR with prior atelectasis and no acute findings   - UA (7/6) with positive nitrites (7/6)   - SCx (7/5) with pansensitive PSA  - BCx (7/6) x 2 NGT  - UCx (7/6) with coag neg staph  - MRSA PCR (7/6) POSITIVE for both MRSA/MSSA s/p Bactroban (7/6 - 7/10)  - s/p empiric Zosyn (7/8 - 7/14) however WBC continues to wax and wane without fever and will monitor off ABX .     HEME  - On Xarelto 10mg at home likely for DVT PPX?   - Hold home Xarelto in prep for OR   - DVT PPX with LVX for now     ENDOCRINE  - No hx of DM2   - Monitor BG     SKIN  - Basic trach and PEG care ordered     ETHICS/ GOC    - FULL CODE   - Dr Tyson Velasquez,  involved in care    DISPO - Back home with  when able.    67 YO Female with PMHx of ALS, Parkinson,, nonverbal, bed bound, chronic respiratory failure with tracheostomy and vent dependence, and oropharyngeal dysphagia with PEG who presented from home with tracheostomy leak. She was noted with vent alarm and poor TVe (250-290) at home. Trach changed at home with no improvement and sent in for thoracic evaluation. While in MICU no air leak noted with hyperinflated cuff. Transferred to [facility] on 7/5 with course complicated by leukocytosis second to UTI vs trachitis and completed zosyn course.     NEUROLOGY  # ALS   - Baseline nonverbal, awake, and communicates with eye movement.   - Continue on home rilutek 50mg BID     PSYCH   # Anxiety and Depression   - Continue on home valium 2mg BID   - Continue on zoloft 75mg QD while in house (on 4cc/ 80mg QD at home)     CARDIOVASCULAR  - No acute issues   - Monitor HR and BP     RESPIRATORY  # Tracheostomy leak   - At home noted with TVe 200-300s despite tracheostomy change to 80XLTCD.   - No airleak noted in house with hyperinflated cuff likely tracheomegaly?   - Continue on chronic vent 16/400/5/21   - Chronic bibasilar atelectasis and continued on HTS PRN with chest PT  -  flex bronch and trach tube upsizing to 9mm Bovana  -On rounds noted to be receiving low TV and air heard escaping around cuff - maintaining 02 sat   - Seen with thoracic at bedside - bedside bronch done - trach position checked and additional air placed in cuff by thoracic with increase of TV to set parameter  Per [facility] - a custom trach will be ordered for this patient and DC is on hold     GI  # Dysphagia   - Continue on PEG-TF   - Mild abdominal distention, however having good BMs and belly soft, monitor for now    RENAL  - No acute issues   - Monitor renal function and UOP     # Hyperlactatemia  - Lactate 3.5 on admission and improved with rehydration.     INFECTIOUS DISEASE  # Leukocytosis likely second to trachitis vs UTI?   - No fever or chills and noted with prior leukocytosis   - CXR with prior atelectasis and no acute findings   - UA (7/6) with positive nitrites (7/6)   - SCx (7/5) with pansensitive PSA  - BCx (7/6) x 2 NGT  - UCx (7/6) with coag neg staph  - MRSA PCR (7/6) POSITIVE for both MRSA/MSSA s/p Bactroban (7/6 - 7/10)  - s/p empiric Zosyn (7/8 - 7/14) however WBC continues to wax and wane without fever and will monitor off ABX .     HEME  - On Xarelto 10mg at home likely for DVT PPX?   - Hold home Xarelto in prep for OR   - DVT PPX with LVX for now     ENDOCRINE  - No hx of DM2   - Monitor BG     SKIN  - Basic trach and PEG care ordered     ETHICS/ GOC    - FULL CODE   - Dr Tyson Velasquez,  involved in care    DISPO - Back home with  when able.

## 2024-07-18 NOTE — PROGRESS NOTE ADULT - SUBJECTIVE AND OBJECTIVE BOX
CHIEF COMPLAINT:    INTERVAL EVENTS: Trach exchange in OR by thoracic surgery     ROS: Seen by bedside during AM rounds     OBJECTIVE:  ICU Vital Signs Last 24 Hrs  T(C): 36.2 (18 Jul 2024 04:00), Max: 36.9 (17 Jul 2024 12:00)  T(F): 97.1 (18 Jul 2024 04:00), Max: 98.5 (17 Jul 2024 12:00)  HR: 94 (18 Jul 2024 04:00) (89 - 98)  BP: 131/71 (18 Jul 2024 04:00) (126/76 - 150/85)  BP(mean): 96 (17 Jul 2024 16:00) (94 - 96)  ABP: --  ABP(mean): --  RR: 18 (18 Jul 2024 04:00) (16 - 18)  SpO2: 100% (18 Jul 2024 04:00) (96% - 100%)    O2 Parameters below as of 18 Jul 2024 04:00  Patient On (Oxygen Delivery Method): ventilator    O2 Concentration (%): 21      Mode: AC/ CMV (Assist Control/ Continuous Mandatory Ventilation), RR (machine): 16, TV (machine): 400, FiO2: 21, PEEP: 5, ITime: 0.64, MAP: 9, PIP: 28    07-17 @ 07:01  -  07-18 @ 07:00  --------------------------------------------------------  IN: 600 mL / OUT: 300 mL / NET: 300 mL      CAPILLARY BLOOD GLUCOSE          PHYSICAL EXAM:  General:   HEENT: + trach   Lymph Nodes:  Neck:   Respiratory:   Cardiovascular:   Abdomen: + PEG   Extremities:   Skin:   Neurological:  Psychiatry:    Mode: AC/ CMV (Assist Control/ Continuous Mandatory Ventilation)  RR (machine): 16  TV (machine): 400  FiO2: 21  PEEP: 5  ITime: 0.64  MAP: 9  PIP: 28      HOSPITAL MEDICATIONS:  MEDICATIONS  (STANDING):  artificial  tears Solution 1 Drop(s) Both EYES every 6 hours  chlorhexidine 0.12% Liquid 15 milliLiter(s) Oral Mucosa every 12 hours  chlorhexidine 2% Cloths 1 Application(s) Topical daily  diazepam    Tablet 2 milliGRAM(s) Oral every 12 hours  enoxaparin Injectable 40 milliGRAM(s) SubCutaneous every 24 hours  multivitamin/minerals/iron Oral Solution (CENTRUM) 15 milliLiter(s) Oral daily  petrolatum Ophthalmic Ointment 1 Application(s) Both EYES at bedtime  riluzole 50 milliGRAM(s) Oral two times a day  sertraline 75 milliGRAM(s) Oral daily    MEDICATIONS  (PRN):  acetaminophen   Oral Liquid .. 650 milliGRAM(s) Oral every 6 hours PRN Temp greater or equal to 38C (100.4F), Mild Pain (1 - 3)  sodium chloride 3%  Inhalation 4 milliLiter(s) Inhalation every 12 hours PRN for congestion      LABS:                        12.8   12.82 )-----------( 359      ( 18 Jul 2024 05:01 )             40.5     07-17    140  |  101  |  16  ----------------------------<  123<H>  4.1   |  22  |  <0.20<L>    Ca    9.7      17 Jul 2024 05:14  Phos  3.2     07-17  Mg     2.20     07-17      PT/INR - ( 17 Jul 2024 05:14 )   PT: 13.3 sec;   INR: 1.18 ratio         PTT - ( 17 Jul 2024 05:14 )  PTT:33.1 sec  Urinalysis Basic - ( 17 Jul 2024 05:14 )    Color: x / Appearance: x / SG: x / pH: x  Gluc: 123 mg/dL / Ketone: x  / Bili: x / Urobili: x   Blood: x / Protein: x / Nitrite: x   Leuk Esterase: x / RBC: x / WBC x   Sq Epi: x / Non Sq Epi: x / Bacteria: x         INTERVAL EVENTS: Trach exchange in OR by thoracic surgery     ROS: Seen by bedside during AM rounds     OBJECTIVE:  ICU Vital Signs Last 24 Hrs  T(C): 36.2 (18 Jul 2024 04:00), Max: 36.9 (17 Jul 2024 12:00)  T(F): 97.1 (18 Jul 2024 04:00), Max: 98.5 (17 Jul 2024 12:00)  HR: 94 (18 Jul 2024 04:00) (89 - 98)  BP: 131/71 (18 Jul 2024 04:00) (126/76 - 150/85)  BP(mean): 96 (17 Jul 2024 16:00) (94 - 96)  ABP: --  ABP(mean): --  RR: 18 (18 Jul 2024 04:00) (16 - 18)  SpO2: 100% (18 Jul 2024 04:00) (96% - 100%)    O2 Parameters below as of 18 Jul 2024 04:00  Patient On (Oxygen Delivery Method): ventilator    O2 Concentration (%): 21      Mode: AC/ CMV (Assist Control/ Continuous Mandatory Ventilation), RR (machine): 16, TV (machine): 400, FiO2: 21, PEEP: 5, ITime: 0.64, MAP: 9, PIP: 28    07-17 @ 07:01  -  07-18 @ 07:00  --------------------------------------------------------  IN: 600 mL / OUT: 300 mL / NET: 300 mL      CAPILLARY BLOOD GLUCOSE        PHYSICAL EXAMINATION  General: NAD   HEENT: Trach present with lg air leak noted from stoma area, TV decreased   Cards: S1/S2, no murmurs   Pulm: Course vent sounds bilaterally. No wheezes.   Abdomen: Mildly distended abdominal, however soft and nontender. BS (+) PEG present.   Extremities: No pedal edema. No active NATIVIDAD of BL upper and lower extremities.  Neurology: Awake with eyes open, however unable to follow commands second to baseline ALS with no acute focal neurological deficits       Mode: AC/ CMV (Assist Control/ Continuous Mandatory Ventilation)  RR (machine): 16  TV (machine): 400  FiO2: 21  PEEP: 5  ITime: 0.64  MAP: 9  PIP: 28      HOSPITAL MEDICATIONS:  MEDICATIONS  (STANDING):  artificial  tears Solution 1 Drop(s) Both EYES every 6 hours  chlorhexidine 0.12% Liquid 15 milliLiter(s) Oral Mucosa every 12 hours  chlorhexidine 2% Cloths 1 Application(s) Topical daily  diazepam    Tablet 2 milliGRAM(s) Oral every 12 hours  enoxaparin Injectable 40 milliGRAM(s) SubCutaneous every 24 hours  multivitamin/minerals/iron Oral Solution (CENTRUM) 15 milliLiter(s) Oral daily  petrolatum Ophthalmic Ointment 1 Application(s) Both EYES at bedtime  riluzole 50 milliGRAM(s) Oral two times a day  sertraline 75 milliGRAM(s) Oral daily    MEDICATIONS  (PRN):  acetaminophen   Oral Liquid .. 650 milliGRAM(s) Oral every 6 hours PRN Temp greater or equal to 38C (100.4F), Mild Pain (1 - 3)  sodium chloride 3%  Inhalation 4 milliLiter(s) Inhalation every 12 hours PRN for congestion      LABS:                        12.8   12.82 )-----------( 359      ( 18 Jul 2024 05:01 )             40.5     07-17    140  |  101  |  16  ----------------------------<  123<H>  4.1   |  22  |  <0.20<L>    Ca    9.7      17 Jul 2024 05:14  Phos  3.2     07-17  Mg     2.20     07-17      PT/INR - ( 17 Jul 2024 05:14 )   PT: 13.3 sec;   INR: 1.18 ratio         PTT - ( 17 Jul 2024 05:14 )  PTT:33.1 sec  Urinalysis Basic - ( 17 Jul 2024 05:14 )    Color: x / Appearance: x / SG: x / pH: x  Gluc: 123 mg/dL / Ketone: x  / Bili: x / Urobili: x   Blood: x / Protein: x / Nitrite: x   Leuk Esterase: x / RBC: x / WBC x   Sq Epi: x / Non Sq Epi: x / Bacteria: x

## 2024-07-18 NOTE — PROGRESS NOTE ADULT - SUBJECTIVE AND OBJECTIVE BOX
Called to bedside  pt not holding tidal volumes  air leaking around tracheostomy    Vital Signs Last 24 Hrs  T(C): 36.4 (18 Jul 2024 08:00), Max: 36.4 (18 Jul 2024 08:00)  T(F): 97.5 (18 Jul 2024 08:00), Max: 97.5 (18 Jul 2024 08:00)  HR: 97 (18 Jul 2024 10:26) (89 - 97)  BP: 123/80 (18 Jul 2024 08:00) (123/80 - 150/85)  BP(mean): 94 (18 Jul 2024 08:00) (94 - 96)  RR: 16 (18 Jul 2024 08:00) (16 - 18)  SpO2: 100% (18 Jul 2024 10:26) (100% - 100%)    Parameters below as of 18 Jul 2024 08:00  Patient On (Oxygen Delivery Method): ventilator    O2 Concentration (%): 21    PHYSICAL EXAMINATION  HEENT: Trach   Cards: S1/S2, no murmurs   Pulm: Course vent sounds bilaterally. No wheezes.   Abdomen: Mildly distended abdominal, however soft and nontender. BS (+) PEG present.   Extremities: No pedal edema. No active NATIVIDAD of BL upper and lower extremities.  Neurology: Awake with eyes open, however unable to follow commands second to baseline ALS with no acute focal neurological deficits     A/P  68y F PMHx ALS, Parkinson's, Trach, PEG; chronic resp failure, mechanical vent dependent since 2015, chronic dysphagia; from home; nonverbal, bedbound  - bedside bronchoscopy  -trach balloon reinflated, additional 5-7cc air into balloon  - pt now holding tidal volumes, SpO2 100%  above d/w Dr Owens  please hold discharge - Dr Owens will order custom trach for trach exchange in near future  above d/w iTffany KOTHARI [id number]

## 2024-07-18 NOTE — PROCEDURE NOTE - NSBRONCHPROCDETAILS_GEN_A_CORE_FT
TINO MATA  MRN-[id number]    Procedure: Flexible Bronchoscopy    Indication: High Peak Pressure    : Myself    Findings:  The bronchoscope was inserted through the endotracheal tube which was noted to be in good position. Airway evaluation revealed:      Trachea: Normal caliber    Main Luz: Sharp    R Lung Tracheobronchial Tree: examined to at least the first subsegmental level with normal mucosa and anatomy but had a moderate amount of thick white secretions present, no endobronchial lesions.    L Lung Tracheobronchial Tree: examined to at least the first subsegmental level with normal mucosa and anatomy and without any endobronchial lesions or significant secretions    The bronchoscope was withdrawn from endotracheal tube post airway evaluation. The patient tolerated the procedure well with no immediate complications. Chest x-ray is pending.    Specimens: None    Impressions: Moderate amount of white, thick secretions on the right side.       Donovan Light PA-C  Thoracic Surgery  Spectra [phone]    07-18-24 @ 15:18

## 2024-07-18 NOTE — PROGRESS NOTE ADULT - NS ATTEND AMEND GEN_ALL_CORE FT
67 yo F w/ PMH of ALS, chronic respiratory failure w/ tracheostomy and vent dependence, oropharyngeal dysphagia w/ PEG, Parkinson's disease, non verbal at baseline presents w/ tracheostomy air leak.   w/ set volume at 400. Tracheostomy changed at home with no improvement and so presented for further evaluation. She underwent upsizing and trach exchange to size 9 Bivona on 7/17. She tolerated the procedure well.     This morning noted to have significant air leak, minimal volumes returned. Additional 8 cc added to trach cuff with improvement in volume returned. Bedside bronchoscopy by CT surgery - trach in place, thin secretions aspirated.     - baseline mental status, on home regimen: Rilutek 50 mg bid, Valium 2 mg bid, Zolof 75 mg daily   - On VC//16/+5/30%, RR 16    - leukocytosis, tx for UTI and possible tracheitis. On Zosyn 7/8 - 7/14 7 day course. Leukocytosis but no other localizing signs of infection. Will monitor off abx for now   - sputum cx pansensitive pseudomonas.   - Will continue to monitor

## 2024-07-18 NOTE — PROGRESS NOTE ADULT - SUBJECTIVE AND OBJECTIVE BOX
ANESTHESIA POSTOP CHECK    68y Female POSTOP DAY 1     Vital Signs Last 24 Hrs  T(C): 36.4 (18 Jul 2024 08:00), Max: 36.9 (17 Jul 2024 12:00)  T(F): 97.5 (18 Jul 2024 08:00), Max: 98.5 (17 Jul 2024 12:00)  HR: 95 (18 Jul 2024 08:00) (89 - 95)  BP: 123/80 (18 Jul 2024 08:00) (123/80 - 150/85)  BP(mean): 94 (18 Jul 2024 08:00) (94 - 96)  RR: 16 (18 Jul 2024 08:00) (16 - 18)  SpO2: 100% (18 Jul 2024 08:00) (96% - 100%)    Parameters below as of 18 Jul 2024 08:00  Patient On (Oxygen Delivery Method): ventilator    O2 Concentration (%): 21  I&O's Summary    17 Jul 2024 07:01  -  18 Jul 2024 07:00  --------------------------------------------------------  IN: 600 mL / OUT: 300 mL / NET: 300 mL        [X ] NO APPARENT ANESTHESIA COMPLICATIONS      Comments:

## 2024-07-19 ENCOUNTER — APPOINTMENT (OUTPATIENT)
Dept: PULMONOLOGY | Facility: CLINIC | Age: 69
End: 2024-07-19

## 2024-07-19 LAB
ANION GAP SERPL CALC-SCNC: 17 MMOL/L — HIGH (ref 7–14)
BASOPHILS # BLD AUTO: 0.05 K/UL — SIGNIFICANT CHANGE UP (ref 0–0.2)
BASOPHILS NFR BLD AUTO: 0.3 % — SIGNIFICANT CHANGE UP (ref 0–2)
BUN SERPL-MCNC: 15 MG/DL — SIGNIFICANT CHANGE UP (ref 7–23)
CALCIUM SERPL-MCNC: 9.9 MG/DL — SIGNIFICANT CHANGE UP (ref 8.4–10.5)
CHLORIDE SERPL-SCNC: 101 MMOL/L — SIGNIFICANT CHANGE UP (ref 98–107)
CO2 SERPL-SCNC: 22 MMOL/L — SIGNIFICANT CHANGE UP (ref 22–31)
CREAT SERPL-MCNC: <0.2 MG/DL — LOW (ref 0.5–1.3)
EGFR: 126 ML/MIN/1.73M2 — SIGNIFICANT CHANGE UP
EOSINOPHIL # BLD AUTO: 0.12 K/UL — SIGNIFICANT CHANGE UP (ref 0–0.5)
EOSINOPHIL NFR BLD AUTO: 0.8 % — SIGNIFICANT CHANGE UP (ref 0–6)
GLUCOSE SERPL-MCNC: 202 MG/DL — HIGH (ref 70–99)
HCT VFR BLD CALC: 43.7 % — SIGNIFICANT CHANGE UP (ref 34.5–45)
HGB BLD-MCNC: 13.8 G/DL — SIGNIFICANT CHANGE UP (ref 11.5–15.5)
IANC: 12.85 K/UL — HIGH (ref 1.8–7.4)
IMM GRANULOCYTES NFR BLD AUTO: 1.2 % — HIGH (ref 0–0.9)
LYMPHOCYTES # BLD AUTO: 0.79 K/UL — LOW (ref 1–3.3)
LYMPHOCYTES # BLD AUTO: 5.4 % — LOW (ref 13–44)
MAGNESIUM SERPL-MCNC: 2 MG/DL — SIGNIFICANT CHANGE UP (ref 1.6–2.6)
MCHC RBC-ENTMCNC: 27 PG — SIGNIFICANT CHANGE UP (ref 27–34)
MCHC RBC-ENTMCNC: 31.6 GM/DL — LOW (ref 32–36)
MCV RBC AUTO: 85.5 FL — SIGNIFICANT CHANGE UP (ref 80–100)
MONOCYTES # BLD AUTO: 0.7 K/UL — SIGNIFICANT CHANGE UP (ref 0–0.9)
MONOCYTES NFR BLD AUTO: 4.8 % — SIGNIFICANT CHANGE UP (ref 2–14)
NEUTROPHILS # BLD AUTO: 12.85 K/UL — HIGH (ref 1.8–7.4)
NEUTROPHILS NFR BLD AUTO: 87.5 % — HIGH (ref 43–77)
NRBC # BLD: 0 /100 WBCS — SIGNIFICANT CHANGE UP (ref 0–0)
NRBC # FLD: 0 K/UL — SIGNIFICANT CHANGE UP (ref 0–0)
PHOSPHATE SERPL-MCNC: 3.3 MG/DL — SIGNIFICANT CHANGE UP (ref 2.5–4.5)
PLATELET # BLD AUTO: 409 K/UL — HIGH (ref 150–400)
POTASSIUM SERPL-MCNC: 3.8 MMOL/L — SIGNIFICANT CHANGE UP (ref 3.5–5.3)
POTASSIUM SERPL-SCNC: 3.8 MMOL/L — SIGNIFICANT CHANGE UP (ref 3.5–5.3)
RBC # BLD: 5.11 M/UL — SIGNIFICANT CHANGE UP (ref 3.8–5.2)
RBC # FLD: 15.9 % — HIGH (ref 10.3–14.5)
SODIUM SERPL-SCNC: 140 MMOL/L — SIGNIFICANT CHANGE UP (ref 135–145)
WBC # BLD: 14.68 K/UL — HIGH (ref 3.8–10.5)
WBC # FLD AUTO: 14.68 K/UL — HIGH (ref 3.8–10.5)

## 2024-07-19 PROCEDURE — 99233 SBSQ HOSP IP/OBS HIGH 50: CPT | Mod: FS

## 2024-07-19 PROCEDURE — 99231 SBSQ HOSP IP/OBS SF/LOW 25: CPT

## 2024-07-19 RX ORDER — HYDRALAZINE HCL 50 MG
2.5 TABLET ORAL ONCE
Refills: 0 | Status: COMPLETED | OUTPATIENT
Start: 2024-07-19 | End: 2024-07-19

## 2024-07-19 RX ORDER — ACETAMINOPHEN 325 MG/1
1000 TABLET ORAL ONCE
Refills: 0 | Status: COMPLETED | OUTPATIENT
Start: 2024-07-19 | End: 2024-07-19

## 2024-07-19 RX ADMIN — CHLORHEXIDINE GLUCONATE 15 MILLILITER(S): 40 SOLUTION TOPICAL at 17:29

## 2024-07-19 RX ADMIN — Medication 2 MILLIGRAM(S): at 06:15

## 2024-07-19 RX ADMIN — ACETAMINOPHEN 400 MILLIGRAM(S): 325 TABLET ORAL at 11:12

## 2024-07-19 RX ADMIN — RILUZOLE 50 MILLIGRAM(S): 5 LIQUID ORAL at 06:15

## 2024-07-19 RX ADMIN — RILUZOLE 50 MILLIGRAM(S): 5 LIQUID ORAL at 17:29

## 2024-07-19 RX ADMIN — ACETAMINOPHEN 1000 MILLIGRAM(S): 325 TABLET ORAL at 11:40

## 2024-07-19 RX ADMIN — POVIDONE, PROPYLENE GLYCOL 1 DROP(S): 6.8; 3 LIQUID OPHTHALMIC at 23:41

## 2024-07-19 RX ADMIN — Medication 2.5 MILLIGRAM(S): at 09:14

## 2024-07-19 RX ADMIN — POVIDONE, PROPYLENE GLYCOL 1 DROP(S): 6.8; 3 LIQUID OPHTHALMIC at 11:08

## 2024-07-19 RX ADMIN — POVIDONE, PROPYLENE GLYCOL 1 DROP(S): 6.8; 3 LIQUID OPHTHALMIC at 17:29

## 2024-07-19 RX ADMIN — CHLORHEXIDINE GLUCONATE 1 APPLICATION(S): 40 SOLUTION TOPICAL at 11:07

## 2024-07-19 RX ADMIN — CHLORHEXIDINE GLUCONATE 15 MILLILITER(S): 40 SOLUTION TOPICAL at 06:15

## 2024-07-19 RX ADMIN — POVIDONE, PROPYLENE GLYCOL 1 APPLICATION(S): 6.8; 3 LIQUID OPHTHALMIC at 23:07

## 2024-07-19 RX ADMIN — ENOXAPARIN SODIUM 40 MILLIGRAM(S): 100 INJECTION SUBCUTANEOUS at 06:15

## 2024-07-19 RX ADMIN — Medication 2.5 MILLIGRAM(S): at 10:13

## 2024-07-19 RX ADMIN — Medication 15 MILLILITER(S): at 11:06

## 2024-07-19 RX ADMIN — Medication 2 MILLIGRAM(S): at 17:28

## 2024-07-19 RX ADMIN — POVIDONE, PROPYLENE GLYCOL 1 DROP(S): 6.8; 3 LIQUID OPHTHALMIC at 06:16

## 2024-07-19 RX ADMIN — Medication 2.5 MILLIGRAM(S): at 02:17

## 2024-07-19 RX ADMIN — SERTRALINE HYDROCHLORIDE 75 MILLIGRAM(S): 50 TABLET, FILM COATED ORAL at 11:01

## 2024-07-19 NOTE — PROGRESS NOTE ADULT - SUBJECTIVE AND OBJECTIVE BOX
CHIEF COMPLAINT: Trach change     INTERVAL EVENTS: Elevated BP overnight improved with hydralizine x 1     ROS: Seen by bedside during AM rounds     OBJECTIVE:  ICU Vital Signs Last 24 Hrs  T(C): 36.7 (19 Jul 2024 04:00), Max: 36.9 (19 Jul 2024 00:00)  T(F): 98 (19 Jul 2024 04:00), Max: 98.4 (19 Jul 2024 00:00)  HR: 92 (19 Jul 2024 04:00) (91 - 109)  BP: 129/79 (19 Jul 2024 04:00) (123/80 - 187/93)  BP(mean): 88 (18 Jul 2024 16:00) (88 - 98)  ABP: --  ABP(mean): --  RR: 16 (19 Jul 2024 04:00) (16 - 16)  SpO2: 100% (19 Jul 2024 04:00) (100% - 100%)    O2 Parameters below as of 19 Jul 2024 04:00  Patient On (Oxygen Delivery Method): ventilator          Mode: AC/ CMV (Assist Control/ Continuous Mandatory Ventilation), RR (machine): 16, TV (machine): 400, FiO2: 30, PEEP: 5, ITime: 0.81, MAP: 8, PIP: 36    07-17 @ 07:01 - 07-18 @ 07:00  --------------------------------------------------------  IN: 600 mL / OUT: 300 mL / NET: 300 mL    07-18 @ 07:01 - 07-19 @ 06:47  --------------------------------------------------------  IN: 1160 mL / OUT: 900 mL / NET: 260 mL      CAPILLARY BLOOD GLUCOSE          PHYSICAL EXAM:  General:   HEENT:   Lymph Nodes:  Neck:   Respiratory:   Cardiovascular:   Abdomen:   Extremities:   Skin:   Neurological:  Psychiatry:    Mode: AC/ CMV (Assist Control/ Continuous Mandatory Ventilation)  RR (machine): 16  TV (machine): 400  FiO2: 30  PEEP: 5  ITime: 0.81  MAP: 8  PIP: 36      HOSPITAL MEDICATIONS:  MEDICATIONS  (STANDING):  artificial  tears Solution 1 Drop(s) Both EYES every 6 hours  chlorhexidine 0.12% Liquid 15 milliLiter(s) Oral Mucosa every 12 hours  chlorhexidine 2% Cloths 1 Application(s) Topical daily  diazepam    Tablet 2 milliGRAM(s) Oral every 12 hours  enoxaparin Injectable 40 milliGRAM(s) SubCutaneous every 24 hours  multivitamin/minerals/iron Oral Solution (CENTRUM) 15 milliLiter(s) Oral daily  petrolatum Ophthalmic Ointment 1 Application(s) Both EYES at bedtime  riluzole 50 milliGRAM(s) Oral two times a day  sertraline 75 milliGRAM(s) Oral daily    MEDICATIONS  (PRN):  acetaminophen   Oral Liquid .. 650 milliGRAM(s) Oral every 6 hours PRN Temp greater or equal to 38C (100.4F), Mild Pain (1 - 3)  sodium chloride 3%  Inhalation 4 milliLiter(s) Inhalation every 12 hours PRN for congestion      LABS:                        12.8   12.82 )-----------( 359      ( 18 Jul 2024 05:01 )             40.5     07-18    139  |  101  |  18  ----------------------------<  135<H>  3.7   |  21<L>  |  <0.20<L>    Ca    9.6      18 Jul 2024 05:01  Phos  3.5     07-18  Mg     2.00     07-18        Urinalysis Basic - ( 18 Jul 2024 05:01 )    Color: x / Appearance: x / SG: x / pH: x  Gluc: 135 mg/dL / Ketone: x  / Bili: x / Urobili: x   Blood: x / Protein: x / Nitrite: x   Leuk Esterase: x / RBC: x / WBC x   Sq Epi: x / Non Sq Epi: x / Bacteria: x         CHIEF COMPLAINT: Trach change     INTERVAL EVENTS: Elevated BP overnight improved with hydralizine x 1     ROS: Seen by bedside during AM rounds     OBJECTIVE:  ICU Vital Signs Last 24 Hrs  T(C): 36.7 (19 Jul 2024 04:00), Max: 36.9 (19 Jul 2024 00:00)  T(F): 98 (19 Jul 2024 04:00), Max: 98.4 (19 Jul 2024 00:00)  HR: 92 (19 Jul 2024 04:00) (91 - 109)  BP: 129/79 (19 Jul 2024 04:00) (123/80 - 187/93)  BP(mean): 88 (18 Jul 2024 16:00) (88 - 98)  ABP: --  ABP(mean): --  RR: 16 (19 Jul 2024 04:00) (16 - 16)  SpO2: 100% (19 Jul 2024 04:00) (100% - 100%)    O2 Parameters below as of 19 Jul 2024 04:00  Patient On (Oxygen Delivery Method): ventilator          Mode: AC/ CMV (Assist Control/ Continuous Mandatory Ventilation), RR (machine): 16, TV (machine): 400, FiO2: 30, PEEP: 5, ITime: 0.81, MAP: 8, PIP: 36    07-17 @ 07:01  -  07-18 @ 07:00  --------------------------------------------------------  IN: 600 mL / OUT: 300 mL / NET: 300 mL    07-18 @ 07:01  -  07-19 @ 06:47  --------------------------------------------------------  IN: 1160 mL / OUT: 900 mL / NET: 260 mL      CAPILLARY BLOOD GLUCOSE        PHYSICAL EXAMINATION  General: NAD   HEENT: Bilat eyes more reddened/dry + trach to vent , no leak from trach stoma   Cards: S1/S2, no murmurs   Pulm: Course vent sounds bilaterally. No wheezes.   Abdomen: Mildly distended abdominal, however soft and nontender. BS (+) PEG present.   Extremities: No pedal edema. No active NATIVIDAD of BL upper and lower extremities.  Neurology: Awake with eyes open, however unable to follow commands second to baseline ALS with no acute focal neurological deficits     Mode: AC/ CMV (Assist Control/ Continuous Mandatory Ventilation)  RR (machine): 16  TV (machine): 400  FiO2: 30  PEEP: 5  ITime: 0.81  MAP: 8  PIP: 36      HOSPITAL MEDICATIONS:  MEDICATIONS  (STANDING):  artificial  tears Solution 1 Drop(s) Both EYES every 6 hours  chlorhexidine 0.12% Liquid 15 milliLiter(s) Oral Mucosa every 12 hours  chlorhexidine 2% Cloths 1 Application(s) Topical daily  diazepam    Tablet 2 milliGRAM(s) Oral every 12 hours  enoxaparin Injectable 40 milliGRAM(s) SubCutaneous every 24 hours  multivitamin/minerals/iron Oral Solution (CENTRUM) 15 milliLiter(s) Oral daily  petrolatum Ophthalmic Ointment 1 Application(s) Both EYES at bedtime  riluzole 50 milliGRAM(s) Oral two times a day  sertraline 75 milliGRAM(s) Oral daily    MEDICATIONS  (PRN):  acetaminophen   Oral Liquid .. 650 milliGRAM(s) Oral every 6 hours PRN Temp greater or equal to 38C (100.4F), Mild Pain (1 - 3)  sodium chloride 3%  Inhalation 4 milliLiter(s) Inhalation every 12 hours PRN for congestion      LABS:                        12.8   12.82 )-----------( 359      ( 18 Jul 2024 05:01 )             40.5     07-18    139  |  101  |  18  ----------------------------<  135<H>  3.7   |  21<L>  |  <0.20<L>    Ca    9.6      18 Jul 2024 05:01  Phos  3.5     07-18  Mg     2.00     07-18        Urinalysis Basic - ( 18 Jul 2024 05:01 )    Color: x / Appearance: x / SG: x / pH: x  Gluc: 135 mg/dL / Ketone: x  / Bili: x / Urobili: x   Blood: x / Protein: x / Nitrite: x   Leuk Esterase: x / RBC: x / WBC x   Sq Epi: x / Non Sq Epi: x / Bacteria: x

## 2024-07-19 NOTE — PROGRESS NOTE ADULT - NS ATTEND AMEND GEN_ALL_CORE FT
69 yo F w/ PMH of ALS, chronic respiratory failure w/ tracheostomy and vent dependence, oropharyngeal dysphagia w/ PEG, Parkinson's disease, non verbal at baseline presents w/ tracheostomy air leak.   w/ set volume at 400. Tracheostomy changed at home with no improvement and so presented for further evaluation. She underwent upsizing and trach exchange to size 9 Bivona on 7/17. She tolerated the procedure well.     7/18 noted to have significant air leak, minimal volumes returned. Additional 8 cc added to trach cuff with improvement in volume returned. Bedside bronchoscopy by CT surgery - trach in place, thin secretions aspirated.     - baseline mental status, on home regimen: Rilutek 50 mg bid, Valium 2 mg bid, Zolof 75 mg daily   - On VC//16/+5/30%, RR 16    - leukocytosis, tx for UTI and possible tracheitis. On Zosyn 7/8 - 7/14 7 day course. Leukocytosis but no other localizing signs of infection. Will monitor off abx for now   - sputum cx pansensitive pseudomonas.   - Will continue to monitor.

## 2024-07-19 NOTE — PROGRESS NOTE ADULT - ASSESSMENT
67 YO Female with PMHx of ALS, Parkinson,, nonverbal, bed bound, chronic respiratory failure with tracheostomy and vent dependence, and oropharyngeal dysphagia with PEG who presented from home with tracheostomy leak. She was noted with vent alarm and poor TVe (250-290) at home. Trach changed at home with no improvement and sent in for thoracic evaluation. While in MICU no air leak noted with hyperinflated cuff. Transferred to [facility] on 7/5 with course complicated by leukocytosis second to UTI vs trachitis and completed zosyn course.     NEUROLOGY  # ALS   - Baseline nonverbal, awake, and communicates with eye movement.   - Continue on home rilutek 50mg BID     PSYCH   # Anxiety and Depression   - Continue on home valium 2mg BID   - Continue on zoloft 75mg QD while in house (on 4cc/ 80mg QD at home)     CARDIOVASCULAR   - Monitor HR and BP  - BP elevated 7/19 - resolved s/p IV hydralizine      RESPIRATORY  # Tracheostomy leak   - At home noted with TVe 200-300s despite tracheostomy change to 80XLTCD.   - No airleak noted in house with hyperinflated cuff likely tracheomegaly?   - Continue on chronic vent 16/400/5/21   - Chronic bibasilar atelectasis and continued on HTS PRN with chest PT  -  flex bronch and trach tube upsizing to 9mm Bovana  -On rounds noted to be receiving low TV and air heard escaping around cuff - maintaining 02 sat   - Seen with thoracic at bedside - bedside bronch done - trach position checked and additional air placed in cuff by thoracic with increase of TV to set parameter  Per [facility] - a custom trach will be ordered for this patient and DC is on hold     GI  # Dysphagia   - Continue on PEG-TF   - Mild abdominal distention, however having good BMs and belly soft, monitor for now    RENAL  - No acute issues   - Monitor renal function and UOP     # Hyperlactatemia  - Lactate 3.5 on admission and improved with rehydration.     INFECTIOUS DISEASE  # Leukocytosis likely second to trachitis vs UTI?   - No fever or chills and noted with prior leukocytosis   - CXR with prior atelectasis and no acute findings   - UA (7/6) with positive nitrites (7/6)   - SCx (7/5) with pansensitive PSA  - BCx (7/6) x 2 NGT  - UCx (7/6) with coag neg staph  - MRSA PCR (7/6) POSITIVE for both MRSA/MSSA s/p Bactroban (7/6 - 7/10)  - s/p empiric Zosyn (7/8 - 7/14) however WBC continues to wax and wane without fever and will monitor off ABX .     HEME  - On Xarelto 10mg at home likely for DVT PPX?   - Hold home Xarelto in prep for OR   - DVT PPX with LVX for now     ENDOCRINE  - No hx of DM2   - Monitor BG     SKIN  - Basic trach and PEG care ordered     ETHICS/ GOC    - FULL CODE   - Dr Tyson Velasquez,  involved in care    DISPO - Back home with  when able.    67 YO Female with PMHx of ALS, Parkinson,, nonverbal, bed bound, chronic respiratory failure with tracheostomy and vent dependence, and oropharyngeal dysphagia with PEG who presented from home with tracheostomy leak. She was noted with vent alarm and poor TVe (250-290) at home. Trach changed at home with no improvement and sent in for thoracic evaluation. While in MICU no air leak noted with hyperinflated cuff. Transferred to [facility] on 7/5 with course complicated by leukocytosis second to UTI vs trachitis and completed zosyn course.     NEUROLOGY  # ALS   - Baseline nonverbal, awake, and communicates with eye movement.   - Continue on home rilutek 50mg BID     HEENT  - Eyes reddened /dry - restarted home routine of lacrilube and taping eyes - will do for time during day to soothe eyes   - Natural tears inc to 4x day     PSYCH   # Anxiety and Depression   - Continue on home valium 2mg BID   - Continue on zoloft 75mg QD while in house (on 4cc/ 80mg QD at home)     CARDIOVASCULAR   - Monitor HR and BP  - BP elevated 7/19 - resolved s/p IV hydralizine      RESPIRATORY  # Tracheostomy leak   - At home noted with TVe 200-300s despite tracheostomy change to 80XLTCD.   - No airleak noted in house with hyperinflated cuff likely tracheomegaly?   - Continue on chronic vent 16/400/5/21   - Chronic bibasilar atelectasis and continued on HTS PRN with chest PT  -  flex bronch and trach tube upsizing to 9mm Bovana  -On rounds noted to be receiving low TV and air heard escaping around cuff - maintaining 02 sat   - Seen with thoracic at bedside - bedside bronch done - trach position checked and additional air placed in cuff by thoracic with increase of TV to set parameter  Per [facility] - a custom trach will be ordered for this patient and DC is on hold   - No leak today from stoma last night or today     GI  # Dysphagia   - Continue on PEG-TF   - Mild abdominal distention, however having good BMs and belly soft, monitor for now    RENAL  - No acute issues   - Monitor renal function and UOP   -Pt had PVR Approx 300cc will continue to monitor - pt does have str cath at home     # Hyperlactatemia  - Lactate 3.5 on admission and improved with rehydration.     INFECTIOUS DISEASE  # Leukocytosis likely second to trachitis vs UTI?   - No fever or chills and noted with prior leukocytosis   - CXR with prior atelectasis and no acute findings   - UA (7/6) with positive nitrites (7/6)   - SCx (7/5) with pansensitive PSA  - BCx (7/6) x 2 NGT  - UCx (7/6) with coag neg staph  - MRSA PCR (7/6) POSITIVE for both MRSA/MSSA s/p Bactroban (7/6 - 7/10)  - s/p empiric Zosyn (7/8 - 7/14) however WBC continues to wax and wane without fever and will monitor off ABX .     HEME  - On Xarelto 10mg at home likely for DVT PPX?   - Hold home Xarelto in prep for OR   - DVT PPX with LVX for now     ENDOCRINE  - No hx of DM2   - Monitor BG     SKIN  - Basic trach and PEG care ordered     ETHICS/ GOC    - FULL CODE   - Dr Tyson Velasquez,  involved in care    DISPO - Back home with  when able.

## 2024-07-19 NOTE — PROGRESS NOTE ADULT - SUBJECTIVE AND OBJECTIVE BOX
Patient seen at bedside.  On vent w/ trach in place.   Bedside nurse stated trach still had some leak but it improves with positioning and filling balloon.    Vital Signs Last 24 Hrs  T(C): 36.6 (19 Jul 2024 12:00), Max: 36.9 (19 Jul 2024 00:00)  T(F): 97.9 (19 Jul 2024 12:00), Max: 98.4 (19 Jul 2024 00:00)  HR: 112 (19 Jul 2024 12:00) (91 - 114)  BP: 154/81 (19 Jul 2024 12:00) (129/79 - 216/100)  BP(mean): 88 (18 Jul 2024 16:00) (88 - 88)  RR: 16 (19 Jul 2024 12:00) (16 - 16)  SpO2: 99% (19 Jul 2024 12:00) (99% - 100%)    Parameters below as of 19 Jul 2024 12:00  Patient On (Oxygen Delivery Method): ventilator        PE:   Trach in place, on ventilator, no acute distress.  PEG in place, abd soft/ND                          13.8   14.68 )-----------( 409      ( 19 Jul 2024 05:15 )             43.7       07-19    140  |  101  |  15  ----------------------------<  202<H>  3.8   |  22  |  <0.20<L>    Ca    9.9      19 Jul 2024 05:15  Phos  3.3     07-19  Mg     2.00     07-19    A/P  68y F PMHx ALS, Parkinson's, Trach, PEG; chronic resp failure, mechanical vent dependent since 2015, chronic dysphagia; from home; nonverbal, bedbound  -pt holding tidal volumes  -mild positional leak still, not leaking when at bedside.  please hold discharge - Custom trach ordered, will exchange when available.

## 2024-07-20 LAB
ANION GAP SERPL CALC-SCNC: 16 MMOL/L — HIGH (ref 7–14)
BASOPHILS # BLD AUTO: 0.02 K/UL — SIGNIFICANT CHANGE UP (ref 0–0.2)
BASOPHILS NFR BLD AUTO: 0.1 % — SIGNIFICANT CHANGE UP (ref 0–2)
BUN SERPL-MCNC: 23 MG/DL — SIGNIFICANT CHANGE UP (ref 7–23)
CALCIUM SERPL-MCNC: 9.9 MG/DL — SIGNIFICANT CHANGE UP (ref 8.4–10.5)
CHLORIDE SERPL-SCNC: 99 MMOL/L — SIGNIFICANT CHANGE UP (ref 98–107)
CO2 SERPL-SCNC: 22 MMOL/L — SIGNIFICANT CHANGE UP (ref 22–31)
CREAT SERPL-MCNC: <0.2 MG/DL — LOW (ref 0.5–1.3)
EGFR: 126 ML/MIN/1.73M2 — SIGNIFICANT CHANGE UP
EOSINOPHIL # BLD AUTO: 0.04 K/UL — SIGNIFICANT CHANGE UP (ref 0–0.5)
EOSINOPHIL NFR BLD AUTO: 0.2 % — SIGNIFICANT CHANGE UP (ref 0–6)
GLUCOSE SERPL-MCNC: 202 MG/DL — HIGH (ref 70–99)
HCT VFR BLD CALC: 41.2 % — SIGNIFICANT CHANGE UP (ref 34.5–45)
HGB BLD-MCNC: 13 G/DL — SIGNIFICANT CHANGE UP (ref 11.5–15.5)
IANC: 14.22 K/UL — HIGH (ref 1.8–7.4)
IMM GRANULOCYTES NFR BLD AUTO: 1 % — HIGH (ref 0–0.9)
LYMPHOCYTES # BLD AUTO: 1.1 K/UL — SIGNIFICANT CHANGE UP (ref 1–3.3)
LYMPHOCYTES # BLD AUTO: 6.7 % — LOW (ref 13–44)
MAGNESIUM SERPL-MCNC: 2 MG/DL — SIGNIFICANT CHANGE UP (ref 1.6–2.6)
MCHC RBC-ENTMCNC: 26.8 PG — LOW (ref 27–34)
MCHC RBC-ENTMCNC: 31.6 GM/DL — LOW (ref 32–36)
MCV RBC AUTO: 84.9 FL — SIGNIFICANT CHANGE UP (ref 80–100)
MONOCYTES # BLD AUTO: 0.88 K/UL — SIGNIFICANT CHANGE UP (ref 0–0.9)
MONOCYTES NFR BLD AUTO: 5.4 % — SIGNIFICANT CHANGE UP (ref 2–14)
NEUTROPHILS # BLD AUTO: 14.22 K/UL — HIGH (ref 1.8–7.4)
NEUTROPHILS NFR BLD AUTO: 86.6 % — HIGH (ref 43–77)
NRBC # BLD: 0 /100 WBCS — SIGNIFICANT CHANGE UP (ref 0–0)
NRBC # FLD: 0 K/UL — SIGNIFICANT CHANGE UP (ref 0–0)
PHOSPHATE SERPL-MCNC: 2.8 MG/DL — SIGNIFICANT CHANGE UP (ref 2.5–4.5)
PLATELET # BLD AUTO: 458 K/UL — HIGH (ref 150–400)
POTASSIUM SERPL-MCNC: 4.4 MMOL/L — SIGNIFICANT CHANGE UP (ref 3.5–5.3)
POTASSIUM SERPL-SCNC: 4.4 MMOL/L — SIGNIFICANT CHANGE UP (ref 3.5–5.3)
RBC # BLD: 4.85 M/UL — SIGNIFICANT CHANGE UP (ref 3.8–5.2)
RBC # FLD: 16 % — HIGH (ref 10.3–14.5)
SODIUM SERPL-SCNC: 137 MMOL/L — SIGNIFICANT CHANGE UP (ref 135–145)
WBC # BLD: 16.43 K/UL — HIGH (ref 3.8–10.5)
WBC # FLD AUTO: 16.43 K/UL — HIGH (ref 3.8–10.5)

## 2024-07-20 PROCEDURE — 99233 SBSQ HOSP IP/OBS HIGH 50: CPT | Mod: FS

## 2024-07-20 RX ADMIN — Medication 2 MILLIGRAM(S): at 17:13

## 2024-07-20 RX ADMIN — POVIDONE, PROPYLENE GLYCOL 1 DROP(S): 6.8; 3 LIQUID OPHTHALMIC at 05:35

## 2024-07-20 RX ADMIN — CHLORHEXIDINE GLUCONATE 15 MILLILITER(S): 40 SOLUTION TOPICAL at 05:34

## 2024-07-20 RX ADMIN — RILUZOLE 50 MILLIGRAM(S): 5 LIQUID ORAL at 05:34

## 2024-07-20 RX ADMIN — Medication 2 MILLIGRAM(S): at 05:33

## 2024-07-20 RX ADMIN — POVIDONE, PROPYLENE GLYCOL 1 DROP(S): 6.8; 3 LIQUID OPHTHALMIC at 17:12

## 2024-07-20 RX ADMIN — POVIDONE, PROPYLENE GLYCOL 1 APPLICATION(S): 6.8; 3 LIQUID OPHTHALMIC at 22:09

## 2024-07-20 RX ADMIN — CHLORHEXIDINE GLUCONATE 15 MILLILITER(S): 40 SOLUTION TOPICAL at 17:13

## 2024-07-20 RX ADMIN — ENOXAPARIN SODIUM 40 MILLIGRAM(S): 100 INJECTION SUBCUTANEOUS at 05:34

## 2024-07-20 RX ADMIN — POVIDONE, PROPYLENE GLYCOL 1 DROP(S): 6.8; 3 LIQUID OPHTHALMIC at 11:23

## 2024-07-20 RX ADMIN — Medication 15 MILLILITER(S): at 11:21

## 2024-07-20 RX ADMIN — CHLORHEXIDINE GLUCONATE 1 APPLICATION(S): 40 SOLUTION TOPICAL at 11:23

## 2024-07-20 RX ADMIN — RILUZOLE 50 MILLIGRAM(S): 5 LIQUID ORAL at 17:13

## 2024-07-20 RX ADMIN — SERTRALINE HYDROCHLORIDE 75 MILLIGRAM(S): 50 TABLET, FILM COATED ORAL at 11:21

## 2024-07-20 NOTE — PROGRESS NOTE ADULT - ASSESSMENT
69 YO Female with PMHx of ALS, Parkinson,, nonverbal, bed bound, chronic respiratory failure with tracheostomy and vent dependence, and oropharyngeal dysphagia with PEG who presented from home with tracheostomy leak. She was noted with vent alarm and poor TVe (250-290) at home. Trach changed at home with no improvement and sent in for thoracic evaluation. While in MICU no air leak noted with hyperinflated cuff. Transferred to [facility] on 7/5 with course complicated by leukocytosis second to UTI vs trachitis and completed zosyn course.     NEUROLOGY  # ALS   - Baseline nonverbal, awake, and communicates with eye movement.   - Continue on home rilutek 50mg BID     HEENT  - Eyes reddened /dry - restarted home routine of lacrilube and taping eyes - will do for time during day to soothe eyes   - Natural tears inc to 4x day     PSYCH   # Anxiety and Depression   - Continue on home valium 2mg BID   - Continue on zoloft 75mg QD while in house (on 4cc/ 80mg QD at home)     CARDIOVASCULAR   - Monitor HR and BP  - BP elevated 7/19 - resolved s/p IV hydralizine      RESPIRATORY  # Tracheostomy leak   - At home noted with TVe 200-300s despite tracheostomy change to 80XLTCD.   - No airleak noted in house with hyperinflated cuff likely tracheomegaly?   - Continue on chronic vent 16/400/5/21   - Chronic bibasilar atelectasis and continued on HTS PRN with chest PT  -  flex bronch and trach tube upsizing to 9mm Bovana  -On rounds noted to be receiving low TV and air heard escaping around cuff - maintaining 02 sat   - Seen with thoracic at bedside - bedside bronch done - trach position checked and additional air placed in cuff by thoracic with increase of TV to set parameter  Per [facility] - a custom trach will be ordered for this patient and DC is on hold   - Small leak around trach - awaiting custom trach per thoracic    GI  # Dysphagia   - Continue on PEG-TF   - Mild abdominal distention, however having good BMs and belly soft, monitor for now    RENAL  - No acute issues   - Monitor renal function and UOP   -Pt had PVR Approx 300cc will continue to monitor - pt does have str cath at home     # Hyperlactatemia  - Lactate 3.5 on admission and improved with rehydration.     INFECTIOUS DISEASE  # Leukocytosis likely second to trachitis vs UTI?   - No fever or chills and noted with prior leukocytosis   - CXR with prior atelectasis and no acute findings   - UA (7/6) with positive nitrites (7/6)   - SCx (7/5) with pansensitive PSA  - BCx (7/6) x 2 NGT  - UCx (7/6) with coag neg staph  - MRSA PCR (7/6) POSITIVE for both MRSA/MSSA s/p Bactroban (7/6 - 7/10)  - s/p empiric Zosyn (7/8 - 7/14) however WBC continues to wax and wane without fever and will monitor off ABX .     HEME  - On Xarelto 10mg at home likely for DVT PPX?   - Hold home Xarelto in prep for OR   - DVT PPX with LVX for now     ENDOCRINE  - No hx of DM2   - Monitor BG     SKIN  - Basic trach and PEG care ordered     ETHICS/ GOC    - FULL CODE   - Dr Tyson Velasquez,  involved in care    DISPO - Back home with  when able.

## 2024-07-20 NOTE — PROGRESS NOTE ADULT - NS ATTEND AMEND GEN_ALL_CORE FT
--------67 yo F w/ PMH of ALS, chronic respiratory failure w/ tracheostomy and vent dependence, oropharyngeal dysphagia w/ PEG, Parkinson's disease, non verbal at baseline presents w/ tracheostomy air leak.   w/ set volume at 400. Tracheostomy changed at home with no improvement and so presented for further evaluation. She underwent upsizing and trach exchange to size 9 Bivona on 7/17. She tolerated the procedure well.     7/18 noted to have significant air leak, minimal volumes returned. Additional 8 cc added to trach cuff with improvement in volume returned. Bedside bronchoscopy by CT surgery - trach in place, thin secretions aspirated.     -----mild positional leak still, -------please hold discharge - Custom trach ordered,  by thoracic surg ----- will exchange when available.  ---f/u by thoracic surg   - baseline mental status, on home regimen: Rilutek 50 mg bid, Valium 2 mg bid, Zolof 75 mg daily   - On VC//16/+5/30%, RR 16    - leukocytosis, tx for UTI and possible tracheitis. On Zosyn 7/8 - 7/14 7 day course. Leukocytosis but no other localizing signs of infection. Will monitor off abx for now   - sputum cx pansensitive pseudomonas.   - Will continue to monitor.

## 2024-07-20 NOTE — PROGRESS NOTE ADULT - SUBJECTIVE AND OBJECTIVE BOX
INTERVAL EVENTS: No acute overnight events     ROS: Seen by bedside during AM rounds     OBJECTIVE:  ICU Vital Signs Last 24 Hrs  T(C): 36.6 (20 Jul 2024 04:00), Max: 36.7 (19 Jul 2024 16:00)  T(F): 97.8 (20 Jul 2024 04:00), Max: 98 (19 Jul 2024 16:00)  HR: 102 (20 Jul 2024 04:00) (102 - 114)  BP: 90/62 (20 Jul 2024 04:00) (90/62 - 216/100)  BP(mean): 101 (20 Jul 2024 00:00) (94 - 101)  ABP: --  ABP(mean): --  RR: 16 (20 Jul 2024 04:00) (16 - 19)  SpO2: 99% (20 Jul 2024 04:00) (97% - 100%)    O2 Parameters below as of 20 Jul 2024 04:00  Patient On (Oxygen Delivery Method): ventilator          Mode: AC/ CMV (Assist Control/ Continuous Mandatory Ventilation), RR (machine): 16, TV (machine): 400, FiO2: 30, PEEP: 5, ITime: 0.78, MAP: 8, PIP: 31    07-18 @ 07:01 - 07-19 @ 07:00  --------------------------------------------------------  IN: 1160 mL / OUT: 900 mL / NET: 260 mL    07-19 @ 07:01 - 07-20 @ 06:46  --------------------------------------------------------  IN: 450 mL / OUT: 920 mL / NET: -470 mL      CAPILLARY BLOOD GLUCOSE          PHYSICAL EXAM:  General:   HEENT:   Lymph Nodes:  Neck:   Respiratory:   Cardiovascular:   Abdomen:   Extremities:   Skin:   Neurological:  Psychiatry:    Mode: AC/ CMV (Assist Control/ Continuous Mandatory Ventilation)  RR (machine): 16  TV (machine): 400  FiO2: 30  PEEP: 5  ITime: 0.78  MAP: 8  PIP: 31      HOSPITAL MEDICATIONS:  MEDICATIONS  (STANDING):  artificial  tears Solution 1 Drop(s) Both EYES every 6 hours  chlorhexidine 0.12% Liquid 15 milliLiter(s) Oral Mucosa every 12 hours  chlorhexidine 2% Cloths 1 Application(s) Topical daily  diazepam    Tablet 2 milliGRAM(s) Oral every 12 hours  enoxaparin Injectable 40 milliGRAM(s) SubCutaneous every 24 hours  multivitamin/minerals/iron Oral Solution (CENTRUM) 15 milliLiter(s) Oral daily  petrolatum Ophthalmic Ointment 1 Application(s) Both EYES at bedtime  riluzole 50 milliGRAM(s) Oral two times a day  sertraline 75 milliGRAM(s) Oral daily    MEDICATIONS  (PRN):  acetaminophen   Oral Liquid .. 650 milliGRAM(s) Oral every 6 hours PRN Temp greater or equal to 38C (100.4F), Mild Pain (1 - 3)  sodium chloride 3%  Inhalation 4 milliLiter(s) Inhalation every 12 hours PRN for congestion      LABS:                        13.8   14.68 )-----------( 409      ( 19 Jul 2024 05:15 )             43.7     07-19    140  |  101  |  15  ----------------------------<  202<H>  3.8   |  22  |  <0.20<L>    Ca    9.9      19 Jul 2024 05:15  Phos  3.3     07-19  Mg     2.00     07-19        Urinalysis Basic - ( 19 Jul 2024 05:15 )    Color: x / Appearance: x / SG: x / pH: x  Gluc: 202 mg/dL / Ketone: x  / Bili: x / Urobili: x   Blood: x / Protein: x / Nitrite: x   Leuk Esterase: x / RBC: x / WBC x   Sq Epi: x / Non Sq Epi: x / Bacteria: x

## 2024-07-20 NOTE — PROGRESS NOTE ADULT - TIME BILLING
review of the medical record, interpretation of labs, imaging studies, discussion with interdisciplinary team, physical exam and medical decision making as above.

## 2024-07-21 LAB
ANION GAP SERPL CALC-SCNC: 15 MMOL/L — HIGH (ref 7–14)
BASOPHILS # BLD AUTO: 0.07 K/UL — SIGNIFICANT CHANGE UP (ref 0–0.2)
BASOPHILS NFR BLD AUTO: 0.4 % — SIGNIFICANT CHANGE UP (ref 0–2)
BUN SERPL-MCNC: 28 MG/DL — HIGH (ref 7–23)
CALCIUM SERPL-MCNC: 9.6 MG/DL — SIGNIFICANT CHANGE UP (ref 8.4–10.5)
CHLORIDE SERPL-SCNC: 101 MMOL/L — SIGNIFICANT CHANGE UP (ref 98–107)
CO2 SERPL-SCNC: 22 MMOL/L — SIGNIFICANT CHANGE UP (ref 22–31)
CREAT SERPL-MCNC: <0.2 MG/DL — LOW (ref 0.5–1.3)
EGFR: 126 ML/MIN/1.73M2 — SIGNIFICANT CHANGE UP
EOSINOPHIL # BLD AUTO: 0.07 K/UL — SIGNIFICANT CHANGE UP (ref 0–0.5)
EOSINOPHIL NFR BLD AUTO: 0.4 % — SIGNIFICANT CHANGE UP (ref 0–6)
GLUCOSE SERPL-MCNC: 120 MG/DL — HIGH (ref 70–99)
HCT VFR BLD CALC: 42.2 % — SIGNIFICANT CHANGE UP (ref 34.5–45)
HGB BLD-MCNC: 12.8 G/DL — SIGNIFICANT CHANGE UP (ref 11.5–15.5)
IANC: 13.86 K/UL — HIGH (ref 1.8–7.4)
IMM GRANULOCYTES NFR BLD AUTO: 0.8 % — SIGNIFICANT CHANGE UP (ref 0–0.9)
LYMPHOCYTES # BLD AUTO: 1.95 K/UL — SIGNIFICANT CHANGE UP (ref 1–3.3)
LYMPHOCYTES # BLD AUTO: 11.4 % — LOW (ref 13–44)
MAGNESIUM SERPL-MCNC: 2 MG/DL — SIGNIFICANT CHANGE UP (ref 1.6–2.6)
MCHC RBC-ENTMCNC: 25.9 PG — LOW (ref 27–34)
MCHC RBC-ENTMCNC: 30.3 GM/DL — LOW (ref 32–36)
MCV RBC AUTO: 85.3 FL — SIGNIFICANT CHANGE UP (ref 80–100)
MONOCYTES # BLD AUTO: 1.03 K/UL — HIGH (ref 0–0.9)
MONOCYTES NFR BLD AUTO: 6 % — SIGNIFICANT CHANGE UP (ref 2–14)
NEUTROPHILS # BLD AUTO: 13.86 K/UL — HIGH (ref 1.8–7.4)
NEUTROPHILS NFR BLD AUTO: 81 % — HIGH (ref 43–77)
NRBC # BLD: 0 /100 WBCS — SIGNIFICANT CHANGE UP (ref 0–0)
NRBC # FLD: 0 K/UL — SIGNIFICANT CHANGE UP (ref 0–0)
PHOSPHATE SERPL-MCNC: 3.2 MG/DL — SIGNIFICANT CHANGE UP (ref 2.5–4.5)
PLATELET # BLD AUTO: 413 K/UL — HIGH (ref 150–400)
POTASSIUM SERPL-MCNC: 5.7 MMOL/L — HIGH (ref 3.5–5.3)
POTASSIUM SERPL-SCNC: 5.7 MMOL/L — HIGH (ref 3.5–5.3)
RBC # BLD: 4.95 M/UL — SIGNIFICANT CHANGE UP (ref 3.8–5.2)
RBC # FLD: 16.1 % — HIGH (ref 10.3–14.5)
SODIUM SERPL-SCNC: 138 MMOL/L — SIGNIFICANT CHANGE UP (ref 135–145)
WBC # BLD: 17.12 K/UL — HIGH (ref 3.8–10.5)
WBC # FLD AUTO: 17.12 K/UL — HIGH (ref 3.8–10.5)

## 2024-07-21 PROCEDURE — 99233 SBSQ HOSP IP/OBS HIGH 50: CPT | Mod: FS

## 2024-07-21 RX ORDER — HYDRALAZINE HCL 50 MG
2.5 TABLET ORAL ONCE
Refills: 0 | Status: COMPLETED | OUTPATIENT
Start: 2024-07-21 | End: 2024-07-21

## 2024-07-21 RX ORDER — SODIUM CHLORIDE 9 MG/ML
500 INJECTION INTRAMUSCULAR; INTRAVENOUS; SUBCUTANEOUS ONCE
Refills: 0 | Status: COMPLETED | OUTPATIENT
Start: 2024-07-21 | End: 2024-07-21

## 2024-07-21 RX ADMIN — CHLORHEXIDINE GLUCONATE 1 APPLICATION(S): 40 SOLUTION TOPICAL at 11:14

## 2024-07-21 RX ADMIN — POVIDONE, PROPYLENE GLYCOL 1 DROP(S): 6.8; 3 LIQUID OPHTHALMIC at 00:14

## 2024-07-21 RX ADMIN — RILUZOLE 50 MILLIGRAM(S): 5 LIQUID ORAL at 05:21

## 2024-07-21 RX ADMIN — POVIDONE, PROPYLENE GLYCOL 1 DROP(S): 6.8; 3 LIQUID OPHTHALMIC at 17:28

## 2024-07-21 RX ADMIN — POVIDONE, PROPYLENE GLYCOL 1 DROP(S): 6.8; 3 LIQUID OPHTHALMIC at 11:13

## 2024-07-21 RX ADMIN — Medication 2.5 MILLIGRAM(S): at 00:13

## 2024-07-21 RX ADMIN — RILUZOLE 50 MILLIGRAM(S): 5 LIQUID ORAL at 17:28

## 2024-07-21 RX ADMIN — POVIDONE, PROPYLENE GLYCOL 1 DROP(S): 6.8; 3 LIQUID OPHTHALMIC at 05:21

## 2024-07-21 RX ADMIN — Medication 2 MILLIGRAM(S): at 17:28

## 2024-07-21 RX ADMIN — CHLORHEXIDINE GLUCONATE 15 MILLILITER(S): 40 SOLUTION TOPICAL at 17:28

## 2024-07-21 RX ADMIN — Medication 15 MILLILITER(S): at 11:15

## 2024-07-21 RX ADMIN — CHLORHEXIDINE GLUCONATE 15 MILLILITER(S): 40 SOLUTION TOPICAL at 05:21

## 2024-07-21 RX ADMIN — SODIUM CHLORIDE 1000 MILLILITER(S): 9 INJECTION INTRAMUSCULAR; INTRAVENOUS; SUBCUTANEOUS at 05:19

## 2024-07-21 RX ADMIN — ENOXAPARIN SODIUM 40 MILLIGRAM(S): 100 INJECTION SUBCUTANEOUS at 05:21

## 2024-07-21 RX ADMIN — SERTRALINE HYDROCHLORIDE 75 MILLIGRAM(S): 50 TABLET, FILM COATED ORAL at 11:14

## 2024-07-21 NOTE — PROGRESS NOTE ADULT - SUBJECTIVE AND OBJECTIVE BOX
CHIEF COMPLAINT: trach change     INTERVAL EVENTS: BP elevated overnight hydralazine given with hypotension after    ROS: Seen by bedside during AM rounds     OBJECTIVE:  ICU Vital Signs Last 24 Hrs  T(C): 36.8 (20 Jul 2024 23:48), Max: 36.8 (20 Jul 2024 23:48)  T(F): 98.2 (20 Jul 2024 23:48), Max: 98.2 (20 Jul 2024 23:48)  HR: 117 (21 Jul 2024 02:35) (100 - 117)  BP: 156/84 (21 Jul 2024 02:23) (96/63 - 182/93)  BP(mean): --  ABP: --  ABP(mean): --  RR: 16 (20 Jul 2024 23:48) (16 - 26)  SpO2: 97% (21 Jul 2024 02:35) (96% - 100%)    O2 Parameters below as of 20 Jul 2024 23:48  Patient On (Oxygen Delivery Method): ventilator          Mode: AC/ CMV (Assist Control/ Continuous Mandatory Ventilation), RR (machine): 16, TV (machine): 400, FiO2: 30, PEEP: 5, ITime: 0.78, MAP: 9, PIP: 29    07-19 @ 07:01 - 07-20 @ 07:00  --------------------------------------------------------  IN: 600 mL / OUT: 920 mL / NET: -320 mL    07-20 @ 07:01 - 07-21 @ 06:52  --------------------------------------------------------  IN: 0 mL / OUT: 900 mL / NET: -900 mL      CAPILLARY BLOOD GLUCOSE          PHYSICAL EXAM:  General:   HEENT:   Lymph Nodes:  Neck:   Respiratory:   Cardiovascular:   Abdomen:   Extremities:   Skin:   Neurological:  Psychiatry:    Mode: AC/ CMV (Assist Control/ Continuous Mandatory Ventilation)  RR (machine): 16  TV (machine): 400  FiO2: 30  PEEP: 5  ITime: 0.78  MAP: 9  PIP: 29      HOSPITAL MEDICATIONS:  MEDICATIONS  (STANDING):  artificial  tears Solution 1 Drop(s) Both EYES every 6 hours  chlorhexidine 0.12% Liquid 15 milliLiter(s) Oral Mucosa every 12 hours  chlorhexidine 2% Cloths 1 Application(s) Topical daily  diazepam    Tablet 2 milliGRAM(s) Oral every 12 hours  enoxaparin Injectable 40 milliGRAM(s) SubCutaneous every 24 hours  multivitamin/minerals/iron Oral Solution (CENTRUM) 15 milliLiter(s) Oral daily  petrolatum Ophthalmic Ointment 1 Application(s) Both EYES at bedtime  riluzole 50 milliGRAM(s) Oral two times a day  sertraline 75 milliGRAM(s) Oral daily    MEDICATIONS  (PRN):  acetaminophen   Oral Liquid .. 650 milliGRAM(s) Oral every 6 hours PRN Temp greater or equal to 38C (100.4F), Mild Pain (1 - 3)  sodium chloride 3%  Inhalation 4 milliLiter(s) Inhalation every 12 hours PRN for congestion      LABS:                        13.0   16.43 )-----------( 458      ( 20 Jul 2024 06:00 )             41.2     07-20    137  |  99  |  23  ----------------------------<  202<H>  4.4   |  22  |  <0.20<L>    Ca    9.9      20 Jul 2024 06:00  Phos  2.8     07-20  Mg     2.00     07-20        Urinalysis Basic - ( 20 Jul 2024 06:00 )    Color: x / Appearance: x / SG: x / pH: x  Gluc: 202 mg/dL / Ketone: x  / Bili: x / Urobili: x   Blood: x / Protein: x / Nitrite: x   Leuk Esterase: x / RBC: x / WBC x   Sq Epi: x / Non Sq Epi: x / Bacteria: x         CHIEF COMPLAINT: trach change     INTERVAL EVENTS: BP elevated overnight hydralazine given with hypotension after    ROS: Seen by bedside during AM rounds     OBJECTIVE:  ICU Vital Signs Last 24 Hrs  T(C): 36.8 (20 Jul 2024 23:48), Max: 36.8 (20 Jul 2024 23:48)  T(F): 98.2 (20 Jul 2024 23:48), Max: 98.2 (20 Jul 2024 23:48)  HR: 117 (21 Jul 2024 02:35) (100 - 117)  BP: 156/84 (21 Jul 2024 02:23) (96/63 - 182/93)  BP(mean): --  ABP: --  ABP(mean): --  RR: 16 (20 Jul 2024 23:48) (16 - 26)  SpO2: 97% (21 Jul 2024 02:35) (96% - 100%)    O2 Parameters below as of 20 Jul 2024 23:48  Patient On (Oxygen Delivery Method): ventilator          Mode: AC/ CMV (Assist Control/ Continuous Mandatory Ventilation), RR (machine): 16, TV (machine): 400, FiO2: 30, PEEP: 5, ITime: 0.78, MAP: 9, PIP: 29    07-19 @ 07:01 - 07-20 @ 07:00  --------------------------------------------------------  IN: 600 mL / OUT: 920 mL / NET: -320 mL    07-20 @ 07:01 - 07-21 @ 06:52  --------------------------------------------------------  IN: 0 mL / OUT: 900 mL / NET: -900 mL      CAPILLARY BLOOD GLUCOSE          PHYSICAL EXAMINATION  General: NAD   HEENT: Trach present -small leak  Cards: S1/S2, no murmurs   Pulm: Course vent sounds bilaterally. No wheezes.   Abdomen: Mildly distended abdominal, however soft and nontender. BS (+) PEG present.   Extremities: No pedal edema. No active [place] of BL upper and lower extremities.  Neurology: Awake with eyes open, however unable to follow commands second to baseline ALS with no acute focal neurological deficits     Mode: AC/ CMV (Assist Control/ Continuous Mandatory Ventilation)  RR (machine): 16  TV (machine): 400  FiO2: 30  PEEP: 5  ITime: 0.78  MAP: 9  PIP: 29      HOSPITAL MEDICATIONS:  MEDICATIONS  (STANDING):  artificial  tears Solution 1 Drop(s) Both EYES every 6 hours  chlorhexidine 0.12% Liquid 15 milliLiter(s) Oral Mucosa every 12 hours  chlorhexidine 2% Cloths 1 Application(s) Topical daily  diazepam    Tablet 2 milliGRAM(s) Oral every 12 hours  enoxaparin Injectable 40 milliGRAM(s) SubCutaneous every 24 hours  multivitamin/minerals/iron Oral Solution (CENTRUM) 15 milliLiter(s) Oral daily  petrolatum Ophthalmic Ointment 1 Application(s) Both EYES at bedtime  riluzole 50 milliGRAM(s) Oral two times a day  sertraline 75 milliGRAM(s) Oral daily    MEDICATIONS  (PRN):  acetaminophen   Oral Liquid .. 650 milliGRAM(s) Oral every 6 hours PRN Temp greater or equal to 38C (100.4F), Mild Pain (1 - 3)  sodium chloride 3%  Inhalation 4 milliLiter(s) Inhalation every 12 hours PRN for congestion      LABS:                        13.0   16.43 )-----------( 458      ( 20 Jul 2024 06:00 )             41.2     07-20    137  |  99  |  23  ----------------------------<  202<H>  4.4   |  22  |  <0.20<L>    Ca    9.9      20 Jul 2024 06:00  Phos  2.8     07-20  Mg     2.00     07-20        Urinalysis Basic - ( 20 Jul 2024 06:00 )    Color: x / Appearance: x / SG: x / pH: x  Gluc: 202 mg/dL / Ketone: x  / Bili: x / Urobili: x   Blood: x / Protein: x / Nitrite: x   Leuk Esterase: x / RBC: x / WBC x   Sq Epi: x / Non Sq Epi: x / Bacteria: x

## 2024-07-21 NOTE — PROGRESS NOTE ADULT - NS ATTEND AMEND GEN_ALL_CORE FT
69 yo F w/ PMH of ALS, chronic respiratory failure w/ tracheostomy and vent dependence, oropharyngeal dysphagia w/ PEG, Parkinson's disease, non verbal at baseline presents w/ tracheostomy air leak.   w/ set volume at 400. Tracheostomy changed at home with no improvement and so presented for further evaluation. She underwent upsizing and trach exchange to size 9 Bivona on 7/17. She tolerated the procedure well.     7/18 noted to have significant air leak, minimal volumes returned. Additional 8 cc added to trach cuff with improvement in volume returned. Bedside bronchoscopy by CT surgery - trach in place, thin secretions aspirated.     -----mild positional leak still  - Custom trach ordered,  by thoracic surg ----- will exchange when available.  ---f/u by thoracic surg   - baseline mental status, on home regimen: Rilutek 50 mg bid, Valium 2 mg bid, Zolof 75 mg daily   - On VC//16/+5/30%, RR 16    - leukocytosis, tx for UTI and possible tracheitis. On Zosyn 7/8 - 7/14 7 day course. Leukocytosis but no other localizing signs of infection. Will monitor off abx for now   - labile BP - monitor for now, hydralazine prn, ? possibly due to discomfort, pain meds prn   - sputum cx pansensitive pseudomonas.   - Will continue to monitor.

## 2024-07-21 NOTE — PROGRESS NOTE ADULT - ASSESSMENT
67 YO Female with PMHx of ALS, Parkinson,, nonverbal, bed bound, chronic respiratory failure with tracheostomy and vent dependence, and oropharyngeal dysphagia with PEG who presented from home with tracheostomy leak. She was noted with vent alarm and poor TVe (250-290) at home. Trach changed at home with no improvement and sent in for thoracic evaluation. While in MICU no air leak noted with hyperinflated cuff. Transferred to [facility] on 7/5 with course complicated by leukocytosis second to UTI vs trachitis and completed zosyn course.     NEUROLOGY  # ALS   - Baseline nonverbal, awake, and communicates with eye movement.   - Continue on home rilutek 50mg BID     HEENT  - Eyes reddened /dry - restarted home routine of lacrilube and taping eyes - will do for time during day to soothe eyes   - Natural tears inc to 4x day     PSYCH   # Anxiety and Depression   - Continue on home valium 2mg BID   - Continue on zoloft 75mg QD while in house (on 4cc/ 80mg QD at home)     CARDIOVASCULAR   - Monitor HR and BP  - BP elevated 7/19 - resolved s/p IV hydralizine      RESPIRATORY  # Tracheostomy leak   - At home noted with TVe 200-300s despite tracheostomy change to 80XLTCD.   - No airleak noted in house with hyperinflated cuff likely tracheomegaly?   - Continue on chronic vent 16/400/5/21   - Chronic bibasilar atelectasis and continued on HTS PRN with chest PT  -  flex bronch and trach tube upsizing to 9mm Bovana  -On rounds noted to be receiving low TV and air heard escaping around cuff - maintaining 02 sat   - Seen with thoracic at bedside - bedside bronch done - trach position checked and additional air placed in cuff by thoracic with increase of TV to set parameter  Per [facility] - a custom trach will be ordered for this patient and DC is on hold   - Small leak around trach - awaiting custom trach per thoracic    GI  # Dysphagia   - Continue on PEG-TF   - Mild abdominal distention, however having good BMs and belly soft, monitor for now    RENAL  - No acute issues   - Monitor renal function and UOP   -Pt had PVR Approx 300cc will continue to monitor - pt does have str cath at home     # Hyperlactatemia  - Lactate 3.5 on admission and improved with rehydration.     INFECTIOUS DISEASE  # Leukocytosis likely second to trachitis vs UTI?   - No fever or chills and noted with prior leukocytosis   - CXR with prior atelectasis and no acute findings   - UA (7/6) with positive nitrites (7/6)   - SCx (7/5) with pansensitive PSA  - BCx (7/6) x 2 NGT  - UCx (7/6) with coag neg staph  - MRSA PCR (7/6) POSITIVE for both MRSA/MSSA s/p Bactroban (7/6 - 7/10)  - s/p empiric Zosyn (7/8 - 7/14) however WBC continues to wax and wane without fever and will monitor off ABX .     HEME  - On Xarelto 10mg at home likely for DVT PPX?   - Hold home Xarelto in prep for OR   - DVT PPX with LVX for now     ENDOCRINE  - No hx of DM2   - Monitor BG     SKIN  - Basic trach and PEG care ordered     ETHICS/ GOC    - FULL CODE   - Dr Tyson Velasquez,  involved in care    DISPO - Back home with  when able.    69 YO Female with PMHx of ALS, Parkinson,, nonverbal, bed bound, chronic respiratory failure with tracheostomy and vent dependence, and oropharyngeal dysphagia with PEG who presented from home with tracheostomy leak. She was noted with vent alarm and poor TVe (250-290) at home. Trach changed at home with no improvement and sent in for thoracic evaluation. While in MICU no air leak noted with hyperinflated cuff. Transferred to [facility] on 7/5 with course complicated by leukocytosis second to UTI vs trachitis and completed zosyn course.     NEUROLOGY  # ALS   - Baseline nonverbal, awake, and communicates with eye movement.   - Continue on home rilutek 50mg BID     HEENT  - Eyes reddened /dry - restarted home routine of lacrilube and taping eyes - will do for time during day to soothe eyes   - Natural tears inc to 4x day     PSYCH   # Anxiety and Depression   - Continue on home valium 2mg BID   - Continue on zoloft 75mg QD while in house (on 4cc/ 80mg QD at home)     CARDIOVASCULAR   - Monitor HR and BP  - BP elevated 7/19 - resolved s/p IV hydralizine  but had hypotension when treated 7/20   - would consider Tylenol IV as BP may be pt with discomfort      RESPIRATORY  # Tracheostomy leak   - At home noted with TVe 200-300s despite tracheostomy change to 80XLTCD.   - No airleak noted in house with hyperinflated cuff likely tracheomegaly?   - Continue on chronic vent 16/400/5/21   - Chronic bibasilar atelectasis and continued on HTS PRN with chest PT  -  flex bronch and trach tube upsizing to 9mm Bovana  -On rounds noted to be receiving low TV and air heard escaping around cuff - maintaining 02 sat   - Seen with thoracic at bedside - bedside bronch done - trach position checked and additional air placed in cuff by thoracic with increase of TV to set parameter  Per [facility] - a custom trach will be ordered for this patient and DC is on hold   - Small leak around trach - awaiting custom trach per thoracic    GI  # Dysphagia   - Continue on PEG-TF   - Mild abdominal distention, however having good BMs and belly soft, monitor for now    RENAL  - No acute issues   - Monitor renal function and UOP   -Pt had PVR Approx 300cc will continue to monitor - pt does have str cath at home     # Hyperlactatemia  - Lactate 3.5 on admission and improved with rehydration.     INFECTIOUS DISEASE  # Leukocytosis likely second to trachitis vs UTI?   - No fever or chills and noted with prior leukocytosis   - CXR with prior atelectasis and no acute findings   - UA (7/6) with positive nitrites (7/6)   - SCx (7/5) with pansensitive PSA  - BCx (7/6) x 2 NGT  - UCx (7/6) with coag neg staph  - MRSA PCR (7/6) POSITIVE for both MRSA/MSSA s/p Bactroban (7/6 - 7/10)  - s/p empiric Zosyn (7/8 - 7/14) however WBC continues to wax and wane without fever and will monitor off ABX .     HEME  - On Xarelto 10mg at home likely for DVT PPX?   - Hold home Xarelto in prep for OR   - DVT PPX with LVX for now     ENDOCRINE  - No hx of DM2   - Monitor BG     SKIN  - Basic trach and PEG care ordered     ETHICS/ GOC    - FULL CODE   - Dr Tyson Velasquez,  involved in care    DISPO - Back home with  when able.

## 2024-07-21 NOTE — PROGRESS NOTE ADULT - NS ATTEST RISK PROBLEM GEN_ALL_CORE FT
review of the medical record, interpretation of labs, imaging studies, discussion with interdisciplinary team, physical exam and medical decision making as above
Review of patient records, including history, laboratory data, imaging. Patient evaluation and assessment. Coordination of care. Time excludes teaching.
respiratory failure

## 2024-07-22 LAB
ANION GAP SERPL CALC-SCNC: 14 MMOL/L — SIGNIFICANT CHANGE UP (ref 7–14)
BASOPHILS # BLD AUTO: 0.06 K/UL — SIGNIFICANT CHANGE UP (ref 0–0.2)
BASOPHILS NFR BLD AUTO: 0.4 % — SIGNIFICANT CHANGE UP (ref 0–2)
BUN SERPL-MCNC: 26 MG/DL — HIGH (ref 7–23)
CALCIUM SERPL-MCNC: 9.7 MG/DL — SIGNIFICANT CHANGE UP (ref 8.4–10.5)
CHLORIDE SERPL-SCNC: 102 MMOL/L — SIGNIFICANT CHANGE UP (ref 98–107)
CO2 SERPL-SCNC: 24 MMOL/L — SIGNIFICANT CHANGE UP (ref 22–31)
CREAT SERPL-MCNC: <0.2 MG/DL — LOW (ref 0.5–1.3)
EGFR: 125 ML/MIN/1.73M2 — SIGNIFICANT CHANGE UP
EOSINOPHIL # BLD AUTO: 0.3 K/UL — SIGNIFICANT CHANGE UP (ref 0–0.5)
EOSINOPHIL NFR BLD AUTO: 2.1 % — SIGNIFICANT CHANGE UP (ref 0–6)
GLUCOSE SERPL-MCNC: 150 MG/DL — HIGH (ref 70–99)
HCT VFR BLD CALC: 39.6 % — SIGNIFICANT CHANGE UP (ref 34.5–45)
HGB BLD-MCNC: 12.4 G/DL — SIGNIFICANT CHANGE UP (ref 11.5–15.5)
IANC: 11.14 K/UL — HIGH (ref 1.8–7.4)
IMM GRANULOCYTES NFR BLD AUTO: 0.8 % — SIGNIFICANT CHANGE UP (ref 0–0.9)
LYMPHOCYTES # BLD AUTO: 1.6 K/UL — SIGNIFICANT CHANGE UP (ref 1–3.3)
LYMPHOCYTES # BLD AUTO: 11.3 % — LOW (ref 13–44)
MAGNESIUM SERPL-MCNC: 1.9 MG/DL — SIGNIFICANT CHANGE UP (ref 1.6–2.6)
MCHC RBC-ENTMCNC: 26.6 PG — LOW (ref 27–34)
MCHC RBC-ENTMCNC: 31.3 GM/DL — LOW (ref 32–36)
MCV RBC AUTO: 85 FL — SIGNIFICANT CHANGE UP (ref 80–100)
MONOCYTES # BLD AUTO: 0.97 K/UL — HIGH (ref 0–0.9)
MONOCYTES NFR BLD AUTO: 6.8 % — SIGNIFICANT CHANGE UP (ref 2–14)
NEUTROPHILS # BLD AUTO: 11.14 K/UL — HIGH (ref 1.8–7.4)
NEUTROPHILS NFR BLD AUTO: 78.6 % — HIGH (ref 43–77)
NRBC # BLD: 0 /100 WBCS — SIGNIFICANT CHANGE UP (ref 0–0)
NRBC # FLD: 0 K/UL — SIGNIFICANT CHANGE UP (ref 0–0)
PHOSPHATE SERPL-MCNC: 3.5 MG/DL — SIGNIFICANT CHANGE UP (ref 2.5–4.5)
PLATELET # BLD AUTO: 387 K/UL — SIGNIFICANT CHANGE UP (ref 150–400)
POTASSIUM SERPL-MCNC: 4.3 MMOL/L — SIGNIFICANT CHANGE UP (ref 3.5–5.3)
POTASSIUM SERPL-SCNC: 4.3 MMOL/L — SIGNIFICANT CHANGE UP (ref 3.5–5.3)
RBC # BLD: 4.66 M/UL — SIGNIFICANT CHANGE UP (ref 3.8–5.2)
RBC # FLD: 16.2 % — HIGH (ref 10.3–14.5)
SODIUM SERPL-SCNC: 140 MMOL/L — SIGNIFICANT CHANGE UP (ref 135–145)
WBC # BLD: 14.18 K/UL — HIGH (ref 3.8–10.5)
WBC # FLD AUTO: 14.18 K/UL — HIGH (ref 3.8–10.5)

## 2024-07-22 PROCEDURE — 99233 SBSQ HOSP IP/OBS HIGH 50: CPT | Mod: FS

## 2024-07-22 RX ORDER — ACETAMINOPHEN 325 MG/1
750 TABLET ORAL ONCE
Refills: 0 | Status: COMPLETED | OUTPATIENT
Start: 2024-07-22 | End: 2024-07-22

## 2024-07-22 RX ORDER — HYDRALAZINE HCL 50 MG
2.5 TABLET ORAL ONCE
Refills: 0 | Status: COMPLETED | OUTPATIENT
Start: 2024-07-22 | End: 2024-07-22

## 2024-07-22 RX ADMIN — ENOXAPARIN SODIUM 40 MILLIGRAM(S): 100 INJECTION SUBCUTANEOUS at 05:27

## 2024-07-22 RX ADMIN — POVIDONE, PROPYLENE GLYCOL 1 DROP(S): 6.8; 3 LIQUID OPHTHALMIC at 05:28

## 2024-07-22 RX ADMIN — ACETAMINOPHEN 300 MILLIGRAM(S): 325 TABLET ORAL at 16:28

## 2024-07-22 RX ADMIN — SERTRALINE HYDROCHLORIDE 75 MILLIGRAM(S): 50 TABLET, FILM COATED ORAL at 11:18

## 2024-07-22 RX ADMIN — Medication 2.5 MILLIGRAM(S): at 19:00

## 2024-07-22 RX ADMIN — POVIDONE, PROPYLENE GLYCOL 1 DROP(S): 6.8; 3 LIQUID OPHTHALMIC at 17:04

## 2024-07-22 RX ADMIN — Medication 2 MILLIGRAM(S): at 05:26

## 2024-07-22 RX ADMIN — CHLORHEXIDINE GLUCONATE 15 MILLILITER(S): 40 SOLUTION TOPICAL at 17:05

## 2024-07-22 RX ADMIN — ACETAMINOPHEN 650 MILLIGRAM(S): 325 TABLET ORAL at 22:25

## 2024-07-22 RX ADMIN — RILUZOLE 50 MILLIGRAM(S): 5 LIQUID ORAL at 17:05

## 2024-07-22 RX ADMIN — CHLORHEXIDINE GLUCONATE 15 MILLILITER(S): 40 SOLUTION TOPICAL at 05:27

## 2024-07-22 RX ADMIN — ACETAMINOPHEN 650 MILLIGRAM(S): 325 TABLET ORAL at 21:56

## 2024-07-22 RX ADMIN — CHLORHEXIDINE GLUCONATE 1 APPLICATION(S): 40 SOLUTION TOPICAL at 11:19

## 2024-07-22 RX ADMIN — RILUZOLE 50 MILLIGRAM(S): 5 LIQUID ORAL at 05:27

## 2024-07-22 RX ADMIN — POVIDONE, PROPYLENE GLYCOL 1 DROP(S): 6.8; 3 LIQUID OPHTHALMIC at 11:18

## 2024-07-22 RX ADMIN — POVIDONE, PROPYLENE GLYCOL 1 DROP(S): 6.8; 3 LIQUID OPHTHALMIC at 00:22

## 2024-07-22 RX ADMIN — Medication 15 MILLILITER(S): at 11:19

## 2024-07-22 RX ADMIN — Medication 2 MILLIGRAM(S): at 17:04

## 2024-07-22 RX ADMIN — POVIDONE, PROPYLENE GLYCOL 1 APPLICATION(S): 6.8; 3 LIQUID OPHTHALMIC at 00:22

## 2024-07-22 RX ADMIN — ACETAMINOPHEN 750 MILLIGRAM(S): 325 TABLET ORAL at 16:54

## 2024-07-22 NOTE — PROGRESS NOTE ADULT - ASSESSMENT
67 YO Female with PMHx of ALS, Parkinson,, nonverbal, bed bound, chronic respiratory failure with tracheostomy and vent dependence, and oropharyngeal dysphagia with PEG who presented from home with tracheostomy leak. She was noted with vent alarm and poor TVe (250-290) at home. Trach changed at home with no improvement and sent in for thoracic evaluation. While in MICU no air leak noted with hyperinflated cuff. Transferred to [facility] on 7/5 with course complicated by leukocytosis second to UTI vs trachitis and completed zosyn course.     NEUROLOGY  # ALS   - Baseline nonverbal, awake, and communicates with eye movement.   - Continue on home rilutek 50mg BID     HEENT  - Eyes reddened /dry - restarted home routine of lacrilube and taping eyes - will do for time during day to soothe eyes   - Natural tears inc to 4x day     PSYCH   # Anxiety and Depression   - Continue on home valium 2mg BID   - Continue on zoloft 75mg QD while in house (on 4cc/ 80mg QD at home)     CARDIOVASCULAR   - Monitor HR and BP  - BP elevated 7/19 - resolved s/p IV hydralizine  but had hypotension when treated 7/20   - would consider Tylenol IV as BP may be pt with discomfort      RESPIRATORY  # Tracheostomy leak   - At home noted with TVe 200-300s despite tracheostomy change to 80XLTCD.   - No airleak noted in house with hyperinflated cuff likely tracheomegaly?   - Continue on chronic vent 16/400/5/21   - Chronic bibasilar atelectasis and continued on HTS PRN with chest PT  -  flex bronch and trach tube upsizing to 9mm Bovana  - On rounds noted to be receiving low TV and air heard escaping around cuff - maintaining 02 sat   - Seen with thoracic at bedside - bedside bronch done - trach position checked and additional air placed in cuff by thoracic with increase of TV to set parameter  Per [facility] - a custom trach will be ordered for this patient and DC is on hold   - Small leak around trach - awaiting custom trach per thoracic    GI  # Dysphagia   - Continue on PEG-TF   - Mild abdominal distention, however having good BMs and belly soft, monitor for now    RENAL  - No acute issues   - Monitor renal function and UOP   - Pt had PVR Approx 300cc will continue to monitor - pt does have str cath at home     # Hyperlactatemia  - Lactate 3.5 on admission and improved with rehydration.     INFECTIOUS DISEASE  # Leukocytosis likely second to trachitis vs UTI?   - No fever or chills and noted with prior leukocytosis   - CXR with prior atelectasis and no acute findings   - UA (7/6) with positive nitrites (7/6)   - SCx (7/5) with pansensitive PSA  - BCx (7/6) x 2 NGT  - UCx (7/6) with coag neg staph  - MRSA PCR (7/6) POSITIVE for both MRSA/MSSA s/p Bactroban (7/6 - 7/10)  - s/p empiric Zosyn (7/8 - 7/14) however WBC continues to wax and wane without fever and will monitor off ABX .     HEME  - On Xarelto 10mg at home likely for DVT PPX?   - Hold home Xarelto in prep for OR   - DVT PPX with LVX for now     ENDOCRINE  - No hx of DM2   - Monitor BG     SKIN  - Basic trach and PEG care ordered     ETHICS/ GOC    - FULL CODE   - Dr Tyson Velasquez,  involved in care    DISPO - Back home with  when able.    67 YO Female with PMHx of ALS, Parkinson,, nonverbal, bed bound, chronic respiratory failure with tracheostomy and vent dependence, and oropharyngeal dysphagia with PEG who presented from home with tracheostomy leak. She was noted with vent alarm and poor TVe (250-290) at home. Trach changed at home with no improvement and sent in for thoracic evaluation. While in MICU no air leak noted with hyperinflated cuff. Transferred to [facility] on 7/5 with course complicated by leukocytosis second to UTI vs trachitis and completed zosyn course.     NEUROLOGY  # ALS   - Baseline nonverbal, awake, and communicates with eye movement.   - Continue on home rilutek 50mg BID     HEENT  - Eyes reddened /dry - restarted home routine of lacrilube and taping eyes - will do for time during day to soothe eyes   - Natural tears inc to 4x day     PSYCH   # Anxiety and Depression   - Continue on home valium 2mg BID   - Continue on zoloft 75mg QD while in house (on 4cc/ 80mg QD at home)     CARDIOVASCULAR   - Monitor HR and BP  - BP elevated 7/19 - resolved s/p IV hydralizine  but had hypotension when treated 7/20   - would consider Tylenol IV as BP may be pt with discomfort      RESPIRATORY  # Tracheostomy leak   - At home noted with TVe 200-300s despite tracheostomy change to 80XLTCD.   - No airleak noted in house with hyperinflated cuff likely tracheomegaly?   - Continue on chronic vent 16/400/5/21   - Chronic bibasilar atelectasis and continued on HTS PRN with chest PT  -  flex bronch and trach tube upsizing to 9mm Bovana  - On rounds noted to be receiving low TV and air heard escaping around cuff - maintaining 02 sat   - Seen with thoracic at bedside - bedside bronch done - trach position checked and additional air placed in cuff by thoracic with increase of TV to set parameter  Per [facility] - a custom trach will be ordered for this patient and DC is on hold   - Small leak around trach - awaiting custom trach per thoracic     GI  # Dysphagia   - Continue on PEG-TF   - Mild abdominal distention, however having good BMs and belly soft, monitor for now    RENAL  - No acute issues   - Monitor renal function and UOP   - Pt had PVR Approx 300cc will continue to monitor - pt does have str cath at home     # Hyperlactatemia  - Lactate 3.5 on admission and improved with rehydration.     INFECTIOUS DISEASE  # Leukocytosis likely second to trachitis vs UTI?   - No fever or chills and noted with prior leukocytosis   - CXR with prior atelectasis and no acute findings   - UA (7/6) with positive nitrites (7/6)   - SCx (7/5) with pansensitive PSA  - BCx (7/6) x 2 NGT  - UCx (7/6) with coag neg staph  - MRSA PCR (7/6) POSITIVE for both MRSA/MSSA s/p Bactroban (7/6 - 7/10)  - s/p empiric Zosyn (7/8 - 7/14) however WBC continues to wax and wane without fever and will monitor off ABX .     HEME  - On Xarelto 10mg at home likely for DVT PPX?   - Hold home Xarelto in prep for OR   - DVT PPX with LVX for now     ENDOCRINE  - No hx of DM2   - Monitor BG     SKIN  - Basic trach and PEG care ordered     ETHICS/ GOC    - FULL CODE   - Dr Tyson Velasquez,  involved in care    DISPO - Back home with  when able.

## 2024-07-22 NOTE — PROGRESS NOTE ADULT - SUBJECTIVE AND OBJECTIVE BOX
CHIEF COMPLAINT: Patient is a 69y old  Female who presents with a chief complaint of trach exchange (18 Jul 2024 07:07)      INTERVAL EVENTS:   - No acute overnight events.   - Awaiting custom Tracheostomy tube with Thoracic Surgery.    ROS: Seen by bedside during AM rounds     OBJECTIVE:  ICU Vital Signs Last 24 Hrs  T(C): 36.4 (22 Jul 2024 04:00), Max: 36.9 (21 Jul 2024 10:30)  T(F): 97.5 (22 Jul 2024 04:00), Max: 98.4 (21 Jul 2024 10:30)  HR: 98 (22 Jul 2024 04:00) (96 - 109)  BP: 119/64 (22 Jul 2024 04:00) (94/71 - 141/82)  BP(mean): 79 (22 Jul 2024 04:00) (79 - 94)  ABP: --  ABP(mean): --  RR: 16 (22 Jul 2024 04:00) (16 - 16)  SpO2: 99% (22 Jul 2024 04:00) (98% - 99%)    O2 Parameters below as of 22 Jul 2024 04:00  Patient On (Oxygen Delivery Method): ventilator    O2 Concentration (%): 30      Mode: AC/ CMV (Assist Control/ Continuous Mandatory Ventilation), RR (machine): 16, TV (machine): 400, FiO2: 30, PEEP: 5, ITime: 0.64, MAP: 9, PIP: 27    07-20 @ 07:01 - 07-21 @ 07:00  --------------------------------------------------------  IN: 680 mL / OUT: 900 mL / NET: -220 mL    07-21 @ 07:01 - 07-22 @ 06:44  --------------------------------------------------------  IN: 1310 mL / OUT: 900 mL / NET: 410 mL      CAPILLARY BLOOD GLUCOSE          PHYSICAL EXAM:  General:   HEENT:   Lymph Nodes:  Neck:   Respiratory:   Cardiovascular:   Abdomen:   Extremities:   Skin:   Neurological:  Psychiatry:    Mode: AC/ CMV (Assist Control/ Continuous Mandatory Ventilation)  RR (machine): 16  TV (machine): 400  FiO2: 30  PEEP: 5  ITime: 0.64  MAP: 9  PIP: 27      HOSPITAL MEDICATIONS:  MEDICATIONS  (STANDING):  artificial  tears Solution 1 Drop(s) Both EYES every 6 hours  chlorhexidine 0.12% Liquid 15 milliLiter(s) Oral Mucosa every 12 hours  chlorhexidine 2% Cloths 1 Application(s) Topical daily  diazepam    Tablet 2 milliGRAM(s) Oral every 12 hours  enoxaparin Injectable 40 milliGRAM(s) SubCutaneous every 24 hours  multivitamin/minerals/iron Oral Solution (CENTRUM) 15 milliLiter(s) Oral daily  petrolatum Ophthalmic Ointment 1 Application(s) Both EYES at bedtime  riluzole 50 milliGRAM(s) Oral two times a day  sertraline 75 milliGRAM(s) Oral daily    MEDICATIONS  (PRN):  acetaminophen   Oral Liquid .. 650 milliGRAM(s) Oral every 6 hours PRN Temp greater or equal to 38C (100.4F), Mild Pain (1 - 3)  sodium chloride 3%  Inhalation 4 milliLiter(s) Inhalation every 12 hours PRN for congestion      LABS:                        12.8   17.12 )-----------( 413      ( 21 Jul 2024 06:00 )             42.2     07-21    138  |  101  |  28<H>  ----------------------------<  120<H>  5.7<H>   |  22  |  <0.20<L>    Ca    9.6      21 Jul 2024 06:00  Phos  3.2     07-21  Mg     2.00     07-21        Urinalysis Basic - ( 21 Jul 2024 06:00 )    Color: x / Appearance: x / SG: x / pH: x  Gluc: 120 mg/dL / Ketone: x  / Bili: x / Urobili: x   Blood: x / Protein: x / Nitrite: x   Leuk Esterase: x / RBC: x / WBC x   Sq Epi: x / Non Sq Epi: x / Bacteria: x         CHIEF COMPLAINT: Patient is a 69y old  Female who presents with a chief complaint of trach exchange (18 Jul 2024 07:07)      INTERVAL EVENTS:   - No acute overnight events.   - Awaiting custom Tracheostomy tube with Thoracic Surgery.    ROS: Seen by bedside during AM rounds, unable to obtain d/t mental status.     OBJECTIVE:  ICU Vital Signs Last 24 Hrs  T(C): 36.4 (22 Jul 2024 04:00), Max: 36.9 (21 Jul 2024 10:30)  T(F): 97.5 (22 Jul 2024 04:00), Max: 98.4 (21 Jul 2024 10:30)  HR: 98 (22 Jul 2024 04:00) (96 - 109)  BP: 119/64 (22 Jul 2024 04:00) (94/71 - 141/82)  BP(mean): 79 (22 Jul 2024 04:00) (79 - 94)  ABP: --  ABP(mean): --  RR: 16 (22 Jul 2024 04:00) (16 - 16)  SpO2: 99% (22 Jul 2024 04:00) (98% - 99%)    O2 Parameters below as of 22 Jul 2024 04:00  Patient On (Oxygen Delivery Method): ventilator    O2 Concentration (%): 30      Mode: AC/ CMV (Assist Control/ Continuous Mandatory Ventilation), RR (machine): 16, TV (machine): 400, FiO2: 30, PEEP: 5, ITime: 0.64, MAP: 9, PIP: 27    07-20 @ 07:01 - 07-21 @ 07:00  --------------------------------------------------------  IN: 680 mL / OUT: 900 mL / NET: -220 mL    07-21 @ 07:01  -  07-22 @ 06:44  --------------------------------------------------------  IN: 1310 mL / OUT: 900 mL / NET: 410 mL      CAPILLARY BLOOD GLUCOSE          PHYSICAL EXAM:  General: NAD, +Trach To Ventilator   Neck: neck supple, no JVD, trachea midline  Respiratory: +rhonchi breath sounds b/l, no rales or wheezing, no respiratory distress  Cardiovascular: normal s1, s2, reg rate and rhythm  Abdomen: soft, non tender, +PEG  Extremities: no LE edema b/l  Skin: warm and dry  Neurological: awake, does not follow commands  Psychiatry: no agitation       Mode: AC/ CMV (Assist Control/ Continuous Mandatory Ventilation)  RR (machine): 16  TV (machine): 400  FiO2: 30  PEEP: 5  ITime: 0.64  MAP: 9  PIP: 27      HOSPITAL MEDICATIONS:  MEDICATIONS  (STANDING):  artificial  tears Solution 1 Drop(s) Both EYES every 6 hours  chlorhexidine 0.12% Liquid 15 milliLiter(s) Oral Mucosa every 12 hours  chlorhexidine 2% Cloths 1 Application(s) Topical daily  diazepam    Tablet 2 milliGRAM(s) Oral every 12 hours  enoxaparin Injectable 40 milliGRAM(s) SubCutaneous every 24 hours  multivitamin/minerals/iron Oral Solution (CENTRUM) 15 milliLiter(s) Oral daily  petrolatum Ophthalmic Ointment 1 Application(s) Both EYES at bedtime  riluzole 50 milliGRAM(s) Oral two times a day  sertraline 75 milliGRAM(s) Oral daily    MEDICATIONS  (PRN):  acetaminophen   Oral Liquid .. 650 milliGRAM(s) Oral every 6 hours PRN Temp greater or equal to 38C (100.4F), Mild Pain (1 - 3)  sodium chloride 3%  Inhalation 4 milliLiter(s) Inhalation every 12 hours PRN for congestion      LABS:                        12.8   17.12 )-----------( 413      ( 21 Jul 2024 06:00 )             42.2     07-21    138  |  101  |  28<H>  ----------------------------<  120<H>  5.7<H>   |  22  |  <0.20<L>    Ca    9.6      21 Jul 2024 06:00  Phos  3.2     07-21  Mg     2.00     07-21        Urinalysis Basic - ( 21 Jul 2024 06:00 )    Color: x / Appearance: x / SG: x / pH: x  Gluc: 120 mg/dL / Ketone: x  / Bili: x / Urobili: x   Blood: x / Protein: x / Nitrite: x   Leuk Esterase: x / RBC: x / WBC x   Sq Epi: x / Non Sq Epi: x / Bacteria: x

## 2024-07-22 NOTE — PROGRESS NOTE ADULT - NS ATTEND AMEND GEN_ALL_CORE FT
Pt is a 69F with MHx advanced ALS (nonverbal and bedbound at baseline) with chronic hypercapnic respiratory failure and ventilatory dependence via tracheostomy at baseline and Parkinson's dz presenting to [facility] on 7/18/24 with acute hypercapnia secondary to large expiratory air leak s/p upsizing without improvement with hospital course c/b tracheitis vs. UTI transferred from MICU to RCU on 7/5 for further management.     Pt with known advanced progressive ALS with most recent baseline known to be nonverbal and bedbound with functional quadriplegia and complete ADL dependence. 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 Will c/w home Lacrilube and natural tears with qhs eye taping. Received vancomycin +tobramycin eye drops in the past and on previous admission opthalmology recommended possible need for tarsorrhaphy, but was ultimately deferred. Will c/w home riluzole, c/w home diazepam and zoloft.     Pt with acute on chronic hypercapnic respiratory failure 2/2 neuromuscular weakness in the setting of advanced ALS with chronic ventilatory dependence with tracheostomy found to tracheitis and large air leak 2/2 tracheomegaly likely 2/2 prolonged tracheostomy dependence. Trach exchanged at home without improvement followed by upsizing at bedside to 9 Bivona but still with air leak. Thoracic sx consulted, custom trach ordered. Will c/w current vent support, pt on minimal settings. Does not tolerate PSV trials, remains completely passive on ventilator. Will c/w airway clearance and trach care as per RCU team. Wean O2 supplementation for goal O2 saturation 90-95%.     Pt with sepsis 2/2 UTI vs. tracheitis. Remains hemodynamically stable and afebrile with fluctuating but stable WBC. Initial cx (+) Pseudomonas in trach aspirate, similar to previous cultures, for which pt completed course of Zosyn. Previous UCx from 7/24/23 (+) VRE faecalis sensitive only to linezolid/daptomycin but likely colonizers. Will continue to monitor conservatively off abx at this time.     Discharge pending medical optimization, likely home with ventilator. Pt full code. On home NOAC for DVT ppx, held for now in preparation for OR. Will continue to discuss with pt's family, pt's daughter at bedside. HCP  (Dr. Velasquez) Pt is a 69F with MHx advanced ALS (nonverbal and bedbound at baseline) with chronic hypercapnic respiratory failure and ventilatory dependence via tracheostomy at baseline and Parkinson's dz presenting to [facility] on 7/18/24 with acute hypercapnia secondary to large expiratory air leak s/p upsizing without improvement with hospital course c/b tracheitis vs. UTI transferred from MICU to RCU on 7/5 for further management.     Pt with known advanced progressive ALS with most recent baseline known to be nonverbal and bedbound with functional quadriplegia and complete ADL dependence. 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 Will c/w home Lacrilube and natural tears with qhs eye taping. Received vancomycin +tobramycin eye drops in the past and on previous admission opthalmology recommended possible need for tarsorrhaphy, but was ultimately deferred. Will c/w home riluzole, c/w home diazepam and zoloft.     Pt with acute on chronic hypercapnic respiratory failure 2/2 neuromuscular weakness in the setting of advanced ALS with chronic ventilatory dependence with tracheostomy found to tracheitis and large air leak 2/2 tracheomegaly likely 2/2 prolonged tracheostomy dependence. Trach exchanged at home without improvement followed by upsizing at bedside to 9 Bivona but still with air leak. Thoracic sx consulted, custom trach ordered. Will c/w current vent support, pt on minimal settings. Does not tolerate PSV trials, remains completely passive on ventilator. Will c/w airway clearance and trach care as per RCU team. Wean O2 supplementation for goal O2 saturation 90-95%.     Pt with sepsis 2/2 UTI vs. tracheitis. Remains hemodynamically stable and afebrile with fluctuating but stable WBC. Initial cx (+) Pseudomonas in trach aspirate, similar to previous cultures, for which pt completed course of Zosyn. Previous UCx from 7/24/23 (+) VRE faecalis sensitive only to linezolid/daptomycin but likely colonizers. Will continue to monitor conservatively off abx at this time.     Discharge pending medical optimization, likely home with ventilator. Pt full code. On home NOAC for DVT ppx, held for now in preparation for OR. Will continue to discuss with pt's family, pt's daughter at bedside. HCP  (Dr. Velasquez). O/P Pulm: Dr. Pranav Newman

## 2024-07-23 LAB
ANION GAP SERPL CALC-SCNC: 12 MMOL/L — SIGNIFICANT CHANGE UP (ref 7–14)
BASOPHILS # BLD AUTO: 0.04 K/UL — SIGNIFICANT CHANGE UP (ref 0–0.2)
BASOPHILS NFR BLD AUTO: 0.2 % — SIGNIFICANT CHANGE UP (ref 0–2)
BUN SERPL-MCNC: 20 MG/DL — SIGNIFICANT CHANGE UP (ref 7–23)
CALCIUM SERPL-MCNC: 9.9 MG/DL — SIGNIFICANT CHANGE UP (ref 8.4–10.5)
CHLORIDE SERPL-SCNC: 101 MMOL/L — SIGNIFICANT CHANGE UP (ref 98–107)
CO2 SERPL-SCNC: 27 MMOL/L — SIGNIFICANT CHANGE UP (ref 22–31)
CREAT SERPL-MCNC: <0.2 MG/DL — LOW (ref 0.5–1.3)
EGFR: 125 ML/MIN/1.73M2 — SIGNIFICANT CHANGE UP
EOSINOPHIL # BLD AUTO: 0.26 K/UL — SIGNIFICANT CHANGE UP (ref 0–0.5)
EOSINOPHIL NFR BLD AUTO: 1.6 % — SIGNIFICANT CHANGE UP (ref 0–6)
GLUCOSE SERPL-MCNC: 128 MG/DL — HIGH (ref 70–99)
HCT VFR BLD CALC: 43.3 % — SIGNIFICANT CHANGE UP (ref 34.5–45)
HGB BLD-MCNC: 13 G/DL — SIGNIFICANT CHANGE UP (ref 11.5–15.5)
IANC: 12.61 K/UL — HIGH (ref 1.8–7.4)
IMM GRANULOCYTES NFR BLD AUTO: 0.9 % — SIGNIFICANT CHANGE UP (ref 0–0.9)
LYMPHOCYTES # BLD AUTO: 1.95 K/UL — SIGNIFICANT CHANGE UP (ref 1–3.3)
LYMPHOCYTES # BLD AUTO: 12.1 % — LOW (ref 13–44)
MAGNESIUM SERPL-MCNC: 2 MG/DL — SIGNIFICANT CHANGE UP (ref 1.6–2.6)
MCHC RBC-ENTMCNC: 26.1 PG — LOW (ref 27–34)
MCHC RBC-ENTMCNC: 30 GM/DL — LOW (ref 32–36)
MCV RBC AUTO: 86.8 FL — SIGNIFICANT CHANGE UP (ref 80–100)
MONOCYTES # BLD AUTO: 1.05 K/UL — HIGH (ref 0–0.9)
MONOCYTES NFR BLD AUTO: 6.5 % — SIGNIFICANT CHANGE UP (ref 2–14)
NEUTROPHILS # BLD AUTO: 12.61 K/UL — HIGH (ref 1.8–7.4)
NEUTROPHILS NFR BLD AUTO: 78.7 % — HIGH (ref 43–77)
NRBC # BLD: 0 /100 WBCS — SIGNIFICANT CHANGE UP (ref 0–0)
NRBC # FLD: 0.02 K/UL — HIGH (ref 0–0)
PHOSPHATE SERPL-MCNC: 3.3 MG/DL — SIGNIFICANT CHANGE UP (ref 2.5–4.5)
PLATELET # BLD AUTO: 464 K/UL — HIGH (ref 150–400)
POTASSIUM SERPL-MCNC: 4.2 MMOL/L — SIGNIFICANT CHANGE UP (ref 3.5–5.3)
POTASSIUM SERPL-SCNC: 4.2 MMOL/L — SIGNIFICANT CHANGE UP (ref 3.5–5.3)
RBC # BLD: 4.99 M/UL — SIGNIFICANT CHANGE UP (ref 3.8–5.2)
RBC # FLD: 16.2 % — HIGH (ref 10.3–14.5)
SODIUM SERPL-SCNC: 140 MMOL/L — SIGNIFICANT CHANGE UP (ref 135–145)
WBC # BLD: 16.05 K/UL — HIGH (ref 3.8–10.5)
WBC # FLD AUTO: 16.05 K/UL — HIGH (ref 3.8–10.5)

## 2024-07-23 PROCEDURE — 99233 SBSQ HOSP IP/OBS HIGH 50: CPT | Mod: FS

## 2024-07-23 RX ORDER — DIAZEPAM 10 MG
2 TABLET ORAL DAILY
Refills: 0 | Status: DISCONTINUED | OUTPATIENT
Start: 2024-07-23 | End: 2024-07-25

## 2024-07-23 RX ADMIN — Medication 2 MILLIGRAM(S): at 17:16

## 2024-07-23 RX ADMIN — Medication 2 MILLIGRAM(S): at 05:21

## 2024-07-23 RX ADMIN — POVIDONE, PROPYLENE GLYCOL 1 DROP(S): 6.8; 3 LIQUID OPHTHALMIC at 17:16

## 2024-07-23 RX ADMIN — POVIDONE, PROPYLENE GLYCOL 1 DROP(S): 6.8; 3 LIQUID OPHTHALMIC at 23:17

## 2024-07-23 RX ADMIN — POVIDONE, PROPYLENE GLYCOL 1 APPLICATION(S): 6.8; 3 LIQUID OPHTHALMIC at 23:17

## 2024-07-23 RX ADMIN — POVIDONE, PROPYLENE GLYCOL 1 DROP(S): 6.8; 3 LIQUID OPHTHALMIC at 00:25

## 2024-07-23 RX ADMIN — POVIDONE, PROPYLENE GLYCOL 1 APPLICATION(S): 6.8; 3 LIQUID OPHTHALMIC at 00:25

## 2024-07-23 RX ADMIN — POVIDONE, PROPYLENE GLYCOL 1 DROP(S): 6.8; 3 LIQUID OPHTHALMIC at 11:11

## 2024-07-23 RX ADMIN — Medication 15 MILLILITER(S): at 11:12

## 2024-07-23 RX ADMIN — ENOXAPARIN SODIUM 40 MILLIGRAM(S): 100 INJECTION SUBCUTANEOUS at 05:21

## 2024-07-23 RX ADMIN — Medication 2 MILLIGRAM(S): at 14:09

## 2024-07-23 RX ADMIN — ACETAMINOPHEN 650 MILLIGRAM(S): 325 TABLET ORAL at 23:16

## 2024-07-23 RX ADMIN — SERTRALINE HYDROCHLORIDE 75 MILLIGRAM(S): 50 TABLET, FILM COATED ORAL at 11:11

## 2024-07-23 RX ADMIN — CHLORHEXIDINE GLUCONATE 1 APPLICATION(S): 40 SOLUTION TOPICAL at 11:11

## 2024-07-23 RX ADMIN — POVIDONE, PROPYLENE GLYCOL 1 DROP(S): 6.8; 3 LIQUID OPHTHALMIC at 05:22

## 2024-07-23 RX ADMIN — ACETAMINOPHEN 650 MILLIGRAM(S): 325 TABLET ORAL at 23:46

## 2024-07-23 RX ADMIN — CHLORHEXIDINE GLUCONATE 15 MILLILITER(S): 40 SOLUTION TOPICAL at 17:16

## 2024-07-23 RX ADMIN — CHLORHEXIDINE GLUCONATE 15 MILLILITER(S): 40 SOLUTION TOPICAL at 05:21

## 2024-07-23 RX ADMIN — RILUZOLE 50 MILLIGRAM(S): 5 LIQUID ORAL at 17:16

## 2024-07-23 RX ADMIN — RILUZOLE 50 MILLIGRAM(S): 5 LIQUID ORAL at 05:21

## 2024-07-23 NOTE — PROGRESS NOTE ADULT - ASSESSMENT
67 YO Female with PMHx of ALS, Parkinson,, nonverbal, bed bound, chronic respiratory failure with tracheostomy and vent dependence, and oropharyngeal dysphagia with PEG who presented from home with tracheostomy leak. She was noted with vent alarm and poor TVe (250-290) at home. Trach changed at home with no improvement and sent in for thoracic evaluation. While in MICU no air leak noted with hyperinflated cuff. Transferred to RCU on 7/5 with course complicated by leukocytosis second to UTI vs trachitis and completed zosyn course.     NEUROLOGY  # ALS   - Baseline nonverbal, awake, and communicates with eye movement.   - Continue on home rilutek 50mg BID     HEENT  - Eyes reddened /dry - restarted home routine of lacrilube and taping eyes - will do for time during day to soothe eyes   - Natural tears inc to 4x day     PSYCH   # Anxiety and Depression   - Continue on home valium 2mg BID   - Continue on zoloft 75mg QD while in house (on 4cc/ 80mg QD at home)     CARDIOVASCULAR   - Monitor HR and BP  - BP elevated 7/19 - resolved s/p IV hydralizine  but had hypotension when treated 7/20   - would consider Tylenol IV as BP may be pt with discomfort      RESPIRATORY  # Tracheostomy leak   - At home noted with TVe 200-300s despite tracheostomy change to 80XLTCD.   - No airleak noted in house with hyperinflated cuff likely tracheomegaly?   - Continue on chronic vent 16/400/5/21   - Chronic bibasilar atelectasis and continued on HTS PRN with chest PT  -  flex bronch and trach tube upsizing to 9mm Bovana  - On rounds noted to be receiving low TV and air heard escaping around cuff - maintaining 02 sat   - Seen with thoracic at bedside - bedside bronch done - trach position checked and additional air placed in cuff by thoracic with increase of TV to set parameter  Per [facility] - a custom trach will be ordered for this patient and DC is on hold   - Small leak around trach - awaiting custom trach per thoracic - still pending 7/23    GI  # Dysphagia   - Continue on PEG-TF     RENAL  - No acute issues   - Monitor renal function and UOP   - Pt had PVR Approx 300cc will continue to monitor - pt does have str cath at home     # Hyperlactatemia  - Lactate 3.5 on admission and improved with rehydration.     INFECTIOUS DISEASE  # Leukocytosis likely second to trachitis vs UTI?   - No fever or chills and noted with prior leukocytosis   - CXR with prior atelectasis and no acute findings   - UA (7/6) with positive nitrites (7/6)   - SCx (7/5) with pansensitive PSA  - BCx (7/6) x 2 NGT  - UCx (7/6) with coag neg staph  - MRSA PCR (7/6) POSITIVE for both MRSA/MSSA s/p Bactroban (7/6 - 7/10)  - s/p empiric Zosyn (7/8 - 7/14) however WBC continues to wax and wane without fever and will monitor off ABX .     HEME  - On Xarelto 10mg at home likely for DVT PPX?   - Hold home Xarelto in prep for OR   - DVT PPX with LVX for now     ENDOCRINE  - No hx of DM2   - Monitor BG     SKIN  - Basic trach and PEG care ordered     ETHICS/ GOC    - FULL CODE   - Dr Tyson Velasquez,  involved in care    DISPO - Back home with  when able.    67 YO Female with PMHx of ALS, Parkinson,, nonverbal, bed bound, chronic respiratory failure with tracheostomy and vent dependence, and oropharyngeal dysphagia with PEG who presented from home with tracheostomy leak. She was noted with vent alarm and poor TVe (250-290) at home. Trach changed at home with no improvement and sent in for thoracic evaluation. While in MICU no air leak noted with hyperinflated cuff. Transferred to RCU on 7/5 with course complicated by leukocytosis second to UTI vs trachitis and completed zosyn course.     NEUROLOGY  # ALS   - Baseline nonverbal, awake, and communicates with eye movement.   - Continue on home rilutek 50mg BID     HEENT  - Eyes reddened /dry - restarted home routine of lacrilube and taping eyes - will do for time during day to soothe eyes   - Natural tears inc to 4x day     PSYCH   # Anxiety and Depression   - Continue on home valium 2mg BID   - Continue on zoloft 75mg QD while in house (on 4cc/ 80mg QD at home)   - Additional valium 2mg added as PRN for dysautonomia possibly contributing to evening HTN     CARDIOVASCULAR   - Monitor HR and BP  - BP elevated 7/19 - resolved s/p IV hydralizine  but had hypotension when treated 7/20   - would consider Tylenol IV as BP may be pt with discomfort       #HTN  - BP intermittently elevated at times, trialing PRN valium 2 mg PRN for dysautonomia   - Consider adding low dose Norvasc if BP remains elevated      RESPIRATORY  # Tracheostomy leak   - At home noted with TVe 200-300s despite tracheostomy change to 80XLTCD.   - No airleak noted in house with hyperinflated cuff likely tracheomegaly?   - Continue on chronic vent 16/400/5/21   - Chronic bibasilar atelectasis and continued on HTS PRN with chest PT  -  flex bronch and trach tube upsizing to 9mm Bovana  - On rounds noted to be receiving low TV and air heard escaping around cuff - maintaining 02 sat   - Seen with thoracic at bedside - bedside bronch done - trach position checked and additional air placed in cuff by thoracic with increase of TV to set parameter  Per [facility] - a custom trach will be ordered for this patient and DC is on hold   - Small leak around trach - awaiting custom trach per thoracic - still pending 7/23    GI  # Dysphagia   - Continue on PEG-TF     RENAL  - No acute issues   - Monitor renal function and UOP   - Pt had PVR Approx 300cc will continue to monitor - pt does have str cath at home     # Hyperlactatemia  - Lactate 3.5 on admission and improved with rehydration.     INFECTIOUS DISEASE  # Leukocytosis likely second to trachitis vs UTI?   - No fever or chills and noted with prior leukocytosis   - CXR with prior atelectasis and no acute findings   - UA (7/6) with positive nitrites (7/6)   - SCx (7/5) with pansensitive PSA  - BCx (7/6) x 2 NGT  - UCx (7/6) with coag neg staph  - MRSA PCR (7/6) POSITIVE for both MRSA/MSSA s/p Bactroban (7/6 - 7/10)  - s/p empiric Zosyn (7/8 - 7/14) however WBC continues to wax and wane without fever and will monitor off ABX .     HEME  - On Xarelto 10mg at home likely for DVT PPX?   - Hold home Xarelto in prep for OR   - DVT PPX with LVX for now     ENDOCRINE  - No hx of DM2   - Monitor BG     SKIN  - Basic trach and PEG care ordered     ETHICS/ GOC    - FULL CODE   - Dr Tyson Velasquez,  involved in care    DISPO - Back home with  when able.

## 2024-07-23 NOTE — PROGRESS NOTE ADULT - NS ATTEND AMEND GEN_ALL_CORE FT
Pt is a 69F with MHx advanced ALS (nonverbal and bedbound at baseline) with chronic hypercapnic respiratory failure and ventilatory dependence via tracheostomy at baseline and Parkinson's dz presenting to [facility] on 7/18/24 with acute hypercapnia secondary to large expiratory air leak s/p upsizing without improvement with hospital course c/b tracheitis vs. UTI transferred from MICU to RCU on 7/5 for further management.     Pt with known advanced progressive ALS with most recent baseline known to be nonverbal and bedbound with functional quadriplegia and complete ADL dependence. 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 Will c/w home Lacrilube and natural tears with qhs eye taping. Pt with worsening eye redness, especially on right side, despite conservative measures. Received vancomycin +tobramycin eye drops in the past and on previous admission opthalmology recommended possible need for tarsorrhaphy, but was ultimately deferred. Will re-consult on current admission for further assistance. Will c/w home riluzole, c/w home diazepam and zoloft.     Pt with acute on chronic hypercapnic respiratory failure 2/2 neuromuscular weakness in the setting of advanced ALS with chronic ventilatory dependence with tracheostomy found to tracheitis and large air leak 2/2 tracheomegaly likely 2/2 prolonged tracheostomy dependence. Trach exchanged at home without improvement followed by upsizing at bedside to 9 Bivona but still with air leak. Thoracic sx consulted, custom trach ordered. Will c/w current vent support, pt on minimal settings. Does not tolerate PSV trials, remains completely passive on ventilator. Will c/w airway clearance and trach care as per RCU team. Wean O2 supplementation for goal O2 saturation 90-95%.     Pt with sepsis 2/2 UTI vs. tracheitis. Remains hemodynamically stable and afebrile with fluctuating but stable WBC. Initial cx (+) Pseudomonas in trach aspirate, similar to previous cultures, for which pt completed course of Zosyn. Previous UCx from 7/24/23 (+) VRE faecalis sensitive only to linezolid/daptomycin but likely colonizers. Will continue to monitor conservatively off abx at this time. Pt with known periods of BP variation with transient hypertensive episodes, will monitor off anti-HTNs, likely 2/2 dysautonomia.    Discharge pending medical optimization, likely home with ventilator. Pt full code. On home NOAC for DVT ppx, held for now in preparation for OR. Will continue to discuss with pt's family, pt's daughter at bedside. HCP  (Dr. Velasquez). O/P Pulm: Dr. Pranav Newman

## 2024-07-23 NOTE — PROGRESS NOTE ADULT - SUBJECTIVE AND OBJECTIVE BOX
CHIEF COMPLAINT: Patient is a 69y old  Female who presents with a chief complaint of trach exchange (18 Jul 2024 07:07)      INTERVAL EVENTS:   - Overnight patient noted to be hypertensive, received 2.5 IV Hydralazine   - Pending custom tracheostomy tube w/ Thoracic surgery, discussed with Thoracic 7/22     ROS: Seen by bedside during AM rounds     OBJECTIVE:  ICU Vital Signs Last 24 Hrs  T(C): 36.1 (23 Jul 2024 04:00), Max: 36.9 (22 Jul 2024 16:00)  T(F): 96.9 (23 Jul 2024 04:00), Max: 98.4 (22 Jul 2024 16:00)  HR: 97 (23 Jul 2024 04:00) (96 - 115)  BP: 114/80 (23 Jul 2024 04:00) (114/80 - 195/100)  BP(mean): 90 (23 Jul 2024 04:00) (86 - 111)  ABP: --  ABP(mean): --  RR: 13 (23 Jul 2024 04:00) (12 - 13)  SpO2: 99% (23 Jul 2024 04:00) (98% - 99%)    O2 Parameters below as of 23 Jul 2024 04:00  Patient On (Oxygen Delivery Method): ventilator    O2 Concentration (%): 30      Mode: AC/ CMV (Assist Control/ Continuous Mandatory Ventilation), RR (machine): 12, TV (machine): 400, FiO2: 30, PEEP: 5, ITime: 0.64, MAP: 8, PIP: 28    07-21 @ 07:01 - 07-22 @ 07:00  --------------------------------------------------------  IN: 1310 mL / OUT: 900 mL / NET: 410 mL    07-22 @ 07:01 - 07-23 @ 06:41  --------------------------------------------------------  IN: 1360 mL / OUT: 1050 mL / NET: 310 mL      CAPILLARY BLOOD GLUCOSE          PHYSICAL EXAM:  General:   HEENT:   Lymph Nodes:  Neck:   Respiratory:   Cardiovascular:   Abdomen:   Extremities:   Skin:   Neurological:  Psychiatry:    Mode: AC/ CMV (Assist Control/ Continuous Mandatory Ventilation)  RR (machine): 12  TV (machine): 400  FiO2: 30  PEEP: 5  ITime: 0.64  MAP: 8  PIP: 28      HOSPITAL MEDICATIONS:  MEDICATIONS  (STANDING):  artificial  tears Solution 1 Drop(s) Both EYES every 6 hours  chlorhexidine 0.12% Liquid 15 milliLiter(s) Oral Mucosa every 12 hours  chlorhexidine 2% Cloths 1 Application(s) Topical daily  diazepam    Tablet 2 milliGRAM(s) Oral every 12 hours  enoxaparin Injectable 40 milliGRAM(s) SubCutaneous every 24 hours  multivitamin/minerals/iron Oral Solution (CENTRUM) 15 milliLiter(s) Oral daily  petrolatum Ophthalmic Ointment 1 Application(s) Both EYES at bedtime  riluzole 50 milliGRAM(s) Oral two times a day  sertraline 75 milliGRAM(s) Oral daily    MEDICATIONS  (PRN):  acetaminophen   Oral Liquid .. 650 milliGRAM(s) Oral every 6 hours PRN Temp greater or equal to 38C (100.4F), Mild Pain (1 - 3)  sodium chloride 3%  Inhalation 4 milliLiter(s) Inhalation every 12 hours PRN for congestion      LABS:                        13.0   16.05 )-----------( 464      ( 23 Jul 2024 06:00 )             43.3     07-22    140  |  102  |  26<H>  ----------------------------<  150<H>  4.3   |  24  |  <0.20<L>    Ca    9.7      22 Jul 2024 05:35  Phos  3.5     07-22  Mg     1.90     07-22        Urinalysis Basic - ( 22 Jul 2024 05:35 )    Color: x / Appearance: x / SG: x / pH: x  Gluc: 150 mg/dL / Ketone: x  / Bili: x / Urobili: x   Blood: x / Protein: x / Nitrite: x   Leuk Esterase: x / RBC: x / WBC x   Sq Epi: x / Non Sq Epi: x / Bacteria: x         CHIEF COMPLAINT: Patient is a 69y old  Female who presents with a chief complaint of trach exchange (18 Jul 2024 07:07)      INTERVAL EVENTS:   - Overnight patient noted to be hypertensive, received 2.5 IV Hydralazine   - Pending custom tracheostomy tube w/ Thoracic surgery, discussed with Thoracic 7/22     ROS: Seen by bedside during AM rounds, unable to obtain 2/2 baseline metal status    OBJECTIVE:  ICU Vital Signs Last 24 Hrs  T(C): 36.1 (23 Jul 2024 04:00), Max: 36.9 (22 Jul 2024 16:00)  T(F): 96.9 (23 Jul 2024 04:00), Max: 98.4 (22 Jul 2024 16:00)  HR: 97 (23 Jul 2024 04:00) (96 - 115)  BP: 114/80 (23 Jul 2024 04:00) (114/80 - 195/100)  BP(mean): 90 (23 Jul 2024 04:00) (86 - 111)  ABP: --  ABP(mean): --  RR: 13 (23 Jul 2024 04:00) (12 - 13)  SpO2: 99% (23 Jul 2024 04:00) (98% - 99%)    O2 Parameters below as of 23 Jul 2024 04:00  Patient On (Oxygen Delivery Method): ventilator    O2 Concentration (%): 30      Mode: AC/ CMV (Assist Control/ Continuous Mandatory Ventilation), RR (machine): 12, TV (machine): 400, FiO2: 30, PEEP: 5, ITime: 0.64, MAP: 8, PIP: 28    07-21 @ 07:01 - 07-22 @ 07:00  --------------------------------------------------------  IN: 1310 mL / OUT: 900 mL / NET: 410 mL    07-22 @ 07:01 - 07-23 @ 06:41  --------------------------------------------------------  IN: 1360 mL / OUT: 1050 mL / NET: 310 mL      CAPILLARY BLOOD GLUCOSE          PHYSICAL EXAM:  General: NAD, +Trach to Vent   Neck: neck supple, no JVD, trach midline  Respiratory: +rhonchi auscultated b/l, no wheeze, no rales, no resp distress  Cardiovascular: normal s1, s2, reg rate and rhythm  Abdomen: soft, non tender, +PEG  Extremities: no LE edema b/l in +SCDs  Skin: warm and dry  Neurological: +eyes taped closed, +neurologic deficits d/t ALS hx   Psychiatry: no agitation      Mode: AC/ CMV (Assist Control/ Continuous Mandatory Ventilation)  RR (machine): 12  TV (machine): 400  FiO2: 30  PEEP: 5  ITime: 0.64  MAP: 8  PIP: 28      HOSPITAL MEDICATIONS:  MEDICATIONS  (STANDING):  artificial  tears Solution 1 Drop(s) Both EYES every 6 hours  chlorhexidine 0.12% Liquid 15 milliLiter(s) Oral Mucosa every 12 hours  chlorhexidine 2% Cloths 1 Application(s) Topical daily  diazepam    Tablet 2 milliGRAM(s) Oral every 12 hours  enoxaparin Injectable 40 milliGRAM(s) SubCutaneous every 24 hours  multivitamin/minerals/iron Oral Solution (CENTRUM) 15 milliLiter(s) Oral daily  petrolatum Ophthalmic Ointment 1 Application(s) Both EYES at bedtime  riluzole 50 milliGRAM(s) Oral two times a day  sertraline 75 milliGRAM(s) Oral daily    MEDICATIONS  (PRN):  acetaminophen   Oral Liquid .. 650 milliGRAM(s) Oral every 6 hours PRN Temp greater or equal to 38C (100.4F), Mild Pain (1 - 3)  sodium chloride 3%  Inhalation 4 milliLiter(s) Inhalation every 12 hours PRN for congestion      LABS:                        13.0   16.05 )-----------( 464      ( 23 Jul 2024 06:00 )             43.3     07-22    140  |  102  |  26<H>  ----------------------------<  150<H>  4.3   |  24  |  <0.20<L>    Ca    9.7      22 Jul 2024 05:35  Phos  3.5     07-22  Mg     1.90     07-22        Urinalysis Basic - ( 22 Jul 2024 05:35 )    Color: x / Appearance: x / SG: x / pH: x  Gluc: 150 mg/dL / Ketone: x  / Bili: x / Urobili: x   Blood: x / Protein: x / Nitrite: x   Leuk Esterase: x / RBC: x / WBC x   Sq Epi: x / Non Sq Epi: x / Bacteria: x

## 2024-07-24 PROCEDURE — 99233 SBSQ HOSP IP/OBS HIGH 50: CPT | Mod: FS

## 2024-07-24 RX ORDER — ACETAMINOPHEN 325 MG/1
750 TABLET ORAL ONCE
Refills: 0 | Status: COMPLETED | OUTPATIENT
Start: 2024-07-24 | End: 2024-07-24

## 2024-07-24 RX ADMIN — CHLORHEXIDINE GLUCONATE 15 MILLILITER(S): 40 SOLUTION TOPICAL at 05:32

## 2024-07-24 RX ADMIN — RILUZOLE 50 MILLIGRAM(S): 5 LIQUID ORAL at 05:32

## 2024-07-24 RX ADMIN — POVIDONE, PROPYLENE GLYCOL 1 DROP(S): 6.8; 3 LIQUID OPHTHALMIC at 17:07

## 2024-07-24 RX ADMIN — POVIDONE, PROPYLENE GLYCOL 1 DROP(S): 6.8; 3 LIQUID OPHTHALMIC at 11:05

## 2024-07-24 RX ADMIN — Medication 2 MILLIGRAM(S): at 05:32

## 2024-07-24 RX ADMIN — ACETAMINOPHEN 300 MILLIGRAM(S): 325 TABLET ORAL at 17:58

## 2024-07-24 RX ADMIN — SERTRALINE HYDROCHLORIDE 75 MILLIGRAM(S): 50 TABLET, FILM COATED ORAL at 11:04

## 2024-07-24 RX ADMIN — CHLORHEXIDINE GLUCONATE 15 MILLILITER(S): 40 SOLUTION TOPICAL at 17:06

## 2024-07-24 RX ADMIN — POVIDONE, PROPYLENE GLYCOL 1 DROP(S): 6.8; 3 LIQUID OPHTHALMIC at 05:33

## 2024-07-24 RX ADMIN — ENOXAPARIN SODIUM 40 MILLIGRAM(S): 100 INJECTION SUBCUTANEOUS at 05:32

## 2024-07-24 RX ADMIN — Medication 2 MILLIGRAM(S): at 17:06

## 2024-07-24 RX ADMIN — POVIDONE, PROPYLENE GLYCOL 1 APPLICATION(S): 6.8; 3 LIQUID OPHTHALMIC at 23:03

## 2024-07-24 RX ADMIN — CHLORHEXIDINE GLUCONATE 1 APPLICATION(S): 40 SOLUTION TOPICAL at 11:04

## 2024-07-24 RX ADMIN — RILUZOLE 50 MILLIGRAM(S): 5 LIQUID ORAL at 17:07

## 2024-07-24 RX ADMIN — Medication 15 MILLILITER(S): at 11:04

## 2024-07-24 RX ADMIN — POVIDONE, PROPYLENE GLYCOL 1 DROP(S): 6.8; 3 LIQUID OPHTHALMIC at 23:03

## 2024-07-24 NOTE — PROGRESS NOTE ADULT - ASSESSMENT
69 YO Female with PMHx of ALS, Parkinson,, nonverbal, bed bound, chronic respiratory failure with tracheostomy and vent dependence, and oropharyngeal dysphagia with PEG who presented from home with tracheostomy leak. She was noted with vent alarm and poor TVe (250-290) at home. Trach changed at home with no improvement and sent in for thoracic evaluation. While in MICU no air leak noted with hyperinflated cuff. Transferred to [facility] on 7/5 with course complicated by leukocytosis second to UTI vs trachitis and completed zosyn course.     NEUROLOGY  # ALS   - Baseline nonverbal, awake, and communicates with eye movement.   - Continue on home Rilutek 50mg BID     HEENT  - Eyes reddened /dry - restarted home routine of Lacrilube and taping eyes - will do for time during day to soothe eyes   - Natural tears inc to 4x day     PSYCH   # Anxiety and Depression   - Continue on home valium 2mg BID   - Continue on zoloft 75mg QD while in house (on 4cc/ 80mg QD at home)   - Additional valium 2mg added as PRN for dysautonomia possibly contributing to evening HTN     CARDIOVASCULAR   - Monitor HR and BP  - BP elevated 7/19 - resolved s/p IV Hydralazine but had hypotension when treated 7/20   - would consider Tylenol IV as BP may be pt with discomfort       #HTN  - BP intermittently elevated at times, trialing PRN valium 2 mg PRN for dysautonomia   - Consider adding low dose Norvasc if BP remains elevated      RESPIRATORY  # Tracheostomy leak   - At home noted with TVe 200-300s despite tracheostomy change to 80XLTCD.   - No airleak noted in house with hyperinflated cuff likely tracheomegaly?   - Continue on chronic vent 16/400/5/21   - Chronic bibasilar atelectasis and continued on HTS PRN with chest PT  -  Flex bronch and trach tube upsizing to 9mm Bivona  - On rounds noted to be receiving low TV and air heard escaping around cuff - maintaining 02 sat   - Seen with thoracic at bedside - bedside bronch done - trach position checked and additional air placed in cuff by thoracic with increase of TV to set parameter  Per [facility] - a custom trach will be ordered for this patient and DC is on hold   - Small leak around trach - awaiting custom trach per thoracic - still pending 7/23    GI  # Dysphagia   - Continue on PEG-TF     RENAL  - No acute issues   - Monitor renal function and UOP   - Pt had PVR Approx 300cc will continue to monitor - pt does have str cath at home     # Hyperlactatemia  - Lactate 3.5 on admission and improved with rehydration.     INFECTIOUS DISEASE  # Leukocytosis likely second to trachitis vs UTI?   - No fever or chills and noted with prior leukocytosis   - CXR with prior atelectasis and no acute findings   - UA (7/6) with positive nitrites (7/6)   - SCx (7/5) with pansensitive PSA  - BCx (7/6) x 2 NGT  - UCx (7/6) with coag neg staph  - MRSA PCR (7/6) POSITIVE for both MRSA/MSSA s/p Bactroban (7/6 - 7/10)  - s/p empiric Zosyn (7/8 - 7/14) however WBC continues to wax and wean without fever and will monitor off ABX .     HEME  - On Xarelto 10mg at home likely for DVT PPX?   - Hold home Xarelto in prep for OR   - DVT PPX with LVX for now     ENDOCRINE  - No hx of DM2   - Monitor BG     SKIN  - Basic trach and PEG care ordered     ETHICS/ GOC    - FULL CODE   - Dr Tyson Velasquez,  involved in care    DISPO - Back home with  when able.    67 YO Female with PMHx of ALS, Parkinson,, nonverbal, bed bound, chronic respiratory failure with tracheostomy and vent dependence, and oropharyngeal dysphagia with PEG who presented from home with tracheostomy leak. She was noted with vent alarm and poor TVe (250-290) at home. Trach changed at home with no improvement and sent in for thoracic evaluation. While in MICU no air leak noted with hyperinflated cuff. Transferred to [facility] on 7/5 with course complicated by leukocytosis second to UTI vs trachitis and completed zosyn course.     NEUROLOGY  # ALS   - Baseline nonverbal, awake, and communicates with eye movement.   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    PSYCH   # Anxiety and Depression   - Continue on home valium 2mg BID   - Continue on zoloft 75mg QD while in house (on 4cc/ 80mg QD at home)   - Additional valium 2mg added as PRN for dysautonomia possibly contributing to evening HTN     CARDIOVASCULAR   - Monitor HR and BP  - BP elevated 7/19 - resolved s/p IV Hydralazine but had hypotension when treated 7/20   - would consider Tylenol IV as BP may be pt with discomfort       #HTN  - BP intermittently elevated at times, trialing PRN valium 2 mg PRN for dysautonomia   - Consider adding low dose Norvasc if BP remains elevated      RESPIRATORY  # Tracheostomy leak   - At home noted with TVe 200-300s despite tracheostomy change to 80XLTCD.   - No airleak noted in house with hyperinflated cuff likely tracheomegaly?   - Continue on chronic vent 16/400/5/21   - Chronic bibasilar atelectasis and continued on HTS PRN with chest PT  -  Flex bronch and trach tube upsizing to 9mm Bivona  - On rounds noted to be receiving low TV and air heard escaping around cuff - maintaining 02 sat   - Seen with thoracic at bedside - bedside bronch done - trach position checked and additional air placed in cuff by thoracic with increase of TV to set parameter  Per [facility] - a custom trach will be ordered for this patient and DC is on hold   - Small leak around trach - awaiting custom trach per thoracic - still pending 7/24    GI  # Dysphagia   - Continue on PEG-TF     RENAL  - No acute issues   - Monitor renal function and UOP   - Pt had PVR Approx 300cc will continue to monitor - pt does have str cath at home     # Hyperlactatemia  - Lactate 3.5 on admission and improved with rehydration.     INFECTIOUS DISEASE  # Leukocytosis likely second to trachitis vs UTI?   - No fever or chills and noted with prior leukocytosis   - CXR with prior atelectasis and no acute findings   - UA (7/6) with positive nitrites (7/6)   - SCx (7/5) with pansensitive PSA  - BCx (7/6) x 2 NGT  - UCx (7/6) with coag neg staph  - MRSA PCR (7/6) POSITIVE for both MRSA/MSSA s/p Bactroban (7/6 - 7/10)  - s/p empiric Zosyn (7/8 - 7/14) however WBC continues to wax and wean without fever and will monitor off ABX .     HEME  - On Xarelto 10mg at home likely for DVT PPX?   - Hold home Xarelto in prep for OR   - DVT PPX with LVX for now     ENDOCRINE  - No hx of DM2   - Monitor BG     SKIN  - Basic trach and PEG care ordered     ETHICS/ GOC    - FULL CODE   - Dr Tyson Velasquez,  involved in care    DISPO - Back home with  when able.    69 YO Female with PMHx of ALS, Parkinson,, nonverbal, bed bound, chronic respiratory failure with tracheostomy and vent dependence, and oropharyngeal dysphagia with PEG who presented from home with tracheostomy leak. She was noted with vent alarm and poor TVe (250-290) at home. Trach changed at home with no improvement and sent in for thoracic evaluation. While in MICU no air leak noted with hyperinflated cuff. Transferred to [facility] on 7/5 with course complicated by leukocytosis second to UTI vs trachitis and completed zosyn course.     NEUROLOGY  # ALS   - Baseline nonverbal, awake, and communicates with eye movement.   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    PSYCH   # Anxiety and Depression   - Continue on home valium 2mg BID   - Continue on zoloft 75mg QD while in house (on 4cc/ 80mg QD at home)   - Additional valium 2mg added as PRN for dysautonomia possibly contributing to evening HTN     CARDIOVASCULAR   - Monitor HR and BP  - BP elevated 7/19 - resolved s/p IV Hydralazine but had hypotension when treated 7/20   - would consider Tylenol IV as BP may be pt with discomfort       #HTN  - BP intermittently elevated at times, trialing PRN valium 2 mg PRN for dysautonomia   - Consider adding low dose Norvasc if BP remains elevated      RESPIRATORY  # Tracheostomy leak   - At home noted with TVe 200-300s despite tracheostomy change to 80XLTCD.   - No airleak noted in house with hyperinflated cuff likely tracheomegaly?   - Continue on chronic vent 16/400/5/21   - Chronic bibasilar atelectasis and continued on HTS PRN with chest PT  -  Flex bronch and Trach tube upsizing to 9mm Bivona  - On rounds noted to be receiving low TV and air heard escaping around cuff - maintaining 02 sat   - Seen with thoracic at bedside - bedside bronch done - trach position checked and additional air placed in cuff by thoracic with increase of TV to set parameter  Per [facility] - a custom trach will be ordered for this patient and DC is on hold   - Small leak around trach - awaiting custom trach per thoracic - still pending 7/24    GI  # Dysphagia   - Continue on PEG-TF     RENAL  - No acute issues   - Monitor renal function and UOP   - Pt had PVR Approx 300cc will continue to monitor - pt does have str cath at home     # Hyperlactatemia  - Lactate 3.5 on admission and improved with rehydration.     INFECTIOUS DISEASE  # Leukocytosis likely second to trachitis vs UTI?   - No fever or chills and noted with prior leukocytosis   - CXR with prior atelectasis and no acute findings   - UA (7/6) with positive nitrites (7/6)   - SCx (7/5) with pansensitive PSA  - BCx (7/6) x 2 NGT  - UCx (7/6) with coag neg staph  - MRSA PCR (7/6) POSITIVE for both MRSA/MSSA s/p Bactroban (7/6 - 7/10)  - s/p empiric Zosyn (7/8 - 7/14) however WBC continues to wax and wean without fever and will monitor off ABX .     HEME  - On Xarelto 10mg at home likely for DVT PPX?   - Hold home Xarelto in prep for OR   - DVT PPX with LVX for now     ENDOCRINE  - No hx of DM2   - Monitor BG     SKIN  - Basic trach and PEG care ordered     ETHICS/ GOC    - FULL CODE   - Dr Tyson Velasquez,  involved in care    DISPO - Back home with  when able.

## 2024-07-24 NOTE — PROGRESS NOTE ADULT - NS ATTEND AMEND GEN_ALL_CORE FT
Pt is a 69F with MHx advanced ALS (nonverbal and bedbound at baseline) with chronic hypercapnic respiratory failure and ventilatory dependence via tracheostomy at baseline and Parkinson's dz presenting to [facility] on 7/18/24 with acute hypercapnia secondary to large expiratory air leak s/p upsizing without improvement with hospital course c/b tracheitis vs. UTI transferred from MICU to [facility] on 7/5 for further management.     Pt with known advanced progressive ALS with most recent baseline known to be nonverbal and bedbound with functional quadriplegia and complete ADL dependence. 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 Will c/w home Lacrilube and natural tears with qhs eye taping. Pt with worsening eye redness, especially on right side, despite conservative measures. Received vancomycin +tobramycin eye drops in the past and on previous admission opthalmology recommended possible need for tarsorrhaphy, but was ultimately deferred as not in line with pt's GOC. Will re-consult on current admission for further assistance. Will c/w home riluzole, c/w home diazepam and zoloft.     Pt with acute on chronic hypercapnic respiratory failure 2/2 neuromuscular weakness in the setting of advanced ALS with chronic ventilatory dependence with tracheostomy found to tracheitis and large air leak 2/2 tracheomegaly likely 2/2 prolonged tracheostomy dependence. Trach exchanged at home without improvement followed by upsizing at bedside to 9 Bivona but still with air leak. Thoracic sx consulted, custom trach ordered. Will c/w current vent support, pt on minimal settings. Does not tolerate PSV trials, remains completely passive on ventilator. Will c/w airway clearance and trach care as per R[facility] team. Wean O2 supplementation for goal O2 saturation 90-95%.     Pt with sepsis 2/2 UTI vs. tracheitis. Remains hemodynamically stable and afebrile with fluctuating but stable WBC. Initial cx (+) Pseudomonas in trach aspirate, similar to previous cultures, for which pt completed course of Zosyn. Previous UCx from 7/24/23 (+) VRE faecalis sensitive only to linezolid/daptomycin but likely colonizers. Will continue to monitor conservatively off abx at this time. Pt with known periods of BP variation with transient hypertensive episodes, will monitor off anti-HTNs, likely 2/2 dysautonomia.    Discharge pending medical optimization, likely home with ventilator. Pt full code. On home NOAC for DVT ppx, held for now in preparation for OR. Will continue to discuss with pt's family, pt's  at bedside. HCP  (Dr. Velasquez). O/P Pulm: Dr. Pranav Newman

## 2024-07-24 NOTE — PROGRESS NOTE ADULT - SUBJECTIVE AND OBJECTIVE BOX
CHIEF COMPLAINT: Patient is a 69y old  Female who presents with a chief complaint of trach exchange (18 Jul 2024 07:07)    Interval Events:    REVIEW OF SYSTEMS:  [ ] All other systems negative  [ ] Unable to assess ROS because ________    Mode: AC/ CMV (Assist Control/ Continuous Mandatory Ventilation), RR (machine): 12, TV (machine): 400, FiO2: 30, PEEP: 5, ITime: 0.64, MAP: 9, PIP: 29    OBJECTIVE:  ICU Vital Signs Last 24 Hrs  T(C): 36.4 (24 Jul 2024 04:00), Max: 36.8 (23 Jul 2024 16:00)  T(F): 97.5 (24 Jul 2024 04:00), Max: 98.3 (23 Jul 2024 16:00)  HR: 101 (24 Jul 2024 04:00) (91 - 108)  BP: 113/73 (24 Jul 2024 04:00) (113/73 - 164/84)  BP(mean): 86 (24 Jul 2024 04:00) (86 - 98)  ABP: --  ABP(mean): --  RR: 12 (24 Jul 2024 04:00) (11 - 12)  SpO2: 99% (24 Jul 2024 04:00) (98% - 100%)    O2 Parameters below as of 24 Jul 2024 04:00  Patient On (Oxygen Delivery Method): ventilator    O2 Concentration (%): 30    Mode: AC/ CMV (Assist Control/ Continuous Mandatory Ventilation), RR (machine): 12, TV (machine): 400, FiO2: 30, PEEP: 5, ITime: 0.64, MAP: 9, PIP: 29    07-23 @ 07:01  -  07-24 @ 07:00  --------------------------------------------------------  IN: 1310 mL / OUT: 750 mL / NET: 560 mL    CAPILLARY BLOOD GLUCOSE    HOSPITAL MEDICATIONS:  MEDICATIONS  (STANDING):  artificial  tears Solution 1 Drop(s) Both EYES every 6 hours  chlorhexidine 0.12% Liquid 15 milliLiter(s) Oral Mucosa every 12 hours  chlorhexidine 2% Cloths 1 Application(s) Topical daily  diazepam    Tablet 2 milliGRAM(s) Oral every 12 hours  enoxaparin Injectable 40 milliGRAM(s) SubCutaneous every 24 hours  multivitamin/minerals/iron Oral Solution (CENTRUM) 15 milliLiter(s) Oral daily  petrolatum Ophthalmic Ointment 1 Application(s) Both EYES at bedtime  riluzole 50 milliGRAM(s) Oral two times a day  sertraline 75 milliGRAM(s) Oral daily    MEDICATIONS  (PRN):  acetaminophen   Oral Liquid .. 650 milliGRAM(s) Oral every 6 hours PRN Temp greater or equal to 38C (100.4F), Mild Pain (1 - 3)  diazepam    Tablet 2 milliGRAM(s) Oral daily PRN for dysautonomia/ hypertension/discomfort      LABS:                        13.0   16.05 )-----------( 464      ( 23 Jul 2024 06:00 )             43.3     07-23    140  |  101  |  20  ----------------------------<  128<H>  4.2   |  27  |  <0.20<L>    Ca    9.9      23 Jul 2024 06:00  Phos  3.3     07-23  Mg     2.00     07-23    Urinalysis Basic - ( 23 Jul 2024 06:00 )    Color: x / Appearance: x / SG: x / pH: x  Gluc: 128 mg/dL / Ketone: x  / Bili: x / Urobili: x   Blood: x / Protein: x / Nitrite: x   Leuk Esterase: x / RBC: x / WBC x   Sq Epi: x / Non Sq Epi: x / Bacteria: x    PAST MEDICAL & SURGICAL HISTORY:  ALS (amyotrophic lateral sclerosis)    HLD (hyperlipidemia)    Ventilator dependent  Since 2015    Aspiration into airway    S/P gastrostomy  10/14 for dysphagia  receives jevity 2 cans TID    Dependence on tracheostomy    Encounter for PEG (percutaneous endoscopic gastrostomy)  peg placed    FAMILY HISTORY:  No pertinent family history in first degree relatives        Social History:      RADIOLOGY:  [ ] Reviewed and interpreted by me    PULMONARY FUNCTION TESTS:    EKG: CHIEF COMPLAINT: Patient is a 69y old  Female who presents with a chief complaint of trach exchange (18 Jul 2024 07:07)    Interval Events: No acute events overnight. Clinically no changes, awaiting custom trach.    REVIEW OF SYSTEMS: Pt seen and examined on AM rounds; unable to obtain 2/2 baseline metal status    Mode: AC/ CMV (Assist Control/ Continuous Mandatory Ventilation), RR (machine): 12, TV (machine): 400, FiO2: 30, PEEP: 5, ITime: 0.64, MAP: 9, PIP: 29    OBJECTIVE:  ICU Vital Signs Last 24 Hrs  T(C): 36.4 (24 Jul 2024 04:00), Max: 36.8 (23 Jul 2024 16:00)  T(F): 97.5 (24 Jul 2024 04:00), Max: 98.3 (23 Jul 2024 16:00)  HR: 101 (24 Jul 2024 04:00) (91 - 108)  BP: 113/73 (24 Jul 2024 04:00) (113/73 - 164/84)  BP(mean): 86 (24 Jul 2024 04:00) (86 - 98)  ABP: --  ABP(mean): --  RR: 12 (24 Jul 2024 04:00) (11 - 12)  SpO2: 99% (24 Jul 2024 04:00) (98% - 100%)    O2 Parameters below as of 24 Jul 2024 04:00  Patient On (Oxygen Delivery Method): ventilator    O2 Concentration (%): 30    Mode: AC/ CMV (Assist Control/ Continuous Mandatory Ventilation), RR (machine): 12, TV (machine): 400, FiO2: 30, PEEP: 5, ITime: 0.64, MAP: 9, PIP: 29    07-23 @ 07:01  -  07-24 @ 07:00  --------------------------------------------------------  IN: 1310 mL / OUT: 750 mL / NET: 560 mL    CAPILLARY BLOOD GLUCOSE    PHYSICAL EXAM:  General: NAD, +Trach to Vent   Neck: neck supple, no JVD, trach midline  Respiratory: +rhonchi auscultated b/l, no wheeze, no rales, no resp distress  Cardiovascular: normal S1 and S2, regular rate and rhythm  Abdomen: soft, non tender, +PEG  Extremities: no LE edema b/l in +SCDs  Skin: warm and dry  Neurological: +neurologic deficits d/t ALS hx   Psychiatry: no agitation      HOSPITAL MEDICATIONS:  MEDICATIONS  (STANDING):  artificial  tears Solution 1 Drop(s) Both EYES every 6 hours  chlorhexidine 0.12% Liquid 15 milliLiter(s) Oral Mucosa every 12 hours  chlorhexidine 2% Cloths 1 Application(s) Topical daily  diazepam    Tablet 2 milliGRAM(s) Oral every 12 hours  enoxaparin Injectable 40 milliGRAM(s) SubCutaneous every 24 hours  multivitamin/minerals/iron Oral Solution (CENTRUM) 15 milliLiter(s) Oral daily  petrolatum Ophthalmic Ointment 1 Application(s) Both EYES at bedtime  riluzole 50 milliGRAM(s) Oral two times a day  sertraline 75 milliGRAM(s) Oral daily    MEDICATIONS  (PRN):  acetaminophen   Oral Liquid .. 650 milliGRAM(s) Oral every 6 hours PRN Temp greater or equal to 38C (100.4F), Mild Pain (1 - 3)  diazepam    Tablet 2 milliGRAM(s) Oral daily PRN for dysautonomia/ hypertension/discomfort      LABS:                        13.0   16.05 )-----------( 464      ( 23 Jul 2024 06:00 )             43.3     07-23    140  |  101  |  20  ----------------------------<  128<H>  4.2   |  27  |  <0.20<L>    Ca    9.9      23 Jul 2024 06:00  Phos  3.3     07-23  Mg     2.00     07-23    Urinalysis Basic - ( 23 Jul 2024 06:00 )    Color: x / Appearance: x / SG: x / pH: x  Gluc: 128 mg/dL / Ketone: x  / Bili: x / Urobili: x   Blood: x / Protein: x / Nitrite: x   Leuk Esterase: x / RBC: x / WBC x   Sq Epi: x / Non Sq Epi: x / Bacteria: x    PAST MEDICAL & SURGICAL HISTORY:  ALS (amyotrophic lateral sclerosis)    HLD (hyperlipidemia)    Ventilator dependent  Since 2015    Aspiration into airway    S/P gastrostomy  10/14 for dysphagia  receives jevity 2 cans TID    Dependence on tracheostomy    Encounter for PEG (percutaneous endoscopic gastrostomy)  peg placed    FAMILY HISTORY:  No pertinent family history in first degree relatives        Social History:      RADIOLOGY:  [ ] Reviewed and interpreted by me    PULMONARY FUNCTION TESTS:    EKG: CHIEF COMPLAINT: Patient is a 69y old  Female who presents with a chief complaint of trach exchange (18 Jul 2024 07:07)    Interval Events: No acute events overnight. Clinically no changes, awaiting custom trach. CT sx following.                        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, recs pending     REVIEW OF SYSTEMS: Pt seen and examined on AM rounds; unable to obtain 2/2 baseline metal status    Mode: AC/ CMV (Assist Control/ Continuous Mandatory Ventilation), RR (machine): 12, TV (machine): 400, FiO2: 30, PEEP: 5, ITime: 0.64, MAP: 9, PIP: 29    OBJECTIVE:  ICU Vital Signs Last 24 Hrs  T(C): 36.4 (24 Jul 2024 04:00), Max: 36.8 (23 Jul 2024 16:00)  T(F): 97.5 (24 Jul 2024 04:00), Max: 98.3 (23 Jul 2024 16:00)  HR: 101 (24 Jul 2024 04:00) (91 - 108)  BP: 113/73 (24 Jul 2024 04:00) (113/73 - 164/84)  BP(mean): 86 (24 Jul 2024 04:00) (86 - 98)  ABP: --  ABP(mean): --  RR: 12 (24 Jul 2024 04:00) (11 - 12)  SpO2: 99% (24 Jul 2024 04:00) (98% - 100%)    O2 Parameters below as of 24 Jul 2024 04:00  Patient On (Oxygen Delivery Method): ventilator    O2 Concentration (%): 30    Mode: AC/ CMV (Assist Control/ Continuous Mandatory Ventilation), RR (machine): 12, TV (machine): 400, FiO2: 30, PEEP: 5, ITime: 0.64, MAP: 9, PIP: 29    07-23 @ 07:01  -  07-24 @ 07:00  --------------------------------------------------------  IN: 1310 mL / OUT: 750 mL / NET: 560 mL    CAPILLARY BLOOD GLUCOSE    PHYSICAL EXAM:  General: NAD, +Trach to Vent   Neck: neck supple, no JVD, trach midline  Eye: +corneal opacification noted b/l, R>L, conjunctival hemorrhage noted in R eye??   Respiratory: +rhonchi auscultated b/l, no wheeze, no rales, no resp distress  Cardiovascular: normal S1 and S2, regular rate and rhythm  Abdomen: soft, non tender, +PEG  Extremities: no LE edema b/l in +SCDs  Skin: warm and dry  Neurological: +neurologic deficits d/t ALS hx   Psychiatry: no agitation      HOSPITAL MEDICATIONS:  MEDICATIONS  (STANDING):  artificial  tears Solution 1 Drop(s) Both EYES every 6 hours  chlorhexidine 0.12% Liquid 15 milliLiter(s) Oral Mucosa every 12 hours  chlorhexidine 2% Cloths 1 Application(s) Topical daily  diazepam    Tablet 2 milliGRAM(s) Oral every 12 hours  enoxaparin Injectable 40 milliGRAM(s) SubCutaneous every 24 hours  multivitamin/minerals/iron Oral Solution (CENTRUM) 15 milliLiter(s) Oral daily  petrolatum Ophthalmic Ointment 1 Application(s) Both EYES at bedtime  riluzole 50 milliGRAM(s) Oral two times a day  sertraline 75 milliGRAM(s) Oral daily    MEDICATIONS  (PRN):  acetaminophen   Oral Liquid .. 650 milliGRAM(s) Oral every 6 hours PRN Temp greater or equal to 38C (100.4F), Mild Pain (1 - 3)  diazepam    Tablet 2 milliGRAM(s) Oral daily PRN for dysautonomia/ hypertension/discomfort      LABS:                        13.0   16.05 )-----------( 464      ( 23 Jul 2024 06:00 )             43.3     07-23    140  |  101  |  20  ----------------------------<  128<H>  4.2   |  27  |  <0.20<L>    Ca    9.9      23 Jul 2024 06:00  Phos  3.3     07-23  Mg     2.00     07-23    Urinalysis Basic - ( 23 Jul 2024 06:00 )    Color: x / Appearance: x / SG: x / pH: x  Gluc: 128 mg/dL / Ketone: x  / Bili: x / Urobili: x   Blood: x / Protein: x / Nitrite: x   Leuk Esterase: x / RBC: x / WBC x   Sq Epi: x / Non Sq Epi: x / Bacteria: x    PAST MEDICAL & SURGICAL HISTORY:  ALS (amyotrophic lateral sclerosis)    HLD (hyperlipidemia)    Ventilator dependent  Since 2015    Aspiration into airway    S/P gastrostomy  10/14 for dysphagia  receives jevity 2 cans TID    Dependence on tracheostomy    Encounter for PEG (percutaneous endoscopic gastrostomy)  peg placed    FAMILY HISTORY:  No pertinent family history in first degree relatives        Social History:      RADIOLOGY:  [ ] Reviewed and interpreted by me    PULMONARY FUNCTION TESTS:    EKG:

## 2024-07-24 NOTE — CHART NOTE - NSCHARTNOTEFT_GEN_A_CORE
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     Exam with severe corneal exposure OD>>OS. With epithelial defect, stromal cell, plaque formation, conjunctival injection OD. With stromal scar and plaque but no epi defect or conj injection OS.     Full consult note tomorrow.     For now, please begin the following:   - Ofloxacin, 6 times daily to the right eye   - Erythromycin ointment and lacrilube each every 3 hours but alternating, so that patient is receiving ointment q 1.5 hours, to both eyes   - Create moisture chamber with tegaderm following placement of ointment to the right eye throughout the day, and to the left eye nightly. Nurses shown how to make at bedside   - Artificial tears every 1.5 hours before placement of ointment

## 2024-07-24 NOTE — CHART NOTE - NSCHARTNOTEFT_GEN_A_CORE
Spoke with Dr. Owens.   Custom trach still in fabrication process.   Awaiting to hear when complete and ready for delivery.   Will update RCU team as updates are obtained.   Unsure of OR date at this time.

## 2024-07-25 LAB
ALBUMIN SERPL ELPH-MCNC: 3.7 G/DL — SIGNIFICANT CHANGE UP (ref 3.3–5)
ALP SERPL-CCNC: 129 U/L — HIGH (ref 40–120)
ALT FLD-CCNC: 19 U/L — SIGNIFICANT CHANGE UP (ref 4–33)
ANION GAP SERPL CALC-SCNC: 13 MMOL/L — SIGNIFICANT CHANGE UP (ref 7–14)
AST SERPL-CCNC: 20 U/L — SIGNIFICANT CHANGE UP (ref 4–32)
BASOPHILS # BLD AUTO: 0.06 K/UL — SIGNIFICANT CHANGE UP (ref 0–0.2)
BASOPHILS NFR BLD AUTO: 0.4 % — SIGNIFICANT CHANGE UP (ref 0–2)
BILIRUB SERPL-MCNC: 0.5 MG/DL — SIGNIFICANT CHANGE UP (ref 0.2–1.2)
BUN SERPL-MCNC: 28 MG/DL — HIGH (ref 7–23)
CALCIUM SERPL-MCNC: 9.7 MG/DL — SIGNIFICANT CHANGE UP (ref 8.4–10.5)
CHLORIDE SERPL-SCNC: 98 MMOL/L — SIGNIFICANT CHANGE UP (ref 98–107)
CO2 SERPL-SCNC: 29 MMOL/L — SIGNIFICANT CHANGE UP (ref 22–31)
CREAT SERPL-MCNC: <0.2 MG/DL — LOW (ref 0.5–1.3)
EGFR: 125 ML/MIN/1.73M2 — SIGNIFICANT CHANGE UP
EOSINOPHIL # BLD AUTO: 0.3 K/UL — SIGNIFICANT CHANGE UP (ref 0–0.5)
EOSINOPHIL NFR BLD AUTO: 2.2 % — SIGNIFICANT CHANGE UP (ref 0–6)
GLUCOSE SERPL-MCNC: 125 MG/DL — HIGH (ref 70–99)
HCT VFR BLD CALC: 40.5 % — SIGNIFICANT CHANGE UP (ref 34.5–45)
HGB BLD-MCNC: 12.8 G/DL — SIGNIFICANT CHANGE UP (ref 11.5–15.5)
IANC: 9.3 K/UL — HIGH (ref 1.8–7.4)
IMM GRANULOCYTES NFR BLD AUTO: 1.1 % — HIGH (ref 0–0.9)
LYMPHOCYTES # BLD AUTO: 19.9 % — SIGNIFICANT CHANGE UP (ref 13–44)
LYMPHOCYTES # BLD AUTO: 2.67 K/UL — SIGNIFICANT CHANGE UP (ref 1–3.3)
MAGNESIUM SERPL-MCNC: 2 MG/DL — SIGNIFICANT CHANGE UP (ref 1.6–2.6)
MCHC RBC-ENTMCNC: 26.9 PG — LOW (ref 27–34)
MCHC RBC-ENTMCNC: 31.6 GM/DL — LOW (ref 32–36)
MCV RBC AUTO: 85.1 FL — SIGNIFICANT CHANGE UP (ref 80–100)
MONOCYTES # BLD AUTO: 0.94 K/UL — HIGH (ref 0–0.9)
MONOCYTES NFR BLD AUTO: 7 % — SIGNIFICANT CHANGE UP (ref 2–14)
NEUTROPHILS # BLD AUTO: 9.3 K/UL — HIGH (ref 1.8–7.4)
NEUTROPHILS NFR BLD AUTO: 69.4 % — SIGNIFICANT CHANGE UP (ref 43–77)
NRBC # BLD: 0 /100 WBCS — SIGNIFICANT CHANGE UP (ref 0–0)
NRBC # FLD: 0 K/UL — SIGNIFICANT CHANGE UP (ref 0–0)
PHOSPHATE SERPL-MCNC: 3 MG/DL — SIGNIFICANT CHANGE UP (ref 2.5–4.5)
PLATELET # BLD AUTO: 444 K/UL — HIGH (ref 150–400)
POTASSIUM SERPL-MCNC: 4.2 MMOL/L — SIGNIFICANT CHANGE UP (ref 3.5–5.3)
POTASSIUM SERPL-SCNC: 4.2 MMOL/L — SIGNIFICANT CHANGE UP (ref 3.5–5.3)
PROT SERPL-MCNC: 8.1 G/DL — SIGNIFICANT CHANGE UP (ref 6–8.3)
RBC # BLD: 4.76 M/UL — SIGNIFICANT CHANGE UP (ref 3.8–5.2)
RBC # FLD: 15.8 % — HIGH (ref 10.3–14.5)
SODIUM SERPL-SCNC: 140 MMOL/L — SIGNIFICANT CHANGE UP (ref 135–145)
WBC # BLD: 13.42 K/UL — HIGH (ref 3.8–10.5)
WBC # FLD AUTO: 13.42 K/UL — HIGH (ref 3.8–10.5)

## 2024-07-25 PROCEDURE — 99223 1ST HOSP IP/OBS HIGH 75: CPT

## 2024-07-25 PROCEDURE — 99233 SBSQ HOSP IP/OBS HIGH 50: CPT | Mod: FS

## 2024-07-25 RX ORDER — POVIDONE, PROPYLENE GLYCOL 6.8; 3 MG/ML; MG/ML
1 LIQUID OPHTHALMIC
Refills: 0 | Status: DISCONTINUED | OUTPATIENT
Start: 2024-07-25 | End: 2024-08-08

## 2024-07-25 RX ORDER — ERYTHROMYCIN 5 MG/G
1 OINTMENT OPHTHALMIC
Refills: 0 | Status: DISCONTINUED | OUTPATIENT
Start: 2024-07-25 | End: 2024-08-29

## 2024-07-25 RX ORDER — DIAZEPAM 10 MG
2 TABLET ORAL AT BEDTIME
Refills: 0 | Status: DISCONTINUED | OUTPATIENT
Start: 2024-07-25 | End: 2024-08-01

## 2024-07-25 RX ORDER — OFLOXACIN 3 MG/ML
1 SOLUTION/ DROPS OPHTHALMIC EVERY 4 HOURS
Refills: 0 | Status: DISCONTINUED | OUTPATIENT
Start: 2024-07-25 | End: 2024-08-01

## 2024-07-25 RX ORDER — DIAZEPAM 10 MG
2 TABLET ORAL EVERY 12 HOURS
Refills: 0 | Status: DISCONTINUED | OUTPATIENT
Start: 2024-07-25 | End: 2024-08-01

## 2024-07-25 RX ADMIN — POVIDONE, PROPYLENE GLYCOL 1 DROP(S): 6.8; 3 LIQUID OPHTHALMIC at 08:04

## 2024-07-25 RX ADMIN — POVIDONE, PROPYLENE GLYCOL 1 DROP(S): 6.8; 3 LIQUID OPHTHALMIC at 15:54

## 2024-07-25 RX ADMIN — POVIDONE, PROPYLENE GLYCOL 1 APPLICATION(S): 6.8; 3 LIQUID OPHTHALMIC at 22:42

## 2024-07-25 RX ADMIN — POVIDONE, PROPYLENE GLYCOL 1 APPLICATION(S): 6.8; 3 LIQUID OPHTHALMIC at 09:21

## 2024-07-25 RX ADMIN — CHLORHEXIDINE GLUCONATE 1 APPLICATION(S): 40 SOLUTION TOPICAL at 11:03

## 2024-07-25 RX ADMIN — RILUZOLE 50 MILLIGRAM(S): 5 LIQUID ORAL at 05:03

## 2024-07-25 RX ADMIN — POVIDONE, PROPYLENE GLYCOL 1 DROP(S): 6.8; 3 LIQUID OPHTHALMIC at 14:36

## 2024-07-25 RX ADMIN — Medication 2 MILLIGRAM(S): at 17:04

## 2024-07-25 RX ADMIN — POVIDONE, PROPYLENE GLYCOL 1 DROP(S): 6.8; 3 LIQUID OPHTHALMIC at 05:03

## 2024-07-25 RX ADMIN — POVIDONE, PROPYLENE GLYCOL 1 DROP(S): 6.8; 3 LIQUID OPHTHALMIC at 17:05

## 2024-07-25 RX ADMIN — RILUZOLE 50 MILLIGRAM(S): 5 LIQUID ORAL at 17:04

## 2024-07-25 RX ADMIN — OFLOXACIN 1 DROP(S): 3 SOLUTION/ DROPS OPHTHALMIC at 13:26

## 2024-07-25 RX ADMIN — POVIDONE, PROPYLENE GLYCOL 1 DROP(S): 6.8; 3 LIQUID OPHTHALMIC at 12:55

## 2024-07-25 RX ADMIN — POVIDONE, PROPYLENE GLYCOL 1 DROP(S): 6.8; 3 LIQUID OPHTHALMIC at 18:42

## 2024-07-25 RX ADMIN — CHLORHEXIDINE GLUCONATE 15 MILLILITER(S): 40 SOLUTION TOPICAL at 17:04

## 2024-07-25 RX ADMIN — POVIDONE, PROPYLENE GLYCOL 1 APPLICATION(S): 6.8; 3 LIQUID OPHTHALMIC at 08:05

## 2024-07-25 RX ADMIN — POVIDONE, PROPYLENE GLYCOL 1 DROP(S): 6.8; 3 LIQUID OPHTHALMIC at 23:47

## 2024-07-25 RX ADMIN — POVIDONE, PROPYLENE GLYCOL 1 DROP(S): 6.8; 3 LIQUID OPHTHALMIC at 11:04

## 2024-07-25 RX ADMIN — POVIDONE, PROPYLENE GLYCOL 1 DROP(S): 6.8; 3 LIQUID OPHTHALMIC at 22:42

## 2024-07-25 RX ADMIN — SERTRALINE HYDROCHLORIDE 75 MILLIGRAM(S): 50 TABLET, FILM COATED ORAL at 11:03

## 2024-07-25 RX ADMIN — POVIDONE, PROPYLENE GLYCOL 1 APPLICATION(S): 6.8; 3 LIQUID OPHTHALMIC at 18:42

## 2024-07-25 RX ADMIN — ENOXAPARIN SODIUM 40 MILLIGRAM(S): 100 INJECTION SUBCUTANEOUS at 05:03

## 2024-07-25 RX ADMIN — POVIDONE, PROPYLENE GLYCOL 1 APPLICATION(S): 6.8; 3 LIQUID OPHTHALMIC at 12:55

## 2024-07-25 RX ADMIN — OFLOXACIN 1 DROP(S): 3 SOLUTION/ DROPS OPHTHALMIC at 17:05

## 2024-07-25 RX ADMIN — POVIDONE, PROPYLENE GLYCOL 1 DROP(S): 6.8; 3 LIQUID OPHTHALMIC at 09:02

## 2024-07-25 RX ADMIN — ERYTHROMYCIN 1 APPLICATION(S): 5 OINTMENT OPHTHALMIC at 08:05

## 2024-07-25 RX ADMIN — Medication 15 MILLILITER(S): at 11:03

## 2024-07-25 RX ADMIN — POVIDONE, PROPYLENE GLYCOL 1 DROP(S): 6.8; 3 LIQUID OPHTHALMIC at 09:20

## 2024-07-25 RX ADMIN — OFLOXACIN 1 DROP(S): 3 SOLUTION/ DROPS OPHTHALMIC at 09:02

## 2024-07-25 RX ADMIN — POVIDONE, PROPYLENE GLYCOL 1 DROP(S): 6.8; 3 LIQUID OPHTHALMIC at 21:16

## 2024-07-25 RX ADMIN — ERYTHROMYCIN 1 APPLICATION(S): 5 OINTMENT OPHTHALMIC at 17:06

## 2024-07-25 RX ADMIN — ERYTHROMYCIN 1 APPLICATION(S): 5 OINTMENT OPHTHALMIC at 23:48

## 2024-07-25 RX ADMIN — Medication 2 MILLIGRAM(S): at 05:02

## 2024-07-25 RX ADMIN — ERYTHROMYCIN 1 APPLICATION(S): 5 OINTMENT OPHTHALMIC at 21:16

## 2024-07-25 RX ADMIN — OFLOXACIN 1 DROP(S): 3 SOLUTION/ DROPS OPHTHALMIC at 22:42

## 2024-07-25 RX ADMIN — CHLORHEXIDINE GLUCONATE 15 MILLILITER(S): 40 SOLUTION TOPICAL at 05:03

## 2024-07-25 RX ADMIN — Medication 2 MILLIGRAM(S): at 12:13

## 2024-07-25 RX ADMIN — ERYTHROMYCIN 1 APPLICATION(S): 5 OINTMENT OPHTHALMIC at 14:37

## 2024-07-25 RX ADMIN — POVIDONE, PROPYLENE GLYCOL 1 APPLICATION(S): 6.8; 3 LIQUID OPHTHALMIC at 15:54

## 2024-07-25 RX ADMIN — ERYTHROMYCIN 1 APPLICATION(S): 5 OINTMENT OPHTHALMIC at 11:04

## 2024-07-25 NOTE — CONSULT NOTE ADULT - SUBJECTIVE AND OBJECTIVE BOX
[facility] DEPARTMENT OF OPHTHALMOLOGY - INITIAL ADULT CONSULT  -----------------------------------------------------------------------------  Chase Molina MD, PGY-3  Contact: TEAMS  -----------------------------------------------------------------------------    HPI:  Ms. Velasquez is a 68 year old woman with a past medical history including ALS, nonverbal and bedbound, Parkinson's, chronic respiratory failure requiring trach and vent, chronic oropharyngeal dysphagia with PEG who presents as per request of his pulmonologist with concerns for air leak. Today pt was seen by pulmonologist who noted that she was having air leakage from her trach, tidal volume 400 exhaled minute volume 250-290.  Pulmonologist changed tracheostomy tube without improvement. Pt otherwise at baseline. No recent fevers or acute complaints. Pt brought to [facility] for CT surgery evaluation for trach evaluation.    (04 Jul 2024 23:35)    Interval History: Ophthalmology consulted for worsening right eye redness. 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 Previously would be able to tape eyelids shut at night but now be coming more difficult.     PMH: as above   POcHx: exposure keratopathy   FH: denies glc/amd  Social History: denies etoh/tobacco  Ophthalmic Medications: none  Allergies: NKDA    Review of Systems:  RONIT     VITALS: T(C): 36.4 (07-25-24 @ 12:00)  T(F): 97.5 (07-25-24 @ 12:00), Max: 97.9 (07-24-24 @ 17:06)  HR: 98 (07-25-24 @ 15:55) (87 - 104)  BP: 105/68 (07-25-24 @ 13:00) (102/70 - 178/92)  RR:  (12 - 16)  SpO2:  (99% - 100%)  Wt(kg): --  General: AAO x 3, appropriate mood and affect    Ophthalmology Exam:  Visual acuity (sc): RONIT   Pupils: PERRL OU, no APD  Ttono: 16 OU  Extraocular movements (EOMs): RONIT  Confrontational Visual Field (CVF): RONIT  Color Plates: RONIT    Pen Light Exam (PLE)  External: Flat OU  Lids/Lashes/Lacrimal Ducts: Flat OU    Sclera/Conjunctiva: injection OD>>OS   Cornea: 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 With chronic corneal damage and changes secondary to exposure including extensive overlying filaments, pannus formation, and stromal scarring; OS with inferior epithelial defect encompassing 20 % of cornea. Also with chronic corneal damage and changes secondary to exposure including extensive filaments, pannus formation, and stromal scarring; No infiltration or ulcer OU   Anterior Chamber: D+F OU    Iris: Flat OU  Lens: NS OU     No view to posterior segment, will B scan at future assessment.     Labs/Imaging:  ***

## 2024-07-25 NOTE — CONSULT NOTE ADULT - ASSESSMENT
67 YO Female with PMHx of ALS, Parkinson,, nonverbal, bed bound, chronic respiratory failure with tracheostomy and vent dependence, and oropharyngeal dysphagia with PEG who presented from home with tracheostomy leak.   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 Was previously able to have lids taped shut at night however now becoming more difficult. Patient consequently with noticeable worsening in eye redness OD>>OS despite frequent lubrication   - exam with conjunctival injection OD>>OS, cornea OD with inferior epithelial defect encompassing 40% of cornea. With chronic corneal damage and changes secondary to exposure including extensive overlying filaments, pannus formation, and stromal scarring; OS with inferior epithelial defect encompassing 20% of cornea. Also with chronic corneal damage and changes secondary to exposure including extensive filaments, pannus. No active infiltrate or ulcer appreciated at this time OU. No thinning OU.   - recommend Ofloxacin, 6 times daily to the right eye and 4 times daily to the left eye  - recommend Erythromycin ointment and lacrilube each every 3 hours alternating 1.5 hours apart, so that patient is receiving ointment q 1.5 hours, to both eyes   - Create moisture chamber with tegaderm following placement of EXTENSIVE ointment to the right eye throughout the day, and to the left eye nightly. Nurses shown how to make at bedside. The tegaderm should not be placed tightly overlying the eye, and there should be ointment covering the entirety of the exposed eye. Place moisturizing ointment around areas of face where tegaderm is to stick so that patient does not develop skin irritation    - Artificial tears every 1.5 hours before placement of ointment.    ZANDRA Denton     Outpatient follow-up: Patient should follow-up with his/her ophthalmologist or with [facility] Department of Ophthalmology upon discharge at the address below     [facility] Department of Ophthalmology  [address] [place] [address]. [address]  [place] [address]  [phone]

## 2024-07-25 NOTE — PROGRESS NOTE ADULT - TIME BILLING
Resolving. Patient will decrease Tobradex ST to BID OU until out and begin PF AT's BID-TID OU (sample of Systane given). Patient advised to continue the use of hot compresses Qday x 5 minutes until symptoms completely resolve. Review of patient records, including history, laboratory data, and imaging. Patient evaluation and assessment. Coordination of care. Time excludes teaching or procedures.

## 2024-07-25 NOTE — CONSULT NOTE ADULT - ATTENDING COMMENTS
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    Agree with resident note. need aggressive lubrication and moisture chamber as she does not have a blink.

## 2024-07-25 NOTE — PROGRESS NOTE ADULT - NS ATTEND AMEND GEN_ALL_CORE FT
Pt is a 69F with MHx advanced ALS (nonverbal and bedbound at baseline) with chronic hypercapnic respiratory failure and ventilatory dependence via tracheostomy at baseline and Parkinson's dz presenting to [facility] on 7/18/24 with acute hypercapnia secondary to large expiratory air leak s/p upsizing without improvement with hospital course c/b tracheitis vs. UTI transferred from MICU to RCU on 7/5 for further management.     Pt with known advanced progressive ALS with most recent baseline known to be nonverbal and bedbound with functional quadriplegia and complete ADL dependence. 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 Will c/w home Lacrilube and natural tears with qhs eye taping. Pt with worsening eye redness, especially on right side, despite conservative measures. Received vancomycin +tobramycin eye drops in the past and on previous admission opthalmology recommended possible need for tarsorrhaphy, but was ultimately deferred as not in line with pt's GOC. 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 Will c/w home riluzole, c/w home diazepam and zoloft.     Pt with acute on chronic hypercapnic respiratory failure 2/2 neuromuscular weakness in the setting of advanced ALS with chronic ventilatory dependence with tracheostomy found to tracheitis and large air leak 2/2 tracheomegaly likely 2/2 prolonged tracheostomy dependence. Trach exchanged at home without improvement followed by upsizing at bedside to 9 Bivona but still with air leak. Thoracic sx consulted, custom trach ordered. Will c/w current vent support, pt on minimal settings. Does not tolerate PSV trials, remains completely passive on ventilator. Will c/w airway clearance and trach care as per RCU team. Wean O2 supplementation for goal O2 saturation 90-95%.     Pt with sepsis 2/2 UTI vs. tracheitis. Remains hemodynamically stable and afebrile with fluctuating but stable WBC. Initial cx (+) Pseudomonas in trach aspirate, similar to previous cultures, for which pt completed course of Zosyn. Previous UCx from 7/24/23 (+) VRE faecalis sensitive only to linezolid/daptomycin but likely colonizers. Will continue to monitor conservatively off abx at this time. Pt with known periods of BP variation with transient hypertensive episodes, will monitor off anti-HTNs, likely 2/2 dysautonomia.    Discharge pending medical optimization, likely home with ventilator. Pt full code. On home NOAC for DVT ppx, held for now in preparation for OR. Will continue to discuss with pt's family, pt's  at bedside. HCP  (Dr. Velasquez). O/P Pulm: Dr. Pranav Newman

## 2024-07-25 NOTE — PROGRESS NOTE ADULT - ASSESSMENT
69 YO Female with PMHx of ALS, Parkinson,, nonverbal, bed bound, chronic respiratory failure with tracheostomy and vent dependence, and oropharyngeal dysphagia with PEG who presented from home with tracheostomy leak. She was noted with vent alarm and poor TVe (250-290) at home. Trach changed at home with no improvement and sent in for thoracic evaluation. While in MICU no air leak noted with hyperinflated cuff. Transferred to RCU on 7/5 with course complicated by leukocytosis second to UTI vs trachitis and completed zosyn course.     NEUROLOGY  # ALS   - Baseline nonverbal, awake, and communicates with eye movement.   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   - Continue on ofloxacin OD Q4H   - Continue on lacrilube and erythromycin alternating OU Q1.5H   - Continue with artificial tears OU Q1.5H prior to lacrilube and erythromycin    PSYCH   # Anxiety and Depression   - Continue on home valium 2mg BID with additional 2mg PRN   - Continue on zoloft 75mg QD while in house (on 4cc/ 80mg QD at home)     CARDIOVASCULAR   # HTN thought to be second to discomfort vs dysautonomia   - Evening HTN and continue on hydralazine vs valium PRN doses   - Monitor HR and BP     #HTN  - BP intermittently elevated at times, trialing PRN valium 2 mg PRN for dysautonomia   - Consider adding low dose Norvasc if BP remains elevated      RESPIRATORY  # Tracheostomy leak   - At home noted with TVe 200-300s despite tracheostomy change to 80XLTCD.   - No airleak noted in house with hyperinflated cuff likely tracheomegaly?   - Continue on chronic vent 16/400/5/21   - Chronic bibasilar atelectasis and continued on HTS PRN with chest PT  -  Flex bronch and Trach tube upsizing to 9mm Bivona  - On rounds noted to be receiving low TV and air heard escaping around cuff - maintaining 02 sat   - Seen with thoracic at bedside - bedside bronch done - trach position checked and additional air placed in cuff by thoracic with increase of TV to set parameter  Per [facility] - a custom trach will be ordered for this patient and DC is on hold   - Small leak around trach - awaiting custom trach per thoracic - still pending 7/24    GI  # Dysphagia   - Continue on PEG-TF     RENAL  - No acute issues   - Monitor renal function and UOP   - Pt had PVR Approx 300cc will continue to monitor - pt does have str cath at home     # Hyperlactatemia  - Lactate 3.5 on admission and improved with rehydration.     INFECTIOUS DISEASE  # Leukocytosis likely second to trachitis vs UTI?   - No fever or chills and noted with prior leukocytosis   - CXR with prior atelectasis and no acute findings   - UA (7/6) with positive nitrites (7/6)   - SCx (7/5) with pansensitive PSA  - BCx (7/6) x 2 NGT  - UCx (7/6) with coag neg staph  - MRSA PCR (7/6) POSITIVE for both MRSA/MSSA s/p Bactroban (7/6 - 7/10)  - s/p empiric Zosyn (7/8 - 7/14) however WBC continues to wax and wean without fever and will monitor off ABX .     HEME  - On Xarelto 10mg at home likely for DVT PPX?   - Hold home Xarelto in prep for OR   - DVT PPX with LVX for now     ENDOCRINE  - No hx of DM2   - Monitor BG     SKIN  - Basic trach and PEG care ordered     ETHICS/ GOC    - FULL CODE   - Dr Tyson Velasquez,  involved in care    DISPO - Back home with  when able.    69 YO Female with PMHx of ALS, Parkinson,, nonverbal, bed bound, chronic respiratory failure with tracheostomy and vent dependence, and oropharyngeal dysphagia with PEG who presented from home with tracheostomy leak. She was noted with vent alarm and poor TVe (250-290) at home. Trach changed at home with no improvement and sent in for thoracic evaluation. While in MICU no air leak noted with hyperinflated cuff. Transferred to RCU on 7/5 with course complicated by leukocytosis second to UTI vs trachitis and completed zosyn course.     NEUROLOGY  # ALS   - Baseline nonverbal, awake, and communicates with eye movement.   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   - Continue on ofloxacin OD Q4H   - Continue on lacrilube and erythromycin alternating OU Q1.5H (alternating meds)  - Continue with artificial tears OU Q1.5H prior to lacrilube and erythromycin     PSYCH   # Anxiety and Depression   - Continue on home valium 2mg BID with additional 2mg PRN   - Continue on zoloft 75mg QD while in house (on 4cc/ 80mg QD at home)     CARDIOVASCULAR   # HTN thought to be second to discomfort vs dysautonomia   - Evening HTN and continue on hydralazine vs valium PRN doses   - Monitor HR and BP     #HTN  - BP intermittently elevated at times, trialing PRN valium 2 mg PRN for dysautonomia   - Consider adding low dose Norvasc if BP remains elevated      RESPIRATORY  # Tracheostomy leak   - At home noted with TVe 200-300s despite tracheostomy change to 80XLTCD.   - No airleak noted in house with hyperinflated cuff likely tracheomegaly?   - Continue on chronic vent 16/400/5/21   - Chronic bibasilar atelectasis and continued on HTS PRN with chest PT  -  Flex bronch and Trach tube upsizing to 9mm Bivona  - On rounds noted to be receiving low TV and air heard escaping around cuff - maintaining 02 sat   - Seen with thoracic at bedside - bedside bronch done - trach position checked and additional air placed in cuff by thoracic with increase of TV to set parameter  Per [facility] - a custom trach will be ordered for this patient and DC is on hold   - Small leak around trach - awaiting custom trach per thoracic - still pending 7/24    GI  # Dysphagia   - Continue on PEG-TF     RENAL  - No acute issues   - Monitor renal function and UOP   - Pt had PVR Approx 300cc will continue to monitor - pt does have str cath at home     # Hyperlactatemia  - Lactate 3.5 on admission and improved with rehydration.     INFECTIOUS DISEASE  # Leukocytosis likely second to trachitis vs UTI?   - No fever or chills and noted with prior leukocytosis   - CXR with prior atelectasis and no acute findings   - UA (7/6) with positive nitrites (7/6)   - SCx (7/5) with pansensitive PSA  - BCx (7/6) x 2 NGT  - UCx (7/6) with coag neg staph  - MRSA PCR (7/6) POSITIVE for both MRSA/MSSA s/p Bactroban (7/6 - 7/10)  - s/p empiric Zosyn (7/8 - 7/14) however WBC continues to wax and wean without fever and will monitor off ABX .     HEME  - On Xarelto 10mg at home likely for DVT PPX?   - Hold home Xarelto in prep for OR   - DVT PPX with LVX for now     ENDOCRINE  - No hx of DM2   - Monitor BG     SKIN  - Basic trach and PEG care ordered     ETHICS/ GOC    - FULL CODE   - Dr Tyson Velasquez,  involved in care    DISPO - Back home with  when able.    67 YO Female with PMHx of ALS, Parkinson,, nonverbal, bed bound, chronic respiratory failure with tracheostomy and vent dependence, and oropharyngeal dysphagia with PEG who presented from home with tracheostomy leak. She was noted with vent alarm and poor TVe (250-290) at home. Trach changed at home with no improvement and sent in for thoracic evaluation. While in MICU no air leak noted with hyperinflated cuff. Transferred to RCU on 7/5 with course complicated by leukocytosis second to UTI vs trachitis and completed zosyn course. Trach upsized by thoracic on 7/17 however AL remains and now pending custom trach.     NEUROLOGY  # ALS   - Baseline nonverbal, awake, and communicates with eye movement.   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   - Continue on ofloxacin OD Q4H   - Continue on lacrilube and erythromycin alternating OU Q1.5H (alternating meds)  - Continue with artificial tears OU Q1.5H prior to lacrilube and erythromycin     PSYCH   # Anxiety and Depression   - Continue on home valium 2mg BID with additional 2mg PRN   - Continue on zoloft 75mg QD while in house (on 4cc/ 80mg QD at home)     CARDIOVASCULAR   # HTN thought to be second to discomfort vs dysautonomia   - Evening HTN and continue on hydralazine vs valium PRN doses   - Monitor HR and BP    RESPIRATORY  # Tracheostomy leak   - At home noted with TVe 200-300s despite tracheostomy change to 80XLTCD.   - No airleak noted in house with hyperinflated cuff likely tracheomegaly?   - Continue on chronic vent 16/400/5/21   - Chronic bibasilar atelectasis and continued on HTS PRN with chest PT  - Trach upsized to bivona 9 on 7/17, however trach leak remains.   - Pending custom tracheostomy     GI  # Dysphagia   - Continue on PEG-TF     RENAL  # High PVR   - BS with roughly 200-300cc PVR, however no acute distress   - Monitor renal function and UOP     INFECTIOUS DISEASE  # Leukocytosis likely second to trachitis vs UTI?   - No fever or chills and noted with prior leukocytosis   - CXR with prior atelectasis and no acute findings   - UA (7/6) with positive nitrites (7/6)   - SCx (7/5) with pansensitive PSA  - BCx (7/6) x 2 NGT  - UCx (7/6) with coag neg staph  - MRSA PCR (7/6) POSITIVE for both MRSA/MSSA s/p Bactroban (7/6 - 7/10)  - s/p empiric Zosyn (7/8 - 7/14) however WBC continues to wax and wean without fever and will monitor off ABX .     HEME  - On Xarelto 10mg at home likely for DVT PPX?   - Hold home Xarelto in prep for OR   - DVT PPX with LVX for now     ENDOCRINE  - No hx of DM2   - Monitor BG     SKIN  - Basic trach and PEG care ordered     ETHICS/ GOC    - FULL CODE   - Dr Tyson Velasquez,  involved in care    DISPO - Back home with  when able.

## 2024-07-25 NOTE — PROGRESS NOTE ADULT - SUBJECTIVE AND OBJECTIVE BOX
CHIEF COMPLAINT: Patient is a 69y old  Female who presents with a chief complaint of trach exchange (18 Jul 2024 07:07)      INTERVAL EVENTS:    REVIEW OF SYSTEMS: Seen by bedside during AM rounds and     Mode: AC/ CMV (Assist Control/ Continuous Mandatory Ventilation), RR (machine): 16, TV (machine): 400, FiO2: 30, PEEP: 5, ITime: 0.76, MAP: 8, PIP: 22      OBJECTIVE:  ICU Vital Signs Last 24 Hrs  T(C): 36.5 (25 Jul 2024 04:00), Max: 36.6 (24 Jul 2024 08:00)  T(F): 97.7 (25 Jul 2024 04:00), Max: 97.9 (24 Jul 2024 08:00)  HR: 90 (25 Jul 2024 04:00) (90 - 105)  BP: 128/73 (25 Jul 2024 04:00) (102/70 - 178/92)  BP(mean): --  ABP: --  ABP(mean): --  RR: 16 (25 Jul 2024 04:00) (12 - 16)  SpO2: 100% (25 Jul 2024 04:00) (99% - 100%)    O2 Parameters below as of 25 Jul 2024 04:00  Patient On (Oxygen Delivery Method): ventilator          Mode: AC/ CMV (Assist Control/ Continuous Mandatory Ventilation), RR (machine): 16, TV (machine): 400, FiO2: 30, PEEP: 5, ITime: 0.76, MAP: 8, PIP: 22    07-24 @ 07:01  -  07-25 @ 07:00  --------------------------------------------------------  IN: 1160 mL / OUT: 900 mL / NET: 260 mL      CAPILLARY BLOOD GLUCOSE          HOSPITAL MEDICATIONS:  MEDICATIONS  (STANDING):  artificial  tears Solution 1 Drop(s) Both EYES <User Schedule>  chlorhexidine 0.12% Liquid 15 milliLiter(s) Oral Mucosa every 12 hours  chlorhexidine 2% Cloths 1 Application(s) Topical daily  diazepam    Tablet 2 milliGRAM(s) Oral every 12 hours  enoxaparin Injectable 40 milliGRAM(s) SubCutaneous every 24 hours  erythromycin   Ointment 1 Application(s) Both EYES <User Schedule>  multivitamin/minerals/iron Oral Solution (CENTRUM) 15 milliLiter(s) Oral daily  ofloxacin 0.3% Solution 1 Drop(s) Right EYE every 4 hours  petrolatum Ophthalmic Ointment 1 Application(s) Both EYES <User Schedule>  riluzole 50 milliGRAM(s) Oral two times a day  sertraline 75 milliGRAM(s) Oral daily    MEDICATIONS  (PRN):  acetaminophen   Oral Liquid .. 650 milliGRAM(s) Oral every 6 hours PRN Temp greater or equal to 38C (100.4F), Mild Pain (1 - 3)  diazepam    Tablet 2 milliGRAM(s) Oral daily PRN for dysautonomia/ hypertension/discomfort      PHYSICAL EXAMINATION    LABS:                        12.8   13.42 )-----------( 444      ( 25 Jul 2024 04:52 )             40.5     07-25    140  |  98  |  28<H>  ----------------------------<  125<H>  4.2   |  29  |  <0.20<L>    Ca    9.7      25 Jul 2024 04:52  Phos  3.0     07-25  Mg     2.00     07-25    TPro  8.1  /  Alb  3.7  /  TBili  0.5  /  DBili  x   /  AST  20  /  ALT  19  /  AlkPhos  129<H>  07-25      Urinalysis Basic - ( 25 Jul 2024 04:52 )    Color: x / Appearance: x / SG: x / pH: x  Gluc: 125 mg/dL / Ketone: x  / Bili: x / Urobili: x   Blood: x / Protein: x / Nitrite: x   Leuk Esterase: x / RBC: x / WBC x   Sq Epi: x / Non Sq Epi: x / Bacteria: x            PAST MEDICAL & SURGICAL HISTORY:  ALS (amyotrophic lateral sclerosis)      HLD (hyperlipidemia)      Ventilator dependent  Since 2015      Aspiration into airway      S/P gastrostomy  10/14 for dysphagia  receives jevity 2 cans TID      Dependence on tracheostomy      Encounter for PEG (percutaneous endoscopic gastrostomy)  peg placed          FAMILY HISTORY:  No pertinent family history in first degree relatives        Social History:      RADIOLOGY:  [ ] Reviewed and interpreted by me    PULMONARY FUNCTION TESTS:    EKG: CHIEF COMPLAINT: Patient is a 69y old  Female who presents with a chief complaint of trach exchange (18 Jul 2024 07:07)      INTERVAL EVENTS: 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, R>L    REVIEW OF SYSTEMS: Seen by bedside during AM rounds; unable to obtain 2/2 baseline metal status    Mode: AC/ CMV (Assist Control/ Continuous Mandatory Ventilation), RR (machine): 16, TV (machine): 400, FiO2: 30, PEEP: 5, ITime: 0.76, MAP: 8, PIP: 22      OBJECTIVE:  ICU Vital Signs Last 24 Hrs  T(C): 36.5 (25 Jul 2024 04:00), Max: 36.6 (24 Jul 2024 08:00)  T(F): 97.7 (25 Jul 2024 04:00), Max: 97.9 (24 Jul 2024 08:00)  HR: 90 (25 Jul 2024 04:00) (90 - 105)  BP: 128/73 (25 Jul 2024 04:00) (102/70 - 178/92)  BP(mean): --  ABP: --  ABP(mean): --  RR: 16 (25 Jul 2024 04:00) (12 - 16)  SpO2: 100% (25 Jul 2024 04:00) (99% - 100%)    O2 Parameters below as of 25 Jul 2024 04:00  Patient On (Oxygen Delivery Method): ventilator      Mode: AC/ CMV (Assist Control/ Continuous Mandatory Ventilation), RR (machine): 16, TV (machine): 400, FiO2: 30, PEEP: 5, ITime: 0.76, MAP: 8, PIP: 22    07-24 @ 07:01  -  07-25 @ 07:00  --------------------------------------------------------  IN: 1160 mL / OUT: 900 mL / NET: 260 mL      CAPILLARY BLOOD GLUCOSE      PHYSICAL EXAM:  General: NAD, +Trach to Vent   Neck: neck supple, no JVD, trach midline  Eye: +corneal opacification noted b/l, R>L, conjunctival hemorrhage noted in R eye??   Respiratory: +rhonchi auscultated b/l, no wheeze, no rales, no resp distress  Cardiovascular: normal S1 and S2, regular rate and rhythm  Abdomen: soft, non tender, +PEG  Extremities: no LE edema b/l in +SCDs  Skin: warm and dry  Neurological: +neurologic deficits d/t ALS hx   Psychiatry: no agitation    HOSPITAL MEDICATIONS:  MEDICATIONS  (STANDING):  artificial  tears Solution 1 Drop(s) Both EYES <User Schedule>  chlorhexidine 0.12% Liquid 15 milliLiter(s) Oral Mucosa every 12 hours  chlorhexidine 2% Cloths 1 Application(s) Topical daily  diazepam    Tablet 2 milliGRAM(s) Oral every 12 hours  enoxaparin Injectable 40 milliGRAM(s) SubCutaneous every 24 hours  erythromycin   Ointment 1 Application(s) Both EYES <User Schedule>  multivitamin/minerals/iron Oral Solution (CENTRUM) 15 milliLiter(s) Oral daily  ofloxacin 0.3% Solution 1 Drop(s) Right EYE every 4 hours  petrolatum Ophthalmic Ointment 1 Application(s) Both EYES <User Schedule>  riluzole 50 milliGRAM(s) Oral two times a day  sertraline 75 milliGRAM(s) Oral daily    MEDICATIONS  (PRN):  acetaminophen   Oral Liquid .. 650 milliGRAM(s) Oral every 6 hours PRN Temp greater or equal to 38C (100.4F), Mild Pain (1 - 3)  diazepam    Tablet 2 milliGRAM(s) Oral daily PRN for dysautonomia/ hypertension/discomfort      PHYSICAL EXAMINATION    LABS:                        12.8   13.42 )-----------( 444      ( 25 Jul 2024 04:52 )             40.5     07-25    140  |  98  |  28<H>  ----------------------------<  125<H>  4.2   |  29  |  <0.20<L>    Ca    9.7      25 Jul 2024 04:52  Phos  3.0     07-25  Mg     2.00     07-25    TPro  8.1  /  Alb  3.7  /  TBili  0.5  /  DBili  x   /  AST  20  /  ALT  19  /  AlkPhos  129<H>  07-25      Urinalysis Basic - ( 25 Jul 2024 04:52 )    Color: x / Appearance: x / SG: x / pH: x  Gluc: 125 mg/dL / Ketone: x  / Bili: x / Urobili: x   Blood: x / Protein: x / Nitrite: x   Leuk Esterase: x / RBC: x / WBC x   Sq Epi: x / Non Sq Epi: x / Bacteria: x            PAST MEDICAL & SURGICAL HISTORY:  ALS (amyotrophic lateral sclerosis)      HLD (hyperlipidemia)      Ventilator dependent  Since 2015      Aspiration into airway      S/P gastrostomy  10/14 for dysphagia  receives jevity 2 cans TID      Dependence on tracheostomy      Encounter for PEG (percutaneous endoscopic gastrostomy)  peg placed          FAMILY HISTORY:  No pertinent family history in first degree relatives        Social History:      RADIOLOGY:  [ ] Reviewed and interpreted by me    PULMONARY FUNCTION TESTS:    EKG: CHIEF COMPLAINT: Patient is a 69y old  Female who presents with a chief complaint of trach exchange (18 Jul 2024 07:07)      INTERVAL EVENTS:   - 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     REVIEW OF SYSTEMS: Seen by bedside during AM rounds and unable to obtain second to nonverbal with ALS at baseline    Mode: AC/ CMV (Assist Control/ Continuous Mandatory Ventilation), RR (machine): 16, TV (machine): 400, FiO2: 30, PEEP: 5, ITime: 0.76, MAP: 8, PIP: 22      OBJECTIVE:  ICU Vital Signs Last 24 Hrs  T(C): 36.5 (25 Jul 2024 04:00), Max: 36.6 (24 Jul 2024 08:00)  T(F): 97.7 (25 Jul 2024 04:00), Max: 97.9 (24 Jul 2024 08:00)  HR: 90 (25 Jul 2024 04:00) (90 - 105)  BP: 128/73 (25 Jul 2024 04:00) (102/70 - 178/92)  BP(mean): --  ABP: --  ABP(mean): --  RR: 16 (25 Jul 2024 04:00) (12 - 16)  SpO2: 100% (25 Jul 2024 04:00) (99% - 100%)    O2 Parameters below as of 25 Jul 2024 04:00  Patient On (Oxygen Delivery Method): ventilator      Mode: AC/ CMV (Assist Control/ Continuous Mandatory Ventilation), RR (machine): 16, TV (machine): 400, FiO2: 30, PEEP: 5, ITime: 0.76, MAP: 8, PIP: 22    07-24 @ 07:01  -  07-25 @ 07:00  --------------------------------------------------------  IN: 1160 mL / OUT: 900 mL / NET: 260 mL      CAPILLARY BLOOD GLUCOSE      PHYSICAL EXAM:  General: NAD   HEENT: Trach present. BL eyes with corneal opacification (R>L) with severe exposure keratopathy   Respiratory: Course vent sounds bilaterally with rhonchi.   Cardiovascular: RRR with S1/S2  Abdomen: Soft, NTND, and BS present and PEG present.   Extremities: No edema BL and no AROM x 4 extremities   Neurological: Awake with eyes open, however does not follow commands with baseline [facility]    HOSPITAL MEDICATIONS:  MEDICATIONS  (STANDING):  artificial  tears Solution 1 Drop(s) Both EYES <User Schedule>  chlorhexidine 0.12% Liquid 15 milliLiter(s) Oral Mucosa every 12 hours  chlorhexidine 2% Cloths 1 Application(s) Topical daily  diazepam    Tablet 2 milliGRAM(s) Oral every 12 hours  enoxaparin Injectable 40 milliGRAM(s) SubCutaneous every 24 hours  erythromycin   Ointment 1 Application(s) Both EYES <User Schedule>  multivitamin/minerals/iron Oral Solution (CENTRUM) 15 milliLiter(s) Oral daily  ofloxacin 0.3% Solution 1 Drop(s) Right EYE every 4 hours  petrolatum Ophthalmic Ointment 1 Application(s) Both EYES <User Schedule>  riluzole 50 milliGRAM(s) Oral two times a day  sertraline 75 milliGRAM(s) Oral daily    MEDICATIONS  (PRN):  acetaminophen   Oral Liquid .. 650 milliGRAM(s) Oral every 6 hours PRN Temp greater or equal to 38C (100.4F), Mild Pain (1 - 3)  diazepam    Tablet 2 milliGRAM(s) Oral daily PRN for dysautonomia/ hypertension/discomfort      LABS:                        12.8   13.42 )-----------( 444      ( 25 Jul 2024 04:52 )             40.5     07-25    140  |  98  |  28<H>  ----------------------------<  125<H>  4.2   |  29  |  <0.20<L>    Ca    9.7      25 Jul 2024 04:52  Phos  3.0     07-25  Mg     2.00     07-25    TPro  8.1  /  Alb  3.7  /  TBili  0.5  /  DBili  x   /  AST  20  /  ALT  19  /  AlkPhos  129<H>  07-25      Urinalysis Basic - ( 25 Jul 2024 04:52 )  Color: x / Appearance: x / SG: x / pH: x  Gluc: 125 mg/dL / Ketone: x  / Bili: x / Urobili: x   Blood: x / Protein: x / Nitrite: x   Leuk Esterase: x / RBC: x / WBC x   Sq Epi: x / Non Sq Epi: x / Bacteria: x            PAST MEDICAL & SURGICAL HISTORY:  ALS (amyotrophic lateral sclerosis)      HLD (hyperlipidemia)      Ventilator dependent  Since 2015      Aspiration into airway      S/P gastrostomy  10/14 for dysphagia  receives jevity 2 cans TID      Dependence on tracheostomy      Encounter for PEG (percutaneous endoscopic gastrostomy)  peg placed          FAMILY HISTORY:  No pertinent family history in first degree relatives        Social History:      RADIOLOGY:  [ ] Reviewed and interpreted by me    PULMONARY FUNCTION TESTS:    EKG:

## 2024-07-26 LAB
ANION GAP SERPL CALC-SCNC: 16 MMOL/L — HIGH (ref 7–14)
BASOPHILS # BLD AUTO: 0.08 K/UL — SIGNIFICANT CHANGE UP (ref 0–0.2)
BASOPHILS NFR BLD AUTO: 0.6 % — SIGNIFICANT CHANGE UP (ref 0–2)
BUN SERPL-MCNC: 27 MG/DL — HIGH (ref 7–23)
CALCIUM SERPL-MCNC: 9.9 MG/DL — SIGNIFICANT CHANGE UP (ref 8.4–10.5)
CHLORIDE SERPL-SCNC: 99 MMOL/L — SIGNIFICANT CHANGE UP (ref 98–107)
CO2 SERPL-SCNC: 29 MMOL/L — SIGNIFICANT CHANGE UP (ref 22–31)
CREAT SERPL-MCNC: <0.2 MG/DL — LOW (ref 0.5–1.3)
EGFR: 125 ML/MIN/1.73M2 — SIGNIFICANT CHANGE UP
EOSINOPHIL # BLD AUTO: 0.26 K/UL — SIGNIFICANT CHANGE UP (ref 0–0.5)
EOSINOPHIL NFR BLD AUTO: 2.1 % — SIGNIFICANT CHANGE UP (ref 0–6)
GLUCOSE SERPL-MCNC: 132 MG/DL — HIGH (ref 70–99)
HCT VFR BLD CALC: 46.4 % — HIGH (ref 34.5–45)
HGB BLD-MCNC: 13.6 G/DL — SIGNIFICANT CHANGE UP (ref 11.5–15.5)
IANC: 9.54 K/UL — HIGH (ref 1.8–7.4)
IMM GRANULOCYTES NFR BLD AUTO: 1.1 % — HIGH (ref 0–0.9)
LYMPHOCYTES # BLD AUTO: 1.74 K/UL — SIGNIFICANT CHANGE UP (ref 1–3.3)
LYMPHOCYTES # BLD AUTO: 13.8 % — SIGNIFICANT CHANGE UP (ref 13–44)
MAGNESIUM SERPL-MCNC: 2.2 MG/DL — SIGNIFICANT CHANGE UP (ref 1.6–2.6)
MCHC RBC-ENTMCNC: 25.6 PG — LOW (ref 27–34)
MCHC RBC-ENTMCNC: 29.3 GM/DL — LOW (ref 32–36)
MCV RBC AUTO: 87.2 FL — SIGNIFICANT CHANGE UP (ref 80–100)
MONOCYTES # BLD AUTO: 0.85 K/UL — SIGNIFICANT CHANGE UP (ref 0–0.9)
MONOCYTES NFR BLD AUTO: 6.7 % — SIGNIFICANT CHANGE UP (ref 2–14)
NEUTROPHILS # BLD AUTO: 9.54 K/UL — HIGH (ref 1.8–7.4)
NEUTROPHILS NFR BLD AUTO: 75.7 % — SIGNIFICANT CHANGE UP (ref 43–77)
NRBC # BLD: 0 /100 WBCS — SIGNIFICANT CHANGE UP (ref 0–0)
NRBC # FLD: 0 K/UL — SIGNIFICANT CHANGE UP (ref 0–0)
PHOSPHATE SERPL-MCNC: 3.9 MG/DL — SIGNIFICANT CHANGE UP (ref 2.5–4.5)
PLATELET # BLD AUTO: 458 K/UL — HIGH (ref 150–400)
POTASSIUM SERPL-MCNC: 4.1 MMOL/L — SIGNIFICANT CHANGE UP (ref 3.5–5.3)
POTASSIUM SERPL-SCNC: 4.1 MMOL/L — SIGNIFICANT CHANGE UP (ref 3.5–5.3)
RBC # BLD: 5.32 M/UL — HIGH (ref 3.8–5.2)
RBC # FLD: 16.1 % — HIGH (ref 10.3–14.5)
SODIUM SERPL-SCNC: 144 MMOL/L — SIGNIFICANT CHANGE UP (ref 135–145)
WBC # BLD: 12.61 K/UL — HIGH (ref 3.8–10.5)
WBC # FLD AUTO: 12.61 K/UL — HIGH (ref 3.8–10.5)

## 2024-07-26 PROCEDURE — 99233 SBSQ HOSP IP/OBS HIGH 50: CPT | Mod: FS

## 2024-07-26 RX ORDER — OFLOXACIN 3 MG/ML
1 SOLUTION/ DROPS OPHTHALMIC EVERY 6 HOURS
Refills: 0 | Status: DISCONTINUED | OUTPATIENT
Start: 2024-07-26 | End: 2024-08-01

## 2024-07-26 RX ADMIN — POVIDONE, PROPYLENE GLYCOL 1 DROP(S): 6.8; 3 LIQUID OPHTHALMIC at 19:39

## 2024-07-26 RX ADMIN — POVIDONE, PROPYLENE GLYCOL 1 DROP(S): 6.8; 3 LIQUID OPHTHALMIC at 13:13

## 2024-07-26 RX ADMIN — POVIDONE, PROPYLENE GLYCOL 1 DROP(S): 6.8; 3 LIQUID OPHTHALMIC at 17:10

## 2024-07-26 RX ADMIN — RILUZOLE 50 MILLIGRAM(S): 5 LIQUID ORAL at 05:01

## 2024-07-26 RX ADMIN — POVIDONE, PROPYLENE GLYCOL 1 APPLICATION(S): 6.8; 3 LIQUID OPHTHALMIC at 10:24

## 2024-07-26 RX ADMIN — POVIDONE, PROPYLENE GLYCOL 1 APPLICATION(S): 6.8; 3 LIQUID OPHTHALMIC at 08:17

## 2024-07-26 RX ADMIN — ENOXAPARIN SODIUM 40 MILLIGRAM(S): 100 INJECTION SUBCUTANEOUS at 04:55

## 2024-07-26 RX ADMIN — POVIDONE, PROPYLENE GLYCOL 1 APPLICATION(S): 6.8; 3 LIQUID OPHTHALMIC at 13:25

## 2024-07-26 RX ADMIN — Medication 2 MILLIGRAM(S): at 05:01

## 2024-07-26 RX ADMIN — RILUZOLE 50 MILLIGRAM(S): 5 LIQUID ORAL at 17:11

## 2024-07-26 RX ADMIN — POVIDONE, PROPYLENE GLYCOL 1 DROP(S): 6.8; 3 LIQUID OPHTHALMIC at 09:35

## 2024-07-26 RX ADMIN — CHLORHEXIDINE GLUCONATE 1 APPLICATION(S): 40 SOLUTION TOPICAL at 11:37

## 2024-07-26 RX ADMIN — POVIDONE, PROPYLENE GLYCOL 1 APPLICATION(S): 6.8; 3 LIQUID OPHTHALMIC at 17:12

## 2024-07-26 RX ADMIN — ERYTHROMYCIN 1 APPLICATION(S): 5 OINTMENT OPHTHALMIC at 15:23

## 2024-07-26 RX ADMIN — POVIDONE, PROPYLENE GLYCOL 1 DROP(S): 6.8; 3 LIQUID OPHTHALMIC at 03:51

## 2024-07-26 RX ADMIN — POVIDONE, PROPYLENE GLYCOL 1 DROP(S): 6.8; 3 LIQUID OPHTHALMIC at 19:02

## 2024-07-26 RX ADMIN — POVIDONE, PROPYLENE GLYCOL 1 DROP(S): 6.8; 3 LIQUID OPHTHALMIC at 21:19

## 2024-07-26 RX ADMIN — ERYTHROMYCIN 1 APPLICATION(S): 5 OINTMENT OPHTHALMIC at 21:20

## 2024-07-26 RX ADMIN — SERTRALINE HYDROCHLORIDE 75 MILLIGRAM(S): 50 TABLET, FILM COATED ORAL at 11:39

## 2024-07-26 RX ADMIN — CHLORHEXIDINE GLUCONATE 15 MILLILITER(S): 40 SOLUTION TOPICAL at 05:02

## 2024-07-26 RX ADMIN — ERYTHROMYCIN 1 APPLICATION(S): 5 OINTMENT OPHTHALMIC at 11:41

## 2024-07-26 RX ADMIN — OFLOXACIN 1 DROP(S): 3 SOLUTION/ DROPS OPHTHALMIC at 09:35

## 2024-07-26 RX ADMIN — POVIDONE, PROPYLENE GLYCOL 1 APPLICATION(S): 6.8; 3 LIQUID OPHTHALMIC at 04:54

## 2024-07-26 RX ADMIN — POVIDONE, PROPYLENE GLYCOL 1 DROP(S): 6.8; 3 LIQUID OPHTHALMIC at 04:54

## 2024-07-26 RX ADMIN — OFLOXACIN 1 DROP(S): 3 SOLUTION/ DROPS OPHTHALMIC at 19:01

## 2024-07-26 RX ADMIN — Medication 2 MILLIGRAM(S): at 17:10

## 2024-07-26 RX ADMIN — POVIDONE, PROPYLENE GLYCOL 1 DROP(S): 6.8; 3 LIQUID OPHTHALMIC at 10:23

## 2024-07-26 RX ADMIN — ERYTHROMYCIN 1 APPLICATION(S): 5 OINTMENT OPHTHALMIC at 03:50

## 2024-07-26 RX ADMIN — ERYTHROMYCIN 1 APPLICATION(S): 5 OINTMENT OPHTHALMIC at 23:56

## 2024-07-26 RX ADMIN — POVIDONE, PROPYLENE GLYCOL 1 APPLICATION(S): 6.8; 3 LIQUID OPHTHALMIC at 02:10

## 2024-07-26 RX ADMIN — POVIDONE, PROPYLENE GLYCOL 1 APPLICATION(S): 6.8; 3 LIQUID OPHTHALMIC at 19:41

## 2024-07-26 RX ADMIN — POVIDONE, PROPYLENE GLYCOL 1 DROP(S): 6.8; 3 LIQUID OPHTHALMIC at 23:57

## 2024-07-26 RX ADMIN — ERYTHROMYCIN 1 APPLICATION(S): 5 OINTMENT OPHTHALMIC at 19:01

## 2024-07-26 RX ADMIN — POVIDONE, PROPYLENE GLYCOL 1 APPLICATION(S): 6.8; 3 LIQUID OPHTHALMIC at 22:36

## 2024-07-26 RX ADMIN — Medication 15 MILLILITER(S): at 11:42

## 2024-07-26 RX ADMIN — POVIDONE, PROPYLENE GLYCOL 1 DROP(S): 6.8; 3 LIQUID OPHTHALMIC at 02:10

## 2024-07-26 RX ADMIN — POVIDONE, PROPYLENE GLYCOL 1 DROP(S): 6.8; 3 LIQUID OPHTHALMIC at 06:45

## 2024-07-26 RX ADMIN — POVIDONE, PROPYLENE GLYCOL 1 DROP(S): 6.8; 3 LIQUID OPHTHALMIC at 15:22

## 2024-07-26 RX ADMIN — POVIDONE, PROPYLENE GLYCOL 1 DROP(S): 6.8; 3 LIQUID OPHTHALMIC at 11:48

## 2024-07-26 RX ADMIN — OFLOXACIN 1 DROP(S): 3 SOLUTION/ DROPS OPHTHALMIC at 13:12

## 2024-07-26 RX ADMIN — OFLOXACIN 1 DROP(S): 3 SOLUTION/ DROPS OPHTHALMIC at 23:57

## 2024-07-26 RX ADMIN — OFLOXACIN 1 DROP(S): 3 SOLUTION/ DROPS OPHTHALMIC at 06:46

## 2024-07-26 RX ADMIN — OFLOXACIN 1 DROP(S): 3 SOLUTION/ DROPS OPHTHALMIC at 21:20

## 2024-07-26 RX ADMIN — CHLORHEXIDINE GLUCONATE 15 MILLILITER(S): 40 SOLUTION TOPICAL at 17:11

## 2024-07-26 RX ADMIN — POVIDONE, PROPYLENE GLYCOL 1 DROP(S): 6.8; 3 LIQUID OPHTHALMIC at 08:17

## 2024-07-26 RX ADMIN — POVIDONE, PROPYLENE GLYCOL 1 DROP(S): 6.8; 3 LIQUID OPHTHALMIC at 22:35

## 2024-07-26 RX ADMIN — OFLOXACIN 1 DROP(S): 3 SOLUTION/ DROPS OPHTHALMIC at 02:10

## 2024-07-26 RX ADMIN — ERYTHROMYCIN 1 APPLICATION(S): 5 OINTMENT OPHTHALMIC at 06:46

## 2024-07-26 RX ADMIN — ERYTHROMYCIN 1 APPLICATION(S): 5 OINTMENT OPHTHALMIC at 09:39

## 2024-07-26 NOTE — PROGRESS NOTE ADULT - SUBJECTIVE AND OBJECTIVE BOX
[facility] DEPARTMENT OF OPHTHALMOLOGY  ------------------------------------------------------------------------------  Chase Molina MD PGY-3  available on teams  ------------------------------------------------------------------------------    Interval History: No acute events overnight.     MEDICATIONS  (STANDING):  artificial  tears Solution 1 Drop(s) Both EYES <User Schedule>  chlorhexidine 0.12% Liquid 15 milliLiter(s) Oral Mucosa every 12 hours  chlorhexidine 2% Cloths 1 Application(s) Topical daily  diazepam    Tablet 2 milliGRAM(s) Oral every 12 hours  enoxaparin Injectable 40 milliGRAM(s) SubCutaneous every 24 hours  erythromycin   Ointment 1 Application(s) Both EYES <User Schedule>  multivitamin/minerals/iron Oral Solution (CENTRUM) 15 milliLiter(s) Oral daily  ofloxacin 0.3% Solution 1 Drop(s) Right EYE every 4 hours  petrolatum Ophthalmic Ointment 1 Application(s) Both EYES <User Schedule>  riluzole 50 milliGRAM(s) Oral two times a day  sertraline 75 milliGRAM(s) Oral daily    MEDICATIONS  (PRN):  acetaminophen   Oral Liquid .. 650 milliGRAM(s) Oral every 6 hours PRN Temp greater or equal to 38C (100.4F), Mild Pain (1 - 3)  diazepam    Tablet 2 milliGRAM(s) Oral at bedtime PRN for dysautonomia/ hypertension/discomfort      VITALS: T(C): 36.3 (07-26-24 @ 12:00)  T(F): 97.3 (07-26-24 @ 12:00), Max: 98 (07-26-24 @ 00:00)  HR: 100 (07-26-24 @ 12:00) (84 - 105)  BP: 150/77 (07-26-24 @ 12:00) (124/81 - 174/82)  RR:  (16 - 18)  SpO2:  (99% - 100%)  Wt(kg): --  General: RONIT    Ophthalmology Exam:  Visual acuity (sc): RONIT   Pupils: PERRL OU, no APD  Ttono: 16 OU  Extraocular movements (EOMs): RONIT  Confrontational Visual Field (CVF): RONIT  Color Plates: RONIT    Pen Light Exam (PLE)  External: Flat OU  Lids/Lashes/Lacrimal Ducts: Flat OU    Sclera/Conjunctiva: injection OD>>OS   Cornea: 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 With chronic corneal damage and changes secondary to exposure including extensive overlying filaments, pannus formation, and stromal scarring; OS with inferior epithelial defect encompassing 20 % of cornea. Also with chronic corneal damage and changes secondary to exposure including extensive filaments, pannus formation, and stromal scarring; No infiltration or ulcer OU   Anterior Chamber: D+F OU    Iris: Flat OU  Lens: NS OU

## 2024-07-26 NOTE — PROGRESS NOTE ADULT - SUBJECTIVE AND OBJECTIVE BOX
CHIEF COMPLAINT: Patient is a 69y old  Female who presents with a chief complaint of trach exchange (18 Jul 2024 07:07)      INTERVAL EVENTS:  - No interval events overnight   - Pending custom tracheostomy     REVIEW OF SYSTEMS: Seen by bedside during AM rounds and unable to assess ROS as ALS on vent    Mode: AC/ CMV (Assist Control/ Continuous Mandatory Ventilation), RR (machine): 16, TV (machine): 400, FiO2: 30, PEEP: 5, ITime: 0.76, MAP: 9, PIP: 27      OBJECTIVE:  ICU Vital Signs Last 24 Hrs  T(C): 36.6 (26 Jul 2024 04:00), Max: 36.7 (26 Jul 2024 00:00)  T(F): 97.8 (26 Jul 2024 04:00), Max: 98 (26 Jul 2024 00:00)  HR: 92 (26 Jul 2024 07:20) (84 - 105)  BP: 149/84 (26 Jul 2024 04:00) (105/68 - 174/82)  BP(mean): 98 (25 Jul 2024 16:00) (81 - 111)  ABP: --  ABP(mean): --  RR: 16 (26 Jul 2024 04:00) (16 - 18)  SpO2: 100% (26 Jul 2024 07:20) (99% - 100%)    O2 Parameters below as of 26 Jul 2024 04:00  Patient On (Oxygen Delivery Method): ventilator          Mode: AC/ CMV (Assist Control/ Continuous Mandatory Ventilation), RR (machine): 16, TV (machine): 400, FiO2: 30, PEEP: 5, ITime: 0.76, MAP: 9, PIP: 27    07-25 @ 07:01  -  07-26 @ 07:00  --------------------------------------------------------  IN: 1300 mL / OUT: 750 mL / NET: 550 mL      CAPILLARY BLOOD GLUCOSE          HOSPITAL MEDICATIONS:  MEDICATIONS  (STANDING):  artificial  tears Solution 1 Drop(s) Both EYES <User Schedule>  chlorhexidine 0.12% Liquid 15 milliLiter(s) Oral Mucosa every 12 hours  chlorhexidine 2% Cloths 1 Application(s) Topical daily  diazepam    Tablet 2 milliGRAM(s) Oral every 12 hours  enoxaparin Injectable 40 milliGRAM(s) SubCutaneous every 24 hours  erythromycin   Ointment 1 Application(s) Both EYES <User Schedule>  multivitamin/minerals/iron Oral Solution (CENTRUM) 15 milliLiter(s) Oral daily  ofloxacin 0.3% Solution 1 Drop(s) Right EYE every 4 hours  petrolatum Ophthalmic Ointment 1 Application(s) Both EYES <User Schedule>  riluzole 50 milliGRAM(s) Oral two times a day  sertraline 75 milliGRAM(s) Oral daily    MEDICATIONS  (PRN):  acetaminophen   Oral Liquid .. 650 milliGRAM(s) Oral every 6 hours PRN Temp greater or equal to 38C (100.4F), Mild Pain (1 - 3)  diazepam    Tablet 2 milliGRAM(s) Oral at bedtime PRN for dysautonomia/ hypertension/discomfort      PHYSICAL EXAMINATION    LABS:                        13.6   12.61 )-----------( 458      ( 26 Jul 2024 03:00 )             46.4     07-26    144  |  99  |  27<H>  ----------------------------<  132<H>  4.1   |  29  |  <0.20<L>    Ca    9.9      26 Jul 2024 03:00  Phos  3.9     07-26  Mg     2.20     07-26    TPro  8.1  /  Alb  3.7  /  TBili  0.5  /  DBili  x   /  AST  20  /  ALT  19  /  AlkPhos  129<H>  07-25      Urinalysis Basic - ( 26 Jul 2024 03:00 )  Color: x / Appearance: x / SG: x / pH: x  Gluc: 132 mg/dL / Ketone: x  / Bili: x / Urobili: x   Blood: x / Protein: x / Nitrite: x   Leuk Esterase: x / RBC: x / WBC x   Sq Epi: x / Non Sq Epi: x / Bacteria: x            PAST MEDICAL & SURGICAL HISTORY:  ALS (amyotrophic lateral sclerosis)      HLD (hyperlipidemia)      Ventilator dependent  Since 2015      Aspiration into airway      S/P gastrostomy  10/14 for dysphagia  receives jevity 2 cans TID      Dependence on tracheostomy      Encounter for PEG (percutaneous endoscopic gastrostomy)  peg placed          FAMILY HISTORY:  No pertinent family history in first degree relatives        Social History:      RADIOLOGY:  [ ] Reviewed and interpreted by me    PULMONARY FUNCTION TESTS:    EKG: CHIEF COMPLAINT: Patient is a 69y old  Female who presents with a chief complaint of trach exchange (18 Jul 2024 07:07)      INTERVAL EVENTS:  - No interval events overnight   - Pending custom tracheostomy     REVIEW OF SYSTEMS: Seen by bedside during AM rounds and unable to assess ROS as ALS on vent    Mode: AC/ CMV (Assist Control/ Continuous Mandatory Ventilation), RR (machine): 16, TV (machine): 400, FiO2: 30, PEEP: 5, ITime: 0.76, MAP: 9, PIP: 27      OBJECTIVE:  ICU Vital Signs Last 24 Hrs  T(C): 36.6 (26 Jul 2024 04:00), Max: 36.7 (26 Jul 2024 00:00)  T(F): 97.8 (26 Jul 2024 04:00), Max: 98 (26 Jul 2024 00:00)  HR: 92 (26 Jul 2024 07:20) (84 - 105)  BP: 149/84 (26 Jul 2024 04:00) (105/68 - 174/82)  BP(mean): 98 (25 Jul 2024 16:00) (81 - 111)  ABP: --  ABP(mean): --  RR: 16 (26 Jul 2024 04:00) (16 - 18)  SpO2: 100% (26 Jul 2024 07:20) (99% - 100%)    O2 Parameters below as of 26 Jul 2024 04:00  Patient On (Oxygen Delivery Method): ventilator          Mode: AC/ CMV (Assist Control/ Continuous Mandatory Ventilation), RR (machine): 16, TV (machine): 400, FiO2: 30, PEEP: 5, ITime: 0.76, MAP: 9, PIP: 27    07-25 @ 07:01  -  07-26 @ 07:00  --------------------------------------------------------  IN: 1300 mL / OUT: 750 mL / NET: 550 mL      CAPILLARY BLOOD GLUCOSE          HOSPITAL MEDICATIONS:  MEDICATIONS  (STANDING):  artificial  tears Solution 1 Drop(s) Both EYES <User Schedule>  chlorhexidine 0.12% Liquid 15 milliLiter(s) Oral Mucosa every 12 hours  chlorhexidine 2% Cloths 1 Application(s) Topical daily  diazepam    Tablet 2 milliGRAM(s) Oral every 12 hours  enoxaparin Injectable 40 milliGRAM(s) SubCutaneous every 24 hours  erythromycin   Ointment 1 Application(s) Both EYES <User Schedule>  multivitamin/minerals/iron Oral Solution (CENTRUM) 15 milliLiter(s) Oral daily  ofloxacin 0.3% Solution 1 Drop(s) Right EYE every 4 hours  petrolatum Ophthalmic Ointment 1 Application(s) Both EYES <User Schedule>  riluzole 50 milliGRAM(s) Oral two times a day  sertraline 75 milliGRAM(s) Oral daily    MEDICATIONS  (PRN):  acetaminophen   Oral Liquid .. 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   Respiratory: Course vent sounds bilaterally with rhonchi in bases.    Cardiovascular: RRR with S1/S2  Abdomen: Soft, NTND, and BS present and PEG present.   Extremities: No edema BL and no AROM x 4 extremities. BL feet with contracture.   Neurological: Awake with eyes open, however does not follow commands with baseline ALS    LABS:                        13.6   12.61 )-----------( 458      ( 26 Jul 2024 03:00 )             46.4     07-26    144  |  99  |  27<H>  ----------------------------<  132<H>  4.1   |  29  |  <0.20<L>    Ca    9.9      26 Jul 2024 03:00  Phos  3.9     07-26  Mg     2.20     07-26    TPro  8.1  /  Alb  3.7  /  TBili  0.5  /  DBili  x   /  AST  20  /  ALT  19  /  AlkPhos  129<H>  07-25      Urinalysis Basic - ( 26 Jul 2024 03:00 )  Color: x / Appearance: x / SG: x / pH: x  Gluc: 132 mg/dL / Ketone: x  / Bili: x / Urobili: x   Blood: x / Protein: x / Nitrite: x   Leuk Esterase: x / RBC: x / WBC x   Sq Epi: x / Non Sq Epi: x / Bacteria: x            PAST MEDICAL & SURGICAL HISTORY:  ALS (amyotrophic lateral sclerosis)      HLD (hyperlipidemia)      Ventilator dependent  Since 2015      Aspiration into airway      S/P gastrostomy  10/14 for dysphagia  receives jevity 2 cans TID      Dependence on tracheostomy      Encounter for PEG (percutaneous endoscopic gastrostomy)  peg placed          FAMILY HISTORY:  No pertinent family history in first degree relatives        Social History:      RADIOLOGY:  [ ] Reviewed and interpreted by me    PULMONARY FUNCTION TESTS:    EKG: CHIEF COMPLAINT: Patient is a 69y old  Female who presents with a chief complaint of trach exchange (18 Jul 2024 07:07)      INTERVAL EVENTS:  - No interval events overnight   - Continued on optho regimen   - Pending custom tracheostomy     REVIEW OF SYSTEMS: Seen by bedside during AM rounds and unable to assess ROS as ALS on vent    Mode: AC/ CMV (Assist Control/ Continuous Mandatory Ventilation), RR (machine): 16, TV (machine): 400, FiO2: 30, PEEP: 5, ITime: 0.76, MAP: 9, PIP: 27      OBJECTIVE:  ICU Vital Signs Last 24 Hrs  T(C): 36.6 (26 Jul 2024 04:00), Max: 36.7 (26 Jul 2024 00:00)  T(F): 97.8 (26 Jul 2024 04:00), Max: 98 (26 Jul 2024 00:00)  HR: 92 (26 Jul 2024 07:20) (84 - 105)  BP: 149/84 (26 Jul 2024 04:00) (105/68 - 174/82)  BP(mean): 98 (25 Jul 2024 16:00) (81 - 111)  ABP: --  ABP(mean): --  RR: 16 (26 Jul 2024 04:00) (16 - 18)  SpO2: 100% (26 Jul 2024 07:20) (99% - 100%)    O2 Parameters below as of 26 Jul 2024 04:00  Patient On (Oxygen Delivery Method): ventilator          Mode: AC/ CMV (Assist Control/ Continuous Mandatory Ventilation), RR (machine): 16, TV (machine): 400, FiO2: 30, PEEP: 5, ITime: 0.76, MAP: 9, PIP: 27    07-25 @ 07:01  -  07-26 @ 07:00  --------------------------------------------------------  IN: 1300 mL / OUT: 750 mL / NET: 550 mL      CAPILLARY BLOOD GLUCOSE          HOSPITAL MEDICATIONS:  MEDICATIONS  (STANDING):  artificial  tears Solution 1 Drop(s) Both EYES <User Schedule>  chlorhexidine 0.12% Liquid 15 milliLiter(s) Oral Mucosa every 12 hours  chlorhexidine 2% Cloths 1 Application(s) Topical daily  diazepam    Tablet 2 milliGRAM(s) Oral every 12 hours  enoxaparin Injectable 40 milliGRAM(s) SubCutaneous every 24 hours  erythromycin   Ointment 1 Application(s) Both EYES <User Schedule>  multivitamin/minerals/iron Oral Solution (CENTRUM) 15 milliLiter(s) Oral daily  ofloxacin 0.3% Solution 1 Drop(s) Right EYE every 4 hours  petrolatum Ophthalmic Ointment 1 Application(s) Both EYES <User Schedule>  riluzole 50 milliGRAM(s) Oral two times a day  sertraline 75 milliGRAM(s) Oral daily    MEDICATIONS  (PRN):  acetaminophen   Oral Liquid .. 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   Respiratory: Course vent sounds bilaterally with rhonchi in bases.    Cardiovascular: RRR with S1/S2  Abdomen: Soft, NTND, and BS present and PEG present.   Extremities: No edema BL and no AROM x 4 extremities. BL feet with contracture.   Neurological: Awake with eyes open, however does not follow commands with baseline ALS    LABS:                        13.6   12.61 )-----------( 458      ( 26 Jul 2024 03:00 )             46.4     07-26    144  |  99  |  27<H>  ----------------------------<  132<H>  4.1   |  29  |  <0.20<L>    Ca    9.9      26 Jul 2024 03:00  Phos  3.9     07-26  Mg     2.20     07-26    TPro  8.1  /  Alb  3.7  /  TBili  0.5  /  DBili  x   /  AST  20  /  ALT  19  /  AlkPhos  129<H>  07-25      Urinalysis Basic - ( 26 Jul 2024 03:00 )  Color: x / Appearance: x / SG: x / pH: x  Gluc: 132 mg/dL / Ketone: x  / Bili: x / Urobili: x   Blood: x / Protein: x / Nitrite: x   Leuk Esterase: x / RBC: x / WBC x   Sq Epi: x / Non Sq Epi: x / Bacteria: x            PAST MEDICAL & SURGICAL HISTORY:  ALS (amyotrophic lateral sclerosis)      HLD (hyperlipidemia)      Ventilator dependent  Since 2015      Aspiration into airway      S/P gastrostomy  10/14 for dysphagia  receives jevity 2 cans TID      Dependence on tracheostomy      Encounter for PEG (percutaneous endoscopic gastrostomy)  peg placed          FAMILY HISTORY:  No pertinent family history in first degree relatives        Social History:      RADIOLOGY:  [ ] Reviewed and interpreted by me    PULMONARY FUNCTION TESTS:    EKG:

## 2024-07-26 NOTE — PROGRESS NOTE ADULT - NS ATTEND AMEND GEN_ALL_CORE FT
Pt is a 69F with MHx advanced ALS (nonverbal and bedbound at baseline) with chronic hypercapnic respiratory failure and ventilatory dependence via tracheostomy at baseline and Parkinson's dz presenting to [facility] on 7/18/24 with acute hypercapnia secondary to large expiratory air leak s/p upsizing without improvement with hospital course c/b tracheitis vs. UTI transferred from MICU to RCU on 7/5 for further management.     Pt with known advanced progressive ALS with baseline known to be nonverbal and bedbound with functional quadriplegia and complete ADL dependence. 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 Pt with worsening eye redness, especially on right side, despite conservative measures. Received vancomycin +tobramycin eye drops in the past and on previous admission opthalmology recommended possible need for tarsorrhaphy, but was ultimately deferred as not in line with pt's GOC. 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 Will also c/w home riluzole, c/w home diazepam and zoloft. Diazepam prn added for breakthrough concern for agitation or discomfort.    Pt with acute on chronic hypercapnic respiratory failure 2/2 neuromuscular weakness in the setting of advanced ALS with chronic ventilatory dependence with tracheostomy found to have tracheitis and large air leak 2/2 tracheomegaly likely 2/2 prolonged tracheostomy dependence. Trach exchanged at home without improvement followed by upsizing at bedside to 9 [place] but still with air leak. Thoracic sx consulted, custom trach ordered (Portex 9.5 with 40mm balloon). Awaiting custom order delivery for OR trach exchange. Will c/w current vent support, pt on home settings. Air leak remains ~25% total TV. Does not tolerate PSV trials, remains completely passive on ventilator. Will c/w airway clearance and trach care as per RCU team. Wean O2 supplementation for goal O2 saturation 90-95%.     Pt with sepsis 2/2 UTI vs. tracheitis. Remains hemodynamically stable and afebrile with fluctuating but stable WBC. Initial cx (+) Pseudomonas in trach aspirate, similar to previous cultures, for which pt completed course of Zosyn. Previous UCx from 7/24/23 (+) VRE faecalis sensitive only to linezolid/daptomycin but likely colonizers. Will continue to monitor conservatively off abx at this time. Pt with known periods of BP variation with transient hypertensive episodes, will monitor off anti-HTNs, likely 2/2 dysautonomia.    Discharge pending medical optimization, likely d/c home with ventilator. Pt full code. On home NOAC for DVT ppx, held for now in preparation for OR. Will restart prior to d/c. Will continue to discuss with pt's family, pt's  at bedside. HCP  (Dr. Velasquez). O/P Pulm: Dr. Pranav Nemwan

## 2024-07-26 NOTE — PROGRESS NOTE ADULT - ASSESSMENT
69 YO Female with PMHx of ALS, Parkinson,, nonverbal, bed bound, chronic respiratory failure with tracheostomy and vent dependence, and oropharyngeal dysphagia with PEG who presented from home with tracheostomy leak.   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 Was previously able to have lids taped shut at night however now becoming more difficult. Patient consequently with noticeable worsening in eye redness OD>>OS despite frequent lubrication   - exam with conjunctival injection OD>>OS, cornea OD with inferior epithelial defect encompassing 40% of cornea. With chronic corneal damage and changes secondary to exposure including extensive overlying filaments, pannus formation, and stromal scarring; OS with inferior epithelial defect encompassing 20% of cornea. Also with chronic corneal damage and changes secondary to exposure including extensive filaments, pannus. No active infiltrate or ulcer appreciated at this time OU. No thinning OU.   - recommend Ofloxacin, 6 times daily to the right eye and 4 times daily to the left eye  - recommend Erythromycin ointment and lacrilube each every 3 hours alternating 1.5 hours apart, so that patient is receiving ointment q 1.5 hours, to both eyes   - Create moisture chamber with tegaderm following placement of EXTENSIVE ointment to the right eye throughout the day, and to the left eye nightly. Nurses shown how to make at bedside. The tegaderm should not be placed tightly overlying the eye, and there should be ointment covering the entirety of the exposed eye. Place moisturizing ointment around areas of face where tegaderm is to stick so that patient does not develop skin irritation    - Artificial tears every 1.5 hours before placement of ointment.      Outpatient follow-up: Patient should follow-up with his/her ophthalmologist or with [facility] Department of Ophthalmology upon discharge at the address below     [facility] Department of Ophthalmology  [address] [place] [address]. [address]  [place] [address]  [phone]

## 2024-07-26 NOTE — PROGRESS NOTE ADULT - ASSESSMENT
69 YO Female with PMHx of ALS, Parkinson,, nonverbal, bed bound, chronic respiratory failure with tracheostomy and vent dependence, and oropharyngeal dysphagia with PEG who presented from home with tracheostomy leak. She was noted with vent alarm and poor TVe (250-290) at home. Trach changed at home with no improvement and sent in for thoracic evaluation. While in MICU no air leak noted with hyperinflated cuff. Transferred to [facility] on 7/5 with course complicated by leukocytosis second to UTI vs trachitis and completed zosyn course. Trach upsized by thoracic on 7/17 however AL remains and now pending custom trach.     NEUROLOGY  # ALS   - Baseline nonverbal, awake, and communicates with eye movement.   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   - Continue on ofloxacin OD Q4H   - Continue on lacrilube and erythromycin alternating OU Q1.5H (alternating meds)  - Continue with artificial tears OU Q1.5H prior to lacrilube and erythromycin     PSYCH   # Anxiety and Depression   - Continue on home valium 2mg BID with additional 2mg PRN   - Continue on zoloft 75mg QD while in house (on 4cc/ 80mg QD at home)     CARDIOVASCULAR   # HTN thought to be second to discomfort vs dysautonomia   - Evening HTN and continue on hydralazine vs valium PRN doses   - Monitor HR and BP    RESPIRATORY  # Tracheostomy leak   - At home noted with TVe 200-300s despite tracheostomy change to 80XLTCD.   - No airleak noted in house with hyperinflated cuff likely tracheomegaly?   - Continue on chronic vent 16/400/5/21   - Chronic bibasilar atelectasis and continued on HTS PRN with chest PT  - Trach upsized to bivona 9 on 7/17, however trach leak remains.   - Pending custom tracheostomy     GI  # Dysphagia   - Continue on PEG-TF     RENAL  # High PVR   - BS with roughly 200-300cc PVR, however no acute distress   - Monitor renal function and UOP     INFECTIOUS DISEASE  # Leukocytosis likely second to trachitis vs UTI?   - No fever or chills and noted with prior leukocytosis   - CXR with prior atelectasis and no acute findings   - UA (7/6) with positive nitrites (7/6)   - SCx (7/5) with pansensitive PSA  - BCx (7/6) x 2 NGT  - UCx (7/6) with coag neg staph  - MRSA PCR (7/6) POSITIVE for both MRSA/MSSA s/p Bactroban (7/6 - 7/10)  - s/p empiric Zosyn (7/8 - 7/14) however WBC continues to wax and wean without fever and will monitor off ABX .     HEME  - On Xarelto 10mg at home likely for DVT PPX?   - Hold home Xarelto in prep for OR   - DVT PPX with LVX for now     ENDOCRINE  - No hx of DM2   - Monitor BG     SKIN  - Basic trach and PEG care ordered     ETHICS/ GOC    - FULL CODE   - Dr Tyson Velasquez,  involved in care    DISPO - Back home with  when able.    69 YO Female with PMHx of ALS, Parkinson,, nonverbal, bed bound, chronic respiratory failure with tracheostomy and vent dependence, and oropharyngeal dysphagia with PEG who presented from home with tracheostomy leak. She was noted with vent alarm and poor TVe (250-290) at home. Trach changed at home with no improvement and sent in for thoracic evaluation. While in MICU no air leak noted with hyperinflated cuff. Transferred to [facility] on 7/5 with course complicated by leukocytosis second to UTI vs trachitis and completed zosyn course. Trach upsized by thoracic on 7/17 however AL remains and now pending custom trach.     NEUROLOGY  # ALS   - Baseline nonverbal, awake, and communicates with eye movement.   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     PSYCH   # Anxiety and Depression   - Continue on home valium 2mg BID with additional 2mg PRN   - Continue on zoloft 75mg QD while in house (on 4cc/ 80mg QD at home)     CARDIOVASCULAR   # HTN thought to be second to discomfort vs dysautonomia   - Evening HTN and continue on hydralazine vs valium PRN doses   - Monitor HR and BP    RESPIRATORY  # Tracheostomy leak   - At home noted with TVe 200-300s despite tracheostomy change to 80XLTCD.   - No airleak noted in house with hyperinflated cuff likely tracheomegaly?   - Continue on chronic vent 16/400/5/21   - Chronic bibasilar atelectasis and continued on HTS PRN with chest PT  - Trach upsized to bivona 9 on 7/17, however trach leak remains.   - Pending custom tracheostomy ordered by thoracic surgery     GI  # Dysphagia   - Continue on PEG-TF     RENAL  # High PVR   - BS with roughly 200-300cc PVR, however no acute distress   - Monitor renal function and UOP     INFECTIOUS DISEASE  # Leukocytosis likely second to trachitis vs UTI?   - No fever or chills and noted with prior leukocytosis   - CXR with prior atelectasis and no acute findings   - UA (7/6) with positive nitrites (7/6)   - SCx (7/5) with pansensitive PSA  - BCx (7/6) x 2 NGT  - UCx (7/6) with coag neg staph  - MRSA PCR (7/6) POSITIVE for both MRSA/MSSA s/p Bactroban (7/6 - 7/10)  - s/p empiric Zosyn (7/8 - 7/14) however WBC continues to wax and wean without fever and will monitor off ABX .     HEME  - On Xarelto 10mg at home likely for DVT PPX?   - Hold home Xarelto in prep for OR   - DVT PPX with LVX for now     ENDOCRINE  - No hx of DM2   - Monitor BG     SKIN  - Basic trach and PEG care ordered     ETHICS/ GOC    - FULL CODE   - Dr Tyson Velasquez,  involved in care    DISPO - Back home with  when able.    69 YO Female with PMHx of ALS, Parkinson,, nonverbal, bed bound, chronic respiratory failure with tracheostomy and vent dependence, and oropharyngeal dysphagia with PEG who presented from home with tracheostomy leak. She was noted with vent alarm and poor TVe (250-290) at home. Trach changed at home with no improvement and sent in for thoracic evaluation. While in MICU no air leak noted with hyperinflated cuff. Transferred to [facility] on 7/5 with course complicated by leukocytosis second to UTI vs trachitis and completed zosyn course. Trach upsized by thoracic on 7/17 however AL remains and now pending custom trach.     NEUROLOGY  # ALS   - Baseline nonverbal, awake, and communicates with eye movement.   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 Nurses taught moister chamber placement with optho team.     PSYCH   # Anxiety and Depression   - Continue on home valium 2mg BID with additional 2mg PRN   - Continue on zoloft 75mg QD while in house (on 4cc/ 80mg QD at home)     CARDIOVASCULAR   # HTN thought to be second to discomfort vs dysautonomia   - Evening HTN and continue on hydralazine vs valium PRN doses   - Monitor HR and BP    RESPIRATORY  # Tracheostomy leak   - At home noted with TVe 200-300s despite tracheostomy change to 80XLTCD.   - No airleak noted in house with hyperinflated cuff likely tracheomegaly?   - Continue on chronic vent 16/400/5/21   - Chronic bibasilar atelectasis and continued on HTS PRN with chest PT  - Trach upsized to bivona 9 on 7/17, however trach leak remains.   - Pending custom tracheostomy ordered by thoracic surgery     GI  # Dysphagia   - Continue on PEG-TF     RENAL  # High PVR   - BS with roughly 200-300cc PVR, however no acute distress   - Monitor renal function and UOP     INFECTIOUS DISEASE  # Leukocytosis likely second to trachitis vs UTI?   - No fever or chills and noted with prior leukocytosis   - CXR with prior atelectasis and no acute findings   - UA (7/6) with positive nitrites (7/6)   - SCx (7/5) with pansensitive PSA  - BCx (7/6) x 2 NGT  - UCx (7/6) with coag neg staph  - MRSA PCR (7/6) POSITIVE for both MRSA/MSSA s/p Bactroban (7/6 - 7/10)  - s/p empiric Zosyn (7/8 - 7/14) however WBC continues to wax and wean without fever and will monitor off ABX .     HEME  - On Xarelto 10mg at home likely for DVT PPX?   - Hold home Xarelto in prep for OR   - DVT PPX with LVX for now     ENDOCRINE  - No hx of DM2   - Monitor BG     SKIN  - Basic trach and PEG care ordered     ETHICS/ GOC    - FULL CODE   - Dr Tyson Velasquez,  involved in care    DISPO - Back home with  when able.

## 2024-07-27 LAB
ANION GAP SERPL CALC-SCNC: 14 MMOL/L — SIGNIFICANT CHANGE UP (ref 7–14)
BASOPHILS # BLD AUTO: 0.04 K/UL — SIGNIFICANT CHANGE UP (ref 0–0.2)
BASOPHILS NFR BLD AUTO: 0.3 % — SIGNIFICANT CHANGE UP (ref 0–2)
BUN SERPL-MCNC: 27 MG/DL — HIGH (ref 7–23)
CALCIUM SERPL-MCNC: 9.8 MG/DL — SIGNIFICANT CHANGE UP (ref 8.4–10.5)
CHLORIDE SERPL-SCNC: 101 MMOL/L — SIGNIFICANT CHANGE UP (ref 98–107)
CO2 SERPL-SCNC: 27 MMOL/L — SIGNIFICANT CHANGE UP (ref 22–31)
CREAT SERPL-MCNC: <0.2 MG/DL — LOW (ref 0.5–1.3)
EGFR: 125 ML/MIN/1.73M2 — SIGNIFICANT CHANGE UP
EOSINOPHIL # BLD AUTO: 0.21 K/UL — SIGNIFICANT CHANGE UP (ref 0–0.5)
EOSINOPHIL NFR BLD AUTO: 1.5 % — SIGNIFICANT CHANGE UP (ref 0–6)
GLUCOSE SERPL-MCNC: 143 MG/DL — HIGH (ref 70–99)
HCT VFR BLD CALC: 42 % — SIGNIFICANT CHANGE UP (ref 34.5–45)
HGB BLD-MCNC: 12.9 G/DL — SIGNIFICANT CHANGE UP (ref 11.5–15.5)
IANC: 10.49 K/UL — HIGH (ref 1.8–7.4)
IMM GRANULOCYTES NFR BLD AUTO: 0.9 % — SIGNIFICANT CHANGE UP (ref 0–0.9)
LYMPHOCYTES # BLD AUTO: 15.7 % — SIGNIFICANT CHANGE UP (ref 13–44)
LYMPHOCYTES # BLD AUTO: 2.16 K/UL — SIGNIFICANT CHANGE UP (ref 1–3.3)
MAGNESIUM SERPL-MCNC: 2 MG/DL — SIGNIFICANT CHANGE UP (ref 1.6–2.6)
MCHC RBC-ENTMCNC: 26.3 PG — LOW (ref 27–34)
MCHC RBC-ENTMCNC: 30.7 GM/DL — LOW (ref 32–36)
MCV RBC AUTO: 85.7 FL — SIGNIFICANT CHANGE UP (ref 80–100)
MONOCYTES # BLD AUTO: 0.75 K/UL — SIGNIFICANT CHANGE UP (ref 0–0.9)
MONOCYTES NFR BLD AUTO: 5.4 % — SIGNIFICANT CHANGE UP (ref 2–14)
NEUTROPHILS # BLD AUTO: 10.49 K/UL — HIGH (ref 1.8–7.4)
NEUTROPHILS NFR BLD AUTO: 76.2 % — SIGNIFICANT CHANGE UP (ref 43–77)
NRBC # BLD: 0 /100 WBCS — SIGNIFICANT CHANGE UP (ref 0–0)
NRBC # FLD: 0 K/UL — SIGNIFICANT CHANGE UP (ref 0–0)
PHOSPHATE SERPL-MCNC: 3 MG/DL — SIGNIFICANT CHANGE UP (ref 2.5–4.5)
PLATELET # BLD AUTO: 396 K/UL — SIGNIFICANT CHANGE UP (ref 150–400)
POTASSIUM SERPL-MCNC: 4 MMOL/L — SIGNIFICANT CHANGE UP (ref 3.5–5.3)
POTASSIUM SERPL-SCNC: 4 MMOL/L — SIGNIFICANT CHANGE UP (ref 3.5–5.3)
RBC # BLD: 4.9 M/UL — SIGNIFICANT CHANGE UP (ref 3.8–5.2)
RBC # FLD: 16.1 % — HIGH (ref 10.3–14.5)
SODIUM SERPL-SCNC: 142 MMOL/L — SIGNIFICANT CHANGE UP (ref 135–145)
WBC # BLD: 13.77 K/UL — HIGH (ref 3.8–10.5)
WBC # FLD AUTO: 13.77 K/UL — HIGH (ref 3.8–10.5)

## 2024-07-27 PROCEDURE — 99232 SBSQ HOSP IP/OBS MODERATE 35: CPT

## 2024-07-27 RX ORDER — HYDRALAZINE HCL 50 MG
2.5 TABLET ORAL ONCE
Refills: 0 | Status: COMPLETED | OUTPATIENT
Start: 2024-07-27 | End: 2024-07-27

## 2024-07-27 RX ADMIN — ERYTHROMYCIN 1 APPLICATION(S): 5 OINTMENT OPHTHALMIC at 09:25

## 2024-07-27 RX ADMIN — CHLORHEXIDINE GLUCONATE 15 MILLILITER(S): 40 SOLUTION TOPICAL at 05:20

## 2024-07-27 RX ADMIN — RILUZOLE 50 MILLIGRAM(S): 5 LIQUID ORAL at 17:11

## 2024-07-27 RX ADMIN — POVIDONE, PROPYLENE GLYCOL 1 DROP(S): 6.8; 3 LIQUID OPHTHALMIC at 10:55

## 2024-07-27 RX ADMIN — Medication 2 MILLIGRAM(S): at 17:10

## 2024-07-27 RX ADMIN — POVIDONE, PROPYLENE GLYCOL 1 DROP(S): 6.8; 3 LIQUID OPHTHALMIC at 19:15

## 2024-07-27 RX ADMIN — POVIDONE, PROPYLENE GLYCOL 1 DROP(S): 6.8; 3 LIQUID OPHTHALMIC at 21:09

## 2024-07-27 RX ADMIN — POVIDONE, PROPYLENE GLYCOL 1 APPLICATION(S): 6.8; 3 LIQUID OPHTHALMIC at 07:19

## 2024-07-27 RX ADMIN — Medication 2 MILLIGRAM(S): at 05:19

## 2024-07-27 RX ADMIN — OFLOXACIN 1 DROP(S): 3 SOLUTION/ DROPS OPHTHALMIC at 17:10

## 2024-07-27 RX ADMIN — POVIDONE, PROPYLENE GLYCOL 1 DROP(S): 6.8; 3 LIQUID OPHTHALMIC at 03:32

## 2024-07-27 RX ADMIN — RILUZOLE 50 MILLIGRAM(S): 5 LIQUID ORAL at 05:19

## 2024-07-27 RX ADMIN — POVIDONE, PROPYLENE GLYCOL 1 APPLICATION(S): 6.8; 3 LIQUID OPHTHALMIC at 01:36

## 2024-07-27 RX ADMIN — POVIDONE, PROPYLENE GLYCOL 1 DROP(S): 6.8; 3 LIQUID OPHTHALMIC at 07:19

## 2024-07-27 RX ADMIN — POVIDONE, PROPYLENE GLYCOL 1 APPLICATION(S): 6.8; 3 LIQUID OPHTHALMIC at 19:16

## 2024-07-27 RX ADMIN — ERYTHROMYCIN 1 APPLICATION(S): 5 OINTMENT OPHTHALMIC at 03:32

## 2024-07-27 RX ADMIN — POVIDONE, PROPYLENE GLYCOL 1 DROP(S): 6.8; 3 LIQUID OPHTHALMIC at 01:35

## 2024-07-27 RX ADMIN — ENOXAPARIN SODIUM 40 MILLIGRAM(S): 100 INJECTION SUBCUTANEOUS at 05:20

## 2024-07-27 RX ADMIN — CHLORHEXIDINE GLUCONATE 15 MILLILITER(S): 40 SOLUTION TOPICAL at 17:11

## 2024-07-27 RX ADMIN — POVIDONE, PROPYLENE GLYCOL 1 DROP(S): 6.8; 3 LIQUID OPHTHALMIC at 16:04

## 2024-07-27 RX ADMIN — POVIDONE, PROPYLENE GLYCOL 1 APPLICATION(S): 6.8; 3 LIQUID OPHTHALMIC at 16:04

## 2024-07-27 RX ADMIN — OFLOXACIN 1 DROP(S): 3 SOLUTION/ DROPS OPHTHALMIC at 09:24

## 2024-07-27 RX ADMIN — POVIDONE, PROPYLENE GLYCOL 1 DROP(S): 6.8; 3 LIQUID OPHTHALMIC at 22:24

## 2024-07-27 RX ADMIN — POVIDONE, PROPYLENE GLYCOL 1 DROP(S): 6.8; 3 LIQUID OPHTHALMIC at 23:04

## 2024-07-27 RX ADMIN — POVIDONE, PROPYLENE GLYCOL 1 DROP(S): 6.8; 3 LIQUID OPHTHALMIC at 11:18

## 2024-07-27 RX ADMIN — POVIDONE, PROPYLENE GLYCOL 1 DROP(S): 6.8; 3 LIQUID OPHTHALMIC at 13:12

## 2024-07-27 RX ADMIN — POVIDONE, PROPYLENE GLYCOL 1 APPLICATION(S): 6.8; 3 LIQUID OPHTHALMIC at 13:12

## 2024-07-27 RX ADMIN — POVIDONE, PROPYLENE GLYCOL 1 DROP(S): 6.8; 3 LIQUID OPHTHALMIC at 16:50

## 2024-07-27 RX ADMIN — ERYTHROMYCIN 1 APPLICATION(S): 5 OINTMENT OPHTHALMIC at 11:17

## 2024-07-27 RX ADMIN — OFLOXACIN 1 DROP(S): 3 SOLUTION/ DROPS OPHTHALMIC at 05:21

## 2024-07-27 RX ADMIN — POVIDONE, PROPYLENE GLYCOL 1 DROP(S): 6.8; 3 LIQUID OPHTHALMIC at 09:25

## 2024-07-27 RX ADMIN — SERTRALINE HYDROCHLORIDE 75 MILLIGRAM(S): 50 TABLET, FILM COATED ORAL at 11:17

## 2024-07-27 RX ADMIN — OFLOXACIN 1 DROP(S): 3 SOLUTION/ DROPS OPHTHALMIC at 11:18

## 2024-07-27 RX ADMIN — ERYTHROMYCIN 1 APPLICATION(S): 5 OINTMENT OPHTHALMIC at 16:03

## 2024-07-27 RX ADMIN — OFLOXACIN 1 DROP(S): 3 SOLUTION/ DROPS OPHTHALMIC at 01:35

## 2024-07-27 RX ADMIN — CHLORHEXIDINE GLUCONATE 1 APPLICATION(S): 40 SOLUTION TOPICAL at 11:17

## 2024-07-27 RX ADMIN — ERYTHROMYCIN 1 APPLICATION(S): 5 OINTMENT OPHTHALMIC at 23:04

## 2024-07-27 RX ADMIN — ERYTHROMYCIN 1 APPLICATION(S): 5 OINTMENT OPHTHALMIC at 17:11

## 2024-07-27 RX ADMIN — POVIDONE, PROPYLENE GLYCOL 1 APPLICATION(S): 6.8; 3 LIQUID OPHTHALMIC at 22:24

## 2024-07-27 RX ADMIN — POVIDONE, PROPYLENE GLYCOL 1 APPLICATION(S): 6.8; 3 LIQUID OPHTHALMIC at 03:32

## 2024-07-27 RX ADMIN — Medication 15 MILLILITER(S): at 11:17

## 2024-07-27 RX ADMIN — OFLOXACIN 1 DROP(S): 3 SOLUTION/ DROPS OPHTHALMIC at 23:03

## 2024-07-27 RX ADMIN — POVIDONE, PROPYLENE GLYCOL 1 DROP(S): 6.8; 3 LIQUID OPHTHALMIC at 17:11

## 2024-07-27 RX ADMIN — Medication 2.5 MILLIGRAM(S): at 23:03

## 2024-07-27 RX ADMIN — Medication 2 MILLIGRAM(S): at 21:15

## 2024-07-27 RX ADMIN — OFLOXACIN 1 DROP(S): 3 SOLUTION/ DROPS OPHTHALMIC at 13:12

## 2024-07-27 RX ADMIN — ERYTHROMYCIN 1 APPLICATION(S): 5 OINTMENT OPHTHALMIC at 21:09

## 2024-07-27 RX ADMIN — POVIDONE, PROPYLENE GLYCOL 1 DROP(S): 6.8; 3 LIQUID OPHTHALMIC at 05:20

## 2024-07-27 RX ADMIN — OFLOXACIN 1 DROP(S): 3 SOLUTION/ DROPS OPHTHALMIC at 21:09

## 2024-07-27 RX ADMIN — ERYTHROMYCIN 1 APPLICATION(S): 5 OINTMENT OPHTHALMIC at 05:20

## 2024-07-27 RX ADMIN — POVIDONE, PROPYLENE GLYCOL 1 DROP(S): 6.8; 3 LIQUID OPHTHALMIC at 04:29

## 2024-07-27 RX ADMIN — POVIDONE, PROPYLENE GLYCOL 1 APPLICATION(S): 6.8; 3 LIQUID OPHTHALMIC at 10:56

## 2024-07-27 NOTE — PROGRESS NOTE ADULT - SUBJECTIVE AND OBJECTIVE BOX
[facility] DEPARTMENT OF OPHTHALMOLOGY  ------------------------------------------------------------------------------  Dedrick Hernandez MD PGY 3  Available on Teams  ------------------------------------------------------------------------------    Interval History: No acute events overnight.     MEDICATIONS  (STANDING):  artificial  tears Solution 1 Drop(s) Both EYES <User Schedule>  chlorhexidine 0.12% Liquid 15 milliLiter(s) Oral Mucosa every 12 hours  chlorhexidine 2% Cloths 1 Application(s) Topical daily  diazepam    Tablet 2 milliGRAM(s) Oral every 12 hours  enoxaparin Injectable 40 milliGRAM(s) SubCutaneous every 24 hours  erythromycin   Ointment 1 Application(s) Both EYES <User Schedule>  multivitamin/minerals/iron Oral Solution (CENTRUM) 15 milliLiter(s) Oral daily  ofloxacin 0.3% Solution 1 Drop(s) Right EYE every 4 hours  ofloxacin 0.3% Solution 1 Drop(s) Left EYE every 6 hours  petrolatum Ophthalmic Ointment 1 Application(s) Both EYES <User Schedule>  riluzole 50 milliGRAM(s) Oral two times a day  sertraline 75 milliGRAM(s) Oral daily    MEDICATIONS  (PRN):  acetaminophen   Oral Liquid .. 650 milliGRAM(s) Oral every 6 hours PRN Temp greater or equal to 38C (100.4F), Mild Pain (1 - 3)  diazepam    Tablet 2 milliGRAM(s) Oral at bedtime PRN for dysautonomia/ hypertension/discomfort      VITALS: T(C): 36.7 (07-27-24 @ 04:18)  T(F): 98 (07-27-24 @ 04:18), Max: 98 (07-27-24 @ 04:18)  HR: 96 (07-27-24 @ 07:15) (92 - 104)  BP: 132/79 (07-27-24 @ 04:18) (116/70 - 164/81)  RR:  (16 - 19)  SpO2:  (97% - 100%)  Wt(kg): --  General: AAO x 0    Ophthalmology Exam:  Visual acuity (sc): RONIT   Pupils: PERRL OU, no APD  Ttono: 16 OU  Extraocular movements (EOMs): RONIT  Confrontational Visual Field (CVF): RONIT  Color Plates: RONIT    Pen Light Exam (PLE)  External: Flat OU  Lids/Lashes/Lacrimal Ducts: Flat OU    Sclera/Conjunctiva: injection OD>>OS   Cornea: 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 With chronic corneal damage and changes secondary to exposure including extensive overlying filaments, pannus formation, and stromal scarring; OS with inferior epithelial defect encompassing 20 % of cornea. Also with chronic corneal damage and changes secondary to exposure including extensive filaments, pannus formation, and stromal scarring; No infiltration or ulcer OU   Anterior Chamber: D+F OU    Iris: Flat OU  Lens: NS OU

## 2024-07-27 NOTE — PROGRESS NOTE ADULT - SUBJECTIVE AND OBJECTIVE BOX
CHIEF COMPLAINT: Patient is a 69y old  Female who presents with a chief complaint of trach exchange (18 Jul 2024 07:07)      INTERVAL EVENTS:  - No interval events overnight    REVIEW OF SYSTEMS: Seen by bedside during AM rounds and unable to assess ROS as nonverbal at baseline with ALS    Mode: AC/ CMV (Assist Control/ Continuous Mandatory Ventilation), RR (machine): 16, TV (machine): 400, FiO2: 30, PEEP: 5, ITime: 0.78, MAP: 10, PIP: 31      OBJECTIVE:  ICU Vital Signs Last 24 Hrs  T(C): 36.7 (27 Jul 2024 04:18), Max: 36.7 (27 Jul 2024 04:18)  T(F): 98 (27 Jul 2024 04:18), Max: 98 (27 Jul 2024 04:18)  HR: 92 (27 Jul 2024 04:18) (92 - 104)  BP: 132/79 (27 Jul 2024 04:18) (116/70 - 164/81)  BP(mean): --  ABP: --  ABP(mean): --  RR: 19 (27 Jul 2024 04:18) (16 - 19)  SpO2: 97% (27 Jul 2024 04:18) (97% - 100%)    O2 Parameters below as of 27 Jul 2024 04:18  Patient On (Oxygen Delivery Method): ventilator          Mode: AC/ CMV (Assist Control/ Continuous Mandatory Ventilation), RR (machine): 16, TV (machine): 400, FiO2: 30, PEEP: 5, ITime: 0.78, MAP: 10, PIP: 31    07-25 @ 07:01 - 07-26 @ 07:00  --------------------------------------------------------  IN: 1300 mL / OUT: 750 mL / NET: 550 mL    07-26 @ 07:01 - 07-27 @ 06:53  --------------------------------------------------------  IN: 0 mL / OUT: 1000 mL / NET: -1000 mL      CAPILLARY BLOOD GLUCOSE          HOSPITAL MEDICATIONS:  MEDICATIONS  (STANDING):  artificial  tears Solution 1 Drop(s) Both EYES <User Schedule>  chlorhexidine 0.12% Liquid 15 milliLiter(s) Oral Mucosa every 12 hours  chlorhexidine 2% Cloths 1 Application(s) Topical daily  diazepam    Tablet 2 milliGRAM(s) Oral every 12 hours  enoxaparin Injectable 40 milliGRAM(s) SubCutaneous every 24 hours  erythromycin   Ointment 1 Application(s) Both EYES <User Schedule>  multivitamin/minerals/iron Oral Solution (CENTRUM) 15 milliLiter(s) Oral daily  ofloxacin 0.3% Solution 1 Drop(s) Left EYE every 6 hours  ofloxacin 0.3% Solution 1 Drop(s) Right EYE every 4 hours  petrolatum Ophthalmic Ointment 1 Application(s) Both EYES <User Schedule>  riluzole 50 milliGRAM(s) Oral two times a day  sertraline 75 milliGRAM(s) Oral daily    MEDICATIONS  (PRN):  acetaminophen   Oral Liquid .. 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   Respiratory: Course vent sounds bilaterally with rhonchi in bases.    Cardiovascular: RRR with S1/S2  Abdomen: Soft, NTND, and BS present and PEG present.   Extremities: No edema BL and no AROM x 4 extremities. BL feet with contracture.   Neurological: Awake with eyes open, however does not follow commands with baseline ALS    LABS:                        12.9   13.77 )-----------( 396      ( 27 Jul 2024 03:56 )             42.0     07-27    142  |  101  |  27<H>  ----------------------------<  143<H>  4.0   |  27  |  <0.20<L>    Ca    9.8      27 Jul 2024 03:56  Phos  3.0     07-27  Mg     2.00     07-27        Urinalysis Basic - ( 27 Jul 2024 03:56 )  Color: x / Appearance: x / SG: x / pH: x  Gluc: 143 mg/dL / Ketone: x  / Bili: x / Urobili: x   Blood: x / Protein: x / Nitrite: x   Leuk Esterase: x / RBC: x / WBC x   Sq Epi: x / Non Sq Epi: x / Bacteria: x            PAST MEDICAL & SURGICAL HISTORY:  ALS (amyotrophic lateral sclerosis)      HLD (hyperlipidemia)      Ventilator dependent  Since 2015      Aspiration into airway      S/P gastrostomy  10/14 for dysphagia  receives jevity 2 cans TID      Dependence on tracheostomy      Encounter for PEG (percutaneous endoscopic gastrostomy)  peg placed          FAMILY HISTORY:  No pertinent family history in first degree relatives        Social History:      RADIOLOGY:  [ ] Reviewed and interpreted by me    PULMONARY FUNCTION TESTS:    EKG:

## 2024-07-27 NOTE — PROGRESS NOTE ADULT - ASSESSMENT
69 YO Female with PMHx of ALS, Parkinson,, nonverbal, bed bound, chronic respiratory failure with tracheostomy and vent dependence, and oropharyngeal dysphagia with PEG who presented from home with tracheostomy leak. She was noted with vent alarm and poor TVe (250-290) at home. Trach changed at home with no improvement and sent in for thoracic evaluation. While in MICU no air leak noted with hyperinflated cuff. Transferred to [facility] on 7/5 with course complicated by leukocytosis second to UTI vs trachitis and completed zosyn course. Trach upsized by thoracic on 7/17 however AL remains and now pending custom trach.     NEUROLOGY  # ALS   - Baseline nonverbal, awake, and communicates with eye movement.   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 Nurses taught moister chamber placement with optho team.     PSYCH   # Anxiety and Depression   - Continue on home valium 2mg BID with additional 2mg PRN   - Continue on zoloft 75mg QD while in house (on 4cc/ 80mg QD at home)     CARDIOVASCULAR   # HTN thought to be second to discomfort vs dysautonomia   - Evening HTN and continue on hydralazine vs valium PRN doses   - Monitor HR and BP    RESPIRATORY  # Tracheostomy leak   - At home noted with TVe 200-300s despite tracheostomy change to 80XLTCD.   - No airleak noted in house with hyperinflated cuff likely tracheomegaly?   - Continue on chronic vent 16/400/5/21   - Chronic bibasilar atelectasis and continued on HTS PRN with chest PT  - Trach upsized to bivona 9 on 7/17, however trach leak remains.   - Pending custom tracheostomy ordered by thoracic surgery     GI  # Dysphagia   - Continue on PEG-TF     RENAL  # High PVR   - BS with roughly 200-300cc PVR, however no acute distress   - Monitor renal function and UOP     INFECTIOUS DISEASE  # Leukocytosis likely second to trachitis vs UTI?   - No fever or chills and noted with prior leukocytosis   - CXR with prior atelectasis and no acute findings   - UA (7/6) with positive nitrites (7/6)   - SCx (7/5) with pansensitive PSA  - BCx (7/6) x 2 NGT  - UCx (7/6) with coag neg staph  - MRSA PCR (7/6) POSITIVE for both MRSA/MSSA s/p Bactroban (7/6 - 7/10)  - s/p empiric Zosyn (7/8 - 7/14) however WBC continues to wax and wean without fever and will monitor off ABX .     HEME  - On Xarelto 10mg at home likely for DVT PPX?   - Hold home Xarelto in prep for OR   - DVT PPX with LVX for now     ENDOCRINE  - No hx of DM2   - Monitor BG     SKIN  - Basic trach and PEG care ordered     ETHICS/ GOC    - FULL CODE   - Dr Tyson Velasquez,  involved in care    DISPO - Back home with  when able.    69 YO Female with PMHx of ALS, Parkinson,, nonverbal, bed bound, chronic respiratory failure with tracheostomy and vent dependence, and oropharyngeal dysphagia with PEG who presented from home with tracheostomy leak. She was noted with vent alarm and poor TVe (250-290) at home. Trach changed at home with no improvement and sent in for thoracic evaluation. While in MICU no air leak noted with hyperinflated cuff. Transferred to [facility] on 7/5 with course complicated by leukocytosis second to UTI vs trachitis and completed zosyn course. Trach upsized by thoracic on 7/17 however AL remains and now pending custom trach.     NEUROLOGY  # ALS   - Baseline nonverbal, awake, and communicates with eye movement.   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 Nurses taught moister chamber placement with optho team.     PSYCH   # Anxiety and Depression   - Continue on home valium 2mg BID with additional 2mg PRN   - Continue on zoloft 75mg QD while in house (on 4cc/ 80mg QD at home)     CARDIOVASCULAR   # HTN thought to be second to discomfort vs dysautonomia   - Evening HTN and continue on hydralazine vs valium PRN doses   - Monitor HR and BP    RESPIRATORY  # Tracheostomy leak   - At home noted with TVe 200-300s despite tracheostomy change to 80XLTCD.   - No airleak noted in house with hyperinflated cuff likely tracheomegaly?   - Continue on chronic vent 16/400/5/21   - Chronic bibasilar atelectasis and continued on HTS PRN with chest PT  - Trach upsized to bivona 9 on 7/17, however trach leak remains.   - Pending custom tracheostomy ordered by thoracic surgery     GI  # Dysphagia   - Continue on PEG-TF     RENAL  # High PVR   - BS with roughly 200-300cc PVR, however no acute distress   - Monitor renal function and UOP     INFECTIOUS DISEASE  # Leukocytosis likely second to trachitis vs UTI?   - No fever or chills and noted with prior leukocytosis   - CXR with prior atelectasis and no acute findings   - UA (7/6) with positive nitrites (7/6)   - SCx (7/5) with pansensitive PSA  - BCx (7/6) x 2 NGT  - UCx (7/6) with coag neg staph  - MRSA PCR (7/6) POSITIVE for both MRSA/MSSA s/p Bactroban (7/6 - 7/10)  - s/p empiric Zosyn (7/8 - 7/14) however WBC continues to wax and wean without fever and will monitor off ABX .     HEME  - On Xarelto 10mg at home likely for DVT PPX?   - Hold home Xarelto in prep for OR   - DVT PPX with LVX for now     ENDOCRINE  - No hx of DM2   - Monitor BG     SKIN  - Basic trach and PEG care ordered     ETHICS/ GOC    - FULL CODE   - Dr Tyson Velasquez,  involved in care    DISPO - Back home with  when able.   ***********  7/27no new events

## 2024-07-27 NOTE — PROGRESS NOTE ADULT - NS ATTEND AMEND GEN_ALL_CORE FT
Pt is a 69F with MHx advanced ALS (nonverbal and bedbound at baseline) with chronic hypercapnic respiratory failure and ventilatory dependence via tracheostomy at baseline and Parkinson's dz presenting to [facility] on 7/18/24 with acute hypercapnia secondary to large expiratory air leak s/p upsizing without improvement with hospital course c/b tracheitis vs. UTI transferred from MICU to RCU on 7/5 for further management.     Pt with known advanced progressive ALS with baseline known to be nonverbal and bedbound with functional quadriplegia and complete ADL dependence. 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 Pt with worsening eye redness, especially on right side, despite conservative measures. Received vancomycin +tobramycin eye drops in the past and on previous admission opthalmology recommended possible need for tarsorrhaphy, but was ultimately deferred as not in line with pt's GOC. 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 Will also c/w home riluzole, c/w home diazepam and zoloft. Diazepam prn added for breakthrough concern for agitation or discomfort.    Pt with acute on chronic hypercapnic respiratory failure 2/2 neuromuscular weakness in the setting of advanced ALS with chronic ventilatory dependence with tracheostomy found to have tracheitis and large air leak 2/2 tracheomegaly likely 2/2 prolonged tracheostomy dependence. Trach exchanged at home without improvement followed by upsizing at bedside to 9 Bivona but still with air leak. Thoracic sx consulted, custom trach ordered (Portex 9.5 with 40mm balloon). Awaiting custom order delivery for OR trach exchange. Will c/w current vent support, pt on home settings. Air leak remains ~25% total TV. Does not tolerate PSV trials, remains completely passive on ventilator. Will c/w airway clearance and trach care as per RCU team. Wean O2 supplementation for goal O2 saturation 90-95%.     Pt with sepsis 2/2 UTI vs. tracheitis. Remains hemodynamically stable and afebrile with fluctuating but stable WBC. Initial cx (+) Pseudomonas in trach aspirate, similar to previous cultures, for which pt completed course of Zosyn. Previous UCx from 7/24/23 (+) VRE faecalis sensitive only to linezolid/daptomycin but likely colonizers. Will continue to monitor conservatively off abx at this time. Pt with known periods of BP variation with transient hypertensive episodes, will monitor off anti-HTNs, likely 2/2 dysautonomia.    Discharge pending medical optimization, likely d/c home with ventilator. Pt full code. On home NOAC for DVT ppx, held for now in preparation for OR. Will restart prior to d/c. Will continue to discuss with pt's family, pt's  at bedside. HCP  (Dr. Velasquez). O/P Pulm: Dr. Pranav Newman as above:  Pt is a 69F with MHx advanced ALS (nonverbal and bedbound at baseline) with chronic hypercapnic respiratory failure and ventilatory dependence via tracheostomy at baseline and Parkinson's dz presenting to [facility] on 7/18/24 with acute hypercapnia secondary to large expiratory air leak s/p upsizing without improvement with hospital course c/b tracheitis vs. UTI transferred from MICU to RCU on 7/5 for further management.     Pt with known advanced progressive ALS with baseline known to be nonverbal and bedbound with functional quadriplegia and complete ADL dependence. 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 Pt with worsening eye redness, especially on right side, despite conservative measures. Received vancomycin +tobramycin eye drops in the past and on previous admission opthalmology recommended possible need for tarsorrhaphy, but was ultimately deferred as not in line with pt's GOC. 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 Will also c/w home riluzole, c/w home diazepam and zoloft. Diazepam prn added for breakthrough concern for agitation or discomfort.    Pt with acute on chronic hypercapnic respiratory failure 2/2 neuromuscular weakness in the setting of advanced ALS with chronic ventilatory dependence with tracheostomy found to have tracheitis and large air leak 2/2 tracheomegaly likely 2/2 prolonged tracheostomy dependence. Trach exchanged at home without improvement followed by upsizing at bedside to 9 Bivona but still with air leak. Thoracic sx consulted, custom trach ordered (Portex 9.5 with 40mm balloon). Awaiting custom order delivery for OR trach exchange. Will c/w current vent support, pt on home settings. Air leak remains ~25% total TV. Does not tolerate PSV trials, remains completely passive on ventilator. Will c/w airway clearance and trach care as per RCU team. Wean O2 supplementation for goal O2 saturation 90-95%. NEW trach pending (Roman)    Pt with sepsis 2/2 UTI vs. tracheitis. Remains hemodynamically stable and afebrile with fluctuating but stable WBC. Initial cx (+) Pseudomonas in trach aspirate, similar to previous cultures, for which pt completed course of Zosyn. Previous UCx from 7/24/23 (+) VRE faecalis sensitive only to linezolid/daptomycin but likely colonizers. Will continue to monitor conservatively off abx at this time. Pt with known periods of BP variation with transient hypertensive episodes, will monitor off anti-HTNs, likely 2/2 dysautonomia.    Discharge pending medical optimization (new Trach and optho f/up), likely d/c home with ventilator. Pt full code. On home NOAC for DVT ppx, held for now in preparation for OR. Will restart prior to d/c. Will continue to discuss with pt's family, pt's  at bedside. HCP  (Dr. Velasquez). O/P Pulm: Dr. Pranav Nickerson MD-Pulmonary   [phone]

## 2024-07-27 NOTE — PROGRESS NOTE ADULT - ASSESSMENT
70 YO Female with PMHx of ALS, Parkinson,, nonverbal, bed bound, chronic respiratory failure with tracheostomy and vent dependence, and oropharyngeal dysphagia with PEG who presented from home with tracheostomy leak.   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 Was previously able to have lids taped shut at night however now becoming more difficult. Patient consequently with noticeable worsening in eye redness OD>>OS despite frequent lubrication   - exam with conjunctival injection OD>>OS, cornea OD with inferior epithelial defect encompassing 40% of cornea. With chronic corneal damage and changes secondary to exposure including extensive overlying filaments, pannus formation, and stromal scarring; OS with inferior epithelial defect encompassing 20% of cornea. Also with chronic corneal damage and changes secondary to exposure including extensive filaments, pannus. No active infiltrate or ulcer appreciated at this time OU. No thinning OU.   - recommend Ofloxacin, 6 times daily to the right eye and 4 times daily to the left eye  - recommend Erythromycin ointment and lacrilube each every 3 hours alternating 1.5 hours apart, so that patient is receiving ointment q 1.5 hours, to both eyes   - Create moisture chamber with tegaderm following placement of EXTENSIVE ointment to the right eye throughout the day, and to the left eye nightly. Nurses shown how to make at bedside. The tegaderm should not be placed tightly overlying the eye, and there should be ointment covering the entirety of the exposed eye. Place moisturizing ointment around areas of face where tegaderm is to stick so that patient does not develop skin irritation    - Artificial tears every 1.5 hours before placement of ointment.      Outpatient follow-up: Patient should follow-up with his/her ophthalmologist or with [facility] Department of Ophthalmology upon discharge at the address below     [facility] Department of Ophthalmology  [address] [place] [address]. [address]  [place] [address]  [phone]

## 2024-07-28 PROCEDURE — 99232 SBSQ HOSP IP/OBS MODERATE 35: CPT

## 2024-07-28 RX ORDER — ACETAMINOPHEN 325 MG/1
750 TABLET ORAL ONCE
Refills: 0 | Status: COMPLETED | OUTPATIENT
Start: 2024-07-28 | End: 2024-07-28

## 2024-07-28 RX ADMIN — POVIDONE, PROPYLENE GLYCOL 1 DROP(S): 6.8; 3 LIQUID OPHTHALMIC at 15:35

## 2024-07-28 RX ADMIN — POVIDONE, PROPYLENE GLYCOL 1 APPLICATION(S): 6.8; 3 LIQUID OPHTHALMIC at 07:45

## 2024-07-28 RX ADMIN — ERYTHROMYCIN 1 APPLICATION(S): 5 OINTMENT OPHTHALMIC at 03:30

## 2024-07-28 RX ADMIN — RILUZOLE 50 MILLIGRAM(S): 5 LIQUID ORAL at 05:15

## 2024-07-28 RX ADMIN — CHLORHEXIDINE GLUCONATE 15 MILLILITER(S): 40 SOLUTION TOPICAL at 17:27

## 2024-07-28 RX ADMIN — POVIDONE, PROPYLENE GLYCOL 1 APPLICATION(S): 6.8; 3 LIQUID OPHTHALMIC at 19:02

## 2024-07-28 RX ADMIN — POVIDONE, PROPYLENE GLYCOL 1 DROP(S): 6.8; 3 LIQUID OPHTHALMIC at 05:16

## 2024-07-28 RX ADMIN — OFLOXACIN 1 DROP(S): 3 SOLUTION/ DROPS OPHTHALMIC at 21:21

## 2024-07-28 RX ADMIN — POVIDONE, PROPYLENE GLYCOL 1 DROP(S): 6.8; 3 LIQUID OPHTHALMIC at 23:49

## 2024-07-28 RX ADMIN — POVIDONE, PROPYLENE GLYCOL 1 DROP(S): 6.8; 3 LIQUID OPHTHALMIC at 22:28

## 2024-07-28 RX ADMIN — OFLOXACIN 1 DROP(S): 3 SOLUTION/ DROPS OPHTHALMIC at 23:49

## 2024-07-28 RX ADMIN — SERTRALINE HYDROCHLORIDE 75 MILLIGRAM(S): 50 TABLET, FILM COATED ORAL at 11:33

## 2024-07-28 RX ADMIN — ERYTHROMYCIN 1 APPLICATION(S): 5 OINTMENT OPHTHALMIC at 21:22

## 2024-07-28 RX ADMIN — POVIDONE, PROPYLENE GLYCOL 1 APPLICATION(S): 6.8; 3 LIQUID OPHTHALMIC at 15:35

## 2024-07-28 RX ADMIN — POVIDONE, PROPYLENE GLYCOL 1 DROP(S): 6.8; 3 LIQUID OPHTHALMIC at 01:13

## 2024-07-28 RX ADMIN — ERYTHROMYCIN 1 APPLICATION(S): 5 OINTMENT OPHTHALMIC at 11:30

## 2024-07-28 RX ADMIN — ERYTHROMYCIN 1 APPLICATION(S): 5 OINTMENT OPHTHALMIC at 05:15

## 2024-07-28 RX ADMIN — OFLOXACIN 1 DROP(S): 3 SOLUTION/ DROPS OPHTHALMIC at 17:26

## 2024-07-28 RX ADMIN — RILUZOLE 50 MILLIGRAM(S): 5 LIQUID ORAL at 17:27

## 2024-07-28 RX ADMIN — POVIDONE, PROPYLENE GLYCOL 1 DROP(S): 6.8; 3 LIQUID OPHTHALMIC at 11:30

## 2024-07-28 RX ADMIN — CHLORHEXIDINE GLUCONATE 15 MILLILITER(S): 40 SOLUTION TOPICAL at 05:15

## 2024-07-28 RX ADMIN — ENOXAPARIN SODIUM 40 MILLIGRAM(S): 100 INJECTION SUBCUTANEOUS at 05:15

## 2024-07-28 RX ADMIN — OFLOXACIN 1 DROP(S): 3 SOLUTION/ DROPS OPHTHALMIC at 14:31

## 2024-07-28 RX ADMIN — POVIDONE, PROPYLENE GLYCOL 1 DROP(S): 6.8; 3 LIQUID OPHTHALMIC at 03:30

## 2024-07-28 RX ADMIN — POVIDONE, PROPYLENE GLYCOL 1 DROP(S): 6.8; 3 LIQUID OPHTHALMIC at 04:54

## 2024-07-28 RX ADMIN — POVIDONE, PROPYLENE GLYCOL 1 APPLICATION(S): 6.8; 3 LIQUID OPHTHALMIC at 04:52

## 2024-07-28 RX ADMIN — ERYTHROMYCIN 1 APPLICATION(S): 5 OINTMENT OPHTHALMIC at 23:49

## 2024-07-28 RX ADMIN — Medication 15 MILLILITER(S): at 11:33

## 2024-07-28 RX ADMIN — Medication 1 MILLIGRAM(S): at 02:30

## 2024-07-28 RX ADMIN — Medication 2 MILLIGRAM(S): at 17:25

## 2024-07-28 RX ADMIN — ERYTHROMYCIN 1 APPLICATION(S): 5 OINTMENT OPHTHALMIC at 15:35

## 2024-07-28 RX ADMIN — POVIDONE, PROPYLENE GLYCOL 1 DROP(S): 6.8; 3 LIQUID OPHTHALMIC at 17:27

## 2024-07-28 RX ADMIN — OFLOXACIN 1 DROP(S): 3 SOLUTION/ DROPS OPHTHALMIC at 05:16

## 2024-07-28 RX ADMIN — ACETAMINOPHEN 750 MILLIGRAM(S): 325 TABLET ORAL at 18:06

## 2024-07-28 RX ADMIN — OFLOXACIN 1 DROP(S): 3 SOLUTION/ DROPS OPHTHALMIC at 01:13

## 2024-07-28 RX ADMIN — POVIDONE, PROPYLENE GLYCOL 1 APPLICATION(S): 6.8; 3 LIQUID OPHTHALMIC at 22:29

## 2024-07-28 RX ADMIN — Medication 2 MILLIGRAM(S): at 05:15

## 2024-07-28 RX ADMIN — POVIDONE, PROPYLENE GLYCOL 1 DROP(S): 6.8; 3 LIQUID OPHTHALMIC at 13:31

## 2024-07-28 RX ADMIN — ERYTHROMYCIN 1 APPLICATION(S): 5 OINTMENT OPHTHALMIC at 17:27

## 2024-07-28 RX ADMIN — POVIDONE, PROPYLENE GLYCOL 1 APPLICATION(S): 6.8; 3 LIQUID OPHTHALMIC at 10:16

## 2024-07-28 RX ADMIN — CHLORHEXIDINE GLUCONATE 1 APPLICATION(S): 40 SOLUTION TOPICAL at 11:29

## 2024-07-28 RX ADMIN — POVIDONE, PROPYLENE GLYCOL 1 APPLICATION(S): 6.8; 3 LIQUID OPHTHALMIC at 13:31

## 2024-07-28 RX ADMIN — ERYTHROMYCIN 1 APPLICATION(S): 5 OINTMENT OPHTHALMIC at 10:16

## 2024-07-28 RX ADMIN — Medication 1 MILLIGRAM(S): at 01:54

## 2024-07-28 RX ADMIN — POVIDONE, PROPYLENE GLYCOL 1 APPLICATION(S): 6.8; 3 LIQUID OPHTHALMIC at 01:13

## 2024-07-28 RX ADMIN — POVIDONE, PROPYLENE GLYCOL 1 DROP(S): 6.8; 3 LIQUID OPHTHALMIC at 19:01

## 2024-07-28 RX ADMIN — POVIDONE, PROPYLENE GLYCOL 1 DROP(S): 6.8; 3 LIQUID OPHTHALMIC at 07:44

## 2024-07-28 RX ADMIN — POVIDONE, PROPYLENE GLYCOL 1 DROP(S): 6.8; 3 LIQUID OPHTHALMIC at 15:36

## 2024-07-28 RX ADMIN — POVIDONE, PROPYLENE GLYCOL 1 DROP(S): 6.8; 3 LIQUID OPHTHALMIC at 10:17

## 2024-07-28 RX ADMIN — POVIDONE, PROPYLENE GLYCOL 1 DROP(S): 6.8; 3 LIQUID OPHTHALMIC at 10:15

## 2024-07-28 RX ADMIN — ACETAMINOPHEN 300 MILLIGRAM(S): 325 TABLET ORAL at 17:24

## 2024-07-28 RX ADMIN — OFLOXACIN 1 DROP(S): 3 SOLUTION/ DROPS OPHTHALMIC at 10:16

## 2024-07-28 RX ADMIN — POVIDONE, PROPYLENE GLYCOL 1 DROP(S): 6.8; 3 LIQUID OPHTHALMIC at 21:20

## 2024-07-28 RX ADMIN — OFLOXACIN 1 DROP(S): 3 SOLUTION/ DROPS OPHTHALMIC at 11:30

## 2024-07-28 NOTE — CHART NOTE - NSCHARTNOTEFT_GEN_A_CORE
Notified by primary RN that patient was hypertensive to 174/77 mm hg. Recommended to reassess  manual BP which is noted 164/88 mm hg. Seen and assessed the patient  at bedside, no distress , no hypoxia, no clinical change noted from baseline. Pt has h/o hypertension related to dysautonomia. Hydralazine 2.5 mg iv x 1 dose ordered.     Reassessment BP 1 hour post administration of the dose noted to be 162/74 mm hg.     Addendum@ 0125 Am:  Notified that patient is hypertensive to 208/94 mm hg. Seen and assessed the patient at bedside. No hypoxia noted. Minimal frowning noted on the forehead, ? could be in pain which could be a contributory factor to elevated BP. Morphine 1 mg iv x1 dose ordered.   BP improved to 154/74 mmg post administration of morphine. Notified by primary RN that patient was hypertensive to 174/77 mm hg. Recommended to reassess  manual BP which is noted as 164/88 mm hg. Seen and assessed the patient  at bedside, no distress , no hypoxia, no clinical change noted from baseline. Pt has h/o hypertension related to dysautonomia. Hydralazine 2.5 mg iv x 1 dose ordered.     Reassessment BP 1 hour post administration of the dose noted to be 162/74 mm hg.     Addendum@ 0125 Am:  Notified that patient is hypertensive to 208/94 mm hg. Seen and assessed the patient at bedside. No hypoxia noted. Minimal frowning noted on the forehead, Pt  ?could be in pain which could be the contributory factor to elevated BP. Morphine 1 mg iv x1 dose ordered.   BP improved to 154/74 mm hg post administration of morphine.

## 2024-07-28 NOTE — PROGRESS NOTE ADULT - SUBJECTIVE AND OBJECTIVE BOX
CHIEF COMPLAINT: Patient is a 69y old  Female who presents with a chief complaint of trach exchange (18 Jul 2024 07:07)      Interval Events:    REVIEW OF SYSTEMS:  [ ] All other systems negative  [ ] Unable to assess ROS because ________    Mode: AC/ CMV (Assist Control/ Continuous Mandatory Ventilation), RR (machine): 16, TV (machine): 400, FiO2: 30, PEEP: 5, ITime: 0.77, MAP: 8, PIP: 22      OBJECTIVE:  ICU Vital Signs Last 24 Hrs  T(C): 36.6 (28 Jul 2024 04:19), Max: 37.4 (28 Jul 2024 01:37)  T(F): 97.8 (28 Jul 2024 04:19), Max: 99.3 (28 Jul 2024 01:37)  HR: 99 (28 Jul 2024 07:15) (94 - 115)  BP: 151/67 (28 Jul 2024 04:19) (108/70 - 208/94)  BP(mean): 82 (27 Jul 2024 16:00) (82 - 83)  ABP: --  ABP(mean): --  RR: 16 (28 Jul 2024 04:19) (16 - 16)  SpO2: 99% (28 Jul 2024 07:15) (96% - 100%)    O2 Parameters below as of 28 Jul 2024 04:19  Patient On (Oxygen Delivery Method): ventilator          Mode: AC/ CMV (Assist Control/ Continuous Mandatory Ventilation), RR (machine): 16, TV (machine): 400, FiO2: 30, PEEP: 5, ITime: 0.77, MAP: 8, PIP: 22    07-27 @ 07:01  -  07-28 @ 07:00  --------------------------------------------------------  IN: 800 mL / OUT: 400 mL / NET: 400 mL      CAPILLARY BLOOD GLUCOSE          HOSPITAL MEDICATIONS:  MEDICATIONS  (STANDING):  artificial  tears Solution 1 Drop(s) Both EYES <User Schedule>  chlorhexidine 0.12% Liquid 15 milliLiter(s) Oral Mucosa every 12 hours  chlorhexidine 2% Cloths 1 Application(s) Topical daily  diazepam    Tablet 2 milliGRAM(s) Oral every 12 hours  enoxaparin Injectable 40 milliGRAM(s) SubCutaneous every 24 hours  erythromycin   Ointment 1 Application(s) Both EYES <User Schedule>  multivitamin/minerals/iron Oral Solution (CENTRUM) 15 milliLiter(s) Oral daily  ofloxacin 0.3% Solution 1 Drop(s) Left EYE every 6 hours  ofloxacin 0.3% Solution 1 Drop(s) Right EYE every 4 hours  petrolatum Ophthalmic Ointment 1 Application(s) Both EYES <User Schedule>  riluzole 50 milliGRAM(s) Oral two times a day  sertraline 75 milliGRAM(s) Oral daily    MEDICATIONS  (PRN):  acetaminophen   Oral Liquid .. 650 milliGRAM(s) Oral every 6 hours PRN Temp greater or equal to 38C (100.4F), Mild Pain (1 - 3)  diazepam    Tablet 2 milliGRAM(s) Oral at bedtime PRN for dysautonomia/ hypertension/discomfort      LABS:                        12.9   13.77 )-----------( 396      ( 27 Jul 2024 03:56 )             42.0     07-27    142  |  101  |  27<H>  ----------------------------<  143<H>  4.0   |  27  |  <0.20<L>    Ca    9.8      27 Jul 2024 03:56  Phos  3.0     07-27  Mg     2.00     07-27        Urinalysis Basic - ( 27 Jul 2024 03:56 )    Color: x / Appearance: x / SG: x / pH: x  Gluc: 143 mg/dL / Ketone: x  / Bili: x / Urobili: x   Blood: x / Protein: x / Nitrite: x   Leuk Esterase: x / RBC: x / WBC x   Sq Epi: x / Non Sq Epi: x / Bacteria: x            PAST MEDICAL & SURGICAL HISTORY:  ALS (amyotrophic lateral sclerosis)      HLD (hyperlipidemia)      Ventilator dependent  Since 2015      Aspiration into airway      S/P gastrostomy  10/14 for dysphagia  receives jevity 2 cans TID      Dependence on tracheostomy      Encounter for PEG (percutaneous endoscopic gastrostomy)  peg placed          FAMILY HISTORY:  No pertinent family history in first degree relatives        Social History:      RADIOLOGY:  [ ] Reviewed and interpreted by me    PULMONARY FUNCTION TESTS:    EKG: CHIEF COMPLAINT: Patient is a 69y old  Female who presents with a chief complaint of trach exchange (18 Jul 2024 07:07)    Interval Events: +hypertensive episode ovn, s/p tylenol/hydralazine/morphine x1. No new nursing issues reported today. Clinically unchanged. VSS, and medications reviewed.                          Awaiting for custom Trach to be delivered.     REVIEW OF SYSTEMS:  See above  [x] Unable to assess ROS because hx of ALS, non verbal     Mode: AC/ CMV (Assist Control/ Continuous Mandatory Ventilation), RR (machine): 16, TV (machine): 400, FiO2: 30, PEEP: 5, ITime: 0.77, MAP: 8, PIP: 22    OBJECTIVE:  ICU Vital Signs Last 24 Hrs  T(C): 36.6 (28 Jul 2024 04:19), Max: 37.4 (28 Jul 2024 01:37)  T(F): 97.8 (28 Jul 2024 04:19), Max: 99.3 (28 Jul 2024 01:37)  HR: 99 (28 Jul 2024 07:15) (94 - 115)  BP: 151/67 (28 Jul 2024 04:19) (108/70 - 208/94)  BP(mean): 82 (27 Jul 2024 16:00) (82 - 83)  ABP: --  ABP(mean): --  RR: 16 (28 Jul 2024 04:19) (16 - 16)  SpO2: 99% (28 Jul 2024 07:15) (96% - 100%)    O2 Parameters below as of 28 Jul 2024 04:19  Patient On (Oxygen Delivery Method): ventilator    Mode: AC/ CMV (Assist Control/ Continuous Mandatory Ventilation), RR (machine): 16, TV (machine): 400, FiO2: 30, PEEP: 5, ITime: 0.77, MAP: 8, PIP: 22    07-27 @ 07:01  -  07-28 @ 07:00  --------------------------------------------------------  IN: 800 mL / OUT: 400 mL / NET: 400 mL    CAPILLARY BLOOD GLUCOSE    HOSPITAL MEDICATIONS:  MEDICATIONS  (STANDING):  artificial  tears Solution 1 Drop(s) Both EYES <User Schedule>  chlorhexidine 0.12% Liquid 15 milliLiter(s) Oral Mucosa every 12 hours  chlorhexidine 2% Cloths 1 Application(s) Topical daily  diazepam    Tablet 2 milliGRAM(s) Oral every 12 hours  enoxaparin Injectable 40 milliGRAM(s) SubCutaneous every 24 hours  erythromycin   Ointment 1 Application(s) Both EYES <User Schedule>  multivitamin/minerals/iron Oral Solution (CENTRUM) 15 milliLiter(s) Oral daily  ofloxacin 0.3% Solution 1 Drop(s) Left EYE every 6 hours  ofloxacin 0.3% Solution 1 Drop(s) Right EYE every 4 hours  petrolatum Ophthalmic Ointment 1 Application(s) Both EYES <User Schedule>  riluzole 50 milliGRAM(s) Oral two times a day  sertraline 75 milliGRAM(s) Oral daily    MEDICATIONS  (PRN):  acetaminophen   Oral Liquid .. 650 milliGRAM(s) Oral every 6 hours PRN Temp greater or equal to 38C (100.4F), Mild Pain (1 - 3)  diazepam    Tablet 2 milliGRAM(s) Oral at bedtime PRN for dysautonomia/ hypertension/discomfort    PHYSICAL EXAM:  General: NAD, +Trach to Vent   Neck: neck supple, no JVD, trach midline  Eye: +corneal opacification noted b/l, R>L, conjunctival hemorrhage noted in R eye??   Respiratory: +rhonchi auscultated b/l, no wheeze, no rales, no resp distress  Cardiovascular: normal S1 and S2, regular rate and rhythm  Abdomen: soft, non tender, +PEG  Extremities: no LE edema b/l in +SCDs  Skin: warm and dry  Neurological: +neurologic deficits d/t ALS hx   Psychiatry: no agitation    LABS:                        12.9   13.77 )-----------( 396      ( 27 Jul 2024 03:56 )             42.0     07-27    142  |  101  |  27<H>  ----------------------------<  143<H>  4.0   |  27  |  <0.20<L>    Ca    9.8      27 Jul 2024 03:56  Phos  3.0     07-27  Mg     2.00     07-27    Urinalysis Basic - ( 27 Jul 2024 03:56 )    Color: x / Appearance: x / SG: x / pH: x  Gluc: 143 mg/dL / Ketone: x  / Bili: x / Urobili: x   Blood: x / Protein: x / Nitrite: x   Leuk Esterase: x / RBC: x / WBC x   Sq Epi: x / Non Sq Epi: x / Bacteria: x    PAST MEDICAL & SURGICAL HISTORY:  ALS (amyotrophic lateral sclerosis)    HLD (hyperlipidemia)    Ventilator dependent  Since 2015    Aspiration into airway    S/P gastrostomy  10/14 for dysphagia  receives jevity 2 cans TID    Dependence on tracheostomy    Encounter for PEG (percutaneous endoscopic gastrostomy)  peg placed    FAMILY HISTORY:  No pertinent family history in first degree relatives    Social History:    RADIOLOGY:  [ ] Reviewed and interpreted by me    PULMONARY FUNCTION TESTS:    EKG:

## 2024-07-28 NOTE — CHART NOTE - NSCHARTNOTEFT_GEN_A_CORE
Notified by primary RN that patient's  tracheostomy is leaking, patient is pulling tidal volume .15 litres.  Trach dressing reinforced which improved tidal volume to .39 litre.     Patient is 68y F PMHx ALS, Parkinson's, Trach, PEG; chronic resp failure, mechanical vent dependent since 2015, chronic dysphagia; from home; nonverbal, bedbound who presented from home with tracheostomy leak. She was noted with vent alarm and poor TVe (250-290) at home. Trach changed at home with no improvement and sent in for thoracic evaluation. While in MICU no air leak noted with hyperinflated cuff. Transferred to RCU on 7/5 with course complicated by leukocytosis second to UTI vs trachitis and completed zosyn course. Trach upsized by thoracic on 7/17 however AL remains and now pending custom trach.     Seen and assessed the patient at bedside with the primary RN and respiratory therapist in the room. Patient is nonverbal, not alert at baseline. No clinical change, SOB/ increased work of breathing and hypoxia  noted at the time. While writer was in the room,  Patient was pulling the  Tidal volume to 4.1 -4.3 L without any hypoxia , tachypnea, and increased WOB  . No interventions done. Recommended to continue to monitor Notified by primary RN that patient's  tracheostomy is leaking, patient is pulling tidal volume .15 litres.  Trach dressing reinforced which improved tidal volume to .39 litre.     Patient is 68y F PMHx ALS, Parkinson's, Trach, PEG; chronic resp failure, mechanical vent dependent since 2015, chronic dysphagia; from home; nonverbal, bedbound who presented from home with tracheostomy leak. She was noted with vent alarm and poor TVe (250-290) at home. Trach changed at home with no improvement and sent in for thoracic evaluation. While in MICU no air leak noted with hyperinflated cuff. Transferred to RCU on 7/5 with course complicated by leukocytosis second to UTI vs trachitis and completed zosyn course. Trach upsized by thoracic on 7/17 however AL remains and now pending custom trach.     Seen and assessed the patient at bedside with the primary RN and respiratory therapist in the room. Patient is nonverbal, not alert at baseline, on vent w/ trach in place.. No clinical change, SOB/ increased work of breathing and hypoxia  noted at the time. While writer was in the room,  Patient was pulling the  Tidal volume to 4.1 -4.3 L without any hypoxia , tachypnea, and increased WOB  . No interventions done. Recommended to continue to monitor Notified by primary RN that patient's  tracheostomy is leaking, patient is pulling tidal volume .15 litres.  Trach dressing reinforced which improved tidal volume to .39 litre.     Patient is 68y F PMHx ALS, Parkinson's, Trach, PEG; chronic resp failure, mechanical vent dependent since 2015, chronic dysphagia; from home; nonverbal, bedbound who presented from home with tracheostomy leak. She was noted with vent alarm and poor TVe (250-290) at home. Trach changed at home with no improvement and sent in for thoracic evaluation. While in MICU no air leak noted with hyperinflated cuff. Transferred to RCU on 7/5 with course complicated by leukocytosis second to UTI vs trachitis and completed zosyn course. Trach upsized by thoracic on 7/17 however AL remains and now pending custom trach.     Seen and assessed the patient at bedside with the primary RN and respiratory therapist in the room. Patient is nonverbal, not alert at baseline, on vent w/ trach in place.. No clinical change, SOB/ increased work of breathing and hypoxia  noted at the time. While writer was in the room,  Patient was pulling the  Tidal volume to 4.1 -4.3 L without any hypoxia , tachypnea, and increased WOB  . No interventions needed at the time as pt pulling appropriate volume. Recommended to continue to monitor

## 2024-07-28 NOTE — PROGRESS NOTE ADULT - ASSESSMENT
69 YO Female with PMHx of ALS, Parkinson,, nonverbal, bed bound, chronic respiratory failure with tracheostomy and vent dependence, and oropharyngeal dysphagia with PEG who presented from home with tracheostomy leak. She was noted with vent alarm and poor TVe (250-290) at home. Trach changed at home with no improvement and sent in for thoracic evaluation. While in MICU no air leak noted with hyperinflated cuff. Transferred to RCU on 7/5 with course complicated by leukocytosis second to UTI vs trachitis and completed zosyn course. Trach upsized by thoracic on 7/17 however AL remains and now pending custom trach.     NEUROLOGY  # ALS   - Baseline nonverbal, awake, and communicates with eye movement.   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 Nurses taught moister chamber placement with optho team.     PSYCH   # Anxiety and Depression   - Continue on home valium 2mg BID with additional 2mg PRN   - Continue on zoloft 75mg QD while in house (on 4cc/ 80mg QD at home)     CARDIOVASCULAR   # HTN thought to be second to discomfort vs dysautonomia   - Evening HTN and continue on hydralazine vs valium PRN doses   - Monitor HR and BP    RESPIRATORY  # Tracheostomy leak   - At home noted with TVe 200-300s despite tracheostomy change to 80XLTCD.   - No airleak noted in house with hyperinflated cuff likely tracheomegaly?   - Continue on chronic vent 16/400/5/21   - Chronic bibasilar atelectasis and continued on HTS PRN with chest PT  - Trach upsized to bivona 9 on 7/17, however trach leak remains.   - Pending custom tracheostomy ordered by thoracic surgery     GI  # Dysphagia   - Continue on PEG-TF     RENAL  # High PVR   - BS with roughly 200-300cc PVR, however no acute distress   - Monitor renal function and UOP     INFECTIOUS DISEASE  # Leukocytosis likely second to trachitis vs UTI?   - No fever or chills and noted with prior leukocytosis   - CXR with prior atelectasis and no acute findings   - UA (7/6) with positive nitrites (7/6)   - SCx (7/5) with pansensitive PSA  - BCx (7/6) x 2 NGT  - UCx (7/6) with coag neg staph  - MRSA PCR (7/6) POSITIVE for both MRSA/MSSA s/p Bactroban (7/6 - 7/10)  - s/p empiric Zosyn (7/8 - 7/14) however WBC continues to wax and wean without fever and will monitor off ABX .     HEME  - On Xarelto 10mg at home likely for DVT PPX?   - Hold home Xarelto in prep for OR   - DVT PPX with LVX for now     ENDOCRINE  - No hx of DM2   - Monitor BG     SKIN  - Basic trach and PEG care ordered     ETHICS/ GOC    - FULL CODE   - Dr Tyson Velasquez,  involved in care    DISPO - Back home with  when able.   ***********  7/27no new events   69 YO Female with PMHx of ALS, Parkinson,, nonverbal, bed bound, chronic respiratory failure with tracheostomy and vent dependence, and oropharyngeal dysphagia with PEG who presented from home with tracheostomy leak. She was noted with vent alarm and poor TVe (250-290) at home. Trach changed at home with no improvement and sent in for thoracic evaluation. While in MICU no air leak noted with hyperinflated cuff. Transferred to RCU on 7/5 with course complicated by leukocytosis second to UTI vs trachitis and completed zosyn course. Trach upsized by thoracic on 7/17 however AL remains and now pending custom trach.     NEUROLOGY  # ALS   - Baseline nonverbal, awake, and communicates with eye movement.   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 Nurses taught moister chamber placement with optho team.     PSYCH   # Anxiety and Depression   - Continue on home valium 2mg BID with additional 2mg PRN   - Continue on zoloft 75mg QD while in house (on 4cc/ 80mg QD at home)     CARDIOVASCULAR   # HTN thought to be second to discomfort vs dysautonomia   - Evening HTN and continue on hydralazine vs valium PRN doses   - Monitor HR and BP    RESPIRATORY  # Tracheostomy leak   - At home noted with TVe 200-300s despite tracheostomy change to 80XLTCD.   - No airleak noted in house with hyperinflated cuff likely tracheomegaly?   - Continue on chronic vent 16/400/5/21   - Chronic bibasilar atelectasis and continued on HTS PRN with chest PT  - Trach upsized to bivona 9 on 7/17, however trach leak remains.   - Pending custom tracheostomy ordered by thoracic surgery     GI  # Dysphagia   - Continue on PEG-TF     RENAL  # High PVR   - BS with roughly 200-300cc PVR, however no acute distress   - Monitor renal function and UOP     INFECTIOUS DISEASE  # Leukocytosis likely second to trachitis vs UTI?   - No fever or chills and noted with prior leukocytosis   - CXR with prior atelectasis and no acute findings   - UA (7/6) with positive nitrites (7/6)   - SCx (7/5) with pansensitive PSA  - BCx (7/6) x 2 NGT  - UCx (7/6) with coag neg staph  - MRSA PCR (7/6) POSITIVE for both MRSA/MSSA s/p Bactroban (7/6 - 7/10)  - s/p empiric Zosyn (7/8 - 7/14) however WBC continues to wax and wean without fever and will monitor off ABX .     HEME  - On Xarelto 10mg at home likely for DVT PPX?   - Hold home Xarelto in prep for OR   - DVT PPX with LVX for now     ENDOCRINE  - No hx of DM2   - Monitor BG     SKIN  - Basic trach and PEG care ordered     ETHICS/ GOC    - FULL CODE   - Dr Tyson Velasquez,  involved in care    DISPO - Back home with  when able.   ***********  7/27-no new events  7/28-o/n elev BP   67 YO Female with PMHx of ALS, Parkinson,, nonverbal, bed bound, chronic respiratory failure with tracheostomy and vent dependence, and oropharyngeal dysphagia with PEG who presented from home with tracheostomy leak. She was noted with vent alarm and poor TVe (250-290) at home. Trach changed at home with no improvement and sent in for thoracic evaluation. While in MICU no air leak noted with hyperinflated cuff. Transferred to RCU on 7/5 with course complicated by leukocytosis second to UTI vs trachitis and completed zosyn course. Trach upsized by thoracic on 7/17 however AL remains and now pending custom trach.     NEUROLOGY  # ALS   - Baseline nonverbal, awake, and communicates with eye movement.   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 Nurses taught moister chamber placement with optho team.     PSYCH   # Anxiety and Depression   - Continue on home valium 2mg BID with additional 2mg PRN   - Continue on zoloft 75mg QD while in house (on 4cc/ 80mg QD at home)     CARDIOVASCULAR   # HTN thought to be second to discomfort vs dysautonomia   - Evening HTN and continue on hydralazine vs valium PRN doses   - Monitor HR and BP    RESPIRATORY  # Tracheostomy leak   - At home noted with TVe 200-300s despite tracheostomy change to 80XLTCD.   - No air leak noted in house with hyperinflated cuff likely tracheomegaly?   - Continue on chronic vent 16/400/5/21   - Chronic bibasilar atelectasis and continued on HTS PRN with chest PT  - Trach upsized to Bivona 9 on 7/17, however trach leak remains.   - Pending custom tracheostomy ordered by Thoracic surgery     GI  # Dysphagia   - Continue on PEG-TF     RENAL  # High PVR   - BS with roughly 200-300cc PVR, however no acute distress   - Monitor renal function and UOP     INFECTIOUS DISEASE  # Leukocytosis likely second to trachitis vs UTI?   - No fever or chills and noted with prior leukocytosis   - CXR with prior atelectasis and no acute findings   - UA (7/6) with positive nitrites (7/6)   - SCx (7/5) with pansensitive PSA  - BCx (7/6) x 2 NGT  - UCx (7/6) with coag neg staph  - MRSA PCR (7/6) POSITIVE for both MRSA/MSSA s/p Bactroban (7/6 - 7/10)  - s/p empiric Zosyn (7/8 - 7/14) however WBC continues to wax and wean without fever and will monitor off ABX .     HEME  - On Xarelto 10mg at home likely for DVT PPX?   - Hold home Xarelto in prep for OR   - DVT PPX with LVX for now     ENDOCRINE  - No hx of DM2   - Monitor BG     SKIN  - Basic trach and PEG care ordered     ETHICS/ GOC    - FULL CODE   - Dr Tyson Velasquez,  involved in care    DISPO - Back home with  when able.   ***********  7/27-no new events  7/28-o/n elev BP

## 2024-07-28 NOTE — PROGRESS NOTE ADULT - NS ATTEND AMEND GEN_ALL_CORE FT
as above:  Pt is a 69F with MHx advanced ALS (nonverbal and bedbound at baseline) with chronic hypercapnic respiratory failure and ventilatory dependence via tracheostomy at baseline and Parkinson's dz presenting to [facility] on 7/18/24 with acute hypercapnia secondary to large expiratory air leak s/p upsizing without improvement with hospital course c/b tracheitis vs. UTI transferred from MICU to RCU on 7/5 for further management.     Pt with known advanced progressive ALS with baseline known to be nonverbal and bedbound with functional quadriplegia and complete ADL dependence. 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 Pt with worsening eye redness, especially on right side, despite conservative measures. Received vancomycin +tobramycin eye drops in the past and on previous admission opthalmology recommended possible need for tarsorrhaphy, but was ultimately deferred as not in line with pt's GOC. 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 Will also c/w home riluzole, c/w home diazepam and zoloft. Diazepam prn added for breakthrough concern for agitation or discomfort.    Pt with acute on chronic hypercapnic respiratory failure 2/2 neuromuscular weakness in the setting of advanced ALS with chronic ventilatory dependence with tracheostomy found to have tracheitis and large air leak 2/2 tracheomegaly likely 2/2 prolonged tracheostomy dependence. Trach exchanged at home without improvement followed by upsizing at bedside to 9 Bivona but still with air leak. Thoracic sx consulted, custom trach ordered (Portex 9.5 with 40mm balloon). Awaiting custom order delivery for OR trach exchange. Will c/w current vent support, pt on home settings. Air leak remains ~25% total TV. Does not tolerate PSV trials, remains completely passive on ventilator. Will c/w airway clearance and trach care as per RCU team. Wean O2 supplementation for goal O2 saturation 90-95%. NEW trach pending (Roman)    Pt with sepsis 2/2 UTI vs. tracheitis. Remains hemodynamically stable and afebrile with fluctuating but stable WBC. Initial cx (+) Pseudomonas in trach aspirate, similar to previous cultures, for which pt completed course of Zosyn. Previous UCx from 7/24/23 (+) VRE faecalis sensitive only to linezolid/daptomycin but likely colonizers. Will continue to monitor conservatively off abx at this time. Pt with known periods of BP variation with transient hypertensive episodes, will monitor off anti-HTNs, likely 2/2 dysautonomia.    Discharge pending medical optimization (new Trach and optho f/up), likely d/c home with ventilator. Pt full code. On home NOAC for DVT ppx, held for now in preparation for OR. Will restart prior to d/c. Will continue to discuss with pt's family, pt's  at bedside. HCP  (Dr. Velasquez). O/P Pulm: Dr. Pranav Nickerson MD-Pulmonary   [phone] as above: BP elevaions o/n  Pt is a 69F with MHx advanced ALS (nonverbal and bedbound at baseline) with chronic hypercapnic respiratory failure and ventilatory dependence via tracheostomy at baseline and Parkinson's dz presenting to [facility] on 7/18/24 with acute hypercapnia secondary to large expiratory air leak s/p upsizing without improvement with hospital course c/b tracheitis vs. UTI transferred from MICU to RCU on 7/5 for further management.     Neuro-Pt with known advanced progressive ALS with baseline known to be nonverbal and bedbound with functional quadriplegia and complete ADL dependence. 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 Pt with worsening eye redness, especially on right side, despite conservative measures. Received vancomycin +tobramycin eye drops in the past and on previous admission opthalmology recommended possible need for tarsorrhaphy, but was ultimately deferred as not in line with pt's GOC. 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 Will also c/w home riluzole, c/w home diazepam and zoloft. Diazepam prn added for breakthrough concern for agitation or discomfort.    Resp-Pt with acute on chronic hypercapnic respiratory failure 2/2 neuromuscular weakness in the setting of advanced ALS with chronic ventilatory dependence with tracheostomy found to have tracheitis and large air leak 2/2 tracheomegaly likely 2/2 prolonged tracheostomy dependence. Trach exchanged at home without improvement followed by upsizing at bedside to 9 Bivona but still with air leak. Thoracic sx consulted, custom trach ordered (Portex 9.5 with 40mm balloon). Awaiting custom order delivery for OR trach exchange. Will c/w current vent support, pt on home settings. Air leak remains ~25% total TV. Does not tolerate PSV trials, remains completely passive on ventilator. Will c/w airway clearance and trach care as per RCU team. Wean O2 supplementation for goal O2 saturation 90-95%. NEW trach pending (Roman)    ID-Pt with sepsis 2/2 UTI vs. tracheitis. Remains hemodynamically stable and afebrile with fluctuating but stable WBC. Initial cx (+) Pseudomonas in trach aspirate, similar to previous cultures, for which pt completed course of Zosyn. Previous UCx from 7/24/23 (+) VRE faecalis sensitive only to linezolid/daptomycin but likely colonizers. Will continue to monitor conservatively off abx at this time. Pt with known periods of BP variation with transient hypertensive episodes, will monitor off anti-HTNs, likely 2/2 dysautonomia.    DVT and GI prophylaxis-TF in progress    Discharge pending medical optimization (new Trach and optho f/up), likely d/c home with ventilator. Pt full code. On home NOAC for DVT ppx, held for now in preparation for OR. Will restart prior to d/c. Will continue to discuss with pt's family, pt's  at bedside. HCP  (Dr. Velasquez). O/P Pulm: Dr. Pranav Nickerson MD-Pulmonary   [phone]

## 2024-07-29 PROCEDURE — 99233 SBSQ HOSP IP/OBS HIGH 50: CPT | Mod: FS

## 2024-07-29 PROCEDURE — 99233 SBSQ HOSP IP/OBS HIGH 50: CPT

## 2024-07-29 RX ADMIN — CHLORHEXIDINE GLUCONATE 1 APPLICATION(S): 40 SOLUTION TOPICAL at 11:10

## 2024-07-29 RX ADMIN — POVIDONE, PROPYLENE GLYCOL 1 DROP(S): 6.8; 3 LIQUID OPHTHALMIC at 22:15

## 2024-07-29 RX ADMIN — ERYTHROMYCIN 1 APPLICATION(S): 5 OINTMENT OPHTHALMIC at 15:52

## 2024-07-29 RX ADMIN — POVIDONE, PROPYLENE GLYCOL 1 DROP(S): 6.8; 3 LIQUID OPHTHALMIC at 04:30

## 2024-07-29 RX ADMIN — POVIDONE, PROPYLENE GLYCOL 1 DROP(S): 6.8; 3 LIQUID OPHTHALMIC at 15:53

## 2024-07-29 RX ADMIN — Medication 2 MILLIGRAM(S): at 17:20

## 2024-07-29 RX ADMIN — POVIDONE, PROPYLENE GLYCOL 1 APPLICATION(S): 6.8; 3 LIQUID OPHTHALMIC at 10:15

## 2024-07-29 RX ADMIN — CHLORHEXIDINE GLUCONATE 15 MILLILITER(S): 40 SOLUTION TOPICAL at 05:49

## 2024-07-29 RX ADMIN — OFLOXACIN 1 DROP(S): 3 SOLUTION/ DROPS OPHTHALMIC at 21:18

## 2024-07-29 RX ADMIN — OFLOXACIN 1 DROP(S): 3 SOLUTION/ DROPS OPHTHALMIC at 17:18

## 2024-07-29 RX ADMIN — Medication 2 MILLIGRAM(S): at 08:14

## 2024-07-29 RX ADMIN — ERYTHROMYCIN 1 APPLICATION(S): 5 OINTMENT OPHTHALMIC at 03:01

## 2024-07-29 RX ADMIN — RILUZOLE 50 MILLIGRAM(S): 5 LIQUID ORAL at 17:20

## 2024-07-29 RX ADMIN — ENOXAPARIN SODIUM 40 MILLIGRAM(S): 100 INJECTION SUBCUTANEOUS at 05:48

## 2024-07-29 RX ADMIN — POVIDONE, PROPYLENE GLYCOL 1 DROP(S): 6.8; 3 LIQUID OPHTHALMIC at 08:14

## 2024-07-29 RX ADMIN — OFLOXACIN 1 DROP(S): 3 SOLUTION/ DROPS OPHTHALMIC at 10:14

## 2024-07-29 RX ADMIN — POVIDONE, PROPYLENE GLYCOL 1 DROP(S): 6.8; 3 LIQUID OPHTHALMIC at 15:52

## 2024-07-29 RX ADMIN — RILUZOLE 50 MILLIGRAM(S): 5 LIQUID ORAL at 05:50

## 2024-07-29 RX ADMIN — ERYTHROMYCIN 1 APPLICATION(S): 5 OINTMENT OPHTHALMIC at 05:48

## 2024-07-29 RX ADMIN — POVIDONE, PROPYLENE GLYCOL 1 APPLICATION(S): 6.8; 3 LIQUID OPHTHALMIC at 20:29

## 2024-07-29 RX ADMIN — POVIDONE, PROPYLENE GLYCOL 1 DROP(S): 6.8; 3 LIQUID OPHTHALMIC at 03:02

## 2024-07-29 RX ADMIN — POVIDONE, PROPYLENE GLYCOL 1 DROP(S): 6.8; 3 LIQUID OPHTHALMIC at 01:11

## 2024-07-29 RX ADMIN — OFLOXACIN 1 DROP(S): 3 SOLUTION/ DROPS OPHTHALMIC at 05:49

## 2024-07-29 RX ADMIN — OFLOXACIN 1 DROP(S): 3 SOLUTION/ DROPS OPHTHALMIC at 11:11

## 2024-07-29 RX ADMIN — Medication 15 MILLILITER(S): at 11:11

## 2024-07-29 RX ADMIN — ERYTHROMYCIN 1 APPLICATION(S): 5 OINTMENT OPHTHALMIC at 17:17

## 2024-07-29 RX ADMIN — OFLOXACIN 1 DROP(S): 3 SOLUTION/ DROPS OPHTHALMIC at 01:11

## 2024-07-29 RX ADMIN — OFLOXACIN 1 DROP(S): 3 SOLUTION/ DROPS OPHTHALMIC at 13:45

## 2024-07-29 RX ADMIN — ERYTHROMYCIN 1 APPLICATION(S): 5 OINTMENT OPHTHALMIC at 11:11

## 2024-07-29 RX ADMIN — Medication 2 MILLIGRAM(S): at 05:48

## 2024-07-29 RX ADMIN — POVIDONE, PROPYLENE GLYCOL 1 APPLICATION(S): 6.8; 3 LIQUID OPHTHALMIC at 04:30

## 2024-07-29 RX ADMIN — POVIDONE, PROPYLENE GLYCOL 1 DROP(S): 6.8; 3 LIQUID OPHTHALMIC at 20:29

## 2024-07-29 RX ADMIN — POVIDONE, PROPYLENE GLYCOL 1 DROP(S): 6.8; 3 LIQUID OPHTHALMIC at 17:20

## 2024-07-29 RX ADMIN — POVIDONE, PROPYLENE GLYCOL 1 APPLICATION(S): 6.8; 3 LIQUID OPHTHALMIC at 07:32

## 2024-07-29 RX ADMIN — POVIDONE, PROPYLENE GLYCOL 1 APPLICATION(S): 6.8; 3 LIQUID OPHTHALMIC at 13:45

## 2024-07-29 RX ADMIN — POVIDONE, PROPYLENE GLYCOL 1 DROP(S): 6.8; 3 LIQUID OPHTHALMIC at 11:11

## 2024-07-29 RX ADMIN — ERYTHROMYCIN 1 APPLICATION(S): 5 OINTMENT OPHTHALMIC at 23:17

## 2024-07-29 RX ADMIN — POVIDONE, PROPYLENE GLYCOL 1 DROP(S): 6.8; 3 LIQUID OPHTHALMIC at 10:14

## 2024-07-29 RX ADMIN — OFLOXACIN 1 DROP(S): 3 SOLUTION/ DROPS OPHTHALMIC at 23:16

## 2024-07-29 RX ADMIN — POVIDONE, PROPYLENE GLYCOL 1 DROP(S): 6.8; 3 LIQUID OPHTHALMIC at 07:31

## 2024-07-29 RX ADMIN — POVIDONE, PROPYLENE GLYCOL 1 APPLICATION(S): 6.8; 3 LIQUID OPHTHALMIC at 01:11

## 2024-07-29 RX ADMIN — OFLOXACIN 1 DROP(S): 3 SOLUTION/ DROPS OPHTHALMIC at 17:19

## 2024-07-29 RX ADMIN — POVIDONE, PROPYLENE GLYCOL 1 DROP(S): 6.8; 3 LIQUID OPHTHALMIC at 05:49

## 2024-07-29 RX ADMIN — POVIDONE, PROPYLENE GLYCOL 1 DROP(S): 6.8; 3 LIQUID OPHTHALMIC at 13:45

## 2024-07-29 RX ADMIN — POVIDONE, PROPYLENE GLYCOL 1 APPLICATION(S): 6.8; 3 LIQUID OPHTHALMIC at 15:52

## 2024-07-29 RX ADMIN — CHLORHEXIDINE GLUCONATE 15 MILLILITER(S): 40 SOLUTION TOPICAL at 17:21

## 2024-07-29 RX ADMIN — SERTRALINE HYDROCHLORIDE 75 MILLIGRAM(S): 50 TABLET, FILM COATED ORAL at 11:11

## 2024-07-29 RX ADMIN — POVIDONE, PROPYLENE GLYCOL 1 DROP(S): 6.8; 3 LIQUID OPHTHALMIC at 23:16

## 2024-07-29 RX ADMIN — POVIDONE, PROPYLENE GLYCOL 1 APPLICATION(S): 6.8; 3 LIQUID OPHTHALMIC at 22:15

## 2024-07-29 RX ADMIN — ERYTHROMYCIN 1 APPLICATION(S): 5 OINTMENT OPHTHALMIC at 08:14

## 2024-07-29 RX ADMIN — ERYTHROMYCIN 1 APPLICATION(S): 5 OINTMENT OPHTHALMIC at 21:18

## 2024-07-29 RX ADMIN — POVIDONE, PROPYLENE GLYCOL 1 DROP(S): 6.8; 3 LIQUID OPHTHALMIC at 21:18

## 2024-07-29 NOTE — PROGRESS NOTE ADULT - ASSESSMENT
68 YO Female with PMHx of ALS, Parkinson,, nonverbal, bed bound, chronic respiratory failure with tracheostomy and vent dependence, and oropharyngeal dysphagia with PEG who presented from home with tracheostomy leak.   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 Was previously able to have lids taped shut at night however now becoming more difficult. Patient consequently with noticeable worsening in eye redness OD>>OS despite frequent lubrication   - Initial exam with conjunctival injection OD>>OS, cornea OD with inferior epithelial defect encompassing 40% of cornea. With chronic corneal damage and changes secondary to exposure including extensive overlying filaments, pannus formation, and stromal scarring; OS with inferior epithelial defect encompassing 20% of cornea. Also with chronic corneal damage and changes secondary to exposure including extensive filaments, pannus. No active infiltrate or ulcer appreciated at this time OU. No thinning OU.   - 7/29: exam OD with improving inferior epithelial defect measuring 1.5 mm.; OS with no further inferior epithelial defect.  - recommend Ofloxacin, 6 times daily to the right eye and 4 times daily to the left eye  - recommend Erythromycin ointment and lacrilube each every 3 hours alternating 1.5 hours apart, so that patient is receiving ointment q 1.5 hours, to both eyes   - Create moisture chamber with tegaderm following placement of EXTENSIVE ointment to the right eye throughout the day, and to the left eye nightly. Nurses shown how to make at bedside. The tegaderm should not be placed tightly overlying the eye, and there should be ointment covering the entirety of the exposed eye. Place moisturizing ointment around areas of face where tegaderm is to stick so that patient does not develop skin irritation    - Artificial tears every 1.5 hours before placement of ointment.    SDW Dr. Denton     Outpatient follow-up: Patient should follow-up with his/her ophthalmologist or with [facility] Department of Ophthalmology upon discharge at the address below     [facility] Department of Ophthalmology  [address] [place] [address]. [address]  [place] [address]  [phone]

## 2024-07-29 NOTE — PROGRESS NOTE ADULT - SUBJECTIVE AND OBJECTIVE BOX
[facility] DEPARTMENT OF OPHTHALMOLOGY  ------------------------------------------------------------------------------  Chase Molina MD PGY 3  Available on Teams  ------------------------------------------------------------------------------    Interval History: No acute events overnight.     MEDICATIONS  (STANDING):  artificial  tears Solution 1 Drop(s) Both EYES <User Schedule>  chlorhexidine 0.12% Liquid 15 milliLiter(s) Oral Mucosa every 12 hours  chlorhexidine 2% Cloths 1 Application(s) Topical daily  diazepam    Tablet 2 milliGRAM(s) Oral every 12 hours  enoxaparin Injectable 40 milliGRAM(s) SubCutaneous every 24 hours  erythromycin   Ointment 1 Application(s) Both EYES <User Schedule>  multivitamin/minerals/iron Oral Solution (CENTRUM) 15 milliLiter(s) Oral daily  ofloxacin 0.3% Solution 1 Drop(s) Right EYE every 4 hours  ofloxacin 0.3% Solution 1 Drop(s) Left EYE every 6 hours  petrolatum Ophthalmic Ointment 1 Application(s) Both EYES <User Schedule>  riluzole 50 milliGRAM(s) Oral two times a day  sertraline 75 milliGRAM(s) Oral daily    MEDICATIONS  (PRN):  acetaminophen   Oral Liquid .. 650 milliGRAM(s) Oral every 6 hours PRN Temp greater or equal to 38C (100.4F), Mild Pain (1 - 3)  diazepam    Tablet 2 milliGRAM(s) Oral at bedtime PRN for dysautonomia/ hypertension/discomfort      VITALS: T(C): 36.7 (07-27-24 @ 04:18)  T(F): 98 (07-27-24 @ 04:18), Max: 98 (07-27-24 @ 04:18)  HR: 96 (07-27-24 @ 07:15) (92 - 104)  BP: 132/79 (07-27-24 @ 04:18) (116/70 - 164/81)  RR:  (16 - 19)  SpO2:  (97% - 100%)  Wt(kg): --  General: AAO x 0    Ophthalmology Exam:  Visual acuity (sc): RONIT   Pupils: PERRL OU, no APD  Ttono: 16 OU  Extraocular movements (EOMs): RONIT  Confrontational Visual Field (CVF): RONIT  Color Plates: RONIT    Pen Light Exam (PLE)  External: Flat OU  Lids/Lashes/Lacrimal Ducts: Flat OU    Sclera/Conjunctiva: injection OD>>OS   Cornea: OD with improving inferior epithelial defect measuring 1.5 mm. With chronic corneal damage and changes secondary to exposure including extensive overlying filaments, pannus formation, and stromal scarring; OS with no further inferior epithelial defect. Also with chronic corneal damage and changes secondary to exposure including extensive filaments, pannus formation, and stromal scarring; No infiltration or ulcer OU   Anterior Chamber: D+F OU    Iris: Flat OU  Lens: NS OU

## 2024-07-29 NOTE — PROGRESS NOTE ADULT - NS ATTEND AMEND GEN_ALL_CORE FT
67 yo F w/ PMH of ALS, chronic respiratory failure w/ tracheostomy and vent dependence, oropharyngeal dysphagia w/ PEG, Parkinson's disease, non verbal at baseline presents w/ tracheostomy air leak.   w/ set volume at 400. Tracheostomy changed at home with no improvement and so presented for further evaluation. She underwent upsizing and trach exchange to size 9 Bivona on 7/17. She tolerated the procedure well.     7/18 noted to have significant air leak, minimal volumes returned. Additional 8 cc added to trach cuff with improvement in volume returned. Bedside bronchoscopy by CT surgery - trach in place, thin secretions aspirated.     -----mild positional leak still  - Custom trach ordered,  by thoracic surg ----- will exchange when available.  ---f/u by thoracic surg   - baseline mental status, on home regimen: Rilutek 50 mg bid, Valium 2 mg bid, Zoloft 75 mg daily   - On VC//16/+5/30%, RR 16    - leukocytosis, tx for UTI and possible tracheitis. On Zosyn 7/8 - 7/14 7 day course. Leukocytosis but no other localizing signs of infection. Will monitor off abx for now   - labile BP - monitor for now, hydralazine prn, ? possibly due to discomfort, pain meds prn   - sputum cx pansensitive pseudomonas.   - Will continue to monitor.

## 2024-07-29 NOTE — PROGRESS NOTE ADULT - SUBJECTIVE AND OBJECTIVE BOX
CHIEF COMPLAINT: Patient is a 69y old  Female who presents with a chief complaint of trach exchange (18 Jul 2024 07:07)    Interval Events:    REVIEW OF SYSTEMS:  [ ] All other systems negative  [ ] Unable to assess ROS because ________    Mode: AC/ CMV (Assist Control/ Continuous Mandatory Ventilation), RR (machine): 16, TV (machine): 400, FiO2: 30, PEEP: 5, ITime: 0.64, MAP: 9, PIP: 21    OBJECTIVE:  ICU Vital Signs Last 24 Hrs  T(C): 36.7 (29 Jul 2024 04:00), Max: 36.9 (29 Jul 2024 00:00)  T(F): 98.1 (29 Jul 2024 04:00), Max: 98.4 (29 Jul 2024 00:00)  HR: 119 (29 Jul 2024 07:16) (91 - 119)  BP: 118/71 (29 Jul 2024 04:00) (110/69 - 198/90)  BP(mean): --  ABP: --  ABP(mean): --  RR: 16 (29 Jul 2024 04:00) (16 - 18)  SpO2: 95% (29 Jul 2024 07:16) (95% - 100%)    O2 Parameters below as of 29 Jul 2024 04:00  Patient On (Oxygen Delivery Method): ventilator    Mode: AC/ CMV (Assist Control/ Continuous Mandatory Ventilation), RR (machine): 16, TV (machine): 400, FiO2: 30, PEEP: 5, ITime: 0.64, MAP: 9, PIP: 21    07-28 @ 07:01  -  07-29 @ 07:00  --------------------------------------------------------  IN: 1500 mL / OUT: 800 mL / NET: 700 mL    CAPILLARY BLOOD GLUCOSE    HOSPITAL MEDICATIONS:  MEDICATIONS  (STANDING):  artificial  tears Solution 1 Drop(s) Both EYES <User Schedule>  chlorhexidine 0.12% Liquid 15 milliLiter(s) Oral Mucosa every 12 hours  chlorhexidine 2% Cloths 1 Application(s) Topical daily  diazepam    Tablet 2 milliGRAM(s) Oral every 12 hours  enoxaparin Injectable 40 milliGRAM(s) SubCutaneous every 24 hours  erythromycin   Ointment 1 Application(s) Both EYES <User Schedule>  multivitamin/minerals/iron Oral Solution (CENTRUM) 15 milliLiter(s) Oral daily  ofloxacin 0.3% Solution 1 Drop(s) Left EYE every 6 hours  ofloxacin 0.3% Solution 1 Drop(s) Right EYE every 4 hours  petrolatum Ophthalmic Ointment 1 Application(s) Both EYES <User Schedule>  riluzole 50 milliGRAM(s) Oral two times a day  sertraline 75 milliGRAM(s) Oral daily    MEDICATIONS  (PRN):  acetaminophen   Oral Liquid .. 650 milliGRAM(s) Oral every 6 hours PRN Temp greater or equal to 38C (100.4F), Mild Pain (1 - 3)  diazepam    Tablet 2 milliGRAM(s) Oral at bedtime PRN for dysautonomia/ hypertension/discomfort      LABS:    PAST MEDICAL & SURGICAL HISTORY:  ALS (amyotrophic lateral sclerosis)    HLD (hyperlipidemia)    Ventilator dependent  Since 2015    Aspiration into airway    S/P gastrostomy  10/14 for dysphagia  receives jevity 2 cans TID    Dependence on tracheostomy    Encounter for PEG (percutaneous endoscopic gastrostomy)  peg placed    FAMILY HISTORY:  No pertinent family history in first degree relatives    Social History:    RADIOLOGY:  [ ] Reviewed and interpreted by me    PULMONARY FUNCTION TESTS:    EKG: CHIEF COMPLAINT: Patient is a 69y old  Female who presents with a chief complaint of trach exchange (18 Jul 2024 07:07)    Interval Events: None reported overnight. Clinically unchanged. CTsx following. VSS, and medications reviewed.     REVIEW OF SYSTEMS:  See above  [x] Unable to assess ROS because hx of ALS     Mode: AC/ CMV (Assist Control/ Continuous Mandatory Ventilation), RR (machine): 16, TV (machine): 400, FiO2: 30, PEEP: 5, ITime: 0.64, MAP: 9, PIP: 21    OBJECTIVE:  ICU Vital Signs Last 24 Hrs  T(C): 36.7 (29 Jul 2024 04:00), Max: 36.9 (29 Jul 2024 00:00)  T(F): 98.1 (29 Jul 2024 04:00), Max: 98.4 (29 Jul 2024 00:00)  HR: 119 (29 Jul 2024 07:16) (91 - 119)  BP: 118/71 (29 Jul 2024 04:00) (110/69 - 198/90)  BP(mean): --  ABP: --  ABP(mean): --  RR: 16 (29 Jul 2024 04:00) (16 - 18)  SpO2: 95% (29 Jul 2024 07:16) (95% - 100%)    O2 Parameters below as of 29 Jul 2024 04:00  Patient On (Oxygen Delivery Method): ventilator    Mode: AC/ CMV (Assist Control/ Continuous Mandatory Ventilation), RR (machine): 16, TV (machine): 400, FiO2: 30, PEEP: 5, ITime: 0.64, MAP: 9, PIP: 21    07-28 @ 07:01  -  07-29 @ 07:00  --------------------------------------------------------  IN: 1500 mL / OUT: 800 mL / NET: 700 mL    CAPILLARY BLOOD GLUCOSE    HOSPITAL MEDICATIONS:  MEDICATIONS  (STANDING):  artificial  tears Solution 1 Drop(s) Both EYES <User Schedule>  chlorhexidine 0.12% Liquid 15 milliLiter(s) Oral Mucosa every 12 hours  chlorhexidine 2% Cloths 1 Application(s) Topical daily  diazepam    Tablet 2 milliGRAM(s) Oral every 12 hours  enoxaparin Injectable 40 milliGRAM(s) SubCutaneous every 24 hours  erythromycin   Ointment 1 Application(s) Both EYES <User Schedule>  multivitamin/minerals/iron Oral Solution (CENTRUM) 15 milliLiter(s) Oral daily  ofloxacin 0.3% Solution 1 Drop(s) Left EYE every 6 hours  ofloxacin 0.3% Solution 1 Drop(s) Right EYE every 4 hours  petrolatum Ophthalmic Ointment 1 Application(s) Both EYES <User Schedule>  riluzole 50 milliGRAM(s) Oral two times a day  sertraline 75 milliGRAM(s) Oral daily    MEDICATIONS  (PRN):  acetaminophen   Oral Liquid .. 650 milliGRAM(s) Oral every 6 hours PRN Temp greater or equal to 38C (100.4F), Mild Pain (1 - 3)  diazepam    Tablet 2 milliGRAM(s) Oral at bedtime PRN for dysautonomia/ hypertension/discomfort    PHYSICAL EXAM:  General: NAD, +Trach to Vent   Neck: neck supple, no JVD, trach midline  Eye: +corneal opacification noted b/l, R>L, conjunctival hemorrhage noted in R eye??   Respiratory: +rhonchi auscultated b/l, no wheeze, no rales, no resp distress  Cardiovascular: normal S1 and S2, regular rate and rhythm  Abdomen: soft, non tender, +PEG  Extremities: no LE edema b/l in +SCDs  Skin: warm and dry  Neurological: +neurologic deficits d/t ALS hx   Psychiatry: no agitation    LABS:    PAST MEDICAL & SURGICAL HISTORY:  ALS (amyotrophic lateral sclerosis)    HLD (hyperlipidemia)    Ventilator dependent  Since 2015    Aspiration into airway    S/P gastrostomy  10/14 for dysphagia  receives jevity 2 cans TID    Dependence on tracheostomy    Encounter for PEG (percutaneous endoscopic gastrostomy)  peg placed    FAMILY HISTORY:  No pertinent family history in first degree relatives    Social History:    RADIOLOGY:  [ ] Reviewed and interpreted by me    PULMONARY FUNCTION TESTS:    EKG:

## 2024-07-29 NOTE — PROGRESS NOTE ADULT - ASSESSMENT
69 YO Female with PMHx of ALS, Parkinson,, nonverbal, bed bound, chronic respiratory failure with tracheostomy and vent dependence, and oropharyngeal dysphagia with PEG who presented from home with tracheostomy leak. She was noted with vent alarm and poor TVe (250-290) at home. Trach changed at home with no improvement and sent in for thoracic evaluation. While in MICU no air leak noted with hyperinflated cuff. Transferred to [facility] on 7/5 with course complicated by leukocytosis second to UTI vs trachitis and completed zosyn course. Trach upsized by thoracic on 7/17 however AL remains and now pending custom trach.     NEUROLOGY  # ALS   - Baseline nonverbal, awake, and communicates with eye movement.   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 Nurses taught moister chamber placement with optho team.     PSYCH   # Anxiety and Depression   - Continue on home valium 2mg BID with additional 2mg PRN   - Continue on zoloft 75mg QD while in house (on 4cc/ 80mg QD at home)     CARDIOVASCULAR   # HTN thought to be second to discomfort vs dysautonomia   - Evening HTN and continue on hydralazine vs valium PRN doses   - Monitor HR and BP    RESPIRATORY  # Tracheostomy leak   - At home noted with TVe 200-300s despite tracheostomy change to 80XLTCD.   - No air leak noted in house with hyperinflated cuff likely tracheomegaly?   - Continue on chronic vent 16/400/5/21   - Chronic bibasilar atelectasis and continued on HTS PRN with chest PT  - Trach upsized to Bivona 9 on 7/17, however trach leak remains.   - Pending Custom Tracheostomy ordered by Thoracic surgery     GI  # Dysphagia   - Continue on PEG-TF     RENAL  # High PVR   - BS with roughly 200-300cc PVR, however no acute distress   - Monitor renal function and UOP     INFECTIOUS DISEASE  # Leukocytosis likely second to trachitis vs UTI?   - No fever or chills and noted with prior leukocytosis   - CXR with prior atelectasis and no acute findings   - UA (7/6) with positive nitrites (7/6)   - SCx (7/5) with pansensitive PSA  - BCx (7/6) x 2 NGT  - UCx (7/6) with coag neg staph  - MRSA PCR (7/6) POSITIVE for both MRSA/MSSA s/p Bactroban (7/6 - 7/10)  - s/p empiric Zosyn (7/8 - 7/14) however WBC continues to wax and wean without fever and will monitor off ABX .     HEME  - On Xarelto 10mg at home likely for DVT PPX?   - Hold home Xarelto in prep for OR   - DVT PPX with LVX for now     ENDOCRINE  - No hx of DM2   - Monitor BG     SKIN  - Basic trach and PEG care ordered     ETHICS/ GOC    - FULL CODE   - Dr Tyson Velasquez,  involved in care    DISPO - Back home with  when able.      69 YO Female with PMHx of ALS, Parkinson,, nonverbal, bed bound, chronic respiratory failure with tracheostomy and vent dependence, and oropharyngeal dysphagia with PEG who presented from home with tracheostomy leak. She was noted with vent alarm and poor TVe (250-290) at home. Trach changed at home with no improvement and sent in for thoracic evaluation. While in MICU no air leak noted with hyperinflated cuff. Transferred to [facility] on 7/5 with course complicated by leukocytosis second to UTI vs trachitis and completed zosyn course. Trach upsized by thoracic on 7/17 however AL remains and now pending custom trach.     NEUROLOGY  # ALS   - Baseline nonverbal, awake, and communicates with eye movement.   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 Nurses taught moister chamber placement with optho team.     PSYCH   # Anxiety and Depression   - Continue on home Valium 2mg BID with additional 2mg PRN   - Continue on Zoloft 75mg QD while in house (on 4cc/ 80mg QD at home)     CARDIOVASCULAR   # HTN thought to be second to discomfort vs dysautonomia   - Evening HTN and continue on hydralazine vs valium PRN doses   - Monitor HR and BP    RESPIRATORY  # Tracheostomy leak   - At home noted with TVe 200-300s despite tracheostomy change to 80XLTCD.   - No air leak noted in house with hyperinflated cuff likely tracheomegaly?   - Continue on chronic vent 16/400/5/21   - Chronic bibasilar atelectasis and continued on HTS PRN with chest PT  - Trach upsized to Bivona 9 on 7/17, however trach leak remains.   - Pending Custom Tracheostomy ordered by Thoracic surgery     GI  # Dysphagia   - Continue on PEG-TF     RENAL  # High PVR   - BS with roughly 200-300cc PVR, however no acute distress   - Monitor renal function and UOP     INFECTIOUS DISEASE  # Leukocytosis likely second to trachitis vs UTI?   - No fever or chills and noted with prior leukocytosis   - CXR with prior atelectasis and no acute findings   - UA (7/6) with positive nitrites (7/6)   - SCx (7/5) with pansensitive PSA  - BCx (7/6) x 2 NGT  - UCx (7/6) with coag neg staph  - MRSA PCR (7/6) POSITIVE for both MRSA/MSSA s/p Bactroban (7/6 - 7/10)  - s/p empiric Zosyn (7/8 - 7/14) however WBC continues to wax and wean without fever and will monitor off ABX .     HEME  - On Xarelto 10mg at home likely for DVT PPX?   - Hold home Xarelto in prep for OR   - DVT PPX with LVX for now     ENDOCRINE  - No hx of DM2   - Monitor BG     SKIN  - Basic trach and PEG care ordered     ETHICS/ GOC    - FULL CODE   - Dr Tyson Velasquez,  involved in care    DISPO - Back home with  when able.

## 2024-07-30 LAB
ANION GAP SERPL CALC-SCNC: 12 MMOL/L — SIGNIFICANT CHANGE UP (ref 7–14)
BUN SERPL-MCNC: 28 MG/DL — HIGH (ref 7–23)
CALCIUM SERPL-MCNC: 9.6 MG/DL — SIGNIFICANT CHANGE UP (ref 8.4–10.5)
CHLORIDE SERPL-SCNC: 101 MMOL/L — SIGNIFICANT CHANGE UP (ref 98–107)
CO2 SERPL-SCNC: 29 MMOL/L — SIGNIFICANT CHANGE UP (ref 22–31)
CREAT SERPL-MCNC: <0.2 MG/DL — LOW (ref 0.5–1.3)
EGFR: 125 ML/MIN/1.73M2 — SIGNIFICANT CHANGE UP
GLUCOSE SERPL-MCNC: 162 MG/DL — HIGH (ref 70–99)
HCT VFR BLD CALC: 42.9 % — SIGNIFICANT CHANGE UP (ref 34.5–45)
HGB BLD-MCNC: 13.3 G/DL — SIGNIFICANT CHANGE UP (ref 11.5–15.5)
MAGNESIUM SERPL-MCNC: 2.1 MG/DL — SIGNIFICANT CHANGE UP (ref 1.6–2.6)
MCHC RBC-ENTMCNC: 26.9 PG — LOW (ref 27–34)
MCHC RBC-ENTMCNC: 31 GM/DL — LOW (ref 32–36)
MCV RBC AUTO: 86.8 FL — SIGNIFICANT CHANGE UP (ref 80–100)
NRBC # BLD: 0 /100 WBCS — SIGNIFICANT CHANGE UP (ref 0–0)
NRBC # FLD: 0 K/UL — SIGNIFICANT CHANGE UP (ref 0–0)
PHOSPHATE SERPL-MCNC: 3.8 MG/DL — SIGNIFICANT CHANGE UP (ref 2.5–4.5)
PLATELET # BLD AUTO: 373 K/UL — SIGNIFICANT CHANGE UP (ref 150–400)
POTASSIUM SERPL-MCNC: 4.8 MMOL/L — SIGNIFICANT CHANGE UP (ref 3.5–5.3)
POTASSIUM SERPL-SCNC: 4.8 MMOL/L — SIGNIFICANT CHANGE UP (ref 3.5–5.3)
RBC # BLD: 4.94 M/UL — SIGNIFICANT CHANGE UP (ref 3.8–5.2)
RBC # FLD: 16.7 % — HIGH (ref 10.3–14.5)
SODIUM SERPL-SCNC: 142 MMOL/L — SIGNIFICANT CHANGE UP (ref 135–145)
WBC # BLD: 12.55 K/UL — HIGH (ref 3.8–10.5)
WBC # FLD AUTO: 12.55 K/UL — HIGH (ref 3.8–10.5)

## 2024-07-30 PROCEDURE — 99233 SBSQ HOSP IP/OBS HIGH 50: CPT | Mod: FS

## 2024-07-30 RX ADMIN — POVIDONE, PROPYLENE GLYCOL 1 DROP(S): 6.8; 3 LIQUID OPHTHALMIC at 11:05

## 2024-07-30 RX ADMIN — SERTRALINE HYDROCHLORIDE 75 MILLIGRAM(S): 50 TABLET, FILM COATED ORAL at 11:04

## 2024-07-30 RX ADMIN — OFLOXACIN 1 DROP(S): 3 SOLUTION/ DROPS OPHTHALMIC at 17:35

## 2024-07-30 RX ADMIN — Medication 15 MILLILITER(S): at 11:08

## 2024-07-30 RX ADMIN — POVIDONE, PROPYLENE GLYCOL 1 APPLICATION(S): 6.8; 3 LIQUID OPHTHALMIC at 23:18

## 2024-07-30 RX ADMIN — POVIDONE, PROPYLENE GLYCOL 1 DROP(S): 6.8; 3 LIQUID OPHTHALMIC at 00:53

## 2024-07-30 RX ADMIN — POVIDONE, PROPYLENE GLYCOL 1 DROP(S): 6.8; 3 LIQUID OPHTHALMIC at 22:00

## 2024-07-30 RX ADMIN — ENOXAPARIN SODIUM 40 MILLIGRAM(S): 100 INJECTION SUBCUTANEOUS at 05:00

## 2024-07-30 RX ADMIN — OFLOXACIN 1 DROP(S): 3 SOLUTION/ DROPS OPHTHALMIC at 14:31

## 2024-07-30 RX ADMIN — POVIDONE, PROPYLENE GLYCOL 1 APPLICATION(S): 6.8; 3 LIQUID OPHTHALMIC at 04:07

## 2024-07-30 RX ADMIN — OFLOXACIN 1 DROP(S): 3 SOLUTION/ DROPS OPHTHALMIC at 05:00

## 2024-07-30 RX ADMIN — CHLORHEXIDINE GLUCONATE 15 MILLILITER(S): 40 SOLUTION TOPICAL at 17:34

## 2024-07-30 RX ADMIN — POVIDONE, PROPYLENE GLYCOL 1 DROP(S): 6.8; 3 LIQUID OPHTHALMIC at 16:22

## 2024-07-30 RX ADMIN — POVIDONE, PROPYLENE GLYCOL 1 APPLICATION(S): 6.8; 3 LIQUID OPHTHALMIC at 09:37

## 2024-07-30 RX ADMIN — POVIDONE, PROPYLENE GLYCOL 1 DROP(S): 6.8; 3 LIQUID OPHTHALMIC at 09:36

## 2024-07-30 RX ADMIN — ERYTHROMYCIN 1 APPLICATION(S): 5 OINTMENT OPHTHALMIC at 23:42

## 2024-07-30 RX ADMIN — POVIDONE, PROPYLENE GLYCOL 1 DROP(S): 6.8; 3 LIQUID OPHTHALMIC at 03:08

## 2024-07-30 RX ADMIN — POVIDONE, PROPYLENE GLYCOL 1 DROP(S): 6.8; 3 LIQUID OPHTHALMIC at 08:31

## 2024-07-30 RX ADMIN — ERYTHROMYCIN 1 APPLICATION(S): 5 OINTMENT OPHTHALMIC at 08:31

## 2024-07-30 RX ADMIN — Medication 2 MILLIGRAM(S): at 08:31

## 2024-07-30 RX ADMIN — OFLOXACIN 1 DROP(S): 3 SOLUTION/ DROPS OPHTHALMIC at 21:16

## 2024-07-30 RX ADMIN — RILUZOLE 50 MILLIGRAM(S): 5 LIQUID ORAL at 17:34

## 2024-07-30 RX ADMIN — RILUZOLE 50 MILLIGRAM(S): 5 LIQUID ORAL at 05:00

## 2024-07-30 RX ADMIN — OFLOXACIN 1 DROP(S): 3 SOLUTION/ DROPS OPHTHALMIC at 09:37

## 2024-07-30 RX ADMIN — POVIDONE, PROPYLENE GLYCOL 1 APPLICATION(S): 6.8; 3 LIQUID OPHTHALMIC at 13:04

## 2024-07-30 RX ADMIN — POVIDONE, PROPYLENE GLYCOL 1 DROP(S): 6.8; 3 LIQUID OPHTHALMIC at 13:04

## 2024-07-30 RX ADMIN — POVIDONE, PROPYLENE GLYCOL 1 DROP(S): 6.8; 3 LIQUID OPHTHALMIC at 20:25

## 2024-07-30 RX ADMIN — ERYTHROMYCIN 1 APPLICATION(S): 5 OINTMENT OPHTHALMIC at 05:01

## 2024-07-30 RX ADMIN — Medication 2 MILLIGRAM(S): at 17:34

## 2024-07-30 RX ADMIN — OFLOXACIN 1 DROP(S): 3 SOLUTION/ DROPS OPHTHALMIC at 02:12

## 2024-07-30 RX ADMIN — POVIDONE, PROPYLENE GLYCOL 1 DROP(S): 6.8; 3 LIQUID OPHTHALMIC at 23:42

## 2024-07-30 RX ADMIN — Medication 2 MILLIGRAM(S): at 05:00

## 2024-07-30 RX ADMIN — POVIDONE, PROPYLENE GLYCOL 1 APPLICATION(S): 6.8; 3 LIQUID OPHTHALMIC at 18:52

## 2024-07-30 RX ADMIN — POVIDONE, PROPYLENE GLYCOL 1 APPLICATION(S): 6.8; 3 LIQUID OPHTHALMIC at 06:30

## 2024-07-30 RX ADMIN — POVIDONE, PROPYLENE GLYCOL 1 DROP(S): 6.8; 3 LIQUID OPHTHALMIC at 05:01

## 2024-07-30 RX ADMIN — POVIDONE, PROPYLENE GLYCOL 1 DROP(S): 6.8; 3 LIQUID OPHTHALMIC at 14:31

## 2024-07-30 RX ADMIN — POVIDONE, PROPYLENE GLYCOL 1 DROP(S): 6.8; 3 LIQUID OPHTHALMIC at 02:12

## 2024-07-30 RX ADMIN — POVIDONE, PROPYLENE GLYCOL 1 DROP(S): 6.8; 3 LIQUID OPHTHALMIC at 17:35

## 2024-07-30 RX ADMIN — ERYTHROMYCIN 1 APPLICATION(S): 5 OINTMENT OPHTHALMIC at 11:05

## 2024-07-30 RX ADMIN — ERYTHROMYCIN 1 APPLICATION(S): 5 OINTMENT OPHTHALMIC at 02:12

## 2024-07-30 RX ADMIN — ERYTHROMYCIN 1 APPLICATION(S): 5 OINTMENT OPHTHALMIC at 17:36

## 2024-07-30 RX ADMIN — CHLORHEXIDINE GLUCONATE 15 MILLILITER(S): 40 SOLUTION TOPICAL at 05:00

## 2024-07-30 RX ADMIN — CHLORHEXIDINE GLUCONATE 1 APPLICATION(S): 40 SOLUTION TOPICAL at 11:02

## 2024-07-30 RX ADMIN — POVIDONE, PROPYLENE GLYCOL 1 APPLICATION(S): 6.8; 3 LIQUID OPHTHALMIC at 00:54

## 2024-07-30 RX ADMIN — ERYTHROMYCIN 1 APPLICATION(S): 5 OINTMENT OPHTHALMIC at 14:31

## 2024-07-30 RX ADMIN — POVIDONE, PROPYLENE GLYCOL 1 APPLICATION(S): 6.8; 3 LIQUID OPHTHALMIC at 16:22

## 2024-07-30 RX ADMIN — POVIDONE, PROPYLENE GLYCOL 1 DROP(S): 6.8; 3 LIQUID OPHTHALMIC at 18:51

## 2024-07-30 RX ADMIN — OFLOXACIN 1 DROP(S): 3 SOLUTION/ DROPS OPHTHALMIC at 11:05

## 2024-07-30 RX ADMIN — POVIDONE, PROPYLENE GLYCOL 1 DROP(S): 6.8; 3 LIQUID OPHTHALMIC at 06:30

## 2024-07-30 RX ADMIN — ERYTHROMYCIN 1 APPLICATION(S): 5 OINTMENT OPHTHALMIC at 20:25

## 2024-07-30 RX ADMIN — OFLOXACIN 1 DROP(S): 3 SOLUTION/ DROPS OPHTHALMIC at 23:42

## 2024-07-30 NOTE — PROGRESS NOTE ADULT - ASSESSMENT
67 YO Female with PMHx of ALS, Parkinson,, nonverbal, bed bound, chronic respiratory failure with tracheostomy and vent dependence, and oropharyngeal dysphagia with PEG who presented from home with tracheostomy leak. She was noted with vent alarm and poor TVe (250-290) at home. Trach changed at home with no improvement and sent in for thoracic evaluation. While in MICU no air leak noted with hyperinflated cuff. Transferred to [facility] on 7/5 with course complicated by leukocytosis second to UTI vs trachitis and completed zosyn course. Trach upsized by thoracic on 7/17 however AL remains and now pending custom trach.     NEUROLOGY  # ALS   - Baseline nonverbal, awake, and communicates with eye movement.   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 Nurses taught moister chamber placement with optho team.     PSYCH   # Anxiety and Depression   - Continue on home Valium 2mg BID with additional 2mg PRN   - Continue on Zoloft 75mg QD while in house (on 4cc/ 80mg QD at home)     CARDIOVASCULAR   # HTN thought to be second to discomfort vs dysautonomia   - Evening HTN and continue on hydralazine vs valium PRN doses   - Monitor HR and BP    RESPIRATORY  # Tracheostomy leak   - At home noted with TVe 200-300s despite tracheostomy change to 80XLTCD.   - No air leak noted in house with hyperinflated cuff likely tracheomegaly?   - Continue on chronic vent 16/400/5/21   - Chronic bibasilar atelectasis and continued on HTS PRN with chest PT  - Trach upsized to Bivona 9 on 7/17, however trach leak remains.   - Pending Custom Tracheostomy ordered by Thoracic surgery     GI  # Dysphagia   - Continue on PEG-TF     RENAL  # High PVR   - BS with roughly 200-300cc PVR, however no acute distress   - Monitor renal function and UOP     INFECTIOUS DISEASE  # Leukocytosis likely second to trachitis vs UTI?   - No fever or chills and noted with prior leukocytosis   - CXR with prior atelectasis and no acute findings   - UA (7/6) with positive nitrites (7/6)   - SCx (7/5) with pansensitive PSA  - BCx (7/6) x 2 NGT  - UCx (7/6) with coag neg staph  - MRSA PCR (7/6) POSITIVE for both MRSA/MSSA s/p Bactroban (7/6 - 7/10)  - s/p empiric Zosyn (7/8 - 7/14) however WBC continues to wax and wean without fever and will monitor off ABX .     HEME  - On Xarelto 10mg at home likely for DVT PPX?   - Hold home Xarelto in prep for OR   - DVT PPX with LVX for now     ENDOCRINE  - No hx of DM2   - Monitor BG     SKIN  - Basic trach and PEG care ordered     ETHICS/ GOC    - FULL CODE   - Dr Tyson Velasquez,  involved in care    DISPO - Back home with  when able.      69 YO Female with PMHx of ALS, Parkinson,, nonverbal, bed bound, chronic respiratory failure with tracheostomy and vent dependence, and oropharyngeal dysphagia with PEG who presented from home with tracheostomy leak. She was noted with vent alarm and poor TVe (250-290) at home. Trach changed at home with no improvement and sent in for thoracic evaluation. While in MICU no air leak noted with hyperinflated cuff. Transferred to [facility] on 7/5 with course complicated by leukocytosis second to UTI vs trachitis and completed zosyn course. Trach upsized by thoracic on 7/17 however AL remains and now pending custom trach.     PLAN  NEUROLOGY  # ALS   - Baseline nonverbal, awake, and communicates with eye movement.   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 Nurses taught moister chamber placement with optho team.     PSYCH   # Anxiety and Depression   - Continue on home Valium 2mg BID with additional 2mg PRN   - Continue on Zoloft 75mg QD while in house (on 4cc/ 80mg QD at home)     CARDIOVASCULAR   # HTN thought to be second to discomfort vs dysautonomia   - Evening HTN and continue on Hydralazine vs valium PRN doses   - Monitor HR and BP    RESPIRATORY  # Tracheostomy leak   - At home noted with TVe 200-300s despite tracheostomy change to 80XLTCD.   - No air leak noted in house with hyperinflated cuff likely tracheomegaly?   - Continue on chronic vent 16/400/5/21   - Chronic bibasilar atelectasis and continued on HTS PRN with chest PT  - Trach upsized to Bivona 9 on 7/17, however trach leak remains.   - Pending Custom Tracheostomy ordered by Thoracic surgery     GI  # Dysphagia   - Continue on PEG-TF     RENAL  # High PVR   - BS with roughly 200-300cc PVR, however no acute distress   - Monitor renal function and UOP     INFECTIOUS DISEASE  # Leukocytosis likely second to trachitis vs UTI?   - No fever or chills and noted with prior leukocytosis   - CXR with prior atelectasis and no acute findings   - UA (7/6) with positive nitrites (7/6)   - SCx (7/5) with pansensitive PSA  - BCx (7/6) x 2 NGT  - UCx (7/6) with coag neg staph  - MRSA PCR (7/6) POSITIVE for both MRSA/MSSA s/p Bactroban (7/6 - 7/10)  - s/p empiric Zosyn (7/8 - 7/14) however WBC continues to wax and wean without fever and will monitor off ABX .     HEME  - On Xarelto 10mg at home likely for DVT PPX?   - Hold home Xarelto in prep for OR   - DVT PPX with LVX for now     ENDOCRINE  - No hx of DM2   - Monitor BG     SKIN  - Basic trach and PEG care ordered     ETHICS/ GOC    - FULL CODE   - Dr Tyson Velasquez,  involved in care    DISPO - Back home with  when able.

## 2024-07-30 NOTE — PROGRESS NOTE ADULT - SUBJECTIVE AND OBJECTIVE BOX
CHIEF COMPLAINT: Patient is a 69y old  Female who presents with a chief complaint of trach exchange (18 Jul 2024 07:07)    Interval Events:    REVIEW OF SYSTEMS:  [ ] All other systems negative  [ ] Unable to assess ROS because ________    Mode: CPAP with PS, FiO2: 30, PEEP: 5, PS: 15, MAP: 8, PIP: 21    OBJECTIVE:  ICU Vital Signs Last 24 Hrs  T(C): 36.6 (30 Jul 2024 08:00), Max: 36.6 (29 Jul 2024 16:00)  T(F): 97.8 (30 Jul 2024 08:00), Max: 97.8 (29 Jul 2024 16:00)  HR: 108 (30 Jul 2024 08:00) (94 - 111)  BP: 165/88 (30 Jul 2024 08:00) (111/71 - 165/88)  BP(mean): 110 (30 Jul 2024 08:00) (110 - 110)  ABP: --  ABP(mean): --  RR: 18 (30 Jul 2024 08:00) (16 - 19)  SpO2: 100% (30 Jul 2024 08:00) (98% - 100%)    O2 Parameters below as of 30 Jul 2024 08:00  Patient On (Oxygen Delivery Method): ventilator    Mode: CPAP with PS, FiO2: 30, PEEP: 5, PS: 15, MAP: 8, PIP: 21    07-29 @ 07:01  -  07-30 @ 07:00  --------------------------------------------------------  IN: 1300 mL / OUT: 700 mL / NET: 600 mL    CAPILLARY BLOOD GLUCOSE    HOSPITAL MEDICATIONS:  MEDICATIONS  (STANDING):  artificial  tears Solution 1 Drop(s) Both EYES <User Schedule>  chlorhexidine 0.12% Liquid 15 milliLiter(s) Oral Mucosa every 12 hours  chlorhexidine 2% Cloths 1 Application(s) Topical daily  diazepam    Tablet 2 milliGRAM(s) Oral every 12 hours  enoxaparin Injectable 40 milliGRAM(s) SubCutaneous every 24 hours  erythromycin   Ointment 1 Application(s) Both EYES <User Schedule>  multivitamin/minerals/iron Oral Solution (CENTRUM) 15 milliLiter(s) Oral daily  ofloxacin 0.3% Solution 1 Drop(s) Right EYE every 4 hours  ofloxacin 0.3% Solution 1 Drop(s) Left EYE every 6 hours  petrolatum Ophthalmic Ointment 1 Application(s) Both EYES <User Schedule>  riluzole 50 milliGRAM(s) Oral two times a day  sertraline 75 milliGRAM(s) Oral daily    MEDICATIONS  (PRN):  acetaminophen   Oral Liquid .. 650 milliGRAM(s) Oral every 6 hours PRN Temp greater or equal to 38C (100.4F), Mild Pain (1 - 3)  diazepam    Tablet 2 milliGRAM(s) Oral at bedtime PRN for dysautonomia/ hypertension/discomfort      LABS:                        13.3   12.55 )-----------( 373      ( 30 Jul 2024 03:19 )             42.9     07-30    142  |  101  |  28<H>  ----------------------------<  162<H>  4.8   |  29  |  <0.20<L>    Ca    9.6      30 Jul 2024 03:19  Phos  3.8     07-30  Mg     2.10     07-30    Urinalysis Basic - ( 30 Jul 2024 03:19 )    Color: x / Appearance: x / SG: x / pH: x  Gluc: 162 mg/dL / Ketone: x  / Bili: x / Urobili: x   Blood: x / Protein: x / Nitrite: x   Leuk Esterase: x / RBC: x / WBC x   Sq Epi: x / Non Sq Epi: x / Bacteria: x    PAST MEDICAL & SURGICAL HISTORY:  ALS (amyotrophic lateral sclerosis)    HLD (hyperlipidemia)    Ventilator dependent  Since 2015    Aspiration into airway    S/P gastrostomy  10/14 for dysphagia  receives jevity 2 cans TID    Dependence on tracheostomy    Encounter for PEG (percutaneous endoscopic gastrostomy)  peg placed    FAMILY HISTORY:  No pertinent family history in first degree relatives    Social History:    RADIOLOGY:  [ ] Reviewed and interpreted by me    PULMONARY FUNCTION TESTS:    EKG: CHIEF COMPLAINT: Patient is a 69y old  Female who presents with a chief complaint of trach exchange (18 Jul 2024 07:07)    Interval Events: None reported overnight. Clinically unchanged. No new nursing issues reported. Awaiting for Custom Trach. CT Sx following.  VSS, and medications reviewed.     REVIEW OF SYSTEMS:  See above  [x] Unable to assess ROS because hx of ALS    Mode: CPAP with PS, FiO2: 30, PEEP: 5, PS: 15, MAP: 8, PIP: 21    OBJECTIVE:  ICU Vital Signs Last 24 Hrs  T(C): 36.6 (30 Jul 2024 08:00), Max: 36.6 (29 Jul 2024 16:00)  T(F): 97.8 (30 Jul 2024 08:00), Max: 97.8 (29 Jul 2024 16:00)  HR: 108 (30 Jul 2024 08:00) (94 - 111)  BP: 165/88 (30 Jul 2024 08:00) (111/71 - 165/88)  BP(mean): 110 (30 Jul 2024 08:00) (110 - 110)  ABP: --  ABP(mean): --  RR: 18 (30 Jul 2024 08:00) (16 - 19)  SpO2: 100% (30 Jul 2024 08:00) (98% - 100%)    O2 Parameters below as of 30 Jul 2024 08:00  Patient On (Oxygen Delivery Method): ventilator    Mode: CPAP with PS, FiO2: 30, PEEP: 5, PS: 15, MAP: 8, PIP: 21    07-29 @ 07:01  -  07-30 @ 07:00  --------------------------------------------------------  IN: 1300 mL / OUT: 700 mL / NET: 600 mL    CAPILLARY BLOOD GLUCOSE    HOSPITAL MEDICATIONS:  MEDICATIONS  (STANDING):  artificial  tears Solution 1 Drop(s) Both EYES <User Schedule>  chlorhexidine 0.12% Liquid 15 milliLiter(s) Oral Mucosa every 12 hours  chlorhexidine 2% Cloths 1 Application(s) Topical daily  diazepam    Tablet 2 milliGRAM(s) Oral every 12 hours  enoxaparin Injectable 40 milliGRAM(s) SubCutaneous every 24 hours  erythromycin   Ointment 1 Application(s) Both EYES <User Schedule>  multivitamin/minerals/iron Oral Solution (CENTRUM) 15 milliLiter(s) Oral daily  ofloxacin 0.3% Solution 1 Drop(s) Right EYE every 4 hours  ofloxacin 0.3% Solution 1 Drop(s) Left EYE every 6 hours  petrolatum Ophthalmic Ointment 1 Application(s) Both EYES <User Schedule>  riluzole 50 milliGRAM(s) Oral two times a day  sertraline 75 milliGRAM(s) Oral daily    MEDICATIONS  (PRN):  acetaminophen   Oral Liquid .. 650 milliGRAM(s) Oral every 6 hours PRN Temp greater or equal to 38C (100.4F), Mild Pain (1 - 3)  diazepam    Tablet 2 milliGRAM(s) Oral at bedtime PRN for dysautonomia/ hypertension/discomfort    PHYSICAL EXAM:  General: NAD, +Trach to Vent   Neck: neck supple, no JVD, trach midline  Eye: +corneal opacification noted b/l, R>L, +eyes covered  Respiratory: +rhonchi auscultated b/l, no wheeze, no rales, no resp distress  Heart: +s1. s2  Abdomen: soft, non tender, +PEG  Extremities: no LE edema b/l in +SCDs  Skin: warm and dry  Neurological: +neurologic deficits d/t ALS hx   Psychiatry: no agitation    LABS:                        13.3   12.55 )-----------( 373      ( 30 Jul 2024 03:19 )             42.9     07-30    142  |  101  |  28<H>  ----------------------------<  162<H>  4.8   |  29  |  <0.20<L>    Ca    9.6      30 Jul 2024 03:19  Phos  3.8     07-30  Mg     2.10     07-30    Urinalysis Basic - ( 30 Jul 2024 03:19 )    Color: x / Appearance: x / SG: x / pH: x  Gluc: 162 mg/dL / Ketone: x  / Bili: x / Urobili: x   Blood: x / Protein: x / Nitrite: x   Leuk Esterase: x / RBC: x / WBC x   Sq Epi: x / Non Sq Epi: x / Bacteria: x    PAST MEDICAL & SURGICAL HISTORY:  ALS (amyotrophic lateral sclerosis)    HLD (hyperlipidemia)    Ventilator dependent  Since 2015    Aspiration into airway    S/P gastrostomy  10/14 for dysphagia  receives jevity 2 cans TID    Dependence on tracheostomy    Encounter for PEG (percutaneous endoscopic gastrostomy)  peg placed    FAMILY HISTORY:  No pertinent family history in first degree relatives    Social History:    RADIOLOGY:  [ ] Reviewed and interpreted by me    PULMONARY FUNCTION TESTS:    EKG:

## 2024-07-30 NOTE — CHART NOTE - NSCHARTNOTEFT_GEN_A_CORE
Nutrition Follow-Up/Chart Note         Source: Patient A&Ox 0   Family [ x ]     RN [x ]    Chart [x ]     Pt is seen for nutrition follow up due to severe/moderate malnutrition, per protocol.    Hospital Course:    Per chart review, Patient is a 67 YO Female with PMHx of ALS, Parkinson,, nonverbal, bed bound, chronic respiratory failure with tracheostomy and vent dependence, and oropharyngeal dysphagia with PEG who presented from home with tracheostomy leak. She was noted with vent alarm and poor TVe (250-290) at home. Trach changed at home with no improvement and sent in for thoracic evaluation. While in MICU no air leak noted with hyperinflated cuff. Transferred to [facility] on 7/5 with course complicated by leukocytosis second to UTI vs trachitis and completed zosyn course. Trach upsized by thoracic on 7/17 however AL remains and now pending custom trach.     Nutrition Course:  Patient is being re-evaluation 2/2 Length of stay on EN feeding, per protocol.  Patient was noted alert, non-verbal at baseline, unable to participate in nutrition interview at time of visit.  Noted son at bedside.  Noted EN feeding Jevity 1.2 via PEG running at goal rate of 50ml x 20 hrs, providing 1200ml formula day.  This is providing 1440 kcal, 67 gm protein, 972ml free water from formula.  Additional provision of free water per medical discretion.  This EN feeding is providing additional calorie/protein in house vs Patient's home EN regimen (948ml/day=4 bottles/day).  Patient has no N/V/D/C, last BM on 7/27 with fecal incontinences per RN flowsheet.  Noted tolerating EN feeding well per discussion with RN.  Recommend to continue current EN feeding regimen.  Water flushes per medical discretion, please consider to increase water flushes as trending elevated BUN.  Patient may resume back to home regimen (@70ml/hr) when medically feasible to be discharged home.  Son voiced no question/concern to this writer.  RDN to remain available for further nutrition intervention as indicated.     Diet, NPO with Tube Feed:   Tube Feeding Modality: Gastrostomy  Jevity 1.2 Marck (JEVITY1.2RTH)  Total Volume for 24 Hours (mL): 1200  Continuous  Starting Tube Feed Rate {mL per Hour}: 30  Increase Tube Feed Rate by (mL): 10     Every 4 hours  Until Goal Tube Feed Rate (mL per Hour): 50  Tube Feed Duration (in Hours): 24  Tube Feed Start Time: 17:00 (07-17-24 @ 18:23)    Anthropometrics:   Height (cm): 162.6 (07-05)  Weight (kg): 56.9 (07-30)  BMI (kg/m2): 21.5 (07-30)  IBW: 120 lbs/54.5kg +/- 10%    Weight Assessment: per RN flowsheet in KG  7/7 - 58.2kg  7/6 - n/a  7/5 - 56.1kg    % weight change : n/a    Edema: 1+ generalized edema per RN flowsheet    Skin: Incontinence Associated Dermatitis per RN flowsheet    Estimated Needs Assessment: [  ] No change in need assessment    Weight Used: recalculated via #/ 54.5kg   Estimated Energy: 8238-7733 Kcal/kg/day (20-25 kcal/kg) given pt is bed-bound and requiring less energy needs  Estimated Protein: 43.6-54.5 gm/kg/day ( 0.8-1 gm/kg)  Estimated Fluid: per MD and team discretion.      __________________ Pertinent Medications__________________   MEDICATIONS  (STANDING):  artificial  tears Solution 1 Drop(s) Both EYES <User Schedule>  chlorhexidine 0.12% Liquid 15 milliLiter(s) Oral Mucosa every 12 hours  chlorhexidine 2% Cloths 1 Application(s) Topical daily  diazepam    Tablet 2 milliGRAM(s) Oral every 12 hours  enoxaparin Injectable 40 milliGRAM(s) SubCutaneous every 24 hours  erythromycin   Ointment 1 Application(s) Both EYES <User Schedule>  multivitamin/minerals/iron Oral Solution (CENTRUM) 15 milliLiter(s) Oral daily  ofloxacin 0.3% Solution 1 Drop(s) Right EYE every 4 hours  ofloxacin 0.3% Solution 1 Drop(s) Left EYE every 6 hours  petrolatum Ophthalmic Ointment 1 Application(s) Both EYES <User Schedule>  riluzole 50 milliGRAM(s) Oral two times a day  sertraline 75 milliGRAM(s) Oral daily    MEDICATIONS  (PRN):  acetaminophen   Oral Liquid .. 650 milliGRAM(s) Oral every 6 hours PRN Temp greater or equal to 38C (100.4F), Mild Pain (1 - 3)  diazepam    Tablet 2 milliGRAM(s) Oral at bedtime PRN for dysautonomia/ hypertension/discomfort    __________________ Pertinent Labs__________________   07-30 Na142 mmol/L Glu 162 mg/dL<H> K+ 4.8 mmol/L Cr  <0.20 mg/dL<L> BUN 28 mg/dL<H> 07-30 Phos 3.8 mg/dL 07-25 Alb 3.7 g/dL      Previous Nutrition Diagnosis: No active nutrition diagnosis identified at this time    New Nutrition Diagnosis :  [ x ] not applicable     Education:  [ x ] Not applicable 2/2 cognitive deficit    Recommendations:  1. Continue present EN order of Jevity 1.2@50ml/hr for 24 hours, with total volume 1200ml /day. Free water per medical discretion.  2. Please consider to increase water flushes as trending elevated BUN  3. Continue to trend electrolytes, replete per medical/team discretion.    Monitoring and Evaluation:   [ x ] Monitor EN tolerance, skin integrity, bowel regimen, and nutrition pertinent labs.  [ x ] Tolerance to diet prescription [ x ] weights [ x ] follow up per protocol

## 2024-07-30 NOTE — PROGRESS NOTE ADULT - NS ATTEND AMEND GEN_ALL_CORE FT
67 yo F w/ PMH of ALS, chronic respiratory failure w/ tracheostomy and vent dependence, oropharyngeal dysphagia w/ PEG, Parkinson's disease, non verbal at baseline presents w/ tracheostomy air leak.   w/ set volume at 400. Tracheostomy changed at home with no improvement and so presented for further evaluation. She underwent upsizing and trach exchange to size 9 [place] on 7/17. She tolerated the procedure well.     7/18 noted to have significant air leak, minimal volumes returned. Additional 8 cc added to trach cuff with improvement in volume returned. Bedside bronchoscopy by CT surgery - trach in place, thin secretions aspirated.     -----continues to have positional leak, at times large- Custom trach ordered,  by thoracic surg ----- will exchange when available.  ---f/u by thoracic surg   - baseline mental status, on home regimen: Rilutek 50 mg bid, Valium 2 mg bid, Zoloft 75 mg daily   - On VC//16/+5/30%, RR 16    - leukocytosis, tx for UTI and possible tracheitis. On Zosyn 7/8 - 7/14 7 day course. Leukocytosis but no other localizing signs of infection. Will monitor off abx for now   - labile BP - monitor for now, hydralazine prn, ? possibly due to discomfort, pain meds prn   - sputum cx pansensitive pseudomonas.   - ophtho consult appreciated - errosive keratopathy, improving  - Will continue to monitor.

## 2024-07-31 LAB
BASE EXCESS BLDV CALC-SCNC: 5.9 MMOL/L — HIGH (ref -2–3)
CA-I SERPL-SCNC: 1.24 MMOL/L — SIGNIFICANT CHANGE UP (ref 1.15–1.33)
CHLORIDE BLDV-SCNC: 104 MMOL/L — SIGNIFICANT CHANGE UP (ref 96–108)
CO2 BLDV-SCNC: 30.9 MMOL/L — HIGH (ref 22–26)
GAS PNL BLDV: 138 MMOL/L — SIGNIFICANT CHANGE UP (ref 136–145)
GAS PNL BLDV: SIGNIFICANT CHANGE UP
GAS PNL BLDV: SIGNIFICANT CHANGE UP
GLUCOSE BLDV-MCNC: 168 MG/DL — HIGH (ref 70–99)
HCO3 BLDV-SCNC: 30 MMOL/L — HIGH (ref 22–29)
HCT VFR BLDA CALC: 38 % — SIGNIFICANT CHANGE UP (ref 34.5–46.5)
HGB BLD CALC-MCNC: 12.5 G/DL — SIGNIFICANT CHANGE UP (ref 11.7–16.1)
LACTATE BLDV-MCNC: 1.9 MMOL/L — SIGNIFICANT CHANGE UP (ref 0.5–2)
PCO2 BLDV: 39 MMHG — SIGNIFICANT CHANGE UP (ref 39–52)
PH BLDV: 7.49 — HIGH (ref 7.32–7.43)
PO2 BLDV: 97 MMHG — HIGH (ref 25–45)
POTASSIUM BLDV-SCNC: 4.5 MMOL/L — SIGNIFICANT CHANGE UP (ref 3.5–5.1)
SAO2 % BLDV: 99.4 % — HIGH (ref 67–88)

## 2024-07-31 PROCEDURE — 99233 SBSQ HOSP IP/OBS HIGH 50: CPT | Mod: FS

## 2024-07-31 RX ADMIN — POVIDONE, PROPYLENE GLYCOL 1 DROP(S): 6.8; 3 LIQUID OPHTHALMIC at 05:34

## 2024-07-31 RX ADMIN — OFLOXACIN 1 DROP(S): 3 SOLUTION/ DROPS OPHTHALMIC at 21:43

## 2024-07-31 RX ADMIN — POVIDONE, PROPYLENE GLYCOL 1 APPLICATION(S): 6.8; 3 LIQUID OPHTHALMIC at 06:55

## 2024-07-31 RX ADMIN — CHLORHEXIDINE GLUCONATE 15 MILLILITER(S): 40 SOLUTION TOPICAL at 05:33

## 2024-07-31 RX ADMIN — POVIDONE, PROPYLENE GLYCOL 1 DROP(S): 6.8; 3 LIQUID OPHTHALMIC at 13:33

## 2024-07-31 RX ADMIN — OFLOXACIN 1 DROP(S): 3 SOLUTION/ DROPS OPHTHALMIC at 13:34

## 2024-07-31 RX ADMIN — Medication 2 MILLIGRAM(S): at 17:02

## 2024-07-31 RX ADMIN — POVIDONE, PROPYLENE GLYCOL 1 DROP(S): 6.8; 3 LIQUID OPHTHALMIC at 09:03

## 2024-07-31 RX ADMIN — ERYTHROMYCIN 1 APPLICATION(S): 5 OINTMENT OPHTHALMIC at 23:11

## 2024-07-31 RX ADMIN — POVIDONE, PROPYLENE GLYCOL 1 APPLICATION(S): 6.8; 3 LIQUID OPHTHALMIC at 02:04

## 2024-07-31 RX ADMIN — RILUZOLE 50 MILLIGRAM(S): 5 LIQUID ORAL at 05:33

## 2024-07-31 RX ADMIN — POVIDONE, PROPYLENE GLYCOL 1 DROP(S): 6.8; 3 LIQUID OPHTHALMIC at 04:05

## 2024-07-31 RX ADMIN — ERYTHROMYCIN 1 APPLICATION(S): 5 OINTMENT OPHTHALMIC at 14:27

## 2024-07-31 RX ADMIN — CHLORHEXIDINE GLUCONATE 1 APPLICATION(S): 40 SOLUTION TOPICAL at 11:28

## 2024-07-31 RX ADMIN — POVIDONE, PROPYLENE GLYCOL 1 DROP(S): 6.8; 3 LIQUID OPHTHALMIC at 21:43

## 2024-07-31 RX ADMIN — POVIDONE, PROPYLENE GLYCOL 1 DROP(S): 6.8; 3 LIQUID OPHTHALMIC at 14:27

## 2024-07-31 RX ADMIN — OFLOXACIN 1 DROP(S): 3 SOLUTION/ DROPS OPHTHALMIC at 17:03

## 2024-07-31 RX ADMIN — POVIDONE, PROPYLENE GLYCOL 1 DROP(S): 6.8; 3 LIQUID OPHTHALMIC at 04:56

## 2024-07-31 RX ADMIN — OFLOXACIN 1 DROP(S): 3 SOLUTION/ DROPS OPHTHALMIC at 05:34

## 2024-07-31 RX ADMIN — POVIDONE, PROPYLENE GLYCOL 1 APPLICATION(S): 6.8; 3 LIQUID OPHTHALMIC at 21:44

## 2024-07-31 RX ADMIN — RILUZOLE 50 MILLIGRAM(S): 5 LIQUID ORAL at 17:02

## 2024-07-31 RX ADMIN — Medication 15 MILLILITER(S): at 11:28

## 2024-07-31 RX ADMIN — OFLOXACIN 1 DROP(S): 3 SOLUTION/ DROPS OPHTHALMIC at 11:26

## 2024-07-31 RX ADMIN — POVIDONE, PROPYLENE GLYCOL 1 DROP(S): 6.8; 3 LIQUID OPHTHALMIC at 12:26

## 2024-07-31 RX ADMIN — ERYTHROMYCIN 1 APPLICATION(S): 5 OINTMENT OPHTHALMIC at 05:34

## 2024-07-31 RX ADMIN — POVIDONE, PROPYLENE GLYCOL 1 APPLICATION(S): 6.8; 3 LIQUID OPHTHALMIC at 11:27

## 2024-07-31 RX ADMIN — OFLOXACIN 1 DROP(S): 3 SOLUTION/ DROPS OPHTHALMIC at 23:11

## 2024-07-31 RX ADMIN — SERTRALINE HYDROCHLORIDE 75 MILLIGRAM(S): 50 TABLET, FILM COATED ORAL at 11:28

## 2024-07-31 RX ADMIN — ERYTHROMYCIN 1 APPLICATION(S): 5 OINTMENT OPHTHALMIC at 02:04

## 2024-07-31 RX ADMIN — OFLOXACIN 1 DROP(S): 3 SOLUTION/ DROPS OPHTHALMIC at 02:03

## 2024-07-31 RX ADMIN — POVIDONE, PROPYLENE GLYCOL 1 DROP(S): 6.8; 3 LIQUID OPHTHALMIC at 17:03

## 2024-07-31 RX ADMIN — ERYTHROMYCIN 1 APPLICATION(S): 5 OINTMENT OPHTHALMIC at 09:03

## 2024-07-31 RX ADMIN — ERYTHROMYCIN 1 APPLICATION(S): 5 OINTMENT OPHTHALMIC at 17:04

## 2024-07-31 RX ADMIN — POVIDONE, PROPYLENE GLYCOL 1 DROP(S): 6.8; 3 LIQUID OPHTHALMIC at 02:03

## 2024-07-31 RX ADMIN — POVIDONE, PROPYLENE GLYCOL 1 DROP(S): 6.8; 3 LIQUID OPHTHALMIC at 16:00

## 2024-07-31 RX ADMIN — CHLORHEXIDINE GLUCONATE 15 MILLILITER(S): 40 SOLUTION TOPICAL at 17:03

## 2024-07-31 RX ADMIN — POVIDONE, PROPYLENE GLYCOL 1 DROP(S): 6.8; 3 LIQUID OPHTHALMIC at 06:55

## 2024-07-31 RX ADMIN — POVIDONE, PROPYLENE GLYCOL 1 DROP(S): 6.8; 3 LIQUID OPHTHALMIC at 23:10

## 2024-07-31 RX ADMIN — POVIDONE, PROPYLENE GLYCOL 1 APPLICATION(S): 6.8; 3 LIQUID OPHTHALMIC at 18:33

## 2024-07-31 RX ADMIN — POVIDONE, PROPYLENE GLYCOL 1 APPLICATION(S): 6.8; 3 LIQUID OPHTHALMIC at 04:05

## 2024-07-31 RX ADMIN — Medication 2 MILLIGRAM(S): at 05:33

## 2024-07-31 RX ADMIN — POVIDONE, PROPYLENE GLYCOL 1 DROP(S): 6.8; 3 LIQUID OPHTHALMIC at 18:33

## 2024-07-31 RX ADMIN — ERYTHROMYCIN 1 APPLICATION(S): 5 OINTMENT OPHTHALMIC at 21:43

## 2024-07-31 RX ADMIN — ENOXAPARIN SODIUM 40 MILLIGRAM(S): 100 INJECTION SUBCUTANEOUS at 05:33

## 2024-07-31 RX ADMIN — OFLOXACIN 1 DROP(S): 3 SOLUTION/ DROPS OPHTHALMIC at 11:25

## 2024-07-31 RX ADMIN — ERYTHROMYCIN 1 APPLICATION(S): 5 OINTMENT OPHTHALMIC at 11:27

## 2024-07-31 RX ADMIN — POVIDONE, PROPYLENE GLYCOL 1 APPLICATION(S): 6.8; 3 LIQUID OPHTHALMIC at 16:00

## 2024-07-31 RX ADMIN — POVIDONE, PROPYLENE GLYCOL 1 APPLICATION(S): 6.8; 3 LIQUID OPHTHALMIC at 13:34

## 2024-07-31 RX ADMIN — POVIDONE, PROPYLENE GLYCOL 1 DROP(S): 6.8; 3 LIQUID OPHTHALMIC at 11:26

## 2024-07-31 NOTE — PROGRESS NOTE ADULT - SUBJECTIVE AND OBJECTIVE BOX
CHIEF COMPLAINT: Patient is a 69y old  Female who presents with a chief complaint of trach exchange (18 Jul 2024 07:07)      INTERVAL EVENTS:   - No acute overnight events.  - Awaiting custom trach with Thoracic surgery    ROS: Seen by bedside during AM rounds     OBJECTIVE:  ICU Vital Signs Last 24 Hrs  T(C): 36.1 (31 Jul 2024 04:00), Max: 36.6 (30 Jul 2024 08:00)  T(F): 96.9 (31 Jul 2024 04:00), Max: 97.8 (30 Jul 2024 08:00)  HR: 103 (31 Jul 2024 04:00) (101 - 111)  BP: 124/86 (31 Jul 2024 04:00) (100/73 - 165/88)  BP(mean): 96 (31 Jul 2024 04:00) (83 - 110)  ABP: --  ABP(mean): --  RR: 17 (31 Jul 2024 04:00) (16 - 20)  SpO2: 98% (31 Jul 2024 04:00) (97% - 100%)    O2 Parameters below as of 31 Jul 2024 04:00  Patient On (Oxygen Delivery Method): ventilator    O2 Concentration (%): 30      Mode: AC/ CMV (Assist Control/ Continuous Mandatory Ventilation), RR (machine): 16, TV (machine): 400, FiO2: 30, PEEP: 5, ITime: 0.64, MAP: 18, PIP: 25    07-29 @ 07:01 - 07-30 @ 07:00  --------------------------------------------------------  IN: 1400 mL / OUT: 700 mL / NET: 700 mL    07-30 @ 07:01 - 07-31 @ 06:40  --------------------------------------------------------  IN: 1500 mL / OUT: 750 mL / NET: 750 mL      CAPILLARY BLOOD GLUCOSE          PHYSICAL EXAM:  General:   HEENT:   Lymph Nodes:  Neck:   Respiratory:   Cardiovascular:   Abdomen:   Extremities:   Skin:   Neurological:  Psychiatry:    Mode: AC/ CMV (Assist Control/ Continuous Mandatory Ventilation)  RR (machine): 16  TV (machine): 400  FiO2: 30  PEEP: 5  ITime: 0.64  MAP: 18  PIP: 25      HOSPITAL MEDICATIONS:  MEDICATIONS  (STANDING):  artificial  tears Solution 1 Drop(s) Both EYES <User Schedule>  chlorhexidine 0.12% Liquid 15 milliLiter(s) Oral Mucosa every 12 hours  chlorhexidine 2% Cloths 1 Application(s) Topical daily  diazepam    Tablet 2 milliGRAM(s) Oral every 12 hours  enoxaparin Injectable 40 milliGRAM(s) SubCutaneous every 24 hours  erythromycin   Ointment 1 Application(s) Both EYES <User Schedule>  multivitamin/minerals/iron Oral Solution (CENTRUM) 15 milliLiter(s) Oral daily  ofloxacin 0.3% Solution 1 Drop(s) Right EYE every 4 hours  ofloxacin 0.3% Solution 1 Drop(s) Left EYE every 6 hours  petrolatum Ophthalmic Ointment 1 Application(s) Both EYES <User Schedule>  riluzole 50 milliGRAM(s) Oral two times a day  sertraline 75 milliGRAM(s) Oral daily    MEDICATIONS  (PRN):  acetaminophen   Oral Liquid .. 650 milliGRAM(s) Oral every 6 hours PRN Temp greater or equal to 38C (100.4F), Mild Pain (1 - 3)  diazepam    Tablet 2 milliGRAM(s) Oral at bedtime PRN for dysautonomia/ hypertension/discomfort      LABS:                        13.3   12.55 )-----------( 373      ( 30 Jul 2024 03:19 )             42.9     07-30    142  |  101  |  28<H>  ----------------------------<  162<H>  4.8   |  29  |  <0.20<L>    Ca    9.6      30 Jul 2024 03:19  Phos  3.8     07-30  Mg     2.10     07-30        Urinalysis Basic - ( 30 Jul 2024 03:19 )    Color: x / Appearance: x / SG: x / pH: x  Gluc: 162 mg/dL / Ketone: x  / Bili: x / Urobili: x   Blood: x / Protein: x / Nitrite: x   Leuk Esterase: x / RBC: x / WBC x   Sq Epi: x / Non Sq Epi: x / Bacteria: x         CHIEF COMPLAINT: Patient is a 69y old  Female who presents with a chief complaint of trach exchange (18 Jul 2024 07:07)      INTERVAL EVENTS:   - No acute overnight events.  - Awaiting custom trach with Thoracic surgery    ROS: Seen by bedside during AM rounds     OBJECTIVE:  ICU Vital Signs Last 24 Hrs  T(C): 36.1 (31 Jul 2024 04:00), Max: 36.6 (30 Jul 2024 08:00)  T(F): 96.9 (31 Jul 2024 04:00), Max: 97.8 (30 Jul 2024 08:00)  HR: 103 (31 Jul 2024 04:00) (101 - 111)  BP: 124/86 (31 Jul 2024 04:00) (100/73 - 165/88)  BP(mean): 96 (31 Jul 2024 04:00) (83 - 110)  ABP: --  ABP(mean): --  RR: 17 (31 Jul 2024 04:00) (16 - 20)  SpO2: 98% (31 Jul 2024 04:00) (97% - 100%)    O2 Parameters below as of 31 Jul 2024 04:00  Patient On (Oxygen Delivery Method): ventilator    O2 Concentration (%): 30      Mode: AC/ CMV (Assist Control/ Continuous Mandatory Ventilation), RR (machine): 16, TV (machine): 400, FiO2: 30, PEEP: 5, ITime: 0.64, MAP: 18, PIP: 25    07-29 @ 07:01 - 07-30 @ 07:00  --------------------------------------------------------  IN: 1400 mL / OUT: 700 mL / NET: 700 mL    07-30 @ 07:01 - 07-31 @ 06:40  --------------------------------------------------------  IN: 1500 mL / OUT: 750 mL / NET: 750 mL      CAPILLARY BLOOD GLUCOSE          PHYSICAL EXAM:  General: NAD, + trach to vent  Eye: + eyes covered.   Neck: neck supple, no JVD, trach midline  Respiratory: + rhonchi auscultated b/l. No wheezing or rales. No accessory muscle use. No respiratory distress.   Cardiovascular: Normal S1 and S2. Regular rate and rhythm.   Abdomen: soft, nondistended. No tenderness x4 quadrants. + PEG  Extremities: no LE edema b/l. +SCDs  Skin: warm and dry  Neurological: +neurologic deficits d/t ALS hx   Psychiatry: no agitation    Mode: AC/ CMV (Assist Control/ Continuous Mandatory Ventilation)  RR (machine): 16  TV (machine): 400  FiO2: 30  PEEP: 5  ITime: 0.64  MAP: 18  PIP: 25      HOSPITAL MEDICATIONS:  MEDICATIONS  (STANDING):  artificial  tears Solution 1 Drop(s) Both EYES <User Schedule>  chlorhexidine 0.12% Liquid 15 milliLiter(s) Oral Mucosa every 12 hours  chlorhexidine 2% Cloths 1 Application(s) Topical daily  diazepam    Tablet 2 milliGRAM(s) Oral every 12 hours  enoxaparin Injectable 40 milliGRAM(s) SubCutaneous every 24 hours  erythromycin   Ointment 1 Application(s) Both EYES <User Schedule>  multivitamin/minerals/iron Oral Solution (CENTRUM) 15 milliLiter(s) Oral daily  ofloxacin 0.3% Solution 1 Drop(s) Right EYE every 4 hours  ofloxacin 0.3% Solution 1 Drop(s) Left EYE every 6 hours  petrolatum Ophthalmic Ointment 1 Application(s) Both EYES <User Schedule>  riluzole 50 milliGRAM(s) Oral two times a day  sertraline 75 milliGRAM(s) Oral daily    MEDICATIONS  (PRN):  acetaminophen   Oral Liquid .. 650 milliGRAM(s) Oral every 6 hours PRN Temp greater or equal to 38C (100.4F), Mild Pain (1 - 3)  diazepam    Tablet 2 milliGRAM(s) Oral at bedtime PRN for dysautonomia/ hypertension/discomfort      LABS:                        13.3   12.55 )-----------( 373      ( 30 Jul 2024 03:19 )             42.9     07-30    142  |  101  |  28<H>  ----------------------------<  162<H>  4.8   |  29  |  <0.20<L>    Ca    9.6      30 Jul 2024 03:19  Phos  3.8     07-30  Mg     2.10     07-30        Urinalysis Basic - ( 30 Jul 2024 03:19 )    Color: x / Appearance: x / SG: x / pH: x  Gluc: 162 mg/dL / Ketone: x  / Bili: x / Urobili: x   Blood: x / Protein: x / Nitrite: x   Leuk Esterase: x / RBC: x / WBC x   Sq Epi: x / Non Sq Epi: x / Bacteria: x         CHIEF COMPLAINT: Patient is a 69y old  Female who presents with a chief complaint of trach exchange (18 Jul 2024 07:07)      INTERVAL EVENTS:   - No acute overnight events.  - Awaiting custom trach with Thoracic surgery    ROS: Seen by bedside during AM rounds, unable to obtain d/t baseline mental status/ ALS     OBJECTIVE:  ICU Vital Signs Last 24 Hrs  T(C): 36.1 (31 Jul 2024 04:00), Max: 36.6 (30 Jul 2024 08:00)  T(F): 96.9 (31 Jul 2024 04:00), Max: 97.8 (30 Jul 2024 08:00)  HR: 103 (31 Jul 2024 04:00) (101 - 111)  BP: 124/86 (31 Jul 2024 04:00) (100/73 - 165/88)  BP(mean): 96 (31 Jul 2024 04:00) (83 - 110)  ABP: --  ABP(mean): --  RR: 17 (31 Jul 2024 04:00) (16 - 20)  SpO2: 98% (31 Jul 2024 04:00) (97% - 100%)    O2 Parameters below as of 31 Jul 2024 04:00  Patient On (Oxygen Delivery Method): ventilator    O2 Concentration (%): 30      Mode: AC/ CMV (Assist Control/ Continuous Mandatory Ventilation), RR (machine): 16, TV (machine): 400, FiO2: 30, PEEP: 5, ITime: 0.64, MAP: 18, PIP: 25    07-29 @ 07:01  -  07-30 @ 07:00  --------------------------------------------------------  IN: 1400 mL / OUT: 700 mL / NET: 700 mL    07-30 @ 07:01  -  07-31 @ 06:40  --------------------------------------------------------  IN: 1500 mL / OUT: 750 mL / NET: 750 mL      CAPILLARY BLOOD GLUCOSE          PHYSICAL EXAM:  General: NAD, + trach to vent  Eye: + eyes covered.   Neck: neck supple, no JVD, trach midline  Respiratory: + rhonchi auscultated b/l. No wheezing or rales. No accessory muscle use. No respiratory distress.   Cardiovascular: Normal S1 and S2. Regular rate and rhythm.   Abdomen: soft, nondistended. No tenderness x4 quadrants. + PEG  Extremities: no LE edema b/l. +SCDs  Skin: warm and dry  Neurological: +neurologic deficits d/t ALS hx   Psychiatry: no agitation    Mode: AC/ CMV (Assist Control/ Continuous Mandatory Ventilation)  RR (machine): 16  TV (machine): 400  FiO2: 30  PEEP: 5  ITime: 0.64  MAP: 18  PIP: 25      HOSPITAL MEDICATIONS:  MEDICATIONS  (STANDING):  artificial  tears Solution 1 Drop(s) Both EYES <User Schedule>  chlorhexidine 0.12% Liquid 15 milliLiter(s) Oral Mucosa every 12 hours  chlorhexidine 2% Cloths 1 Application(s) Topical daily  diazepam    Tablet 2 milliGRAM(s) Oral every 12 hours  enoxaparin Injectable 40 milliGRAM(s) SubCutaneous every 24 hours  erythromycin   Ointment 1 Application(s) Both EYES <User Schedule>  multivitamin/minerals/iron Oral Solution (CENTRUM) 15 milliLiter(s) Oral daily  ofloxacin 0.3% Solution 1 Drop(s) Right EYE every 4 hours  ofloxacin 0.3% Solution 1 Drop(s) Left EYE every 6 hours  petrolatum Ophthalmic Ointment 1 Application(s) Both EYES <User Schedule>  riluzole 50 milliGRAM(s) Oral two times a day  sertraline 75 milliGRAM(s) Oral daily    MEDICATIONS  (PRN):  acetaminophen   Oral Liquid .. 650 milliGRAM(s) Oral every 6 hours PRN Temp greater or equal to 38C (100.4F), Mild Pain (1 - 3)  diazepam    Tablet 2 milliGRAM(s) Oral at bedtime PRN for dysautonomia/ hypertension/discomfort      LABS:                        13.3   12.55 )-----------( 373      ( 30 Jul 2024 03:19 )             42.9     07-30    142  |  101  |  28<H>  ----------------------------<  162<H>  4.8   |  29  |  <0.20<L>    Ca    9.6      30 Jul 2024 03:19  Phos  3.8     07-30  Mg     2.10     07-30        Urinalysis Basic - ( 30 Jul 2024 03:19 )    Color: x / Appearance: x / SG: x / pH: x  Gluc: 162 mg/dL / Ketone: x  / Bili: x / Urobili: x   Blood: x / Protein: x / Nitrite: x   Leuk Esterase: x / RBC: x / WBC x   Sq Epi: x / Non Sq Epi: x / Bacteria: x

## 2024-07-31 NOTE — PROGRESS NOTE ADULT - NS ATTEND AMEND GEN_ALL_CORE FT
67 yo F w/ PMH of ALS, chronic respiratory failure w/ tracheostomy and vent dependence, oropharyngeal dysphagia w/ PEG, Parkinson's disease, non verbal at baseline presents w/ tracheostomy air leak.   w/ set volume at 400. Tracheostomy changed at home with no improvement and so presented for further evaluation. She underwent upsizing and trach exchange to size 9 Bivona on 7/17. She tolerated the procedure well.     7/18 noted to have significant air leak, minimal volumes returned. Additional 8 cc added to trach cuff with improvement in volume returned. Bedside bronchoscopy by CT surgery - trach in place, thin secretions aspirated.     -----continues to have positional leak, at times large- Custom trach ordered,  by thoracic surg ----- will exchange when available.  ---f/u by thoracic surg   - check VBG to ensure no sig hypercapnea  - baseline mental status, on home regimen: Rilutek 50 mg bid, Valium 2 mg bid, Zoloft 75 mg daily   - On VC//16/+5/30%, RR 16    - leukocytosis, tx for UTI and possible tracheitis. On Zosyn 7/8 - 7/14 7 day course. Leukocytosis but no other localizing signs of infection. Will monitor off abx for now   - labile BP - monitor for now, hydralazine prn, ? possibly due to discomfort, pain meds prn   - sputum cx pansensitive pseudomonas.   - ophtho consult appreciated - errosive keratopathy, improving  - Will continue to monitor.

## 2024-07-31 NOTE — PROGRESS NOTE ADULT - ASSESSMENT
69 YO Female with PMHx of ALS, Parkinson,, nonverbal, bed bound, chronic respiratory failure with tracheostomy and vent dependence, and oropharyngeal dysphagia with PEG who presented from home with tracheostomy leak. She was noted with vent alarm and poor TVe (250-290) at home. Trach changed at home with no improvement and sent in for thoracic evaluation. While in MICU no air leak noted with hyperinflated cuff. Transferred to [facility] on 7/5 with course complicated by leukocytosis second to UTI vs trachitis and completed zosyn course. Trach upsized by thoracic on 7/17 however AL remains and now pending custom trach.     PLAN  NEUROLOGY  # ALS   - Baseline nonverbal, awake, and communicates with eye movement.   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 Nurses taught moister chamber placement with optho team.     PSYCH   # Anxiety and Depression   - Continue on home Valium 2mg BID with additional 2mg PRN   - Continue on Zoloft 75mg QD while in house (on 4cc/ 80mg QD at home)     CARDIOVASCULAR   # HTN thought to be second to discomfort vs dysautonomia   - Evening HTN and continue on Hydralazine vs valium PRN doses   - Monitor HR and BP    RESPIRATORY  # Tracheostomy leak   - At home noted with TVe 200-300s despite tracheostomy change to 80XLTCD.   - No air leak noted in house with hyperinflated cuff likely tracheomegaly?   - Continue on chronic vent 16/400/5/21   - Chronic bibasilar atelectasis and continued on HTS PRN with chest PT  - Trach upsized to Bivona 9 on 7/17, however trach leak remains.   - Pending Custom Tracheostomy ordered by Thoracic surgery     GI  # Dysphagia   - Continue on PEG-TF     RENAL  # High PVR   - BS with roughly 200-300cc PVR, however no acute distress   - Monitor renal function and UOP     INFECTIOUS DISEASE  # Leukocytosis likely second to trachitis vs UTI?   - No fever or chills and noted with prior leukocytosis   - CXR with prior atelectasis and no acute findings   - UA (7/6) with positive nitrites (7/6)   - SCx (7/5) with pansensitive PSA  - BCx (7/6) x 2 NGT  - UCx (7/6) with coag neg staph  - MRSA PCR (7/6) POSITIVE for both MRSA/MSSA s/p Bactroban (7/6 - 7/10)  - s/p empiric Zosyn (7/8 - 7/14) however WBC continues to wax and wean without fever and will monitor off ABX .     HEME  - On Xarelto 10mg at home likely for DVT PPX?   - Hold home Xarelto in prep for OR   - DVT PPX with LVX for now     ENDOCRINE  - No hx of DM2   - Monitor BG     SKIN  - Basic trach and PEG care ordered     ETHICS/ GOC    - FULL CODE   - Dr Tyson Velasquez,  involved in care    DISPO - Back home with  when able.

## 2024-08-01 LAB
ANION GAP SERPL CALC-SCNC: 14 MMOL/L — SIGNIFICANT CHANGE UP (ref 7–14)
BASE EXCESS BLDV CALC-SCNC: 6.2 MMOL/L — HIGH (ref -2–3)
BUN SERPL-MCNC: 26 MG/DL — HIGH (ref 7–23)
CA-I SERPL-SCNC: 1.23 MMOL/L — SIGNIFICANT CHANGE UP (ref 1.15–1.33)
CALCIUM SERPL-MCNC: 9.5 MG/DL — SIGNIFICANT CHANGE UP (ref 8.4–10.5)
CHLORIDE BLDV-SCNC: 108 MMOL/L — SIGNIFICANT CHANGE UP (ref 96–108)
CHLORIDE SERPL-SCNC: 104 MMOL/L — SIGNIFICANT CHANGE UP (ref 98–107)
CO2 BLDV-SCNC: 30.6 MMOL/L — HIGH (ref 22–26)
CO2 SERPL-SCNC: 25 MMOL/L — SIGNIFICANT CHANGE UP (ref 22–31)
CREAT SERPL-MCNC: <0.2 MG/DL — LOW (ref 0.5–1.3)
EGFR: 125 ML/MIN/1.73M2 — SIGNIFICANT CHANGE UP
GAS PNL BLDV: 138 MMOL/L — SIGNIFICANT CHANGE UP (ref 136–145)
GAS PNL BLDV: SIGNIFICANT CHANGE UP
GLUCOSE BLDV-MCNC: 156 MG/DL — HIGH (ref 70–99)
GLUCOSE SERPL-MCNC: 148 MG/DL — HIGH (ref 70–99)
HCO3 BLDV-SCNC: 30 MMOL/L — HIGH (ref 22–29)
HCT VFR BLD CALC: 39.7 % — SIGNIFICANT CHANGE UP (ref 34.5–45)
HCT VFR BLDA CALC: 39 % — SIGNIFICANT CHANGE UP (ref 34.5–46.5)
HGB BLD CALC-MCNC: 13 G/DL — SIGNIFICANT CHANGE UP (ref 11.7–16.1)
HGB BLD-MCNC: 12.3 G/DL — SIGNIFICANT CHANGE UP (ref 11.5–15.5)
LACTATE BLDV-MCNC: 2.8 MMOL/L — HIGH (ref 0.5–2)
MAGNESIUM SERPL-MCNC: 2.1 MG/DL — SIGNIFICANT CHANGE UP (ref 1.6–2.6)
MCHC RBC-ENTMCNC: 26.2 PG — LOW (ref 27–34)
MCHC RBC-ENTMCNC: 31 GM/DL — LOW (ref 32–36)
MCV RBC AUTO: 84.6 FL — SIGNIFICANT CHANGE UP (ref 80–100)
NRBC # BLD: 0 /100 WBCS — SIGNIFICANT CHANGE UP (ref 0–0)
NRBC # FLD: 0 K/UL — SIGNIFICANT CHANGE UP (ref 0–0)
PCO2 BLDV: 37 MMHG — LOW (ref 39–52)
PH BLDV: 7.51 — HIGH (ref 7.32–7.43)
PHOSPHATE SERPL-MCNC: 3.6 MG/DL — SIGNIFICANT CHANGE UP (ref 2.5–4.5)
PLATELET # BLD AUTO: 364 K/UL — SIGNIFICANT CHANGE UP (ref 150–400)
PO2 BLDV: 189 MMHG — HIGH (ref 25–45)
POTASSIUM BLDV-SCNC: 4.2 MMOL/L — SIGNIFICANT CHANGE UP (ref 3.5–5.1)
POTASSIUM SERPL-MCNC: 4.1 MMOL/L — SIGNIFICANT CHANGE UP (ref 3.5–5.3)
POTASSIUM SERPL-SCNC: 4.1 MMOL/L — SIGNIFICANT CHANGE UP (ref 3.5–5.3)
RBC # BLD: 4.69 M/UL — SIGNIFICANT CHANGE UP (ref 3.8–5.2)
RBC # FLD: 16.5 % — HIGH (ref 10.3–14.5)
SAO2 % BLDV: 99.8 % — HIGH (ref 67–88)
SODIUM SERPL-SCNC: 143 MMOL/L — SIGNIFICANT CHANGE UP (ref 135–145)
WBC # BLD: 13.14 K/UL — HIGH (ref 3.8–10.5)
WBC # FLD AUTO: 13.14 K/UL — HIGH (ref 3.8–10.5)

## 2024-08-01 PROCEDURE — 99233 SBSQ HOSP IP/OBS HIGH 50: CPT | Mod: FS

## 2024-08-01 PROCEDURE — 99232 SBSQ HOSP IP/OBS MODERATE 35: CPT

## 2024-08-01 RX ORDER — OFLOXACIN 3 MG/ML
1 SOLUTION/ DROPS OPHTHALMIC
Refills: 0 | Status: DISCONTINUED | OUTPATIENT
Start: 2024-08-01 | End: 2024-08-29

## 2024-08-01 RX ORDER — DIAZEPAM 10 MG
2 TABLET ORAL EVERY 12 HOURS
Refills: 0 | Status: DISCONTINUED | OUTPATIENT
Start: 2024-08-01 | End: 2024-08-08

## 2024-08-01 RX ORDER — DIAZEPAM 10 MG
2 TABLET ORAL AT BEDTIME
Refills: 0 | Status: DISCONTINUED | OUTPATIENT
Start: 2024-08-01 | End: 2024-08-08

## 2024-08-01 RX ADMIN — POVIDONE, PROPYLENE GLYCOL 1 DROP(S): 6.8; 3 LIQUID OPHTHALMIC at 11:18

## 2024-08-01 RX ADMIN — POVIDONE, PROPYLENE GLYCOL 1 DROP(S): 6.8; 3 LIQUID OPHTHALMIC at 02:25

## 2024-08-01 RX ADMIN — POVIDONE, PROPYLENE GLYCOL 1 DROP(S): 6.8; 3 LIQUID OPHTHALMIC at 04:00

## 2024-08-01 RX ADMIN — OFLOXACIN 1 DROP(S): 3 SOLUTION/ DROPS OPHTHALMIC at 17:00

## 2024-08-01 RX ADMIN — POVIDONE, PROPYLENE GLYCOL 1 DROP(S): 6.8; 3 LIQUID OPHTHALMIC at 22:52

## 2024-08-01 RX ADMIN — RILUZOLE 50 MILLIGRAM(S): 5 LIQUID ORAL at 18:43

## 2024-08-01 RX ADMIN — Medication 2 MILLIGRAM(S): at 05:35

## 2024-08-01 RX ADMIN — ERYTHROMYCIN 1 APPLICATION(S): 5 OINTMENT OPHTHALMIC at 11:18

## 2024-08-01 RX ADMIN — POVIDONE, PROPYLENE GLYCOL 1 APPLICATION(S): 6.8; 3 LIQUID OPHTHALMIC at 18:43

## 2024-08-01 RX ADMIN — ERYTHROMYCIN 1 APPLICATION(S): 5 OINTMENT OPHTHALMIC at 02:25

## 2024-08-01 RX ADMIN — ERYTHROMYCIN 1 APPLICATION(S): 5 OINTMENT OPHTHALMIC at 23:47

## 2024-08-01 RX ADMIN — Medication 2 MILLIGRAM(S): at 18:43

## 2024-08-01 RX ADMIN — ERYTHROMYCIN 1 APPLICATION(S): 5 OINTMENT OPHTHALMIC at 18:44

## 2024-08-01 RX ADMIN — POVIDONE, PROPYLENE GLYCOL 1 APPLICATION(S): 6.8; 3 LIQUID OPHTHALMIC at 01:20

## 2024-08-01 RX ADMIN — OFLOXACIN 1 DROP(S): 3 SOLUTION/ DROPS OPHTHALMIC at 11:19

## 2024-08-01 RX ADMIN — OFLOXACIN 1 DROP(S): 3 SOLUTION/ DROPS OPHTHALMIC at 05:35

## 2024-08-01 RX ADMIN — Medication 15 MILLILITER(S): at 11:17

## 2024-08-01 RX ADMIN — ERYTHROMYCIN 1 APPLICATION(S): 5 OINTMENT OPHTHALMIC at 21:30

## 2024-08-01 RX ADMIN — CHLORHEXIDINE GLUCONATE 15 MILLILITER(S): 40 SOLUTION TOPICAL at 05:35

## 2024-08-01 RX ADMIN — SERTRALINE HYDROCHLORIDE 75 MILLIGRAM(S): 50 TABLET, FILM COATED ORAL at 11:17

## 2024-08-01 RX ADMIN — POVIDONE, PROPYLENE GLYCOL 1 DROP(S): 6.8; 3 LIQUID OPHTHALMIC at 01:21

## 2024-08-01 RX ADMIN — POVIDONE, PROPYLENE GLYCOL 1 DROP(S): 6.8; 3 LIQUID OPHTHALMIC at 18:43

## 2024-08-01 RX ADMIN — CHLORHEXIDINE GLUCONATE 15 MILLILITER(S): 40 SOLUTION TOPICAL at 18:44

## 2024-08-01 RX ADMIN — POVIDONE, PROPYLENE GLYCOL 1 DROP(S): 6.8; 3 LIQUID OPHTHALMIC at 10:31

## 2024-08-01 RX ADMIN — ERYTHROMYCIN 1 APPLICATION(S): 5 OINTMENT OPHTHALMIC at 05:35

## 2024-08-01 RX ADMIN — ERYTHROMYCIN 1 APPLICATION(S): 5 OINTMENT OPHTHALMIC at 08:00

## 2024-08-01 RX ADMIN — RILUZOLE 50 MILLIGRAM(S): 5 LIQUID ORAL at 05:35

## 2024-08-01 RX ADMIN — OFLOXACIN 1 DROP(S): 3 SOLUTION/ DROPS OPHTHALMIC at 18:43

## 2024-08-01 RX ADMIN — POVIDONE, PROPYLENE GLYCOL 1 DROP(S): 6.8; 3 LIQUID OPHTHALMIC at 05:36

## 2024-08-01 RX ADMIN — ERYTHROMYCIN 1 APPLICATION(S): 5 OINTMENT OPHTHALMIC at 17:00

## 2024-08-01 RX ADMIN — POVIDONE, PROPYLENE GLYCOL 1 DROP(S): 6.8; 3 LIQUID OPHTHALMIC at 17:00

## 2024-08-01 RX ADMIN — POVIDONE, PROPYLENE GLYCOL 1 DROP(S): 6.8; 3 LIQUID OPHTHALMIC at 07:44

## 2024-08-01 RX ADMIN — OFLOXACIN 1 DROP(S): 3 SOLUTION/ DROPS OPHTHALMIC at 10:31

## 2024-08-01 RX ADMIN — POVIDONE, PROPYLENE GLYCOL 1 DROP(S): 6.8; 3 LIQUID OPHTHALMIC at 18:45

## 2024-08-01 RX ADMIN — POVIDONE, PROPYLENE GLYCOL 1 DROP(S): 6.8; 3 LIQUID OPHTHALMIC at 00:39

## 2024-08-01 RX ADMIN — POVIDONE, PROPYLENE GLYCOL 1 APPLICATION(S): 6.8; 3 LIQUID OPHTHALMIC at 10:31

## 2024-08-01 RX ADMIN — POVIDONE, PROPYLENE GLYCOL 1 DROP(S): 6.8; 3 LIQUID OPHTHALMIC at 08:00

## 2024-08-01 RX ADMIN — ENOXAPARIN SODIUM 40 MILLIGRAM(S): 100 INJECTION SUBCUTANEOUS at 05:34

## 2024-08-01 RX ADMIN — POVIDONE, PROPYLENE GLYCOL 1 APPLICATION(S): 6.8; 3 LIQUID OPHTHALMIC at 04:00

## 2024-08-01 RX ADMIN — POVIDONE, PROPYLENE GLYCOL 1 DROP(S): 6.8; 3 LIQUID OPHTHALMIC at 18:46

## 2024-08-01 RX ADMIN — POVIDONE, PROPYLENE GLYCOL 1 APPLICATION(S): 6.8; 3 LIQUID OPHTHALMIC at 07:46

## 2024-08-01 RX ADMIN — OFLOXACIN 1 DROP(S): 3 SOLUTION/ DROPS OPHTHALMIC at 02:25

## 2024-08-01 RX ADMIN — CHLORHEXIDINE GLUCONATE 1 APPLICATION(S): 40 SOLUTION TOPICAL at 11:18

## 2024-08-01 RX ADMIN — POVIDONE, PROPYLENE GLYCOL 1 APPLICATION(S): 6.8; 3 LIQUID OPHTHALMIC at 16:59

## 2024-08-01 RX ADMIN — POVIDONE, PROPYLENE GLYCOL 1 DROP(S): 6.8; 3 LIQUID OPHTHALMIC at 20:00

## 2024-08-01 RX ADMIN — POVIDONE, PROPYLENE GLYCOL 1 APPLICATION(S): 6.8; 3 LIQUID OPHTHALMIC at 22:08

## 2024-08-01 NOTE — PROGRESS NOTE ADULT - ASSESSMENT
67 YO Female with PMHx of ALS, Parkinson,, nonverbal, bed bound, chronic respiratory failure with tracheostomy and vent dependence, and oropharyngeal dysphagia with PEG who presented from home with tracheostomy leak. She was noted with vent alarm and poor TVe (250-290) at home. Trach changed at home with no improvement and sent in for thoracic evaluation. While in MICU no air leak noted with hyperinflated cuff. Transferred to [facility] on 7/5 with course complicated by leukocytosis second to UTI vs trachitis and completed zosyn course. Trach upsized by thoracic on 7/17 however AL remains and now pending custom trach.     PLAN  NEUROLOGY  # ALS   - Baseline nonverbal, awake, and communicates with eye movement.   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 Nurses taught moister chamber placement with optho team.     PSYCH   # Anxiety and Depression   - Continue on home Valium 2mg BID with additional 2mg PRN   - Continue on Zoloft 75mg QD while in house (on 4cc/ 80mg QD at home)     CARDIOVASCULAR   # HTN thought to be second to discomfort vs dysautonomia   - Evening HTN and continue on Hydralazine vs valium PRN doses   - Monitor HR and BP    RESPIRATORY  # Tracheostomy leak   - At home noted with TVe 200-300s despite tracheostomy change to 80XLTCD.   - No air leak noted in house with hyperinflated cuff likely tracheomegaly?   - Continue on chronic vent 16/400/5/21   - Chronic bibasilar atelectasis and continued on HTS PRN with chest PT  - Trach upsized to Bivona 9 on 7/17, however trach leak remains.   - Pending Custom Tracheostomy ordered by Thoracic surgery     GI  # Dysphagia   - Continue on PEG-TF     RENAL  # High PVR   - BS with roughly 200-300cc PVR, however no acute distress   - Monitor renal function and UOP     INFECTIOUS DISEASE  # Leukocytosis likely second to trachitis vs UTI?   - No fever or chills and noted with prior leukocytosis   - CXR with prior atelectasis and no acute findings   - UA (7/6) with positive nitrites (7/6)   - SCx (7/5) with pansensitive PSA  - BCx (7/6) x 2 NGT  - UCx (7/6) with coag neg staph  - MRSA PCR (7/6) POSITIVE for both MRSA/MSSA s/p Bactroban (7/6 - 7/10)  - s/p empiric Zosyn (7/8 - 7/14) however WBC continues to wax and wean without fever and will monitor off ABX .     HEME  - On Xarelto 10mg at home likely for DVT PPX?   - Hold home Xarelto in prep for OR   - DVT PPX with LVX for now     ENDOCRINE  - No hx of DM2   - Monitor BG     SKIN  - Basic trach and PEG care ordered     ETHICS/ GOC    - FULL CODE   - Dr Tyson Velasquez,  involved in care    DISPO - Back home with  when able.

## 2024-08-01 NOTE — PROGRESS NOTE ADULT - SUBJECTIVE AND OBJECTIVE BOX
CHIEF COMPLAINT:Patient is a 69y old  Female who presents with a chief complaint of trach exchange (18 Jul 2024 07:07)      INTERVAL EVENTS:     ROS: Seen by bedside during AM rounds     OBJECTIVE:  ICU Vital Signs Last 24 Hrs  T(C): 36.6 (01 Aug 2024 04:00), Max: 36.9 (31 Jul 2024 20:00)  T(F): 97.9 (01 Aug 2024 04:00), Max: 98.5 (31 Jul 2024 20:00)  HR: 98 (01 Aug 2024 07:47) (98 - 109)  BP: 119/89 (01 Aug 2024 04:00) (119/89 - 142/88)  BP(mean): 100 (01 Aug 2024 04:00) (89 - 100)  ABP: --  ABP(mean): --  RR: 17 (01 Aug 2024 04:00) (17 - 20)  SpO2: 100% (01 Aug 2024 07:47) (99% - 100%)    O2 Parameters below as of 01 Aug 2024 04:00  Patient On (Oxygen Delivery Method): ventilator    O2 Concentration (%): 30      Mode: AC/ CMV (Assist Control/ Continuous Mandatory Ventilation), RR (machine): 16, TV (machine): 400, FiO2: 30, PEEP: 5, ITime: 0.78, MAP: 9, PIP: 25    07-31 @ 07:01  -  08-01 @ 07:00  --------------------------------------------------------  IN: 1550 mL / OUT: 550 mL / NET: 1000 mL      CAPILLARY BLOOD GLUCOSE          PHYSICAL EXAM:  General:   HEENT:   Lymph Nodes:  Neck:   Respiratory:   Cardiovascular:   Abdomen:   Extremities:   Skin:   Neurological:  Psychiatry:    Mode: AC/ CMV (Assist Control/ Continuous Mandatory Ventilation)  RR (machine): 16  TV (machine): 400  FiO2: 30  PEEP: 5  ITime: 0.78  MAP: 9  PIP: 25      HOSPITAL MEDICATIONS:  MEDICATIONS  (STANDING):  artificial  tears Solution 1 Drop(s) Both EYES <User Schedule>  chlorhexidine 0.12% Liquid 15 milliLiter(s) Oral Mucosa every 12 hours  chlorhexidine 2% Cloths 1 Application(s) Topical daily  diazepam    Tablet 2 milliGRAM(s) Oral every 12 hours  enoxaparin Injectable 40 milliGRAM(s) SubCutaneous every 24 hours  erythromycin   Ointment 1 Application(s) Both EYES <User Schedule>  multivitamin/minerals/iron Oral Solution (CENTRUM) 15 milliLiter(s) Oral daily  ofloxacin 0.3% Solution 1 Drop(s) Right EYE every 4 hours  ofloxacin 0.3% Solution 1 Drop(s) Left EYE every 6 hours  petrolatum Ophthalmic Ointment 1 Application(s) Both EYES <User Schedule>  riluzole 50 milliGRAM(s) Oral two times a day  sertraline 75 milliGRAM(s) Oral daily    MEDICATIONS  (PRN):  acetaminophen   Oral Liquid .. 650 milliGRAM(s) Oral every 6 hours PRN Temp greater or equal to 38C (100.4F), Mild Pain (1 - 3)  diazepam    Tablet 2 milliGRAM(s) Oral at bedtime PRN for dysautonomia/ hypertension/discomfort      LABS:                        12.3   13.14 )-----------( 364      ( 01 Aug 2024 03:00 )             39.7     08-01    143  |  104  |  26<H>  ----------------------------<  148<H>  4.1   |  25  |  <0.20<L>    Ca    9.5      01 Aug 2024 03:00  Phos  3.6     08-01  Mg     2.10     08-01        Urinalysis Basic - ( 01 Aug 2024 03:00 )    Color: x / Appearance: x / SG: x / pH: x  Gluc: 148 mg/dL / Ketone: x  / Bili: x / Urobili: x   Blood: x / Protein: x / Nitrite: x   Leuk Esterase: x / RBC: x / WBC x   Sq Epi: x / Non Sq Epi: x / Bacteria: x        Venous Blood Gas:  08-01 @ 03:00  7.51/37/189/30/99.8  VBG Lactate: 2.8  Venous Blood Gas:  07-31 @ 11:32  7.49/39/97/30/99.4  VBG Lactate: 1.9   CHIEF COMPLAINT:Patient is a 69y old  Female who presents with a chief complaint of trach exchange (18 Jul 2024 07:07)      INTERVAL EVENTS:     ROS: Seen by bedside during AM rounds     OBJECTIVE:  ICU Vital Signs Last 24 Hrs  T(C): 36.6 (01 Aug 2024 04:00), Max: 36.9 (31 Jul 2024 20:00)  T(F): 97.9 (01 Aug 2024 04:00), Max: 98.5 (31 Jul 2024 20:00)  HR: 98 (01 Aug 2024 07:47) (98 - 109)  BP: 119/89 (01 Aug 2024 04:00) (119/89 - 142/88)  BP(mean): 100 (01 Aug 2024 04:00) (89 - 100)  ABP: --  ABP(mean): --  RR: 17 (01 Aug 2024 04:00) (17 - 20)  SpO2: 100% (01 Aug 2024 07:47) (99% - 100%)    O2 Parameters below as of 01 Aug 2024 04:00  Patient On (Oxygen Delivery Method): ventilator    O2 Concentration (%): 30      Mode: AC/ CMV (Assist Control/ Continuous Mandatory Ventilation), RR (machine): 16, TV (machine): 400, FiO2: 30, PEEP: 5, ITime: 0.78, MAP: 9, PIP: 25    07-31 @ 07:01  -  08-01 @ 07:00  --------------------------------------------------------  IN: 1550 mL / OUT: 550 mL / NET: 1000 mL      CAPILLARY BLOOD GLUCOSE          PHYSICAL EXAM:  General: NAD   Neck: (+) Trach tube noted, site c/d/i.  Cards: S1/S2, no murmurs   Pulm: rhonchorous b/l R>L. No resp distress.  VT 380ml.  Abdomen: Soft, NTND. (+) PEG noted, site c/d/i.   Extremities: No pedal edema. Extremities warm to touch. Intact distal pulses.   Neurology: Awake/eyes open. Nonverbal. Not following commands. Not withdrawing to pain.   Skin: warm to touch, color appropriate for ethnicity. Refer to RN assessment for further details.    Mode: AC/ CMV (Assist Control/ Continuous Mandatory Ventilation)  RR (machine): 16  TV (machine): 400  FiO2: 30  PEEP: 5  ITime: 0.78  MAP: 9  PIP: 25      HOSPITAL MEDICATIONS:  MEDICATIONS  (STANDING):  artificial  tears Solution 1 Drop(s) Both EYES <User Schedule>  chlorhexidine 0.12% Liquid 15 milliLiter(s) Oral Mucosa every 12 hours  chlorhexidine 2% Cloths 1 Application(s) Topical daily  diazepam    Tablet 2 milliGRAM(s) Oral every 12 hours  enoxaparin Injectable 40 milliGRAM(s) SubCutaneous every 24 hours  erythromycin   Ointment 1 Application(s) Both EYES <User Schedule>  multivitamin/minerals/iron Oral Solution (CENTRUM) 15 milliLiter(s) Oral daily  ofloxacin 0.3% Solution 1 Drop(s) Right EYE every 4 hours  ofloxacin 0.3% Solution 1 Drop(s) Left EYE every 6 hours  petrolatum Ophthalmic Ointment 1 Application(s) Both EYES <User Schedule>  riluzole 50 milliGRAM(s) Oral two times a day  sertraline 75 milliGRAM(s) Oral daily    MEDICATIONS  (PRN):  acetaminophen   Oral Liquid .. 650 milliGRAM(s) Oral every 6 hours PRN Temp greater or equal to 38C (100.4F), Mild Pain (1 - 3)  diazepam    Tablet 2 milliGRAM(s) Oral at bedtime PRN for dysautonomia/ hypertension/discomfort      LABS:                        12.3   13.14 )-----------( 364      ( 01 Aug 2024 03:00 )             39.7     08-01    143  |  104  |  26<H>  ----------------------------<  148<H>  4.1   |  25  |  <0.20<L>    Ca    9.5      01 Aug 2024 03:00  Phos  3.6     08-01  Mg     2.10     08-01        Urinalysis Basic - ( 01 Aug 2024 03:00 )    Color: x / Appearance: x / SG: x / pH: x  Gluc: 148 mg/dL / Ketone: x  / Bili: x / Urobili: x   Blood: x / Protein: x / Nitrite: x   Leuk Esterase: x / RBC: x / WBC x   Sq Epi: x / Non Sq Epi: x / Bacteria: x        Venous Blood Gas:  08-01 @ 03:00  7.51/37/189/30/99.8  VBG Lactate: 2.8  Venous Blood Gas:  07-31 @ 11:32  7.49/39/97/30/99.4  VBG Lactate: 1.9   CHIEF COMPLAINT:Patient is a 69y old  Female who presents with a chief complaint of trach exchange (18 Jul 2024 07:07)      INTERVAL EVENTS: no overnight events.    ROS: Seen by bedside during AM rounds     OBJECTIVE:  ICU Vital Signs Last 24 Hrs  T(C): 36.6 (01 Aug 2024 04:00), Max: 36.9 (31 Jul 2024 20:00)  T(F): 97.9 (01 Aug 2024 04:00), Max: 98.5 (31 Jul 2024 20:00)  HR: 98 (01 Aug 2024 07:47) (98 - 109)  BP: 119/89 (01 Aug 2024 04:00) (119/89 - 142/88)  BP(mean): 100 (01 Aug 2024 04:00) (89 - 100)  ABP: --  ABP(mean): --  RR: 17 (01 Aug 2024 04:00) (17 - 20)  SpO2: 100% (01 Aug 2024 07:47) (99% - 100%)    O2 Parameters below as of 01 Aug 2024 04:00  Patient On (Oxygen Delivery Method): ventilator    O2 Concentration (%): 30      Mode: AC/ CMV (Assist Control/ Continuous Mandatory Ventilation), RR (machine): 16, TV (machine): 400, FiO2: 30, PEEP: 5, ITime: 0.78, MAP: 9, PIP: 25    07-31 @ 07:01  -  08-01 @ 07:00  --------------------------------------------------------  IN: 1550 mL / OUT: 550 mL / NET: 1000 mL      CAPILLARY BLOOD GLUCOSE          PHYSICAL EXAM:  General: NAD   Neck: (+) Trach tube noted, site c/d/i.  Cards: S1/S2, no murmurs   Pulm: rhonchorous b/l R>L. No resp distress.  VT 380ml.  Abdomen: Soft, NTND. (+) PEG noted, site c/d/i.   Extremities: No pedal edema. Extremities warm to touch. Intact distal pulses.   Neurology: Awake/eyes open. Nonverbal. Not following commands. Not withdrawing to pain.   Skin: warm to touch, color appropriate for ethnicity. Refer to RN assessment for further details.    Mode: AC/ CMV (Assist Control/ Continuous Mandatory Ventilation)  RR (machine): 16  TV (machine): 400  FiO2: 30  PEEP: 5  ITime: 0.78  MAP: 9  PIP: 25      HOSPITAL MEDICATIONS:  MEDICATIONS  (STANDING):  artificial  tears Solution 1 Drop(s) Both EYES <User Schedule>  chlorhexidine 0.12% Liquid 15 milliLiter(s) Oral Mucosa every 12 hours  chlorhexidine 2% Cloths 1 Application(s) Topical daily  diazepam    Tablet 2 milliGRAM(s) Oral every 12 hours  enoxaparin Injectable 40 milliGRAM(s) SubCutaneous every 24 hours  erythromycin   Ointment 1 Application(s) Both EYES <User Schedule>  multivitamin/minerals/iron Oral Solution (CENTRUM) 15 milliLiter(s) Oral daily  ofloxacin 0.3% Solution 1 Drop(s) Right EYE every 4 hours  ofloxacin 0.3% Solution 1 Drop(s) Left EYE every 6 hours  petrolatum Ophthalmic Ointment 1 Application(s) Both EYES <User Schedule>  riluzole 50 milliGRAM(s) Oral two times a day  sertraline 75 milliGRAM(s) Oral daily    MEDICATIONS  (PRN):  acetaminophen   Oral Liquid .. 650 milliGRAM(s) Oral every 6 hours PRN Temp greater or equal to 38C (100.4F), Mild Pain (1 - 3)  diazepam    Tablet 2 milliGRAM(s) Oral at bedtime PRN for dysautonomia/ hypertension/discomfort      LABS:                        12.3   13.14 )-----------( 364      ( 01 Aug 2024 03:00 )             39.7     08-01    143  |  104  |  26<H>  ----------------------------<  148<H>  4.1   |  25  |  <0.20<L>    Ca    9.5      01 Aug 2024 03:00  Phos  3.6     08-01  Mg     2.10     08-01        Urinalysis Basic - ( 01 Aug 2024 03:00 )    Color: x / Appearance: x / SG: x / pH: x  Gluc: 148 mg/dL / Ketone: x  / Bili: x / Urobili: x   Blood: x / Protein: x / Nitrite: x   Leuk Esterase: x / RBC: x / WBC x   Sq Epi: x / Non Sq Epi: x / Bacteria: x        Venous Blood Gas:  08-01 @ 03:00  7.51/37/189/30/99.8  VBG Lactate: 2.8  Venous Blood Gas:  07-31 @ 11:32  7.49/39/97/30/99.4  VBG Lactate: 1.9

## 2024-08-01 NOTE — PROGRESS NOTE ADULT - ASSESSMENT
68 YO Female with PMHx of ALS, Parkinson,, nonverbal, bed bound, chronic respiratory failure with tracheostomy and vent dependence, and oropharyngeal dysphagia with PEG who presented from home with tracheostomy leak.   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 Was previously able to have lids taped shut at night however now becoming more difficult. Patient consequently with noticeable worsening in eye redness OD>>OS despite frequent lubrication   - Initial exam with conjunctival injection OD>>OS, cornea OD with inferior epithelial defect encompassing 40% of cornea. With chronic corneal damage and changes secondary to exposure including extensive overlying filaments, pannus formation, and stromal scarring; OS with inferior epithelial defect encompassing 20% of cornea. Also with chronic corneal damage and changes secondary to exposure including extensive filaments, pannus. No active infiltrate or ulcer appreciated at this time OU. No thinning OU.   - 7/29: exam OU with resolved epithelial defect  - can decrease ofloxacni to twice a day to both eyes (ordered)   - CW Erythromycin ointment and lacrilube each every 3 hours alternating 1.5 hours apart, so that patient is receiving ointment q 1.5 hours, to both eyes   - Create moisture chamber with tegaderm following placement of EXTENSIVE ointment to the right eye throughout the day, and to the left eye nightly. Nurses shown how to make at bedside. The tegaderm should not be placed tightly overlying the eye, and there should be ointment covering the entirety of the exposed eye. Place moisturizing ointment around areas of face where tegaderm is to stick so that patient does not develop skin irritation    - Artificial tears every 1.5 hours before placement of ointment.    ZANDRA Denton     Outpatient follow-up: Patient should follow-up with his/her ophthalmologist or with [facility] Department of Ophthalmology upon discharge at the address below     [facility] Department of Ophthalmology  [address] [place] [address]. [address]  [place] [address]  [phone]

## 2024-08-02 LAB
ANION GAP SERPL CALC-SCNC: 16 MMOL/L — HIGH (ref 7–14)
BASE EXCESS BLDV CALC-SCNC: -0.1 MMOL/L — SIGNIFICANT CHANGE UP (ref -2–3)
BASE EXCESS BLDV CALC-SCNC: 4 MMOL/L — HIGH (ref -2–3)
BASOPHILS # BLD AUTO: 0.05 K/UL — SIGNIFICANT CHANGE UP (ref 0–0.2)
BASOPHILS NFR BLD AUTO: 0.4 % — SIGNIFICANT CHANGE UP (ref 0–2)
BLOOD GAS VENOUS COMPREHENSIVE RESULT: SIGNIFICANT CHANGE UP
BLOOD GAS VENOUS COMPREHENSIVE RESULT: SIGNIFICANT CHANGE UP
BUN SERPL-MCNC: 25 MG/DL — HIGH (ref 7–23)
CALCIUM SERPL-MCNC: 9.7 MG/DL — SIGNIFICANT CHANGE UP (ref 8.4–10.5)
CHLORIDE BLDV-SCNC: 108 MMOL/L — SIGNIFICANT CHANGE UP (ref 96–108)
CHLORIDE BLDV-SCNC: 112 MMOL/L — HIGH (ref 96–108)
CHLORIDE SERPL-SCNC: 104 MMOL/L — SIGNIFICANT CHANGE UP (ref 98–107)
CO2 BLDV-SCNC: 27.3 MMOL/L — HIGH (ref 22–26)
CO2 BLDV-SCNC: 28.7 MMOL/L — HIGH (ref 22–26)
CO2 SERPL-SCNC: 25 MMOL/L — SIGNIFICANT CHANGE UP (ref 22–31)
CREAT SERPL-MCNC: <0.2 MG/DL — LOW (ref 0.5–1.3)
EGFR: 125 ML/MIN/1.73M2 — SIGNIFICANT CHANGE UP
EOSINOPHIL # BLD AUTO: 0.23 K/UL — SIGNIFICANT CHANGE UP (ref 0–0.5)
EOSINOPHIL NFR BLD AUTO: 1.8 % — SIGNIFICANT CHANGE UP (ref 0–6)
GAS PNL BLDV: 142 MMOL/L — SIGNIFICANT CHANGE UP (ref 136–145)
GAS PNL BLDV: 144 MMOL/L — SIGNIFICANT CHANGE UP (ref 136–145)
GLUCOSE BLDV-MCNC: 131 MG/DL — HIGH (ref 70–99)
GLUCOSE BLDV-MCNC: 141 MG/DL — HIGH (ref 70–99)
GLUCOSE SERPL-MCNC: 119 MG/DL — HIGH (ref 70–99)
HCO3 BLDV-SCNC: 26 MMOL/L — SIGNIFICANT CHANGE UP (ref 22–29)
HCO3 BLDV-SCNC: 28 MMOL/L — SIGNIFICANT CHANGE UP (ref 22–29)
HCT VFR BLD CALC: 40.2 % — SIGNIFICANT CHANGE UP (ref 34.5–45)
HCT VFR BLDA CALC: 36 % — SIGNIFICANT CHANGE UP (ref 34.5–46.5)
HCT VFR BLDA CALC: 39 % — SIGNIFICANT CHANGE UP (ref 34.5–46.5)
HGB BLD CALC-MCNC: 12 G/DL — SIGNIFICANT CHANGE UP (ref 11.7–16.1)
HGB BLD CALC-MCNC: 12.9 G/DL — SIGNIFICANT CHANGE UP (ref 11.7–16.1)
HGB BLD-MCNC: 12.4 G/DL — SIGNIFICANT CHANGE UP (ref 11.5–15.5)
IANC: 9.97 K/UL — HIGH (ref 1.8–7.4)
IMM GRANULOCYTES NFR BLD AUTO: 1 % — HIGH (ref 0–0.9)
LACTATE BLDV-MCNC: 1.9 MMOL/L — SIGNIFICANT CHANGE UP (ref 0.5–2)
LACTATE BLDV-MCNC: 4.2 MMOL/L — CRITICAL HIGH (ref 0.5–2)
LYMPHOCYTES # BLD AUTO: 1.42 K/UL — SIGNIFICANT CHANGE UP (ref 1–3.3)
LYMPHOCYTES # BLD AUTO: 11.3 % — LOW (ref 13–44)
MAGNESIUM SERPL-MCNC: 2.3 MG/DL — SIGNIFICANT CHANGE UP (ref 1.6–2.6)
MCHC RBC-ENTMCNC: 26.2 PG — LOW (ref 27–34)
MCHC RBC-ENTMCNC: 30.8 GM/DL — LOW (ref 32–36)
MCV RBC AUTO: 84.8 FL — SIGNIFICANT CHANGE UP (ref 80–100)
MONOCYTES # BLD AUTO: 0.78 K/UL — SIGNIFICANT CHANGE UP (ref 0–0.9)
MONOCYTES NFR BLD AUTO: 6.2 % — SIGNIFICANT CHANGE UP (ref 2–14)
NEUTROPHILS # BLD AUTO: 9.97 K/UL — HIGH (ref 1.8–7.4)
NEUTROPHILS NFR BLD AUTO: 79.3 % — HIGH (ref 43–77)
NRBC # BLD: 0 /100 WBCS — SIGNIFICANT CHANGE UP (ref 0–0)
NRBC # FLD: 0 K/UL — SIGNIFICANT CHANGE UP (ref 0–0)
PCO2 BLDV: 37 MMHG — LOW (ref 39–52)
PCO2 BLDV: 47 MMHG — SIGNIFICANT CHANGE UP (ref 39–52)
PH BLDV: 7.35 — SIGNIFICANT CHANGE UP (ref 7.32–7.43)
PH BLDV: 7.48 — HIGH (ref 7.32–7.43)
PHOSPHATE SERPL-MCNC: 2.9 MG/DL — SIGNIFICANT CHANGE UP (ref 2.5–4.5)
PLATELET # BLD AUTO: 368 K/UL — SIGNIFICANT CHANGE UP (ref 150–400)
PO2 BLDV: 101 MMHG — HIGH (ref 25–45)
PO2 BLDV: 133 MMHG — HIGH (ref 25–45)
POTASSIUM BLDV-SCNC: 4.1 MMOL/L — SIGNIFICANT CHANGE UP (ref 3.5–5.1)
POTASSIUM BLDV-SCNC: 4.4 MMOL/L — SIGNIFICANT CHANGE UP (ref 3.5–5.1)
POTASSIUM SERPL-MCNC: 4.2 MMOL/L — SIGNIFICANT CHANGE UP (ref 3.5–5.3)
POTASSIUM SERPL-SCNC: 4.2 MMOL/L — SIGNIFICANT CHANGE UP (ref 3.5–5.3)
RBC # BLD: 4.74 M/UL — SIGNIFICANT CHANGE UP (ref 3.8–5.2)
RBC # FLD: 16.9 % — HIGH (ref 10.3–14.5)
SAO2 % BLDV: 99 % — HIGH (ref 67–88)
SAO2 % BLDV: 99.6 % — HIGH (ref 67–88)
SODIUM SERPL-SCNC: 145 MMOL/L — SIGNIFICANT CHANGE UP (ref 135–145)
WBC # BLD: 12.58 K/UL — HIGH (ref 3.8–10.5)
WBC # FLD AUTO: 12.58 K/UL — HIGH (ref 3.8–10.5)

## 2024-08-02 PROCEDURE — 99233 SBSQ HOSP IP/OBS HIGH 50: CPT | Mod: FS

## 2024-08-02 RX ORDER — SODIUM CHLORIDE 9 MG/ML
1000 INJECTION INTRAMUSCULAR; INTRAVENOUS; SUBCUTANEOUS
Refills: 0 | Status: DISCONTINUED | OUTPATIENT
Start: 2024-08-02 | End: 2024-08-07

## 2024-08-02 RX ORDER — SODIUM CHLORIDE 9 MG/ML
1000 INJECTION INTRAMUSCULAR; INTRAVENOUS; SUBCUTANEOUS
Refills: 0 | Status: DISCONTINUED | OUTPATIENT
Start: 2024-08-02 | End: 2024-08-02

## 2024-08-02 RX ADMIN — POVIDONE, PROPYLENE GLYCOL 1 DROP(S): 6.8; 3 LIQUID OPHTHALMIC at 13:08

## 2024-08-02 RX ADMIN — POVIDONE, PROPYLENE GLYCOL 1 DROP(S): 6.8; 3 LIQUID OPHTHALMIC at 05:28

## 2024-08-02 RX ADMIN — SERTRALINE HYDROCHLORIDE 75 MILLIGRAM(S): 50 TABLET, FILM COATED ORAL at 11:03

## 2024-08-02 RX ADMIN — POVIDONE, PROPYLENE GLYCOL 1 APPLICATION(S): 6.8; 3 LIQUID OPHTHALMIC at 10:17

## 2024-08-02 RX ADMIN — POVIDONE, PROPYLENE GLYCOL 1 DROP(S): 6.8; 3 LIQUID OPHTHALMIC at 01:32

## 2024-08-02 RX ADMIN — ERYTHROMYCIN 1 APPLICATION(S): 5 OINTMENT OPHTHALMIC at 11:02

## 2024-08-02 RX ADMIN — POVIDONE, PROPYLENE GLYCOL 1 APPLICATION(S): 6.8; 3 LIQUID OPHTHALMIC at 17:34

## 2024-08-02 RX ADMIN — OFLOXACIN 1 DROP(S): 3 SOLUTION/ DROPS OPHTHALMIC at 06:20

## 2024-08-02 RX ADMIN — POVIDONE, PROPYLENE GLYCOL 1 DROP(S): 6.8; 3 LIQUID OPHTHALMIC at 15:09

## 2024-08-02 RX ADMIN — POVIDONE, PROPYLENE GLYCOL 1 APPLICATION(S): 6.8; 3 LIQUID OPHTHALMIC at 01:38

## 2024-08-02 RX ADMIN — CHLORHEXIDINE GLUCONATE 15 MILLILITER(S): 40 SOLUTION TOPICAL at 17:35

## 2024-08-02 RX ADMIN — POVIDONE, PROPYLENE GLYCOL 1 DROP(S): 6.8; 3 LIQUID OPHTHALMIC at 00:38

## 2024-08-02 RX ADMIN — Medication 2 MILLIGRAM(S): at 05:29

## 2024-08-02 RX ADMIN — ERYTHROMYCIN 1 APPLICATION(S): 5 OINTMENT OPHTHALMIC at 23:46

## 2024-08-02 RX ADMIN — POVIDONE, PROPYLENE GLYCOL 1 DROP(S): 6.8; 3 LIQUID OPHTHALMIC at 23:46

## 2024-08-02 RX ADMIN — POVIDONE, PROPYLENE GLYCOL 1 DROP(S): 6.8; 3 LIQUID OPHTHALMIC at 09:16

## 2024-08-02 RX ADMIN — RILUZOLE 50 MILLIGRAM(S): 5 LIQUID ORAL at 05:29

## 2024-08-02 RX ADMIN — POVIDONE, PROPYLENE GLYCOL 1 APPLICATION(S): 6.8; 3 LIQUID OPHTHALMIC at 05:28

## 2024-08-02 RX ADMIN — POVIDONE, PROPYLENE GLYCOL 1 APPLICATION(S): 6.8; 3 LIQUID OPHTHALMIC at 13:08

## 2024-08-02 RX ADMIN — ENOXAPARIN SODIUM 40 MILLIGRAM(S): 100 INJECTION SUBCUTANEOUS at 05:29

## 2024-08-02 RX ADMIN — POVIDONE, PROPYLENE GLYCOL 1 DROP(S): 6.8; 3 LIQUID OPHTHALMIC at 21:15

## 2024-08-02 RX ADMIN — POVIDONE, PROPYLENE GLYCOL 1 DROP(S): 6.8; 3 LIQUID OPHTHALMIC at 06:50

## 2024-08-02 RX ADMIN — CHLORHEXIDINE GLUCONATE 1 APPLICATION(S): 40 SOLUTION TOPICAL at 11:03

## 2024-08-02 RX ADMIN — POVIDONE, PROPYLENE GLYCOL 1 DROP(S): 6.8; 3 LIQUID OPHTHALMIC at 03:53

## 2024-08-02 RX ADMIN — Medication 2 MILLIGRAM(S): at 17:37

## 2024-08-02 RX ADMIN — OFLOXACIN 1 DROP(S): 3 SOLUTION/ DROPS OPHTHALMIC at 17:34

## 2024-08-02 RX ADMIN — ERYTHROMYCIN 1 APPLICATION(S): 5 OINTMENT OPHTHALMIC at 04:45

## 2024-08-02 RX ADMIN — SODIUM CHLORIDE 100 MILLILITER(S): 9 INJECTION INTRAMUSCULAR; INTRAVENOUS; SUBCUTANEOUS at 10:17

## 2024-08-02 RX ADMIN — ERYTHROMYCIN 1 APPLICATION(S): 5 OINTMENT OPHTHALMIC at 21:45

## 2024-08-02 RX ADMIN — ERYTHROMYCIN 1 APPLICATION(S): 5 OINTMENT OPHTHALMIC at 17:35

## 2024-08-02 RX ADMIN — POVIDONE, PROPYLENE GLYCOL 1 APPLICATION(S): 6.8; 3 LIQUID OPHTHALMIC at 09:16

## 2024-08-02 RX ADMIN — POVIDONE, PROPYLENE GLYCOL 1 DROP(S): 6.8; 3 LIQUID OPHTHALMIC at 22:30

## 2024-08-02 RX ADMIN — ERYTHROMYCIN 1 APPLICATION(S): 5 OINTMENT OPHTHALMIC at 09:16

## 2024-08-02 RX ADMIN — POVIDONE, PROPYLENE GLYCOL 1 APPLICATION(S): 6.8; 3 LIQUID OPHTHALMIC at 19:21

## 2024-08-02 RX ADMIN — Medication 15 MILLILITER(S): at 11:03

## 2024-08-02 RX ADMIN — SODIUM CHLORIDE 75 MILLILITER(S): 9 INJECTION INTRAMUSCULAR; INTRAVENOUS; SUBCUTANEOUS at 09:16

## 2024-08-02 RX ADMIN — POVIDONE, PROPYLENE GLYCOL 1 DROP(S): 6.8; 3 LIQUID OPHTHALMIC at 17:34

## 2024-08-02 RX ADMIN — POVIDONE, PROPYLENE GLYCOL 1 DROP(S): 6.8; 3 LIQUID OPHTHALMIC at 18:27

## 2024-08-02 RX ADMIN — ERYTHROMYCIN 1 APPLICATION(S): 5 OINTMENT OPHTHALMIC at 02:44

## 2024-08-02 RX ADMIN — ERYTHROMYCIN 1 APPLICATION(S): 5 OINTMENT OPHTHALMIC at 15:09

## 2024-08-02 RX ADMIN — POVIDONE, PROPYLENE GLYCOL 1 DROP(S): 6.8; 3 LIQUID OPHTHALMIC at 17:36

## 2024-08-02 RX ADMIN — CHLORHEXIDINE GLUCONATE 15 MILLILITER(S): 40 SOLUTION TOPICAL at 05:29

## 2024-08-02 RX ADMIN — POVIDONE, PROPYLENE GLYCOL 1 DROP(S): 6.8; 3 LIQUID OPHTHALMIC at 10:17

## 2024-08-02 RX ADMIN — RILUZOLE 50 MILLIGRAM(S): 5 LIQUID ORAL at 17:35

## 2024-08-02 RX ADMIN — POVIDONE, PROPYLENE GLYCOL 1 APPLICATION(S): 6.8; 3 LIQUID OPHTHALMIC at 22:30

## 2024-08-02 RX ADMIN — POVIDONE, PROPYLENE GLYCOL 1 DROP(S): 6.8; 3 LIQUID OPHTHALMIC at 11:02

## 2024-08-02 RX ADMIN — POVIDONE, PROPYLENE GLYCOL 1 DROP(S): 6.8; 3 LIQUID OPHTHALMIC at 07:22

## 2024-08-02 NOTE — PROGRESS NOTE ADULT - ASSESSMENT
69 YO Female with PMHx of ALS, Parkinson,, nonverbal, bed bound, chronic respiratory failure with tracheostomy and vent dependence, and oropharyngeal dysphagia with PEG who presented from home with tracheostomy leak. She was noted with vent alarm and poor TVe (250-290) at home. Trach changed at home with no improvement and sent in for thoracic evaluation. While in MICU no air leak noted with hyperinflated cuff. Transferred to [facility] on 7/5 with course complicated by leukocytosis second to UTI vs trachitis and completed zosyn course. Trach upsized by thoracic on 7/17 however AL remains and now pending custom trach.     PLAN  NEUROLOGY  # ALS   - Baseline nonverbal, awake, and communicates with eye movement.   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 Nurses taught moister chamber placement with optho team.     PSYCH   # Anxiety and Depression   - Continue on home Valium 2mg BID with additional 2mg PRN   - Continue on Zoloft 75mg QD while in house (on 4cc/ 80mg QD at home)     CARDIOVASCULAR   # HTN thought to be second to discomfort vs dysautonomia   - Evening HTN and continue on Hydralazine vs valium PRN doses   - Monitor HR and BP    RESPIRATORY  # Tracheostomy leak   - At home noted with TVe 200-300s despite tracheostomy change to 80XLTCD.   - No air leak noted in house with hyperinflated cuff likely tracheomegaly?   - Continue on chronic vent 16/400/5/21   - Chronic bibasilar atelectasis and continued on HTS PRN with chest PT  - Trach upsized to Bivona 9 on 7/17, however trach leak remains.   - Pending Custom Tracheostomy ordered by Thoracic surgery     GI  # Dysphagia   - Continue on PEG-TF     RENAL  # High PVR   - BS with roughly 200-300cc PVR, however no acute distress   - Monitor renal function and UOP     INFECTIOUS DISEASE  # Leukocytosis likely second to trachitis vs UTI?   - No fever or chills and noted with prior leukocytosis   - CXR with prior atelectasis and no acute findings   - UA (7/6) with positive nitrites (7/6)   - SCx (7/5) with pansensitive PSA  - BCx (7/6) x 2 NGT  - UCx (7/6) with coag neg staph  - MRSA PCR (7/6) POSITIVE for both MRSA/MSSA s/p Bactroban (7/6 - 7/10)  - s/p empiric Zosyn (7/8 - 7/14) however WBC continues to wax and wean without fever and will monitor off ABX .     HEME  - On Xarelto 10mg at home likely for DVT PPX?   - Hold home Xarelto in prep for OR   - DVT PPX with LVX for now     ENDOCRINE  - No hx of DM2   - Monitor BG     SKIN  - Basic trach and PEG care ordered     ETHICS/ GOC    - FULL CODE   - Dr Tyson Velasquez,  involved in care    DISPO - Back home with  when able.      67 YO Female with PMHx of ALS, Parkinson,, nonverbal, bed bound, chronic respiratory failure with tracheostomy and vent dependence, and oropharyngeal dysphagia with PEG who presented from home with tracheostomy leak. She was noted with vent alarm and poor TVe (250-290) at home. Trach changed at home with no improvement and sent in for thoracic evaluation. While in MICU no air leak noted with hyperinflated cuff. Transferred to [facility] on 7/5 with course complicated by leukocytosis second to UTI vs trachitis and completed zosyn course. Trach upsized by thoracic on 7/17 however AL remains and now pending custom trach.     PLAN  NEUROLOGY  # ALS   - Baseline nonverbal, awake, and communicates with eye movement.   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 Nurses taught moister chamber placement with optho team.     PSYCH   # Anxiety and Depression   - Continue on home Valium 2mg BID with additional 2mg PRN   - Continue on Zoloft 75mg QD while in house (on 4cc/ 80mg QD at home)     CARDIOVASCULAR   # HTN thought to be second to discomfort vs dysautonomia   - Evening HTN and continue on Hydralazine vs valium PRN doses   - Monitor HR and BP    RESPIRATORY  # Tracheostomy leak   - At home noted with TVe 200-300s despite tracheostomy change to 80XLTCD.   - No air leak noted in house with hyperinflated cuff likely tracheomegaly?   - Continue on chronic vent 16/400/5/21   - Chronic bibasilar atelectasis and continued on HTS PRN with chest PT  - Trach upsized to Bivona 9 on 7/17, however trach leak remains.   - Pending Custom Tracheostomy ordered by Thoracic surgery   - Air leak overnight - AM labs (8/2) revealed lactate of 4.2, 100mL/hr NS x 10 hours ordered; f/u repeat afternoon labs    GI  # Dysphagia   - Continue on PEG-TF     RENAL  # High PVR   - BS with roughly 200-300cc PVR, however no acute distress   - Monitor renal function and UOP     INFECTIOUS DISEASE  # Leukocytosis likely second to trachitis vs UTI?   - No fever or chills and noted with prior leukocytosis   - CXR with prior atelectasis and no acute findings   - UA (7/6) with positive nitrites (7/6)   - SCx (7/5) with pansensitive PSA  - BCx (7/6) x 2 NGT  - UCx (7/6) with coag neg staph  - MRSA PCR (7/6) POSITIVE for both MRSA/MSSA s/p Bactroban (7/6 - 7/10)  - s/p empiric Zosyn (7/8 - 7/14) however WBC continues to wax and wean without fever and will monitor off ABX .     HEME  - On Xarelto 10mg at home likely for DVT PPX?   - Hold home Xarelto in prep for OR   - DVT PPX with LVX for now     ENDOCRINE  - No hx of DM2   - Monitor BG     SKIN  - Basic trach and PEG care ordered     ETHICS/ GOC    - FULL CODE   - Dr Tyson Velasquez,  involved in care    DISPO - Back home with  when able.      67 YO Female with PMHx of ALS, Parkinson,, nonverbal, bed bound, chronic respiratory failure with tracheostomy and vent dependence, and oropharyngeal dysphagia with PEG who presented from home with tracheostomy leak. She was noted with vent alarm and poor TVe (250-290) at home. Trach changed at home with no improvement and sent in for thoracic evaluation. While in MICU no air leak noted with hyperinflated cuff. Transferred to [facility] on 7/5 with course complicated by leukocytosis second to UTI vs trachitis and completed zosyn course. Trach upsized by thoracic on 7/17 however AL remains and now pending custom trach.     NEUROLOGY  # ALS   - Baseline nonverbal, awake, and communicates with eye movement.   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 Nurses taught moister chamber placement with optho team.     PSYCH   # Anxiety and Depression   - Continue on home Valium 2mg BID with additional 2mg PRN   - Continue on Zoloft 75mg QD while in house (on 4cc/ 80mg QD at home)     CARDIOVASCULAR   # HTN thought to be second to discomfort vs dysautonomia   - Evening HTN and continue on Hydralazine vs valium PRN doses   - Monitor HR and BP    RESPIRATORY  # Tracheostomy leak   - At home noted with TVe 200-300s despite tracheostomy change to 80XLTCD.   - No air leak noted in house with hyperinflated cuff likely tracheomegaly?   - Continue on chronic vent 16/400/5/21   - Chronic bibasilar atelectasis and continued on HTS PRN with chest PT  - Trach upsized to Bivona 9 on 7/17, however trach leak remains.   - Pending Custom Tracheostomy ordered by Thoracic surgery     GI  # Dysphagia   - Continue on PEG-TF     RENAL  # High PVR   - BS with roughly 200-300cc PVR, however no acute distress   - Monitor renal function and UOP     # Elevated lactate   - Lactate elevated with mild contraction alkalosis   - Rehydrate and monitor     INFECTIOUS DISEASE  # Leukocytosis likely second to trachitis vs UTI?   - No fever or chills and noted with prior leukocytosis   - CXR with prior atelectasis and no acute findings   - UA (7/6) with positive nitrites (7/6)   - SCx (7/5) with pansensitive PSA  - BCx (7/6) x 2 NGT  - UCx (7/6) with coag neg staph  - MRSA PCR (7/6) POSITIVE for both MRSA/MSSA s/p Bactroban (7/6 - 7/10)  - s/p empiric Zosyn (7/8 - 7/14) however WBC continues to wax and wean without fever and will monitor off ABX .     HEME  - On Xarelto 10mg at home likely for DVT PPX?   - Hold home Xarelto in prep for OR   - DVT PPX with LVX for now     ENDOCRINE  - No hx of DM2   - Monitor BG     SKIN  - Basic trach and PEG care ordered     ETHICS/ GOC    - FULL CODE   - Dr Tyson Velasquez,  involved in care    DISPO - Back home with  when able.

## 2024-08-02 NOTE — PROCEDURE NOTE - ADDITIONAL PROCEDURE DETAILS
Patient requiring PIV access for medical management. Prior to procedure, patient was properly identified using name and . Site prepped using chlorhexidine and tourniquet applied. Ultrasound was used to identify a LUE, easily compressible, no thrombus, non-pulsatile. A  20G x 5.7cm AccSearchboxth Ace Intravascular Catheter was introduced into the skin and inserted into the identified vessel. Flash seen in needle housing and placement confirmed on US. Guidewire advanced and catheter advanced over guide wire. Needle withdrawn and placement again confirmed with US visualization. Clave with saline flush attached and placement again confirmed with positive blood return/ flash. IV flushes easily without resistance, and no swelling appreciated at insertion site. Site cleaned with alcohol, Cavilon skin prep applied, and cath secured with central line dressing. Patient tolerated procedure well with no complications and no blood loss. Nursing staff informed and dressing labeled with initials, date and time. Dressing to be changed Qweekly.     BRUNO Marie, PA-C  Department of Medicine/ [facility]  In house [facility] [id number]  In house [facility] [id number]  Reachable via teams

## 2024-08-02 NOTE — PROGRESS NOTE ADULT - NS ATTEND AMEND GEN_ALL_CORE FT
: 67 yo F w/ PMH of ALS, chronic respiratory failure w/ tracheostomy and vent dependence, oropharyngeal dysphagia w/ PEG, Parkinson's disease, non verbal at baseline presents w/ tracheostomy air leak.   w/ set volume at 400. Tracheostomy changed at home with no improvement and so presented for further evaluation. She underwent upsizing and trach exchange to size 9 Bivona on 7/17. She tolerated the procedure well.     7/18 noted to have significant air leak, minimal volumes returned. Additional 8 cc added to trach cuff with improvement in volume returned. Bedside bronchoscopy by CT surgery - trach in place, thin secretions aspirated.     -----continues to have positional leak, at times large- Custom trach ordered,  by thoracic surg ----- will exchange when available.  ---f/u by thoracic surg   - check VBG to ensure no sig hypercapnea  - baseline mental status, on home regimen: Rilutek 50 mg bid, Valium 2 mg bid, Zoloft 75 mg daily   - On VC//16/+5/30%, RR 16    - significant air leak overnight, lactate 4, give IVF, will repeat  this afternoon   - leukocytosis, tx for UTI and possible tracheitis. On Zosyn 7/8 - 7/14 7 day course. Leukocytosis but no other localizing signs of infection. Will monitor off abx for now   - labile BP - monitor for now, hydralazine prn, ? possibly due to discomfort, pain meds prn   - sputum cx pansensitive pseudomonas.   - ophtho consult appreciated - errosive keratopathy, improving  - per Dr. Roman joyner available approximately next week Mon/Tuesday  - Will continue to monitor.

## 2024-08-02 NOTE — PROGRESS NOTE ADULT - SUBJECTIVE AND OBJECTIVE BOX
CHIEF COMPLAINT: Patient is a 69y old  Female who presents with a chief complaint of trach exchange (18 Jul 2024 07:07)      INTERVAL EVENTS:    REVIEW OF SYSTEMS: Seen by bedside during AM rounds and     Mode: AC/ CMV (Assist Control/ Continuous Mandatory Ventilation), RR (machine): 16, TV (machine): 400, FiO2: 30, PEEP: 5, ITime: 0.64, MAP: 9, PIP: 25      OBJECTIVE:  ICU Vital Signs Last 24 Hrs  T(C): 37.1 (02 Aug 2024 04:00), Max: 37.3 (01 Aug 2024 20:00)  T(F): 98.8 (02 Aug 2024 04:00), Max: 99.1 (01 Aug 2024 20:00)  HR: 99 (02 Aug 2024 07:09) (97 - 110)  BP: 138/88 (02 Aug 2024 04:00) (108/86 - 169/90)  BP(mean): 102 (01 Aug 2024 16:00) (94 - 102)  ABP: --  ABP(mean): --  RR: 22 (02 Aug 2024 04:00) (18 - 22)  SpO2: 99% (02 Aug 2024 07:09) (96% - 100%)    O2 Parameters below as of 02 Aug 2024 04:00  Patient On (Oxygen Delivery Method): ventilator    O2 Concentration (%): 30      Mode: AC/ CMV (Assist Control/ Continuous Mandatory Ventilation), RR (machine): 16, TV (machine): 400, FiO2: 30, PEEP: 5, ITime: 0.64, MAP: 9, PIP: 25    08-01 @ 07:01  -  08-02 @ 07:00  --------------------------------------------------------  IN: 600 mL / OUT: 1750 mL / NET: -1150 mL      CAPILLARY BLOOD GLUCOSE          HOSPITAL MEDICATIONS:  MEDICATIONS  (STANDING):  artificial  tears Solution 1 Drop(s) Both EYES <User Schedule>  chlorhexidine 0.12% Liquid 15 milliLiter(s) Oral Mucosa every 12 hours  chlorhexidine 2% Cloths 1 Application(s) Topical daily  diazepam    Tablet 2 milliGRAM(s) Oral every 12 hours  enoxaparin Injectable 40 milliGRAM(s) SubCutaneous every 24 hours  erythromycin   Ointment 1 Application(s) Both EYES <User Schedule>  multivitamin/minerals/iron Oral Solution (CENTRUM) 15 milliLiter(s) Oral daily  ofloxacin 0.3% Solution 1 Drop(s) Both EYES two times a day  petrolatum Ophthalmic Ointment 1 Application(s) Both EYES <User Schedule>  riluzole 50 milliGRAM(s) Oral two times a day  sertraline 75 milliGRAM(s) Oral daily    MEDICATIONS  (PRN):  acetaminophen   Oral Liquid .. 650 milliGRAM(s) Oral every 6 hours PRN Temp greater or equal to 38C (100.4F), Mild Pain (1 - 3)  diazepam    Tablet 2 milliGRAM(s) Oral at bedtime PRN for dysautonomia/ hypertension/discomfort      PHYSICAL EXAMINATION    LABS:                        12.3   13.14 )-----------( 364      ( 01 Aug 2024 03:00 )             39.7     08-01    143  |  104  |  26<H>  ----------------------------<  148<H>  4.1   |  25  |  <0.20<L>    Ca    9.5      01 Aug 2024 03:00  Phos  3.6     08-01  Mg     2.10     08-01        Urinalysis Basic - ( 01 Aug 2024 03:00 )    Color: x / Appearance: x / SG: x / pH: x  Gluc: 148 mg/dL / Ketone: x  / Bili: x / Urobili: x   Blood: x / Protein: x / Nitrite: x   Leuk Esterase: x / RBC: x / WBC x   Sq Epi: x / Non Sq Epi: x / Bacteria: x        Venous Blood Gas:  08-01 @ 03:00  7.51/37/189/30/99.8  VBG Lactate: 2.8  Venous Blood Gas:  07-31 @ 11:32  7.49/39/97/30/99.4  VBG Lactate: 1.9      PAST MEDICAL & SURGICAL HISTORY:  ALS (amyotrophic lateral sclerosis)      HLD (hyperlipidemia)      Ventilator dependent  Since 2015      Aspiration into airway      S/P gastrostomy  10/14 for dysphagia  receives jevity 2 cans TID      Dependence on tracheostomy      Encounter for PEG (percutaneous endoscopic gastrostomy)  peg placed          FAMILY HISTORY:  No pertinent family history in first degree relatives        Social History:      RADIOLOGY:  [ ] Reviewed and interpreted by me    PULMONARY FUNCTION TESTS:    EKG: CHIEF COMPLAINT: Patient is a 69y old  Female who presents with a chief complaint of trach exchange (18 Jul 2024 07:07)    INTERVAL EVENTS: air leak overnight - AM labs revealed lactate of 4.2, IVF ordered  - Left peripheral IV line placed    REVIEW OF SYSTEMS: Patient seen and evaluated at bedside during AM rounds. Unable to obtain 2/2 baseline mentation.    Mode: AC/ CMV (Assist Control/ Continuous Mandatory Ventilation), RR (machine): 16, TV (machine): 400, FiO2: 30, PEEP: 5, ITime: 0.64, MAP: 9, PIP: 25      OBJECTIVE:  ICU Vital Signs Last 24 Hrs  T(C): 37.1 (02 Aug 2024 04:00), Max: 37.3 (01 Aug 2024 20:00)  T(F): 98.8 (02 Aug 2024 04:00), Max: 99.1 (01 Aug 2024 20:00)  HR: 99 (02 Aug 2024 07:09) (97 - 110)  BP: 138/88 (02 Aug 2024 04:00) (108/86 - 169/90)  BP(mean): 102 (01 Aug 2024 16:00) (94 - 102)  ABP: --  ABP(mean): --  RR: 22 (02 Aug 2024 04:00) (18 - 22)  SpO2: 99% (02 Aug 2024 07:09) (96% - 100%)    O2 Parameters below as of 02 Aug 2024 04:00  Patient On (Oxygen Delivery Method): ventilator    O2 Concentration (%): 30      Mode: AC/ CMV (Assist Control/ Continuous Mandatory Ventilation), RR (machine): 16, TV (machine): 400, FiO2: 30, PEEP: 5, ITime: 0.64, MAP: 9, PIP: 25    08-01 @ 07:01  -  08-02 @ 07:00  --------------------------------------------------------  IN: 600 mL / OUT: 1750 mL / NET: -1150 mL      CAPILLARY BLOOD GLUCOSE          HOSPITAL MEDICATIONS:  MEDICATIONS  (STANDING):  artificial  tears Solution 1 Drop(s) Both EYES <User Schedule>  chlorhexidine 0.12% Liquid 15 milliLiter(s) Oral Mucosa every 12 hours  chlorhexidine 2% Cloths 1 Application(s) Topical daily  diazepam    Tablet 2 milliGRAM(s) Oral every 12 hours  enoxaparin Injectable 40 milliGRAM(s) SubCutaneous every 24 hours  erythromycin   Ointment 1 Application(s) Both EYES <User Schedule>  multivitamin/minerals/iron Oral Solution (CENTRUM) 15 milliLiter(s) Oral daily  ofloxacin 0.3% Solution 1 Drop(s) Both EYES two times a day  petrolatum Ophthalmic Ointment 1 Application(s) Both EYES <User Schedule>  riluzole 50 milliGRAM(s) Oral two times a day  sertraline 75 milliGRAM(s) Oral daily    MEDICATIONS  (PRN):  acetaminophen   Oral Liquid .. 650 milliGRAM(s) Oral every 6 hours PRN Temp greater or equal to 38C (100.4F), Mild Pain (1 - 3)  diazepam    Tablet 2 milliGRAM(s) Oral at bedtime PRN for dysautonomia/ hypertension/discomfort      PHYSICAL EXAMINATION  General: NAD; +trach to vent  Neck: +Trach tube noted, site c/d/i.  Cards: Normal S1/S2, no murmurs   Pulm: Rhonchi auscultated bilaterally. No respiratory distress.   Abdomen: Soft, nontender & nondistended. +PEG noted, site c/d/i.   Extremities: No pedal edema. Extremities warm to touch.  Neurology: Awake/eyes open, does not follow commands. Nonverbal at baseline. Not withdrawing to pain.   Skin: warm to touch. Refer to RN assessment for further details.      LABS:                        12.3   13.14 )-----------( 364      ( 01 Aug 2024 03:00 )             39.7     08-01    143  |  104  |  26<H>  ----------------------------<  148<H>  4.1   |  25  |  <0.20<L>    Ca    9.5      01 Aug 2024 03:00  Phos  3.6     08-01  Mg     2.10     08-01        Urinalysis Basic - ( 01 Aug 2024 03:00 )    Color: x / Appearance: x / SG: x / pH: x  Gluc: 148 mg/dL / Ketone: x  / Bili: x / Urobili: x   Blood: x / Protein: x / Nitrite: x   Leuk Esterase: x / RBC: x / WBC x   Sq Epi: x / Non Sq Epi: x / Bacteria: x        Venous Blood Gas:  08-01 @ 03:00  7.51/37/189/30/99.8  VBG Lactate: 2.8  Venous Blood Gas:  07-31 @ 11:32  7.49/39/97/30/99.4  VBG Lactate: 1.9      PAST MEDICAL & SURGICAL HISTORY:  ALS (amyotrophic lateral sclerosis)      HLD (hyperlipidemia)      Ventilator dependent  Since 2015      Aspiration into airway      S/P gastrostomy  10/14 for dysphagia  receives jevity 2 cans TID      Dependence on tracheostomy      Encounter for PEG (percutaneous endoscopic gastrostomy)  peg placed          FAMILY HISTORY:  No pertinent family history in first degree relatives        Social History:      RADIOLOGY:  [ ] Reviewed and interpreted by me    PULMONARY FUNCTION TESTS:    EKG: CHIEF COMPLAINT: Patient is a 69y old  Female who presents with a chief complaint of trach exchange (18 Jul 2024 07:07)    INTERVAL EVENTS:   - Air leak overnight and reinflated cuff this morning with improvement   - Contraction with lactate 4s this morning and rehydrating     REVIEW OF SYSTEMS: Patient seen and evaluated at bedside during AM rounds and unable to assess ROS second to ALS    Mode: AC/ CMV (Assist Control/ Continuous Mandatory Ventilation), RR (machine): 16, TV (machine): 400, FiO2: 30, PEEP: 5, ITime: 0.64, MAP: 9, PIP: 25      OBJECTIVE:  ICU Vital Signs Last 24 Hrs  T(C): 37.1 (02 Aug 2024 04:00), Max: 37.3 (01 Aug 2024 20:00)  T(F): 98.8 (02 Aug 2024 04:00), Max: 99.1 (01 Aug 2024 20:00)  HR: 99 (02 Aug 2024 07:09) (97 - 110)  BP: 138/88 (02 Aug 2024 04:00) (108/86 - 169/90)  BP(mean): 102 (01 Aug 2024 16:00) (94 - 102)  ABP: --  ABP(mean): --  RR: 22 (02 Aug 2024 04:00) (18 - 22)  SpO2: 99% (02 Aug 2024 07:09) (96% - 100%)    O2 Parameters below as of 02 Aug 2024 04:00  Patient On (Oxygen Delivery Method): ventilator    O2 Concentration (%): 30      Mode: AC/ CMV (Assist Control/ Continuous Mandatory Ventilation), RR (machine): 16, TV (machine): 400, FiO2: 30, PEEP: 5, ITime: 0.64, MAP: 9, PIP: 25    08-01 @ 07:01  -  08-02 @ 07:00  --------------------------------------------------------  IN: 600 mL / OUT: 1750 mL / NET: -1150 mL      CAPILLARY BLOOD GLUCOSE          HOSPITAL MEDICATIONS:  MEDICATIONS  (STANDING):  artificial  tears Solution 1 Drop(s) Both EYES <User Schedule>  chlorhexidine 0.12% Liquid 15 milliLiter(s) Oral Mucosa every 12 hours  chlorhexidine 2% Cloths 1 Application(s) Topical daily  diazepam    Tablet 2 milliGRAM(s) Oral every 12 hours  enoxaparin Injectable 40 milliGRAM(s) SubCutaneous every 24 hours  erythromycin   Ointment 1 Application(s) Both EYES <User Schedule>  multivitamin/minerals/iron Oral Solution (CENTRUM) 15 milliLiter(s) Oral daily  ofloxacin 0.3% Solution 1 Drop(s) Both EYES two times a day  petrolatum Ophthalmic Ointment 1 Application(s) Both EYES <User Schedule>  riluzole 50 milliGRAM(s) Oral two times a day  sertraline 75 milliGRAM(s) Oral daily    MEDICATIONS  (PRN):  acetaminophen   Oral Liquid .. 650 milliGRAM(s) Oral every 6 hours PRN Temp greater or equal to 38C (100.4F), Mild Pain (1 - 3)  diazepam    Tablet 2 milliGRAM(s) Oral at bedtime PRN for dysautonomia/ hypertension/discomfort      PHYSICAL EXAMINATION  General: NAD   HEENT: Trach present. Eyes improving.   Cards: S1/S2, no murmurs   Pulm: Course vetn sounds bilaterally. No wheezes.   Abdomen: Soft, nondistended and nontender. BS (+) PEG present.   Extremities: No pedal edema. No active [place] of BL upper and lower extremities.  Neurology: AOx0 with baseline ALS with no acute focal neurological deficits         LABS:                        12.3   13.14 )-----------( 364      ( 01 Aug 2024 03:00 )             39.7     08-01    143  |  104  |  26<H>  ----------------------------<  148<H>  4.1   |  25  |  <0.20<L>    Ca    9.5      01 Aug 2024 03:00  Phos  3.6     08-01  Mg     2.10     08-01        Urinalysis Basic - ( 01 Aug 2024 03:00 )  Color: x / Appearance: x / SG: x / pH: x  Gluc: 148 mg/dL / Ketone: x  / Bili: x / Urobili: x   Blood: x / Protein: x / Nitrite: x   Leuk Esterase: x / RBC: x / WBC x   Sq Epi: x / Non Sq Epi: x / Bacteria: x        Venous Blood Gas:  08-01 @ 03:00  7.51/37/189/30/99.8  VBG Lactate: 2.8  Venous Blood Gas:  07-31 @ 11:32  7.49/39/97/30/99.4  VBG Lactate: 1.9      PAST MEDICAL & SURGICAL HISTORY:  ALS (amyotrophic lateral sclerosis)      HLD (hyperlipidemia)      Ventilator dependent  Since 2015      Aspiration into airway      S/P gastrostomy  10/14 for dysphagia  receives jevity 2 cans TID      Dependence on tracheostomy      Encounter for PEG (percutaneous endoscopic gastrostomy)  peg placed          FAMILY HISTORY:  No pertinent family history in first degree relatives        Social History:      RADIOLOGY:  [ ] Reviewed and interpreted by me    PULMONARY FUNCTION TESTS:    EKG:

## 2024-08-03 PROCEDURE — 99233 SBSQ HOSP IP/OBS HIGH 50: CPT

## 2024-08-03 RX ADMIN — POVIDONE, PROPYLENE GLYCOL 1 APPLICATION(S): 6.8; 3 LIQUID OPHTHALMIC at 22:00

## 2024-08-03 RX ADMIN — POVIDONE, PROPYLENE GLYCOL 1 DROP(S): 6.8; 3 LIQUID OPHTHALMIC at 21:11

## 2024-08-03 RX ADMIN — ERYTHROMYCIN 1 APPLICATION(S): 5 OINTMENT OPHTHALMIC at 05:50

## 2024-08-03 RX ADMIN — POVIDONE, PROPYLENE GLYCOL 1 APPLICATION(S): 6.8; 3 LIQUID OPHTHALMIC at 14:32

## 2024-08-03 RX ADMIN — POVIDONE, PROPYLENE GLYCOL 1 DROP(S): 6.8; 3 LIQUID OPHTHALMIC at 22:50

## 2024-08-03 RX ADMIN — POVIDONE, PROPYLENE GLYCOL 1 DROP(S): 6.8; 3 LIQUID OPHTHALMIC at 01:30

## 2024-08-03 RX ADMIN — POVIDONE, PROPYLENE GLYCOL 1 DROP(S): 6.8; 3 LIQUID OPHTHALMIC at 08:28

## 2024-08-03 RX ADMIN — OFLOXACIN 1 DROP(S): 3 SOLUTION/ DROPS OPHTHALMIC at 17:34

## 2024-08-03 RX ADMIN — RILUZOLE 50 MILLIGRAM(S): 5 LIQUID ORAL at 17:33

## 2024-08-03 RX ADMIN — CHLORHEXIDINE GLUCONATE 1 APPLICATION(S): 40 SOLUTION TOPICAL at 11:05

## 2024-08-03 RX ADMIN — ERYTHROMYCIN 1 APPLICATION(S): 5 OINTMENT OPHTHALMIC at 08:28

## 2024-08-03 RX ADMIN — ERYTHROMYCIN 1 APPLICATION(S): 5 OINTMENT OPHTHALMIC at 21:11

## 2024-08-03 RX ADMIN — POVIDONE, PROPYLENE GLYCOL 1 APPLICATION(S): 6.8; 3 LIQUID OPHTHALMIC at 04:41

## 2024-08-03 RX ADMIN — Medication 15 MILLILITER(S): at 11:04

## 2024-08-03 RX ADMIN — ERYTHROMYCIN 1 APPLICATION(S): 5 OINTMENT OPHTHALMIC at 03:40

## 2024-08-03 RX ADMIN — POVIDONE, PROPYLENE GLYCOL 1 DROP(S): 6.8; 3 LIQUID OPHTHALMIC at 11:05

## 2024-08-03 RX ADMIN — POVIDONE, PROPYLENE GLYCOL 1 DROP(S): 6.8; 3 LIQUID OPHTHALMIC at 11:06

## 2024-08-03 RX ADMIN — POVIDONE, PROPYLENE GLYCOL 1 DROP(S): 6.8; 3 LIQUID OPHTHALMIC at 04:20

## 2024-08-03 RX ADMIN — POVIDONE, PROPYLENE GLYCOL 1 DROP(S): 6.8; 3 LIQUID OPHTHALMIC at 05:50

## 2024-08-03 RX ADMIN — POVIDONE, PROPYLENE GLYCOL 1 APPLICATION(S): 6.8; 3 LIQUID OPHTHALMIC at 21:11

## 2024-08-03 RX ADMIN — POVIDONE, PROPYLENE GLYCOL 1 DROP(S): 6.8; 3 LIQUID OPHTHALMIC at 15:07

## 2024-08-03 RX ADMIN — ENOXAPARIN SODIUM 40 MILLIGRAM(S): 100 INJECTION SUBCUTANEOUS at 05:49

## 2024-08-03 RX ADMIN — CHLORHEXIDINE GLUCONATE 15 MILLILITER(S): 40 SOLUTION TOPICAL at 17:33

## 2024-08-03 RX ADMIN — RILUZOLE 50 MILLIGRAM(S): 5 LIQUID ORAL at 05:49

## 2024-08-03 RX ADMIN — POVIDONE, PROPYLENE GLYCOL 1 DROP(S): 6.8; 3 LIQUID OPHTHALMIC at 17:35

## 2024-08-03 RX ADMIN — CHLORHEXIDINE GLUCONATE 15 MILLILITER(S): 40 SOLUTION TOPICAL at 05:49

## 2024-08-03 RX ADMIN — POVIDONE, PROPYLENE GLYCOL 1 APPLICATION(S): 6.8; 3 LIQUID OPHTHALMIC at 16:40

## 2024-08-03 RX ADMIN — ERYTHROMYCIN 1 APPLICATION(S): 5 OINTMENT OPHTHALMIC at 14:33

## 2024-08-03 RX ADMIN — Medication 2 MILLIGRAM(S): at 17:33

## 2024-08-03 RX ADMIN — POVIDONE, PROPYLENE GLYCOL 1 DROP(S): 6.8; 3 LIQUID OPHTHALMIC at 03:00

## 2024-08-03 RX ADMIN — OFLOXACIN 1 DROP(S): 3 SOLUTION/ DROPS OPHTHALMIC at 05:50

## 2024-08-03 RX ADMIN — POVIDONE, PROPYLENE GLYCOL 1 DROP(S): 6.8; 3 LIQUID OPHTHALMIC at 21:12

## 2024-08-03 RX ADMIN — POVIDONE, PROPYLENE GLYCOL 1 APPLICATION(S): 6.8; 3 LIQUID OPHTHALMIC at 01:00

## 2024-08-03 RX ADMIN — POVIDONE, PROPYLENE GLYCOL 1 APPLICATION(S): 6.8; 3 LIQUID OPHTHALMIC at 11:04

## 2024-08-03 RX ADMIN — ERYTHROMYCIN 1 APPLICATION(S): 5 OINTMENT OPHTHALMIC at 23:56

## 2024-08-03 RX ADMIN — POVIDONE, PROPYLENE GLYCOL 1 DROP(S): 6.8; 3 LIQUID OPHTHALMIC at 14:33

## 2024-08-03 RX ADMIN — SERTRALINE HYDROCHLORIDE 75 MILLIGRAM(S): 50 TABLET, FILM COATED ORAL at 11:04

## 2024-08-03 RX ADMIN — POVIDONE, PROPYLENE GLYCOL 1 DROP(S): 6.8; 3 LIQUID OPHTHALMIC at 07:05

## 2024-08-03 RX ADMIN — Medication 2 MILLIGRAM(S): at 05:49

## 2024-08-03 RX ADMIN — POVIDONE, PROPYLENE GLYCOL 1 APPLICATION(S): 6.8; 3 LIQUID OPHTHALMIC at 08:28

## 2024-08-03 RX ADMIN — ERYTHROMYCIN 1 APPLICATION(S): 5 OINTMENT OPHTHALMIC at 17:33

## 2024-08-03 RX ADMIN — ERYTHROMYCIN 1 APPLICATION(S): 5 OINTMENT OPHTHALMIC at 11:04

## 2024-08-03 RX ADMIN — POVIDONE, PROPYLENE GLYCOL 1 DROP(S): 6.8; 3 LIQUID OPHTHALMIC at 16:40

## 2024-08-03 RX ADMIN — POVIDONE, PROPYLENE GLYCOL 1 DROP(S): 6.8; 3 LIQUID OPHTHALMIC at 23:57

## 2024-08-03 NOTE — PROGRESS NOTE ADULT - NS ATTEND AMEND GEN_ALL_CORE FT
: 67 yo F w/ PMH of ALS, chronic respiratory failure w/ tracheostomy and vent dependence, oropharyngeal dysphagia w/ PEG, Parkinson's disease, non verbal at baseline presents w/ tracheostomy air leak.   w/ set volume at 400. Tracheostomy changed at home with no improvement and so presented for further evaluation. She underwent upsizing and trach exchange to size 9 Bivona on 7/17. She tolerated the procedure well.   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  Tolerating airleak with stable ABGs.  She is HD stable, afebrile.    Agree with plan as outlined above.     - Will continue to monitor.

## 2024-08-03 NOTE — PROGRESS NOTE ADULT - SUBJECTIVE AND OBJECTIVE BOX
CHIEF COMPLAINT: Patient is a 69y old  Female who presents with a chief complaint of trach exchange (18 Jul 2024 07:07)      INTERVAL EVENTS: No acute overnight events      ROS: Seen by bedside during AM rounds     OBJECTIVE:  ICU Vital Signs Last 24 Hrs  T(C): 36.8 (03 Aug 2024 04:00), Max: 36.9 (02 Aug 2024 12:00)  T(F): 98.2 (03 Aug 2024 04:00), Max: 98.4 (02 Aug 2024 12:00)  HR: 109 (03 Aug 2024 04:00) (79 - 109)  BP: 183/90 (03 Aug 2024 04:00) (121/81 - 183/90)  BP(mean): 89 (02 Aug 2024 16:00) (89 - 112)  ABP: --  ABP(mean): --  RR: 16 (03 Aug 2024 04:00) (16 - 18)  SpO2: 96% (03 Aug 2024 04:00) (96% - 99%)    O2 Parameters below as of 03 Aug 2024 04:00  Patient On (Oxygen Delivery Method): ventilator    O2 Concentration (%): 30      Mode: AC/ CMV (Assist Control/ Continuous Mandatory Ventilation), RR (machine): 16, TV (machine): 400, FiO2: 30, PEEP: 5, ITime: 0.64, MAP: 9, PIP: 27    08-02 @ 07:01  -  08-03 @ 07:00  --------------------------------------------------------  IN: 2000 mL / OUT: 1380 mL / NET: 620 mL      CAPILLARY BLOOD GLUCOSE          PHYSICAL EXAM:  General:   HEENT:   Lymph Nodes:  Neck:   Respiratory:   Cardiovascular:   Abdomen:   Extremities:   Skin:   Neurological:  Psychiatry:    Mode: AC/ CMV (Assist Control/ Continuous Mandatory Ventilation)  RR (machine): 16  TV (machine): 400  FiO2: 30  PEEP: 5  ITime: 0.64  MAP: 9  PIP: 27      HOSPITAL MEDICATIONS:  MEDICATIONS  (STANDING):  artificial  tears Solution 1 Drop(s) Both EYES <User Schedule>  chlorhexidine 0.12% Liquid 15 milliLiter(s) Oral Mucosa every 12 hours  chlorhexidine 2% Cloths 1 Application(s) Topical daily  diazepam    Tablet 2 milliGRAM(s) Oral every 12 hours  enoxaparin Injectable 40 milliGRAM(s) SubCutaneous every 24 hours  erythromycin   Ointment 1 Application(s) Both EYES <User Schedule>  multivitamin/minerals/iron Oral Solution (CENTRUM) 15 milliLiter(s) Oral daily  ofloxacin 0.3% Solution 1 Drop(s) Both EYES two times a day  petrolatum Ophthalmic Ointment 1 Application(s) Both EYES <User Schedule>  riluzole 50 milliGRAM(s) Oral two times a day  sertraline 75 milliGRAM(s) Oral daily  sodium chloride 0.9%. 1000 milliLiter(s) (100 mL/Hr) IV Continuous <Continuous>    MEDICATIONS  (PRN):  acetaminophen   Oral Liquid .. 650 milliGRAM(s) Oral every 6 hours PRN Temp greater or equal to 38C (100.4F), Mild Pain (1 - 3)  diazepam    Tablet 2 milliGRAM(s) Oral at bedtime PRN for dysautonomia/ hypertension/discomfort      LABS:                        12.4   12.58 )-----------( 368      ( 02 Aug 2024 05:00 )             40.2     08-02    145  |  104  |  25<H>  ----------------------------<  119<H>  4.2   |  25  |  <0.20<L>    Ca    9.7      02 Aug 2024 05:00  Phos  2.9     08-02  Mg     2.30     08-02        Urinalysis Basic - ( 02 Aug 2024 05:00 )    Color: x / Appearance: x / SG: x / pH: x  Gluc: 119 mg/dL / Ketone: x  / Bili: x / Urobili: x   Blood: x / Protein: x / Nitrite: x   Leuk Esterase: x / RBC: x / WBC x   Sq Epi: x / Non Sq Epi: x / Bacteria: x        Venous Blood Gas:  08-02 @ 22:38  7.35/47/133/26/99.6  VBG Lactate: 1.9  Venous Blood Gas:  08-02 @ 05:00  7.48/37/101/28/99.0  VBG Lactate: 4.2   CHIEF COMPLAINT: Patient is a 69y old  Female who presents with a chief complaint of trach exchange (18 Jul 2024 07:07)      INTERVAL EVENTS: No acute overnight events      ROS: Seen by bedside during AM rounds     OBJECTIVE:  ICU Vital Signs Last 24 Hrs  T(C): 36.8 (03 Aug 2024 04:00), Max: 36.9 (02 Aug 2024 12:00)  T(F): 98.2 (03 Aug 2024 04:00), Max: 98.4 (02 Aug 2024 12:00)  HR: 109 (03 Aug 2024 04:00) (79 - 109)  BP: 183/90 (03 Aug 2024 04:00) (121/81 - 183/90)  BP(mean): 89 (02 Aug 2024 16:00) (89 - 112)  ABP: --  ABP(mean): --  RR: 16 (03 Aug 2024 04:00) (16 - 18)  SpO2: 96% (03 Aug 2024 04:00) (96% - 99%)    O2 Parameters below as of 03 Aug 2024 04:00  Patient On (Oxygen Delivery Method): ventilator    O2 Concentration (%): 30      Mode: AC/ CMV (Assist Control/ Continuous Mandatory Ventilation), RR (machine): 16, TV (machine): 400, FiO2: 30, PEEP: 5, ITime: 0.64, MAP: 9, PIP: 27    08-02 @ 07:01  -  08-03 @ 07:00  --------------------------------------------------------  IN: 2000 mL / OUT: 1380 mL / NET: 620 mL      CAPILLARY BLOOD GLUCOSE        PHYSICAL EXAMINATION  General: NAD   HEENT: Trach present. Eyes improving with tx in progress   Cards: S1/S2, no murmurs   Pulm: Course vetn sounds bilaterally. No wheezes.   Abdomen: Soft, nondistended and nontender. BS (+) PEG present.   Extremities: No pedal edema. No active [place] of BL upper and lower extremities.  Neurology: AOx0 with baseline ALS with no acute focal neurological deficits     Mode: AC/ CMV (Assist Control/ Continuous Mandatory Ventilation)  RR (machine): 16  TV (machine): 400  FiO2: 30  PEEP: 5  ITime: 0.64  MAP: 9  PIP: 27      HOSPITAL MEDICATIONS:  MEDICATIONS  (STANDING):  artificial  tears Solution 1 Drop(s) Both EYES <User Schedule>  chlorhexidine 0.12% Liquid 15 milliLiter(s) Oral Mucosa every 12 hours  chlorhexidine 2% Cloths 1 Application(s) Topical daily  diazepam    Tablet 2 milliGRAM(s) Oral every 12 hours  enoxaparin Injectable 40 milliGRAM(s) SubCutaneous every 24 hours  erythromycin   Ointment 1 Application(s) Both EYES <User Schedule>  multivitamin/minerals/iron Oral Solution (CENTRUM) 15 milliLiter(s) Oral daily  ofloxacin 0.3% Solution 1 Drop(s) Both EYES two times a day  petrolatum Ophthalmic Ointment 1 Application(s) Both EYES <User Schedule>  riluzole 50 milliGRAM(s) Oral two times a day  sertraline 75 milliGRAM(s) Oral daily  sodium chloride 0.9%. 1000 milliLiter(s) (100 mL/Hr) IV Continuous <Continuous>    MEDICATIONS  (PRN):  acetaminophen   Oral Liquid .. 650 milliGRAM(s) Oral every 6 hours PRN Temp greater or equal to 38C (100.4F), Mild Pain (1 - 3)  diazepam    Tablet 2 milliGRAM(s) Oral at bedtime PRN for dysautonomia/ hypertension/discomfort      LABS:                        12.4   12.58 )-----------( 368      ( 02 Aug 2024 05:00 )             40.2     08-02    145  |  104  |  25<H>  ----------------------------<  119<H>  4.2   |  25  |  <0.20<L>    Ca    9.7      02 Aug 2024 05:00  Phos  2.9     08-02  Mg     2.30     08-02        Urinalysis Basic - ( 02 Aug 2024 05:00 )    Color: x / Appearance: x / SG: x / pH: x  Gluc: 119 mg/dL / Ketone: x  / Bili: x / Urobili: x   Blood: x / Protein: x / Nitrite: x   Leuk Esterase: x / RBC: x / WBC x   Sq Epi: x / Non Sq Epi: x / Bacteria: x        Venous Blood Gas:  08-02 @ 22:38  7.35/47/133/26/99.6  VBG Lactate: 1.9  Venous Blood Gas:  08-02 @ 05:00  7.48/37/101/28/99.0  VBG Lactate: 4.2

## 2024-08-03 NOTE — PROGRESS NOTE ADULT - ASSESSMENT
69 YO Female with PMHx of ALS, Parkinson,, nonverbal, bed bound, chronic respiratory failure with tracheostomy and vent dependence, and oropharyngeal dysphagia with PEG who presented from home with tracheostomy leak. She was noted with vent alarm and poor TVe (250-290) at home. Trach changed at home with no improvement and sent in for thoracic evaluation. While in MICU no air leak noted with hyperinflated cuff. Transferred to [facility] on 7/5 with course complicated by leukocytosis second to UTI vs trachitis and completed zosyn course. Trach upsized by thoracic on 7/17 however AL remains and now pending custom trach.     NEUROLOGY  # ALS   - Baseline nonverbal, awake, and communicates with eye movement.   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 Nurses taught moister chamber placement with optho team.     PSYCH   # Anxiety and Depression   - Continue on home Valium 2mg BID with additional 2mg PRN   - Continue on Zoloft 75mg QD while in house (on 4cc/ 80mg QD at home)     CARDIOVASCULAR   # HTN thought to be second to discomfort vs dysautonomia   - Evening HTN and continue on Hydralazine vs valium PRN doses   - Monitor HR and BP    RESPIRATORY  # Tracheostomy leak   - At home noted with TVe 200-300s despite tracheostomy change to 80XLTCD.   - No air leak noted in house with hyperinflated cuff likely tracheomegaly?   - Continue on chronic vent 16/400/5/21   - Chronic bibasilar atelectasis and continued on HTS PRN with chest PT  - Trach upsized to Bivona 9 on 7/17, however trach leak remains.   - Pending Custom Tracheostomy ordered by Thoracic surgery     GI  # Dysphagia   - Continue on PEG-TF     RENAL  # High PVR   - BS with roughly 200-300cc PVR, however no acute distress   - Monitor renal function and UOP     # Elevated lactate   - Lactate elevated with mild contraction alkalosis   - Rehydrate and monitor     INFECTIOUS DISEASE  # Leukocytosis likely second to trachitis vs UTI?   - No fever or chills and noted with prior leukocytosis   - CXR with prior atelectasis and no acute findings   - UA (7/6) with positive nitrites (7/6)   - SCx (7/5) with pansensitive PSA  - BCx (7/6) x 2 NGT  - UCx (7/6) with coag neg staph  - MRSA PCR (7/6) POSITIVE for both MRSA/MSSA s/p Bactroban (7/6 - 7/10)  - s/p empiric Zosyn (7/8 - 7/14) however WBC continues to wax and wean without fever and will monitor off ABX .     HEME  - On Xarelto 10mg at home likely for DVT PPX?   - Hold home Xarelto in prep for OR   - DVT PPX with LVX for now     ENDOCRINE  - No hx of DM2   - Monitor BG     SKIN  - Basic trach and PEG care ordered     ETHICS/ GOC    - FULL CODE   - Dr Tyson Velasquez,  involved in care    DISPO - Back home with  when able.

## 2024-08-04 LAB
ANION GAP SERPL CALC-SCNC: 13 MMOL/L — SIGNIFICANT CHANGE UP (ref 7–14)
BUN SERPL-MCNC: 26 MG/DL — HIGH (ref 7–23)
CALCIUM SERPL-MCNC: 9.2 MG/DL — SIGNIFICANT CHANGE UP (ref 8.4–10.5)
CHLORIDE SERPL-SCNC: 105 MMOL/L — SIGNIFICANT CHANGE UP (ref 98–107)
CO2 SERPL-SCNC: 24 MMOL/L — SIGNIFICANT CHANGE UP (ref 22–31)
CREAT SERPL-MCNC: <0.2 MG/DL — LOW (ref 0.5–1.3)
EGFR: 125 ML/MIN/1.73M2 — SIGNIFICANT CHANGE UP
GLUCOSE SERPL-MCNC: 154 MG/DL — HIGH (ref 70–99)
MAGNESIUM SERPL-MCNC: 2 MG/DL — SIGNIFICANT CHANGE UP (ref 1.6–2.6)
PHOSPHATE SERPL-MCNC: 3.3 MG/DL — SIGNIFICANT CHANGE UP (ref 2.5–4.5)
POTASSIUM SERPL-MCNC: 4.2 MMOL/L — SIGNIFICANT CHANGE UP (ref 3.5–5.3)
POTASSIUM SERPL-SCNC: 4.2 MMOL/L — SIGNIFICANT CHANGE UP (ref 3.5–5.3)
SODIUM SERPL-SCNC: 142 MMOL/L — SIGNIFICANT CHANGE UP (ref 135–145)

## 2024-08-04 PROCEDURE — 99233 SBSQ HOSP IP/OBS HIGH 50: CPT

## 2024-08-04 RX ADMIN — ERYTHROMYCIN 1 APPLICATION(S): 5 OINTMENT OPHTHALMIC at 23:32

## 2024-08-04 RX ADMIN — POVIDONE, PROPYLENE GLYCOL 1 APPLICATION(S): 6.8; 3 LIQUID OPHTHALMIC at 16:21

## 2024-08-04 RX ADMIN — CHLORHEXIDINE GLUCONATE 15 MILLILITER(S): 40 SOLUTION TOPICAL at 05:02

## 2024-08-04 RX ADMIN — POVIDONE, PROPYLENE GLYCOL 1 APPLICATION(S): 6.8; 3 LIQUID OPHTHALMIC at 19:41

## 2024-08-04 RX ADMIN — ERYTHROMYCIN 1 APPLICATION(S): 5 OINTMENT OPHTHALMIC at 21:20

## 2024-08-04 RX ADMIN — POVIDONE, PROPYLENE GLYCOL 1 DROP(S): 6.8; 3 LIQUID OPHTHALMIC at 09:28

## 2024-08-04 RX ADMIN — ERYTHROMYCIN 1 APPLICATION(S): 5 OINTMENT OPHTHALMIC at 17:41

## 2024-08-04 RX ADMIN — CHLORHEXIDINE GLUCONATE 15 MILLILITER(S): 40 SOLUTION TOPICAL at 17:42

## 2024-08-04 RX ADMIN — POVIDONE, PROPYLENE GLYCOL 1 DROP(S): 6.8; 3 LIQUID OPHTHALMIC at 04:47

## 2024-08-04 RX ADMIN — RILUZOLE 50 MILLIGRAM(S): 5 LIQUID ORAL at 17:41

## 2024-08-04 RX ADMIN — POVIDONE, PROPYLENE GLYCOL 1 APPLICATION(S): 6.8; 3 LIQUID OPHTHALMIC at 04:47

## 2024-08-04 RX ADMIN — POVIDONE, PROPYLENE GLYCOL 1 APPLICATION(S): 6.8; 3 LIQUID OPHTHALMIC at 14:57

## 2024-08-04 RX ADMIN — POVIDONE, PROPYLENE GLYCOL 1 DROP(S): 6.8; 3 LIQUID OPHTHALMIC at 03:57

## 2024-08-04 RX ADMIN — POVIDONE, PROPYLENE GLYCOL 1 DROP(S): 6.8; 3 LIQUID OPHTHALMIC at 14:56

## 2024-08-04 RX ADMIN — ENOXAPARIN SODIUM 40 MILLIGRAM(S): 100 INJECTION SUBCUTANEOUS at 04:48

## 2024-08-04 RX ADMIN — POVIDONE, PROPYLENE GLYCOL 1 DROP(S): 6.8; 3 LIQUID OPHTHALMIC at 05:03

## 2024-08-04 RX ADMIN — POVIDONE, PROPYLENE GLYCOL 1 APPLICATION(S): 6.8; 3 LIQUID OPHTHALMIC at 01:49

## 2024-08-04 RX ADMIN — POVIDONE, PROPYLENE GLYCOL 1 DROP(S): 6.8; 3 LIQUID OPHTHALMIC at 21:20

## 2024-08-04 RX ADMIN — POVIDONE, PROPYLENE GLYCOL 1 DROP(S): 6.8; 3 LIQUID OPHTHALMIC at 17:41

## 2024-08-04 RX ADMIN — POVIDONE, PROPYLENE GLYCOL 1 APPLICATION(S): 6.8; 3 LIQUID OPHTHALMIC at 07:21

## 2024-08-04 RX ADMIN — CHLORHEXIDINE GLUCONATE 1 APPLICATION(S): 40 SOLUTION TOPICAL at 11:10

## 2024-08-04 RX ADMIN — POVIDONE, PROPYLENE GLYCOL 1 DROP(S): 6.8; 3 LIQUID OPHTHALMIC at 22:32

## 2024-08-04 RX ADMIN — ERYTHROMYCIN 1 APPLICATION(S): 5 OINTMENT OPHTHALMIC at 09:28

## 2024-08-04 RX ADMIN — ERYTHROMYCIN 1 APPLICATION(S): 5 OINTMENT OPHTHALMIC at 14:57

## 2024-08-04 RX ADMIN — ERYTHROMYCIN 1 APPLICATION(S): 5 OINTMENT OPHTHALMIC at 11:10

## 2024-08-04 RX ADMIN — ERYTHROMYCIN 1 APPLICATION(S): 5 OINTMENT OPHTHALMIC at 05:02

## 2024-08-04 RX ADMIN — Medication 2 MILLIGRAM(S): at 17:44

## 2024-08-04 RX ADMIN — POVIDONE, PROPYLENE GLYCOL 1 DROP(S): 6.8; 3 LIQUID OPHTHALMIC at 01:49

## 2024-08-04 RX ADMIN — POVIDONE, PROPYLENE GLYCOL 1 DROP(S): 6.8; 3 LIQUID OPHTHALMIC at 07:25

## 2024-08-04 RX ADMIN — POVIDONE, PROPYLENE GLYCOL 1 DROP(S): 6.8; 3 LIQUID OPHTHALMIC at 16:22

## 2024-08-04 RX ADMIN — POVIDONE, PROPYLENE GLYCOL 1 DROP(S): 6.8; 3 LIQUID OPHTHALMIC at 23:32

## 2024-08-04 RX ADMIN — POVIDONE, PROPYLENE GLYCOL 1 DROP(S): 6.8; 3 LIQUID OPHTHALMIC at 14:57

## 2024-08-04 RX ADMIN — OFLOXACIN 1 DROP(S): 3 SOLUTION/ DROPS OPHTHALMIC at 05:03

## 2024-08-04 RX ADMIN — ERYTHROMYCIN 1 APPLICATION(S): 5 OINTMENT OPHTHALMIC at 03:57

## 2024-08-04 RX ADMIN — POVIDONE, PROPYLENE GLYCOL 1 APPLICATION(S): 6.8; 3 LIQUID OPHTHALMIC at 22:31

## 2024-08-04 RX ADMIN — SERTRALINE HYDROCHLORIDE 75 MILLIGRAM(S): 50 TABLET, FILM COATED ORAL at 11:10

## 2024-08-04 RX ADMIN — POVIDONE, PROPYLENE GLYCOL 1 DROP(S): 6.8; 3 LIQUID OPHTHALMIC at 19:42

## 2024-08-04 RX ADMIN — POVIDONE, PROPYLENE GLYCOL 1 DROP(S): 6.8; 3 LIQUID OPHTHALMIC at 11:16

## 2024-08-04 RX ADMIN — Medication 15 MILLILITER(S): at 11:11

## 2024-08-04 RX ADMIN — POVIDONE, PROPYLENE GLYCOL 1 APPLICATION(S): 6.8; 3 LIQUID OPHTHALMIC at 11:10

## 2024-08-04 RX ADMIN — OFLOXACIN 1 DROP(S): 3 SOLUTION/ DROPS OPHTHALMIC at 17:41

## 2024-08-04 RX ADMIN — Medication 2 MILLIGRAM(S): at 05:02

## 2024-08-04 RX ADMIN — RILUZOLE 50 MILLIGRAM(S): 5 LIQUID ORAL at 05:02

## 2024-08-04 RX ADMIN — POVIDONE, PROPYLENE GLYCOL 1 DROP(S): 6.8; 3 LIQUID OPHTHALMIC at 11:11

## 2024-08-04 NOTE — PROGRESS NOTE ADULT - NS ATTEND AMEND GEN_ALL_CORE FT
: 69 yo F w/ PMH of ALS, chronic respiratory failure w/ tracheostomy and vent dependence, oropharyngeal dysphagia w/ PEG, Parkinson's disease, non verbal at baseline presents w/ tracheostomy air leak.   w/ set volume at 400. Tracheostomy changed at home with no improvement and so presented for further evaluation. She underwent upsizing and trach exchange to size 9 Bivona on 7/17. She tolerated the procedure well.   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  Tolerating airleak with stable ABGs.  No overnight events. She is HD stable, afebrile.  Awaiting custom tracheostomy.   Agree with plan as outlined above.     - Will continue to monitor.

## 2024-08-04 NOTE — PROGRESS NOTE ADULT - ASSESSMENT
67 YO Female with PMHx of ALS, Parkinson,, nonverbal, bed bound, chronic respiratory failure with tracheostomy and vent dependence, and oropharyngeal dysphagia with PEG who presented from home with tracheostomy leak. She was noted with vent alarm and poor TVe (250-290) at home. Trach changed at home with no improvement and sent in for thoracic evaluation. While in MICU no air leak noted with hyperinflated cuff. Transferred to [facility] on 7/5 with course complicated by leukocytosis second to UTI vs trachitis and completed zosyn course. Trach upsized by thoracic on 7/17 however AL remains and now pending custom trach.     NEUROLOGY  # ALS   - Baseline nonverbal, awake, and communicates with eye movement.   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 Nurses taught moister chamber placement with optho team.     PSYCH   # Anxiety and Depression   - Continue on home Valium 2mg BID with additional 2mg PRN   - Continue on Zoloft 75mg QD while in house (on 4cc/ 80mg QD at home)     CARDIOVASCULAR   # HTN thought to be second to discomfort vs dysautonomia   - Evening HTN and continue on Hydralazine vs valium PRN doses   - Monitor HR and BP    RESPIRATORY  # Tracheostomy leak   - At home noted with TVe 200-300s despite tracheostomy change to 80XLTCD.   - No air leak noted in house with hyperinflated cuff likely tracheomegaly?   - Continue on chronic vent 16/400/5/21   - Chronic bibasilar atelectasis and continued on HTS PRN with chest PT  - Trach upsized to Bivona 9 on 7/17, however trach leak remains.   - Pending Custom Tracheostomy ordered by Thoracic surgery     GI  # Dysphagia   - Continue on PEG-TF     RENAL  # High PVR   - BS with roughly 200-300cc PVR, however no acute distress   - Monitor renal function and UOP     # Elevated lactate   - Lactate elevated with mild contraction alkalosis   - Rehydrate and monitor     INFECTIOUS DISEASE  # Leukocytosis likely second to trachitis vs UTI?   - No fever or chills and noted with prior leukocytosis   - CXR with prior atelectasis and no acute findings   - UA (7/6) with positive nitrites (7/6)   - SCx (7/5) with pansensitive PSA  - BCx (7/6) x 2 NGT  - UCx (7/6) with coag neg staph  - MRSA PCR (7/6) POSITIVE for both MRSA/MSSA s/p Bactroban (7/6 - 7/10)  - s/p empiric Zosyn (7/8 - 7/14) however WBC continues to wax and wean without fever and will monitor off ABX .     HEME  - On Xarelto 10mg at home likely for DVT PPX?   - Hold home Xarelto in prep for OR   - DVT PPX with LVX for now     ENDOCRINE  - No hx of DM2   - Monitor BG     SKIN  - Basic trach and PEG care ordered     ETHICS/ GOC    - FULL CODE   - Dr Tyson Velasquez,  involved in care    DISPO - Back home with  when able.      69 YO Female with PMHx of ALS, Parkinson,, nonverbal, bed bound, chronic respiratory failure with tracheostomy and vent dependence, and oropharyngeal dysphagia with PEG who presented from home with tracheostomy leak. She was noted with vent alarm and poor TVe (250-290) at home. Trach changed at home with no improvement and sent in for thoracic evaluation. While in MICU no air leak noted with hyperinflated cuff. Transferred to [facility] on 7/5 with course complicated by leukocytosis second to UTI vs trachitis and completed zosyn course. Trach upsized by thoracic on 7/17 however AL remains and now pending custom trach.     NEUROLOGY  # ALS   - Baseline nonverbal, awake, and communicates with eye movement.   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 Nurses taught moister chamber placement with optho team.     PSYCH   # Anxiety and Depression   - Continue on home Valium 2mg BID with additional 2mg PRN   - Continue on Zoloft 75mg QD while in house (on 4cc/ 80mg QD at home)     CARDIOVASCULAR   # HTN thought to be second to discomfort vs dysautonomia   - Evening HTN and continue on Hydralazine vs valium PRN doses   - Monitor HR and BP    # SVT  - Approx 5 sec episode of SVT, self limited (8/4)  - Monitor BP and keep Mg>2, K>4  - Consider BB if becomes more persistent or HD unstable    RESPIRATORY  # Tracheostomy leak   - At home noted with TVe 200-300s despite tracheostomy change to 80XLTCD.   - No air leak noted in house with hyperinflated cuff likely tracheomegaly?   - Continue on chronic vent 16/400/5/21   - Chronic bibasilar atelectasis and continued on HTS PRN with chest PT  - Trach upsized to Bivona 9 on 7/17, however trach leak remains.   - Pending Custom Tracheostomy ordered by Thoracic surgery     GI  # Dysphagia   - Continue on PEG-TF     RENAL  # High PVR   - BS with roughly 200-300cc PVR, however no acute distress   - Monitor renal function and UOP     # Elevated lactate   - Lactate elevated with mild contraction alkalosis   - Rehydrate and monitor     INFECTIOUS DISEASE  # Leukocytosis likely second to trachitis vs UTI?   - No fever or chills and noted with prior leukocytosis   - CXR with prior atelectasis and no acute findings   - UA (7/6) with positive nitrites (7/6)   - SCx (7/5) with pansensitive PSA  - BCx (7/6) x 2 NGT  - UCx (7/6) with coag neg staph  - MRSA PCR (7/6) POSITIVE for both MRSA/MSSA s/p Bactroban (7/6 - 7/10)  - s/p empiric Zosyn (7/8 - 7/14) however WBC continues to wax and wean without fever and will monitor off ABX .     HEME  - On Xarelto 10mg at home likely for DVT PPX?   - Hold home Xarelto in prep for OR   - DVT PPX with LVX for now     ENDOCRINE  - No hx of DM2   - Monitor BG     SKIN  - Basic trach and PEG care ordered     ETHICS/ GOC    - FULL CODE   - Dr Tyson Velasquez,  involved in care    DISPO - Back home with  when able.

## 2024-08-04 NOTE — PROGRESS NOTE ADULT - SUBJECTIVE AND OBJECTIVE BOX
CHIEF COMPLAINT:Patient is a 69y old  Female who presents with a chief complaint of trach exchange (18 Jul 2024 07:07)      INTERVAL EVENTS:     ROS: Seen by bedside during AM rounds     OBJECTIVE:  ICU Vital Signs Last 24 Hrs  T(C): 36.7 (03 Aug 2024 20:00), Max: 36.8 (03 Aug 2024 08:00)  T(F): 98.1 (03 Aug 2024 20:00), Max: 98.2 (03 Aug 2024 08:00)  HR: 97 (04 Aug 2024 06:52) (79 - 104)  BP: 103/72 (03 Aug 2024 20:00) (103/72 - 141/76)  BP(mean): 93 (03 Aug 2024 16:00) (93 - 94)  ABP: --  ABP(mean): --  RR: 16 (03 Aug 2024 20:00) (16 - 16)  SpO2: 96% (04 Aug 2024 06:52) (95% - 98%)    O2 Parameters below as of 03 Aug 2024 20:00  Patient On (Oxygen Delivery Method): ventilator    O2 Concentration (%): 30      Mode: AC/ CMV (Assist Control/ Continuous Mandatory Ventilation), RR (machine): 16, TV (machine): 400, FiO2: 30, PEEP: 5, ITime: 0.64, MAP: 9, PIP: 27    08-03 @ 07:01  -  08-04 @ 07:00  --------------------------------------------------------  IN: 600 mL / OUT: 840 mL / NET: -240 mL      CAPILLARY BLOOD GLUCOSE          PHYSICAL EXAM:  General:   HEENT:   Lymph Nodes:  Neck:   Respiratory:   Cardiovascular:   Abdomen:   Extremities:   Skin:   Neurological:  Psychiatry:    Mode: AC/ CMV (Assist Control/ Continuous Mandatory Ventilation)  RR (machine): 16  TV (machine): 400  FiO2: 30  PEEP: 5  ITime: 0.64  MAP: 9  PIP: 27      HOSPITAL MEDICATIONS:  MEDICATIONS  (STANDING):  artificial  tears Solution 1 Drop(s) Both EYES <User Schedule>  chlorhexidine 0.12% Liquid 15 milliLiter(s) Oral Mucosa every 12 hours  chlorhexidine 2% Cloths 1 Application(s) Topical daily  diazepam    Tablet 2 milliGRAM(s) Oral every 12 hours  enoxaparin Injectable 40 milliGRAM(s) SubCutaneous every 24 hours  erythromycin   Ointment 1 Application(s) Both EYES <User Schedule>  multivitamin/minerals/iron Oral Solution (CENTRUM) 15 milliLiter(s) Oral daily  ofloxacin 0.3% Solution 1 Drop(s) Both EYES two times a day  petrolatum Ophthalmic Ointment 1 Application(s) Both EYES <User Schedule>  riluzole 50 milliGRAM(s) Oral two times a day  sertraline 75 milliGRAM(s) Oral daily  sodium chloride 0.9%. 1000 milliLiter(s) (100 mL/Hr) IV Continuous <Continuous>    MEDICATIONS  (PRN):  acetaminophen   Oral Liquid .. 650 milliGRAM(s) Oral every 6 hours PRN Temp greater or equal to 38C (100.4F), Mild Pain (1 - 3)  diazepam    Tablet 2 milliGRAM(s) Oral at bedtime PRN for dysautonomia/ hypertension/discomfort      LABS:                Venous Blood Gas:  08-02 @ 22:38  7.35/47/133/26/99.6  VBG Lactate: 1.9   CHIEF COMPLAINT:Patient is a 69y old  Female who presents with a chief complaint of trach exchange (18 Jul 2024 07:07)      INTERVAL EVENTS:     ROS: Seen by bedside during AM rounds     OBJECTIVE:  ICU Vital Signs Last 24 Hrs  T(C): 36.7 (03 Aug 2024 20:00), Max: 36.8 (03 Aug 2024 08:00)  T(F): 98.1 (03 Aug 2024 20:00), Max: 98.2 (03 Aug 2024 08:00)  HR: 97 (04 Aug 2024 06:52) (79 - 104)  BP: 103/72 (03 Aug 2024 20:00) (103/72 - 141/76)  BP(mean): 93 (03 Aug 2024 16:00) (93 - 94)  ABP: --  ABP(mean): --  RR: 16 (03 Aug 2024 20:00) (16 - 16)  SpO2: 96% (04 Aug 2024 06:52) (95% - 98%)    O2 Parameters below as of 03 Aug 2024 20:00  Patient On (Oxygen Delivery Method): ventilator    O2 Concentration (%): 30      Mode: AC/ CMV (Assist Control/ Continuous Mandatory Ventilation), RR (machine): 16, TV (machine): 400, FiO2: 30, PEEP: 5, ITime: 0.64, MAP: 9, PIP: 27    08-03 @ 07:01  -  08-04 @ 07:00  --------------------------------------------------------  IN: 600 mL / OUT: 840 mL / NET: -240 mL      CAPILLARY BLOOD GLUCOSE          PHYSICAL EXAM:  General: NAD   Neck: (+) Trach tube noted, site c/d/i.  Cards: S1/S2, no murmurs   Pulm: CTA bilaterally. No wheezes.   Abdomen: Soft, NTND. (+) PEG noted, site c/d/i.   Extremities: No pedal edema. Extremities warm to touch.  Neurology: Awake/eyes open. Nonverbal. Not tracking. Not following commands. Functional quadriplegic.   Skin: warm to touch, color appropriate for ethnicity. Refer to RN assessment for further details.    Mode: AC/ CMV (Assist Control/ Continuous Mandatory Ventilation)  RR (machine): 16  TV (machine): 400  FiO2: 30  PEEP: 5  ITime: 0.64  MAP: 9  PIP: 27      HOSPITAL MEDICATIONS:  MEDICATIONS  (STANDING):  artificial  tears Solution 1 Drop(s) Both EYES <User Schedule>  chlorhexidine 0.12% Liquid 15 milliLiter(s) Oral Mucosa every 12 hours  chlorhexidine 2% Cloths 1 Application(s) Topical daily  diazepam    Tablet 2 milliGRAM(s) Oral every 12 hours  enoxaparin Injectable 40 milliGRAM(s) SubCutaneous every 24 hours  erythromycin   Ointment 1 Application(s) Both EYES <User Schedule>  multivitamin/minerals/iron Oral Solution (CENTRUM) 15 milliLiter(s) Oral daily  ofloxacin 0.3% Solution 1 Drop(s) Both EYES two times a day  petrolatum Ophthalmic Ointment 1 Application(s) Both EYES <User Schedule>  riluzole 50 milliGRAM(s) Oral two times a day  sertraline 75 milliGRAM(s) Oral daily  sodium chloride 0.9%. 1000 milliLiter(s) (100 mL/Hr) IV Continuous <Continuous>    MEDICATIONS  (PRN):  acetaminophen   Oral Liquid .. 650 milliGRAM(s) Oral every 6 hours PRN Temp greater or equal to 38C (100.4F), Mild Pain (1 - 3)  diazepam    Tablet 2 milliGRAM(s) Oral at bedtime PRN for dysautonomia/ hypertension/discomfort      LABS:                Venous Blood Gas:  08-02 @ 22:38  7.35/47/133/26/99.6  VBG Lactate: 1.9   CHIEF COMPLAINT:Patient is a 69y old  Female who presents with a chief complaint of trach exchange (18 Jul 2024 07:07)      INTERVAL EVENTS: no overnight events. 5 sec of SVT run this afternoon.     ROS: Seen by bedside during AM rounds     OBJECTIVE:  ICU Vital Signs Last 24 Hrs  T(C): 36.7 (03 Aug 2024 20:00), Max: 36.8 (03 Aug 2024 08:00)  T(F): 98.1 (03 Aug 2024 20:00), Max: 98.2 (03 Aug 2024 08:00)  HR: 97 (04 Aug 2024 06:52) (79 - 104)  BP: 103/72 (03 Aug 2024 20:00) (103/72 - 141/76)  BP(mean): 93 (03 Aug 2024 16:00) (93 - 94)  ABP: --  ABP(mean): --  RR: 16 (03 Aug 2024 20:00) (16 - 16)  SpO2: 96% (04 Aug 2024 06:52) (95% - 98%)    O2 Parameters below as of 03 Aug 2024 20:00  Patient On (Oxygen Delivery Method): ventilator    O2 Concentration (%): 30      Mode: AC/ CMV (Assist Control/ Continuous Mandatory Ventilation), RR (machine): 16, TV (machine): 400, FiO2: 30, PEEP: 5, ITime: 0.64, MAP: 9, PIP: 27    08-03 @ 07:01  -  08-04 @ 07:00  --------------------------------------------------------  IN: 600 mL / OUT: 840 mL / NET: -240 mL      CAPILLARY BLOOD GLUCOSE          PHYSICAL EXAM:  General: NAD   Neck: (+) Trach tube noted, site c/d/i.  Cards: S1/S2, no murmurs   Pulm: CTA bilaterally. No wheezes.   Abdomen: Soft, NTND. (+) PEG noted, site c/d/i.   Extremities: No pedal edema. Extremities warm to touch.  Neurology: Awake/eyes open. Nonverbal. Not tracking. Not following commands. Functional quadriplegic.   Skin: warm to touch, color appropriate for ethnicity. Refer to RN assessment for further details.    Mode: AC/ CMV (Assist Control/ Continuous Mandatory Ventilation)  RR (machine): 16  TV (machine): 400  FiO2: 30  PEEP: 5  ITime: 0.64  MAP: 9  PIP: 27      HOSPITAL MEDICATIONS:  MEDICATIONS  (STANDING):  artificial  tears Solution 1 Drop(s) Both EYES <User Schedule>  chlorhexidine 0.12% Liquid 15 milliLiter(s) Oral Mucosa every 12 hours  chlorhexidine 2% Cloths 1 Application(s) Topical daily  diazepam    Tablet 2 milliGRAM(s) Oral every 12 hours  enoxaparin Injectable 40 milliGRAM(s) SubCutaneous every 24 hours  erythromycin   Ointment 1 Application(s) Both EYES <User Schedule>  multivitamin/minerals/iron Oral Solution (CENTRUM) 15 milliLiter(s) Oral daily  ofloxacin 0.3% Solution 1 Drop(s) Both EYES two times a day  petrolatum Ophthalmic Ointment 1 Application(s) Both EYES <User Schedule>  riluzole 50 milliGRAM(s) Oral two times a day  sertraline 75 milliGRAM(s) Oral daily  sodium chloride 0.9%. 1000 milliLiter(s) (100 mL/Hr) IV Continuous <Continuous>    MEDICATIONS  (PRN):  acetaminophen   Oral Liquid .. 650 milliGRAM(s) Oral every 6 hours PRN Temp greater or equal to 38C (100.4F), Mild Pain (1 - 3)  diazepam    Tablet 2 milliGRAM(s) Oral at bedtime PRN for dysautonomia/ hypertension/discomfort      LABS:                Venous Blood Gas:  08-02 @ 22:38  7.35/47/133/26/99.6  VBG Lactate: 1.9

## 2024-08-04 NOTE — CHART NOTE - NSCHARTNOTEFT_GEN_A_CORE
Notified this afternoon that patient had ~5 sec of SVT, self resolved. Patient BP stable. Unable to assess symptoms given patient nonverbal at baseline. Labs checked, lytes stable. Will continue to monitor.

## 2024-08-05 LAB
ANION GAP SERPL CALC-SCNC: 14 MMOL/L — SIGNIFICANT CHANGE UP (ref 7–14)
BASOPHILS # BLD AUTO: 0.03 K/UL — SIGNIFICANT CHANGE UP (ref 0–0.2)
BASOPHILS NFR BLD AUTO: 0.3 % — SIGNIFICANT CHANGE UP (ref 0–2)
BLOOD GAS VENOUS COMPREHENSIVE RESULT: SIGNIFICANT CHANGE UP
BUN SERPL-MCNC: 22 MG/DL — SIGNIFICANT CHANGE UP (ref 7–23)
CALCIUM SERPL-MCNC: 9.3 MG/DL — SIGNIFICANT CHANGE UP (ref 8.4–10.5)
CHLORIDE SERPL-SCNC: 105 MMOL/L — SIGNIFICANT CHANGE UP (ref 98–107)
CO2 SERPL-SCNC: 23 MMOL/L — SIGNIFICANT CHANGE UP (ref 22–31)
CREAT SERPL-MCNC: <0.2 MG/DL — LOW (ref 0.5–1.3)
EGFR: 125 ML/MIN/1.73M2 — SIGNIFICANT CHANGE UP
EOSINOPHIL # BLD AUTO: 0.23 K/UL — SIGNIFICANT CHANGE UP (ref 0–0.5)
EOSINOPHIL NFR BLD AUTO: 2 % — SIGNIFICANT CHANGE UP (ref 0–6)
GLUCOSE SERPL-MCNC: 143 MG/DL — HIGH (ref 70–99)
HCT VFR BLD CALC: 41.9 % — SIGNIFICANT CHANGE UP (ref 34.5–45)
HGB BLD-MCNC: 12.8 G/DL — SIGNIFICANT CHANGE UP (ref 11.5–15.5)
IANC: 8.96 K/UL — HIGH (ref 1.8–7.4)
IMM GRANULOCYTES NFR BLD AUTO: 1.3 % — HIGH (ref 0–0.9)
LYMPHOCYTES # BLD AUTO: 1.25 K/UL — SIGNIFICANT CHANGE UP (ref 1–3.3)
LYMPHOCYTES # BLD AUTO: 11 % — LOW (ref 13–44)
MAGNESIUM SERPL-MCNC: 2.1 MG/DL — SIGNIFICANT CHANGE UP (ref 1.6–2.6)
MCHC RBC-ENTMCNC: 26.1 PG — LOW (ref 27–34)
MCHC RBC-ENTMCNC: 30.5 GM/DL — LOW (ref 32–36)
MCV RBC AUTO: 85.3 FL — SIGNIFICANT CHANGE UP (ref 80–100)
MONOCYTES # BLD AUTO: 0.71 K/UL — SIGNIFICANT CHANGE UP (ref 0–0.9)
MONOCYTES NFR BLD AUTO: 6.3 % — SIGNIFICANT CHANGE UP (ref 2–14)
NEUTROPHILS # BLD AUTO: 8.96 K/UL — HIGH (ref 1.8–7.4)
NEUTROPHILS NFR BLD AUTO: 79.1 % — HIGH (ref 43–77)
NRBC # BLD: 0 /100 WBCS — SIGNIFICANT CHANGE UP (ref 0–0)
NRBC # FLD: 0 K/UL — SIGNIFICANT CHANGE UP (ref 0–0)
PHOSPHATE SERPL-MCNC: 3.4 MG/DL — SIGNIFICANT CHANGE UP (ref 2.5–4.5)
PLATELET # BLD AUTO: 347 K/UL — SIGNIFICANT CHANGE UP (ref 150–400)
POTASSIUM SERPL-MCNC: 4.1 MMOL/L — SIGNIFICANT CHANGE UP (ref 3.5–5.3)
POTASSIUM SERPL-SCNC: 4.1 MMOL/L — SIGNIFICANT CHANGE UP (ref 3.5–5.3)
RBC # BLD: 4.91 M/UL — SIGNIFICANT CHANGE UP (ref 3.8–5.2)
RBC # FLD: 17.2 % — HIGH (ref 10.3–14.5)
SODIUM SERPL-SCNC: 142 MMOL/L — SIGNIFICANT CHANGE UP (ref 135–145)
WBC # BLD: 11.33 K/UL — HIGH (ref 3.8–10.5)
WBC # FLD AUTO: 11.33 K/UL — HIGH (ref 3.8–10.5)

## 2024-08-05 PROCEDURE — 99233 SBSQ HOSP IP/OBS HIGH 50: CPT | Mod: FS

## 2024-08-05 RX ADMIN — POVIDONE, PROPYLENE GLYCOL 1 DROP(S): 6.8; 3 LIQUID OPHTHALMIC at 17:25

## 2024-08-05 RX ADMIN — POVIDONE, PROPYLENE GLYCOL 1 DROP(S): 6.8; 3 LIQUID OPHTHALMIC at 11:19

## 2024-08-05 RX ADMIN — Medication 15 MILLILITER(S): at 11:20

## 2024-08-05 RX ADMIN — POVIDONE, PROPYLENE GLYCOL 1 DROP(S): 6.8; 3 LIQUID OPHTHALMIC at 07:16

## 2024-08-05 RX ADMIN — ERYTHROMYCIN 1 APPLICATION(S): 5 OINTMENT OPHTHALMIC at 11:19

## 2024-08-05 RX ADMIN — POVIDONE, PROPYLENE GLYCOL 1 APPLICATION(S): 6.8; 3 LIQUID OPHTHALMIC at 12:59

## 2024-08-05 RX ADMIN — POVIDONE, PROPYLENE GLYCOL 1 DROP(S): 6.8; 3 LIQUID OPHTHALMIC at 17:24

## 2024-08-05 RX ADMIN — ERYTHROMYCIN 1 APPLICATION(S): 5 OINTMENT OPHTHALMIC at 17:24

## 2024-08-05 RX ADMIN — OFLOXACIN 1 DROP(S): 3 SOLUTION/ DROPS OPHTHALMIC at 17:23

## 2024-08-05 RX ADMIN — POVIDONE, PROPYLENE GLYCOL 1 APPLICATION(S): 6.8; 3 LIQUID OPHTHALMIC at 04:18

## 2024-08-05 RX ADMIN — POVIDONE, PROPYLENE GLYCOL 1 DROP(S): 6.8; 3 LIQUID OPHTHALMIC at 04:17

## 2024-08-05 RX ADMIN — POVIDONE, PROPYLENE GLYCOL 1 DROP(S): 6.8; 3 LIQUID OPHTHALMIC at 12:59

## 2024-08-05 RX ADMIN — ERYTHROMYCIN 1 APPLICATION(S): 5 OINTMENT OPHTHALMIC at 05:05

## 2024-08-05 RX ADMIN — ERYTHROMYCIN 1 APPLICATION(S): 5 OINTMENT OPHTHALMIC at 23:08

## 2024-08-05 RX ADMIN — ERYTHROMYCIN 1 APPLICATION(S): 5 OINTMENT OPHTHALMIC at 03:19

## 2024-08-05 RX ADMIN — POVIDONE, PROPYLENE GLYCOL 1 DROP(S): 6.8; 3 LIQUID OPHTHALMIC at 01:34

## 2024-08-05 RX ADMIN — POVIDONE, PROPYLENE GLYCOL 1 DROP(S): 6.8; 3 LIQUID OPHTHALMIC at 05:05

## 2024-08-05 RX ADMIN — POVIDONE, PROPYLENE GLYCOL 1 DROP(S): 6.8; 3 LIQUID OPHTHALMIC at 23:09

## 2024-08-05 RX ADMIN — ERYTHROMYCIN 1 APPLICATION(S): 5 OINTMENT OPHTHALMIC at 21:12

## 2024-08-05 RX ADMIN — POVIDONE, PROPYLENE GLYCOL 1 APPLICATION(S): 6.8; 3 LIQUID OPHTHALMIC at 01:34

## 2024-08-05 RX ADMIN — POVIDONE, PROPYLENE GLYCOL 1 DROP(S): 6.8; 3 LIQUID OPHTHALMIC at 22:19

## 2024-08-05 RX ADMIN — POVIDONE, PROPYLENE GLYCOL 1 APPLICATION(S): 6.8; 3 LIQUID OPHTHALMIC at 22:19

## 2024-08-05 RX ADMIN — RILUZOLE 50 MILLIGRAM(S): 5 LIQUID ORAL at 17:23

## 2024-08-05 RX ADMIN — CHLORHEXIDINE GLUCONATE 15 MILLILITER(S): 40 SOLUTION TOPICAL at 05:05

## 2024-08-05 RX ADMIN — ERYTHROMYCIN 1 APPLICATION(S): 5 OINTMENT OPHTHALMIC at 10:25

## 2024-08-05 RX ADMIN — POVIDONE, PROPYLENE GLYCOL 1 DROP(S): 6.8; 3 LIQUID OPHTHALMIC at 15:47

## 2024-08-05 RX ADMIN — ERYTHROMYCIN 1 APPLICATION(S): 5 OINTMENT OPHTHALMIC at 15:46

## 2024-08-05 RX ADMIN — POVIDONE, PROPYLENE GLYCOL 1 APPLICATION(S): 6.8; 3 LIQUID OPHTHALMIC at 20:08

## 2024-08-05 RX ADMIN — POVIDONE, PROPYLENE GLYCOL 1 DROP(S): 6.8; 3 LIQUID OPHTHALMIC at 20:08

## 2024-08-05 RX ADMIN — Medication 2 MILLIGRAM(S): at 17:22

## 2024-08-05 RX ADMIN — RILUZOLE 50 MILLIGRAM(S): 5 LIQUID ORAL at 05:04

## 2024-08-05 RX ADMIN — ENOXAPARIN SODIUM 40 MILLIGRAM(S): 100 INJECTION SUBCUTANEOUS at 04:19

## 2024-08-05 RX ADMIN — POVIDONE, PROPYLENE GLYCOL 1 DROP(S): 6.8; 3 LIQUID OPHTHALMIC at 09:16

## 2024-08-05 RX ADMIN — POVIDONE, PROPYLENE GLYCOL 1 DROP(S): 6.8; 3 LIQUID OPHTHALMIC at 10:25

## 2024-08-05 RX ADMIN — Medication 2 MILLIGRAM(S): at 05:04

## 2024-08-05 RX ADMIN — CHLORHEXIDINE GLUCONATE 1 APPLICATION(S): 40 SOLUTION TOPICAL at 11:20

## 2024-08-05 RX ADMIN — POVIDONE, PROPYLENE GLYCOL 1 APPLICATION(S): 6.8; 3 LIQUID OPHTHALMIC at 17:23

## 2024-08-05 RX ADMIN — OFLOXACIN 1 DROP(S): 3 SOLUTION/ DROPS OPHTHALMIC at 05:05

## 2024-08-05 RX ADMIN — POVIDONE, PROPYLENE GLYCOL 1 DROP(S): 6.8; 3 LIQUID OPHTHALMIC at 21:12

## 2024-08-05 RX ADMIN — POVIDONE, PROPYLENE GLYCOL 1 APPLICATION(S): 6.8; 3 LIQUID OPHTHALMIC at 07:16

## 2024-08-05 RX ADMIN — POVIDONE, PROPYLENE GLYCOL 1 DROP(S): 6.8; 3 LIQUID OPHTHALMIC at 03:18

## 2024-08-05 RX ADMIN — CHLORHEXIDINE GLUCONATE 15 MILLILITER(S): 40 SOLUTION TOPICAL at 17:23

## 2024-08-05 RX ADMIN — POVIDONE, PROPYLENE GLYCOL 1 APPLICATION(S): 6.8; 3 LIQUID OPHTHALMIC at 10:24

## 2024-08-05 RX ADMIN — SERTRALINE HYDROCHLORIDE 75 MILLIGRAM(S): 50 TABLET, FILM COATED ORAL at 11:20

## 2024-08-05 NOTE — PROGRESS NOTE ADULT - SUBJECTIVE AND OBJECTIVE BOX
CHIEF COMPLAINT: Patient is a 69y old  Female who presents with a chief complaint of trach exchange (18 Jul 2024 07:07)    INTERVAL EVENTS:   - Yesterday afternoon (8/4) had ~5 sec of SVT, self resolved.    ROS: Seen by bedside during AM rounds     OBJECTIVE:  ICU Vital Signs Last 24 Hrs  T(C): 36.4 (05 Aug 2024 04:00), Max: 36.8 (04 Aug 2024 16:00)  T(F): 97.6 (05 Aug 2024 04:00), Max: 98.2 (04 Aug 2024 16:00)  HR: 100 (05 Aug 2024 04:00) (97 - 100)  BP: 156/90 (05 Aug 2024 04:00) (100/71 - 156/90)  BP(mean): 109 (05 Aug 2024 04:00) (82 - 109)  ABP: --  ABP(mean): --  RR: 16 (05 Aug 2024 04:00) (16 - 18)  SpO2: 93% (05 Aug 2024 04:00) (92% - 97%)    O2 Parameters below as of 05 Aug 2024 04:00  Patient On (Oxygen Delivery Method): ventilator    O2 Concentration (%): 30      Mode: AC/ CMV (Assist Control/ Continuous Mandatory Ventilation), RR (machine): 16, TV (machine): 400, FiO2: 30, PEEP: 5, ITime: 0.8, MAP: 10, PIP: 34    08-04 @ 07:01  -  08-05 @ 07:00  --------------------------------------------------------  IN: 1300 mL / OUT: 1000 mL / NET: 300 mL      CAPILLARY BLOOD GLUCOSE          PHYSICAL EXAM:  General:   HEENT:   Lymph Nodes:  Neck:   Respiratory:   Cardiovascular:   Abdomen:   Extremities:   Skin:   Neurological:  Psychiatry:    Mode: AC/ CMV (Assist Control/ Continuous Mandatory Ventilation)  RR (machine): 16  TV (machine): 400  FiO2: 30  PEEP: 5  ITime: 0.8  MAP: 10  PIP: 34      HOSPITAL MEDICATIONS:  MEDICATIONS  (STANDING):  artificial  tears Solution 1 Drop(s) Both EYES <User Schedule>  chlorhexidine 0.12% Liquid 15 milliLiter(s) Oral Mucosa every 12 hours  chlorhexidine 2% Cloths 1 Application(s) Topical daily  diazepam    Tablet 2 milliGRAM(s) Oral every 12 hours  enoxaparin Injectable 40 milliGRAM(s) SubCutaneous every 24 hours  erythromycin   Ointment 1 Application(s) Both EYES <User Schedule>  multivitamin/minerals/iron Oral Solution (CENTRUM) 15 milliLiter(s) Oral daily  ofloxacin 0.3% Solution 1 Drop(s) Both EYES two times a day  petrolatum Ophthalmic Ointment 1 Application(s) Both EYES <User Schedule>  riluzole 50 milliGRAM(s) Oral two times a day  sertraline 75 milliGRAM(s) Oral daily  sodium chloride 0.9%. 1000 milliLiter(s) (100 mL/Hr) IV Continuous <Continuous>    MEDICATIONS  (PRN):  acetaminophen   Oral Liquid .. 650 milliGRAM(s) Oral every 6 hours PRN Temp greater or equal to 38C (100.4F), Mild Pain (1 - 3)  diazepam    Tablet 2 milliGRAM(s) Oral at bedtime PRN for dysautonomia/ hypertension/discomfort      LABS:    08-04    142  |  105  |  26<H>  ----------------------------<  154<H>  4.2   |  24  |  <0.20<L>    Ca    9.2      04 Aug 2024 15:30  Phos  3.3     08-04  Mg     2.00     08-04        Urinalysis Basic - ( 04 Aug 2024 15:30 )    Color: x / Appearance: x / SG: x / pH: x  Gluc: 154 mg/dL / Ketone: x  / Bili: x / Urobili: x   Blood: x / Protein: x / Nitrite: x   Leuk Esterase: x / RBC: x / WBC x   Sq Epi: x / Non Sq Epi: x / Bacteria: x        Venous Blood Gas:  08-05 @ 05:00  7.32/53/69/27/94.8  VBG Lactate: 2.6   CHIEF COMPLAINT: Patient is a 69y old  Female who presents with a chief complaint of trach exchange (18 Jul 2024 07:07)    INTERVAL EVENTS: No acute events overnight. Continues with Trach to vent.  - Yesterday afternoon (8/4) had ~5 sec of SVT, self resolved.    ROS: Seen by bedside during AM rounds     OBJECTIVE:  ICU Vital Signs Last 24 Hrs  T(C): 36.4 (05 Aug 2024 04:00), Max: 36.8 (04 Aug 2024 16:00)  T(F): 97.6 (05 Aug 2024 04:00), Max: 98.2 (04 Aug 2024 16:00)  HR: 100 (05 Aug 2024 04:00) (97 - 100)  BP: 156/90 (05 Aug 2024 04:00) (100/71 - 156/90)  BP(mean): 109 (05 Aug 2024 04:00) (82 - 109)  ABP: --  ABP(mean): --  RR: 16 (05 Aug 2024 04:00) (16 - 18)  SpO2: 93% (05 Aug 2024 04:00) (92% - 97%)    O2 Parameters below as of 05 Aug 2024 04:00  Patient On (Oxygen Delivery Method): ventilator    O2 Concentration (%): 30      Mode: AC/ CMV (Assist Control/ Continuous Mandatory Ventilation), RR (machine): 16, TV (machine): 400, FiO2: 30, PEEP: 5, ITime: 0.8, MAP: 10, PIP: 34    08-04 @ 07:01  -  08-05 @ 07:00  --------------------------------------------------------  IN: 1300 mL / OUT: 1000 mL / NET: 300 mL      CAPILLARY BLOOD GLUCOSE          PHYSICAL EXAM:  General: NAD   HEENT: +Trach tube noted, site c/d/i. Eyes improving - covered by tegaderm  Cards: S1/S2, no murmurs   Pulm: CTA bilaterally. No wheezes.   Abdomen: Soft, NTND. +PEG noted, site c/d/i.   Extremities: No pedal edema. Extremities warm to touch.  Neurology: Baseline ALS; does not follow commands. No acute focal neurological deficits  Skin: warm to touch. Refer to RN assessment for further details.      Mode: AC/ CMV (Assist Control/ Continuous Mandatory Ventilation)  RR (machine): 16  TV (machine): 400  FiO2: 30  PEEP: 5  ITime: 0.8  MAP: 10  PIP: 34      HOSPITAL MEDICATIONS:  MEDICATIONS  (STANDING):  artificial  tears Solution 1 Drop(s) Both EYES <User Schedule>  chlorhexidine 0.12% Liquid 15 milliLiter(s) Oral Mucosa every 12 hours  chlorhexidine 2% Cloths 1 Application(s) Topical daily  diazepam    Tablet 2 milliGRAM(s) Oral every 12 hours  enoxaparin Injectable 40 milliGRAM(s) SubCutaneous every 24 hours  erythromycin   Ointment 1 Application(s) Both EYES <User Schedule>  multivitamin/minerals/iron Oral Solution (CENTRUM) 15 milliLiter(s) Oral daily  ofloxacin 0.3% Solution 1 Drop(s) Both EYES two times a day  petrolatum Ophthalmic Ointment 1 Application(s) Both EYES <User Schedule>  riluzole 50 milliGRAM(s) Oral two times a day  sertraline 75 milliGRAM(s) Oral daily  sodium chloride 0.9%. 1000 milliLiter(s) (100 mL/Hr) IV Continuous <Continuous>    MEDICATIONS  (PRN):  acetaminophen   Oral Liquid .. 650 milliGRAM(s) Oral every 6 hours PRN Temp greater or equal to 38C (100.4F), Mild Pain (1 - 3)  diazepam    Tablet 2 milliGRAM(s) Oral at bedtime PRN for dysautonomia/ hypertension/discomfort      LABS:    08-04    142  |  105  |  26<H>  ----------------------------<  154<H>  4.2   |  24  |  <0.20<L>    Ca    9.2      04 Aug 2024 15:30  Phos  3.3     08-04  Mg     2.00     08-04        Urinalysis Basic - ( 04 Aug 2024 15:30 )    Color: x / Appearance: x / SG: x / pH: x  Gluc: 154 mg/dL / Ketone: x  / Bili: x / Urobili: x   Blood: x / Protein: x / Nitrite: x   Leuk Esterase: x / RBC: x / WBC x   Sq Epi: x / Non Sq Epi: x / Bacteria: x        Venous Blood Gas:  08-05 @ 05:00  7.32/53/69/27/94.8  VBG Lactate: 2.6   CHIEF COMPLAINT: Patient is a 69y old  Female who presents with a chief complaint of trach exchange (18 Jul 2024 07:07)    INTERVAL EVENTS: No acute events overnight. Continues with Trach to vent.  - Yesterday afternoon (8/4) had ~5 sec of SVT, self resolved.    ROS: Patient seen and evaluated at bedside during AM rounds. Unable to obtain ROS 2/2 baseline mentation.    OBJECTIVE:  ICU Vital Signs Last 24 Hrs  T(C): 36.4 (05 Aug 2024 04:00), Max: 36.8 (04 Aug 2024 16:00)  T(F): 97.6 (05 Aug 2024 04:00), Max: 98.2 (04 Aug 2024 16:00)  HR: 100 (05 Aug 2024 04:00) (97 - 100)  BP: 156/90 (05 Aug 2024 04:00) (100/71 - 156/90)  BP(mean): 109 (05 Aug 2024 04:00) (82 - 109)  ABP: --  ABP(mean): --  RR: 16 (05 Aug 2024 04:00) (16 - 18)  SpO2: 93% (05 Aug 2024 04:00) (92% - 97%)    O2 Parameters below as of 05 Aug 2024 04:00  Patient On (Oxygen Delivery Method): ventilator    O2 Concentration (%): 30      Mode: AC/ CMV (Assist Control/ Continuous Mandatory Ventilation), RR (machine): 16, TV (machine): 400, FiO2: 30, PEEP: 5, ITime: 0.8, MAP: 10, PIP: 34    08-04 @ 07:01  -  08-05 @ 07:00  --------------------------------------------------------  IN: 1300 mL / OUT: 1000 mL / NET: 300 mL      CAPILLARY BLOOD GLUCOSE          PHYSICAL EXAM:  General: NAD   HEENT: +Trach tube noted, site c/d/i. Eyes improving - covered by tegaderm  Cards: S1/S2, no murmurs   Pulm: CTA bilaterally. No wheezes.   Abdomen: Soft, NTND. +PEG noted, site c/d/i.   Extremities: No pedal edema. Extremities warm to touch.  Neurology: Baseline ALS; does not follow commands. No acute focal neurological deficits  Skin: warm to touch. Refer to RN assessment for further details.      Mode: AC/ CMV (Assist Control/ Continuous Mandatory Ventilation)  RR (machine): 16  TV (machine): 400  FiO2: 30  PEEP: 5  ITime: 0.8  MAP: 10  PIP: 34      HOSPITAL MEDICATIONS:  MEDICATIONS  (STANDING):  artificial  tears Solution 1 Drop(s) Both EYES <User Schedule>  chlorhexidine 0.12% Liquid 15 milliLiter(s) Oral Mucosa every 12 hours  chlorhexidine 2% Cloths 1 Application(s) Topical daily  diazepam    Tablet 2 milliGRAM(s) Oral every 12 hours  enoxaparin Injectable 40 milliGRAM(s) SubCutaneous every 24 hours  erythromycin   Ointment 1 Application(s) Both EYES <User Schedule>  multivitamin/minerals/iron Oral Solution (CENTRUM) 15 milliLiter(s) Oral daily  ofloxacin 0.3% Solution 1 Drop(s) Both EYES two times a day  petrolatum Ophthalmic Ointment 1 Application(s) Both EYES <User Schedule>  riluzole 50 milliGRAM(s) Oral two times a day  sertraline 75 milliGRAM(s) Oral daily  sodium chloride 0.9%. 1000 milliLiter(s) (100 mL/Hr) IV Continuous <Continuous>    MEDICATIONS  (PRN):  acetaminophen   Oral Liquid .. 650 milliGRAM(s) Oral every 6 hours PRN Temp greater or equal to 38C (100.4F), Mild Pain (1 - 3)  diazepam    Tablet 2 milliGRAM(s) Oral at bedtime PRN for dysautonomia/ hypertension/discomfort      LABS:    08-04    142  |  105  |  26<H>  ----------------------------<  154<H>  4.2   |  24  |  <0.20<L>    Ca    9.2      04 Aug 2024 15:30  Phos  3.3     08-04  Mg     2.00     08-04        Urinalysis Basic - ( 04 Aug 2024 15:30 )    Color: x / Appearance: x / SG: x / pH: x  Gluc: 154 mg/dL / Ketone: x  / Bili: x / Urobili: x   Blood: x / Protein: x / Nitrite: x   Leuk Esterase: x / RBC: x / WBC x   Sq Epi: x / Non Sq Epi: x / Bacteria: x        Venous Blood Gas:  08-05 @ 05:00  7.32/53/69/27/94.8  VBG Lactate: 2.6

## 2024-08-05 NOTE — PROGRESS NOTE ADULT - ASSESSMENT
69 YO Female with PMHx of ALS, Parkinson,, nonverbal, bed bound, chronic respiratory failure with tracheostomy and vent dependence, and oropharyngeal dysphagia with PEG who presented from home with tracheostomy leak. She was noted with vent alarm and poor TVe (250-290) at home. Trach changed at home with no improvement and sent in for thoracic evaluation. While in MICU no air leak noted with hyperinflated cuff. Transferred to [facility] on 7/5 with course complicated by leukocytosis second to UTI vs trachitis and completed zosyn course. Trach upsized by thoracic on 7/17 however AL remains and now pending custom trach.     NEUROLOGY  # ALS   - Baseline nonverbal, awake, and communicates with eye movement.   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 Nurses taught moister chamber placement with optho team.     PSYCH   # Anxiety and Depression   - Continue on home Valium 2mg BID with additional 2mg PRN   - Continue on Zoloft 75mg QD while in house (on 4cc/ 80mg QD at home)     CARDIOVASCULAR   # HTN thought to be second to discomfort vs dysautonomia   - Evening HTN and continue on Hydralazine vs valium PRN doses   - Monitor HR and BP    # SVT  - Approx 5 sec episode of SVT, self limited (8/4)  - Monitor BP and keep Mg>2, K>4  - Consider BB if becomes more persistent or HD unstable    RESPIRATORY  # Tracheostomy leak   - At home noted with TVe 200-300s despite tracheostomy change to [id number].   - No air leak noted in house with hyperinflated cuff likely tracheomegaly?   - Continue on chronic vent 16/400/5/21   - Chronic bibasilar atelectasis and continued on HTS PRN with chest PT  - Trach upsized to [place] 9 on 7/17, however trach leak remains.   - Pending Custom Tracheostomy ordered by Thoracic surgery     GI  # Dysphagia   - Continue on PEG-TF     RENAL  # High PVR   - BS with roughly 200-300cc PVR, however no acute distress   - Monitor renal function and UOP     # Elevated lactate   - Lactate elevated with mild contraction alkalosis   - Rehydrate and monitor     INFECTIOUS DISEASE  # Leukocytosis likely second to trachitis vs UTI?   - No fever or chills and noted with prior leukocytosis   - CXR with prior atelectasis and no acute findings   - UA (7/6) with positive nitrites (7/6)   - SCx (7/5) with pansensitive PSA  - BCx (7/6) x 2 NGT  - UCx (7/6) with coag neg staph  - MRSA PCR (7/6) POSITIVE for both MRSA/MSSA s/p Bactroban (7/6 - 7/10)  - s/p empiric Zosyn (7/8 - 7/14) however WBC continues to wax and wean without fever and will monitor off ABX .     HEME  - On Xarelto 10mg at home likely for DVT PPX?   - Hold home Xarelto in prep for OR   - DVT PPX with LVX for now     ENDOCRINE  - No hx of DM2   - Monitor BG     SKIN  - Basic trach and PEG care ordered     ETHICS/ GOC    - FULL CODE   - Dr Tyson Velasquez,  involved in care    DISPO - Back home with  when able.

## 2024-08-05 NOTE — PROGRESS NOTE ADULT - NS ATTEND AMEND GEN_ALL_CORE FT
Pt is a 69F with MHx advanced ALS (nonverbal and bedbound at baseline) with chronic hypercapnic respiratory failure and ventilatory dependence via tracheostomy at baseline and Parkinson's dz presenting to [facility] on 7/18/24 with acute hypercapnia secondary to large expiratory air leak s/p upsizing without improvement with hospital course c/b tracheitis vs. UTI transferred from MICU to RCU on 7/5 for further management.     Pt with advanced progressive ALS with baseline known to be nonverbal and bedbound with functional quadriplegia and complete ADL dependence. 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 Pt p/w worsening eye redness, especially on right side, despite conservative protective measures at home. She has received vancomycin +tobramycin eye drops in the past and on previous admissions opthalmology recommended possible need for tarsorrhaphy, but was ultimately deferred as not in line with pt's GOC. 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 Keratopathy slowly improving. Will also c/w home riluzole, c/w home diazepam and zoloft. Diazepam prn added for breakthrough concern for agitation or discomfort.     Pt with acute on chronic hypercapnic respiratory failure 2/2 neuromuscular weakness in the setting of advanced ALS with chronic ventilatory dependence with tracheostomy found to have tracheitis and large air leak 2/2 tracheomegaly likely 2/2 prolonged tracheostomy dependence. Trach exchanged at home without improvement followed by upsizing at bedside to 9 Bivona but still with air leak. Thoracic sx consulted, custom trach ordered (Portex 9.5 with 40mm balloon). Awaiting custom order delivery for OR trach exchange. Will c/w current vent support, pt on home settings. Air leak remains ~25% total TV and has been stable. Pt does not tolerate PSV trials, remains completely passive on ventilator. Will c/w airway clearance and trach care as per RCU team. Wean O2 supplementation for goal O2 saturation 90-95%.     Pt with sepsis 2/2 UTI vs. tracheitis. Remains hemodynamically stable and afebrile with fluctuating but stable WBC. Initial cx (+) Pseudomonas in trach aspirate, similar to previous cultures, for which pt completed course of Zosyn. Previous UCx from 7/24/23 (+) VRE faecalis sensitive only to linezolid/daptomycin but likely colonizers. Will continue to monitor conservatively off abx at this time. Pt with known periods of BP variation with transient hypertensive episodes, will monitor off anti-HTNs, likely 2/2 dysautonomia.    Discharge pending medical optimization, likely d/c home with ventilator. Pt full code. On home NOAC for DVT ppx, held for now in preparation for OR. Will restart prior to d/c. Will continue to discuss with pt's family, pt's  at bedside. HCP  (Dr. Velasquez). O/P Pulm: Dr. Pranav Newman

## 2024-08-06 LAB
BASE EXCESS BLDV CALC-SCNC: 1.5 MMOL/L — SIGNIFICANT CHANGE UP (ref -2–3)
CO2 BLDV-SCNC: 27.9 MMOL/L — HIGH (ref 22–26)
HCO3 BLDV-SCNC: 27 MMOL/L — SIGNIFICANT CHANGE UP (ref 22–29)
PCO2 BLDV: 43 MMHG — SIGNIFICANT CHANGE UP (ref 39–52)
PH BLDV: 7.4 — SIGNIFICANT CHANGE UP (ref 7.32–7.43)
PO2 BLDV: 147 MMHG — HIGH (ref 25–45)
SAO2 % BLDV: 99.6 % — HIGH (ref 67–88)

## 2024-08-06 PROCEDURE — 99233 SBSQ HOSP IP/OBS HIGH 50: CPT | Mod: FS

## 2024-08-06 RX ADMIN — ERYTHROMYCIN 1 APPLICATION(S): 5 OINTMENT OPHTHALMIC at 02:08

## 2024-08-06 RX ADMIN — CHLORHEXIDINE GLUCONATE 15 MILLILITER(S): 40 SOLUTION TOPICAL at 05:09

## 2024-08-06 RX ADMIN — ERYTHROMYCIN 1 APPLICATION(S): 5 OINTMENT OPHTHALMIC at 15:01

## 2024-08-06 RX ADMIN — POVIDONE, PROPYLENE GLYCOL 1 APPLICATION(S): 6.8; 3 LIQUID OPHTHALMIC at 21:56

## 2024-08-06 RX ADMIN — POVIDONE, PROPYLENE GLYCOL 1 DROP(S): 6.8; 3 LIQUID OPHTHALMIC at 10:31

## 2024-08-06 RX ADMIN — ERYTHROMYCIN 1 APPLICATION(S): 5 OINTMENT OPHTHALMIC at 12:22

## 2024-08-06 RX ADMIN — RILUZOLE 50 MILLIGRAM(S): 5 LIQUID ORAL at 17:27

## 2024-08-06 RX ADMIN — POVIDONE, PROPYLENE GLYCOL 1 APPLICATION(S): 6.8; 3 LIQUID OPHTHALMIC at 16:19

## 2024-08-06 RX ADMIN — ERYTHROMYCIN 1 APPLICATION(S): 5 OINTMENT OPHTHALMIC at 20:45

## 2024-08-06 RX ADMIN — POVIDONE, PROPYLENE GLYCOL 1 DROP(S): 6.8; 3 LIQUID OPHTHALMIC at 21:56

## 2024-08-06 RX ADMIN — POVIDONE, PROPYLENE GLYCOL 1 APPLICATION(S): 6.8; 3 LIQUID OPHTHALMIC at 10:32

## 2024-08-06 RX ADMIN — POVIDONE, PROPYLENE GLYCOL 1 DROP(S): 6.8; 3 LIQUID OPHTHALMIC at 20:45

## 2024-08-06 RX ADMIN — POVIDONE, PROPYLENE GLYCOL 1 APPLICATION(S): 6.8; 3 LIQUID OPHTHALMIC at 18:32

## 2024-08-06 RX ADMIN — POVIDONE, PROPYLENE GLYCOL 1 DROP(S): 6.8; 3 LIQUID OPHTHALMIC at 05:09

## 2024-08-06 RX ADMIN — POVIDONE, PROPYLENE GLYCOL 1 DROP(S): 6.8; 3 LIQUID OPHTHALMIC at 02:08

## 2024-08-06 RX ADMIN — POVIDONE, PROPYLENE GLYCOL 1 DROP(S): 6.8; 3 LIQUID OPHTHALMIC at 23:23

## 2024-08-06 RX ADMIN — POVIDONE, PROPYLENE GLYCOL 1 DROP(S): 6.8; 3 LIQUID OPHTHALMIC at 03:41

## 2024-08-06 RX ADMIN — CHLORHEXIDINE GLUCONATE 1 APPLICATION(S): 40 SOLUTION TOPICAL at 12:22

## 2024-08-06 RX ADMIN — ERYTHROMYCIN 1 APPLICATION(S): 5 OINTMENT OPHTHALMIC at 17:32

## 2024-08-06 RX ADMIN — POVIDONE, PROPYLENE GLYCOL 1 DROP(S): 6.8; 3 LIQUID OPHTHALMIC at 09:10

## 2024-08-06 RX ADMIN — POVIDONE, PROPYLENE GLYCOL 1 APPLICATION(S): 6.8; 3 LIQUID OPHTHALMIC at 13:43

## 2024-08-06 RX ADMIN — OFLOXACIN 1 DROP(S): 3 SOLUTION/ DROPS OPHTHALMIC at 17:31

## 2024-08-06 RX ADMIN — SERTRALINE HYDROCHLORIDE 75 MILLIGRAM(S): 50 TABLET, FILM COATED ORAL at 12:22

## 2024-08-06 RX ADMIN — ERYTHROMYCIN 1 APPLICATION(S): 5 OINTMENT OPHTHALMIC at 09:10

## 2024-08-06 RX ADMIN — POVIDONE, PROPYLENE GLYCOL 1 DROP(S): 6.8; 3 LIQUID OPHTHALMIC at 06:32

## 2024-08-06 RX ADMIN — ENOXAPARIN SODIUM 40 MILLIGRAM(S): 100 INJECTION SUBCUTANEOUS at 05:08

## 2024-08-06 RX ADMIN — Medication 15 MILLILITER(S): at 12:21

## 2024-08-06 RX ADMIN — POVIDONE, PROPYLENE GLYCOL 1 DROP(S): 6.8; 3 LIQUID OPHTHALMIC at 16:19

## 2024-08-06 RX ADMIN — RILUZOLE 50 MILLIGRAM(S): 5 LIQUID ORAL at 05:08

## 2024-08-06 RX ADMIN — POVIDONE, PROPYLENE GLYCOL 1 DROP(S): 6.8; 3 LIQUID OPHTHALMIC at 01:01

## 2024-08-06 RX ADMIN — POVIDONE, PROPYLENE GLYCOL 1 DROP(S): 6.8; 3 LIQUID OPHTHALMIC at 13:43

## 2024-08-06 RX ADMIN — Medication 2 MILLIGRAM(S): at 05:08

## 2024-08-06 RX ADMIN — POVIDONE, PROPYLENE GLYCOL 1 APPLICATION(S): 6.8; 3 LIQUID OPHTHALMIC at 06:32

## 2024-08-06 RX ADMIN — OFLOXACIN 1 DROP(S): 3 SOLUTION/ DROPS OPHTHALMIC at 05:09

## 2024-08-06 RX ADMIN — Medication 2 MILLIGRAM(S): at 17:30

## 2024-08-06 RX ADMIN — POVIDONE, PROPYLENE GLYCOL 1 DROP(S): 6.8; 3 LIQUID OPHTHALMIC at 12:22

## 2024-08-06 RX ADMIN — CHLORHEXIDINE GLUCONATE 15 MILLILITER(S): 40 SOLUTION TOPICAL at 17:27

## 2024-08-06 RX ADMIN — POVIDONE, PROPYLENE GLYCOL 1 DROP(S): 6.8; 3 LIQUID OPHTHALMIC at 18:32

## 2024-08-06 RX ADMIN — ERYTHROMYCIN 1 APPLICATION(S): 5 OINTMENT OPHTHALMIC at 05:09

## 2024-08-06 RX ADMIN — POVIDONE, PROPYLENE GLYCOL 1 APPLICATION(S): 6.8; 3 LIQUID OPHTHALMIC at 03:41

## 2024-08-06 RX ADMIN — POVIDONE, PROPYLENE GLYCOL 1 DROP(S): 6.8; 3 LIQUID OPHTHALMIC at 17:31

## 2024-08-06 RX ADMIN — ERYTHROMYCIN 1 APPLICATION(S): 5 OINTMENT OPHTHALMIC at 23:23

## 2024-08-06 RX ADMIN — POVIDONE, PROPYLENE GLYCOL 1 DROP(S): 6.8; 3 LIQUID OPHTHALMIC at 15:01

## 2024-08-06 RX ADMIN — POVIDONE, PROPYLENE GLYCOL 1 APPLICATION(S): 6.8; 3 LIQUID OPHTHALMIC at 01:02

## 2024-08-06 NOTE — PROGRESS NOTE ADULT - NS ATTEND AMEND GEN_ALL_CORE FT
Pt is a 69F with MHx advanced ALS (nonverbal and bedbound at baseline) with chronic hypercapnic respiratory failure and ventilatory dependence via tracheostomy at baseline and Parkinson's dz presenting to [facility] on 7/18/24 with acute hypercapnia secondary to large expiratory air leak s/p upsizing without improvement with hospital course c/b tracheitis vs. UTI transferred from MICU to RCU on 7/5 for further management.     Pt with advanced progressive ALS with baseline known to be nonverbal and bedbound with functional quadriplegia and complete ADL dependence. 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 Pt p/w worsening eye redness, especially on right side, despite conservative protective measures at home. She has received vancomycin +tobramycin eye drops in the past and on previous admissions opthalmology recommended possible need for tarsorrhaphy, but was ultimately deferred as not in line with pt's GOC. 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 Keratopathy slowly improving. Will also c/w home riluzole, c/w home diazepam and zoloft. Diazepam prn added for breakthrough concern for agitation or discomfort.     Pt with acute on chronic hypercapnic respiratory failure 2/2 neuromuscular weakness in the setting of advanced ALS with chronic ventilatory dependence with tracheostomy found to have tracheitis and large air leak 2/2 tracheomegaly likely 2/2 prolonged tracheostomy dependence. Trach exchanged at home without improvement followed by upsizing at bedside to 9 Bivona but still with air leak. Thoracic sx consulted, custom trach ordered (Portex 9.5 with 40mm balloon). Awaiting custom order delivery for OR trach exchange. Will c/w current vent support, pt on home settings. Air leak remains ~25% total TV and has been stable. Pt does not tolerate PSV trials, remains completely passive on ventilator. Will c/w airway clearance and trach care as per RCU team. Wean O2 supplementation for goal O2 saturation 90-95%.     Pt with sepsis 2/2 UTI vs. tracheitis. Remains hemodynamically stable and afebrile with fluctuating but stable WBC. Initial cx (+) Pseudomonas in trach aspirate, similar to previous cultures, for which pt completed course of Zosyn. Previous UCx from 7/24/23 (+) VRE faecalis sensitive only to linezolid/daptomycin but likely colonizers. Will continue to monitor conservatively off abx at this time. Pt with known periods of BP variation with transient hypertensive episodes, will monitor off anti-HTNs, likely 2/2 dysautonomia.    Discharge pending medical optimization, likely d/c home with ventilator. Pt full code. On home NOAC for DVT ppx, held for now in preparation for OR. Will restart prior to d/c. Will continue to discuss with pt's family, pt's  at bedside. HCP  (Dr. Velasquez). O/P Pulm: Dr. Pranav Newman

## 2024-08-06 NOTE — PROGRESS NOTE ADULT - SUBJECTIVE AND OBJECTIVE BOX
CHIEF COMPLAINT: Patient is a 69y old  Female who presents with a chief complaint of trach exchange (18 Jul 2024 07:07)    INTERVAL EVENTS:     ROS: Patient seen and evaluated at bedside during AM rounds.    OBJECTIVE:  ICU Vital Signs Last 24 Hrs  T(C): 36.3 (06 Aug 2024 04:00), Max: 36.8 (05 Aug 2024 08:00)  T(F): 97.4 (06 Aug 2024 04:00), Max: 98.2 (05 Aug 2024 08:00)  HR: 108 (06 Aug 2024 06:43) (103 - 112)  BP: 117/71 (06 Aug 2024 04:00) (110/62 - 164/86)  BP(mean): 85 (06 Aug 2024 04:00) (77 - 106)  ABP: --  ABP(mean): --  RR: 17 (06 Aug 2024 04:00) (16 - 20)  SpO2: 96% (06 Aug 2024 06:43) (95% - 98%)    O2 Parameters below as of 06 Aug 2024 04:00  Patient On (Oxygen Delivery Method): ventilator    O2 Concentration (%): 30      Mode: AC/ CMV (Assist Control/ Continuous Mandatory Ventilation), RR (machine): 16, TV (machine): 400, FiO2: 30, PEEP: 5, ITime: 0.65, MAP: 10, PIP: 25    08-04 @ 07:01  -  08-05 @ 07:00  --------------------------------------------------------  IN: 1300 mL / OUT: 1000 mL / NET: 300 mL    08-05 @ 07:01  -  08-06 @ 06:49  --------------------------------------------------------  IN: 2300 mL / OUT: 1250 mL / NET: 1050 mL      CAPILLARY BLOOD GLUCOSE          PHYSICAL EXAM:  General:   HEENT:   Lymph Nodes:  Neck:   Respiratory:   Cardiovascular:   Abdomen:   Extremities:   Skin:   Neurological:  Psychiatry:    Mode: AC/ CMV (Assist Control/ Continuous Mandatory Ventilation)  RR (machine): 16  TV (machine): 400  FiO2: 30  PEEP: 5  ITime: 0.65  MAP: 10  PIP: 25      HOSPITAL MEDICATIONS:  MEDICATIONS  (STANDING):  artificial  tears Solution 1 Drop(s) Both EYES <User Schedule>  chlorhexidine 0.12% Liquid 15 milliLiter(s) Oral Mucosa every 12 hours  chlorhexidine 2% Cloths 1 Application(s) Topical daily  diazepam    Tablet 2 milliGRAM(s) Oral every 12 hours  enoxaparin Injectable 40 milliGRAM(s) SubCutaneous every 24 hours  erythromycin   Ointment 1 Application(s) Both EYES <User Schedule>  multivitamin/minerals/iron Oral Solution (CENTRUM) 15 milliLiter(s) Oral daily  ofloxacin 0.3% Solution 1 Drop(s) Both EYES two times a day  petrolatum Ophthalmic Ointment 1 Application(s) Both EYES <User Schedule>  riluzole 50 milliGRAM(s) Oral two times a day  sertraline 75 milliGRAM(s) Oral daily  sodium chloride 0.9%. 1000 milliLiter(s) (100 mL/Hr) IV Continuous <Continuous>    MEDICATIONS  (PRN):  acetaminophen   Oral Liquid .. 650 milliGRAM(s) Oral every 6 hours PRN Temp greater or equal to 38C (100.4F), Mild Pain (1 - 3)  diazepam    Tablet 2 milliGRAM(s) Oral at bedtime PRN for dysautonomia/ hypertension/discomfort      LABS:                        12.8   11.33 )-----------( 347      ( 05 Aug 2024 05:00 )             41.9     08-05    142  |  105  |  22  ----------------------------<  143<H>  4.1   |  23  |  <0.20<L>    Ca    9.3      05 Aug 2024 05:00  Phos  3.4     08-05  Mg     2.10     08-05        Urinalysis Basic - ( 05 Aug 2024 05:00 )    Color: x / Appearance: x / SG: x / pH: x  Gluc: 143 mg/dL / Ketone: x  / Bili: x / Urobili: x   Blood: x / Protein: x / Nitrite: x   Leuk Esterase: x / RBC: x / WBC x   Sq Epi: x / Non Sq Epi: x / Bacteria: x        Venous Blood Gas:  08-06 @ 06:00  7.40/43/147/27/99.6  VBG Lactate: --  Venous Blood Gas:  08-05 @ 05:00  7.32/53/69/27/94.8  VBG Lactate: 2.6   CHIEF COMPLAINT: Patient is a 69y old  Female who presents with a chief complaint of trach exchange (18 Jul 2024 07:07)    INTERVAL EVENTS: No acute events overnight.    ROS: Patient seen and evaluated at bedside during AM rounds. Unable to obtain d/t baseline mentation.    OBJECTIVE:  ICU Vital Signs Last 24 Hrs  T(C): 36.3 (06 Aug 2024 04:00), Max: 36.8 (05 Aug 2024 08:00)  T(F): 97.4 (06 Aug 2024 04:00), Max: 98.2 (05 Aug 2024 08:00)  HR: 108 (06 Aug 2024 06:43) (103 - 112)  BP: 117/71 (06 Aug 2024 04:00) (110/62 - 164/86)  BP(mean): 85 (06 Aug 2024 04:00) (77 - 106)  ABP: --  ABP(mean): --  RR: 17 (06 Aug 2024 04:00) (16 - 20)  SpO2: 96% (06 Aug 2024 06:43) (95% - 98%)    O2 Parameters below as of 06 Aug 2024 04:00  Patient On (Oxygen Delivery Method): ventilator    O2 Concentration (%): 30      Mode: AC/ CMV (Assist Control/ Continuous Mandatory Ventilation), RR (machine): 16, TV (machine): 400, FiO2: 30, PEEP: 5, ITime: 0.65, MAP: 10, PIP: 25    08-04 @ 07:01  -  08-05 @ 07:00  --------------------------------------------------------  IN: 1300 mL / OUT: 1000 mL / NET: 300 mL    08-05 @ 07:01  -  08-06 @ 06:49  --------------------------------------------------------  IN: 2300 mL / OUT: 1250 mL / NET: 1050 mL      CAPILLARY BLOOD GLUCOSE          PHYSICAL EXAM:  General: NAD   HEENT: +Trach tube noted, site c/d/i. Eyes improving - covered by tegaderm  Cards: S1/S2, no murmurs   Pulm: CTA with slight end inspiratory wheezing bilaterally. No respiratory distress.  Abdomen: Soft, NTND. +PEG noted, site c/d/i.   Extremities: No pedal edema. Extremities warm to touch.  Neurology: Baseline ALS; does not follow commands. No acute focal neurological deficits  Skin: warm to touch. Refer to RN assessment for further details.      Mode: AC/ CMV (Assist Control/ Continuous Mandatory Ventilation)  RR (machine): 16  TV (machine): 400  FiO2: 30  PEEP: 5  ITime: 0.65  MAP: 10  PIP: 25      HOSPITAL MEDICATIONS:  MEDICATIONS  (STANDING):  artificial  tears Solution 1 Drop(s) Both EYES <User Schedule>  chlorhexidine 0.12% Liquid 15 milliLiter(s) Oral Mucosa every 12 hours  chlorhexidine 2% Cloths 1 Application(s) Topical daily  diazepam    Tablet 2 milliGRAM(s) Oral every 12 hours  enoxaparin Injectable 40 milliGRAM(s) SubCutaneous every 24 hours  erythromycin   Ointment 1 Application(s) Both EYES <User Schedule>  multivitamin/minerals/iron Oral Solution (CENTRUM) 15 milliLiter(s) Oral daily  ofloxacin 0.3% Solution 1 Drop(s) Both EYES two times a day  petrolatum Ophthalmic Ointment 1 Application(s) Both EYES <User Schedule>  riluzole 50 milliGRAM(s) Oral two times a day  sertraline 75 milliGRAM(s) Oral daily  sodium chloride 0.9%. 1000 milliLiter(s) (100 mL/Hr) IV Continuous <Continuous>    MEDICATIONS  (PRN):  acetaminophen   Oral Liquid .. 650 milliGRAM(s) Oral every 6 hours PRN Temp greater or equal to 38C (100.4F), Mild Pain (1 - 3)  diazepam    Tablet 2 milliGRAM(s) Oral at bedtime PRN for dysautonomia/ hypertension/discomfort      LABS:                        12.8   11.33 )-----------( 347      ( 05 Aug 2024 05:00 )             41.9     08-05    142  |  105  |  22  ----------------------------<  143<H>  4.1   |  23  |  <0.20<L>    Ca    9.3      05 Aug 2024 05:00  Phos  3.4     08-05  Mg     2.10     08-05        Urinalysis Basic - ( 05 Aug 2024 05:00 )    Color: x / Appearance: x / SG: x / pH: x  Gluc: 143 mg/dL / Ketone: x  / Bili: x / Urobili: x   Blood: x / Protein: x / Nitrite: x   Leuk Esterase: x / RBC: x / WBC x   Sq Epi: x / Non Sq Epi: x / Bacteria: x        Venous Blood Gas:  08-06 @ 06:00  7.40/43/147/27/99.6  VBG Lactate: --  Venous Blood Gas:  08-05 @ 05:00  7.32/53/69/27/94.8  VBG Lactate: 2.6

## 2024-08-06 NOTE — PROGRESS NOTE ADULT - ASSESSMENT
67 YO Female with PMHx of ALS, Parkinson,, nonverbal, bed bound, chronic respiratory failure with tracheostomy and vent dependence, and oropharyngeal dysphagia with PEG who presented from home with tracheostomy leak. She was noted with vent alarm and poor TVe (250-290) at home. Trach changed at home with no improvement and sent in for thoracic evaluation. While in MICU no air leak noted with hyperinflated cuff. Transferred to RCU on 7/5 with course complicated by leukocytosis second to UTI vs trachitis and completed zosyn course. Trach upsized by thoracic on 7/17 however AL remains and now pending custom trach.     NEUROLOGY  # ALS   - Baseline nonverbal, awake, and communicates with eye movement.   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 Nurses taught moister chamber placement with optho team.     PSYCH   # Anxiety and Depression   - Continue on home Valium 2mg BID with additional 2mg PRN   - Continue on Zoloft 75mg QD while in house (on 4cc/ 80mg QD at home)     CARDIOVASCULAR   # HTN thought to be second to discomfort vs dysautonomia   - Evening HTN and continue on Hydralazine vs valium PRN doses   - Monitor HR and BP    # SVT  - Approx 5 sec episode of SVT, self limited (8/4)  - Monitor BP and keep Mg>2, K>4  - Consider BB if becomes more persistent or HD unstable    RESPIRATORY  # Tracheostomy leak   - At home noted with TVe 200-300s despite tracheostomy change to 80XLTCD.   - No air leak noted in house with hyperinflated cuff likely tracheomegaly?   - Continue on chronic vent 16/400/5/21   - Chronic bibasilar atelectasis and continued on HTS PRN with chest PT  - Trach upsized to Bivona 9 on 7/17, however trach leak remains.   - Pending Custom Tracheostomy ordered by Thoracic surgery     GI  # Dysphagia   - Continue on PEG-TF     RENAL  # High PVR   - BS with roughly 200-300cc PVR, however no acute distress   - Monitor renal function and UOP     # Elevated lactate   - Lactate elevated with mild contraction alkalosis   - Rehydrate and monitor     INFECTIOUS DISEASE  # Leukocytosis likely second to trachitis vs UTI?   - No fever or chills and noted with prior leukocytosis   - CXR with prior atelectasis and no acute findings   - UA (7/6) with positive nitrites (7/6)   - SCx (7/5) with pansensitive PSA  - BCx (7/6) x 2 NGT  - UCx (7/6) with coag neg staph  - MRSA PCR (7/6) POSITIVE for both MRSA/MSSA s/p Bactroban (7/6 - 7/10)  - s/p empiric Zosyn (7/8 - 7/14) however WBC continues to wax and wean without fever and will monitor off ABX .     HEME  - On Xarelto 10mg at home likely for DVT PPX?   - Hold home Xarelto in prep for OR   - DVT PPX with LVX for now     ENDOCRINE  - No hx of DM2   - Monitor BG     SKIN  - Basic trach and PEG care ordered     ETHICS/ GOC    - FULL CODE   - Dr Tyson Velasquez,  involved in care    DISPO - Back home with  when able.      67 YO Female with PMHx of ALS, Parkinson,, nonverbal, bed bound, chronic respiratory failure with tracheostomy and vent dependence, and oropharyngeal dysphagia with PEG who presented from home with tracheostomy leak. She was noted with vent alarm and poor TVe (250-290) at home. Trach changed at home with no improvement and sent in for thoracic evaluation. While in MICU no air leak noted with hyperinflated cuff. Transferred to RCU on 7/5 with course complicated by leukocytosis second to UTI vs trachitis and completed zosyn course. Trach upsized by thoracic on 7/17 however AL remains and now pending custom trach.     NEUROLOGY  # ALS   - Baseline nonverbal, awake, and communicates with eye movement.   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 Nurses taught moister chamber placement with optho team.   - Ophtho to re-evaluate today (8/6) or tomorrow    PSYCH   # Anxiety and Depression   - Continue on home Valium 2mg BID with additional 2mg PRN   - Continue on Zoloft 75mg QD while in house (on 4cc/ 80mg QD at home)     CARDIOVASCULAR   # HTN thought to be second to discomfort vs dysautonomia   - Evening HTN and continue on Hydralazine vs valium PRN doses   - Monitor HR and BP    # SVT  - Approx 5 sec episode of SVT, self limited (8/4)  - Monitor BP and keep Mg>2, K>4  - Consider BB if becomes more persistent or HD unstable    RESPIRATORY  # Tracheostomy leak   - At home noted with TVe 200-300s despite tracheostomy change to 80XLTCD.   - No air leak noted in house with hyperinflated cuff likely tracheomegaly?   - Continue on chronic vent 16/400/5/21   - Chronic bibasilar atelectasis and continued on HTS PRN with chest PT  - Trach upsized to Bivona 9 on 7/17, however trach leak remains.   - Pending Custom Tracheostomy ordered by Thoracic surgery - Per CTSx discussion 8/4, hopeful for delivery this week.    GI  # Dysphagia   - Continue on PEG-TF     RENAL  # High PVR   - BS with roughly 200-300cc PVR, however no acute distress   - Monitor renal function and UOP     # Elevated lactate   - Lactate elevated with mild contraction alkalosis   - Rehydrate and monitor     INFECTIOUS DISEASE  # Leukocytosis likely second to trachitis vs UTI?   - No fever or chills and noted with prior leukocytosis   - CXR with prior atelectasis and no acute findings   - UA (7/6) with positive nitrites (7/6)   - SCx (7/5) with pansensitive PSA  - BCx (7/6) x 2 NGT  - UCx (7/6) with coag neg staph  - MRSA PCR (7/6) POSITIVE for both MRSA/MSSA s/p Bactroban (7/6 - 7/10)  - s/p empiric Zosyn (7/8 - 7/14) however WBC continues to wax and wean without fever and will monitor off ABX .     HEME  - On Xarelto 10mg at home likely for DVT PPX?   - Hold home Xarelto in prep for OR   - DVT PPX with LVX for now     ENDOCRINE  - No hx of DM2   - Monitor BG     SKIN  - Basic trach and PEG care ordered     ETHICS/ GOC    - FULL CODE   - Dr Tyson Velasquez,  involved in care    DISPO - Back home with  when able.      69 YO Female with PMHx of ALS, Parkinson,, nonverbal, bed bound, chronic respiratory failure with tracheostomy and vent dependence, and oropharyngeal dysphagia with PEG who presented from home with tracheostomy leak. She was noted with vent alarm and poor TVe (250-290) at home. Trach changed at home with no improvement and sent in for thoracic evaluation. While in MICU no air leak noted with hyperinflated cuff. Transferred to RCU on 7/5 with course complicated by leukocytosis second to UTI vs trachitis and completed zosyn course. Trach upsized by thoracic on 7/17 however AL remains and now pending custom trach.     NEUROLOGY  # ALS   - Baseline nonverbal, awake, and communicates with eye movement.   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 Nurses taught moister chamber placement with optho team.   - Ophtho to re-evaluate today (8/6) or tomorrow    PSYCH   # Anxiety and Depression   - Continue on home Valium 2mg BID with additional 2mg PRN   - Continue on Zoloft 75mg QD while in house (on 4cc/ 80mg QD at home)     CARDIOVASCULAR   # HTN thought to be second to discomfort vs dysautonomia   - Evening HTN and continue on Hydralazine vs valium PRN doses   - Monitor HR and BP    # SVT  - Intermittent episodes of SVT lasting a few seconds at a time & self limited   - Monitor BP and keep Mg>2, K>4  - Consider BB if becomes more persistent or HD unstable    RESPIRATORY  # Tracheostomy leak   - At home noted with TVe 200-300s despite tracheostomy change to 80XLTCD.   - No air leak noted in house with hyperinflated cuff likely tracheomegaly?   - Continue on chronic vent 16/400/5/21   - Chronic bibasilar atelectasis and continued on HTS PRN with chest PT  - Trach upsized to Bivona 9 on 7/17, however trach leak remains.   - Pending Custom Tracheostomy ordered by Thoracic surgery - Per CTSx discussion 8/4, hopeful for delivery this week.    GI  # Dysphagia   - Continue on PEG-TF     RENAL  # High PVR   - BS with roughly 200-300cc PVR, however no acute distress   - Monitor renal function and UOP     # Elevated lactate   - Lactate elevated with mild contraction alkalosis   - Rehydrate and monitor     INFECTIOUS DISEASE  # Leukocytosis likely second to trachitis vs UTI?   - No fever or chills and noted with prior leukocytosis   - CXR with prior atelectasis and no acute findings   - UA (7/6) with positive nitrites (7/6)   - SCx (7/5) with pansensitive PSA  - BCx (7/6) x 2 NGT  - UCx (7/6) with coag neg staph  - MRSA PCR (7/6) POSITIVE for both MRSA/MSSA s/p Bactroban (7/6 - 7/10)  - s/p empiric Zosyn (7/8 - 7/14) however WBC continues to wax and wean without fever and will monitor off ABX .     HEME  - On Xarelto 10mg at home likely for DVT PPX?   - Hold home Xarelto in prep for OR   - DVT PPX with LVX for now     ENDOCRINE  - No hx of DM2   - Monitor BG     SKIN  - Basic trach and PEG care ordered     ETHICS/ GOC    - FULL CODE   - Dr Tyson Velasquez,  involved in care    DISPO - Back home with  when able.

## 2024-08-07 PROCEDURE — 99233 SBSQ HOSP IP/OBS HIGH 50: CPT | Mod: FS

## 2024-08-07 RX ADMIN — OFLOXACIN 1 DROP(S): 3 SOLUTION/ DROPS OPHTHALMIC at 17:15

## 2024-08-07 RX ADMIN — POVIDONE, PROPYLENE GLYCOL 1 DROP(S): 6.8; 3 LIQUID OPHTHALMIC at 00:37

## 2024-08-07 RX ADMIN — POVIDONE, PROPYLENE GLYCOL 1 DROP(S): 6.8; 3 LIQUID OPHTHALMIC at 17:16

## 2024-08-07 RX ADMIN — ERYTHROMYCIN 1 APPLICATION(S): 5 OINTMENT OPHTHALMIC at 05:06

## 2024-08-07 RX ADMIN — CHLORHEXIDINE GLUCONATE 1 APPLICATION(S): 40 SOLUTION TOPICAL at 11:02

## 2024-08-07 RX ADMIN — OFLOXACIN 1 DROP(S): 3 SOLUTION/ DROPS OPHTHALMIC at 05:06

## 2024-08-07 RX ADMIN — CHLORHEXIDINE GLUCONATE 15 MILLILITER(S): 40 SOLUTION TOPICAL at 17:17

## 2024-08-07 RX ADMIN — POVIDONE, PROPYLENE GLYCOL 1 DROP(S): 6.8; 3 LIQUID OPHTHALMIC at 22:38

## 2024-08-07 RX ADMIN — POVIDONE, PROPYLENE GLYCOL 1 DROP(S): 6.8; 3 LIQUID OPHTHALMIC at 17:45

## 2024-08-07 RX ADMIN — ERYTHROMYCIN 1 APPLICATION(S): 5 OINTMENT OPHTHALMIC at 14:18

## 2024-08-07 RX ADMIN — CHLORHEXIDINE GLUCONATE 15 MILLILITER(S): 40 SOLUTION TOPICAL at 05:07

## 2024-08-07 RX ADMIN — POVIDONE, PROPYLENE GLYCOL 1 APPLICATION(S): 6.8; 3 LIQUID OPHTHALMIC at 03:57

## 2024-08-07 RX ADMIN — ENOXAPARIN SODIUM 40 MILLIGRAM(S): 100 INJECTION SUBCUTANEOUS at 05:05

## 2024-08-07 RX ADMIN — Medication 2 MILLIGRAM(S): at 05:05

## 2024-08-07 RX ADMIN — POVIDONE, PROPYLENE GLYCOL 1 DROP(S): 6.8; 3 LIQUID OPHTHALMIC at 03:56

## 2024-08-07 RX ADMIN — ERYTHROMYCIN 1 APPLICATION(S): 5 OINTMENT OPHTHALMIC at 11:04

## 2024-08-07 RX ADMIN — POVIDONE, PROPYLENE GLYCOL 1 DROP(S): 6.8; 3 LIQUID OPHTHALMIC at 23:43

## 2024-08-07 RX ADMIN — POVIDONE, PROPYLENE GLYCOL 1 DROP(S): 6.8; 3 LIQUID OPHTHALMIC at 14:18

## 2024-08-07 RX ADMIN — POVIDONE, PROPYLENE GLYCOL 1 DROP(S): 6.8; 3 LIQUID OPHTHALMIC at 07:12

## 2024-08-07 RX ADMIN — Medication 15 MILLILITER(S): at 11:02

## 2024-08-07 RX ADMIN — POVIDONE, PROPYLENE GLYCOL 1 DROP(S): 6.8; 3 LIQUID OPHTHALMIC at 02:20

## 2024-08-07 RX ADMIN — ERYTHROMYCIN 1 APPLICATION(S): 5 OINTMENT OPHTHALMIC at 02:20

## 2024-08-07 RX ADMIN — SERTRALINE HYDROCHLORIDE 75 MILLIGRAM(S): 50 TABLET, FILM COATED ORAL at 11:02

## 2024-08-07 RX ADMIN — POVIDONE, PROPYLENE GLYCOL 1 DROP(S): 6.8; 3 LIQUID OPHTHALMIC at 05:06

## 2024-08-07 RX ADMIN — POVIDONE, PROPYLENE GLYCOL 1 APPLICATION(S): 6.8; 3 LIQUID OPHTHALMIC at 14:18

## 2024-08-07 RX ADMIN — POVIDONE, PROPYLENE GLYCOL 1 DROP(S): 6.8; 3 LIQUID OPHTHALMIC at 16:15

## 2024-08-07 RX ADMIN — POVIDONE, PROPYLENE GLYCOL 1 APPLICATION(S): 6.8; 3 LIQUID OPHTHALMIC at 10:06

## 2024-08-07 RX ADMIN — ERYTHROMYCIN 1 APPLICATION(S): 5 OINTMENT OPHTHALMIC at 17:16

## 2024-08-07 RX ADMIN — POVIDONE, PROPYLENE GLYCOL 1 APPLICATION(S): 6.8; 3 LIQUID OPHTHALMIC at 00:37

## 2024-08-07 RX ADMIN — RILUZOLE 50 MILLIGRAM(S): 5 LIQUID ORAL at 17:17

## 2024-08-07 RX ADMIN — POVIDONE, PROPYLENE GLYCOL 1 DROP(S): 6.8; 3 LIQUID OPHTHALMIC at 21:24

## 2024-08-07 RX ADMIN — Medication 2 MILLIGRAM(S): at 17:15

## 2024-08-07 RX ADMIN — POVIDONE, PROPYLENE GLYCOL 1 APPLICATION(S): 6.8; 3 LIQUID OPHTHALMIC at 18:01

## 2024-08-07 RX ADMIN — POVIDONE, PROPYLENE GLYCOL 1 DROP(S): 6.8; 3 LIQUID OPHTHALMIC at 11:04

## 2024-08-07 RX ADMIN — POVIDONE, PROPYLENE GLYCOL 1 APPLICATION(S): 6.8; 3 LIQUID OPHTHALMIC at 22:38

## 2024-08-07 RX ADMIN — POVIDONE, PROPYLENE GLYCOL 1 DROP(S): 6.8; 3 LIQUID OPHTHALMIC at 18:01

## 2024-08-07 RX ADMIN — POVIDONE, PROPYLENE GLYCOL 1 APPLICATION(S): 6.8; 3 LIQUID OPHTHALMIC at 07:11

## 2024-08-07 RX ADMIN — RILUZOLE 50 MILLIGRAM(S): 5 LIQUID ORAL at 06:00

## 2024-08-07 RX ADMIN — ERYTHROMYCIN 1 APPLICATION(S): 5 OINTMENT OPHTHALMIC at 21:24

## 2024-08-07 RX ADMIN — POVIDONE, PROPYLENE GLYCOL 1 DROP(S): 6.8; 3 LIQUID OPHTHALMIC at 09:12

## 2024-08-07 RX ADMIN — ERYTHROMYCIN 1 APPLICATION(S): 5 OINTMENT OPHTHALMIC at 09:12

## 2024-08-07 RX ADMIN — POVIDONE, PROPYLENE GLYCOL 1 APPLICATION(S): 6.8; 3 LIQUID OPHTHALMIC at 17:16

## 2024-08-07 RX ADMIN — POVIDONE, PROPYLENE GLYCOL 1 DROP(S): 6.8; 3 LIQUID OPHTHALMIC at 10:06

## 2024-08-07 RX ADMIN — ERYTHROMYCIN 1 APPLICATION(S): 5 OINTMENT OPHTHALMIC at 23:43

## 2024-08-07 NOTE — PROGRESS NOTE ADULT - SUBJECTIVE AND OBJECTIVE BOX
CHIEF COMPLAINT: Patient is a 69y old  Female who presents with a chief complaint of trach exchange (18 Jul 2024 07:07)      INTERVAL EVENTS:  - No interval events overnight   - Pending custom tracheostomy delivery     REVIEW OF SYSTEMS: Seen by bedside during AM rounds and unable to assess ROS second to advanced ALS and vented     Mode: AC/ CMV (Assist Control/ Continuous Mandatory Ventilation), RR (machine): 16, TV (machine): 400, FiO2: 100, PEEP: 5, MAP: 11, PIP: 38      OBJECTIVE:  ICU Vital Signs Last 24 Hrs  T(C): 36.4 (07 Aug 2024 04:00), Max: 36.8 (06 Aug 2024 16:00)  T(F): 97.6 (07 Aug 2024 04:00), Max: 98.2 (06 Aug 2024 16:00)  HR: 105 (07 Aug 2024 06:23) (100 - 108)  BP: 143/88 (07 Aug 2024 04:00) (101/65 - 143/88)  BP(mean): 98 (07 Aug 2024 00:00) (78 - 98)  ABP: --  ABP(mean): --  RR: 16 (07 Aug 2024 04:00) (16 - 19)  SpO2: 99% (07 Aug 2024 06:23) (98% - 100%)    O2 Parameters below as of 07 Aug 2024 04:00  Patient On (Oxygen Delivery Method): ventilator          Mode: AC/ CMV (Assist Control/ Continuous Mandatory Ventilation), RR (machine): 16, TV (machine): 400, FiO2: 100, PEEP: 5, MAP: 11, PIP: 38    08-06 @ 07:01  -  08-07 @ 07:00  --------------------------------------------------------  IN: 700 mL / OUT: 350 mL / NET: 350 mL      CAPILLARY BLOOD GLUCOSE          HOSPITAL MEDICATIONS:  MEDICATIONS  (STANDING):  artificial  tears Solution 1 Drop(s) Both EYES <User Schedule>  chlorhexidine 0.12% Liquid 15 milliLiter(s) Oral Mucosa every 12 hours  chlorhexidine 2% Cloths 1 Application(s) Topical daily  diazepam    Tablet 2 milliGRAM(s) Oral every 12 hours  enoxaparin Injectable 40 milliGRAM(s) SubCutaneous every 24 hours  erythromycin   Ointment 1 Application(s) Both EYES <User Schedule>  multivitamin/minerals/iron Oral Solution (CENTRUM) 15 milliLiter(s) Oral daily  ofloxacin 0.3% Solution 1 Drop(s) Both EYES two times a day  petrolatum Ophthalmic Ointment 1 Application(s) Both EYES <User Schedule>  riluzole 50 milliGRAM(s) Oral two times a day  sertraline 75 milliGRAM(s) Oral daily    MEDICATIONS  (PRN):  acetaminophen   Oral Liquid .. 650 milliGRAM(s) Oral every 6 hours PRN Temp greater or equal to 38C (100.4F), Mild Pain (1 - 3)  diazepam    Tablet 2 milliGRAM(s) Oral at bedtime PRN for dysautonomia/ hypertension/discomfort      PHYSICAL EXAMINATION    LABS:                Venous Blood Gas:  08-06 @ 06:00  7.40/43/147/27/99.6  VBG Lactate: --      PAST MEDICAL & SURGICAL HISTORY:  ALS (amyotrophic lateral sclerosis)      HLD (hyperlipidemia)      Ventilator dependent  Since 2015      Aspiration into airway      S/P gastrostomy  10/14 for dysphagia  receives jevity 2 cans TID      Dependence on tracheostomy      Encounter for PEG (percutaneous endoscopic gastrostomy)  peg placed          FAMILY HISTORY:  No pertinent family history in first degree relatives        Social History:      RADIOLOGY:  [ ] Reviewed and interpreted by me    PULMONARY FUNCTION TESTS:    EKG: CHIEF COMPLAINT: Patient is a 69y old  Female who presents with a chief complaint of trach exchange (18 Jul 2024 07:07)      INTERVAL EVENTS:  - No interval events overnight   - Pending custom tracheostomy delivery     REVIEW OF SYSTEMS: Seen by bedside during AM rounds and unable to assess ROS second to advanced ALS and vented     Mode: AC/ CMV (Assist Control/ Continuous Mandatory Ventilation), RR (machine): 16, TV (machine): 400, FiO2: 100, PEEP: 5, MAP: 11, PIP: 38      OBJECTIVE:  ICU Vital Signs Last 24 Hrs  T(C): 36.4 (07 Aug 2024 04:00), Max: 36.8 (06 Aug 2024 16:00)  T(F): 97.6 (07 Aug 2024 04:00), Max: 98.2 (06 Aug 2024 16:00)  HR: 105 (07 Aug 2024 06:23) (100 - 108)  BP: 143/88 (07 Aug 2024 04:00) (101/65 - 143/88)  BP(mean): 98 (07 Aug 2024 00:00) (78 - 98)  ABP: --  ABP(mean): --  RR: 16 (07 Aug 2024 04:00) (16 - 19)  SpO2: 99% (07 Aug 2024 06:23) (98% - 100%)    O2 Parameters below as of 07 Aug 2024 04:00  Patient On (Oxygen Delivery Method): ventilator          Mode: AC/ CMV (Assist Control/ Continuous Mandatory Ventilation), RR (machine): 16, TV (machine): 400, FiO2: 100, PEEP: 5, MAP: 11, PIP: 38    08-06 @ 07:01  -  08-07 @ 07:00  --------------------------------------------------------  IN: 700 mL / OUT: 350 mL / NET: 350 mL      CAPILLARY BLOOD GLUCOSE          HOSPITAL MEDICATIONS:  MEDICATIONS  (STANDING):  artificial  tears Solution 1 Drop(s) Both EYES <User Schedule>  chlorhexidine 0.12% Liquid 15 milliLiter(s) Oral Mucosa every 12 hours  chlorhexidine 2% Cloths 1 Application(s) Topical daily  diazepam    Tablet 2 milliGRAM(s) Oral every 12 hours  enoxaparin Injectable 40 milliGRAM(s) SubCutaneous every 24 hours  erythromycin   Ointment 1 Application(s) Both EYES <User Schedule>  multivitamin/minerals/iron Oral Solution (CENTRUM) 15 milliLiter(s) Oral daily  ofloxacin 0.3% Solution 1 Drop(s) Both EYES two times a day  petrolatum Ophthalmic Ointment 1 Application(s) Both EYES <User Schedule>  riluzole 50 milliGRAM(s) Oral two times a day  sertraline 75 milliGRAM(s) Oral daily    MEDICATIONS  (PRN):  acetaminophen   Oral Liquid .. 650 milliGRAM(s) Oral every 6 hours PRN Temp greater or equal to 38C (100.4F), Mild Pain (1 - 3)  diazepam    Tablet 2 milliGRAM(s) Oral at bedtime PRN for dysautonomia/ hypertension/discomfort      PHYSICAL EXAMINATION  General: NAD   HEENT: Trach present and eyes taped with Tegaderm   Cards: S1/S2, no murmurs   Pulm: Course vent sounds bilaterally. No wheezes.   Abdomen: Soft, nondistended and nontender. BS (+) PEG   Extremities: No pedal edema. No active [place] of BL upper and lower extremities.  Neurology: Awake with eyes open, however does not track or follow commands with baseline ALS with no acute focal neurological deficits     LABS:                Venous Blood Gas:  08-06 @ 06:00  7.40/43/147/27/99.6  VBG Lactate: --      PAST MEDICAL & SURGICAL HISTORY:  ALS (amyotrophic lateral sclerosis)      HLD (hyperlipidemia)      Ventilator dependent  Since 2015      Aspiration into airway      S/P gastrostomy  10/14 for dysphagia  receives jevity 2 cans TID      Dependence on tracheostomy      Encounter for PEG (percutaneous endoscopic gastrostomy)  peg placed          FAMILY HISTORY:  No pertinent family history in first degree relatives        Social History:      RADIOLOGY:  [ ] Reviewed and interpreted by me    PULMONARY FUNCTION TESTS:    EKG:

## 2024-08-07 NOTE — PROGRESS NOTE ADULT - ASSESSMENT
69 YO Female with PMHx of ALS, Parkinson,, nonverbal, bed bound, chronic respiratory failure with tracheostomy and vent dependence, and oropharyngeal dysphagia with PEG who presented from home with tracheostomy leak. She was noted with vent alarm and poor TVe (250-290) at home. Trach changed at home with no improvement and sent in for thoracic evaluation. While in MICU no air leak noted with hyperinflated cuff. Transferred to RCU on 7/5 with course complicated by leukocytosis second to UTI vs trachitis and completed zosyn course. Trach upsized by thoracic on 7/17 however AL remains and now pending custom trach.     NEUROLOGY  # ALS   - Baseline nonverbal, awake, and communicates with eye movement.   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 Nurses taught moister chamber placement with optho team.   - Ophtho to re-evaluate today (8/6) or tomorrow    PSYCH   # Anxiety and Depression   - Continue on home Valium 2mg BID with additional 2mg PRN   - Continue on Zoloft 75mg QD while in house (on 4cc/ 80mg QD at home)     CARDIOVASCULAR   # HTN thought to be second to discomfort vs dysautonomia   - Evening HTN and continue on Hydralazine vs valium PRN doses   - Monitor HR and BP    # SVT  - Intermittent episodes of SVT lasting a few seconds at a time & self limited   - Monitor BP and keep Mg>2, K>4  - Consider BB if becomes more persistent or HD unstable    RESPIRATORY  # Tracheostomy leak   - At home noted with TVe 200-300s despite tracheostomy change to 80XLTCD.   - No air leak noted in house with hyperinflated cuff likely tracheomegaly?   - Continue on chronic vent 16/400/5/21   - Chronic bibasilar atelectasis and continued on HTS PRN with chest PT  - Trach upsized to Bivona 9 on 7/17, however trach leak remains.   - Pending Custom Tracheostomy ordered by Thoracic surgery - Per CTSx discussion 8/4, hopeful for delivery this week.    GI  # Dysphagia   - Continue on PEG-TF     RENAL  # High PVR   - BS with roughly 200-300cc PVR, however no acute distress   - Monitor renal function and UOP     # Elevated lactate   - Lactate elevated with mild contraction alkalosis   - Rehydrate and monitor     INFECTIOUS DISEASE  # Leukocytosis likely second to trachitis vs UTI?   - No fever or chills and noted with prior leukocytosis   - CXR with prior atelectasis and no acute findings   - UA (7/6) with positive nitrites (7/6)   - SCx (7/5) with pansensitive PSA  - BCx (7/6) x 2 NGT  - UCx (7/6) with coag neg staph  - MRSA PCR (7/6) POSITIVE for both MRSA/MSSA s/p Bactroban (7/6 - 7/10)  - s/p empiric Zosyn (7/8 - 7/14) however WBC continues to wax and wean without fever and will monitor off ABX .     HEME  - On Xarelto 10mg at home likely for DVT PPX?   - Hold home Xarelto in prep for OR   - DVT PPX with LVX for now     ENDOCRINE  - No hx of DM2   - Monitor BG     SKIN  - Basic trach and PEG care ordered     ETHICS/ GOC    - FULL CODE   - Dr Tyson Velasquez,  involved in care    DISPO - Back home with  when able.      69 YO Female with PMHx of ALS, Parkinson,, nonverbal, bed bound, chronic respiratory failure with tracheostomy and vent dependence, and oropharyngeal dysphagia with PEG who presented from home with tracheostomy leak. She was noted with vent alarm and poor TVe (250-290) at home. Trach changed at home with no improvement and sent in for thoracic evaluation. While in MICU no air leak noted with hyperinflated cuff. Transferred to RCU on 7/5 with course complicated by leukocytosis second to UTI vs trachitis and completed zosyn course. Trach upsized by thoracic on 7/17 however AL remains and now pending custom trach.     NEUROLOGY  # ALS   - Baseline nonverbal, awake, and communicates with eye movement.   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 Nurses taught moister chamber placement with optho team.   - Pending optho de-escalation recommendations given improvement.    PSYCH   # Anxiety and Depression   - Continue on home Valium 2mg BID with additional 2mg PRN   - Continue on Zoloft 75mg QD while in house (on 4cc/ 80mg QD at home)     CARDIOVASCULAR   # HTN thought to be second to discomfort vs dysautonomia   - Evening HTN and continue on Hydralazine vs valium PRN doses   - Monitor HR and BP    # SVT  - Intermittent episodes of SVT lasting a few seconds at a time & self limited   - Monitor BP and keep Mg>2, K>4  - Consider BB if becomes more persistent or HD unstable    RESPIRATORY  # Tracheostomy leak   - At home noted with TVe 200-300s despite tracheostomy change to 80XLTCD.   - No air leak noted in house with hyperinflated cuff likely tracheomegaly?   - Continue on chronic vent 16/400/5/21   - Chronic bibasilar atelectasis and continued on HTS PRN with chest PT  - Trach upsized to Bivona 9 on 7/17, however trach leak remains.   - Pending Custom Tracheostomy ordered by Thoracic surgery     GI  # Dysphagia   - Continue on PEG-TF     RENAL  # High PVR   - BS with roughly 200-300cc PVR, however no acute distress   - Monitor renal function and UOP     # Elevated lactate   - Lactate elevated with mild contraction alkalosis   - Rehydrate with improvement     INFECTIOUS DISEASE  # Leukocytosis likely second to trachitis vs UTI?   - No fever or chills and noted with prior leukocytosis   - CXR with prior atelectasis and no acute findings   - UA (7/6) with positive nitrites (7/6)   - SCx (7/5) with pansensitive PSA  - BCx (7/6) x 2 NGT  - UCx (7/6) with coag neg staph  - MRSA PCR (7/6) POSITIVE for both MRSA/MSSA s/p Bactroban (7/6 - 7/10)  - s/p empiric Zosyn (7/8 - 7/14) however WBC continues to wax and wean without fever and will monitor off ABX .     HEME  - On Xarelto 10mg at home likely for DVT PPX?   - Hold home Xarelto in prep for OR   - DVT PPX with LVX for now     ENDOCRINE  - No hx of DM2   - Monitor BG     SKIN  - Basic trach and PEG care ordered     ETHICS/ GOC    - FULL CODE   - Dr Tyson Velasquez,  involved in care    DISPO - Back home with  when able.      69 YO Female with PMHx of ALS, Parkinson,, nonverbal, bed bound, chronic respiratory failure with tracheostomy and vent dependence, and oropharyngeal dysphagia with PEG who presented from home with tracheostomy leak. She was noted with vent alarm and poor TVe (250-290) at home. Trach changed at home with no improvement and sent in for thoracic evaluation. While in MICU no air leak noted with hyperinflated cuff. Transferred to RCU on 7/5 with course complicated by leukocytosis second to UTI vs trachitis and completed zosyn course. Trach upsized by thoracic on 7/17 however AL remains and now pending custom trach.     NEUROLOGY  # ALS   - Baseline nonverbal, awake, and communicates with eye movement.   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 Nurses taught moister chamber placement with optho team.   - Pending optho de-escalation recommendations given improvement.    PSYCH   # Anxiety and Depression   - Continue on home Valium 2mg BID with additional 2mg PRN   - Continue on Zoloft 75mg QD while in house (on 4cc/ 80mg QD at home)     CARDIOVASCULAR   # HTN thought to be second to discomfort vs dysautonomia   - Evening HTN and continue on Hydralazine vs valium PRN doses   - Monitor HR and BP    # pSVT  - Intermittent episodes of SVT, lasting a few seconds at a time, and self limiting.   - Consider BB if becomes more persistent or HD unstable    RESPIRATORY  # Tracheostomy leak   - At home noted with TVe 200-300s despite tracheostomy change to 80XLTCD.   - No air leak noted in house with hyperinflated cuff likely tracheomegaly?   - Continue on chronic vent 16/400/5/21   - Chronic bibasilar atelectasis and continued on HTS PRN with chest PT  - Trach upsized to Bivona 9 on 7/17, however trach leak remains.   - Pending Custom Tracheostomy ordered by Thoracic surgery     GI  # Dysphagia   - Continue on PEG-TF     RENAL  # High PVR   - BS with roughly 200-300cc PVR, however no acute distress   - Monitor renal function and UOP     # Elevated lactate   - Lactate elevated with mild contraction alkalosis   - Rehydrate with improvement     INFECTIOUS DISEASE  # Leukocytosis likely second to trachitis vs UTI?   - No fever or chills and noted with prior leukocytosis   - CXR with prior atelectasis and no acute findings   - UA (7/6) with positive nitrites (7/6)   - SCx (7/5) with pansensitive PSA  - BCx (7/6) x 2 NGT  - UCx (7/6) with coag neg staph  - MRSA PCR (7/6) POSITIVE for both MRSA/MSSA s/p Bactroban (7/6 - 7/10)  - s/p empiric Zosyn (7/8 - 7/14) however WBC continues to wax and wean without fever and will monitor off ABX .     HEME  - On Xarelto 10mg at home likely for DVT PPX?   - Hold home Xarelto in prep for OR   - DVT PPX with LVX for now     ENDOCRINE  - No hx of DM2   - Monitor BG     SKIN  - Basic trach and PEG care ordered     ETHICS/ GOC    - FULL CODE   - Dr Tyson Velasquez,  involved in care    DISPO - Back home with  when able.

## 2024-08-07 NOTE — PROGRESS NOTE ADULT - NS ATTEND AMEND GEN_ALL_CORE FT
Pt is a 69F with MHx advanced ALS (nonverbal and bedbound at baseline) with chronic hypercapnic respiratory failure and ventilatory dependence via tracheostomy at baseline and Parkinson's dz presenting to [facility] on 7/18/24 with acute hypercapnia secondary to large expiratory air leak s/p upsizing without improvement with hospital course c/b tracheitis vs. UTI transferred from MICU to RCU on 7/5 for further management.     Pt with advanced progressive ALS with baseline known to be nonverbal and bedbound with functional quadriplegia and complete ADL dependence. 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 Pt p/w worsening eye redness, especially on right side, despite conservative protective measures at home. She has received vancomycin +tobramycin eye drops in the past and on previous admissions opthalmology recommended possible need for tarsorrhaphy, but was ultimately deferred as not in line with pt's GOC. 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 Keratopathy slowly improving. Will also c/w home riluzole, c/w home diazepam and zoloft. Diazepam prn added for breakthrough concern for agitation or discomfort without further issues to date.     Pt with acute on chronic hypercapnic respiratory failure 2/2 neuromuscular weakness in the setting of advanced ALS with chronic ventilatory dependence with tracheostomy found to have tracheitis and large air leak 2/2 tracheomegaly likely 2/2 prolonged tracheostomy dependence. Trach exchanged at home without improvement followed by upsizing at bedside to 9 Bivona but still with air leak. Thoracic sx consulted, custom trach ordered (Portex 9.5 with 40mm balloon). Awaiting custom order delivery for OR trach exchange. Will c/w current vent support, pt on home settings. Air leak remains ~25% total TV and has been stable. Pt does not tolerate PSV trials, remains completely passive on ventilator. Will c/w airway clearance and trach care as per RCU team. Wean O2 supplementation for goal O2 saturation 90-95%.     Pt with sepsis 2/2 UTI vs. tracheitis. Remains hemodynamically stable and afebrile with fluctuating but stable WBC. Initial cx (+) Pseudomonas in trach aspirate, similar to previous cultures, for which pt completed course of Zosyn. Previous UCx from 7/24/23 (+) VRE faecalis sensitive only to linezolid/daptomycin but likely colonizers. Will continue to monitor conservatively off abx at this time. Pt with known periods of BP variation with transient hypertensive episodes, will monitor off anti-HTNs, likely 2/2 dysautonomia.    Discharge pending medical optimization, likely d/c home with ventilator. Pt full code. On home NOAC for DVT ppx, held for now in preparation for OR. Will restart prior to d/c. Will continue to discuss with pt's family, pt's  at bedside. HCP  (Dr. Velasquez). O/P Pulm: Dr. Pranav Newman

## 2024-08-08 LAB
ADD ON TEST-SPECIMEN IN LAB: SIGNIFICANT CHANGE UP
ANION GAP SERPL CALC-SCNC: 13 MMOL/L — SIGNIFICANT CHANGE UP (ref 7–14)
BUN SERPL-MCNC: 21 MG/DL — SIGNIFICANT CHANGE UP (ref 7–23)
CALCIUM SERPL-MCNC: 9.6 MG/DL — SIGNIFICANT CHANGE UP (ref 8.4–10.5)
CHLORIDE SERPL-SCNC: 104 MMOL/L — SIGNIFICANT CHANGE UP (ref 98–107)
CO2 SERPL-SCNC: 25 MMOL/L — SIGNIFICANT CHANGE UP (ref 22–31)
CREAT SERPL-MCNC: <0.2 MG/DL — LOW (ref 0.5–1.3)
EGFR: 125 ML/MIN/1.73M2 — SIGNIFICANT CHANGE UP
GLUCOSE SERPL-MCNC: 175 MG/DL — HIGH (ref 70–99)
MAGNESIUM SERPL-MCNC: 2.1 MG/DL — SIGNIFICANT CHANGE UP (ref 1.6–2.6)
PHOSPHATE SERPL-MCNC: 3.5 MG/DL — SIGNIFICANT CHANGE UP (ref 2.5–4.5)
POTASSIUM SERPL-MCNC: 4.4 MMOL/L — SIGNIFICANT CHANGE UP (ref 3.5–5.3)
POTASSIUM SERPL-SCNC: 4.4 MMOL/L — SIGNIFICANT CHANGE UP (ref 3.5–5.3)
SODIUM SERPL-SCNC: 142 MMOL/L — SIGNIFICANT CHANGE UP (ref 135–145)

## 2024-08-08 PROCEDURE — 99232 SBSQ HOSP IP/OBS MODERATE 35: CPT

## 2024-08-08 PROCEDURE — 99233 SBSQ HOSP IP/OBS HIGH 50: CPT | Mod: FS

## 2024-08-08 RX ORDER — DIAZEPAM 10 MG
2 TABLET ORAL AT BEDTIME
Refills: 0 | Status: DISCONTINUED | OUTPATIENT
Start: 2024-08-08 | End: 2024-08-15

## 2024-08-08 RX ORDER — SODIUM CHLORIDE 0.65 %
1 AEROSOL, SPRAY (ML) NASAL EVERY 4 HOURS
Refills: 0 | Status: DISCONTINUED | OUTPATIENT
Start: 2024-08-08 | End: 2024-08-08

## 2024-08-08 RX ORDER — POVIDONE, PROPYLENE GLYCOL 6.8; 3 MG/ML; MG/ML
1 LIQUID OPHTHALMIC
Refills: 0 | Status: DISCONTINUED | OUTPATIENT
Start: 2024-08-08 | End: 2024-08-29

## 2024-08-08 RX ORDER — DIAZEPAM 10 MG
2 TABLET ORAL EVERY 12 HOURS
Refills: 0 | Status: DISCONTINUED | OUTPATIENT
Start: 2024-08-08 | End: 2024-08-15

## 2024-08-08 RX ADMIN — POVIDONE, PROPYLENE GLYCOL 1 DROP(S): 6.8; 3 LIQUID OPHTHALMIC at 17:49

## 2024-08-08 RX ADMIN — POVIDONE, PROPYLENE GLYCOL 1 DROP(S): 6.8; 3 LIQUID OPHTHALMIC at 07:31

## 2024-08-08 RX ADMIN — OFLOXACIN 1 DROP(S): 3 SOLUTION/ DROPS OPHTHALMIC at 06:17

## 2024-08-08 RX ADMIN — ERYTHROMYCIN 1 APPLICATION(S): 5 OINTMENT OPHTHALMIC at 21:16

## 2024-08-08 RX ADMIN — POVIDONE, PROPYLENE GLYCOL 1 APPLICATION(S): 6.8; 3 LIQUID OPHTHALMIC at 05:15

## 2024-08-08 RX ADMIN — RILUZOLE 50 MILLIGRAM(S): 5 LIQUID ORAL at 17:53

## 2024-08-08 RX ADMIN — POVIDONE, PROPYLENE GLYCOL 1 APPLICATION(S): 6.8; 3 LIQUID OPHTHALMIC at 07:30

## 2024-08-08 RX ADMIN — POVIDONE, PROPYLENE GLYCOL 1 APPLICATION(S): 6.8; 3 LIQUID OPHTHALMIC at 13:00

## 2024-08-08 RX ADMIN — ERYTHROMYCIN 1 APPLICATION(S): 5 OINTMENT OPHTHALMIC at 15:00

## 2024-08-08 RX ADMIN — POVIDONE, PROPYLENE GLYCOL 1 DROP(S): 6.8; 3 LIQUID OPHTHALMIC at 02:59

## 2024-08-08 RX ADMIN — Medication 2 MILLIGRAM(S): at 05:13

## 2024-08-08 RX ADMIN — POVIDONE, PROPYLENE GLYCOL 1 DROP(S): 6.8; 3 LIQUID OPHTHALMIC at 13:00

## 2024-08-08 RX ADMIN — POVIDONE, PROPYLENE GLYCOL 1 DROP(S): 6.8; 3 LIQUID OPHTHALMIC at 06:16

## 2024-08-08 RX ADMIN — POVIDONE, PROPYLENE GLYCOL 1 DROP(S): 6.8; 3 LIQUID OPHTHALMIC at 16:00

## 2024-08-08 RX ADMIN — OFLOXACIN 1 DROP(S): 3 SOLUTION/ DROPS OPHTHALMIC at 17:49

## 2024-08-08 RX ADMIN — ENOXAPARIN SODIUM 40 MILLIGRAM(S): 100 INJECTION SUBCUTANEOUS at 05:15

## 2024-08-08 RX ADMIN — CHLORHEXIDINE GLUCONATE 15 MILLILITER(S): 40 SOLUTION TOPICAL at 05:16

## 2024-08-08 RX ADMIN — POVIDONE, PROPYLENE GLYCOL 1 DROP(S): 6.8; 3 LIQUID OPHTHALMIC at 22:03

## 2024-08-08 RX ADMIN — Medication 2 MILLIGRAM(S): at 17:49

## 2024-08-08 RX ADMIN — POVIDONE, PROPYLENE GLYCOL 1 APPLICATION(S): 6.8; 3 LIQUID OPHTHALMIC at 01:10

## 2024-08-08 RX ADMIN — POVIDONE, PROPYLENE GLYCOL 1 DROP(S): 6.8; 3 LIQUID OPHTHALMIC at 20:41

## 2024-08-08 RX ADMIN — POVIDONE, PROPYLENE GLYCOL 1 DROP(S): 6.8; 3 LIQUID OPHTHALMIC at 05:13

## 2024-08-08 RX ADMIN — POVIDONE, PROPYLENE GLYCOL 1 DROP(S): 6.8; 3 LIQUID OPHTHALMIC at 09:45

## 2024-08-08 RX ADMIN — CHLORHEXIDINE GLUCONATE 15 MILLILITER(S): 40 SOLUTION TOPICAL at 17:50

## 2024-08-08 RX ADMIN — RILUZOLE 50 MILLIGRAM(S): 5 LIQUID ORAL at 05:16

## 2024-08-08 RX ADMIN — POVIDONE, PROPYLENE GLYCOL 1 DROP(S): 6.8; 3 LIQUID OPHTHALMIC at 23:27

## 2024-08-08 RX ADMIN — POVIDONE, PROPYLENE GLYCOL 1 DROP(S): 6.8; 3 LIQUID OPHTHALMIC at 15:00

## 2024-08-08 RX ADMIN — POVIDONE, PROPYLENE GLYCOL 1 DROP(S): 6.8; 3 LIQUID OPHTHALMIC at 01:10

## 2024-08-08 RX ADMIN — ERYTHROMYCIN 1 APPLICATION(S): 5 OINTMENT OPHTHALMIC at 09:44

## 2024-08-08 RX ADMIN — POVIDONE, PROPYLENE GLYCOL 1 DROP(S): 6.8; 3 LIQUID OPHTHALMIC at 19:18

## 2024-08-08 RX ADMIN — POVIDONE, PROPYLENE GLYCOL 1 DROP(S): 6.8; 3 LIQUID OPHTHALMIC at 09:43

## 2024-08-08 RX ADMIN — POVIDONE, PROPYLENE GLYCOL 1 APPLICATION(S): 6.8; 3 LIQUID OPHTHALMIC at 16:00

## 2024-08-08 RX ADMIN — ERYTHROMYCIN 1 APPLICATION(S): 5 OINTMENT OPHTHALMIC at 02:59

## 2024-08-08 RX ADMIN — POVIDONE, PROPYLENE GLYCOL 1 DROP(S): 6.8; 3 LIQUID OPHTHALMIC at 23:45

## 2024-08-08 RX ADMIN — ERYTHROMYCIN 1 APPLICATION(S): 5 OINTMENT OPHTHALMIC at 23:27

## 2024-08-08 RX ADMIN — POVIDONE, PROPYLENE GLYCOL 1 DROP(S): 6.8; 3 LIQUID OPHTHALMIC at 21:15

## 2024-08-08 RX ADMIN — POVIDONE, PROPYLENE GLYCOL 1 APPLICATION(S): 6.8; 3 LIQUID OPHTHALMIC at 09:44

## 2024-08-08 RX ADMIN — ERYTHROMYCIN 1 APPLICATION(S): 5 OINTMENT OPHTHALMIC at 05:14

## 2024-08-08 RX ADMIN — ERYTHROMYCIN 1 APPLICATION(S): 5 OINTMENT OPHTHALMIC at 17:50

## 2024-08-08 NOTE — PROGRESS NOTE ADULT - ASSESSMENT
70 YO Female with PMHx of ALS, Parkinson,, nonverbal, bed bound, chronic respiratory failure with tracheostomy and vent dependence, and oropharyngeal dysphagia with PEG who presented from home with tracheostomy leak.   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 Was previously able to have lids taped shut at night however now becoming more difficult. Patient consequently with noticeable worsening in eye redness OD>>OS despite frequent lubrication   - Initial exam with conjunctival injection OD>>OS, cornea OD with inferior epithelial defect encompassing 40% of cornea. With chronic corneal damage and changes secondary to exposure including extensive overlying filaments, pannus formation, and stromal scarring; OS with inferior epithelial defect encompassing 20% of cornea. Also with chronic corneal damage and changes secondary to exposure including extensive filaments, pannus. No active infiltrate or ulcer appreciated at this time OU. No thinning OU.   - 8/8: exam OU with resolved epithelial defect but persistent chronic corneal changes as per exam above  - cw ofloxacin twice a day to both eyes  - CW Erythromycin ointment every 3 hours to both eyes   - Create moisture chamber with tegaderm following placement of EXTENSIVE ointment to both eyes. Nurses shown how to make at bedside. The tegaderm should not be placed tightly overlying the eye, and there should be ointment covering the entirety of the exposed eye. Place moisturizing ointment around areas of face where tegaderm is to stick so that patient does not develop skin irritation    - Artificial tears every 1 hour while awake to both eyes prior to ointment   - Can have 6 hour period during day during which should continue with ointment/drop regimen but without tegaderm     SDW Dr. Denton     Outpatient follow-up: Patient should follow-up with his/her ophthalmologist or with [facility] of Ophthalmology upon discharge at the address below     [facility] of Ophthalmology  [address] [place] [address]. [address]  [place] [address]  [phone]

## 2024-08-08 NOTE — CHART NOTE - NSCHARTNOTEFT_GEN_A_CORE
Pt seen for NUTRITION FOLLOW UP     Medical Course:  69 y/o Female with PMHx of ALS, Parkinson,, nonverbal, bed bound, chronic respiratory failure with tracheostomy and vent dependence, and oropharyngeal dysphagia with PEG who presented from home with tracheostomy leak. She was noted with vent alarm and poor TVe (250-290) at home. Trach changed at home with no improvement and sent in for thoracic evaluation. While in MICU no air leak noted with hyperinflated cuff. Transferred to [facility] on 7/5 with course complicated by leukocytosis second to UTI vs trachitis and completed zosyn course. Trach upsized by thoracic on 7/17 however AL remains and now pending custom trach.       Nutrition Course:   Unable to conduct nutrition interview 2/2 decreased cognition. Information obtained from comprehensive chart review and per RN today: No recent episodes of nausea, vomiting, diarrhea, or constipation. Last BM noted 8/8 per RN flowsheets. Pt remains NPO TF only as sole source of nutrition and hydration; Jevity 1.2 @ 50ml/hr x24hr: (55kg): 1200ml total volume, 1440kcal (26kcal/kg), 67 gm protein (1.2gm/kg), 972ml free water from formula. TF continues to meet Pt's estimated needs. Will continue as same, no intolerances noted. RD asked team to check A1C% levels as Pt noted with elevated lab glucose levels. May consider changing TF formula to glucose controlled. Will f/u when lab is complete.     Diet Prescription: Diet, NPO with Tube Feed:   Tube Feeding Modality: Gastrostomy  Jevity 1.2 Marck (JEVITY1.2RTH)  Total Volume for 24 Hours (mL): 1200  Continuous  Starting Tube Feed Rate {mL per Hour}: 30  Increase Tube Feed Rate by (mL): 10     Every 4 hours  Until Goal Tube Feed Rate (mL per Hour): 50  Tube Feed Duration (in Hours): 24  Tube Feed Start Time: 17:00 (07-17-24 @ 18:23)    Pertinent Medications: MEDICATIONS  (STANDING):  artificial  tears Solution 1 Drop(s) Both EYES <User Schedule>  chlorhexidine 0.12% Liquid 15 milliLiter(s) Oral Mucosa every 12 hours  chlorhexidine 2% Cloths 1 Application(s) Topical daily  diazepam    Tablet 2 milliGRAM(s) Oral every 12 hours  enoxaparin Injectable 40 milliGRAM(s) SubCutaneous every 24 hours  erythromycin   Ointment 1 Application(s) Both EYES <User Schedule>  multivitamin/minerals/iron Oral Solution (CENTRUM) 15 milliLiter(s) Oral daily  ofloxacin 0.3% Solution 1 Drop(s) Both EYES two times a day  petrolatum Ophthalmic Ointment 1 Application(s) Both EYES <User Schedule>  riluzole 50 milliGRAM(s) Oral two times a day  sertraline 75 milliGRAM(s) Oral daily    MEDICATIONS  (PRN):  acetaminophen   Oral Liquid .. 650 milliGRAM(s) Oral every 6 hours PRN Temp greater or equal to 38C (100.4F), Mild Pain (1 - 3)  diazepam    Tablet 2 milliGRAM(s) Oral at bedtime PRN for dysautonomia/ hypertension/discomfort    Pertinent Labs: 08-08 Na142 mmol/L Glu 175 mg/dL<H> K+ 4.4 mmol/L Cr  <0.20 mg/dL<L> BUN 21 mg/dL 08-08 Phos 3.5 mg/dL      Height (cm): 162.6 (07-05 @ 00:00)  Weight (kg): 55.2 (08-04 @ 00:38)  BMI (kg/m2): 20.9 (08-04 @ 00:38)  Weight Assessment:       Physical Assessment, per flowsheets:  Edema: generalized 1+   Pressure Injury: none noted      Estimated Needs:   [X] No change since previous assessment, based on #/ 54.5kg   2409-5437 Kcal/kg/day (20-25 kcal/kg), 43.6-54.5 gm/kg/day ( 0.8-1 gm/kg)      Previous Nutrition Diagnosis: No active nutrition diagnosis identified at this time      Education:  [ x ] Not warranted 2/2 decreased cognition    Interventions:   1) Change TF to Jevity 1.2 @ 50ml/hr x24hrs   2) Continue to provide multivitamin w/ minerals once daily for micronutrient and mineral provisions   3) Obtain a1c%  4) RD to f/u prn       Monitor & Evaluate:  Tolerance to diet/supplement, nutrition related lab values, weight trends, BMs/GI distress, hydration status, skin integrity.    Keshia Mcneal MS, RDN (Pager #[phone]) | Also available on TEAMS

## 2024-08-08 NOTE — PROGRESS NOTE ADULT - ASSESSMENT
67 YO Female with PMHx of ALS, Parkinson,, nonverbal, bed bound, chronic respiratory failure with tracheostomy and vent dependence, and oropharyngeal dysphagia with PEG who presented from home with tracheostomy leak. She was noted with vent alarm and poor TVe (250-290) at home. Trach changed at home with no improvement and sent in for thoracic evaluation. While in MICU no air leak noted with hyperinflated cuff. Transferred to [facility] on 7/5 with course complicated by leukocytosis second to UTI vs trachitis and completed zosyn course. Trach upsized by thoracic on 7/17 however AL remains and now pending custom trach.     NEUROLOGY  # ALS   - Baseline nonverbal, awake, and communicates with eye movement.   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 Nurses taught moister chamber placement with optho team.   - Pending optho de-escalation recommendations given improvement.    PSYCH   # Anxiety and Depression   - Continue on home Valium 2mg BID with additional 2mg PRN   - Continue on Zoloft 75mg QD while in house (on 4cc/ 80mg QD at home)     CARDIOVASCULAR   # HTN thought to be second to discomfort vs dysautonomia   - Evening HTN and continue on Hydralazine vs valium PRN doses   - Monitor HR and BP    # pSVT  - Intermittent episodes of SVT, lasting a few seconds at a time, and self limiting.   - Consider BB if becomes more persistent or HD unstable    RESPIRATORY  # Tracheostomy leak   - At home noted with TVe 200-300s despite tracheostomy change to 80XLTCD.   - No air leak noted in house with hyperinflated cuff likely tracheomegaly?   - Continue on chronic vent 16/400/5/21   - Chronic bibasilar atelectasis and continued on HTS PRN with chest PT  - Trach upsized to Bivona 9 on 7/17, however trach leak remains.   - Pending Custom Tracheostomy ordered by Thoracic surgery     GI  # Dysphagia   - Continue on PEG-TF     RENAL  # High PVR   - BS with roughly 200-300cc PVR, however no acute distress   - Monitor renal function and UOP     # Elevated lactate   - Lactate elevated with mild contraction alkalosis   - Rehydrate with improvement     INFECTIOUS DISEASE  # Leukocytosis likely second to trachitis vs UTI?   - No fever or chills and noted with prior leukocytosis   - CXR with prior atelectasis and no acute findings   - UA (7/6) with positive nitrites (7/6)   - SCx (7/5) with pansensitive PSA  - BCx (7/6) x 2 NGT  - UCx (7/6) with coag neg staph  - MRSA PCR (7/6) POSITIVE for both MRSA/MSSA s/p Bactroban (7/6 - 7/10)  - s/p empiric Zosyn (7/8 - 7/14) however WBC continues to wax and wean without fever and will monitor off ABX .     HEME  - On Xarelto 10mg at home likely for DVT PPX?   - Hold home Xarelto in prep for OR   - DVT PPX with LVX for now     ENDOCRINE  - No hx of DM2   - Monitor BG     SKIN  - Basic trach and PEG care ordered     ETHICS/ GOC    - FULL CODE   - Dr Tyson Velasquez,  involved in care    DISPO - Back home with  when able.      69 YO Female with PMHx of ALS, Parkinson,, nonverbal, bed bound, chronic respiratory failure with tracheostomy and vent dependence, and oropharyngeal dysphagia with PEG who presented from home with tracheostomy leak. She was noted with vent alarm and poor TVe (250-290) at home. Trach changed at home with no improvement and sent in for thoracic evaluation. While in MICU no air leak noted with hyperinflated cuff. Transferred to [facility] on 7/5 with course complicated by leukocytosis second to UTI vs trachitis and completed zosyn course. Trach upsized by thoracic on 7/17 however AL remains and now pending custom trach.     NEUROLOGY  # ALS   - Baseline nonverbal, awake, and communicates with eye movement.   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 Nurses taught moister chamber placement with optho team.   - Pending optho de-escalation recommendations given improvement.    PSYCH   # Anxiety and Depression   - Continue on home Valium 2mg BID with additional 2mg PRN   - Continue on Zoloft 75mg QD while in house (on 4cc/ 80mg QD at home)     CARDIOVASCULAR   # HTN thought to be second to discomfort vs dysautonomia   - Evening HTN and continue on Hydralazine vs valium PRN doses   - Monitor HR and BP    # pSVT  - Intermittent episodes of SVT, lasting a few seconds at a time, and self limiting.   - Consider BB if becomes more persistent or HD unstable    RESPIRATORY  # Tracheostomy leak   - At home noted with TVe 200-300s despite tracheostomy change to 80XLTCD.   - No air leak noted in house with hyperinflated cuff likely tracheomegaly?   - Continue on chronic vent 16/400/5/21   - Chronic bibasilar atelectasis and continued on HTS PRN with chest PT  - Trach upsized to Bivona 9 on 7/17, however trach leak remains.   - Pending Custom Tracheostomy ordered by Thoracic surgery     GI  # Dysphagia   - Continue on PEG-TF     RENAL  # High PVR   - BS with roughly 200-300cc PVR, however no acute distress   - Monitor renal function and UOP     # Elevated lactate   - Lactate elevated with mild contraction alkalosis   - Rehydrate with improvement     INFECTIOUS DISEASE  # Leukocytosis likely second to trachitis vs UTI?   - No fever or chills and noted with prior leukocytosis   - CXR with prior atelectasis and no acute findings   - UA (7/6) with positive nitrites (7/6)   - SCx (7/5) with pansensitive PSA  - BCx (7/6) x 2 NGT  - UCx (7/6) with coag neg staph  - MRSA PCR (7/6) POSITIVE for both MRSA/MSSA s/p Bactroban (7/6 - 7/10)  - s/p empiric Zosyn (7/8 - 7/14) however WBC continues to wax and wean without fever and will monitor off ABX .     HEME  - On Xarelto 10mg at home likely for DVT PPX?   - Hold home Xarelto in prep for OR   - DVT PPX with LVX for now and hold home eliqius PPX    ENDOCRINE  - No hx of DM2   - Monitor BG     SKIN  - Basic trach and PEG care ordered     ETHICS/ GOC    - FULL CODE   - Dr Tyson Velasquez,  involved in care    DISPO - Back home with  when able.      67 YO Female with PMHx of ALS, Parkinson,, nonverbal, bed bound, chronic respiratory failure with tracheostomy and vent dependence, and oropharyngeal dysphagia with PEG who presented from home with tracheostomy leak. She was noted with vent alarm and poor TVe (250-290) at home. Trach changed at home with no improvement and sent in for thoracic evaluation. While in MICU no air leak noted with hyperinflated cuff. Transferred to [facility] on 7/5 with course complicated by leukocytosis second to UTI vs trachitis and completed zosyn course. Trach upsized by thoracic on 7/17 however AL remains and now pending custom trach.     NEUROLOGY  # ALS   - Baseline nonverbal, awake, and communicates with eye movement.   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   - Continue on Ofloxacin BID (decreased 8/1)   - Continue on rythromycin OU Q3H (decreased 8/8)  - Continue with artificial tears OU Q1H   - Create moisture chamber with Tegaderm following placement of EXTENSIVE ointment to the right eye throughout the day and to the left eye nightly.   - Allow 6 hour window during the day where moisture can be removed from right eye     PSYCH   # Anxiety and Depression   - Continue on home Valium 2mg BID with additional 2mg PRN   - Continue on Zoloft 75mg QD while in house (on 4cc/ 80mg QD at home)     CARDIOVASCULAR   # HTN thought to be second to discomfort vs dysautonomia   - Evening HTN and continue on Hydralazine vs valium PRN doses   - Monitor HR and BP    # pSVT  - Intermittent episodes of SVT, lasting a few seconds at a time, and self limiting.   - Consider BB if becomes more persistent or HD unstable    RESPIRATORY  # Tracheostomy leak   - At home noted with TVe 200-300s despite tracheostomy change to 80XLTCD.   - No air leak noted in house with hyperinflated cuff likely tracheomegaly?   - Continue on chronic vent 16/400/5/21   - Chronic bibasilar atelectasis and continued on HTS PRN with chest PT  - Trach upsized to Bivona 9 on 7/17, however trach leak remains.   - Pending Custom Tracheostomy ordered by Thoracic surgery     GI  # Dysphagia   - Continue on PEG-TF     RENAL  # High PVR   - BS with roughly 200-300cc PVR, however no acute distress   - Monitor renal function and UOP     # Elevated lactate   - Lactate elevated with mild contraction alkalosis   - Rehydrate with improvement     INFECTIOUS DISEASE  # Leukocytosis likely second to trachitis vs UTI?   - No fever or chills and noted with prior leukocytosis   - CXR with prior atelectasis and no acute findings   - UA (7/6) with positive nitrites (7/6)   - SCx (7/5) with pansensitive PSA  - BCx (7/6) x 2 NGT  - UCx (7/6) with coag neg staph  - MRSA PCR (7/6) POSITIVE for both MRSA/MSSA s/p Bactroban (7/6 - 7/10)  - s/p empiric Zosyn (7/8 - 7/14) however WBC continues to wax and wean without fever and will monitor off ABX .     HEME  - On Xarelto 10mg at home likely for DVT PPX?   - Hold home Xarelto in prep for OR   - DVT PPX with LVX for now and hold home eliqius PPX    ENDOCRINE  - No hx of DM2   - Monitor BG     SKIN  - Basic trach and PEG care ordered     ETHICS/ GOC    - FULL CODE   - Dr Tyson Velasquez,  involved in care    DISPO - Back home with  when able.

## 2024-08-08 NOTE — PROGRESS NOTE ADULT - SUBJECTIVE AND OBJECTIVE BOX
[facility] DEPARTMENT OF OPHTHALMOLOGY  ------------------------------------------------------------------------------  Chase Molina MD PGY 3  Available on Teams  ------------------------------------------------------------------------------    Interval History: No acute events overnight.     MEDICATIONS  (STANDING):  artificial  tears Solution 1 Drop(s) Both EYES <User Schedule>  chlorhexidine 0.12% Liquid 15 milliLiter(s) Oral Mucosa every 12 hours  chlorhexidine 2% Cloths 1 Application(s) Topical daily  diazepam    Tablet 2 milliGRAM(s) Oral every 12 hours  enoxaparin Injectable 40 milliGRAM(s) SubCutaneous every 24 hours  erythromycin   Ointment 1 Application(s) Both EYES <User Schedule>  multivitamin/minerals/iron Oral Solution (CENTRUM) 15 milliLiter(s) Oral daily  ofloxacin 0.3% Solution 1 Drop(s) Right EYE every 4 hours  ofloxacin 0.3% Solution 1 Drop(s) Left EYE every 6 hours  petrolatum Ophthalmic Ointment 1 Application(s) Both EYES <User Schedule>  riluzole 50 milliGRAM(s) Oral two times a day  sertraline 75 milliGRAM(s) Oral daily    MEDICATIONS  (PRN):  acetaminophen   Oral Liquid .. 650 milliGRAM(s) Oral every 6 hours PRN Temp greater or equal to 38C (100.4F), Mild Pain (1 - 3)  diazepam    Tablet 2 milliGRAM(s) Oral at bedtime PRN for dysautonomia/ hypertension/discomfort      VITALS: T(C): 36.7 (07-27-24 @ 04:18)  T(F): 98 (07-27-24 @ 04:18), Max: 98 (07-27-24 @ 04:18)  HR: 96 (07-27-24 @ 07:15) (92 - 104)  BP: 132/79 (07-27-24 @ 04:18) (116/70 - 164/81)  RR:  (16 - 19)  SpO2:  (97% - 100%)  Wt(kg): --  General: AAO x 0    Ophthalmology Exam:  Visual acuity (sc): RONIT   Pupils: PERRL OU, no APD  Ttono: 16 OU  Extraocular movements (EOMs): RONIT  Confrontational Visual Field (CVF): RONIT  Color Plates: RONIT    Pen Light Exam (PLE)  External: Flat OU  Lids/Lashes/Lacrimal Ducts: Flat OU    Sclera/Conjunctiva: injection OD>>OS   Cornea: OD with resolved inferior epithelial defect. With chronic corneal damage and changes secondary to exposure including extensive overlying filaments, pannus formation, and stromal scarring; OS with no further inferior epithelial defect. Also with chronic corneal damage and changes secondary to exposure including extensive filaments, pannus formation, and stromal scarring; No infiltration or ulcer OU   Anterior Chamber: D+F OU    Iris: Flat OU  Lens: NS OU

## 2024-08-08 NOTE — PROGRESS NOTE ADULT - SUBJECTIVE AND OBJECTIVE BOX
CHIEF COMPLAINT: Patient is a 69y old  Female who presents with a chief complaint of trach exchange (18 Jul 2024 07:07)      INTERVAL EVENTS:  - No interval events overnight   - Pending custom tracheostomy     REVIEW OF SYSTEMS: Seen by bedside during AM rounds and unable to assess ROS second to baseline ALS    Mode: CPAP with PS, FiO2: 30, PEEP: 5, PS: 15, MAP: 8, PIP: 21      OBJECTIVE:  ICU Vital Signs Last 24 Hrs  T(C): 36.9 (08 Aug 2024 04:00), Max: 36.9 (08 Aug 2024 04:00)  T(F): 98.4 (08 Aug 2024 04:00), Max: 98.4 (08 Aug 2024 04:00)  HR: 113 (08 Aug 2024 06:35) (91 - 114)  BP: 132/79 (08 Aug 2024 04:00) (95/68 - 132/79)  BP(mean): --  ABP: --  ABP(mean): --  RR: 18 (08 Aug 2024 04:00) (16 - 19)  SpO2: 96% (08 Aug 2024 06:35) (95% - 99%)    O2 Parameters below as of 08 Aug 2024 04:00  Patient On (Oxygen Delivery Method): ventilator          Mode: CPAP with PS, FiO2: 30, PEEP: 5, PS: 15, MAP: 8, PIP: 21    08-07 @ 07:01  -  08-08 @ 07:00  --------------------------------------------------------  IN: 1300 mL / OUT: 1100 mL / NET: 200 mL      CAPILLARY BLOOD GLUCOSE          [facility] MEDICATIONS:  MEDICATIONS  (STANDING):  artificial  tears Solution 1 Drop(s) Both EYES <User Schedule>  chlorhexidine 0.12% Liquid 15 milliLiter(s) Oral Mucosa every 12 hours  chlorhexidine 2% Cloths 1 Application(s) Topical daily  diazepam    Tablet 2 milliGRAM(s) Oral every 12 hours  enoxaparin Injectable 40 milliGRAM(s) SubCutaneous every 24 hours  erythromycin   Ointment 1 Application(s) Both EYES <User Schedule>  multivitamin/minerals/iron Oral Solution (CENTRUM) 15 milliLiter(s) Oral daily  ofloxacin 0.3% Solution 1 Drop(s) Both EYES two times a day  petrolatum Ophthalmic Ointment 1 Application(s) Both EYES <User Schedule>  riluzole 50 milliGRAM(s) Oral two times a day  sertraline 75 milliGRAM(s) Oral daily    MEDICATIONS  (PRN):  acetaminophen   Oral Liquid .. 650 milliGRAM(s) Oral every 6 hours PRN Temp greater or equal to 38C (100.4F), Mild Pain (1 - 3)  diazepam    Tablet 2 milliGRAM(s) Oral at bedtime PRN for dysautonomia/ hypertension/discomfort      PHYSICAL EXAMINATION  General: NAD   HEENT: Trach present and eyes taped with Tegaderm   Cards: S1/S2, no murmurs   Pulm: Course vent sounds bilaterally. No wheezes.   Abdomen: Soft, nondistended and nontender. BS (+) PEG   Extremities: No pedal edema. No active NATIVIDAD of BL upper and lower extremities.  Neurology: Awake with eyes open, however does not track or follow commands with baseline ALS with no acute focal neurological deficits     LABS:    08-08    142  |  104  |  21  ----------------------------<  175<H>  4.4   |  25  |  <0.20<L>    Ca    9.6      08 Aug 2024 02:30  Phos  3.5     08-08  Mg     2.10     08-08        Urinalysis Basic - ( 08 Aug 2024 02:30 )  Color: x / Appearance: x / SG: x / pH: x  Gluc: 175 mg/dL / Ketone: x  / Bili: x / Urobili: x   Blood: x / Protein: x / Nitrite: x   Leuk Esterase: x / RBC: x / WBC x   Sq Epi: x / Non Sq Epi: x / Bacteria: x            PAST MEDICAL & SURGICAL HISTORY:  ALS (amyotrophic lateral sclerosis)      HLD (hyperlipidemia)      Ventilator dependent  Since 2015      Aspiration into airway      S/P gastrostomy  10/14 for dysphagia  receives jevity 2 cans TID      Dependence on tracheostomy      Encounter for PEG (percutaneous endoscopic gastrostomy)  peg placed          FAMILY HISTORY:  No pertinent family history in first degree relatives        Social History:      RADIOLOGY:  [ ] Reviewed and interpreted by me    PULMONARY FUNCTION TESTS:    EKG: CHIEF COMPLAINT: Patient is a 69y old  Female who presents with a chief complaint of trach exchange (18 Jul 2024 07:07)      INTERVAL EVENTS:  - No interval events overnight   - Pending custom tracheostomy     REVIEW OF SYSTEMS: Seen by bedside during AM rounds and unable to assess ROS second to baseline ALS    Mode: CPAP with PS, FiO2: 30, PEEP: 5, PS: 15, MAP: 8, PIP: 21      OBJECTIVE:  ICU Vital Signs Last 24 Hrs  T(C): 36.9 (08 Aug 2024 04:00), Max: 36.9 (08 Aug 2024 04:00)  T(F): 98.4 (08 Aug 2024 04:00), Max: 98.4 (08 Aug 2024 04:00)  HR: 113 (08 Aug 2024 06:35) (91 - 114)  BP: 132/79 (08 Aug 2024 04:00) (95/68 - 132/79)  BP(mean): --  ABP: --  ABP(mean): --  RR: 18 (08 Aug 2024 04:00) (16 - 19)  SpO2: 96% (08 Aug 2024 06:35) (95% - 99%)    O2 Parameters below as of 08 Aug 2024 04:00  Patient On (Oxygen Delivery Method): ventilator          Mode: CPAP with PS, FiO2: 30, PEEP: 5, PS: 15, MAP: 8, PIP: 21    08-07 @ 07:01  -  08-08 @ 07:00  --------------------------------------------------------  IN: 1300 mL / OUT: 1100 mL / NET: 200 mL      CAPILLARY BLOOD GLUCOSE          [facility] MEDICATIONS:  MEDICATIONS  (STANDING):  artificial  tears Solution 1 Drop(s) Both EYES <User Schedule>  chlorhexidine 0.12% Liquid 15 milliLiter(s) Oral Mucosa every 12 hours  chlorhexidine 2% Cloths 1 Application(s) Topical daily  diazepam    Tablet 2 milliGRAM(s) Oral every 12 hours  enoxaparin Injectable 40 milliGRAM(s) SubCutaneous every 24 hours  erythromycin   Ointment 1 Application(s) Both EYES <User Schedule>  multivitamin/minerals/iron Oral Solution (CENTRUM) 15 milliLiter(s) Oral daily  ofloxacin 0.3% Solution 1 Drop(s) Both EYES two times a day  petrolatum Ophthalmic Ointment 1 Application(s) Both EYES <User Schedule>  riluzole 50 milliGRAM(s) Oral two times a day  sertraline 75 milliGRAM(s) Oral daily    MEDICATIONS  (PRN):  acetaminophen   Oral Liquid .. 650 milliGRAM(s) Oral every 6 hours PRN Temp greater or equal to 38C (100.4F), Mild Pain (1 - 3)  diazepam    Tablet 2 milliGRAM(s) Oral at bedtime PRN for dysautonomia/ hypertension/discomfort      PHYSICAL EXAMINATION  General: NAD   HEENT: Trach present   Cards: S1/S2, no murmurs   Pulm: Course vent sounds bilaterally. No wheezes.   Abdomen: Soft, nondistended and nontender. BS (+) PEG   Extremities: No pedal edema. No active NATIVIDAD of BL upper and lower extremities.  Neurology: Awake with eyes open, however does not track or follow commands with baseline ALS with no acute focal neurological deficits     LABS:    08-08    142  |  104  |  21  ----------------------------<  175<H>  4.4   |  25  |  <0.20<L>    Ca    9.6      08 Aug 2024 02:30  Phos  3.5     08-08  Mg     2.10     08-08        Urinalysis Basic - ( 08 Aug 2024 02:30 )  Color: x / Appearance: x / SG: x / pH: x  Gluc: 175 mg/dL / Ketone: x  / Bili: x / Urobili: x   Blood: x / Protein: x / Nitrite: x   Leuk Esterase: x / RBC: x / WBC x   Sq Epi: x / Non Sq Epi: x / Bacteria: x            PAST MEDICAL & SURGICAL HISTORY:  ALS (amyotrophic lateral sclerosis)      HLD (hyperlipidemia)      Ventilator dependent  Since 2015      Aspiration into airway      S/P gastrostomy  10/14 for dysphagia  receives jevity 2 cans TID      Dependence on tracheostomy      Encounter for PEG (percutaneous endoscopic gastrostomy)  peg placed          FAMILY HISTORY:  No pertinent family history in first degree relatives        Social History:      RADIOLOGY:  [ ] Reviewed and interpreted by me    PULMONARY FUNCTION TESTS:    EKG:

## 2024-08-08 NOTE — PROGRESS NOTE ADULT - NS ATTEND AMEND GEN_ALL_CORE FT
Pt is a 69F with MHx advanced ALS (nonverbal and bedbound at baseline) with chronic hypercapnic respiratory failure and ventilatory dependence via tracheostomy at baseline and Parkinson's dz presenting to [facility] on 7/18/24 with acute hypercapnia secondary to large expiratory air leak s/p upsizing without improvement with hospital course c/b tracheitis vs. UTI transferred from MICU to RCU on 7/5 for further management.     Pt with advanced progressive ALS with baseline known to be nonverbal and bedbound with functional quadriplegia and complete ADL dependence. 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 Pt p/w worsening eye redness, especially on right side, despite conservative protective measures at home. She has received vancomycin +tobramycin eye drops in the past and on previous admissions opthalmology recommended possible need for tarsorrhaphy, but was ultimately deferred as not in line with pt's GOC. 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 Keratopathy slowly improving, will decrease frequency of moisture chamber changes today. Will also c/w home riluzole, c/w home diazepam and zoloft. Diazepam prn added for breakthrough concern for agitation or discomfort without further issues to date.     Pt with acute on chronic hypercapnic respiratory failure 2/2 neuromuscular weakness in the setting of advanced ALS with chronic ventilatory dependence with tracheostomy found to have tracheitis and large air leak 2/2 tracheomegaly likely 2/2 prolonged tracheostomy dependence. Trach exchanged at home without improvement followed by upsizing at bedside to 9 Bivona but still with air leak. Thoracic sx consulted, custom trach ordered (Portex 9.5 with 40mm balloon). Awaiting custom order delivery for OR trach exchange. Will c/w current vent support, pt on home settings. Air leak remains ~25% total TV and has been stable. Pt does not tolerate PSV trials, remains completely passive on ventilator. Will c/w airway clearance and trach care as per RCU team. Wean O2 supplementation for goal O2 saturation 90-95%.     Pt with sepsis 2/2 UTI vs. tracheitis. Remains hemodynamically stable and afebrile with fluctuating but stable WBC. Initial cx (+) Pseudomonas in trach aspirate, similar to previous cultures, for which pt completed course of Zosyn. Previous UCx from 7/24/23 (+) VRE faecalis sensitive only to linezolid/daptomycin but likely colonizers. Will continue to monitor conservatively off abx at this time. Pt with known periods of BP variation with transient hypertensive episodes, will monitor off anti-HTNs, likely 2/2 dysautonomia.    Discharge pending medical optimization, likely d/c home with ventilator. Pt full code. On home NOAC for DVT ppx, held for now in preparation for OR. Will restart prior to d/c. Will continue to discuss with pt's family, pt's  at bedside. HCP  (Dr. Velasquez). O/P Pulm: Dr. Pranav Newman

## 2024-08-09 LAB
A1C WITH ESTIMATED AVERAGE GLUCOSE RESULT: 5.1 % — SIGNIFICANT CHANGE UP (ref 4–5.6)
ANION GAP SERPL CALC-SCNC: 12 MMOL/L — SIGNIFICANT CHANGE UP (ref 7–14)
BUN SERPL-MCNC: 24 MG/DL — HIGH (ref 7–23)
CALCIUM SERPL-MCNC: 9.8 MG/DL — SIGNIFICANT CHANGE UP (ref 8.4–10.5)
CHLORIDE SERPL-SCNC: 103 MMOL/L — SIGNIFICANT CHANGE UP (ref 98–107)
CO2 SERPL-SCNC: 27 MMOL/L — SIGNIFICANT CHANGE UP (ref 22–31)
CREAT SERPL-MCNC: <0.2 MG/DL — LOW (ref 0.5–1.3)
EGFR: 125 ML/MIN/1.73M2 — SIGNIFICANT CHANGE UP
ESTIMATED AVERAGE GLUCOSE: 100 — SIGNIFICANT CHANGE UP
GLUCOSE SERPL-MCNC: 149 MG/DL — HIGH (ref 70–99)
MAGNESIUM SERPL-MCNC: 2.2 MG/DL — SIGNIFICANT CHANGE UP (ref 1.6–2.6)
PHOSPHATE SERPL-MCNC: 3.9 MG/DL — SIGNIFICANT CHANGE UP (ref 2.5–4.5)
POTASSIUM SERPL-MCNC: 4.5 MMOL/L — SIGNIFICANT CHANGE UP (ref 3.5–5.3)
POTASSIUM SERPL-SCNC: 4.5 MMOL/L — SIGNIFICANT CHANGE UP (ref 3.5–5.3)
SODIUM SERPL-SCNC: 142 MMOL/L — SIGNIFICANT CHANGE UP (ref 135–145)

## 2024-08-09 PROCEDURE — 99233 SBSQ HOSP IP/OBS HIGH 50: CPT | Mod: FS

## 2024-08-09 RX ORDER — CHLORHEXIDINE GLUCONATE 40 MG/ML
15 SOLUTION TOPICAL EVERY 12 HOURS
Refills: 0 | Status: DISCONTINUED | OUTPATIENT
Start: 2024-08-09 | End: 2024-08-10

## 2024-08-09 RX ADMIN — Medication 2 MILLIGRAM(S): at 17:01

## 2024-08-09 RX ADMIN — POVIDONE, PROPYLENE GLYCOL 1 DROP(S): 6.8; 3 LIQUID OPHTHALMIC at 11:01

## 2024-08-09 RX ADMIN — ERYTHROMYCIN 1 APPLICATION(S): 5 OINTMENT OPHTHALMIC at 05:26

## 2024-08-09 RX ADMIN — POVIDONE, PROPYLENE GLYCOL 1 DROP(S): 6.8; 3 LIQUID OPHTHALMIC at 21:14

## 2024-08-09 RX ADMIN — RILUZOLE 50 MILLIGRAM(S): 5 LIQUID ORAL at 07:29

## 2024-08-09 RX ADMIN — ERYTHROMYCIN 1 APPLICATION(S): 5 OINTMENT OPHTHALMIC at 22:36

## 2024-08-09 RX ADMIN — POVIDONE, PROPYLENE GLYCOL 1 DROP(S): 6.8; 3 LIQUID OPHTHALMIC at 08:58

## 2024-08-09 RX ADMIN — ERYTHROMYCIN 1 APPLICATION(S): 5 OINTMENT OPHTHALMIC at 17:01

## 2024-08-09 RX ADMIN — ERYTHROMYCIN 1 APPLICATION(S): 5 OINTMENT OPHTHALMIC at 14:21

## 2024-08-09 RX ADMIN — OFLOXACIN 1 DROP(S): 3 SOLUTION/ DROPS OPHTHALMIC at 17:01

## 2024-08-09 RX ADMIN — CHLORHEXIDINE GLUCONATE 1 APPLICATION(S): 40 SOLUTION TOPICAL at 11:01

## 2024-08-09 RX ADMIN — POVIDONE, PROPYLENE GLYCOL 1 DROP(S): 6.8; 3 LIQUID OPHTHALMIC at 14:21

## 2024-08-09 RX ADMIN — POVIDONE, PROPYLENE GLYCOL 1 DROP(S): 6.8; 3 LIQUID OPHTHALMIC at 10:06

## 2024-08-09 RX ADMIN — POVIDONE, PROPYLENE GLYCOL 1 DROP(S): 6.8; 3 LIQUID OPHTHALMIC at 15:02

## 2024-08-09 RX ADMIN — POVIDONE, PROPYLENE GLYCOL 1 DROP(S): 6.8; 3 LIQUID OPHTHALMIC at 02:30

## 2024-08-09 RX ADMIN — Medication 2 MILLIGRAM(S): at 05:25

## 2024-08-09 RX ADMIN — SERTRALINE HYDROCHLORIDE 75 MILLIGRAM(S): 50 TABLET, FILM COATED ORAL at 11:01

## 2024-08-09 RX ADMIN — POVIDONE, PROPYLENE GLYCOL 1 DROP(S): 6.8; 3 LIQUID OPHTHALMIC at 13:00

## 2024-08-09 RX ADMIN — POVIDONE, PROPYLENE GLYCOL 1 DROP(S): 6.8; 3 LIQUID OPHTHALMIC at 05:26

## 2024-08-09 RX ADMIN — POVIDONE, PROPYLENE GLYCOL 1 DROP(S): 6.8; 3 LIQUID OPHTHALMIC at 01:11

## 2024-08-09 RX ADMIN — POVIDONE, PROPYLENE GLYCOL 1 DROP(S): 6.8; 3 LIQUID OPHTHALMIC at 06:27

## 2024-08-09 RX ADMIN — CHLORHEXIDINE GLUCONATE 15 MILLILITER(S): 40 SOLUTION TOPICAL at 17:01

## 2024-08-09 RX ADMIN — CHLORHEXIDINE GLUCONATE 15 MILLILITER(S): 40 SOLUTION TOPICAL at 05:26

## 2024-08-09 RX ADMIN — POVIDONE, PROPYLENE GLYCOL 1 DROP(S): 6.8; 3 LIQUID OPHTHALMIC at 18:17

## 2024-08-09 RX ADMIN — ERYTHROMYCIN 1 APPLICATION(S): 5 OINTMENT OPHTHALMIC at 02:30

## 2024-08-09 RX ADMIN — Medication 15 MILLILITER(S): at 11:01

## 2024-08-09 RX ADMIN — POVIDONE, PROPYLENE GLYCOL 1 DROP(S): 6.8; 3 LIQUID OPHTHALMIC at 12:00

## 2024-08-09 RX ADMIN — ERYTHROMYCIN 1 APPLICATION(S): 5 OINTMENT OPHTHALMIC at 11:01

## 2024-08-09 RX ADMIN — OFLOXACIN 1 DROP(S): 3 SOLUTION/ DROPS OPHTHALMIC at 05:25

## 2024-08-09 RX ADMIN — POVIDONE, PROPYLENE GLYCOL 1 DROP(S): 6.8; 3 LIQUID OPHTHALMIC at 16:07

## 2024-08-09 RX ADMIN — POVIDONE, PROPYLENE GLYCOL 1 DROP(S): 6.8; 3 LIQUID OPHTHALMIC at 22:50

## 2024-08-09 RX ADMIN — POVIDONE, PROPYLENE GLYCOL 1 DROP(S): 6.8; 3 LIQUID OPHTHALMIC at 03:00

## 2024-08-09 RX ADMIN — RILUZOLE 50 MILLIGRAM(S): 5 LIQUID ORAL at 17:01

## 2024-08-09 RX ADMIN — POVIDONE, PROPYLENE GLYCOL 1 DROP(S): 6.8; 3 LIQUID OPHTHALMIC at 07:05

## 2024-08-09 RX ADMIN — ERYTHROMYCIN 1 APPLICATION(S): 5 OINTMENT OPHTHALMIC at 08:03

## 2024-08-09 RX ADMIN — POVIDONE, PROPYLENE GLYCOL 1 DROP(S): 6.8; 3 LIQUID OPHTHALMIC at 17:00

## 2024-08-09 RX ADMIN — POVIDONE, PROPYLENE GLYCOL 1 DROP(S): 6.8; 3 LIQUID OPHTHALMIC at 03:59

## 2024-08-09 RX ADMIN — POVIDONE, PROPYLENE GLYCOL 1 DROP(S): 6.8; 3 LIQUID OPHTHALMIC at 23:04

## 2024-08-09 RX ADMIN — POVIDONE, PROPYLENE GLYCOL 1 DROP(S): 6.8; 3 LIQUID OPHTHALMIC at 22:35

## 2024-08-09 RX ADMIN — ENOXAPARIN SODIUM 40 MILLIGRAM(S): 100 INJECTION SUBCUTANEOUS at 05:26

## 2024-08-09 RX ADMIN — POVIDONE, PROPYLENE GLYCOL 1 DROP(S): 6.8; 3 LIQUID OPHTHALMIC at 08:03

## 2024-08-09 NOTE — PROGRESS NOTE ADULT - SUBJECTIVE AND OBJECTIVE BOX
CHIEF COMPLAINT: Patient is a 69y old  Female who presents with a chief complaint of trach exchange (18 Jul 2024 07:07)      INTERVAL EVENTS:  - No interval events overnight   - Custom tracheostomy pending     REVIEW OF SYSTEMS: Seen by bedside during AM rounds and unable to assess ROS second to ALS/ vented     Mode: CPAP with PS, FiO2: 30, PEEP: 5, PS: 15, MAP: 8, PIP: 21      OBJECTIVE:  ICU Vital Signs Last 24 Hrs  T(C): 36.7 (09 Aug 2024 06:00), Max: 36.7 (09 Aug 2024 00:00)  T(F): 98 (09 Aug 2024 06:00), Max: 98.1 (09 Aug 2024 00:00)  HR: 105 (09 Aug 2024 06:30) (100 - 116)  BP: 132/76 (09 Aug 2024 06:00) (118/75 - 142/75)  BP(mean): 86 (08 Aug 2024 16:00) (86 - 96)  ABP: --  ABP(mean): --  RR: 18 (09 Aug 2024 06:00) (18 - 22)  SpO2: 99% (09 Aug 2024 06:30) (97% - 100%)    O2 Parameters below as of 09 Aug 2024 06:00  Patient On (Oxygen Delivery Method): ventilator          Mode: CPAP with PS, FiO2: 30, PEEP: 5, PS: 15, MAP: 8, PIP: 21    08-08 @ 07:01  -  08-09 @ 07:00  --------------------------------------------------------  IN: 1400 mL / OUT: 550 mL / NET: 850 mL      CAPILLARY BLOOD GLUCOSE          HOSPITAL MEDICATIONS:  MEDICATIONS  (STANDING):  artificial  tears Solution 1 Drop(s) Both EYES every 1 hour  chlorhexidine 0.12% Liquid 15 milliLiter(s) Oral Mucosa every 12 hours  chlorhexidine 2% Cloths 1 Application(s) Topical daily  diazepam    Tablet 2 milliGRAM(s) Oral every 12 hours  enoxaparin Injectable 40 milliGRAM(s) SubCutaneous every 24 hours  erythromycin   Ointment 1 Application(s) Both EYES <User Schedule>  multivitamin/minerals/iron Oral Solution (CENTRUM) 15 milliLiter(s) Oral daily  ofloxacin 0.3% Solution 1 Drop(s) Both EYES two times a day  riluzole 50 milliGRAM(s) Oral two times a day  sertraline 75 milliGRAM(s) Oral daily    MEDICATIONS  (PRN):  acetaminophen   Oral Liquid .. 650 milliGRAM(s) Oral every 6 hours PRN Temp greater or equal to 38C (100.4F), Mild Pain (1 - 3)  diazepam    Tablet 2 milliGRAM(s) Oral at bedtime PRN for dysautonomia/ hypertension/discomfort      PHYSICAL EXAMINATION  General: NAD   HEENT: Trach present   Cards: S1/S2, no murmurs   Pulm: Course vent sounds bilaterally. No wheezes.   Abdomen: Soft, nondistended and nontender. BS (+) PEG   Extremities: No pedal edema. No active NATIVIDAD of BL upper and lower extremities.  Neurology: Awake with eyes open, however does not track or follow commands with baseline ALS with no acute focal neurological deficits     LABS:    08-09    142  |  103  |  24<H>  ----------------------------<  149<H>  4.5   |  27  |  <0.20<L>    Ca    9.8      09 Aug 2024 03:30  Phos  3.9     08-09  Mg     2.20     08-09        Urinalysis Basic - ( 09 Aug 2024 03:30 )  Color: x / Appearance: x / SG: x / pH: x  Gluc: 149 mg/dL / Ketone: x  / Bili: x / Urobili: x   Blood: x / Protein: x / Nitrite: x   Leuk Esterase: x / RBC: x / WBC x   Sq Epi: x / Non Sq Epi: x / Bacteria: x            PAST MEDICAL & SURGICAL HISTORY:  ALS (amyotrophic lateral sclerosis)      HLD (hyperlipidemia)      Ventilator dependent  Since 2015      Aspiration into airway      S/P gastrostomy  10/14 for dysphagia  receives jevity 2 cans TID      Dependence on tracheostomy      Encounter for PEG (percutaneous endoscopic gastrostomy)  peg placed          FAMILY HISTORY:  No pertinent family history in first degree relatives        Social History:      RADIOLOGY:  [ ] Reviewed and interpreted by me    PULMONARY FUNCTION TESTS:    EKG: CHIEF COMPLAINT: Patient is a 69y old  Female who presents with a chief complaint of trach exchange (18 Jul 2024 07:07)      INTERVAL EVENTS:  - No interval events overnight   - Custom tracheostomy pending (per tracking number tentative delivery today)     REVIEW OF SYSTEMS: Seen by bedside during AM rounds and unable to assess ROS second to ALS/ vented     Mode: CPAP with PS, FiO2: 30, PEEP: 5, PS: 15, MAP: 8, PIP: 21      OBJECTIVE:  ICU Vital Signs Last 24 Hrs  T(C): 36.7 (09 Aug 2024 06:00), Max: 36.7 (09 Aug 2024 00:00)  T(F): 98 (09 Aug 2024 06:00), Max: 98.1 (09 Aug 2024 00:00)  HR: 105 (09 Aug 2024 06:30) (100 - 116)  BP: 132/76 (09 Aug 2024 06:00) (118/75 - 142/75)  BP(mean): 86 (08 Aug 2024 16:00) (86 - 96)  ABP: --  ABP(mean): --  RR: 18 (09 Aug 2024 06:00) (18 - 22)  SpO2: 99% (09 Aug 2024 06:30) (97% - 100%)    O2 Parameters below as of 09 Aug 2024 06:00  Patient On (Oxygen Delivery Method): ventilator          Mode: CPAP with PS, FiO2: 30, PEEP: 5, PS: 15, MAP: 8, PIP: 21    08-08 @ 07:01  -  08-09 @ 07:00  --------------------------------------------------------  IN: 1400 mL / OUT: 550 mL / NET: 850 mL      CAPILLARY BLOOD GLUCOSE          HOSPITAL MEDICATIONS:  MEDICATIONS  (STANDING):  artificial  tears Solution 1 Drop(s) Both EYES every 1 hour  chlorhexidine 0.12% Liquid 15 milliLiter(s) Oral Mucosa every 12 hours  chlorhexidine 2% Cloths 1 Application(s) Topical daily  diazepam    Tablet 2 milliGRAM(s) Oral every 12 hours  enoxaparin Injectable 40 milliGRAM(s) SubCutaneous every 24 hours  erythromycin   Ointment 1 Application(s) Both EYES <User Schedule>  multivitamin/minerals/iron Oral Solution (CENTRUM) 15 milliLiter(s) Oral daily  ofloxacin 0.3% Solution 1 Drop(s) Both EYES two times a day  riluzole 50 milliGRAM(s) Oral two times a day  sertraline 75 milliGRAM(s) Oral daily    MEDICATIONS  (PRN):  acetaminophen   Oral Liquid .. 650 milliGRAM(s) Oral every 6 hours PRN Temp greater or equal to 38C (100.4F), Mild Pain (1 - 3)  diazepam    Tablet 2 milliGRAM(s) Oral at bedtime PRN for dysautonomia/ hypertension/discomfort      PHYSICAL EXAMINATION  General: NAD   HEENT: Trach present   Cards: S1/S2, no murmurs   Pulm: Course vent sounds bilaterally. No wheezes.   Abdomen: Soft, nondistended and nontender. BS (+) PEG   Extremities: No pedal edema. No active NATIVIDAD of BL upper and lower extremities.  Neurology: Awake with eyes open, however does not track or follow commands with baseline ALS with no acute focal neurological deficits     LABS:    08-09    142  |  103  |  24<H>  ----------------------------<  149<H>  4.5   |  27  |  <0.20<L>    Ca    9.8      09 Aug 2024 03:30  Phos  3.9     08-09  Mg     2.20     08-09        Urinalysis Basic - ( 09 Aug 2024 03:30 )  Color: x / Appearance: x / SG: x / pH: x  Gluc: 149 mg/dL / Ketone: x  / Bili: x / Urobili: x   Blood: x / Protein: x / Nitrite: x   Leuk Esterase: x / RBC: x / WBC x   Sq Epi: x / Non Sq Epi: x / Bacteria: x            PAST MEDICAL & SURGICAL HISTORY:  ALS (amyotrophic lateral sclerosis)      HLD (hyperlipidemia)      Ventilator dependent  Since 2015      Aspiration into airway      S/P gastrostomy  10/14 for dysphagia  receives jevity 2 cans TID      Dependence on tracheostomy      Encounter for PEG (percutaneous endoscopic gastrostomy)  peg placed          FAMILY HISTORY:  No pertinent family history in first degree relatives        Social History:      RADIOLOGY:  [ ] Reviewed and interpreted by me    PULMONARY FUNCTION TESTS:    EKG:

## 2024-08-09 NOTE — PROGRESS NOTE ADULT - NS ATTEND AMEND GEN_ALL_CORE FT
Pt is a 69F with MHx advanced ALS (nonverbal and bedbound at baseline) with chronic hypercapnic respiratory failure and ventilatory dependence via tracheostomy at baseline and Parkinson's dz presenting to [facility] on 7/18/24 with acute hypercapnia secondary to large expiratory air leak s/p upsizing without improvement with hospital course c/b tracheitis vs. UTI transferred from MICU to RCU on 7/5 for further management.     Pt with advanced progressive ALS with baseline known to be nonverbal and bedbound with functional quadriplegia and complete ADL dependence. 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 Pt p/w worsening eye redness, especially on right side, despite conservative protective measures at home. She has received vancomycin +tobramycin eye drops in the past and on previous admissions opthalmology recommended possible need for tarsorrhaphy, but was ultimately deferred as not in line with pt's GOC. 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 Keratopathy slowly improving, will decrease frequency of moisture chamber changes today. Will also c/w home riluzole, c/w home diazepam and zoloft. Diazepam prn added for breakthrough concern for agitation or discomfort without further issues to date.     Pt with acute on chronic hypercapnic respiratory failure 2/2 neuromuscular weakness in the setting of advanced ALS with chronic ventilatory dependence with tracheostomy found to have tracheitis and large air leak 2/2 tracheomegaly likely 2/2 prolonged tracheostomy dependence. Trach exchanged at home without improvement followed by upsizing at bedside to 9 Bivona but still with air leak. Thoracic sx consulted, custom trach ordered (Portex 9.5 with 40mm balloon). Awaiting custom order delivery for OR trach exchange, arrived today. Plan for OR for placement following sterilization and OR scheduling. Will c/w current vent support, pt on home settings. Air leak remains ~25% total TV and has been stable. Pt does not tolerate PSV trials, remains completely passive on ventilator. Will c/w airway clearance and trach care as per RCU team. Wean O2 supplementation for goal O2 saturation 90-95%.     Pt with sepsis 2/2 UTI vs. tracheitis. Remains hemodynamically stable and afebrile with fluctuating but stable WBC. Initial cx (+) Pseudomonas in trach aspirate, similar to previous cultures, for which pt completed course of Zosyn. Previous UCx from 7/24/23 (+) VRE faecalis sensitive only to linezolid/daptomycin but likely colonizers. Will continue to monitor conservatively off abx at this time. Pt with known periods of BP variation with transient hypertensive episodes, will monitor off anti-HTNs, likely 2/2 dysautonomia.    Discharge pending medical optimization, likely d/c home with ventilator. Pt full code. On home NOAC for DVT ppx, held for now in preparation for OR. Will restart prior to d/c. Will continue to discuss with pt's family, pt's  at bedside. HCP  (Dr. Velasquez). O/P Pulm: Dr. Pranav Newman

## 2024-08-09 NOTE — PROGRESS NOTE ADULT - ASSESSMENT
67 YO Female with PMHx of ALS, Parkinson,, nonverbal, bed bound, chronic respiratory failure with tracheostomy and vent dependence, and oropharyngeal dysphagia with PEG who presented from home with tracheostomy leak. She was noted with vent alarm and poor TVe (250-290) at home. Trach changed at home with no improvement and sent in for thoracic evaluation. While in MICU no air leak noted with hyperinflated cuff. Transferred to RCU on 7/5 with course complicated by leukocytosis second to UTI vs trachitis and completed zosyn course. Trach upsized by thoracic on 7/17 however AL remains and now pending custom trach.     NEUROLOGY  # ALS   - Baseline nonverbal, awake, and communicates with eye movement.   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   - Continue on Ofloxacin BID (decreased 8/1)   - Continue on rythromycin OU Q3H (decreased 8/8)  - Continue with artificial tears OU Q1H   - Create moisture chamber with Tegaderm following placement of EXTENSIVE ointment to the right eye throughout the day and to the left eye nightly.   - Allow 6 hour window during the day where moisture can be removed from right eye     PSYCH   # Anxiety and Depression   - Continue on home Valium 2mg BID with additional 2mg PRN   - Continue on Zoloft 75mg QD while in house (on 4cc/ 80mg QD at home)     CARDIOVASCULAR   # HTN thought to be second to discomfort vs dysautonomia   - Evening HTN and continue on Hydralazine vs valium PRN doses   - Monitor HR and BP    # pSVT  - Intermittent episodes of SVT, lasting a few seconds at a time, and self limiting.   - Consider BB if becomes more persistent or HD unstable    RESPIRATORY  # Tracheostomy leak   - At home noted with TVe 200-300s despite tracheostomy change to 80XLTCD.   - No air leak noted in house with hyperinflated cuff likely tracheomegaly?   - Continue on chronic vent 16/400/5/21   - Chronic bibasilar atelectasis and continued on HTS PRN with chest PT  - Trach upsized to Bivona 9 on 7/17, however trach leak remains.   - Pending Custom Tracheostomy ordered by Thoracic surgery     GI  # Dysphagia   - Continue on PEG-TF     RENAL  # High PVR   - BS with roughly 200-300cc PVR, however no acute distress   - Monitor renal function and UOP     # Elevated lactate   - Lactate elevated with mild contraction alkalosis   - Rehydrate with improvement     INFECTIOUS DISEASE  # Leukocytosis likely second to trachitis vs UTI?   - No fever or chills and noted with prior leukocytosis   - CXR with prior atelectasis and no acute findings   - UA (7/6) with positive nitrites (7/6)   - SCx (7/5) with pansensitive PSA  - BCx (7/6) x 2 NGT  - UCx (7/6) with coag neg staph  - MRSA PCR (7/6) POSITIVE for both MRSA/MSSA s/p Bactroban (7/6 - 7/10)  - s/p empiric Zosyn (7/8 - 7/14) however WBC continues to wax and wean without fever and will monitor off ABX .     HEME  - On Xarelto 10mg at home likely for DVT PPX?   - Hold home Xarelto in prep for OR   - DVT PPX with LVX for now and hold home eliqius PPX    ENDOCRINE  - No hx of DM2   - Monitor BG     SKIN  - Basic trach and PEG care ordered     ETHICS/ GOC    - FULL CODE   - Dr Tyson Velasquez,  involved in care    DISPO - Back home with  when able.

## 2024-08-10 PROCEDURE — 99233 SBSQ HOSP IP/OBS HIGH 50: CPT | Mod: FS

## 2024-08-10 RX ORDER — ENOXAPARIN SODIUM 100 MG/ML
40 INJECTION SUBCUTANEOUS EVERY 24 HOURS
Refills: 0 | Status: COMPLETED | OUTPATIENT
Start: 2024-08-11 | End: 2024-08-11

## 2024-08-10 RX ORDER — CHLORHEXIDINE GLUCONATE 40 MG/ML
15 SOLUTION TOPICAL EVERY 12 HOURS
Refills: 0 | Status: DISCONTINUED | OUTPATIENT
Start: 2024-08-10 | End: 2024-08-29

## 2024-08-10 RX ADMIN — POVIDONE, PROPYLENE GLYCOL 1 DROP(S): 6.8; 3 LIQUID OPHTHALMIC at 06:16

## 2024-08-10 RX ADMIN — POVIDONE, PROPYLENE GLYCOL 1 DROP(S): 6.8; 3 LIQUID OPHTHALMIC at 12:09

## 2024-08-10 RX ADMIN — POVIDONE, PROPYLENE GLYCOL 1 DROP(S): 6.8; 3 LIQUID OPHTHALMIC at 02:15

## 2024-08-10 RX ADMIN — ERYTHROMYCIN 1 APPLICATION(S): 5 OINTMENT OPHTHALMIC at 11:40

## 2024-08-10 RX ADMIN — POVIDONE, PROPYLENE GLYCOL 1 DROP(S): 6.8; 3 LIQUID OPHTHALMIC at 04:32

## 2024-08-10 RX ADMIN — ERYTHROMYCIN 1 APPLICATION(S): 5 OINTMENT OPHTHALMIC at 17:07

## 2024-08-10 RX ADMIN — POVIDONE, PROPYLENE GLYCOL 1 DROP(S): 6.8; 3 LIQUID OPHTHALMIC at 06:39

## 2024-08-10 RX ADMIN — POVIDONE, PROPYLENE GLYCOL 1 DROP(S): 6.8; 3 LIQUID OPHTHALMIC at 21:26

## 2024-08-10 RX ADMIN — OFLOXACIN 1 DROP(S): 3 SOLUTION/ DROPS OPHTHALMIC at 17:08

## 2024-08-10 RX ADMIN — POVIDONE, PROPYLENE GLYCOL 1 DROP(S): 6.8; 3 LIQUID OPHTHALMIC at 22:47

## 2024-08-10 RX ADMIN — POVIDONE, PROPYLENE GLYCOL 1 DROP(S): 6.8; 3 LIQUID OPHTHALMIC at 10:23

## 2024-08-10 RX ADMIN — CHLORHEXIDINE GLUCONATE 15 MILLILITER(S): 40 SOLUTION TOPICAL at 05:30

## 2024-08-10 RX ADMIN — POVIDONE, PROPYLENE GLYCOL 1 DROP(S): 6.8; 3 LIQUID OPHTHALMIC at 20:04

## 2024-08-10 RX ADMIN — ERYTHROMYCIN 1 APPLICATION(S): 5 OINTMENT OPHTHALMIC at 20:05

## 2024-08-10 RX ADMIN — POVIDONE, PROPYLENE GLYCOL 1 DROP(S): 6.8; 3 LIQUID OPHTHALMIC at 17:06

## 2024-08-10 RX ADMIN — Medication 2 MILLIGRAM(S): at 17:06

## 2024-08-10 RX ADMIN — Medication 2 MILLIGRAM(S): at 05:28

## 2024-08-10 RX ADMIN — ERYTHROMYCIN 1 APPLICATION(S): 5 OINTMENT OPHTHALMIC at 08:24

## 2024-08-10 RX ADMIN — RILUZOLE 50 MILLIGRAM(S): 5 LIQUID ORAL at 17:24

## 2024-08-10 RX ADMIN — OFLOXACIN 1 DROP(S): 3 SOLUTION/ DROPS OPHTHALMIC at 05:30

## 2024-08-10 RX ADMIN — POVIDONE, PROPYLENE GLYCOL 1 DROP(S): 6.8; 3 LIQUID OPHTHALMIC at 16:20

## 2024-08-10 RX ADMIN — POVIDONE, PROPYLENE GLYCOL 1 DROP(S): 6.8; 3 LIQUID OPHTHALMIC at 05:29

## 2024-08-10 RX ADMIN — ERYTHROMYCIN 1 APPLICATION(S): 5 OINTMENT OPHTHALMIC at 02:01

## 2024-08-10 RX ADMIN — POVIDONE, PROPYLENE GLYCOL 1 DROP(S): 6.8; 3 LIQUID OPHTHALMIC at 23:30

## 2024-08-10 RX ADMIN — RILUZOLE 50 MILLIGRAM(S): 5 LIQUID ORAL at 06:38

## 2024-08-10 RX ADMIN — POVIDONE, PROPYLENE GLYCOL 1 DROP(S): 6.8; 3 LIQUID OPHTHALMIC at 22:23

## 2024-08-10 RX ADMIN — POVIDONE, PROPYLENE GLYCOL 1 DROP(S): 6.8; 3 LIQUID OPHTHALMIC at 04:51

## 2024-08-10 RX ADMIN — ERYTHROMYCIN 1 APPLICATION(S): 5 OINTMENT OPHTHALMIC at 00:48

## 2024-08-10 RX ADMIN — Medication 15 MILLILITER(S): at 11:39

## 2024-08-10 RX ADMIN — CHLORHEXIDINE GLUCONATE 15 MILLILITER(S): 40 SOLUTION TOPICAL at 17:08

## 2024-08-10 RX ADMIN — SERTRALINE HYDROCHLORIDE 75 MILLIGRAM(S): 50 TABLET, FILM COATED ORAL at 11:39

## 2024-08-10 RX ADMIN — POVIDONE, PROPYLENE GLYCOL 1 DROP(S): 6.8; 3 LIQUID OPHTHALMIC at 18:01

## 2024-08-10 RX ADMIN — POVIDONE, PROPYLENE GLYCOL 1 DROP(S): 6.8; 3 LIQUID OPHTHALMIC at 13:01

## 2024-08-10 RX ADMIN — CHLORHEXIDINE GLUCONATE 1 APPLICATION(S): 40 SOLUTION TOPICAL at 12:09

## 2024-08-10 RX ADMIN — POVIDONE, PROPYLENE GLYCOL 1 DROP(S): 6.8; 3 LIQUID OPHTHALMIC at 15:09

## 2024-08-10 RX ADMIN — ERYTHROMYCIN 1 APPLICATION(S): 5 OINTMENT OPHTHALMIC at 15:09

## 2024-08-10 RX ADMIN — POVIDONE, PROPYLENE GLYCOL 1 DROP(S): 6.8; 3 LIQUID OPHTHALMIC at 00:47

## 2024-08-10 RX ADMIN — POVIDONE, PROPYLENE GLYCOL 1 DROP(S): 6.8; 3 LIQUID OPHTHALMIC at 08:05

## 2024-08-10 RX ADMIN — POVIDONE, PROPYLENE GLYCOL 1 DROP(S): 6.8; 3 LIQUID OPHTHALMIC at 01:58

## 2024-08-10 RX ADMIN — POVIDONE, PROPYLENE GLYCOL 1 DROP(S): 6.8; 3 LIQUID OPHTHALMIC at 09:48

## 2024-08-10 RX ADMIN — ENOXAPARIN SODIUM 40 MILLIGRAM(S): 100 INJECTION SUBCUTANEOUS at 04:34

## 2024-08-10 RX ADMIN — POVIDONE, PROPYLENE GLYCOL 1 DROP(S): 6.8; 3 LIQUID OPHTHALMIC at 11:39

## 2024-08-10 RX ADMIN — ERYTHROMYCIN 1 APPLICATION(S): 5 OINTMENT OPHTHALMIC at 05:29

## 2024-08-10 RX ADMIN — POVIDONE, PROPYLENE GLYCOL 1 DROP(S): 6.8; 3 LIQUID OPHTHALMIC at 14:07

## 2024-08-10 NOTE — PROGRESS NOTE ADULT - ASSESSMENT
67 YO Female with PMHx of ALS, Parkinson,, nonverbal, bed bound, chronic respiratory failure with tracheostomy and vent dependence, and oropharyngeal dysphagia with PEG who presented from home with tracheostomy leak. She was noted with vent alarm and poor TVe (250-290) at home. Trach changed at home with no improvement and sent in for thoracic evaluation. While in MICU no air leak noted with hyperinflated cuff. Transferred to RCU on 7/5 with course complicated by leukocytosis second to UTI vs trachitis and completed zosyn course. Trach upsized by thoracic on 7/17 however AL remains and now pending custom trach.     NEUROLOGY  # ALS   - Baseline nonverbal, awake, and communicates with eye movement.   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   - Continue on Ofloxacin BID (decreased 8/1)   - Continue on rythromycin OU Q3H (decreased 8/8)  - Continue with artificial tears OU Q1H   - Create moisture chamber with Tegaderm following placement of EXTENSIVE ointment to the right eye throughout the day and to the left eye nightly.   - Allow 6 hour window during the day where moisture can be removed from right eye     PSYCH   # Anxiety and Depression   - Continue on home Valium 2mg BID with additional 2mg PRN   - Continue on Zoloft 75mg QD while in house (on 4cc/ 80mg QD at home)     CARDIOVASCULAR   # HTN thought to be second to discomfort vs dysautonomia   - Evening HTN and continue on Hydralazine vs valium PRN doses   - Monitor HR and BP    # pSVT  - Intermittent episodes of SVT, lasting a few seconds at a time, and self limiting.   - Consider BB if becomes more persistent or HD unstable    RESPIRATORY  # Tracheostomy leak   - At home noted with TVe 200-300s despite tracheostomy change to 80XLTCD.   - No air leak noted in house with hyperinflated cuff likely tracheomegaly?   - Continue on chronic vent 16/400/5/21   - Chronic bibasilar atelectasis and continued on HTS PRN with chest PT  - Trach upsized to Bivona 9 on 7/17, however trach leak remains.   - Custom Tracheostomy delivered and likely plan for exchange on Monday     GI  # Dysphagia   - Continue on PEG-TF     RENAL  # High PVR   - BS with roughly 200-300cc PVR, however no acute distress   - Monitor renal function and UOP     # Elevated lactate   - Lactate elevated with mild contraction alkalosis   - Rehydrate with improvement     INFECTIOUS DISEASE  # Leukocytosis likely second to trachitis vs UTI?   - No fever or chills and noted with prior leukocytosis   - CXR with prior atelectasis and no acute findings   - UA (7/6) with positive nitrites (7/6)   - SCx (7/5) with pansensitive PSA  - BCx (7/6) x 2 NGT  - UCx (7/6) with coag neg staph  - MRSA PCR (7/6) POSITIVE for both MRSA/MSSA s/p Bactroban (7/6 - 7/10)  - s/p empiric Zosyn (7/8 - 7/14) however WBC continues to wax and wean without fever and will monitor off ABX .     HEME  - On Xarelto 10mg at home likely for DVT PPX?   - Hold home Xarelto in prep for OR   - DVT PPX with LVX for now and hold home eliqius PPX    ENDOCRINE  - No hx of DM2   - Monitor BG     SKIN  - Basic trach and PEG care ordered     ETHICS/ GOC    - FULL CODE   - Dr Tyson Velasquez,  involved in care    DISPO - Back home with  when able.      67 YO Female with PMHx of ALS, Parkinson,, nonverbal, bed bound, chronic respiratory failure with tracheostomy and vent dependence, and oropharyngeal dysphagia with PEG who presented from home with tracheostomy leak. She was noted with vent alarm and poor TVe (250-290) at home. Trach changed at home with no improvement and sent in for thoracic evaluation. While in MICU no air leak noted with hyperinflated cuff. Transferred to RCU on 7/5 with course complicated by leukocytosis second to UTI vs trachitis and completed zosyn course. Trach upsized by thoracic on 7/17 however AL remains and now pending custom trach.     NEUROLOGY  # ALS   - Baseline nonverbal, awake, and communicates with eye movement.   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   - Continue on Ofloxacin BID (decreased 8/1)   - Continue on rythromycin OU Q3H (decreased 8/8)  - Continue with artificial tears OU Q1H   - Create moisture chamber with Tegaderm following placement of EXTENSIVE ointment to the right eye throughout the day and to the left eye nightly.   - Allow 6 hour window during the day where moisture can be removed from right eye     PSYCH   # Anxiety and Depression   - Continue on home Valium 2mg BID with additional 2mg PRN   - Continue on Zoloft 75mg QD while in house (on 4cc/ 80mg QD at home)     CARDIOVASCULAR   # HTN thought to be second to discomfort vs dysautonomia   - Evening HTN and continue on Hydralazine vs valium PRN doses   - Monitor HR and BP    # pSVT  - Intermittent episodes of SVT, lasting a few seconds at a time, and self limiting.   - Consider BB if becomes more persistent or HD unstable    RESPIRATORY  # Tracheostomy leak   - At home noted with TVe 200-300s despite tracheostomy change to 80XLTCD.   - No air leak noted in house with hyperinflated cuff likely tracheomegaly?   - Continue on chronic vent 16/400/5/21   - Chronic bibasilar atelectasis and continued on HTS PRN with chest PT  - Trach upsized to Bivona 9 on 7/17, however trach leak remains.   - Custom Tracheostomy delivered and plan for exchange on Monday 8/12    GI  # Dysphagia   - Continue on PEG-TF     RENAL  # High PVR   - BS with roughly 200-300cc PVR, however no acute distress   - Monitor renal function and UOP     # Elevated lactate   - Lactate elevated with mild contraction alkalosis   - Rehydrate with improvement     INFECTIOUS DISEASE  # Leukocytosis likely second to trachitis vs UTI?   - No fever or chills and noted with prior leukocytosis   - CXR with prior atelectasis and no acute findings   - UA (7/6) with positive nitrites (7/6)   - SCx (7/5) with pansensitive PSA  - BCx (7/6) x 2 NGT  - UCx (7/6) with coag neg staph  - MRSA PCR (7/6) POSITIVE for both MRSA/MSSA s/p Bactroban (7/6 - 7/10)  - s/p empiric Zosyn (7/8 - 7/14) however WBC continues to wax and wean without fever and will monitor off ABX .     HEME  - On Xarelto 10mg at home likely for DVT PPX?   - Hold home Xarelto in prep for OR   - DVT PPX with LVX for now and hold home eliqius PPX    ENDOCRINE  - No hx of DM2   - Monitor BG     SKIN  - Basic trach and PEG care ordered   - DTI on back and pending [facility]    ETHICS/ GOC    - FULL CODE   - Dr Tyson Velasquez,  involved in care    DISPO - Back home with  when able.

## 2024-08-10 NOTE — CHART NOTE - NSCHARTNOTEFT_GEN_A_CORE
Pt seen. Remains on vent  No new events overnight.   Maintaining vent settings w/ current trach    Vital Signs Last 24 Hrs  T(C): 37.3 (10 Aug 2024 12:00), Max: 37.6 (10 Aug 2024 08:00)  T(F): 99.1 (10 Aug 2024 12:00), Max: 99.6 (10 Aug 2024 08:00)  HR: 93 (10 Aug 2024 12:00) (86 - 108)  BP: 123/83 (10 Aug 2024 12:00) (109/69 - 128/78)  BP(mean): 96 (10 Aug 2024 12:00) (94 - 96)  RR: 16 (10 Aug 2024 12:00) (16 - 23)  SpO2: 100% (10 Aug 2024 12:00) (97% - 100%)    Parameters below as of 10 Aug 2024 04:00  Patient On (Oxygen Delivery Method): ventilator    Alert  On Vent  Trach site intact    New custom Trach obtained per DR. Owens  Will plan to book pt as add on to OR schedule for Monday for  Bronch, Trach exchange.   RCU made aware  Please have active type and screen, coags,   NPO after 12mn on Sunday night.   Please medically optimize for OR  Will follow

## 2024-08-10 NOTE — PROGRESS NOTE ADULT - NS ATTEND AMEND GEN_ALL_CORE FT
69F with MHx advanced ALS (nonverbal and bedbound at baseline) with chronic hypercapnic respiratory failure and ventilatory dependence via tracheostomy at baseline and Parkinson's dz presenting to [facility] on 7/18/24 with acute hypercapnia secondary to large expiratory air leak s/p upsizing without improvement with hospital course c/b tracheitis vs. 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

## 2024-08-11 PROCEDURE — 99231 SBSQ HOSP IP/OBS SF/LOW 25: CPT

## 2024-08-11 PROCEDURE — 99233 SBSQ HOSP IP/OBS HIGH 50: CPT | Mod: FS

## 2024-08-11 RX ADMIN — Medication 15 MILLILITER(S): at 11:17

## 2024-08-11 RX ADMIN — POVIDONE, PROPYLENE GLYCOL 1 DROP(S): 6.8; 3 LIQUID OPHTHALMIC at 17:09

## 2024-08-11 RX ADMIN — POVIDONE, PROPYLENE GLYCOL 1 DROP(S): 6.8; 3 LIQUID OPHTHALMIC at 04:31

## 2024-08-11 RX ADMIN — ERYTHROMYCIN 1 APPLICATION(S): 5 OINTMENT OPHTHALMIC at 14:24

## 2024-08-11 RX ADMIN — POVIDONE, PROPYLENE GLYCOL 1 DROP(S): 6.8; 3 LIQUID OPHTHALMIC at 14:24

## 2024-08-11 RX ADMIN — OFLOXACIN 1 DROP(S): 3 SOLUTION/ DROPS OPHTHALMIC at 17:09

## 2024-08-11 RX ADMIN — POVIDONE, PROPYLENE GLYCOL 1 DROP(S): 6.8; 3 LIQUID OPHTHALMIC at 19:30

## 2024-08-11 RX ADMIN — POVIDONE, PROPYLENE GLYCOL 1 DROP(S): 6.8; 3 LIQUID OPHTHALMIC at 05:03

## 2024-08-11 RX ADMIN — CHLORHEXIDINE GLUCONATE 1 APPLICATION(S): 40 SOLUTION TOPICAL at 11:17

## 2024-08-11 RX ADMIN — ERYTHROMYCIN 1 APPLICATION(S): 5 OINTMENT OPHTHALMIC at 17:09

## 2024-08-11 RX ADMIN — POVIDONE, PROPYLENE GLYCOL 1 DROP(S): 6.8; 3 LIQUID OPHTHALMIC at 23:11

## 2024-08-11 RX ADMIN — Medication 2 MILLIGRAM(S): at 17:09

## 2024-08-11 RX ADMIN — RILUZOLE 50 MILLIGRAM(S): 5 LIQUID ORAL at 05:04

## 2024-08-11 RX ADMIN — POVIDONE, PROPYLENE GLYCOL 1 DROP(S): 6.8; 3 LIQUID OPHTHALMIC at 03:10

## 2024-08-11 RX ADMIN — ERYTHROMYCIN 1 APPLICATION(S): 5 OINTMENT OPHTHALMIC at 23:48

## 2024-08-11 RX ADMIN — ERYTHROMYCIN 1 APPLICATION(S): 5 OINTMENT OPHTHALMIC at 00:07

## 2024-08-11 RX ADMIN — Medication 2 MILLIGRAM(S): at 05:04

## 2024-08-11 RX ADMIN — ERYTHROMYCIN 1 APPLICATION(S): 5 OINTMENT OPHTHALMIC at 09:18

## 2024-08-11 RX ADMIN — POVIDONE, PROPYLENE GLYCOL 1 DROP(S): 6.8; 3 LIQUID OPHTHALMIC at 23:49

## 2024-08-11 RX ADMIN — POVIDONE, PROPYLENE GLYCOL 1 DROP(S): 6.8; 3 LIQUID OPHTHALMIC at 11:18

## 2024-08-11 RX ADMIN — POVIDONE, PROPYLENE GLYCOL 1 DROP(S): 6.8; 3 LIQUID OPHTHALMIC at 11:17

## 2024-08-11 RX ADMIN — POVIDONE, PROPYLENE GLYCOL 1 DROP(S): 6.8; 3 LIQUID OPHTHALMIC at 13:45

## 2024-08-11 RX ADMIN — RILUZOLE 50 MILLIGRAM(S): 5 LIQUID ORAL at 17:10

## 2024-08-11 RX ADMIN — POVIDONE, PROPYLENE GLYCOL 1 DROP(S): 6.8; 3 LIQUID OPHTHALMIC at 06:40

## 2024-08-11 RX ADMIN — POVIDONE, PROPYLENE GLYCOL 1 DROP(S): 6.8; 3 LIQUID OPHTHALMIC at 18:45

## 2024-08-11 RX ADMIN — ENOXAPARIN SODIUM 40 MILLIGRAM(S): 100 INJECTION SUBCUTANEOUS at 00:07

## 2024-08-11 RX ADMIN — POVIDONE, PROPYLENE GLYCOL 1 DROP(S): 6.8; 3 LIQUID OPHTHALMIC at 21:09

## 2024-08-11 RX ADMIN — POVIDONE, PROPYLENE GLYCOL 1 DROP(S): 6.8; 3 LIQUID OPHTHALMIC at 17:45

## 2024-08-11 RX ADMIN — ERYTHROMYCIN 1 APPLICATION(S): 5 OINTMENT OPHTHALMIC at 21:09

## 2024-08-11 RX ADMIN — POVIDONE, PROPYLENE GLYCOL 1 DROP(S): 6.8; 3 LIQUID OPHTHALMIC at 01:41

## 2024-08-11 RX ADMIN — POVIDONE, PROPYLENE GLYCOL 1 DROP(S): 6.8; 3 LIQUID OPHTHALMIC at 21:53

## 2024-08-11 RX ADMIN — CHLORHEXIDINE GLUCONATE 15 MILLILITER(S): 40 SOLUTION TOPICAL at 05:04

## 2024-08-11 RX ADMIN — ERYTHROMYCIN 1 APPLICATION(S): 5 OINTMENT OPHTHALMIC at 04:32

## 2024-08-11 RX ADMIN — ERYTHROMYCIN 1 APPLICATION(S): 5 OINTMENT OPHTHALMIC at 06:40

## 2024-08-11 RX ADMIN — CHLORHEXIDINE GLUCONATE 15 MILLILITER(S): 40 SOLUTION TOPICAL at 17:10

## 2024-08-11 RX ADMIN — OFLOXACIN 1 DROP(S): 3 SOLUTION/ DROPS OPHTHALMIC at 06:40

## 2024-08-11 RX ADMIN — POVIDONE, PROPYLENE GLYCOL 1 DROP(S): 6.8; 3 LIQUID OPHTHALMIC at 00:45

## 2024-08-11 RX ADMIN — POVIDONE, PROPYLENE GLYCOL 1 DROP(S): 6.8; 3 LIQUID OPHTHALMIC at 07:22

## 2024-08-11 RX ADMIN — ERYTHROMYCIN 1 APPLICATION(S): 5 OINTMENT OPHTHALMIC at 11:17

## 2024-08-11 RX ADMIN — SERTRALINE HYDROCHLORIDE 75 MILLIGRAM(S): 50 TABLET, FILM COATED ORAL at 11:16

## 2024-08-11 RX ADMIN — POVIDONE, PROPYLENE GLYCOL 1 DROP(S): 6.8; 3 LIQUID OPHTHALMIC at 23:12

## 2024-08-11 RX ADMIN — POVIDONE, PROPYLENE GLYCOL 1 DROP(S): 6.8; 3 LIQUID OPHTHALMIC at 07:21

## 2024-08-11 RX ADMIN — POVIDONE, PROPYLENE GLYCOL 1 DROP(S): 6.8; 3 LIQUID OPHTHALMIC at 13:43

## 2024-08-11 RX ADMIN — POVIDONE, PROPYLENE GLYCOL 1 DROP(S): 6.8; 3 LIQUID OPHTHALMIC at 09:20

## 2024-08-11 RX ADMIN — POVIDONE, PROPYLENE GLYCOL 1 DROP(S): 6.8; 3 LIQUID OPHTHALMIC at 09:17

## 2024-08-11 NOTE — PROGRESS NOTE ADULT - SUBJECTIVE AND OBJECTIVE BOX
Patient seen and examined at bedside.  In bed, on vent, maintaining vent settings w/ current trach.  No acute overnight events.    Vital Signs Last 24 Hrs  T(C): 36.9 (11 Aug 2024 04:00), Max: 37.5 (10 Aug 2024 16:00)  T(F): 98.5 (11 Aug 2024 04:00), Max: 99.5 (10 Aug 2024 16:00)  HR: 96 (11 Aug 2024 06:13) (93 - 113)  BP: 140/71 (11 Aug 2024 04:00) (123/83 - 140/71)  BP(mean): 93 (11 Aug 2024 04:00) (92 - 104)  RR: 16 (11 Aug 2024 04:00) (16 - 16)  SpO2: 98% (11 Aug 2024 06:13) (98% - 100%)    Parameters below as of 11 Aug 2024 04:00  Patient On (Oxygen Delivery Method): ventilator      Alert  On Vent  Trach site intact      A/P:  68y F PMHx ALS, Parkinson's, Trach, PEG; chronic resp failure, mechanical vent dependent since 2015, chronic dysphagia; from home; nonverbal, bedbound    - New custom Trach obtained per DR. Owens  - Will plan to book pt as add on to OR schedule for Monday, 8/12/24, for Bronch, Trach exchange.   - Active Type and cross, 2U on hold  - CBC, BMP, Coags   - NPO after 23:59 tonight, 8/11/24, for OR tomorrow.   - Please medically optimize for OR  - Care per primary team  - Thoracic to follow.

## 2024-08-11 NOTE — PROGRESS NOTE ADULT - SUBJECTIVE AND OBJECTIVE BOX
CHIEF COMPLAINT: Patient is a 69y old  Female who presents with a chief complaint of trach exchange (18 Jul 2024 07:07)    Interval Events: None reported overnight. Clinically unchanged. Plan for bronch and custom trach exchange on Monday w CTSx. NPO p MN. AM labs ordered.                        VSS and medications reviewed.     REVIEW OF SYSTEMS:  See above  [x] Unable to assess ROS because hx of ALS     Mode: AC/ CMV (Assist Control/ Continuous Mandatory Ventilation), RR (machine): 16, TV (machine): 400, FiO2: 30, PEEP: 5, MAP: 9, PIP: 26    OBJECTIVE:  ICU Vital Signs Last 24 Hrs  T(C): 36.9 (11 Aug 2024 04:00), Max: 37.6 (10 Aug 2024 08:00)  T(F): 98.5 (11 Aug 2024 04:00), Max: 99.6 (10 Aug 2024 08:00)  HR: 96 (11 Aug 2024 06:13) (93 - 113)  BP: 140/71 (11 Aug 2024 04:00) (123/83 - 140/71)  BP(mean): 93 (11 Aug 2024 04:00) (92 - 104)  ABP: --  ABP(mean): --  RR: 16 (11 Aug 2024 04:00) (16 - 16)  SpO2: 98% (11 Aug 2024 06:13) (98% - 100%)    O2 Parameters below as of 11 Aug 2024 04:00  Patient On (Oxygen Delivery Method): ventilator    Mode: AC/ CMV (Assist Control/ Continuous Mandatory Ventilation), RR (machine): 16, TV (machine): 400, FiO2: 30, PEEP: 5, MAP: 9, PIP: 26    08-10 @ 07:01  -  08-11 @ 07:00  --------------------------------------------------------  IN: 1000 mL / OUT: 0 mL / NET: 1000 mL    CAPILLARY BLOOD GLUCOSE    HOSPITAL MEDICATIONS:  MEDICATIONS  (STANDING):  artificial  tears Solution 1 Drop(s) Both EYES every 1 hour  chlorhexidine 0.12% Liquid 15 milliLiter(s) Oral Mucosa every 12 hours  chlorhexidine 2% Cloths 1 Application(s) Topical daily  diazepam    Tablet 2 milliGRAM(s) Oral every 12 hours  erythromycin   Ointment 1 Application(s) Both EYES <User Schedule>  multivitamin/minerals/iron Oral Solution (CENTRUM) 15 milliLiter(s) Oral daily  ofloxacin 0.3% Solution 1 Drop(s) Both EYES two times a day  riluzole 50 milliGRAM(s) Oral two times a day  sertraline 75 milliGRAM(s) Oral daily    MEDICATIONS  (PRN):  acetaminophen   Oral Liquid .. 650 milliGRAM(s) Oral every 6 hours PRN Temp greater or equal to 38C (100.4F), Mild Pain (1 - 3)  diazepam    Tablet 2 milliGRAM(s) Oral at bedtime PRN for dysautonomia/ hypertension/discomfort    PHYSICAL EXAM:  General: NAD, +Trach to Vent   Neck: Supple, no JVD, trach midline  Eye: +corneal opacification noted b/l, R>L, +eyes covered  Respiratory: +rhonchi auscultated b/l, no wheeze, no rales, no resp distress  Heart: +s1, s2  Abdomen: +BS, soft, non tender, non distended, +PEG, area c/d/i   Extremities: no LE edema b/l in +SCDs  Skin: warm and dry  Neurological: +neurologic deficits d/t ALS hx   Psychiatry: no agitation    LABS:    PAST MEDICAL & SURGICAL HISTORY:  ALS (amyotrophic lateral sclerosis)    HLD (hyperlipidemia)    Ventilator dependent  Since 2015  Aspiration into airway    S/P gastrostomy  10/14 for dysphagia  receives jevity 2 cans TID    Dependence on tracheostomy    Encounter for PEG (percutaneous endoscopic gastrostomy)  peg placed    FAMILY HISTORY:  No pertinent family history in first degree relatives    Social History:    RADIOLOGY:  [ ] Reviewed and interpreted by me    PULMONARY FUNCTION TESTS:    EKG:

## 2024-08-11 NOTE — PROGRESS NOTE ADULT - ASSESSMENT
ASSESSMENT   69 YO Female with PMHx of ALS, Parkinson,, nonverbal, bed bound, chronic respiratory failure with tracheostomy and vent dependence, and oropharyngeal dysphagia with PEG who presented from home with tracheostomy leak. She was noted with vent alarm and poor TVe (250-290) at home. Trach changed at home with no improvement and sent in for thoracic evaluation. While in MICU no air leak noted with hyperinflated cuff. Transferred to RCU on 7/5 with course complicated by leukocytosis second to UTI vs trachitis and completed zosyn course. Trach upsized by thoracic on 7/17 however AL remains and now pending custom trach.     PLAN  NEUROLOGY  # ALS   - Baseline nonverbal, awake, and communicates with eye movement.   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   - Continue on Ofloxacin BID (decreased 8/1)   - Continue on Erythromycin OU Q3H (decreased 8/8)  - Continue with artificial tears OU Q1H   - Create moisture chamber with Tegaderm following placement of EXTENSIVE ointment to the right eye throughout the day and to the left eye nightly.   - Allow 6 hour window during the day where moisture can be removed from right eye     PSYCH   # Anxiety and Depression   - Continue on home Valium 2mg BID with additional 2mg PRN   - Continue on Zoloft 75mg QD while in house (on 4cc/ 80mg QD at home)     CARDIOVASCULAR   # HTN thought to be second to discomfort vs dysautonomia   - Evening HTN and continue on Hydralazine vs valium PRN doses   - Monitor HR and BP    # pSVT  - Intermittent episodes of SVT, lasting a few seconds at a time, and self limiting.   - Consider BB if becomes more persistent or HD unstable    RESPIRATORY  # Tracheostomy leak   - At home noted with TVe 200-300s despite tracheostomy change to 80XLTCD.   - No air leak noted in house with hyperinflated cuff likely tracheomegaly?   - Continue on chronic vent 16/400/5/21   - Chronic bibasilar atelectasis and continued on HTS PRN with chest PT  - Trach upsized to Bivona 9 on 7/17, however trach leak remains.   - Custom Tracheostomy delivered and plan for exchange on Monday 8/12  - NPO p MN, AM labs ordered    GI  # Dysphagia   - Continue on PEG-TF     RENAL  # High PVR   - BS with roughly 200-300cc PVR, however no acute distress   - Monitor renal function and UOP     # Elevated lactate   - Lactate elevated with mild contraction alkalosis   - Rehydrate with improvement     INFECTIOUS DISEASE  # Leukocytosis likely second to trachitis vs UTI?   - No fever or chills and noted with prior leukocytosis   - CXR with prior atelectasis and no acute findings   - UA (7/6) with positive nitrites (7/6)   - SCx (7/5) with pansensitive PSA  - BCx (7/6) x 2 NGT  - UCx (7/6) with coag neg staph  - MRSA PCR (7/6) POSITIVE for both MRSA/MSSA s/p Bactroban (7/6 - 7/10)  - s/p empiric Zosyn (7/8 - 7/14) however WBC continues to wax and wean without fever and will monitor off ABX .     HEME  - On Xarelto 10mg at home likely for DVT PPX?   - Hold home Xarelto in prep for OR   - DVT PPX with LVX for now and hold home Eliquis PPX    ENDOCRINE  - No hx of DM2   - Monitor BG     SKIN  - Basic trach and PEG care ordered   - DTI on back and pending [facility]    ETHICS/ GOC    - FULL CODE   - Dr Tyson Velasquez,  involved in care    DISPO - Back home with  when able.

## 2024-08-11 NOTE — PROGRESS NOTE ADULT - NS ATTEND AMEND GEN_ALL_CORE FT
69F with MHx advanced ALS (nonverbal and bedbound at baseline) with chronic hypercapnic respiratory failure and ventilatory dependence via tracheostomy at baseline and Parkinson's dz presenting to [facility] on 7/18/24 with acute hypercapnia secondary to large expiratory air leak s/p upsizing without improvement with hospital course c/b tracheitis vs. UTI transferred from MICU to RCU on 7/5 for further management    - c/w home riluzole, diazepam, and zoloft  - plan for tracheostomy exchange with thoracic on Monday - custom Portex 9.5 with 40mm. 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

## 2024-08-12 LAB
ANION GAP SERPL CALC-SCNC: 15 MMOL/L — HIGH (ref 7–14)
APTT BLD: 30.7 SEC — SIGNIFICANT CHANGE UP (ref 24.5–35.6)
BLD GP AB SCN SERPL QL: NEGATIVE — SIGNIFICANT CHANGE UP
BUN SERPL-MCNC: 25 MG/DL — HIGH (ref 7–23)
CALCIUM SERPL-MCNC: 9.7 MG/DL — SIGNIFICANT CHANGE UP (ref 8.4–10.5)
CHLORIDE SERPL-SCNC: 103 MMOL/L — SIGNIFICANT CHANGE UP (ref 98–107)
CO2 SERPL-SCNC: 22 MMOL/L — SIGNIFICANT CHANGE UP (ref 22–31)
CREAT SERPL-MCNC: <0.2 MG/DL — LOW (ref 0.5–1.3)
EGFR: 125 ML/MIN/1.73M2 — SIGNIFICANT CHANGE UP
GLUCOSE BLDC GLUCOMTR-MCNC: 112 MG/DL — HIGH (ref 70–99)
GLUCOSE BLDC GLUCOMTR-MCNC: 160 MG/DL — HIGH (ref 70–99)
GLUCOSE BLDC GLUCOMTR-MCNC: 161 MG/DL — HIGH (ref 70–99)
GLUCOSE SERPL-MCNC: 126 MG/DL — HIGH (ref 70–99)
HCT VFR BLD CALC: 42.2 % — SIGNIFICANT CHANGE UP (ref 34.5–45)
HGB BLD-MCNC: 12.8 G/DL — SIGNIFICANT CHANGE UP (ref 11.5–15.5)
INR BLD: 1.12 RATIO — SIGNIFICANT CHANGE UP (ref 0.85–1.18)
MAGNESIUM SERPL-MCNC: 2 MG/DL — SIGNIFICANT CHANGE UP (ref 1.6–2.6)
MCHC RBC-ENTMCNC: 25.2 PG — LOW (ref 27–34)
MCHC RBC-ENTMCNC: 30.3 GM/DL — LOW (ref 32–36)
MCV RBC AUTO: 83.2 FL — SIGNIFICANT CHANGE UP (ref 80–100)
NRBC # BLD: 0 /100 WBCS — SIGNIFICANT CHANGE UP (ref 0–0)
NRBC # FLD: 0 K/UL — SIGNIFICANT CHANGE UP (ref 0–0)
PHOSPHATE SERPL-MCNC: 3.4 MG/DL — SIGNIFICANT CHANGE UP (ref 2.5–4.5)
PLATELET # BLD AUTO: 425 K/UL — HIGH (ref 150–400)
POTASSIUM SERPL-MCNC: 4.6 MMOL/L — SIGNIFICANT CHANGE UP (ref 3.5–5.3)
POTASSIUM SERPL-SCNC: 4.6 MMOL/L — SIGNIFICANT CHANGE UP (ref 3.5–5.3)
PROTHROM AB SERPL-ACNC: 12.6 SEC — SIGNIFICANT CHANGE UP (ref 9.5–13)
RBC # BLD: 5.07 M/UL — SIGNIFICANT CHANGE UP (ref 3.8–5.2)
RBC # FLD: 17 % — HIGH (ref 10.3–14.5)
RH IG SCN BLD-IMP: POSITIVE — SIGNIFICANT CHANGE UP
SODIUM SERPL-SCNC: 140 MMOL/L — SIGNIFICANT CHANGE UP (ref 135–145)
WBC # BLD: 16.13 K/UL — HIGH (ref 3.8–10.5)
WBC # FLD AUTO: 16.13 K/UL — HIGH (ref 3.8–10.5)

## 2024-08-12 PROCEDURE — 99233 SBSQ HOSP IP/OBS HIGH 50: CPT

## 2024-08-12 PROCEDURE — 31502 CHANGE OF WINDPIPE AIRWAY: CPT

## 2024-08-12 DEVICE — IMPLANTABLE DEVICE: Type: IMPLANTABLE DEVICE | Status: FUNCTIONAL

## 2024-08-12 RX ADMIN — POVIDONE, PROPYLENE GLYCOL 1 DROP(S): 6.8; 3 LIQUID OPHTHALMIC at 07:45

## 2024-08-12 RX ADMIN — CHLORHEXIDINE GLUCONATE 1 APPLICATION(S): 40 SOLUTION TOPICAL at 11:13

## 2024-08-12 RX ADMIN — CHLORHEXIDINE GLUCONATE 15 MILLILITER(S): 40 SOLUTION TOPICAL at 17:24

## 2024-08-12 RX ADMIN — ERYTHROMYCIN 1 APPLICATION(S): 5 OINTMENT OPHTHALMIC at 17:24

## 2024-08-12 RX ADMIN — POVIDONE, PROPYLENE GLYCOL 1 DROP(S): 6.8; 3 LIQUID OPHTHALMIC at 20:47

## 2024-08-12 RX ADMIN — Medication 1200 MILLILITER(S): at 20:30

## 2024-08-12 RX ADMIN — Medication 15 MILLILITER(S): at 11:14

## 2024-08-12 RX ADMIN — POVIDONE, PROPYLENE GLYCOL 1 DROP(S): 6.8; 3 LIQUID OPHTHALMIC at 12:05

## 2024-08-12 RX ADMIN — CHLORHEXIDINE GLUCONATE 15 MILLILITER(S): 40 SOLUTION TOPICAL at 05:01

## 2024-08-12 RX ADMIN — POVIDONE, PROPYLENE GLYCOL 1 DROP(S): 6.8; 3 LIQUID OPHTHALMIC at 23:00

## 2024-08-12 RX ADMIN — POVIDONE, PROPYLENE GLYCOL 1 DROP(S): 6.8; 3 LIQUID OPHTHALMIC at 16:01

## 2024-08-12 RX ADMIN — POVIDONE, PROPYLENE GLYCOL 1 DROP(S): 6.8; 3 LIQUID OPHTHALMIC at 23:20

## 2024-08-12 RX ADMIN — ERYTHROMYCIN 1 APPLICATION(S): 5 OINTMENT OPHTHALMIC at 03:25

## 2024-08-12 RX ADMIN — ERYTHROMYCIN 1 APPLICATION(S): 5 OINTMENT OPHTHALMIC at 09:47

## 2024-08-12 RX ADMIN — POVIDONE, PROPYLENE GLYCOL 1 DROP(S): 6.8; 3 LIQUID OPHTHALMIC at 14:02

## 2024-08-12 RX ADMIN — RILUZOLE 50 MILLIGRAM(S): 5 LIQUID ORAL at 05:00

## 2024-08-12 RX ADMIN — RILUZOLE 50 MILLIGRAM(S): 5 LIQUID ORAL at 17:25

## 2024-08-12 RX ADMIN — POVIDONE, PROPYLENE GLYCOL 1 DROP(S): 6.8; 3 LIQUID OPHTHALMIC at 17:23

## 2024-08-12 RX ADMIN — POVIDONE, PROPYLENE GLYCOL 1 DROP(S): 6.8; 3 LIQUID OPHTHALMIC at 21:29

## 2024-08-12 RX ADMIN — Medication 2 MILLIGRAM(S): at 17:24

## 2024-08-12 RX ADMIN — POVIDONE, PROPYLENE GLYCOL 1 DROP(S): 6.8; 3 LIQUID OPHTHALMIC at 20:07

## 2024-08-12 RX ADMIN — POVIDONE, PROPYLENE GLYCOL 1 DROP(S): 6.8; 3 LIQUID OPHTHALMIC at 02:42

## 2024-08-12 RX ADMIN — POVIDONE, PROPYLENE GLYCOL 1 DROP(S): 6.8; 3 LIQUID OPHTHALMIC at 01:08

## 2024-08-12 RX ADMIN — POVIDONE, PROPYLENE GLYCOL 1 DROP(S): 6.8; 3 LIQUID OPHTHALMIC at 07:46

## 2024-08-12 RX ADMIN — POVIDONE, PROPYLENE GLYCOL 1 DROP(S): 6.8; 3 LIQUID OPHTHALMIC at 09:46

## 2024-08-12 RX ADMIN — ERYTHROMYCIN 1 APPLICATION(S): 5 OINTMENT OPHTHALMIC at 14:00

## 2024-08-12 RX ADMIN — Medication 2 MILLIGRAM(S): at 05:00

## 2024-08-12 RX ADMIN — OFLOXACIN 1 DROP(S): 3 SOLUTION/ DROPS OPHTHALMIC at 17:24

## 2024-08-12 RX ADMIN — POVIDONE, PROPYLENE GLYCOL 1 DROP(S): 6.8; 3 LIQUID OPHTHALMIC at 06:33

## 2024-08-12 RX ADMIN — ERYTHROMYCIN 1 APPLICATION(S): 5 OINTMENT OPHTHALMIC at 05:01

## 2024-08-12 RX ADMIN — POVIDONE, PROPYLENE GLYCOL 1 DROP(S): 6.8; 3 LIQUID OPHTHALMIC at 04:53

## 2024-08-12 RX ADMIN — ERYTHROMYCIN 1 APPLICATION(S): 5 OINTMENT OPHTHALMIC at 21:29

## 2024-08-12 RX ADMIN — OFLOXACIN 1 DROP(S): 3 SOLUTION/ DROPS OPHTHALMIC at 05:01

## 2024-08-12 RX ADMIN — POVIDONE, PROPYLENE GLYCOL 1 DROP(S): 6.8; 3 LIQUID OPHTHALMIC at 14:01

## 2024-08-12 RX ADMIN — POVIDONE, PROPYLENE GLYCOL 1 DROP(S): 6.8; 3 LIQUID OPHTHALMIC at 17:25

## 2024-08-12 RX ADMIN — POVIDONE, PROPYLENE GLYCOL 1 DROP(S): 6.8; 3 LIQUID OPHTHALMIC at 14:00

## 2024-08-12 RX ADMIN — POVIDONE, PROPYLENE GLYCOL 1 DROP(S): 6.8; 3 LIQUID OPHTHALMIC at 03:25

## 2024-08-12 RX ADMIN — ERYTHROMYCIN 1 APPLICATION(S): 5 OINTMENT OPHTHALMIC at 11:13

## 2024-08-12 RX ADMIN — SERTRALINE HYDROCHLORIDE 75 MILLIGRAM(S): 50 TABLET, FILM COATED ORAL at 11:14

## 2024-08-12 RX ADMIN — POVIDONE, PROPYLENE GLYCOL 1 DROP(S): 6.8; 3 LIQUID OPHTHALMIC at 11:13

## 2024-08-12 RX ADMIN — POVIDONE, PROPYLENE GLYCOL 1 DROP(S): 6.8; 3 LIQUID OPHTHALMIC at 04:06

## 2024-08-12 RX ADMIN — POVIDONE, PROPYLENE GLYCOL 1 DROP(S): 6.8; 3 LIQUID OPHTHALMIC at 09:48

## 2024-08-12 NOTE — PROGRESS NOTE ADULT - ASSESSMENT
ASSESSMENT   67 YO Female with PMHx of ALS, Parkinson,, nonverbal, bed bound, chronic respiratory failure with tracheostomy and vent dependence, and oropharyngeal dysphagia with PEG who presented from home with tracheostomy leak. She was noted with vent alarm and poor TVe (250-290) at home. Trach changed at home with no improvement and sent in for thoracic evaluation. While in MICU no air leak noted with hyperinflated cuff. Transferred to RCU on 7/5 with course complicated by leukocytosis second to UTI vs trachitis and completed zosyn course. Trach upsized by thoracic on 7/17 however AL remains and now pending custom trach.     PLAN  NEUROLOGY  # ALS   - Baseline nonverbal, awake, and communicates with eye movement.   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   - Continue on Ofloxacin BID (decreased 8/1)   - Continue on Erythromycin OU Q3H (decreased 8/8)  - Continue with artificial tears OU Q1H   - Create moisture chamber with Tegaderm following placement of EXTENSIVE ointment to the right eye throughout the day and to the left eye nightly.   - Allow 6 hour window during the day where moisture can be removed from right eye     PSYCH   # Anxiety and Depression   - Continue on home Valium 2mg BID with additional 2mg PRN   - Continue on Zoloft 75mg QD while in house (on 4cc/ 80mg QD at home)     CARDIOVASCULAR   # HTN thought to be second to discomfort vs dysautonomia   - Evening HTN and continue on Hydralazine vs valium PRN doses   - Monitor HR and BP    # pSVT  - Intermittent episodes of SVT, lasting a few seconds at a time, and self limiting.   - Consider BB if becomes more persistent or HD unstable    RESPIRATORY  # Tracheostomy leak   - At home noted with TVe 200-300s despite tracheostomy change to 80XLTCD.   - No air leak noted in house with hyperinflated cuff likely tracheomegaly?   - Continue on chronic vent 16/400/5/21   - Chronic bibasilar atelectasis and continued on HTS PRN with chest PT  - Trach upsized to Bivona 9 on 7/17, however trach leak remains.   - Custom Tracheostomy delivered and plan for exchange on Monday 8/12  - NPO p MN, AM labs ordered    GI  # Dysphagia   - Continue on PEG-TF     RENAL  # High PVR   - BS with roughly 200-300cc PVR, however no acute distress   - Monitor renal function and UOP     # Elevated lactate   - Lactate elevated with mild contraction alkalosis   - Rehydrate with improvement     INFECTIOUS DISEASE  # Leukocytosis likely second to trachitis vs UTI?   - No fever or chills and noted with prior leukocytosis   - CXR with prior atelectasis and no acute findings   - UA (7/6) with positive nitrites (7/6)   - SCx (7/5) with pansensitive PSA  - BCx (7/6) x 2 NGT  - UCx (7/6) with coag neg staph  - MRSA PCR (7/6) POSITIVE for both MRSA/MSSA s/p Bactroban (7/6 - 7/10)  - s/p empiric Zosyn (7/8 - 7/14) however WBC continues to wax and wean without fever and will monitor off ABX .     HEME  - On Xarelto 10mg at home likely for DVT PPX?   - Hold home Xarelto in prep for OR   - DVT PPX with LVX for now and hold home Eliquis PPX    ENDOCRINE  - No hx of DM2   - Monitor BG     SKIN  - Basic trach and PEG care ordered   - DTI on back and pending [facility]    ETHICS/ GOC    - FULL CODE   - Dr Tyson Velasquez,  involved in care    DISPO - Back home with  when able.      ASSESSMENT   67 YO Female with PMHx of ALS, Parkinson,, nonverbal, bed bound, chronic respiratory failure with tracheostomy and vent dependence, and oropharyngeal dysphagia with PEG who presented from home with tracheostomy leak. She was noted with vent alarm and poor TVe (250-290) at home. Trach changed at home with no improvement and sent in for thoracic evaluation. While in MICU no air leak noted with hyperinflated cuff. Transferred to RCU on 7/5 with course complicated by leukocytosis second to UTI vs trachitis and completed zosyn course. Trach upsized by thoracic on 7/17 however AL remains and now pending custom trach.     PLAN  NEUROLOGY  # ALS   - Baseline nonverbal, awake, and communicates with eye movement.   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   - Continue on Ofloxacin BID (decreased 8/1)   - Continue on Erythromycin OU Q3H (decreased 8/8)  - Continue with artificial tears OU Q1H   - Create moisture chamber with Tegaderm following placement of EXTENSIVE ointment to the right eye throughout the day and to the left eye nightly.   - Allow 6 hour window during the day where moisture can be removed from right eye     PSYCH   # Anxiety and Depression   - Continue on home Valium 2mg BID with additional 2mg PRN   - Continue on Zoloft 75mg QD while in house (on 4cc/ 80mg QD at home)     CARDIOVASCULAR   # HTN thought to be second to discomfort vs dysautonomia   - Evening HTN and continue on Hydralazine vs valium PRN doses   - Monitor HR and BP    # pSVT  - Intermittent episodes of SVT, lasting a few seconds at a time, and self limiting.   - Consider BB if becomes more persistent or HD unstable    RESPIRATORY  # Tracheostomy leak   - At home noted with TVe 200-300s despite tracheostomy change to 80XLTCD.   - No air leak noted in house with hyperinflated cuff likely tracheomegaly?   - Continue on chronic vent 16/400/5/21   - Chronic bibasilar atelectasis and continued on HTS PRN with chest PT  - Trach upsized to Bivona 9 on 7/17, however trach leak remains.   - Custom Tracheostomy delivered and plan for exchange on Monday 8/12  - NPO p MN, AM labs ordered  - A second trach will be available per Thoracic PA     GI  # Dysphagia   - Continue on PEG-TF     RENAL  # High PVR   - BS with roughly 200-300cc PVR, however no acute distress   - Monitor renal function and UOP     # Elevated lactate   - Lactate elevated with mild contraction alkalosis   - Rehydrate with improvement     INFECTIOUS DISEASE  # Leukocytosis likely second to trachitis vs UTI?   - No fever or chills and noted with prior leukocytosis   - CXR with prior atelectasis and no acute findings   - UA (7/6) with positive nitrites (7/6)   - SCx (7/5) with pansensitive PSA  - BCx (7/6) x 2 NGT  - UCx (7/6) with coag neg staph  - MRSA PCR (7/6) POSITIVE for both MRSA/MSSA s/p Bactroban (7/6 - 7/10)  - s/p empiric Zosyn (7/8 - 7/14) however WBC continues to wax and wean without fever and will monitor off ABX .     HEME  - On Xarelto 10mg at home likely for DVT PPX?   - Hold home Xarelto in prep for OR   - DVT PPX with LVX for now and hold home Eliquis PPX    ENDOCRINE  - No hx of DM2   - Monitor BG     SKIN  - Basic trach and PEG care ordered   - DTI on back and pending [facility]    ETHICS/ GOC    - FULL CODE   - Dr Tyson Velasquez,  involved in care    DISPO - Back home with  when able.

## 2024-08-12 NOTE — BRIEF OPERATIVE NOTE - NSICDXBRIEFPROCEDURE_GEN_ALL_CORE_FT
PROCEDURES:  Replacement of tracheostomy tube 17-Jul-2024 16:10:43  Jena Garrison  
PROCEDURES:  Replacement of tracheostomy tube 17-Jul-2024 16:10:43  Jena Garrison  Flexible bronchoscopy 17-Jul-2024 16:10:50  Jena Garrison

## 2024-08-12 NOTE — CHART NOTE - NSCHARTNOTEFT_GEN_A_CORE
Pt seen for NUTRITION FOLLOW UP     Medical Course:  69 y/o Female with PMHx of ALS, Parkinson,, nonverbal, bed bound, chronic respiratory failure with tracheostomy and vent dependence, and oropharyngeal dysphagia with PEG who presented from home with tracheostomy leak. She was noted with vent alarm and poor TVe (250-290) at home. Trach changed at home with no improvement and sent in for thoracic evaluation. While in MICU no air leak noted with hyperinflated cuff. Transferred to RCU on 7/5 with course complicated by leukocytosis second to UTI vs trachitis and completed zosyn course. Trach upsized by thoracic on 7/17 however AL remains and now pending custom trach.     Nutrition Course:   Unable to conduct nutrition interview 2/2 decreased cognition. Information obtained from comprehensive chart review and per RN today: No recent episodes of nausea, vomiting, diarrhea, or constipation. Last BM noted 8/11 per RN flowsheets. Pt remains NPO TF only as sole source of nutrition and hydration; Jevity 1.2 @ 50ml/hr x24hr: (55kg): 1200ml total volume, 1440kcal (26kcal/kg), 67 gm protein (1.2gm/kg), 972ml free water from formula. TF continues to meet Pt's estimated needs. Will continue as same, no intolerances noted.     Per RD last note check a1c: A1C; 5.1% wnr. will continue on same feeding, no change warranted.     Pt NPO at time of visit today for procedure.     Diet Prescription: Diet, NPO:   NPO for Procedure/Test     NPO Start Date: 11-Aug-2024,   NPO Start Time: 23:59  Except Medications (08-10-24 @ 07:11)  Diet, NPO with Tube Feed:   Tube Feeding Modality: Gastrostomy  Jevity 1.2 Marck (JEVITY1.2RTH)  Total Volume for 24 Hours (mL): 1200  Continuous  Starting Tube Feed Rate {mL per Hour}: 50  Until Goal Tube Feed Rate (mL per Hour): 50  Tube Feed Duration (in Hours): 24  Tube Feed Start Time: 17:30 (08-08-24 @ 17:22)    Pertinent Medications: MEDICATIONS  (STANDING):  artificial  tears Solution 1 Drop(s) Both EYES every 1 hour  chlorhexidine 0.12% Liquid 15 milliLiter(s) Oral Mucosa every 12 hours  chlorhexidine 2% Cloths 1 Application(s) Topical daily  diazepam    Tablet 2 milliGRAM(s) Oral every 12 hours  erythromycin   Ointment 1 Application(s) Both EYES <User Schedule>  multivitamin/minerals/iron Oral Solution (CENTRUM) 15 milliLiter(s) Oral daily  ofloxacin 0.3% Solution 1 Drop(s) Both EYES two times a day  riluzole 50 milliGRAM(s) Oral two times a day  sertraline 75 milliGRAM(s) Oral daily    MEDICATIONS  (PRN):  acetaminophen   Oral Liquid .. 650 milliGRAM(s) Oral every 6 hours PRN Temp greater or equal to 38C (100.4F), Mild Pain (1 - 3)  diazepam    Tablet 2 milliGRAM(s) Oral at bedtime PRN for dysautonomia/ hypertension/discomfort    Pertinent Labs: 08-12 Na140 mmol/L Glu 126 mg/dL<H> K+ 4.6 mmol/L Cr  <0.20 mg/dL<L> BUN 25 mg/dL<H> 08-12 Phos 3.4 mg/dL    POCT Blood Glucose.: 112 mg/dL (12 Aug 2024 06:34)      Height (cm): 162.6 (07-05 @ 00:00)  Weight (kg): 54.9 (08-12), 55.2 (08-04 @ 00:38)  BMI (kg/m2): 20.9 (08-04 @ 00:38)  Weight Assessment: Pt noted with stable wt x 8d (-0.2%)       Physical Assessment, per flowsheets:  Edema: Generalized 1+   Pressure Injury: none noted      Estimated Needs:   [X] No change since previous assessment, based on #/ 54.5kg   0257-0766 Kcal/kg/day (20-25 kcal/kg), 43.6-54.5 gm/kg/day ( 0.8-1 gm/kg)      Previous Nutrition Diagnosis: No active nutrition diagnosis identified at this time      Education:  [ x ] Not warranted 2/2 decreased cognition    Interventions:   1) Continue on current feeding to Jevity 1.2 @ 50ml/hr x24hrs   2) Continue to provide multivitamin w/ minerals once daily for micronutrient and mineral provisions   3) RD to f/u prn      Monitor & Evaluate:  Tolerance to diet/supplement, nutrition related lab values, weight trends, BMs/GI distress, hydration status, skin integrity.    Keshia Mcneal MS, RDN (Pager #[phone]) | Also available on TEAMS

## 2024-08-12 NOTE — PROGRESS NOTE ADULT - NS ATTEND AMEND GEN_ALL_CORE FT
Patient is a 70 yo F w/ advanced ALS (nonverbal and bedbound at baseline) with chronic hypercapnic respiratory failure and ventilatory dependence via tracheostomy at baseline and Parkinson's who was initially presenting to [facility] on 7/4/24 with acute hypercapnia secondary to large expiratory air leak s/p upsizing without improvement with hospital course c/b tracheitis vs. UTI transferred from MICU to RCU on 7/5 for further management    #Acute on chronic hypercapnic respiratory failure  #ALS  #Chronic trach dependence - Trach exchanged at home without improvement followed by upsizing at bedside to 9 Bivona but still with air leak. Thoracic sx consulted, custom trach ordered (Portex 9.5 with 40mm balloon).  - Plan for possible OR today with thoracic. NPO since mid  - c/w home riluzole, diazepam, and zoloft  - plan for tracheostomy exchange with thoracic on Monday - custom Portex 9.5 with 40mm. 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 Patient is a 68 yo F w/ advanced ALS (nonverbal and bedbound at baseline) with chronic hypercapnic respiratory failure and ventilatory dependence via tracheostomy at baseline and Parkinson's who was initially presenting to [facility] on 7/4/24 with acute hypercapnia secondary to large expiratory air leak s/p upsizing without improvement with hospital course c/b tracheitis vs. UTI transferred from MICU to RCU on 7/5 for further management    #Acute on chronic hypercapnic respiratory failure  #ALS  #Chronic trach dependence - Trach exchanged at home without improvement followed by upsizing at bedside to 9 Bivona but still with air leak. Thoracic sx consulted, custom trach ordered (Portex 9.5 with 40mm balloon).  - Plan for possible OR today for trach exchange with thoracic. NPO since midnight  - c/w home riluzole, diazepam, and zoloft  - c/w mechanical ventilatory support  - c/w airway clearance  - c/w eye drops for corneal exposure  - Holding home NOAC for DVT ppx for now. Restart prior to d/c    Huang Rae MD  Pulmonary & Critical Care

## 2024-08-12 NOTE — PROGRESS NOTE ADULT - SUBJECTIVE AND OBJECTIVE BOX
CHIEF COMPLAINT: Patient is a 69y old  Female who presents with a chief complaint of trach exchange (18 Jul 2024 07:07)      INTERVAL EVENTS: NPO for OR for trach exchange      ROS: Seen by bedside during AM rounds     OBJECTIVE:  ICU Vital Signs Last 24 Hrs  T(C): 36.3 (12 Aug 2024 04:00), Max: 36.9 (11 Aug 2024 12:00)  T(F): 97.3 (12 Aug 2024 04:00), Max: 98.5 (11 Aug 2024 12:00)  HR: 103 (12 Aug 2024 06:57) (96 - 111)  BP: 120/69 (12 Aug 2024 04:00) (112/64 - 142/62)  BP(mean): --  ABP: --  ABP(mean): --  RR: 16 (12 Aug 2024 04:00) (16 - 18)  SpO2: 100% (12 Aug 2024 06:57) (97% - 100%)    O2 Parameters below as of 12 Aug 2024 04:00  Patient On (Oxygen Delivery Method): ventilator          Mode: AC/ CMV (Assist Control/ Continuous Mandatory Ventilation), RR (machine): 16, TV (machine): 400, FiO2: 30, PEEP: 5, ITime: 0.64, MAP: 10, PIP: 34    08-11 @ 07:01  -  08-12 @ 07:00  --------------------------------------------------------  IN: 1050 mL / OUT: 1000 mL / NET: 50 mL      CAPILLARY BLOOD GLUCOSE      POCT Blood Glucose.: 112 mg/dL (12 Aug 2024 06:34)      PHYSICAL EXAM:  General:   HEENT:   Lymph Nodes:  Neck:   Respiratory:   Cardiovascular:   Abdomen:   Extremities:   Skin:   Neurological:  Psychiatry:    Mode: AC/ CMV (Assist Control/ Continuous Mandatory Ventilation)  RR (machine): 16  TV (machine): 400  FiO2: 30  PEEP: 5  ITime: 0.64  MAP: 10  PIP: 34      HOSPITAL MEDICATIONS:  MEDICATIONS  (STANDING):  artificial  tears Solution 1 Drop(s) Both EYES every 1 hour  chlorhexidine 0.12% Liquid 15 milliLiter(s) Oral Mucosa every 12 hours  chlorhexidine 2% Cloths 1 Application(s) Topical daily  diazepam    Tablet 2 milliGRAM(s) Oral every 12 hours  erythromycin   Ointment 1 Application(s) Both EYES <User Schedule>  multivitamin/minerals/iron Oral Solution (CENTRUM) 15 milliLiter(s) Oral daily  ofloxacin 0.3% Solution 1 Drop(s) Both EYES two times a day  riluzole 50 milliGRAM(s) Oral two times a day  sertraline 75 milliGRAM(s) Oral daily    MEDICATIONS  (PRN):  acetaminophen   Oral Liquid .. 650 milliGRAM(s) Oral every 6 hours PRN Temp greater or equal to 38C (100.4F), Mild Pain (1 - 3)  diazepam    Tablet 2 milliGRAM(s) Oral at bedtime PRN for dysautonomia/ hypertension/discomfort      LABS:                        12.8   16.13 )-----------( 425      ( 12 Aug 2024 05:56 )             42.2                  CHIEF COMPLAINT: Patient is a 69y old  Female who presents with a chief complaint of trach exchange (18 Jul 2024 07:07)      INTERVAL EVENTS: NPO for OR for trach exchange      ROS: Seen by bedside during AM rounds     OBJECTIVE:  ICU Vital Signs Last 24 Hrs  T(C): 36.3 (12 Aug 2024 04:00), Max: 36.9 (11 Aug 2024 12:00)  T(F): 97.3 (12 Aug 2024 04:00), Max: 98.5 (11 Aug 2024 12:00)  HR: 103 (12 Aug 2024 06:57) (96 - 111)  BP: 120/69 (12 Aug 2024 04:00) (112/64 - 142/62)  BP(mean): --  ABP: --  ABP(mean): --  RR: 16 (12 Aug 2024 04:00) (16 - 18)  SpO2: 100% (12 Aug 2024 06:57) (97% - 100%)    O2 Parameters below as of 12 Aug 2024 04:00  Patient On (Oxygen Delivery Method): ventilator          Mode: AC/ CMV (Assist Control/ Continuous Mandatory Ventilation), RR (machine): 16, TV (machine): 400, FiO2: 30, PEEP: 5, ITime: 0.64, MAP: 10, PIP: 34    08-11 @ 07:01  -  08-12 @ 07:00  --------------------------------------------------------  IN: 1050 mL / OUT: 1000 mL / NET: 50 mL      CAPILLARY BLOOD GLUCOSE      POCT Blood Glucose.: 112 mg/dL (12 Aug 2024 06:34)      PHYSICAL EXAM:  General: NAD, +Trach to Vent   Neck: Supple, no JVD, trach midline  Eye: +corneal opacification noted b/l, R>L, +eyes covered  Respiratory: +rhonchi auscultated b/l, no wheeze, no rales, no resp distress  Heart: +s1, s2  Abdomen: +BS, soft, non tender, non distended, +PEG, area c/d/i   Extremities: no LE edema b/l in +SCDs  Skin: warm and dry  Neurological: +neurologic deficits d/t ALS hx   Psychiatry: no     Mode: AC/ CMV (Assist Control/ Continuous Mandatory Ventilation)  RR (machine): 16  TV (machine): 400  FiO2: 30  PEEP: 5  ITime: 0.64  MAP: 10  PIP: 34      HOSPITAL MEDICATIONS:  MEDICATIONS  (STANDING):  artificial  tears Solution 1 Drop(s) Both EYES every 1 hour  chlorhexidine 0.12% Liquid 15 milliLiter(s) Oral Mucosa every 12 hours  chlorhexidine 2% Cloths 1 Application(s) Topical daily  diazepam    Tablet 2 milliGRAM(s) Oral every 12 hours  erythromycin   Ointment 1 Application(s) Both EYES <User Schedule>  multivitamin/minerals/iron Oral Solution (CENTRUM) 15 milliLiter(s) Oral daily  ofloxacin 0.3% Solution 1 Drop(s) Both EYES two times a day  riluzole 50 milliGRAM(s) Oral two times a day  sertraline 75 milliGRAM(s) Oral daily    MEDICATIONS  (PRN):  acetaminophen   Oral Liquid .. 650 milliGRAM(s) Oral every 6 hours PRN Temp greater or equal to 38C (100.4F), Mild Pain (1 - 3)  diazepam    Tablet 2 milliGRAM(s) Oral at bedtime PRN for dysautonomia/ hypertension/discomfort      LABS:                        12.8   16.13 )-----------( 425      ( 12 Aug 2024 05:56 )             42.2

## 2024-08-12 NOTE — PROGRESS NOTE ADULT - TIME BILLING
Reviewing the EMR, vitals, imaging, medication list recent labs, prior records, and coordinating care with medical providers. This time excludes procedures and teaching. Medical management as above, reviewing chart and coordinating care with primary team/staff, as well as reviewing vitals, radiology, medication list, recent labs, and prior records.    Does not include teaching time.

## 2024-08-13 LAB
ANION GAP SERPL CALC-SCNC: 13 MMOL/L — SIGNIFICANT CHANGE UP (ref 7–14)
BUN SERPL-MCNC: 24 MG/DL — HIGH (ref 7–23)
CALCIUM SERPL-MCNC: 9.4 MG/DL — SIGNIFICANT CHANGE UP (ref 8.4–10.5)
CHLORIDE SERPL-SCNC: 104 MMOL/L — SIGNIFICANT CHANGE UP (ref 98–107)
CO2 SERPL-SCNC: 23 MMOL/L — SIGNIFICANT CHANGE UP (ref 22–31)
CREAT SERPL-MCNC: <0.2 MG/DL — LOW (ref 0.5–1.3)
EGFR: 125 ML/MIN/1.73M2 — SIGNIFICANT CHANGE UP
GLUCOSE SERPL-MCNC: 188 MG/DL — HIGH (ref 70–99)
HCT VFR BLD CALC: 37.9 % — SIGNIFICANT CHANGE UP (ref 34.5–45)
HGB BLD-MCNC: 11.8 G/DL — SIGNIFICANT CHANGE UP (ref 11.5–15.5)
MAGNESIUM SERPL-MCNC: 2.1 MG/DL — SIGNIFICANT CHANGE UP (ref 1.6–2.6)
MCHC RBC-ENTMCNC: 26 PG — LOW (ref 27–34)
MCHC RBC-ENTMCNC: 31.1 GM/DL — LOW (ref 32–36)
MCV RBC AUTO: 83.5 FL — SIGNIFICANT CHANGE UP (ref 80–100)
NRBC # BLD: 0 /100 WBCS — SIGNIFICANT CHANGE UP (ref 0–0)
NRBC # FLD: 0 K/UL — SIGNIFICANT CHANGE UP (ref 0–0)
PHOSPHATE SERPL-MCNC: 3.3 MG/DL — SIGNIFICANT CHANGE UP (ref 2.5–4.5)
PLATELET # BLD AUTO: 444 K/UL — HIGH (ref 150–400)
POTASSIUM SERPL-MCNC: 4 MMOL/L — SIGNIFICANT CHANGE UP (ref 3.5–5.3)
POTASSIUM SERPL-SCNC: 4 MMOL/L — SIGNIFICANT CHANGE UP (ref 3.5–5.3)
RBC # BLD: 4.54 M/UL — SIGNIFICANT CHANGE UP (ref 3.8–5.2)
RBC # FLD: 16.8 % — HIGH (ref 10.3–14.5)
SODIUM SERPL-SCNC: 140 MMOL/L — SIGNIFICANT CHANGE UP (ref 135–145)
WBC # BLD: 15.06 K/UL — HIGH (ref 3.8–10.5)
WBC # FLD AUTO: 15.06 K/UL — HIGH (ref 3.8–10.5)

## 2024-08-13 PROCEDURE — 99233 SBSQ HOSP IP/OBS HIGH 50: CPT

## 2024-08-13 RX ORDER — ENOXAPARIN SODIUM 100 MG/ML
40 INJECTION SUBCUTANEOUS EVERY 24 HOURS
Refills: 0 | Status: DISCONTINUED | OUTPATIENT
Start: 2024-08-13 | End: 2024-08-19

## 2024-08-13 RX ADMIN — OFLOXACIN 1 DROP(S): 3 SOLUTION/ DROPS OPHTHALMIC at 06:26

## 2024-08-13 RX ADMIN — ERYTHROMYCIN 1 APPLICATION(S): 5 OINTMENT OPHTHALMIC at 00:07

## 2024-08-13 RX ADMIN — ERYTHROMYCIN 1 APPLICATION(S): 5 OINTMENT OPHTHALMIC at 23:18

## 2024-08-13 RX ADMIN — POVIDONE, PROPYLENE GLYCOL 1 DROP(S): 6.8; 3 LIQUID OPHTHALMIC at 11:20

## 2024-08-13 RX ADMIN — POVIDONE, PROPYLENE GLYCOL 1 DROP(S): 6.8; 3 LIQUID OPHTHALMIC at 03:47

## 2024-08-13 RX ADMIN — POVIDONE, PROPYLENE GLYCOL 1 DROP(S): 6.8; 3 LIQUID OPHTHALMIC at 21:09

## 2024-08-13 RX ADMIN — ERYTHROMYCIN 1 APPLICATION(S): 5 OINTMENT OPHTHALMIC at 14:42

## 2024-08-13 RX ADMIN — ERYTHROMYCIN 1 APPLICATION(S): 5 OINTMENT OPHTHALMIC at 20:05

## 2024-08-13 RX ADMIN — RILUZOLE 50 MILLIGRAM(S): 5 LIQUID ORAL at 05:01

## 2024-08-13 RX ADMIN — ENOXAPARIN SODIUM 40 MILLIGRAM(S): 100 INJECTION SUBCUTANEOUS at 17:24

## 2024-08-13 RX ADMIN — Medication 2 MILLIGRAM(S): at 05:01

## 2024-08-13 RX ADMIN — POVIDONE, PROPYLENE GLYCOL 1 DROP(S): 6.8; 3 LIQUID OPHTHALMIC at 16:38

## 2024-08-13 RX ADMIN — POVIDONE, PROPYLENE GLYCOL 1 DROP(S): 6.8; 3 LIQUID OPHTHALMIC at 01:15

## 2024-08-13 RX ADMIN — POVIDONE, PROPYLENE GLYCOL 1 DROP(S): 6.8; 3 LIQUID OPHTHALMIC at 18:38

## 2024-08-13 RX ADMIN — POVIDONE, PROPYLENE GLYCOL 1 DROP(S): 6.8; 3 LIQUID OPHTHALMIC at 11:29

## 2024-08-13 RX ADMIN — POVIDONE, PROPYLENE GLYCOL 1 DROP(S): 6.8; 3 LIQUID OPHTHALMIC at 13:53

## 2024-08-13 RX ADMIN — POVIDONE, PROPYLENE GLYCOL 1 DROP(S): 6.8; 3 LIQUID OPHTHALMIC at 05:01

## 2024-08-13 RX ADMIN — ERYTHROMYCIN 1 APPLICATION(S): 5 OINTMENT OPHTHALMIC at 11:21

## 2024-08-13 RX ADMIN — POVIDONE, PROPYLENE GLYCOL 1 DROP(S): 6.8; 3 LIQUID OPHTHALMIC at 20:04

## 2024-08-13 RX ADMIN — CHLORHEXIDINE GLUCONATE 15 MILLILITER(S): 40 SOLUTION TOPICAL at 05:01

## 2024-08-13 RX ADMIN — POVIDONE, PROPYLENE GLYCOL 1 DROP(S): 6.8; 3 LIQUID OPHTHALMIC at 17:24

## 2024-08-13 RX ADMIN — POVIDONE, PROPYLENE GLYCOL 1 DROP(S): 6.8; 3 LIQUID OPHTHALMIC at 06:25

## 2024-08-13 RX ADMIN — ERYTHROMYCIN 1 APPLICATION(S): 5 OINTMENT OPHTHALMIC at 03:47

## 2024-08-13 RX ADMIN — Medication 2 MILLIGRAM(S): at 17:23

## 2024-08-13 RX ADMIN — POVIDONE, PROPYLENE GLYCOL 1 DROP(S): 6.8; 3 LIQUID OPHTHALMIC at 23:18

## 2024-08-13 RX ADMIN — POVIDONE, PROPYLENE GLYCOL 1 DROP(S): 6.8; 3 LIQUID OPHTHALMIC at 02:14

## 2024-08-13 RX ADMIN — POVIDONE, PROPYLENE GLYCOL 1 DROP(S): 6.8; 3 LIQUID OPHTHALMIC at 08:49

## 2024-08-13 RX ADMIN — OFLOXACIN 1 DROP(S): 3 SOLUTION/ DROPS OPHTHALMIC at 17:24

## 2024-08-13 RX ADMIN — POVIDONE, PROPYLENE GLYCOL 1 DROP(S): 6.8; 3 LIQUID OPHTHALMIC at 14:42

## 2024-08-13 RX ADMIN — Medication 15 MILLILITER(S): at 11:25

## 2024-08-13 RX ADMIN — POVIDONE, PROPYLENE GLYCOL 1 DROP(S): 6.8; 3 LIQUID OPHTHALMIC at 20:05

## 2024-08-13 RX ADMIN — POVIDONE, PROPYLENE GLYCOL 1 DROP(S): 6.8; 3 LIQUID OPHTHALMIC at 08:46

## 2024-08-13 RX ADMIN — POVIDONE, PROPYLENE GLYCOL 1 DROP(S): 6.8; 3 LIQUID OPHTHALMIC at 13:52

## 2024-08-13 RX ADMIN — POVIDONE, PROPYLENE GLYCOL 1 DROP(S): 6.8; 3 LIQUID OPHTHALMIC at 04:57

## 2024-08-13 RX ADMIN — POVIDONE, PROPYLENE GLYCOL 1 DROP(S): 6.8; 3 LIQUID OPHTHALMIC at 00:07

## 2024-08-13 RX ADMIN — POVIDONE, PROPYLENE GLYCOL 1 DROP(S): 6.8; 3 LIQUID OPHTHALMIC at 10:22

## 2024-08-13 RX ADMIN — CHLORHEXIDINE GLUCONATE 15 MILLILITER(S): 40 SOLUTION TOPICAL at 17:23

## 2024-08-13 RX ADMIN — ERYTHROMYCIN 1 APPLICATION(S): 5 OINTMENT OPHTHALMIC at 06:26

## 2024-08-13 RX ADMIN — RILUZOLE 50 MILLIGRAM(S): 5 LIQUID ORAL at 17:23

## 2024-08-13 RX ADMIN — POVIDONE, PROPYLENE GLYCOL 1 DROP(S): 6.8; 3 LIQUID OPHTHALMIC at 07:38

## 2024-08-13 RX ADMIN — CHLORHEXIDINE GLUCONATE 1 APPLICATION(S): 40 SOLUTION TOPICAL at 11:20

## 2024-08-13 RX ADMIN — ERYTHROMYCIN 1 APPLICATION(S): 5 OINTMENT OPHTHALMIC at 17:21

## 2024-08-13 RX ADMIN — SERTRALINE HYDROCHLORIDE 75 MILLIGRAM(S): 50 TABLET, FILM COATED ORAL at 11:20

## 2024-08-13 RX ADMIN — ERYTHROMYCIN 1 APPLICATION(S): 5 OINTMENT OPHTHALMIC at 08:48

## 2024-08-13 RX ADMIN — POVIDONE, PROPYLENE GLYCOL 1 DROP(S): 6.8; 3 LIQUID OPHTHALMIC at 17:21

## 2024-08-13 NOTE — ADVANCED PRACTICE NURSE CONSULT - ASSESSMENT
General: Patient nonverbal, cachectic with temporal wasting, tracheostomy with FiO2 @ 30% presenting with blanchable erythema under trach plate and to anterior chest, bilateral eyelids held shut with Tegaderm as per ENT order- in order to create moisture. Patient is bedbound, incontinent of urine and stool with external female urinary catching device (prima fit) in place. Upon assessment, incontinence care of formed/soft brown stool provided. LUQ PEG tube clean, dry with no evidence of peritubular skin breakdown. Skin warm, dry with increased moisture in intertriginous folds, scattered areas of hyperpigmentation and hypopigmentation, scattered areas of ecchymosis on bilateral upper extremities with no hematomas. Blanchable erythema on bilateral heels.     R lateral coccyx: DTPI as evidenced by 100% purple maroon discoloration measuring [id number]. Periwound with blanchable erythema, two areas of refractory hyperemia noted on L lateral coccyx and L buttocks. No drainage, no odor. No induration, no erythema, no edema, no crepitus, no fluctuance, no temperature changes, no overt signs of infection noted. Goals of care: Monitor for tissue type changes, prevent from pressure, friction, shearing, excess moisture.

## 2024-08-13 NOTE — PROGRESS NOTE ADULT - ASSESSMENT
ASSESSMENT   67 YO Female with PMHx of ALS, Parkinson,, nonverbal, bed bound, chronic respiratory failure with tracheostomy and vent dependence, and oropharyngeal dysphagia with PEG who presented from home with tracheostomy leak. She was noted with vent alarm and poor TVe (250-290) at home. Trach changed at home with no improvement and sent in for thoracic evaluation. While in MICU no air leak noted with hyperinflated cuff. Transferred to RCU on 7/5 with course complicated by leukocytosis second to UTI vs trachitis and completed zosyn course. Trach upsized by thoracic on 7/17 however AL remains and now pending custom trach.     PLAN  NEUROLOGY  # ALS   - Baseline nonverbal, awake, and communicates with eye movement.   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   - Continue on Ofloxacin BID (decreased 8/1)   - Continue on Erythromycin OU Q3H (decreased 8/8)  - Continue with artificial tears OU Q1H   - Create moisture chamber with Tegaderm following placement of EXTENSIVE ointment to the right eye throughout the day and to the left eye nightly.   - Allow 6 hour window during the day where moisture can be removed from right eye     PSYCH   # Anxiety and Depression   - Continue on home Valium 2mg BID with additional 2mg PRN   - Continue on Zoloft 75mg QD while in house (on 4cc/ 80mg QD at home)     CARDIOVASCULAR   # HTN thought to be second to discomfort vs dysautonomia   - Evening HTN and continue on Hydralazine vs valium PRN doses   - Monitor HR and BP    # pSVT  - Intermittent episodes of SVT, lasting a few seconds at a time, and self limiting.   - Consider BB if becomes more persistent or HD unstable    RESPIRATORY  # Tracheostomy leak   - At home noted with TVe 200-300s despite tracheostomy change to 80XLTCD.   - No air leak noted in house with hyperinflated cuff likely tracheomegaly?   - Continue on chronic vent 16/400/5/21   - Chronic bibasilar atelectasis and continued on HTS PRN with chest PT  - Trach upsized to Bivona 9 on 7/17, however trach leak remains.   - Custom Tracheostomy delivered and exchanged on Monday 8/12  - Pt has a 9.5mm Bovana  - A second trach will be available per Thoracic PA     GI  # Dysphagia   - Continue on PEG-TF     RENAL  # High PVR   - BS with roughly 200-300cc PVR, however no acute distress   - Monitor renal function and UOP     # Elevated lactate   - Lactate elevated with mild contraction alkalosis   - Rehydrate with improvement     INFECTIOUS DISEASE  # Leukocytosis likely second to trachitis vs UTI?   - No fever or chills and noted with prior leukocytosis   - CXR with prior atelectasis and no acute findings   - UA (7/6) with positive nitrites (7/6)   - SCx (7/5) with pansensitive PSA  - BCx (7/6) x 2 NGT  - UCx (7/6) with coag neg staph  - MRSA PCR (7/6) POSITIVE for both MRSA/MSSA s/p Bactroban (7/6 - 7/10)  - s/p empiric Zosyn (7/8 - 7/14) however WBC continues to wax and wean without fever and will monitor off ABX .     HEME  - On Xarelto 10mg at home likely for DVT PPX?   - Hold home Xarelto in prep for OR   - DVT PPX with LVX for now and hold home Eliquis PPX    ENDOCRINE  - No hx of DM2   - Monitor BG     SKIN  - Basic trach and PEG care ordered   - DTI on back and pending [facility]    ETHICS/ GOC    - FULL CODE   - Dr Tyosn Velasquez,  involved in care    DISPO - Back home with  when able.      ASSESSMENT   67 YO Female with PMHx of ALS, Parkinson,, nonverbal, bed bound, chronic respiratory failure with tracheostomy and vent dependence, and oropharyngeal dysphagia with PEG who presented from home with tracheostomy leak. She was noted with vent alarm and poor TVe (250-290) at home. Trach changed at home with no improvement and sent in for thoracic evaluation. While in MICU no air leak noted with hyperinflated cuff. Transferred to RCU on 7/5 with course complicated by leukocytosis second to UTI vs trachitis and completed zosyn course. Trach upsized by thoracic on 7/17 however AL remains and now pending custom trach.     PLAN  NEUROLOGY  # ALS   - Baseline nonverbal, awake, and communicates with eye movement.   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   - Continue on Ofloxacin BID (decreased 8/1)   - Continue on Erythromycin OU Q3H (decreased 8/8)  - Continue with artificial tears OU Q1H   - Create moisture chamber with Tegaderm following placement of EXTENSIVE ointment to the right eye throughout the day and to the left eye nightly.   - Allow 6 hour window during the day where moisture can be removed from right eye     PSYCH   # Anxiety and Depression   - Continue on home Valium 2mg BID with additional 2mg PRN   - Continue on Zoloft 75mg QD while in house (on 4cc/ 80mg QD at home)     CARDIOVASCULAR   # HTN thought to be second to discomfort vs dysautonomia   - Evening HTN and continue on Hydralazine vs valium PRN doses   - Monitor HR and BP    # pSVT  - Intermittent episodes of SVT, lasting a few seconds at a time, and self limiting.   - Consider BB if becomes more persistent or HD unstable    RESPIRATORY  # Tracheostomy leak   - At home noted with TVe 200-300s despite tracheostomy change to 80XLTCD.   - No air leak noted in house with hyperinflated cuff likely tracheomegaly?   - Continue on chronic vent 16/400/5/21   - Chronic bibasilar atelectasis and continued on HTS PRN with chest PT  - Trach upsized to Bivona 9 on 7/17, however trach leak remains.   - Custom Tracheostomy delivered and exchanged on Monday 8/12  - Pt has a 9.5mm Bovana, Second spare trach was defective - Per thoracic team 2 spare trachs have been ordered with possible delivery Thurs or Fri       GI  # Dysphagia   - Continue on PEG-TF     RENAL  # High PVR   - BS with roughly 200-300cc PVR, however no acute distress   - Monitor renal function and UOP     # Elevated lactate   - Lactate elevated with mild contraction alkalosis   - Rehydrate with improvement     INFECTIOUS DISEASE  # Leukocytosis likely second to trachitis vs UTI?   - No fever or chills and noted with prior leukocytosis   - CXR with prior atelectasis and no acute findings   - UA (7/6) with positive nitrites (7/6)   - SCx (7/5) with pansensitive PSA  - BCx (7/6) x 2 NGT  - UCx (7/6) with coag neg staph  - MRSA PCR (7/6) POSITIVE for both MRSA/MSSA s/p Bactroban (7/6 - 7/10)  - s/p empiric Zosyn (7/8 - 7/14) however WBC continues to wax and wean without fever and will monitor off ABX .     HEME  - On Xarelto 10mg at home likely for DVT PPX?   - Hold home Xarelto in prep for OR   - DVT PPX with LVX for now and hold home Eliquis PPX    ENDOCRINE  - No hx of DM2   - Monitor BG     SKIN  - Basic trach and PEG care ordered   - DTI on back and pending [facility]    ETHICS/ GOC    - FULL CODE   - Dr Tyson Velasquez,  involved in care    DISPO - Back home with  when able.

## 2024-08-13 NOTE — PROGRESS NOTE ADULT - NS ATTEND AMEND GEN_ALL_CORE FT
Patient is a 68 yo F w/ advanced ALS (nonverbal and bedbound at baseline) with chronic hypercapnic respiratory failure and ventilatory dependence via tracheostomy at baseline and Parkinson's who was initially presenting to [facility] on 7/4/24 with acute hypercapnia secondary to large expiratory air leak s/p upsizing without improvement with hospital course c/b tracheitis vs. UTI transferred from MICU to RCU on 7/5 for further management    #Acute on chronic hypercapnic respiratory failure  #ALS  #Chronic trach dependence - Trach exchanged at home without improvement followed by upsizing at bedside to 9 Bivona but still with air leak. Thoracic sx consulted, custom trach ordered (Portex 9.5 with 40mm balloon).  - s/p OR on 8/12 trach exchange with thoracic. Improved TV w/ Portex 9.5 with 40mm balloon. No current backup trach. Additional ordered, to arrive Thursday  - c/w home riluzole, diazepam, and zoloft  - c/w mechanical ventilatory support  - c/w airway clearance  - c/w eye drops for corneal exposure  - Resume DVT ppx    Huang Rea MD  Pulmonary & Critical Care

## 2024-08-13 NOTE — PROGRESS NOTE ADULT - SUBJECTIVE AND OBJECTIVE BOX
Pt is s/p trach exchange. to a 9.5 Bivona  She is maintaining her TV and O2 sat  Trach site is clean and dry

## 2024-08-13 NOTE — ADVANCED PRACTICE NURSE CONSULT - REASON FOR CONSULT
Patient seen on skin care rounds after wound care referral received for assessment of skin impairment and recommendations of topical management of the sacrum DTPI vs IAD. As per H&P, patient is a 68 year old woman with a past medical history including ALS, nonverbal and bedbound, Parkinson's, chronic respiratory failure requiring trach and vent, chronic oropharyngeal dysphagia with PEG who presents as per request of his pulmonologist with concerns for air leak. Today pt was seen by pulmonologist who noted that she was having air leakage from her trach, tidal volume 400 exhaled minute volume 250-290.  Pulmonologist changed tracheostomy tube without improvement. Pt otherwise at baseline. No recent fevers or acute complaints. Pt brought to [facility] for CT surgery evaluation for trach evaluation.     Chart reviewed: WBC: (N), H&H: (N), INR: (N), A1C: (N), platelets: (N), BMI: (N) Russel (N), patient interviewed. Patient H/O of (INSERT HISTORY). Patient admitted with (ADMISSION).  Patient seen on skin care rounds after wound care referral received for assessment of skin impairment and recommendations of topical management of the sacrum DTPI vs IAD. As per H&P, patient is a 68 year old woman with a past medical history including ALS, nonverbal and bedbound, Parkinson's, chronic respiratory failure requiring trach and vent, chronic oropharyngeal dysphagia with PEG who presents as per request of his pulmonologist with concerns for air leak. Today pt was seen by pulmonologist who noted that she was having air leakage from her trach, tidal volume 400 exhaled minute volume 250-290.  Pulmonologist changed tracheostomy tube without improvement. Pt otherwise at baseline. No recent fevers or acute complaints. Pt brought to [facility] for CT surgery evaluation for trach evaluation.

## 2024-08-13 NOTE — PROGRESS NOTE ADULT - SUBJECTIVE AND OBJECTIVE BOX
CHIEF COMPLAINT: Patient is a 69y old  Female who presents with a chief complaint of trach exchange (18 Jul 2024 07:07)      INTERVAL EVENTS: Pt s/p trach exchange in OR     ROS: Seen by bedside during AM rounds     OBJECTIVE:  ICU Vital Signs Last 24 Hrs  T(C): 36.5 (13 Aug 2024 04:00), Max: 36.9 (12 Aug 2024 08:00)  T(F): 97.7 (13 Aug 2024 04:00), Max: 98.4 (12 Aug 2024 08:00)  HR: 94 (13 Aug 2024 06:15) (90 - 113)  BP: 104/59 (13 Aug 2024 04:00) (91/67 - 143/79)  BP(mean): --  ABP: --  ABP(mean): --  RR: 18 (13 Aug 2024 04:00) (17 - 19)  SpO2: 99% (13 Aug 2024 06:15) (97% - 100%)    O2 Parameters below as of 13 Aug 2024 04:00  Patient On (Oxygen Delivery Method): ventilator          Mode: AC/ CMV (Assist Control/ Continuous Mandatory Ventilation), RR (machine): 16, TV (machine): 400, FiO2: 30, PEEP: 5, ITime: 0.7, MAP: 8, PIP: 22    08-12 @ 07:01  -  08-13 @ 07:00  --------------------------------------------------------  IN: 500 mL / OUT: 850 mL / NET: -350 mL      CAPILLARY BLOOD GLUCOSE      POCT Blood Glucose.: 161 mg/dL (12 Aug 2024 23:16)      PHYSICAL EXAM:  General:   HEENT:   Lymph Nodes:  Neck:   Respiratory:   Cardiovascular:   Abdomen:   Extremities:   Skin:   Neurological:  Psychiatry:    Mode: AC/ CMV (Assist Control/ Continuous Mandatory Ventilation)  RR (machine): 16  TV (machine): 400  FiO2: 30  PEEP: 5  ITime: 0.7  MAP: 8  PIP: 22      HOSPITAL MEDICATIONS:  MEDICATIONS  (STANDING):  artificial  tears Solution 1 Drop(s) Both EYES every 1 hour  chlorhexidine 0.12% Liquid 15 milliLiter(s) Oral Mucosa every 12 hours  chlorhexidine 2% Cloths 1 Application(s) Topical daily  diazepam    Tablet 2 milliGRAM(s) Oral every 12 hours  erythromycin   Ointment 1 Application(s) Both EYES <User Schedule>  multivitamin/minerals/iron Oral Solution (CENTRUM) 15 milliLiter(s) Oral daily  ofloxacin 0.3% Solution 1 Drop(s) Both EYES two times a day  riluzole 50 milliGRAM(s) Oral two times a day  sertraline 75 milliGRAM(s) Oral daily    MEDICATIONS  (PRN):  acetaminophen   Oral Liquid .. 650 milliGRAM(s) Oral every 6 hours PRN Temp greater or equal to 38C (100.4F), Mild Pain (1 - 3)  diazepam    Tablet 2 milliGRAM(s) Oral at bedtime PRN for dysautonomia/ hypertension/discomfort      LABS:                        11.8   15.06 )-----------( 444      ( 13 Aug 2024 05:10 )             37.9     08-13    140  |  104  |  24<H>  ----------------------------<  188<H>  4.0   |  23  |  <0.20<L>    Ca    9.4      13 Aug 2024 05:10  Phos  3.3     08-13  Mg     2.10     08-13      PT/INR - ( 12 Aug 2024 05:56 )   PT: 12.6 sec;   INR: 1.12 ratio         PTT - ( 12 Aug 2024 05:56 )  PTT:30.7 sec  Urinalysis Basic - ( 13 Aug 2024 05:10 )    Color: x / Appearance: x / SG: x / pH: x  Gluc: 188 mg/dL / Ketone: x  / Bili: x / Urobili: x   Blood: x / Protein: x / Nitrite: x   Leuk Esterase: x / RBC: x / WBC x   Sq Epi: x / Non Sq Epi: x / Bacteria: x         CHIEF COMPLAINT: Patient is a 69y old  Female who presents with a chief complaint of trach exchange (18 Jul 2024 07:07)      INTERVAL EVENTS: Pt s/p trach exchange in OR     ROS: Seen by bedside during AM rounds     OBJECTIVE:  ICU Vital Signs Last 24 Hrs  T(C): 36.5 (13 Aug 2024 04:00), Max: 36.9 (12 Aug 2024 08:00)  T(F): 97.7 (13 Aug 2024 04:00), Max: 98.4 (12 Aug 2024 08:00)  HR: 94 (13 Aug 2024 06:15) (90 - 113)  BP: 104/59 (13 Aug 2024 04:00) (91/67 - 143/79)  BP(mean): --  ABP: --  ABP(mean): --  RR: 18 (13 Aug 2024 04:00) (17 - 19)  SpO2: 99% (13 Aug 2024 06:15) (97% - 100%)    O2 Parameters below as of 13 Aug 2024 04:00  Patient On (Oxygen Delivery Method): ventilator          Mode: AC/ CMV (Assist Control/ Continuous Mandatory Ventilation), RR (machine): 16, TV (machine): 400, FiO2: 30, PEEP: 5, ITime: 0.7, MAP: 8, PIP: 22    08-12 @ 07:01  -  08-13 @ 07:00  --------------------------------------------------------  IN: 500 mL / OUT: 850 mL / NET: -350 mL      CAPILLARY BLOOD GLUCOSE      POCT Blood Glucose.: 161 mg/dL (12 Aug 2024 23:16)      PHYSICAL EXAM:  General: NAD, +Trach to Vent   Neck: Supple, no JVD, trach midline  Eye: +corneal opacification noted b/l, R>L, +eyes covered  Respiratory: +rhonchi auscultated b/l, no wheeze, no rales, no resp distress  Heart: +S1S2 RR   Abdomen: +BS, soft, non tender, non distended, +PEG, area c/d/i   Extremities: no LE edema b/l in +SCDs  Skin: warm and dry  Neurological: +neurologic deficits d/t ALS hx   Psychiatry: no     Mode: AC/ CMV (Assist Control/ Continuous Mandatory Ventilation)  RR (machine): 16  TV (machine): 400  FiO2: 30  PEEP: 5  ITime: 0.7  MAP: 8  PIP: 22      HOSPITAL MEDICATIONS:  MEDICATIONS  (STANDING):  artificial  tears Solution 1 Drop(s) Both EYES every 1 hour  chlorhexidine 0.12% Liquid 15 milliLiter(s) Oral Mucosa every 12 hours  chlorhexidine 2% Cloths 1 Application(s) Topical daily  diazepam    Tablet 2 milliGRAM(s) Oral every 12 hours  erythromycin   Ointment 1 Application(s) Both EYES <User Schedule>  multivitamin/minerals/iron Oral Solution (CENTRUM) 15 milliLiter(s) Oral daily  ofloxacin 0.3% Solution 1 Drop(s) Both EYES two times a day  riluzole 50 milliGRAM(s) Oral two times a day  sertraline 75 milliGRAM(s) Oral daily    MEDICATIONS  (PRN):  acetaminophen   Oral Liquid .. 650 milliGRAM(s) Oral every 6 hours PRN Temp greater or equal to 38C (100.4F), Mild Pain (1 - 3)  diazepam    Tablet 2 milliGRAM(s) Oral at bedtime PRN for dysautonomia/ hypertension/discomfort      LABS:                        11.8   15.06 )-----------( 444      ( 13 Aug 2024 05:10 )             37.9     08-13    140  |  104  |  24<H>  ----------------------------<  188<H>  4.0   |  23  |  <0.20<L>    Ca    9.4      13 Aug 2024 05:10  Phos  3.3     08-13  Mg     2.10     08-13      PT/INR - ( 12 Aug 2024 05:56 )   PT: 12.6 sec;   INR: 1.12 ratio         PTT - ( 12 Aug 2024 05:56 )  PTT:30.7 sec  Urinalysis Basic - ( 13 Aug 2024 05:10 )    Color: x / Appearance: x / SG: x / pH: x  Gluc: 188 mg/dL / Ketone: x  / Bili: x / Urobili: x   Blood: x / Protein: x / Nitrite: x   Leuk Esterase: x / RBC: x / WBC x   Sq Epi: x / Non Sq Epi: x / Bacteria: x

## 2024-08-14 LAB
ANION GAP SERPL CALC-SCNC: 13 MMOL/L — SIGNIFICANT CHANGE UP (ref 7–14)
BASE EXCESS BLDV CALC-SCNC: 0.7 MMOL/L — SIGNIFICANT CHANGE UP (ref -2–3)
BUN SERPL-MCNC: 32 MG/DL — HIGH (ref 7–23)
CALCIUM SERPL-MCNC: 9.1 MG/DL — SIGNIFICANT CHANGE UP (ref 8.4–10.5)
CHLORIDE SERPL-SCNC: 103 MMOL/L — SIGNIFICANT CHANGE UP (ref 98–107)
CO2 BLDV-SCNC: 25.4 MMOL/L — SIGNIFICANT CHANGE UP (ref 22–26)
CO2 SERPL-SCNC: 22 MMOL/L — SIGNIFICANT CHANGE UP (ref 22–31)
CREAT SERPL-MCNC: <0.2 MG/DL — LOW (ref 0.5–1.3)
EGFR: 125 ML/MIN/1.73M2 — SIGNIFICANT CHANGE UP
GAS PNL BLDV: SIGNIFICANT CHANGE UP
GLUCOSE SERPL-MCNC: 123 MG/DL — HIGH (ref 70–99)
HCO3 BLDV-SCNC: 24 MMOL/L — SIGNIFICANT CHANGE UP (ref 22–29)
HCT VFR BLD CALC: 36.2 % — SIGNIFICANT CHANGE UP (ref 34.5–45)
HGB BLD-MCNC: 11.2 G/DL — LOW (ref 11.5–15.5)
MAGNESIUM SERPL-MCNC: 2 MG/DL — SIGNIFICANT CHANGE UP (ref 1.6–2.6)
MCHC RBC-ENTMCNC: 25.6 PG — LOW (ref 27–34)
MCHC RBC-ENTMCNC: 30.9 GM/DL — LOW (ref 32–36)
MCV RBC AUTO: 82.6 FL — SIGNIFICANT CHANGE UP (ref 80–100)
NRBC # BLD: 0 /100 WBCS — SIGNIFICANT CHANGE UP (ref 0–0)
NRBC # FLD: 0 K/UL — SIGNIFICANT CHANGE UP (ref 0–0)
PCO2 BLDV: 35 MMHG — LOW (ref 39–52)
PH BLDV: 7.45 — HIGH (ref 7.32–7.43)
PHOSPHATE SERPL-MCNC: 2.9 MG/DL — SIGNIFICANT CHANGE UP (ref 2.5–4.5)
PLATELET # BLD AUTO: 402 K/UL — HIGH (ref 150–400)
PO2 BLDV: 74 MMHG — HIGH (ref 25–45)
POTASSIUM SERPL-MCNC: 4 MMOL/L — SIGNIFICANT CHANGE UP (ref 3.5–5.3)
POTASSIUM SERPL-SCNC: 4 MMOL/L — SIGNIFICANT CHANGE UP (ref 3.5–5.3)
RBC # BLD: 4.38 M/UL — SIGNIFICANT CHANGE UP (ref 3.8–5.2)
RBC # FLD: 17.2 % — HIGH (ref 10.3–14.5)
SAO2 % BLDV: 96.6 % — HIGH (ref 67–88)
SODIUM SERPL-SCNC: 138 MMOL/L — SIGNIFICANT CHANGE UP (ref 135–145)
WBC # BLD: 12.58 K/UL — HIGH (ref 3.8–10.5)
WBC # FLD AUTO: 12.58 K/UL — HIGH (ref 3.8–10.5)

## 2024-08-14 PROCEDURE — 99233 SBSQ HOSP IP/OBS HIGH 50: CPT

## 2024-08-14 RX ADMIN — CHLORHEXIDINE GLUCONATE 15 MILLILITER(S): 40 SOLUTION TOPICAL at 17:09

## 2024-08-14 RX ADMIN — POVIDONE, PROPYLENE GLYCOL 1 DROP(S): 6.8; 3 LIQUID OPHTHALMIC at 04:50

## 2024-08-14 RX ADMIN — POVIDONE, PROPYLENE GLYCOL 1 DROP(S): 6.8; 3 LIQUID OPHTHALMIC at 07:07

## 2024-08-14 RX ADMIN — POVIDONE, PROPYLENE GLYCOL 1 DROP(S): 6.8; 3 LIQUID OPHTHALMIC at 02:51

## 2024-08-14 RX ADMIN — CHLORHEXIDINE GLUCONATE 15 MILLILITER(S): 40 SOLUTION TOPICAL at 05:05

## 2024-08-14 RX ADMIN — OFLOXACIN 1 DROP(S): 3 SOLUTION/ DROPS OPHTHALMIC at 05:04

## 2024-08-14 RX ADMIN — ERYTHROMYCIN 1 APPLICATION(S): 5 OINTMENT OPHTHALMIC at 23:14

## 2024-08-14 RX ADMIN — POVIDONE, PROPYLENE GLYCOL 1 DROP(S): 6.8; 3 LIQUID OPHTHALMIC at 22:12

## 2024-08-14 RX ADMIN — POVIDONE, PROPYLENE GLYCOL 1 DROP(S): 6.8; 3 LIQUID OPHTHALMIC at 02:52

## 2024-08-14 RX ADMIN — RILUZOLE 50 MILLIGRAM(S): 5 LIQUID ORAL at 05:04

## 2024-08-14 RX ADMIN — POVIDONE, PROPYLENE GLYCOL 1 DROP(S): 6.8; 3 LIQUID OPHTHALMIC at 13:52

## 2024-08-14 RX ADMIN — ERYTHROMYCIN 1 APPLICATION(S): 5 OINTMENT OPHTHALMIC at 17:08

## 2024-08-14 RX ADMIN — CHLORHEXIDINE GLUCONATE 1 APPLICATION(S): 40 SOLUTION TOPICAL at 11:46

## 2024-08-14 RX ADMIN — POVIDONE, PROPYLENE GLYCOL 1 DROP(S): 6.8; 3 LIQUID OPHTHALMIC at 13:51

## 2024-08-14 RX ADMIN — ERYTHROMYCIN 1 APPLICATION(S): 5 OINTMENT OPHTHALMIC at 11:45

## 2024-08-14 RX ADMIN — POVIDONE, PROPYLENE GLYCOL 1 DROP(S): 6.8; 3 LIQUID OPHTHALMIC at 20:23

## 2024-08-14 RX ADMIN — POVIDONE, PROPYLENE GLYCOL 1 DROP(S): 6.8; 3 LIQUID OPHTHALMIC at 05:04

## 2024-08-14 RX ADMIN — POVIDONE, PROPYLENE GLYCOL 1 DROP(S): 6.8; 3 LIQUID OPHTHALMIC at 10:30

## 2024-08-14 RX ADMIN — POVIDONE, PROPYLENE GLYCOL 1 DROP(S): 6.8; 3 LIQUID OPHTHALMIC at 10:29

## 2024-08-14 RX ADMIN — POVIDONE, PROPYLENE GLYCOL 1 DROP(S): 6.8; 3 LIQUID OPHTHALMIC at 07:08

## 2024-08-14 RX ADMIN — ERYTHROMYCIN 1 APPLICATION(S): 5 OINTMENT OPHTHALMIC at 15:26

## 2024-08-14 RX ADMIN — POVIDONE, PROPYLENE GLYCOL 1 DROP(S): 6.8; 3 LIQUID OPHTHALMIC at 09:25

## 2024-08-14 RX ADMIN — POVIDONE, PROPYLENE GLYCOL 1 DROP(S): 6.8; 3 LIQUID OPHTHALMIC at 04:10

## 2024-08-14 RX ADMIN — Medication 2 MILLIGRAM(S): at 17:07

## 2024-08-14 RX ADMIN — POVIDONE, PROPYLENE GLYCOL 1 DROP(S): 6.8; 3 LIQUID OPHTHALMIC at 07:13

## 2024-08-14 RX ADMIN — POVIDONE, PROPYLENE GLYCOL 1 DROP(S): 6.8; 3 LIQUID OPHTHALMIC at 15:25

## 2024-08-14 RX ADMIN — POVIDONE, PROPYLENE GLYCOL 1 DROP(S): 6.8; 3 LIQUID OPHTHALMIC at 17:08

## 2024-08-14 RX ADMIN — POVIDONE, PROPYLENE GLYCOL 1 DROP(S): 6.8; 3 LIQUID OPHTHALMIC at 16:22

## 2024-08-14 RX ADMIN — ERYTHROMYCIN 1 APPLICATION(S): 5 OINTMENT OPHTHALMIC at 20:23

## 2024-08-14 RX ADMIN — POVIDONE, PROPYLENE GLYCOL 1 DROP(S): 6.8; 3 LIQUID OPHTHALMIC at 19:18

## 2024-08-14 RX ADMIN — ERYTHROMYCIN 1 APPLICATION(S): 5 OINTMENT OPHTHALMIC at 09:25

## 2024-08-14 RX ADMIN — Medication 15 MILLILITER(S): at 11:46

## 2024-08-14 RX ADMIN — OFLOXACIN 1 DROP(S): 3 SOLUTION/ DROPS OPHTHALMIC at 17:09

## 2024-08-14 RX ADMIN — ENOXAPARIN SODIUM 40 MILLIGRAM(S): 100 INJECTION SUBCUTANEOUS at 17:09

## 2024-08-14 RX ADMIN — POVIDONE, PROPYLENE GLYCOL 1 DROP(S): 6.8; 3 LIQUID OPHTHALMIC at 11:45

## 2024-08-14 RX ADMIN — POVIDONE, PROPYLENE GLYCOL 1 DROP(S): 6.8; 3 LIQUID OPHTHALMIC at 04:11

## 2024-08-14 RX ADMIN — POVIDONE, PROPYLENE GLYCOL 1 DROP(S): 6.8; 3 LIQUID OPHTHALMIC at 18:39

## 2024-08-14 RX ADMIN — POVIDONE, PROPYLENE GLYCOL 1 DROP(S): 6.8; 3 LIQUID OPHTHALMIC at 21:14

## 2024-08-14 RX ADMIN — RILUZOLE 50 MILLIGRAM(S): 5 LIQUID ORAL at 17:09

## 2024-08-14 RX ADMIN — Medication 2 MILLIGRAM(S): at 05:04

## 2024-08-14 RX ADMIN — SERTRALINE HYDROCHLORIDE 75 MILLIGRAM(S): 50 TABLET, FILM COATED ORAL at 11:46

## 2024-08-14 RX ADMIN — POVIDONE, PROPYLENE GLYCOL 1 DROP(S): 6.8; 3 LIQUID OPHTHALMIC at 23:14

## 2024-08-14 RX ADMIN — ERYTHROMYCIN 1 APPLICATION(S): 5 OINTMENT OPHTHALMIC at 02:51

## 2024-08-14 RX ADMIN — POVIDONE, PROPYLENE GLYCOL 1 DROP(S): 6.8; 3 LIQUID OPHTHALMIC at 16:23

## 2024-08-14 RX ADMIN — ERYTHROMYCIN 1 APPLICATION(S): 5 OINTMENT OPHTHALMIC at 05:04

## 2024-08-14 NOTE — PROGRESS NOTE ADULT - ASSESSMENT
ASSESSMENT   67 YO Female with PMHx of ALS, Parkinson,, nonverbal, bed bound, chronic respiratory failure with tracheostomy and vent dependence, and oropharyngeal dysphagia with PEG who presented from home with tracheostomy leak. She was noted with vent alarm and poor TVe (250-290) at home. Trach changed at home with no improvement and sent in for thoracic evaluation. While in MICU no air leak noted with hyperinflated cuff. Transferred to RCU on 7/5 with course complicated by leukocytosis second to UTI vs trachitis and completed zosyn course. Trach upsized by thoracic on 7/17 however AL remains and now pending custom trach.     PLAN  NEUROLOGY  # ALS   - Baseline nonverbal, awake, and communicates with eye movement.   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   - Continue on Ofloxacin BID (decreased 8/1)   - Continue on Erythromycin OU Q3H (decreased 8/8)  - Continue with artificial tears OU Q1H   - Create moisture chamber with Tegaderm following placement of EXTENSIVE ointment to the right eye throughout the day and to the left eye nightly.   - Allow 6 hour window during the day where moisture can be removed from right eye     PSYCH   # Anxiety and Depression   - Continue on home Valium 2mg BID with additional 2mg PRN   - Continue on Zoloft 75mg QD while in house (on 4cc/ 80mg QD at home)     CARDIOVASCULAR   # HTN thought to be second to discomfort vs dysautonomia   - Evening HTN and continue on Hydralazine vs valium PRN doses   - Monitor HR and BP    # pSVT  - Intermittent episodes of SVT, lasting a few seconds at a time, and self limiting.   - Consider BB if becomes more persistent or HD unstable    RESPIRATORY  # Tracheostomy leak   - At home noted with TVe 200-300s despite tracheostomy change to 80XLTCD.   - No air leak noted in house with hyperinflated cuff likely tracheomegaly?   - Continue on chronic vent 16/400/5/21   - Chronic bibasilar atelectasis and continued on HTS PRN with chest PT  - Trach upsized to Bivona 9 on 7/17, however trach leak remains.   - Custom Tracheostomy delivered and exchanged on Monday 8/12  - Pt has a 9.5mm Bovana, Second spare trach was defective - Per thoracic team 2 spare trachs have been ordered with possible delivery Thurs or Fri       GI  # Dysphagia   - Continue on PEG-TF     RENAL  # High PVR   - BS with roughly 200-300cc PVR, however no acute distress   - Monitor renal function and UOP     # Elevated lactate   - Lactate elevated with mild contraction alkalosis   - Rehydrate with improvement     INFECTIOUS DISEASE  # Leukocytosis likely second to trachitis vs UTI?   - No fever or chills and noted with prior leukocytosis   - CXR with prior atelectasis and no acute findings   - UA (7/6) with positive nitrites (7/6)   - SCx (7/5) with pansensitive PSA  - BCx (7/6) x 2 NGT  - UCx (7/6) with coag neg staph  - MRSA PCR (7/6) POSITIVE for both MRSA/MSSA s/p Bactroban (7/6 - 7/10)  - s/p empiric Zosyn (7/8 - 7/14) however WBC continues to wax and wean without fever and will monitor off ABX .     HEME  - On Xarelto 10mg at home likely for DVT PPX?   - Hold home Xarelto in prep for OR   - DVT PPX with LVX for now and hold home Eliquis PPX  -LVX restarted     ENDOCRINE  - No hx of DM2   - Monitor BG     SKIN  - Basic trach and PEG care ordered   - DTI on back and pending [facility]    ETHICS/ GOC    - FULL CODE   - Dr Tyson Velasquez,  involved in care    DISPO - Back home with  when able.      ASSESSMENT   67 YO Female with PMHx of ALS, Parkinson,, nonverbal, bed bound, chronic respiratory failure with tracheostomy and vent dependence, and oropharyngeal dysphagia with PEG who presented from home with tracheostomy leak. She was noted with vent alarm and poor TVe (250-290) at home. Trach changed at home with no improvement and sent in for thoracic evaluation. While in MICU no air leak noted with hyperinflated cuff. Transferred to RCU on 7/5 with course complicated by leukocytosis second to UTI vs trachitis and completed zosyn course. Trach upsized by thoracic on 7/17 however AL remains and now pending custom trach.     PLAN  NEUROLOGY  # ALS   - Baseline nonverbal, awake, and communicates with eye movement.   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   - Continue on Ofloxacin BID (decreased 8/1)   - Continue on Erythromycin OU Q3H (decreased 8/8)  - Continue with artificial tears OU Q1H   - Create moisture chamber with Tegaderm following placement of EXTENSIVE ointment to the right eye throughout the day and to the left eye nightly.   - Allow 6 hour window during the day where moisture can be removed from right eye   - Eyes seen today with dressing off and improved - less inflamed     PSYCH   # Anxiety and Depression   - Continue on home Valium 2mg BID with additional 2mg PRN   - Continue on Zoloft 75mg QD while in house (on 4cc/ 80mg QD at home)     CARDIOVASCULAR   # HTN thought to be second to discomfort vs dysautonomia   - Evening HTN and continue on Hydralazine vs valium PRN doses   - Monitor HR and BP    # pSVT  - Intermittent episodes of SVT, lasting a few seconds at a time, and self limiting.   - Consider BB if becomes more persistent or HD unstable    RESPIRATORY  # Tracheostomy leak   - At home noted with TVe 200-300s despite tracheostomy change to 80XLTCD.   - No air leak noted in house with hyperinflated cuff likely tracheomegaly?   - Continue on chronic vent 16/400/5/21   - Chronic bibasilar atelectasis and continued on HTS PRN with chest PT  - Trach upsized to Bivona 9 on 7/17, however trach leak remains.   - Custom Tracheostomy delivered and exchanged on Monday 8/12  - Pt has a 9.5mm Bovana, Second spare trach was defective - Per thoracic team 2 spare trachs have been ordered with possible delivery Thurs or Fri BY OR manager April- spectra #  [id number]  - Family will bring in home vent for trial with new trach in place       GI  # Dysphagia   - Continue on PEG-TF     RENAL  # High PVR   - BS with roughly 200-300cc PVR, however no acute distress   - Monitor renal function and UOP     # Elevated lactate   - Lactate elevated with mild contraction alkalosis   - Rehydrate with improvement     INFECTIOUS DISEASE  # Leukocytosis likely second to trachitis vs UTI?   - No fever or chills and noted with prior leukocytosis   - CXR with prior atelectasis and no acute findings   - UA (7/6) with positive nitrites (7/6)   - SCx (7/5) with pansensitive PSA  - BCx (7/6) x 2 NGT  - UCx (7/6) with coag neg staph  - MRSA PCR (7/6) POSITIVE for both MRSA/MSSA s/p Bactroban (7/6 - 7/10)  - s/p empiric Zosyn (7/8 - 7/14) however WBC continues to wax and wean without fever and will monitor off ABX .     HEME  - On Xarelto 10mg at home likely for DVT PPX?   - Hold home Xarelto in prep for OR   - DVT PPX with LVX for now and hold home Eliquis PPX  -LVX restarted     ENDOCRINE  - No hx of DM2   - Monitor BG     SKIN  - Basic trach and PEG care ordered   - DTI on back and pending [facility]    ETHICS/ GOC    - FULL CODE   - Dr Tyson Velasquez,  involved in care    DISPO - Back home with  when able.

## 2024-08-14 NOTE — PROGRESS NOTE ADULT - NS ATTEND AMEND GEN_ALL_CORE FT
Patient is a 70 yo F w/ advanced ALS (nonverbal and bedbound at baseline) with chronic hypercapnic respiratory failure and ventilatory dependence via tracheostomy at baseline and Parkinson's who was initially presenting to [facility] on 7/4/24 with acute hypercapnia secondary to large expiratory air leak s/p upsizing without improvement with hospital course c/b tracheitis vs. UTI transferred from MICU to RCU on 7/5 for further management    #Acute on chronic hypercapnic respiratory failure  #ALS  #Chronic trach dependence - Trach exchanged at home without improvement followed by upsizing at bedside to 9 Bivona but still with air leak. Thoracic sx consulted, custom trach ordered (Portex 9.5 with 40mm balloon).  - s/p OR on 8/12 trach exchange with thoracic. Improved TV w/ Portex 9.5 with 40mm balloon. No current backup trach. Additional ordered, to arrive Thursday or Friday  - c/w home riluzole, diazepam, and zoloft  - c/w mechanical ventilatory support, family to bring in home vent to use to demonstrate stability while admitted  - c/w airway clearance  - c/w eye drops for corneal exposure  - Resumed DVT ppx, can change to PO on discharge    Huang Rae MD  Pulmonary & Critical Care

## 2024-08-14 NOTE — PROGRESS NOTE ADULT - SUBJECTIVE AND OBJECTIVE BOX
CHIEF COMPLAINT: Patient is a 69y old  Female who presents with a chief complaint of trach exchange (18 Jul 2024 07:07)      INTERVAL EVENTS: No acute overnight events     ROS: Seen by bedside during AM rounds     OBJECTIVE:  ICU Vital Signs Last 24 Hrs  T(C): 36.6 (14 Aug 2024 04:00), Max: 36.7 (14 Aug 2024 00:00)  T(F): 97.8 (14 Aug 2024 04:00), Max: 98 (14 Aug 2024 00:00)  HR: 88 (14 Aug 2024 04:00) (86 - 95)  BP: 118/60 (14 Aug 2024 04:00) (101/60 - 123/59)  BP(mean): --  ABP: --  ABP(mean): --  RR: 18 (14 Aug 2024 04:00) (18 - 18)  SpO2: 99% (14 Aug 2024 04:00) (97% - 100%)    O2 Parameters below as of 14 Aug 2024 04:00  Patient On (Oxygen Delivery Method): ventilator          Mode: AC/ CMV (Assist Control/ Continuous Mandatory Ventilation), RR (machine): 16, TV (machine): 400, FiO2: 30, PEEP: 5, ITime: 0.7, MAP: 10, PIP: 29    08-13 @ 07:01  -  08-14 @ 07:00  --------------------------------------------------------  IN: 700 mL / OUT: 700 mL / NET: 0 mL      CAPILLARY BLOOD GLUCOSE      POCT Blood Glucose.: 161 mg/dL (12 Aug 2024 23:16)      PHYSICAL EXAM:  General:   HEENT:   Lymph Nodes:  Neck:   Respiratory:   Cardiovascular:   Abdomen:   Extremities:   Skin:   Neurological:  Psychiatry:    Mode: AC/ CMV (Assist Control/ Continuous Mandatory Ventilation)  RR (machine): 16  TV (machine): 400  FiO2: 30  PEEP: 5  ITime: 0.7  MAP: 10  PIP: 29      HOSPITAL MEDICATIONS:  MEDICATIONS  (STANDING):  artificial  tears Solution 1 Drop(s) Both EYES every 1 hour  chlorhexidine 0.12% Liquid 15 milliLiter(s) Oral Mucosa every 12 hours  chlorhexidine 2% Cloths 1 Application(s) Topical daily  diazepam    Tablet 2 milliGRAM(s) Oral every 12 hours  enoxaparin Injectable 40 milliGRAM(s) SubCutaneous every 24 hours  erythromycin   Ointment 1 Application(s) Both EYES <User Schedule>  multivitamin/minerals/iron Oral Solution (CENTRUM) 15 milliLiter(s) Oral daily  ofloxacin 0.3% Solution 1 Drop(s) Both EYES two times a day  riluzole 50 milliGRAM(s) Oral two times a day  sertraline 75 milliGRAM(s) Oral daily    MEDICATIONS  (PRN):  acetaminophen   Oral Liquid .. 650 milliGRAM(s) Oral every 6 hours PRN Temp greater or equal to 38C (100.4F), Mild Pain (1 - 3)  diazepam    Tablet 2 milliGRAM(s) Oral at bedtime PRN for dysautonomia/ hypertension/discomfort      LABS:                        11.2   12.58 )-----------( 402      ( 14 Aug 2024 03:00 )             36.2     08-14    138  |  103  |  32<H>  ----------------------------<  123<H>  4.0   |  22  |  <0.20<L>    Ca    9.1      14 Aug 2024 03:00  Phos  2.9     08-14  Mg     2.00     08-14        Urinalysis Basic - ( 14 Aug 2024 03:00 )    Color: x / Appearance: x / SG: x / pH: x  Gluc: 123 mg/dL / Ketone: x  / Bili: x / Urobili: x   Blood: x / Protein: x / Nitrite: x   Leuk Esterase: x / RBC: x / WBC x   Sq Epi: x / Non Sq Epi: x / Bacteria: x        Venous Blood Gas:  08-14 @ 03:00  7.45/35/74/24/96.6  VBG Lactate: --   CHIEF COMPLAINT: Patient is a 69y old  Female who presents with a chief complaint of trach exchange (18 Jul 2024 07:07)      INTERVAL EVENTS: No acute overnight events     ROS: Seen by bedside during AM rounds     OBJECTIVE:  ICU Vital Signs Last 24 Hrs  T(C): 36.6 (14 Aug 2024 04:00), Max: 36.7 (14 Aug 2024 00:00)  T(F): 97.8 (14 Aug 2024 04:00), Max: 98 (14 Aug 2024 00:00)  HR: 88 (14 Aug 2024 04:00) (86 - 95)  BP: 118/60 (14 Aug 2024 04:00) (101/60 - 123/59)  BP(mean): --  ABP: --  ABP(mean): --  RR: 18 (14 Aug 2024 04:00) (18 - 18)  SpO2: 99% (14 Aug 2024 04:00) (97% - 100%)    O2 Parameters below as of 14 Aug 2024 04:00  Patient On (Oxygen Delivery Method): ventilator          Mode: AC/ CMV (Assist Control/ Continuous Mandatory Ventilation), RR (machine): 16, TV (machine): 400, FiO2: 30, PEEP: 5, ITime: 0.7, MAP: 10, PIP: 29    08-13 @ 07:01  -  08-14 @ 07:00  --------------------------------------------------------  IN: 700 mL / OUT: 700 mL / NET: 0 mL      CAPILLARY BLOOD GLUCOSE      POCT Blood Glucose.: 161 mg/dL (12 Aug 2024 23:16)      PHYSICAL EXAM:  General: NAD, +Trach to Vent   Neck: Supple, no JVD, trach midline  Eye: +corneal opacification noted b/l, R>L, Dressing off - eyes improved less erythema   Respiratory: +rhonchi auscultated b/l, no wheeze, no rales, no resp distress  Heart: +S1S2 RR   Abdomen: +BS, soft, non tender, non distended, +PEG, area c/d/i   Extremities: no LE edema   Skin: warm and dry  Neurological: +neurologic deficits d/t ALS hx   Psychiatry: no     Mode: AC/ CMV (Assist Control/ Continuous Mandatory Ventilation)  RR (machine): 16  TV (machine): 400  FiO2: 30  PEEP: 5  ITime: 0.7  MAP: 10  PIP: 29      HOSPITAL MEDICATIONS:  MEDICATIONS  (STANDING):  artificial  tears Solution 1 Drop(s) Both EYES every 1 hour  chlorhexidine 0.12% Liquid 15 milliLiter(s) Oral Mucosa every 12 hours  chlorhexidine 2% Cloths 1 Application(s) Topical daily  diazepam    Tablet 2 milliGRAM(s) Oral every 12 hours  enoxaparin Injectable 40 milliGRAM(s) SubCutaneous every 24 hours  erythromycin   Ointment 1 Application(s) Both EYES <User Schedule>  multivitamin/minerals/iron Oral Solution (CENTRUM) 15 milliLiter(s) Oral daily  ofloxacin 0.3% Solution 1 Drop(s) Both EYES two times a day  riluzole 50 milliGRAM(s) Oral two times a day  sertraline 75 milliGRAM(s) Oral daily    MEDICATIONS  (PRN):  acetaminophen   Oral Liquid .. 650 milliGRAM(s) Oral every 6 hours PRN Temp greater or equal to 38C (100.4F), Mild Pain (1 - 3)  diazepam    Tablet 2 milliGRAM(s) Oral at bedtime PRN for dysautonomia/ hypertension/discomfort      LABS:                        11.2   12.58 )-----------( 402      ( 14 Aug 2024 03:00 )             36.2     08-14    138  |  103  |  32<H>  ----------------------------<  123<H>  4.0   |  22  |  <0.20<L>    Ca    9.1      14 Aug 2024 03:00  Phos  2.9     08-14  Mg     2.00     08-14        Urinalysis Basic - ( 14 Aug 2024 03:00 )    Color: x / Appearance: x / SG: x / pH: x  Gluc: 123 mg/dL / Ketone: x  / Bili: x / Urobili: x   Blood: x / Protein: x / Nitrite: x   Leuk Esterase: x / RBC: x / WBC x   Sq Epi: x / Non Sq Epi: x / Bacteria: x        Venous Blood Gas:  08-14 @ 03:00  7.45/35/74/24/96.6  VBG Lactate: --

## 2024-08-15 PROCEDURE — 99233 SBSQ HOSP IP/OBS HIGH 50: CPT

## 2024-08-15 RX ORDER — DIAZEPAM 10 MG
2 TABLET ORAL AT BEDTIME
Refills: 0 | Status: DISCONTINUED | OUTPATIENT
Start: 2024-08-15 | End: 2024-08-22

## 2024-08-15 RX ORDER — DIAZEPAM 10 MG
2 TABLET ORAL EVERY 12 HOURS
Refills: 0 | Status: DISCONTINUED | OUTPATIENT
Start: 2024-08-15 | End: 2024-08-22

## 2024-08-15 RX ADMIN — POVIDONE, PROPYLENE GLYCOL 1 DROP(S): 6.8; 3 LIQUID OPHTHALMIC at 01:13

## 2024-08-15 RX ADMIN — POVIDONE, PROPYLENE GLYCOL 1 DROP(S): 6.8; 3 LIQUID OPHTHALMIC at 06:04

## 2024-08-15 RX ADMIN — POVIDONE, PROPYLENE GLYCOL 1 DROP(S): 6.8; 3 LIQUID OPHTHALMIC at 08:46

## 2024-08-15 RX ADMIN — POVIDONE, PROPYLENE GLYCOL 1 DROP(S): 6.8; 3 LIQUID OPHTHALMIC at 11:10

## 2024-08-15 RX ADMIN — CHLORHEXIDINE GLUCONATE 1 APPLICATION(S): 40 SOLUTION TOPICAL at 11:08

## 2024-08-15 RX ADMIN — CHLORHEXIDINE GLUCONATE 15 MILLILITER(S): 40 SOLUTION TOPICAL at 05:03

## 2024-08-15 RX ADMIN — POVIDONE, PROPYLENE GLYCOL 1 DROP(S): 6.8; 3 LIQUID OPHTHALMIC at 11:09

## 2024-08-15 RX ADMIN — RILUZOLE 50 MILLIGRAM(S): 5 LIQUID ORAL at 17:05

## 2024-08-15 RX ADMIN — POVIDONE, PROPYLENE GLYCOL 1 DROP(S): 6.8; 3 LIQUID OPHTHALMIC at 04:22

## 2024-08-15 RX ADMIN — Medication 2 MILLIGRAM(S): at 17:09

## 2024-08-15 RX ADMIN — POVIDONE, PROPYLENE GLYCOL 1 DROP(S): 6.8; 3 LIQUID OPHTHALMIC at 19:51

## 2024-08-15 RX ADMIN — SERTRALINE HYDROCHLORIDE 75 MILLIGRAM(S): 50 TABLET, FILM COATED ORAL at 11:07

## 2024-08-15 RX ADMIN — POVIDONE, PROPYLENE GLYCOL 1 DROP(S): 6.8; 3 LIQUID OPHTHALMIC at 22:07

## 2024-08-15 RX ADMIN — POVIDONE, PROPYLENE GLYCOL 1 DROP(S): 6.8; 3 LIQUID OPHTHALMIC at 17:02

## 2024-08-15 RX ADMIN — ERYTHROMYCIN 1 APPLICATION(S): 5 OINTMENT OPHTHALMIC at 23:08

## 2024-08-15 RX ADMIN — ENOXAPARIN SODIUM 40 MILLIGRAM(S): 100 INJECTION SUBCUTANEOUS at 17:04

## 2024-08-15 RX ADMIN — POVIDONE, PROPYLENE GLYCOL 1 DROP(S): 6.8; 3 LIQUID OPHTHALMIC at 02:05

## 2024-08-15 RX ADMIN — POVIDONE, PROPYLENE GLYCOL 1 DROP(S): 6.8; 3 LIQUID OPHTHALMIC at 20:53

## 2024-08-15 RX ADMIN — POVIDONE, PROPYLENE GLYCOL 1 DROP(S): 6.8; 3 LIQUID OPHTHALMIC at 08:50

## 2024-08-15 RX ADMIN — POVIDONE, PROPYLENE GLYCOL 1 DROP(S): 6.8; 3 LIQUID OPHTHALMIC at 17:07

## 2024-08-15 RX ADMIN — ERYTHROMYCIN 1 APPLICATION(S): 5 OINTMENT OPHTHALMIC at 14:06

## 2024-08-15 RX ADMIN — POVIDONE, PROPYLENE GLYCOL 1 DROP(S): 6.8; 3 LIQUID OPHTHALMIC at 23:08

## 2024-08-15 RX ADMIN — ERYTHROMYCIN 1 APPLICATION(S): 5 OINTMENT OPHTHALMIC at 02:05

## 2024-08-15 RX ADMIN — Medication 2 MILLIGRAM(S): at 05:01

## 2024-08-15 RX ADMIN — POVIDONE, PROPYLENE GLYCOL 1 DROP(S): 6.8; 3 LIQUID OPHTHALMIC at 00:06

## 2024-08-15 RX ADMIN — Medication 15 MILLILITER(S): at 11:08

## 2024-08-15 RX ADMIN — POVIDONE, PROPYLENE GLYCOL 1 DROP(S): 6.8; 3 LIQUID OPHTHALMIC at 19:18

## 2024-08-15 RX ADMIN — POVIDONE, PROPYLENE GLYCOL 1 DROP(S): 6.8; 3 LIQUID OPHTHALMIC at 13:00

## 2024-08-15 RX ADMIN — POVIDONE, PROPYLENE GLYCOL 1 DROP(S): 6.8; 3 LIQUID OPHTHALMIC at 09:43

## 2024-08-15 RX ADMIN — ERYTHROMYCIN 1 APPLICATION(S): 5 OINTMENT OPHTHALMIC at 17:04

## 2024-08-15 RX ADMIN — ERYTHROMYCIN 1 APPLICATION(S): 5 OINTMENT OPHTHALMIC at 20:52

## 2024-08-15 RX ADMIN — ERYTHROMYCIN 1 APPLICATION(S): 5 OINTMENT OPHTHALMIC at 05:03

## 2024-08-15 RX ADMIN — POVIDONE, PROPYLENE GLYCOL 1 DROP(S): 6.8; 3 LIQUID OPHTHALMIC at 13:01

## 2024-08-15 RX ADMIN — POVIDONE, PROPYLENE GLYCOL 1 DROP(S): 6.8; 3 LIQUID OPHTHALMIC at 21:21

## 2024-08-15 RX ADMIN — POVIDONE, PROPYLENE GLYCOL 1 DROP(S): 6.8; 3 LIQUID OPHTHALMIC at 14:06

## 2024-08-15 RX ADMIN — ERYTHROMYCIN 1 APPLICATION(S): 5 OINTMENT OPHTHALMIC at 08:47

## 2024-08-15 RX ADMIN — ERYTHROMYCIN 1 APPLICATION(S): 5 OINTMENT OPHTHALMIC at 11:08

## 2024-08-15 RX ADMIN — OFLOXACIN 1 DROP(S): 3 SOLUTION/ DROPS OPHTHALMIC at 05:02

## 2024-08-15 RX ADMIN — POVIDONE, PROPYLENE GLYCOL 1 DROP(S): 6.8; 3 LIQUID OPHTHALMIC at 03:05

## 2024-08-15 RX ADMIN — RILUZOLE 50 MILLIGRAM(S): 5 LIQUID ORAL at 05:01

## 2024-08-15 RX ADMIN — CHLORHEXIDINE GLUCONATE 15 MILLILITER(S): 40 SOLUTION TOPICAL at 17:06

## 2024-08-15 RX ADMIN — OFLOXACIN 1 DROP(S): 3 SOLUTION/ DROPS OPHTHALMIC at 17:05

## 2024-08-15 RX ADMIN — POVIDONE, PROPYLENE GLYCOL 1 DROP(S): 6.8; 3 LIQUID OPHTHALMIC at 05:02

## 2024-08-15 RX ADMIN — POVIDONE, PROPYLENE GLYCOL 1 DROP(S): 6.8; 3 LIQUID OPHTHALMIC at 17:03

## 2024-08-15 NOTE — PROGRESS NOTE ADULT - ASSESSMENT
ASSESSMENT   69 YO Female with PMHx of ALS, Parkinson,, nonverbal, bed bound, chronic respiratory failure with tracheostomy and vent dependence, and oropharyngeal dysphagia with PEG who presented from home with tracheostomy leak. She was noted with vent alarm and poor TVe (250-290) at home. Trach changed at home with no improvement and sent in for thoracic evaluation. While in MICU no air leak noted with hyperinflated cuff. Transferred to RCU on 7/5 with course complicated by leukocytosis second to UTI vs trachitis and completed zosyn course. Trach upsized by thoracic on 7/17 however AL remains and now pending custom trach.     PLAN  NEUROLOGY  # ALS   - Baseline nonverbal, awake, and communicates with eye movement.   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   - Continue on Ofloxacin BID (decreased 8/1)   - Continue on Erythromycin OU Q3H (decreased 8/8)  - Continue with artificial tears OU Q1H   - Create moisture chamber with Tegaderm following placement of EXTENSIVE ointment to the right eye throughout the day and to the left eye nightly.   - Allow 6 hour window during the day where moisture can be removed from right eye   - Eyes seen today with dressing off and improved - less inflamed     PSYCH   # Anxiety and Depression   - Continue on home Valium 2mg BID with additional 2mg PRN   - Continue on Zoloft 75mg QD while in house (on 4cc/ 80mg QD at home)     CARDIOVASCULAR   # HTN thought to be second to discomfort vs dysautonomia   - Evening HTN and continue on Hydralazine vs valium PRN doses   - Monitor HR and BP    # pSVT  - Intermittent episodes of SVT, lasting a few seconds at a time, and self limiting.   - Consider BB if becomes more persistent or HD unstable    RESPIRATORY  # Tracheostomy leak   - At home noted with TVe 200-300s despite tracheostomy change to 80XLTCD.   - No air leak noted in house with hyperinflated cuff likely tracheomegaly?   - Continue on chronic vent 16/400/5/21   - Chronic bibasilar atelectasis and continued on HTS PRN with chest PT  - Trach upsized to Bivona 9 on 7/17, however trach leak remains.   - Custom Tracheostomy delivered and exchanged on Monday 8/12  - Pt has a 9.5mm Bovana, Second spare trach was defective - Per thoracic team 2 spare trachs have been ordered with possible delivery Thurs or Fri BY OR manager April- spectra #  [id number]  - Family will bring in home vent for trial with new trach in place       GI  # Dysphagia   - Continue on PEG-TF     RENAL  # High PVR   - BS with roughly 200-300cc PVR, however no acute distress   - Monitor renal function and UOP     # Elevated lactate   - Lactate elevated with mild contraction alkalosis   - Rehydrate with improvement     INFECTIOUS DISEASE  # Leukocytosis likely second to trachitis vs UTI?   - No fever or chills and noted with prior leukocytosis   - CXR with prior atelectasis and no acute findings   - UA (7/6) with positive nitrites (7/6)   - SCx (7/5) with pansensitive PSA  - BCx (7/6) x 2 NGT  - UCx (7/6) with coag neg staph  - MRSA PCR (7/6) POSITIVE for both MRSA/MSSA s/p Bactroban (7/6 - 7/10)  - s/p empiric Zosyn (7/8 - 7/14) however WBC continues to wax and wean without fever and will monitor off ABX .     HEME  - On Xarelto 10mg at home likely for DVT PPX?   - Hold home Xarelto in prep for OR   - DVT PPX with LVX for now and hold home Eliquis PPX  - LVX restarted     ENDOCRINE  - No hx of DM2   - Monitor BG     SKIN  - Basic trach and PEG care ordered   - DTI on back and pending [facility]    ETHICS/ GOC    - FULL CODE   - Dr Tyson Velasquez,  involved in care    DISPO - Back home with  when able.     ASSESSMENT   67 YO Female with PMHx of ALS, Parkinson,, nonverbal, bed bound, chronic respiratory failure with tracheostomy and vent dependence, and oropharyngeal dysphagia with PEG who presented from home with tracheostomy leak. She was noted with vent alarm and poor TVe (250-290) at home. Trach changed at home with no improvement and sent in for thoracic evaluation. While in MICU no air leak noted with hyperinflated cuff. Transferred to RCU on 7/5 with course complicated by leukocytosis second to UTI vs trachitis and completed zosyn course. Trach upsized by thoracic on 7/17 however AL remains and now pending custom trach.     PLAN  NEUROLOGY  # ALS   - Baseline nonverbal, awake, and communicates with eye movement.   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   - Continue on Ofloxacin BID (decreased 8/1)   - Continue on Erythromycin OU Q3H (decreased 8/8)  - Continue with artificial tears OU Q1H   - Create moisture chamber with Tegaderm following placement of EXTENSIVE ointment to the right eye throughout the day and to the left eye nightly.   - Allow 6 hour window during the day where moisture can be removed from right eye   - inflammation appears to be improving     PSYCH   # Anxiety and Depression   - Continue on home Valium 2mg BID with additional 2mg PRN   - Continue on Zoloft 75mg QD while in house (on 4cc/ 80mg QD at home)     CARDIOVASCULAR   # HTN thought to be second to discomfort vs dysautonomia   - Evening HTN and continue on Hydralazine vs valium PRN doses   - Monitor HR and BP    # pSVT  - Intermittent episodes of SVT, lasting a few seconds at a time, and self limiting.   - Consider BB if becomes more persistent or HD unstable    RESPIRATORY  # Tracheostomy leak   - At home noted with TVe 200-300s despite tracheostomy change to 80XLTCD.   - No air leak noted in house with hyperinflated cuff likely tracheomegaly?   - Continue on chronic vent 16/400/5/21   - Chronic bibasilar atelectasis and continued on HTS PRN with chest PT  - Trach upsized to Bivona 9 on 7/17, however trach leak remains.   - Custom Tracheostomy delivered and exchanged on Monday 8/12  - Pt has a 9.5mm Bovana, Second spare trach was defective - Per thoracic team 2 spare trachs have been ordered with possible delivery Thurs or Fri BY OR manager April- spectra #  [id number]  - Family will bring in home vent for trial with new trach in place       GI  # Dysphagia   - Continue on PEG-TF     RENAL  # High PVR   - BS with roughly 200-300cc PVR, however no acute distress   - Monitor renal function and UOP     # Elevated lactate   - Lactate elevated with mild contraction alkalosis   - Rehydrate with improvement     INFECTIOUS DISEASE  # Leukocytosis likely second to trachitis vs UTI?   - No fever or chills and noted with prior leukocytosis   - CXR with prior atelectasis and no acute findings   - UA (7/6) with positive nitrites (7/6)   - SCx (7/5) with pansensitive PSA  - BCx (7/6) x 2 NGT  - UCx (7/6) with coag neg staph  - MRSA PCR (7/6) POSITIVE for both MRSA/MSSA s/p Bactroban (7/6 - 7/10)  - s/p empiric Zosyn (7/8 - 7/14) however WBC continues to wax and wean without fever and will monitor off ABX .     HEME  - On Xarelto 10mg at home likely for DVT PPX?   - Hold home Xarelto in prep for OR   - DVT PPX with LVX for now and hold home Eliquis PPX  - LVX restarted     ENDOCRINE  - No hx of DM2   - Monitor BG     SKIN  - Basic trach and PEG care ordered   - DTI on back and pending [facility]    ETHICS/ GOC    - FULL CODE   - Dr Tyson Velasquez,  involved in care    DISPO - Back home with  when able.

## 2024-08-15 NOTE — PROGRESS NOTE ADULT - NS ATTEND AMEND GEN_ALL_CORE FT
Patient is a 70 yo F w/ advanced ALS (nonverbal and bedbound at baseline) with chronic hypercapnic respiratory failure and ventilatory dependence via tracheostomy at baseline and Parkinson's who was initially presenting to [facility] on 7/4/24 with acute hypercapnia secondary to large expiratory air leak s/p upsizing without improvement with hospital course c/b tracheitis vs. UTI transferred from MICU to RCU on 7/5 for further management    #Acute on chronic hypercapnic respiratory failure  #ALS  #Chronic trach dependence - Trach exchanged at home without improvement followed by upsizing at bedside to 9 Bivona but still with air leak. Thoracic sx consulted, custom trach ordered (Portex 9.5 with 40mm balloon).  - s/p OR on 8/12 trach exchange with thoracic. Improved TV w/ Portex 9.5 with 40mm balloon. No current backup trach. Additional ordered, to arrive later today or Friday  - c/w home riluzole, diazepam, and zoloft  - c/w mechanical ventilatory support, family to bring in home vent to use to demonstrate stability while admitted  - c/w airway clearance  - c/w eye drops for corneal exposure  - Resumed DVT ppx, can change to PO on discharge    Huang Rae MD  Pulmonary & Critical Care

## 2024-08-15 NOTE — PROGRESS NOTE ADULT - SUBJECTIVE AND OBJECTIVE BOX
[facility] DEPARTMENT OF OPHTHALMOLOGY  ------------------------------------------------------------------------------  Chase Molina MD PGY 3  Available on Teams  ------------------------------------------------------------------------------    Interval History: No acute events overnight.     MEDICATIONS  (STANDING):  artificial  tears Solution 1 Drop(s) Both EYES <User Schedule>  chlorhexidine 0.12% Liquid 15 milliLiter(s) Oral Mucosa every 12 hours  chlorhexidine 2% Cloths 1 Application(s) Topical daily  diazepam    Tablet 2 milliGRAM(s) Oral every 12 hours  enoxaparin Injectable 40 milliGRAM(s) SubCutaneous every 24 hours  erythromycin   Ointment 1 Application(s) Both EYES <User Schedule>  multivitamin/minerals/iron Oral Solution (CENTRUM) 15 milliLiter(s) Oral daily  ofloxacin 0.3% Solution 1 Drop(s) Right EYE every 4 hours  ofloxacin 0.3% Solution 1 Drop(s) Left EYE every 6 hours  petrolatum Ophthalmic Ointment 1 Application(s) Both EYES <User Schedule>  riluzole 50 milliGRAM(s) Oral two times a day  sertraline 75 milliGRAM(s) Oral daily    MEDICATIONS  (PRN):  acetaminophen   Oral Liquid .. 650 milliGRAM(s) Oral every 6 hours PRN Temp greater or equal to 38C (100.4F), Mild Pain (1 - 3)  diazepam    Tablet 2 milliGRAM(s) Oral at bedtime PRN for dysautonomia/ hypertension/discomfort      VITALS: T(C): 36.7 (07-27-24 @ 04:18)  T(F): 98 (07-27-24 @ 04:18), Max: 98 (07-27-24 @ 04:18)  HR: 96 (07-27-24 @ 07:15) (92 - 104)  BP: 132/79 (07-27-24 @ 04:18) (116/70 - 164/81)  RR:  (16 - 19)  SpO2:  (97% - 100%)  Wt(kg): --  General: AAO x 0    Ophthalmology Exam:  Visual acuity (sc): RONIT   Pupils: PERRL OU, no APD  Ttono: 16 OU  Extraocular movements (EOMs): RONIT  Confrontational Visual Field (CVF): RONIT  Color Plates: RONIT    Pen Light Exam (PLE)  External: Flat OU  Lids/Lashes/Lacrimal Ducts: Flat OU    Sclera/Conjunctiva: injection OD>>OS   Cornea: OD with resolved inferior epithelial defect. With chronic corneal damage and changes secondary to exposure including extensive overlying filaments, pannus formation, and stromal scarring; OS with no further inferior epithelial defect. Also with chronic corneal damage and changes secondary to exposure including extensive filaments, pannus formation, and stromal scarring; No infiltration or ulcer OU   Anterior Chamber: D+F OU    Iris: Flat OU  Lens: NS OU

## 2024-08-15 NOTE — PROGRESS NOTE ADULT - SUBJECTIVE AND OBJECTIVE BOX
CHIEF COMPLAINT: Patient is a 69y old  Female who presents with a chief complaint of trach exchange (18 Jul 2024 07:07)      INTERVAL EVENTS:   - no acute overnight events.    ROS: Seen by bedside during AM rounds     OBJECTIVE:  ICU Vital Signs Last 24 Hrs  T(C): 36 (15 Aug 2024 04:00), Max: 36.9 (14 Aug 2024 12:00)  T(F): 96.8 (15 Aug 2024 04:00), Max: 98.5 (14 Aug 2024 16:00)  HR: 111 (15 Aug 2024 04:00) (88 - 122)  BP: 106/65 (15 Aug 2024 04:00) (105/76 - 127/65)  BP(mean): 78 (15 Aug 2024 04:00) (74 - 86)  ABP: --  ABP(mean): --  RR: 20 (15 Aug 2024 04:00) (18 - 20)  SpO2: 100% (15 Aug 2024 04:00) (95% - 100%)    O2 Parameters below as of 15 Aug 2024 04:00  Patient On (Oxygen Delivery Method): ventilator          Mode: AC/ CMV (Assist Control/ Continuous Mandatory Ventilation), RR (machine): 16, TV (machine): 400, FiO2: 30, PEEP: 5, ITime: 0.64, MAP: 10, PIP: 36    08-13 @ 07:01 - 08-14 @ 07:00  --------------------------------------------------------  IN: 700 mL / OUT: 700 mL / NET: 0 mL    08-14 @ 07:01  -  08-15 @ 06:48  --------------------------------------------------------  IN: 1400 mL / OUT: 850 mL / NET: 550 mL      CAPILLARY BLOOD GLUCOSE          PHYSICAL EXAM:  General:   HEENT:   Lymph Nodes:  Neck:   Respiratory:   Cardiovascular:   Abdomen:   Extremities:   Skin:   Neurological:  Psychiatry:    Mode: AC/ CMV (Assist Control/ Continuous Mandatory Ventilation)  RR (machine): 16  TV (machine): 400  FiO2: 30  PEEP: 5  ITime: 0.64  MAP: 10  PIP: 36      HOSPITAL MEDICATIONS:  MEDICATIONS  (STANDING):  artificial  tears Solution 1 Drop(s) Both EYES every 1 hour  chlorhexidine 0.12% Liquid 15 milliLiter(s) Oral Mucosa every 12 hours  chlorhexidine 2% Cloths 1 Application(s) Topical daily  diazepam    Tablet 2 milliGRAM(s) Oral every 12 hours  enoxaparin Injectable 40 milliGRAM(s) SubCutaneous every 24 hours  erythromycin   Ointment 1 Application(s) Both EYES <User Schedule>  multivitamin/minerals/iron Oral Solution (CENTRUM) 15 milliLiter(s) Oral daily  ofloxacin 0.3% Solution 1 Drop(s) Both EYES two times a day  riluzole 50 milliGRAM(s) Oral two times a day  sertraline 75 milliGRAM(s) Oral daily    MEDICATIONS  (PRN):  acetaminophen   Oral Liquid .. 650 milliGRAM(s) Oral every 6 hours PRN Temp greater or equal to 38C (100.4F), Mild Pain (1 - 3)  diazepam    Tablet 2 milliGRAM(s) Oral at bedtime PRN for dysautonomia/ hypertension/discomfort      LABS:                        11.2   12.58 )-----------( 402      ( 14 Aug 2024 03:00 )             36.2     08-14    138  |  103  |  32<H>  ----------------------------<  123<H>  4.0   |  22  |  <0.20<L>    Ca    9.1      14 Aug 2024 03:00  Phos  2.9     08-14  Mg     2.00     08-14        Urinalysis Basic - ( 14 Aug 2024 03:00 )    Color: x / Appearance: x / SG: x / pH: x  Gluc: 123 mg/dL / Ketone: x  / Bili: x / Urobili: x   Blood: x / Protein: x / Nitrite: x   Leuk Esterase: x / RBC: x / WBC x   Sq Epi: x / Non Sq Epi: x / Bacteria: x        Venous Blood Gas:  08-14 @ 03:00  7.45/35/74/24/96.6  VBG Lactate: --   CHIEF COMPLAINT: Patient is a 69y old  Female who presents with a chief complaint of trach exchange (18 Jul 2024 07:07)      INTERVAL EVENTS:   - no acute overnight events.  - Trach to ventilator s/p trach exchange w/ Thoracic.     ROS: Seen by bedside during AM rounds. Unable to obtain d/t mental status and hx of ALS.    OBJECTIVE:  ICU Vital Signs Last 24 Hrs  T(C): 36 (15 Aug 2024 04:00), Max: 36.9 (14 Aug 2024 12:00)  T(F): 96.8 (15 Aug 2024 04:00), Max: 98.5 (14 Aug 2024 16:00)  HR: 111 (15 Aug 2024 04:00) (88 - 122)  BP: 106/65 (15 Aug 2024 04:00) (105/76 - 127/65)  BP(mean): 78 (15 Aug 2024 04:00) (74 - 86)  ABP: --  ABP(mean): --  RR: 20 (15 Aug 2024 04:00) (18 - 20)  SpO2: 100% (15 Aug 2024 04:00) (95% - 100%)    O2 Parameters below as of 15 Aug 2024 04:00  Patient On (Oxygen Delivery Method): ventilator          Mode: AC/ CMV (Assist Control/ Continuous Mandatory Ventilation), RR (machine): 16, TV (machine): 400, FiO2: 30, PEEP: 5, ITime: 0.64, MAP: 10, PIP: 36    08-13 @ 07:01  -  08-14 @ 07:00  --------------------------------------------------------  IN: 700 mL / OUT: 700 mL / NET: 0 mL    08-14 @ 07:01  -  08-15 @ 06:48  --------------------------------------------------------  IN: 1400 mL / OUT: 850 mL / NET: 550 mL      CAPILLARY BLOOD GLUCOSE        PHYSICAL EXAM:  General: NAD  Neck: neck supple, no JVD, +trach to ventilator   Respiratory: +coarse breath sounds bilaterally, no wheezing, no accessory muscle use or respiratory distress   Cardiovascular: +sinus tachycardia   Abdomen: soft, non tender, +PEG  Extremities: no LE edema b/l  Skin: warm and dry  Neurological: Eyes are open, does not track or follow commands, history of ALS   Psychiatry: no agitation       Mode: AC/ CMV (Assist Control/ Continuous Mandatory Ventilation)  RR (machine): 16  TV (machine): 400  FiO2: 30  PEEP: 5  ITime: 0.64  MAP: 10  PIP: 36      HOSPITAL MEDICATIONS:  MEDICATIONS  (STANDING):  artificial  tears Solution 1 Drop(s) Both EYES every 1 hour  chlorhexidine 0.12% Liquid 15 milliLiter(s) Oral Mucosa every 12 hours  chlorhexidine 2% Cloths 1 Application(s) Topical daily  diazepam    Tablet 2 milliGRAM(s) Oral every 12 hours  enoxaparin Injectable 40 milliGRAM(s) SubCutaneous every 24 hours  erythromycin   Ointment 1 Application(s) Both EYES <User Schedule>  multivitamin/minerals/iron Oral Solution (CENTRUM) 15 milliLiter(s) Oral daily  ofloxacin 0.3% Solution 1 Drop(s) Both EYES two times a day  riluzole 50 milliGRAM(s) Oral two times a day  sertraline 75 milliGRAM(s) Oral daily    MEDICATIONS  (PRN):  acetaminophen   Oral Liquid .. 650 milliGRAM(s) Oral every 6 hours PRN Temp greater or equal to 38C (100.4F), Mild Pain (1 - 3)  diazepam    Tablet 2 milliGRAM(s) Oral at bedtime PRN for dysautonomia/ hypertension/discomfort      LABS:                        11.2   12.58 )-----------( 402      ( 14 Aug 2024 03:00 )             36.2     08-14    138  |  103  |  32<H>  ----------------------------<  123<H>  4.0   |  22  |  <0.20<L>    Ca    9.1      14 Aug 2024 03:00  Phos  2.9     08-14  Mg     2.00     08-14        Urinalysis Basic - ( 14 Aug 2024 03:00 )    Color: x / Appearance: x / SG: x / pH: x  Gluc: 123 mg/dL / Ketone: x  / Bili: x / Urobili: x   Blood: x / Protein: x / Nitrite: x   Leuk Esterase: x / RBC: x / WBC x   Sq Epi: x / Non Sq Epi: x / Bacteria: x        Venous Blood Gas:  08-14 @ 03:00  7.45/35/74/24/96.6  VBG Lactate: --

## 2024-08-15 NOTE — CHART NOTE - NSCHARTNOTEFT_GEN_A_CORE
Provider spoke to Jayden () [phone] and provided clinical update. Explained current trach status and requested family to bring in home ventilator to ensure safety prior to discharge. All questions answered at this time.    Devang Herndon PA-C,   Internal Medicine ACP   In house pager #[phone]

## 2024-08-15 NOTE — PROGRESS NOTE ADULT - ASSESSMENT
68 YO Female with PMHx of ALS, Parkinson,, nonverbal, bed bound, chronic respiratory failure with tracheostomy and vent dependence, and oropharyngeal dysphagia with PEG who presented from home with tracheostomy leak.   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 Was previously able to have lids taped shut at night however now becoming more difficult. Patient consequently with noticeable worsening in eye redness OD>>OS despite frequent lubrication   - Initial exam with conjunctival injection OD>>OS, cornea OD with inferior epithelial defect encompassing 40% of cornea. With chronic corneal damage and changes secondary to exposure including extensive overlying filaments, pannus formation, and stromal scarring; OS with inferior epithelial defect encompassing 20% of cornea. Also with chronic corneal damage and changes secondary to exposure including extensive filaments, pannus. No active infiltrate or ulcer appreciated at this time OU. No thinning OU.   - 8/15: exam OU with resolved epithelial defect but persistent chronic corneal changes as per exam above  - cw ofloxacin twice a day to both eyes  - CW Erythromycin ointment every 3 hours to both eyes   - Create moisture chamber with tegaderm following placement of EXTENSIVE ointment to both eyes. Nurses shown how to make at bedside. The tegaderm should not be placed tightly overlying the eye, and there should be ointment covering the entirety of the exposed eye. Place moisturizing ointment around areas of face where tegaderm is to stick so that patient does not develop skin irritation    - Artificial tears every 1 hour while awake to both eyes prior to ointment   - Can have 6 hour period during day during which should continue with ointment/drop regimen but without tegaderm     SDW Dr. Denton     Outpatient follow-up: Patient should follow-up with his/her ophthalmologist or with [facility] Department of Ophthalmology upon discharge at the address below     [facility] Department of Ophthalmology  [address] [place] [address]. [address]  [place] [address]  [phone]

## 2024-08-16 LAB
ANION GAP SERPL CALC-SCNC: 13 MMOL/L — SIGNIFICANT CHANGE UP (ref 7–14)
BUN SERPL-MCNC: 20 MG/DL — SIGNIFICANT CHANGE UP (ref 7–23)
CALCIUM SERPL-MCNC: 9.6 MG/DL — SIGNIFICANT CHANGE UP (ref 8.4–10.5)
CHLORIDE SERPL-SCNC: 103 MMOL/L — SIGNIFICANT CHANGE UP (ref 98–107)
CO2 SERPL-SCNC: 22 MMOL/L — SIGNIFICANT CHANGE UP (ref 22–31)
CREAT SERPL-MCNC: <0.2 MG/DL — LOW (ref 0.5–1.3)
EGFR: 125 ML/MIN/1.73M2 — SIGNIFICANT CHANGE UP
GLUCOSE SERPL-MCNC: 123 MG/DL — HIGH (ref 70–99)
HCT VFR BLD CALC: 40.9 % — SIGNIFICANT CHANGE UP (ref 34.5–45)
HGB BLD-MCNC: 12.6 G/DL — SIGNIFICANT CHANGE UP (ref 11.5–15.5)
MAGNESIUM SERPL-MCNC: 2.1 MG/DL — SIGNIFICANT CHANGE UP (ref 1.6–2.6)
MCHC RBC-ENTMCNC: 25.9 PG — LOW (ref 27–34)
MCHC RBC-ENTMCNC: 30.8 GM/DL — LOW (ref 32–36)
MCV RBC AUTO: 84.2 FL — SIGNIFICANT CHANGE UP (ref 80–100)
NRBC # BLD: 0 /100 WBCS — SIGNIFICANT CHANGE UP (ref 0–0)
NRBC # FLD: 0 K/UL — SIGNIFICANT CHANGE UP (ref 0–0)
PHOSPHATE SERPL-MCNC: 2.6 MG/DL — SIGNIFICANT CHANGE UP (ref 2.5–4.5)
PLATELET # BLD AUTO: 433 K/UL — HIGH (ref 150–400)
POTASSIUM SERPL-MCNC: 4.3 MMOL/L — SIGNIFICANT CHANGE UP (ref 3.5–5.3)
POTASSIUM SERPL-SCNC: 4.3 MMOL/L — SIGNIFICANT CHANGE UP (ref 3.5–5.3)
RBC # BLD: 4.86 M/UL — SIGNIFICANT CHANGE UP (ref 3.8–5.2)
RBC # FLD: 17 % — HIGH (ref 10.3–14.5)
SODIUM SERPL-SCNC: 138 MMOL/L — SIGNIFICANT CHANGE UP (ref 135–145)
WBC # BLD: 14.33 K/UL — HIGH (ref 3.8–10.5)
WBC # FLD AUTO: 14.33 K/UL — HIGH (ref 3.8–10.5)

## 2024-08-16 PROCEDURE — 99233 SBSQ HOSP IP/OBS HIGH 50: CPT

## 2024-08-16 RX ADMIN — POVIDONE, PROPYLENE GLYCOL 1 DROP(S): 6.8; 3 LIQUID OPHTHALMIC at 14:00

## 2024-08-16 RX ADMIN — POVIDONE, PROPYLENE GLYCOL 1 DROP(S): 6.8; 3 LIQUID OPHTHALMIC at 19:07

## 2024-08-16 RX ADMIN — OFLOXACIN 1 DROP(S): 3 SOLUTION/ DROPS OPHTHALMIC at 17:15

## 2024-08-16 RX ADMIN — POVIDONE, PROPYLENE GLYCOL 1 DROP(S): 6.8; 3 LIQUID OPHTHALMIC at 02:53

## 2024-08-16 RX ADMIN — SERTRALINE HYDROCHLORIDE 75 MILLIGRAM(S): 50 TABLET, FILM COATED ORAL at 11:42

## 2024-08-16 RX ADMIN — ERYTHROMYCIN 1 APPLICATION(S): 5 OINTMENT OPHTHALMIC at 11:41

## 2024-08-16 RX ADMIN — Medication 15 MILLILITER(S): at 11:42

## 2024-08-16 RX ADMIN — ERYTHROMYCIN 1 APPLICATION(S): 5 OINTMENT OPHTHALMIC at 20:52

## 2024-08-16 RX ADMIN — POVIDONE, PROPYLENE GLYCOL 1 DROP(S): 6.8; 3 LIQUID OPHTHALMIC at 08:30

## 2024-08-16 RX ADMIN — POVIDONE, PROPYLENE GLYCOL 1 DROP(S): 6.8; 3 LIQUID OPHTHALMIC at 00:41

## 2024-08-16 RX ADMIN — POVIDONE, PROPYLENE GLYCOL 1 DROP(S): 6.8; 3 LIQUID OPHTHALMIC at 22:47

## 2024-08-16 RX ADMIN — OFLOXACIN 1 DROP(S): 3 SOLUTION/ DROPS OPHTHALMIC at 05:26

## 2024-08-16 RX ADMIN — POVIDONE, PROPYLENE GLYCOL 1 DROP(S): 6.8; 3 LIQUID OPHTHALMIC at 15:17

## 2024-08-16 RX ADMIN — CHLORHEXIDINE GLUCONATE 15 MILLILITER(S): 40 SOLUTION TOPICAL at 05:25

## 2024-08-16 RX ADMIN — POVIDONE, PROPYLENE GLYCOL 1 DROP(S): 6.8; 3 LIQUID OPHTHALMIC at 23:36

## 2024-08-16 RX ADMIN — POVIDONE, PROPYLENE GLYCOL 1 DROP(S): 6.8; 3 LIQUID OPHTHALMIC at 05:26

## 2024-08-16 RX ADMIN — POVIDONE, PROPYLENE GLYCOL 1 DROP(S): 6.8; 3 LIQUID OPHTHALMIC at 10:14

## 2024-08-16 RX ADMIN — POVIDONE, PROPYLENE GLYCOL 1 DROP(S): 6.8; 3 LIQUID OPHTHALMIC at 22:01

## 2024-08-16 RX ADMIN — RILUZOLE 50 MILLIGRAM(S): 5 LIQUID ORAL at 17:14

## 2024-08-16 RX ADMIN — POVIDONE, PROPYLENE GLYCOL 1 DROP(S): 6.8; 3 LIQUID OPHTHALMIC at 11:41

## 2024-08-16 RX ADMIN — POVIDONE, PROPYLENE GLYCOL 1 DROP(S): 6.8; 3 LIQUID OPHTHALMIC at 20:10

## 2024-08-16 RX ADMIN — POVIDONE, PROPYLENE GLYCOL 1 DROP(S): 6.8; 3 LIQUID OPHTHALMIC at 08:32

## 2024-08-16 RX ADMIN — ERYTHROMYCIN 1 APPLICATION(S): 5 OINTMENT OPHTHALMIC at 23:37

## 2024-08-16 RX ADMIN — POVIDONE, PROPYLENE GLYCOL 1 DROP(S): 6.8; 3 LIQUID OPHTHALMIC at 04:50

## 2024-08-16 RX ADMIN — ERYTHROMYCIN 1 APPLICATION(S): 5 OINTMENT OPHTHALMIC at 08:30

## 2024-08-16 RX ADMIN — Medication 2 MILLIGRAM(S): at 17:19

## 2024-08-16 RX ADMIN — POVIDONE, PROPYLENE GLYCOL 1 DROP(S): 6.8; 3 LIQUID OPHTHALMIC at 17:16

## 2024-08-16 RX ADMIN — ERYTHROMYCIN 1 APPLICATION(S): 5 OINTMENT OPHTHALMIC at 05:26

## 2024-08-16 RX ADMIN — POVIDONE, PROPYLENE GLYCOL 1 DROP(S): 6.8; 3 LIQUID OPHTHALMIC at 14:01

## 2024-08-16 RX ADMIN — RILUZOLE 50 MILLIGRAM(S): 5 LIQUID ORAL at 05:25

## 2024-08-16 RX ADMIN — POVIDONE, PROPYLENE GLYCOL 1 DROP(S): 6.8; 3 LIQUID OPHTHALMIC at 06:37

## 2024-08-16 RX ADMIN — POVIDONE, PROPYLENE GLYCOL 1 DROP(S): 6.8; 3 LIQUID OPHTHALMIC at 04:11

## 2024-08-16 RX ADMIN — POVIDONE, PROPYLENE GLYCOL 1 DROP(S): 6.8; 3 LIQUID OPHTHALMIC at 14:03

## 2024-08-16 RX ADMIN — POVIDONE, PROPYLENE GLYCOL 1 DROP(S): 6.8; 3 LIQUID OPHTHALMIC at 20:51

## 2024-08-16 RX ADMIN — CHLORHEXIDINE GLUCONATE 15 MILLILITER(S): 40 SOLUTION TOPICAL at 17:14

## 2024-08-16 RX ADMIN — CHLORHEXIDINE GLUCONATE 1 APPLICATION(S): 40 SOLUTION TOPICAL at 11:41

## 2024-08-16 RX ADMIN — POVIDONE, PROPYLENE GLYCOL 1 DROP(S): 6.8; 3 LIQUID OPHTHALMIC at 10:16

## 2024-08-16 RX ADMIN — Medication 2 MILLIGRAM(S): at 05:25

## 2024-08-16 RX ADMIN — POVIDONE, PROPYLENE GLYCOL 1 DROP(S): 6.8; 3 LIQUID OPHTHALMIC at 01:21

## 2024-08-16 RX ADMIN — POVIDONE, PROPYLENE GLYCOL 1 DROP(S): 6.8; 3 LIQUID OPHTHALMIC at 17:15

## 2024-08-16 RX ADMIN — ERYTHROMYCIN 1 APPLICATION(S): 5 OINTMENT OPHTHALMIC at 02:54

## 2024-08-16 RX ADMIN — ERYTHROMYCIN 1 APPLICATION(S): 5 OINTMENT OPHTHALMIC at 17:14

## 2024-08-16 RX ADMIN — ENOXAPARIN SODIUM 40 MILLIGRAM(S): 100 INJECTION SUBCUTANEOUS at 17:14

## 2024-08-16 RX ADMIN — ERYTHROMYCIN 1 APPLICATION(S): 5 OINTMENT OPHTHALMIC at 14:00

## 2024-08-16 NOTE — PROGRESS NOTE ADULT - ASSESSMENT
ASSESSMENT   67 YO Female with PMHx of ALS, Parkinson,, nonverbal, bed bound, chronic respiratory failure with tracheostomy and vent dependence, and oropharyngeal dysphagia with PEG who presented from home with tracheostomy leak. She was noted with vent alarm and poor TVe (250-290) at home. Trach changed at home with no improvement and sent in for thoracic evaluation. While in MICU no air leak noted with hyperinflated cuff. Transferred to RCU on 7/5 with course complicated by leukocytosis second to UTI vs trachitis and completed zosyn course. Trach upsized by thoracic on 7/17 however AL remains and now pending custom trach.     PLAN  NEUROLOGY  # ALS   - Baseline nonverbal, awake, and communicates with eye movement.   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   - Continue on Ofloxacin BID (decreased 8/1)   - Continue on Erythromycin OU Q3H (decreased 8/8)  - Continue with artificial tears OU Q1H   - Create moisture chamber with Tegaderm following placement of EXTENSIVE ointment to the right eye throughout the day and to the left eye nightly.   - Allow 6 hour window during the day where moisture can be removed from right eye   - inflammation appears to be improving     PSYCH   # Anxiety and Depression   - Continue on home Valium 2mg BID with additional 2mg PRN   - Continue on Zoloft 75mg QD while in house (on 4cc/ 80mg QD at home)     CARDIOVASCULAR   # HTN thought to be second to discomfort vs dysautonomia   - Evening HTN and continue on Hydralazine vs valium PRN doses   - Monitor HR and BP    # pSVT  - Intermittent episodes of SVT, lasting a few seconds at a time, and self limiting.   - Consider BB if becomes more persistent or HD unstable    RESPIRATORY  # Tracheostomy leak   - At home noted with TVe 200-300s despite tracheostomy change to 80XLTCD.   - No air leak noted in house with hyperinflated cuff likely tracheomegaly?   - Continue on chronic vent 16/400/5/21   - Chronic bibasilar atelectasis and continued on HTS PRN with chest PT  - Trach upsized to Bivona 9 on 7/17, however trach leak remains.   - Custom Tracheostomy delivered and exchanged on Monday 8/12  - Pt has a 9.5mm Bovana, Second spare trach was defective - Per thoracic team 2 spare trachs have been ordered with possible delivery Fri BY OR manager April- spectra #  [id number]  - Family will bring in home vent for trial with new trach in place       GI  # Dysphagia   - Continue on PEG-TF     RENAL  # High PVR   - BS with roughly 200-300cc PVR, however no acute distress   - Monitor renal function and UOP     # Elevated lactate   - Lactate elevated with mild contraction alkalosis   - Rehydrate with improvement     INFECTIOUS DISEASE  # Leukocytosis likely second to trachitis vs UTI?   - No fever or chills and noted with prior leukocytosis   - CXR with prior atelectasis and no acute findings   - UA (7/6) with positive nitrites (7/6)   - SCx (7/5) with pansensitive PSA  - BCx (7/6) x 2 NGT  - UCx (7/6) with coag neg staph  - MRSA PCR (7/6) POSITIVE for both MRSA/MSSA s/p Bactroban (7/6 - 7/10)  - s/p empiric Zosyn (7/8 - 7/14) however WBC continues to wax and wean without fever and will monitor off ABX .     HEME  - On Xarelto 10mg at home likely for DVT PPX?   - Hold home Xarelto in prep for OR   - DVT PPX with LVX for now and hold home Eliquis PPX  - LVX restarted     ENDOCRINE  - No hx of DM2   - Monitor BG     SKIN  - Basic trach and PEG care ordered   - DTI on back and pending [facility]    ETHICS/ GOC    - FULL CODE   - Dr Tyson Velasquez,  involved in care    DISPO - Back home with  when able.     ASSESSMENT   69 YO Female with PMHx of ALS, Parkinson,, nonverbal, bed bound, chronic respiratory failure with tracheostomy and vent dependence, and oropharyngeal dysphagia with PEG who presented from home with tracheostomy leak. She was noted with vent alarm and poor TVe (250-290) at home. Trach changed at home with no improvement and sent in for thoracic evaluation. While in MICU no air leak noted with hyperinflated cuff. Transferred to RCU on 7/5 with course complicated by leukocytosis second to UTI vs trachitis and completed zosyn course. Trach upsized by thoracic on 7/17 however AL remains and now pending custom trach.     PLAN  NEUROLOGY  # ALS   - Baseline nonverbal, awake, and communicates with eye movement.   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   - Continue on Ofloxacin BID (decreased 8/1)   - Continue on Erythromycin OU Q3H (decreased 8/8)  - Continue with artificial tears OU Q1H   - Create moisture chamber with Tegaderm following placement of EXTENSIVE ointment to the right eye throughout the day and to the left eye nightly.   - Allow 6 hour window during the day where moisture can be removed from right eye   - inflammation appears to be improving   - Ophtho f/u yesterday 8/15: exam OU with resolved epithelial defect but persistent chronic corneal changes - continue with above regimen    PSYCH   # Anxiety and Depression   - Continue on home Valium 2mg BID with additional 2mg PRN   - Continue on Zoloft 75mg QD while in house (on 4cc/ 80mg QD at home)     CARDIOVASCULAR   # HTN thought to be second to discomfort vs dysautonomia   - Evening HTN and continue on Hydralazine vs valium PRN doses   - Monitor HR and BP    # pSVT  - Intermittent episodes of SVT, lasting a few seconds at a time, and self limiting.   - Consider BB if becomes more persistent or HD unstable    RESPIRATORY  # Tracheostomy leak   - At home noted with TVe 200-300s despite tracheostomy change to 80XLTCD.   - No air leak noted in house with hyperinflated cuff likely tracheomegaly?   - Continue on chronic vent 16/400/5/21   - Chronic bibasilar atelectasis and continued on HTS PRN with chest PT  - Trach upsized to Bivona 9 on 7/17, however trach leak remains.   - Custom Tracheostomy delivered and exchanged on Monday 8/12  - Pt has a 9.5mm Bovana, Second spare trach was defective - Per thoracic team 2 spare trachs have been ordered with possible delivery Fri BY OR manager April- spectra #  [id number]  - Family will bring in home vent for trial with new trach in place     GI  # Dysphagia   - Continue on PEG-TF     RENAL  # High PVR   - BS with roughly 200-300cc PVR, however no acute distress   - Monitor renal function and UOP     # Elevated lactate   - Lactate elevated with mild contraction alkalosis   - Rehydrate with improvement     INFECTIOUS DISEASE  # Leukocytosis likely second to trachitis vs UTI?   - No fever or chills and noted with prior leukocytosis   - CXR with prior atelectasis and no acute findings   - UA (7/6) with positive nitrites (7/6)   - SCx (7/5) with pansensitive PSA  - BCx (7/6) x 2 NGT  - UCx (7/6) with coag neg staph  - MRSA PCR (7/6) POSITIVE for both MRSA/MSSA s/p Bactroban (7/6 - 7/10)  - s/p empiric Zosyn (7/8 - 7/14) however WBC continues to wax and wean without fever and will monitor off ABX .     HEME  - On Xarelto 10mg at home likely for DVT PPX?   - Hold home Xarelto in prep for OR   - DVT PPX with LVX for now and hold home Eliquis PPX  - LVX restarted     ENDOCRINE  - No hx of DM2   - Monitor BG     SKIN  - Basic trach and PEG care ordered   - DTI on back and pending [facility]    ETHICS/ GOC    - FULL CODE   - Dr Tyson Velasquez,  involved in care    DISPO - Back home with  when able.

## 2024-08-16 NOTE — PROGRESS NOTE ADULT - NS ATTEND AMEND GEN_ALL_CORE FT
Patient is a 70 yo F w/ advanced ALS (nonverbal and bedbound at baseline) with chronic hypercapnic respiratory failure and ventilatory dependence via tracheostomy at baseline and Parkinson's who was initially presenting to [facility] on 7/4/24 with acute hypercapnia secondary to large expiratory air leak s/p upsizing without improvement with hospital course c/b tracheitis vs. UTI transferred from MICU to RCU on 7/5 for further management    #Acute on chronic hypercapnic respiratory failure  #ALS  #Chronic trach dependence - Trach exchanged at home without improvement followed by upsizing at bedside to 9 Bivona but still with air leak. Thoracic sx consulted, custom trach ordered (Portex 9.5 with 40mm balloon).  - s/p OR on 8/12 trach exchange with thoracic. Improved TV w/ Portex 9.5 with 40mm balloon. Backup trachs arrived, undergoing sterilization process.  - c/w home riluzole, diazepam, and zoloft  - c/w mechanical ventilatory support, family to bring in home vent to use to demonstrate stability while admitted  - c/w airway clearance  - c/w ointment/eye drops for corneal exposure  - Resumed DVT ppx, can change to PO on discharge    Plan for possible discharge Monday    Huang Rae MD  Pulmonary & Critical Care

## 2024-08-16 NOTE — PROGRESS NOTE ADULT - SUBJECTIVE AND OBJECTIVE BOX
CHIEF COMPLAINT: Patient is a 69y old  Female who presents with a chief complaint of trach exchange (18 Jul 2024 07:07)      INTERVAL EVENTS:   - No acute overnight events, on ventilator.  - Awaiting custom spare trachs and family to bring home ventilator to RCU    ROS: Seen by bedside during AM rounds     OBJECTIVE:  ICU Vital Signs Last 24 Hrs  T(C): 36.4 (16 Aug 2024 04:00), Max: 37.2 (15 Aug 2024 08:00)  T(F): 97.6 (16 Aug 2024 04:00), Max: 98.9 (15 Aug 2024 08:00)  HR: 96 (16 Aug 2024 04:00) (96 - 114)  BP: 119/75 (16 Aug 2024 04:00) (119/75 - 153/97)  BP(mean): 87 (16 Aug 2024 04:00) (87 - 112)  ABP: --  ABP(mean): --  RR: 17 (16 Aug 2024 04:00) (16 - 20)  SpO2: 99% (16 Aug 2024 04:00) (97% - 99%)    O2 Parameters below as of 16 Aug 2024 04:00  Patient On (Oxygen Delivery Method): ventilator          Mode: AC/ CMV (Assist Control/ Continuous Mandatory Ventilation), RR (machine): 16, TV (machine): 400, FiO2: 30, PEEP: 5, ITime: 0.65, MAP: 10, PIP: 32    08-14 @ 07:01 - 08-15 @ 07:00  --------------------------------------------------------  IN: 1400 mL / OUT: 850 mL / NET: 550 mL    08-15 @ 07:01 - 08-16 @ 06:41  --------------------------------------------------------  IN: 1300 mL / OUT: 450 mL / NET: 850 mL      CAPILLARY BLOOD GLUCOSE          PHYSICAL EXAM:  General:   HEENT:   Lymph Nodes:  Neck:   Respiratory:   Cardiovascular:   Abdomen:   Extremities:   Skin:   Neurological:  Psychiatry:    Mode: AC/ CMV (Assist Control/ Continuous Mandatory Ventilation)  RR (machine): 16  TV (machine): 400  FiO2: 30  PEEP: 5  ITime: 0.65  MAP: 10  PIP: 32      HOSPITAL MEDICATIONS:  MEDICATIONS  (STANDING):  artificial  tears Solution 1 Drop(s) Both EYES every 1 hour  chlorhexidine 0.12% Liquid 15 milliLiter(s) Oral Mucosa every 12 hours  chlorhexidine 2% Cloths 1 Application(s) Topical daily  diazepam    Tablet 2 milliGRAM(s) Oral every 12 hours  enoxaparin Injectable 40 milliGRAM(s) SubCutaneous every 24 hours  erythromycin   Ointment 1 Application(s) Both EYES <User Schedule>  multivitamin/minerals/iron Oral Solution (CENTRUM) 15 milliLiter(s) Oral daily  ofloxacin 0.3% Solution 1 Drop(s) Both EYES two times a day  riluzole 50 milliGRAM(s) Oral two times a day  sertraline 75 milliGRAM(s) Oral daily    MEDICATIONS  (PRN):  acetaminophen   Oral Liquid .. 650 milliGRAM(s) Oral every 6 hours PRN Temp greater or equal to 38C (100.4F), Mild Pain (1 - 3)  diazepam    Tablet 2 milliGRAM(s) Oral at bedtime PRN for dysautonomia/ hypertension/discomfort      LABS:                        12.6   14.33 )-----------( 433      ( 16 Aug 2024 03:10 )             40.9     08-16    138  |  103  |  20  ----------------------------<  123<H>  4.3   |  22  |  <0.20<L>    Ca    9.6      16 Aug 2024 03:10  Phos  2.6     08-16  Mg     2.10     08-16        Urinalysis Basic - ( 16 Aug 2024 03:10 )    Color: x / Appearance: x / SG: x / pH: x  Gluc: 123 mg/dL / Ketone: x  / Bili: x / Urobili: x   Blood: x / Protein: x / Nitrite: x   Leuk Esterase: x / RBC: x / WBC x   Sq Epi: x / Non Sq Epi: x / Bacteria: x         CHIEF COMPLAINT: Patient is a 69y old  Female who presents with a chief complaint of trach exchange (18 Jul 2024 07:07)      INTERVAL EVENTS:   - No acute overnight events, on ventilator.  - Awaiting custom spare trachs and family to bring home ventilator to RCU    ROS: Patient seen and evaluated at bedside during AM rounds.     OBJECTIVE:  ICU Vital Signs Last 24 Hrs  T(C): 36.4 (16 Aug 2024 04:00), Max: 37.2 (15 Aug 2024 08:00)  T(F): 97.6 (16 Aug 2024 04:00), Max: 98.9 (15 Aug 2024 08:00)  HR: 96 (16 Aug 2024 04:00) (96 - 114)  BP: 119/75 (16 Aug 2024 04:00) (119/75 - 153/97)  BP(mean): 87 (16 Aug 2024 04:00) (87 - 112)  ABP: --  ABP(mean): --  RR: 17 (16 Aug 2024 04:00) (16 - 20)  SpO2: 99% (16 Aug 2024 04:00) (97% - 99%)    O2 Parameters below as of 16 Aug 2024 04:00  Patient On (Oxygen Delivery Method): ventilator          Mode: AC/ CMV (Assist Control/ Continuous Mandatory Ventilation), RR (machine): 16, TV (machine): 400, FiO2: 30, PEEP: 5, ITime: 0.65, MAP: 10, PIP: 32    08-14 @ 07:01 - 08-15 @ 07:00  --------------------------------------------------------  IN: 1400 mL / OUT: 850 mL / NET: 550 mL    08-15 @ 07:01 - 08-16 @ 06:41  --------------------------------------------------------  IN: 1300 mL / OUT: 450 mL / NET: 850 mL      CAPILLARY BLOOD GLUCOSE          PHYSICAL EXAM:  General:   HEENT:   Lymph Nodes:  Neck:   Respiratory:   Cardiovascular:   Abdomen:   Extremities:   Skin:   Neurological:  Psychiatry:    Mode: AC/ CMV (Assist Control/ Continuous Mandatory Ventilation)  RR (machine): 16  TV (machine): 400  FiO2: 30  PEEP: 5  ITime: 0.65  MAP: 10  PIP: 32      HOSPITAL MEDICATIONS:  MEDICATIONS  (STANDING):  artificial  tears Solution 1 Drop(s) Both EYES every 1 hour  chlorhexidine 0.12% Liquid 15 milliLiter(s) Oral Mucosa every 12 hours  chlorhexidine 2% Cloths 1 Application(s) Topical daily  diazepam    Tablet 2 milliGRAM(s) Oral every 12 hours  enoxaparin Injectable 40 milliGRAM(s) SubCutaneous every 24 hours  erythromycin   Ointment 1 Application(s) Both EYES <User Schedule>  multivitamin/minerals/iron Oral Solution (CENTRUM) 15 milliLiter(s) Oral daily  ofloxacin 0.3% Solution 1 Drop(s) Both EYES two times a day  riluzole 50 milliGRAM(s) Oral two times a day  sertraline 75 milliGRAM(s) Oral daily    MEDICATIONS  (PRN):  acetaminophen   Oral Liquid .. 650 milliGRAM(s) Oral every 6 hours PRN Temp greater or equal to 38C (100.4F), Mild Pain (1 - 3)  diazepam    Tablet 2 milliGRAM(s) Oral at bedtime PRN for dysautonomia/ hypertension/discomfort      LABS:                        12.6   14.33 )-----------( 433      ( 16 Aug 2024 03:10 )             40.9     08-16    138  |  103  |  20  ----------------------------<  123<H>  4.3   |  22  |  <0.20<L>    Ca    9.6      16 Aug 2024 03:10  Phos  2.6     08-16  Mg     2.10     08-16        Urinalysis Basic - ( 16 Aug 2024 03:10 )    Color: x / Appearance: x / SG: x / pH: x  Gluc: 123 mg/dL / Ketone: x  / Bili: x / Urobili: x   Blood: x / Protein: x / Nitrite: x   Leuk Esterase: x / RBC: x / WBC x   Sq Epi: x / Non Sq Epi: x / Bacteria: x         CHIEF COMPLAINT: Patient is a 69y old  Female who presents with a chief complaint of trach exchange (18 Jul 2024 07:07)    INTERVAL EVENTS:   - No acute overnight events, on ventilator.  - Awaiting custom spare trachs and family to bring home ventilator to RCU    ROS: Patient seen and evaluated at bedside during AM rounds. Unable to obtain d/t baseline mental status    OBJECTIVE:  ICU Vital Signs Last 24 Hrs  T(C): 36.4 (16 Aug 2024 04:00), Max: 37.2 (15 Aug 2024 08:00)  T(F): 97.6 (16 Aug 2024 04:00), Max: 98.9 (15 Aug 2024 08:00)  HR: 96 (16 Aug 2024 04:00) (96 - 114)  BP: 119/75 (16 Aug 2024 04:00) (119/75 - 153/97)  BP(mean): 87 (16 Aug 2024 04:00) (87 - 112)  ABP: --  ABP(mean): --  RR: 17 (16 Aug 2024 04:00) (16 - 20)  SpO2: 99% (16 Aug 2024 04:00) (97% - 99%)    O2 Parameters below as of 16 Aug 2024 04:00  Patient On (Oxygen Delivery Method): ventilator          Mode: AC/ CMV (Assist Control/ Continuous Mandatory Ventilation), RR (machine): 16, TV (machine): 400, FiO2: 30, PEEP: 5, ITime: 0.65, MAP: 10, PIP: 32    08-14 @ 07:01  -  08-15 @ 07:00  --------------------------------------------------------  IN: 1400 mL / OUT: 850 mL / NET: 550 mL    08-15 @ 07:01  -  08-16 @ 06:41  --------------------------------------------------------  IN: 1300 mL / OUT: 450 mL / NET: 850 mL      CAPILLARY BLOOD GLUCOSE      PHYSICAL EXAM:  General: NAD   Neck: +Trach tube noted to vent, site c/d/i.  Cards: S1/S2, no murmurs. Sinus tachycardia  Pulm: CTA bilaterally. No wheezes. No acute respiratory distress.  Abdomen: Soft, NTND. +PEG noted, site c/d/i.   Extremities: No pedal edema. Extremities warm to touch.  Neurology: Eyes are open, does not track or follow commands, history of ALS  Skin: warm to touch. Refer to RN assessment for further details.     Mode: AC/ CMV (Assist Control/ Continuous Mandatory Ventilation)  RR (machine): 16  TV (machine): 400  FiO2: 30  PEEP: 5  ITime: 0.65  MAP: 10  PIP: 32      HOSPITAL MEDICATIONS:  MEDICATIONS  (STANDING):  artificial  tears Solution 1 Drop(s) Both EYES every 1 hour  chlorhexidine 0.12% Liquid 15 milliLiter(s) Oral Mucosa every 12 hours  chlorhexidine 2% Cloths 1 Application(s) Topical daily  diazepam    Tablet 2 milliGRAM(s) Oral every 12 hours  enoxaparin Injectable 40 milliGRAM(s) SubCutaneous every 24 hours  erythromycin   Ointment 1 Application(s) Both EYES <User Schedule>  multivitamin/minerals/iron Oral Solution (CENTRUM) 15 milliLiter(s) Oral daily  ofloxacin 0.3% Solution 1 Drop(s) Both EYES two times a day  riluzole 50 milliGRAM(s) Oral two times a day  sertraline 75 milliGRAM(s) Oral daily    MEDICATIONS  (PRN):  acetaminophen   Oral Liquid .. 650 milliGRAM(s) Oral every 6 hours PRN Temp greater or equal to 38C (100.4F), Mild Pain (1 - 3)  diazepam    Tablet 2 milliGRAM(s) Oral at bedtime PRN for dysautonomia/ hypertension/discomfort      LABS:                        12.6   14.33 )-----------( 433      ( 16 Aug 2024 03:10 )             40.9     08-16    138  |  103  |  20  ----------------------------<  123<H>  4.3   |  22  |  <0.20<L>    Ca    9.6      16 Aug 2024 03:10  Phos  2.6     08-16  Mg     2.10     08-16        Urinalysis Basic - ( 16 Aug 2024 03:10 )    Color: x / Appearance: x / SG: x / pH: x  Gluc: 123 mg/dL / Ketone: x  / Bili: x / Urobili: x   Blood: x / Protein: x / Nitrite: x   Leuk Esterase: x / RBC: x / WBC x   Sq Epi: x / Non Sq Epi: x / Bacteria: x

## 2024-08-17 PROCEDURE — 99233 SBSQ HOSP IP/OBS HIGH 50: CPT | Mod: FS

## 2024-08-17 RX ADMIN — POVIDONE, PROPYLENE GLYCOL 1 DROP(S): 6.8; 3 LIQUID OPHTHALMIC at 15:40

## 2024-08-17 RX ADMIN — CHLORHEXIDINE GLUCONATE 1 APPLICATION(S): 40 SOLUTION TOPICAL at 11:05

## 2024-08-17 RX ADMIN — ERYTHROMYCIN 1 APPLICATION(S): 5 OINTMENT OPHTHALMIC at 17:37

## 2024-08-17 RX ADMIN — POVIDONE, PROPYLENE GLYCOL 1 DROP(S): 6.8; 3 LIQUID OPHTHALMIC at 01:33

## 2024-08-17 RX ADMIN — POVIDONE, PROPYLENE GLYCOL 1 DROP(S): 6.8; 3 LIQUID OPHTHALMIC at 00:54

## 2024-08-17 RX ADMIN — POVIDONE, PROPYLENE GLYCOL 1 DROP(S): 6.8; 3 LIQUID OPHTHALMIC at 11:15

## 2024-08-17 RX ADMIN — ERYTHROMYCIN 1 APPLICATION(S): 5 OINTMENT OPHTHALMIC at 09:30

## 2024-08-17 RX ADMIN — POVIDONE, PROPYLENE GLYCOL 1 DROP(S): 6.8; 3 LIQUID OPHTHALMIC at 11:04

## 2024-08-17 RX ADMIN — ENOXAPARIN SODIUM 40 MILLIGRAM(S): 100 INJECTION SUBCUTANEOUS at 17:36

## 2024-08-17 RX ADMIN — POVIDONE, PROPYLENE GLYCOL 1 DROP(S): 6.8; 3 LIQUID OPHTHALMIC at 22:26

## 2024-08-17 RX ADMIN — POVIDONE, PROPYLENE GLYCOL 1 DROP(S): 6.8; 3 LIQUID OPHTHALMIC at 09:30

## 2024-08-17 RX ADMIN — POVIDONE, PROPYLENE GLYCOL 1 DROP(S): 6.8; 3 LIQUID OPHTHALMIC at 15:38

## 2024-08-17 RX ADMIN — ERYTHROMYCIN 1 APPLICATION(S): 5 OINTMENT OPHTHALMIC at 02:58

## 2024-08-17 RX ADMIN — Medication 2 MILLIGRAM(S): at 05:01

## 2024-08-17 RX ADMIN — ERYTHROMYCIN 1 APPLICATION(S): 5 OINTMENT OPHTHALMIC at 21:40

## 2024-08-17 RX ADMIN — POVIDONE, PROPYLENE GLYCOL 1 DROP(S): 6.8; 3 LIQUID OPHTHALMIC at 05:01

## 2024-08-17 RX ADMIN — POVIDONE, PROPYLENE GLYCOL 1 DROP(S): 6.8; 3 LIQUID OPHTHALMIC at 05:56

## 2024-08-17 RX ADMIN — POVIDONE, PROPYLENE GLYCOL 1 DROP(S): 6.8; 3 LIQUID OPHTHALMIC at 23:27

## 2024-08-17 RX ADMIN — CHLORHEXIDINE GLUCONATE 15 MILLILITER(S): 40 SOLUTION TOPICAL at 05:02

## 2024-08-17 RX ADMIN — OFLOXACIN 1 DROP(S): 3 SOLUTION/ DROPS OPHTHALMIC at 05:02

## 2024-08-17 RX ADMIN — POVIDONE, PROPYLENE GLYCOL 1 DROP(S): 6.8; 3 LIQUID OPHTHALMIC at 15:37

## 2024-08-17 RX ADMIN — SERTRALINE HYDROCHLORIDE 75 MILLIGRAM(S): 50 TABLET, FILM COATED ORAL at 11:05

## 2024-08-17 RX ADMIN — POVIDONE, PROPYLENE GLYCOL 1 DROP(S): 6.8; 3 LIQUID OPHTHALMIC at 20:34

## 2024-08-17 RX ADMIN — ERYTHROMYCIN 1 APPLICATION(S): 5 OINTMENT OPHTHALMIC at 15:37

## 2024-08-17 RX ADMIN — RILUZOLE 50 MILLIGRAM(S): 5 LIQUID ORAL at 05:01

## 2024-08-17 RX ADMIN — POVIDONE, PROPYLENE GLYCOL 1 DROP(S): 6.8; 3 LIQUID OPHTHALMIC at 21:40

## 2024-08-17 RX ADMIN — POVIDONE, PROPYLENE GLYCOL 1 DROP(S): 6.8; 3 LIQUID OPHTHALMIC at 13:49

## 2024-08-17 RX ADMIN — POVIDONE, PROPYLENE GLYCOL 1 DROP(S): 6.8; 3 LIQUID OPHTHALMIC at 02:58

## 2024-08-17 RX ADMIN — Medication 2 MILLIGRAM(S): at 17:36

## 2024-08-17 RX ADMIN — POVIDONE, PROPYLENE GLYCOL 1 DROP(S): 6.8; 3 LIQUID OPHTHALMIC at 17:37

## 2024-08-17 RX ADMIN — RILUZOLE 50 MILLIGRAM(S): 5 LIQUID ORAL at 17:37

## 2024-08-17 RX ADMIN — OFLOXACIN 1 DROP(S): 3 SOLUTION/ DROPS OPHTHALMIC at 17:37

## 2024-08-17 RX ADMIN — ERYTHROMYCIN 1 APPLICATION(S): 5 OINTMENT OPHTHALMIC at 05:01

## 2024-08-17 RX ADMIN — POVIDONE, PROPYLENE GLYCOL 1 DROP(S): 6.8; 3 LIQUID OPHTHALMIC at 06:30

## 2024-08-17 RX ADMIN — POVIDONE, PROPYLENE GLYCOL 1 DROP(S): 6.8; 3 LIQUID OPHTHALMIC at 11:08

## 2024-08-17 RX ADMIN — POVIDONE, PROPYLENE GLYCOL 1 DROP(S): 6.8; 3 LIQUID OPHTHALMIC at 04:05

## 2024-08-17 RX ADMIN — ERYTHROMYCIN 1 APPLICATION(S): 5 OINTMENT OPHTHALMIC at 11:04

## 2024-08-17 RX ADMIN — POVIDONE, PROPYLENE GLYCOL 1 DROP(S): 6.8; 3 LIQUID OPHTHALMIC at 07:14

## 2024-08-17 RX ADMIN — Medication 15 MILLILITER(S): at 11:05

## 2024-08-17 RX ADMIN — CHLORHEXIDINE GLUCONATE 15 MILLILITER(S): 40 SOLUTION TOPICAL at 17:36

## 2024-08-17 NOTE — PROGRESS NOTE ADULT - SUBJECTIVE AND OBJECTIVE BOX
CHIEF COMPLAINT:    INTERVAL EVENTS:     ROS: Seen by bedside during AM rounds     OBJECTIVE:  ICU Vital Signs Last 24 Hrs  T(C): 36.1 (17 Aug 2024 04:00), Max: 36.7 (16 Aug 2024 08:00)  T(F): 96.9 (17 Aug 2024 04:00), Max: 98.1 (16 Aug 2024 08:00)  HR: 98 (17 Aug 2024 04:00) (96 - 105)  BP: 110/73 (17 Aug 2024 04:00) (104/71 - 124/71)  BP(mean): 83 (17 Aug 2024 04:00) (78 - 91)  ABP: --  ABP(mean): --  RR: 16 (17 Aug 2024 04:00) (16 - 16)  SpO2: 99% (17 Aug 2024 04:00) (97% - 99%)    O2 Parameters below as of 17 Aug 2024 04:00  Patient On (Oxygen Delivery Method): ventilator          Mode: AC/ CMV (Assist Control/ Continuous Mandatory Ventilation), RR (machine): 16, TV (machine): 400, FiO2: 30, PEEP: 5, ITime: 0.64, MAP: 10, PIP: 22    08-15 @ 07:01 - 08-16 @ 07:00  --------------------------------------------------------  IN: 1300 mL / OUT: 450 mL / NET: 850 mL    08-16 @ 07:01 - 08-17 @ 06:56  --------------------------------------------------------  IN: 1300 mL / OUT: 750 mL / NET: 550 mL      CAPILLARY BLOOD GLUCOSE          PHYSICAL EXAM:  General:   HEENT:   Lymph Nodes:  Neck:   Respiratory:   Cardiovascular:   Abdomen:   Extremities:   Skin:   Neurological:  Psychiatry:    Mode: AC/ CMV (Assist Control/ Continuous Mandatory Ventilation)  RR (machine): 16  TV (machine): 400  FiO2: 30  PEEP: 5  ITime: 0.64  MAP: 10  PIP: 22      HOSPITAL MEDICATIONS:  MEDICATIONS  (STANDING):  artificial  tears Solution 1 Drop(s) Both EYES every 1 hour  chlorhexidine 0.12% Liquid 15 milliLiter(s) Oral Mucosa every 12 hours  chlorhexidine 2% Cloths 1 Application(s) Topical daily  diazepam    Tablet 2 milliGRAM(s) Oral every 12 hours  enoxaparin Injectable 40 milliGRAM(s) SubCutaneous every 24 hours  erythromycin   Ointment 1 Application(s) Both EYES <User Schedule>  multivitamin/minerals/iron Oral Solution (CENTRUM) 15 milliLiter(s) Oral daily  ofloxacin 0.3% Solution 1 Drop(s) Both EYES two times a day  riluzole 50 milliGRAM(s) Oral two times a day  sertraline 75 milliGRAM(s) Oral daily    MEDICATIONS  (PRN):  acetaminophen   Oral Liquid .. 650 milliGRAM(s) Oral every 6 hours PRN Temp greater or equal to 38C (100.4F), Mild Pain (1 - 3)  diazepam    Tablet 2 milliGRAM(s) Oral at bedtime PRN for dysautonomia/ hypertension/discomfort      LABS:                        12.6   14.33 )-----------( 433      ( 16 Aug 2024 03:10 )             40.9     08-16    138  |  103  |  20  ----------------------------<  123<H>  4.3   |  22  |  <0.20<L>    Ca    9.6      16 Aug 2024 03:10  Phos  2.6     08-16  Mg     2.10     08-16        Urinalysis Basic - ( 16 Aug 2024 03:10 )    Color: x / Appearance: x / SG: x / pH: x  Gluc: 123 mg/dL / Ketone: x  / Bili: x / Urobili: x   Blood: x / Protein: x / Nitrite: x   Leuk Esterase: x / RBC: x / WBC x   Sq Epi: x / Non Sq Epi: x / Bacteria: x         CHIEF COMPLAINT: Patient is a 69y old  Female who presents with a chief complaint of trach exchange (18 Jul 2024 07:07)      INTERVAL EVENTS: No acute overnight evetns     ROS: Seen by bedside during AM rounds     OBJECTIVE:  ICU Vital Signs Last 24 Hrs  T(C): 36.1 (17 Aug 2024 04:00), Max: 36.7 (16 Aug 2024 08:00)  T(F): 96.9 (17 Aug 2024 04:00), Max: 98.1 (16 Aug 2024 08:00)  HR: 98 (17 Aug 2024 04:00) (96 - 105)  BP: 110/73 (17 Aug 2024 04:00) (104/71 - 124/71)  BP(mean): 83 (17 Aug 2024 04:00) (78 - 91)  ABP: --  ABP(mean): --  RR: 16 (17 Aug 2024 04:00) (16 - 16)  SpO2: 99% (17 Aug 2024 04:00) (97% - 99%)    O2 Parameters below as of 17 Aug 2024 04:00  Patient On (Oxygen Delivery Method): ventilator          Mode: AC/ CMV (Assist Control/ Continuous Mandatory Ventilation), RR (machine): 16, TV (machine): 400, FiO2: 30, PEEP: 5, ITime: 0.64, MAP: 10, PIP: 22    08-15 @ 07:01  -  08-16 @ 07:00  --------------------------------------------------------  IN: 1300 mL / OUT: 450 mL / NET: 850 mL    08-16 @ 07:01  -  08-17 @ 06:56  --------------------------------------------------------  IN: 1300 mL / OUT: 750 mL / NET: 550 mL      CAPILLARY BLOOD GLUCOSE      PHYSICAL EXAM:  General: NAD   Neck: +Trach tube noted to vent, site c/d/i.  Cards: S1/S2, no murmurs. Sinus tachycardia  Pulm: CTA bilaterally. No wheezes. No acute respiratory distress.  Abdomen: Soft, NTND. +PEG noted, site c/d/i.   Extremities: No pedal edema. Extremities warm to touch.  Neurology: Eyes are open, does not track or follow commands, history of ALS  Skin: warm to touch. Refer to RN assessment for further details.     Mode: AC/ CMV (Assist Control/ Continuous Mandatory Ventilation)  RR (machine): 16  TV (machine): 400  FiO2: 30  PEEP: 5  ITime: 0.64  MAP: 10  PIP: 22      HOSPITAL MEDICATIONS:  MEDICATIONS  (STANDING):  artificial  tears Solution 1 Drop(s) Both EYES every 1 hour  chlorhexidine 0.12% Liquid 15 milliLiter(s) Oral Mucosa every 12 hours  chlorhexidine 2% Cloths 1 Application(s) Topical daily  diazepam    Tablet 2 milliGRAM(s) Oral every 12 hours  enoxaparin Injectable 40 milliGRAM(s) SubCutaneous every 24 hours  erythromycin   Ointment 1 Application(s) Both EYES <User Schedule>  multivitamin/minerals/iron Oral Solution (CENTRUM) 15 milliLiter(s) Oral daily  ofloxacin 0.3% Solution 1 Drop(s) Both EYES two times a day  riluzole 50 milliGRAM(s) Oral two times a day  sertraline 75 milliGRAM(s) Oral daily    MEDICATIONS  (PRN):  acetaminophen   Oral Liquid .. 650 milliGRAM(s) Oral every 6 hours PRN Temp greater or equal to 38C (100.4F), Mild Pain (1 - 3)  diazepam    Tablet 2 milliGRAM(s) Oral at bedtime PRN for dysautonomia/ hypertension/discomfort      LABS:                        12.6   14.33 )-----------( 433      ( 16 Aug 2024 03:10 )             40.9     08-16    138  |  103  |  20  ----------------------------<  123<H>  4.3   |  22  |  <0.20<L>    Ca    9.6      16 Aug 2024 03:10  Phos  2.6     08-16  Mg     2.10     08-16        Urinalysis Basic - ( 16 Aug 2024 03:10 )    Color: x / Appearance: x / SG: x / pH: x  Gluc: 123 mg/dL / Ketone: x  / Bili: x / Urobili: x   Blood: x / Protein: x / Nitrite: x   Leuk Esterase: x / RBC: x / WBC x   Sq Epi: x / Non Sq Epi: x / Bacteria: x

## 2024-08-17 NOTE — PROGRESS NOTE ADULT - ASSESSMENT
ASSESSMENT   67 YO Female with PMHx of ALS, Parkinson,, nonverbal, bed bound, chronic respiratory failure with tracheostomy and vent dependence, and oropharyngeal dysphagia with PEG who presented from home with tracheostomy leak. She was noted with vent alarm and poor TVe (250-290) at home. Trach changed at home with no improvement and sent in for thoracic evaluation. While in MICU no air leak noted with hyperinflated cuff. Transferred to RCU on 7/5 with course complicated by leukocytosis second to UTI vs trachitis and completed zosyn course. Trach upsized by thoracic on 7/17 however AL remains and now pending custom trach.     PLAN  NEUROLOGY  # ALS   - Baseline nonverbal, awake, and communicates with eye movement.   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   - Continue on Ofloxacin BID (decreased 8/1)   - Continue on Erythromycin OU Q3H (decreased 8/8)  - Continue with artificial tears OU Q1H   - Create moisture chamber with Tegaderm following placement of EXTENSIVE ointment to the right eye throughout the day and to the left eye nightly.   - Allow 6 hour window during the day where moisture can be removed from right eye   - inflammation appears to be improving   - Ophtho f/u yesterday 8/15: exam OU with resolved epithelial defect but persistent chronic corneal changes - continue with above regimen    PSYCH   # Anxiety and Depression   - Continue on home Valium 2mg BID with additional 2mg PRN   - Continue on Zoloft 75mg QD while in house (on 4cc/ 80mg QD at home)     CARDIOVASCULAR   # HTN thought to be second to discomfort vs dysautonomia   - Evening HTN and continue on Hydralazine vs valium PRN doses   - Monitor HR and BP    # pSVT  - Intermittent episodes of SVT, lasting a few seconds at a time, and self limiting.   - Consider BB if becomes more persistent or HD unstable    RESPIRATORY  # Tracheostomy leak   - At home noted with TVe 200-300s despite tracheostomy change to [id number].   - No air leak noted in house with hyperinflated cuff likely tracheomegaly?   - Continue on chronic vent 16/400/5/21   - Chronic bibasilar atelectasis and continued on HTS PRN with chest PT  - Trach upsized to Bivona 9 on 7/17, however trach leak remains.   - Custom Tracheostomy delivered and exchanged on Monday 8/12  - Pt has a 9.5mm Bovana, Second spare trach was defective - Per thoracic team 2 spare trachs have been ordered with possible delivery Fri BY OR manager April- spectra #  [id number]  - Family will bring in home vent for trial with new trach in place     GI  # Dysphagia   - Continue on PEG-TF     RENAL  # High PVR   - BS with roughly 200-300cc PVR, however no acute distress   - Monitor renal function and UOP     # Elevated lactate   - Lactate elevated with mild contraction alkalosis   - Rehydrate with improvement     INFECTIOUS DISEASE  # Leukocytosis likely second to trachitis vs UTI?   - No fever or chills and noted with prior leukocytosis   - CXR with prior atelectasis and no acute findings   - UA (7/6) with positive nitrites (7/6)   - SCx (7/5) with pansensitive PSA  - BCx (7/6) x 2 NGT  - UCx (7/6) with coag neg staph  - MRSA PCR (7/6) POSITIVE for both MRSA/MSSA s/p Bactroban (7/6 - 7/10)  - s/p empiric Zosyn (7/8 - 7/14) however WBC continues to wax and wean without fever and will monitor off ABX .     HEME  - On Xarelto 10mg at home likely for DVT PPX?   - Hold home Xarelto in prep for OR   - DVT PPX with LVX for now and hold home Eliquis PPX  - LVX restarted     ENDOCRINE  - No hx of DM2   - Monitor BG     SKIN  - Basic trach and PEG care ordered   - DTI on back and pending [facility]    ETHICS/ GOC    - FULL CODE   - Dr Tyson Velasquez,  involved in care    DISPO - Back home with  when able.

## 2024-08-17 NOTE — PROGRESS NOTE ADULT - NS ATTEND AMEND GEN_ALL_CORE FT
Pt is a 69F with MHx advanced ALS (nonverbal and bedbound at baseline) with chronic hypercapnic respiratory failure and ventilatory dependence via tracheostomy at baseline and Parkinson's dz presenting to [facility] on 7/18/24 with acute hypercapnia secondary to large expiratory air leak s/p upsizing without improvement with hospital course c/b tracheitis vs. UTI transferred from MICU to RCU on 7/5 for further management.     Pt with advanced progressive ALS with baseline known to be nonverbal and bedbound with functional quadriplegia and complete ADL dependence. 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 Pt initially p/w worsening eye redness, especially on right side, despite conservative protective measures at home. She has received vancomycin +tobramycin eye drops in the past and on previous admissions opthalmology recommended possible need for tarsorrhaphy, but was ultimately deferred as not in line with pt's GOC. 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 Keratopathy slowly improving, now decreased frequency of moisture chamber changes. Epithelial defect now resolved, chronic corneal change persists. Will also c/w home riluzole, c/w home diazepam and zoloft. Diazepam prn added for breakthrough concern for agitation or discomfort without further issues to date.     Pt with acute on chronic hypercapnic respiratory failure 2/2 neuromuscular weakness in the setting of advanced ALS with chronic ventilatory dependence with tracheostomy found to have tracheitis and large air leak 2/2 tracheomegaly likely 2/2 prolonged tracheostomy dependence. Trach exchanged at home without improvement followed by upsizing at bedside to 9 Bivona but still with air leak. Thoracic sx consulted, custom trach ordered (Portex 9.5 with 40mm balloon). Pt now s/p OR exchange to custom 9.5mm Bivona trach, but second spare trach was defective. 2 more spare trachs were ordered, now awaiting delivery. Will c/w current vent support, pt on home settings. Will trial home vent prior to d/c. Pt does not tolerate PSV trials, remains completely passive on ventilator. Will c/w airway clearance and trach care as per RCU team. Wean O2 supplementation for goal O2 saturation 90-95%.     Pt with sepsis 2/2 UTI vs. tracheitis. Remains hemodynamically stable and afebrile with fluctuating but stable WBC. Initial cx (+) Pseudomonas in trach aspirate, similar to previous cultures, for which pt completed course of Zosyn. Previous UCx from 7/24/23 (+) VRE faecalis sensitive only to linezolid/daptomycin but likely colonizers. Will continue to monitor conservatively off abx at this time. Pt with known periods of BP variation with transient hypertensive episodes, will monitor off anti-HTNs, likely 2/2 dysautonomia. Pt a/w transient hemodynamically stable periods of SVTs, monitoring conservatively with plan for BB therapy if persists.     Discharge pending medical optimization, likely d/c home with ventilator. Pt full code. On home NOAC for DVT ppx, can now restart. Will continue to discuss with pt's family, pt's  at bedside. HCP  (Dr. Velasquez). O/P Pulm: Dr. Pranav Newman.

## 2024-08-18 PROCEDURE — 99233 SBSQ HOSP IP/OBS HIGH 50: CPT | Mod: FS

## 2024-08-18 RX ADMIN — POVIDONE, PROPYLENE GLYCOL 1 DROP(S): 6.8; 3 LIQUID OPHTHALMIC at 19:19

## 2024-08-18 RX ADMIN — ERYTHROMYCIN 1 APPLICATION(S): 5 OINTMENT OPHTHALMIC at 23:06

## 2024-08-18 RX ADMIN — POVIDONE, PROPYLENE GLYCOL 1 DROP(S): 6.8; 3 LIQUID OPHTHALMIC at 11:10

## 2024-08-18 RX ADMIN — POVIDONE, PROPYLENE GLYCOL 1 DROP(S): 6.8; 3 LIQUID OPHTHALMIC at 20:24

## 2024-08-18 RX ADMIN — ERYTHROMYCIN 1 APPLICATION(S): 5 OINTMENT OPHTHALMIC at 08:09

## 2024-08-18 RX ADMIN — POVIDONE, PROPYLENE GLYCOL 1 DROP(S): 6.8; 3 LIQUID OPHTHALMIC at 15:06

## 2024-08-18 RX ADMIN — POVIDONE, PROPYLENE GLYCOL 1 DROP(S): 6.8; 3 LIQUID OPHTHALMIC at 04:25

## 2024-08-18 RX ADMIN — POVIDONE, PROPYLENE GLYCOL 1 DROP(S): 6.8; 3 LIQUID OPHTHALMIC at 05:12

## 2024-08-18 RX ADMIN — POVIDONE, PROPYLENE GLYCOL 1 DROP(S): 6.8; 3 LIQUID OPHTHALMIC at 22:21

## 2024-08-18 RX ADMIN — POVIDONE, PROPYLENE GLYCOL 1 DROP(S): 6.8; 3 LIQUID OPHTHALMIC at 21:23

## 2024-08-18 RX ADMIN — ERYTHROMYCIN 1 APPLICATION(S): 5 OINTMENT OPHTHALMIC at 05:07

## 2024-08-18 RX ADMIN — OFLOXACIN 1 DROP(S): 3 SOLUTION/ DROPS OPHTHALMIC at 17:30

## 2024-08-18 RX ADMIN — CHLORHEXIDINE GLUCONATE 15 MILLILITER(S): 40 SOLUTION TOPICAL at 17:20

## 2024-08-18 RX ADMIN — POVIDONE, PROPYLENE GLYCOL 1 DROP(S): 6.8; 3 LIQUID OPHTHALMIC at 23:07

## 2024-08-18 RX ADMIN — POVIDONE, PROPYLENE GLYCOL 1 DROP(S): 6.8; 3 LIQUID OPHTHALMIC at 17:20

## 2024-08-18 RX ADMIN — POVIDONE, PROPYLENE GLYCOL 1 DROP(S): 6.8; 3 LIQUID OPHTHALMIC at 03:00

## 2024-08-18 RX ADMIN — POVIDONE, PROPYLENE GLYCOL 1 DROP(S): 6.8; 3 LIQUID OPHTHALMIC at 05:07

## 2024-08-18 RX ADMIN — Medication 2 MILLIGRAM(S): at 17:19

## 2024-08-18 RX ADMIN — SERTRALINE HYDROCHLORIDE 75 MILLIGRAM(S): 50 TABLET, FILM COATED ORAL at 11:10

## 2024-08-18 RX ADMIN — POVIDONE, PROPYLENE GLYCOL 1 DROP(S): 6.8; 3 LIQUID OPHTHALMIC at 02:00

## 2024-08-18 RX ADMIN — RILUZOLE 50 MILLIGRAM(S): 5 LIQUID ORAL at 06:30

## 2024-08-18 RX ADMIN — POVIDONE, PROPYLENE GLYCOL 1 DROP(S): 6.8; 3 LIQUID OPHTHALMIC at 09:29

## 2024-08-18 RX ADMIN — Medication 2 MILLIGRAM(S): at 06:29

## 2024-08-18 RX ADMIN — POVIDONE, PROPYLENE GLYCOL 1 DROP(S): 6.8; 3 LIQUID OPHTHALMIC at 10:19

## 2024-08-18 RX ADMIN — ERYTHROMYCIN 1 APPLICATION(S): 5 OINTMENT OPHTHALMIC at 20:24

## 2024-08-18 RX ADMIN — POVIDONE, PROPYLENE GLYCOL 1 DROP(S): 6.8; 3 LIQUID OPHTHALMIC at 14:04

## 2024-08-18 RX ADMIN — RILUZOLE 50 MILLIGRAM(S): 5 LIQUID ORAL at 17:20

## 2024-08-18 RX ADMIN — CHLORHEXIDINE GLUCONATE 1 APPLICATION(S): 40 SOLUTION TOPICAL at 11:09

## 2024-08-18 RX ADMIN — POVIDONE, PROPYLENE GLYCOL 1 DROP(S): 6.8; 3 LIQUID OPHTHALMIC at 12:36

## 2024-08-18 RX ADMIN — ERYTHROMYCIN 1 APPLICATION(S): 5 OINTMENT OPHTHALMIC at 03:00

## 2024-08-18 RX ADMIN — CHLORHEXIDINE GLUCONATE 15 MILLILITER(S): 40 SOLUTION TOPICAL at 06:31

## 2024-08-18 RX ADMIN — POVIDONE, PROPYLENE GLYCOL 1 DROP(S): 6.8; 3 LIQUID OPHTHALMIC at 01:00

## 2024-08-18 RX ADMIN — POVIDONE, PROPYLENE GLYCOL 1 DROP(S): 6.8; 3 LIQUID OPHTHALMIC at 08:08

## 2024-08-18 RX ADMIN — POVIDONE, PROPYLENE GLYCOL 1 DROP(S): 6.8; 3 LIQUID OPHTHALMIC at 06:29

## 2024-08-18 RX ADMIN — Medication 15 MILLILITER(S): at 11:09

## 2024-08-18 RX ADMIN — ERYTHROMYCIN 1 APPLICATION(S): 5 OINTMENT OPHTHALMIC at 11:09

## 2024-08-18 RX ADMIN — ENOXAPARIN SODIUM 40 MILLIGRAM(S): 100 INJECTION SUBCUTANEOUS at 17:20

## 2024-08-18 RX ADMIN — OFLOXACIN 1 DROP(S): 3 SOLUTION/ DROPS OPHTHALMIC at 06:30

## 2024-08-18 RX ADMIN — POVIDONE, PROPYLENE GLYCOL 1 DROP(S): 6.8; 3 LIQUID OPHTHALMIC at 18:02

## 2024-08-18 RX ADMIN — ERYTHROMYCIN 1 APPLICATION(S): 5 OINTMENT OPHTHALMIC at 15:07

## 2024-08-18 RX ADMIN — ERYTHROMYCIN 1 APPLICATION(S): 5 OINTMENT OPHTHALMIC at 18:05

## 2024-08-18 RX ADMIN — POVIDONE, PROPYLENE GLYCOL 1 DROP(S): 6.8; 3 LIQUID OPHTHALMIC at 16:26

## 2024-08-18 RX ADMIN — POVIDONE, PROPYLENE GLYCOL 1 DROP(S): 6.8; 3 LIQUID OPHTHALMIC at 13:04

## 2024-08-18 RX ADMIN — POVIDONE, PROPYLENE GLYCOL 1 DROP(S): 6.8; 3 LIQUID OPHTHALMIC at 18:35

## 2024-08-18 RX ADMIN — ERYTHROMYCIN 1 APPLICATION(S): 5 OINTMENT OPHTHALMIC at 00:10

## 2024-08-18 RX ADMIN — POVIDONE, PROPYLENE GLYCOL 1 DROP(S): 6.8; 3 LIQUID OPHTHALMIC at 04:08

## 2024-08-18 NOTE — PROGRESS NOTE ADULT - SUBJECTIVE AND OBJECTIVE BOX
CHIEF COMPLAINT: Patient is a 69y old  Female who presents with a chief complaint of trach exchange (18 Jul 2024 07:07)      INTERVAL EVENTS:     ROS: Seen by bedside during AM rounds     OBJECTIVE:  ICU Vital Signs Last 24 Hrs  T(C): 36.6 (18 Aug 2024 04:00), Max: 36.6 (18 Aug 2024 04:00)  T(F): 97.8 (18 Aug 2024 04:00), Max: 97.8 (18 Aug 2024 04:00)  HR: 97 (18 Aug 2024 06:51) (94 - 102)  BP: 98/69 (18 Aug 2024 04:00) (98/69 - 121/74)  BP(mean): 80 (18 Aug 2024 04:00) (76 - 86)  ABP: --  ABP(mean): --  RR: 16 (18 Aug 2024 04:00) (16 - 16)  SpO2: 97% (18 Aug 2024 06:51) (96% - 100%)    O2 Parameters below as of 18 Aug 2024 04:00  Patient On (Oxygen Delivery Method): ventilator          Mode: AC/ CMV (Assist Control/ Continuous Mandatory Ventilation), RR (machine): 16, TV (machine): 400, FiO2: 30, PEEP: 5, ITime: 0.64, MAP: 9, PIP: 25    08-16 @ 07:01  -  08-17 @ 07:00  --------------------------------------------------------  IN: 1300 mL / OUT: 750 mL / NET: 550 mL      CAPILLARY BLOOD GLUCOSE          PHYSICAL EXAM:  General:   HEENT:   Lymph Nodes:  Neck:   Respiratory:   Cardiovascular:   Abdomen:   Extremities:   Skin:   Neurological:  Psychiatry:    Mode: AC/ CMV (Assist Control/ Continuous Mandatory Ventilation)  RR (machine): 16  TV (machine): 400  FiO2: 30  PEEP: 5  ITime: 0.64  MAP: 9  PIP: 25      HOSPITAL MEDICATIONS:  MEDICATIONS  (STANDING):  artificial  tears Solution 1 Drop(s) Both EYES every 1 hour  chlorhexidine 0.12% Liquid 15 milliLiter(s) Oral Mucosa every 12 hours  chlorhexidine 2% Cloths 1 Application(s) Topical daily  diazepam    Tablet 2 milliGRAM(s) Oral every 12 hours  enoxaparin Injectable 40 milliGRAM(s) SubCutaneous every 24 hours  erythromycin   Ointment 1 Application(s) Both EYES <User Schedule>  multivitamin/minerals/iron Oral Solution (CENTRUM) 15 milliLiter(s) Oral daily  ofloxacin 0.3% Solution 1 Drop(s) Both EYES two times a day  riluzole 50 milliGRAM(s) Oral two times a day  sertraline 75 milliGRAM(s) Oral daily    MEDICATIONS  (PRN):  acetaminophen   Oral Liquid .. 650 milliGRAM(s) Oral every 6 hours PRN Temp greater or equal to 38C (100.4F), Mild Pain (1 - 3)  diazepam    Tablet 2 milliGRAM(s) Oral at bedtime PRN for dysautonomia/ hypertension/discomfort      LABS:                 CHIEF COMPLAINT: Patient is a 69y old  Female who presents with a chief complaint of trach exchange (18 Jul 2024 07:07)      INTERVAL EVENTS:     ROS: Seen by bedside during AM rounds     OBJECTIVE:  ICU Vital Signs Last 24 Hrs  T(C): 36.6 (18 Aug 2024 04:00), Max: 36.6 (18 Aug 2024 04:00)  T(F): 97.8 (18 Aug 2024 04:00), Max: 97.8 (18 Aug 2024 04:00)  HR: 97 (18 Aug 2024 06:51) (94 - 102)  BP: 98/69 (18 Aug 2024 04:00) (98/69 - 121/74)  BP(mean): 80 (18 Aug 2024 04:00) (76 - 86)  ABP: --  ABP(mean): --  RR: 16 (18 Aug 2024 04:00) (16 - 16)  SpO2: 97% (18 Aug 2024 06:51) (96% - 100%)    O2 Parameters below as of 18 Aug 2024 04:00  Patient On (Oxygen Delivery Method): ventilator          Mode: AC/ CMV (Assist Control/ Continuous Mandatory Ventilation), RR (machine): 16, TV (machine): 400, FiO2: 30, PEEP: 5, ITime: 0.64, MAP: 9, PIP: 25    08-16 @ 07:01  -  08-17 @ 07:00  --------------------------------------------------------  IN: 1300 mL / OUT: 750 mL / NET: 550 mL      CAPILLARY BLOOD GLUCOSE          PHYSICAL EXAM:  General: NAD   Neck: +Trach tube noted to vent, site c/d/i.  Cards: S1/S2, no murmurs. Sinus tachycardia  Pulm: CTA bilaterally. No wheezes. No acute respiratory distress.  Abdomen: Soft, NTND. +PEG noted, site c/d/i.   Extremities: No pedal edema. Extremities warm to touch.  Neurology: Eyes are open, does not track or follow commands, history of ALS  Skin: warm to touch. Refer to RN assessment for further details.     Mode: AC/ CMV (Assist Control/ Continuous Mandatory Ventilation)  RR (machine): 16  TV (machine): 400  FiO2: 30  PEEP: 5  ITime: 0.64  MAP: 9  PIP: 25      HOSPITAL MEDICATIONS:  MEDICATIONS  (STANDING):  artificial  tears Solution 1 Drop(s) Both EYES every 1 hour  chlorhexidine 0.12% Liquid 15 milliLiter(s) Oral Mucosa every 12 hours  chlorhexidine 2% Cloths 1 Application(s) Topical daily  diazepam    Tablet 2 milliGRAM(s) Oral every 12 hours  enoxaparin Injectable 40 milliGRAM(s) SubCutaneous every 24 hours  erythromycin   Ointment 1 Application(s) Both EYES <User Schedule>  multivitamin/minerals/iron Oral Solution (CENTRUM) 15 milliLiter(s) Oral daily  ofloxacin 0.3% Solution 1 Drop(s) Both EYES two times a day  riluzole 50 milliGRAM(s) Oral two times a day  sertraline 75 milliGRAM(s) Oral daily    MEDICATIONS  (PRN):  acetaminophen   Oral Liquid .. 650 milliGRAM(s) Oral every 6 hours PRN Temp greater or equal to 38C (100.4F), Mild Pain (1 - 3)  diazepam    Tablet 2 milliGRAM(s) Oral at bedtime PRN for dysautonomia/ hypertension/discomfort      LABS:

## 2024-08-18 NOTE — PROGRESS NOTE ADULT - NS ATTEND AMEND GEN_ALL_CORE FT
Pt is a 69F with MHx advanced ALS (nonverbal and bedbound at baseline) with chronic hypercapnic respiratory failure and ventilatory dependence via tracheostomy at baseline and Parkinson's dz presenting to [facility] on 7/18/24 with acute hypercapnia secondary to large expiratory air leak s/p upsizing without improvement with hospital course c/b tracheitis vs. UTI transferred from MICU to RCU on 7/5 for further management.     Pt with advanced progressive ALS with baseline known to be nonverbal and bedbound with functional quadriplegia and complete ADL dependence. 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 Pt initially p/w worsening eye redness, especially on right side, despite conservative protective measures at home. She has received vancomycin +tobramycin eye drops in the past and on previous admissions opthalmology recommended possible need for tarsorrhaphy, but was ultimately deferred as not in line with pt's GOC. 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 Keratopathy slowly improving, now decreased frequency of moisture chamber changes. Epithelial defect now resolved, chronic corneal change persists. Will also c/w home riluzole, c/w home diazepam and zoloft. Diazepam prn added for breakthrough concern for agitation or discomfort without further issues to date.     Pt with acute on chronic hypercapnic respiratory failure 2/2 neuromuscular weakness in the setting of advanced ALS with chronic ventilatory dependence with tracheostomy found to have tracheitis and large air leak 2/2 tracheomegaly likely 2/2 prolonged tracheostomy dependence. Trach exchanged at home without improvement followed by upsizing at bedside to 9 Bivona but still with air leak. Thoracic sx consulted, custom trach ordered (Portex 9.5 with 40mm balloon). Pt now s/p OR exchange to custom 9.5mm Bivona trach, but second spare trach was defective. 2 more spare trachs were ordered, now awaiting delivery. Will c/w current vent support, pt on home settings. Will trial home vent prior to d/c. Pt does not tolerate PSV trials, remains completely passive on ventilator. Will c/w airway clearance and trach care as per RCU team. Wean O2 supplementation for goal O2 saturation 90-95%.     Overnight, pt was found during bedside turning to have single live maggot on her pillow at level of the neck, unclear where it originated from except for single fly which was also in room in the AM. Bedside evaluation of upper airway, oropharynx, and skin unremarkable except for profuse secretions. No evidence of more maggots in suction tubing. Bedside scope evaluation of nares, oropharynx, and upper airway unremarkable. Will continue to monitor, primary team to evaluate again after bedside turning.     Pt with sepsis 2/2 UTI vs. tracheitis. Remains hemodynamically stable and afebrile with fluctuating but stable WBC. Initial cx (+) Pseudomonas in trach aspirate, similar to previous cultures, for which pt completed course of Zosyn. Previous UCx from 7/24/23 (+) VRE faecalis sensitive only to linezolid/daptomycin but likely colonizers. Will continue to monitor conservatively off abx at this time. Pt with known periods of BP variation with transient hypertensive episodes, will monitor off anti-HTNs, likely 2/2 dysautonomia. Pt a/w transient hemodynamically stable periods of SVTs, monitoring conservatively with plan for BB therapy if persists.     Discharge pending medical optimization, likely d/c home with ventilator. Pt full code. On home NOAC for DVT ppx, can now restart. Will continue to discuss with pt's family, pt's  at bedside. HCP  (Dr. Velasquez). O/P Pulm: Dr. Pranav Newman.

## 2024-08-18 NOTE — PROGRESS NOTE ADULT - ASSESSMENT
ASSESSMENT   69 YO Female with PMHx of ALS, Parkinson,, nonverbal, bed bound, chronic respiratory failure with tracheostomy and vent dependence, and oropharyngeal dysphagia with PEG who presented from home with tracheostomy leak. She was noted with vent alarm and poor TVe (250-290) at home. Trach changed at home with no improvement and sent in for thoracic evaluation. While in MICU no air leak noted with hyperinflated cuff. Transferred to RCU on 7/5 with course complicated by leukocytosis second to UTI vs trachitis and completed zosyn course. Trach upsized by thoracic on 7/17 however AL remains and now pending custom trach.     PLAN  NEUROLOGY  # ALS   - Baseline nonverbal, awake, and communicates with eye movement.   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   - Continue on Ofloxacin BID (decreased 8/1)   - Continue on Erythromycin OU Q3H (decreased 8/8)  - Continue with artificial tears OU Q1H   - Create moisture chamber with Tegaderm following placement of EXTENSIVE ointment to the right eye throughout the day and to the left eye nightly.   - Allow 6 hour window during the day where moisture can be removed from right eye   - inflammation appears to be improving   - Ophtho f/u yesterday 8/15: exam OU with resolved epithelial defect but persistent chronic corneal changes - continue with above regimen    PSYCH   # Anxiety and Depression   - Continue on home Valium 2mg BID with additional 2mg PRN   - Continue on Zoloft 75mg QD while in house (on 4cc/ 80mg QD at home)     CARDIOVASCULAR   # HTN thought to be second to discomfort vs dysautonomia   - Evening HTN and continue on Hydralazine vs valium PRN doses   - Monitor HR and BP    # pSVT  - Intermittent episodes of SVT, lasting a few seconds at a time, and self limiting.   - Consider BB if becomes more persistent or HD unstable    RESPIRATORY  # Tracheostomy leak   - At home noted with TVe 200-300s despite tracheostomy change to 80XLTCD.   - No air leak noted in house with hyperinflated cuff likely tracheomegaly?   - Continue on chronic vent 16/400/5/21   - Chronic bibasilar atelectasis and continued on HTS PRN with chest PT  - Trach upsized to Bivona 9 on 7/17, however trach leak remains.   - Custom Tracheostomy delivered and exchanged on Monday 8/12  - Pt has a 9.5mm Bovana, Second spare trach was defective - Per thoracic team 2 spare trachs have been ordered with possible delivery Fri BY OR manager April- spectra #  [id number]  - Family will bring in home vent for trial with new trach in place     GI  # Dysphagia   - Continue on PEG-TF     RENAL  # High PVR   - BS with roughly 200-300cc PVR, however no acute distress   - Monitor renal function and UOP     # Elevated lactate   - Lactate elevated with mild contraction alkalosis   - Rehydrate with improvement     INFECTIOUS DISEASE  # Leukocytosis likely second to trachitis vs UTI?   - No fever or chills and noted with prior leukocytosis   - CXR with prior atelectasis and no acute findings   - UA (7/6) with positive nitrites (7/6)   - SCx (7/5) with pansensitive PSA  - BCx (7/6) x 2 NGT  - UCx (7/6) with coag neg staph  - MRSA PCR (7/6) POSITIVE for both MRSA/MSSA s/p Bactroban (7/6 - 7/10)  - s/p empiric Zosyn (7/8 - 7/14) however WBC continues to wax and wean without fever and will monitor off ABX .     HEME  - On Xarelto 10mg at home likely for DVT PPX?   - Hold home Xarelto in prep for OR   - DVT PPX with LVX for now and hold home Eliquis PPX  - LVX restarted     ENDOCRINE  - No hx of DM2   - Monitor BG     SKIN  - Basic trach and PEG care ordered   - DTI on back and pending [facility]    ETHICS/ GOC    - FULL CODE   - Dr Tyson Velasquez,  involved in care    DISPO - Back home with  when able.

## 2024-08-19 LAB
ANION GAP SERPL CALC-SCNC: 16 MMOL/L — HIGH (ref 7–14)
BUN SERPL-MCNC: 24 MG/DL — HIGH (ref 7–23)
CALCIUM SERPL-MCNC: 9.5 MG/DL — SIGNIFICANT CHANGE UP (ref 8.4–10.5)
CHLORIDE SERPL-SCNC: 100 MMOL/L — SIGNIFICANT CHANGE UP (ref 98–107)
CO2 SERPL-SCNC: 22 MMOL/L — SIGNIFICANT CHANGE UP (ref 22–31)
CREAT SERPL-MCNC: <0.2 MG/DL — LOW (ref 0.5–1.3)
EGFR: 125 ML/MIN/1.73M2 — SIGNIFICANT CHANGE UP
GLUCOSE SERPL-MCNC: 166 MG/DL — HIGH (ref 70–99)
HCT VFR BLD CALC: 39.4 % — SIGNIFICANT CHANGE UP (ref 34.5–45)
HGB BLD-MCNC: 12.2 G/DL — SIGNIFICANT CHANGE UP (ref 11.5–15.5)
MAGNESIUM SERPL-MCNC: 2 MG/DL — SIGNIFICANT CHANGE UP (ref 1.6–2.6)
MCHC RBC-ENTMCNC: 26 PG — LOW (ref 27–34)
MCHC RBC-ENTMCNC: 31 GM/DL — LOW (ref 32–36)
MCV RBC AUTO: 84 FL — SIGNIFICANT CHANGE UP (ref 80–100)
NRBC # BLD: 0 /100 WBCS — SIGNIFICANT CHANGE UP (ref 0–0)
NRBC # FLD: 0 K/UL — SIGNIFICANT CHANGE UP (ref 0–0)
PHOSPHATE SERPL-MCNC: 3.3 MG/DL — SIGNIFICANT CHANGE UP (ref 2.5–4.5)
PLATELET # BLD AUTO: 425 K/UL — HIGH (ref 150–400)
POTASSIUM SERPL-MCNC: 4.3 MMOL/L — SIGNIFICANT CHANGE UP (ref 3.5–5.3)
POTASSIUM SERPL-SCNC: 4.3 MMOL/L — SIGNIFICANT CHANGE UP (ref 3.5–5.3)
RBC # BLD: 4.69 M/UL — SIGNIFICANT CHANGE UP (ref 3.8–5.2)
RBC # FLD: 17.2 % — HIGH (ref 10.3–14.5)
SODIUM SERPL-SCNC: 138 MMOL/L — SIGNIFICANT CHANGE UP (ref 135–145)
WBC # BLD: 13.62 K/UL — HIGH (ref 3.8–10.5)
WBC # FLD AUTO: 13.62 K/UL — HIGH (ref 3.8–10.5)

## 2024-08-19 PROCEDURE — 99233 SBSQ HOSP IP/OBS HIGH 50: CPT | Mod: GC

## 2024-08-19 RX ORDER — RIVAROXABAN 10 MG/1
10 TABLET, FILM COATED ORAL ONCE
Refills: 0 | Status: COMPLETED | OUTPATIENT
Start: 2024-08-19 | End: 2024-08-19

## 2024-08-19 RX ORDER — RIVAROXABAN 10 MG/1
10 TABLET, FILM COATED ORAL DAILY
Refills: 0 | Status: DISCONTINUED | OUTPATIENT
Start: 2024-08-20 | End: 2024-08-29

## 2024-08-19 RX ORDER — RIVAROXABAN 10 MG/1
TABLET, FILM COATED ORAL
Refills: 0 | Status: DISCONTINUED | OUTPATIENT
Start: 2024-08-19 | End: 2024-08-29

## 2024-08-19 RX ADMIN — POVIDONE, PROPYLENE GLYCOL 1 DROP(S): 6.8; 3 LIQUID OPHTHALMIC at 13:50

## 2024-08-19 RX ADMIN — ERYTHROMYCIN 1 APPLICATION(S): 5 OINTMENT OPHTHALMIC at 14:59

## 2024-08-19 RX ADMIN — Medication 2 MILLIGRAM(S): at 05:25

## 2024-08-19 RX ADMIN — POVIDONE, PROPYLENE GLYCOL 1 DROP(S): 6.8; 3 LIQUID OPHTHALMIC at 18:47

## 2024-08-19 RX ADMIN — RILUZOLE 50 MILLIGRAM(S): 5 LIQUID ORAL at 05:26

## 2024-08-19 RX ADMIN — ERYTHROMYCIN 1 APPLICATION(S): 5 OINTMENT OPHTHALMIC at 11:30

## 2024-08-19 RX ADMIN — POVIDONE, PROPYLENE GLYCOL 1 DROP(S): 6.8; 3 LIQUID OPHTHALMIC at 13:54

## 2024-08-19 RX ADMIN — OFLOXACIN 1 DROP(S): 3 SOLUTION/ DROPS OPHTHALMIC at 17:13

## 2024-08-19 RX ADMIN — POVIDONE, PROPYLENE GLYCOL 1 DROP(S): 6.8; 3 LIQUID OPHTHALMIC at 08:56

## 2024-08-19 RX ADMIN — Medication 2 MILLIGRAM(S): at 17:16

## 2024-08-19 RX ADMIN — POVIDONE, PROPYLENE GLYCOL 1 DROP(S): 6.8; 3 LIQUID OPHTHALMIC at 06:05

## 2024-08-19 RX ADMIN — POVIDONE, PROPYLENE GLYCOL 1 DROP(S): 6.8; 3 LIQUID OPHTHALMIC at 17:13

## 2024-08-19 RX ADMIN — POVIDONE, PROPYLENE GLYCOL 1 DROP(S): 6.8; 3 LIQUID OPHTHALMIC at 23:00

## 2024-08-19 RX ADMIN — CHLORHEXIDINE GLUCONATE 15 MILLILITER(S): 40 SOLUTION TOPICAL at 05:26

## 2024-08-19 RX ADMIN — POVIDONE, PROPYLENE GLYCOL 1 DROP(S): 6.8; 3 LIQUID OPHTHALMIC at 04:30

## 2024-08-19 RX ADMIN — CHLORHEXIDINE GLUCONATE 15 MILLILITER(S): 40 SOLUTION TOPICAL at 17:13

## 2024-08-19 RX ADMIN — OFLOXACIN 1 DROP(S): 3 SOLUTION/ DROPS OPHTHALMIC at 05:25

## 2024-08-19 RX ADMIN — POVIDONE, PROPYLENE GLYCOL 1 DROP(S): 6.8; 3 LIQUID OPHTHALMIC at 02:00

## 2024-08-19 RX ADMIN — ERYTHROMYCIN 1 APPLICATION(S): 5 OINTMENT OPHTHALMIC at 08:56

## 2024-08-19 RX ADMIN — POVIDONE, PROPYLENE GLYCOL 1 DROP(S): 6.8; 3 LIQUID OPHTHALMIC at 01:03

## 2024-08-19 RX ADMIN — POVIDONE, PROPYLENE GLYCOL 1 DROP(S): 6.8; 3 LIQUID OPHTHALMIC at 05:25

## 2024-08-19 RX ADMIN — CHLORHEXIDINE GLUCONATE 1 APPLICATION(S): 40 SOLUTION TOPICAL at 11:30

## 2024-08-19 RX ADMIN — ERYTHROMYCIN 1 APPLICATION(S): 5 OINTMENT OPHTHALMIC at 17:13

## 2024-08-19 RX ADMIN — POVIDONE, PROPYLENE GLYCOL 1 DROP(S): 6.8; 3 LIQUID OPHTHALMIC at 22:00

## 2024-08-19 RX ADMIN — POVIDONE, PROPYLENE GLYCOL 1 DROP(S): 6.8; 3 LIQUID OPHTHALMIC at 11:32

## 2024-08-19 RX ADMIN — POVIDONE, PROPYLENE GLYCOL 1 DROP(S): 6.8; 3 LIQUID OPHTHALMIC at 21:38

## 2024-08-19 RX ADMIN — SERTRALINE HYDROCHLORIDE 75 MILLIGRAM(S): 50 TABLET, FILM COATED ORAL at 11:31

## 2024-08-19 RX ADMIN — ERYTHROMYCIN 1 APPLICATION(S): 5 OINTMENT OPHTHALMIC at 03:09

## 2024-08-19 RX ADMIN — POVIDONE, PROPYLENE GLYCOL 1 DROP(S): 6.8; 3 LIQUID OPHTHALMIC at 03:08

## 2024-08-19 RX ADMIN — POVIDONE, PROPYLENE GLYCOL 1 DROP(S): 6.8; 3 LIQUID OPHTHALMIC at 14:59

## 2024-08-19 RX ADMIN — POVIDONE, PROPYLENE GLYCOL 1 DROP(S): 6.8; 3 LIQUID OPHTHALMIC at 11:36

## 2024-08-19 RX ADMIN — POVIDONE, PROPYLENE GLYCOL 1 DROP(S): 6.8; 3 LIQUID OPHTHALMIC at 11:30

## 2024-08-19 RX ADMIN — POVIDONE, PROPYLENE GLYCOL 1 DROP(S): 6.8; 3 LIQUID OPHTHALMIC at 18:48

## 2024-08-19 RX ADMIN — ERYTHROMYCIN 1 APPLICATION(S): 5 OINTMENT OPHTHALMIC at 21:38

## 2024-08-19 RX ADMIN — Medication 15 MILLILITER(S): at 11:31

## 2024-08-19 RX ADMIN — POVIDONE, PROPYLENE GLYCOL 1 DROP(S): 6.8; 3 LIQUID OPHTHALMIC at 17:17

## 2024-08-19 RX ADMIN — ERYTHROMYCIN 1 APPLICATION(S): 5 OINTMENT OPHTHALMIC at 05:25

## 2024-08-19 RX ADMIN — POVIDONE, PROPYLENE GLYCOL 1 DROP(S): 6.8; 3 LIQUID OPHTHALMIC at 06:37

## 2024-08-19 RX ADMIN — RIVAROXABAN 10 MILLIGRAM(S): 10 TABLET, FILM COATED ORAL at 14:59

## 2024-08-19 RX ADMIN — POVIDONE, PROPYLENE GLYCOL 1 DROP(S): 6.8; 3 LIQUID OPHTHALMIC at 20:00

## 2024-08-19 RX ADMIN — POVIDONE, PROPYLENE GLYCOL 1 DROP(S): 6.8; 3 LIQUID OPHTHALMIC at 07:41

## 2024-08-19 RX ADMIN — RILUZOLE 50 MILLIGRAM(S): 5 LIQUID ORAL at 17:14

## 2024-08-19 NOTE — PROGRESS NOTE ADULT - SUBJECTIVE AND OBJECTIVE BOX
CHIEF COMPLAINT:    INTERVAL EVENTS:     ROS: Seen by bedside during AM rounds     OBJECTIVE:  ICU Vital Signs Last 24 Hrs  T(C): 36.4 (19 Aug 2024 04:00), Max: 36.4 (18 Aug 2024 08:00)  T(F): 97.5 (19 Aug 2024 04:00), Max: 97.6 (18 Aug 2024 08:00)  HR: 98 (19 Aug 2024 04:00) (95 - 112)  BP: 99/69 (19 Aug 2024 04:00) (99/69 - 135/81)  BP(mean): 80 (19 Aug 2024 04:00) (80 - 96)  ABP: --  ABP(mean): --  RR: 16 (19 Aug 2024 04:00) (16 - 16)  SpO2: 99% (19 Aug 2024 04:00) (97% - 99%)    O2 Parameters below as of 19 Aug 2024 04:00  Patient On (Oxygen Delivery Method): ventilator    O2 Concentration (%): 30      Mode: AC/ CMV (Assist Control/ Continuous Mandatory Ventilation), RR (machine): 16, TV (machine): 400, FiO2: 30, PEEP: 5, ITime: 0.79, MAP: 9, PIP: 27    08-18 @ 07:01  -  08-19 @ 07:00  --------------------------------------------------------  IN: 1500 mL / OUT: 700 mL / NET: 800 mL      CAPILLARY BLOOD GLUCOSE          PHYSICAL EXAM:  General:   HEENT:   Lymph Nodes:  Neck:   Respiratory:   Cardiovascular:   Abdomen:   Extremities:   Skin:   Neurological:  Psychiatry:    Mode: AC/ CMV (Assist Control/ Continuous Mandatory Ventilation)  RR (machine): 16  TV (machine): 400  FiO2: 30  PEEP: 5  ITime: 0.79  MAP: 9  PIP: 27      HOSPITAL MEDICATIONS:  MEDICATIONS  (STANDING):  artificial  tears Solution 1 Drop(s) Both EYES every 1 hour  chlorhexidine 0.12% Liquid 15 milliLiter(s) Oral Mucosa every 12 hours  chlorhexidine 2% Cloths 1 Application(s) Topical daily  diazepam    Tablet 2 milliGRAM(s) Oral every 12 hours  enoxaparin Injectable 40 milliGRAM(s) SubCutaneous every 24 hours  erythromycin   Ointment 1 Application(s) Both EYES <User Schedule>  multivitamin/minerals/iron Oral Solution (CENTRUM) 15 milliLiter(s) Oral daily  ofloxacin 0.3% Solution 1 Drop(s) Both EYES two times a day  riluzole 50 milliGRAM(s) Oral two times a day  sertraline 75 milliGRAM(s) Oral daily    MEDICATIONS  (PRN):  acetaminophen   Oral Liquid .. 650 milliGRAM(s) Oral every 6 hours PRN Temp greater or equal to 38C (100.4F), Mild Pain (1 - 3)  diazepam    Tablet 2 milliGRAM(s) Oral at bedtime PRN for dysautonomia/ hypertension/discomfort      LABS:                        12.2   13.62 )-----------( 425      ( 19 Aug 2024 02:45 )             39.4     08-19    138  |  100  |  24<H>  ----------------------------<  166<H>  4.3   |  22  |  <0.20<L>    Ca    9.5      19 Aug 2024 02:45  Phos  3.3     08-19  Mg     2.00     08-19        Urinalysis Basic - ( 19 Aug 2024 02:45 )    Color: x / Appearance: x / SG: x / pH: x  Gluc: 166 mg/dL / Ketone: x  / Bili: x / Urobili: x   Blood: x / Protein: x / Nitrite: x   Leuk Esterase: x / RBC: x / WBC x   Sq Epi: x / Non Sq Epi: x / Bacteria: x         CHIEF COMPLAINT: Patient is a 69y old  Female who presents with a chief complaint of trach exchange (18 Jul 2024 07:07)      INTERVAL EVENTS: No overnight events     ROS: Seen by bedside during AM rounds     OBJECTIVE:  ICU Vital Signs Last 24 Hrs  T(C): 36.4 (19 Aug 2024 04:00), Max: 36.4 (18 Aug 2024 08:00)  T(F): 97.5 (19 Aug 2024 04:00), Max: 97.6 (18 Aug 2024 08:00)  HR: 98 (19 Aug 2024 04:00) (95 - 112)  BP: 99/69 (19 Aug 2024 04:00) (99/69 - 135/81)  BP(mean): 80 (19 Aug 2024 04:00) (80 - 96)  ABP: --  ABP(mean): --  RR: 16 (19 Aug 2024 04:00) (16 - 16)  SpO2: 99% (19 Aug 2024 04:00) (97% - 99%)    O2 Parameters below as of 19 Aug 2024 04:00  Patient On (Oxygen Delivery Method): ventilator    O2 Concentration (%): 30      Mode: AC/ CMV (Assist Control/ Continuous Mandatory Ventilation), RR (machine): 16, TV (machine): 400, FiO2: 30, PEEP: 5, ITime: 0.79, MAP: 9, PIP: 27    08-18 @ 07:01  -  08-19 @ 07:00  --------------------------------------------------------  IN: 1500 mL / OUT: 700 mL / NET: 800 mL      CAPILLARY BLOOD GLUCOSE      PHYSICAL EXAM:  General: NAD   Neck: +Trach tube noted to vent, site c/d/i.  Cards: S1/S2, no murmurs. Sinus tachycardia  Pulm: CTA bilaterally. No wheezes. No acute respiratory distress.  Abdomen: Soft, NTND. +PEG noted, site c/d/i.   Extremities: No pedal edema. Extremities warm to touch.  Neurology: Eyes are open, does not track or follow commands, history of ALS  Skin: warm to touch. Refer to RN assessment for further details.       Mode: AC/ CMV (Assist Control/ Continuous Mandatory Ventilation)  RR (machine): 16  TV (machine): 400  FiO2: 30  PEEP: 5  ITime: 0.79  MAP: 9  PIP: 27      HOSPITAL MEDICATIONS:  MEDICATIONS  (STANDING):  artificial  tears Solution 1 Drop(s) Both EYES every 1 hour  chlorhexidine 0.12% Liquid 15 milliLiter(s) Oral Mucosa every 12 hours  chlorhexidine 2% Cloths 1 Application(s) Topical daily  diazepam    Tablet 2 milliGRAM(s) Oral every 12 hours  enoxaparin Injectable 40 milliGRAM(s) SubCutaneous every 24 hours  erythromycin   Ointment 1 Application(s) Both EYES <User Schedule>  multivitamin/minerals/iron Oral Solution (CENTRUM) 15 milliLiter(s) Oral daily  ofloxacin 0.3% Solution 1 Drop(s) Both EYES two times a day  riluzole 50 milliGRAM(s) Oral two times a day  sertraline 75 milliGRAM(s) Oral daily    MEDICATIONS  (PRN):  acetaminophen   Oral Liquid .. 650 milliGRAM(s) Oral every 6 hours PRN Temp greater or equal to 38C (100.4F), Mild Pain (1 - 3)  diazepam    Tablet 2 milliGRAM(s) Oral at bedtime PRN for dysautonomia/ hypertension/discomfort      LABS:                        12.2   13.62 )-----------( 425      ( 19 Aug 2024 02:45 )             39.4     08-19    138  |  100  |  24<H>  ----------------------------<  166<H>  4.3   |  22  |  <0.20<L>    Ca    9.5      19 Aug 2024 02:45  Phos  3.3     08-19  Mg     2.00     08-19        Urinalysis Basic - ( 19 Aug 2024 02:45 )    Color: x / Appearance: x / SG: x / pH: x  Gluc: 166 mg/dL / Ketone: x  / Bili: x / Urobili: x   Blood: x / Protein: x / Nitrite: x   Leuk Esterase: x / RBC: x / WBC x   Sq Epi: x / Non Sq Epi: x / Bacteria: x

## 2024-08-19 NOTE — PROGRESS NOTE ADULT - ASSESSMENT
ASSESSMENT   69 YO Female with PMHx of ALS, Parkinson,, nonverbal, bed bound, chronic respiratory failure with tracheostomy and vent dependence, and oropharyngeal dysphagia with PEG who presented from home with tracheostomy leak. She was noted with vent alarm and poor TVe (250-290) at home. Trach changed at home with no improvement and sent in for thoracic evaluation. While in MICU no air leak noted with hyperinflated cuff. Transferred to RCU on 7/5 with course complicated by leukocytosis second to UTI vs trachitis and completed zosyn course. Trach upsized by thoracic on 7/17 however AL remains and now pending custom trach.     PLAN  NEUROLOGY  # ALS   - Baseline nonverbal, awake, and communicates with eye movement.   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   - Continue on Ofloxacin BID (decreased 8/1)   - Continue on Erythromycin OU Q3H (decreased 8/8)  - Continue with artificial tears OU Q1H   - Create moisture chamber with Tegaderm following placement of EXTENSIVE ointment to the right eye throughout the day and to the left eye nightly.   - Allow 6 hour window during the day where moisture can be removed from right eye   - inflammation appears to be improving   - Ophtho f/u yesterday 8/15: exam OU with resolved epithelial defect but persistent chronic corneal changes - continue with above regimen    PSYCH   # Anxiety and Depression   - Continue on home Valium 2mg BID with additional 2mg PRN   - Continue on Zoloft 75mg QD while in house (on 4cc/ 80mg QD at home)     CARDIOVASCULAR   # HTN thought to be second to discomfort vs dysautonomia   - Evening HTN and continue on Hydralazine vs valium PRN doses   - Monitor HR and BP    # pSVT  - Intermittent episodes of SVT, lasting a few seconds at a time, and self limiting.   - Consider BB if becomes more persistent or HD unstable    RESPIRATORY  # Tracheostomy leak   - At home noted with TVe 200-300s despite tracheostomy change to 80XLTCD.   - No air leak noted in house with hyperinflated cuff likely tracheomegaly?   - Continue on chronic vent 16/400/5/21   - Chronic bibasilar atelectasis and continued on HTS PRN with chest PT  - Trach upsized to Bivona 9 on 7/17, however trach leak remains.   - Custom Tracheostomy delivered and exchanged on Monday 8/12  - Pt has a 9.5mm Bovana, Second spare trach was defective - Per thoracic team 2 spare trachs have been ordered with possible delivery Fri BY OR manager April- spectra #  [id number]  - Family will bring in home vent for trial with new trach in place     GI  # Dysphagia   - Continue on PEG-TF     RENAL  # High PVR   - BS with roughly 200-300cc PVR, however no acute distress   - Monitor renal function and UOP     # Elevated lactate   - Lactate elevated with mild contraction alkalosis   - Rehydrate with improvement     INFECTIOUS DISEASE  # Leukocytosis likely second to trachitis vs UTI?   - No fever or chills and noted with prior leukocytosis   - CXR with prior atelectasis and no acute findings   - UA (7/6) with positive nitrites (7/6)   - SCx (7/5) with pansensitive PSA  - BCx (7/6) x 2 NGT  - UCx (7/6) with coag neg staph  - MRSA PCR (7/6) POSITIVE for both MRSA/MSSA s/p Bactroban (7/6 - 7/10)  - s/p empiric Zosyn (7/8 - 7/14) however WBC continues to wax and wean without fever and will monitor off ABX .     HEME  - On Xarelto 10mg at home likely for DVT PPX?   - Hold home Xarelto in prep for OR   - DVT PPX with LVX for now and hold home Eliquis PPX  - LVX restarted     ENDOCRINE  - No hx of DM2   - Monitor BG     SKIN  - Basic trach and PEG care ordered   - DTI on back and pending [facility]    ETHICS/ GOC    - FULL CODE   - Dr Tyson Velasquez,  involved in care    DISPO - Back home with  when able.     ASSESSMENT   67 YO Female with PMHx of ALS, Parkinson,, nonverbal, bed bound, chronic respiratory failure with tracheostomy and vent dependence, and oropharyngeal dysphagia with PEG who presented from home with tracheostomy leak. She was noted with vent alarm and poor TVe (250-290) at home. Trach changed at home with no improvement and sent in for thoracic evaluation. While in MICU no air leak noted with hyperinflated cuff. Transferred to RCU on 7/5 with course complicated by leukocytosis second to UTI vs trachitis and completed zosyn course. Trach upsized by thoracic on 7/17 however AL remains and now pending custom trach.     PLAN  NEUROLOGY  # ALS   - Baseline nonverbal, awake, and communicates with eye movement.   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   - Continue on Ofloxacin BID (decreased 8/1)   - Continue on Erythromycin OU Q3H (decreased 8/8)  - Continue with artificial tears OU Q1H   - Create moisture chamber with Tegaderm following placement of EXTENSIVE ointment to the right eye throughout the day and to the left eye nightly.   - Allow 6 hour window during the day where moisture can be removed from right eye   - inflammation appears to be improving   - Ophtho f/u yesterday 8/15: exam OU with resolved epithelial defect but persistent chronic corneal changes - continue with above regimen    PSYCH   # Anxiety and Depression   - Continue on home Valium 2mg BID with additional 2mg PRN   - Continue on Zoloft 75mg QD while in house (on 4cc/ 80mg QD at home)     CARDIOVASCULAR   # HTN thought to be second to discomfort vs dysautonomia   - Evening HTN and continue on Hydralazine vs valium PRN doses   - Monitor HR and BP    # pSVT  - Intermittent episodes of SVT, lasting a few seconds at a time, and self limiting.   - Consider BB if becomes more persistent or HD unstable    RESPIRATORY  # Tracheostomy leak   - At home noted with TVe 200-300s despite tracheostomy change to 80XLTCD.   - No air leak noted in house with hyperinflated cuff likely tracheomegaly?   - Continue on chronic vent 16/400/5/21   - Chronic bibasilar atelectasis and continued on HTS PRN with chest PT  - Trach upsized to Bivona 9 on 7/17, however trach leak remains.   - Custom Tracheostomy delivered and exchanged on Monday 8/12  - Pt has a 9.5mm Bovana, Second spare trach was defective - Per thoracic team 2 spare trachs have been ordered with possible delivery Fri BY OR manager April- spectra #  [id number]  - Family will bring in home vent for trial with new trach in place  - [employer] cleared vent for use  - Spoke to April in OR and trachs to be sterilized today     GI  # Dysphagia   - Continue on PEG-TF     RENAL  # High PVR   - BS with roughly 200-300cc PVR, however no acute distress   - Monitor renal function and UOP     # Elevated lactate   - Lactate elevated with mild contraction alkalosis   - Rehydrate with improvement     INFECTIOUS DISEASE  # Leukocytosis likely second to trachitis vs UTI?   - No fever or chills and noted with prior leukocytosis   - CXR with prior atelectasis and no acute findings   - UA (7/6) with positive nitrites (7/6)   - SCx (7/5) with pansensitive PSA  - BCx (7/6) x 2 NGT  - UCx (7/6) with coag neg staph  - MRSA PCR (7/6) POSITIVE for both MRSA/MSSA s/p Bactroban (7/6 - 7/10)  - s/p empiric Zosyn (7/8 - 7/14) however WBC continues to wax and wean without fever and will monitor off ABX .     HEME  - On Xarelto 10mg at home likely for DVT PPX?   - Hold home Xarelto in prep for OR   - DVT PPX with LVX for now and hold home Eliquis PPX  - LVX restarted     ENDOCRINE  - No hx of DM2   - Monitor BG     SKIN  - Basic trach and PEG care ordered   - DTI on back and pending [facility]    ETHICS/ GOC    - FULL CODE   - Dr Tyson Velasquez,  involved in care    DISPO - Back home with  when able.

## 2024-08-19 NOTE — PROGRESS NOTE ADULT - NS ATTEND AMEND GEN_ALL_CORE FT
69F PMH advanced ALS (nonverbal and bedbound at baseline) with chronic hypercapnic respiratory failure s/p trach/PEG, vent-dependent, and Parkinson's who presented to [facility] on 7/18/24 with acute hypercapnia secondary to large expiratory air leak s/p upsizing without improvement with hospital course c/b tracheitis vs. UTI transferred from MICU to RCU on 7/5 for further management. S/p OR on 8/12 trach exchange with thoracic surgery with improved tidal volumes (Portex 9.5 with 40 mm balloon).    # Neuro: continue home riluzole for ALS. Continue sertraline and diazepam. Bedbound, nonverbal, quadriplegic, dependent on ADL's. Continue therapy for keratopathy  # CV: HD stable  # Pulm: continue lung protective ventilation. Minimal leak currently. Backup trachs to be sterilized today. Home vent cleared by [employer]. No further maggots seen at bedside, s/p bronch 8/18  # GI: continue PEG feeds  # Renal: stable kidney function and lytes  # ID: no evidence of active infection. Previously completed Zosyn for Pseudomonas aeruginosa VAP  # Heme: enoxaparin for DVT ppx, stable H/H  # Dispo: d/c planning to home with home vent. Full code. Can restart rivaroxaban 10 mg daily upon discharge. Discussed with  at bedside

## 2024-08-20 PROCEDURE — 99233 SBSQ HOSP IP/OBS HIGH 50: CPT

## 2024-08-20 RX ADMIN — POVIDONE, PROPYLENE GLYCOL 1 DROP(S): 6.8; 3 LIQUID OPHTHALMIC at 05:00

## 2024-08-20 RX ADMIN — POVIDONE, PROPYLENE GLYCOL 1 DROP(S): 6.8; 3 LIQUID OPHTHALMIC at 11:05

## 2024-08-20 RX ADMIN — ERYTHROMYCIN 1 APPLICATION(S): 5 OINTMENT OPHTHALMIC at 14:02

## 2024-08-20 RX ADMIN — ERYTHROMYCIN 1 APPLICATION(S): 5 OINTMENT OPHTHALMIC at 17:36

## 2024-08-20 RX ADMIN — POVIDONE, PROPYLENE GLYCOL 1 DROP(S): 6.8; 3 LIQUID OPHTHALMIC at 07:44

## 2024-08-20 RX ADMIN — ERYTHROMYCIN 1 APPLICATION(S): 5 OINTMENT OPHTHALMIC at 08:57

## 2024-08-20 RX ADMIN — ERYTHROMYCIN 1 APPLICATION(S): 5 OINTMENT OPHTHALMIC at 03:09

## 2024-08-20 RX ADMIN — POVIDONE, PROPYLENE GLYCOL 1 DROP(S): 6.8; 3 LIQUID OPHTHALMIC at 05:35

## 2024-08-20 RX ADMIN — CHLORHEXIDINE GLUCONATE 15 MILLILITER(S): 40 SOLUTION TOPICAL at 05:34

## 2024-08-20 RX ADMIN — POVIDONE, PROPYLENE GLYCOL 1 DROP(S): 6.8; 3 LIQUID OPHTHALMIC at 19:00

## 2024-08-20 RX ADMIN — POVIDONE, PROPYLENE GLYCOL 1 DROP(S): 6.8; 3 LIQUID OPHTHALMIC at 05:09

## 2024-08-20 RX ADMIN — POVIDONE, PROPYLENE GLYCOL 1 DROP(S): 6.8; 3 LIQUID OPHTHALMIC at 00:00

## 2024-08-20 RX ADMIN — ERYTHROMYCIN 1 APPLICATION(S): 5 OINTMENT OPHTHALMIC at 05:33

## 2024-08-20 RX ADMIN — CHLORHEXIDINE GLUCONATE 1 APPLICATION(S): 40 SOLUTION TOPICAL at 11:06

## 2024-08-20 RX ADMIN — POVIDONE, PROPYLENE GLYCOL 1 DROP(S): 6.8; 3 LIQUID OPHTHALMIC at 02:00

## 2024-08-20 RX ADMIN — OFLOXACIN 1 DROP(S): 3 SOLUTION/ DROPS OPHTHALMIC at 17:37

## 2024-08-20 RX ADMIN — POVIDONE, PROPYLENE GLYCOL 1 DROP(S): 6.8; 3 LIQUID OPHTHALMIC at 17:36

## 2024-08-20 RX ADMIN — POVIDONE, PROPYLENE GLYCOL 1 DROP(S): 6.8; 3 LIQUID OPHTHALMIC at 14:02

## 2024-08-20 RX ADMIN — Medication 15 MILLILITER(S): at 11:06

## 2024-08-20 RX ADMIN — POVIDONE, PROPYLENE GLYCOL 1 DROP(S): 6.8; 3 LIQUID OPHTHALMIC at 23:13

## 2024-08-20 RX ADMIN — POVIDONE, PROPYLENE GLYCOL 1 DROP(S): 6.8; 3 LIQUID OPHTHALMIC at 11:26

## 2024-08-20 RX ADMIN — POVIDONE, PROPYLENE GLYCOL 1 DROP(S): 6.8; 3 LIQUID OPHTHALMIC at 07:42

## 2024-08-20 RX ADMIN — ERYTHROMYCIN 1 APPLICATION(S): 5 OINTMENT OPHTHALMIC at 00:00

## 2024-08-20 RX ADMIN — SERTRALINE HYDROCHLORIDE 75 MILLIGRAM(S): 50 TABLET, FILM COATED ORAL at 11:06

## 2024-08-20 RX ADMIN — POVIDONE, PROPYLENE GLYCOL 1 DROP(S): 6.8; 3 LIQUID OPHTHALMIC at 01:00

## 2024-08-20 RX ADMIN — POVIDONE, PROPYLENE GLYCOL 1 DROP(S): 6.8; 3 LIQUID OPHTHALMIC at 20:00

## 2024-08-20 RX ADMIN — ERYTHROMYCIN 1 APPLICATION(S): 5 OINTMENT OPHTHALMIC at 11:05

## 2024-08-20 RX ADMIN — POVIDONE, PROPYLENE GLYCOL 1 DROP(S): 6.8; 3 LIQUID OPHTHALMIC at 09:00

## 2024-08-20 RX ADMIN — POVIDONE, PROPYLENE GLYCOL 1 DROP(S): 6.8; 3 LIQUID OPHTHALMIC at 17:41

## 2024-08-20 RX ADMIN — POVIDONE, PROPYLENE GLYCOL 1 DROP(S): 6.8; 3 LIQUID OPHTHALMIC at 17:37

## 2024-08-20 RX ADMIN — POVIDONE, PROPYLENE GLYCOL 1 DROP(S): 6.8; 3 LIQUID OPHTHALMIC at 22:00

## 2024-08-20 RX ADMIN — POVIDONE, PROPYLENE GLYCOL 1 DROP(S): 6.8; 3 LIQUID OPHTHALMIC at 21:40

## 2024-08-20 RX ADMIN — Medication 2 MILLIGRAM(S): at 17:37

## 2024-08-20 RX ADMIN — POVIDONE, PROPYLENE GLYCOL 1 DROP(S): 6.8; 3 LIQUID OPHTHALMIC at 03:09

## 2024-08-20 RX ADMIN — ERYTHROMYCIN 1 APPLICATION(S): 5 OINTMENT OPHTHALMIC at 21:40

## 2024-08-20 RX ADMIN — CHLORHEXIDINE GLUCONATE 15 MILLILITER(S): 40 SOLUTION TOPICAL at 17:37

## 2024-08-20 RX ADMIN — Medication 2 MILLIGRAM(S): at 05:34

## 2024-08-20 RX ADMIN — POVIDONE, PROPYLENE GLYCOL 1 DROP(S): 6.8; 3 LIQUID OPHTHALMIC at 14:05

## 2024-08-20 RX ADMIN — OFLOXACIN 1 DROP(S): 3 SOLUTION/ DROPS OPHTHALMIC at 05:34

## 2024-08-20 RX ADMIN — RILUZOLE 50 MILLIGRAM(S): 5 LIQUID ORAL at 17:37

## 2024-08-20 RX ADMIN — POVIDONE, PROPYLENE GLYCOL 1 DROP(S): 6.8; 3 LIQUID OPHTHALMIC at 14:04

## 2024-08-20 RX ADMIN — POVIDONE, PROPYLENE GLYCOL 1 DROP(S): 6.8; 3 LIQUID OPHTHALMIC at 08:57

## 2024-08-20 RX ADMIN — RILUZOLE 50 MILLIGRAM(S): 5 LIQUID ORAL at 05:34

## 2024-08-20 NOTE — PROGRESS NOTE ADULT - SUBJECTIVE AND OBJECTIVE BOX
CHIEF COMPLAINT: Patient is a 69y old  Female who presents with a chief complaint of trach exchange (18 Jul 2024 07:07)      INTERVAL EVENTS: No acute overnight events     ROS: Seen by bedside during AM rounds     OBJECTIVE:  ICU Vital Signs Last 24 Hrs  T(C): 36.1 (20 Aug 2024 04:00), Max: 36.4 (20 Aug 2024 00:00)  T(F): 96.9 (20 Aug 2024 04:00), Max: 97.6 (20 Aug 2024 00:00)  HR: 91 (20 Aug 2024 04:00) (81 - 98)  BP: 106/67 (20 Aug 2024 04:00) (103/66 - 109/68)  BP(mean): 79 (20 Aug 2024 04:00) (75 - 81)  ABP: --  ABP(mean): --  RR: 16 (20 Aug 2024 04:00) (16 - 16)  SpO2: 100% (20 Aug 2024 04:00) (98% - 100%)    O2 Parameters below as of 20 Aug 2024 04:00  Patient On (Oxygen Delivery Method): ventilator    O2 Concentration (%): 30      Mode: AC/ CMV (Assist Control/ Continuous Mandatory Ventilation), RR (machine): 16, TV (machine): 400, FiO2: 30, PEEP: 5, ITime: 0.64, MAP: 8, PIP: 25    08-18 @ 07:01  -  08-19 @ 07:00  --------------------------------------------------------  IN: 1500 mL / OUT: 700 mL / NET: 800 mL    08-19 @ 07:01  -  08-20 @ 06:54  --------------------------------------------------------  IN: 1300 mL / OUT: 300 mL / NET: 1000 mL      CAPILLARY BLOOD GLUCOSE          PHYSICAL EXAM:  General:   HEENT:   Lymph Nodes:  Neck:   Respiratory:   Cardiovascular:   Abdomen:   Extremities:   Skin:   Neurological:  Psychiatry:    Mode: AC/ CMV (Assist Control/ Continuous Mandatory Ventilation)  RR (machine): 16  TV (machine): 400  FiO2: 30  PEEP: 5  ITime: 0.64  MAP: 8  PIP: 25      HOSPITAL MEDICATIONS:  MEDICATIONS  (STANDING):  artificial  tears Solution 1 Drop(s) Both EYES every 1 hour  chlorhexidine 0.12% Liquid 15 milliLiter(s) Oral Mucosa every 12 hours  chlorhexidine 2% Cloths 1 Application(s) Topical daily  diazepam    Tablet 2 milliGRAM(s) Oral every 12 hours  erythromycin   Ointment 1 Application(s) Both EYES <User Schedule>  multivitamin/minerals/iron Oral Solution (CENTRUM) 15 milliLiter(s) Oral daily  ofloxacin 0.3% Solution 1 Drop(s) Both EYES two times a day  riluzole 50 milliGRAM(s) Oral two times a day  rivaroxaban      rivaroxaban 10 milliGRAM(s) Oral daily  sertraline 75 milliGRAM(s) Oral daily    MEDICATIONS  (PRN):  acetaminophen   Oral Liquid .. 650 milliGRAM(s) Oral every 6 hours PRN Temp greater or equal to 38C (100.4F), Mild Pain (1 - 3)  diazepam    Tablet 2 milliGRAM(s) Oral at bedtime PRN for dysautonomia/ hypertension/discomfort      LABS:                        12.2   13.62 )-----------( 425      ( 19 Aug 2024 02:45 )             39.4     08-19    138  |  100  |  24<H>  ----------------------------<  166<H>  4.3   |  22  |  <0.20<L>    Ca    9.5      19 Aug 2024 02:45  Phos  3.3     08-19  Mg     2.00     08-19        Urinalysis Basic - ( 19 Aug 2024 02:45 )    Color: x / Appearance: x / SG: x / pH: x  Gluc: 166 mg/dL / Ketone: x  / Bili: x / Urobili: x   Blood: x / Protein: x / Nitrite: x   Leuk Esterase: x / RBC: x / WBC x   Sq Epi: x / Non Sq Epi: x / Bacteria: x         CHIEF COMPLAINT: Patient is a 69y old  Female who presents with a chief complaint of trach exchange (18 Jul 2024 07:07)      INTERVAL EVENTS: No acute overnight events     ROS: Seen by bedside during AM rounds     OBJECTIVE:  ICU Vital Signs Last 24 Hrs  T(C): 36.1 (20 Aug 2024 04:00), Max: 36.4 (20 Aug 2024 00:00)  T(F): 96.9 (20 Aug 2024 04:00), Max: 97.6 (20 Aug 2024 00:00)  HR: 91 (20 Aug 2024 04:00) (81 - 98)  BP: 106/67 (20 Aug 2024 04:00) (103/66 - 109/68)  BP(mean): 79 (20 Aug 2024 04:00) (75 - 81)  ABP: --  ABP(mean): --  RR: 16 (20 Aug 2024 04:00) (16 - 16)  SpO2: 100% (20 Aug 2024 04:00) (98% - 100%)    O2 Parameters below as of 20 Aug 2024 04:00  Patient On (Oxygen Delivery Method): ventilator    O2 Concentration (%): 30      Mode: AC/ CMV (Assist Control/ Continuous Mandatory Ventilation), RR (machine): 16, TV (machine): 400, FiO2: 30, PEEP: 5, ITime: 0.64, MAP: 8, PIP: 25    08-18 @ 07:01  -  08-19 @ 07:00  --------------------------------------------------------  IN: 1500 mL / OUT: 700 mL / NET: 800 mL    08-19 @ 07:01  -  08-20 @ 06:54  --------------------------------------------------------  IN: 1300 mL / OUT: 300 mL / NET: 1000 mL      CAPILLARY BLOOD GLUCOSE      PHYSICAL EXAM:  General: NAD   HEENT Less erythema L>R eye   Neck: +Trach tube noted to vent, site c/d/i.  Cards: S1/S2, no murmurs. Sinus tachycardia  Pulm: CTA bilaterally. No wheezes. No acute respiratory distress.  Abdomen: Soft, NTND. +PEG noted, site c/d/i.   Extremities: No pedal edema. Extremities warm to touch.  Neurology: Eyes are open, does not track or follow commands, history of ALS  Skin: warm to touch. Refer to RN assessment for further details.         Mode: AC/ CMV (Assist Control/ Continuous Mandatory Ventilation)  RR (machine): 16  TV (machine): 400  FiO2: 30  PEEP: 5  ITime: 0.64  MAP: 8  PIP: 25      HOSPITAL MEDICATIONS:  MEDICATIONS  (STANDING):  artificial  tears Solution 1 Drop(s) Both EYES every 1 hour  chlorhexidine 0.12% Liquid 15 milliLiter(s) Oral Mucosa every 12 hours  chlorhexidine 2% Cloths 1 Application(s) Topical daily  diazepam    Tablet 2 milliGRAM(s) Oral every 12 hours  erythromycin   Ointment 1 Application(s) Both EYES <User Schedule>  multivitamin/minerals/iron Oral Solution (CENTRUM) 15 milliLiter(s) Oral daily  ofloxacin 0.3% Solution 1 Drop(s) Both EYES two times a day  riluzole 50 milliGRAM(s) Oral two times a day  rivaroxaban      rivaroxaban 10 milliGRAM(s) Oral daily  sertraline 75 milliGRAM(s) Oral daily    MEDICATIONS  (PRN):  acetaminophen   Oral Liquid .. 650 milliGRAM(s) Oral every 6 hours PRN Temp greater or equal to 38C (100.4F), Mild Pain (1 - 3)  diazepam    Tablet 2 milliGRAM(s) Oral at bedtime PRN for dysautonomia/ hypertension/discomfort      LABS:                        12.2   13.62 )-----------( 425      ( 19 Aug 2024 02:45 )             39.4     08-19    138  |  100  |  24<H>  ----------------------------<  166<H>  4.3   |  22  |  <0.20<L>    Ca    9.5      19 Aug 2024 02:45  Phos  3.3     08-19  Mg     2.00     08-19        Urinalysis Basic - ( 19 Aug 2024 02:45 )    Color: x / Appearance: x / SG: x / pH: x  Gluc: 166 mg/dL / Ketone: x  / Bili: x / Urobili: x   Blood: x / Protein: x / Nitrite: x   Leuk Esterase: x / RBC: x / WBC x   Sq Epi: x / Non Sq Epi: x / Bacteria: x

## 2024-08-20 NOTE — PROGRESS NOTE ADULT - ASSESSMENT
ASSESSMENT   67 YO Female with PMHx of ALS, Parkinson,, nonverbal, bed bound, chronic respiratory failure with tracheostomy and vent dependence, and oropharyngeal dysphagia with PEG who presented from home with tracheostomy leak. She was noted with vent alarm and poor TVe (250-290) at home. Trach changed at home with no improvement and sent in for thoracic evaluation. While in MICU no air leak noted with hyperinflated cuff. Transferred to RCU on 7/5 with course complicated by leukocytosis second to UTI vs trachitis and completed zosyn course. Trach upsized by thoracic on 7/17 however AL remains and now pending custom trach.     PLAN  NEUROLOGY  # ALS   - Baseline nonverbal, awake, and communicates with eye movement.   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   - Continue on Ofloxacin BID (decreased 8/1)   - Continue on Erythromycin OU Q3H (decreased 8/8)  - Continue with artificial tears OU Q1H   - Create moisture chamber with Tegaderm following placement of EXTENSIVE ointment to the right eye throughout the day and to the left eye nightly.   - Allow 6 hour window during the day where moisture can be removed from right eye   - inflammation appears to be improving   - Ophtho f/u yesterday 8/15: exam OU with resolved epithelial defect but persistent chronic corneal changes - continue with above regimen    PSYCH   # Anxiety and Depression   - Continue on home Valium 2mg BID with additional 2mg PRN   - Continue on Zoloft 75mg QD while in house (on 4cc/ 80mg QD at home)     CARDIOVASCULAR   # HTN thought to be second to discomfort vs dysautonomia   - Evening HTN and continue on Hydralazine vs valium PRN doses   - Monitor HR and BP    # pSVT  - Intermittent episodes of SVT, lasting a few seconds at a time, and self limiting.   - Consider BB if becomes more persistent or HD unstable    RESPIRATORY  # Tracheostomy leak   - At home noted with TVe 200-300s despite tracheostomy change to 80XLTCD.   - No air leak noted in house with hyperinflated cuff likely tracheomegaly?   - Continue on chronic vent 16/400/5/21   - Chronic bibasilar atelectasis and continued on HTS PRN with chest PT  - Trach upsized to Bivona 9 on 7/17, however trach leak remains.   - Custom Tracheostomy delivered and exchanged on Monday 8/12  - Pt has a 9.5mm Bovana, Second spare trach was defective - Per thoracic team 2 spare trachs have been ordered with possible delivery Fri BY OR manager April- spectra #  [id number]  - Family will bring in home vent for trial with new trach in place  - Biomed cleared vent for use  - Spoke to April in OR and trachs to be sterilized today     GI  # Dysphagia   - Continue on PEG-TF     RENAL  # High PVR   - BS with roughly 200-300cc PVR, however no acute distress   - Monitor renal function and UOP     # Elevated lactate   - Lactate elevated with mild contraction alkalosis   - Rehydrate with improvement     INFECTIOUS DISEASE  # Leukocytosis likely second to trachitis vs UTI?   - No fever or chills and noted with prior leukocytosis   - CXR with prior atelectasis and no acute findings   - UA (7/6) with positive nitrites (7/6)   - SCx (7/5) with pansensitive PSA  - BCx (7/6) x 2 NGT  - UCx (7/6) with coag neg staph  - MRSA PCR (7/6) POSITIVE for both MRSA/MSSA s/p Bactroban (7/6 - 7/10)  - s/p empiric Zosyn (7/8 - 7/14) however WBC continues to wax and wean without fever and will monitor off ABX .     HEME  - On Xarelto 10mg at home likely for DVT PPX?   - Hold home Xarelto in prep for OR   - DVT PPX with LVX for now and hold home Eliquis PPX  - LVX restarted     ENDOCRINE  - No hx of DM2   - Monitor BG     SKIN  - Basic trach and PEG care ordered   - DTI on back and pending [facility]    ETHICS/ GOC    - FULL CODE   - Dr Tyson Velasquez,  involved in care    DISPO - Back home with  when able.     ASSESSMENT   67 YO Female with PMHx of ALS, Parkinson,, nonverbal, bed bound, chronic respiratory failure with tracheostomy and vent dependence, and oropharyngeal dysphagia with PEG who presented from home with tracheostomy leak. She was noted with vent alarm and poor TVe (250-290) at home. Trach changed at home with no improvement and sent in for thoracic evaluation. While in MICU no air leak noted with hyperinflated cuff. Transferred to RCU on 7/5 with course complicated by leukocytosis second to UTI vs trachitis and completed zosyn course. Trach upsized by thoracic on 7/17 however AL remains and now pending custom trach.     PLAN  NEUROLOGY  # ALS   - Baseline nonverbal, awake, and communicates with eye movement.   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   - Continue on Ofloxacin BID (decreased 8/1)   - Continue on Erythromycin OU Q3H (decreased 8/8)  - Continue with artificial tears OU Q1H   - Create moisture chamber with Tegaderm following placement of EXTENSIVE ointment to the right eye throughout the day and to the left eye nightly.   - Allow 6 hour window during the day where moisture can be removed from right eye   - inflammation appears to be improving   - Ophtho f/u yesterday 8/15: exam OU with resolved epithelial defect but persistent chronic corneal changes - continue with above regimen    PSYCH   # Anxiety and Depression   - Continue on home Valium 2mg BID with additional 2mg PRN   - Continue on Zoloft 75mg QD while in house (on 4cc/ 80mg QD at home)     CARDIOVASCULAR   # HTN thought to be second to discomfort vs dysautonomia   - Evening HTN and continue on Hydralazine vs valium PRN doses   - Monitor HR and BP    # pSVT  - Intermittent episodes of SVT, lasting a few seconds at a time, and self limiting.   - Consider BB if becomes more persistent or HD unstable    RESPIRATORY  # Tracheostomy leak   - At home noted with TVe 200-300s despite tracheostomy change to 80XLTCD.   - No air leak noted in house with hyperinflated cuff likely tracheomegaly?   - Continue on chronic vent 16/400/5/21   - Chronic bibasilar atelectasis and continued on HTS PRN with chest PT  - Trach upsized to Bivona 9 on 7/17, however trach leak remains.   - Custom Tracheostomy delivered and exchanged on Monday 8/12  - Pt has a 9.5mm Bovana, Second spare trach was defective - Per thoracic team 2 spare trachs have been ordered with possible delivery Fri BY OR manager April- spectra #  [id number]  - Family will bring in home vent for trial with new trach in place  - Biomed cleared vent for use  - Spoke to April in OR and trachs  sterilized and one balloon burst one is intact but no longer sterile Team to decide if pt can leave with trach not sterile or needs to be sterilized by  and do we need a second spare        GI  # Dysphagia   - Continue on PEG-TF     RENAL  # High PVR   - BS with roughly 200-300cc PVR, however no acute distress   - Monitor renal function and UOP     # Elevated lactate   - Lactate elevated with mild contraction alkalosis   - Rehydrate with improvement     INFECTIOUS DISEASE  # Leukocytosis likely second to trachitis vs UTI?   - No fever or chills and noted with prior leukocytosis   - CXR with prior atelectasis and no acute findings   - UA (7/6) with positive nitrites (7/6)   - SCx (7/5) with pansensitive PSA  - BCx (7/6) x 2 NGT  - UCx (7/6) with coag neg staph  - MRSA PCR (7/6) POSITIVE for both MRSA/MSSA s/p Bactroban (7/6 - 7/10)  - s/p empiric Zosyn (7/8 - 7/14) however WBC continues to wax and wean without fever and will monitor off ABX .     HEME  - On Xarelto 10mg at home likely for DVT PPX?   - Hold home Xarelto in prep for OR   - DVT PPX with LVX for now and hold home Eliquis PPX  - LVX restarted     ENDOCRINE  - No hx of DM2   - Monitor BG     SKIN  - Basic trach and PEG care ordered   - DTI on back and pending [facility]    ETHICS/ GOC    - FULL CODE   - Dr Tyson Velasquez,  involved in care    DISPO - Back home with  when able.

## 2024-08-21 PROCEDURE — 99233 SBSQ HOSP IP/OBS HIGH 50: CPT

## 2024-08-21 RX ADMIN — POVIDONE, PROPYLENE GLYCOL 1 DROP(S): 6.8; 3 LIQUID OPHTHALMIC at 05:00

## 2024-08-21 RX ADMIN — POVIDONE, PROPYLENE GLYCOL 1 DROP(S): 6.8; 3 LIQUID OPHTHALMIC at 13:00

## 2024-08-21 RX ADMIN — POVIDONE, PROPYLENE GLYCOL 1 DROP(S): 6.8; 3 LIQUID OPHTHALMIC at 15:31

## 2024-08-21 RX ADMIN — POVIDONE, PROPYLENE GLYCOL 1 DROP(S): 6.8; 3 LIQUID OPHTHALMIC at 17:12

## 2024-08-21 RX ADMIN — OFLOXACIN 1 DROP(S): 3 SOLUTION/ DROPS OPHTHALMIC at 05:33

## 2024-08-21 RX ADMIN — POVIDONE, PROPYLENE GLYCOL 1 DROP(S): 6.8; 3 LIQUID OPHTHALMIC at 22:06

## 2024-08-21 RX ADMIN — ERYTHROMYCIN 1 APPLICATION(S): 5 OINTMENT OPHTHALMIC at 15:31

## 2024-08-21 RX ADMIN — Medication 15 MILLILITER(S): at 11:02

## 2024-08-21 RX ADMIN — ERYTHROMYCIN 1 APPLICATION(S): 5 OINTMENT OPHTHALMIC at 06:40

## 2024-08-21 RX ADMIN — POVIDONE, PROPYLENE GLYCOL 1 DROP(S): 6.8; 3 LIQUID OPHTHALMIC at 04:00

## 2024-08-21 RX ADMIN — POVIDONE, PROPYLENE GLYCOL 1 DROP(S): 6.8; 3 LIQUID OPHTHALMIC at 02:14

## 2024-08-21 RX ADMIN — ERYTHROMYCIN 1 APPLICATION(S): 5 OINTMENT OPHTHALMIC at 20:34

## 2024-08-21 RX ADMIN — POVIDONE, PROPYLENE GLYCOL 1 DROP(S): 6.8; 3 LIQUID OPHTHALMIC at 00:00

## 2024-08-21 RX ADMIN — POVIDONE, PROPYLENE GLYCOL 1 DROP(S): 6.8; 3 LIQUID OPHTHALMIC at 06:40

## 2024-08-21 RX ADMIN — POVIDONE, PROPYLENE GLYCOL 1 DROP(S): 6.8; 3 LIQUID OPHTHALMIC at 12:58

## 2024-08-21 RX ADMIN — POVIDONE, PROPYLENE GLYCOL 1 DROP(S): 6.8; 3 LIQUID OPHTHALMIC at 03:42

## 2024-08-21 RX ADMIN — POVIDONE, PROPYLENE GLYCOL 1 DROP(S): 6.8; 3 LIQUID OPHTHALMIC at 22:07

## 2024-08-21 RX ADMIN — RILUZOLE 50 MILLIGRAM(S): 5 LIQUID ORAL at 05:33

## 2024-08-21 RX ADMIN — ERYTHROMYCIN 1 APPLICATION(S): 5 OINTMENT OPHTHALMIC at 09:03

## 2024-08-21 RX ADMIN — SERTRALINE HYDROCHLORIDE 75 MILLIGRAM(S): 50 TABLET, FILM COATED ORAL at 11:03

## 2024-08-21 RX ADMIN — POVIDONE, PROPYLENE GLYCOL 1 DROP(S): 6.8; 3 LIQUID OPHTHALMIC at 07:37

## 2024-08-21 RX ADMIN — POVIDONE, PROPYLENE GLYCOL 1 DROP(S): 6.8; 3 LIQUID OPHTHALMIC at 15:32

## 2024-08-21 RX ADMIN — CHLORHEXIDINE GLUCONATE 1 APPLICATION(S): 40 SOLUTION TOPICAL at 11:01

## 2024-08-21 RX ADMIN — OFLOXACIN 1 DROP(S): 3 SOLUTION/ DROPS OPHTHALMIC at 17:07

## 2024-08-21 RX ADMIN — POVIDONE, PROPYLENE GLYCOL 1 DROP(S): 6.8; 3 LIQUID OPHTHALMIC at 20:34

## 2024-08-21 RX ADMIN — CHLORHEXIDINE GLUCONATE 15 MILLILITER(S): 40 SOLUTION TOPICAL at 05:33

## 2024-08-21 RX ADMIN — POVIDONE, PROPYLENE GLYCOL 1 DROP(S): 6.8; 3 LIQUID OPHTHALMIC at 11:09

## 2024-08-21 RX ADMIN — POVIDONE, PROPYLENE GLYCOL 1 DROP(S): 6.8; 3 LIQUID OPHTHALMIC at 17:07

## 2024-08-21 RX ADMIN — POVIDONE, PROPYLENE GLYCOL 1 DROP(S): 6.8; 3 LIQUID OPHTHALMIC at 20:18

## 2024-08-21 RX ADMIN — CHLORHEXIDINE GLUCONATE 15 MILLILITER(S): 40 SOLUTION TOPICAL at 17:08

## 2024-08-21 RX ADMIN — Medication 2 MILLIGRAM(S): at 17:08

## 2024-08-21 RX ADMIN — ERYTHROMYCIN 1 APPLICATION(S): 5 OINTMENT OPHTHALMIC at 17:08

## 2024-08-21 RX ADMIN — POVIDONE, PROPYLENE GLYCOL 1 DROP(S): 6.8; 3 LIQUID OPHTHALMIC at 09:06

## 2024-08-21 RX ADMIN — ERYTHROMYCIN 1 APPLICATION(S): 5 OINTMENT OPHTHALMIC at 00:00

## 2024-08-21 RX ADMIN — POVIDONE, PROPYLENE GLYCOL 1 DROP(S): 6.8; 3 LIQUID OPHTHALMIC at 07:34

## 2024-08-21 RX ADMIN — ERYTHROMYCIN 1 APPLICATION(S): 5 OINTMENT OPHTHALMIC at 11:02

## 2024-08-21 RX ADMIN — POVIDONE, PROPYLENE GLYCOL 1 DROP(S): 6.8; 3 LIQUID OPHTHALMIC at 10:19

## 2024-08-21 RX ADMIN — POVIDONE, PROPYLENE GLYCOL 1 DROP(S): 6.8; 3 LIQUID OPHTHALMIC at 09:03

## 2024-08-21 RX ADMIN — RILUZOLE 50 MILLIGRAM(S): 5 LIQUID ORAL at 17:08

## 2024-08-21 RX ADMIN — Medication 2 MILLIGRAM(S): at 05:33

## 2024-08-21 RX ADMIN — POVIDONE, PROPYLENE GLYCOL 1 DROP(S): 6.8; 3 LIQUID OPHTHALMIC at 11:02

## 2024-08-21 RX ADMIN — POVIDONE, PROPYLENE GLYCOL 1 DROP(S): 6.8; 3 LIQUID OPHTHALMIC at 01:14

## 2024-08-21 RX ADMIN — ERYTHROMYCIN 1 APPLICATION(S): 5 OINTMENT OPHTHALMIC at 03:42

## 2024-08-21 NOTE — PROGRESS NOTE ADULT - SUBJECTIVE AND OBJECTIVE BOX
CHIEF COMPLAINT: Patient is a 69y old  Female who presents with a chief complaint of trach exchange (18 Jul 2024 07:07)    Interval Events: No acute events reported overnight. D/C planning in progress. VSS, and medications reviewed.    REVIEW OF SYSTEMS:  [x] Unable to assess ROS because hx of ALS    Mode: AC/ CMV (Assist Control/ Continuous Mandatory Ventilation), RR (machine): 16, TV (machine): 400, FiO2: 30, PEEP: 5, ITime: 0.64, MAP: 9, PIP: 28    OBJECTIVE:  ICU Vital Signs Last 24 Hrs  T(C): 36.1 (21 Aug 2024 04:00), Max: 37 (20 Aug 2024 20:00)  T(F): 96.9 (21 Aug 2024 04:00), Max: 98.6 (20 Aug 2024 20:00)  HR: 94 (21 Aug 2024 04:00) (87 - 105)  BP: 99/73 (21 Aug 2024 04:00) (99/72 - 132/76)  BP(mean): 82 (21 Aug 2024 04:00) (82 - 93)  ABP: --  ABP(mean): --  RR: 16 (21 Aug 2024 04:00) (16 - 17)  SpO2: 98% (21 Aug 2024 04:00) (97% - 100%)    O2 Parameters below as of 21 Aug 2024 04:00  Patient On (Oxygen Delivery Method): ventilator    O2 Concentration (%): 30    Mode: AC/ CMV (Assist Control/ Continuous Mandatory Ventilation), RR (machine): 16, TV (machine): 400, FiO2: 30, PEEP: 5, ITime: 0.64, MAP: 9, PIP: 28    08-20 @ 07:01  -  08-21 @ 07:00  --------------------------------------------------------  IN: 1300 mL / OUT: 600 mL / NET: 700 mL    CAPILLARY BLOOD GLUCOSE    HOSPITAL MEDICATIONS:  MEDICATIONS  (STANDING):  artificial  tears Solution 1 Drop(s) Both EYES every 1 hour  chlorhexidine 0.12% Liquid 15 milliLiter(s) Oral Mucosa every 12 hours  chlorhexidine 2% Cloths 1 Application(s) Topical daily  diazepam    Tablet 2 milliGRAM(s) Oral every 12 hours  erythromycin   Ointment 1 Application(s) Both EYES <User Schedule>  multivitamin/minerals/iron Oral Solution (CENTRUM) 15 milliLiter(s) Oral daily  ofloxacin 0.3% Solution 1 Drop(s) Both EYES two times a day  riluzole 50 milliGRAM(s) Oral two times a day  rivaroxaban      rivaroxaban 10 milliGRAM(s) Oral daily  sertraline 75 milliGRAM(s) Oral daily    MEDICATIONS  (PRN):  acetaminophen   Oral Liquid .. 650 milliGRAM(s) Oral every 6 hours PRN Temp greater or equal to 38C (100.4F), Mild Pain (1 - 3)  diazepam    Tablet 2 milliGRAM(s) Oral at bedtime PRN for dysautonomia/ hypertension/discomfort    PHYSICAL EXAM:  General: NAD, +Trach to Vent   Neck: neck supple, no JVD, trach midline  Eye: +corneal opacification noted b/l, R>L, +eyes covered  Respiratory: +rhonchi auscultated b/l, no wheeze, no rales, no resp distress  Heart: +s1. s2  Abdomen: soft, non tender, +PEG  Extremities: no LE edema b/l in +SCDs  Skin: warm and dry  Neurological: +neurologic deficits d/t ALS hx   Psychiatry: no agitation    LABS:    PAST MEDICAL & SURGICAL HISTORY:  ALS (amyotrophic lateral sclerosis)    HLD (hyperlipidemia)    Ventilator dependent  Since 2015    Aspiration into airway    S/P gastrostomy  10/14 for dysphagia  receives jevity 2 cans TID    Dependence on tracheostomy    Encounter for PEG (percutaneous endoscopic gastrostomy)  peg placed    FAMILY HISTORY:  No pertinent family history in first degree relative    Social History:    RADIOLOGY:  [ ] Reviewed and interpreted by me    PULMONARY FUNCTION TESTS:    EKG:

## 2024-08-21 NOTE — PROGRESS NOTE ADULT - ASSESSMENT
ASSESSMENT   69 YO Female with PMHx of ALS, Parkinson,, nonverbal, bed bound, chronic respiratory failure with tracheostomy and vent dependence, and oropharyngeal dysphagia with PEG who presented from home with tracheostomy leak. She was noted with vent alarm and poor TVe (250-290) at home. Trach changed at home with no improvement and sent in for thoracic evaluation. While in MICU no air leak noted with hyperinflated cuff. Transferred to RCU on 7/5 with course complicated by leukocytosis second to UTI vs trachitis and completed zosyn course. Trach upsized by thoracic on 7/17 however AL remains and now pending custom trach.     PLAN  NEUROLOGY  # ALS   - Baseline nonverbal, awake, and communicates with eye movement.   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   - Continue on Ofloxacin BID (decreased 8/1)   - Continue on Erythromycin OU Q3H (decreased 8/8)  - Continue with artificial tears OU Q1H   - Create moisture chamber with Tegaderm following placement of EXTENSIVE ointment to the right eye throughout the day and to the left eye nightly.   - Allow 6 hour window during the day where moisture can be removed from right eye   - Inflammation appears to be improving   - Ophtho f/u yesterday 8/15: exam OU with resolved epithelial defect but persistent chronic corneal changes - continue with above regimen    PSYCH   # Anxiety and Depression   - Continue on home Valium 2mg BID with additional 2mg PRN   - Continue on Zoloft 75mg QD while in house (on 4cc/ 80mg QD at home)     CARDIOVASCULAR   # HTN thought to be second to discomfort vs dysautonomia   - Evening HTN and continue on Hydralazine vs valium PRN doses   - Monitor HR and BP    # pSVT  - Intermittent episodes of SVT, lasting a few seconds at a time, and self limiting.   - Consider BB if becomes more persistent or HD unstable    RESPIRATORY  # Tracheostomy leak   - At home noted with TVe 200-300s despite tracheostomy change to 80XLTCD.   - No air leak noted in house with hyperinflated cuff likely tracheomegaly?   - Continue on chronic vent 16/400/5/21   - Chronic bibasilar atelectasis and continued on HTS PRN with chest PT  - Trach upsized to Bivona 9 on 7/17, however trach leak remains.   - Custom Tracheostomy delivered and exchanged on Monday 8/12  - Pt has a 9.5mm Bovana, Second spare trach was defective - Per thoracic team 2 spare trachs have been ordered with possible delivery Fri BY OR manager April- spectra #  [id number]  - s/p Family brought in home vent for trial with new trach in place  - Biomed cleared vent for use  - Spoke to April in OR and trachs  sterilized and one balloon burst one is intact but no longer sterile Team to decide if pt can leave with trach not sterile or needs to be sterilized by  and do we need a second spare      GI  # Dysphagia   - Continue on PEG-TF     RENAL  # High PVR   - BS with roughly 200-300cc PVR, however no acute distress   - Monitor renal function and UOP     # Elevated lactate   - Lactate elevated with mild contraction alkalosis   - Rehydrate with improvement     INFECTIOUS DISEASE  # Leukocytosis likely second to trachitis vs UTI?   - No fever or chills and noted with prior leukocytosis   - CXR with prior atelectasis and no acute findings   - UA (7/6) with positive nitrites (7/6)   - SCx (7/5) with pansensitive PSA  - BCx (7/6) x 2 NGT  - UCx (7/6) with coag neg staph  - MRSA PCR (7/6) POSITIVE for both MRSA/MSSA s/p Bactroban (7/6 - 7/10)  - s/p empiric Zosyn (7/8 - 7/14) however WBC continues to wax and wean w/o fever  - Will continue to monitor off ABX .     HEME  - c/w Home Xarelto 10mg - likely for DVT PPX?     ENDOCRINE  - No hx of DM2   - Monitor BG     SKIN  - Basic trach and PEG care ordered   - DTI on back and pending [facility]    ETHICS/ GOC    - FULL CODE   - Dr Tyson Velasquez,  involved in care    DISPO - Back home with  when able.

## 2024-08-21 NOTE — PROGRESS NOTE ADULT - NS ATTEND AMEND GEN_ALL_CORE FT
-    69F PMH advanced ALS (nonverbal and bedbound at baseline) with chronic hypercapnic respiratory failure s/p trach/PEG, vent-dependent, and Parkinson's who presented to [facility] on 7/18/24 with acute hypercapnia secondary to large expiratory air leak s/p upsizing without improvement with hospital course c/b tracheitis vs. UTI transferred from MICU to RCU on 7/5 for further management. S/p OR on 8/12 trach exchange with thoracic surgery with improved tidal volumes (Portex 9.5 with 40 mm balloon).    # Neuro: continue home riluzole for ALS. Continue sertraline and diazepam. Bedbound, nonverbal, quadriplegic, dependent on ADL's. Continue therapy for keratopathy, now improved  # CV: HD stable  # Pulm: continue lung protective ventilation. Minimal leak currently. Pending backup trachs, but unable to be sterilized. Unclear long term plan as balloon of custom trach ruptured during sterilization  # GI: continue PEG feeds  # Renal: stable kidney function and lytes  # ID: no evidence of active infection. Previously completed Zosyn for Pseudomonas aeruginosa VAP  # Heme: rivaroxaban for DVT ppx. Stable H/H  # Dispo: d/c planning to home with home vent. Full code

## 2024-08-22 LAB
ANION GAP SERPL CALC-SCNC: 15 MMOL/L — HIGH (ref 7–14)
BUN SERPL-MCNC: 24 MG/DL — HIGH (ref 7–23)
CALCIUM SERPL-MCNC: 9.6 MG/DL — SIGNIFICANT CHANGE UP (ref 8.4–10.5)
CHLORIDE SERPL-SCNC: 100 MMOL/L — SIGNIFICANT CHANGE UP (ref 98–107)
CO2 SERPL-SCNC: 25 MMOL/L — SIGNIFICANT CHANGE UP (ref 22–31)
CREAT SERPL-MCNC: <0.2 MG/DL — LOW (ref 0.5–1.3)
EGFR: 125 ML/MIN/1.73M2 — SIGNIFICANT CHANGE UP
GLUCOSE SERPL-MCNC: 152 MG/DL — HIGH (ref 70–99)
HCT VFR BLD CALC: 41.9 % — SIGNIFICANT CHANGE UP (ref 34.5–45)
HGB BLD-MCNC: 12.7 G/DL — SIGNIFICANT CHANGE UP (ref 11.5–15.5)
MAGNESIUM SERPL-MCNC: 2.1 MG/DL — SIGNIFICANT CHANGE UP (ref 1.6–2.6)
MCHC RBC-ENTMCNC: 25.1 PG — LOW (ref 27–34)
MCHC RBC-ENTMCNC: 30.3 GM/DL — LOW (ref 32–36)
MCV RBC AUTO: 82.8 FL — SIGNIFICANT CHANGE UP (ref 80–100)
NRBC # BLD: 0 /100 WBCS — SIGNIFICANT CHANGE UP (ref 0–0)
NRBC # FLD: 0 K/UL — SIGNIFICANT CHANGE UP (ref 0–0)
PHOSPHATE SERPL-MCNC: 4.2 MG/DL — SIGNIFICANT CHANGE UP (ref 2.5–4.5)
PLATELET # BLD AUTO: 356 K/UL — SIGNIFICANT CHANGE UP (ref 150–400)
POTASSIUM SERPL-MCNC: 4.3 MMOL/L — SIGNIFICANT CHANGE UP (ref 3.5–5.3)
POTASSIUM SERPL-SCNC: 4.3 MMOL/L — SIGNIFICANT CHANGE UP (ref 3.5–5.3)
RBC # BLD: 5.06 M/UL — SIGNIFICANT CHANGE UP (ref 3.8–5.2)
RBC # FLD: 18.6 % — HIGH (ref 10.3–14.5)
SODIUM SERPL-SCNC: 140 MMOL/L — SIGNIFICANT CHANGE UP (ref 135–145)
WBC # BLD: 10.82 K/UL — HIGH (ref 3.8–10.5)
WBC # FLD AUTO: 10.82 K/UL — HIGH (ref 3.8–10.5)

## 2024-08-22 PROCEDURE — 99233 SBSQ HOSP IP/OBS HIGH 50: CPT

## 2024-08-22 RX ORDER — DIAZEPAM 10 MG
2 TABLET ORAL AT BEDTIME
Refills: 0 | Status: DISCONTINUED | OUTPATIENT
Start: 2024-08-22 | End: 2024-08-29

## 2024-08-22 RX ORDER — DIAZEPAM 10 MG
2 TABLET ORAL EVERY 12 HOURS
Refills: 0 | Status: DISCONTINUED | OUTPATIENT
Start: 2024-08-22 | End: 2024-08-29

## 2024-08-22 RX ADMIN — POVIDONE, PROPYLENE GLYCOL 1 DROP(S): 6.8; 3 LIQUID OPHTHALMIC at 11:27

## 2024-08-22 RX ADMIN — POVIDONE, PROPYLENE GLYCOL 1 DROP(S): 6.8; 3 LIQUID OPHTHALMIC at 02:22

## 2024-08-22 RX ADMIN — POVIDONE, PROPYLENE GLYCOL 1 DROP(S): 6.8; 3 LIQUID OPHTHALMIC at 11:32

## 2024-08-22 RX ADMIN — CHLORHEXIDINE GLUCONATE 1 APPLICATION(S): 40 SOLUTION TOPICAL at 11:28

## 2024-08-22 RX ADMIN — POVIDONE, PROPYLENE GLYCOL 1 DROP(S): 6.8; 3 LIQUID OPHTHALMIC at 03:45

## 2024-08-22 RX ADMIN — Medication 2 MILLIGRAM(S): at 17:05

## 2024-08-22 RX ADMIN — POVIDONE, PROPYLENE GLYCOL 1 DROP(S): 6.8; 3 LIQUID OPHTHALMIC at 12:59

## 2024-08-22 RX ADMIN — ERYTHROMYCIN 1 APPLICATION(S): 5 OINTMENT OPHTHALMIC at 05:08

## 2024-08-22 RX ADMIN — POVIDONE, PROPYLENE GLYCOL 1 DROP(S): 6.8; 3 LIQUID OPHTHALMIC at 13:00

## 2024-08-22 RX ADMIN — OFLOXACIN 1 DROP(S): 3 SOLUTION/ DROPS OPHTHALMIC at 17:05

## 2024-08-22 RX ADMIN — RILUZOLE 50 MILLIGRAM(S): 5 LIQUID ORAL at 07:34

## 2024-08-22 RX ADMIN — ERYTHROMYCIN 1 APPLICATION(S): 5 OINTMENT OPHTHALMIC at 11:28

## 2024-08-22 RX ADMIN — CHLORHEXIDINE GLUCONATE 15 MILLILITER(S): 40 SOLUTION TOPICAL at 17:05

## 2024-08-22 RX ADMIN — ERYTHROMYCIN 1 APPLICATION(S): 5 OINTMENT OPHTHALMIC at 21:26

## 2024-08-22 RX ADMIN — POVIDONE, PROPYLENE GLYCOL 1 DROP(S): 6.8; 3 LIQUID OPHTHALMIC at 05:08

## 2024-08-22 RX ADMIN — POVIDONE, PROPYLENE GLYCOL 1 DROP(S): 6.8; 3 LIQUID OPHTHALMIC at 04:20

## 2024-08-22 RX ADMIN — POVIDONE, PROPYLENE GLYCOL 1 DROP(S): 6.8; 3 LIQUID OPHTHALMIC at 02:16

## 2024-08-22 RX ADMIN — POVIDONE, PROPYLENE GLYCOL 1 DROP(S): 6.8; 3 LIQUID OPHTHALMIC at 11:24

## 2024-08-22 RX ADMIN — ERYTHROMYCIN 1 APPLICATION(S): 5 OINTMENT OPHTHALMIC at 23:46

## 2024-08-22 RX ADMIN — POVIDONE, PROPYLENE GLYCOL 1 DROP(S): 6.8; 3 LIQUID OPHTHALMIC at 17:05

## 2024-08-22 RX ADMIN — ERYTHROMYCIN 1 APPLICATION(S): 5 OINTMENT OPHTHALMIC at 11:23

## 2024-08-22 RX ADMIN — RILUZOLE 50 MILLIGRAM(S): 5 LIQUID ORAL at 17:05

## 2024-08-22 RX ADMIN — POVIDONE, PROPYLENE GLYCOL 1 DROP(S): 6.8; 3 LIQUID OPHTHALMIC at 16:04

## 2024-08-22 RX ADMIN — Medication 15 MILLILITER(S): at 11:28

## 2024-08-22 RX ADMIN — Medication 2 MILLIGRAM(S): at 05:10

## 2024-08-22 RX ADMIN — OFLOXACIN 1 DROP(S): 3 SOLUTION/ DROPS OPHTHALMIC at 05:09

## 2024-08-22 RX ADMIN — POVIDONE, PROPYLENE GLYCOL 1 DROP(S): 6.8; 3 LIQUID OPHTHALMIC at 21:26

## 2024-08-22 RX ADMIN — CHLORHEXIDINE GLUCONATE 15 MILLILITER(S): 40 SOLUTION TOPICAL at 05:09

## 2024-08-22 RX ADMIN — POVIDONE, PROPYLENE GLYCOL 1 DROP(S): 6.8; 3 LIQUID OPHTHALMIC at 22:14

## 2024-08-22 RX ADMIN — POVIDONE, PROPYLENE GLYCOL 1 DROP(S): 6.8; 3 LIQUID OPHTHALMIC at 16:02

## 2024-08-22 RX ADMIN — ERYTHROMYCIN 1 APPLICATION(S): 5 OINTMENT OPHTHALMIC at 00:42

## 2024-08-22 RX ADMIN — POVIDONE, PROPYLENE GLYCOL 1 DROP(S): 6.8; 3 LIQUID OPHTHALMIC at 07:43

## 2024-08-22 RX ADMIN — ERYTHROMYCIN 1 APPLICATION(S): 5 OINTMENT OPHTHALMIC at 16:02

## 2024-08-22 RX ADMIN — ERYTHROMYCIN 1 APPLICATION(S): 5 OINTMENT OPHTHALMIC at 02:28

## 2024-08-22 RX ADMIN — POVIDONE, PROPYLENE GLYCOL 1 DROP(S): 6.8; 3 LIQUID OPHTHALMIC at 07:34

## 2024-08-22 RX ADMIN — POVIDONE, PROPYLENE GLYCOL 1 DROP(S): 6.8; 3 LIQUID OPHTHALMIC at 19:45

## 2024-08-22 RX ADMIN — POVIDONE, PROPYLENE GLYCOL 1 DROP(S): 6.8; 3 LIQUID OPHTHALMIC at 23:46

## 2024-08-22 RX ADMIN — POVIDONE, PROPYLENE GLYCOL 1 DROP(S): 6.8; 3 LIQUID OPHTHALMIC at 06:25

## 2024-08-22 RX ADMIN — ERYTHROMYCIN 1 APPLICATION(S): 5 OINTMENT OPHTHALMIC at 17:06

## 2024-08-22 RX ADMIN — POVIDONE, PROPYLENE GLYCOL 1 DROP(S): 6.8; 3 LIQUID OPHTHALMIC at 17:06

## 2024-08-22 RX ADMIN — SERTRALINE HYDROCHLORIDE 75 MILLIGRAM(S): 50 TABLET, FILM COATED ORAL at 11:28

## 2024-08-22 RX ADMIN — POVIDONE, PROPYLENE GLYCOL 1 DROP(S): 6.8; 3 LIQUID OPHTHALMIC at 20:38

## 2024-08-22 RX ADMIN — POVIDONE, PROPYLENE GLYCOL 1 DROP(S): 6.8; 3 LIQUID OPHTHALMIC at 23:24

## 2024-08-22 RX ADMIN — POVIDONE, PROPYLENE GLYCOL 1 DROP(S): 6.8; 3 LIQUID OPHTHALMIC at 00:42

## 2024-08-22 RX ADMIN — POVIDONE, PROPYLENE GLYCOL 1 DROP(S): 6.8; 3 LIQUID OPHTHALMIC at 11:23

## 2024-08-22 NOTE — PROGRESS NOTE ADULT - SUBJECTIVE AND OBJECTIVE BOX
CHIEF COMPLAINT:Patient is a 69y old  Female who presents with a chief complaint of trach exchange (18 Jul 2024 07:07)      INTERVAL EVENTS:     ROS: Seen by bedside during AM rounds     OBJECTIVE:  ICU Vital Signs Last 24 Hrs  T(C): 36.5 (22 Aug 2024 04:00), Max: 36.5 (22 Aug 2024 04:00)  T(F): 97.7 (22 Aug 2024 04:00), Max: 97.7 (22 Aug 2024 04:00)  HR: 99 (22 Aug 2024 04:00) (81 - 106)  BP: 115/83 (22 Aug 2024 04:00) (99/66 - 124/76)  BP(mean): 95 (22 Aug 2024 04:00) (77 - 95)  ABP: --  ABP(mean): --  RR: 16 (22 Aug 2024 04:00) (16 - 17)  SpO2: 98% (22 Aug 2024 04:00) (97% - 99%)    O2 Parameters below as of 22 Aug 2024 04:00  Patient On (Oxygen Delivery Method): ventilator    O2 Concentration (%): 30      Mode: AC/ CMV (Assist Control/ Continuous Mandatory Ventilation), RR (machine): 16, TV (machine): 400, FiO2: 30, PEEP: 5, ITime: 0.64, MAP: 8, PIP: 30    08-21 @ 07:01  -  08-22 @ 07:00  --------------------------------------------------------  IN: 1050 mL / OUT: 700 mL / NET: 350 mL      CAPILLARY BLOOD GLUCOSE          PHYSICAL EXAM:  General:   HEENT:   Lymph Nodes:  Neck:   Respiratory:   Cardiovascular:   Abdomen:   Extremities:   Skin:   Neurological:  Psychiatry:    Mode: AC/ CMV (Assist Control/ Continuous Mandatory Ventilation)  RR (machine): 16  TV (machine): 400  FiO2: 30  PEEP: 5  ITime: 0.64  MAP: 8  PIP: 30      HOSPITAL MEDICATIONS:  MEDICATIONS  (STANDING):  artificial  tears Solution 1 Drop(s) Both EYES every 1 hour  chlorhexidine 0.12% Liquid 15 milliLiter(s) Oral Mucosa every 12 hours  chlorhexidine 2% Cloths 1 Application(s) Topical daily  diazepam    Tablet 2 milliGRAM(s) Oral every 12 hours  erythromycin   Ointment 1 Application(s) Both EYES <User Schedule>  multivitamin/minerals/iron Oral Solution (CENTRUM) 15 milliLiter(s) Oral daily  ofloxacin 0.3% Solution 1 Drop(s) Both EYES two times a day  riluzole 50 milliGRAM(s) Oral two times a day  rivaroxaban 10 milliGRAM(s) Oral daily  rivaroxaban      sertraline 75 milliGRAM(s) Oral daily    MEDICATIONS  (PRN):  acetaminophen   Oral Liquid .. 650 milliGRAM(s) Oral every 6 hours PRN Temp greater or equal to 38C (100.4F), Mild Pain (1 - 3)  diazepam    Tablet 2 milliGRAM(s) Oral at bedtime PRN for dysautonomia/ hypertension/discomfort      LABS:                        12.7   10.82 )-----------( 356      ( 22 Aug 2024 03:00 )             41.9     08-22    140  |  100  |  24<H>  ----------------------------<  152<H>  4.3   |  25  |  <0.20<L>    Ca    9.6      22 Aug 2024 03:00  Phos  4.2     08-22  Mg     2.10     08-22        Urinalysis Basic - ( 22 Aug 2024 03:00 )    Color: x / Appearance: x / SG: x / pH: x  Gluc: 152 mg/dL / Ketone: x  / Bili: x / Urobili: x   Blood: x / Protein: x / Nitrite: x   Leuk Esterase: x / RBC: x / WBC x   Sq Epi: x / Non Sq Epi: x / Bacteria: x         CHIEF COMPLAINT:Patient is a 69y old  Female who presents with a chief complaint of trach exchange (18 Jul 2024 07:07)      INTERVAL EVENTS:     ROS: Seen by bedside during AM rounds     OBJECTIVE:  ICU Vital Signs Last 24 Hrs  T(C): 36.5 (22 Aug 2024 04:00), Max: 36.5 (22 Aug 2024 04:00)  T(F): 97.7 (22 Aug 2024 04:00), Max: 97.7 (22 Aug 2024 04:00)  HR: 99 (22 Aug 2024 04:00) (81 - 106)  BP: 115/83 (22 Aug 2024 04:00) (99/66 - 124/76)  BP(mean): 95 (22 Aug 2024 04:00) (77 - 95)  ABP: --  ABP(mean): --  RR: 16 (22 Aug 2024 04:00) (16 - 17)  SpO2: 98% (22 Aug 2024 04:00) (97% - 99%)    O2 Parameters below as of 22 Aug 2024 04:00  Patient On (Oxygen Delivery Method): ventilator    O2 Concentration (%): 30      Mode: AC/ CMV (Assist Control/ Continuous Mandatory Ventilation), RR (machine): 16, TV (machine): 400, FiO2: 30, PEEP: 5, ITime: 0.64, MAP: 8, PIP: 30    08-21 @ 07:01  -  08-22 @ 07:00  --------------------------------------------------------  IN: 1050 mL / OUT: 700 mL / NET: 350 mL      CAPILLARY BLOOD GLUCOSE          PHYSICAL EXAM:  General: NAD   Neck: (+) Trach tube noted, site c/d/i.  Cards: S1/S2, no murmurs   Pulm: Rhonchorous b/l. No resp distress.    Abdomen: Soft, NTND. (+) PEG noted, site c/d/i.   Extremities: No pedal edema. Extremities warm to touch. Intact distal pulses.   Neurology: Awake/eyes open. Nonverbal. Not following commands. Not withdrawing to pain.   Skin: warm to touch, color appropriate for ethnicity. Refer to RN assessment for further details.      Mode: AC/ CMV (Assist Control/ Continuous Mandatory Ventilation)  RR (machine): 16  TV (machine): 400  FiO2: 30  PEEP: 5  ITime: 0.64  MAP: 8  PIP: 30      HOSPITAL MEDICATIONS:  MEDICATIONS  (STANDING):  artificial  tears Solution 1 Drop(s) Both EYES every 1 hour  chlorhexidine 0.12% Liquid 15 milliLiter(s) Oral Mucosa every 12 hours  chlorhexidine 2% Cloths 1 Application(s) Topical daily  diazepam    Tablet 2 milliGRAM(s) Oral every 12 hours  erythromycin   Ointment 1 Application(s) Both EYES <User Schedule>  multivitamin/minerals/iron Oral Solution (CENTRUM) 15 milliLiter(s) Oral daily  ofloxacin 0.3% Solution 1 Drop(s) Both EYES two times a day  riluzole 50 milliGRAM(s) Oral two times a day  rivaroxaban 10 milliGRAM(s) Oral daily  rivaroxaban      sertraline 75 milliGRAM(s) Oral daily    MEDICATIONS  (PRN):  acetaminophen   Oral Liquid .. 650 milliGRAM(s) Oral every 6 hours PRN Temp greater or equal to 38C (100.4F), Mild Pain (1 - 3)  diazepam    Tablet 2 milliGRAM(s) Oral at bedtime PRN for dysautonomia/ hypertension/discomfort      LABS:                        12.7   10.82 )-----------( 356      ( 22 Aug 2024 03:00 )             41.9     08-22    140  |  100  |  24<H>  ----------------------------<  152<H>  4.3   |  25  |  <0.20<L>    Ca    9.6      22 Aug 2024 03:00  Phos  4.2     08-22  Mg     2.10     08-22        Urinalysis Basic - ( 22 Aug 2024 03:00 )    Color: x / Appearance: x / SG: x / pH: x  Gluc: 152 mg/dL / Ketone: x  / Bili: x / Urobili: x   Blood: x / Protein: x / Nitrite: x   Leuk Esterase: x / RBC: x / WBC x   Sq Epi: x / Non Sq Epi: x / Bacteria: x         CHIEF COMPLAINT:Patient is a 69y old  Female who presents with a chief complaint of trach exchange (18 Jul 2024 07:07)      INTERVAL EVENTS: no overnight events noted.     ROS: Seen by bedside during AM rounds     OBJECTIVE:  ICU Vital Signs Last 24 Hrs  T(C): 36.5 (22 Aug 2024 04:00), Max: 36.5 (22 Aug 2024 04:00)  T(F): 97.7 (22 Aug 2024 04:00), Max: 97.7 (22 Aug 2024 04:00)  HR: 99 (22 Aug 2024 04:00) (81 - 106)  BP: 115/83 (22 Aug 2024 04:00) (99/66 - 124/76)  BP(mean): 95 (22 Aug 2024 04:00) (77 - 95)  ABP: --  ABP(mean): --  RR: 16 (22 Aug 2024 04:00) (16 - 17)  SpO2: 98% (22 Aug 2024 04:00) (97% - 99%)    O2 Parameters below as of 22 Aug 2024 04:00  Patient On (Oxygen Delivery Method): ventilator    O2 Concentration (%): 30      Mode: AC/ CMV (Assist Control/ Continuous Mandatory Ventilation), RR (machine): 16, TV (machine): 400, FiO2: 30, PEEP: 5, ITime: 0.64, MAP: 8, PIP: 30    08-21 @ 07:01  -  08-22 @ 07:00  --------------------------------------------------------  IN: 1050 mL / OUT: 700 mL / NET: 350 mL      CAPILLARY BLOOD GLUCOSE          PHYSICAL EXAM:  General: NAD   Neck: (+) Trach tube noted, site c/d/i.  Cards: S1/S2, no murmurs   Pulm: Rhonchorous b/l. No resp distress.    Abdomen: Soft, NTND. (+) PEG noted, site c/d/i.   Extremities: No pedal edema. Extremities warm to touch. Intact distal pulses.   Neurology: Awake/eyes open. Nonverbal. Not following commands. Not withdrawing to pain.   Skin: warm to touch, color appropriate for ethnicity. Refer to RN assessment for further details.      Mode: AC/ CMV (Assist Control/ Continuous Mandatory Ventilation)  RR (machine): 16  TV (machine): 400  FiO2: 30  PEEP: 5  ITime: 0.64  MAP: 8  PIP: 30      HOSPITAL MEDICATIONS:  MEDICATIONS  (STANDING):  artificial  tears Solution 1 Drop(s) Both EYES every 1 hour  chlorhexidine 0.12% Liquid 15 milliLiter(s) Oral Mucosa every 12 hours  chlorhexidine 2% Cloths 1 Application(s) Topical daily  diazepam    Tablet 2 milliGRAM(s) Oral every 12 hours  erythromycin   Ointment 1 Application(s) Both EYES <User Schedule>  multivitamin/minerals/iron Oral Solution (CENTRUM) 15 milliLiter(s) Oral daily  ofloxacin 0.3% Solution 1 Drop(s) Both EYES two times a day  riluzole 50 milliGRAM(s) Oral two times a day  rivaroxaban 10 milliGRAM(s) Oral daily  rivaroxaban      sertraline 75 milliGRAM(s) Oral daily    MEDICATIONS  (PRN):  acetaminophen   Oral Liquid .. 650 milliGRAM(s) Oral every 6 hours PRN Temp greater or equal to 38C (100.4F), Mild Pain (1 - 3)  diazepam    Tablet 2 milliGRAM(s) Oral at bedtime PRN for dysautonomia/ hypertension/discomfort      LABS:                        12.7   10.82 )-----------( 356      ( 22 Aug 2024 03:00 )             41.9     08-22    140  |  100  |  24<H>  ----------------------------<  152<H>  4.3   |  25  |  <0.20<L>    Ca    9.6      22 Aug 2024 03:00  Phos  4.2     08-22  Mg     2.10     08-22        Urinalysis Basic - ( 22 Aug 2024 03:00 )    Color: x / Appearance: x / SG: x / pH: x  Gluc: 152 mg/dL / Ketone: x  / Bili: x / Urobili: x   Blood: x / Protein: x / Nitrite: x   Leuk Esterase: x / RBC: x / WBC x   Sq Epi: x / Non Sq Epi: x / Bacteria: x

## 2024-08-22 NOTE — PROGRESS NOTE ADULT - SUBJECTIVE AND OBJECTIVE BOX
[facility] DEPARTMENT OF OPHTHALMOLOGY  ------------------------------------------------------------------------------  Chase Molina MD PGY 3  Available on Teams  ------------------------------------------------------------------------------    Interval History: No acute events overnight.     MEDICATIONS  (STANDING):  artificial  tears Solution 1 Drop(s) Both EYES <User Schedule>  chlorhexidine 0.12% Liquid 15 milliLiter(s) Oral Mucosa every 12 hours  chlorhexidine 2% Cloths 1 Application(s) Topical daily  diazepam    Tablet 2 milliGRAM(s) Oral every 12 hours  enoxaparin Injectable 40 milliGRAM(s) SubCutaneous every 24 hours  erythromycin   Ointment 1 Application(s) Both EYES <User Schedule>  multivitamin/minerals/iron Oral Solution (CENTRUM) 15 milliLiter(s) Oral daily  ofloxacin 0.3% Solution 1 Drop(s) Right EYE every 4 hours  ofloxacin 0.3% Solution 1 Drop(s) Left EYE every 6 hours  petrolatum Ophthalmic Ointment 1 Application(s) Both EYES <User Schedule>  riluzole 50 milliGRAM(s) Oral two times a day  sertraline 75 milliGRAM(s) Oral daily    MEDICATIONS  (PRN):  acetaminophen   Oral Liquid .. 650 milliGRAM(s) Oral every 6 hours PRN Temp greater or equal to 38C (100.4F), Mild Pain (1 - 3)  diazepam    Tablet 2 milliGRAM(s) Oral at bedtime PRN for dysautonomia/ hypertension/discomfort      VITALS: T(C): 36.7 (07-27-24 @ 04:18)  T(F): 98 (07-27-24 @ 04:18), Max: 98 (07-27-24 @ 04:18)  HR: 96 (07-27-24 @ 07:15) (92 - 104)  BP: 132/79 (07-27-24 @ 04:18) (116/70 - 164/81)  RR:  (16 - 19)  SpO2:  (97% - 100%)  Wt(kg): --  General: AAO x 0    Ophthalmology Exam:  Visual acuity (sc): RONIT   Pupils: PERRL OU, no APD  Ttono: 16 OU  Extraocular movements (EOMs): RONIT  Confrontational Visual Field (CVF): RONIT  Color Plates: RONIT    Pen Light Exam (PLE)  External: Flat OU  Lids/Lashes/Lacrimal Ducts: Flat OU    Sclera/Conjunctiva: injection OD>>OS   Cornea: OD with resolved inferior epithelial defect. With chronic corneal damage and changes secondary to exposure including extensive overlying filaments, pannus formation, and stromal scarring; OS with no further inferior epithelial defect. Also with chronic corneal damage and changes secondary to exposure including extensive filaments, pannus formation, and stromal scarring; No infiltration or ulcer OU   Anterior Chamber: D+F OU    Iris: Flat OU  Lens: NS OU

## 2024-08-22 NOTE — PATIENT PROFILE ADULT - DIETITIAN.
-Pain management as needed  - fingersticks q ac/hs  -sliding scale insulin   -DVT ppx: OOB and ambulate, heparin  -Advance diet to regular  -Encourage breastfeeding   -d/w Dr. [place]
-d/c home   -instructions verbalized  -follow up in 1-2weeks in office for incision check  -d/w Dr. Tapia
A/P: POD #2 s/p c/s; Gdma-2 and ghtn  Endo  notified  case management  -Pain management as needed  -cont post op care  -OOB and ambulate  - f/u Rpt CBC   -encourage insentive spirometer use  -d/w dr. Alex woo
- cbc/bmp 1800  - fingersticks q ac/hs  - sliding scale insulin   -continue close monitor   -continue post op care  - continue monitoring FS, consult endo tomorrow if needed   -d/w Dr. Sommer
-Patient s/p betamethasone x 2, magnesium x 24 hours.  -continue nifedipine IR 10 mg q6 hrs  -Lantus 28U QHS and sliding scale for glycemic control and Humalog 10 units with every meal  -NST q12 hrs  -will discuss with MFM after NST  -Continue close monitoring  Discussed with Dr Moraes
dietitian/nutrition services

## 2024-08-22 NOTE — PROGRESS NOTE ADULT - NS ATTEND AMEND GEN_ALL_CORE FT
-    69F PMH advanced ALS (nonverbal and bedbound at baseline) with chronic hypercapnic respiratory failure s/p trach/PEG, vent-dependent, and Parkinson's who presented to [facility] on 7/18/24 with acute hypercapnia secondary to large expiratory air leak s/p upsizing without improvement with hospital course c/b tracheitis vs. UTI transferred from MICU to RCU on 7/5 for further management. S/p OR on 8/12 trach exchange with thoracic surgery with improved tidal volumes (Portex 9.5 with 40 mm balloon).    # Neuro: continue home riluzole for ALS. Continue sertraline and diazepam. Bedbound, nonverbal, quadriplegic, dependent on ADL's. Continue therapy for keratopathy, now improved  # CV: HD stable  # Pulm: continue lung protective ventilation. Minimal leak currently. Pending backup tracheostomy tubes; however, 1 trach balloon ruptured during sterilization. Pending 2nd backup trach sterilization. Will also order 2 further custom trachs as backup, but these may take several weeks for delivery  # GI: continue PEG feeds  # Renal: stable kidney function and lytes  # ID: no evidence of active infection. Previously completed Zosyn for Pseudomonas aeruginosa VAP  # Heme: rivaroxaban for DVT ppx. Stable H/H  # Dispo: d/c planning to home with home vent once backup trachs available. Full code

## 2024-08-22 NOTE — PROGRESS NOTE ADULT - ASSESSMENT
68 YO Female with PMHx of ALS, Parkinson,, nonverbal, bed bound, chronic respiratory failure with tracheostomy and vent dependence, and oropharyngeal dysphagia with PEG who presented from home with tracheostomy leak.   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 Was previously able to have lids taped shut at night however now becoming more difficult. Patient consequently with noticeable worsening in eye redness OD>>OS despite frequent lubrication   - Initial exam with conjunctival injection OD>>OS, cornea OD with inferior epithelial defect encompassing 40% of cornea. With chronic corneal damage and changes secondary to exposure including extensive overlying filaments, pannus formation, and stromal scarring; OS with inferior epithelial defect encompassing 20% of cornea. Also with chronic corneal damage and changes secondary to exposure including extensive filaments, pannus. No active infiltrate or ulcer appreciated at this time OU. No thinning OU.   - 8/22: exam OU with no epithelial defect but persistent chronic corneal changes as per exam above  - cw ofloxacin twice a day to both eyes  - CW Erythromycin ointment every 3 hours to both eyes   - Create moisture chamber with tegaderm following placement of EXTENSIVE ointment to both eyes. Nurses shown how to make at bedside. The tegaderm should not be placed tightly overlying the eye, and there should be ointment covering the entirety of the exposed eye. Place moisturizing ointment around areas of face where tegaderm is to stick so that patient does not develop skin irritation    - CW Artificial tears every 1 hour while awake to both eyes prior to ointment   - Can have 6 hour period during day during which should continue with ointment/drop regimen but without tegaderm     SDW Dr. Denton     Outpatient follow-up: Patient should follow-up with his/her ophthalmologist or with [facility] Department of Ophthalmology upon discharge at the address below     [facility] Department of Ophthalmology  [address] [place] [address]. [address]  [place] [address]  [phone] 70 YO Female with PMHx of ALS, Parkinson,, nonverbal, bed bound, chronic respiratory failure with tracheostomy and vent dependence, and oropharyngeal dysphagia with PEG who presented from home with tracheostomy leak.   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 Was previously able to have lids taped shut at night however now becoming more difficult. Patient consequently with noticeable worsening in eye redness OD>>OS despite frequent lubrication   - Initial exam with conjunctival injection OD>>OS, cornea OD with inferior epithelial defect encompassing 40% of cornea. With chronic corneal damage and changes secondary to exposure including extensive overlying filaments, pannus formation, and stromal scarring; OS with inferior epithelial defect encompassing 20% of cornea. Also with chronic corneal damage and changes secondary to exposure including extensive filaments, pannus. No active infiltrate or ulcer appreciated at this time OU. No thinning OU.   - 8/22: exam OU with no epithelial defect but persistent chronic corneal changes as per exam above  - cw ofloxacin twice a day to both eyes  - CW Erythromycin ointment every 3 hours to both eyes   - Create moisture chamber with tegaderm following placement of EXTENSIVE ointment to both eyes. Nurses shown how to make at bedside. The tegaderm should not be placed tightly overlying the eye, and there should be ointment covering the entirety of the exposed eye. Place moisturizing ointment around areas of face where tegaderm is to stick so that patient does not develop skin irritation    - CW Artificial tears every 1 hour while awake to both eyes prior to ointment   - Can have 6 hour period during day during which should continue with ointment/drop regimen but without tegaderm     SDW Dr. Denton     Outpatient follow-up: Patient should follow-up with his/her ophthalmologist or with [facility] Department of Ophthalmology upon discharge at the address below     [facility] Department of Ophthalmology  [address] [place] [address]. [address]  [place] [address]  [phone]

## 2024-08-23 PROCEDURE — 99233 SBSQ HOSP IP/OBS HIGH 50: CPT

## 2024-08-23 RX ADMIN — POVIDONE, PROPYLENE GLYCOL 1 DROP(S): 6.8; 3 LIQUID OPHTHALMIC at 08:27

## 2024-08-23 RX ADMIN — OFLOXACIN 1 DROP(S): 3 SOLUTION/ DROPS OPHTHALMIC at 05:02

## 2024-08-23 RX ADMIN — CHLORHEXIDINE GLUCONATE 15 MILLILITER(S): 40 SOLUTION TOPICAL at 05:03

## 2024-08-23 RX ADMIN — POVIDONE, PROPYLENE GLYCOL 1 DROP(S): 6.8; 3 LIQUID OPHTHALMIC at 15:38

## 2024-08-23 RX ADMIN — ERYTHROMYCIN 1 APPLICATION(S): 5 OINTMENT OPHTHALMIC at 17:15

## 2024-08-23 RX ADMIN — RILUZOLE 50 MILLIGRAM(S): 5 LIQUID ORAL at 05:02

## 2024-08-23 RX ADMIN — POVIDONE, PROPYLENE GLYCOL 1 DROP(S): 6.8; 3 LIQUID OPHTHALMIC at 05:01

## 2024-08-23 RX ADMIN — ERYTHROMYCIN 1 APPLICATION(S): 5 OINTMENT OPHTHALMIC at 05:02

## 2024-08-23 RX ADMIN — Medication 2 MILLIGRAM(S): at 05:01

## 2024-08-23 RX ADMIN — POVIDONE, PROPYLENE GLYCOL 1 DROP(S): 6.8; 3 LIQUID OPHTHALMIC at 03:33

## 2024-08-23 RX ADMIN — CHLORHEXIDINE GLUCONATE 15 MILLILITER(S): 40 SOLUTION TOPICAL at 17:15

## 2024-08-23 RX ADMIN — ERYTHROMYCIN 1 APPLICATION(S): 5 OINTMENT OPHTHALMIC at 21:17

## 2024-08-23 RX ADMIN — ERYTHROMYCIN 1 APPLICATION(S): 5 OINTMENT OPHTHALMIC at 11:15

## 2024-08-23 RX ADMIN — POVIDONE, PROPYLENE GLYCOL 1 DROP(S): 6.8; 3 LIQUID OPHTHALMIC at 08:09

## 2024-08-23 RX ADMIN — POVIDONE, PROPYLENE GLYCOL 1 DROP(S): 6.8; 3 LIQUID OPHTHALMIC at 11:13

## 2024-08-23 RX ADMIN — POVIDONE, PROPYLENE GLYCOL 1 DROP(S): 6.8; 3 LIQUID OPHTHALMIC at 10:42

## 2024-08-23 RX ADMIN — POVIDONE, PROPYLENE GLYCOL 1 DROP(S): 6.8; 3 LIQUID OPHTHALMIC at 14:20

## 2024-08-23 RX ADMIN — POVIDONE, PROPYLENE GLYCOL 1 DROP(S): 6.8; 3 LIQUID OPHTHALMIC at 19:06

## 2024-08-23 RX ADMIN — POVIDONE, PROPYLENE GLYCOL 1 DROP(S): 6.8; 3 LIQUID OPHTHALMIC at 22:13

## 2024-08-23 RX ADMIN — POVIDONE, PROPYLENE GLYCOL 1 DROP(S): 6.8; 3 LIQUID OPHTHALMIC at 02:18

## 2024-08-23 RX ADMIN — POVIDONE, PROPYLENE GLYCOL 1 DROP(S): 6.8; 3 LIQUID OPHTHALMIC at 17:15

## 2024-08-23 RX ADMIN — ERYTHROMYCIN 1 APPLICATION(S): 5 OINTMENT OPHTHALMIC at 08:28

## 2024-08-23 RX ADMIN — POVIDONE, PROPYLENE GLYCOL 1 DROP(S): 6.8; 3 LIQUID OPHTHALMIC at 23:19

## 2024-08-23 RX ADMIN — POVIDONE, PROPYLENE GLYCOL 1 DROP(S): 6.8; 3 LIQUID OPHTHALMIC at 15:42

## 2024-08-23 RX ADMIN — ERYTHROMYCIN 1 APPLICATION(S): 5 OINTMENT OPHTHALMIC at 03:34

## 2024-08-23 RX ADMIN — POVIDONE, PROPYLENE GLYCOL 1 DROP(S): 6.8; 3 LIQUID OPHTHALMIC at 04:14

## 2024-08-23 RX ADMIN — POVIDONE, PROPYLENE GLYCOL 1 DROP(S): 6.8; 3 LIQUID OPHTHALMIC at 18:30

## 2024-08-23 RX ADMIN — RILUZOLE 50 MILLIGRAM(S): 5 LIQUID ORAL at 17:15

## 2024-08-23 RX ADMIN — Medication 15 MILLILITER(S): at 11:15

## 2024-08-23 RX ADMIN — POVIDONE, PROPYLENE GLYCOL 1 DROP(S): 6.8; 3 LIQUID OPHTHALMIC at 20:46

## 2024-08-23 RX ADMIN — CHLORHEXIDINE GLUCONATE 1 APPLICATION(S): 40 SOLUTION TOPICAL at 11:15

## 2024-08-23 RX ADMIN — POVIDONE, PROPYLENE GLYCOL 1 DROP(S): 6.8; 3 LIQUID OPHTHALMIC at 13:01

## 2024-08-23 RX ADMIN — ERYTHROMYCIN 1 APPLICATION(S): 5 OINTMENT OPHTHALMIC at 15:39

## 2024-08-23 RX ADMIN — POVIDONE, PROPYLENE GLYCOL 1 DROP(S): 6.8; 3 LIQUID OPHTHALMIC at 02:12

## 2024-08-23 RX ADMIN — SERTRALINE HYDROCHLORIDE 75 MILLIGRAM(S): 50 TABLET, FILM COATED ORAL at 11:15

## 2024-08-23 RX ADMIN — Medication 2 MILLIGRAM(S): at 17:14

## 2024-08-23 RX ADMIN — POVIDONE, PROPYLENE GLYCOL 1 DROP(S): 6.8; 3 LIQUID OPHTHALMIC at 05:02

## 2024-08-23 RX ADMIN — POVIDONE, PROPYLENE GLYCOL 1 DROP(S): 6.8; 3 LIQUID OPHTHALMIC at 21:17

## 2024-08-23 RX ADMIN — OFLOXACIN 1 DROP(S): 3 SOLUTION/ DROPS OPHTHALMIC at 17:16

## 2024-08-23 RX ADMIN — POVIDONE, PROPYLENE GLYCOL 1 DROP(S): 6.8; 3 LIQUID OPHTHALMIC at 10:41

## 2024-08-23 RX ADMIN — POVIDONE, PROPYLENE GLYCOL 1 DROP(S): 6.8; 3 LIQUID OPHTHALMIC at 08:28

## 2024-08-23 NOTE — ADVANCED PRACTICE NURSE CONSULT - RECOMMEDATIONS
Topical Recommendations    Tracheostomy: Cleanse around trach site with NS. Pat dry. Apply Silicone foam dressing without border beneath trach collar, cut mid dressing to "Y" shape. Change foam dressing every shift and prn. Change trach ties every 3 days.     PEG: Cleanse q shift with NS, apply liquid barrier film beneath jenny disc.  If redness noted under jenny disc bumper apply thin foam  dressing without border (Mepilex Lite)- cut to mid dressing with a 'Y' shape.   Secondary securement to abdomen taping in 'H' fashion with Steri-strips.     Sacrum to BL buttocks: Cleanse with NS, pat dry. Apply Liquid barrier film to periwound skin. Apply small silicone foam with borders over rafaela prominence only. Once adhered, apply Yamini moisture barrier cream twice a day and PRN with incontinent episodes. Change foam PRN if soiled.    Continue low air loss bed therapy, continue heel elevation, continue to turn & reposition per protocol, soft pillow between bony prominences, continue moisture management with barrier creams & single breathable pad, continue measures to decrease friction/shear/pressure. Continue with nutritional support as per dietary/orders.    Plan of care discussed with aide at bedside, with education provided for care at home. Able to verbalize understanding utilizing teachback, 2 weeks of supplies left at bedside.    Please contact Wound Care Service Line if we can be of further assistance (ext [phone]).  
Topical Recommendations    Tracheostomy: Cleanse around trach site with NS. Pat dry. Apply Silicone foam dressing without border beneath trach collar, cut mid dressing to "Y" shape. Change foam dressing every shift and prn. Change trach ties every 3 days.     PEG: Cleanse q shift with NS, apply liquid barrier film beneath jenny disc.  If redness noted under jenny disc bumper apply thin foam  dressing without border (Mepilex Lite)- cut to mid dressing with a 'Y' shape.   Secondary securement to abdomen taping in 'H' fashion with Steri-strips.     Coccyx: Cleanse with NS, pat dry. Apply Liquid barrier film (allow to dry). Apply small silicone foam with borders over boniest prominence of sacral/coccyx area. Change daily and PRN if soiled.    R buttocks:  Cleanse with skin cleanser, pat dry. Apply TRIAD paste and secure with silicone foam with borders. Change twice a day and PRN if soiled. With episodes of incontinence only remove soiled layer of Triad, then reinforce with thin layer.     Continue low air loss bed therapy, continue heel elevation, continue to turn & reposition per protocol, soft pillow between bony prominences, continue moisture management with barrier creams & single breathable pad, continue measures to decrease friction/shear/pressure. Continue with nutritional support as per dietary/orders.    Plan of care discussed with son at bedside, all questions answered.    Please contact Wound Care Service Line if we can be of further assistance (ext [phone]).

## 2024-08-23 NOTE — PROGRESS NOTE ADULT - SUBJECTIVE AND OBJECTIVE BOX
CHIEF COMPLAINT:Patient is a 69y old  Female who presents with a chief complaint of trach exchange (18 Jul 2024 07:07)      INTERVAL EVENTS:     ROS: Seen by bedside during AM rounds     OBJECTIVE:  ICU Vital Signs Last 24 Hrs  T(C): 36.4 (23 Aug 2024 04:00), Max: 36.7 (22 Aug 2024 20:00)  T(F): 97.6 (23 Aug 2024 04:00), Max: 98 (22 Aug 2024 20:00)  HR: 97 (23 Aug 2024 04:00) (94 - 113)  BP: 108/72 (23 Aug 2024 04:00) (108/72 - 156/90)  BP(mean): 107 (22 Aug 2024 16:00) (87 - 107)  ABP: --  ABP(mean): --  RR: 16 (23 Aug 2024 04:00) (16 - 16)  SpO2: 99% (23 Aug 2024 04:00) (96% - 99%)    O2 Parameters below as of 23 Aug 2024 04:00  Patient On (Oxygen Delivery Method): ventilator    O2 Concentration (%): 30      Mode: AC/ CMV (Assist Control/ Continuous Mandatory Ventilation), RR (machine): 16, TV (machine): 400, FiO2: 30, PEEP: 5, ITime: 0.77, MAP: 9, PIP: 25    08-21 @ 07:01 - 08-22 @ 07:00  --------------------------------------------------------  IN: 1050 mL / OUT: 700 mL / NET: 350 mL    08-22 @ 07:01  -  08-23 @ 06:59  --------------------------------------------------------  IN: 1300 mL / OUT: 900 mL / NET: 400 mL      CAPILLARY BLOOD GLUCOSE          PHYSICAL EXAM:  General:   HEENT:   Lymph Nodes:  Neck:   Respiratory:   Cardiovascular:   Abdomen:   Extremities:   Skin:   Neurological:  Psychiatry:    Mode: AC/ CMV (Assist Control/ Continuous Mandatory Ventilation)  RR (machine): 16  TV (machine): 400  FiO2: 30  PEEP: 5  ITime: 0.77  MAP: 9  PIP: 25      HOSPITAL MEDICATIONS:  MEDICATIONS  (STANDING):  artificial  tears Solution 1 Drop(s) Both EYES every 1 hour  chlorhexidine 0.12% Liquid 15 milliLiter(s) Oral Mucosa every 12 hours  chlorhexidine 2% Cloths 1 Application(s) Topical daily  diazepam    Tablet 2 milliGRAM(s) Oral every 12 hours  erythromycin   Ointment 1 Application(s) Both EYES <User Schedule>  multivitamin/minerals/iron Oral Solution (CENTRUM) 15 milliLiter(s) Oral daily  ofloxacin 0.3% Solution 1 Drop(s) Both EYES two times a day  riluzole 50 milliGRAM(s) Oral two times a day  rivaroxaban 10 milliGRAM(s) Oral daily  rivaroxaban      sertraline 75 milliGRAM(s) Oral daily    MEDICATIONS  (PRN):  acetaminophen   Oral Liquid .. 650 milliGRAM(s) Oral every 6 hours PRN Temp greater or equal to 38C (100.4F), Mild Pain (1 - 3)  diazepam    Tablet 2 milliGRAM(s) Oral at bedtime PRN for dysautonomia/ hypertension/discomfort      LABS:                        12.7   10.82 )-----------( 356      ( 22 Aug 2024 03:00 )             41.9     08-22    140  |  100  |  24<H>  ----------------------------<  152<H>  4.3   |  25  |  <0.20<L>    Ca    9.6      22 Aug 2024 03:00  Phos  4.2     08-22  Mg     2.10     08-22        Urinalysis Basic - ( 22 Aug 2024 03:00 )    Color: x / Appearance: x / SG: x / pH: x  Gluc: 152 mg/dL / Ketone: x  / Bili: x / Urobili: x   Blood: x / Protein: x / Nitrite: x   Leuk Esterase: x / RBC: x / WBC x   Sq Epi: x / Non Sq Epi: x / Bacteria: x         CHIEF COMPLAINT:Patient is a 69y old  Female who presents with a chief complaint of trach exchange (18 Jul 2024 07:07)      INTERVAL EVENTS:     ROS: Seen by bedside during AM rounds     OBJECTIVE:  ICU Vital Signs Last 24 Hrs  T(C): 36.4 (23 Aug 2024 04:00), Max: 36.7 (22 Aug 2024 20:00)  T(F): 97.6 (23 Aug 2024 04:00), Max: 98 (22 Aug 2024 20:00)  HR: 97 (23 Aug 2024 04:00) (94 - 113)  BP: 108/72 (23 Aug 2024 04:00) (108/72 - 156/90)  BP(mean): 107 (22 Aug 2024 16:00) (87 - 107)  ABP: --  ABP(mean): --  RR: 16 (23 Aug 2024 04:00) (16 - 16)  SpO2: 99% (23 Aug 2024 04:00) (96% - 99%)    O2 Parameters below as of 23 Aug 2024 04:00  Patient On (Oxygen Delivery Method): ventilator    O2 Concentration (%): 30      Mode: AC/ CMV (Assist Control/ Continuous Mandatory Ventilation), RR (machine): 16, TV (machine): 400, FiO2: 30, PEEP: 5, ITime: 0.77, MAP: 9, PIP: 25    08-21 @ 07:01  -  08-22 @ 07:00  --------------------------------------------------------  IN: 1050 mL / OUT: 700 mL / NET: 350 mL    08-22 @ 07:01  -  08-23 @ 06:59  --------------------------------------------------------  IN: 1300 mL / OUT: 900 mL / NET: 400 mL      CAPILLARY BLOOD GLUCOSE          PHYSICAL EXAM:  General: NAD   Neck: (+) Trach tube noted, site c/d/i.  Cards: S1/S2, no murmurs   Pulm: CTA b/l with mild end inspiratory rhonchi b/l. No resp distress. -330ml.    Abdomen: Soft, NTND. (+) PEG noted, site c/d/i.   Extremities: No pedal edema. Extremities warm to touch.   Neurology: Awake/eyes open. Nonverbal. Not following commands. Not tracking.   Skin: warm to touch, color appropriate for ethnicity. Refer to RN assessment for further details.    Mode: AC/ CMV (Assist Control/ Continuous Mandatory Ventilation)  RR (machine): 16  TV (machine): 400  FiO2: 30  PEEP: 5  ITime: 0.77  MAP: 9  PIP: 25      HOSPITAL MEDICATIONS:  MEDICATIONS  (STANDING):  artificial  tears Solution 1 Drop(s) Both EYES every 1 hour  chlorhexidine 0.12% Liquid 15 milliLiter(s) Oral Mucosa every 12 hours  chlorhexidine 2% Cloths 1 Application(s) Topical daily  diazepam    Tablet 2 milliGRAM(s) Oral every 12 hours  erythromycin   Ointment 1 Application(s) Both EYES <User Schedule>  multivitamin/minerals/iron Oral Solution (CENTRUM) 15 milliLiter(s) Oral daily  ofloxacin 0.3% Solution 1 Drop(s) Both EYES two times a day  riluzole 50 milliGRAM(s) Oral two times a day  rivaroxaban 10 milliGRAM(s) Oral daily  rivaroxaban      sertraline 75 milliGRAM(s) Oral daily    MEDICATIONS  (PRN):  acetaminophen   Oral Liquid .. 650 milliGRAM(s) Oral every 6 hours PRN Temp greater or equal to 38C (100.4F), Mild Pain (1 - 3)  diazepam    Tablet 2 milliGRAM(s) Oral at bedtime PRN for dysautonomia/ hypertension/discomfort      LABS:                        12.7   10.82 )-----------( 356      ( 22 Aug 2024 03:00 )             41.9     08-22    140  |  100  |  24<H>  ----------------------------<  152<H>  4.3   |  25  |  <0.20<L>    Ca    9.6      22 Aug 2024 03:00  Phos  4.2     08-22  Mg     2.10     08-22        Urinalysis Basic - ( 22 Aug 2024 03:00 )    Color: x / Appearance: x / SG: x / pH: x  Gluc: 152 mg/dL / Ketone: x  / Bili: x / Urobili: x   Blood: x / Protein: x / Nitrite: x   Leuk Esterase: x / RBC: x / WBC x   Sq Epi: x / Non Sq Epi: x / Bacteria: x

## 2024-08-23 NOTE — PROGRESS NOTE ADULT - ASSESSMENT
ASSESSMENT   69 YO Female with PMHx of ALS, Parkinson,, nonverbal, bed bound, chronic respiratory failure with tracheostomy and vent dependence, and oropharyngeal dysphagia with PEG who presented from home with tracheostomy leak. She was noted with vent alarm and poor TVe (250-290) at home. Trach changed at home with no improvement and sent in for thoracic evaluation. While in MICU no air leak noted with hyperinflated cuff. Transferred to RCU on 7/5 with course complicated by leukocytosis second to UTI vs trachitis and completed zosyn course. Trach upsized by thoracic on 7/17 however AL remains and now s/p 9.5mm Bivona custom trach.     PLAN  NEUROLOGY  # ALS   - Baseline nonverbal, awake, and communicates with eye movement.   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   - Continue on Ofloxacin BID (decreased 8/1)   - Continue on Erythromycin OU Q3H (decreased 8/8)  - Continue with artificial tears OU Q1H   - Create moisture chamber with Tegaderm following placement of EXTENSIVE ointment to the right eye throughout the day and to the left eye nightly.   - Allow 6 hour window during the day where moisture can be removed from right eye   - Inflammation appears to be improving   - Ophtho f/u yesterday 8/15: exam OU with resolved epithelial defect but persistent chronic corneal changes - continue with above regimen  - Seen by Ophtho again 8/22     PSYCH   # Anxiety and Depression   - Continue on home Valium 2mg BID with additional 2mg PRN   - Continue on Zoloft 75mg QD while in house (on 4cc/ 80mg QD at home)     CARDIOVASCULAR   # HTN thought to be second to discomfort vs dysautonomia   - Evening HTN and continue on Hydralazine vs valium PRN doses   - Monitor HR and BP    # pSVT  - Intermittent episodes of SVT, lasting a few seconds at a time, and self limiting.   - Consider BB if becomes more persistent or HD unstable    RESPIRATORY  # Tracheostomy leak   - At home noted with TVe 200-300s despite tracheostomy change to [id number].   - No air leak noted in house with hyperinflated cuff likely tracheomegaly?   - Continue on chronic vent 16/400/5/21   - Chronic bibasilar atelectasis and continued on HTS PRN with chest PT  - Trach upsized to Bivona 9 on 7/17, however trach leak remains.   - Custom Tracheostomy delivered and exchanged on Monday 8/12  - Pt has a 9.5mm Bovana, Second spare trach was defective - Per thoracic team 2 spare trachs have been ordered with possible delivery Fri BY OR manager April- spectra #  [id number]  - s/p Family brought in home vent for trial with new trach in place  - Biomed cleared vent for use  - Spoke to April in OR and trachs  sterilized and one balloon burst one is intact but no longer sterile Team to decide if pt can leave with trach not sterile or needs to be sterilized by  and do we need a second spare    -8/22 - Patient with spare trach present however unclear whether balloon is intact. Will d/w  to obtain additional spare custom trach tubes     GI  # Dysphagia   - Continue on PEG-TF     RENAL  # High PVR   - BS with roughly 200-300cc PVR, however no acute distress   - Monitor renal function and UOP     # Elevated lactate   - Lactate elevated with mild contraction alkalosis   - Rehydrate with improvement     INFECTIOUS DISEASE  # Leukocytosis likely second to trachitis vs UTI?   - No fever or chills and noted with prior leukocytosis   - CXR with prior atelectasis and no acute findings   - UA (7/6) with positive nitrites (7/6)   - SCx (7/5) with pansensitive PSA  - BCx (7/6) x 2 NGT  - UCx (7/6) with coag neg staph  - MRSA PCR (7/6) POSITIVE for both MRSA/MSSA s/p Bactroban (7/6 - 7/10)  - s/p empiric Zosyn (7/8 - 7/14) however WBC continues to wax and wean w/o fever  - Will continue to monitor off ABX .     HEME  - c/w Home Xarelto 10mg - likely for DVT PPX?     ENDOCRINE  - No hx of DM2   - Monitor BG     SKIN  - Basic trach and PEG care ordered   - DTI on back and pending [facility]    ETHICS/ GOC    - FULL CODE   - Dr Tyson Velasquez,  involved in care    DISPO - Back home with  when able.

## 2024-08-23 NOTE — ADVANCED PRACTICE NURSE CONSULT - REASON FOR CONSULT
Patient known to [facility] service line last seen on 8/13/24, seen during skin care rounds for follow up evaluation.

## 2024-08-23 NOTE — ADVANCED PRACTICE NURSE CONSULT - ASSESSMENT
General: Patient nonverbal, cachectic with temporal wasting, tracheostomy with FiO2 @ 30% presenting with blanchable erythema and increased moisture under trach plate and to anterior chest, Patient is bedbound, incontinent of urine and stool with external female urinary catching device (prima fit) in place. Upon assessment, incontinence care of formed/soft brown stool provided. LUQ PEG tube clean, dry with no evidence of peritubular skin breakdown. Skin warm, dry with increased moisture in intertriginous folds, scattered areas of hyperpigmentation and hypopigmentation, scattered areas of ecchymosis on bilateral upper extremities with no hematomas. Blanchable erythema on bilateral heels.     R lateral coccyx: Originally seen as DTPI, now presenting as 100% black dry eschar measuring 1.5cm1.1apj3lz (previously [id number]). Satellite lesion noted at 10 o'clock <2 cm away measuring [id number]. Cluster measurement [id number]. No drainage, no odor. Periwound with blanchable erythema, no induration, no erythema, no edema, no crepitus, no fluctuance, no temperature changes, no overt signs of infection noted. Goals of care: Monitor for tissue type changes, prevent from pressure, friction, shearing, excess moisture.      General: Patient nonverbal, cachectic with temporal wasting, tracheostomy with FiO2 @ 30% presenting with blanchable erythema and increased moisture under trach plate and to anterior chest, Patient is bedbound, incontinent of urine and stool with external female urinary catching device (prima fit) in place. Upon assessment, incontinence care of formed/soft brown stool provided. LUQ PEG tube clean, dry with no evidence of peritubular skin breakdown. Skin warm, dry with increased moisture in intertriginous folds, scattered areas of hyperpigmentation and hypopigmentation, scattered areas of ecchymosis on bilateral upper extremities with no hematomas. Blanchable erythema on bilateral heels.     R lateral coccyx: Originally seen as DTPI, now presenting as 100% black dry eschar measuring 1.5cm1.8lac6sl (previously 2cmx1.2weo7pq). Satellite lesion noted at 10 o'clock <2 cm away measuring [id number] with 100% purple maroon discoloration, fading in center of wound. Cluster measurement [id number]. No drainage, no odor. Periwound with blanchable erythema, no induration, no erythema, no edema, no crepitus, no fluctuance, no temperature changes, no overt signs of infection noted. Goals of care: Monitor for tissue type changes, prevent from pressure, friction, shearing, excess moisture.

## 2024-08-23 NOTE — PROGRESS NOTE ADULT - NS ATTEND AMEND GEN_ALL_CORE FT
-    69F PMH advanced ALS (nonverbal and bedbound at baseline) with chronic hypercapnic respiratory failure s/p trach/PEG, vent-dependent, and Parkinson's who presented to [facility] on 7/18/24 with acute hypercapnia secondary to large expiratory air leak s/p upsizing without improvement with hospital course c/b tracheitis vs. UTI transferred from MICU to RCU on 7/5 for further management. S/p OR on 8/12 trach exchange with thoracic surgery with improved tidal volumes (Portex 9.5 with 40 mm balloon).    # Neuro: continue home riluzole for ALS. Continue sertraline and diazepam. Bedbound, nonverbal, quadriplegic, dependent on ADL's. Continue therapy for keratopathy, now improved  # CV: HD stable  # Pulm: continue lung protective ventilation. Minimal leak currently. Pending backup tracheostomy tubes; however, 1 trach balloon ruptured during sterilization. Pending 2nd backup trach sterilization. Will also order 2 further custom trachs as backup, but these may take several weeks for delivery  # GI: continue PEG feeds  # Renal: stable kidney function and lytes  # ID: no evidence of active infection. Previously completed Zosyn for Pseudomonas aeruginosa VAP  # Heme: rivaroxaban for DVT ppx. Stable H/H  # Dispo: d/c planning to home with home vent once backup trachs available. Full code. Discussed with manas Johnson at bedside

## 2024-08-23 NOTE — PROGRESS NOTE ADULT - TIME-BASED
Encounter Date: 8/23/2024       History     Chief Complaint   Patient presents with    Mental Health Problem     Pt reports feeling depressed x few days, states having thoughts of wanting to die without plan. Dialysis pt, missed her treatment on Monday but went on Wednesday. Missed her treatment this AM. Pt is also complaining of constipation, last BM x 1 week ago.     Patient is  a 54 yo female w/ PMHx of ESRD on HD (MWF), PAD s/p b/l BKA, pAF on eliquis, HTN, morbid obesity, CVA, AIMEE who presents to the ED with the complaint of depression, constipation and missed dialysis.  Patient states that she has been experiencing feelings of depression, worthlessness that worsened in nature over the past several weeks secondary to custody issues with her 14-year-old daughter.  She denies any history of depression.  She denies any thoughts of suicide or homicide.  She denies any decreased need for sleep or risk taking behaviors.  Additionally, the patient does report approximate 1 week of constipation.  She denies any use of over-the-counter medications to help with her constipation.  She denies any abdominal pain nausea or vomiting.  As any fever.  Additionally, the patient does report missing dialysis on Monday but did report a full session on Wednesday.  She states she was scheduled to go to dialysis at approximately 10:30 a.m. this morning but did not secondary to feelings of depression.      Review of patient's allergies indicates:  No Known Allergies  Past Medical History:   Diagnosis Date    Acute gastritis without hemorrhage 07/24/2023    Anemia in ESRD (end-stage renal disease) 04/10/2013    Bacteremia due to Pseudomonas 01/30/2020    CHF (congestive heart failure)     Cysts of both ovaries 04/30/2018    Debility 03/06/2022    DM (diabetes mellitus), type 2     Diet controlled.  c/b CKD, PAD    Encounter for blood transfusion     Hyperlipidemia     Hypertension     Major depressive disorder 03/06/2022    Malignant  hypertension with ESRD (end stage renal disease)     Morbid obesity with BMI of 45.0-49.9, adult 2017    Multiple thyroid nodules 2022    AIMEE (obstructive sleep apnea)     PAD (peripheral artery disease) 2019    s/p bilateral BKA.  - 19 left BKA due to PAD with left foot ulcer with osteomyelitis    Pressure ulcer of right hip 2022    Pseudoaneurysm of arteriovenous dialysis fistula     Left arm    S/P laparoscopic sleeve gastrectomy 2017    Steal syndrome of dialysis vascular access 2018    Stroke     residual right weakness/numbness    Thrombosis of arteriovenous graft 2019    Type 2 diabetes mellitus, uncontrolled, with renal complications      Past Surgical History:   Procedure Laterality Date    AMPUTATION      ANGIOGRAPHY OF LOWER EXTREMITY N/A 2019    Procedure: Angiogram Extremity bilateral;  Surgeon: Edward Quintana MD PhD;  Location: [facility] CATH LAB;  Service: Cardiology;  Laterality: N/A;    ANGIOGRAPHY OF LOWER EXTREMITY Right 2019    Procedure: Angiogram Extremity Unilateral, right;  Surgeon: Judd Galarza MD;  Location: [facility] CATH LAB;  Service: Peripheral Vascular;  Laterality: Right;    BELOW KNEE AMPUTATION OF LOWER EXTREMITY Right 2020    Procedure: AMPUTATION, BELOW KNEE;  Surgeon: Alena Solorio MD;  Location: [facility] OR;  Service: General;  Laterality: Right;     SECTION, CLASSIC      x2    CHOLECYSTECTOMY      DEBRIDEMENT OF LOWER EXTREMITY Right 10/10/2019    Procedure: DEBRIDEMENT, LOWER EXTREMITY;  Surgeon: Alena Solorio MD;  Location: [facility] OR;  Service: General;  Laterality: Right;    DEBRIDEMENT OF LOWER EXTREMITY Right 11/15/2019    Procedure: DEBRIDEMENT, LOWER EXTREMITY;  Surgeon: Alena Solorio MD;  Location: [facility] OR;  Service: General;  Laterality: Right;    DECLOTTING OF ARTERIOVENOUS GRAFT N/A 2024    Procedure: [id number];  Surgeon: Judd Galarza MD;  Location: [facility] CATH LAB;   Service: Peripheral Vascular;  Laterality: N/A;    DECLOTTING OF VASCULAR GRAFT Left 6/27/2019    Procedure: DECLOT-GRAFT;  Surgeon: Judd Galarza MD;  Location: [facility] CATH LAB;  Service: Peripheral Vascular;  Laterality: Left;    ESOPHAGOGASTRODUODENOSCOPY N/A 6/2/2022    Procedure: EGD (ESOPHAGOGASTRODUODENOSCOPY);  Surgeon: Emmanuel Valenzuela MD;  Location: [facility] ENDO;  Service: Endoscopy;  Laterality: N/A;    FISTULOGRAM N/A 7/10/2019    Procedure: Fistulogram;  Surgeon: Sohan Alvarado MD;  Location: [facility] CATH LAB/EP;  Service: Cardiology;  Laterality: N/A;    FISTULOGRAM N/A 7/28/2023    Procedure: FISTULOGRAM;  Surgeon: Jdud Galarza MD;  Location: [facility] OR 2ND FLR;  Service: Peripheral Vascular;  Laterality: N/A;  AV graft   50.49 mGy  9.2044 Gycm2  46 ml dye  30.8 min    FISTULOGRAM, WITH PTA Left 8/15/2023    Procedure: FISTULOGRAM, WITH PTA;  Surgeon: Judd Galarza MD;  Location: [facility] OR 2ND FLR;  Service: Peripheral Vascular;  Laterality: Left;  mGy:10.11  Gycm2:1.8543  local:3  fluro time:9.7 min    contrast vol: 16    FOOT AMPUTATION THROUGH METATARSAL Left 2/26/2019    Procedure: AMPUTATION, FOOT, TRANSMETATARSAL;  Surgeon: Liliane Hyatt DPM;  Location: [facility] OR;  Service: Podiatry;  Laterality: Left;  4th and 5th partial ray amputatuion      FOOT AMPUTATION THROUGH METATARSAL Left 4/10/2019    Procedure: AMPUTATION, FOOT, TRANSMETATARSAL with wound vac application;  Surgeon: Liliane Hyatt DPM;  Location: [facility] OR;  Service: Podiatry;  Laterality: Left;  I am availiable at 11:30.   Thank you      FOOT AMPUTATION THROUGH METATARSAL Left 4/5/2019    Procedure: AMPUTATION, FOOT, TRANSMETATARSAL;  Surgeon: Liliane Hyatt DPM;  Location: [facility] OR;  Service: Podiatry;  Laterality: Left;    GASTRECTOMY      gastric sleeve      INCISION AND DRAINAGE OF WOUND      MECHANICAL THROMBOLYSIS Left 7/10/2019    Procedure: Thrombolysis - bypass graft;  Surgeon: Sohan Alvarado MD;  Location: [facility] CATH LAB/EP;   Service: Cardiology;  Laterality: Left;    PERCUTANEOUS MECHANICAL THROMBECTOMY OF VASCULAR GRAFT OF UPPER EXTREMITY  7/28/2023    Procedure: THROMBECTOMY, MECHANICAL, VASCULAR GRAFT, UPPER EXTREMITY, PERCUTANEOUS;  Surgeon: Judd Galarza MD;  Location: [facility] OR [address] [place] [address];  Service: Peripheral Vascular;;  Percutaneous mechanical thrombectomy w Possis Angiojet AVX     PERCUTANEOUS TRANSLUMINAL ANGIOPLASTY (PTA) OF PERIPHERAL VESSEL Left 3/14/2019    Procedure: PTA, PERIPHERAL VESSEL;  Surgeon: Edward Quintana MD PhD;  Location: [facility] CATH LAB;  Service: Cardiology;  Laterality: Left;    PERCUTANEOUS TRANSLUMINAL ANGIOPLASTY (PTA) OF PERIPHERAL VESSEL Left 4/4/2019    Procedure: PTA, PERIPHERAL VESSEL;  Surgeon: Parish Renteria MD;  Location: [facility] CATH LAB/EP;  Service: Cardiology;  Laterality: Left;    PERCUTANEOUS TRANSLUMINAL ANGIOPLASTY OF ARTERIOVENOUS FISTULA N/A 7/10/2019    Procedure: PTA, AV FISTULA;  Surgeon: Sohan Alvarado MD;  Location: [facility] CATH LAB/EP;  Service: Cardiology;  Laterality: N/A;    PERCUTANEOUS TRANSLUMINAL ANGIOPLASTY OF ARTERIOVENOUS FISTULA N/A 2/22/2024    Procedure: PTA, AV FISTULA;  Surgeon: Judd Galarza MD;  Location: [facility] CATH LAB;  Service: Peripheral Vascular;  Laterality: N/A;    PLACEMENT-STENT Left 7/28/2023    Procedure: PLACEMENT-STENT;  Surgeon: Judd Galarza MD;  Location: [address];  Service: Peripheral Vascular;  Laterality: Left;    STENT, FISTULA Left 8/15/2023    Procedure: STENT, FISTULA;  Surgeon: Judd Galarza MD;  [facility];  Service: Peripheral Vascular;  Laterality: Left;    THROMBECTOMY Left 8/19/2019    Procedure: THROMBECTOMY;  Surgeon: Alena Solorio MD;  Location: [facility] OR;  Service: General;  Laterality: Left;    THROMBECTOMY Left 8/15/2023    Procedure: THROMBECTOMY;  Surgeon: Judd Galarza MD;  [facility];  Service: Peripheral Vascular;  Laterality: Left;    TUBAL LIGATION  2010    VASCULAR  SURGERY      fistula construction L upper arm     Family History   Problem Relation Name Age of Onset    Breast cancer Mother      Ulcers Father      Heart disease Father      Colon cancer Maternal Grandfather      Ovarian cancer Neg Hx       Social History     Tobacco Use    Smoking status: Never    Smokeless tobacco: Never   Substance Use Topics    Alcohol use: No    Drug use: No     Review of Systems   Constitutional:  Negative for appetite change and fatigue.   Respiratory:  Negative for chest tightness, shortness of breath and stridor.    Cardiovascular:  Negative for chest pain, palpitations and leg swelling.   Gastrointestinal:  Positive for constipation. Negative for abdominal pain, nausea and vomiting.   Genitourinary:  Negative for flank pain and urgency.   Musculoskeletal:  Negative for back pain and myalgias.   Neurological:  Negative for dizziness and headaches.   Psychiatric/Behavioral:  Positive for dysphoric mood. Negative for sleep disturbance and suicidal ideas.        Physical Exam     Initial Vitals [08/23/24 1009]   BP Pulse Resp Temp SpO2   (!) 188/60 68 20 97.9 °F (36.6 °C) 98 %      MAP       --         Physical Exam    Constitutional: She appears well-developed and well-nourished.   No acute distress  Morbidly obese   Eyes: EOM are normal. Pupils are equal, round, and reactive to light.   Neck:   Normal range of motion.  Cardiovascular:  Normal rate, regular rhythm, normal heart sounds and intact distal pulses.           Pulmonary/Chest: Breath sounds normal.   Abdominal: Abdomen is soft. Bowel sounds are normal.   Musculoskeletal:      Cervical back: Normal range of motion.      Comments: Bilateral BKA  AV fistula to the left upper extremity; audible bruit; palpable thrill     Neurological: She is alert and oriented to person, place, and time. She has normal strength.   Skin: Capillary refill takes less than 2 seconds.   Psychiatric: She has a normal mood and affect.   Sad  Somewhat  withdrawn  Denies SI/HI/AVH         ED Course   Procedures  Labs Reviewed   COMPREHENSIVE METABOLIC PANEL - Abnormal       Result Value    Sodium 139      Potassium 5.2 (*)     Chloride 104      CO2 27      Glucose 72      BUN 33 (*)     Creatinine 6.1 (*)     Calcium 9.6      Total Protein 6.9      Albumin 2.8 (*)     Total Bilirubin 0.4      Alkaline Phosphatase 83      AST 17      ALT 6 (*)     eGFR 8 (*)     Anion Gap 8     MAGNESIUM    Magnesium 2.2     PHOSPHORUS   PHOSPHORUS    Phosphorus 3.5       EKG Readings: (Independently Interpreted)   Initial Reading: No STEMI. Rhythm: Normal Sinus Rhythm. Conduction: LBBB.   NSR; LBBB; no STEMI      ECG Results              EKG 12-lead (Final result)        Collection Time Result Time QRS Duration OHS QTC Calculation    08/23/24 10:59:54 08/23/24 13:18:04 150 485                     Final result by Interface, Lab In [facility] (08/23/24 13:18:13)                   Narrative:    Test Reason : R06.02,    Vent. Rate : 063 BPM     Atrial Rate : 063 BPM     P-R Int : 202 ms          QRS Dur : 150 ms      QT Int : 474 ms       P-R-T Axes : 056 017 066 degrees     QTc Int : 485 ms    Normal sinus rhythm  Left bundle branch block  Abnormal ECG  When compared with ECG of 07-AUG-2024 13:32,  Questionable change in The axis  Confirmed by José Tolliver MD, Julio ([id number]) on 8/23/2024 1:18:02 PM    Referred By: [email or web address]   SELF           Confirmed By:Julio Tolliver,                                  Imaging Results              X-Ray Abdomen Portable (Final result)  Result time 08/23/24 13:03:51      Final result by Keith Astorga MD (08/23/24 13:03:51)                   Impression:      Nonobstructive bowel gas pattern.      Electronically signed by: Keith Astorga MD  Date:    08/23/2024  Time:    13:03               Narrative:    EXAMINATION:  XR ABDOMEN PORTABLE    CLINICAL HISTORY:  Constipation, unspecified    TECHNIQUE:  AP View(s) of the abdomen was  performed.    COMPARISON:  CT abdomen and pelvis 08/02/2024 (report only), right upper quadrant ultrasound 02/17/2024, CTA chest, abdomen and pelvis 09/18/2023 abdominal radiograph 07/10/2023    FINDINGS:  No dilated loops of bowel to suggest obstruction.  No organomegaly or significant mass effect.  Cholecystectomy clips noted.  No large amount of free air definitively seen allowing for portable AP supine technique.  Imaged lung bases are clear.  No acute osseous process seen.                                       Medications - No data to display  Medical Decision Making  Amount and/or Complexity of Data Reviewed  Labs: ordered. Decision-making details documented in ED Course.  Radiology: ordered. Decision-making details documented in ED Course.    Risk  Prescription drug management.               ED Course as of 08/25/24 1215   Fri Aug 23, 2024   1203 Potassium(!): 5.2 [LC]   1203 Creatinine(!): 6.1 [LC]   1203 BUN(!): 33 [LC]   1203 Sodium: 139 [LC]   1203 Albumin(!): 2.8 [LC]   1203 Magnesium : 2.2 [LC]   1444 X-Ray Abdomen Portable  Non obstructive bowel gas pattern.  [LC]   1444 Spoke to nephrology (dr. Flores) who will take patient for dialysis.  [LC]      ED Course User Index  [LC] Davin Godoy MD               Medical Decision Making:   Initial Assessment:   See HPI   Clinical Tests:   Lab Tests: Ordered and Reviewed  ED Management:  - patient was formally evaluated by tele psychiatrist who did not feel the patient required emergent psychiatric hospitalization at this time; they did offer her outpatient resources for feelings of depression; the patient was also dialyzed while in the hospital and was subsequently discharged; she will be discharged with prescriptions for constipation, bowel regimen; patient reports feeling better after dialysis session; she is stable for discharge and was instructed to continue to follow her current dialysis regimen  - No further intervention is indicated at this time  after having taken into account the patient's history, physical exam findings, and empirical and objective data obtained during the patient's emergency department workup.   - The patient is at low risk for an emergent medical condition at this time, and I am of the belief that that it is safe to discharge the patient from the emergency department.   - The patient is instructed to follow up as outpatient as indicated on the discharge paperwork.    - I have discussed the specifics of the workup with the patient and the patient has verbalized understanding of the details of the workup, the diagnosis, the treatment plan, and the need for outpatient follow-up.    - Although the patient has no emergent etiology today this does not preclude the development of an emergent condition so, in addition, I have advised the patient that they can return to the ED and/or activate EMS at any time with worsening of their symptoms, change of their symptoms, or with any other medical complaint.    - The patient remained comfortable and stable during their visit in the ED.    - Discharge and follow-up instructions discussed with the patient who expressed understanding and willingness to comply with my recommendations.  - Results of all emergency department tests  discussed thoroughly with patient; all patient questions answered; pt in agreement with plan  - Pt instructed to follow up with PCP in 2-3 days for recheck of today's complaints  - Pt given strict emergency department return precautions for any new or worsening of symptoms  - Pt discharged from the emergency department in stable condition, in no acute distress                Clinical Impression:  Final diagnoses:  [K59.00] Constipation  [F32.A] Depression, unspecified depression type  [Z99.2] Encounter for dialysis (Primary)          ED Disposition Condition    Discharge Stable          ED Prescriptions       Medication Sig Dispense Start Date End Date Auth. Provider     lactulose (CHRONULAC) 20 gram/30 mL Soln (Expires today) Take 15 mLs (10 g total) by mouth 2 (two) times daily. for 2 days 60 mL 8/23/2024 8/25/2024 Davin Godoy MD          Follow-up Information       Follow up With Specialties Details Why Contact Info    [facility]- [facility] Connected Anywhere  Schedule an appointment as soon as possible for a visit today As needed, If symptoms worsen, [email or web address] virtual visit, you can connect with a licensed therapist for $85 online or on our que. Online therapy appointments are for adolescent, family and marriage counseling, anxiety, depression, insomnia and more.    [employer]  Call today [phone], As needed Free phones  Create an account at [email or web address]    [facility] Services - Child and Adolescent Psychiatry, Psychology, Psychiatry Schedule an appointment as soon as possible for a visit today As needed, If symptoms worsen [id number]   [address]  [facility] [address]  [phone]               Davin Godoy MD  08/25/24 5216     56

## 2024-08-24 LAB
ANION GAP SERPL CALC-SCNC: 13 MMOL/L — SIGNIFICANT CHANGE UP (ref 7–14)
BASOPHILS # BLD AUTO: 0.06 K/UL — SIGNIFICANT CHANGE UP (ref 0–0.2)
BASOPHILS NFR BLD AUTO: 0.3 % — SIGNIFICANT CHANGE UP (ref 0–2)
BUN SERPL-MCNC: 22 MG/DL — SIGNIFICANT CHANGE UP (ref 7–23)
CALCIUM SERPL-MCNC: 9.4 MG/DL — SIGNIFICANT CHANGE UP (ref 8.4–10.5)
CHLORIDE SERPL-SCNC: 101 MMOL/L — SIGNIFICANT CHANGE UP (ref 98–107)
CO2 SERPL-SCNC: 26 MMOL/L — SIGNIFICANT CHANGE UP (ref 22–31)
CREAT SERPL-MCNC: <0.2 MG/DL — LOW (ref 0.5–1.3)
EGFR: 125 ML/MIN/1.73M2 — SIGNIFICANT CHANGE UP
EOSINOPHIL # BLD AUTO: 0.14 K/UL — SIGNIFICANT CHANGE UP (ref 0–0.5)
EOSINOPHIL NFR BLD AUTO: 0.8 % — SIGNIFICANT CHANGE UP (ref 0–6)
GLUCOSE SERPL-MCNC: 146 MG/DL — HIGH (ref 70–99)
HCT VFR BLD CALC: 42.5 % — SIGNIFICANT CHANGE UP (ref 34.5–45)
HGB BLD-MCNC: 13 G/DL — SIGNIFICANT CHANGE UP (ref 11.5–15.5)
IANC: 15.44 K/UL — HIGH (ref 1.8–7.4)
IMM GRANULOCYTES NFR BLD AUTO: 1.4 % — HIGH (ref 0–0.9)
LYMPHOCYTES # BLD AUTO: 1.24 K/UL — SIGNIFICANT CHANGE UP (ref 1–3.3)
LYMPHOCYTES # BLD AUTO: 6.8 % — LOW (ref 13–44)
MAGNESIUM SERPL-MCNC: 2.1 MG/DL — SIGNIFICANT CHANGE UP (ref 1.6–2.6)
MCHC RBC-ENTMCNC: 25.5 PG — LOW (ref 27–34)
MCHC RBC-ENTMCNC: 30.6 GM/DL — LOW (ref 32–36)
MCV RBC AUTO: 83.3 FL — SIGNIFICANT CHANGE UP (ref 80–100)
MONOCYTES # BLD AUTO: 0.98 K/UL — HIGH (ref 0–0.9)
MONOCYTES NFR BLD AUTO: 5.4 % — SIGNIFICANT CHANGE UP (ref 2–14)
NEUTROPHILS # BLD AUTO: 15.44 K/UL — HIGH (ref 1.8–7.4)
NEUTROPHILS NFR BLD AUTO: 85.3 % — HIGH (ref 43–77)
NRBC # BLD: 0 /100 WBCS — SIGNIFICANT CHANGE UP (ref 0–0)
NRBC # FLD: 0 K/UL — SIGNIFICANT CHANGE UP (ref 0–0)
PHOSPHATE SERPL-MCNC: 3.8 MG/DL — SIGNIFICANT CHANGE UP (ref 2.5–4.5)
PLATELET # BLD AUTO: 380 K/UL — SIGNIFICANT CHANGE UP (ref 150–400)
POTASSIUM SERPL-MCNC: 4.2 MMOL/L — SIGNIFICANT CHANGE UP (ref 3.5–5.3)
POTASSIUM SERPL-SCNC: 4.2 MMOL/L — SIGNIFICANT CHANGE UP (ref 3.5–5.3)
RBC # BLD: 5.1 M/UL — SIGNIFICANT CHANGE UP (ref 3.8–5.2)
RBC # FLD: 18.9 % — HIGH (ref 10.3–14.5)
SODIUM SERPL-SCNC: 140 MMOL/L — SIGNIFICANT CHANGE UP (ref 135–145)
WBC # BLD: 18.11 K/UL — HIGH (ref 3.8–10.5)
WBC # FLD AUTO: 18.11 K/UL — HIGH (ref 3.8–10.5)

## 2024-08-24 PROCEDURE — 71045 X-RAY EXAM CHEST 1 VIEW: CPT | Mod: 26

## 2024-08-24 PROCEDURE — 99233 SBSQ HOSP IP/OBS HIGH 50: CPT | Mod: FS

## 2024-08-24 RX ADMIN — RILUZOLE 50 MILLIGRAM(S): 5 LIQUID ORAL at 05:03

## 2024-08-24 RX ADMIN — ERYTHROMYCIN 1 APPLICATION(S): 5 OINTMENT OPHTHALMIC at 09:09

## 2024-08-24 RX ADMIN — ACETAMINOPHEN 650 MILLIGRAM(S): 325 TABLET ORAL at 22:30

## 2024-08-24 RX ADMIN — POVIDONE, PROPYLENE GLYCOL 1 DROP(S): 6.8; 3 LIQUID OPHTHALMIC at 02:25

## 2024-08-24 RX ADMIN — POVIDONE, PROPYLENE GLYCOL 1 DROP(S): 6.8; 3 LIQUID OPHTHALMIC at 07:37

## 2024-08-24 RX ADMIN — CHLORHEXIDINE GLUCONATE 1 APPLICATION(S): 40 SOLUTION TOPICAL at 11:35

## 2024-08-24 RX ADMIN — ERYTHROMYCIN 1 APPLICATION(S): 5 OINTMENT OPHTHALMIC at 15:54

## 2024-08-24 RX ADMIN — Medication 2 MILLIGRAM(S): at 05:03

## 2024-08-24 RX ADMIN — ACETAMINOPHEN 650 MILLIGRAM(S): 325 TABLET ORAL at 21:58

## 2024-08-24 RX ADMIN — POVIDONE, PROPYLENE GLYCOL 1 DROP(S): 6.8; 3 LIQUID OPHTHALMIC at 18:51

## 2024-08-24 RX ADMIN — SERTRALINE HYDROCHLORIDE 75 MILLIGRAM(S): 50 TABLET, FILM COATED ORAL at 11:35

## 2024-08-24 RX ADMIN — POVIDONE, PROPYLENE GLYCOL 1 DROP(S): 6.8; 3 LIQUID OPHTHALMIC at 15:53

## 2024-08-24 RX ADMIN — POVIDONE, PROPYLENE GLYCOL 1 DROP(S): 6.8; 3 LIQUID OPHTHALMIC at 03:36

## 2024-08-24 RX ADMIN — POVIDONE, PROPYLENE GLYCOL 1 DROP(S): 6.8; 3 LIQUID OPHTHALMIC at 09:09

## 2024-08-24 RX ADMIN — ERYTHROMYCIN 1 APPLICATION(S): 5 OINTMENT OPHTHALMIC at 03:38

## 2024-08-24 RX ADMIN — ERYTHROMYCIN 1 APPLICATION(S): 5 OINTMENT OPHTHALMIC at 11:36

## 2024-08-24 RX ADMIN — POVIDONE, PROPYLENE GLYCOL 1 DROP(S): 6.8; 3 LIQUID OPHTHALMIC at 20:45

## 2024-08-24 RX ADMIN — POVIDONE, PROPYLENE GLYCOL 1 DROP(S): 6.8; 3 LIQUID OPHTHALMIC at 22:41

## 2024-08-24 RX ADMIN — POVIDONE, PROPYLENE GLYCOL 1 DROP(S): 6.8; 3 LIQUID OPHTHALMIC at 07:39

## 2024-08-24 RX ADMIN — POVIDONE, PROPYLENE GLYCOL 1 DROP(S): 6.8; 3 LIQUID OPHTHALMIC at 05:02

## 2024-08-24 RX ADMIN — POVIDONE, PROPYLENE GLYCOL 1 DROP(S): 6.8; 3 LIQUID OPHTHALMIC at 10:08

## 2024-08-24 RX ADMIN — ERYTHROMYCIN 1 APPLICATION(S): 5 OINTMENT OPHTHALMIC at 21:02

## 2024-08-24 RX ADMIN — Medication 15 MILLILITER(S): at 11:47

## 2024-08-24 RX ADMIN — POVIDONE, PROPYLENE GLYCOL 1 DROP(S): 6.8; 3 LIQUID OPHTHALMIC at 11:35

## 2024-08-24 RX ADMIN — ERYTHROMYCIN 1 APPLICATION(S): 5 OINTMENT OPHTHALMIC at 06:30

## 2024-08-24 RX ADMIN — POVIDONE, PROPYLENE GLYCOL 1 DROP(S): 6.8; 3 LIQUID OPHTHALMIC at 21:02

## 2024-08-24 RX ADMIN — RILUZOLE 50 MILLIGRAM(S): 5 LIQUID ORAL at 17:23

## 2024-08-24 RX ADMIN — ERYTHROMYCIN 1 APPLICATION(S): 5 OINTMENT OPHTHALMIC at 23:48

## 2024-08-24 RX ADMIN — OFLOXACIN 1 DROP(S): 3 SOLUTION/ DROPS OPHTHALMIC at 17:24

## 2024-08-24 RX ADMIN — POVIDONE, PROPYLENE GLYCOL 1 DROP(S): 6.8; 3 LIQUID OPHTHALMIC at 16:23

## 2024-08-24 RX ADMIN — POVIDONE, PROPYLENE GLYCOL 1 DROP(S): 6.8; 3 LIQUID OPHTHALMIC at 12:12

## 2024-08-24 RX ADMIN — ERYTHROMYCIN 1 APPLICATION(S): 5 OINTMENT OPHTHALMIC at 17:23

## 2024-08-24 RX ADMIN — POVIDONE, PROPYLENE GLYCOL 1 DROP(S): 6.8; 3 LIQUID OPHTHALMIC at 01:55

## 2024-08-24 RX ADMIN — Medication 2 MILLIGRAM(S): at 17:21

## 2024-08-24 RX ADMIN — OFLOXACIN 1 DROP(S): 3 SOLUTION/ DROPS OPHTHALMIC at 06:30

## 2024-08-24 RX ADMIN — POVIDONE, PROPYLENE GLYCOL 1 DROP(S): 6.8; 3 LIQUID OPHTHALMIC at 00:14

## 2024-08-24 RX ADMIN — POVIDONE, PROPYLENE GLYCOL 1 DROP(S): 6.8; 3 LIQUID OPHTHALMIC at 17:22

## 2024-08-24 RX ADMIN — POVIDONE, PROPYLENE GLYCOL 1 DROP(S): 6.8; 3 LIQUID OPHTHALMIC at 04:24

## 2024-08-24 RX ADMIN — POVIDONE, PROPYLENE GLYCOL 1 DROP(S): 6.8; 3 LIQUID OPHTHALMIC at 21:59

## 2024-08-24 RX ADMIN — ERYTHROMYCIN 1 APPLICATION(S): 5 OINTMENT OPHTHALMIC at 00:14

## 2024-08-24 RX ADMIN — POVIDONE, PROPYLENE GLYCOL 1 DROP(S): 6.8; 3 LIQUID OPHTHALMIC at 19:31

## 2024-08-24 RX ADMIN — POVIDONE, PROPYLENE GLYCOL 1 DROP(S): 6.8; 3 LIQUID OPHTHALMIC at 15:55

## 2024-08-24 RX ADMIN — POVIDONE, PROPYLENE GLYCOL 1 DROP(S): 6.8; 3 LIQUID OPHTHALMIC at 13:06

## 2024-08-24 RX ADMIN — POVIDONE, PROPYLENE GLYCOL 1 DROP(S): 6.8; 3 LIQUID OPHTHALMIC at 23:48

## 2024-08-24 RX ADMIN — CHLORHEXIDINE GLUCONATE 15 MILLILITER(S): 40 SOLUTION TOPICAL at 05:03

## 2024-08-24 RX ADMIN — CHLORHEXIDINE GLUCONATE 15 MILLILITER(S): 40 SOLUTION TOPICAL at 17:23

## 2024-08-24 RX ADMIN — POVIDONE, PROPYLENE GLYCOL 1 DROP(S): 6.8; 3 LIQUID OPHTHALMIC at 06:30

## 2024-08-24 NOTE — PROGRESS NOTE ADULT - NS ATTEND AMEND GEN_ALL_CORE FT
69F PMH advanced ALS (nonverbal and bedbound at baseline) with chronic hypercapnic respiratory failure s/p trach/PEG, vent-dependent, and Parkinson's who presented to [facility] on 7/18/24 with acute hypercapnia secondary to large expiratory air leak s/p upsizing without improvement with hospital course c/b tracheitis vs. UTI transferred from MICU to RCU on 7/5 for further management. S/p OR on 8/12 trach exchange with thoracic surgery with improved tidal volumes (Portex 9.5 with 40 mm balloon).    # Neuro: continue home riluzole for ALS. Continue sertraline and diazepam. Bedbound, nonverbal, quadriplegic, dependent on ADL's. Continue therapy for keratopathy, now improved  # CV: HD stable  # Pulm: continue lung protective ventilation. Monitoring for air leak and cuff overinflation. Pending backup tracheostomy tubes; however, 1 trach balloon ruptured during sterilization. Pending 2nd backup trach sterilization. Also pending 2 further custom trachs as backup, but these may take several weeks for delivery. Currently on 30% Fio2.  # GI: continue PEG feeds  # Renal: stable kidney function and lytes  # ID: no evidence of active infection. Previously completed Zosyn for Pseudomonas aeruginosa VAP  # Heme: rivaroxaban for DVT ppx. Stable H/H  # Dispo: d/c planning to home with home vent once backup trachs available. Full code. 69F PMH advanced ALS (nonverbal and bedbound at baseline) with chronic hypercapnic respiratory failure s/p trach/PEG, vent-dependent, and Parkinson's who presented [facility]18[facility]24 with acute hypercapnia secondary to large expiratory air leak s/p upsizing without improvement with hospital course c/b tracheitis vs. UTI transferred from MICU to RCU on 7/5 for further management. S/p OR on 8/12 trach exchange with thoracic surgery with improved tidal volumes (Portex 9.5 with 40 mm balloon).    # Neuro: continue home riluzole for ALS. Continue sertraline and diazepam. Bedbound, nonverbal, quadriplegic, dependent on ADL's. Continue therapy for keratopathy, now improved  # CV: HD stable  # Pulm: continue lung protective ventilation. Monitoring for air leak and cuff overinflation. Pending backup tracheostomy tubes; however, 1 trach balloon ruptured during sterilization. Pending 2nd backup trach sterilization. Also pending 2 further custom trachs as backup, but these may take several weeks for delivery. Currently on 30% Fio2. Mild leukocytosis with increase in secretions. Pending CXR. Monitoring off abx for now  # GI: continue PEG feeds  # Renal: stable kidney function and lytes  # ID: no evidence of active infection. Previously completed Zosyn for Pseudomonas aeruginosa VAP  # Heme: rivaroxaban for DVT ppx. Stable H/H  # Dispo: d/c planning to home with home vent once backup trachs available. Full code.

## 2024-08-24 NOTE — PROGRESS NOTE ADULT - ASSESSMENT
67 YO Female with PMHx of ALS, Parkinson,, nonverbal, bed bound, chronic respiratory failure with tracheostomy and vent dependence, and oropharyngeal dysphagia with PEG who presented from home with tracheostomy leak. She was noted with vent alarm and poor TVe (250-290) at home. Trach changed at home with no improvement and sent in for thoracic evaluation. While in MICU no air leak noted with hyperinflated cuff. Transferred to RCU on 7/5 with course complicated by leukocytosis second to UTI vs trachitis and completed zosyn course. Trach upsized by thoracic on 7/17 however AL remains and now s/p 9.5mm Bivona custom trach on 8/12    NEUROLOGY  # ALS   - Baseline nonverbal, awake, and communicates with eye movement.   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   - Continue on Ofloxacin BID (decreased 8/1)   - Continue on Erythromycin OU Q3H (decreased 8/8)  - Continue with artificial tears OU Q1H   - Create moisture chamber with Tegaderm following placement of EXTENSIVE ointment to the right eye throughout the day and to the left eye nightly. Allow 6 hour window during the day where moisture can be removed from right eye.    PSYCH   # Anxiety and Depression   - Continue on home Valium 2mg BID with additional 2mg PRN   - Continue on Zoloft 75mg QD while in house (on 4cc/ 80mg QD at home)     CARDIOVASCULAR   # HTN thought to be second to discomfort vs dysautonomia   - Evening HTN and continue on Hydralazine vs valium PRN doses   - Monitor HR and BP    # pSVT  - Intermittent episodes of SVT, lasting a few seconds at a time, and self limiting. Consider BB if becomes more persistent or HD unstable    RESPIRATORY  # Tracheostomy leak   - At home noted with TVe 200-300s despite tracheostomy change to 80XLTCD.   - No air leak noted in house with hyperinflated cuff likely tracheomegaly?   - Continue on chronic vent 16/400/5/21   - Chronic bibasilar atelectasis and continued on HTS PRN with chest PT  - Trach upsized to Bivona 9 on 7/17, however trach leak remains.   - Custom Bivona 9.5mm Tracheostomy delivered and exchanged on Monday 8/12, however spare tracheostomy was defective.   - Remaining spare trach with no overt evidence of leak and sterilized 8/23  - Pending trial on home vent     GI  # Dysphagia   - Continue on PEG-TF     RENAL  # High PVR   - BS with roughly 200-300cc PVR, however no acute distress   - Monitor renal function and UOP     # Elevated lactate   - Lactate elevated with mild contraction alkalosis   - Rehydrate with improvement     INFECTIOUS DISEASE  # Leukocytosis likely second to trachitis vs UTI?   - No fever or chills and noted with prior leukocytosis   - CXR with prior atelectasis and no acute findings   - UA (7/6) with positive nitrites (7/6)   - SCx (7/5) with pansensitive PSA  - BCx (7/6) x 2 NGT  - UCx (7/6) with coag neg staph  - MRSA PCR (7/6) POSITIVE for both MRSA/MSSA s/p Bactroban (7/6 - 7/10)  - s/p empiric Zosyn (7/8 - 7/14)     # Leukocytosis second to aspiration   - Leukocytosis rising again with CXR showing RLL opacity  - Concern noted for chronic aspiration tracheostomy leak   - IF spikes vs leukocytosis worsens then will tx empirically   - Will continue to monitor off ABX for now    HEME  - Continue on home Xarelto 10mg DVT PPX?     ENDOCRINE  - No hx of DM2   - Monitor BG     SKIN  - Basic trach and PEG care ordered   - DTI on back and pending [facility]    ETHICS/ GOC    - FULL CODE   - Dr Tyson Velasquez,  involved in care    DISPO - Back home with  when able.

## 2024-08-25 LAB
ANION GAP SERPL CALC-SCNC: 15 MMOL/L — HIGH (ref 7–14)
BASOPHILS # BLD AUTO: 0.05 K/UL — SIGNIFICANT CHANGE UP (ref 0–0.2)
BASOPHILS NFR BLD AUTO: 0.3 % — SIGNIFICANT CHANGE UP (ref 0–2)
BUN SERPL-MCNC: 22 MG/DL — SIGNIFICANT CHANGE UP (ref 7–23)
CALCIUM SERPL-MCNC: 9.4 MG/DL — SIGNIFICANT CHANGE UP (ref 8.4–10.5)
CHLORIDE SERPL-SCNC: 101 MMOL/L — SIGNIFICANT CHANGE UP (ref 98–107)
CO2 SERPL-SCNC: 25 MMOL/L — SIGNIFICANT CHANGE UP (ref 22–31)
CREAT SERPL-MCNC: <0.2 MG/DL — LOW (ref 0.5–1.3)
EGFR: 125 ML/MIN/1.73M2 — SIGNIFICANT CHANGE UP
EOSINOPHIL # BLD AUTO: 0.13 K/UL — SIGNIFICANT CHANGE UP (ref 0–0.5)
EOSINOPHIL NFR BLD AUTO: 0.7 % — SIGNIFICANT CHANGE UP (ref 0–6)
GLUCOSE SERPL-MCNC: 144 MG/DL — HIGH (ref 70–99)
HCT VFR BLD CALC: 39.9 % — SIGNIFICANT CHANGE UP (ref 34.5–45)
HGB BLD-MCNC: 12.2 G/DL — SIGNIFICANT CHANGE UP (ref 11.5–15.5)
IANC: 14.42 K/UL — HIGH (ref 1.8–7.4)
IMM GRANULOCYTES NFR BLD AUTO: 1 % — HIGH (ref 0–0.9)
LYMPHOCYTES # BLD AUTO: 1.56 K/UL — SIGNIFICANT CHANGE UP (ref 1–3.3)
LYMPHOCYTES # BLD AUTO: 9 % — LOW (ref 13–44)
MAGNESIUM SERPL-MCNC: 1.9 MG/DL — SIGNIFICANT CHANGE UP (ref 1.6–2.6)
MCHC RBC-ENTMCNC: 25.6 PG — LOW (ref 27–34)
MCHC RBC-ENTMCNC: 30.6 GM/DL — LOW (ref 32–36)
MCV RBC AUTO: 83.8 FL — SIGNIFICANT CHANGE UP (ref 80–100)
MONOCYTES # BLD AUTO: 1 K/UL — HIGH (ref 0–0.9)
MONOCYTES NFR BLD AUTO: 5.8 % — SIGNIFICANT CHANGE UP (ref 2–14)
NEUTROPHILS # BLD AUTO: 14.42 K/UL — HIGH (ref 1.8–7.4)
NEUTROPHILS NFR BLD AUTO: 83.2 % — HIGH (ref 43–77)
NRBC # BLD: 0 /100 WBCS — SIGNIFICANT CHANGE UP (ref 0–0)
NRBC # FLD: 0 K/UL — SIGNIFICANT CHANGE UP (ref 0–0)
PHOSPHATE SERPL-MCNC: 2.4 MG/DL — LOW (ref 2.5–4.5)
PLATELET # BLD AUTO: 336 K/UL — SIGNIFICANT CHANGE UP (ref 150–400)
POTASSIUM SERPL-MCNC: 3.9 MMOL/L — SIGNIFICANT CHANGE UP (ref 3.5–5.3)
POTASSIUM SERPL-SCNC: 3.9 MMOL/L — SIGNIFICANT CHANGE UP (ref 3.5–5.3)
RBC # BLD: 4.76 M/UL — SIGNIFICANT CHANGE UP (ref 3.8–5.2)
RBC # FLD: 18.7 % — HIGH (ref 10.3–14.5)
SODIUM SERPL-SCNC: 141 MMOL/L — SIGNIFICANT CHANGE UP (ref 135–145)
WBC # BLD: 17.34 K/UL — HIGH (ref 3.8–10.5)
WBC # FLD AUTO: 17.34 K/UL — HIGH (ref 3.8–10.5)

## 2024-08-25 PROCEDURE — 99233 SBSQ HOSP IP/OBS HIGH 50: CPT | Mod: FS

## 2024-08-25 RX ORDER — IPRATROPIUM BROMIDE AND ALBUTEROL SULFATE .5; 3 MG/3ML; MG/3ML
3 SOLUTION RESPIRATORY (INHALATION) EVERY 6 HOURS
Refills: 0 | Status: DISCONTINUED | OUTPATIENT
Start: 2024-08-25 | End: 2024-08-29

## 2024-08-25 RX ORDER — SODIUM PHOSPHATE, DIBASIC, ANHYDROUS, POTASSIUM PHOSPHATE, MONOBASIC, AND SODIUM PHOSPHATE, MONOBASIC, MONOHYDRATE 852; 155; 130 MG/1; MG/1; MG/1
1 TABLET, COATED ORAL
Refills: 0 | Status: COMPLETED | OUTPATIENT
Start: 2024-08-25 | End: 2024-08-25

## 2024-08-25 RX ADMIN — POVIDONE, PROPYLENE GLYCOL 1 DROP(S): 6.8; 3 LIQUID OPHTHALMIC at 19:00

## 2024-08-25 RX ADMIN — POVIDONE, PROPYLENE GLYCOL 1 DROP(S): 6.8; 3 LIQUID OPHTHALMIC at 02:18

## 2024-08-25 RX ADMIN — POVIDONE, PROPYLENE GLYCOL 1 DROP(S): 6.8; 3 LIQUID OPHTHALMIC at 08:11

## 2024-08-25 RX ADMIN — ERYTHROMYCIN 1 APPLICATION(S): 5 OINTMENT OPHTHALMIC at 09:07

## 2024-08-25 RX ADMIN — SODIUM PHOSPHATE, DIBASIC, ANHYDROUS, POTASSIUM PHOSPHATE, MONOBASIC, AND SODIUM PHOSPHATE, MONOBASIC, MONOHYDRATE 1 PACKET(S): 852; 155; 130 TABLET, COATED ORAL at 10:39

## 2024-08-25 RX ADMIN — IPRATROPIUM BROMIDE AND ALBUTEROL SULFATE 3 MILLILITER(S): .5; 3 SOLUTION RESPIRATORY (INHALATION) at 15:05

## 2024-08-25 RX ADMIN — ERYTHROMYCIN 1 APPLICATION(S): 5 OINTMENT OPHTHALMIC at 11:01

## 2024-08-25 RX ADMIN — POVIDONE, PROPYLENE GLYCOL 1 DROP(S): 6.8; 3 LIQUID OPHTHALMIC at 03:07

## 2024-08-25 RX ADMIN — ERYTHROMYCIN 1 APPLICATION(S): 5 OINTMENT OPHTHALMIC at 14:25

## 2024-08-25 RX ADMIN — ERYTHROMYCIN 1 APPLICATION(S): 5 OINTMENT OPHTHALMIC at 17:02

## 2024-08-25 RX ADMIN — IPRATROPIUM BROMIDE AND ALBUTEROL SULFATE 3 MILLILITER(S): .5; 3 SOLUTION RESPIRATORY (INHALATION) at 22:57

## 2024-08-25 RX ADMIN — POVIDONE, PROPYLENE GLYCOL 1 DROP(S): 6.8; 3 LIQUID OPHTHALMIC at 22:22

## 2024-08-25 RX ADMIN — CHLORHEXIDINE GLUCONATE 15 MILLILITER(S): 40 SOLUTION TOPICAL at 17:02

## 2024-08-25 RX ADMIN — POVIDONE, PROPYLENE GLYCOL 1 DROP(S): 6.8; 3 LIQUID OPHTHALMIC at 13:05

## 2024-08-25 RX ADMIN — SODIUM PHOSPHATE, DIBASIC, ANHYDROUS, POTASSIUM PHOSPHATE, MONOBASIC, AND SODIUM PHOSPHATE, MONOBASIC, MONOHYDRATE 1 PACKET(S): 852; 155; 130 TABLET, COATED ORAL at 09:07

## 2024-08-25 RX ADMIN — ERYTHROMYCIN 1 APPLICATION(S): 5 OINTMENT OPHTHALMIC at 03:58

## 2024-08-25 RX ADMIN — RILUZOLE 50 MILLIGRAM(S): 5 LIQUID ORAL at 05:00

## 2024-08-25 RX ADMIN — POVIDONE, PROPYLENE GLYCOL 1 DROP(S): 6.8; 3 LIQUID OPHTHALMIC at 10:40

## 2024-08-25 RX ADMIN — POVIDONE, PROPYLENE GLYCOL 1 DROP(S): 6.8; 3 LIQUID OPHTHALMIC at 12:12

## 2024-08-25 RX ADMIN — SERTRALINE HYDROCHLORIDE 75 MILLIGRAM(S): 50 TABLET, FILM COATED ORAL at 11:01

## 2024-08-25 RX ADMIN — POVIDONE, PROPYLENE GLYCOL 1 DROP(S): 6.8; 3 LIQUID OPHTHALMIC at 04:29

## 2024-08-25 RX ADMIN — POVIDONE, PROPYLENE GLYCOL 1 DROP(S): 6.8; 3 LIQUID OPHTHALMIC at 14:24

## 2024-08-25 RX ADMIN — Medication 2 MILLIGRAM(S): at 17:02

## 2024-08-25 RX ADMIN — POVIDONE, PROPYLENE GLYCOL 1 DROP(S): 6.8; 3 LIQUID OPHTHALMIC at 20:00

## 2024-08-25 RX ADMIN — Medication 15 MILLILITER(S): at 11:00

## 2024-08-25 RX ADMIN — POVIDONE, PROPYLENE GLYCOL 1 DROP(S): 6.8; 3 LIQUID OPHTHALMIC at 11:00

## 2024-08-25 RX ADMIN — POVIDONE, PROPYLENE GLYCOL 1 DROP(S): 6.8; 3 LIQUID OPHTHALMIC at 07:27

## 2024-08-25 RX ADMIN — POVIDONE, PROPYLENE GLYCOL 1 DROP(S): 6.8; 3 LIQUID OPHTHALMIC at 09:08

## 2024-08-25 RX ADMIN — RILUZOLE 50 MILLIGRAM(S): 5 LIQUID ORAL at 17:01

## 2024-08-25 RX ADMIN — CHLORHEXIDINE GLUCONATE 1 APPLICATION(S): 40 SOLUTION TOPICAL at 11:01

## 2024-08-25 RX ADMIN — POVIDONE, PROPYLENE GLYCOL 1 DROP(S): 6.8; 3 LIQUID OPHTHALMIC at 04:59

## 2024-08-25 RX ADMIN — POVIDONE, PROPYLENE GLYCOL 1 DROP(S): 6.8; 3 LIQUID OPHTHALMIC at 06:14

## 2024-08-25 RX ADMIN — ERYTHROMYCIN 1 APPLICATION(S): 5 OINTMENT OPHTHALMIC at 06:14

## 2024-08-25 RX ADMIN — Medication 2 MILLIGRAM(S): at 05:00

## 2024-08-25 RX ADMIN — POVIDONE, PROPYLENE GLYCOL 1 DROP(S): 6.8; 3 LIQUID OPHTHALMIC at 01:05

## 2024-08-25 RX ADMIN — POVIDONE, PROPYLENE GLYCOL 1 DROP(S): 6.8; 3 LIQUID OPHTHALMIC at 15:57

## 2024-08-25 RX ADMIN — POVIDONE, PROPYLENE GLYCOL 1 DROP(S): 6.8; 3 LIQUID OPHTHALMIC at 17:02

## 2024-08-25 RX ADMIN — POVIDONE, PROPYLENE GLYCOL 1 DROP(S): 6.8; 3 LIQUID OPHTHALMIC at 23:00

## 2024-08-25 RX ADMIN — CHLORHEXIDINE GLUCONATE 15 MILLILITER(S): 40 SOLUTION TOPICAL at 05:00

## 2024-08-25 RX ADMIN — POVIDONE, PROPYLENE GLYCOL 1 DROP(S): 6.8; 3 LIQUID OPHTHALMIC at 21:00

## 2024-08-25 RX ADMIN — ERYTHROMYCIN 1 APPLICATION(S): 5 OINTMENT OPHTHALMIC at 22:03

## 2024-08-25 RX ADMIN — POVIDONE, PROPYLENE GLYCOL 1 DROP(S): 6.8; 3 LIQUID OPHTHALMIC at 16:59

## 2024-08-25 RX ADMIN — OFLOXACIN 1 DROP(S): 3 SOLUTION/ DROPS OPHTHALMIC at 17:02

## 2024-08-25 RX ADMIN — OFLOXACIN 1 DROP(S): 3 SOLUTION/ DROPS OPHTHALMIC at 06:15

## 2024-08-25 RX ADMIN — Medication 2 MILLIGRAM(S): at 22:03

## 2024-08-25 NOTE — PROGRESS NOTE ADULT - ASSESSMENT
69 YO Female with PMHx of ALS, Parkinson,, nonverbal, bed bound, chronic respiratory failure with tracheostomy and vent dependence, and oropharyngeal dysphagia with PEG who presented from home with tracheostomy leak. She was noted with vent alarm and poor TVe (250-290) at home. Trach changed at home with no improvement and sent in for thoracic evaluation. While in MICU no air leak noted with hyperinflated cuff. Transferred to RCU on 7/5 with course complicated by leukocytosis second to UTI vs trachitis and completed zosyn course. Trach upsized by thoracic on 7/17 however AL remains and now s/p 9.5mm Bivona custom trach on 8/12    NEUROLOGY  # ALS   - Baseline nonverbal, awake, and communicates with eye movement.   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   - Continue on Ofloxacin BID (decreased 8/1)   - Continue on Erythromycin OU Q3H (decreased 8/8)  - Continue with artificial tears OU Q1H   - Create moisture chamber with Tegaderm following placement of EXTENSIVE ointment to the right eye throughout the day and to the left eye nightly. Allow 6 hour window during the day where moisture can be removed from right eye.    PSYCH   # Anxiety and Depression   - Continue on home Valium 2mg BID with additional 2mg PRN   - Continue on Zoloft 75mg QD while in house (on 4cc/ 80mg QD at home)     CARDIOVASCULAR   # HTN thought to be second to discomfort vs dysautonomia   - Evening HTN and continue on Hydralazine vs valium PRN doses   - Monitor HR and BP    # pSVT  - Intermittent episodes of SVT, lasting a few seconds at a time, and self limiting. Consider BB if becomes more persistent or HD unstable    RESPIRATORY  # Tracheostomy leak   - At home noted with TVe 200-300s despite tracheostomy change to 80XLTCD.   - No air leak noted in house with hyperinflated cuff likely tracheomegaly?   - Continue on chronic vent 16/400/5/21   - Chronic bibasilar atelectasis and continued on HTS PRN with chest PT  - Trach upsized to Bivona 9 on 7/17, however trach leak remains.   - Custom Bivona 9.5mm Tracheostomy delivered and exchanged on Monday 8/12, however spare tracheostomy was defective.   - Remaining spare trach with no overt evidence of leak and sterilized 8/23  - Pending trial on home vent     GI  # Dysphagia   - Continue on PEG-TF     RENAL  # High PVR   - BS with roughly 200-300cc PVR, however no acute distress   - Monitor renal function and UOP     # Elevated lactate   - Lactate elevated with mild contraction alkalosis   - Rehydrate with improvement     INFECTIOUS DISEASE  # Leukocytosis likely second to trachitis vs UTI?   - No fever or chills and noted with prior leukocytosis   - CXR with prior atelectasis and no acute findings   - UA (7/6) with positive nitrites (7/6)   - SCx (7/5) with pansensitive PSA  - BCx (7/6) x 2 NGT  - UCx (7/6) with coag neg staph  - MRSA PCR (7/6) POSITIVE for both MRSA/MSSA s/p Bactroban (7/6 - 7/10)  - s/p empiric Zosyn (7/8 - 7/14)     # Leukocytosis second to aspiration   - Leukocytosis rising again with CXR showing RLL opacity  - Concern noted for chronic aspiration tracheostomy leak   - IF spikes vs leukocytosis worsens then will tx empirically   - Will continue to monitor off ABX for now    HEME  - Continue on home Xarelto 10mg DVT PPX?     ENDOCRINE  - No hx of DM2   - Monitor BG     SKIN  - Basic trach and PEG care ordered   - DTI on back and pending [facility]    ETHICS/ GOC    - FULL CODE   - Dr Tyson Velasquez,  involved in care    DISPO - Back home with  when able.     69 YO Female with PMHx of ALS, Parkinson,, nonverbal, bed bound, chronic respiratory failure with tracheostomy and vent dependence, and oropharyngeal dysphagia with PEG who presented from home with tracheostomy leak. She was noted with vent alarm and poor TVe (250-290) at home. Trach changed at home with no improvement and sent in for thoracic evaluation. While in MICU no air leak noted with hyperinflated cuff. Transferred to RCU on 7/5 with course complicated by leukocytosis second to UTI vs trachitis and completed zosyn course. Trach upsized by thoracic on 7/17 however AL remains and now s/p 9.5mm Bivona custom trach on 8/12 and course complicated by leukocytosis likely post op atelectasis     NEUROLOGY  # ALS   - Baseline nonverbal, awake, and communicates with eye movement.   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   - Continue on Ofloxacin BID (decreased 8/1)   - Continue on Erythromycin OU Q3H (decreased 8/8)  - Continue with artificial tears OU Q1H   - Create moisture chamber with Tegaderm following placement of EXTENSIVE ointment to the right eye throughout the day and to the left eye nightly. Allow 6 hour window during the day where moisture can be removed from right eye.    PSYCH   # Anxiety and Depression   - Continue on home Valium 2mg BID with additional 2mg PRN   - Continue on Zoloft 75mg QD while in house (on 4cc/ 80mg QD at home)     CARDIOVASCULAR   # HTN thought to be second to discomfort vs dysautonomia   - Evening HTN and continue on Hydralazine vs valium PRN doses   - Monitor HR and BP    # pSVT  - Intermittent episodes of SVT, lasting a few seconds at a time, and self limiting.  - Consider BB if becomes more persistent or HD unstable    RESPIRATORY  # Tracheostomy leak   - At home noted with TVe 200-300s despite tracheostomy change to 80XLTCD.   - No air leak noted in house with hyperinflated cuff likely tracheomegaly?   - Continue on chronic vent 16/400/5/21   - Trach upsized to Bivona 9 on 7/17, however trach leak remained.   - Custom Bivona 9.5mm Tracheostomy delivered and exchanged on Monday 8/12, however spare tracheostomy was defective.   - Remaining spare trach with no overt evidence of leak and sterilized 8/23  - Course complicated with leukocytosis as below likely post op RLL band like atelectasis   - Continue on nebs and IPV   - Pending trial on home vent     GI  # Dysphagia   - Continue on PEG-TF     RENAL  # High PVR   - BS with roughly 200-300cc PVR, however no acute distress   - Monitor renal function and UOP     # Elevated lactate   - Lactate elevated with mild contraction alkalosis   - Rehydrate with improvement     INFECTIOUS DISEASE  # Leukocytosis likely second to trachitis vs UTI?   - No fever or chills and noted with prior leukocytosis   - CXR with prior atelectasis and no acute findings   - UA (7/6) with positive nitrites (7/6)   - SCx (7/5) with pansensitive PSA  - BCx (7/6) x 2 NGT  - UCx (7/6) with coag neg staph  - MRSA PCR (7/6) POSITIVE for both MRSA/MSSA s/p Bactroban (7/6 - 7/10)  - s/p empiric Zosyn (7/8 - 7/14)     # Leukocytosis second to aspiration vs post op atelectasis?   - Leukocytosis rising again with CXR showing RLL band like opacity   - Concern noted for chronic aspiration tracheostomy leak vs post op atelectasis   - IF spikes vs leukocytosis worsens then will tx empirically   - Will continue to monitor off ABX and attempt pulmonary toilet as above    HEME  - Continue on home Xarelto 10mg DVT PPX?     ENDOCRINE  - No hx of DM2   - Monitor BG     SKIN  - Basic trach and PEG care ordered   - DTI on back and pending [facility]    ETHICS/ GOC    - FULL CODE   - Dr Tyson Velasquez,  involved in care    DISPO - Back home with  when able.

## 2024-08-25 NOTE — PROGRESS NOTE ADULT - SUBJECTIVE AND OBJECTIVE BOX
CHIEF COMPLAINT: Patient is a 69y old  Female who presents with a chief complaint of trach exchange (18 Jul 2024 07:07)      INTERVAL EVENTS:  - Leukocytosis slightly improved with no true fever spike.     REVIEW OF SYSTEMS: Seen by bedside during AM rounds and unable to assess ROS second to nonverbal with ALS baseline    Mode: AC/ CMV (Assist Control/ Continuous Mandatory Ventilation), RR (machine): 16, TV (machine): 400, FiO2: 30, PEEP: 5, ITime: 0.95, MAP: 9, PIP: 26      OBJECTIVE:  ICU Vital Signs Last 24 Hrs  T(C): 36.4 (24 Aug 2024 04:00), Max: 36.7 (23 Aug 2024 20:00)  T(F): 97.6 (24 Aug 2024 04:00), Max: 98 (23 Aug 2024 20:00)  HR: 111 (24 Aug 2024 06:33) (96 - 114)  BP: 133/83 (24 Aug 2024 04:00) (104/73 - 146/94)  BP(mean): 106 (23 Aug 2024 16:00) (83 - 106)  ABP: --  ABP(mean): --  RR: 16 (24 Aug 2024 04:00) (16 - 16)  SpO2: 97% (24 Aug 2024 06:33) (94% - 98%)    O2 Parameters below as of 24 Aug 2024 04:00  Patient On (Oxygen Delivery Method): ventilator    O2 Concentration (%): 30      Mode: AC/ CMV (Assist Control/ Continuous Mandatory Ventilation), RR (machine): 16, TV (machine): 400, FiO2: 30, PEEP: 5, ITime: 0.95, MAP: 9, PIP: 26    08-23 @ 07:01  -  08-24 @ 07:00  --------------------------------------------------------  IN: 1300 mL / OUT: 900 mL / NET: 400 mL      CAPILLARY BLOOD GLUCOSE          HOSPITAL MEDICATIONS:  MEDICATIONS  (STANDING):  artificial  tears Solution 1 Drop(s) Both EYES every 1 hour  chlorhexidine 0.12% Liquid 15 milliLiter(s) Oral Mucosa every 12 hours  chlorhexidine 2% Cloths 1 Application(s) Topical daily  diazepam    Tablet 2 milliGRAM(s) Oral every 12 hours  erythromycin   Ointment 1 Application(s) Both EYES <User Schedule>  multivitamin/minerals/iron Oral Solution (CENTRUM) 15 milliLiter(s) Oral daily  ofloxacin 0.3% Solution 1 Drop(s) Both EYES two times a day  riluzole 50 milliGRAM(s) Oral two times a day  rivaroxaban 10 milliGRAM(s) Oral daily  rivaroxaban      sertraline 75 milliGRAM(s) Oral daily    MEDICATIONS  (PRN):  acetaminophen   Oral Liquid .. 650 milliGRAM(s) Oral every 6 hours PRN Temp greater or equal to 38C (100.4F), Mild Pain (1 - 3)  diazepam    Tablet 2 milliGRAM(s) Oral at bedtime PRN for dysautonomia/ hypertension/discomfort      PHYSICAL EXAMINATION  General: NAD   HEENT: Exposure keratopathy appears to be improving. Tracheostomy presented with tiny air leak. Balloon inflated with improvement.   Cards: S1/S2, no murmurs   Pulm: Course vent sounds bilaterally. No wheezes.   Abdomen: Soft, nondistended and nontender. BS (+) PEG present.   Extremities: No pedal edema. No active ROM x 4 extremities with baseline ALS  Neurology: Awake with eyes open, however does not follow commands with baseline ALS with no acute focal neurological deficits     LABS:                        13.0   18.11 )-----------( 380      ( 24 Aug 2024 03:01 )             42.5                           12.2   17.34 )-----------( 336      ( 25 Aug 2024 05:05 )             39.9       08-24    140  |  101  |  22  ----------------------------<  146<H>  4.2   |  26  |  <0.20<L>    Ca    9.4      24 Aug 2024 03:01  Phos  3.8     08-24  Mg     2.10     08-24 08-25    141  |  101  |  22  ----------------------------<  144<H>  3.9   |  25  |  <0.20<L>    Ca    9.4      25 Aug 2024 05:05  Phos  2.4     08-25  Mg     1.90     08-25        Urinalysis Basic - ( 24 Aug 2024 03:01 )  Color: x / Appearance: x / SG: x / pH: x  Gluc: 146 mg/dL / Ketone: x  / Bili: x / Urobili: x   Blood: x / Protein: x / Nitrite: x   Leuk Esterase: x / RBC: x / WBC x   Sq Epi: x / Non Sq Epi: x / Bacteria: x            PAST MEDICAL & SURGICAL HISTORY:  ALS (amyotrophic lateral sclerosis)      HLD (hyperlipidemia)      Ventilator dependent  Since 2015      Aspiration into airway      S/P gastrostomy  10/14 for dysphagia  receives jevity 2 cans TID      Dependence on tracheostomy      Encounter for PEG (percutaneous endoscopic gastrostomy)  peg placed          FAMILY HISTORY:  No pertinent family history in first degree relatives        Social History:      RADIOLOGY:  [ ] Reviewed and interpreted by me    PULMONARY FUNCTION TESTS:    EKG: CHIEF COMPLAINT: Patient is a 69y old  Female who presents with a chief complaint of trach exchange (18 Jul 2024 07:07)      INTERVAL EVENTS:  - Leukocytosis slightly improved with no true fever spike. CXR reviewed and more likely atelectasis. Nebs and IPV started.     REVIEW OF SYSTEMS: Seen by bedside during AM rounds and unable to assess ROS second to nonverbal with ALS baseline    Mode: AC/ CMV (Assist Control/ Continuous Mandatory Ventilation), RR (machine): 16, TV (machine): 400, FiO2: 30, PEEP: 5, ITime: 0.95, MAP: 9, PIP: 26      OBJECTIVE:  ICU Vital Signs Last 24 Hrs  T(C): 36.4 (24 Aug 2024 04:00), Max: 36.7 (23 Aug 2024 20:00)  T(F): 97.6 (24 Aug 2024 04:00), Max: 98 (23 Aug 2024 20:00)  HR: 111 (24 Aug 2024 06:33) (96 - 114)  BP: 133/83 (24 Aug 2024 04:00) (104/73 - 146/94)  BP(mean): 106 (23 Aug 2024 16:00) (83 - 106)  ABP: --  ABP(mean): --  RR: 16 (24 Aug 2024 04:00) (16 - 16)  SpO2: 97% (24 Aug 2024 06:33) (94% - 98%)    O2 Parameters below as of 24 Aug 2024 04:00  Patient On (Oxygen Delivery Method): ventilator    O2 Concentration (%): 30      Mode: AC/ CMV (Assist Control/ Continuous Mandatory Ventilation), RR (machine): 16, TV (machine): 400, FiO2: 30, PEEP: 5, ITime: 0.95, MAP: 9, PIP: 26    08-23 @ 07:01  -  08-24 @ 07:00  --------------------------------------------------------  IN: 1300 mL / OUT: 900 mL / NET: 400 mL      CAPILLARY BLOOD GLUCOSE          HOSPITAL MEDICATIONS:  MEDICATIONS  (STANDING):  artificial  tears Solution 1 Drop(s) Both EYES every 1 hour  chlorhexidine 0.12% Liquid 15 milliLiter(s) Oral Mucosa every 12 hours  chlorhexidine 2% Cloths 1 Application(s) Topical daily  diazepam    Tablet 2 milliGRAM(s) Oral every 12 hours  erythromycin   Ointment 1 Application(s) Both EYES <User Schedule>  multivitamin/minerals/iron Oral Solution (CENTRUM) 15 milliLiter(s) Oral daily  ofloxacin 0.3% Solution 1 Drop(s) Both EYES two times a day  riluzole 50 milliGRAM(s) Oral two times a day  rivaroxaban 10 milliGRAM(s) Oral daily  rivaroxaban      sertraline 75 milliGRAM(s) Oral daily    MEDICATIONS  (PRN):  acetaminophen   Oral Liquid .. 650 milliGRAM(s) Oral every 6 hours PRN Temp greater or equal to 38C (100.4F), Mild Pain (1 - 3)  diazepam    Tablet 2 milliGRAM(s) Oral at bedtime PRN for dysautonomia/ hypertension/discomfort      PHYSICAL EXAMINATION  General: NAD   HEENT: Exposure keratopathy appears to be improving. Tracheostomy presented with tiny air leak. Balloon inflated with improvement.   Cards: S1/S2, no murmurs   Pulm: Course vent sounds bilaterally. No wheezes.   Abdomen: Soft, nondistended and nontender. BS (+) PEG present.   Extremities: No pedal edema. No active ROM x 4 extremities with baseline ALS  Neurology: Awake with eyes open, however does not follow commands with baseline ALS with no acute focal neurological deficits     LABS:                        13.0   18.11 )-----------( 380      ( 24 Aug 2024 03:01 )             42.5                           12.2   17.34 )-----------( 336      ( 25 Aug 2024 05:05 )             39.9       08-24    140  |  101  |  22  ----------------------------<  146<H>  4.2   |  26  |  <0.20<L>    Ca    9.4      24 Aug 2024 03:01  Phos  3.8     08-24  Mg     2.10     08-24 08-25    141  |  101  |  22  ----------------------------<  144<H>  3.9   |  25  |  <0.20<L>    Ca    9.4      25 Aug 2024 05:05  Phos  2.4     08-25  Mg     1.90     08-25        Urinalysis Basic - ( 24 Aug 2024 03:01 )  Color: x / Appearance: x / SG: x / pH: x  Gluc: 146 mg/dL / Ketone: x  / Bili: x / Urobili: x   Blood: x / Protein: x / Nitrite: x   Leuk Esterase: x / RBC: x / WBC x   Sq Epi: x / Non Sq Epi: x / Bacteria: x            PAST MEDICAL & SURGICAL HISTORY:  ALS (amyotrophic lateral sclerosis)      HLD (hyperlipidemia)      Ventilator dependent  Since 2015      Aspiration into airway      S/P gastrostomy  10/14 for dysphagia  receives jevity 2 cans TID      Dependence on tracheostomy      Encounter for PEG (percutaneous endoscopic gastrostomy)  peg placed          FAMILY HISTORY:  No pertinent family history in first degree relatives        Social History:      RADIOLOGY:  [ ] Reviewed and interpreted by me    PULMONARY FUNCTION TESTS:    EKG:

## 2024-08-25 NOTE — PROGRESS NOTE ADULT - NS ATTEND AMEND GEN_ALL_CORE FT
69F PMH advanced ALS (nonverbal and bedbound at baseline) with chronic hypercapnic respiratory failure s/p trach/PEG, vent-dependent, and Parkinson's who presented to [facility] on 7/18/24 with acute hypercapnia secondary to large expiratory air leak s/p upsizing without improvement with hospital course c/b tracheitis vs. UTI transferred from MICU to RCU on 7/5 for further management. S/p OR on 8/12 trach exchange with thoracic surgery with improved tidal volumes (Portex 9.5 with 40 mm balloon).    # Neuro: continue home riluzole for ALS. Continue sertraline and diazepam. Bedbound, nonverbal, quadriplegic, dependent on ADL's. Continue therapy for keratopathy, now improved  # CV: HD stable  # Pulm: continue lung protective ventilation. Monitoring for air leak and cuff overinflation. Pending backup tracheostomy tubes; however, 1 trach balloon ruptured during sterilization. Pending 2nd backup trach sterilization. Also pending 2 further custom trachs as backup, but these may take several weeks for delivery. Currently on 30% Fio2. Stable leukocytosis with R lower lobe consolidation. Possible mucous plug/aspiration. Monitoring off abx for now - will increase airway clearance regimen  # GI: continue PEG feeds  # Renal: stable kidney function and lytes  # ID: no evidence of active infection. Previously completed Zosyn for Pseudomonas aeruginosa VAP  # Heme: rivaroxaban for DVT ppx. Stable H/H  # Dispo: d/c planning to home with home vent once backup trachs available and medically stable. Full code

## 2024-08-26 LAB
ANION GAP SERPL CALC-SCNC: 14 MMOL/L — SIGNIFICANT CHANGE UP (ref 7–14)
BASOPHILS # BLD AUTO: 0.04 K/UL — SIGNIFICANT CHANGE UP (ref 0–0.2)
BASOPHILS NFR BLD AUTO: 0.2 % — SIGNIFICANT CHANGE UP (ref 0–2)
BUN SERPL-MCNC: 20 MG/DL — SIGNIFICANT CHANGE UP (ref 7–23)
CALCIUM SERPL-MCNC: 9.4 MG/DL — SIGNIFICANT CHANGE UP (ref 8.4–10.5)
CHLORIDE SERPL-SCNC: 102 MMOL/L — SIGNIFICANT CHANGE UP (ref 98–107)
CO2 SERPL-SCNC: 27 MMOL/L — SIGNIFICANT CHANGE UP (ref 22–31)
CREAT SERPL-MCNC: <0.2 MG/DL — LOW (ref 0.5–1.3)
EGFR: 125 ML/MIN/1.73M2 — SIGNIFICANT CHANGE UP
EOSINOPHIL # BLD AUTO: 0.05 K/UL — SIGNIFICANT CHANGE UP (ref 0–0.5)
EOSINOPHIL NFR BLD AUTO: 0.3 % — SIGNIFICANT CHANGE UP (ref 0–6)
GLUCOSE SERPL-MCNC: 220 MG/DL — HIGH (ref 70–99)
HCT VFR BLD CALC: 42.2 % — SIGNIFICANT CHANGE UP (ref 34.5–45)
HGB BLD-MCNC: 12.8 G/DL — SIGNIFICANT CHANGE UP (ref 11.5–15.5)
IANC: 14.97 K/UL — HIGH (ref 1.8–7.4)
IMM GRANULOCYTES NFR BLD AUTO: 0.9 % — SIGNIFICANT CHANGE UP (ref 0–0.9)
LYMPHOCYTES # BLD AUTO: 1.05 K/UL — SIGNIFICANT CHANGE UP (ref 1–3.3)
LYMPHOCYTES # BLD AUTO: 6.1 % — LOW (ref 13–44)
MAGNESIUM SERPL-MCNC: 2 MG/DL — SIGNIFICANT CHANGE UP (ref 1.6–2.6)
MCHC RBC-ENTMCNC: 25.8 PG — LOW (ref 27–34)
MCHC RBC-ENTMCNC: 30.3 GM/DL — LOW (ref 32–36)
MCV RBC AUTO: 84.9 FL — SIGNIFICANT CHANGE UP (ref 80–100)
MONOCYTES # BLD AUTO: 0.85 K/UL — SIGNIFICANT CHANGE UP (ref 0–0.9)
MONOCYTES NFR BLD AUTO: 5 % — SIGNIFICANT CHANGE UP (ref 2–14)
NEUTROPHILS # BLD AUTO: 14.97 K/UL — HIGH (ref 1.8–7.4)
NEUTROPHILS NFR BLD AUTO: 87.5 % — HIGH (ref 43–77)
NRBC # BLD: 0 /100 WBCS — SIGNIFICANT CHANGE UP (ref 0–0)
NRBC # FLD: 0 K/UL — SIGNIFICANT CHANGE UP (ref 0–0)
PHOSPHATE SERPL-MCNC: 3 MG/DL — SIGNIFICANT CHANGE UP (ref 2.5–4.5)
PLATELET # BLD AUTO: 343 K/UL — SIGNIFICANT CHANGE UP (ref 150–400)
POTASSIUM SERPL-MCNC: 4.1 MMOL/L — SIGNIFICANT CHANGE UP (ref 3.5–5.3)
POTASSIUM SERPL-SCNC: 4.1 MMOL/L — SIGNIFICANT CHANGE UP (ref 3.5–5.3)
RBC # BLD: 4.97 M/UL — SIGNIFICANT CHANGE UP (ref 3.8–5.2)
RBC # FLD: 18.6 % — HIGH (ref 10.3–14.5)
SODIUM SERPL-SCNC: 143 MMOL/L — SIGNIFICANT CHANGE UP (ref 135–145)
WBC # BLD: 17.12 K/UL — HIGH (ref 3.8–10.5)
WBC # FLD AUTO: 17.12 K/UL — HIGH (ref 3.8–10.5)

## 2024-08-26 PROCEDURE — 71045 X-RAY EXAM CHEST 1 VIEW: CPT | Mod: 26

## 2024-08-26 PROCEDURE — 99233 SBSQ HOSP IP/OBS HIGH 50: CPT

## 2024-08-26 RX ORDER — PIPERACILLIN SODIUM AND TAZOBACTAM SODIUM 3; .375 G/15ML; G/15ML
3.38 INJECTION, POWDER, FOR SOLUTION INTRAVENOUS ONCE
Refills: 0 | Status: COMPLETED | OUTPATIENT
Start: 2024-08-26 | End: 2024-08-26

## 2024-08-26 RX ORDER — ACETAMINOPHEN 325 MG/1
750 TABLET ORAL ONCE
Refills: 0 | Status: COMPLETED | OUTPATIENT
Start: 2024-08-26 | End: 2024-08-26

## 2024-08-26 RX ORDER — PIPERACILLIN SODIUM AND TAZOBACTAM SODIUM 3; .375 G/15ML; G/15ML
3.38 INJECTION, POWDER, FOR SOLUTION INTRAVENOUS EVERY 8 HOURS
Refills: 0 | Status: DISCONTINUED | OUTPATIENT
Start: 2024-08-26 | End: 2024-08-29

## 2024-08-26 RX ADMIN — ERYTHROMYCIN 1 APPLICATION(S): 5 OINTMENT OPHTHALMIC at 17:08

## 2024-08-26 RX ADMIN — PIPERACILLIN SODIUM AND TAZOBACTAM SODIUM 200 GRAM(S): 3; .375 INJECTION, POWDER, FOR SOLUTION INTRAVENOUS at 12:51

## 2024-08-26 RX ADMIN — POVIDONE, PROPYLENE GLYCOL 1 DROP(S): 6.8; 3 LIQUID OPHTHALMIC at 17:17

## 2024-08-26 RX ADMIN — POVIDONE, PROPYLENE GLYCOL 1 DROP(S): 6.8; 3 LIQUID OPHTHALMIC at 19:00

## 2024-08-26 RX ADMIN — POVIDONE, PROPYLENE GLYCOL 1 DROP(S): 6.8; 3 LIQUID OPHTHALMIC at 10:35

## 2024-08-26 RX ADMIN — POVIDONE, PROPYLENE GLYCOL 1 DROP(S): 6.8; 3 LIQUID OPHTHALMIC at 16:45

## 2024-08-26 RX ADMIN — ACETAMINOPHEN 300 MILLIGRAM(S): 325 TABLET ORAL at 18:45

## 2024-08-26 RX ADMIN — CHLORHEXIDINE GLUCONATE 15 MILLILITER(S): 40 SOLUTION TOPICAL at 05:15

## 2024-08-26 RX ADMIN — POVIDONE, PROPYLENE GLYCOL 1 DROP(S): 6.8; 3 LIQUID OPHTHALMIC at 03:15

## 2024-08-26 RX ADMIN — POVIDONE, PROPYLENE GLYCOL 1 DROP(S): 6.8; 3 LIQUID OPHTHALMIC at 09:28

## 2024-08-26 RX ADMIN — RILUZOLE 50 MILLIGRAM(S): 5 LIQUID ORAL at 05:14

## 2024-08-26 RX ADMIN — CHLORHEXIDINE GLUCONATE 1 APPLICATION(S): 40 SOLUTION TOPICAL at 11:06

## 2024-08-26 RX ADMIN — ERYTHROMYCIN 1 APPLICATION(S): 5 OINTMENT OPHTHALMIC at 08:02

## 2024-08-26 RX ADMIN — ERYTHROMYCIN 1 APPLICATION(S): 5 OINTMENT OPHTHALMIC at 00:00

## 2024-08-26 RX ADMIN — IPRATROPIUM BROMIDE AND ALBUTEROL SULFATE 3 MILLILITER(S): .5; 3 SOLUTION RESPIRATORY (INHALATION) at 03:33

## 2024-08-26 RX ADMIN — PIPERACILLIN SODIUM AND TAZOBACTAM SODIUM 25 GRAM(S): 3; .375 INJECTION, POWDER, FOR SOLUTION INTRAVENOUS at 15:44

## 2024-08-26 RX ADMIN — POVIDONE, PROPYLENE GLYCOL 1 DROP(S): 6.8; 3 LIQUID OPHTHALMIC at 23:00

## 2024-08-26 RX ADMIN — PIPERACILLIN SODIUM AND TAZOBACTAM SODIUM 25 GRAM(S): 3; .375 INJECTION, POWDER, FOR SOLUTION INTRAVENOUS at 22:00

## 2024-08-26 RX ADMIN — POVIDONE, PROPYLENE GLYCOL 1 DROP(S): 6.8; 3 LIQUID OPHTHALMIC at 05:14

## 2024-08-26 RX ADMIN — SERTRALINE HYDROCHLORIDE 75 MILLIGRAM(S): 50 TABLET, FILM COATED ORAL at 11:05

## 2024-08-26 RX ADMIN — IPRATROPIUM BROMIDE AND ALBUTEROL SULFATE 3 MILLILITER(S): .5; 3 SOLUTION RESPIRATORY (INHALATION) at 10:16

## 2024-08-26 RX ADMIN — POVIDONE, PROPYLENE GLYCOL 1 DROP(S): 6.8; 3 LIQUID OPHTHALMIC at 16:15

## 2024-08-26 RX ADMIN — ERYTHROMYCIN 1 APPLICATION(S): 5 OINTMENT OPHTHALMIC at 05:14

## 2024-08-26 RX ADMIN — POVIDONE, PROPYLENE GLYCOL 1 DROP(S): 6.8; 3 LIQUID OPHTHALMIC at 01:24

## 2024-08-26 RX ADMIN — POVIDONE, PROPYLENE GLYCOL 1 DROP(S): 6.8; 3 LIQUID OPHTHALMIC at 12:53

## 2024-08-26 RX ADMIN — POVIDONE, PROPYLENE GLYCOL 1 DROP(S): 6.8; 3 LIQUID OPHTHALMIC at 17:07

## 2024-08-26 RX ADMIN — ERYTHROMYCIN 1 APPLICATION(S): 5 OINTMENT OPHTHALMIC at 11:05

## 2024-08-26 RX ADMIN — POVIDONE, PROPYLENE GLYCOL 1 DROP(S): 6.8; 3 LIQUID OPHTHALMIC at 00:00

## 2024-08-26 RX ADMIN — POVIDONE, PROPYLENE GLYCOL 1 DROP(S): 6.8; 3 LIQUID OPHTHALMIC at 22:00

## 2024-08-26 RX ADMIN — POVIDONE, PROPYLENE GLYCOL 1 DROP(S): 6.8; 3 LIQUID OPHTHALMIC at 06:40

## 2024-08-26 RX ADMIN — CHLORHEXIDINE GLUCONATE 15 MILLILITER(S): 40 SOLUTION TOPICAL at 17:08

## 2024-08-26 RX ADMIN — IPRATROPIUM BROMIDE AND ALBUTEROL SULFATE 3 MILLILITER(S): .5; 3 SOLUTION RESPIRATORY (INHALATION) at 21:52

## 2024-08-26 RX ADMIN — POVIDONE, PROPYLENE GLYCOL 1 DROP(S): 6.8; 3 LIQUID OPHTHALMIC at 13:51

## 2024-08-26 RX ADMIN — POVIDONE, PROPYLENE GLYCOL 1 DROP(S): 6.8; 3 LIQUID OPHTHALMIC at 02:24

## 2024-08-26 RX ADMIN — Medication 2 MILLIGRAM(S): at 17:08

## 2024-08-26 RX ADMIN — OFLOXACIN 1 DROP(S): 3 SOLUTION/ DROPS OPHTHALMIC at 17:09

## 2024-08-26 RX ADMIN — POVIDONE, PROPYLENE GLYCOL 1 DROP(S): 6.8; 3 LIQUID OPHTHALMIC at 06:05

## 2024-08-26 RX ADMIN — IPRATROPIUM BROMIDE AND ALBUTEROL SULFATE 3 MILLILITER(S): .5; 3 SOLUTION RESPIRATORY (INHALATION) at 15:51

## 2024-08-26 RX ADMIN — ERYTHROMYCIN 1 APPLICATION(S): 5 OINTMENT OPHTHALMIC at 21:11

## 2024-08-26 RX ADMIN — RILUZOLE 50 MILLIGRAM(S): 5 LIQUID ORAL at 17:08

## 2024-08-26 RX ADMIN — POVIDONE, PROPYLENE GLYCOL 1 DROP(S): 6.8; 3 LIQUID OPHTHALMIC at 07:52

## 2024-08-26 RX ADMIN — POVIDONE, PROPYLENE GLYCOL 1 DROP(S): 6.8; 3 LIQUID OPHTHALMIC at 11:10

## 2024-08-26 RX ADMIN — POVIDONE, PROPYLENE GLYCOL 1 DROP(S): 6.8; 3 LIQUID OPHTHALMIC at 21:10

## 2024-08-26 RX ADMIN — OFLOXACIN 1 DROP(S): 3 SOLUTION/ DROPS OPHTHALMIC at 05:14

## 2024-08-26 RX ADMIN — Medication 15 MILLILITER(S): at 11:08

## 2024-08-26 RX ADMIN — Medication 2 MILLIGRAM(S): at 05:13

## 2024-08-26 RX ADMIN — POVIDONE, PROPYLENE GLYCOL 1 DROP(S): 6.8; 3 LIQUID OPHTHALMIC at 20:00

## 2024-08-26 RX ADMIN — ERYTHROMYCIN 1 APPLICATION(S): 5 OINTMENT OPHTHALMIC at 03:15

## 2024-08-26 RX ADMIN — ERYTHROMYCIN 1 APPLICATION(S): 5 OINTMENT OPHTHALMIC at 15:15

## 2024-08-26 RX ADMIN — POVIDONE, PROPYLENE GLYCOL 1 DROP(S): 6.8; 3 LIQUID OPHTHALMIC at 11:04

## 2024-08-26 RX ADMIN — POVIDONE, PROPYLENE GLYCOL 1 DROP(S): 6.8; 3 LIQUID OPHTHALMIC at 04:15

## 2024-08-26 NOTE — CHART NOTE - NSCHARTNOTESELECT_GEN_ALL_CORE
Event Note
Length of stay re-evaluation/Nutrition Services
Nutrition Services
Thoracic Surgery/Event Note
Thoracic surgery
Thoracic surgery
family update/Event Note
ophthalmology/Event Note
s/p ICU stay on EN order/Nutrition Services
Event Note
Event Note
Nutrition Services
Nutrition Services
POCUS/Event Note
RCU Acceptance Note/Event Note
Thoracic Surgery Post OP Note/Event Note
Thoracic Surgery/Event Note
Thoracic surgery
Thoracic surgery
Transfer Note

## 2024-08-26 NOTE — PROGRESS NOTE ADULT - SUBJECTIVE AND OBJECTIVE BOX
CHIEF COMPLAINT: Patient is a 69y old  Female who presents with a chief complaint of trach exchange (18 Jul 2024 07:07)      INTERVAL EVENTS:  - Leukocytosis slightly improved with no true fever spike. CXR reviewed and more likely atelectasis. Nebs and IPV started.     REVIEW OF SYSTEMS: Seen by bedside during AM rounds and unable to assess ROS second to nonverbal with ALS baseline    Mode: AC/ CMV (Assist Control/ Continuous Mandatory Ventilation), RR (machine): 16, TV (machine): 400, FiO2: 30, PEEP: 5, ITime: 0.95, MAP: 9, PIP: 26      OBJECTIVE:  ICU Vital Signs Last 24 Hrs  T(C): 36.4 (24 Aug 2024 04:00), Max: 36.7 (23 Aug 2024 20:00)  T(F): 97.6 (24 Aug 2024 04:00), Max: 98 (23 Aug 2024 20:00)  HR: 111 (24 Aug 2024 06:33) (96 - 114)  BP: 133/83 (24 Aug 2024 04:00) (104/73 - 146/94)  BP(mean): 106 (23 Aug 2024 16:00) (83 - 106)  ABP: --  ABP(mean): --  RR: 16 (24 Aug 2024 04:00) (16 - 16)  SpO2: 97% (24 Aug 2024 06:33) (94% - 98%)    O2 Parameters below as of 24 Aug 2024 04:00  Patient On (Oxygen Delivery Method): ventilator    O2 Concentration (%): 30      Mode: AC/ CMV (Assist Control/ Continuous Mandatory Ventilation), RR (machine): 16, TV (machine): 400, FiO2: 30, PEEP: 5, ITime: 0.95, MAP: 9, PIP: 26    08-23 @ 07:01  -  08-24 @ 07:00  --------------------------------------------------------  IN: 1300 mL / OUT: 900 mL / NET: 400 mL      CAPILLARY BLOOD GLUCOSE          HOSPITAL MEDICATIONS:  MEDICATIONS  (STANDING):  artificial  tears Solution 1 Drop(s) Both EYES every 1 hour  chlorhexidine 0.12% Liquid 15 milliLiter(s) Oral Mucosa every 12 hours  chlorhexidine 2% Cloths 1 Application(s) Topical daily  diazepam    Tablet 2 milliGRAM(s) Oral every 12 hours  erythromycin   Ointment 1 Application(s) Both EYES <User Schedule>  multivitamin/minerals/iron Oral Solution (CENTRUM) 15 milliLiter(s) Oral daily  ofloxacin 0.3% Solution 1 Drop(s) Both EYES two times a day  riluzole 50 milliGRAM(s) Oral two times a day  rivaroxaban 10 milliGRAM(s) Oral daily  rivaroxaban      sertraline 75 milliGRAM(s) Oral daily    MEDICATIONS  (PRN):  acetaminophen   Oral Liquid .. 650 milliGRAM(s) Oral every 6 hours PRN Temp greater or equal to 38C (100.4F), Mild Pain (1 - 3)  diazepam    Tablet 2 milliGRAM(s) Oral at bedtime PRN for dysautonomia/ hypertension/discomfort      PHYSICAL EXAMINATION  General: NAD   HEENT: Exposure keratopathy appears to be improving. Tracheostomy presented with tiny air leak. Balloon inflated with improvement.   Cards: S1/S2, no murmurs   Pulm: Course vent sounds bilaterally. No wheezes.   Abdomen: Soft, nondistended and nontender. BS (+) PEG present.   Extremities: No pedal edema. No active ROM x 4 extremities with baseline ALS  Neurology: Awake with eyes open, however does not follow commands with baseline ALS with no acute focal neurological deficits     LABS:                        13.0   18.11 )-----------( 380      ( 24 Aug 2024 03:01 )             42.5                           12.2   17.34 )-----------( 336      ( 25 Aug 2024 05:05 )             39.9       08-24    140  |  101  |  22  ----------------------------<  146<H>  4.2   |  26  |  <0.20<L>    Ca    9.4      24 Aug 2024 03:01  Phos  3.8     08-24  Mg     2.10     08-24 08-25    141  |  101  |  22  ----------------------------<  144<H>  3.9   |  25  |  <0.20<L>    Ca    9.4      25 Aug 2024 05:05  Phos  2.4     08-25  Mg     1.90     08-25        Urinalysis Basic - ( 24 Aug 2024 03:01 )  Color: x / Appearance: x / SG: x / pH: x  Gluc: 146 mg/dL / Ketone: x  / Bili: x / Urobili: x   Blood: x / Protein: x / Nitrite: x   Leuk Esterase: x / RBC: x / WBC x   Sq Epi: x / Non Sq Epi: x / Bacteria: x            PAST MEDICAL & SURGICAL HISTORY:  ALS (amyotrophic lateral sclerosis)      HLD (hyperlipidemia)      Ventilator dependent  Since 2015      Aspiration into airway      S/P gastrostomy  10/14 for dysphagia  receives jevity 2 cans TID      Dependence on tracheostomy      Encounter for PEG (percutaneous endoscopic gastrostomy)  peg placed          FAMILY HISTORY:  No pertinent family history in first degree relatives        Social History:      RADIOLOGY:  [ ] Reviewed and interpreted by me    PULMONARY FUNCTION TESTS:    EKG: CHIEF COMPLAINT: Patient is a 69y old  Female who presents with a chief complaint of trach exchange (18 Jul 2024 07:07)      INTERVAL EVENTS:  - Desaturation this morning and will check RPT CXR for worsened atelectasis concern     REVIEW OF SYSTEMS: Seen by bedside during AM rounds and unable to assess ROS second to nonverbal with ALS baseline    Mode: AC/ CMV (Assist Control/ Continuous Mandatory Ventilation), RR (machine): 16, TV (machine): 400, FiO2: 30, PEEP: 5, ITime: 0.95, MAP: 9, PIP: 26      OBJECTIVE:  ICU Vital Signs Last 24 Hrs  T(C): 36.4 (24 Aug 2024 04:00), Max: 36.7 (23 Aug 2024 20:00)  T(F): 97.6 (24 Aug 2024 04:00), Max: 98 (23 Aug 2024 20:00)  HR: 111 (24 Aug 2024 06:33) (96 - 114)  BP: 133/83 (24 Aug 2024 04:00) (104/73 - 146/94)  BP(mean): 106 (23 Aug 2024 16:00) (83 - 106)  ABP: --  ABP(mean): --  RR: 16 (24 Aug 2024 04:00) (16 - 16)  SpO2: 97% (24 Aug 2024 06:33) (94% - 98%)    O2 Parameters below as of 24 Aug 2024 04:00  Patient On (Oxygen Delivery Method): ventilator    O2 Concentration (%): 30      Mode: AC/ CMV (Assist Control/ Continuous Mandatory Ventilation), RR (machine): 16, TV (machine): 400, FiO2: 30, PEEP: 5, ITime: 0.95, MAP: 9, PIP: 26    08-23 @ 07:01  -  08-24 @ 07:00  --------------------------------------------------------  IN: 1300 mL / OUT: 900 mL / NET: 400 mL      CAPILLARY BLOOD GLUCOSE          HOSPITAL MEDICATIONS:  MEDICATIONS  (STANDING):  artificial  tears Solution 1 Drop(s) Both EYES every 1 hour  chlorhexidine 0.12% Liquid 15 milliLiter(s) Oral Mucosa every 12 hours  chlorhexidine 2% Cloths 1 Application(s) Topical daily  diazepam    Tablet 2 milliGRAM(s) Oral every 12 hours  erythromycin   Ointment 1 Application(s) Both EYES <User Schedule>  multivitamin/minerals/iron Oral Solution (CENTRUM) 15 milliLiter(s) Oral daily  ofloxacin 0.3% Solution 1 Drop(s) Both EYES two times a day  riluzole 50 milliGRAM(s) Oral two times a day  rivaroxaban 10 milliGRAM(s) Oral daily  rivaroxaban      sertraline 75 milliGRAM(s) Oral daily    MEDICATIONS  (PRN):  acetaminophen   Oral Liquid .. 650 milliGRAM(s) Oral every 6 hours PRN Temp greater or equal to 38C (100.4F), Mild Pain (1 - 3)  diazepam    Tablet 2 milliGRAM(s) Oral at bedtime PRN for dysautonomia/ hypertension/discomfort      PHYSICAL EXAMINATION  General: NAD   HEENT: Exposure keratopathy appears to be improving. Tracheostomy presented with tiny air leak. Balloon inflated with improvement.   Cards: S1/S2, no murmurs   Pulm: Course vent sounds bilaterally. No wheezes.   Abdomen: Soft, nondistended and nontender. BS (+) PEG present.   Extremities: No pedal edema. No active ROM x 4 extremities with baseline ALS  Neurology: Awake with eyes open, however does not follow commands with baseline ALS with no acute focal neurological deficits     LABS:                        13.0   18.11 )-----------( 380      ( 24 Aug 2024 03:01 )             42.5                           12.2   17.34 )-----------( 336      ( 25 Aug 2024 05:05 )             39.9       08-24    140  |  101  |  22  ----------------------------<  146<H>  4.2   |  26  |  <0.20<L>    Ca    9.4      24 Aug 2024 03:01  Phos  3.8     08-24  Mg     2.10     08-24 08-25    141  |  101  |  22  ----------------------------<  144<H>  3.9   |  25  |  <0.20<L>    Ca    9.4      25 Aug 2024 05:05  Phos  2.4     08-25  Mg     1.90     08-25        Urinalysis Basic - ( 24 Aug 2024 03:01 )  Color: x / Appearance: x / SG: x / pH: x  Gluc: 146 mg/dL / Ketone: x  / Bili: x / Urobili: x   Blood: x / Protein: x / Nitrite: x   Leuk Esterase: x / RBC: x / WBC x   Sq Epi: x / Non Sq Epi: x / Bacteria: x            PAST MEDICAL & SURGICAL HISTORY:  ALS (amyotrophic lateral sclerosis)      HLD (hyperlipidemia)      Ventilator dependent  Since 2015      Aspiration into airway      S/P gastrostomy  10/14 for dysphagia  receives jevity 2 cans TID      Dependence on tracheostomy      Encounter for PEG (percutaneous endoscopic gastrostomy)  peg placed          FAMILY HISTORY:  No pertinent family history in first degree relatives        Social History:      RADIOLOGY:  [ ] Reviewed and interpreted by me    PULMONARY FUNCTION TESTS:    EKG: CHIEF COMPLAINT: Patient is a 69y old  Female who presents with a chief complaint of trach exchange (18 Jul 2024 07:07)      INTERVAL EVENTS:  - Desaturation this morning and fever spike today with possible RLL PNA   - Zosyn started and cx pending    REVIEW OF SYSTEMS: Seen by bedside during AM rounds and unable to assess ROS second to nonverbal with ALS baseline    Mode: AC/ CMV (Assist Control/ Continuous Mandatory Ventilation), RR (machine): 16, TV (machine): 400, FiO2: 30, PEEP: 5, ITime: 0.95, MAP: 9, PIP: 26      OBJECTIVE:  ICU Vital Signs Last 24 Hrs  T(C): 36.4 (24 Aug 2024 04:00), Max: 36.7 (23 Aug 2024 20:00)  T(F): 97.6 (24 Aug 2024 04:00), Max: 98 (23 Aug 2024 20:00)  HR: 111 (24 Aug 2024 06:33) (96 - 114)  BP: 133/83 (24 Aug 2024 04:00) (104/73 - 146/94)  BP(mean): 106 (23 Aug 2024 16:00) (83 - 106)  ABP: --  ABP(mean): --  RR: 16 (24 Aug 2024 04:00) (16 - 16)  SpO2: 97% (24 Aug 2024 06:33) (94% - 98%)    O2 Parameters below as of 24 Aug 2024 04:00  Patient On (Oxygen Delivery Method): ventilator    O2 Concentration (%): 30      Mode: AC/ CMV (Assist Control/ Continuous Mandatory Ventilation), RR (machine): 16, TV (machine): 400, FiO2: 30, PEEP: 5, ITime: 0.95, MAP: 9, PIP: 26    08-23 @ 07:01  -  08-24 @ 07:00  --------------------------------------------------------  IN: 1300 mL / OUT: 900 mL / NET: 400 mL      CAPILLARY BLOOD GLUCOSE          HOSPITAL MEDICATIONS:  MEDICATIONS  (STANDING):  artificial  tears Solution 1 Drop(s) Both EYES every 1 hour  chlorhexidine 0.12% Liquid 15 milliLiter(s) Oral Mucosa every 12 hours  chlorhexidine 2% Cloths 1 Application(s) Topical daily  diazepam    Tablet 2 milliGRAM(s) Oral every 12 hours  erythromycin   Ointment 1 Application(s) Both EYES <User Schedule>  multivitamin/minerals/iron Oral Solution (CENTRUM) 15 milliLiter(s) Oral daily  ofloxacin 0.3% Solution 1 Drop(s) Both EYES two times a day  riluzole 50 milliGRAM(s) Oral two times a day  rivaroxaban 10 milliGRAM(s) Oral daily  rivaroxaban      sertraline 75 milliGRAM(s) Oral daily    MEDICATIONS  (PRN):  acetaminophen   Oral Liquid .. 650 milliGRAM(s) Oral every 6 hours PRN Temp greater or equal to 38C (100.4F), Mild Pain (1 - 3)  diazepam    Tablet 2 milliGRAM(s) Oral at bedtime PRN for dysautonomia/ hypertension/discomfort      PHYSICAL EXAMINATION  General: NAD   HEENT: Exposure keratopathy appears to be improving. Tracheostomy presented with tiny air leak. Balloon inflated with improvement.   Cards: S1/S2, no murmurs   Pulm: Course vent sounds bilaterally with rhonchi bilaterally. No wheezes.   Abdomen: Soft, nondistended and nontender. BS (+) PEG present.   Extremities: No pedal edema. No active ROM x 4 extremities with baseline ALS  Neurology: Awake with eyes open, however does not follow commands with baseline ALS with no acute focal neurological deficits     LABS:                        13.0   18.11 )-----------( 380      ( 24 Aug 2024 03:01 )             42.5                           12.2   17.34 )-----------( 336      ( 25 Aug 2024 05:05 )             39.9       08-24    140  |  101  |  22  ----------------------------<  146<H>  4.2   |  26  |  <0.20<L>    Ca    9.4      24 Aug 2024 03:01  Phos  3.8     08-24  Mg     2.10     08-24 08-25    141  |  101  |  22  ----------------------------<  144<H>  3.9   |  25  |  <0.20<L>    Ca    9.4      25 Aug 2024 05:05  Phos  2.4     08-25  Mg     1.90     08-25        Urinalysis Basic - ( 24 Aug 2024 03:01 )  Color: x / Appearance: x / SG: x / pH: x  Gluc: 146 mg/dL / Ketone: x  / Bili: x / Urobili: x   Blood: x / Protein: x / Nitrite: x   Leuk Esterase: x / RBC: x / WBC x   Sq Epi: x / Non Sq Epi: x / Bacteria: x            PAST MEDICAL & SURGICAL HISTORY:  ALS (amyotrophic lateral sclerosis)      HLD (hyperlipidemia)      Ventilator dependent  Since 2015      Aspiration into airway      S/P gastrostomy  10/14 for dysphagia  receives jevity 2 cans TID      Dependence on tracheostomy      Encounter for PEG (percutaneous endoscopic gastrostomy)  peg placed          FAMILY HISTORY:  No pertinent family history in first degree relatives        Social History:      RADIOLOGY:  [ ] Reviewed and interpreted by me    PULMONARY FUNCTION TESTS:    EKG:

## 2024-08-26 NOTE — PROGRESS NOTE ADULT - ASSESSMENT
67 YO Female with PMHx of ALS, Parkinson,, nonverbal, bed bound, chronic respiratory failure with tracheostomy and vent dependence, and oropharyngeal dysphagia with PEG who presented from home with tracheostomy leak. She was noted with vent alarm and poor TVe (250-290) at home. Trach changed at home with no improvement and sent in for thoracic evaluation. While in MICU no air leak noted with hyperinflated cuff. Transferred to [facility] on 7/5 with course complicated by leukocytosis second to UTI vs trachitis and completed zosyn course. Trach upsized by thoracic on 7/17 however AL remains and now s/p 9.5mm Bivona custom trach on 8/12 and course complicated by leukocytosis likely post op atelectasis     NEUROLOGY  # ALS   - Baseline nonverbal, awake, and communicates with eye movement.   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   - Continue on Ofloxacin BID (decreased 8/1)   - Continue on Erythromycin OU Q3H (decreased 8/8)  - Continue with artificial tears OU Q1H   - Create moisture chamber with Tegaderm following placement of EXTENSIVE ointment to the right eye throughout the day and to the left eye nightly. Allow 6 hour window during the day where moisture can be removed from right eye.    PSYCH   # Anxiety and Depression   - Continue on home Valium 2mg BID with additional 2mg PRN   - Continue on Zoloft 75mg QD while in house (on 4cc/ 80mg QD at home)     CARDIOVASCULAR   # HTN thought to be second to discomfort vs dysautonomia   - Evening HTN and continue on Hydralazine vs valium PRN doses   - Monitor HR and BP    # pSVT  - Intermittent episodes of SVT, lasting a few seconds at a time, and self limiting.  - Consider BB if becomes more persistent or HD unstable    RESPIRATORY  # Tracheostomy leak   - At home noted with TVe 200-300s despite tracheostomy change to 80XLTCD.   - No air leak noted in house with hyperinflated cuff likely tracheomegaly?   - Continue on chronic vent 16/400/5/21   - Trach upsized to Bivona 9 on 7/17, however trach leak remained.   - Custom Bivona 9.5mm Tracheostomy delivered and exchanged on Monday 8/12, however spare tracheostomy was defective.   - Remaining spare trach with no overt evidence of leak and sterilized 8/23  - Course complicated with leukocytosis as below likely post op RLL band like atelectasis   - Continue on nebs and IPV   - Pending trial on home vent     GI  # Dysphagia   - Continue on PEG-TF     RENAL  # High PVR   - BS with roughly 200-300cc PVR, however no acute distress   - Monitor renal function and UOP     # Elevated lactate   - Lactate elevated with mild contraction alkalosis   - Rehydrate with improvement     INFECTIOUS DISEASE  # Leukocytosis likely second to trachitis vs UTI?   - No fever or chills and noted with prior leukocytosis   - CXR with prior atelectasis and no acute findings   - UA (7/6) with positive nitrites (7/6)   - SCx (7/5) with pansensitive PSA  - BCx (7/6) x 2 NGT  - UCx (7/6) with coag neg staph  - MRSA PCR (7/6) POSITIVE for both MRSA/MSSA s/p Bactroban (7/6 - 7/10)  - s/p empiric Zosyn (7/8 - 7/14)     # Leukocytosis second to aspiration vs post op atelectasis?   - Leukocytosis rising again with CXR showing RLL band like opacity   - Concern noted for chronic aspiration tracheostomy leak vs post op atelectasis   - IF spikes vs leukocytosis worsens then will tx empirically   - Will continue to monitor off ABX and attempt pulmonary toilet as above    HEME  - Continue on home Xarelto 10mg DVT PPX?     ENDOCRINE  - No hx of DM2   - Monitor BG     SKIN  - Basic trach and PEG care ordered   - DTI on back and pending [facility]    ETHICS/ GOC    - FULL CODE   - Dr Tyson Velasquez,  involved in care    DISPO - Back home with  when able.     69 YO Female with PMHx of ALS, Parkinson,, nonverbal, bed bound, chronic respiratory failure with tracheostomy and vent dependence, and oropharyngeal dysphagia with PEG who presented from home with tracheostomy leak. She was noted with vent alarm and poor TVe (250-290) at home. Trach changed at home with no improvement and sent in for thoracic evaluation. While in MICU no air leak noted with hyperinflated cuff. Transferred to [facility] on 7/5 with course complicated by leukocytosis second to UTI vs trachitis and completed zosyn course. Trach upsized by thoracic on 7/17 however AL remains and now s/p 9.5mm Bivona custom trach on 8/12 and course complicated by leukocytosis likely post op atelectasis     NEUROLOGY  # ALS   - Baseline nonverbal, awake, and communicates with eye movement.   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   - Continue on Ofloxacin BID (decreased 8/1)   - Continue on Erythromycin OU Q3H (decreased 8/8)  - Continue with artificial tears OU Q1H   - Create moisture chamber with Tegaderm following placement of EXTENSIVE ointment to the right eye throughout the day and to the left eye nightly. Allow 6 hour window during the day where moisture can be removed from right eye.    PSYCH   # Anxiety and Depression   - Continue on home Valium 2mg BID with additional 2mg PRN   - Continue on Zoloft 75mg QD while in house (on 4cc/ 80mg QD at home)     CARDIOVASCULAR   # HTN thought to be second to discomfort vs dysautonomia   - Evening HTN and continue on Hydralazine vs valium PRN doses   - Monitor HR and BP    # pSVT  - Intermittent episodes of SVT, lasting a few seconds at a time, and self limiting.  - Consider BB if becomes more persistent or HD unstable    RESPIRATORY  # Tracheostomy leak   - At home noted with TVe 200-300s despite tracheostomy change to 80XLTCD.   - No air leak noted in house with hyperinflated cuff likely tracheomegaly?   - Trach upsized to Bivona 9 on 7/17, however trach leak remained.   - Custom Bivona 9.5mm Tracheostomy delivered and exchanged on Monday 8/12, however spare tracheostomy was defective.   - Remaining spare trach with no overt evidence of leak and sterilized 8/23  - Continue on chronic vent 16/400/5/21   - Additional spare to be delivered tomorrow   - Continue on nebs and IPV   - Pending trial on home vent post infxn     GI  # Dysphagia   - Continue on PEG-TF     RENAL  # High PVR   - BS with roughly 200-300cc PVR, however no acute distress   - Monitor renal function and UOP     # Elevated lactate   - Lactate elevated with mild contraction alkalosis   - Rehydrate with improvement     INFECTIOUS DISEASE  # Leukocytosis likely second to trachitis vs UTI?   - No fever or chills and noted with prior leukocytosis   - CXR with prior atelectasis and no acute findings   - UA (7/6) with positive nitrites (7/6)   - SCx (7/5) with pansensitive PSA  - BCx (7/6) x 2 NGT  - UCx (7/6) with coag neg staph  - MRSA PCR (7/6) POSITIVE for both MRSA/MSSA s/p Bactroban (7/6 - 7/10)  - s/p empiric Zosyn (7/8 - 7/14)     # RLL PNA  - Leukocytosis rising again with CXR/ POCUS showing RLL opacity/ consolidation   - Concern noted for chronic aspiration tracheostomy leak vs post op atelectasis   - Zosyn started (8/26 - ) pending SCx, BCx and UA    HEME  - Continue on home Xarelto 10mg DVT PPX?     ENDOCRINE  - No hx of DM2   - Monitor BG     SKIN  - Basic trach and PEG care ordered   - DTI on back and pending [facility]    ETHICS/ GOC    - FULL CODE   - Dr Tyson Velasquez,  involved in care    DISPO - Back home with  when able.     67 YO Female with PMHx of ALS, Parkinson,, nonverbal, bed bound, chronic respiratory failure with tracheostomy and vent dependence, and oropharyngeal dysphagia with PEG who presented from home with tracheostomy leak. She was noted with vent alarm and poor TVe (250-290) at home. Trach changed at home with no improvement and sent in for thoracic evaluation. While in MICU no air leak noted with hyperinflated cuff. Transferred to [facility] on 7/5 with course complicated by leukocytosis second to UTI vs trachitis and completed zosyn course. Trach upsized by thoracic on 7/17 however AL remains and now s/p 9.5mm Bivona custom trach on 8/12 and course complicated by leukocytosis likely post op atelectasis vs RLL PNA    NEUROLOGY  # ALS   - Baseline nonverbal, awake, and communicates with eye movement.   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   - Continue on Ofloxacin BID (decreased 8/1)   - Continue on Erythromycin OU Q3H (decreased 8/8)  - Continue with artificial tears OU Q1H   - Create moisture chamber with Tegaderm following placement of EXTENSIVE ointment to the right eye throughout the day and to the left eye nightly. Allow 6 hour window during the day where moisture can be removed from right eye.    PSYCH   # Anxiety and Depression   - Continue on home Valium 2mg BID with additional 2mg PRN   - Continue on Zoloft 75mg QD while in house (on 4cc/ 80mg QD at home)     CARDIOVASCULAR   # HTN thought to be second to discomfort vs dysautonomia   - Evening HTN and continue on Hydralazine vs valium PRN doses   - Monitor HR and BP    # pSVT  - Intermittent episodes of SVT, lasting a few seconds at a time, and self limiting.  - Consider BB if becomes more persistent or HD unstable    RESPIRATORY  # Tracheostomy leak   - At home noted with TVe 200-300s despite tracheostomy change to 80XLTCD.   - No air leak noted in house with hyperinflated cuff likely tracheomegaly?   - Trach upsized to Bivona 9 on 7/17, however trach leak remained.   - Custom Bivona 9.5mm Tracheostomy delivered and exchanged on Monday 8/12, however spare tracheostomy was defective.   - Remaining spare trach with no overt evidence of leak and sterilized 8/23  - Continue on chronic vent 16/400/5/21   - Additional spare to be delivered tomorrow   - Continue on nebs and IPV   - Pending trial on home vent post infxn     GI  # Dysphagia   - Continue on PEG-TF     RENAL  # High PVR   - BS with roughly 200-300cc PVR, however no acute distress   - Monitor renal function and UOP     # Elevated lactate   - Lactate elevated with mild contraction alkalosis   - Rehydrate with improvement     INFECTIOUS DISEASE  # Leukocytosis likely second to trachitis vs UTI?   - No fever or chills and noted with prior leukocytosis   - CXR with prior atelectasis and no acute findings   - UA (7/6) with positive nitrites (7/6)   - SCx (7/5) with pansensitive PSA  - BCx (7/6) x 2 NGT  - UCx (7/6) with coag neg staph  - MRSA PCR (7/6) POSITIVE for both MRSA/MSSA s/p Bactroban (7/6 - 7/10)  - s/p empiric Zosyn (7/8 - 7/14)     # RLL PNA  - Leukocytosis rising again with CXR/ POCUS showing RLL opacity/ consolidation   - Concern noted for chronic aspiration tracheostomy leak vs post op atelectasis   - Zosyn started (8/26 - ) pending SCx, BCx and UA    HEME  - Continue on home Xarelto 10mg DVT PPX?     ENDOCRINE  - No hx of DM2   - Monitor BG     SKIN  - Basic trach and PEG care ordered   - DTI on back and pending [facility]    ETHICS/ GOC    - FULL CODE   - Dr Tyson Velasquez,  involved in care    DISPO - Back home with  when able.

## 2024-08-26 NOTE — CHART NOTE - NSCHARTNOTEFT_GEN_A_CORE
RCU PA POCUS NOTE     : TAYE Piña     INDICATION: Hypoxia    PROCEDURE:  [ ] LIMITED ECHO  [x ] LIMITED CHEST  [ ] LIMITED RETROPERITONEAL  [ ] LIMITED ABDOMINAL  [ ] LIMITED DVT  [ ] NEEDLE GUIDANCE VASCULAR  [ ] NEEDLE GUIDANCE THORACENTESIS  [ ] NEEDLE GUIDANCE PARACENTESIS  [ ] NEEDLE GUIDANCE PERICARDIOCENTESIS  [ ] OTHER    FINDINGS:  BL lung sliding with scant B line on left,  however overall A line predominant   BL simple trace pleural effusions   RLL consolidation vs atelectasis    INTERPRETATION:  Concern for RLL PNA vs atelectasis    BRUNO Marie, PA-C  Department of Medicine/ R[facility]  In house [facility] [id number]  In house Medicine [facility] [id number]  Reachable via teams

## 2024-08-26 NOTE — PROGRESS NOTE ADULT - NS ATTEND AMEND GEN_ALL_CORE FT
Patient is a 68 yo F w/ advanced ALS (nonverbal and bedbound at baseline) with chronic hypercapnic respiratory failure and ventilatory dependence via tracheostomy at baseline and Parkinson's who was initially presenting to [facility] on 7/4/24 with acute hypercapnia secondary to large expiratory air leak s/p upsizing without improvement with hospital course c/b tracheitis vs. UTI transferred from MICU to RCU on 7/5 for further management    #Acute on chronic hypercapnic respiratory failure  #ALS  #Chronic trach dependence - Trach exchanged at home without improvement followed by upsizing at bedside to 9 Bivona but still with air leak. Thoracic sx consulted, custom trach ordered (Portex 9.5 with 40mm balloon).  - s/p OR on 8/12 trach exchange with thoracic. Improved TV w/ Portex 9.5 with 40mm balloon. Backup trachs arrived, but 1 with ruptured ballon while undergoing sterilization process. 2 additional trachs ordered. WIll f/u on arrival date  - c/w home riluzole, diazepam, and zoloft  - c/w mechanical ventilatory support.  - c/w airway clearance, will start empiric Zosyn for now and send sputum cultures given increased secretions  - c/w ointment/eye drops for corneal exposure, f/u ophto recs  - c/w Mickey Rae MD  Pulmonary & Critical Care

## 2024-08-27 LAB
ANION GAP SERPL CALC-SCNC: 12 MMOL/L — SIGNIFICANT CHANGE UP (ref 7–14)
APPEARANCE UR: CLEAR — SIGNIFICANT CHANGE UP
BASOPHILS # BLD AUTO: 0.04 K/UL — SIGNIFICANT CHANGE UP (ref 0–0.2)
BASOPHILS NFR BLD AUTO: 0.3 % — SIGNIFICANT CHANGE UP (ref 0–2)
BILIRUB UR-MCNC: NEGATIVE — SIGNIFICANT CHANGE UP
BUN SERPL-MCNC: 22 MG/DL — SIGNIFICANT CHANGE UP (ref 7–23)
CALCIUM SERPL-MCNC: 9.3 MG/DL — SIGNIFICANT CHANGE UP (ref 8.4–10.5)
CHLORIDE SERPL-SCNC: 102 MMOL/L — SIGNIFICANT CHANGE UP (ref 98–107)
CO2 SERPL-SCNC: 29 MMOL/L — SIGNIFICANT CHANGE UP (ref 22–31)
COLOR SPEC: SIGNIFICANT CHANGE UP
CREAT SERPL-MCNC: <0.2 MG/DL — LOW (ref 0.5–1.3)
DIFF PNL FLD: NEGATIVE — SIGNIFICANT CHANGE UP
EGFR: 125 ML/MIN/1.73M2 — SIGNIFICANT CHANGE UP
EOSINOPHIL # BLD AUTO: 0.1 K/UL — SIGNIFICANT CHANGE UP (ref 0–0.5)
EOSINOPHIL NFR BLD AUTO: 0.7 % — SIGNIFICANT CHANGE UP (ref 0–6)
GLUCOSE SERPL-MCNC: 207 MG/DL — HIGH (ref 70–99)
GLUCOSE UR QL: NEGATIVE MG/DL — SIGNIFICANT CHANGE UP
GRAM STN FLD: ABNORMAL
HCT VFR BLD CALC: 40.2 % — SIGNIFICANT CHANGE UP (ref 34.5–45)
HGB BLD-MCNC: 11.9 G/DL — SIGNIFICANT CHANGE UP (ref 11.5–15.5)
IANC: 11.76 K/UL — HIGH (ref 1.8–7.4)
IMM GRANULOCYTES NFR BLD AUTO: 0.9 % — SIGNIFICANT CHANGE UP (ref 0–0.9)
KETONES UR-MCNC: NEGATIVE MG/DL — SIGNIFICANT CHANGE UP
LEUKOCYTE ESTERASE UR-ACNC: NEGATIVE — SIGNIFICANT CHANGE UP
LYMPHOCYTES # BLD AUTO: 1.12 K/UL — SIGNIFICANT CHANGE UP (ref 1–3.3)
LYMPHOCYTES # BLD AUTO: 8 % — LOW (ref 13–44)
MAGNESIUM SERPL-MCNC: 2 MG/DL — SIGNIFICANT CHANGE UP (ref 1.6–2.6)
MCHC RBC-ENTMCNC: 25.3 PG — LOW (ref 27–34)
MCHC RBC-ENTMCNC: 29.6 GM/DL — LOW (ref 32–36)
MCV RBC AUTO: 85.4 FL — SIGNIFICANT CHANGE UP (ref 80–100)
MONOCYTES # BLD AUTO: 0.78 K/UL — SIGNIFICANT CHANGE UP (ref 0–0.9)
MONOCYTES NFR BLD AUTO: 5.6 % — SIGNIFICANT CHANGE UP (ref 2–14)
NEUTROPHILS # BLD AUTO: 11.76 K/UL — HIGH (ref 1.8–7.4)
NEUTROPHILS NFR BLD AUTO: 84.5 % — HIGH (ref 43–77)
NITRITE UR-MCNC: POSITIVE
NRBC # BLD: 0 /100 WBCS — SIGNIFICANT CHANGE UP (ref 0–0)
NRBC # FLD: 0 K/UL — SIGNIFICANT CHANGE UP (ref 0–0)
PH UR: 6.5 — SIGNIFICANT CHANGE UP (ref 5–8)
PHOSPHATE SERPL-MCNC: 3.3 MG/DL — SIGNIFICANT CHANGE UP (ref 2.5–4.5)
PLATELET # BLD AUTO: 345 K/UL — SIGNIFICANT CHANGE UP (ref 150–400)
POTASSIUM SERPL-MCNC: 4.4 MMOL/L — SIGNIFICANT CHANGE UP (ref 3.5–5.3)
POTASSIUM SERPL-SCNC: 4.4 MMOL/L — SIGNIFICANT CHANGE UP (ref 3.5–5.3)
PROT UR-MCNC: 100 MG/DL
RBC # BLD: 4.71 M/UL — SIGNIFICANT CHANGE UP (ref 3.8–5.2)
RBC # FLD: 18.3 % — HIGH (ref 10.3–14.5)
SODIUM SERPL-SCNC: 143 MMOL/L — SIGNIFICANT CHANGE UP (ref 135–145)
SP GR SPEC: 1.04 — HIGH (ref 1–1.03)
SPECIMEN SOURCE: SIGNIFICANT CHANGE UP
UROBILINOGEN FLD QL: 1 MG/DL — SIGNIFICANT CHANGE UP (ref 0.2–1)
WBC # BLD: 13.92 K/UL — HIGH (ref 3.8–10.5)
WBC # FLD AUTO: 13.92 K/UL — HIGH (ref 3.8–10.5)

## 2024-08-27 PROCEDURE — 99233 SBSQ HOSP IP/OBS HIGH 50: CPT

## 2024-08-27 RX ADMIN — POVIDONE, PROPYLENE GLYCOL 1 DROP(S): 6.8; 3 LIQUID OPHTHALMIC at 11:49

## 2024-08-27 RX ADMIN — POVIDONE, PROPYLENE GLYCOL 1 DROP(S): 6.8; 3 LIQUID OPHTHALMIC at 17:16

## 2024-08-27 RX ADMIN — POVIDONE, PROPYLENE GLYCOL 1 DROP(S): 6.8; 3 LIQUID OPHTHALMIC at 22:24

## 2024-08-27 RX ADMIN — POVIDONE, PROPYLENE GLYCOL 1 DROP(S): 6.8; 3 LIQUID OPHTHALMIC at 05:34

## 2024-08-27 RX ADMIN — POVIDONE, PROPYLENE GLYCOL 1 DROP(S): 6.8; 3 LIQUID OPHTHALMIC at 11:46

## 2024-08-27 RX ADMIN — IPRATROPIUM BROMIDE AND ALBUTEROL SULFATE 3 MILLILITER(S): .5; 3 SOLUTION RESPIRATORY (INHALATION) at 03:57

## 2024-08-27 RX ADMIN — POVIDONE, PROPYLENE GLYCOL 1 DROP(S): 6.8; 3 LIQUID OPHTHALMIC at 00:00

## 2024-08-27 RX ADMIN — ERYTHROMYCIN 1 APPLICATION(S): 5 OINTMENT OPHTHALMIC at 17:08

## 2024-08-27 RX ADMIN — POVIDONE, PROPYLENE GLYCOL 1 DROP(S): 6.8; 3 LIQUID OPHTHALMIC at 06:09

## 2024-08-27 RX ADMIN — ERYTHROMYCIN 1 APPLICATION(S): 5 OINTMENT OPHTHALMIC at 03:28

## 2024-08-27 RX ADMIN — POVIDONE, PROPYLENE GLYCOL 1 DROP(S): 6.8; 3 LIQUID OPHTHALMIC at 21:21

## 2024-08-27 RX ADMIN — ERYTHROMYCIN 1 APPLICATION(S): 5 OINTMENT OPHTHALMIC at 23:49

## 2024-08-27 RX ADMIN — POVIDONE, PROPYLENE GLYCOL 1 DROP(S): 6.8; 3 LIQUID OPHTHALMIC at 13:27

## 2024-08-27 RX ADMIN — POVIDONE, PROPYLENE GLYCOL 1 DROP(S): 6.8; 3 LIQUID OPHTHALMIC at 02:00

## 2024-08-27 RX ADMIN — IPRATROPIUM BROMIDE AND ALBUTEROL SULFATE 3 MILLILITER(S): .5; 3 SOLUTION RESPIRATORY (INHALATION) at 21:17

## 2024-08-27 RX ADMIN — POVIDONE, PROPYLENE GLYCOL 1 DROP(S): 6.8; 3 LIQUID OPHTHALMIC at 07:38

## 2024-08-27 RX ADMIN — CHLORHEXIDINE GLUCONATE 1 APPLICATION(S): 40 SOLUTION TOPICAL at 11:21

## 2024-08-27 RX ADMIN — POVIDONE, PROPYLENE GLYCOL 1 DROP(S): 6.8; 3 LIQUID OPHTHALMIC at 08:57

## 2024-08-27 RX ADMIN — POVIDONE, PROPYLENE GLYCOL 1 DROP(S): 6.8; 3 LIQUID OPHTHALMIC at 16:47

## 2024-08-27 RX ADMIN — ERYTHROMYCIN 1 APPLICATION(S): 5 OINTMENT OPHTHALMIC at 05:34

## 2024-08-27 RX ADMIN — Medication 15 MILLILITER(S): at 11:21

## 2024-08-27 RX ADMIN — POVIDONE, PROPYLENE GLYCOL 1 DROP(S): 6.8; 3 LIQUID OPHTHALMIC at 20:09

## 2024-08-27 RX ADMIN — POVIDONE, PROPYLENE GLYCOL 1 DROP(S): 6.8; 3 LIQUID OPHTHALMIC at 15:46

## 2024-08-27 RX ADMIN — POVIDONE, PROPYLENE GLYCOL 1 DROP(S): 6.8; 3 LIQUID OPHTHALMIC at 13:28

## 2024-08-27 RX ADMIN — PIPERACILLIN SODIUM AND TAZOBACTAM SODIUM 25 GRAM(S): 3; .375 INJECTION, POWDER, FOR SOLUTION INTRAVENOUS at 21:20

## 2024-08-27 RX ADMIN — POVIDONE, PROPYLENE GLYCOL 1 DROP(S): 6.8; 3 LIQUID OPHTHALMIC at 03:28

## 2024-08-27 RX ADMIN — CHLORHEXIDINE GLUCONATE 15 MILLILITER(S): 40 SOLUTION TOPICAL at 05:34

## 2024-08-27 RX ADMIN — PIPERACILLIN SODIUM AND TAZOBACTAM SODIUM 25 GRAM(S): 3; .375 INJECTION, POWDER, FOR SOLUTION INTRAVENOUS at 13:27

## 2024-08-27 RX ADMIN — POVIDONE, PROPYLENE GLYCOL 1 DROP(S): 6.8; 3 LIQUID OPHTHALMIC at 01:00

## 2024-08-27 RX ADMIN — ERYTHROMYCIN 1 APPLICATION(S): 5 OINTMENT OPHTHALMIC at 21:20

## 2024-08-27 RX ADMIN — POVIDONE, PROPYLENE GLYCOL 1 DROP(S): 6.8; 3 LIQUID OPHTHALMIC at 23:48

## 2024-08-27 RX ADMIN — POVIDONE, PROPYLENE GLYCOL 1 DROP(S): 6.8; 3 LIQUID OPHTHALMIC at 17:09

## 2024-08-27 RX ADMIN — ERYTHROMYCIN 1 APPLICATION(S): 5 OINTMENT OPHTHALMIC at 11:20

## 2024-08-27 RX ADMIN — CHLORHEXIDINE GLUCONATE 15 MILLILITER(S): 40 SOLUTION TOPICAL at 17:09

## 2024-08-27 RX ADMIN — Medication 2 MILLIGRAM(S): at 17:16

## 2024-08-27 RX ADMIN — POVIDONE, PROPYLENE GLYCOL 1 DROP(S): 6.8; 3 LIQUID OPHTHALMIC at 09:05

## 2024-08-27 RX ADMIN — ERYTHROMYCIN 1 APPLICATION(S): 5 OINTMENT OPHTHALMIC at 03:27

## 2024-08-27 RX ADMIN — POVIDONE, PROPYLENE GLYCOL 1 DROP(S): 6.8; 3 LIQUID OPHTHALMIC at 19:30

## 2024-08-27 RX ADMIN — SERTRALINE HYDROCHLORIDE 75 MILLIGRAM(S): 50 TABLET, FILM COATED ORAL at 11:21

## 2024-08-27 RX ADMIN — RILUZOLE 50 MILLIGRAM(S): 5 LIQUID ORAL at 05:34

## 2024-08-27 RX ADMIN — PIPERACILLIN SODIUM AND TAZOBACTAM SODIUM 25 GRAM(S): 3; .375 INJECTION, POWDER, FOR SOLUTION INTRAVENOUS at 05:35

## 2024-08-27 RX ADMIN — OFLOXACIN 1 DROP(S): 3 SOLUTION/ DROPS OPHTHALMIC at 05:35

## 2024-08-27 RX ADMIN — IPRATROPIUM BROMIDE AND ALBUTEROL SULFATE 3 MILLILITER(S): .5; 3 SOLUTION RESPIRATORY (INHALATION) at 09:33

## 2024-08-27 RX ADMIN — ERYTHROMYCIN 1 APPLICATION(S): 5 OINTMENT OPHTHALMIC at 15:46

## 2024-08-27 RX ADMIN — Medication 2 MILLIGRAM(S): at 05:35

## 2024-08-27 RX ADMIN — IPRATROPIUM BROMIDE AND ALBUTEROL SULFATE 3 MILLILITER(S): .5; 3 SOLUTION RESPIRATORY (INHALATION) at 13:53

## 2024-08-27 RX ADMIN — POVIDONE, PROPYLENE GLYCOL 1 DROP(S): 6.8; 3 LIQUID OPHTHALMIC at 04:05

## 2024-08-27 RX ADMIN — ERYTHROMYCIN 1 APPLICATION(S): 5 OINTMENT OPHTHALMIC at 08:56

## 2024-08-27 RX ADMIN — POVIDONE, PROPYLENE GLYCOL 1 DROP(S): 6.8; 3 LIQUID OPHTHALMIC at 10:20

## 2024-08-27 RX ADMIN — OFLOXACIN 1 DROP(S): 3 SOLUTION/ DROPS OPHTHALMIC at 17:07

## 2024-08-27 RX ADMIN — RILUZOLE 50 MILLIGRAM(S): 5 LIQUID ORAL at 17:08

## 2024-08-27 NOTE — PROGRESS NOTE ADULT - SUBJECTIVE AND OBJECTIVE BOX
CHIEF COMPLAINT:    INTERVAL EVENTS:     ROS: Seen by bedside during AM rounds     OBJECTIVE:  ICU Vital Signs Last 24 Hrs  T(C): 37 (27 Aug 2024 08:00), Max: 38.4 (26 Aug 2024 18:11)  T(F): 98.6 (27 Aug 2024 08:00), Max: 101.1 (26 Aug 2024 18:11)  HR: 109 (27 Aug 2024 08:00) (109 - 128)  BP: 151/85 (27 Aug 2024 08:00) (109/65 - 157/81)  BP(mean): 98 (27 Aug 2024 04:00) (93 - 100)  ABP: --  ABP(mean): --  RR: 16 (27 Aug 2024 08:00) (16 - 19)  SpO2: 98% (27 Aug 2024 08:00) (97% - 100%)    O2 Parameters below as of 27 Aug 2024 08:00  Patient On (Oxygen Delivery Method): ventilator    O2 Concentration (%): 30      Mode: AC/ CMV (Assist Control/ Continuous Mandatory Ventilation), RR (machine): 16, TV (machine): 400, FiO2: 50, PEEP: 5, ITime: 0.76, MAP: 8, PIP: 24    08-26 @ 07:01  -  08-27 @ 07:00  --------------------------------------------------------  IN: 1350 mL / OUT: 600 mL / NET: 750 mL      CAPILLARY BLOOD GLUCOSE          PHYSICAL EXAM:  General:   HEENT:   Lymph Nodes:  Neck:   Respiratory:   Cardiovascular:   Abdomen:   Extremities:   Skin:   Neurological:  Psychiatry:    Mode: AC/ CMV (Assist Control/ Continuous Mandatory Ventilation)  RR (machine): 16  TV (machine): 400  FiO2: 50  PEEP: 5  ITime: 0.76  MAP: 8  PIP: 24      HOSPITAL MEDICATIONS:  MEDICATIONS  (STANDING):  albuterol/ipratropium for Nebulization 3 milliLiter(s) Nebulizer every 6 hours  artificial  tears Solution 1 Drop(s) Both EYES every 1 hour  chlorhexidine 0.12% Liquid 15 milliLiter(s) Oral Mucosa every 12 hours  chlorhexidine 2% Cloths 1 Application(s) Topical daily  diazepam    Tablet 2 milliGRAM(s) Oral every 12 hours  erythromycin   Ointment 1 Application(s) Both EYES <User Schedule>  multivitamin/minerals/iron Oral Solution (CENTRUM) 15 milliLiter(s) Oral daily  ofloxacin 0.3% Solution 1 Drop(s) Both EYES two times a day  piperacillin/tazobactam IVPB.. 3.375 Gram(s) IV Intermittent every 8 hours  riluzole 50 milliGRAM(s) Oral two times a day  rivaroxaban 10 milliGRAM(s) Oral daily  rivaroxaban      sertraline 75 milliGRAM(s) Oral daily    MEDICATIONS  (PRN):  acetaminophen   Oral Liquid .. 650 milliGRAM(s) Oral every 6 hours PRN Temp greater or equal to 38C (100.4F), Mild Pain (1 - 3)  diazepam    Tablet 2 milliGRAM(s) Oral at bedtime PRN for dysautonomia/ hypertension/discomfort      LABS:                        11.9   13.92 )-----------( 345      ( 27 Aug 2024 05:45 )             40.2     08-27    143  |  102  |  22  ----------------------------<  207<H>  4.4   |  29  |  <0.20<L>    Ca    9.3      27 Aug 2024 05:45  Phos  3.3     08-27  Mg     2.00     08-27        Urinalysis Basic - ( 27 Aug 2024 05:45 )    Color: x / Appearance: x / SG: x / pH: x  Gluc: 207 mg/dL / Ketone: x  / Bili: x / Urobili: x   Blood: x / Protein: x / Nitrite: x   Leuk Esterase: x / RBC: x / WBC x   Sq Epi: x / Non Sq Epi: x / Bacteria: x         CHIEF COMPLAINT: Patient is a 69y old  Female who presents with a chief complaint of trach exchange (18 Jul 2024 07:07)      INTERVAL EVENTS: No overnight events     ROS: Seen by bedside during AM rounds     OBJECTIVE:  ICU Vital Signs Last 24 Hrs  T(C): 37 (27 Aug 2024 08:00), Max: 38.4 (26 Aug 2024 18:11)  T(F): 98.6 (27 Aug 2024 08:00), Max: 101.1 (26 Aug 2024 18:11)  HR: 109 (27 Aug 2024 08:00) (109 - 128)  BP: 151/85 (27 Aug 2024 08:00) (109/65 - 157/81)  BP(mean): 98 (27 Aug 2024 04:00) (93 - 100)  ABP: --  ABP(mean): --  RR: 16 (27 Aug 2024 08:00) (16 - 19)  SpO2: 98% (27 Aug 2024 08:00) (97% - 100%)    O2 Parameters below as of 27 Aug 2024 08:00  Patient On (Oxygen Delivery Method): ventilator    O2 Concentration (%): 30      Mode: AC/ CMV (Assist Control/ Continuous Mandatory Ventilation), RR (machine): 16, TV (machine): 400, FiO2: 50, PEEP: 5, ITime: 0.76, MAP: 8, PIP: 24    08-26 @ 07:01  -  08-27 @ 07:00  --------------------------------------------------------  IN: 1350 mL / OUT: 600 mL / NET: 750 mL      CAPILLARY BLOOD GLUCOSE          PHYSICAL EXAM:  General:   HEENT:   Lymph Nodes:  Neck:   Respiratory:   Cardiovascular:   Abdomen:   Extremities:   Skin:   Neurological:  Psychiatry:    Mode: AC/ CMV (Assist Control/ Continuous Mandatory Ventilation)  RR (machine): 16  TV (machine): 400  FiO2: 50  PEEP: 5  ITime: 0.76  MAP: 8  PIP: 24      HOSPITAL MEDICATIONS:  MEDICATIONS  (STANDING):  albuterol/ipratropium for Nebulization 3 milliLiter(s) Nebulizer every 6 hours  artificial  tears Solution 1 Drop(s) Both EYES every 1 hour  chlorhexidine 0.12% Liquid 15 milliLiter(s) Oral Mucosa every 12 hours  chlorhexidine 2% Cloths 1 Application(s) Topical daily  diazepam    Tablet 2 milliGRAM(s) Oral every 12 hours  erythromycin   Ointment 1 Application(s) Both EYES <User Schedule>  multivitamin/minerals/iron Oral Solution (CENTRUM) 15 milliLiter(s) Oral daily  ofloxacin 0.3% Solution 1 Drop(s) Both EYES two times a day  piperacillin/tazobactam IVPB.. 3.375 Gram(s) IV Intermittent every 8 hours  riluzole 50 milliGRAM(s) Oral two times a day  rivaroxaban 10 milliGRAM(s) Oral daily  rivaroxaban      sertraline 75 milliGRAM(s) Oral daily    MEDICATIONS  (PRN):  acetaminophen   Oral Liquid .. 650 milliGRAM(s) Oral every 6 hours PRN Temp greater or equal to 38C (100.4F), Mild Pain (1 - 3)  diazepam    Tablet 2 milliGRAM(s) Oral at bedtime PRN for dysautonomia/ hypertension/discomfort      LABS:                        11.9   13.92 )-----------( 345      ( 27 Aug 2024 05:45 )             40.2     08-27    143  |  102  |  22  ----------------------------<  207<H>  4.4   |  29  |  <0.20<L>    Ca    9.3      27 Aug 2024 05:45  Phos  3.3     08-27  Mg     2.00     08-27        Urinalysis Basic - ( 27 Aug 2024 05:45 )    Color: x / Appearance: x / SG: x / pH: x  Gluc: 207 mg/dL / Ketone: x  / Bili: x / Urobili: x   Blood: x / Protein: x / Nitrite: x   Leuk Esterase: x / RBC: x / WBC x   Sq Epi: x / Non Sq Epi: x / Bacteria: x         CHIEF COMPLAINT: Patient is a 69y old  Female who presents with a chief complaint of trach exchange (18 Jul 2024 07:07)      INTERVAL EVENTS: No overnight events     ROS: Seen by bedside during AM rounds     OBJECTIVE:  ICU Vital Signs Last 24 Hrs  T(C): 37 (27 Aug 2024 08:00), Max: 38.4 (26 Aug 2024 18:11)  T(F): 98.6 (27 Aug 2024 08:00), Max: 101.1 (26 Aug 2024 18:11)  HR: 109 (27 Aug 2024 08:00) (109 - 128)  BP: 151/85 (27 Aug 2024 08:00) (109/65 - 157/81)  BP(mean): 98 (27 Aug 2024 04:00) (93 - 100)  ABP: --  ABP(mean): --  RR: 16 (27 Aug 2024 08:00) (16 - 19)  SpO2: 98% (27 Aug 2024 08:00) (97% - 100%)    O2 Parameters below as of 27 Aug 2024 08:00  Patient On (Oxygen Delivery Method): ventilator    O2 Concentration (%): 30      Mode: AC/ CMV (Assist Control/ Continuous Mandatory Ventilation), RR (machine): 16, TV (machine): 400, FiO2: 50, PEEP: 5, ITime: 0.76, MAP: 8, PIP: 24    08-26 @ 07:01  -  08-27 @ 07:00  --------------------------------------------------------  IN: 1350 mL / OUT: 600 mL / NET: 750 mL      CAPILLARY BLOOD GLUCOSE      PHYSICAL EXAMINATION  General: NAD   HEENT: Exposure keratopathy appears to be improving. No air leak noted   Cards: S1S2 RRR no murmurs   Pulm: BIlat coarse bs . No wheezes.   Abdomen: Soft, nondistended and nontender. BS (+) PEG present.   Extremities: No pedal edema. No active ROM x 4 extremities with baseline ALS  Neurology: Awake with eyes open, however does not follow commands with baseline ALS         Mode: AC/ CMV (Assist Control/ Continuous Mandatory Ventilation)  RR (machine): 16  TV (machine): 400  FiO2: 50  PEEP: 5  ITime: 0.76  MAP: 8  PIP: 24      HOSPITAL MEDICATIONS:  MEDICATIONS  (STANDING):  albuterol/ipratropium for Nebulization 3 milliLiter(s) Nebulizer every 6 hours  artificial  tears Solution 1 Drop(s) Both EYES every 1 hour  chlorhexidine 0.12% Liquid 15 milliLiter(s) Oral Mucosa every 12 hours  chlorhexidine 2% Cloths 1 Application(s) Topical daily  diazepam    Tablet 2 milliGRAM(s) Oral every 12 hours  erythromycin   Ointment 1 Application(s) Both EYES <User Schedule>  multivitamin/minerals/iron Oral Solution (CENTRUM) 15 milliLiter(s) Oral daily  ofloxacin 0.3% Solution 1 Drop(s) Both EYES two times a day  piperacillin/tazobactam IVPB.. 3.375 Gram(s) IV Intermittent every 8 hours  riluzole 50 milliGRAM(s) Oral two times a day  rivaroxaban 10 milliGRAM(s) Oral daily  rivaroxaban      sertraline 75 milliGRAM(s) Oral daily    MEDICATIONS  (PRN):  acetaminophen   Oral Liquid .. 650 milliGRAM(s) Oral every 6 hours PRN Temp greater or equal to 38C (100.4F), Mild Pain (1 - 3)  diazepam    Tablet 2 milliGRAM(s) Oral at bedtime PRN for dysautonomia/ hypertension/discomfort      LABS:                        11.9   13.92 )-----------( 345      ( 27 Aug 2024 05:45 )             40.2     08-27    143  |  102  |  22  ----------------------------<  207<H>  4.4   |  29  |  <0.20<L>    Ca    9.3      27 Aug 2024 05:45  Phos  3.3     08-27  Mg     2.00     08-27        Urinalysis Basic - ( 27 Aug 2024 05:45 )    Color: x / Appearance: x / SG: x / pH: x  Gluc: 207 mg/dL / Ketone: x  / Bili: x / Urobili: x   Blood: x / Protein: x / Nitrite: x   Leuk Esterase: x / RBC: x / WBC x   Sq Epi: x / Non Sq Epi: x / Bacteria: x

## 2024-08-27 NOTE — PROGRESS NOTE ADULT - NS ATTEND AMEND GEN_ALL_CORE FT
Patient is a 70 yo F w/ advanced ALS (nonverbal and bedbound at baseline) with chronic hypercapnic respiratory failure and ventilatory dependence via tracheostomy at baseline and Parkinson's who was initially presenting to [facility] on 7/4/24 with acute hypercapnia secondary to large expiratory air leak s/p upsizing without improvement with hospital course c/b tracheitis vs. UTI transferred from MICU to RCU on 7/5 for further management    #Acute on chronic hypercapnic respiratory failure  #ALS  #Chronic trach dependence - Trach exchanged at home without improvement followed by upsizing at bedside to 9 Bivona but still with air leak. Thoracic sx consulted, custom trach ordered (Portex 9.5 with 40mm balloon).  - s/p OR on 8/12 trach exchange with thoracic. Improved TV w/ Portex 9.5 with 40mm balloon. Backup trachs arrived, but 1 with ruptured ballon while undergoing sterilization process. 2 additional trachs ordered. Will arrive today or tomorrow  - c/w home riluzole, diazepam, and zoloft  - c/w mechanical ventilatory support.  - c/w airway clearance, started empiric Zosyn on 8/26 for now and sent sputum cultures given increased secretions. Secretions improved  - c/w ointment/eye drops for corneal exposure, f/u Optho recs  - c/w Mickey Rae MD  Pulmonary & Critical Care

## 2024-08-27 NOTE — PROGRESS NOTE ADULT - ASSESSMENT
67 YO Female with PMHx of ALS, Parkinson,, nonverbal, bed bound, chronic respiratory failure with tracheostomy and vent dependence, and oropharyngeal dysphagia with PEG who presented from home with tracheostomy leak. She was noted with vent alarm and poor TVe (250-290) at home. Trach changed at home with no improvement and sent in for thoracic evaluation. While in MICU no air leak noted with hyperinflated cuff. Transferred to RCU on 7/5 with course complicated by leukocytosis second to UTI vs trachitis and completed zosyn course. Trach upsized by thoracic on 7/17 however AL remains and now s/p 9.5mm Bivona custom trach on 8/12 and course complicated by leukocytosis likely post op atelectasis vs RLL PNA    NEUROLOGY  # ALS   - Baseline nonverbal, awake, and communicates with eye movement.   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   - Continue on Ofloxacin BID (decreased 8/1)   - Continue on Erythromycin OU Q3H (decreased 8/8)  - Continue with artificial tears OU Q1H   - Create moisture chamber with Tegaderm following placement of EXTENSIVE ointment to the right eye throughout the day and to the left eye nightly. Allow 6 hour window during the day where moisture can be removed from right eye.    PSYCH   # Anxiety and Depression   - Continue on home Valium 2mg BID with additional 2mg PRN   - Continue on Zoloft 75mg QD while in house (on 4cc/ 80mg QD at home)     CARDIOVASCULAR   # HTN thought to be second to discomfort vs dysautonomia   - Evening HTN and continue on Hydralazine vs valium PRN doses   - Monitor HR and BP    # pSVT  - Intermittent episodes of SVT, lasting a few seconds at a time, and self limiting.  - Consider BB if becomes more persistent or HD unstable    RESPIRATORY  # Tracheostomy leak   - At home noted with TVe 200-300s despite tracheostomy change to 80XLTCD.   - No air leak noted in house with hyperinflated cuff likely tracheomegaly?   - Trach upsized to Bivona 9 on 7/17, however trach leak remained.   - Custom Bivona 9.5mm Tracheostomy delivered and exchanged on Monday 8/12, however spare tracheostomy was defective.   - Remaining spare trach with no overt evidence of leak and sterilized 8/23  - Continue on chronic vent 16/400/5/21   - Additional spare to be delivered tomorrow   - Continue on nebs and IPV   - Pending trial on home vent post infxn     GI  # Dysphagia   - Continue on PEG-TF     RENAL  # High PVR   - BS with roughly 200-300cc PVR, however no acute distress   - Monitor renal function and UOP     # Elevated lactate   - Lactate elevated with mild contraction alkalosis   - Rehydrate with improvement     INFECTIOUS DISEASE  # Leukocytosis likely second to trachitis vs UTI?   - No fever or chills and noted with prior leukocytosis   - CXR with prior atelectasis and no acute findings   - UA (7/6) with positive nitrites (7/6)   - SCx (7/5) with pansensitive PSA  - BCx (7/6) x 2 NGT  - UCx (7/6) with coag neg staph  - MRSA PCR (7/6) POSITIVE for both MRSA/MSSA s/p Bactroban (7/6 - 7/10)  - s/p empiric Zosyn (7/8 - 7/14)     # RLL PNA  - Leukocytosis rising again with CXR/ POCUS showing RLL opacity/ consolidation   - Concern noted for chronic aspiration tracheostomy leak vs post op atelectasis   - Zosyn started (8/26 - ) pending SCx, BCx and UA    HEME  - Continue on home Xarelto 10mg DVT PPX?     ENDOCRINE  - No hx of DM2   - Monitor BG     SKIN  - Basic trach and PEG care ordered   - DTI on back and pending [facility]    ETHICS/ GOC    - FULL CODE   - Dr Tyson Velasquez,  involved in care    DISPO - Back home with  when able.     69 YO Female with PMHx of ALS, Parkinson,, nonverbal, bed bound, chronic respiratory failure with tracheostomy and vent dependence, and oropharyngeal dysphagia with PEG who presented from home with tracheostomy leak. She was noted with vent alarm and poor TVe (250-290) at home. Trach changed at home with no improvement and sent in for thoracic evaluation. While in MICU no air leak noted with hyperinflated cuff. Transferred to RCU on 7/5 with course complicated by leukocytosis second to UTI vs trachitis and completed zosyn course. Trach upsized by thoracic on 7/17 however AL remains and now s/p 9.5mm Bivona custom trach on 8/12 and course complicated by leukocytosis likely post op atelectasis vs RLL PNA    NEUROLOGY  # ALS   - Baseline nonverbal, awake, and communicates with eye movement.   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   - Continue on Ofloxacin BID (decreased 8/1)   - Continue on Erythromycin OU Q3H (decreased 8/8)  - Continue with artificial tears OU Q1H   - Create moisture chamber with Tegaderm following placement of EXTENSIVE ointment to the right eye throughout the day and to the left eye nightly. Allow 6 hour window during the day where moisture can be removed from right eye.    PSYCH   # Anxiety and Depression   - Continue on home Valium 2mg BID with additional 2mg PRN   - Continue on Zoloft 75mg QD while in house (on 4cc/ 80mg QD at home)     CARDIOVASCULAR   # HTN thought to be second to discomfort vs dysautonomia   - Evening HTN and continue on Hydralazine vs valium PRN doses   - Monitor HR and BP    # pSVT  - Intermittent episodes of SVT, lasting a few seconds at a time, and self limiting.  - Consider BB if becomes more persistent or HD unstable    RESPIRATORY  # Tracheostomy leak   - At home noted with TVe 200-300s despite tracheostomy change to 80XLTCD.   - No air leak noted in house with hyperinflated cuff likely tracheomegaly?   - Trach upsized to Bivona 9 on 7/17, however trach leak remained.   - Custom Bivona 9.5mm Tracheostomy delivered and exchanged on Monday 8/12, however spare tracheostomy was defective.   - Remaining spare trach with no overt evidence of leak and sterilized 8/23  - Continue on chronic vent 16/400/5/21   - Additional spare to be delivered tomorrow   - Continue on nebs and IPV   - Pending trial on home vent post infxn   - Pt tolerating home vent     GI  # Dysphagia   - Continue on PEG-TF     RENAL  # High PVR   - BS with roughly 200-300cc PVR, however no acute distress   - Monitor renal function and UOP     # Elevated lactate   - Lactate elevated with mild contraction alkalosis   - Rehydrate with improvement     INFECTIOUS DISEASE  # Leukocytosis likely second to trachitis vs UTI?   - No fever or chills and noted with prior leukocytosis   - CXR with prior atelectasis and no acute findings   - UA (7/6) with positive nitrites (7/6)   - SCx (7/5) with pansensitive PSA  - BCx (7/6) x 2 NGT  - UCx (7/6) with coag neg staph  - MRSA PCR (7/6) POSITIVE for both MRSA/MSSA s/p Bactroban (7/6 - 7/10)  - s/p empiric Zosyn (7/8 - 7/14)     # RLL PNA  - Leukocytosis rising again with CXR/ POCUS showing RLL opacity/ consolidation   - Concern noted for chronic aspiration tracheostomy leak vs post op atelectasis   - Zosyn started (8/26 - ) pending SCx, BCx and UA    HEME  - Continue on home Xarelto 10mg DVT PPX?     ENDOCRINE  - No hx of DM2   - Monitor BG     SKIN  - Basic trach and PEG care ordered   - DTI on back and pending [facility]OC    ETHICS/ GOC    - FULL CODE   - Dr Tyson Velasquez,  involved in care    DISPO - Back home with  when able.     67 YO Female with PMHx of ALS, Parkinson,, nonverbal, bed bound, chronic respiratory failure with tracheostomy and vent dependence, and oropharyngeal dysphagia with PEG who presented from home with tracheostomy leak. She was noted with vent alarm and poor TVe (250-290) at home. Trach changed at home with no improvement and sent in for thoracic evaluation. While in MICU no air leak noted with hyperinflated cuff. Transferred to RCU on 7/5 with course complicated by leukocytosis second to UTI vs trachitis and completed zosyn course. Trach upsized by thoracic on 7/17 however AL remains and now s/p 9.5mm Bivona custom trach on 8/12 and course complicated by leukocytosis likely post op atelectasis vs RLL PNA    NEUROLOGY  # ALS   - Baseline nonverbal, awake, and communicates with eye movement.   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   - Continue on Ofloxacin BID (decreased 8/1)   - Continue on Erythromycin OU Q3H (decreased 8/8)  - Continue with artificial tears OU Q1H   - Create moisture chamber with Tegaderm following placement of EXTENSIVE ointment to the right eye throughout the day and to the left eye nightly. Allow 6 hour window during the day where moisture can be removed from right eye.    PSYCH   # Anxiety and Depression   - Continue on home Valium 2mg BID with additional 2mg PRN   - Continue on Zoloft 75mg QD while in house (on 4cc/ 80mg QD at home)     CARDIOVASCULAR   # HTN thought to be second to discomfort vs dysautonomia   - Evening HTN and continue on Hydralazine vs valium PRN doses   - Monitor HR and BP    # pSVT  - Intermittent episodes of SVT, lasting a few seconds at a time, and self limiting.  - Consider BB if becomes more persistent or HD unstable    RESPIRATORY  # Tracheostomy leak   - At home noted with TVe 200-300s despite tracheostomy change to 80XLTCD.   - No air leak noted in house with hyperinflated cuff likely tracheomegaly?   - Trach upsized to Bivona 9 on 7/17, however trach leak remained.   - Custom Bivona 9.5mm Tracheostomy delivered and exchanged on Monday 8/12, however spare tracheostomy was defective.   - Remaining spare trach with no overt evidence of leak and sterilized 8/23  - Continue on chronic vent 16/400/5/21   - Additional spare to be delivered tomorrow   - Continue on nebs and IPV   - Pending trial on home vent post infxn   - Pt tolerating home vent   - Spare trachs due to arrive 8/28 am and will need to be sterilized prior to dc - Will let family know re: dc planning     GI  # Dysphagia   - Continue on PEG-TF     RENAL  # High PVR   - BS with roughly 200-300cc PVR, however no acute distress   - Monitor renal function and UOP     # Elevated lactate   - Lactate elevated with mild contraction alkalosis   - Rehydrate with improvement     INFECTIOUS DISEASE  # Leukocytosis likely second to trachitis vs UTI?   - No fever or chills and noted with prior leukocytosis   - CXR with prior atelectasis and no acute findings   - UA (7/6) with positive nitrites (7/6)   - SCx (7/5) with pansensitive PSA  - BCx (7/6) x 2 NGT  - UCx (7/6) with coag neg staph  - MRSA PCR (7/6) POSITIVE for both MRSA/MSSA s/p Bactroban (7/6 - 7/10)  - s/p empiric Zosyn (7/8 - 7/14)     # RLL PNA  - Leukocytosis rising again with CXR/ POCUS showing RLL opacity/ consolidation   - Concern noted for chronic aspiration tracheostomy leak vs post op atelectasis   - Zosyn started (8/26 - ) pending SCx, BCx and UA    HEME  - Continue on home Xarelto 10mg DVT PPX?     ENDOCRINE  - No hx of DM2   - Monitor BG     SKIN  - Basic trach and PEG care ordered   - DTI on back and pending [facility]    ETHICS/ GOC    - FULL CODE   - Dr Tyson Velasquez,  involved in care    DISPO - Back home with  when able.

## 2024-08-28 ENCOUNTER — TRANSCRIPTION ENCOUNTER (OUTPATIENT)
Age: 69
End: 2024-08-28

## 2024-08-28 LAB
-  AZTREONAM: SIGNIFICANT CHANGE UP
-  CEFEPIME: SIGNIFICANT CHANGE UP
-  CEFTAZIDIME: SIGNIFICANT CHANGE UP
-  CIPROFLOXACIN: SIGNIFICANT CHANGE UP
-  IMIPENEM: SIGNIFICANT CHANGE UP
-  LEVOFLOXACIN: SIGNIFICANT CHANGE UP
-  MEROPENEM: SIGNIFICANT CHANGE UP
-  PIPERACILLIN/TAZOBACTAM: SIGNIFICANT CHANGE UP
ANION GAP SERPL CALC-SCNC: 15 MMOL/L — HIGH (ref 7–14)
BASOPHILS # BLD AUTO: 0.04 K/UL — SIGNIFICANT CHANGE UP (ref 0–0.2)
BASOPHILS NFR BLD AUTO: 0.3 % — SIGNIFICANT CHANGE UP (ref 0–2)
BUN SERPL-MCNC: 21 MG/DL — SIGNIFICANT CHANGE UP (ref 7–23)
CALCIUM SERPL-MCNC: 9.4 MG/DL — SIGNIFICANT CHANGE UP (ref 8.4–10.5)
CHLORIDE SERPL-SCNC: 101 MMOL/L — SIGNIFICANT CHANGE UP (ref 98–107)
CO2 SERPL-SCNC: 28 MMOL/L — SIGNIFICANT CHANGE UP (ref 22–31)
CREAT SERPL-MCNC: <0.2 MG/DL — LOW (ref 0.5–1.3)
CULTURE RESULTS: ABNORMAL
EGFR: 125 ML/MIN/1.73M2 — SIGNIFICANT CHANGE UP
EOSINOPHIL # BLD AUTO: 0.21 K/UL — SIGNIFICANT CHANGE UP (ref 0–0.5)
EOSINOPHIL NFR BLD AUTO: 1.6 % — SIGNIFICANT CHANGE UP (ref 0–6)
GLUCOSE SERPL-MCNC: 174 MG/DL — HIGH (ref 70–99)
HCT VFR BLD CALC: 40.6 % — SIGNIFICANT CHANGE UP (ref 34.5–45)
HGB BLD-MCNC: 11.8 G/DL — SIGNIFICANT CHANGE UP (ref 11.5–15.5)
IANC: 9.71 K/UL — HIGH (ref 1.8–7.4)
IMM GRANULOCYTES NFR BLD AUTO: 1.3 % — HIGH (ref 0–0.9)
LYMPHOCYTES # BLD AUTO: 1.68 K/UL — SIGNIFICANT CHANGE UP (ref 1–3.3)
LYMPHOCYTES # BLD AUTO: 13.2 % — SIGNIFICANT CHANGE UP (ref 13–44)
MAGNESIUM SERPL-MCNC: 1.9 MG/DL — SIGNIFICANT CHANGE UP (ref 1.6–2.6)
MCHC RBC-ENTMCNC: 25.2 PG — LOW (ref 27–34)
MCHC RBC-ENTMCNC: 29.1 GM/DL — LOW (ref 32–36)
MCV RBC AUTO: 86.8 FL — SIGNIFICANT CHANGE UP (ref 80–100)
METHOD TYPE: SIGNIFICANT CHANGE UP
MONOCYTES # BLD AUTO: 0.97 K/UL — HIGH (ref 0–0.9)
MONOCYTES NFR BLD AUTO: 7.6 % — SIGNIFICANT CHANGE UP (ref 2–14)
NEUTROPHILS # BLD AUTO: 9.71 K/UL — HIGH (ref 1.8–7.4)
NEUTROPHILS NFR BLD AUTO: 76 % — SIGNIFICANT CHANGE UP (ref 43–77)
NRBC # BLD: 0 /100 WBCS — SIGNIFICANT CHANGE UP (ref 0–0)
NRBC # FLD: 0 K/UL — SIGNIFICANT CHANGE UP (ref 0–0)
ORGANISM # SPEC MICROSCOPIC CNT: ABNORMAL
ORGANISM # SPEC MICROSCOPIC CNT: ABNORMAL
PHOSPHATE SERPL-MCNC: 3.6 MG/DL — SIGNIFICANT CHANGE UP (ref 2.5–4.5)
PLATELET # BLD AUTO: 369 K/UL — SIGNIFICANT CHANGE UP (ref 150–400)
POTASSIUM SERPL-MCNC: 4.2 MMOL/L — SIGNIFICANT CHANGE UP (ref 3.5–5.3)
POTASSIUM SERPL-SCNC: 4.2 MMOL/L — SIGNIFICANT CHANGE UP (ref 3.5–5.3)
RBC # BLD: 4.68 M/UL — SIGNIFICANT CHANGE UP (ref 3.8–5.2)
RBC # FLD: 18.1 % — HIGH (ref 10.3–14.5)
SODIUM SERPL-SCNC: 144 MMOL/L — SIGNIFICANT CHANGE UP (ref 135–145)
SPECIMEN SOURCE: SIGNIFICANT CHANGE UP
WBC # BLD: 12.77 K/UL — HIGH (ref 3.8–10.5)
WBC # FLD AUTO: 12.77 K/UL — HIGH (ref 3.8–10.5)

## 2024-08-28 PROCEDURE — 99233 SBSQ HOSP IP/OBS HIGH 50: CPT

## 2024-08-28 PROCEDURE — 93010 ELECTROCARDIOGRAM REPORT: CPT

## 2024-08-28 RX ORDER — DIAZEPAM 10 MG
2 TABLET ORAL ONCE
Refills: 0 | Status: DISCONTINUED | OUTPATIENT
Start: 2024-08-28 | End: 2024-08-28

## 2024-08-28 RX ADMIN — POVIDONE, PROPYLENE GLYCOL 1 DROP(S): 6.8; 3 LIQUID OPHTHALMIC at 16:07

## 2024-08-28 RX ADMIN — POVIDONE, PROPYLENE GLYCOL 1 DROP(S): 6.8; 3 LIQUID OPHTHALMIC at 16:02

## 2024-08-28 RX ADMIN — POVIDONE, PROPYLENE GLYCOL 1 DROP(S): 6.8; 3 LIQUID OPHTHALMIC at 07:32

## 2024-08-28 RX ADMIN — ERYTHROMYCIN 1 APPLICATION(S): 5 OINTMENT OPHTHALMIC at 05:03

## 2024-08-28 RX ADMIN — IPRATROPIUM BROMIDE AND ALBUTEROL SULFATE 3 MILLILITER(S): .5; 3 SOLUTION RESPIRATORY (INHALATION) at 22:01

## 2024-08-28 RX ADMIN — PIPERACILLIN SODIUM AND TAZOBACTAM SODIUM 25 GRAM(S): 3; .375 INJECTION, POWDER, FOR SOLUTION INTRAVENOUS at 11:17

## 2024-08-28 RX ADMIN — CHLORHEXIDINE GLUCONATE 15 MILLILITER(S): 40 SOLUTION TOPICAL at 05:05

## 2024-08-28 RX ADMIN — OFLOXACIN 1 DROP(S): 3 SOLUTION/ DROPS OPHTHALMIC at 17:10

## 2024-08-28 RX ADMIN — POVIDONE, PROPYLENE GLYCOL 1 DROP(S): 6.8; 3 LIQUID OPHTHALMIC at 10:39

## 2024-08-28 RX ADMIN — PIPERACILLIN SODIUM AND TAZOBACTAM SODIUM 25 GRAM(S): 3; .375 INJECTION, POWDER, FOR SOLUTION INTRAVENOUS at 05:02

## 2024-08-28 RX ADMIN — POVIDONE, PROPYLENE GLYCOL 1 DROP(S): 6.8; 3 LIQUID OPHTHALMIC at 10:38

## 2024-08-28 RX ADMIN — POVIDONE, PROPYLENE GLYCOL 1 DROP(S): 6.8; 3 LIQUID OPHTHALMIC at 16:31

## 2024-08-28 RX ADMIN — Medication 2 MILLIGRAM(S): at 22:46

## 2024-08-28 RX ADMIN — POVIDONE, PROPYLENE GLYCOL 1 DROP(S): 6.8; 3 LIQUID OPHTHALMIC at 19:53

## 2024-08-28 RX ADMIN — IPRATROPIUM BROMIDE AND ALBUTEROL SULFATE 3 MILLILITER(S): .5; 3 SOLUTION RESPIRATORY (INHALATION) at 08:40

## 2024-08-28 RX ADMIN — Medication 15 MILLILITER(S): at 11:16

## 2024-08-28 RX ADMIN — POVIDONE, PROPYLENE GLYCOL 1 DROP(S): 6.8; 3 LIQUID OPHTHALMIC at 06:35

## 2024-08-28 RX ADMIN — POVIDONE, PROPYLENE GLYCOL 1 DROP(S): 6.8; 3 LIQUID OPHTHALMIC at 17:10

## 2024-08-28 RX ADMIN — POVIDONE, PROPYLENE GLYCOL 1 DROP(S): 6.8; 3 LIQUID OPHTHALMIC at 21:04

## 2024-08-28 RX ADMIN — Medication 2 MILLIGRAM(S): at 17:13

## 2024-08-28 RX ADMIN — ERYTHROMYCIN 1 APPLICATION(S): 5 OINTMENT OPHTHALMIC at 23:53

## 2024-08-28 RX ADMIN — Medication 2 MILLIGRAM(S): at 05:02

## 2024-08-28 RX ADMIN — POVIDONE, PROPYLENE GLYCOL 1 DROP(S): 6.8; 3 LIQUID OPHTHALMIC at 23:52

## 2024-08-28 RX ADMIN — POVIDONE, PROPYLENE GLYCOL 1 DROP(S): 6.8; 3 LIQUID OPHTHALMIC at 10:40

## 2024-08-28 RX ADMIN — ERYTHROMYCIN 1 APPLICATION(S): 5 OINTMENT OPHTHALMIC at 21:05

## 2024-08-28 RX ADMIN — ERYTHROMYCIN 1 APPLICATION(S): 5 OINTMENT OPHTHALMIC at 17:10

## 2024-08-28 RX ADMIN — ERYTHROMYCIN 1 APPLICATION(S): 5 OINTMENT OPHTHALMIC at 03:09

## 2024-08-28 RX ADMIN — POVIDONE, PROPYLENE GLYCOL 1 DROP(S): 6.8; 3 LIQUID OPHTHALMIC at 04:16

## 2024-08-28 RX ADMIN — RILUZOLE 50 MILLIGRAM(S): 5 LIQUID ORAL at 17:10

## 2024-08-28 RX ADMIN — POVIDONE, PROPYLENE GLYCOL 1 DROP(S): 6.8; 3 LIQUID OPHTHALMIC at 20:18

## 2024-08-28 RX ADMIN — POVIDONE, PROPYLENE GLYCOL 1 DROP(S): 6.8; 3 LIQUID OPHTHALMIC at 11:16

## 2024-08-28 RX ADMIN — IPRATROPIUM BROMIDE AND ALBUTEROL SULFATE 3 MILLILITER(S): .5; 3 SOLUTION RESPIRATORY (INHALATION) at 04:46

## 2024-08-28 RX ADMIN — IPRATROPIUM BROMIDE AND ALBUTEROL SULFATE 3 MILLILITER(S): .5; 3 SOLUTION RESPIRATORY (INHALATION) at 17:10

## 2024-08-28 RX ADMIN — ERYTHROMYCIN 1 APPLICATION(S): 5 OINTMENT OPHTHALMIC at 10:37

## 2024-08-28 RX ADMIN — POVIDONE, PROPYLENE GLYCOL 1 DROP(S): 6.8; 3 LIQUID OPHTHALMIC at 23:12

## 2024-08-28 RX ADMIN — PIPERACILLIN SODIUM AND TAZOBACTAM SODIUM 25 GRAM(S): 3; .375 INJECTION, POWDER, FOR SOLUTION INTRAVENOUS at 21:03

## 2024-08-28 RX ADMIN — POVIDONE, PROPYLENE GLYCOL 1 DROP(S): 6.8; 3 LIQUID OPHTHALMIC at 03:08

## 2024-08-28 RX ADMIN — POVIDONE, PROPYLENE GLYCOL 1 DROP(S): 6.8; 3 LIQUID OPHTHALMIC at 00:35

## 2024-08-28 RX ADMIN — OFLOXACIN 1 DROP(S): 3 SOLUTION/ DROPS OPHTHALMIC at 05:03

## 2024-08-28 RX ADMIN — POVIDONE, PROPYLENE GLYCOL 1 DROP(S): 6.8; 3 LIQUID OPHTHALMIC at 12:11

## 2024-08-28 RX ADMIN — POVIDONE, PROPYLENE GLYCOL 1 DROP(S): 6.8; 3 LIQUID OPHTHALMIC at 22:46

## 2024-08-28 RX ADMIN — POVIDONE, PROPYLENE GLYCOL 1 DROP(S): 6.8; 3 LIQUID OPHTHALMIC at 01:09

## 2024-08-28 RX ADMIN — CHLORHEXIDINE GLUCONATE 15 MILLILITER(S): 40 SOLUTION TOPICAL at 17:10

## 2024-08-28 RX ADMIN — POVIDONE, PROPYLENE GLYCOL 1 DROP(S): 6.8; 3 LIQUID OPHTHALMIC at 16:04

## 2024-08-28 RX ADMIN — POVIDONE, PROPYLENE GLYCOL 1 DROP(S): 6.8; 3 LIQUID OPHTHALMIC at 07:34

## 2024-08-28 RX ADMIN — Medication 2 MILLIGRAM(S): at 12:10

## 2024-08-28 RX ADMIN — SERTRALINE HYDROCHLORIDE 75 MILLIGRAM(S): 50 TABLET, FILM COATED ORAL at 11:16

## 2024-08-28 RX ADMIN — ERYTHROMYCIN 1 APPLICATION(S): 5 OINTMENT OPHTHALMIC at 16:03

## 2024-08-28 RX ADMIN — POVIDONE, PROPYLENE GLYCOL 1 DROP(S): 6.8; 3 LIQUID OPHTHALMIC at 02:16

## 2024-08-28 RX ADMIN — CHLORHEXIDINE GLUCONATE 1 APPLICATION(S): 40 SOLUTION TOPICAL at 11:17

## 2024-08-28 RX ADMIN — POVIDONE, PROPYLENE GLYCOL 1 DROP(S): 6.8; 3 LIQUID OPHTHALMIC at 05:04

## 2024-08-28 RX ADMIN — RILUZOLE 50 MILLIGRAM(S): 5 LIQUID ORAL at 05:05

## 2024-08-28 RX ADMIN — ERYTHROMYCIN 1 APPLICATION(S): 5 OINTMENT OPHTHALMIC at 11:16

## 2024-08-28 NOTE — PROVIDER CONTACT NOTE (OTHER) - REASON
High BP of 183/106 (124) at 22:11
pt , Manual /94
High BP of 224/106
Hypertension
pt electronic /87  manual /88
High BP of 175/88 (107) at 0000
Pt BP 91/67 axillary temp 96.4. No s/s of distress noted.
pt electronic /91  manual /86
High BP of 175/105 (121) at 20:00
Hypertension
Hypertension
Hypotension
Manual BP of 180/70 at 1955
Pt trach leaking, tidal volume .15. Trach dressing reinforced improving tidal volume to .26 and above.
pt  /78, PRN diazepam given, will recheck BP in 30 mins.

## 2024-08-28 NOTE — PROVIDER CONTACT NOTE (OTHER) - RECOMMENDATIONS
Notify Provider
contact provider
Notify provider
contact provider
contact provider
Notify provider
contact provider
contact provider
Notify Provider
Notify provider
contact provider
Notify provider
contact provider

## 2024-08-28 NOTE — PROGRESS NOTE ADULT - NS ATTEND AMEND GEN_ALL_CORE FT
Patient is a 68 yo F w/ advanced ALS (nonverbal and bedbound at baseline) with chronic hypercapnic respiratory failure and ventilatory dependence via tracheostomy at baseline and Parkinson's who was initially presenting to [facility] on 7/4/24 with acute hypercapnia secondary to large expiratory air leak s/p upsizing without improvement with hospital course c/b tracheitis vs. UTI transferred from MICU to RCU on 7/5 for further management    #Acute on chronic hypercapnic respiratory failure  #ALS  #Chronic trach dependence - Trach exchanged at home without improvement followed by upsizing at bedside to 9 Bivona but still with air leak. Thoracic sx consulted, custom trach ordered (Portex 9.5 with 40mm balloon).  - s/p OR on 8/12 trach exchange with thoracic. Improved TV w/ Portex 9.5 with 40mm balloon. Backup trachs arrived, but 1 with ruptured ballon while undergoing sterilization process. 2 additional trachs ordered. Unfortnately delayed arrival to tomorrow. Will expedite sterilization once arrived  - c/w home riluzole, diazepam, and zoloft  - c/w mechanical ventilatory support.  - c/w airway clearance, started empiric Zosyn on 8/26 for now with repeat sputum with rare Pseudomonas again, will change to PO Levaquin to complete 7 total day course. Secretions improved  - c/w ointment/eye drops for corneal exposure, f/u Optho recs  - c/w Xarelto  - Possible d/c tomorrow    Huang Rae MD  Pulmonary & Critical Care

## 2024-08-28 NOTE — PROVIDER CONTACT NOTE (OTHER) - ACTION/TREATMENT ORDERED:
Provider made aware. No new interventions at this time.
provider notified
provider notified
provider notified, continue to monitor
Provider made aware
Provider made aware. Pt may be in pain, 1mg IV morphine ordered. Will repeat BP 20 min post.
provider notified, another iv hydralazine dose ordered
Provider aware and ordered lactated ringers 300ml bolus in 15 mins.
Provider made aware and ordered Valium 2mg
provider notified
provider notified, hydralazine ordered
provider notified, iv hydralazine ordered
Provider made aware and ordered to retake BP in 1hour.
provider notified
provider notified, continue to monitor

## 2024-08-28 NOTE — PROGRESS NOTE ADULT - ASSESSMENT
67 YO Female with PMHx of ALS, Parkinson,, nonverbal, bed bound, chronic respiratory failure with tracheostomy and vent dependence, and oropharyngeal dysphagia with PEG who presented from home with tracheostomy leak. She was noted with vent alarm and poor TVe (250-290) at home. Trach changed at home with no improvement and sent in for thoracic evaluation. While in MICU no air leak noted with hyperinflated cuff. Transferred to RCU on 7/5 with course complicated by leukocytosis second to UTI vs trachitis and completed zosyn course. Trach upsized by thoracic on 7/17 however AL remains and now s/p 9.5mm Bivona custom trach on 8/12 and course complicated by leukocytosis likely post op atelectasis vs RLL PNA    NEUROLOGY  # ALS   - Baseline nonverbal, awake, and communicates with eye movement.   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   - Continue on Ofloxacin BID (decreased 8/1)   - Continue on Erythromycin OU Q3H (decreased 8/8)  - Continue with artificial tears OU Q1H   - Create moisture chamber with Tegaderm following placement of EXTENSIVE ointment to the right eye throughout the day and to the left eye nightly. Allow 6 hour window during the day where moisture can be removed from right eye.    PSYCH   # Anxiety and Depression   - Continue on home Valium 2mg BID with additional 2mg PRN   - Continue on Zoloft 75mg QD while in house (on 4cc/ 80mg QD at home)     CARDIOVASCULAR   # HTN thought to be second to discomfort vs dysautonomia   - Evening HTN and continue on Hydralazine vs valium PRN doses   - Monitor HR and BP    # pSVT  - Intermittent episodes of SVT, lasting a few seconds at a time, and self limiting.  - Consider BB if becomes more persistent or HD unstable    RESPIRATORY  # Tracheostomy leak   - At home noted with TVe 200-300s despite tracheostomy change to 80XLTCD.   - No air leak noted in house with hyperinflated cuff likely tracheomegaly?   - Trach upsized to Bivona 9 on 7/17, however trach leak remained.   - Custom Bivona 9.5mm Tracheostomy delivered and exchanged on Monday 8/12, however spare tracheostomy was defective.   - Remaining spare trach with no overt evidence of leak and sterilized 8/23  - Continue on chronic vent 16/400/5/21   - Additional spare to be delivered tomorrow   - Continue on nebs and IPV   - Pending trial on home vent post infxn   - Pt tolerating home vent   - Spare trachs due to arrive 8/28 am and will need to be sterilized prior to dc - Will let family know re: dc planning     GI  # Dysphagia   - Continue on PEG-TF     RENAL  # High PVR   - BS with roughly 200-300cc PVR, however no acute distress   - Monitor renal function and UOP     # Elevated lactate   - Lactate elevated with mild contraction alkalosis   - Rehydrate with improvement     INFECTIOUS DISEASE  # Leukocytosis likely second to trachitis vs UTI?   - No fever or chills and noted with prior leukocytosis   - CXR with prior atelectasis and no acute findings   - UA (7/6) with positive nitrites (7/6)   - SCx (7/5) with pansensitive PSA  - BCx (7/6) x 2 NGT  - UCx (7/6) with coag neg staph  - MRSA PCR (7/6) POSITIVE for both MRSA/MSSA s/p Bactroban (7/6 - 7/10)  - s/p empiric Zosyn (7/8 - 7/14)     # RLL PNA  - Leukocytosis rising again with CXR/ POCUS showing RLL opacity/ consolidation   - Concern noted for chronic aspiration tracheostomy leak vs post op atelectasis   - Zosyn started (8/26 - ) pending SCx, BCx and UA    HEME  - Continue on home Xarelto 10mg DVT PPX?     ENDOCRINE  - No hx of DM2   - Monitor BG     SKIN  - Basic trach and PEG care ordered   - DTI on back and pending [employer]    ETHICS/ GOC    - FULL CODE   - Dr Tyson Velasquez,  involved in care    DISPO - Back home with  when able.     67 YO Female with PMHx of ALS, Parkinson,, nonverbal, bed bound, chronic respiratory failure with tracheostomy and vent dependence, and oropharyngeal dysphagia with PEG who presented from home with tracheostomy leak. She was noted with vent alarm and poor TVe (250-290) at home. Trach changed at home with no improvement and sent in for thoracic evaluation. While in MICU no air leak noted with hyperinflated cuff. Transferred to RCU on 7/5 with course complicated by leukocytosis second to UTI vs trachitis and completed zosyn course. Trach upsized by thoracic on 7/17 however AL remains and now s/p 9.5mm Bivona custom trach on 8/12 and course complicated by leukocytosis likely post op atelectasis vs RLL PNA    NEUROLOGY  # ALS   - Baseline nonverbal, awake, and communicates with eye movement.   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   - Continue on Ofloxacin BID (decreased 8/1)   - Continue on Erythromycin OU Q3H (decreased 8/8)  - Continue with artificial tears OU Q1H   - Create moisture chamber with Tegaderm following placement of EXTENSIVE ointment to the right eye throughout the day and to the left eye nightly. Allow 6 hour window during the day where moisture can be removed from right eye.    PSYCH   # Anxiety and Depression   - Continue on home Valium 2mg BID with additional 2mg PRN   - Continue on Zoloft 75mg QD while in house (on 4cc/ 80mg QD at home)     CARDIOVASCULAR   # HTN thought to be second to discomfort vs dysautonomia   - Evening HTN and continue on Hydralazine vs valium PRN doses   - Monitor HR and BP    # pSVT  - Intermittent episodes of SVT, lasting a few seconds at a time, and self limiting.  - Consider BB if becomes more persistent or HD unstable    RESPIRATORY  # Tracheostomy leak   - At home noted with TVe 200-300s despite tracheostomy change to 80XLTCD.   - No air leak noted in house with hyperinflated cuff likely tracheomegaly?   - Trach upsized to Bivona 9 on 7/17, however trach leak remained.   - Custom Bivona 9.5mm Tracheostomy delivered and exchanged on Monday 8/12, however spare tracheostomy was defective.   - Remaining spare trach with no overt evidence of leak and sterilized 8/23  - Continue on chronic vent 16/400/5/21   - Additional spare to be delivered tomorrow   - Continue on nebs and IPV   - Pt tolerating home vent   - Awaiting spare trach - did not arrive 2/2 delivery delay hopeful for 8/29 delivery     GI  # Dysphagia   - Continue on PEG-TF     RENAL  # High PVR   - BS with roughly 200-300cc PVR, however no acute distress   - Monitor renal function and UOP     # Elevated lactate   - Lactate elevated with mild contraction alkalosis   - Rehydrate with improvement     INFECTIOUS DISEASE  # Leukocytosis likely second to trachitis vs UTI?   - No fever or chills and noted with prior leukocytosis   - CXR with prior atelectasis and no acute findings   - UA (7/6) with positive nitrites (7/6)   - SCx (7/5) with pansensitive PSA  - BCx (7/6) x 2 NGT  - UCx (7/6) with coag neg staph  - MRSA PCR (7/6) POSITIVE for both MRSA/MSSA s/p Bactroban (7/6 - 7/10)  - s/p empiric Zosyn (7/8 - 7/14)     # RLL PNA  - Leukocytosis rising again with CXR/ POCUS showing RLL opacity/ consolidation   - Concern noted for chronic aspiration tracheostomy leak vs post op atelectasis   - Zosyn started (8/26 - ) pending SCx, BCx and UA    HEME  - Continue on home Xarelto 10mg DVT PPX?     ENDOCRINE  - No hx of DM2   - Monitor BG     SKIN  - Basic trach and PEG care ordered   - DTI on back and pending [facility]    ETHICS/ GOC    - FULL CODE   - Dr Tyson Velasquez,  involved in care    DISPO - Back home with  when able.

## 2024-08-28 NOTE — PROVIDER CONTACT NOTE (OTHER) - ASSESSMENT
High BP of 175/93 post ivp hydralazine
Manual BP of 180/70 at 1955
pt electronic /91  manual /86
bp 82/ 58
BP of 187/93
High BP of 175/88 (107) at 0000, manual BP taken at 0010 and was 170/70 on right upper arm
High BP of 183/106 (124) at 22:11. Manual BP of 185/100 at 22:25
Pt trach leaking, tidal volume .15. Trach dressing reinforced improving tidal volume to .26 and above.
pt electronic /87  manual /88
pt  /78, PRN diazepam given, will recheck BP in 30 mins.
pt , Manual /94. repeat /85.
High BP of 175/105 (121) at 20:00. Manual BP at 20:45 showed 180/90
High BP of 224/106, manual BP taken with a result of 195/100
High BP of 180/91
Pt BP 91/67 axillary temp 96.4. No s/s of distress noted.

## 2024-08-28 NOTE — DISCHARGE NOTE NURSING/CASE MANAGEMENT/SOCIAL WORK - NSSCNAMETXT_GEN_ALL_CORE
1) [facility] [phone] for Private Duty Nurse and Private Aides services to resume on discharge  2) [facility] [phone]

## 2024-08-28 NOTE — PROGRESS NOTE ADULT - ASSESSMENT
70 YO Female with PMHx of ALS, Parkinson,, nonverbal, bed bound, chronic respiratory failure with tracheostomy and vent dependence, and oropharyngeal dysphagia with PEG who presented from home with tracheostomy leak.   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 Was previously able to have lids taped shut at night however now becoming more difficult. Patient consequently with noticeable worsening in eye redness OD>>OS despite frequent lubrication   - Initial exam with conjunctival injection OD>>OS, cornea OD with inferior epithelial defect encompassing 40% of cornea. With chronic corneal damage and changes secondary to exposure including extensive overlying filaments, pannus formation, and stromal scarring; OS with inferior epithelial defect encompassing 20% of cornea. Also with chronic corneal damage and changes secondary to exposure including extensive filaments, pannus. No active infiltrate or ulcer appreciated at this time OU. No thinning OU.   - 8/28: exam OU with no epithelial defect but persistent chronic corneal changes as per exam above  - cw ofloxacin twice a day to both eyes  - CW Erythromycin ointment every 3 hours to both eyes   - Create moisture chamber with tegaderm following placement of EXTENSIVE ointment to both eyes. Nurses shown how to make at bedside. The tegaderm should not be placed tightly overlying the eye, and there should be ointment covering the entirety of the exposed eye. Place moisturizing ointment around areas of face where tegaderm is to stick so that patient does not develop skin irritation    - CW Artificial tears every 1 hour while awake to both eyes prior to ointment   - Can have 6 hour period during day during which should continue with ointment/drop regimen but without tegaderm        Outpatient follow-up: Patient should follow-up with his/her ophthalmologist or with [facility] Department of Ophthalmology upon discharge at the address below     [facility] Department of Ophthalmology  [address] [place] [address]. [address]  [place] [address]  [phone]

## 2024-08-28 NOTE — DISCHARGE NOTE NURSING/CASE MANAGEMENT/SOCIAL WORK - NSDCPEFALRISK_GEN_ALL_CORE
For information on Fall & Injury Prevention, visit: [email or web address] OR  [email or web address] OR  https://www.cdc.gov/steadi/patient.html

## 2024-08-28 NOTE — DISCHARGE NOTE NURSING/CASE MANAGEMENT/SOCIAL WORK - PATIENT PORTAL LINK FT
You can access the FollowMyHealth Patient Portal offered by [facility] by registering at the following website: [email or web address]. By joining [employer]’s FollowMyHealth portal, you will also be able to view your health information using other applications (apps) compatible with our system.

## 2024-08-28 NOTE — PROGRESS NOTE ADULT - ATTENDING COMMENTS
doing well. stable. No new epi defects.  Will need continuous monitoring to make sure new epi defects. Willl follow.   c/w drops/ointment as stated in note
agree with resident note. Patient slowly improving epi defect. will follow.
epithelial defect resolved today!   Continue lubrication to prevent further epithelial breakdown. Agree with resident note
-    69F PMH advanced ALS (nonverbal and bedbound at baseline) with chronic hypercapnic respiratory failure s/p trach/PEG, vent-dependent, and Parkinson's who presented to [facility] on 7/18/24 with acute hypercapnia secondary to large expiratory air leak s/p upsizing without improvement with hospital course c/b tracheitis vs. UTI transferred from MICU to RCU on 7/5 for further management. S/p OR on 8/12 trach exchange with thoracic surgery with improved tidal volumes (Portex 9.5 with 40 mm balloon).    # Neuro: continue home riluzole for ALS. Continue sertraline and diazepam. Bedbound, nonverbal, quadriplegic, dependent on ADL's. Continue therapy for keratopathy, now improved  # CV: HD stable  # Pulm: continue lung protective ventilation. Minimal leak currently. Pending backup trachs and sterilization. Will call biomed to clear home vent to ensure stability of ventilation with her home vent. No further maggots seen at bedside, s/p bronch 8/18  # GI: continue PEG feeds  # Renal: stable kidney function and lytes  # ID: no evidence of active infection. Previously completed Zosyn for Pseudomonas aeruginosa VAP  # Heme: rivaroxaban for DVT ppx. Stable H/H  # Dispo: d/c planning to home with home vent. Full code
Doing well. No new epi defects  Irregular epithelium from chronic exposure, no blink reflex  c/w drops and ointment as stated in note, and moisture chamber at night.
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 continue current regimen.
doing well. stable. No new epi defects.  Will need continuous monitoring to make sure new epi defects. Willl follow.   c/w drops/ointment as stated in note

## 2024-08-28 NOTE — PROVIDER CONTACT NOTE (OTHER) - BACKGROUND
aspiration, hld, ALS
ALS, HTN, RF
HLD, aspiration, ALS
HLD, aspiration, ALS
pt admitted for trach leak/exchange. pt has hx of dysautonomia.
Pt admitted for leak in trach. Pt has hx of als
ALS, HTN, RF
ALS, hld, RF
HLD, aspiration, ALS
Pt admitted for leak in trach. Pt has hx of als
Pt admitted for leak in trach. Pt has hx of als
Pt admitted for trach leak. Pt went to OR today for trach exchange.
pt admitted for trach leak/exchange. pt has hx of dysautonomia.
HLD, aspiration, ALS
HLD, aspiration, ALS

## 2024-08-28 NOTE — PROVIDER CONTACT NOTE (OTHER) - SITUATION
High BP of 187/93
Pt BP 91/67 axillary temp 96.4. No s/s of distress noted.
pt  /78, PRN diazepam given, will recheck BP in 30 mins.
High BP of 175/105 (121) at 20:00.
High BP of 175/88 (107) at 0000
High BP of 180/91
High BP of 224/106, 1 hour post IV tylenol
Pt trach leaking, tidal volume .15. Trach dressing reinforced improving tidal volume to .26 and above.
High BP of 175/93 post ivp hydralazine
bp 82/ 58
pt , Manual /94
High BP of 183/106 (124) at 22:11
Manual BP of 180/70 at 1955
pt electronic /87  manual /88
pt electronic /91  manual /86

## 2024-08-28 NOTE — PROGRESS NOTE ADULT - SUBJECTIVE AND OBJECTIVE BOX
[facility] DEPARTMENT OF OPHTHALMOLOGY  ------------------------------------------------------------------------------  Chase Molina MD PGY 3  Available on Teams  ------------------------------------------------------------------------------    Interval History: No acute events overnight.     MEDICATIONS  (STANDING):  artificial  tears Solution 1 Drop(s) Both EYES <User Schedule>  chlorhexidine 0.12% Liquid 15 milliLiter(s) Oral Mucosa every 12 hours  chlorhexidine 2% Cloths 1 Application(s) Topical daily  diazepam    Tablet 2 milliGRAM(s) Oral every 12 hours  enoxaparin Injectable 40 milliGRAM(s) SubCutaneous every 24 hours  erythromycin   Ointment 1 Application(s) Both EYES <User Schedule>  multivitamin/minerals/iron Oral Solution (CENTRUM) 15 milliLiter(s) Oral daily  ofloxacin 0.3% Solution 1 Drop(s) Right EYE every 4 hours  ofloxacin 0.3% Solution 1 Drop(s) Left EYE every 6 hours  petrolatum Ophthalmic Ointment 1 Application(s) Both EYES <User Schedule>  riluzole 50 milliGRAM(s) Oral two times a day  sertraline 75 milliGRAM(s) Oral daily    MEDICATIONS  (PRN):  acetaminophen   Oral Liquid .. 650 milliGRAM(s) Oral every 6 hours PRN Temp greater or equal to 38C (100.4F), Mild Pain (1 - 3)  diazepam    Tablet 2 milliGRAM(s) Oral at bedtime PRN for dysautonomia/ hypertension/discomfort      VITALS: T(C): 36.7 (07-27-24 @ 04:18)  T(F): 98 (07-27-24 @ 04:18), Max: 98 (07-27-24 @ 04:18)  HR: 96 (07-27-24 @ 07:15) (92 - 104)  BP: 132/79 (07-27-24 @ 04:18) (116/70 - 164/81)  RR:  (16 - 19)  SpO2:  (97% - 100%)  Wt(kg): --  General: AAO x 0    Ophthalmology Exam:  Visual acuity (sc): RONIT   Pupils: PERRL OU, no APD  Ttono: 16 OU  Extraocular movements (EOMs): RONIT  Confrontational Visual Field (CVF): RONIT  Color Plates: RONIT    Pen Light Exam (PLE)  External: Flat OU  Lids/Lashes/Lacrimal Ducts: Flat OU    Sclera/Conjunctiva: injection OD>>OS   Cornea: OD with resolved inferior epithelial defect. With chronic corneal damage and changes secondary to exposure including extensive overlying filaments, pannus formation, and stromal scarring; OS with no further inferior epithelial defect. Also with chronic corneal damage and changes secondary to exposure including extensive filaments, pannus formation, and stromal scarring; No infiltration or ulcer OU   Anterior Chamber: D+F OU    Iris: Flat OU  Lens: NS OU

## 2024-08-28 NOTE — PROGRESS NOTE ADULT - SUBJECTIVE AND OBJECTIVE BOX
CHIEF COMPLAINT:    INTERVAL EVENTS:     ROS: Seen by bedside during AM rounds     OBJECTIVE:  ICU Vital Signs Last 24 Hrs  T(C): 36.5 (28 Aug 2024 08:00), Max: 37.3 (27 Aug 2024 16:00)  T(F): 97.7 (28 Aug 2024 08:00), Max: 99.1 (27 Aug 2024 16:00)  HR: 108 (28 Aug 2024 08:37) (100 - 122)  BP: 178/97 (28 Aug 2024 08:00) (116/69 - 178/97)  BP(mean): 123 (28 Aug 2024 08:00) (123 - 123)  ABP: --  ABP(mean): --  RR: 16 (28 Aug 2024 08:00) (16 - 18)  SpO2: 100% (28 Aug 2024 08:37) (100% - 100%)    O2 Parameters below as of 28 Aug 2024 08:37  Patient On (Oxygen Delivery Method): ventilator          Mode: AC/ CMV (Assist Control/ Continuous Mandatory Ventilation), RR (machine): 16, TV (machine): 400, FiO2: 50, PEEP: 5, ITime: 1, MAP: 9, PIP: 23    08-27 @ 07:01  -  08-28 @ 07:00  --------------------------------------------------------  IN: 1200 mL / OUT: 700 mL / NET: 500 mL      CAPILLARY BLOOD GLUCOSE          PHYSICAL EXAM:  General:   HEENT:   Lymph Nodes:  Neck:   Respiratory:   Cardiovascular:   Abdomen:   Extremities:   Skin:   Neurological:  Psychiatry:    Mode: AC/ CMV (Assist Control/ Continuous Mandatory Ventilation)  RR (machine): 16  TV (machine): 400  FiO2: 50  PEEP: 5  ITime: 1  MAP: 9  PIP: 23      HOSPITAL MEDICATIONS:  MEDICATIONS  (STANDING):  albuterol/ipratropium for Nebulization 3 milliLiter(s) Nebulizer every 6 hours  artificial  tears Solution 1 Drop(s) Both EYES every 1 hour  chlorhexidine 0.12% Liquid 15 milliLiter(s) Oral Mucosa every 12 hours  chlorhexidine 2% Cloths 1 Application(s) Topical daily  diazepam    Tablet 2 milliGRAM(s) Oral every 12 hours  erythromycin   Ointment 1 Application(s) Both EYES <User Schedule>  multivitamin/minerals/iron Oral Solution (CENTRUM) 15 milliLiter(s) Oral daily  ofloxacin 0.3% Solution 1 Drop(s) Both EYES two times a day  piperacillin/tazobactam IVPB.. 3.375 Gram(s) IV Intermittent every 8 hours  riluzole 50 milliGRAM(s) Oral two times a day  rivaroxaban 10 milliGRAM(s) Oral daily  rivaroxaban      sertraline 75 milliGRAM(s) Oral daily    MEDICATIONS  (PRN):  acetaminophen   Oral Liquid .. 650 milliGRAM(s) Oral every 6 hours PRN Temp greater or equal to 38C (100.4F), Mild Pain (1 - 3)  diazepam    Tablet 2 milliGRAM(s) Oral at bedtime PRN for dysautonomia/ hypertension/discomfort      LABS:                        11.8   12.77 )-----------( 369      ( 28 Aug 2024 04:40 )             40.6     08-28    144  |  101  |  21  ----------------------------<  174<H>  4.2   |  28  |  <0.20<L>    Ca    9.4      28 Aug 2024 04:40  Phos  3.6     08-28  Mg     1.90     08-28        Urinalysis Basic - ( 28 Aug 2024 04:40 )    Color: x / Appearance: x / SG: x / pH: x  Gluc: 174 mg/dL / Ketone: x  / Bili: x / Urobili: x   Blood: x / Protein: x / Nitrite: x   Leuk Esterase: x / RBC: x / WBC x   Sq Epi: x / Non Sq Epi: x / Bacteria: x         CHIEF COMPLAINT: Patient is a 69y old  Female who presents with a chief complaint of trach exchange (18 Jul 2024 07:07)      INTERVAL EVENTS: Discharge on hold pending arrival of trachs from manufactorer    ROS: Seen by bedside during AM rounds     OBJECTIVE:  ICU Vital Signs Last 24 Hrs  T(C): 36.5 (28 Aug 2024 08:00), Max: 37.3 (27 Aug 2024 16:00)  T(F): 97.7 (28 Aug 2024 08:00), Max: 99.1 (27 Aug 2024 16:00)  HR: 108 (28 Aug 2024 08:37) (100 - 122)  BP: 178/97 (28 Aug 2024 08:00) (116/69 - 178/97)  BP(mean): 123 (28 Aug 2024 08:00) (123 - 123)  ABP: --  ABP(mean): --  RR: 16 (28 Aug 2024 08:00) (16 - 18)  SpO2: 100% (28 Aug 2024 08:37) (100% - 100%)    O2 Parameters below as of 28 Aug 2024 08:37  Patient On (Oxygen Delivery Method): ventilator          Mode: AC/ CMV (Assist Control/ Continuous Mandatory Ventilation), RR (machine): 16, TV (machine): 400, FiO2: 50, PEEP: 5, ITime: 1, MAP: 9, PIP: 23    08-27 @ 07:01  -  08-28 @ 07:00  --------------------------------------------------------  IN: 1200 mL / OUT: 700 mL / NET: 500 mL      CAPILLARY BLOOD GLUCOSE  PHYSICAL EXAMINATION  General: NAD   HEENT: Exposure keratopathy appears to be improving. Trach to vent No air leak noted   Cards: S1S2 RRR no murmurs   Pulm: BIlat coarse bs . No wheezes.   Abdomen: Soft, nondistended and nontender. BS (+) PEG present.   Extremities: No pedal edema. No active ROM x 4 extremities with baseline ALS  Neurology: Awake with eyes open, however does not follow commands with baseline ALS             Mode: AC/ CMV (Assist Control/ Continuous Mandatory Ventilation)  RR (machine): 16  TV (machine): 400  FiO2: 50  PEEP: 5  ITime: 1  MAP: 9  PIP: 23      HOSPITAL MEDICATIONS:  MEDICATIONS  (STANDING):  albuterol/ipratropium for Nebulization 3 milliLiter(s) Nebulizer every 6 hours  artificial  tears Solution 1 Drop(s) Both EYES every 1 hour  chlorhexidine 0.12% Liquid 15 milliLiter(s) Oral Mucosa every 12 hours  chlorhexidine 2% Cloths 1 Application(s) Topical daily  diazepam    Tablet 2 milliGRAM(s) Oral every 12 hours  erythromycin   Ointment 1 Application(s) Both EYES <User Schedule>  multivitamin/minerals/iron Oral Solution (CENTRUM) 15 milliLiter(s) Oral daily  ofloxacin 0.3% Solution 1 Drop(s) Both EYES two times a day  piperacillin/tazobactam IVPB.. 3.375 Gram(s) IV Intermittent every 8 hours  riluzole 50 milliGRAM(s) Oral two times a day  rivaroxaban 10 milliGRAM(s) Oral daily  rivaroxaban      sertraline 75 milliGRAM(s) Oral daily    MEDICATIONS  (PRN):  acetaminophen   Oral Liquid .. 650 milliGRAM(s) Oral every 6 hours PRN Temp greater or equal to 38C (100.4F), Mild Pain (1 - 3)  diazepam    Tablet 2 milliGRAM(s) Oral at bedtime PRN for dysautonomia/ hypertension/discomfort      LABS:                        11.8   12.77 )-----------( 369      ( 28 Aug 2024 04:40 )             40.6     08-28    144  |  101  |  21  ----------------------------<  174<H>  4.2   |  28  |  <0.20<L>    Ca    9.4      28 Aug 2024 04:40  Phos  3.6     08-28  Mg     1.90     08-28        Urinalysis Basic - ( 28 Aug 2024 04:40 )    Color: x / Appearance: x / SG: x / pH: x  Gluc: 174 mg/dL / Ketone: x  / Bili: x / Urobili: x   Blood: x / Protein: x / Nitrite: x   Leuk Esterase: x / RBC: x / WBC x   Sq Epi: x / Non Sq Epi: x / Bacteria: x

## 2024-08-28 NOTE — PROVIDER CONTACT NOTE (OTHER) - NAME OF MD/NP/PA/DO NOTIFIED:
Fracisco Abbott
Michael Holt
Uriel Mary
Mary Ellen Pittman
Paulina Peterson
Fracisco Abbott
Matthew Jay
Michael Holt
Paulina Peterson
Matthew Jay
Jax Reyna
Mary Ellen Pittman
Jax Reyna
Michael Holt
Zain Hyde

## 2024-08-28 NOTE — PROVIDER CONTACT NOTE (OTHER) - DATE AND TIME:
20-Jul-2024 23:58
22-Jul-2024 18:51
28-Aug-2024 21:55
27-Jul-2024 21:28
18-Jul-2024 23:02
21-Jul-2024 04:55
28-Jul-2024 04:30
09-Jul-2024 22:29
22-Jul-2024 20:05
19-Jul-2024 02:00
28-Jul-2024 01:58
12-Aug-2024 20:00
23-Jul-2024 00:12
28-Aug-2024 20:38
09-Jul-2024 20:46

## 2024-08-28 NOTE — DISCHARGE NOTE NURSING/CASE MANAGEMENT/SOCIAL WORK - NSDCFUADDAPPT_GEN_ALL_CORE_FT
Pt will need continuous follow up for eye care with:  [facility] Department of Ophthalmology  [address]  [place] [id number] [phone]

## 2024-08-29 ENCOUNTER — NON-APPOINTMENT (OUTPATIENT)
Age: 69
End: 2024-08-29

## 2024-08-29 VITALS
TEMPERATURE: 98 F | HEART RATE: 112 BPM | RESPIRATION RATE: 16 BRPM | SYSTOLIC BLOOD PRESSURE: 167 MMHG | DIASTOLIC BLOOD PRESSURE: 80 MMHG | OXYGEN SATURATION: 100 %

## 2024-08-29 LAB
ANION GAP SERPL CALC-SCNC: 15 MMOL/L — HIGH (ref 7–14)
BASOPHILS # BLD AUTO: 0.06 K/UL — SIGNIFICANT CHANGE UP (ref 0–0.2)
BASOPHILS NFR BLD AUTO: 0.4 % — SIGNIFICANT CHANGE UP (ref 0–2)
BUN SERPL-MCNC: 26 MG/DL — HIGH (ref 7–23)
CALCIUM SERPL-MCNC: 10 MG/DL — SIGNIFICANT CHANGE UP (ref 8.4–10.5)
CHLORIDE SERPL-SCNC: 98 MMOL/L — SIGNIFICANT CHANGE UP (ref 98–107)
CO2 SERPL-SCNC: 30 MMOL/L — SIGNIFICANT CHANGE UP (ref 22–31)
CREAT SERPL-MCNC: <0.2 MG/DL — LOW (ref 0.5–1.3)
EGFR: 125 ML/MIN/1.73M2 — SIGNIFICANT CHANGE UP
EOSINOPHIL # BLD AUTO: 0.01 K/UL — SIGNIFICANT CHANGE UP (ref 0–0.5)
EOSINOPHIL NFR BLD AUTO: 0.1 % — SIGNIFICANT CHANGE UP (ref 0–6)
GLUCOSE SERPL-MCNC: 202 MG/DL — HIGH (ref 70–99)
HCT VFR BLD CALC: 44.6 % — SIGNIFICANT CHANGE UP (ref 34.5–45)
HGB BLD-MCNC: 12.8 G/DL — SIGNIFICANT CHANGE UP (ref 11.5–15.5)
IANC: 13.57 K/UL — HIGH (ref 1.8–7.4)
IMM GRANULOCYTES NFR BLD AUTO: 1.7 % — HIGH (ref 0–0.9)
LYMPHOCYTES # BLD AUTO: 0.58 K/UL — LOW (ref 1–3.3)
LYMPHOCYTES # BLD AUTO: 3.8 % — LOW (ref 13–44)
MAGNESIUM SERPL-MCNC: 2 MG/DL — SIGNIFICANT CHANGE UP (ref 1.6–2.6)
MCHC RBC-ENTMCNC: 25 PG — LOW (ref 27–34)
MCHC RBC-ENTMCNC: 28.7 GM/DL — LOW (ref 32–36)
MCV RBC AUTO: 87.3 FL — SIGNIFICANT CHANGE UP (ref 80–100)
MONOCYTES # BLD AUTO: 0.8 K/UL — SIGNIFICANT CHANGE UP (ref 0–0.9)
MONOCYTES NFR BLD AUTO: 5.2 % — SIGNIFICANT CHANGE UP (ref 2–14)
NEUTROPHILS # BLD AUTO: 13.57 K/UL — HIGH (ref 1.8–7.4)
NEUTROPHILS NFR BLD AUTO: 88.8 % — HIGH (ref 43–77)
NRBC # BLD: 0 /100 WBCS — SIGNIFICANT CHANGE UP (ref 0–0)
NRBC # FLD: 0 K/UL — SIGNIFICANT CHANGE UP (ref 0–0)
PHOSPHATE SERPL-MCNC: 2.8 MG/DL — SIGNIFICANT CHANGE UP (ref 2.5–4.5)
PLATELET # BLD AUTO: 392 K/UL — SIGNIFICANT CHANGE UP (ref 150–400)
POTASSIUM SERPL-MCNC: 4 MMOL/L — SIGNIFICANT CHANGE UP (ref 3.5–5.3)
POTASSIUM SERPL-SCNC: 4 MMOL/L — SIGNIFICANT CHANGE UP (ref 3.5–5.3)
RBC # BLD: 5.11 M/UL — SIGNIFICANT CHANGE UP (ref 3.8–5.2)
RBC # FLD: 18.2 % — HIGH (ref 10.3–14.5)
SODIUM SERPL-SCNC: 143 MMOL/L — SIGNIFICANT CHANGE UP (ref 135–145)
WBC # BLD: 15.28 K/UL — HIGH (ref 3.8–10.5)
WBC # FLD AUTO: 15.28 K/UL — HIGH (ref 3.8–10.5)

## 2024-08-29 PROCEDURE — 99233 SBSQ HOSP IP/OBS HIGH 50: CPT

## 2024-08-29 RX ORDER — DIAZEPAM 10 MG
2 TABLET ORAL EVERY 12 HOURS
Refills: 0 | Status: DISCONTINUED | OUTPATIENT
Start: 2024-08-29 | End: 2024-08-29

## 2024-08-29 RX ORDER — POVIDONE, PROPYLENE GLYCOL 6.8; 3 MG/ML; MG/ML
1 LIQUID OPHTHALMIC
Qty: 0 | Refills: 0 | DISCHARGE
Start: 2024-08-29

## 2024-08-29 RX ORDER — ERYTHROMYCIN 5 MG/G
1 OINTMENT OPHTHALMIC
Qty: 15 | Refills: 0
Start: 2024-08-29 | End: 2024-09-27

## 2024-08-29 RX ORDER — DIAZEPAM 10 MG
2 TABLET ORAL AT BEDTIME
Refills: 0 | Status: DISCONTINUED | OUTPATIENT
Start: 2024-08-29 | End: 2024-08-29

## 2024-08-29 RX ORDER — LEVOFLOXACIN 5 MG/ML
1 INJECTION, SOLUTION INTRAVENOUS
Qty: 4 | Refills: 0
Start: 2024-08-29 | End: 2024-09-01

## 2024-08-29 RX ORDER — IPRATROPIUM BROMIDE AND ALBUTEROL SULFATE .5; 3 MG/3ML; MG/3ML
3 SOLUTION RESPIRATORY (INHALATION)
Qty: 0 | Refills: 0 | DISCHARGE
Start: 2024-08-29

## 2024-08-29 RX ORDER — OFLOXACIN 3 MG/ML
1 SOLUTION/ DROPS OPHTHALMIC
Qty: 2 | Refills: 0
Start: 2024-08-29 | End: 2024-09-27

## 2024-08-29 RX ADMIN — POVIDONE, PROPYLENE GLYCOL 1 DROP(S): 6.8; 3 LIQUID OPHTHALMIC at 04:38

## 2024-08-29 RX ADMIN — POVIDONE, PROPYLENE GLYCOL 1 DROP(S): 6.8; 3 LIQUID OPHTHALMIC at 03:01

## 2024-08-29 RX ADMIN — SERTRALINE HYDROCHLORIDE 75 MILLIGRAM(S): 50 TABLET, FILM COATED ORAL at 11:16

## 2024-08-29 RX ADMIN — POVIDONE, PROPYLENE GLYCOL 1 DROP(S): 6.8; 3 LIQUID OPHTHALMIC at 07:25

## 2024-08-29 RX ADMIN — POVIDONE, PROPYLENE GLYCOL 1 DROP(S): 6.8; 3 LIQUID OPHTHALMIC at 12:34

## 2024-08-29 RX ADMIN — POVIDONE, PROPYLENE GLYCOL 1 DROP(S): 6.8; 3 LIQUID OPHTHALMIC at 14:10

## 2024-08-29 RX ADMIN — CHLORHEXIDINE GLUCONATE 15 MILLILITER(S): 40 SOLUTION TOPICAL at 05:02

## 2024-08-29 RX ADMIN — POVIDONE, PROPYLENE GLYCOL 1 DROP(S): 6.8; 3 LIQUID OPHTHALMIC at 13:35

## 2024-08-29 RX ADMIN — POVIDONE, PROPYLENE GLYCOL 1 DROP(S): 6.8; 3 LIQUID OPHTHALMIC at 10:35

## 2024-08-29 RX ADMIN — POVIDONE, PROPYLENE GLYCOL 1 DROP(S): 6.8; 3 LIQUID OPHTHALMIC at 11:16

## 2024-08-29 RX ADMIN — Medication 2 MILLIGRAM(S): at 05:02

## 2024-08-29 RX ADMIN — CHLORHEXIDINE GLUCONATE 1 APPLICATION(S): 40 SOLUTION TOPICAL at 12:20

## 2024-08-29 RX ADMIN — POVIDONE, PROPYLENE GLYCOL 1 DROP(S): 6.8; 3 LIQUID OPHTHALMIC at 01:06

## 2024-08-29 RX ADMIN — ERYTHROMYCIN 1 APPLICATION(S): 5 OINTMENT OPHTHALMIC at 08:50

## 2024-08-29 RX ADMIN — IPRATROPIUM BROMIDE AND ALBUTEROL SULFATE 3 MILLILITER(S): .5; 3 SOLUTION RESPIRATORY (INHALATION) at 11:02

## 2024-08-29 RX ADMIN — OFLOXACIN 1 DROP(S): 3 SOLUTION/ DROPS OPHTHALMIC at 06:27

## 2024-08-29 RX ADMIN — Medication 15 MILLILITER(S): at 11:17

## 2024-08-29 RX ADMIN — ERYTHROMYCIN 1 APPLICATION(S): 5 OINTMENT OPHTHALMIC at 03:01

## 2024-08-29 RX ADMIN — ERYTHROMYCIN 1 APPLICATION(S): 5 OINTMENT OPHTHALMIC at 14:11

## 2024-08-29 RX ADMIN — ERYTHROMYCIN 1 APPLICATION(S): 5 OINTMENT OPHTHALMIC at 11:16

## 2024-08-29 RX ADMIN — POVIDONE, PROPYLENE GLYCOL 1 DROP(S): 6.8; 3 LIQUID OPHTHALMIC at 08:51

## 2024-08-29 RX ADMIN — POVIDONE, PROPYLENE GLYCOL 1 DROP(S): 6.8; 3 LIQUID OPHTHALMIC at 06:27

## 2024-08-29 RX ADMIN — POVIDONE, PROPYLENE GLYCOL 1 DROP(S): 6.8; 3 LIQUID OPHTHALMIC at 05:03

## 2024-08-29 RX ADMIN — ERYTHROMYCIN 1 APPLICATION(S): 5 OINTMENT OPHTHALMIC at 06:27

## 2024-08-29 RX ADMIN — RILUZOLE 50 MILLIGRAM(S): 5 LIQUID ORAL at 05:02

## 2024-08-29 RX ADMIN — POVIDONE, PROPYLENE GLYCOL 1 DROP(S): 6.8; 3 LIQUID OPHTHALMIC at 02:23

## 2024-08-29 RX ADMIN — PIPERACILLIN SODIUM AND TAZOBACTAM SODIUM 25 GRAM(S): 3; .375 INJECTION, POWDER, FOR SOLUTION INTRAVENOUS at 05:03

## 2024-08-29 RX ADMIN — POVIDONE, PROPYLENE GLYCOL 1 DROP(S): 6.8; 3 LIQUID OPHTHALMIC at 15:12

## 2024-08-29 RX ADMIN — PIPERACILLIN SODIUM AND TAZOBACTAM SODIUM 25 GRAM(S): 3; .375 INJECTION, POWDER, FOR SOLUTION INTRAVENOUS at 13:32

## 2024-08-29 RX ADMIN — IPRATROPIUM BROMIDE AND ALBUTEROL SULFATE 3 MILLILITER(S): .5; 3 SOLUTION RESPIRATORY (INHALATION) at 04:21

## 2024-08-29 NOTE — PROGRESS NOTE ADULT - NS ATTEND AMEND GEN_ALL_CORE FT
Patient is a 70 yo F w/ advanced ALS (nonverbal and bedbound at baseline) with chronic hypercapnic respiratory failure and ventilatory dependence via tracheostomy at baseline and Parkinson's who was initially presenting to [facility] on 7/4/24 with acute hypercapnia secondary to large expiratory air leak s/p upsizing without improvement with hospital course c/b tracheitis vs. UTI transferred from MICU to RCU on 7/5 for further management    #Acute on chronic hypercapnic respiratory failure  #ALS  #Chronic trach dependence - Trach exchanged at home without improvement followed by upsizing at bedside to 9 Bivona but still with air leak. Thoracic sx consulted, custom trach ordered (Portex 9.5 with 40mm balloon).  - s/p OR on 8/12 trach exchange with thoracic. Improved TV w/ Portex 9.5 with 40mm balloon. Backup trachs arrived, but 1 with ruptured ballon while undergoing sterilization process. 2 additional trachs ordered. Unfortnately delayed arrival to today. Will expedite sterilization once arrived  - c/w home riluzole, diazepam, and zoloft  - c/w mechanical ventilatory support.  - c/w airway clearance, started empiric Zosyn on 8/26 for now with repeat sputum with rare Pseudomonas again, will change to PO Levaquin to complete 7 total day course. Secretions improved  - c/w ointment/eye drops for corneal exposure, f/u Optho recs  - c/w Xarelto  - Possible d/c today with 2 back up trachs    Huang Rae MD  Pulmonary & Critical Care

## 2024-08-29 NOTE — PROGRESS NOTE ADULT - PROVIDER SPECIALTY LIST ADULT
CT Surgery
MICU
Ophthalmology
Ophthalmology
Pulmonology
Thoracic Surgery
Anesthesia
Ophthalmology
Pulmonology
Thoracic Surgery
Ophthalmology
Pulmonology
Ophthalmology
Pulmonology

## 2024-08-29 NOTE — PROGRESS NOTE ADULT - ASSESSMENT
69 YO Female with PMHx of ALS, Parkinson,, nonverbal, bed bound, chronic respiratory failure with tracheostomy and vent dependence, and oropharyngeal dysphagia with PEG who presented from home with tracheostomy leak. She was noted with vent alarm and poor TVe (250-290) at home. Trach changed at home with no improvement and sent in for thoracic evaluation. While in MICU no air leak noted with hyperinflated cuff. Transferred to RCU on 7/5 with course complicated by leukocytosis second to UTI vs trachitis and completed zosyn course. Trach upsized by thoracic on 7/17 however AL remains and now s/p 9.5mm Bivona custom trach on 8/12 and course complicated by leukocytosis likely post op atelectasis vs RLL PNA    NEUROLOGY  # ALS   - Baseline nonverbal, awake, and communicates with eye movement.   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   - Continue on Ofloxacin BID (decreased 8/1)   - Continue on Erythromycin OU Q3H (decreased 8/8)  - Continue with artificial tears OU Q1H   - Create moisture chamber with Tegaderm following placement of EXTENSIVE ointment to the right eye throughout the day and to the left eye nightly. Allow 6 hour window during the day where moisture can be removed from right eye.    PSYCH   # Anxiety and Depression   - Continue on home Valium 2mg BID with additional 2mg PRN   - Continue on Zoloft 75mg QD while in house (on 4cc/ 80mg QD at home)     CARDIOVASCULAR   # HTN thought to be second to discomfort vs dysautonomia   - Evening HTN and continue on Hydralazine vs valium PRN doses   - Monitor HR and BP    # pSVT  - Intermittent episodes of SVT, lasting a few seconds at a time, and self limiting.  - Consider BB if becomes more persistent or HD unstable    RESPIRATORY  # Tracheostomy leak   - At home noted with TVe 200-300s despite tracheostomy change to 80XLTCD.   - No air leak noted in house with hyperinflated cuff likely tracheomegaly?   - Trach upsized to Bivona 9 on 7/17, however trach leak remained.   - Custom Bivona 9.5mm Tracheostomy delivered and exchanged on Monday 8/12, however spare tracheostomy was defective.   - Remaining spare trach with no overt evidence of leak and sterilized 8/23  - Continue on chronic vent 16/400/5/21   - Additional spare to be delivered tomorrow   - Continue on nebs and IPV   - Pt tolerating home vent   - Awaiting spare trach - did not arrive 2/2 delivery delay hopeful for 8/29 delivery     GI  # Dysphagia   - Continue on PEG-TF     RENAL  # High PVR   - BS with roughly 200-300cc PVR, however no acute distress   - Monitor renal function and UOP     # Elevated lactate   - Lactate elevated with mild contraction alkalosis   - Rehydrate with improvement     INFECTIOUS DISEASE  # Leukocytosis likely second to trachitis vs UTI?   - No fever or chills and noted with prior leukocytosis   - CXR with prior atelectasis and no acute findings   - UA (7/6) with positive nitrites (7/6)   - SCx (7/5) with pansensitive PSA  - BCx (7/6) x 2 NGT  - UCx (7/6) with coag neg staph  - MRSA PCR (7/6) POSITIVE for both MRSA/MSSA s/p Bactroban (7/6 - 7/10)  - s/p empiric Zosyn (7/8 - 7/14)     # RLL PNA  - Leukocytosis rising again with CXR/ POCUS showing RLL opacity/ consolidation   - Concern noted for chronic aspiration tracheostomy leak vs post op atelectasis   - Zosyn started (8/26 - ) pending SCx, BCx and UA    HEME  - Continue on home Xarelto 10mg DVT PPX?     ENDOCRINE  - No hx of DM2   - Monitor BG     SKIN  - Basic trach and PEG care ordered   - DTI on back and pending [facility]    ETHICS/ GOC    - FULL CODE   - Dr Tyson Velasquez,  involved in care    DISPO - Back home with  when able.     67 YO Female with PMHx of ALS, Parkinson,, nonverbal, bed bound, chronic respiratory failure with tracheostomy and vent dependence, and oropharyngeal dysphagia with PEG who presented from home with tracheostomy leak. She was noted with vent alarm and poor TVe (250-290) at home. Trach changed at home with no improvement and sent in for thoracic evaluation. While in MICU no air leak noted with hyperinflated cuff. Transferred to RCU on 7/5 with course complicated by leukocytosis second to UTI vs trachitis and completed zosyn course. Trach upsized by thoracic on 7/17 however AL remains and now s/p 9.5mm Bivona custom trach on 8/12 and course complicated by leukocytosis likely post op atelectasis vs RLL PNA    NEUROLOGY  # ALS   - Baseline nonverbal, awake, and communicates with eye movement.   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   - Continue on Ofloxacin BID (decreased 8/1)   - Continue on Erythromycin OU Q3H (decreased 8/8)  - Continue with artificial tears OU Q1H   - Create moisture chamber with Tegaderm following placement of EXTENSIVE ointment to the right eye throughout the day and to the left eye nightly. Allow 6 hour window during the day where moisture can be removed from right eye.    PSYCH   # Anxiety and Depression   - Continue on home Valium 2mg BID with additional 2mg PRN   - Continue on Zoloft 75mg QD while in house (on 4cc/ 80mg QD at home)     CARDIOVASCULAR   # HTN thought to be second to discomfort vs dysautonomia   - Evening HTN and continue on Hydralazine vs valium PRN doses   - Monitor HR and BP    # pSVT  - Intermittent episodes of SVT, lasting a few seconds at a time, and self limiting.  - Consider BB if becomes more persistent or HD unstable    RESPIRATORY  # Tracheostomy leak   - At home noted with TVe 200-300s despite tracheostomy change to 80XLTCD.   - No air leak noted in house with hyperinflated cuff likely tracheomegaly?   - Trach upsized to Bivona 9 on 7/17, however trach leak remained.   - Custom Bivona 9.5mm Tracheostomy delivered and exchanged on Monday 8/12, however spare tracheostomy was defective.   - Remaining spare trach with no overt evidence of leak and sterilized 8/23  - Continue on chronic vent 16/400/5/21   - Additional spare to be delivered tomorrow   - Continue on nebs and IPV   - Pt tolerating home vent   - Awaiting spare trach - did not arrive 2/2 delivery delay hopeful for 8/29 delivery   - pt dc home today with 2 spare trachs - 3rd arriving 8/30 fu with cetral sterile supply     GI  # Dysphagia   - Continue on PEG-TF     RENAL  # High PVR   - BS with roughly 200-300cc PVR, however no acute distress   - Monitor renal function and UOP     # Elevated lactate   - Lactate elevated with mild contraction alkalosis   - Rehydrate with improvement     INFECTIOUS DISEASE  # Leukocytosis likely second to trachitis vs UTI?   - No fever or chills and noted with prior leukocytosis   - CXR with prior atelectasis and no acute findings   - UA (7/6) with positive nitrites (7/6)   - SCx (7/5) with pansensitive PSA  - BCx (7/6) x 2 NGT  - UCx (7/6) with coag neg staph  - MRSA PCR (7/6) POSITIVE for both MRSA/MSSA s/p Bactroban (7/6 - 7/10)  - s/p empiric Zosyn (7/8 - 7/14)     # RLL PNA  - Leukocytosis rising again with CXR/ POCUS showing RLL opacity/ consolidation   - Concern noted for chronic aspiration tracheostomy leak vs post op atelectasis   - Zosyn started (8/26 - ) pending SCx, BCx and UA    HEME  - Continue on home Xarelto 10mg DVT PPX?     ENDOCRINE  - No hx of DM2   - Monitor BG     SKIN  - Basic trach and PEG care ordered   - DTI on back and pending [facility]    ETHICS/ GOC    - FULL CODE   - Dr Tyson Velasquez,  involved in care    DISPO - Back home with  8/29 with home nursing and aide

## 2024-08-29 NOTE — PROGRESS NOTE ADULT - NS_MD_PANP_GEN_ALL_CORE

## 2024-08-29 NOTE — PROGRESS NOTE ADULT - SUBJECTIVE AND OBJECTIVE BOX
CHIEF COMPLAINT: Patient is a 69y old  Female who presents with a chief complaint of trach exchange (18 Jul 2024 07:07)      INTERVAL EVENTS: BP elevated overnight     ROS: Seen by bedside during AM rounds     OBJECTIVE:  ICU Vital Signs Last 24 Hrs  T(C): 36.6 (29 Aug 2024 04:00), Max: 36.6 (29 Aug 2024 01:05)  T(F): 97.9 (29 Aug 2024 04:00), Max: 97.9 (29 Aug 2024 04:00)  HR: 93 (29 Aug 2024 08:57) (93 - 117)  BP: 164/89 (29 Aug 2024 04:00) (136/78 - 200/91)  BP(mean): 115 (28 Aug 2024 16:00) (115 - 115)  ABP: --  ABP(mean): --  RR: 16 (29 Aug 2024 04:00) (16 - 16)  SpO2: 99% (29 Aug 2024 08:57) (98% - 100%)    O2 Parameters below as of 29 Aug 2024 04:10  Patient On (Oxygen Delivery Method): ventilator          Mode: AC/ CMV (Assist Control/ Continuous Mandatory Ventilation), RR (machine): 16, TV (machine): 400, PEEP: 5, ITime: 1, MAP: 7, PIP: 15    08-28 @ 07:01  -  08-29 @ 07:00  --------------------------------------------------------  IN: 1400 mL / OUT: 1050 mL / NET: 350 mL      CAPILLARY BLOOD GLUCOSE          PHYSICAL EXAM:  General:   HEENT:   Lymph Nodes:  Neck:   Respiratory:   Cardiovascular:   Abdomen:   Extremities:   Skin:   Neurological:  Psychiatry:    Mode: AC/ CMV (Assist Control/ Continuous Mandatory Ventilation)  RR (machine): 16  TV (machine): 400  PEEP: 5  ITime: 1  MAP: 7  PIP: 15      HOSPITAL MEDICATIONS:  MEDICATIONS  (STANDING):  albuterol/ipratropium for Nebulization 3 milliLiter(s) Nebulizer every 6 hours  artificial  tears Solution 1 Drop(s) Both EYES every 1 hour  chlorhexidine 0.12% Liquid 15 milliLiter(s) Oral Mucosa every 12 hours  chlorhexidine 2% Cloths 1 Application(s) Topical daily  diazepam    Tablet 2 milliGRAM(s) Oral every 12 hours  erythromycin   Ointment 1 Application(s) Both EYES <User Schedule>  multivitamin/minerals/iron Oral Solution (CENTRUM) 15 milliLiter(s) Oral daily  ofloxacin 0.3% Solution 1 Drop(s) Both EYES two times a day  piperacillin/tazobactam IVPB.. 3.375 Gram(s) IV Intermittent every 8 hours  riluzole 50 milliGRAM(s) Oral two times a day  rivaroxaban 10 milliGRAM(s) Oral daily  rivaroxaban      sertraline 75 milliGRAM(s) Oral daily    MEDICATIONS  (PRN):  acetaminophen   Oral Liquid .. 650 milliGRAM(s) Oral every 6 hours PRN Temp greater or equal to 38C (100.4F), Mild Pain (1 - 3)  diazepam    Tablet 2 milliGRAM(s) Oral at bedtime PRN for dysautonomia/ hypertension/discomfort      LABS:                        12.8   15.28 )-----------( 392      ( 29 Aug 2024 04:15 )             44.6     08-29    143  |  98  |  26<H>  ----------------------------<  202<H>  4.0   |  30  |  <0.20<L>    Ca    10.0      29 Aug 2024 04:15  Phos  2.8     08-29  Mg     2.00     08-29        Urinalysis Basic - ( 29 Aug 2024 04:15 )    Color: x / Appearance: x / SG: x / pH: x  Gluc: 202 mg/dL / Ketone: x  / Bili: x / Urobili: x   Blood: x / Protein: x / Nitrite: x   Leuk Esterase: x / RBC: x / WBC x   Sq Epi: x / Non Sq Epi: x / Bacteria: x         CHIEF COMPLAINT: Patient is a 69y old  Female who presents with a chief complaint of trach exchange (18 Jul 2024 07:07)      INTERVAL EVENTS: BP elevated overnight     ROS: Seen by bedside during AM rounds     OBJECTIVE:  ICU Vital Signs Last 24 Hrs  T(C): 36.6 (29 Aug 2024 04:00), Max: 36.6 (29 Aug 2024 01:05)  T(F): 97.9 (29 Aug 2024 04:00), Max: 97.9 (29 Aug 2024 04:00)  HR: 93 (29 Aug 2024 08:57) (93 - 117)  BP: 164/89 (29 Aug 2024 04:00) (136/78 - 200/91)  BP(mean): 115 (28 Aug 2024 16:00) (115 - 115)  ABP: --  ABP(mean): --  RR: 16 (29 Aug 2024 04:00) (16 - 16)  SpO2: 99% (29 Aug 2024 08:57) (98% - 100%)    O2 Parameters below as of 29 Aug 2024 04:10  Patient On (Oxygen Delivery Method): ventilator          Mode: AC/ CMV (Assist Control/ Continuous Mandatory Ventilation), RR (machine): 16, TV (machine): 400, PEEP: 5, ITime: 1, MAP: 7, PIP: 15    08-28 @ 07:01  -  08-29 @ 07:00  --------------------------------------------------------  IN: 1400 mL / OUT: 1050 mL / NET: 350 mL      CAPILLARY BLOOD GLUCOSE      PHYSICAL EXAMINATION  General: NAD   HEENT: Exposure keratopathy appears to be improving. Trach to vent No air leak noted   Cards: S1S2 RRR no murmurs   Pulm: BIlat coarse bs . No wheezes.   Abdomen: Soft, nondistended and nontender. BS (+) PEG present.   Extremities: No pedal edema. No active ROM x 4 extremities with baseline ALS  Neurology: Awake with eyes open, however does not follow commands with baseline ALS       Mode: AC/ CMV (Assist Control/ Continuous Mandatory Ventilation)  RR (machine): 16  TV (machine): 400  PEEP: 5  ITime: 1  MAP: 7  PIP: 15      HOSPITAL MEDICATIONS:  MEDICATIONS  (STANDING):  albuterol/ipratropium for Nebulization 3 milliLiter(s) Nebulizer every 6 hours  artificial  tears Solution 1 Drop(s) Both EYES every 1 hour  chlorhexidine 0.12% Liquid 15 milliLiter(s) Oral Mucosa every 12 hours  chlorhexidine 2% Cloths 1 Application(s) Topical daily  diazepam    Tablet 2 milliGRAM(s) Oral every 12 hours  erythromycin   Ointment 1 Application(s) Both EYES <User Schedule>  multivitamin/minerals/iron Oral Solution (CENTRUM) 15 milliLiter(s) Oral daily  ofloxacin 0.3% Solution 1 Drop(s) Both EYES two times a day  piperacillin/tazobactam IVPB.. 3.375 Gram(s) IV Intermittent every 8 hours  riluzole 50 milliGRAM(s) Oral two times a day  rivaroxaban 10 milliGRAM(s) Oral daily  rivaroxaban      sertraline 75 milliGRAM(s) Oral daily    MEDICATIONS  (PRN):  acetaminophen   Oral Liquid .. 650 milliGRAM(s) Oral every 6 hours PRN Temp greater or equal to 38C (100.4F), Mild Pain (1 - 3)  diazepam    Tablet 2 milliGRAM(s) Oral at bedtime PRN for dysautonomia/ hypertension/discomfort      LABS:                        12.8   15.28 )-----------( 392      ( 29 Aug 2024 04:15 )             44.6     08-29    143  |  98  |  26<H>  ----------------------------<  202<H>  4.0   |  30  |  <0.20<L>    Ca    10.0      29 Aug 2024 04:15  Phos  2.8     08-29  Mg     2.00     08-29        Urinalysis Basic - ( 29 Aug 2024 04:15 )    Color: x / Appearance: x / SG: x / pH: x  Gluc: 202 mg/dL / Ketone: x  / Bili: x / Urobili: x   Blood: x / Protein: x / Nitrite: x   Leuk Esterase: x / RBC: x / WBC x   Sq Epi: x / Non Sq Epi: x / Bacteria: x

## 2024-08-29 NOTE — PROGRESS NOTE ADULT - NS ATTEND BILL GEN_ALL_CORE
Attending to bill

## 2024-08-29 NOTE — PROGRESS NOTE ADULT - NS ATTEND OPT1 GEN_ALL_CORE
I attest my time as attending is greater than 50% of the total combined time spent on qualifying patient care activities by the PA/NP and attending.

## 2024-09-01 LAB
CULTURE RESULTS: SIGNIFICANT CHANGE UP
SPECIMEN SOURCE: SIGNIFICANT CHANGE UP

## 2024-09-17 ENCOUNTER — NON-APPOINTMENT (OUTPATIENT)
Age: 69
End: 2024-09-17

## 2024-09-18 ENCOUNTER — RX RENEWAL (OUTPATIENT)
Age: 69
End: 2024-09-18

## 2024-09-24 ENCOUNTER — NON-APPOINTMENT (OUTPATIENT)
Age: 69
End: 2024-09-24

## 2024-10-14 RX ORDER — ERYTHROMYCIN 5 MG/G
5 OINTMENT OPHTHALMIC
Qty: 1 | Refills: 5 | Status: ACTIVE | COMMUNITY
Start: 2024-10-14 | End: 1900-01-01

## 2024-10-16 NOTE — PATIENT PROFILE ADULT - NSPROREFERSVCHOMERESP_GEN_A_NUR
Time reflects when diagnosis was documented in both MDM as applicable and the Disposition within this note       Time User Action Codes Description Comment    10/16/2024  8:03 PM Ruth Travis [R51.9] Acute nonintractable headache, unspecified headache type     10/16/2024  8:03 PM Ruth Travis Add [R42] Lightheadedness     10/16/2024  8:03 PM Ruth Travis Add [I10] Hypertension, unspecified type     10/16/2024  8:03 PM Ruth Travis Add [H53.461] Quadrantanopsia, right           ED Disposition       ED Disposition   Discharge    Condition   Stable    Date/Time   Wed Oct 16, 2024  8:29 PM    Comment   Ayla Popeye discharge to home/self care.                   Assessment & Plan       Medical Decision Making  Patient is a 71 y.o. year-old female w/ hx of lung cancer with mets to the brain complicated by vasogenic edema on Keppra and chronic steroids, status post stereotactic radiation therapy earlier today and on immunotherapy with last infusion approximately week ago who presents with headache, dizziness, and elevated blood pressure x 1 day.  She denies visual disturbance, numbness/tingling/weakness, chest pain, shortness of breath, nausea/vomiting/diaphoresis, or any other complaints.  She previously was on antihypertensives which have been discontinued since her cancer diagnoses.  She has been on a steroid taper and now takes 2 mg/day and has radiation oncology appointment tomorrow to follow-up.    Exam with hypertension but otherwise stable vitals and afebrile.  She has a field cut in the right lower quadrant of the right eye but otherwise no focal neurodeficits on examination.    Differential diagnosis includes but is not limited to: Generalized headache, vasogenic edema, intracranial bleed, less likely ACS/cardiogenic source of dizziness.  I have a lower clinical suspicion for acute ischemic CVA despite visual field cut given that it there is not a last known normal and her prior MRI and CT imaging shows a  parieto-occipital defect that would explain her field cut.    Workup and treatment as below:    Imaging: CT with persistent vasogenic edema, no new ICH/CVA  EKG: See ED Course  Labs: See ED Course  Meds: N/A  Consults: N/A  Reassessment: Patient has stable vitals on reassessment and ambulated with normal gait in the ED.    Dispo: Patient was discharged to home with strict return precautions and plan to discuss escalating steroid therapy with Radiation Oncology during her appointment tomorrow. Patient acknowledged understanding of plan and diagnostic results, and all their questions were answered to their satisfaction.       Amount and/or Complexity of Data Reviewed  Labs: ordered. Decision-making details documented in ED Course.  Radiology: ordered.        ED Course as of 10/16/24 2116   Wed Oct 16, 2024   1950 CT brain with old L occipital CVA, no ICH   1951 EKG rate 75, normal sinus rhythm, normal axis/intervals with isolated T wave inversion in lead III without reciprocal changes.  This T wave inversion is new since August 21 of this year.   1951 WBC(!): 2.32   1951 Hemoglobin(!): 8.6   1951 Platelet Count(!): 128  Pancytopenia, new compared to prior   2002 hs TnI 0hr: 4   2002 Creatinine: 0.80  Stable from prior   2028 CXR with increased vascular congestion in R lung    2059 Radiology read of CT brain shows redemonstrated parieto-occipital vasogenic edema on the left.   2059 Patient has no acute complaints at this time with stable vitals and an unchanged neurologic examination.       Medications - No data to display    ED Risk Strat Scores                                               History of Present Illness       Chief Complaint   Patient presents with    Hypertension      today. Dx w metastatic cancer. Radiation every day. Was taken off BP meds in June.        Past Medical History:   Diagnosis Date    Allergic 2022    Allergic to neomiacin and cephalexin    Cancer (HCC)     lung cancer    Cancer  (HCC)     mets to brain    Cancer (HCC)     perianal mass    Cataract 2023    Due to have durgery 2024    Essential thrombocytosis (HCC)     controlled with medication    Hyperlipidemia     Hypertension     Mass in chest     Nodular goiter     Osteoporosis     Pneumonia Oct 16, 2023    Also  2024    Psoriasis     SIRS (systemic inflammatory response syndrome) (HCC) 2024      Past Surgical History:   Procedure Laterality Date    APPENDECTOMY      BREAST CYST EXCISION Right     COLONOSCOPY  10/31/2018    DXA PROCEDURE (HISTORICAL)  2017    IR BIOPSY OTHER  2024    IR LUMBAR PUNCTURE  2024    MAMMO (HISTORICAL)      18    MAMMO NEEDLE LOCALIZATION RIGHT (ALL INC) Right 2009    SKIN LESION EXCISION      bridge of nose      Family History   Problem Relation Age of Onset    Stroke Mother     Depression Mother             Hypertension Mother     Hearing loss Mother     Tuberculosis Father     Cancer Brother         lung    Tuberculosis Brother     Coronary artery disease Brother     Hypertension Brother     No Known Problems Daughter     No Known Problems Daughter     No Known Problems Maternal Grandmother     No Known Problems Maternal Grandfather     No Known Problems Paternal Grandmother     No Known Problems Paternal Grandfather     No Known Problems Maternal Aunt     No Known Problems Maternal Aunt     No Known Problems Maternal Aunt     No Known Problems Maternal Aunt     No Known Problems Paternal Aunt     Cancer Brother         Throat cancer      Social History     Tobacco Use    Smoking status: Former     Current packs/day: 1.00     Average packs/day: 1 pack/day for 58.8 years (58.8 ttl pk-yrs)     Types: Cigarettes     Start date:      Passive exposure: Past    Smokeless tobacco: Never    Tobacco comments:     Quit    Vaping Use    Vaping status: Never Used   Substance Use Topics    Alcohol use: Not Currently    Drug use: Never       E-Cigarette/Vaping    E-Cigarette Use Never User       E-Cigarette/Vaping Substances    Nicotine No     THC No     CBD No     Flavoring No     Other No     Unknown No       I have reviewed and agree with the history as documented.     71 y.o. year-old female w/ hx of lung cancer with mets to the brain complicated by vasogenic edema on Keppra and chronic steroids, status post stereotactic radiation therapy earlier today and on immunotherapy with last infusion approximately week ago who presents with headache, dizziness, and elevated blood pressure x 1 day.  She denies visual disturbance, numbness/tingling/weakness, chest pain, shortness of breath, nausea/vomiting/diaphoresis, or any other complaints.  She previously was on antihypertensives which have been discontinued since her cancer diagnoses.  She has been on a steroid taper and now takes 2 mg/day and has radiation oncology appointment tomorrow to follow-up.      Hypertension  Associated symptoms: no abdominal pain, no chest pain, no dizziness, no ear pain, no fever, no headaches, no hematuria, no nausea, no palpitations, no shortness of breath, not vomiting and no weakness        Review of Systems   Constitutional:  Negative for chills and fever.   HENT:  Negative for ear pain and sore throat.    Eyes:  Negative for pain and visual disturbance.   Respiratory:  Negative for cough and shortness of breath.    Cardiovascular:  Negative for chest pain and palpitations.   Gastrointestinal:  Negative for abdominal pain, blood in stool, constipation, diarrhea, nausea and vomiting.   Genitourinary:  Negative for dysuria and hematuria.   Musculoskeletal:  Negative for arthralgias and back pain.   Skin:  Negative for color change and rash.   Neurological:  Positive for light-headedness. Negative for dizziness, seizures, syncope, facial asymmetry, speech difficulty, weakness, numbness and headaches.   All other systems reviewed and are negative.          Objective        ED Triage Vitals [10/16/24 1828]   Temperature Pulse Blood Pressure Respirations SpO2 Patient Position - Orthostatic VS   98 °F (36.7 °C) 88 (!) 177/93 16 96 % Sitting      Temp Source Heart Rate Source BP Location FiO2 (%) Pain Score    Oral Monitor;Left Right arm -- --      Vitals      Date and Time Temp Pulse SpO2 Resp BP Pain Score FACES Pain Rating User   10/16/24 2030 -- 71 97 % 16 157/79 -- -- R   10/16/24 1915 -- 75 98 % 16 164/78 -- -- R   10/16/24 1828 98 °F (36.7 °C) 88 96 % 16 177/93 -- -- C(S            Physical Exam  Vitals and nursing note reviewed.   Constitutional:       General: She is not in acute distress.     Appearance: She is well-developed.   HENT:      Head: Normocephalic and atraumatic.   Eyes:      Extraocular Movements: Extraocular movements intact.      Conjunctiva/sclera: Conjunctivae normal.      Pupils: Pupils are equal, round, and reactive to light.   Cardiovascular:      Rate and Rhythm: Normal rate and regular rhythm.      Heart sounds: No murmur heard.  Pulmonary:      Effort: Pulmonary effort is normal. No respiratory distress.      Breath sounds: Normal breath sounds.   Abdominal:      Palpations: Abdomen is soft.      Tenderness: There is no abdominal tenderness.   Musculoskeletal:         General: No swelling.      Cervical back: Neck supple.   Skin:     General: Skin is warm and dry.      Capillary Refill: Capillary refill takes less than 2 seconds.   Neurological:      Mental Status: She is alert and oriented to person, place, and time.      Sensory: No sensory deficit.      Motor: No weakness.      Coordination: Coordination normal.      Gait: Gait normal.      Comments: R eye with R inferolateral field cut. Otherwise CN II-XII intact   Psychiatric:         Mood and Affect: Mood normal.         Results Reviewed       Procedure Component Value Units Date/Time    HS Troponin I 4hr [[id number]]     Lab Status: No result Specimen: Blood     Basic metabolic panel  [[id number]] Collected: 10/16/24 1929    Lab Status: Final result Specimen: Blood from Arm, Right Updated: 10/16/24 2001     Sodium 139 mmol/L      Potassium 4.1 mmol/L      Chloride 108 mmol/L      CO2 25 mmol/L      ANION GAP 6 mmol/L      BUN 18 mg/dL      Creatinine 0.80 mg/dL      Glucose 85 mg/dL      Calcium 8.7 mg/dL      eGFR 73 ml/min/1.73sq m     Narrative:      [employer] guidelines for Chronic Kidney Disease (CKD):     Stage 1 with normal or high GFR (GFR > 90 mL/min/1.73 square meters)    Stage 2 Mild CKD (GFR = 60-89 mL/min/1.73 square meters)    Stage 3A Moderate CKD (GFR = 45-59 mL/min/1.73 square meters)    Stage 3B Moderate CKD (GFR = 30-44 mL/min/1.73 square meters)    Stage 4 Severe CKD (GFR = 15-29 mL/min/1.73 square meters)    Stage 5 End Stage CKD (GFR <15 mL/min/1.73 square meters)  Note: GFR calculation is accurate only with a steady state creatinine    HS Troponin I 2hr [[id number]]     Lab Status: No result Specimen: Blood     HS Troponin 0hr (reflex protocol) [[id number]]  (Normal) Collected: 10/16/24 1929    Lab Status: Final result Specimen: Blood from Arm, Right Updated: 10/16/24 1957     hs TnI 0hr 4 ng/L     Protime-INR [[id number]]  (Normal) Collected: 10/16/24 1929    Lab Status: Final result Specimen: Blood from Arm, Right Updated: 10/16/24 1946     Protime 13.6 seconds      INR 0.97    Narrative:      INR Therapeutic Range    Indication                                             INR Range      Atrial Fibrillation                                               2.0-3.0  Hypercoagulable State                                    2.0.2.3  Left Ventricular Asist Device                            2.0-3.0  Mechanical Heart Valve                                  -    Aortic(with afib, MI, embolism, HF, LA enlargement,    and/or coagulopathy)                                     2.0-3.0 (2.5-3.5)     Mitral                                                              2.5-3.5  Prosthetic/Bioprosthetic Heart Valve               2.0-3.0  Venous thromboembolism (VTE: VT, PE        2.0-3.0    APTT [[id number]]  (Normal) Collected: 10/16/24 1929    Lab Status: Final result Specimen: Blood from Arm, Right Updated: 10/16/24 1946     PTT 29 seconds     CBC and differential [[id number]]  (Abnormal) Collected: 10/16/24 1929    Lab Status: Final result Specimen: Blood from Arm, Right Updated: 10/16/24 1938     WBC 2.32 Thousand/uL      RBC 2.65 Million/uL      Hemoglobin 8.6 g/dL      Hematocrit 26.7 %       fL      MCH 32.5 pg      MCHC 32.2 g/dL      RDW 13.4 %      MPV 8.3 fL      Platelets 128 Thousands/uL      nRBC 0 /100 WBCs      Segmented % 68 %      Immature Grans % 2 %      Lymphocytes % 9 %      Monocytes % 20 %      Eosinophils Relative 1 %      Basophils Relative 0 %      Absolute Neutrophils 1.58 Thousands/µL      Absolute Immature Grans 0.04 Thousand/uL      Absolute Lymphocytes 0.21 Thousands/µL      Absolute Monocytes 0.47 Thousand/µL      Eosinophils Absolute 0.02 Thousand/µL      Basophils Absolute 0.00 Thousands/µL     Fingerstick Glucose (POCT) [[id number]]  (Normal) Collected: 10/16/24 1924    Lab Status: Final result Specimen: Blood Updated: 10/16/24 1925     POC Glucose 77 mg/dl             CT head without contrast   Final Interpretation by Maik Santoyo MD (10/16 2041)      Redemonstration of left parieto-occipital vasogenic edema in this patient with known intracranial metastatic disease status post treatment. Correlate with recent brain MRI performed 10/8/2024.      Otherwise no CT evidence for a new large acute vascular description infarct or acute intracranial hemorrhage.                  Workstation performed: [id number]         XR chest 1 view portable    (Results Pending)       Procedures    ED Medication and Procedure Management   Prior to Admission Medications   Prescriptions Last Dose Informant Patient Reported? Taking?   Cholecalciferol (VITAMIN D3) 5000  units TABS  Self Yes No   Sig: Take 2,000 Units by mouth Daily   HYDROmorphone (DILAUDID) 2 mg tablet   No No   Sig: Take 1 tablet (2 mg total) by mouth every 4 (four) hours as needed for severe pain 1 tab po q 4 hrs prn pain/dyspnea Max Daily Amount: 12 mg   Lidocaine 4 % PTCH  Self Yes No   Sig: Apply topically 4% applies to buttocks as needed every 8 to 12 hours   Patient not taking: Reported on 10/10/2024   MELATONIN PO   Yes No   Sig: Take 6 mg by mouth daily at bedtime   Probiotic Product (PROBIOTIC DAILY PO)  Self Yes No   Sig: Take 1 capsule by mouth daily   Psyllium (METAMUCIL PO)  Self Yes No   Sig: Take by mouth Gummies for constipation   acetaminophen (TYLENOL) 325 mg tablet   Yes No   Sig: Take 325 mg by mouth every 6 (six) hours as needed for mild pain Taking 3 tabs every 6 hours   aspirin 81 mg chewable tablet  Self Yes No   Sig: Chew 81 mg daily.   celecoxib (CeleBREX) 100 mg capsule  Self No No   Sig: Take 2 capsules (200 mg total) by mouth 2 (two) times a day   dexamethasone (DECADRON) 1 mg tablet   No No   Sig: Take 1 tablet (1 mg total) by mouth daily with breakfast for 7 days, THEN 0.5 tablets (0.5 mg total) daily with breakfast for 8 days. Do not start before October 17, 2024.   dexamethasone (DECADRON) 2 mg tablet   No No   Sig: Take 1 tablet (2 mg total) by mouth 2 (two) times a day with meals   diphenhydrAMINE (BENADRYL) 25 mg capsule  Self Yes No   Sig: Take 25 mg by mouth every 12 (twelve) hours as needed for itching   folic acid (FOLVITE) 1 mg tablet  Self No No   Sig: Take 1 tablet (1 mg total) by mouth daily   gabapentin (NEURONTIN) 300 mg capsule   No No   Sig: Take 1 capsule (300 mg total) by mouth daily at bedtime   levETIRAcetam (Keppra) 750 mg tablet   No No   Sig: Take 1 tablet (750 mg total) by mouth 2 (two) times a day   pantoprazole (PROTONIX) 40 mg tablet  Self No No   Sig: TAKE 1 TABLET(40 MG) BY MOUTH DAILY EARLY MORNING   vitamin B-12 (VITAMIN B-12) 1,000 mcg tablet  Self  No No   Sig: Take 1 tablet (1,000 mcg total) by mouth daily      Facility-Administered Medications: None     Discharge Medication List as of 10/16/2024  8:29 PM        CONTINUE these medications which have NOT CHANGED    Details   acetaminophen (TYLENOL) 325 mg tablet Take 325 mg by mouth every 6 (six) hours as needed for mild pain Taking 3 tabs every 6 hours, Historical Med      aspirin 81 mg chewable tablet Chew 81 mg daily., Starting Thu 8/1/2019, Historical Med      celecoxib (CeleBREX) 100 mg capsule Take 2 capsules (200 mg total) by mouth 2 (two) times a day, Starting Tue 9/17/2024, Normal      Cholecalciferol (VITAMIN D3) 5000 units TABS Take 2,000 Units by mouth Daily, Historical Med      !! dexamethasone (DECADRON) 1 mg tablet Multiple Dosages:Starting Thu 10/17/2024, Until Wed 10/23/2024 at 2359, THEN Starting Thu 10/24/2024, Until Thu 10/31/2024 at 2359Take 1 tablet (1 mg total) by mouth daily with breakfast for 7 days, THEN 0.5 tablets (0.5 mg total) daily with breakfast  for 8 days. Do not start before October 17, 2024., Normal      !! dexamethasone (DECADRON) 2 mg tablet Take 1 tablet (2 mg total) by mouth 2 (two) times a day with meals, Starting Tue 10/8/2024, Normal      diphenhydrAMINE (BENADRYL) 25 mg capsule Take 25 mg by mouth every 12 (twelve) hours as needed for itching, Historical Med      folic acid (FOLVITE) 1 mg tablet Take 1 tablet (1 mg total) by mouth daily, Starting Sat 8/17/2024, Normal      gabapentin (NEURONTIN) 300 mg capsule Take 1 capsule (300 mg total) by mouth daily at bedtime, Starting Thu 10/10/2024, Normal      HYDROmorphone (DILAUDID) 2 mg tablet Take 1 tablet (2 mg total) by mouth every 4 (four) hours as needed for severe pain 1 tab po q 4 hrs prn pain/dyspnea Max Daily Amount: 12 mg, Starting Thu 10/10/2024, Normal      levETIRAcetam (Keppra) 750 mg tablet Take 1 tablet (750 mg total) by mouth 2 (two) times a day, Starting Tue 10/8/2024, Until Thu 11/7/2024, Normal       Lidocaine 4 % PTCH Apply topically 4% applies to buttocks as needed every 8 to 12 hours, Historical Med      MELATONIN PO Take 6 mg by mouth daily at bedtime, Historical Med      pantoprazole (PROTONIX) 40 mg tablet TAKE 1 TABLET(40 MG) BY MOUTH DAILY EARLY MORNING, Normal      Probiotic Product (PROBIOTIC DAILY PO) Take 1 capsule by mouth daily, Historical Med      Psyllium (METAMUCIL PO) Take by mouth Gummies for constipation, Historical Med      vitamin B-12 (VITAMIN B-12) 1,000 mcg tablet Take 1 tablet (1,000 mcg total) by mouth daily, Starting Thu 9/14/2023, Normal       !! - Potential duplicate medications found. Please discuss with provider.        No discharge procedures on file.  ED SEPSIS DOCUMENTATION   Time reflects when diagnosis was documented in both MDM as applicable and the Disposition within this note       Time User Action Codes Description Comment    10/16/2024  8:03 PM Ruth Travis [R51.9] Acute nonintractable headache, unspecified headache type     10/16/2024  8:03 PM Ruth Travis [R42] Lightheadedness     10/16/2024  8:03 PM Ruth Travis [I10] Hypertension, unspecified type     10/16/2024  8:03 PM Ruth Travis [H53.461] Quadrantanopsia, right                  Ruth Travis MD  10/17/24 4519     yes

## 2024-11-13 ENCOUNTER — RX RENEWAL (OUTPATIENT)
Age: 69
End: 2024-11-13

## 2024-12-06 ENCOUNTER — RX RENEWAL (OUTPATIENT)
Age: 69
End: 2024-12-06

## 2024-12-10 ENCOUNTER — RX RENEWAL (OUTPATIENT)
Age: 69
End: 2024-12-10

## 2025-01-08 ENCOUNTER — RX RENEWAL (OUTPATIENT)
Age: 70
End: 2025-01-08

## 2025-01-08 DIAGNOSIS — L98.429 NON-PRESSURE CHRONIC ULCER OF BACK WITH UNSPECIFIED SEVERITY: ICD-10-CM

## 2025-01-08 RX ORDER — COLLAGENASE SANTYL 250 [ARB'U]/G
250 OINTMENT TOPICAL DAILY
Qty: 1 | Refills: 0 | Status: ACTIVE | COMMUNITY
Start: 2025-01-08 | End: 1900-01-01

## 2025-01-08 RX ORDER — AMINO ACIDS/PROTEIN HYDROLYS 17G-100/30
LIQUID (ML) ORAL
Qty: 1800 | Refills: 3 | Status: ACTIVE | COMMUNITY
Start: 2025-01-08 | End: 1900-01-01

## 2025-01-31 ENCOUNTER — NON-APPOINTMENT (OUTPATIENT)
Age: 70
End: 2025-01-31

## 2025-02-07 ENCOUNTER — NON-APPOINTMENT (OUTPATIENT)
Age: 70
End: 2025-02-07

## 2025-02-07 DIAGNOSIS — L89.304 PRESSURE ULCER OF UNSPECIFIED BUTTOCK, STAGE 4: ICD-10-CM

## 2025-02-11 ENCOUNTER — RX RENEWAL (OUTPATIENT)
Age: 70
End: 2025-02-11

## 2025-02-24 RX ORDER — COLLAGENASE SANTYL 250 [ARB'U]/G
250 OINTMENT TOPICAL DAILY
Qty: 1 | Refills: 0 | Status: ACTIVE | COMMUNITY
Start: 2025-02-24 | End: 1900-01-01

## 2025-02-24 RX ORDER — SODIUM HYPOCHLORITE 2.5 MG/ML
0.25 SOLUTION TOPICAL
Qty: 2 | Refills: 0 | Status: ACTIVE | COMMUNITY
Start: 2025-02-24 | End: 1900-01-01

## 2025-03-17 NOTE — CHART NOTE - NSCHARTNOTEFT_GEN_A_CORE
[facility]    Notified by RN that a maggot was found on the patient's bed after she was turned - it was found near her head, unclear if it is from her trach stoma.  Patient seen at bedside, non-verbal at baseline.  Small live maggot found on bed, collected in cup at bedside, no additional maggots found on inspection of patient's bed and surrounding environment.  Tracheal suction performed by RN at bedside, copious secretions noted, no additional maggots found in suction output.  No maggots visualized when oral suctioning performed.  Monitor for additional maggots, notify CT Surgery in AM given pt is s/p trach exchange in OR on 8/12/2024.    Vital Signs Last 24 Hrs  T(C): 36.4 (18 Aug 2024 00:00), Max: 36.4 (17 Aug 2024 20:00)  T(F): 97.5 (18 Aug 2024 00:00), Max: 97.6 (17 Aug 2024 20:00)  HR: 97 (18 Aug 2024 00:00) (95 - 102)  BP: 121/74 (18 Aug 2024 00:00) (101/64 - 121/74)  BP(mean): 86 (18 Aug 2024 00:00) (76 - 86)  RR: 16 (18 Aug 2024 00:00) (16 - 16)  SpO2: 100% (18 Aug 2024 00:00) (96% - 100%)  O2 Parameters below as of 18 Aug 2024 00:00  Patient On (Oxygen Delivery Method): ventilator    Chase Hernandez PA-C  Department of Hospital Medicine - Night [facility]  In-House Pager: #[phone] Initiate Treatment: triamcinolone acetonide 0.1 % topical cream BID-Apply to rash on arms BID x 2 weeks PRN flares. Avoid face and groin Plan: Follow up in 4 weeks Detail Level: Zone

## 2025-04-30 ENCOUNTER — RX RENEWAL (OUTPATIENT)
Age: 70
End: 2025-04-30

## 2025-05-06 ENCOUNTER — RX RENEWAL (OUTPATIENT)
Age: 70
End: 2025-05-06

## 2025-05-20 ENCOUNTER — RX RENEWAL (OUTPATIENT)
Age: 70
End: 2025-05-20

## 2025-05-23 NOTE — PROGRESS NOTE ADULT - ASSESSMENT
ASSESSMENT   67 YO Female with PMHx of ALS, Parkinson,, nonverbal, bed bound, chronic respiratory failure with tracheostomy and vent dependence, and oropharyngeal dysphagia with PEG who presented from home with tracheostomy leak. She was noted with vent alarm and poor TVe (250-290) at home. Trach changed at home with no improvement and sent in for thoracic evaluation. While in MICU no air leak noted with hyperinflated cuff. Transferred to [facility] on 7/5 with course complicated by leukocytosis second to UTI vs trachitis and completed zosyn course. Trach upsized by thoracic on 7/17 however AL remains and now pending custom trach.     PLAN  NEUROLOGY  # ALS   - Baseline nonverbal, awake, and communicates with eye movement.   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   - Continue on Ofloxacin BID (decreased 8/1)   - Continue on Erythromycin OU Q3H (decreased 8/8)  - Continue with artificial tears OU Q1H   - Create moisture chamber with Tegaderm following placement of EXTENSIVE ointment to the right eye throughout the day and to the left eye nightly.   - Allow 6 hour window during the day where moisture can be removed from right eye   - Inflammation appears to be improving   - Ophtho f/u yesterday 8/15: exam OU with resolved epithelial defect but persistent chronic corneal changes - continue with above regimen    PSYCH   # Anxiety and Depression   - Continue on home Valium 2mg BID with additional 2mg PRN   - Continue on Zoloft 75mg QD while in house (on 4cc/ 80mg QD at home)     CARDIOVASCULAR   # HTN thought to be second to discomfort vs dysautonomia   - Evening HTN and continue on Hydralazine vs valium PRN doses   - Monitor HR and BP    # pSVT  - Intermittent episodes of SVT, lasting a few seconds at a time, and self limiting.   - Consider BB if becomes more persistent or HD unstable    RESPIRATORY  # Tracheostomy leak   - At home noted with TVe 200-300s despite tracheostomy change to 80XLTCD.   - No air leak noted in house with hyperinflated cuff likely tracheomegaly?   - Continue on chronic vent 16/400/5/21   - Chronic bibasilar atelectasis and continued on HTS PRN with chest PT  - Trach upsized to Bivona 9 on 7/17, however trach leak remains.   - Custom Tracheostomy delivered and exchanged on Monday 8/12  - Pt has a 9.5mm Bovana, Second spare trach was defective - Per thoracic team 2 spare trachs have been ordered with possible delivery Fri BY OR manager April- spectra #  [id number]  - s/p Family brought in home vent for trial with new trach in place  - Biomed cleared vent for use  - Spoke to April in OR and trachs  sterilized and one balloon burst one is intact but no longer sterile Team to decide if pt can leave with trach not sterile or needs to be sterilized by  and do we need a second spare      GI  # Dysphagia   - Continue on PEG-TF     RENAL  # High PVR   - BS with roughly 200-300cc PVR, however no acute distress   - Monitor renal function and UOP     # Elevated lactate   - Lactate elevated with mild contraction alkalosis   - Rehydrate with improvement     INFECTIOUS DISEASE  # Leukocytosis likely second to trachitis vs UTI?   - No fever or chills and noted with prior leukocytosis   - CXR with prior atelectasis and no acute findings   - UA (7/6) with positive nitrites (7/6)   - SCx (7/5) with pansensitive PSA  - BCx (7/6) x 2 NGT  - UCx (7/6) with coag neg staph  - MRSA PCR (7/6) POSITIVE for both MRSA/MSSA s/p Bactroban (7/6 - 7/10)  - s/p empiric Zosyn (7/8 - 7/14) however WBC continues to wax and wean w/o fever  - Will continue to monitor off ABX .     HEME  - c/w Home Xarelto 10mg - likely for DVT PPX?     ENDOCRINE  - No hx of DM2   - Monitor BG     SKIN  - Basic trach and PEG care ordered   - DTI on back and pending [facility]    ETHICS/ GOC    - FULL CODE   - Dr Tyson Velasquez,  involved in care    DISPO - Back home with  when able.     ASSESSMENT   69 YO Female with PMHx of ALS, Parkinson,, nonverbal, bed bound, chronic respiratory failure with tracheostomy and vent dependence, and oropharyngeal dysphagia with PEG who presented from home with tracheostomy leak. She was noted with vent alarm and poor TVe (250-290) at home. Trach changed at home with no improvement and sent in for thoracic evaluation. While in MICU no air leak noted with hyperinflated cuff. Transferred to [facility] on 7/5 with course complicated by leukocytosis second to UTI vs trachitis and completed zosyn course. Trach upsized by thoracic on 7/17 however AL remains and now s/p 9.5mm Bivona custom trach.     PLAN  NEUROLOGY  # ALS   - Baseline nonverbal, awake, and communicates with eye movement.   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   - Continue on Ofloxacin BID (decreased 8/1)   - Continue on Erythromycin OU Q3H (decreased 8/8)  - Continue with artificial tears OU Q1H   - Create moisture chamber with Tegaderm following placement of EXTENSIVE ointment to the right eye throughout the day and to the left eye nightly.   - Allow 6 hour window during the day where moisture can be removed from right eye   - Inflammation appears to be improving   - Ophtho f/u yesterday 8/15: exam OU with resolved epithelial defect but persistent chronic corneal changes - continue with above regimen  - Seen by Ophtho again 8/22     PSYCH   # Anxiety and Depression   - Continue on home Valium 2mg BID with additional 2mg PRN   - Continue on Zoloft 75mg QD while in house (on 4cc/ 80mg QD at home)     CARDIOVASCULAR   # HTN thought to be second to discomfort vs dysautonomia   - Evening HTN and continue on Hydralazine vs valium PRN doses   - Monitor HR and BP    # pSVT  - Intermittent episodes of SVT, lasting a few seconds at a time, and self limiting.   - Consider BB if becomes more persistent or HD unstable    RESPIRATORY  # Tracheostomy leak   - At home noted with TVe 200-300s despite tracheostomy change to 80XLTCD.   - No air leak noted in house with hyperinflated cuff likely tracheomegaly?   - Continue on chronic vent 16/400/5/21   - Chronic bibasilar atelectasis and continued on HTS PRN with chest PT  - Trach upsized to Bivona 9 on 7/17, however trach leak remains.   - Custom Tracheostomy delivered and exchanged on Monday 8/12  - Pt has a 9.5mm Bovana, Second spare trach was defective - Per thoracic team 2 spare trachs have been ordered with possible delivery Fri BY OR manager April- spectra #  [id number]  - s/p Family brought in home vent for trial with new trach in place  - Biomed cleared vent for use  - Spoke to April in OR and trachs  sterilized and one balloon burst one is intact but no longer sterile Team to decide if pt can leave with trach not sterile or needs to be sterilized by  and do we need a second spare    -8/22 - Patient with spare trach present however unclear whether balloon is intact. Will d/w  to obtain additional spare custom trach tubes     GI  # Dysphagia   - Continue on PEG-TF     RENAL  # High PVR   - BS with roughly 200-300cc PVR, however no acute distress   - Monitor renal function and UOP     # Elevated lactate   - Lactate elevated with mild contraction alkalosis   - Rehydrate with improvement     INFECTIOUS DISEASE  # Leukocytosis likely second to trachitis vs UTI?   - No fever or chills and noted with prior leukocytosis   - CXR with prior atelectasis and no acute findings   - UA (7/6) with positive nitrites (7/6)   - SCx (7/5) with pansensitive PSA  - BCx (7/6) x 2 NGT  - UCx (7/6) with coag neg staph  - MRSA PCR (7/6) POSITIVE for both MRSA/MSSA s/p Bactroban (7/6 - 7/10)  - s/p empiric Zosyn (7/8 - 7/14) however WBC continues to wax and wean w/o fever  - Will continue to monitor off ABX .     HEME  - c/w Home Xarelto 10mg - likely for DVT PPX?     ENDOCRINE  - No hx of DM2   - Monitor BG     SKIN  - Basic trach and PEG care ordered   - DTI on back and pending [facility]    ETHICS/ GOC    - FULL CODE   - Dr Tyson Velasquez,  involved in care    DISPO - Back home with  when able.     30-May-2025

## 2025-05-27 DIAGNOSIS — H60.90 UNSPECIFIED OTITIS EXTERNA, UNSPECIFIED EAR: ICD-10-CM

## 2025-05-28 RX ORDER — NEOMYCIN AND POLYMYXIN B SULFATES AND HYDROCORTISONE OTIC 10; 3.5; 1 MG/ML; MG/ML; [USP'U]/ML
3.5-10000-1 SUSPENSION AURICULAR (OTIC) 4 TIMES DAILY
Qty: 1 | Refills: 0 | Status: ACTIVE | COMMUNITY
Start: 2025-05-28 | End: 1900-01-01

## 2025-05-28 RX ORDER — COLISTIN SULFATE, NEOMYCIN SULFATE, THONZONIUM BROMIDE AND HYDROCORTISONE ACETATE 3; 3.3; .5; 1 MG/ML; MG/ML; MG/ML; MG/ML
3.3-3-10-0.5 SUSPENSION AURICULAR (OTIC) 4 TIMES DAILY
Qty: 1 | Refills: 0 | Status: COMPLETED | COMMUNITY
Start: 2025-05-27 | End: 2025-05-28

## 2025-06-19 RX ORDER — SODIUM HYPOCHLORITE 2.5 MG/ML
0.25 SOLUTION TOPICAL
Qty: 2 | Refills: 5 | Status: ACTIVE | COMMUNITY
Start: 2025-06-19 | End: 1900-01-01

## 2025-06-20 ENCOUNTER — RX RENEWAL (OUTPATIENT)
Age: 70
End: 2025-06-20

## 2025-07-25 ENCOUNTER — RX RENEWAL (OUTPATIENT)
Age: 70
End: 2025-07-25

## 2025-07-30 ENCOUNTER — INPATIENT (INPATIENT)
Facility: HOSPITAL | Age: 70
LOS: 21 days | Discharge: HOME | End: 2025-08-21
Attending: INTERNAL MEDICINE | Admitting: INTERNAL MEDICINE
Payer: MEDICARE

## 2025-07-30 DIAGNOSIS — Z43.1 ENCOUNTER FOR ATTENTION TO GASTROSTOMY: Chronic | ICD-10-CM

## 2025-07-30 DIAGNOSIS — Z93.1 GASTROSTOMY STATUS: Chronic | ICD-10-CM

## 2025-07-30 DIAGNOSIS — Z93.0 TRACHEOSTOMY STATUS: Chronic | ICD-10-CM

## 2025-07-30 PROCEDURE — 99291 CRITICAL CARE FIRST HOUR: CPT

## 2025-07-31 VITALS — RESPIRATION RATE: 14 BRPM | OXYGEN SATURATION: 97 % | HEART RATE: 108 BPM

## 2025-07-31 DIAGNOSIS — J95.00 UNSPECIFIED TRACHEOSTOMY COMPLICATION: ICD-10-CM

## 2025-07-31 LAB
ADD ON TEST-SPECIMEN IN LAB: SIGNIFICANT CHANGE UP
ALBUMIN SERPL ELPH-MCNC: 3.3 G/DL — SIGNIFICANT CHANGE UP (ref 3.3–5)
ALP SERPL-CCNC: 170 U/L — HIGH (ref 40–120)
ALT FLD-CCNC: 29 U/L — SIGNIFICANT CHANGE UP (ref 4–33)
ANION GAP SERPL CALC-SCNC: 15 MMOL/L — HIGH (ref 7–14)
APPEARANCE UR: ABNORMAL
APTT BLD: 33.5 SEC — SIGNIFICANT CHANGE UP (ref 26.1–36.8)
AST SERPL-CCNC: 44 U/L — HIGH (ref 4–32)
B PERT DNA SPEC QL NAA+PROBE: SIGNIFICANT CHANGE UP
B PERT+PARAPERT DNA PNL SPEC NAA+PROBE: SIGNIFICANT CHANGE UP
BACTERIA # UR AUTO: ABNORMAL /HPF
BASOPHILS # BLD AUTO: 0.06 K/UL — SIGNIFICANT CHANGE UP (ref 0–0.2)
BASOPHILS NFR BLD AUTO: 0.3 % — SIGNIFICANT CHANGE UP (ref 0–2)
BILIRUB SERPL-MCNC: 0.6 MG/DL — SIGNIFICANT CHANGE UP (ref 0.2–1.2)
BILIRUB UR-MCNC: NEGATIVE — SIGNIFICANT CHANGE UP
BLD GP AB SCN SERPL QL: NEGATIVE — SIGNIFICANT CHANGE UP
BLOOD GAS ARTERIAL COMPREHENSIVE RESULT: SIGNIFICANT CHANGE UP
BUN SERPL-MCNC: 16 MG/DL — SIGNIFICANT CHANGE UP (ref 7–23)
C PNEUM DNA SPEC QL NAA+PROBE: SIGNIFICANT CHANGE UP
CALCIUM SERPL-MCNC: 9 MG/DL — SIGNIFICANT CHANGE UP (ref 8.4–10.5)
CHLORIDE SERPL-SCNC: 95 MMOL/L — LOW (ref 98–107)
CO2 SERPL-SCNC: 22 MMOL/L — SIGNIFICANT CHANGE UP (ref 22–31)
COD CRY URNS QL: PRESENT
COLOR SPEC: YELLOW — SIGNIFICANT CHANGE UP
COMMENT - URINE: SIGNIFICANT CHANGE UP
CREAT SERPL-MCNC: <0.2 MG/DL — LOW (ref 0.5–1.3)
DIFF PNL FLD: NEGATIVE — SIGNIFICANT CHANGE UP
EGFR: 124 ML/MIN/1.73M2 — SIGNIFICANT CHANGE UP
EGFR: 124 ML/MIN/1.73M2 — SIGNIFICANT CHANGE UP
EOSINOPHIL # BLD AUTO: 0.24 K/UL — SIGNIFICANT CHANGE UP (ref 0–0.5)
EOSINOPHIL NFR BLD AUTO: 1.3 % — SIGNIFICANT CHANGE UP (ref 0–6)
EPI CELLS # UR: PRESENT
FLUAV AG NPH QL: SIGNIFICANT CHANGE UP
FLUAV SUBTYP SPEC NAA+PROBE: SIGNIFICANT CHANGE UP
FLUBV AG NPH QL: SIGNIFICANT CHANGE UP
FLUBV RNA SPEC QL NAA+PROBE: SIGNIFICANT CHANGE UP
GAS PNL BLDA: SIGNIFICANT CHANGE UP
GLUCOSE BLDC GLUCOMTR-MCNC: 186 MG/DL — HIGH (ref 70–99)
GLUCOSE SERPL-MCNC: 122 MG/DL — HIGH (ref 70–99)
GLUCOSE UR QL: NEGATIVE MG/DL — SIGNIFICANT CHANGE UP
GRAM STN FLD: SIGNIFICANT CHANGE UP
HADV DNA SPEC QL NAA+PROBE: SIGNIFICANT CHANGE UP
HCOV 229E RNA SPEC QL NAA+PROBE: SIGNIFICANT CHANGE UP
HCOV HKU1 RNA SPEC QL NAA+PROBE: SIGNIFICANT CHANGE UP
HCOV NL63 RNA SPEC QL NAA+PROBE: SIGNIFICANT CHANGE UP
HCOV OC43 RNA SPEC QL NAA+PROBE: SIGNIFICANT CHANGE UP
HCT VFR BLD CALC: 37.2 % — SIGNIFICANT CHANGE UP (ref 34.5–45)
HGB BLD-MCNC: 11.1 G/DL — LOW (ref 11.5–15.5)
HMPV RNA SPEC QL NAA+PROBE: SIGNIFICANT CHANGE UP
HPIV1 RNA SPEC QL NAA+PROBE: SIGNIFICANT CHANGE UP
HPIV2 RNA SPEC QL NAA+PROBE: SIGNIFICANT CHANGE UP
HPIV3 RNA SPEC QL NAA+PROBE: SIGNIFICANT CHANGE UP
HPIV4 RNA SPEC QL NAA+PROBE: SIGNIFICANT CHANGE UP
IMM GRANULOCYTES # BLD AUTO: 0.2 K/UL — HIGH (ref 0–0.07)
IMM GRANULOCYTES NFR BLD AUTO: 1.1 % — HIGH (ref 0–0.9)
INR BLD: 1.14 RATIO — SIGNIFICANT CHANGE UP (ref 0.85–1.16)
KETONES UR QL: NEGATIVE MG/DL — SIGNIFICANT CHANGE UP
LACTATE BLDV-MCNC: 3.7 MMOL/L — HIGH (ref 0.5–2)
LEUKOCYTE ESTERASE UR-ACNC: ABNORMAL
LYMPHOCYTES # BLD AUTO: 1.16 K/UL — SIGNIFICANT CHANGE UP (ref 1–3.3)
LYMPHOCYTES NFR BLD AUTO: 6.5 % — LOW (ref 13–44)
M PNEUMO DNA SPEC QL NAA+PROBE: SIGNIFICANT CHANGE UP
MAGNESIUM SERPL-MCNC: 2.1 MG/DL — SIGNIFICANT CHANGE UP (ref 1.6–2.6)
MCHC RBC-ENTMCNC: 23.9 PG — LOW (ref 27–34)
MCHC RBC-ENTMCNC: 29.8 G/DL — LOW (ref 32–36)
MCV RBC AUTO: 80.2 FL — SIGNIFICANT CHANGE UP (ref 80–100)
MONOCYTES # BLD AUTO: 0.89 K/UL — SIGNIFICANT CHANGE UP (ref 0–0.9)
MONOCYTES NFR BLD AUTO: 5 % — SIGNIFICANT CHANGE UP (ref 2–14)
MRSA PCR RESULT.: DETECTED
NEUTROPHILS # BLD AUTO: 15.23 K/UL — HIGH (ref 1.8–7.4)
NEUTROPHILS NFR BLD AUTO: 85.8 % — HIGH (ref 43–77)
NITRITE UR-MCNC: NEGATIVE — SIGNIFICANT CHANGE UP
NRBC # BLD AUTO: 0 K/UL — SIGNIFICANT CHANGE UP (ref 0–0)
NRBC # FLD: 0 K/UL — SIGNIFICANT CHANGE UP (ref 0–0)
NRBC BLD AUTO-RTO: 0 /100 WBCS — SIGNIFICANT CHANGE UP (ref 0–0)
PH UR: 6.5 — SIGNIFICANT CHANGE UP (ref 5–8)
PHOSPHATE SERPL-MCNC: 4 MG/DL — SIGNIFICANT CHANGE UP (ref 2.5–4.5)
PLATELET # BLD AUTO: 531 K/UL — HIGH (ref 150–400)
PMV BLD: 9.9 FL — SIGNIFICANT CHANGE UP (ref 7–13)
POTASSIUM SERPL-MCNC: 4.5 MMOL/L — SIGNIFICANT CHANGE UP (ref 3.5–5.3)
POTASSIUM SERPL-SCNC: 4.5 MMOL/L — SIGNIFICANT CHANGE UP (ref 3.5–5.3)
PROT SERPL-MCNC: 7.8 G/DL — SIGNIFICANT CHANGE UP (ref 6–8.3)
PROT UR-MCNC: 30 MG/DL
PROTHROM AB SERPL-ACNC: 13.5 SEC — HIGH (ref 9.9–13.4)
RAPID RVP RESULT: SIGNIFICANT CHANGE UP
RBC # BLD: 4.64 M/UL — SIGNIFICANT CHANGE UP (ref 3.8–5.2)
RBC # FLD: 18.4 % — HIGH (ref 10.3–14.5)
RBC CASTS # UR COMP ASSIST: SIGNIFICANT CHANGE UP /HPF (ref 0–4)
RH IG SCN BLD-IMP: POSITIVE — SIGNIFICANT CHANGE UP
RSV RNA NPH QL NAA+NON-PROBE: SIGNIFICANT CHANGE UP
RSV RNA SPEC QL NAA+PROBE: SIGNIFICANT CHANGE UP
RV+EV RNA SPEC QL NAA+PROBE: SIGNIFICANT CHANGE UP
S AUREUS DNA NOSE QL NAA+PROBE: DETECTED
SARS-COV-2 RNA SPEC QL NAA+PROBE: SIGNIFICANT CHANGE UP
SARS-COV-2 RNA SPEC QL NAA+PROBE: SIGNIFICANT CHANGE UP
SODIUM SERPL-SCNC: 132 MMOL/L — LOW (ref 135–145)
SOURCE RESPIRATORY: SIGNIFICANT CHANGE UP
SP GR SPEC: 1.02 — SIGNIFICANT CHANGE UP (ref 1–1.03)
SPECIMEN SOURCE: SIGNIFICANT CHANGE UP
UROBILINOGEN FLD QL: 1 MG/DL — SIGNIFICANT CHANGE UP (ref 0.2–1)
WBC # BLD: 17.78 K/UL — HIGH (ref 3.8–10.5)
WBC # FLD AUTO: 17.78 K/UL — HIGH (ref 3.8–10.5)
WBC UR QL: SIGNIFICANT CHANGE UP /HPF (ref 0–5)

## 2025-07-31 PROCEDURE — 99291 CRITICAL CARE FIRST HOUR: CPT

## 2025-07-31 PROCEDURE — 99291 CRITICAL CARE FIRST HOUR: CPT | Mod: 25

## 2025-07-31 PROCEDURE — 31645 BRNCHSC W/THER ASPIR 1ST: CPT | Mod: GC

## 2025-07-31 PROCEDURE — 71045 X-RAY EXAM CHEST 1 VIEW: CPT | Mod: 26

## 2025-07-31 RX ORDER — ERYTHROMYCIN 5 MG/G
1 OINTMENT OPHTHALMIC
Refills: 0 | Status: DISCONTINUED | OUTPATIENT
Start: 2025-07-31 | End: 2025-08-09

## 2025-07-31 RX ORDER — TRANEXAMIC ACID 1000 MG/10
10 AMPUL (ML) INTRAVENOUS ONCE
Refills: 0 | Status: COMPLETED | OUTPATIENT
Start: 2025-07-31 | End: 2025-07-31

## 2025-07-31 RX ORDER — PIPERACILLIN-TAZO-DEXTROSE,ISO 3.375G/5
3.38 IV SOLUTION, PIGGYBACK PREMIX FROZEN(ML) INTRAVENOUS ONCE
Refills: 0 | Status: DISCONTINUED | OUTPATIENT
Start: 2025-07-31 | End: 2025-07-31

## 2025-07-31 RX ORDER — AZITHROMYCIN 250 MG
500 CAPSULE ORAL ONCE
Refills: 0 | Status: COMPLETED | OUTPATIENT
Start: 2025-07-31 | End: 2025-07-31

## 2025-07-31 RX ORDER — DIAZEPAM 5 MG/1
2 TABLET ORAL EVERY 6 HOURS
Refills: 0 | Status: DISCONTINUED | OUTPATIENT
Start: 2025-07-31 | End: 2025-08-07

## 2025-07-31 RX ORDER — OFLOXACIN 3 MG/ML
1 SOLUTION OPHTHALMIC
Refills: 0 | Status: DISCONTINUED | OUTPATIENT
Start: 2025-07-31 | End: 2025-08-21

## 2025-07-31 RX ORDER — DIAZEPAM 5 MG/1
2 TABLET ORAL ONCE
Refills: 0 | Status: DISCONTINUED | OUTPATIENT
Start: 2025-07-31 | End: 2025-07-31

## 2025-07-31 RX ORDER — TRANEXAMIC ACID 1000 MG/10
1 AMPUL (ML) INTRAVENOUS
Refills: 0 | Status: DISCONTINUED | OUTPATIENT
Start: 2025-07-31 | End: 2025-07-31

## 2025-07-31 RX ORDER — VANCOMYCIN HCL IN 5 % DEXTROSE 1.5G/250ML
750 PLASTIC BAG, INJECTION (ML) INTRAVENOUS EVERY 12 HOURS
Refills: 0 | Status: DISCONTINUED | OUTPATIENT
Start: 2025-07-31 | End: 2025-08-04

## 2025-07-31 RX ORDER — AZITHROMYCIN 250 MG
500 CAPSULE ORAL EVERY 24 HOURS
Refills: 0 | Status: DISCONTINUED | OUTPATIENT
Start: 2025-08-01 | End: 2025-08-01

## 2025-07-31 RX ORDER — RILUZOLE 5 MG/ML
50 LIQUID ORAL
Refills: 0 | Status: DISCONTINUED | OUTPATIENT
Start: 2025-07-31 | End: 2025-08-21

## 2025-07-31 RX ORDER — IPRATROPIUM BROMIDE AND ALBUTEROL SULFATE .5; 2.5 MG/3ML; MG/3ML
3 SOLUTION RESPIRATORY (INHALATION) EVERY 6 HOURS
Refills: 0 | Status: DISCONTINUED | OUTPATIENT
Start: 2025-07-31 | End: 2025-08-21

## 2025-07-31 RX ORDER — PIPERACILLIN-TAZO-DEXTROSE,ISO 3.375G/5
3.38 IV SOLUTION, PIGGYBACK PREMIX FROZEN(ML) INTRAVENOUS EVERY 8 HOURS
Refills: 0 | Status: DISCONTINUED | OUTPATIENT
Start: 2025-07-31 | End: 2025-07-31

## 2025-07-31 RX ORDER — B1/B2/B3/B5/B6/B12/VIT C/FOLIC 500-0.5 MG
1 TABLET ORAL DAILY
Refills: 0 | Status: DISCONTINUED | OUTPATIENT
Start: 2025-07-31 | End: 2025-08-03

## 2025-07-31 RX ORDER — LANOLIN/MINERAL OIL/PETROLATUM
1 OINTMENT (GRAM) OPHTHALMIC (EYE)
Refills: 0 | Status: DISCONTINUED | OUTPATIENT
Start: 2025-07-31 | End: 2025-08-02

## 2025-07-31 RX ORDER — SERTRALINE 100 MG/1
75 TABLET, FILM COATED ORAL DAILY
Refills: 0 | Status: DISCONTINUED | OUTPATIENT
Start: 2025-07-31 | End: 2025-08-21

## 2025-07-31 RX ORDER — PIPERACILLIN-TAZO-DEXTROSE,ISO 3.375G/5
3.38 IV SOLUTION, PIGGYBACK PREMIX FROZEN(ML) INTRAVENOUS EVERY 8 HOURS
Refills: 0 | Status: DISCONTINUED | OUTPATIENT
Start: 2025-07-31 | End: 2025-08-04

## 2025-07-31 RX ORDER — PIPERACILLIN-TAZO-DEXTROSE,ISO 3.375G/5
3.38 IV SOLUTION, PIGGYBACK PREMIX FROZEN(ML) INTRAVENOUS ONCE
Refills: 0 | Status: COMPLETED | OUTPATIENT
Start: 2025-07-31 | End: 2025-07-31

## 2025-07-31 RX ORDER — AZITHROMYCIN 250 MG
CAPSULE ORAL
Refills: 0 | Status: DISCONTINUED | OUTPATIENT
Start: 2025-07-31 | End: 2025-08-01

## 2025-07-31 RX ORDER — FENTANYL CITRATE-0.9 % NACL/PF 100MCG/2ML
50 SYRINGE (ML) INTRAVENOUS ONCE
Refills: 0 | Status: DISCONTINUED | OUTPATIENT
Start: 2025-07-31 | End: 2025-07-31

## 2025-07-31 RX ADMIN — ERYTHROMYCIN 1 APPLICATION(S): 5 OINTMENT OPHTHALMIC at 09:12

## 2025-07-31 RX ADMIN — Medication 1 DROP(S): at 17:05

## 2025-07-31 RX ADMIN — Medication 50 MICROGRAM(S): at 05:47

## 2025-07-31 RX ADMIN — Medication 1 DROP(S): at 08:57

## 2025-07-31 RX ADMIN — ERYTHROMYCIN 1 APPLICATION(S): 5 OINTMENT OPHTHALMIC at 11:57

## 2025-07-31 RX ADMIN — IPRATROPIUM BROMIDE AND ALBUTEROL SULFATE 3 MILLILITER(S): .5; 2.5 SOLUTION RESPIRATORY (INHALATION) at 22:39

## 2025-07-31 RX ADMIN — Medication 1 DROP(S): at 10:38

## 2025-07-31 RX ADMIN — ERYTHROMYCIN 1 APPLICATION(S): 5 OINTMENT OPHTHALMIC at 23:21

## 2025-07-31 RX ADMIN — Medication 1 DROP(S): at 12:00

## 2025-07-31 RX ADMIN — Medication 1 DROP(S): at 18:12

## 2025-07-31 RX ADMIN — Medication 1 DROP(S): at 11:00

## 2025-07-31 RX ADMIN — Medication 1 DROP(S): at 23:20

## 2025-07-31 RX ADMIN — IPRATROPIUM BROMIDE AND ALBUTEROL SULFATE 3 MILLILITER(S): .5; 2.5 SOLUTION RESPIRATORY (INHALATION) at 10:05

## 2025-07-31 RX ADMIN — ERYTHROMYCIN 1 APPLICATION(S): 5 OINTMENT OPHTHALMIC at 20:19

## 2025-07-31 RX ADMIN — Medication 1 DROP(S): at 16:41

## 2025-07-31 RX ADMIN — Medication 250 MILLIGRAM(S): at 05:49

## 2025-07-31 RX ADMIN — Medication 1 DROP(S): at 21:29

## 2025-07-31 RX ADMIN — Medication 25 GRAM(S): at 14:26

## 2025-07-31 RX ADMIN — Medication 15 MILLILITER(S): at 17:05

## 2025-07-31 RX ADMIN — Medication 250 MILLIGRAM(S): at 18:12

## 2025-07-31 RX ADMIN — Medication 200 GRAM(S): at 02:12

## 2025-07-31 RX ADMIN — ERYTHROMYCIN 1 APPLICATION(S): 5 OINTMENT OPHTHALMIC at 15:15

## 2025-07-31 RX ADMIN — Medication 1 APPLICATION(S): at 05:57

## 2025-07-31 RX ADMIN — IPRATROPIUM BROMIDE AND ALBUTEROL SULFATE 3 MILLILITER(S): .5; 2.5 SOLUTION RESPIRATORY (INHALATION) at 15:10

## 2025-07-31 RX ADMIN — OFLOXACIN 1 DROP(S): 3 SOLUTION OPHTHALMIC at 17:37

## 2025-07-31 RX ADMIN — Medication 1 DROP(S): at 15:15

## 2025-07-31 RX ADMIN — Medication 1 TABLET(S): at 11:54

## 2025-07-31 RX ADMIN — Medication 1 DROP(S): at 13:22

## 2025-07-31 RX ADMIN — SERTRALINE 75 MILLIGRAM(S): 100 TABLET, FILM COATED ORAL at 11:57

## 2025-07-31 RX ADMIN — ERYTHROMYCIN 1 APPLICATION(S): 5 OINTMENT OPHTHALMIC at 18:12

## 2025-07-31 RX ADMIN — Medication 10 MILLILITER(S): at 04:00

## 2025-07-31 RX ADMIN — Medication 1 DROP(S): at 08:00

## 2025-07-31 RX ADMIN — Medication 250 MILLIGRAM(S): at 02:12

## 2025-07-31 RX ADMIN — Medication 1 DROP(S): at 22:31

## 2025-07-31 RX ADMIN — Medication 1 DROP(S): at 14:27

## 2025-07-31 RX ADMIN — Medication 1 DROP(S): at 20:14

## 2025-07-31 RX ADMIN — Medication 1 DROP(S): at 19:15

## 2025-07-31 RX ADMIN — Medication 10 MILLILITER(S): at 02:54

## 2025-07-31 RX ADMIN — Medication 50 MICROGRAM(S): at 06:17

## 2025-07-31 RX ADMIN — Medication 25 GRAM(S): at 21:25

## 2025-07-31 RX ADMIN — RILUZOLE 50 MILLIGRAM(S): 5 LIQUID ORAL at 17:37

## 2025-07-31 RX ADMIN — DIAZEPAM 2 MILLIGRAM(S): 5 TABLET ORAL at 01:11

## 2025-07-31 RX ADMIN — Medication 1 DROP(S): at 19:03

## 2025-08-01 LAB
ALBUMIN SERPL ELPH-MCNC: 3 G/DL — LOW (ref 3.3–5)
ALP SERPL-CCNC: 140 U/L — HIGH (ref 40–120)
ALT FLD-CCNC: 21 U/L — SIGNIFICANT CHANGE UP (ref 4–33)
ANION GAP SERPL CALC-SCNC: 13 MMOL/L — SIGNIFICANT CHANGE UP (ref 7–14)
APTT BLD: 30.7 SEC — SIGNIFICANT CHANGE UP (ref 26.1–36.8)
AST SERPL-CCNC: 17 U/L — SIGNIFICANT CHANGE UP (ref 4–32)
BASOPHILS # BLD AUTO: 0.08 K/UL — SIGNIFICANT CHANGE UP (ref 0–0.2)
BASOPHILS NFR BLD AUTO: 0.4 % — SIGNIFICANT CHANGE UP (ref 0–2)
BILIRUB SERPL-MCNC: 0.8 MG/DL — SIGNIFICANT CHANGE UP (ref 0.2–1.2)
BLD GP AB SCN SERPL QL: NEGATIVE — SIGNIFICANT CHANGE UP
BUN SERPL-MCNC: 17 MG/DL — SIGNIFICANT CHANGE UP (ref 7–23)
CALCIUM SERPL-MCNC: 8.6 MG/DL — SIGNIFICANT CHANGE UP (ref 8.4–10.5)
CHLORIDE SERPL-SCNC: 96 MMOL/L — LOW (ref 98–107)
CO2 SERPL-SCNC: 23 MMOL/L — SIGNIFICANT CHANGE UP (ref 22–31)
CREAT SERPL-MCNC: <0.2 MG/DL — LOW (ref 0.5–1.3)
EGFR: 124 ML/MIN/1.73M2 — SIGNIFICANT CHANGE UP
EGFR: 124 ML/MIN/1.73M2 — SIGNIFICANT CHANGE UP
EOSINOPHIL # BLD AUTO: 0.42 K/UL — SIGNIFICANT CHANGE UP (ref 0–0.5)
EOSINOPHIL NFR BLD AUTO: 2.2 % — SIGNIFICANT CHANGE UP (ref 0–6)
GAS PNL BLDV: SIGNIFICANT CHANGE UP
GAS PNL BLDV: SIGNIFICANT CHANGE UP
GLUCOSE SERPL-MCNC: 143 MG/DL — HIGH (ref 70–99)
GRAM STN FLD: ABNORMAL
HCT VFR BLD CALC: 37.2 % — SIGNIFICANT CHANGE UP (ref 34.5–45)
HGB BLD-MCNC: 10.9 G/DL — LOW (ref 11.5–15.5)
IMM GRANULOCYTES # BLD AUTO: 0.2 K/UL — HIGH (ref 0–0.07)
IMM GRANULOCYTES NFR BLD AUTO: 1.1 % — HIGH (ref 0–0.9)
INR BLD: 1.23 RATIO — HIGH (ref 0.85–1.16)
LEGIONELLA AG UR QL: NEGATIVE — SIGNIFICANT CHANGE UP
LYMPHOCYTES # BLD AUTO: 0.7 K/UL — LOW (ref 1–3.3)
LYMPHOCYTES NFR BLD AUTO: 3.7 % — LOW (ref 13–44)
MAGNESIUM SERPL-MCNC: 2 MG/DL — SIGNIFICANT CHANGE UP (ref 1.6–2.6)
MCHC RBC-ENTMCNC: 23.7 PG — LOW (ref 27–34)
MCHC RBC-ENTMCNC: 29.3 G/DL — LOW (ref 32–36)
MCV RBC AUTO: 81 FL — SIGNIFICANT CHANGE UP (ref 80–100)
MONOCYTES # BLD AUTO: 0.68 K/UL — SIGNIFICANT CHANGE UP (ref 0–0.9)
MONOCYTES NFR BLD AUTO: 3.6 % — SIGNIFICANT CHANGE UP (ref 2–14)
NEUTROPHILS # BLD AUTO: 16.59 K/UL — HIGH (ref 1.8–7.4)
NEUTROPHILS NFR BLD AUTO: 89 % — HIGH (ref 43–77)
NRBC # BLD AUTO: 0 K/UL — SIGNIFICANT CHANGE UP (ref 0–0)
NRBC # FLD: 0 K/UL — SIGNIFICANT CHANGE UP (ref 0–0)
NRBC BLD AUTO-RTO: 0 /100 WBCS — SIGNIFICANT CHANGE UP (ref 0–0)
PHOSPHATE SERPL-MCNC: 3.4 MG/DL — SIGNIFICANT CHANGE UP (ref 2.5–4.5)
PLATELET # BLD AUTO: 485 K/UL — HIGH (ref 150–400)
PMV BLD: 9.3 FL — SIGNIFICANT CHANGE UP (ref 7–13)
POTASSIUM SERPL-MCNC: 3.3 MMOL/L — LOW (ref 3.5–5.3)
POTASSIUM SERPL-SCNC: 3.3 MMOL/L — LOW (ref 3.5–5.3)
PROT SERPL-MCNC: 7 G/DL — SIGNIFICANT CHANGE UP (ref 6–8.3)
PROTHROM AB SERPL-ACNC: 14.2 SEC — HIGH (ref 9.9–13.4)
RBC # BLD: 4.59 M/UL — SIGNIFICANT CHANGE UP (ref 3.8–5.2)
RBC # FLD: 18.4 % — HIGH (ref 10.3–14.5)
RH IG SCN BLD-IMP: POSITIVE — SIGNIFICANT CHANGE UP
S PNEUM AG UR QL: POSITIVE
SODIUM SERPL-SCNC: 132 MMOL/L — LOW (ref 135–145)
VANCOMYCIN TROUGH SERPL-MCNC: 13.4 UG/ML — SIGNIFICANT CHANGE UP (ref 10–20)
WBC # BLD: 18.67 K/UL — HIGH (ref 3.8–10.5)
WBC # FLD AUTO: 18.67 K/UL — HIGH (ref 3.8–10.5)

## 2025-08-01 PROCEDURE — 71045 X-RAY EXAM CHEST 1 VIEW: CPT | Mod: 26

## 2025-08-01 PROCEDURE — 99223 1ST HOSP IP/OBS HIGH 75: CPT

## 2025-08-01 PROCEDURE — 99291 CRITICAL CARE FIRST HOUR: CPT

## 2025-08-01 RX ORDER — MUPIROCIN CALCIUM 20 MG/G
1 CREAM TOPICAL
Refills: 0 | Status: DISCONTINUED | OUTPATIENT
Start: 2025-08-01 | End: 2025-08-07

## 2025-08-01 RX ORDER — COLLAGENASE CLOSTRIDIUM HIST. 250 UNIT/G
1 OINTMENT (GRAM) TOPICAL
Refills: 0 | Status: DISCONTINUED | OUTPATIENT
Start: 2025-08-01 | End: 2025-08-13

## 2025-08-01 RX ADMIN — IPRATROPIUM BROMIDE AND ALBUTEROL SULFATE 3 MILLILITER(S): .5; 2.5 SOLUTION RESPIRATORY (INHALATION) at 19:55

## 2025-08-01 RX ADMIN — Medication 1 DROP(S): at 17:12

## 2025-08-01 RX ADMIN — Medication 15 MILLILITER(S): at 05:45

## 2025-08-01 RX ADMIN — OFLOXACIN 1 DROP(S): 3 SOLUTION OPHTHALMIC at 18:08

## 2025-08-01 RX ADMIN — Medication 1 TABLET(S): at 12:20

## 2025-08-01 RX ADMIN — Medication 40 MILLIEQUIVALENT(S): at 05:28

## 2025-08-01 RX ADMIN — IPRATROPIUM BROMIDE AND ALBUTEROL SULFATE 3 MILLILITER(S): .5; 2.5 SOLUTION RESPIRATORY (INHALATION) at 03:23

## 2025-08-01 RX ADMIN — Medication 1 DROP(S): at 06:03

## 2025-08-01 RX ADMIN — Medication 1 DROP(S): at 18:09

## 2025-08-01 RX ADMIN — Medication 25 GRAM(S): at 05:38

## 2025-08-01 RX ADMIN — MUPIROCIN CALCIUM 1 APPLICATION(S): 20 CREAM TOPICAL at 18:28

## 2025-08-01 RX ADMIN — Medication 250 MILLIGRAM(S): at 18:42

## 2025-08-01 RX ADMIN — Medication 1 DROP(S): at 15:17

## 2025-08-01 RX ADMIN — OFLOXACIN 1 DROP(S): 3 SOLUTION OPHTHALMIC at 05:27

## 2025-08-01 RX ADMIN — Medication 25 GRAM(S): at 13:51

## 2025-08-01 RX ADMIN — Medication 1 DROP(S): at 18:44

## 2025-08-01 RX ADMIN — ERYTHROMYCIN 1 APPLICATION(S): 5 OINTMENT OPHTHALMIC at 02:30

## 2025-08-01 RX ADMIN — Medication 1 DROP(S): at 10:10

## 2025-08-01 RX ADMIN — Medication 1 DROP(S): at 03:22

## 2025-08-01 RX ADMIN — Medication 1 DROP(S): at 13:27

## 2025-08-01 RX ADMIN — Medication 1 DROP(S): at 05:28

## 2025-08-01 RX ADMIN — SERTRALINE 75 MILLIGRAM(S): 100 TABLET, FILM COATED ORAL at 12:20

## 2025-08-01 RX ADMIN — Medication 1 DROP(S): at 12:09

## 2025-08-01 RX ADMIN — Medication 250 MILLIGRAM(S): at 05:44

## 2025-08-01 RX ADMIN — Medication 1 DROP(S): at 00:57

## 2025-08-01 RX ADMIN — ERYTHROMYCIN 1 APPLICATION(S): 5 OINTMENT OPHTHALMIC at 23:03

## 2025-08-01 RX ADMIN — Medication 1 DROP(S): at 23:02

## 2025-08-01 RX ADMIN — Medication 1 DROP(S): at 13:51

## 2025-08-01 RX ADMIN — ERYTHROMYCIN 1 APPLICATION(S): 5 OINTMENT OPHTHALMIC at 18:09

## 2025-08-01 RX ADMIN — ERYTHROMYCIN 1 APPLICATION(S): 5 OINTMENT OPHTHALMIC at 12:20

## 2025-08-01 RX ADMIN — Medication 1 DROP(S): at 19:24

## 2025-08-01 RX ADMIN — Medication 1 DROP(S): at 09:23

## 2025-08-01 RX ADMIN — Medication 15 MILLILITER(S): at 18:28

## 2025-08-01 RX ADMIN — Medication 1 DROP(S): at 04:15

## 2025-08-01 RX ADMIN — Medication 1 DROP(S): at 16:08

## 2025-08-01 RX ADMIN — Medication 40 MILLIEQUIVALENT(S): at 08:04

## 2025-08-01 RX ADMIN — ERYTHROMYCIN 1 APPLICATION(S): 5 OINTMENT OPHTHALMIC at 15:17

## 2025-08-01 RX ADMIN — Medication 1 DROP(S): at 00:29

## 2025-08-01 RX ADMIN — Medication 1 DROP(S): at 02:29

## 2025-08-01 RX ADMIN — Medication 1 APPLICATION(S): at 05:26

## 2025-08-01 RX ADMIN — Medication 25 GRAM(S): at 21:12

## 2025-08-01 RX ADMIN — IPRATROPIUM BROMIDE AND ALBUTEROL SULFATE 3 MILLILITER(S): .5; 2.5 SOLUTION RESPIRATORY (INHALATION) at 08:59

## 2025-08-01 RX ADMIN — IPRATROPIUM BROMIDE AND ALBUTEROL SULFATE 3 MILLILITER(S): .5; 2.5 SOLUTION RESPIRATORY (INHALATION) at 14:58

## 2025-08-01 RX ADMIN — ERYTHROMYCIN 1 APPLICATION(S): 5 OINTMENT OPHTHALMIC at 05:26

## 2025-08-01 RX ADMIN — Medication 1 DROP(S): at 22:05

## 2025-08-01 RX ADMIN — ERYTHROMYCIN 1 APPLICATION(S): 5 OINTMENT OPHTHALMIC at 09:23

## 2025-08-01 RX ADMIN — ERYTHROMYCIN 1 APPLICATION(S): 5 OINTMENT OPHTHALMIC at 20:43

## 2025-08-01 RX ADMIN — Medication 250 MILLIGRAM(S): at 02:26

## 2025-08-01 RX ADMIN — Medication 1 DROP(S): at 20:43

## 2025-08-01 RX ADMIN — Medication 1 DROP(S): at 08:03

## 2025-08-01 RX ADMIN — RILUZOLE 50 MILLIGRAM(S): 5 LIQUID ORAL at 18:28

## 2025-08-01 RX ADMIN — Medication 1 DROP(S): at 11:14

## 2025-08-01 RX ADMIN — RILUZOLE 50 MILLIGRAM(S): 5 LIQUID ORAL at 05:29

## 2025-08-01 RX ADMIN — Medication 1 DROP(S): at 21:13

## 2025-08-02 LAB
-  AZTREONAM: SIGNIFICANT CHANGE UP
-  CEFEPIME: SIGNIFICANT CHANGE UP
-  CEFTAZIDIME: SIGNIFICANT CHANGE UP
-  CIPROFLOXACIN: SIGNIFICANT CHANGE UP
-  IMIPENEM: SIGNIFICANT CHANGE UP
-  LEVOFLOXACIN: SIGNIFICANT CHANGE UP
-  MEROPENEM: SIGNIFICANT CHANGE UP
-  PIPERACILLIN/TAZOBACTAM: SIGNIFICANT CHANGE UP
ALBUMIN SERPL ELPH-MCNC: 2.9 G/DL — LOW (ref 3.3–5)
ALP SERPL-CCNC: 129 U/L — HIGH (ref 40–120)
ALT FLD-CCNC: 22 U/L — SIGNIFICANT CHANGE UP (ref 4–33)
ANION GAP SERPL CALC-SCNC: 10 MMOL/L — SIGNIFICANT CHANGE UP (ref 7–14)
APTT BLD: 27.7 SEC — SIGNIFICANT CHANGE UP (ref 26.1–36.8)
AST SERPL-CCNC: 17 U/L — SIGNIFICANT CHANGE UP (ref 4–32)
BASOPHILS # BLD AUTO: 0.04 K/UL — SIGNIFICANT CHANGE UP (ref 0–0.2)
BASOPHILS NFR BLD AUTO: 0.3 % — SIGNIFICANT CHANGE UP (ref 0–2)
BILIRUB SERPL-MCNC: 0.7 MG/DL — SIGNIFICANT CHANGE UP (ref 0.2–1.2)
BLOOD GAS VENOUS COMPREHENSIVE RESULT: SIGNIFICANT CHANGE UP
BUN SERPL-MCNC: 14 MG/DL — SIGNIFICANT CHANGE UP (ref 7–23)
CALCIUM SERPL-MCNC: 9.1 MG/DL — SIGNIFICANT CHANGE UP (ref 8.4–10.5)
CHLORIDE SERPL-SCNC: 98 MMOL/L — SIGNIFICANT CHANGE UP (ref 98–107)
CO2 SERPL-SCNC: 26 MMOL/L — SIGNIFICANT CHANGE UP (ref 22–31)
CREAT SERPL-MCNC: <0.2 MG/DL — LOW (ref 0.5–1.3)
CULTURE RESULTS: ABNORMAL
EGFR: 124 ML/MIN/1.73M2 — SIGNIFICANT CHANGE UP
EGFR: 124 ML/MIN/1.73M2 — SIGNIFICANT CHANGE UP
EOSINOPHIL # BLD AUTO: 0.39 K/UL — SIGNIFICANT CHANGE UP (ref 0–0.5)
EOSINOPHIL NFR BLD AUTO: 2.9 % — SIGNIFICANT CHANGE UP (ref 0–6)
GAS PNL BLDV: SIGNIFICANT CHANGE UP
GLUCOSE SERPL-MCNC: 141 MG/DL — HIGH (ref 70–99)
HCT VFR BLD CALC: 33 % — LOW (ref 34.5–45)
HGB BLD-MCNC: 9.8 G/DL — LOW (ref 11.5–15.5)
IMM GRANULOCYTES # BLD AUTO: 0.12 K/UL — HIGH (ref 0–0.07)
IMM GRANULOCYTES NFR BLD AUTO: 0.9 % — SIGNIFICANT CHANGE UP (ref 0–0.9)
INR BLD: 1.17 RATIO — HIGH (ref 0.85–1.16)
LYMPHOCYTES # BLD AUTO: 0.66 K/UL — LOW (ref 1–3.3)
LYMPHOCYTES NFR BLD AUTO: 5 % — LOW (ref 13–44)
MAGNESIUM SERPL-MCNC: 2.1 MG/DL — SIGNIFICANT CHANGE UP (ref 1.6–2.6)
MCHC RBC-ENTMCNC: 23.6 PG — LOW (ref 27–34)
MCHC RBC-ENTMCNC: 29.7 G/DL — LOW (ref 32–36)
MCV RBC AUTO: 79.3 FL — LOW (ref 80–100)
METHOD TYPE: SIGNIFICANT CHANGE UP
MONOCYTES # BLD AUTO: 0.71 K/UL — SIGNIFICANT CHANGE UP (ref 0–0.9)
MONOCYTES NFR BLD AUTO: 5.4 % — SIGNIFICANT CHANGE UP (ref 2–14)
NEUTROPHILS # BLD AUTO: 11.31 K/UL — HIGH (ref 1.8–7.4)
NEUTROPHILS NFR BLD AUTO: 85.5 % — HIGH (ref 43–77)
NRBC # BLD AUTO: 0 K/UL — SIGNIFICANT CHANGE UP (ref 0–0)
NRBC # FLD: 0 K/UL — SIGNIFICANT CHANGE UP (ref 0–0)
NRBC BLD AUTO-RTO: 0 /100 WBCS — SIGNIFICANT CHANGE UP (ref 0–0)
ORGANISM # SPEC MICROSCOPIC CNT: ABNORMAL
ORGANISM # SPEC MICROSCOPIC CNT: ABNORMAL
PHOSPHATE SERPL-MCNC: 2.2 MG/DL — LOW (ref 2.5–4.5)
PLATELET # BLD AUTO: 425 K/UL — HIGH (ref 150–400)
PMV BLD: 9.1 FL — SIGNIFICANT CHANGE UP (ref 7–13)
POTASSIUM SERPL-MCNC: 3.8 MMOL/L — SIGNIFICANT CHANGE UP (ref 3.5–5.3)
POTASSIUM SERPL-SCNC: 3.8 MMOL/L — SIGNIFICANT CHANGE UP (ref 3.5–5.3)
PROT SERPL-MCNC: 6.7 G/DL — SIGNIFICANT CHANGE UP (ref 6–8.3)
PROTHROM AB SERPL-ACNC: 13.9 SEC — HIGH (ref 9.9–13.4)
RBC # BLD: 4.16 M/UL — SIGNIFICANT CHANGE UP (ref 3.8–5.2)
RBC # FLD: 17.9 % — HIGH (ref 10.3–14.5)
SODIUM SERPL-SCNC: 134 MMOL/L — LOW (ref 135–145)
SPECIMEN SOURCE: SIGNIFICANT CHANGE UP
WBC # BLD: 13.23 K/UL — HIGH (ref 3.8–10.5)
WBC # FLD AUTO: 13.23 K/UL — HIGH (ref 3.8–10.5)

## 2025-08-02 PROCEDURE — 99291 CRITICAL CARE FIRST HOUR: CPT

## 2025-08-02 RX ORDER — LANOLIN/MINERAL OIL/PETROLATUM
1 OINTMENT (GRAM) OPHTHALMIC (EYE) EVERY 6 HOURS
Refills: 0 | Status: DISCONTINUED | OUTPATIENT
Start: 2025-08-02 | End: 2025-08-09

## 2025-08-02 RX ORDER — POTASSIUM PHOSPHATE, MONOBASIC POTASSIUM PHOSPHATE, DIBASIC INJECTION, 236; 224 MG/ML; MG/ML
15 SOLUTION, CONCENTRATE INTRAVENOUS ONCE
Refills: 0 | Status: COMPLETED | OUTPATIENT
Start: 2025-08-02 | End: 2025-08-02

## 2025-08-02 RX ADMIN — ERYTHROMYCIN 1 APPLICATION(S): 5 OINTMENT OPHTHALMIC at 15:25

## 2025-08-02 RX ADMIN — RILUZOLE 50 MILLIGRAM(S): 5 LIQUID ORAL at 05:22

## 2025-08-02 RX ADMIN — ERYTHROMYCIN 1 APPLICATION(S): 5 OINTMENT OPHTHALMIC at 02:22

## 2025-08-02 RX ADMIN — Medication 1 APPLICATION(S): at 06:02

## 2025-08-02 RX ADMIN — IPRATROPIUM BROMIDE AND ALBUTEROL SULFATE 3 MILLILITER(S): .5; 2.5 SOLUTION RESPIRATORY (INHALATION) at 20:02

## 2025-08-02 RX ADMIN — SERTRALINE 75 MILLIGRAM(S): 100 TABLET, FILM COATED ORAL at 12:38

## 2025-08-02 RX ADMIN — MUPIROCIN CALCIUM 1 APPLICATION(S): 20 CREAM TOPICAL at 17:34

## 2025-08-02 RX ADMIN — Medication 1 DROP(S): at 01:09

## 2025-08-02 RX ADMIN — IPRATROPIUM BROMIDE AND ALBUTEROL SULFATE 3 MILLILITER(S): .5; 2.5 SOLUTION RESPIRATORY (INHALATION) at 15:12

## 2025-08-02 RX ADMIN — Medication 15 MILLILITER(S): at 17:32

## 2025-08-02 RX ADMIN — Medication 1 DROP(S): at 03:42

## 2025-08-02 RX ADMIN — ERYTHROMYCIN 1 APPLICATION(S): 5 OINTMENT OPHTHALMIC at 23:03

## 2025-08-02 RX ADMIN — Medication 1 DROP(S): at 11:48

## 2025-08-02 RX ADMIN — Medication 1 DROP(S): at 04:37

## 2025-08-02 RX ADMIN — Medication 1 DROP(S): at 08:09

## 2025-08-02 RX ADMIN — Medication 1 DROP(S): at 17:35

## 2025-08-02 RX ADMIN — Medication 250 MILLIGRAM(S): at 05:13

## 2025-08-02 RX ADMIN — ERYTHROMYCIN 1 APPLICATION(S): 5 OINTMENT OPHTHALMIC at 20:45

## 2025-08-02 RX ADMIN — POTASSIUM PHOSPHATE, MONOBASIC POTASSIUM PHOSPHATE, DIBASIC INJECTION, 62.5 MILLIMOLE(S): 236; 224 SOLUTION, CONCENTRATE INTRAVENOUS at 02:19

## 2025-08-02 RX ADMIN — Medication 1 DROP(S): at 02:22

## 2025-08-02 RX ADMIN — Medication 1 DROP(S): at 00:14

## 2025-08-02 RX ADMIN — Medication 1 DROP(S): at 09:14

## 2025-08-02 RX ADMIN — Medication 1 DROP(S): at 06:03

## 2025-08-02 RX ADMIN — IPRATROPIUM BROMIDE AND ALBUTEROL SULFATE 3 MILLILITER(S): .5; 2.5 SOLUTION RESPIRATORY (INHALATION) at 09:11

## 2025-08-02 RX ADMIN — Medication 25 GRAM(S): at 14:10

## 2025-08-02 RX ADMIN — OFLOXACIN 1 DROP(S): 3 SOLUTION OPHTHALMIC at 05:19

## 2025-08-02 RX ADMIN — Medication 250 MILLIGRAM(S): at 17:34

## 2025-08-02 RX ADMIN — Medication 25 GRAM(S): at 21:18

## 2025-08-02 RX ADMIN — ERYTHROMYCIN 1 APPLICATION(S): 5 OINTMENT OPHTHALMIC at 17:30

## 2025-08-02 RX ADMIN — Medication 25 GRAM(S): at 06:21

## 2025-08-02 RX ADMIN — RILUZOLE 50 MILLIGRAM(S): 5 LIQUID ORAL at 17:32

## 2025-08-02 RX ADMIN — Medication 15 MILLILITER(S): at 05:28

## 2025-08-02 RX ADMIN — Medication 1 TABLET(S): at 12:35

## 2025-08-02 RX ADMIN — OFLOXACIN 1 DROP(S): 3 SOLUTION OPHTHALMIC at 17:34

## 2025-08-02 RX ADMIN — ERYTHROMYCIN 1 APPLICATION(S): 5 OINTMENT OPHTHALMIC at 05:18

## 2025-08-02 RX ADMIN — IPRATROPIUM BROMIDE AND ALBUTEROL SULFATE 3 MILLILITER(S): .5; 2.5 SOLUTION RESPIRATORY (INHALATION) at 03:27

## 2025-08-02 RX ADMIN — MUPIROCIN CALCIUM 1 APPLICATION(S): 20 CREAM TOPICAL at 05:20

## 2025-08-02 RX ADMIN — ERYTHROMYCIN 1 APPLICATION(S): 5 OINTMENT OPHTHALMIC at 09:14

## 2025-08-02 RX ADMIN — Medication 1 DROP(S): at 23:03

## 2025-08-02 RX ADMIN — Medication 1 DROP(S): at 10:12

## 2025-08-02 RX ADMIN — ERYTHROMYCIN 1 APPLICATION(S): 5 OINTMENT OPHTHALMIC at 12:33

## 2025-08-02 RX ADMIN — Medication 1 DROP(S): at 05:28

## 2025-08-02 RX ADMIN — Medication 1 DROP(S): at 12:00

## 2025-08-03 LAB
ALBUMIN SERPL ELPH-MCNC: 3.3 G/DL — SIGNIFICANT CHANGE UP (ref 3.3–5)
ALP SERPL-CCNC: 135 U/L — HIGH (ref 40–120)
ALT FLD-CCNC: 23 U/L — SIGNIFICANT CHANGE UP (ref 4–33)
ANION GAP SERPL CALC-SCNC: 9 MMOL/L — SIGNIFICANT CHANGE UP (ref 7–14)
APTT BLD: 30.8 SEC — SIGNIFICANT CHANGE UP (ref 26.1–36.8)
AST SERPL-CCNC: 16 U/L — SIGNIFICANT CHANGE UP (ref 4–32)
BASOPHILS # BLD AUTO: 0.05 K/UL — SIGNIFICANT CHANGE UP (ref 0–0.2)
BASOPHILS NFR BLD AUTO: 0.5 % — SIGNIFICANT CHANGE UP (ref 0–2)
BILIRUB SERPL-MCNC: 0.5 MG/DL — SIGNIFICANT CHANGE UP (ref 0.2–1.2)
BUN SERPL-MCNC: 11 MG/DL — SIGNIFICANT CHANGE UP (ref 7–23)
CALCIUM SERPL-MCNC: 9.1 MG/DL — SIGNIFICANT CHANGE UP (ref 8.4–10.5)
CHLORIDE SERPL-SCNC: 98 MMOL/L — SIGNIFICANT CHANGE UP (ref 98–107)
CO2 SERPL-SCNC: 29 MMOL/L — SIGNIFICANT CHANGE UP (ref 22–31)
CREAT SERPL-MCNC: <0.2 MG/DL — LOW (ref 0.5–1.3)
EGFR: 124 ML/MIN/1.73M2 — SIGNIFICANT CHANGE UP
EGFR: 124 ML/MIN/1.73M2 — SIGNIFICANT CHANGE UP
EOSINOPHIL # BLD AUTO: 0.48 K/UL — SIGNIFICANT CHANGE UP (ref 0–0.5)
EOSINOPHIL NFR BLD AUTO: 4.5 % — SIGNIFICANT CHANGE UP (ref 0–6)
GAS PNL BLDA: SIGNIFICANT CHANGE UP
GLUCOSE SERPL-MCNC: 164 MG/DL — HIGH (ref 70–99)
HCT VFR BLD CALC: 36.7 % — SIGNIFICANT CHANGE UP (ref 34.5–45)
HGB BLD-MCNC: 10.8 G/DL — LOW (ref 11.5–15.5)
IMM GRANULOCYTES # BLD AUTO: 0.07 K/UL — SIGNIFICANT CHANGE UP (ref 0–0.07)
IMM GRANULOCYTES NFR BLD AUTO: 0.7 % — SIGNIFICANT CHANGE UP (ref 0–0.9)
INR BLD: 1.08 RATIO — SIGNIFICANT CHANGE UP (ref 0.85–1.16)
LYMPHOCYTES # BLD AUTO: 0.8 K/UL — LOW (ref 1–3.3)
LYMPHOCYTES NFR BLD AUTO: 7.5 % — LOW (ref 13–44)
MAGNESIUM SERPL-MCNC: 1.9 MG/DL — SIGNIFICANT CHANGE UP (ref 1.6–2.6)
MCHC RBC-ENTMCNC: 24.1 PG — LOW (ref 27–34)
MCHC RBC-ENTMCNC: 29.4 G/DL — LOW (ref 32–36)
MCV RBC AUTO: 81.9 FL — SIGNIFICANT CHANGE UP (ref 80–100)
MONOCYTES # BLD AUTO: 0.68 K/UL — SIGNIFICANT CHANGE UP (ref 0–0.9)
MONOCYTES NFR BLD AUTO: 6.4 % — SIGNIFICANT CHANGE UP (ref 2–14)
NEUTROPHILS # BLD AUTO: 8.62 K/UL — HIGH (ref 1.8–7.4)
NEUTROPHILS NFR BLD AUTO: 80.4 % — HIGH (ref 43–77)
NRBC # BLD AUTO: 0 K/UL — SIGNIFICANT CHANGE UP (ref 0–0)
NRBC # FLD: 0 K/UL — SIGNIFICANT CHANGE UP (ref 0–0)
NRBC BLD AUTO-RTO: 0 /100 WBCS — SIGNIFICANT CHANGE UP (ref 0–0)
PHOSPHATE SERPL-MCNC: 3.2 MG/DL — SIGNIFICANT CHANGE UP (ref 2.5–4.5)
PLATELET # BLD AUTO: 405 K/UL — HIGH (ref 150–400)
PMV BLD: 9 FL — SIGNIFICANT CHANGE UP (ref 7–13)
POTASSIUM SERPL-MCNC: 4.1 MMOL/L — SIGNIFICANT CHANGE UP (ref 3.5–5.3)
POTASSIUM SERPL-SCNC: 4.1 MMOL/L — SIGNIFICANT CHANGE UP (ref 3.5–5.3)
PROT SERPL-MCNC: 7.1 G/DL — SIGNIFICANT CHANGE UP (ref 6–8.3)
PROTHROM AB SERPL-ACNC: 12.9 SEC — SIGNIFICANT CHANGE UP (ref 9.9–13.4)
RBC # BLD: 4.48 M/UL — SIGNIFICANT CHANGE UP (ref 3.8–5.2)
RBC # FLD: 18.1 % — HIGH (ref 10.3–14.5)
SODIUM SERPL-SCNC: 136 MMOL/L — SIGNIFICANT CHANGE UP (ref 135–145)
WBC # BLD: 10.7 K/UL — HIGH (ref 3.8–10.5)
WBC # FLD AUTO: 10.7 K/UL — HIGH (ref 3.8–10.5)

## 2025-08-03 PROCEDURE — 99291 CRITICAL CARE FIRST HOUR: CPT

## 2025-08-03 RX ORDER — CYST/ALA/Q10/PHOS.SER/DHA/BROC 100-20-50
15 POWDER (GRAM) ORAL DAILY
Refills: 0 | Status: DISCONTINUED | OUTPATIENT
Start: 2025-08-03 | End: 2025-08-21

## 2025-08-03 RX ORDER — MAGNESIUM SULFATE 500 MG/ML
2 SYRINGE (ML) INJECTION ONCE
Refills: 0 | Status: COMPLETED | OUTPATIENT
Start: 2025-08-03 | End: 2025-08-03

## 2025-08-03 RX ADMIN — ERYTHROMYCIN 1 APPLICATION(S): 5 OINTMENT OPHTHALMIC at 14:31

## 2025-08-03 RX ADMIN — Medication 1 APPLICATION(S): at 05:23

## 2025-08-03 RX ADMIN — IPRATROPIUM BROMIDE AND ALBUTEROL SULFATE 3 MILLILITER(S): .5; 2.5 SOLUTION RESPIRATORY (INHALATION) at 09:02

## 2025-08-03 RX ADMIN — Medication 1 DROP(S): at 12:03

## 2025-08-03 RX ADMIN — ERYTHROMYCIN 1 APPLICATION(S): 5 OINTMENT OPHTHALMIC at 05:23

## 2025-08-03 RX ADMIN — ERYTHROMYCIN 1 APPLICATION(S): 5 OINTMENT OPHTHALMIC at 23:08

## 2025-08-03 RX ADMIN — ERYTHROMYCIN 1 APPLICATION(S): 5 OINTMENT OPHTHALMIC at 17:10

## 2025-08-03 RX ADMIN — IPRATROPIUM BROMIDE AND ALBUTEROL SULFATE 3 MILLILITER(S): .5; 2.5 SOLUTION RESPIRATORY (INHALATION) at 15:17

## 2025-08-03 RX ADMIN — Medication 1 DROP(S): at 17:12

## 2025-08-03 RX ADMIN — Medication 25 GRAM(S): at 14:31

## 2025-08-03 RX ADMIN — MUPIROCIN CALCIUM 1 APPLICATION(S): 20 CREAM TOPICAL at 05:22

## 2025-08-03 RX ADMIN — ERYTHROMYCIN 1 APPLICATION(S): 5 OINTMENT OPHTHALMIC at 20:29

## 2025-08-03 RX ADMIN — Medication 15 MILLILITER(S): at 05:22

## 2025-08-03 RX ADMIN — OFLOXACIN 1 DROP(S): 3 SOLUTION OPHTHALMIC at 17:11

## 2025-08-03 RX ADMIN — Medication 25 GRAM(S): at 21:10

## 2025-08-03 RX ADMIN — IPRATROPIUM BROMIDE AND ALBUTEROL SULFATE 3 MILLILITER(S): .5; 2.5 SOLUTION RESPIRATORY (INHALATION) at 02:34

## 2025-08-03 RX ADMIN — IPRATROPIUM BROMIDE AND ALBUTEROL SULFATE 3 MILLILITER(S): .5; 2.5 SOLUTION RESPIRATORY (INHALATION) at 20:01

## 2025-08-03 RX ADMIN — Medication 1 DROP(S): at 05:23

## 2025-08-03 RX ADMIN — Medication 15 MILLILITER(S): at 17:10

## 2025-08-03 RX ADMIN — Medication 25 GRAM(S): at 05:22

## 2025-08-03 RX ADMIN — Medication 250 MILLIGRAM(S): at 05:21

## 2025-08-03 RX ADMIN — SERTRALINE 75 MILLIGRAM(S): 100 TABLET, FILM COATED ORAL at 12:02

## 2025-08-03 RX ADMIN — RILUZOLE 50 MILLIGRAM(S): 5 LIQUID ORAL at 17:10

## 2025-08-03 RX ADMIN — Medication 1 DROP(S): at 23:08

## 2025-08-03 RX ADMIN — ERYTHROMYCIN 1 APPLICATION(S): 5 OINTMENT OPHTHALMIC at 12:01

## 2025-08-03 RX ADMIN — Medication 250 MILLIGRAM(S): at 17:11

## 2025-08-03 RX ADMIN — Medication 1 TABLET(S): at 12:01

## 2025-08-03 RX ADMIN — Medication 25 GRAM(S): at 09:10

## 2025-08-03 RX ADMIN — OFLOXACIN 1 DROP(S): 3 SOLUTION OPHTHALMIC at 05:22

## 2025-08-03 RX ADMIN — MUPIROCIN CALCIUM 1 APPLICATION(S): 20 CREAM TOPICAL at 17:12

## 2025-08-03 RX ADMIN — RILUZOLE 50 MILLIGRAM(S): 5 LIQUID ORAL at 05:22

## 2025-08-03 RX ADMIN — ERYTHROMYCIN 1 APPLICATION(S): 5 OINTMENT OPHTHALMIC at 09:10

## 2025-08-03 RX ADMIN — ERYTHROMYCIN 1 APPLICATION(S): 5 OINTMENT OPHTHALMIC at 02:25

## 2025-08-04 LAB
ALBUMIN SERPL ELPH-MCNC: 3.2 G/DL — LOW (ref 3.3–5)
ALP SERPL-CCNC: 152 U/L — HIGH (ref 40–120)
ALT FLD-CCNC: 25 U/L — SIGNIFICANT CHANGE UP (ref 4–33)
ANION GAP SERPL CALC-SCNC: 11 MMOL/L — SIGNIFICANT CHANGE UP (ref 7–14)
APTT BLD: 32.1 SEC — SIGNIFICANT CHANGE UP (ref 26.1–36.8)
AST SERPL-CCNC: 25 U/L — SIGNIFICANT CHANGE UP (ref 4–32)
BASOPHILS # BLD AUTO: 0.06 K/UL — SIGNIFICANT CHANGE UP (ref 0–0.2)
BASOPHILS NFR BLD AUTO: 0.4 % — SIGNIFICANT CHANGE UP (ref 0–2)
BILIRUB SERPL-MCNC: 0.7 MG/DL — SIGNIFICANT CHANGE UP (ref 0.2–1.2)
BLD GP AB SCN SERPL QL: NEGATIVE — SIGNIFICANT CHANGE UP
BUN SERPL-MCNC: 10 MG/DL — SIGNIFICANT CHANGE UP (ref 7–23)
CALCIUM SERPL-MCNC: 8.9 MG/DL — SIGNIFICANT CHANGE UP (ref 8.4–10.5)
CHLORIDE SERPL-SCNC: 96 MMOL/L — LOW (ref 98–107)
CO2 SERPL-SCNC: 30 MMOL/L — SIGNIFICANT CHANGE UP (ref 22–31)
CREAT SERPL-MCNC: <0.2 MG/DL — LOW (ref 0.5–1.3)
EGFR: 124 ML/MIN/1.73M2 — SIGNIFICANT CHANGE UP
EGFR: 124 ML/MIN/1.73M2 — SIGNIFICANT CHANGE UP
EOSINOPHIL # BLD AUTO: 0.47 K/UL — SIGNIFICANT CHANGE UP (ref 0–0.5)
EOSINOPHIL NFR BLD AUTO: 3.1 % — SIGNIFICANT CHANGE UP (ref 0–6)
GAS PNL BLDA: SIGNIFICANT CHANGE UP
GLUCOSE SERPL-MCNC: 145 MG/DL — HIGH (ref 70–99)
HCT VFR BLD CALC: 38.3 % — SIGNIFICANT CHANGE UP (ref 34.5–45)
HGB BLD-MCNC: 11.1 G/DL — LOW (ref 11.5–15.5)
IMM GRANULOCYTES # BLD AUTO: 0.08 K/UL — HIGH (ref 0–0.07)
IMM GRANULOCYTES NFR BLD AUTO: 0.5 % — SIGNIFICANT CHANGE UP (ref 0–0.9)
INR BLD: 1.12 RATIO — SIGNIFICANT CHANGE UP (ref 0.85–1.16)
LYMPHOCYTES # BLD AUTO: 0.76 K/UL — LOW (ref 1–3.3)
LYMPHOCYTES NFR BLD AUTO: 5 % — LOW (ref 13–44)
MAGNESIUM SERPL-MCNC: 2.3 MG/DL — SIGNIFICANT CHANGE UP (ref 1.6–2.6)
MCHC RBC-ENTMCNC: 24 PG — LOW (ref 27–34)
MCHC RBC-ENTMCNC: 29 G/DL — LOW (ref 32–36)
MCV RBC AUTO: 82.9 FL — SIGNIFICANT CHANGE UP (ref 80–100)
MONOCYTES # BLD AUTO: 0.78 K/UL — SIGNIFICANT CHANGE UP (ref 0–0.9)
MONOCYTES NFR BLD AUTO: 5.2 % — SIGNIFICANT CHANGE UP (ref 2–14)
NEUTROPHILS # BLD AUTO: 12.99 K/UL — HIGH (ref 1.8–7.4)
NEUTROPHILS NFR BLD AUTO: 85.8 % — HIGH (ref 43–77)
NRBC # BLD AUTO: 0 K/UL — SIGNIFICANT CHANGE UP (ref 0–0)
NRBC # FLD: 0 K/UL — SIGNIFICANT CHANGE UP (ref 0–0)
NRBC BLD AUTO-RTO: 0 /100 WBCS — SIGNIFICANT CHANGE UP (ref 0–0)
PHOSPHATE SERPL-MCNC: 3.2 MG/DL — SIGNIFICANT CHANGE UP (ref 2.5–4.5)
PLATELET # BLD AUTO: 432 K/UL — HIGH (ref 150–400)
PMV BLD: 9 FL — SIGNIFICANT CHANGE UP (ref 7–13)
POTASSIUM SERPL-MCNC: 4.2 MMOL/L — SIGNIFICANT CHANGE UP (ref 3.5–5.3)
POTASSIUM SERPL-SCNC: 4.2 MMOL/L — SIGNIFICANT CHANGE UP (ref 3.5–5.3)
PROT SERPL-MCNC: 7.5 G/DL — SIGNIFICANT CHANGE UP (ref 6–8.3)
PROTHROM AB SERPL-ACNC: 13 SEC — SIGNIFICANT CHANGE UP (ref 9.9–13.4)
RBC # BLD: 4.62 M/UL — SIGNIFICANT CHANGE UP (ref 3.8–5.2)
RBC # FLD: 17.9 % — HIGH (ref 10.3–14.5)
RH IG SCN BLD-IMP: POSITIVE — SIGNIFICANT CHANGE UP
SODIUM SERPL-SCNC: 137 MMOL/L — SIGNIFICANT CHANGE UP (ref 135–145)
WBC # BLD: 15.14 K/UL — HIGH (ref 3.8–10.5)
WBC # FLD AUTO: 15.14 K/UL — HIGH (ref 3.8–10.5)

## 2025-08-04 PROCEDURE — 99291 CRITICAL CARE FIRST HOUR: CPT

## 2025-08-04 RX ORDER — PIPERACILLIN-TAZO-DEXTROSE,ISO 3.375G/5
4.5 IV SOLUTION, PIGGYBACK PREMIX FROZEN(ML) INTRAVENOUS EVERY 8 HOURS
Refills: 0 | Status: COMPLETED | OUTPATIENT
Start: 2025-08-04 | End: 2025-08-09

## 2025-08-04 RX ADMIN — Medication 15 MILLILITER(S): at 18:32

## 2025-08-04 RX ADMIN — IPRATROPIUM BROMIDE AND ALBUTEROL SULFATE 3 MILLILITER(S): .5; 2.5 SOLUTION RESPIRATORY (INHALATION) at 15:44

## 2025-08-04 RX ADMIN — ERYTHROMYCIN 1 APPLICATION(S): 5 OINTMENT OPHTHALMIC at 12:29

## 2025-08-04 RX ADMIN — Medication 250 MILLIGRAM(S): at 05:32

## 2025-08-04 RX ADMIN — ERYTHROMYCIN 1 APPLICATION(S): 5 OINTMENT OPHTHALMIC at 18:29

## 2025-08-04 RX ADMIN — ERYTHROMYCIN 1 APPLICATION(S): 5 OINTMENT OPHTHALMIC at 15:41

## 2025-08-04 RX ADMIN — OFLOXACIN 1 DROP(S): 3 SOLUTION OPHTHALMIC at 05:32

## 2025-08-04 RX ADMIN — MUPIROCIN CALCIUM 1 APPLICATION(S): 20 CREAM TOPICAL at 05:33

## 2025-08-04 RX ADMIN — Medication 25 GRAM(S): at 05:32

## 2025-08-04 RX ADMIN — Medication 1 APPLICATION(S): at 05:32

## 2025-08-04 RX ADMIN — ERYTHROMYCIN 1 APPLICATION(S): 5 OINTMENT OPHTHALMIC at 02:34

## 2025-08-04 RX ADMIN — IPRATROPIUM BROMIDE AND ALBUTEROL SULFATE 3 MILLILITER(S): .5; 2.5 SOLUTION RESPIRATORY (INHALATION) at 03:05

## 2025-08-04 RX ADMIN — Medication 25 GRAM(S): at 22:20

## 2025-08-04 RX ADMIN — IPRATROPIUM BROMIDE AND ALBUTEROL SULFATE 3 MILLILITER(S): .5; 2.5 SOLUTION RESPIRATORY (INHALATION) at 09:25

## 2025-08-04 RX ADMIN — Medication 1 DROP(S): at 05:32

## 2025-08-04 RX ADMIN — Medication 15 MILLILITER(S): at 05:31

## 2025-08-04 RX ADMIN — MUPIROCIN CALCIUM 1 APPLICATION(S): 20 CREAM TOPICAL at 18:30

## 2025-08-04 RX ADMIN — OFLOXACIN 1 DROP(S): 3 SOLUTION OPHTHALMIC at 18:29

## 2025-08-04 RX ADMIN — Medication 1 DROP(S): at 12:30

## 2025-08-04 RX ADMIN — IPRATROPIUM BROMIDE AND ALBUTEROL SULFATE 3 MILLILITER(S): .5; 2.5 SOLUTION RESPIRATORY (INHALATION) at 21:57

## 2025-08-04 RX ADMIN — ERYTHROMYCIN 1 APPLICATION(S): 5 OINTMENT OPHTHALMIC at 20:53

## 2025-08-04 RX ADMIN — RILUZOLE 50 MILLIGRAM(S): 5 LIQUID ORAL at 18:32

## 2025-08-04 RX ADMIN — Medication 1 DROP(S): at 18:33

## 2025-08-04 RX ADMIN — ERYTHROMYCIN 1 APPLICATION(S): 5 OINTMENT OPHTHALMIC at 05:31

## 2025-08-04 RX ADMIN — Medication 25 GRAM(S): at 14:14

## 2025-08-04 RX ADMIN — ERYTHROMYCIN 1 APPLICATION(S): 5 OINTMENT OPHTHALMIC at 09:52

## 2025-08-04 RX ADMIN — ERYTHROMYCIN 1 APPLICATION(S): 5 OINTMENT OPHTHALMIC at 23:57

## 2025-08-04 RX ADMIN — Medication 1 DROP(S): at 23:56

## 2025-08-05 LAB
ALBUMIN SERPL ELPH-MCNC: 3.4 G/DL — SIGNIFICANT CHANGE UP (ref 3.3–5)
ALP SERPL-CCNC: 161 U/L — HIGH (ref 40–120)
ALT FLD-CCNC: 27 U/L — SIGNIFICANT CHANGE UP (ref 4–33)
ANION GAP SERPL CALC-SCNC: 10 MMOL/L — SIGNIFICANT CHANGE UP (ref 7–14)
APTT BLD: 32.6 SEC — SIGNIFICANT CHANGE UP (ref 26.1–36.8)
AST SERPL-CCNC: 28 U/L — SIGNIFICANT CHANGE UP (ref 4–32)
BASE EXCESS BLDA CALC-SCNC: 11.7 MMOL/L — HIGH (ref -2–3)
BASOPHILS # BLD AUTO: 0.06 K/UL — SIGNIFICANT CHANGE UP (ref 0–0.2)
BASOPHILS NFR BLD AUTO: 0.4 % — SIGNIFICANT CHANGE UP (ref 0–2)
BILIRUB SERPL-MCNC: 0.7 MG/DL — SIGNIFICANT CHANGE UP (ref 0.2–1.2)
BLOOD GAS VENOUS COMPREHENSIVE RESULT: SIGNIFICANT CHANGE UP
BUN SERPL-MCNC: 11 MG/DL — SIGNIFICANT CHANGE UP (ref 7–23)
CALCIUM SERPL-MCNC: 9 MG/DL — SIGNIFICANT CHANGE UP (ref 8.4–10.5)
CHLORIDE SERPL-SCNC: 96 MMOL/L — LOW (ref 98–107)
CO2 BLDA-SCNC: 36 MMOL/L — HIGH (ref 19–24)
CO2 SERPL-SCNC: 34 MMOL/L — HIGH (ref 22–31)
CREAT SERPL-MCNC: <0.2 MG/DL — LOW (ref 0.5–1.3)
CULTURE RESULTS: SIGNIFICANT CHANGE UP
CULTURE RESULTS: SIGNIFICANT CHANGE UP
EGFR: 124 ML/MIN/1.73M2 — SIGNIFICANT CHANGE UP
EGFR: 124 ML/MIN/1.73M2 — SIGNIFICANT CHANGE UP
EOSINOPHIL # BLD AUTO: 0.19 K/UL — SIGNIFICANT CHANGE UP (ref 0–0.5)
EOSINOPHIL NFR BLD AUTO: 1.4 % — SIGNIFICANT CHANGE UP (ref 0–6)
GAS PNL BLDA: SIGNIFICANT CHANGE UP
GAS PNL BLDV: SIGNIFICANT CHANGE UP
GLUCOSE BLDC GLUCOMTR-MCNC: 121 MG/DL — HIGH (ref 70–99)
GLUCOSE BLDC GLUCOMTR-MCNC: 145 MG/DL — HIGH (ref 70–99)
GLUCOSE SERPL-MCNC: 204 MG/DL — HIGH (ref 70–99)
HCO3 BLDA-SCNC: 35 MMOL/L — HIGH (ref 21–28)
HCT VFR BLD CALC: 40.1 % — SIGNIFICANT CHANGE UP (ref 34.5–45)
HGB BLD-MCNC: 11.4 G/DL — LOW (ref 11.5–15.5)
IMM GRANULOCYTES # BLD AUTO: 0.1 K/UL — HIGH (ref 0–0.07)
IMM GRANULOCYTES NFR BLD AUTO: 0.7 % — SIGNIFICANT CHANGE UP (ref 0–0.9)
INR BLD: 1.14 RATIO — SIGNIFICANT CHANGE UP (ref 0.85–1.16)
LYMPHOCYTES # BLD AUTO: 0.65 K/UL — LOW (ref 1–3.3)
LYMPHOCYTES NFR BLD AUTO: 4.9 % — LOW (ref 13–44)
MAGNESIUM SERPL-MCNC: 2.1 MG/DL — SIGNIFICANT CHANGE UP (ref 1.6–2.6)
MCHC RBC-ENTMCNC: 23.9 PG — LOW (ref 27–34)
MCHC RBC-ENTMCNC: 28.4 G/DL — LOW (ref 32–36)
MCV RBC AUTO: 84.1 FL — SIGNIFICANT CHANGE UP (ref 80–100)
MONOCYTES # BLD AUTO: 0.58 K/UL — SIGNIFICANT CHANGE UP (ref 0–0.9)
MONOCYTES NFR BLD AUTO: 4.3 % — SIGNIFICANT CHANGE UP (ref 2–14)
NEUTROPHILS # BLD AUTO: 11.77 K/UL — HIGH (ref 1.8–7.4)
NEUTROPHILS NFR BLD AUTO: 88.3 % — HIGH (ref 43–77)
NRBC # BLD AUTO: 0 K/UL — SIGNIFICANT CHANGE UP (ref 0–0)
NRBC # FLD: 0 K/UL — SIGNIFICANT CHANGE UP (ref 0–0)
NRBC BLD AUTO-RTO: 0 /100 WBCS — SIGNIFICANT CHANGE UP (ref 0–0)
PCO2 BLDA: 39 MMHG — SIGNIFICANT CHANGE UP (ref 32–45)
PH BLDA: 7.56 — HIGH (ref 7.35–7.45)
PHOSPHATE SERPL-MCNC: 3.2 MG/DL — SIGNIFICANT CHANGE UP (ref 2.5–4.5)
PLATELET # BLD AUTO: 446 K/UL — HIGH (ref 150–400)
PMV BLD: 9 FL — SIGNIFICANT CHANGE UP (ref 7–13)
PO2 BLDA: 185 MMHG — HIGH (ref 83–108)
POTASSIUM SERPL-MCNC: 4 MMOL/L — SIGNIFICANT CHANGE UP (ref 3.5–5.3)
POTASSIUM SERPL-SCNC: 4 MMOL/L — SIGNIFICANT CHANGE UP (ref 3.5–5.3)
PROT SERPL-MCNC: 7.7 G/DL — SIGNIFICANT CHANGE UP (ref 6–8.3)
PROTHROM AB SERPL-ACNC: 13.2 SEC — SIGNIFICANT CHANGE UP (ref 9.9–13.4)
RBC # BLD: 4.77 M/UL — SIGNIFICANT CHANGE UP (ref 3.8–5.2)
RBC # FLD: 17.7 % — HIGH (ref 10.3–14.5)
SAO2 % BLDA: 100 % — HIGH (ref 94–98)
SODIUM SERPL-SCNC: 140 MMOL/L — SIGNIFICANT CHANGE UP (ref 135–145)
SPECIMEN SOURCE: SIGNIFICANT CHANGE UP
SPECIMEN SOURCE: SIGNIFICANT CHANGE UP
WBC # BLD: 13.35 K/UL — HIGH (ref 3.8–10.5)
WBC # FLD AUTO: 13.35 K/UL — HIGH (ref 3.8–10.5)

## 2025-08-05 PROCEDURE — 31645 BRNCHSC W/THER ASPIR 1ST: CPT | Mod: GC,59

## 2025-08-05 PROCEDURE — 71045 X-RAY EXAM CHEST 1 VIEW: CPT | Mod: 26

## 2025-08-05 PROCEDURE — 99292 CRITICAL CARE ADDL 30 MIN: CPT | Mod: 25

## 2025-08-05 PROCEDURE — 31615 TRCHEOBRNCHSC EST TRACHS INC: CPT | Mod: GC

## 2025-08-05 PROCEDURE — 31624 DX BRONCHOSCOPE/LAVAGE: CPT | Mod: GC

## 2025-08-05 PROCEDURE — 99291 CRITICAL CARE FIRST HOUR: CPT | Mod: 25

## 2025-08-05 RX ORDER — FENTANYL CITRATE-0.9 % NACL/PF 100MCG/2ML
100 SYRINGE (ML) INTRAVENOUS ONCE
Refills: 0 | Status: DISCONTINUED | OUTPATIENT
Start: 2025-08-05 | End: 2025-08-05

## 2025-08-05 RX ORDER — NOREPINEPHRINE BITARTRATE 8 MG
0.04 SOLUTION INTRAVENOUS
Qty: 8 | Refills: 0 | Status: DISCONTINUED | OUTPATIENT
Start: 2025-08-05 | End: 2025-08-06

## 2025-08-05 RX ORDER — INSULIN LISPRO 100 U/ML
INJECTION, SOLUTION INTRAVENOUS; SUBCUTANEOUS EVERY 6 HOURS
Refills: 0 | Status: DISCONTINUED | OUTPATIENT
Start: 2025-08-05 | End: 2025-08-21

## 2025-08-05 RX ADMIN — MUPIROCIN CALCIUM 1 APPLICATION(S): 20 CREAM TOPICAL at 17:51

## 2025-08-05 RX ADMIN — Medication 1 DROP(S): at 06:03

## 2025-08-05 RX ADMIN — Medication 1 APPLICATION(S): at 05:07

## 2025-08-05 RX ADMIN — ERYTHROMYCIN 1 APPLICATION(S): 5 OINTMENT OPHTHALMIC at 15:33

## 2025-08-05 RX ADMIN — IPRATROPIUM BROMIDE AND ALBUTEROL SULFATE 3 MILLILITER(S): .5; 2.5 SOLUTION RESPIRATORY (INHALATION) at 09:09

## 2025-08-05 RX ADMIN — ERYTHROMYCIN 1 APPLICATION(S): 5 OINTMENT OPHTHALMIC at 02:52

## 2025-08-05 RX ADMIN — ERYTHROMYCIN 1 APPLICATION(S): 5 OINTMENT OPHTHALMIC at 17:50

## 2025-08-05 RX ADMIN — SERTRALINE 75 MILLIGRAM(S): 100 TABLET, FILM COATED ORAL at 11:42

## 2025-08-05 RX ADMIN — MUPIROCIN CALCIUM 1 APPLICATION(S): 20 CREAM TOPICAL at 05:06

## 2025-08-05 RX ADMIN — ERYTHROMYCIN 1 APPLICATION(S): 5 OINTMENT OPHTHALMIC at 11:42

## 2025-08-05 RX ADMIN — Medication 100 MICROGRAM(S): at 08:07

## 2025-08-05 RX ADMIN — Medication 25 GRAM(S): at 05:06

## 2025-08-05 RX ADMIN — Medication 15 MILLILITER(S): at 05:06

## 2025-08-05 RX ADMIN — IPRATROPIUM BROMIDE AND ALBUTEROL SULFATE 3 MILLILITER(S): .5; 2.5 SOLUTION RESPIRATORY (INHALATION) at 15:16

## 2025-08-05 RX ADMIN — Medication 4 MILLILITER(S): at 09:09

## 2025-08-05 RX ADMIN — ERYTHROMYCIN 1 APPLICATION(S): 5 OINTMENT OPHTHALMIC at 06:04

## 2025-08-05 RX ADMIN — Medication 15 MILLILITER(S): at 11:42

## 2025-08-05 RX ADMIN — IPRATROPIUM BROMIDE AND ALBUTEROL SULFATE 3 MILLILITER(S): .5; 2.5 SOLUTION RESPIRATORY (INHALATION) at 02:49

## 2025-08-05 RX ADMIN — Medication 25 GRAM(S): at 15:09

## 2025-08-05 RX ADMIN — Medication 100 MICROGRAM(S): at 07:10

## 2025-08-05 RX ADMIN — ERYTHROMYCIN 1 APPLICATION(S): 5 OINTMENT OPHTHALMIC at 08:25

## 2025-08-05 RX ADMIN — OFLOXACIN 1 DROP(S): 3 SOLUTION OPHTHALMIC at 17:51

## 2025-08-05 RX ADMIN — RILUZOLE 50 MILLIGRAM(S): 5 LIQUID ORAL at 05:07

## 2025-08-05 RX ADMIN — ERYTHROMYCIN 1 APPLICATION(S): 5 OINTMENT OPHTHALMIC at 22:09

## 2025-08-05 RX ADMIN — Medication 1 DROP(S): at 12:03

## 2025-08-05 RX ADMIN — Medication 15 MILLILITER(S): at 17:50

## 2025-08-05 RX ADMIN — OFLOXACIN 1 DROP(S): 3 SOLUTION OPHTHALMIC at 06:03

## 2025-08-05 RX ADMIN — IPRATROPIUM BROMIDE AND ALBUTEROL SULFATE 3 MILLILITER(S): .5; 2.5 SOLUTION RESPIRATORY (INHALATION) at 22:52

## 2025-08-05 RX ADMIN — NOREPINEPHRINE BITARTRATE 3.67 MICROGRAM(S)/KG/MIN: 8 SOLUTION at 08:25

## 2025-08-05 RX ADMIN — Medication 25 GRAM(S): at 22:09

## 2025-08-05 RX ADMIN — NOREPINEPHRINE BITARTRATE 3.67 MICROGRAM(S)/KG/MIN: 8 SOLUTION at 19:08

## 2025-08-05 RX ADMIN — Medication 1 DROP(S): at 18:20

## 2025-08-06 ENCOUNTER — TRANSCRIPTION ENCOUNTER (OUTPATIENT)
Age: 70
End: 2025-08-06

## 2025-08-06 LAB
ALBUMIN SERPL ELPH-MCNC: 3.4 G/DL — SIGNIFICANT CHANGE UP (ref 3.3–5)
ALP SERPL-CCNC: 156 U/L — HIGH (ref 40–120)
ALT FLD-CCNC: 24 U/L — SIGNIFICANT CHANGE UP (ref 4–33)
ANION GAP SERPL CALC-SCNC: 15 MMOL/L — HIGH (ref 7–14)
APTT BLD: 29.8 SEC — SIGNIFICANT CHANGE UP (ref 26.1–36.8)
AST SERPL-CCNC: 23 U/L — SIGNIFICANT CHANGE UP (ref 4–32)
BASOPHILS # BLD AUTO: 0.06 K/UL — SIGNIFICANT CHANGE UP (ref 0–0.2)
BASOPHILS NFR BLD AUTO: 0.4 % — SIGNIFICANT CHANGE UP (ref 0–2)
BILIRUB SERPL-MCNC: 0.7 MG/DL — SIGNIFICANT CHANGE UP (ref 0.2–1.2)
BUN SERPL-MCNC: 12 MG/DL — SIGNIFICANT CHANGE UP (ref 7–23)
CALCIUM SERPL-MCNC: 9 MG/DL — SIGNIFICANT CHANGE UP (ref 8.4–10.5)
CHLORIDE SERPL-SCNC: 95 MMOL/L — LOW (ref 98–107)
CO2 SERPL-SCNC: 29 MMOL/L — SIGNIFICANT CHANGE UP (ref 22–31)
CREAT SERPL-MCNC: <0.2 MG/DL — LOW (ref 0.5–1.3)
EGFR: 124 ML/MIN/1.73M2 — SIGNIFICANT CHANGE UP
EGFR: 124 ML/MIN/1.73M2 — SIGNIFICANT CHANGE UP
EOSINOPHIL # BLD AUTO: 0.17 K/UL — SIGNIFICANT CHANGE UP (ref 0–0.5)
EOSINOPHIL NFR BLD AUTO: 1.2 % — SIGNIFICANT CHANGE UP (ref 0–6)
GAS PNL BLDV: SIGNIFICANT CHANGE UP
GAS PNL BLDV: SIGNIFICANT CHANGE UP
GLUCOSE BLDC GLUCOMTR-MCNC: 100 MG/DL — HIGH (ref 70–99)
GLUCOSE BLDC GLUCOMTR-MCNC: 139 MG/DL — HIGH (ref 70–99)
GLUCOSE BLDC GLUCOMTR-MCNC: 180 MG/DL — HIGH (ref 70–99)
GLUCOSE BLDC GLUCOMTR-MCNC: 195 MG/DL — HIGH (ref 70–99)
GLUCOSE BLDC GLUCOMTR-MCNC: 218 MG/DL — HIGH (ref 70–99)
GLUCOSE SERPL-MCNC: 170 MG/DL — HIGH (ref 70–99)
HCT VFR BLD CALC: 40.2 % — SIGNIFICANT CHANGE UP (ref 34.5–45)
HGB BLD-MCNC: 11.6 G/DL — SIGNIFICANT CHANGE UP (ref 11.5–15.5)
IMM GRANULOCYTES # BLD AUTO: 0.16 K/UL — HIGH (ref 0–0.07)
IMM GRANULOCYTES NFR BLD AUTO: 1.1 % — HIGH (ref 0–0.9)
INR BLD: 1.14 RATIO — SIGNIFICANT CHANGE UP (ref 0.85–1.16)
LYMPHOCYTES # BLD AUTO: 0.96 K/UL — LOW (ref 1–3.3)
LYMPHOCYTES NFR BLD AUTO: 6.7 % — LOW (ref 13–44)
MAGNESIUM SERPL-MCNC: 2.1 MG/DL — SIGNIFICANT CHANGE UP (ref 1.6–2.6)
MCHC RBC-ENTMCNC: 23.8 PG — LOW (ref 27–34)
MCHC RBC-ENTMCNC: 28.9 G/DL — LOW (ref 32–36)
MCV RBC AUTO: 82.4 FL — SIGNIFICANT CHANGE UP (ref 80–100)
MONOCYTES # BLD AUTO: 0.61 K/UL — SIGNIFICANT CHANGE UP (ref 0–0.9)
MONOCYTES NFR BLD AUTO: 4.3 % — SIGNIFICANT CHANGE UP (ref 2–14)
NEUTROPHILS # BLD AUTO: 12.27 K/UL — HIGH (ref 1.8–7.4)
NEUTROPHILS NFR BLD AUTO: 86.3 % — HIGH (ref 43–77)
NRBC # BLD AUTO: 0 K/UL — SIGNIFICANT CHANGE UP (ref 0–0)
NRBC # FLD: 0 K/UL — SIGNIFICANT CHANGE UP (ref 0–0)
NRBC BLD AUTO-RTO: 0 /100 WBCS — SIGNIFICANT CHANGE UP (ref 0–0)
PHOSPHATE SERPL-MCNC: 3.3 MG/DL — SIGNIFICANT CHANGE UP (ref 2.5–4.5)
PLATELET # BLD AUTO: 437 K/UL — HIGH (ref 150–400)
PMV BLD: 8.8 FL — SIGNIFICANT CHANGE UP (ref 7–13)
POTASSIUM SERPL-MCNC: 3.3 MMOL/L — LOW (ref 3.5–5.3)
POTASSIUM SERPL-SCNC: 3.3 MMOL/L — LOW (ref 3.5–5.3)
PROT SERPL-MCNC: 7.9 G/DL — SIGNIFICANT CHANGE UP (ref 6–8.3)
PROTHROM AB SERPL-ACNC: 13.2 SEC — SIGNIFICANT CHANGE UP (ref 9.9–13.4)
RBC # BLD: 4.88 M/UL — SIGNIFICANT CHANGE UP (ref 3.8–5.2)
RBC # FLD: 17.9 % — HIGH (ref 10.3–14.5)
SODIUM SERPL-SCNC: 139 MMOL/L — SIGNIFICANT CHANGE UP (ref 135–145)
WBC # BLD: 14.23 K/UL — HIGH (ref 3.8–10.5)
WBC # FLD AUTO: 14.23 K/UL — HIGH (ref 3.8–10.5)

## 2025-08-06 PROCEDURE — 99233 SBSQ HOSP IP/OBS HIGH 50: CPT | Mod: GC

## 2025-08-06 PROCEDURE — 99291 CRITICAL CARE FIRST HOUR: CPT

## 2025-08-06 RX ORDER — ACETAMINOPHEN 500 MG/5ML
725 LIQUID (ML) ORAL ONCE
Refills: 0 | Status: COMPLETED | OUTPATIENT
Start: 2025-08-06 | End: 2025-08-06

## 2025-08-06 RX ORDER — RIVAROXABAN 10 MG/1
10 TABLET, FILM COATED ORAL DAILY
Refills: 0 | Status: DISCONTINUED | OUTPATIENT
Start: 2025-08-06 | End: 2025-08-21

## 2025-08-06 RX ADMIN — DIAZEPAM 2 MILLIGRAM(S): 5 TABLET ORAL at 21:07

## 2025-08-06 RX ADMIN — ERYTHROMYCIN 1 APPLICATION(S): 5 OINTMENT OPHTHALMIC at 23:54

## 2025-08-06 RX ADMIN — Medication 40 MILLIEQUIVALENT(S): at 11:43

## 2025-08-06 RX ADMIN — IPRATROPIUM BROMIDE AND ALBUTEROL SULFATE 3 MILLILITER(S): .5; 2.5 SOLUTION RESPIRATORY (INHALATION) at 15:18

## 2025-08-06 RX ADMIN — Medication 1 DROP(S): at 17:07

## 2025-08-06 RX ADMIN — Medication 15 MILLILITER(S): at 11:44

## 2025-08-06 RX ADMIN — Medication 290 MILLIGRAM(S): at 05:34

## 2025-08-06 RX ADMIN — INSULIN LISPRO 2: 100 INJECTION, SOLUTION INTRAVENOUS; SUBCUTANEOUS at 23:53

## 2025-08-06 RX ADMIN — ERYTHROMYCIN 1 APPLICATION(S): 5 OINTMENT OPHTHALMIC at 00:40

## 2025-08-06 RX ADMIN — ERYTHROMYCIN 1 APPLICATION(S): 5 OINTMENT OPHTHALMIC at 15:43

## 2025-08-06 RX ADMIN — ERYTHROMYCIN 1 APPLICATION(S): 5 OINTMENT OPHTHALMIC at 05:35

## 2025-08-06 RX ADMIN — SERTRALINE 75 MILLIGRAM(S): 100 TABLET, FILM COATED ORAL at 11:44

## 2025-08-06 RX ADMIN — MUPIROCIN CALCIUM 1 APPLICATION(S): 20 CREAM TOPICAL at 05:33

## 2025-08-06 RX ADMIN — Medication 40 MILLIEQUIVALENT(S): at 05:34

## 2025-08-06 RX ADMIN — Medication 1 DROP(S): at 05:32

## 2025-08-06 RX ADMIN — IPRATROPIUM BROMIDE AND ALBUTEROL SULFATE 3 MILLILITER(S): .5; 2.5 SOLUTION RESPIRATORY (INHALATION) at 20:11

## 2025-08-06 RX ADMIN — Medication 725 MILLIGRAM(S): at 23:00

## 2025-08-06 RX ADMIN — Medication 25 GRAM(S): at 21:07

## 2025-08-06 RX ADMIN — INSULIN LISPRO 1: 100 INJECTION, SOLUTION INTRAVENOUS; SUBCUTANEOUS at 17:27

## 2025-08-06 RX ADMIN — MUPIROCIN CALCIUM 1 APPLICATION(S): 20 CREAM TOPICAL at 17:06

## 2025-08-06 RX ADMIN — Medication 25 GRAM(S): at 05:32

## 2025-08-06 RX ADMIN — Medication 1 APPLICATION(S): at 05:34

## 2025-08-06 RX ADMIN — Medication 725 MILLIGRAM(S): at 05:49

## 2025-08-06 RX ADMIN — RILUZOLE 50 MILLIGRAM(S): 5 LIQUID ORAL at 17:11

## 2025-08-06 RX ADMIN — DIAZEPAM 2 MILLIGRAM(S): 5 TABLET ORAL at 03:22

## 2025-08-06 RX ADMIN — RILUZOLE 50 MILLIGRAM(S): 5 LIQUID ORAL at 06:40

## 2025-08-06 RX ADMIN — RIVAROXABAN 10 MILLIGRAM(S): 10 TABLET, FILM COATED ORAL at 13:13

## 2025-08-06 RX ADMIN — Medication 1 DROP(S): at 23:54

## 2025-08-06 RX ADMIN — OFLOXACIN 1 DROP(S): 3 SOLUTION OPHTHALMIC at 17:08

## 2025-08-06 RX ADMIN — Medication 1 DROP(S): at 11:45

## 2025-08-06 RX ADMIN — Medication 15 MILLILITER(S): at 05:34

## 2025-08-06 RX ADMIN — ERYTHROMYCIN 1 APPLICATION(S): 5 OINTMENT OPHTHALMIC at 17:06

## 2025-08-06 RX ADMIN — Medication 290 MILLIGRAM(S): at 22:13

## 2025-08-06 RX ADMIN — Medication 1 DROP(S): at 00:40

## 2025-08-06 RX ADMIN — ERYTHROMYCIN 1 APPLICATION(S): 5 OINTMENT OPHTHALMIC at 03:33

## 2025-08-06 RX ADMIN — ERYTHROMYCIN 1 APPLICATION(S): 5 OINTMENT OPHTHALMIC at 21:06

## 2025-08-06 RX ADMIN — IPRATROPIUM BROMIDE AND ALBUTEROL SULFATE 3 MILLILITER(S): .5; 2.5 SOLUTION RESPIRATORY (INHALATION) at 08:27

## 2025-08-06 RX ADMIN — IPRATROPIUM BROMIDE AND ALBUTEROL SULFATE 3 MILLILITER(S): .5; 2.5 SOLUTION RESPIRATORY (INHALATION) at 03:40

## 2025-08-06 RX ADMIN — Medication 25 GRAM(S): at 13:28

## 2025-08-06 RX ADMIN — Medication 15 MILLILITER(S): at 17:11

## 2025-08-06 RX ADMIN — ERYTHROMYCIN 1 APPLICATION(S): 5 OINTMENT OPHTHALMIC at 11:45

## 2025-08-06 RX ADMIN — OFLOXACIN 1 DROP(S): 3 SOLUTION OPHTHALMIC at 05:33

## 2025-08-06 RX ADMIN — INSULIN LISPRO 1: 100 INJECTION, SOLUTION INTRAVENOUS; SUBCUTANEOUS at 05:32

## 2025-08-06 RX ADMIN — ERYTHROMYCIN 1 APPLICATION(S): 5 OINTMENT OPHTHALMIC at 09:09

## 2025-08-07 LAB
ALBUMIN SERPL ELPH-MCNC: 3.6 G/DL — SIGNIFICANT CHANGE UP (ref 3.3–5)
ALP SERPL-CCNC: 167 U/L — HIGH (ref 40–120)
ALT FLD-CCNC: 23 U/L — SIGNIFICANT CHANGE UP (ref 4–33)
ANION GAP SERPL CALC-SCNC: 14 MMOL/L — SIGNIFICANT CHANGE UP (ref 7–14)
APTT BLD: 37.2 SEC — HIGH (ref 26.1–36.8)
AST SERPL-CCNC: 25 U/L — SIGNIFICANT CHANGE UP (ref 4–32)
BASOPHILS # BLD AUTO: 0.07 K/UL — SIGNIFICANT CHANGE UP (ref 0–0.2)
BASOPHILS NFR BLD AUTO: 0.5 % — SIGNIFICANT CHANGE UP (ref 0–2)
BILIRUB SERPL-MCNC: 0.7 MG/DL — SIGNIFICANT CHANGE UP (ref 0.2–1.2)
BLD GP AB SCN SERPL QL: NEGATIVE — SIGNIFICANT CHANGE UP
BLOOD GAS ARTERIAL COMPREHENSIVE RESULT: SIGNIFICANT CHANGE UP
BUN SERPL-MCNC: 19 MG/DL — SIGNIFICANT CHANGE UP (ref 7–23)
CALCIUM SERPL-MCNC: 9.4 MG/DL — SIGNIFICANT CHANGE UP (ref 8.4–10.5)
CHLORIDE SERPL-SCNC: 92 MMOL/L — LOW (ref 98–107)
CO2 SERPL-SCNC: 32 MMOL/L — HIGH (ref 22–31)
CREAT SERPL-MCNC: <0.2 MG/DL — LOW (ref 0.5–1.3)
EGFR: 124 ML/MIN/1.73M2 — SIGNIFICANT CHANGE UP
EGFR: 124 ML/MIN/1.73M2 — SIGNIFICANT CHANGE UP
EOSINOPHIL # BLD AUTO: 0.07 K/UL — SIGNIFICANT CHANGE UP (ref 0–0.5)
EOSINOPHIL NFR BLD AUTO: 0.5 % — SIGNIFICANT CHANGE UP (ref 0–6)
GAS PNL BLDA: SIGNIFICANT CHANGE UP
GAS PNL BLDV: SIGNIFICANT CHANGE UP
GLUCOSE BLDC GLUCOMTR-MCNC: 103 MG/DL — HIGH (ref 70–99)
GLUCOSE BLDC GLUCOMTR-MCNC: 150 MG/DL — HIGH (ref 70–99)
GLUCOSE BLDC GLUCOMTR-MCNC: 186 MG/DL — HIGH (ref 70–99)
GLUCOSE BLDC GLUCOMTR-MCNC: 194 MG/DL — HIGH (ref 70–99)
GLUCOSE SERPL-MCNC: 226 MG/DL — HIGH (ref 70–99)
HCT VFR BLD CALC: 44.5 % — SIGNIFICANT CHANGE UP (ref 34.5–45)
HGB BLD-MCNC: 12.6 G/DL — SIGNIFICANT CHANGE UP (ref 11.5–15.5)
IMM GRANULOCYTES # BLD AUTO: 0.13 K/UL — HIGH (ref 0–0.07)
IMM GRANULOCYTES NFR BLD AUTO: 0.9 % — SIGNIFICANT CHANGE UP (ref 0–0.9)
INR BLD: 1.2 RATIO — HIGH (ref 0.85–1.16)
LYMPHOCYTES # BLD AUTO: 0.63 K/UL — LOW (ref 1–3.3)
LYMPHOCYTES NFR BLD AUTO: 4.5 % — LOW (ref 13–44)
MAGNESIUM SERPL-MCNC: 2.1 MG/DL — SIGNIFICANT CHANGE UP (ref 1.6–2.6)
MCHC RBC-ENTMCNC: 24.4 PG — LOW (ref 27–34)
MCHC RBC-ENTMCNC: 28.3 G/DL — LOW (ref 32–36)
MCV RBC AUTO: 86.1 FL — SIGNIFICANT CHANGE UP (ref 80–100)
MONOCYTES # BLD AUTO: 0.64 K/UL — SIGNIFICANT CHANGE UP (ref 0–0.9)
MONOCYTES NFR BLD AUTO: 4.5 % — SIGNIFICANT CHANGE UP (ref 2–14)
NEUTROPHILS # BLD AUTO: 12.61 K/UL — HIGH (ref 1.8–7.4)
NEUTROPHILS NFR BLD AUTO: 89.1 % — HIGH (ref 43–77)
NRBC # BLD AUTO: 0 K/UL — SIGNIFICANT CHANGE UP (ref 0–0)
NRBC # FLD: 0 K/UL — SIGNIFICANT CHANGE UP (ref 0–0)
NRBC BLD AUTO-RTO: 0 /100 WBCS — SIGNIFICANT CHANGE UP (ref 0–0)
PHOSPHATE SERPL-MCNC: 3.8 MG/DL — SIGNIFICANT CHANGE UP (ref 2.5–4.5)
PLATELET # BLD AUTO: 393 K/UL — SIGNIFICANT CHANGE UP (ref 150–400)
PMV BLD: 8.8 FL — SIGNIFICANT CHANGE UP (ref 7–13)
POTASSIUM SERPL-MCNC: 4.2 MMOL/L — SIGNIFICANT CHANGE UP (ref 3.5–5.3)
POTASSIUM SERPL-SCNC: 4.2 MMOL/L — SIGNIFICANT CHANGE UP (ref 3.5–5.3)
PROT SERPL-MCNC: 8.3 G/DL — SIGNIFICANT CHANGE UP (ref 6–8.3)
PROTHROM AB SERPL-ACNC: 13.9 SEC — HIGH (ref 9.9–13.4)
RBC # BLD: 5.17 M/UL — SIGNIFICANT CHANGE UP (ref 3.8–5.2)
RBC # FLD: 18.2 % — HIGH (ref 10.3–14.5)
RH IG SCN BLD-IMP: POSITIVE — SIGNIFICANT CHANGE UP
SODIUM SERPL-SCNC: 138 MMOL/L — SIGNIFICANT CHANGE UP (ref 135–145)
WBC # BLD: 14.15 K/UL — HIGH (ref 3.8–10.5)
WBC # FLD AUTO: 14.15 K/UL — HIGH (ref 3.8–10.5)

## 2025-08-07 PROCEDURE — 99291 CRITICAL CARE FIRST HOUR: CPT

## 2025-08-07 RX ORDER — DIAZEPAM 5 MG/1
2 TABLET ORAL EVERY 6 HOURS
Refills: 0 | Status: DISCONTINUED | OUTPATIENT
Start: 2025-08-07 | End: 2025-08-14

## 2025-08-07 RX ADMIN — MUPIROCIN CALCIUM 1 APPLICATION(S): 20 CREAM TOPICAL at 05:34

## 2025-08-07 RX ADMIN — ERYTHROMYCIN 1 APPLICATION(S): 5 OINTMENT OPHTHALMIC at 08:46

## 2025-08-07 RX ADMIN — ERYTHROMYCIN 1 APPLICATION(S): 5 OINTMENT OPHTHALMIC at 12:02

## 2025-08-07 RX ADMIN — INSULIN LISPRO 1: 100 INJECTION, SOLUTION INTRAVENOUS; SUBCUTANEOUS at 17:43

## 2025-08-07 RX ADMIN — Medication 1 DROP(S): at 17:42

## 2025-08-07 RX ADMIN — RILUZOLE 50 MILLIGRAM(S): 5 LIQUID ORAL at 05:11

## 2025-08-07 RX ADMIN — Medication 15 MILLILITER(S): at 05:12

## 2025-08-07 RX ADMIN — IPRATROPIUM BROMIDE AND ALBUTEROL SULFATE 3 MILLILITER(S): .5; 2.5 SOLUTION RESPIRATORY (INHALATION) at 15:24

## 2025-08-07 RX ADMIN — IPRATROPIUM BROMIDE AND ALBUTEROL SULFATE 3 MILLILITER(S): .5; 2.5 SOLUTION RESPIRATORY (INHALATION) at 20:32

## 2025-08-07 RX ADMIN — Medication 15 MILLILITER(S): at 17:42

## 2025-08-07 RX ADMIN — Medication 1 DROP(S): at 05:57

## 2025-08-07 RX ADMIN — ERYTHROMYCIN 1 APPLICATION(S): 5 OINTMENT OPHTHALMIC at 03:13

## 2025-08-07 RX ADMIN — ERYTHROMYCIN 1 APPLICATION(S): 5 OINTMENT OPHTHALMIC at 05:56

## 2025-08-07 RX ADMIN — RILUZOLE 50 MILLIGRAM(S): 5 LIQUID ORAL at 17:42

## 2025-08-07 RX ADMIN — INSULIN LISPRO 1: 100 INJECTION, SOLUTION INTRAVENOUS; SUBCUTANEOUS at 05:35

## 2025-08-07 RX ADMIN — IPRATROPIUM BROMIDE AND ALBUTEROL SULFATE 3 MILLILITER(S): .5; 2.5 SOLUTION RESPIRATORY (INHALATION) at 08:17

## 2025-08-07 RX ADMIN — Medication 1 APPLICATION(S): at 05:35

## 2025-08-07 RX ADMIN — IPRATROPIUM BROMIDE AND ALBUTEROL SULFATE 3 MILLILITER(S): .5; 2.5 SOLUTION RESPIRATORY (INHALATION) at 02:27

## 2025-08-07 RX ADMIN — Medication 25 GRAM(S): at 21:48

## 2025-08-07 RX ADMIN — RIVAROXABAN 10 MILLIGRAM(S): 10 TABLET, FILM COATED ORAL at 12:01

## 2025-08-07 RX ADMIN — Medication 25 GRAM(S): at 05:12

## 2025-08-07 RX ADMIN — Medication 25 GRAM(S): at 14:39

## 2025-08-07 RX ADMIN — SERTRALINE 75 MILLIGRAM(S): 100 TABLET, FILM COATED ORAL at 12:01

## 2025-08-07 RX ADMIN — OFLOXACIN 1 DROP(S): 3 SOLUTION OPHTHALMIC at 17:42

## 2025-08-07 RX ADMIN — ERYTHROMYCIN 1 APPLICATION(S): 5 OINTMENT OPHTHALMIC at 17:44

## 2025-08-07 RX ADMIN — OFLOXACIN 1 DROP(S): 3 SOLUTION OPHTHALMIC at 05:11

## 2025-08-07 RX ADMIN — Medication 1 DROP(S): at 12:02

## 2025-08-07 RX ADMIN — Medication 15 MILLILITER(S): at 12:01

## 2025-08-07 RX ADMIN — ERYTHROMYCIN 1 APPLICATION(S): 5 OINTMENT OPHTHALMIC at 21:12

## 2025-08-07 RX ADMIN — ERYTHROMYCIN 1 APPLICATION(S): 5 OINTMENT OPHTHALMIC at 15:24

## 2025-08-08 LAB
ANION GAP SERPL CALC-SCNC: 12 MMOL/L — SIGNIFICANT CHANGE UP (ref 7–14)
BASE EXCESS BLDA CALC-SCNC: 14.8 MMOL/L — HIGH (ref -2–3)
BUN SERPL-MCNC: 24 MG/DL — HIGH (ref 7–23)
CALCIUM SERPL-MCNC: 9 MG/DL — SIGNIFICANT CHANGE UP (ref 8.4–10.5)
CHLORIDE SERPL-SCNC: 98 MMOL/L — SIGNIFICANT CHANGE UP (ref 98–107)
CO2 BLDA-SCNC: 44 MMOL/L — HIGH (ref 19–24)
CO2 SERPL-SCNC: 33 MMOL/L — HIGH (ref 22–31)
CREAT SERPL-MCNC: <0.2 MG/DL — LOW (ref 0.5–1.3)
EGFR: 124 ML/MIN/1.73M2 — SIGNIFICANT CHANGE UP
EGFR: 124 ML/MIN/1.73M2 — SIGNIFICANT CHANGE UP
GLUCOSE BLDC GLUCOMTR-MCNC: 119 MG/DL — HIGH (ref 70–99)
GLUCOSE BLDC GLUCOMTR-MCNC: 136 MG/DL — HIGH (ref 70–99)
GLUCOSE BLDC GLUCOMTR-MCNC: 174 MG/DL — HIGH (ref 70–99)
GLUCOSE BLDC GLUCOMTR-MCNC: 210 MG/DL — HIGH (ref 70–99)
GLUCOSE SERPL-MCNC: 156 MG/DL — HIGH (ref 70–99)
GRAM STN FLD: SIGNIFICANT CHANGE UP
GRAM STN FLD: SIGNIFICANT CHANGE UP
HCO3 BLDA-SCNC: 42 MMOL/L — HIGH (ref 21–28)
HCT VFR BLD CALC: 37.2 % — SIGNIFICANT CHANGE UP (ref 34.5–45)
HGB BLD-MCNC: 10.7 G/DL — LOW (ref 11.5–15.5)
INR BLD: 1.1 RATIO — SIGNIFICANT CHANGE UP (ref 0.85–1.16)
MAGNESIUM SERPL-MCNC: 2 MG/DL — SIGNIFICANT CHANGE UP (ref 1.6–2.6)
MCHC RBC-ENTMCNC: 23.6 PG — LOW (ref 27–34)
MCHC RBC-ENTMCNC: 28.8 G/DL — LOW (ref 32–36)
MCV RBC AUTO: 82.1 FL — SIGNIFICANT CHANGE UP (ref 80–100)
NRBC # BLD AUTO: 0 K/UL — SIGNIFICANT CHANGE UP (ref 0–0)
NRBC # FLD: 0 K/UL — SIGNIFICANT CHANGE UP (ref 0–0)
NRBC BLD AUTO-RTO: 0 /100 WBCS — SIGNIFICANT CHANGE UP (ref 0–0)
PCO2 BLDA: 60 MMHG — HIGH (ref 32–45)
PH BLDA: 7.45 — SIGNIFICANT CHANGE UP (ref 7.35–7.45)
PHOSPHATE SERPL-MCNC: 2.2 MG/DL — LOW (ref 2.5–4.5)
PLATELET # BLD AUTO: 358 K/UL — SIGNIFICANT CHANGE UP (ref 150–400)
PMV BLD: 9 FL — SIGNIFICANT CHANGE UP (ref 7–13)
PO2 BLDA: 87 MMHG — SIGNIFICANT CHANGE UP (ref 83–108)
POTASSIUM SERPL-MCNC: 3.7 MMOL/L — SIGNIFICANT CHANGE UP (ref 3.5–5.3)
POTASSIUM SERPL-SCNC: 3.7 MMOL/L — SIGNIFICANT CHANGE UP (ref 3.5–5.3)
PROTHROM AB SERPL-ACNC: 12.7 SEC — SIGNIFICANT CHANGE UP (ref 9.9–13.4)
RBC # BLD: 4.53 M/UL — SIGNIFICANT CHANGE UP (ref 3.8–5.2)
RBC # FLD: 18 % — HIGH (ref 10.3–14.5)
SAO2 % BLDA: 98.9 % — HIGH (ref 94–98)
SODIUM SERPL-SCNC: 143 MMOL/L — SIGNIFICANT CHANGE UP (ref 135–145)
SPECIMEN SOURCE: SIGNIFICANT CHANGE UP
SPECIMEN SOURCE: SIGNIFICANT CHANGE UP
WBC # BLD: 10.71 K/UL — HIGH (ref 3.8–10.5)
WBC # FLD AUTO: 10.71 K/UL — HIGH (ref 3.8–10.5)

## 2025-08-08 PROCEDURE — 99233 SBSQ HOSP IP/OBS HIGH 50: CPT

## 2025-08-08 PROCEDURE — 99223 1ST HOSP IP/OBS HIGH 75: CPT

## 2025-08-08 RX ORDER — SOD PHOS DI, MONO/K PHOS MONO 250 MG
1 TABLET ORAL THREE TIMES A DAY
Refills: 0 | Status: COMPLETED | OUTPATIENT
Start: 2025-08-08 | End: 2025-08-09

## 2025-08-08 RX ADMIN — ERYTHROMYCIN 1 APPLICATION(S): 5 OINTMENT OPHTHALMIC at 15:04

## 2025-08-08 RX ADMIN — RILUZOLE 50 MILLIGRAM(S): 5 LIQUID ORAL at 17:30

## 2025-08-08 RX ADMIN — Medication 15 MILLILITER(S): at 17:30

## 2025-08-08 RX ADMIN — OFLOXACIN 1 DROP(S): 3 SOLUTION OPHTHALMIC at 17:31

## 2025-08-08 RX ADMIN — Medication 1 DROP(S): at 05:09

## 2025-08-08 RX ADMIN — RIVAROXABAN 10 MILLIGRAM(S): 10 TABLET, FILM COATED ORAL at 11:09

## 2025-08-08 RX ADMIN — Medication 25 GRAM(S): at 21:08

## 2025-08-08 RX ADMIN — Medication 1 APPLICATION(S): at 09:33

## 2025-08-08 RX ADMIN — IPRATROPIUM BROMIDE AND ALBUTEROL SULFATE 3 MILLILITER(S): .5; 2.5 SOLUTION RESPIRATORY (INHALATION) at 15:47

## 2025-08-08 RX ADMIN — OFLOXACIN 1 DROP(S): 3 SOLUTION OPHTHALMIC at 05:09

## 2025-08-08 RX ADMIN — Medication 1 APPLICATION(S): at 05:09

## 2025-08-08 RX ADMIN — IPRATROPIUM BROMIDE AND ALBUTEROL SULFATE 3 MILLILITER(S): .5; 2.5 SOLUTION RESPIRATORY (INHALATION) at 08:22

## 2025-08-08 RX ADMIN — ERYTHROMYCIN 1 APPLICATION(S): 5 OINTMENT OPHTHALMIC at 05:09

## 2025-08-08 RX ADMIN — ERYTHROMYCIN 1 APPLICATION(S): 5 OINTMENT OPHTHALMIC at 09:31

## 2025-08-08 RX ADMIN — ERYTHROMYCIN 1 APPLICATION(S): 5 OINTMENT OPHTHALMIC at 01:17

## 2025-08-08 RX ADMIN — ERYTHROMYCIN 1 APPLICATION(S): 5 OINTMENT OPHTHALMIC at 17:29

## 2025-08-08 RX ADMIN — IPRATROPIUM BROMIDE AND ALBUTEROL SULFATE 3 MILLILITER(S): .5; 2.5 SOLUTION RESPIRATORY (INHALATION) at 03:24

## 2025-08-08 RX ADMIN — Medication 15 MILLILITER(S): at 11:10

## 2025-08-08 RX ADMIN — SERTRALINE 75 MILLIGRAM(S): 100 TABLET, FILM COATED ORAL at 11:10

## 2025-08-08 RX ADMIN — RILUZOLE 50 MILLIGRAM(S): 5 LIQUID ORAL at 05:12

## 2025-08-08 RX ADMIN — Medication 1 DROP(S): at 11:09

## 2025-08-08 RX ADMIN — Medication 1 DROP(S): at 01:17

## 2025-08-08 RX ADMIN — Medication 1 PACKET(S): at 13:17

## 2025-08-08 RX ADMIN — Medication 15 MILLILITER(S): at 05:10

## 2025-08-08 RX ADMIN — Medication 25 GRAM(S): at 13:16

## 2025-08-08 RX ADMIN — INSULIN LISPRO 1: 100 INJECTION, SOLUTION INTRAVENOUS; SUBCUTANEOUS at 17:36

## 2025-08-08 RX ADMIN — Medication 25 GRAM(S): at 05:26

## 2025-08-08 RX ADMIN — Medication 1 PACKET(S): at 21:09

## 2025-08-08 RX ADMIN — ERYTHROMYCIN 1 APPLICATION(S): 5 OINTMENT OPHTHALMIC at 02:25

## 2025-08-08 RX ADMIN — Medication 1 DROP(S): at 17:30

## 2025-08-08 RX ADMIN — IPRATROPIUM BROMIDE AND ALBUTEROL SULFATE 3 MILLILITER(S): .5; 2.5 SOLUTION RESPIRATORY (INHALATION) at 21:17

## 2025-08-08 RX ADMIN — ERYTHROMYCIN 1 APPLICATION(S): 5 OINTMENT OPHTHALMIC at 11:08

## 2025-08-08 RX ADMIN — ERYTHROMYCIN 1 APPLICATION(S): 5 OINTMENT OPHTHALMIC at 21:08

## 2025-08-09 LAB
ANION GAP SERPL CALC-SCNC: 14 MMOL/L — SIGNIFICANT CHANGE UP (ref 7–14)
BUN SERPL-MCNC: 20 MG/DL — SIGNIFICANT CHANGE UP (ref 7–23)
CALCIUM SERPL-MCNC: 9.2 MG/DL — SIGNIFICANT CHANGE UP (ref 8.4–10.5)
CHLORIDE SERPL-SCNC: 95 MMOL/L — LOW (ref 98–107)
CO2 SERPL-SCNC: 32 MMOL/L — HIGH (ref 22–31)
CREAT SERPL-MCNC: <0.2 MG/DL — LOW (ref 0.5–1.3)
EGFR: 124 ML/MIN/1.73M2 — SIGNIFICANT CHANGE UP
EGFR: 124 ML/MIN/1.73M2 — SIGNIFICANT CHANGE UP
GLUCOSE BLDC GLUCOMTR-MCNC: 134 MG/DL — HIGH (ref 70–99)
GLUCOSE BLDC GLUCOMTR-MCNC: 142 MG/DL — HIGH (ref 70–99)
GLUCOSE BLDC GLUCOMTR-MCNC: 164 MG/DL — HIGH (ref 70–99)
GLUCOSE BLDC GLUCOMTR-MCNC: 182 MG/DL — HIGH (ref 70–99)
GLUCOSE SERPL-MCNC: 131 MG/DL — HIGH (ref 70–99)
HCT VFR BLD CALC: 39.9 % — SIGNIFICANT CHANGE UP (ref 34.5–45)
HGB BLD-MCNC: 11.3 G/DL — LOW (ref 11.5–15.5)
MAGNESIUM SERPL-MCNC: 2.1 MG/DL — SIGNIFICANT CHANGE UP (ref 1.6–2.6)
MCHC RBC-ENTMCNC: 24.2 PG — LOW (ref 27–34)
MCHC RBC-ENTMCNC: 28.3 G/DL — LOW (ref 32–36)
MCV RBC AUTO: 85.4 FL — SIGNIFICANT CHANGE UP (ref 80–100)
NRBC # BLD AUTO: 0 K/UL — SIGNIFICANT CHANGE UP (ref 0–0)
NRBC # FLD: 0 K/UL — SIGNIFICANT CHANGE UP (ref 0–0)
NRBC BLD AUTO-RTO: 0 /100 WBCS — SIGNIFICANT CHANGE UP (ref 0–0)
PHOSPHATE SERPL-MCNC: 3 MG/DL — SIGNIFICANT CHANGE UP (ref 2.5–4.5)
PLATELET # BLD AUTO: 413 K/UL — HIGH (ref 150–400)
PMV BLD: 9.4 FL — SIGNIFICANT CHANGE UP (ref 7–13)
POTASSIUM SERPL-MCNC: 4 MMOL/L — SIGNIFICANT CHANGE UP (ref 3.5–5.3)
POTASSIUM SERPL-SCNC: 4 MMOL/L — SIGNIFICANT CHANGE UP (ref 3.5–5.3)
RBC # BLD: 4.67 M/UL — SIGNIFICANT CHANGE UP (ref 3.8–5.2)
RBC # FLD: 18.6 % — HIGH (ref 10.3–14.5)
SODIUM SERPL-SCNC: 141 MMOL/L — SIGNIFICANT CHANGE UP (ref 135–145)
WBC # BLD: 14.81 K/UL — HIGH (ref 3.8–10.5)
WBC # FLD AUTO: 14.81 K/UL — HIGH (ref 3.8–10.5)

## 2025-08-09 PROCEDURE — 99233 SBSQ HOSP IP/OBS HIGH 50: CPT | Mod: GC

## 2025-08-09 RX ORDER — LANOLIN/MINERAL OIL/PETROLATUM
1 OINTMENT (GRAM) OPHTHALMIC (EYE)
Refills: 0 | Status: DISCONTINUED | OUTPATIENT
Start: 2025-08-09 | End: 2025-08-21

## 2025-08-09 RX ORDER — ERYTHROMYCIN 5 MG/G
1 OINTMENT OPHTHALMIC
Refills: 0 | Status: DISCONTINUED | OUTPATIENT
Start: 2025-08-09 | End: 2025-08-21

## 2025-08-09 RX ADMIN — Medication 1 DROP(S): at 03:30

## 2025-08-09 RX ADMIN — Medication 15 MILLILITER(S): at 05:19

## 2025-08-09 RX ADMIN — INSULIN LISPRO 2: 100 INJECTION, SOLUTION INTRAVENOUS; SUBCUTANEOUS at 00:34

## 2025-08-09 RX ADMIN — Medication 1 DROP(S): at 16:55

## 2025-08-09 RX ADMIN — Medication 1 DROP(S): at 13:06

## 2025-08-09 RX ADMIN — RILUZOLE 50 MILLIGRAM(S): 5 LIQUID ORAL at 17:01

## 2025-08-09 RX ADMIN — SERTRALINE 75 MILLIGRAM(S): 100 TABLET, FILM COATED ORAL at 11:11

## 2025-08-09 RX ADMIN — Medication 1 DROP(S): at 09:48

## 2025-08-09 RX ADMIN — IPRATROPIUM BROMIDE AND ALBUTEROL SULFATE 3 MILLILITER(S): .5; 2.5 SOLUTION RESPIRATORY (INHALATION) at 08:40

## 2025-08-09 RX ADMIN — Medication 15 MILLILITER(S): at 11:11

## 2025-08-09 RX ADMIN — Medication 1 DROP(S): at 10:09

## 2025-08-09 RX ADMIN — ERYTHROMYCIN 1 APPLICATION(S): 5 OINTMENT OPHTHALMIC at 19:45

## 2025-08-09 RX ADMIN — IPRATROPIUM BROMIDE AND ALBUTEROL SULFATE 3 MILLILITER(S): .5; 2.5 SOLUTION RESPIRATORY (INHALATION) at 15:25

## 2025-08-09 RX ADMIN — ERYTHROMYCIN 1 APPLICATION(S): 5 OINTMENT OPHTHALMIC at 05:21

## 2025-08-09 RX ADMIN — Medication 1 DROP(S): at 00:37

## 2025-08-09 RX ADMIN — ERYTHROMYCIN 1 APPLICATION(S): 5 OINTMENT OPHTHALMIC at 16:54

## 2025-08-09 RX ADMIN — INSULIN LISPRO 1: 100 INJECTION, SOLUTION INTRAVENOUS; SUBCUTANEOUS at 17:01

## 2025-08-09 RX ADMIN — RILUZOLE 50 MILLIGRAM(S): 5 LIQUID ORAL at 05:19

## 2025-08-09 RX ADMIN — IPRATROPIUM BROMIDE AND ALBUTEROL SULFATE 3 MILLILITER(S): .5; 2.5 SOLUTION RESPIRATORY (INHALATION) at 03:24

## 2025-08-09 RX ADMIN — INSULIN LISPRO 1: 100 INJECTION, SOLUTION INTRAVENOUS; SUBCUTANEOUS at 23:37

## 2025-08-09 RX ADMIN — ERYTHROMYCIN 1 APPLICATION(S): 5 OINTMENT OPHTHALMIC at 03:31

## 2025-08-09 RX ADMIN — ERYTHROMYCIN 1 APPLICATION(S): 5 OINTMENT OPHTHALMIC at 11:08

## 2025-08-09 RX ADMIN — Medication 1 DROP(S): at 17:04

## 2025-08-09 RX ADMIN — ERYTHROMYCIN 1 APPLICATION(S): 5 OINTMENT OPHTHALMIC at 17:02

## 2025-08-09 RX ADMIN — ERYTHROMYCIN 1 APPLICATION(S): 5 OINTMENT OPHTHALMIC at 04:07

## 2025-08-09 RX ADMIN — ERYTHROMYCIN 1 APPLICATION(S): 5 OINTMENT OPHTHALMIC at 13:06

## 2025-08-09 RX ADMIN — Medication 1 DROP(S): at 05:20

## 2025-08-09 RX ADMIN — Medication 25 GRAM(S): at 13:06

## 2025-08-09 RX ADMIN — Medication 1 PACKET(S): at 05:20

## 2025-08-09 RX ADMIN — RIVAROXABAN 10 MILLIGRAM(S): 10 TABLET, FILM COATED ORAL at 11:08

## 2025-08-09 RX ADMIN — IPRATROPIUM BROMIDE AND ALBUTEROL SULFATE 3 MILLILITER(S): .5; 2.5 SOLUTION RESPIRATORY (INHALATION) at 21:41

## 2025-08-09 RX ADMIN — ERYTHROMYCIN 1 APPLICATION(S): 5 OINTMENT OPHTHALMIC at 09:50

## 2025-08-09 RX ADMIN — Medication 25 GRAM(S): at 21:57

## 2025-08-09 RX ADMIN — Medication 1 DROP(S): at 21:56

## 2025-08-09 RX ADMIN — ERYTHROMYCIN 1 APPLICATION(S): 5 OINTMENT OPHTHALMIC at 21:56

## 2025-08-09 RX ADMIN — Medication 25 GRAM(S): at 05:21

## 2025-08-09 RX ADMIN — Medication 15 MILLILITER(S): at 17:00

## 2025-08-09 RX ADMIN — Medication 1 DROP(S): at 11:07

## 2025-08-09 RX ADMIN — Medication 1 DROP(S): at 19:45

## 2025-08-09 RX ADMIN — ERYTHROMYCIN 1 APPLICATION(S): 5 OINTMENT OPHTHALMIC at 10:10

## 2025-08-09 RX ADMIN — Medication 1 DROP(S): at 04:06

## 2025-08-09 RX ADMIN — ERYTHROMYCIN 1 APPLICATION(S): 5 OINTMENT OPHTHALMIC at 23:37

## 2025-08-09 RX ADMIN — ERYTHROMYCIN 1 APPLICATION(S): 5 OINTMENT OPHTHALMIC at 00:33

## 2025-08-09 RX ADMIN — OFLOXACIN 1 DROP(S): 3 SOLUTION OPHTHALMIC at 17:01

## 2025-08-09 RX ADMIN — OFLOXACIN 1 DROP(S): 3 SOLUTION OPHTHALMIC at 05:20

## 2025-08-09 RX ADMIN — Medication 1 DROP(S): at 23:37

## 2025-08-09 RX ADMIN — Medication 1 APPLICATION(S): at 05:19

## 2025-08-10 LAB
ANION GAP SERPL CALC-SCNC: 13 MMOL/L — SIGNIFICANT CHANGE UP (ref 7–14)
BUN SERPL-MCNC: 19 MG/DL — SIGNIFICANT CHANGE UP (ref 7–23)
CALCIUM SERPL-MCNC: 9.3 MG/DL — SIGNIFICANT CHANGE UP (ref 8.4–10.5)
CHLORIDE SERPL-SCNC: 96 MMOL/L — LOW (ref 98–107)
CO2 SERPL-SCNC: 34 MMOL/L — HIGH (ref 22–31)
CREAT SERPL-MCNC: <0.2 MG/DL — LOW (ref 0.5–1.3)
EGFR: 124 ML/MIN/1.73M2 — SIGNIFICANT CHANGE UP
EGFR: 124 ML/MIN/1.73M2 — SIGNIFICANT CHANGE UP
GLUCOSE BLDC GLUCOMTR-MCNC: 122 MG/DL — HIGH (ref 70–99)
GLUCOSE BLDC GLUCOMTR-MCNC: 138 MG/DL — HIGH (ref 70–99)
GLUCOSE BLDC GLUCOMTR-MCNC: 148 MG/DL — HIGH (ref 70–99)
GLUCOSE BLDC GLUCOMTR-MCNC: 182 MG/DL — HIGH (ref 70–99)
GLUCOSE BLDC GLUCOMTR-MCNC: 210 MG/DL — HIGH (ref 70–99)
GLUCOSE SERPL-MCNC: 192 MG/DL — HIGH (ref 70–99)
GRAM STN FLD: ABNORMAL
GRAM STN FLD: ABNORMAL
HCT VFR BLD CALC: 40.7 % — SIGNIFICANT CHANGE UP (ref 34.5–45)
HGB BLD-MCNC: 11.7 G/DL — SIGNIFICANT CHANGE UP (ref 11.5–15.5)
MAGNESIUM SERPL-MCNC: 2.2 MG/DL — SIGNIFICANT CHANGE UP (ref 1.6–2.6)
MCHC RBC-ENTMCNC: 24.1 PG — LOW (ref 27–34)
MCHC RBC-ENTMCNC: 28.7 G/DL — LOW (ref 32–36)
MCV RBC AUTO: 83.9 FL — SIGNIFICANT CHANGE UP (ref 80–100)
NRBC # BLD AUTO: 0 K/UL — SIGNIFICANT CHANGE UP (ref 0–0)
NRBC # FLD: 0 K/UL — SIGNIFICANT CHANGE UP (ref 0–0)
NRBC BLD AUTO-RTO: 0 /100 WBCS — SIGNIFICANT CHANGE UP (ref 0–0)
PHOSPHATE SERPL-MCNC: 3.5 MG/DL — SIGNIFICANT CHANGE UP (ref 2.5–4.5)
PLATELET # BLD AUTO: 414 K/UL — HIGH (ref 150–400)
PMV BLD: 9.1 FL — SIGNIFICANT CHANGE UP (ref 7–13)
POTASSIUM SERPL-MCNC: 4.4 MMOL/L — SIGNIFICANT CHANGE UP (ref 3.5–5.3)
POTASSIUM SERPL-SCNC: 4.4 MMOL/L — SIGNIFICANT CHANGE UP (ref 3.5–5.3)
RBC # BLD: 4.85 M/UL — SIGNIFICANT CHANGE UP (ref 3.8–5.2)
RBC # FLD: 18.5 % — HIGH (ref 10.3–14.5)
SODIUM SERPL-SCNC: 143 MMOL/L — SIGNIFICANT CHANGE UP (ref 135–145)
WBC # BLD: 20.03 K/UL — HIGH (ref 3.8–10.5)
WBC # FLD AUTO: 20.03 K/UL — HIGH (ref 3.8–10.5)

## 2025-08-10 PROCEDURE — 99233 SBSQ HOSP IP/OBS HIGH 50: CPT

## 2025-08-10 RX ORDER — ACETAMINOPHEN 500 MG/5ML
725 LIQUID (ML) ORAL ONCE
Refills: 0 | Status: COMPLETED | OUTPATIENT
Start: 2025-08-10 | End: 2025-08-10

## 2025-08-10 RX ADMIN — Medication 1 DROP(S): at 11:28

## 2025-08-10 RX ADMIN — INSULIN LISPRO 1: 100 INJECTION, SOLUTION INTRAVENOUS; SUBCUTANEOUS at 06:06

## 2025-08-10 RX ADMIN — OFLOXACIN 1 DROP(S): 3 SOLUTION OPHTHALMIC at 17:26

## 2025-08-10 RX ADMIN — ERYTHROMYCIN 1 APPLICATION(S): 5 OINTMENT OPHTHALMIC at 08:25

## 2025-08-10 RX ADMIN — Medication 1 DROP(S): at 01:44

## 2025-08-10 RX ADMIN — Medication 1 DROP(S): at 17:26

## 2025-08-10 RX ADMIN — RILUZOLE 50 MILLIGRAM(S): 5 LIQUID ORAL at 05:53

## 2025-08-10 RX ADMIN — ERYTHROMYCIN 1 APPLICATION(S): 5 OINTMENT OPHTHALMIC at 16:21

## 2025-08-10 RX ADMIN — SERTRALINE 75 MILLIGRAM(S): 100 TABLET, FILM COATED ORAL at 11:27

## 2025-08-10 RX ADMIN — ERYTHROMYCIN 1 APPLICATION(S): 5 OINTMENT OPHTHALMIC at 04:16

## 2025-08-10 RX ADMIN — Medication 15 MILLILITER(S): at 05:52

## 2025-08-10 RX ADMIN — ERYTHROMYCIN 1 APPLICATION(S): 5 OINTMENT OPHTHALMIC at 11:28

## 2025-08-10 RX ADMIN — Medication 1 DROP(S): at 04:15

## 2025-08-10 RX ADMIN — ERYTHROMYCIN 1 APPLICATION(S): 5 OINTMENT OPHTHALMIC at 19:24

## 2025-08-10 RX ADMIN — RIVAROXABAN 10 MILLIGRAM(S): 10 TABLET, FILM COATED ORAL at 11:26

## 2025-08-10 RX ADMIN — IPRATROPIUM BROMIDE AND ALBUTEROL SULFATE 3 MILLILITER(S): .5; 2.5 SOLUTION RESPIRATORY (INHALATION) at 09:02

## 2025-08-10 RX ADMIN — Medication 1 DROP(S): at 08:25

## 2025-08-10 RX ADMIN — Medication 725 MILLIGRAM(S): at 09:45

## 2025-08-10 RX ADMIN — INSULIN LISPRO 2: 100 INJECTION, SOLUTION INTRAVENOUS; SUBCUTANEOUS at 12:06

## 2025-08-10 RX ADMIN — Medication 1 DROP(S): at 19:24

## 2025-08-10 RX ADMIN — Medication 290 MILLIGRAM(S): at 09:14

## 2025-08-10 RX ADMIN — RILUZOLE 50 MILLIGRAM(S): 5 LIQUID ORAL at 17:25

## 2025-08-10 RX ADMIN — ERYTHROMYCIN 1 APPLICATION(S): 5 OINTMENT OPHTHALMIC at 10:04

## 2025-08-10 RX ADMIN — Medication 1 DROP(S): at 10:04

## 2025-08-10 RX ADMIN — ERYTHROMYCIN 1 APPLICATION(S): 5 OINTMENT OPHTHALMIC at 05:52

## 2025-08-10 RX ADMIN — ERYTHROMYCIN 1 APPLICATION(S): 5 OINTMENT OPHTHALMIC at 14:54

## 2025-08-10 RX ADMIN — Medication 1 DROP(S): at 16:21

## 2025-08-10 RX ADMIN — OFLOXACIN 1 DROP(S): 3 SOLUTION OPHTHALMIC at 05:52

## 2025-08-10 RX ADMIN — IPRATROPIUM BROMIDE AND ALBUTEROL SULFATE 3 MILLILITER(S): .5; 2.5 SOLUTION RESPIRATORY (INHALATION) at 03:59

## 2025-08-10 RX ADMIN — ERYTHROMYCIN 1 APPLICATION(S): 5 OINTMENT OPHTHALMIC at 17:26

## 2025-08-10 RX ADMIN — ERYTHROMYCIN 1 APPLICATION(S): 5 OINTMENT OPHTHALMIC at 22:02

## 2025-08-10 RX ADMIN — Medication 15 MILLILITER(S): at 17:25

## 2025-08-10 RX ADMIN — Medication 1 DROP(S): at 22:02

## 2025-08-10 RX ADMIN — IPRATROPIUM BROMIDE AND ALBUTEROL SULFATE 3 MILLILITER(S): .5; 2.5 SOLUTION RESPIRATORY (INHALATION) at 21:53

## 2025-08-10 RX ADMIN — Medication 1 APPLICATION(S): at 05:53

## 2025-08-10 RX ADMIN — Medication 1 DROP(S): at 05:52

## 2025-08-10 RX ADMIN — Medication 1 DROP(S): at 14:55

## 2025-08-10 RX ADMIN — Medication 15 MILLILITER(S): at 11:27

## 2025-08-10 RX ADMIN — ERYTHROMYCIN 1 APPLICATION(S): 5 OINTMENT OPHTHALMIC at 01:44

## 2025-08-10 RX ADMIN — IPRATROPIUM BROMIDE AND ALBUTEROL SULFATE 3 MILLILITER(S): .5; 2.5 SOLUTION RESPIRATORY (INHALATION) at 15:11

## 2025-08-11 LAB
-  CLINDAMYCIN: SIGNIFICANT CHANGE UP
-  ERYTHROMYCIN: SIGNIFICANT CHANGE UP
-  GENTAMICIN: SIGNIFICANT CHANGE UP
-  OXACILLIN: SIGNIFICANT CHANGE UP
-  PENICILLIN: SIGNIFICANT CHANGE UP
-  RIFAMPIN: SIGNIFICANT CHANGE UP
-  TETRACYCLINE: SIGNIFICANT CHANGE UP
-  TRIMETHOPRIM/SULFAMETHOXAZOLE: SIGNIFICANT CHANGE UP
-  VANCOMYCIN: SIGNIFICANT CHANGE UP
ANION GAP SERPL CALC-SCNC: 12 MMOL/L — SIGNIFICANT CHANGE UP (ref 7–14)
BASOPHILS # BLD AUTO: 0.04 K/UL — SIGNIFICANT CHANGE UP (ref 0–0.2)
BASOPHILS # BLD AUTO: 0.05 K/UL — SIGNIFICANT CHANGE UP (ref 0–0.2)
BASOPHILS NFR BLD AUTO: 0.2 % — SIGNIFICANT CHANGE UP (ref 0–2)
BASOPHILS NFR BLD AUTO: 0.3 % — SIGNIFICANT CHANGE UP (ref 0–2)
BUN SERPL-MCNC: 26 MG/DL — HIGH (ref 7–23)
CALCIUM SERPL-MCNC: 9.5 MG/DL — SIGNIFICANT CHANGE UP (ref 8.4–10.5)
CHLORIDE SERPL-SCNC: 94 MMOL/L — LOW (ref 98–107)
CO2 SERPL-SCNC: 35 MMOL/L — HIGH (ref 22–31)
CREAT SERPL-MCNC: <0.2 MG/DL — LOW (ref 0.5–1.3)
EGFR: 124 ML/MIN/1.73M2 — SIGNIFICANT CHANGE UP
EGFR: 124 ML/MIN/1.73M2 — SIGNIFICANT CHANGE UP
EOSINOPHIL # BLD AUTO: 0.05 K/UL — SIGNIFICANT CHANGE UP (ref 0–0.5)
EOSINOPHIL # BLD AUTO: 0.06 K/UL — SIGNIFICANT CHANGE UP (ref 0–0.5)
EOSINOPHIL NFR BLD AUTO: 0.2 % — SIGNIFICANT CHANGE UP (ref 0–6)
EOSINOPHIL NFR BLD AUTO: 0.4 % — SIGNIFICANT CHANGE UP (ref 0–6)
GLUCOSE BLDC GLUCOMTR-MCNC: 137 MG/DL — HIGH (ref 70–99)
GLUCOSE BLDC GLUCOMTR-MCNC: 166 MG/DL — HIGH (ref 70–99)
GLUCOSE BLDC GLUCOMTR-MCNC: 167 MG/DL — HIGH (ref 70–99)
GLUCOSE BLDC GLUCOMTR-MCNC: 224 MG/DL — HIGH (ref 70–99)
GLUCOSE SERPL-MCNC: 199 MG/DL — HIGH (ref 70–99)
HCT VFR BLD CALC: 39.7 % — SIGNIFICANT CHANGE UP (ref 34.5–45)
HCT VFR BLD CALC: 40.9 % — SIGNIFICANT CHANGE UP (ref 34.5–45)
HGB BLD-MCNC: 11.1 G/DL — LOW (ref 11.5–15.5)
HGB BLD-MCNC: 11.4 G/DL — LOW (ref 11.5–15.5)
IMM GRANULOCYTES # BLD AUTO: 0.2 K/UL — HIGH (ref 0–0.07)
IMM GRANULOCYTES # BLD AUTO: 0.38 K/UL — HIGH (ref 0–0.07)
IMM GRANULOCYTES NFR BLD AUTO: 1 % — HIGH (ref 0–0.9)
IMM GRANULOCYTES NFR BLD AUTO: 2.5 % — HIGH (ref 0–0.9)
LACTATE SERPL-SCNC: 1.7 MMOL/L — SIGNIFICANT CHANGE UP (ref 0.5–2)
LACTATE SERPL-SCNC: 2.5 MMOL/L — HIGH (ref 0.5–2)
LYMPHOCYTES # BLD AUTO: 0.81 K/UL — LOW (ref 1–3.3)
LYMPHOCYTES # BLD AUTO: 1.68 K/UL — SIGNIFICANT CHANGE UP (ref 1–3.3)
LYMPHOCYTES NFR BLD AUTO: 11 % — LOW (ref 13–44)
LYMPHOCYTES NFR BLD AUTO: 4 % — LOW (ref 13–44)
MAGNESIUM SERPL-MCNC: 2.2 MG/DL — SIGNIFICANT CHANGE UP (ref 1.6–2.6)
MCHC RBC-ENTMCNC: 24.2 PG — LOW (ref 27–34)
MCHC RBC-ENTMCNC: 24.3 PG — LOW (ref 27–34)
MCHC RBC-ENTMCNC: 27.9 G/DL — LOW (ref 32–36)
MCHC RBC-ENTMCNC: 28 G/DL — LOW (ref 32–36)
MCV RBC AUTO: 86.8 FL — SIGNIFICANT CHANGE UP (ref 80–100)
MCV RBC AUTO: 86.9 FL — SIGNIFICANT CHANGE UP (ref 80–100)
METHOD TYPE: SIGNIFICANT CHANGE UP
MONOCYTES # BLD AUTO: 0.58 K/UL — SIGNIFICANT CHANGE UP (ref 0–0.9)
MONOCYTES # BLD AUTO: 0.65 K/UL — SIGNIFICANT CHANGE UP (ref 0–0.9)
MONOCYTES NFR BLD AUTO: 3.2 % — SIGNIFICANT CHANGE UP (ref 2–14)
MONOCYTES NFR BLD AUTO: 3.8 % — SIGNIFICANT CHANGE UP (ref 2–14)
NEUTROPHILS # BLD AUTO: 12.59 K/UL — HIGH (ref 1.8–7.4)
NEUTROPHILS # BLD AUTO: 18.28 K/UL — HIGH (ref 1.8–7.4)
NEUTROPHILS NFR BLD AUTO: 82 % — HIGH (ref 43–77)
NEUTROPHILS NFR BLD AUTO: 91.4 % — HIGH (ref 43–77)
NRBC # BLD AUTO: 0 K/UL — SIGNIFICANT CHANGE UP (ref 0–0)
NRBC # BLD AUTO: 0 K/UL — SIGNIFICANT CHANGE UP (ref 0–0)
NRBC # FLD: 0 K/UL — SIGNIFICANT CHANGE UP (ref 0–0)
NRBC # FLD: 0 K/UL — SIGNIFICANT CHANGE UP (ref 0–0)
NRBC BLD AUTO-RTO: 0 /100 WBCS — SIGNIFICANT CHANGE UP (ref 0–0)
NRBC BLD AUTO-RTO: 0 /100 WBCS — SIGNIFICANT CHANGE UP (ref 0–0)
PHOSPHATE SERPL-MCNC: 3 MG/DL — SIGNIFICANT CHANGE UP (ref 2.5–4.5)
PLATELET # BLD AUTO: 437 K/UL — HIGH (ref 150–400)
PLATELET # BLD AUTO: 459 K/UL — HIGH (ref 150–400)
PMV BLD: 9.1 FL — SIGNIFICANT CHANGE UP (ref 7–13)
PMV BLD: 9.2 FL — SIGNIFICANT CHANGE UP (ref 7–13)
POTASSIUM SERPL-MCNC: 4.5 MMOL/L — SIGNIFICANT CHANGE UP (ref 3.5–5.3)
POTASSIUM SERPL-SCNC: 4.5 MMOL/L — SIGNIFICANT CHANGE UP (ref 3.5–5.3)
PROCALCITONIN SERPL-MCNC: 0.11 NG/ML — HIGH (ref 0.02–0.1)
RBC # BLD: 4.57 M/UL — SIGNIFICANT CHANGE UP (ref 3.8–5.2)
RBC # BLD: 4.71 M/UL — SIGNIFICANT CHANGE UP (ref 3.8–5.2)
RBC # FLD: 18.6 % — HIGH (ref 10.3–14.5)
RBC # FLD: 18.7 % — HIGH (ref 10.3–14.5)
SODIUM SERPL-SCNC: 141 MMOL/L — SIGNIFICANT CHANGE UP (ref 135–145)
WBC # BLD: 15.33 K/UL — HIGH (ref 3.8–10.5)
WBC # BLD: 20.04 K/UL — HIGH (ref 3.8–10.5)
WBC # FLD AUTO: 15.33 K/UL — HIGH (ref 3.8–10.5)
WBC # FLD AUTO: 20.04 K/UL — HIGH (ref 3.8–10.5)

## 2025-08-11 PROCEDURE — 99233 SBSQ HOSP IP/OBS HIGH 50: CPT | Mod: FS

## 2025-08-11 PROCEDURE — 99223 1ST HOSP IP/OBS HIGH 75: CPT

## 2025-08-11 PROCEDURE — 71045 X-RAY EXAM CHEST 1 VIEW: CPT | Mod: 26

## 2025-08-11 RX ORDER — CEFEPIME 2 G/20ML
2000 INJECTION, POWDER, FOR SOLUTION INTRAVENOUS ONCE
Refills: 0 | Status: COMPLETED | OUTPATIENT
Start: 2025-08-11 | End: 2025-08-11

## 2025-08-11 RX ORDER — VANCOMYCIN HCL IN 5 % DEXTROSE 1.5G/250ML
1000 PLASTIC BAG, INJECTION (ML) INTRAVENOUS EVERY 12 HOURS
Refills: 0 | Status: DISCONTINUED | OUTPATIENT
Start: 2025-08-11 | End: 2025-08-13

## 2025-08-11 RX ORDER — CEFEPIME 2 G/20ML
INJECTION, POWDER, FOR SOLUTION INTRAVENOUS
Refills: 0 | Status: DISCONTINUED | OUTPATIENT
Start: 2025-08-11 | End: 2025-08-15

## 2025-08-11 RX ORDER — CEFEPIME 2 G/20ML
2000 INJECTION, POWDER, FOR SOLUTION INTRAVENOUS EVERY 8 HOURS
Refills: 0 | Status: DISCONTINUED | OUTPATIENT
Start: 2025-08-11 | End: 2025-08-15

## 2025-08-11 RX ADMIN — ERYTHROMYCIN 1 APPLICATION(S): 5 OINTMENT OPHTHALMIC at 13:37

## 2025-08-11 RX ADMIN — INSULIN LISPRO 1: 100 INJECTION, SOLUTION INTRAVENOUS; SUBCUTANEOUS at 12:06

## 2025-08-11 RX ADMIN — OFLOXACIN 1 DROP(S): 3 SOLUTION OPHTHALMIC at 17:36

## 2025-08-11 RX ADMIN — Medication 1 DROP(S): at 20:21

## 2025-08-11 RX ADMIN — Medication 1 DROP(S): at 22:19

## 2025-08-11 RX ADMIN — ERYTHROMYCIN 1 APPLICATION(S): 5 OINTMENT OPHTHALMIC at 10:38

## 2025-08-11 RX ADMIN — Medication 15 MILLILITER(S): at 17:37

## 2025-08-11 RX ADMIN — Medication 1 DROP(S): at 00:16

## 2025-08-11 RX ADMIN — Medication 1 DROP(S): at 02:04

## 2025-08-11 RX ADMIN — IPRATROPIUM BROMIDE AND ALBUTEROL SULFATE 3 MILLILITER(S): .5; 2.5 SOLUTION RESPIRATORY (INHALATION) at 03:10

## 2025-08-11 RX ADMIN — Medication 1 DROP(S): at 13:37

## 2025-08-11 RX ADMIN — Medication 1 DROP(S): at 04:48

## 2025-08-11 RX ADMIN — Medication 15 MILLILITER(S): at 12:05

## 2025-08-11 RX ADMIN — RIVAROXABAN 10 MILLIGRAM(S): 10 TABLET, FILM COATED ORAL at 12:04

## 2025-08-11 RX ADMIN — Medication 1 APPLICATION(S): at 06:15

## 2025-08-11 RX ADMIN — ERYTHROMYCIN 1 APPLICATION(S): 5 OINTMENT OPHTHALMIC at 17:35

## 2025-08-11 RX ADMIN — Medication 15 MILLILITER(S): at 06:15

## 2025-08-11 RX ADMIN — IPRATROPIUM BROMIDE AND ALBUTEROL SULFATE 3 MILLILITER(S): .5; 2.5 SOLUTION RESPIRATORY (INHALATION) at 15:26

## 2025-08-11 RX ADMIN — Medication 1 DROP(S): at 16:29

## 2025-08-11 RX ADMIN — RILUZOLE 50 MILLIGRAM(S): 5 LIQUID ORAL at 17:38

## 2025-08-11 RX ADMIN — ERYTHROMYCIN 1 APPLICATION(S): 5 OINTMENT OPHTHALMIC at 06:15

## 2025-08-11 RX ADMIN — Medication 1 DROP(S): at 12:03

## 2025-08-11 RX ADMIN — CEFEPIME 100 MILLIGRAM(S): 2 INJECTION, POWDER, FOR SOLUTION INTRAVENOUS at 13:38

## 2025-08-11 RX ADMIN — IPRATROPIUM BROMIDE AND ALBUTEROL SULFATE 3 MILLILITER(S): .5; 2.5 SOLUTION RESPIRATORY (INHALATION) at 21:29

## 2025-08-11 RX ADMIN — OFLOXACIN 1 DROP(S): 3 SOLUTION OPHTHALMIC at 06:15

## 2025-08-11 RX ADMIN — IPRATROPIUM BROMIDE AND ALBUTEROL SULFATE 3 MILLILITER(S): .5; 2.5 SOLUTION RESPIRATORY (INHALATION) at 07:35

## 2025-08-11 RX ADMIN — SERTRALINE 75 MILLIGRAM(S): 100 TABLET, FILM COATED ORAL at 12:04

## 2025-08-11 RX ADMIN — ERYTHROMYCIN 1 APPLICATION(S): 5 OINTMENT OPHTHALMIC at 02:04

## 2025-08-11 RX ADMIN — CEFEPIME 100 MILLIGRAM(S): 2 INJECTION, POWDER, FOR SOLUTION INTRAVENOUS at 22:19

## 2025-08-11 RX ADMIN — Medication 1 DROP(S): at 07:48

## 2025-08-11 RX ADMIN — DIAZEPAM 2 MILLIGRAM(S): 5 TABLET ORAL at 00:39

## 2025-08-11 RX ADMIN — ERYTHROMYCIN 1 APPLICATION(S): 5 OINTMENT OPHTHALMIC at 12:04

## 2025-08-11 RX ADMIN — Medication 1 APPLICATION(S): at 10:30

## 2025-08-11 RX ADMIN — ERYTHROMYCIN 1 APPLICATION(S): 5 OINTMENT OPHTHALMIC at 00:17

## 2025-08-11 RX ADMIN — Medication 1 DROP(S): at 17:36

## 2025-08-11 RX ADMIN — ERYTHROMYCIN 1 APPLICATION(S): 5 OINTMENT OPHTHALMIC at 16:28

## 2025-08-11 RX ADMIN — ERYTHROMYCIN 1 APPLICATION(S): 5 OINTMENT OPHTHALMIC at 20:20

## 2025-08-11 RX ADMIN — Medication 250 MILLIGRAM(S): at 17:39

## 2025-08-11 RX ADMIN — INSULIN LISPRO 1: 100 INJECTION, SOLUTION INTRAVENOUS; SUBCUTANEOUS at 06:26

## 2025-08-11 RX ADMIN — ERYTHROMYCIN 1 APPLICATION(S): 5 OINTMENT OPHTHALMIC at 04:48

## 2025-08-11 RX ADMIN — ERYTHROMYCIN 1 APPLICATION(S): 5 OINTMENT OPHTHALMIC at 07:48

## 2025-08-11 RX ADMIN — Medication 1 DROP(S): at 06:14

## 2025-08-11 RX ADMIN — ERYTHROMYCIN 1 APPLICATION(S): 5 OINTMENT OPHTHALMIC at 22:20

## 2025-08-11 RX ADMIN — RILUZOLE 50 MILLIGRAM(S): 5 LIQUID ORAL at 06:15

## 2025-08-11 RX ADMIN — Medication 1 DROP(S): at 10:38

## 2025-08-12 LAB
-  CLINDAMYCIN: SIGNIFICANT CHANGE UP
-  ERYTHROMYCIN: SIGNIFICANT CHANGE UP
-  GENTAMICIN: SIGNIFICANT CHANGE UP
-  OXACILLIN: SIGNIFICANT CHANGE UP
-  PENICILLIN: SIGNIFICANT CHANGE UP
-  RIFAMPIN: SIGNIFICANT CHANGE UP
-  TETRACYCLINE: SIGNIFICANT CHANGE UP
-  TRIMETHOPRIM/SULFAMETHOXAZOLE: SIGNIFICANT CHANGE UP
-  VANCOMYCIN: SIGNIFICANT CHANGE UP
ALBUMIN SERPL ELPH-MCNC: 3.6 G/DL — SIGNIFICANT CHANGE UP (ref 3.3–5)
ALP SERPL-CCNC: 170 U/L — HIGH (ref 40–120)
ALT FLD-CCNC: 20 U/L — SIGNIFICANT CHANGE UP (ref 4–33)
ANION GAP SERPL CALC-SCNC: 9 MMOL/L — SIGNIFICANT CHANGE UP (ref 7–14)
ANISOCYTOSIS BLD QL: SLIGHT — SIGNIFICANT CHANGE UP
APPEARANCE UR: ABNORMAL
AST SERPL-CCNC: 26 U/L — SIGNIFICANT CHANGE UP (ref 4–32)
BACTERIA # UR AUTO: NEGATIVE /HPF — SIGNIFICANT CHANGE UP
BASOPHILS # BLD AUTO: 0.11 K/UL — SIGNIFICANT CHANGE UP (ref 0–0.2)
BASOPHILS # BLD AUTO: 0.12 K/UL — SIGNIFICANT CHANGE UP (ref 0–0.2)
BASOPHILS # BLD MANUAL: 0.35 K/UL — HIGH (ref 0–0.2)
BASOPHILS NFR BLD AUTO: 0.3 % — SIGNIFICANT CHANGE UP (ref 0–2)
BASOPHILS NFR BLD AUTO: 0.5 % — SIGNIFICANT CHANGE UP (ref 0–2)
BASOPHILS NFR BLD MANUAL: 0.9 % — SIGNIFICANT CHANGE UP (ref 0–2)
BILIRUB SERPL-MCNC: 0.9 MG/DL — SIGNIFICANT CHANGE UP (ref 0.2–1.2)
BILIRUB UR-MCNC: NEGATIVE — SIGNIFICANT CHANGE UP
BUN SERPL-MCNC: 45 MG/DL — HIGH (ref 7–23)
CALCIUM SERPL-MCNC: 9.5 MG/DL — SIGNIFICANT CHANGE UP (ref 8.4–10.5)
CAST: 0 /LPF — SIGNIFICANT CHANGE UP (ref 0–4)
CHLORIDE SERPL-SCNC: 95 MMOL/L — LOW (ref 98–107)
CO2 SERPL-SCNC: 36 MMOL/L — HIGH (ref 22–31)
COLOR SPEC: SIGNIFICANT CHANGE UP
CREAT SERPL-MCNC: <0.2 MG/DL — LOW (ref 0.5–1.3)
DIFF PNL FLD: ABNORMAL
EGFR: 124 ML/MIN/1.73M2 — SIGNIFICANT CHANGE UP
EGFR: 124 ML/MIN/1.73M2 — SIGNIFICANT CHANGE UP
EOSINOPHIL # BLD AUTO: 0.01 K/UL — SIGNIFICANT CHANGE UP (ref 0–0.5)
EOSINOPHIL # BLD AUTO: 0.22 K/UL — SIGNIFICANT CHANGE UP (ref 0–0.5)
EOSINOPHIL # BLD MANUAL: 0 K/UL — SIGNIFICANT CHANGE UP (ref 0–0.5)
EOSINOPHIL NFR BLD AUTO: 0 % — SIGNIFICANT CHANGE UP (ref 0–6)
EOSINOPHIL NFR BLD AUTO: 0.9 % — SIGNIFICANT CHANGE UP (ref 0–6)
EOSINOPHIL NFR BLD MANUAL: 0 % — SIGNIFICANT CHANGE UP (ref 0–6)
GLUCOSE BLDC GLUCOMTR-MCNC: 132 MG/DL — HIGH (ref 70–99)
GLUCOSE BLDC GLUCOMTR-MCNC: 147 MG/DL — HIGH (ref 70–99)
GLUCOSE BLDC GLUCOMTR-MCNC: 165 MG/DL — HIGH (ref 70–99)
GLUCOSE BLDC GLUCOMTR-MCNC: 200 MG/DL — HIGH (ref 70–99)
GLUCOSE BLDC GLUCOMTR-MCNC: 256 MG/DL — HIGH (ref 70–99)
GLUCOSE SERPL-MCNC: 206 MG/DL — HIGH (ref 70–99)
GLUCOSE UR QL: NEGATIVE MG/DL — SIGNIFICANT CHANGE UP
GRAM STN FLD: ABNORMAL
HCT VFR BLD CALC: 38.2 % — SIGNIFICANT CHANGE UP (ref 34.5–45)
HCT VFR BLD CALC: 40.4 % — SIGNIFICANT CHANGE UP (ref 34.5–45)
HGB BLD-MCNC: 10.7 G/DL — LOW (ref 11.5–15.5)
HGB BLD-MCNC: 11.3 G/DL — LOW (ref 11.5–15.5)
IMM GRANULOCYTES # BLD AUTO: 0.16 K/UL — HIGH (ref 0–0.07)
IMM GRANULOCYTES # BLD AUTO: 0.39 K/UL — HIGH (ref 0–0.07)
IMM GRANULOCYTES NFR BLD AUTO: 0.7 % — SIGNIFICANT CHANGE UP (ref 0–0.9)
IMM GRANULOCYTES NFR BLD AUTO: 1 % — HIGH (ref 0–0.9)
KETONES UR QL: ABNORMAL MG/DL
LACTATE SERPL-SCNC: 1.6 MMOL/L — SIGNIFICANT CHANGE UP (ref 0.5–2)
LEUKOCYTE ESTERASE UR-ACNC: ABNORMAL
LYMPHOCYTES # BLD AUTO: 0.61 K/UL — LOW (ref 1–3.3)
LYMPHOCYTES # BLD AUTO: 0.67 K/UL — LOW (ref 1–3.3)
LYMPHOCYTES # BLD MANUAL: 0.35 K/UL — LOW (ref 1–3.3)
LYMPHOCYTES NFR BLD AUTO: 1.7 % — LOW (ref 13–44)
LYMPHOCYTES NFR BLD AUTO: 2.6 % — LOW (ref 13–44)
LYMPHOCYTES NFR BLD MANUAL: 0.9 % — LOW (ref 13–44)
MANUAL NEUTROPHIL BANDS #: 0.66 K/UL — SIGNIFICANT CHANGE UP (ref 0–0.84)
MCHC RBC-ENTMCNC: 23.9 PG — LOW (ref 27–34)
MCHC RBC-ENTMCNC: 24.2 PG — LOW (ref 27–34)
MCHC RBC-ENTMCNC: 28 G/DL — LOW (ref 32–36)
MCHC RBC-ENTMCNC: 28 G/DL — LOW (ref 32–36)
MCV RBC AUTO: 85.5 FL — SIGNIFICANT CHANGE UP (ref 80–100)
MCV RBC AUTO: 86.7 FL — SIGNIFICANT CHANGE UP (ref 80–100)
METHOD TYPE: SIGNIFICANT CHANGE UP
MICROCYTES BLD QL: ABNORMAL
MONOCYTES # BLD AUTO: 0.63 K/UL — SIGNIFICANT CHANGE UP (ref 0–0.9)
MONOCYTES # BLD AUTO: 0.67 K/UL — SIGNIFICANT CHANGE UP (ref 0–0.9)
MONOCYTES # BLD MANUAL: 1.01 K/UL — HIGH (ref 0–0.9)
MONOCYTES NFR BLD AUTO: 1.6 % — LOW (ref 2–14)
MONOCYTES NFR BLD AUTO: 2.8 % — SIGNIFICANT CHANGE UP (ref 2–14)
MONOCYTES NFR BLD MANUAL: 2.6 % — SIGNIFICANT CHANGE UP (ref 2–14)
NEUTROPHILS # BLD AUTO: 21.76 K/UL — HIGH (ref 1.8–7.4)
NEUTROPHILS # BLD AUTO: 36.96 K/UL — HIGH (ref 1.8–7.4)
NEUTROPHILS # BLD MANUAL: 36.41 K/UL — HIGH (ref 1.8–7.4)
NEUTROPHILS NFR BLD AUTO: 92.5 % — HIGH (ref 43–77)
NEUTROPHILS NFR BLD AUTO: 95.4 % — HIGH (ref 43–77)
NEUTROPHILS NFR BLD MANUAL: 93.9 % — HIGH (ref 43–77)
NEUTS BAND # BLD: 1.7 % — SIGNIFICANT CHANGE UP (ref 0–8)
NEUTS BAND NFR BLD: 1.7 % — SIGNIFICANT CHANGE UP (ref 0–8)
NITRITE UR-MCNC: NEGATIVE — SIGNIFICANT CHANGE UP
NRBC # BLD AUTO: 0 K/UL — SIGNIFICANT CHANGE UP (ref 0–0)
NRBC # BLD AUTO: 0 K/UL — SIGNIFICANT CHANGE UP (ref 0–0)
NRBC # FLD: 0 K/UL — SIGNIFICANT CHANGE UP (ref 0–0)
NRBC # FLD: 0 K/UL — SIGNIFICANT CHANGE UP (ref 0–0)
NRBC BLD AUTO-RTO: 0 /100 WBCS — SIGNIFICANT CHANGE UP (ref 0–0)
NRBC BLD AUTO-RTO: 0 /100 WBCS — SIGNIFICANT CHANGE UP (ref 0–0)
PH UR: 6 — SIGNIFICANT CHANGE UP (ref 5–8)
PLAT MORPH BLD: NORMAL — SIGNIFICANT CHANGE UP
PLATELET # BLD AUTO: 344 K/UL — SIGNIFICANT CHANGE UP (ref 150–400)
PLATELET # BLD AUTO: 432 K/UL — HIGH (ref 150–400)
PLATELET COUNT - ESTIMATE: NORMAL — SIGNIFICANT CHANGE UP
PMV BLD: 9.2 FL — SIGNIFICANT CHANGE UP (ref 7–13)
PMV BLD: 9.4 FL — SIGNIFICANT CHANGE UP (ref 7–13)
POIKILOCYTOSIS BLD QL AUTO: SLIGHT — SIGNIFICANT CHANGE UP
POTASSIUM SERPL-MCNC: 4.1 MMOL/L — SIGNIFICANT CHANGE UP (ref 3.5–5.3)
POTASSIUM SERPL-SCNC: 4.1 MMOL/L — SIGNIFICANT CHANGE UP (ref 3.5–5.3)
PROCALCITONIN SERPL-MCNC: 14.26 NG/ML — HIGH (ref 0.02–0.1)
PROT SERPL-MCNC: 8.3 G/DL — SIGNIFICANT CHANGE UP (ref 6–8.3)
PROT UR-MCNC: 100 MG/DL
RBC # BLD: 4.47 M/UL — SIGNIFICANT CHANGE UP (ref 3.8–5.2)
RBC # BLD: 4.66 M/UL — SIGNIFICANT CHANGE UP (ref 3.8–5.2)
RBC # FLD: 18.7 % — HIGH (ref 10.3–14.5)
RBC # FLD: 19 % — HIGH (ref 10.3–14.5)
RBC BLD AUTO: ABNORMAL
RBC CASTS # UR COMP ASSIST: 6 /HPF — SIGNIFICANT CHANGE UP (ref 0–4)
REVIEW: SIGNIFICANT CHANGE UP
SODIUM SERPL-SCNC: 140 MMOL/L — SIGNIFICANT CHANGE UP (ref 135–145)
SP GR SPEC: 1.02 — SIGNIFICANT CHANGE UP (ref 1–1.03)
SPECIMEN SOURCE: SIGNIFICANT CHANGE UP
SQUAMOUS # UR AUTO: 2 /HPF — SIGNIFICANT CHANGE UP (ref 0–5)
STOMATOCYTES BLD QL SMEAR: ABNORMAL
UROBILINOGEN FLD QL: 1 MG/DL — SIGNIFICANT CHANGE UP (ref 0.2–1)
WBC # BLD: 23.53 K/UL — HIGH (ref 3.8–10.5)
WBC # BLD: 38.78 K/UL — HIGH (ref 3.8–10.5)
WBC # FLD AUTO: 23.53 K/UL — HIGH (ref 3.8–10.5)
WBC # FLD AUTO: 38.78 K/UL — HIGH (ref 3.8–10.5)
WBC UR QL: 52 /HPF — HIGH (ref 0–5)
YEAST-LIKE CELLS: PRESENT

## 2025-08-12 PROCEDURE — 99233 SBSQ HOSP IP/OBS HIGH 50: CPT | Mod: FS

## 2025-08-12 RX ADMIN — IPRATROPIUM BROMIDE AND ALBUTEROL SULFATE 3 MILLILITER(S): .5; 2.5 SOLUTION RESPIRATORY (INHALATION) at 08:36

## 2025-08-12 RX ADMIN — OFLOXACIN 1 DROP(S): 3 SOLUTION OPHTHALMIC at 05:02

## 2025-08-12 RX ADMIN — Medication 1 DROP(S): at 02:42

## 2025-08-12 RX ADMIN — ERYTHROMYCIN 1 APPLICATION(S): 5 OINTMENT OPHTHALMIC at 02:42

## 2025-08-12 RX ADMIN — OFLOXACIN 1 DROP(S): 3 SOLUTION OPHTHALMIC at 17:59

## 2025-08-12 RX ADMIN — Medication 1 DROP(S): at 00:42

## 2025-08-12 RX ADMIN — Medication 250 MILLIGRAM(S): at 05:04

## 2025-08-12 RX ADMIN — Medication 1 DROP(S): at 08:45

## 2025-08-12 RX ADMIN — Medication 1 DROP(S): at 14:45

## 2025-08-12 RX ADMIN — RILUZOLE 50 MILLIGRAM(S): 5 LIQUID ORAL at 17:58

## 2025-08-12 RX ADMIN — Medication 1 APPLICATION(S): at 05:03

## 2025-08-12 RX ADMIN — Medication 250 MILLIGRAM(S): at 17:58

## 2025-08-12 RX ADMIN — RILUZOLE 50 MILLIGRAM(S): 5 LIQUID ORAL at 05:04

## 2025-08-12 RX ADMIN — SERTRALINE 75 MILLIGRAM(S): 100 TABLET, FILM COATED ORAL at 11:24

## 2025-08-12 RX ADMIN — IPRATROPIUM BROMIDE AND ALBUTEROL SULFATE 3 MILLILITER(S): .5; 2.5 SOLUTION RESPIRATORY (INHALATION) at 20:48

## 2025-08-12 RX ADMIN — ERYTHROMYCIN 1 APPLICATION(S): 5 OINTMENT OPHTHALMIC at 17:59

## 2025-08-12 RX ADMIN — ERYTHROMYCIN 1 APPLICATION(S): 5 OINTMENT OPHTHALMIC at 08:44

## 2025-08-12 RX ADMIN — IPRATROPIUM BROMIDE AND ALBUTEROL SULFATE 3 MILLILITER(S): .5; 2.5 SOLUTION RESPIRATORY (INHALATION) at 15:10

## 2025-08-12 RX ADMIN — IPRATROPIUM BROMIDE AND ALBUTEROL SULFATE 3 MILLILITER(S): .5; 2.5 SOLUTION RESPIRATORY (INHALATION) at 02:50

## 2025-08-12 RX ADMIN — INSULIN LISPRO 1: 100 INJECTION, SOLUTION INTRAVENOUS; SUBCUTANEOUS at 06:21

## 2025-08-12 RX ADMIN — ERYTHROMYCIN 1 APPLICATION(S): 5 OINTMENT OPHTHALMIC at 00:43

## 2025-08-12 RX ADMIN — Medication 1 DROP(S): at 11:28

## 2025-08-12 RX ADMIN — ERYTHROMYCIN 1 APPLICATION(S): 5 OINTMENT OPHTHALMIC at 21:49

## 2025-08-12 RX ADMIN — Medication 15 MILLILITER(S): at 17:59

## 2025-08-12 RX ADMIN — CEFEPIME 100 MILLIGRAM(S): 2 INJECTION, POWDER, FOR SOLUTION INTRAVENOUS at 05:04

## 2025-08-12 RX ADMIN — RIVAROXABAN 10 MILLIGRAM(S): 10 TABLET, FILM COATED ORAL at 11:24

## 2025-08-12 RX ADMIN — ERYTHROMYCIN 1 APPLICATION(S): 5 OINTMENT OPHTHALMIC at 19:48

## 2025-08-12 RX ADMIN — Medication 1 DROP(S): at 11:24

## 2025-08-12 RX ADMIN — CEFEPIME 100 MILLIGRAM(S): 2 INJECTION, POWDER, FOR SOLUTION INTRAVENOUS at 21:50

## 2025-08-12 RX ADMIN — Medication 1 DROP(S): at 04:01

## 2025-08-12 RX ADMIN — Medication 1 DROP(S): at 05:02

## 2025-08-12 RX ADMIN — ERYTHROMYCIN 1 APPLICATION(S): 5 OINTMENT OPHTHALMIC at 05:02

## 2025-08-12 RX ADMIN — ERYTHROMYCIN 1 APPLICATION(S): 5 OINTMENT OPHTHALMIC at 11:26

## 2025-08-12 RX ADMIN — Medication 15 MILLILITER(S): at 11:27

## 2025-08-12 RX ADMIN — CEFEPIME 100 MILLIGRAM(S): 2 INJECTION, POWDER, FOR SOLUTION INTRAVENOUS at 14:56

## 2025-08-12 RX ADMIN — Medication 15 MILLILITER(S): at 05:03

## 2025-08-12 RX ADMIN — ERYTHROMYCIN 1 APPLICATION(S): 5 OINTMENT OPHTHALMIC at 04:01

## 2025-08-12 RX ADMIN — Medication 1 DROP(S): at 21:49

## 2025-08-12 RX ADMIN — Medication 1 DROP(S): at 17:59

## 2025-08-12 RX ADMIN — INSULIN LISPRO 3: 100 INJECTION, SOLUTION INTRAVENOUS; SUBCUTANEOUS at 03:07

## 2025-08-12 RX ADMIN — ERYTHROMYCIN 1 APPLICATION(S): 5 OINTMENT OPHTHALMIC at 14:44

## 2025-08-12 RX ADMIN — Medication 1 DROP(S): at 19:49

## 2025-08-12 RX ADMIN — ERYTHROMYCIN 1 APPLICATION(S): 5 OINTMENT OPHTHALMIC at 11:28

## 2025-08-13 LAB
ANION GAP SERPL CALC-SCNC: 11 MMOL/L — SIGNIFICANT CHANGE UP (ref 7–14)
BASE EXCESS BLDV CALC-SCNC: 11 MMOL/L — HIGH (ref -2–3)
BASOPHILS # BLD AUTO: 0.1 K/UL — SIGNIFICANT CHANGE UP (ref 0–0.2)
BASOPHILS NFR BLD AUTO: 0.6 % — SIGNIFICANT CHANGE UP (ref 0–2)
BUN SERPL-MCNC: 40 MG/DL — HIGH (ref 7–23)
CALCIUM SERPL-MCNC: 9.3 MG/DL — SIGNIFICANT CHANGE UP (ref 8.4–10.5)
CHLORIDE SERPL-SCNC: 98 MMOL/L — SIGNIFICANT CHANGE UP (ref 98–107)
CO2 BLDV-SCNC: 43 MMOL/L — HIGH (ref 22–26)
CO2 SERPL-SCNC: 34 MMOL/L — HIGH (ref 22–31)
CREAT SERPL-MCNC: <0.2 MG/DL — LOW (ref 0.5–1.3)
CULTURE RESULTS: ABNORMAL
CULTURE RESULTS: ABNORMAL
EGFR: 124 ML/MIN/1.73M2 — SIGNIFICANT CHANGE UP
EGFR: 124 ML/MIN/1.73M2 — SIGNIFICANT CHANGE UP
EOSINOPHIL # BLD AUTO: 0.4 K/UL — SIGNIFICANT CHANGE UP (ref 0–0.5)
EOSINOPHIL NFR BLD AUTO: 2.5 % — SIGNIFICANT CHANGE UP (ref 0–6)
GAS PNL BLDV: SIGNIFICANT CHANGE UP
GLUCOSE BLDC GLUCOMTR-MCNC: 140 MG/DL — HIGH (ref 70–99)
GLUCOSE BLDC GLUCOMTR-MCNC: 166 MG/DL — HIGH (ref 70–99)
GLUCOSE BLDC GLUCOMTR-MCNC: 227 MG/DL — HIGH (ref 70–99)
GLUCOSE BLDC GLUCOMTR-MCNC: 337 MG/DL — HIGH (ref 70–99)
GLUCOSE SERPL-MCNC: 168 MG/DL — HIGH (ref 70–99)
GRAM STN FLD: ABNORMAL
HCO3 BLDV-SCNC: 41 MMOL/L — HIGH (ref 22–29)
HCT VFR BLD CALC: 37.2 % — SIGNIFICANT CHANGE UP (ref 34.5–45)
HGB BLD-MCNC: 10.3 G/DL — LOW (ref 11.5–15.5)
IMM GRANULOCYTES # BLD AUTO: 0.1 K/UL — HIGH (ref 0–0.07)
IMM GRANULOCYTES NFR BLD AUTO: 0.6 % — SIGNIFICANT CHANGE UP (ref 0–0.9)
LACTATE SERPL-SCNC: 1.9 MMOL/L — SIGNIFICANT CHANGE UP (ref 0.5–2)
LYMPHOCYTES # BLD AUTO: 0.89 K/UL — LOW (ref 1–3.3)
LYMPHOCYTES NFR BLD AUTO: 5.5 % — LOW (ref 13–44)
MAGNESIUM SERPL-MCNC: 2.4 MG/DL — SIGNIFICANT CHANGE UP (ref 1.6–2.6)
MCHC RBC-ENTMCNC: 24.1 PG — LOW (ref 27–34)
MCHC RBC-ENTMCNC: 27.7 G/DL — LOW (ref 32–36)
MCV RBC AUTO: 86.9 FL — SIGNIFICANT CHANGE UP (ref 80–100)
MONOCYTES # BLD AUTO: 0.62 K/UL — SIGNIFICANT CHANGE UP (ref 0–0.9)
MONOCYTES NFR BLD AUTO: 3.8 % — SIGNIFICANT CHANGE UP (ref 2–14)
NEUTROPHILS # BLD AUTO: 14.21 K/UL — HIGH (ref 1.8–7.4)
NEUTROPHILS NFR BLD AUTO: 87 % — HIGH (ref 43–77)
NRBC # BLD AUTO: 0 K/UL — SIGNIFICANT CHANGE UP (ref 0–0)
NRBC # FLD: 0 K/UL — SIGNIFICANT CHANGE UP (ref 0–0)
NRBC BLD AUTO-RTO: 0 /100 WBCS — SIGNIFICANT CHANGE UP (ref 0–0)
ORGANISM # SPEC MICROSCOPIC CNT: ABNORMAL
PCO2 BLDV: 81 MMHG — CRITICAL HIGH (ref 39–52)
PH BLDV: 7.31 — LOW (ref 7.32–7.43)
PHOSPHATE SERPL-MCNC: 1.8 MG/DL — LOW (ref 2.5–4.5)
PLATELET # BLD AUTO: 381 K/UL — SIGNIFICANT CHANGE UP (ref 150–400)
PMV BLD: 9.8 FL — SIGNIFICANT CHANGE UP (ref 7–13)
PO2 BLDV: 61 MMHG — HIGH (ref 25–45)
POTASSIUM SERPL-MCNC: 4.8 MMOL/L — SIGNIFICANT CHANGE UP (ref 3.5–5.3)
POTASSIUM SERPL-SCNC: 4.8 MMOL/L — SIGNIFICANT CHANGE UP (ref 3.5–5.3)
PROCALCITONIN SERPL-MCNC: 8.24 NG/ML — HIGH (ref 0.02–0.1)
RBC # BLD: 4.28 M/UL — SIGNIFICANT CHANGE UP (ref 3.8–5.2)
RBC # FLD: 18.6 % — HIGH (ref 10.3–14.5)
SAO2 % BLDV: 92.8 % — HIGH (ref 67–88)
SODIUM SERPL-SCNC: 143 MMOL/L — SIGNIFICANT CHANGE UP (ref 135–145)
SPECIMEN SOURCE: SIGNIFICANT CHANGE UP
VANCOMYCIN TROUGH SERPL-MCNC: 25.8 UG/ML — CRITICAL HIGH (ref 10–20)
WBC # BLD: 16.32 K/UL — HIGH (ref 3.8–10.5)
WBC # FLD AUTO: 16.32 K/UL — HIGH (ref 3.8–10.5)

## 2025-08-13 PROCEDURE — 99233 SBSQ HOSP IP/OBS HIGH 50: CPT

## 2025-08-13 PROCEDURE — 71260 CT THORAX DX C+: CPT | Mod: 26

## 2025-08-13 PROCEDURE — 74177 CT ABD & PELVIS W/CONTRAST: CPT | Mod: 26

## 2025-08-13 RX ORDER — SOD PHOS DI, MONO/K PHOS MONO 250 MG
1 TABLET ORAL ONCE
Refills: 0 | Status: COMPLETED | OUTPATIENT
Start: 2025-08-13 | End: 2025-08-13

## 2025-08-13 RX ORDER — COLLAGENASE CLOSTRIDIUM HIST. 250 UNIT/G
1 OINTMENT (GRAM) TOPICAL DAILY
Refills: 0 | Status: DISCONTINUED | OUTPATIENT
Start: 2025-08-13 | End: 2025-08-21

## 2025-08-13 RX ADMIN — ERYTHROMYCIN 1 APPLICATION(S): 5 OINTMENT OPHTHALMIC at 00:08

## 2025-08-13 RX ADMIN — RILUZOLE 50 MILLIGRAM(S): 5 LIQUID ORAL at 17:38

## 2025-08-13 RX ADMIN — CEFEPIME 100 MILLIGRAM(S): 2 INJECTION, POWDER, FOR SOLUTION INTRAVENOUS at 05:13

## 2025-08-13 RX ADMIN — Medication 1 DROP(S): at 22:31

## 2025-08-13 RX ADMIN — ERYTHROMYCIN 1 APPLICATION(S): 5 OINTMENT OPHTHALMIC at 14:21

## 2025-08-13 RX ADMIN — Medication 15 MILLILITER(S): at 05:12

## 2025-08-13 RX ADMIN — Medication 1 APPLICATION(S): at 05:13

## 2025-08-13 RX ADMIN — Medication 1 DROP(S): at 05:12

## 2025-08-13 RX ADMIN — Medication 15 MILLILITER(S): at 11:35

## 2025-08-13 RX ADMIN — Medication 1 DROP(S): at 04:49

## 2025-08-13 RX ADMIN — IPRATROPIUM BROMIDE AND ALBUTEROL SULFATE 3 MILLILITER(S): .5; 2.5 SOLUTION RESPIRATORY (INHALATION) at 03:07

## 2025-08-13 RX ADMIN — ERYTHROMYCIN 1 APPLICATION(S): 5 OINTMENT OPHTHALMIC at 11:37

## 2025-08-13 RX ADMIN — Medication 4 MILLILITER(S): at 21:29

## 2025-08-13 RX ADMIN — ERYTHROMYCIN 1 APPLICATION(S): 5 OINTMENT OPHTHALMIC at 02:48

## 2025-08-13 RX ADMIN — Medication 1 APPLICATION(S): at 10:30

## 2025-08-13 RX ADMIN — Medication 1 DROP(S): at 17:36

## 2025-08-13 RX ADMIN — OFLOXACIN 1 DROP(S): 3 SOLUTION OPHTHALMIC at 17:37

## 2025-08-13 RX ADMIN — ERYTHROMYCIN 1 APPLICATION(S): 5 OINTMENT OPHTHALMIC at 16:03

## 2025-08-13 RX ADMIN — RIVAROXABAN 10 MILLIGRAM(S): 10 TABLET, FILM COATED ORAL at 11:35

## 2025-08-13 RX ADMIN — ERYTHROMYCIN 1 APPLICATION(S): 5 OINTMENT OPHTHALMIC at 23:46

## 2025-08-13 RX ADMIN — Medication 1 DROP(S): at 14:21

## 2025-08-13 RX ADMIN — Medication 1 PACKET(S): at 15:03

## 2025-08-13 RX ADMIN — Medication 1 DROP(S): at 07:34

## 2025-08-13 RX ADMIN — ERYTHROMYCIN 1 APPLICATION(S): 5 OINTMENT OPHTHALMIC at 07:34

## 2025-08-13 RX ADMIN — ERYTHROMYCIN 1 APPLICATION(S): 5 OINTMENT OPHTHALMIC at 22:31

## 2025-08-13 RX ADMIN — CEFEPIME 100 MILLIGRAM(S): 2 INJECTION, POWDER, FOR SOLUTION INTRAVENOUS at 14:21

## 2025-08-13 RX ADMIN — ERYTHROMYCIN 1 APPLICATION(S): 5 OINTMENT OPHTHALMIC at 20:13

## 2025-08-13 RX ADMIN — INSULIN LISPRO 4: 100 INJECTION, SOLUTION INTRAVENOUS; SUBCUTANEOUS at 17:34

## 2025-08-13 RX ADMIN — Medication 1 DROP(S): at 23:46

## 2025-08-13 RX ADMIN — Medication 1 DROP(S): at 10:03

## 2025-08-13 RX ADMIN — CEFEPIME 100 MILLIGRAM(S): 2 INJECTION, POWDER, FOR SOLUTION INTRAVENOUS at 22:30

## 2025-08-13 RX ADMIN — IPRATROPIUM BROMIDE AND ALBUTEROL SULFATE 3 MILLILITER(S): .5; 2.5 SOLUTION RESPIRATORY (INHALATION) at 08:05

## 2025-08-13 RX ADMIN — ERYTHROMYCIN 1 APPLICATION(S): 5 OINTMENT OPHTHALMIC at 04:49

## 2025-08-13 RX ADMIN — Medication 1 DROP(S): at 02:48

## 2025-08-13 RX ADMIN — Medication 1 DROP(S): at 20:13

## 2025-08-13 RX ADMIN — ERYTHROMYCIN 1 APPLICATION(S): 5 OINTMENT OPHTHALMIC at 05:12

## 2025-08-13 RX ADMIN — IPRATROPIUM BROMIDE AND ALBUTEROL SULFATE 3 MILLILITER(S): .5; 2.5 SOLUTION RESPIRATORY (INHALATION) at 14:33

## 2025-08-13 RX ADMIN — IPRATROPIUM BROMIDE AND ALBUTEROL SULFATE 3 MILLILITER(S): .5; 2.5 SOLUTION RESPIRATORY (INHALATION) at 21:28

## 2025-08-13 RX ADMIN — INSULIN LISPRO 2: 100 INJECTION, SOLUTION INTRAVENOUS; SUBCUTANEOUS at 23:47

## 2025-08-13 RX ADMIN — ERYTHROMYCIN 1 APPLICATION(S): 5 OINTMENT OPHTHALMIC at 17:35

## 2025-08-13 RX ADMIN — OFLOXACIN 1 DROP(S): 3 SOLUTION OPHTHALMIC at 05:12

## 2025-08-13 RX ADMIN — Medication 1 DROP(S): at 16:03

## 2025-08-13 RX ADMIN — INSULIN LISPRO 1: 100 INJECTION, SOLUTION INTRAVENOUS; SUBCUTANEOUS at 00:08

## 2025-08-13 RX ADMIN — Medication 1 DROP(S): at 00:08

## 2025-08-13 RX ADMIN — SERTRALINE 75 MILLIGRAM(S): 100 TABLET, FILM COATED ORAL at 11:36

## 2025-08-13 RX ADMIN — Medication 1 DROP(S): at 11:36

## 2025-08-13 RX ADMIN — Medication 15 MILLILITER(S): at 17:37

## 2025-08-13 RX ADMIN — RILUZOLE 50 MILLIGRAM(S): 5 LIQUID ORAL at 05:13

## 2025-08-13 RX ADMIN — ERYTHROMYCIN 1 APPLICATION(S): 5 OINTMENT OPHTHALMIC at 10:03

## 2025-08-13 RX ADMIN — INSULIN LISPRO 1: 100 INJECTION, SOLUTION INTRAVENOUS; SUBCUTANEOUS at 05:25

## 2025-08-14 LAB
-  AZTREONAM: SIGNIFICANT CHANGE UP
-  CEFEPIME: SIGNIFICANT CHANGE UP
-  CEFTAZIDIME: SIGNIFICANT CHANGE UP
-  CIPROFLOXACIN: SIGNIFICANT CHANGE UP
-  IMIPENEM: SIGNIFICANT CHANGE UP
-  LEVOFLOXACIN: SIGNIFICANT CHANGE UP
-  MEROPENEM: SIGNIFICANT CHANGE UP
-  PIPERACILLIN/TAZOBACTAM: SIGNIFICANT CHANGE UP
-  TRIMETHOPRIM/SULFAMETHOXAZOLE: SIGNIFICANT CHANGE UP
ANION GAP SERPL CALC-SCNC: 10 MMOL/L — SIGNIFICANT CHANGE UP (ref 7–14)
BASOPHILS # BLD AUTO: 0.06 K/UL — SIGNIFICANT CHANGE UP (ref 0–0.2)
BASOPHILS NFR BLD AUTO: 0.5 % — SIGNIFICANT CHANGE UP (ref 0–2)
BUN SERPL-MCNC: 33 MG/DL — HIGH (ref 7–23)
CALCIUM SERPL-MCNC: 9.1 MG/DL — SIGNIFICANT CHANGE UP (ref 8.4–10.5)
CHLORIDE SERPL-SCNC: 100 MMOL/L — SIGNIFICANT CHANGE UP (ref 98–107)
CO2 SERPL-SCNC: 33 MMOL/L — HIGH (ref 22–31)
CREAT SERPL-MCNC: <0.2 MG/DL — LOW (ref 0.5–1.3)
CULTURE RESULTS: ABNORMAL
EGFR: 124 ML/MIN/1.73M2 — SIGNIFICANT CHANGE UP
EGFR: 124 ML/MIN/1.73M2 — SIGNIFICANT CHANGE UP
EOSINOPHIL # BLD AUTO: 0.26 K/UL — SIGNIFICANT CHANGE UP (ref 0–0.5)
EOSINOPHIL NFR BLD AUTO: 2 % — SIGNIFICANT CHANGE UP (ref 0–6)
GLUCOSE BLDC GLUCOMTR-MCNC: 108 MG/DL — HIGH (ref 70–99)
GLUCOSE BLDC GLUCOMTR-MCNC: 139 MG/DL — HIGH (ref 70–99)
GLUCOSE BLDC GLUCOMTR-MCNC: 140 MG/DL — HIGH (ref 70–99)
GLUCOSE SERPL-MCNC: 126 MG/DL — HIGH (ref 70–99)
HCT VFR BLD CALC: 34.9 % — SIGNIFICANT CHANGE UP (ref 34.5–45)
HGB BLD-MCNC: 9.7 G/DL — LOW (ref 11.5–15.5)
IMM GRANULOCYTES # BLD AUTO: 0.1 K/UL — HIGH (ref 0–0.07)
IMM GRANULOCYTES NFR BLD AUTO: 0.8 % — SIGNIFICANT CHANGE UP (ref 0–0.9)
LYMPHOCYTES # BLD AUTO: 1.15 K/UL — SIGNIFICANT CHANGE UP (ref 1–3.3)
LYMPHOCYTES NFR BLD AUTO: 8.9 % — LOW (ref 13–44)
MAGNESIUM SERPL-MCNC: 2.1 MG/DL — SIGNIFICANT CHANGE UP (ref 1.6–2.6)
MCHC RBC-ENTMCNC: 24.3 PG — LOW (ref 27–34)
MCHC RBC-ENTMCNC: 27.8 G/DL — LOW (ref 32–36)
MCV RBC AUTO: 87.5 FL — SIGNIFICANT CHANGE UP (ref 80–100)
METHOD TYPE: SIGNIFICANT CHANGE UP
MONOCYTES # BLD AUTO: 0.91 K/UL — HIGH (ref 0–0.9)
MONOCYTES NFR BLD AUTO: 7 % — SIGNIFICANT CHANGE UP (ref 2–14)
NEUTROPHILS # BLD AUTO: 10.46 K/UL — HIGH (ref 1.8–7.4)
NEUTROPHILS NFR BLD AUTO: 80.8 % — HIGH (ref 43–77)
NRBC # BLD AUTO: 0 K/UL — SIGNIFICANT CHANGE UP (ref 0–0)
NRBC # FLD: 0 K/UL — SIGNIFICANT CHANGE UP (ref 0–0)
NRBC BLD AUTO-RTO: 0 /100 WBCS — SIGNIFICANT CHANGE UP (ref 0–0)
ORGANISM # SPEC MICROSCOPIC CNT: ABNORMAL
PHOSPHATE SERPL-MCNC: 1.8 MG/DL — LOW (ref 2.5–4.5)
PLATELET # BLD AUTO: 302 K/UL — SIGNIFICANT CHANGE UP (ref 150–400)
PMV BLD: 10 FL — SIGNIFICANT CHANGE UP (ref 7–13)
POTASSIUM SERPL-MCNC: 4.7 MMOL/L — SIGNIFICANT CHANGE UP (ref 3.5–5.3)
POTASSIUM SERPL-SCNC: 4.7 MMOL/L — SIGNIFICANT CHANGE UP (ref 3.5–5.3)
RBC # BLD: 3.99 M/UL — SIGNIFICANT CHANGE UP (ref 3.8–5.2)
RBC # FLD: 18.8 % — HIGH (ref 10.3–14.5)
SODIUM SERPL-SCNC: 143 MMOL/L — SIGNIFICANT CHANGE UP (ref 135–145)
SPECIMEN SOURCE: SIGNIFICANT CHANGE UP
VANCOMYCIN FLD-MCNC: 16.8 UG/ML — SIGNIFICANT CHANGE UP
WBC # BLD: 12.94 K/UL — HIGH (ref 3.8–10.5)
WBC # FLD AUTO: 12.94 K/UL — HIGH (ref 3.8–10.5)

## 2025-08-14 PROCEDURE — 99232 SBSQ HOSP IP/OBS MODERATE 35: CPT

## 2025-08-14 PROCEDURE — 99233 SBSQ HOSP IP/OBS HIGH 50: CPT

## 2025-08-14 RX ORDER — DIAZEPAM 5 MG/1
2 TABLET ORAL EVERY 6 HOURS
Refills: 0 | Status: DISCONTINUED | OUTPATIENT
Start: 2025-08-14 | End: 2025-08-21

## 2025-08-14 RX ORDER — SODIUM PHOSPHATE,DIBASIC DIHYD
30 POWDER (GRAM) MISCELLANEOUS ONCE
Refills: 0 | Status: COMPLETED | OUTPATIENT
Start: 2025-08-14 | End: 2025-08-14

## 2025-08-14 RX ADMIN — CEFEPIME 100 MILLIGRAM(S): 2 INJECTION, POWDER, FOR SOLUTION INTRAVENOUS at 05:15

## 2025-08-14 RX ADMIN — Medication 15 MILLILITER(S): at 11:57

## 2025-08-14 RX ADMIN — IPRATROPIUM BROMIDE AND ALBUTEROL SULFATE 3 MILLILITER(S): .5; 2.5 SOLUTION RESPIRATORY (INHALATION) at 04:16

## 2025-08-14 RX ADMIN — Medication 1 APPLICATION(S): at 11:58

## 2025-08-14 RX ADMIN — Medication 15 MILLILITER(S): at 05:19

## 2025-08-14 RX ADMIN — Medication 1 DROP(S): at 16:14

## 2025-08-14 RX ADMIN — ERYTHROMYCIN 1 APPLICATION(S): 5 OINTMENT OPHTHALMIC at 16:15

## 2025-08-14 RX ADMIN — ERYTHROMYCIN 1 APPLICATION(S): 5 OINTMENT OPHTHALMIC at 05:20

## 2025-08-14 RX ADMIN — Medication 15 MILLILITER(S): at 18:15

## 2025-08-14 RX ADMIN — Medication 1 DROP(S): at 02:45

## 2025-08-14 RX ADMIN — IPRATROPIUM BROMIDE AND ALBUTEROL SULFATE 3 MILLILITER(S): .5; 2.5 SOLUTION RESPIRATORY (INHALATION) at 07:01

## 2025-08-14 RX ADMIN — CEFEPIME 100 MILLIGRAM(S): 2 INJECTION, POWDER, FOR SOLUTION INTRAVENOUS at 22:17

## 2025-08-14 RX ADMIN — OFLOXACIN 1 DROP(S): 3 SOLUTION OPHTHALMIC at 05:20

## 2025-08-14 RX ADMIN — Medication 1 DROP(S): at 10:07

## 2025-08-14 RX ADMIN — ERYTHROMYCIN 1 APPLICATION(S): 5 OINTMENT OPHTHALMIC at 02:46

## 2025-08-14 RX ADMIN — Medication 1 DROP(S): at 22:14

## 2025-08-14 RX ADMIN — Medication 1 DROP(S): at 18:15

## 2025-08-14 RX ADMIN — Medication 1 DROP(S): at 08:01

## 2025-08-14 RX ADMIN — Medication 1 APPLICATION(S): at 05:19

## 2025-08-14 RX ADMIN — SERTRALINE 75 MILLIGRAM(S): 100 TABLET, FILM COATED ORAL at 11:56

## 2025-08-14 RX ADMIN — ERYTHROMYCIN 1 APPLICATION(S): 5 OINTMENT OPHTHALMIC at 10:08

## 2025-08-14 RX ADMIN — ERYTHROMYCIN 1 APPLICATION(S): 5 OINTMENT OPHTHALMIC at 22:15

## 2025-08-14 RX ADMIN — IPRATROPIUM BROMIDE AND ALBUTEROL SULFATE 3 MILLILITER(S): .5; 2.5 SOLUTION RESPIRATORY (INHALATION) at 22:14

## 2025-08-14 RX ADMIN — ERYTHROMYCIN 1 APPLICATION(S): 5 OINTMENT OPHTHALMIC at 08:00

## 2025-08-14 RX ADMIN — ERYTHROMYCIN 1 APPLICATION(S): 5 OINTMENT OPHTHALMIC at 04:46

## 2025-08-14 RX ADMIN — OFLOXACIN 1 DROP(S): 3 SOLUTION OPHTHALMIC at 18:15

## 2025-08-14 RX ADMIN — ERYTHROMYCIN 1 APPLICATION(S): 5 OINTMENT OPHTHALMIC at 11:58

## 2025-08-14 RX ADMIN — RILUZOLE 50 MILLIGRAM(S): 5 LIQUID ORAL at 05:19

## 2025-08-14 RX ADMIN — IPRATROPIUM BROMIDE AND ALBUTEROL SULFATE 3 MILLILITER(S): .5; 2.5 SOLUTION RESPIRATORY (INHALATION) at 14:38

## 2025-08-14 RX ADMIN — ERYTHROMYCIN 1 APPLICATION(S): 5 OINTMENT OPHTHALMIC at 19:53

## 2025-08-14 RX ADMIN — Medication 1 DROP(S): at 14:21

## 2025-08-14 RX ADMIN — CEFEPIME 100 MILLIGRAM(S): 2 INJECTION, POWDER, FOR SOLUTION INTRAVENOUS at 14:20

## 2025-08-14 RX ADMIN — Medication 1 DROP(S): at 04:46

## 2025-08-14 RX ADMIN — ERYTHROMYCIN 1 APPLICATION(S): 5 OINTMENT OPHTHALMIC at 18:14

## 2025-08-14 RX ADMIN — RILUZOLE 50 MILLIGRAM(S): 5 LIQUID ORAL at 18:15

## 2025-08-14 RX ADMIN — Medication 85 MILLIMOLE(S): at 09:26

## 2025-08-14 RX ADMIN — Medication 1 DROP(S): at 11:57

## 2025-08-14 RX ADMIN — Medication 1 DROP(S): at 19:53

## 2025-08-14 RX ADMIN — RIVAROXABAN 10 MILLIGRAM(S): 10 TABLET, FILM COATED ORAL at 11:57

## 2025-08-14 RX ADMIN — Medication 1 DROP(S): at 05:20

## 2025-08-14 RX ADMIN — ERYTHROMYCIN 1 APPLICATION(S): 5 OINTMENT OPHTHALMIC at 14:21

## 2025-08-15 LAB
-  MINOCYCLINE: SIGNIFICANT CHANGE UP
ANION GAP SERPL CALC-SCNC: 11 MMOL/L — SIGNIFICANT CHANGE UP (ref 7–14)
ANION GAP SERPL CALC-SCNC: 11 MMOL/L — SIGNIFICANT CHANGE UP (ref 7–14)
BASOPHILS # BLD AUTO: 0.06 K/UL — SIGNIFICANT CHANGE UP (ref 0–0.2)
BASOPHILS NFR BLD AUTO: 0.4 % — SIGNIFICANT CHANGE UP (ref 0–2)
BUN SERPL-MCNC: 23 MG/DL — SIGNIFICANT CHANGE UP (ref 7–23)
BUN SERPL-MCNC: 24 MG/DL — HIGH (ref 7–23)
CALCIUM SERPL-MCNC: 8.5 MG/DL — SIGNIFICANT CHANGE UP (ref 8.4–10.5)
CALCIUM SERPL-MCNC: 8.5 MG/DL — SIGNIFICANT CHANGE UP (ref 8.4–10.5)
CHLORIDE SERPL-SCNC: 95 MMOL/L — LOW (ref 98–107)
CHLORIDE SERPL-SCNC: 96 MMOL/L — LOW (ref 98–107)
CO2 SERPL-SCNC: 31 MMOL/L — SIGNIFICANT CHANGE UP (ref 22–31)
CO2 SERPL-SCNC: 34 MMOL/L — HIGH (ref 22–31)
CREAT SERPL-MCNC: <0.2 MG/DL — LOW (ref 0.5–1.3)
CREAT SERPL-MCNC: <0.2 MG/DL — LOW (ref 0.5–1.3)
CULTURE RESULTS: ABNORMAL
EGFR: 124 ML/MIN/1.73M2 — SIGNIFICANT CHANGE UP
EOSINOPHIL # BLD AUTO: 0.4 K/UL — SIGNIFICANT CHANGE UP (ref 0–0.5)
EOSINOPHIL NFR BLD AUTO: 3 % — SIGNIFICANT CHANGE UP (ref 0–6)
GLUCOSE BLDC GLUCOMTR-MCNC: 110 MG/DL — HIGH (ref 70–99)
GLUCOSE BLDC GLUCOMTR-MCNC: 122 MG/DL — HIGH (ref 70–99)
GLUCOSE BLDC GLUCOMTR-MCNC: 131 MG/DL — HIGH (ref 70–99)
GLUCOSE BLDC GLUCOMTR-MCNC: 142 MG/DL — HIGH (ref 70–99)
GLUCOSE BLDC GLUCOMTR-MCNC: 184 MG/DL — HIGH (ref 70–99)
GLUCOSE SERPL-MCNC: 129 MG/DL — HIGH (ref 70–99)
GLUCOSE SERPL-MCNC: 132 MG/DL — HIGH (ref 70–99)
HCT VFR BLD CALC: 32.9 % — LOW (ref 34.5–45)
HGB BLD-MCNC: 9.2 G/DL — LOW (ref 11.5–15.5)
IMM GRANULOCYTES # BLD AUTO: 0.1 K/UL — HIGH (ref 0–0.07)
IMM GRANULOCYTES NFR BLD AUTO: 0.7 % — SIGNIFICANT CHANGE UP (ref 0–0.9)
LYMPHOCYTES # BLD AUTO: 1.14 K/UL — SIGNIFICANT CHANGE UP (ref 1–3.3)
LYMPHOCYTES NFR BLD AUTO: 8.4 % — LOW (ref 13–44)
MAGNESIUM SERPL-MCNC: 2.1 MG/DL — SIGNIFICANT CHANGE UP (ref 1.6–2.6)
MAGNESIUM SERPL-MCNC: 2.1 MG/DL — SIGNIFICANT CHANGE UP (ref 1.6–2.6)
MCHC RBC-ENTMCNC: 24.1 PG — LOW (ref 27–34)
MCHC RBC-ENTMCNC: 28 G/DL — LOW (ref 32–36)
MCV RBC AUTO: 86.4 FL — SIGNIFICANT CHANGE UP (ref 80–100)
METHOD TYPE: SIGNIFICANT CHANGE UP
MONOCYTES # BLD AUTO: 1.03 K/UL — HIGH (ref 0–0.9)
MONOCYTES NFR BLD AUTO: 7.6 % — SIGNIFICANT CHANGE UP (ref 2–14)
NEUTROPHILS # BLD AUTO: 10.77 K/UL — HIGH (ref 1.8–7.4)
NEUTROPHILS NFR BLD AUTO: 79.9 % — HIGH (ref 43–77)
NRBC # BLD AUTO: 0 K/UL — SIGNIFICANT CHANGE UP (ref 0–0)
NRBC # FLD: 0 K/UL — SIGNIFICANT CHANGE UP (ref 0–0)
NRBC BLD AUTO-RTO: 0 /100 WBCS — SIGNIFICANT CHANGE UP (ref 0–0)
ORGANISM # SPEC MICROSCOPIC CNT: ABNORMAL
PHOSPHATE SERPL-MCNC: 2.6 MG/DL — SIGNIFICANT CHANGE UP (ref 2.5–4.5)
PHOSPHATE SERPL-MCNC: 4 MG/DL — SIGNIFICANT CHANGE UP (ref 2.5–4.5)
PLATELET # BLD AUTO: 305 K/UL — SIGNIFICANT CHANGE UP (ref 150–400)
PMV BLD: 10.2 FL — SIGNIFICANT CHANGE UP (ref 7–13)
POTASSIUM SERPL-MCNC: 4.2 MMOL/L — SIGNIFICANT CHANGE UP (ref 3.5–5.3)
POTASSIUM SERPL-MCNC: 4.7 MMOL/L — SIGNIFICANT CHANGE UP (ref 3.5–5.3)
POTASSIUM SERPL-SCNC: 4.2 MMOL/L — SIGNIFICANT CHANGE UP (ref 3.5–5.3)
POTASSIUM SERPL-SCNC: 4.7 MMOL/L — SIGNIFICANT CHANGE UP (ref 3.5–5.3)
RBC # BLD: 3.81 M/UL — SIGNIFICANT CHANGE UP (ref 3.8–5.2)
RBC # FLD: 18.6 % — HIGH (ref 10.3–14.5)
SODIUM SERPL-SCNC: 138 MMOL/L — SIGNIFICANT CHANGE UP (ref 135–145)
SODIUM SERPL-SCNC: 140 MMOL/L — SIGNIFICANT CHANGE UP (ref 135–145)
SPECIMEN SOURCE: SIGNIFICANT CHANGE UP
WBC # BLD: 13.5 K/UL — HIGH (ref 3.8–10.5)
WBC # FLD AUTO: 13.5 K/UL — HIGH (ref 3.8–10.5)

## 2025-08-15 PROCEDURE — 99233 SBSQ HOSP IP/OBS HIGH 50: CPT | Mod: FS

## 2025-08-15 PROCEDURE — G0545: CPT

## 2025-08-15 PROCEDURE — 99222 1ST HOSP IP/OBS MODERATE 55: CPT

## 2025-08-15 RX ORDER — PIPERACILLIN-TAZO-DEXTROSE,ISO 3.375G/5
4.5 IV SOLUTION, PIGGYBACK PREMIX FROZEN(ML) INTRAVENOUS ONCE
Refills: 0 | Status: COMPLETED | OUTPATIENT
Start: 2025-08-15 | End: 2025-08-15

## 2025-08-15 RX ORDER — PIPERACILLIN-TAZO-DEXTROSE,ISO 3.375G/5
4.5 IV SOLUTION, PIGGYBACK PREMIX FROZEN(ML) INTRAVENOUS EVERY 8 HOURS
Refills: 0 | Status: DISCONTINUED | OUTPATIENT
Start: 2025-08-15 | End: 2025-08-21

## 2025-08-15 RX ADMIN — ERYTHROMYCIN 1 APPLICATION(S): 5 OINTMENT OPHTHALMIC at 09:31

## 2025-08-15 RX ADMIN — INSULIN LISPRO 1: 100 INJECTION, SOLUTION INTRAVENOUS; SUBCUTANEOUS at 17:14

## 2025-08-15 RX ADMIN — ERYTHROMYCIN 1 APPLICATION(S): 5 OINTMENT OPHTHALMIC at 02:55

## 2025-08-15 RX ADMIN — Medication 1 DROP(S): at 21:50

## 2025-08-15 RX ADMIN — Medication 15 MILLILITER(S): at 17:14

## 2025-08-15 RX ADMIN — IPRATROPIUM BROMIDE AND ALBUTEROL SULFATE 3 MILLILITER(S): .5; 2.5 SOLUTION RESPIRATORY (INHALATION) at 07:12

## 2025-08-15 RX ADMIN — ERYTHROMYCIN 1 APPLICATION(S): 5 OINTMENT OPHTHALMIC at 00:55

## 2025-08-15 RX ADMIN — OFLOXACIN 1 DROP(S): 3 SOLUTION OPHTHALMIC at 17:14

## 2025-08-15 RX ADMIN — IPRATROPIUM BROMIDE AND ALBUTEROL SULFATE 3 MILLILITER(S): .5; 2.5 SOLUTION RESPIRATORY (INHALATION) at 03:13

## 2025-08-15 RX ADMIN — ERYTHROMYCIN 1 APPLICATION(S): 5 OINTMENT OPHTHALMIC at 07:21

## 2025-08-15 RX ADMIN — Medication 1 DROP(S): at 09:31

## 2025-08-15 RX ADMIN — RILUZOLE 50 MILLIGRAM(S): 5 LIQUID ORAL at 05:28

## 2025-08-15 RX ADMIN — Medication 15 MILLILITER(S): at 11:03

## 2025-08-15 RX ADMIN — SERTRALINE 75 MILLIGRAM(S): 100 TABLET, FILM COATED ORAL at 11:02

## 2025-08-15 RX ADMIN — RIVAROXABAN 10 MILLIGRAM(S): 10 TABLET, FILM COATED ORAL at 11:03

## 2025-08-15 RX ADMIN — Medication 200 GRAM(S): at 11:30

## 2025-08-15 RX ADMIN — IPRATROPIUM BROMIDE AND ALBUTEROL SULFATE 3 MILLILITER(S): .5; 2.5 SOLUTION RESPIRATORY (INHALATION) at 14:54

## 2025-08-15 RX ADMIN — ERYTHROMYCIN 1 APPLICATION(S): 5 OINTMENT OPHTHALMIC at 17:14

## 2025-08-15 RX ADMIN — OFLOXACIN 1 DROP(S): 3 SOLUTION OPHTHALMIC at 05:26

## 2025-08-15 RX ADMIN — ERYTHROMYCIN 1 APPLICATION(S): 5 OINTMENT OPHTHALMIC at 04:04

## 2025-08-15 RX ADMIN — ERYTHROMYCIN 1 APPLICATION(S): 5 OINTMENT OPHTHALMIC at 11:02

## 2025-08-15 RX ADMIN — Medication 1 APPLICATION(S): at 11:03

## 2025-08-15 RX ADMIN — ERYTHROMYCIN 1 APPLICATION(S): 5 OINTMENT OPHTHALMIC at 16:40

## 2025-08-15 RX ADMIN — ERYTHROMYCIN 1 APPLICATION(S): 5 OINTMENT OPHTHALMIC at 19:36

## 2025-08-15 RX ADMIN — Medication 1 DROP(S): at 00:55

## 2025-08-15 RX ADMIN — Medication 1 DROP(S): at 07:21

## 2025-08-15 RX ADMIN — Medication 25 GRAM(S): at 14:34

## 2025-08-15 RX ADMIN — Medication 1 DROP(S): at 02:55

## 2025-08-15 RX ADMIN — Medication 25 GRAM(S): at 21:51

## 2025-08-15 RX ADMIN — Medication 1 DROP(S): at 05:26

## 2025-08-15 RX ADMIN — Medication 1 DROP(S): at 11:02

## 2025-08-15 RX ADMIN — Medication 1 DROP(S): at 17:14

## 2025-08-15 RX ADMIN — RILUZOLE 50 MILLIGRAM(S): 5 LIQUID ORAL at 17:15

## 2025-08-15 RX ADMIN — CEFEPIME 100 MILLIGRAM(S): 2 INJECTION, POWDER, FOR SOLUTION INTRAVENOUS at 05:29

## 2025-08-15 RX ADMIN — ERYTHROMYCIN 1 APPLICATION(S): 5 OINTMENT OPHTHALMIC at 21:51

## 2025-08-15 RX ADMIN — IPRATROPIUM BROMIDE AND ALBUTEROL SULFATE 3 MILLILITER(S): .5; 2.5 SOLUTION RESPIRATORY (INHALATION) at 21:41

## 2025-08-15 RX ADMIN — Medication 1 DROP(S): at 19:36

## 2025-08-15 RX ADMIN — Medication 1 APPLICATION(S): at 05:26

## 2025-08-15 RX ADMIN — Medication 1 DROP(S): at 04:03

## 2025-08-15 RX ADMIN — ERYTHROMYCIN 1 APPLICATION(S): 5 OINTMENT OPHTHALMIC at 05:26

## 2025-08-15 RX ADMIN — Medication 1 DROP(S): at 16:40

## 2025-08-15 RX ADMIN — ERYTHROMYCIN 1 APPLICATION(S): 5 OINTMENT OPHTHALMIC at 13:36

## 2025-08-15 RX ADMIN — Medication 1 DROP(S): at 13:35

## 2025-08-15 RX ADMIN — Medication 15 MILLILITER(S): at 05:36

## 2025-08-16 LAB
ANION GAP SERPL CALC-SCNC: 10 MMOL/L — SIGNIFICANT CHANGE UP (ref 7–14)
BASOPHILS # BLD AUTO: 0.07 K/UL — SIGNIFICANT CHANGE UP (ref 0–0.2)
BASOPHILS NFR BLD AUTO: 0.5 % — SIGNIFICANT CHANGE UP (ref 0–2)
BUN SERPL-MCNC: 21 MG/DL — SIGNIFICANT CHANGE UP (ref 7–23)
CALCIUM SERPL-MCNC: 8.9 MG/DL — SIGNIFICANT CHANGE UP (ref 8.4–10.5)
CHLORIDE SERPL-SCNC: 99 MMOL/L — SIGNIFICANT CHANGE UP (ref 98–107)
CO2 SERPL-SCNC: 33 MMOL/L — HIGH (ref 22–31)
CREAT SERPL-MCNC: <0.2 MG/DL — LOW (ref 0.5–1.3)
EGFR: 124 ML/MIN/1.73M2 — SIGNIFICANT CHANGE UP
EGFR: 124 ML/MIN/1.73M2 — SIGNIFICANT CHANGE UP
EOSINOPHIL # BLD AUTO: 0.41 K/UL — SIGNIFICANT CHANGE UP (ref 0–0.5)
EOSINOPHIL NFR BLD AUTO: 2.7 % — SIGNIFICANT CHANGE UP (ref 0–6)
GLUCOSE BLDC GLUCOMTR-MCNC: 131 MG/DL — HIGH (ref 70–99)
GLUCOSE BLDC GLUCOMTR-MCNC: 133 MG/DL — HIGH (ref 70–99)
GLUCOSE BLDC GLUCOMTR-MCNC: 133 MG/DL — HIGH (ref 70–99)
GLUCOSE BLDC GLUCOMTR-MCNC: 136 MG/DL — HIGH (ref 70–99)
GLUCOSE BLDC GLUCOMTR-MCNC: 142 MG/DL — HIGH (ref 70–99)
GLUCOSE SERPL-MCNC: 129 MG/DL — HIGH (ref 70–99)
HCT VFR BLD CALC: 33 % — LOW (ref 34.5–45)
HGB BLD-MCNC: 9.5 G/DL — LOW (ref 11.5–15.5)
IMM GRANULOCYTES # BLD AUTO: 0.14 K/UL — HIGH (ref 0–0.07)
IMM GRANULOCYTES NFR BLD AUTO: 0.9 % — SIGNIFICANT CHANGE UP (ref 0–0.9)
LYMPHOCYTES # BLD AUTO: 1.21 K/UL — SIGNIFICANT CHANGE UP (ref 1–3.3)
LYMPHOCYTES NFR BLD AUTO: 7.9 % — LOW (ref 13–44)
MAGNESIUM SERPL-MCNC: 2.2 MG/DL — SIGNIFICANT CHANGE UP (ref 1.6–2.6)
MCHC RBC-ENTMCNC: 24.2 PG — LOW (ref 27–34)
MCHC RBC-ENTMCNC: 28.8 G/DL — LOW (ref 32–36)
MCV RBC AUTO: 84.2 FL — SIGNIFICANT CHANGE UP (ref 80–100)
MONOCYTES # BLD AUTO: 0.94 K/UL — HIGH (ref 0–0.9)
MONOCYTES NFR BLD AUTO: 6.1 % — SIGNIFICANT CHANGE UP (ref 2–14)
NEUTROPHILS # BLD AUTO: 12.58 K/UL — HIGH (ref 1.8–7.4)
NEUTROPHILS NFR BLD AUTO: 81.9 % — HIGH (ref 43–77)
NRBC # BLD AUTO: 0 K/UL — SIGNIFICANT CHANGE UP (ref 0–0)
NRBC # FLD: 0 K/UL — SIGNIFICANT CHANGE UP (ref 0–0)
NRBC BLD AUTO-RTO: 0 /100 WBCS — SIGNIFICANT CHANGE UP (ref 0–0)
PHOSPHATE SERPL-MCNC: 2.6 MG/DL — SIGNIFICANT CHANGE UP (ref 2.5–4.5)
PLATELET # BLD AUTO: 300 K/UL — SIGNIFICANT CHANGE UP (ref 150–400)
PMV BLD: 10.3 FL — SIGNIFICANT CHANGE UP (ref 7–13)
POTASSIUM SERPL-MCNC: 4.4 MMOL/L — SIGNIFICANT CHANGE UP (ref 3.5–5.3)
POTASSIUM SERPL-SCNC: 4.4 MMOL/L — SIGNIFICANT CHANGE UP (ref 3.5–5.3)
RBC # BLD: 3.92 M/UL — SIGNIFICANT CHANGE UP (ref 3.8–5.2)
RBC # FLD: 18.6 % — HIGH (ref 10.3–14.5)
SODIUM SERPL-SCNC: 142 MMOL/L — SIGNIFICANT CHANGE UP (ref 135–145)
WBC # BLD: 15.35 K/UL — HIGH (ref 3.8–10.5)
WBC # FLD AUTO: 15.35 K/UL — HIGH (ref 3.8–10.5)

## 2025-08-16 PROCEDURE — 99233 SBSQ HOSP IP/OBS HIGH 50: CPT

## 2025-08-16 RX ADMIN — ERYTHROMYCIN 1 APPLICATION(S): 5 OINTMENT OPHTHALMIC at 19:40

## 2025-08-16 RX ADMIN — Medication 1 DROP(S): at 10:57

## 2025-08-16 RX ADMIN — Medication 1 DROP(S): at 21:13

## 2025-08-16 RX ADMIN — IPRATROPIUM BROMIDE AND ALBUTEROL SULFATE 3 MILLILITER(S): .5; 2.5 SOLUTION RESPIRATORY (INHALATION) at 09:06

## 2025-08-16 RX ADMIN — ERYTHROMYCIN 1 APPLICATION(S): 5 OINTMENT OPHTHALMIC at 08:51

## 2025-08-16 RX ADMIN — Medication 1 DROP(S): at 23:48

## 2025-08-16 RX ADMIN — RILUZOLE 50 MILLIGRAM(S): 5 LIQUID ORAL at 08:53

## 2025-08-16 RX ADMIN — Medication 1 DROP(S): at 03:28

## 2025-08-16 RX ADMIN — OFLOXACIN 1 DROP(S): 3 SOLUTION OPHTHALMIC at 16:16

## 2025-08-16 RX ADMIN — ERYTHROMYCIN 1 APPLICATION(S): 5 OINTMENT OPHTHALMIC at 05:42

## 2025-08-16 RX ADMIN — ERYTHROMYCIN 1 APPLICATION(S): 5 OINTMENT OPHTHALMIC at 16:15

## 2025-08-16 RX ADMIN — Medication 1 DROP(S): at 05:41

## 2025-08-16 RX ADMIN — Medication 1 DROP(S): at 09:50

## 2025-08-16 RX ADMIN — ERYTHROMYCIN 1 APPLICATION(S): 5 OINTMENT OPHTHALMIC at 09:51

## 2025-08-16 RX ADMIN — Medication 1 DROP(S): at 19:40

## 2025-08-16 RX ADMIN — Medication 25 GRAM(S): at 11:00

## 2025-08-16 RX ADMIN — Medication 1 DROP(S): at 18:00

## 2025-08-16 RX ADMIN — Medication 1 APPLICATION(S): at 05:41

## 2025-08-16 RX ADMIN — ERYTHROMYCIN 1 APPLICATION(S): 5 OINTMENT OPHTHALMIC at 01:26

## 2025-08-16 RX ADMIN — ERYTHROMYCIN 1 APPLICATION(S): 5 OINTMENT OPHTHALMIC at 04:24

## 2025-08-16 RX ADMIN — Medication 1 DROP(S): at 01:27

## 2025-08-16 RX ADMIN — OFLOXACIN 1 DROP(S): 3 SOLUTION OPHTHALMIC at 05:41

## 2025-08-16 RX ADMIN — Medication 15 MILLILITER(S): at 16:16

## 2025-08-16 RX ADMIN — ERYTHROMYCIN 1 APPLICATION(S): 5 OINTMENT OPHTHALMIC at 10:57

## 2025-08-16 RX ADMIN — ERYTHROMYCIN 1 APPLICATION(S): 5 OINTMENT OPHTHALMIC at 23:48

## 2025-08-16 RX ADMIN — Medication 1 APPLICATION(S): at 15:03

## 2025-08-16 RX ADMIN — Medication 1 DROP(S): at 04:25

## 2025-08-16 RX ADMIN — ERYTHROMYCIN 1 APPLICATION(S): 5 OINTMENT OPHTHALMIC at 15:04

## 2025-08-16 RX ADMIN — ERYTHROMYCIN 1 APPLICATION(S): 5 OINTMENT OPHTHALMIC at 21:13

## 2025-08-16 RX ADMIN — IPRATROPIUM BROMIDE AND ALBUTEROL SULFATE 3 MILLILITER(S): .5; 2.5 SOLUTION RESPIRATORY (INHALATION) at 19:32

## 2025-08-16 RX ADMIN — ERYTHROMYCIN 1 APPLICATION(S): 5 OINTMENT OPHTHALMIC at 18:00

## 2025-08-16 RX ADMIN — IPRATROPIUM BROMIDE AND ALBUTEROL SULFATE 3 MILLILITER(S): .5; 2.5 SOLUTION RESPIRATORY (INHALATION) at 03:43

## 2025-08-16 RX ADMIN — RIVAROXABAN 10 MILLIGRAM(S): 10 TABLET, FILM COATED ORAL at 10:56

## 2025-08-16 RX ADMIN — Medication 1 DROP(S): at 08:53

## 2025-08-16 RX ADMIN — SERTRALINE 75 MILLIGRAM(S): 100 TABLET, FILM COATED ORAL at 10:57

## 2025-08-16 RX ADMIN — Medication 15 MILLILITER(S): at 10:56

## 2025-08-16 RX ADMIN — IPRATROPIUM BROMIDE AND ALBUTEROL SULFATE 3 MILLILITER(S): .5; 2.5 SOLUTION RESPIRATORY (INHALATION) at 15:28

## 2025-08-16 RX ADMIN — Medication 1 DROP(S): at 16:15

## 2025-08-16 RX ADMIN — RILUZOLE 50 MILLIGRAM(S): 5 LIQUID ORAL at 21:13

## 2025-08-16 RX ADMIN — Medication 15 MILLILITER(S): at 05:41

## 2025-08-16 RX ADMIN — Medication 1 DROP(S): at 15:03

## 2025-08-16 RX ADMIN — Medication 25 GRAM(S): at 05:41

## 2025-08-16 RX ADMIN — Medication 25 GRAM(S): at 21:12

## 2025-08-16 RX ADMIN — ERYTHROMYCIN 1 APPLICATION(S): 5 OINTMENT OPHTHALMIC at 03:28

## 2025-08-17 LAB
ANION GAP SERPL CALC-SCNC: 11 MMOL/L — SIGNIFICANT CHANGE UP (ref 7–14)
BASOPHILS # BLD AUTO: 0.07 K/UL — SIGNIFICANT CHANGE UP (ref 0–0.2)
BASOPHILS NFR BLD AUTO: 0.4 % — SIGNIFICANT CHANGE UP (ref 0–2)
BUN SERPL-MCNC: 20 MG/DL — SIGNIFICANT CHANGE UP (ref 7–23)
CALCIUM SERPL-MCNC: 8.5 MG/DL — SIGNIFICANT CHANGE UP (ref 8.4–10.5)
CHLORIDE SERPL-SCNC: 99 MMOL/L — SIGNIFICANT CHANGE UP (ref 98–107)
CO2 SERPL-SCNC: 30 MMOL/L — SIGNIFICANT CHANGE UP (ref 22–31)
CREAT SERPL-MCNC: <0.2 MG/DL — LOW (ref 0.5–1.3)
CULTURE RESULTS: SIGNIFICANT CHANGE UP
CULTURE RESULTS: SIGNIFICANT CHANGE UP
EGFR: 124 ML/MIN/1.73M2 — SIGNIFICANT CHANGE UP
EGFR: 124 ML/MIN/1.73M2 — SIGNIFICANT CHANGE UP
EOSINOPHIL # BLD AUTO: 0.51 K/UL — HIGH (ref 0–0.5)
EOSINOPHIL NFR BLD AUTO: 3.2 % — SIGNIFICANT CHANGE UP (ref 0–6)
GLUCOSE BLDC GLUCOMTR-MCNC: 105 MG/DL — HIGH (ref 70–99)
GLUCOSE BLDC GLUCOMTR-MCNC: 131 MG/DL — HIGH (ref 70–99)
GLUCOSE BLDC GLUCOMTR-MCNC: 152 MG/DL — HIGH (ref 70–99)
GLUCOSE BLDC GLUCOMTR-MCNC: 160 MG/DL — HIGH (ref 70–99)
GLUCOSE SERPL-MCNC: 148 MG/DL — HIGH (ref 70–99)
HCT VFR BLD CALC: 31.5 % — LOW (ref 34.5–45)
HGB BLD-MCNC: 8.9 G/DL — LOW (ref 11.5–15.5)
IMM GRANULOCYTES # BLD AUTO: 0.16 K/UL — HIGH (ref 0–0.07)
IMM GRANULOCYTES NFR BLD AUTO: 1 % — HIGH (ref 0–0.9)
LYMPHOCYTES # BLD AUTO: 1.4 K/UL — SIGNIFICANT CHANGE UP (ref 1–3.3)
LYMPHOCYTES NFR BLD AUTO: 8.8 % — LOW (ref 13–44)
MAGNESIUM SERPL-MCNC: 2.2 MG/DL — SIGNIFICANT CHANGE UP (ref 1.6–2.6)
MCHC RBC-ENTMCNC: 24.1 PG — LOW (ref 27–34)
MCHC RBC-ENTMCNC: 28.3 G/DL — LOW (ref 32–36)
MCV RBC AUTO: 85.1 FL — SIGNIFICANT CHANGE UP (ref 80–100)
MONOCYTES # BLD AUTO: 0.96 K/UL — HIGH (ref 0–0.9)
MONOCYTES NFR BLD AUTO: 6 % — SIGNIFICANT CHANGE UP (ref 2–14)
NEUTROPHILS # BLD AUTO: 12.87 K/UL — HIGH (ref 1.8–7.4)
NEUTROPHILS NFR BLD AUTO: 80.6 % — HIGH (ref 43–77)
NRBC # BLD AUTO: 0 K/UL — SIGNIFICANT CHANGE UP (ref 0–0)
NRBC # FLD: 0 K/UL — SIGNIFICANT CHANGE UP (ref 0–0)
NRBC BLD AUTO-RTO: 0 /100 WBCS — SIGNIFICANT CHANGE UP (ref 0–0)
PHOSPHATE SERPL-MCNC: 2.3 MG/DL — LOW (ref 2.5–4.5)
PLATELET # BLD AUTO: 302 K/UL — SIGNIFICANT CHANGE UP (ref 150–400)
PMV BLD: 10.7 FL — SIGNIFICANT CHANGE UP (ref 7–13)
POTASSIUM SERPL-MCNC: 4.1 MMOL/L — SIGNIFICANT CHANGE UP (ref 3.5–5.3)
POTASSIUM SERPL-SCNC: 4.1 MMOL/L — SIGNIFICANT CHANGE UP (ref 3.5–5.3)
RBC # BLD: 3.7 M/UL — LOW (ref 3.8–5.2)
RBC # FLD: 18.7 % — HIGH (ref 10.3–14.5)
SODIUM SERPL-SCNC: 140 MMOL/L — SIGNIFICANT CHANGE UP (ref 135–145)
SPECIMEN SOURCE: SIGNIFICANT CHANGE UP
SPECIMEN SOURCE: SIGNIFICANT CHANGE UP
WBC # BLD: 15.97 K/UL — HIGH (ref 3.8–10.5)
WBC # FLD AUTO: 15.97 K/UL — HIGH (ref 3.8–10.5)

## 2025-08-17 PROCEDURE — 99233 SBSQ HOSP IP/OBS HIGH 50: CPT

## 2025-08-17 RX ORDER — POTASSIUM PHOSPHATE, MONOBASIC POTASSIUM PHOSPHATE, DIBASIC INJECTION, 236; 224 MG/ML; MG/ML
15 SOLUTION, CONCENTRATE INTRAVENOUS ONCE
Refills: 0 | Status: COMPLETED | OUTPATIENT
Start: 2025-08-17 | End: 2025-08-17

## 2025-08-17 RX ADMIN — Medication 1 DROP(S): at 14:27

## 2025-08-17 RX ADMIN — ERYTHROMYCIN 1 APPLICATION(S): 5 OINTMENT OPHTHALMIC at 02:59

## 2025-08-17 RX ADMIN — Medication 15 MILLILITER(S): at 16:24

## 2025-08-17 RX ADMIN — IPRATROPIUM BROMIDE AND ALBUTEROL SULFATE 3 MILLILITER(S): .5; 2.5 SOLUTION RESPIRATORY (INHALATION) at 03:08

## 2025-08-17 RX ADMIN — SERTRALINE 75 MILLIGRAM(S): 100 TABLET, FILM COATED ORAL at 11:49

## 2025-08-17 RX ADMIN — Medication 15 MILLILITER(S): at 05:06

## 2025-08-17 RX ADMIN — ERYTHROMYCIN 1 APPLICATION(S): 5 OINTMENT OPHTHALMIC at 05:06

## 2025-08-17 RX ADMIN — ERYTHROMYCIN 1 APPLICATION(S): 5 OINTMENT OPHTHALMIC at 20:00

## 2025-08-17 RX ADMIN — RIVAROXABAN 10 MILLIGRAM(S): 10 TABLET, FILM COATED ORAL at 11:50

## 2025-08-17 RX ADMIN — Medication 1 DROP(S): at 04:12

## 2025-08-17 RX ADMIN — Medication 1 DROP(S): at 05:05

## 2025-08-17 RX ADMIN — OFLOXACIN 1 DROP(S): 3 SOLUTION OPHTHALMIC at 16:26

## 2025-08-17 RX ADMIN — Medication 1 DROP(S): at 07:55

## 2025-08-17 RX ADMIN — OFLOXACIN 1 DROP(S): 3 SOLUTION OPHTHALMIC at 05:06

## 2025-08-17 RX ADMIN — IPRATROPIUM BROMIDE AND ALBUTEROL SULFATE 3 MILLILITER(S): .5; 2.5 SOLUTION RESPIRATORY (INHALATION) at 11:08

## 2025-08-17 RX ADMIN — Medication 1 DROP(S): at 16:24

## 2025-08-17 RX ADMIN — Medication 1 DROP(S): at 20:00

## 2025-08-17 RX ADMIN — Medication 1 DROP(S): at 16:06

## 2025-08-17 RX ADMIN — Medication 1 DROP(S): at 11:48

## 2025-08-17 RX ADMIN — Medication 1 DROP(S): at 23:53

## 2025-08-17 RX ADMIN — ERYTHROMYCIN 1 APPLICATION(S): 5 OINTMENT OPHTHALMIC at 11:48

## 2025-08-17 RX ADMIN — Medication 1 DROP(S): at 02:59

## 2025-08-17 RX ADMIN — ERYTHROMYCIN 1 APPLICATION(S): 5 OINTMENT OPHTHALMIC at 16:07

## 2025-08-17 RX ADMIN — ERYTHROMYCIN 1 APPLICATION(S): 5 OINTMENT OPHTHALMIC at 08:00

## 2025-08-17 RX ADMIN — Medication 25 GRAM(S): at 05:05

## 2025-08-17 RX ADMIN — ERYTHROMYCIN 1 APPLICATION(S): 5 OINTMENT OPHTHALMIC at 23:53

## 2025-08-17 RX ADMIN — IPRATROPIUM BROMIDE AND ALBUTEROL SULFATE 3 MILLILITER(S): .5; 2.5 SOLUTION RESPIRATORY (INHALATION) at 20:32

## 2025-08-17 RX ADMIN — IPRATROPIUM BROMIDE AND ALBUTEROL SULFATE 3 MILLILITER(S): .5; 2.5 SOLUTION RESPIRATORY (INHALATION) at 15:54

## 2025-08-17 RX ADMIN — Medication 1 DROP(S): at 22:28

## 2025-08-17 RX ADMIN — Medication 25 GRAM(S): at 22:28

## 2025-08-17 RX ADMIN — Medication 1 APPLICATION(S): at 05:06

## 2025-08-17 RX ADMIN — Medication 25 GRAM(S): at 16:25

## 2025-08-17 RX ADMIN — INSULIN LISPRO 1: 100 INJECTION, SOLUTION INTRAVENOUS; SUBCUTANEOUS at 05:16

## 2025-08-17 RX ADMIN — INSULIN LISPRO 1: 100 INJECTION, SOLUTION INTRAVENOUS; SUBCUTANEOUS at 23:53

## 2025-08-17 RX ADMIN — POTASSIUM PHOSPHATE, MONOBASIC POTASSIUM PHOSPHATE, DIBASIC INJECTION, 62.5 MILLIMOLE(S): 236; 224 SOLUTION, CONCENTRATE INTRAVENOUS at 11:47

## 2025-08-17 RX ADMIN — ERYTHROMYCIN 1 APPLICATION(S): 5 OINTMENT OPHTHALMIC at 16:23

## 2025-08-17 RX ADMIN — ERYTHROMYCIN 1 APPLICATION(S): 5 OINTMENT OPHTHALMIC at 04:12

## 2025-08-17 RX ADMIN — Medication 1 APPLICATION(S): at 11:50

## 2025-08-17 RX ADMIN — RILUZOLE 50 MILLIGRAM(S): 5 LIQUID ORAL at 05:06

## 2025-08-17 RX ADMIN — Medication 15 MILLILITER(S): at 11:50

## 2025-08-17 RX ADMIN — ERYTHROMYCIN 1 APPLICATION(S): 5 OINTMENT OPHTHALMIC at 14:27

## 2025-08-17 RX ADMIN — ERYTHROMYCIN 1 APPLICATION(S): 5 OINTMENT OPHTHALMIC at 22:28

## 2025-08-17 RX ADMIN — RILUZOLE 50 MILLIGRAM(S): 5 LIQUID ORAL at 16:26

## 2025-08-18 LAB
ANION GAP SERPL CALC-SCNC: 11 MMOL/L — SIGNIFICANT CHANGE UP (ref 7–14)
ANION GAP SERPL CALC-SCNC: 12 MMOL/L — SIGNIFICANT CHANGE UP (ref 7–14)
BASOPHILS # BLD AUTO: 0.06 K/UL — SIGNIFICANT CHANGE UP (ref 0–0.2)
BASOPHILS NFR BLD AUTO: 0.5 % — SIGNIFICANT CHANGE UP (ref 0–2)
BUN SERPL-MCNC: 18 MG/DL — SIGNIFICANT CHANGE UP (ref 7–23)
BUN SERPL-MCNC: 18 MG/DL — SIGNIFICANT CHANGE UP (ref 7–23)
CALCIUM SERPL-MCNC: 8.6 MG/DL — SIGNIFICANT CHANGE UP (ref 8.4–10.5)
CALCIUM SERPL-MCNC: 8.7 MG/DL — SIGNIFICANT CHANGE UP (ref 8.4–10.5)
CHLORIDE SERPL-SCNC: 101 MMOL/L — SIGNIFICANT CHANGE UP (ref 98–107)
CHLORIDE SERPL-SCNC: 98 MMOL/L — SIGNIFICANT CHANGE UP (ref 98–107)
CO2 SERPL-SCNC: 28 MMOL/L — SIGNIFICANT CHANGE UP (ref 22–31)
CO2 SERPL-SCNC: 32 MMOL/L — HIGH (ref 22–31)
CREAT SERPL-MCNC: <0.2 MG/DL — LOW (ref 0.5–1.3)
CREAT SERPL-MCNC: <0.2 MG/DL — LOW (ref 0.5–1.3)
EGFR: 124 ML/MIN/1.73M2 — SIGNIFICANT CHANGE UP
EOSINOPHIL # BLD AUTO: 0.26 K/UL — SIGNIFICANT CHANGE UP (ref 0–0.5)
EOSINOPHIL NFR BLD AUTO: 2.1 % — SIGNIFICANT CHANGE UP (ref 0–6)
GLUCOSE BLDC GLUCOMTR-MCNC: 124 MG/DL — HIGH (ref 70–99)
GLUCOSE BLDC GLUCOMTR-MCNC: 131 MG/DL — HIGH (ref 70–99)
GLUCOSE BLDC GLUCOMTR-MCNC: 143 MG/DL — HIGH (ref 70–99)
GLUCOSE BLDC GLUCOMTR-MCNC: 156 MG/DL — HIGH (ref 70–99)
GLUCOSE SERPL-MCNC: 108 MG/DL — HIGH (ref 70–99)
GLUCOSE SERPL-MCNC: 142 MG/DL — HIGH (ref 70–99)
HCT VFR BLD CALC: 35.7 % — SIGNIFICANT CHANGE UP (ref 34.5–45)
HGB BLD-MCNC: 10.3 G/DL — LOW (ref 11.5–15.5)
IMM GRANULOCYTES # BLD AUTO: 0.17 K/UL — HIGH (ref 0–0.07)
IMM GRANULOCYTES NFR BLD AUTO: 1.4 % — HIGH (ref 0–0.9)
LYMPHOCYTES # BLD AUTO: 0.94 K/UL — LOW (ref 1–3.3)
LYMPHOCYTES NFR BLD AUTO: 7.7 % — LOW (ref 13–44)
MAGNESIUM SERPL-MCNC: 2.3 MG/DL — SIGNIFICANT CHANGE UP (ref 1.6–2.6)
MAGNESIUM SERPL-MCNC: 2.3 MG/DL — SIGNIFICANT CHANGE UP (ref 1.6–2.6)
MCHC RBC-ENTMCNC: 24.5 PG — LOW (ref 27–34)
MCHC RBC-ENTMCNC: 28.9 G/DL — LOW (ref 32–36)
MCV RBC AUTO: 84.8 FL — SIGNIFICANT CHANGE UP (ref 80–100)
MONOCYTES # BLD AUTO: 0.76 K/UL — SIGNIFICANT CHANGE UP (ref 0–0.9)
MONOCYTES NFR BLD AUTO: 6.2 % — SIGNIFICANT CHANGE UP (ref 2–14)
NEUTROPHILS # BLD AUTO: 10.03 K/UL — HIGH (ref 1.8–7.4)
NEUTROPHILS NFR BLD AUTO: 82.1 % — HIGH (ref 43–77)
NRBC # BLD AUTO: 0 K/UL — SIGNIFICANT CHANGE UP (ref 0–0)
NRBC # FLD: 0 K/UL — SIGNIFICANT CHANGE UP (ref 0–0)
NRBC BLD AUTO-RTO: 0 /100 WBCS — SIGNIFICANT CHANGE UP (ref 0–0)
PHOSPHATE SERPL-MCNC: 3 MG/DL — SIGNIFICANT CHANGE UP (ref 2.5–4.5)
PHOSPHATE SERPL-MCNC: 3.6 MG/DL — SIGNIFICANT CHANGE UP (ref 2.5–4.5)
PLATELET # BLD AUTO: 239 K/UL — SIGNIFICANT CHANGE UP (ref 150–400)
PMV BLD: 10.9 FL — SIGNIFICANT CHANGE UP (ref 7–13)
POTASSIUM SERPL-MCNC: 4.1 MMOL/L — SIGNIFICANT CHANGE UP (ref 3.5–5.3)
POTASSIUM SERPL-MCNC: 5.8 MMOL/L — HIGH (ref 3.5–5.3)
POTASSIUM SERPL-SCNC: 4.1 MMOL/L — SIGNIFICANT CHANGE UP (ref 3.5–5.3)
POTASSIUM SERPL-SCNC: 5.8 MMOL/L — HIGH (ref 3.5–5.3)
RBC # BLD: 4.21 M/UL — SIGNIFICANT CHANGE UP (ref 3.8–5.2)
RBC # FLD: 19.8 % — HIGH (ref 10.3–14.5)
SODIUM SERPL-SCNC: 138 MMOL/L — SIGNIFICANT CHANGE UP (ref 135–145)
SODIUM SERPL-SCNC: 144 MMOL/L — SIGNIFICANT CHANGE UP (ref 135–145)
WBC # BLD: 12.22 K/UL — HIGH (ref 3.8–10.5)
WBC # FLD AUTO: 12.22 K/UL — HIGH (ref 3.8–10.5)

## 2025-08-18 PROCEDURE — 99233 SBSQ HOSP IP/OBS HIGH 50: CPT | Mod: FS

## 2025-08-18 PROCEDURE — G0545: CPT

## 2025-08-18 PROCEDURE — 99232 SBSQ HOSP IP/OBS MODERATE 35: CPT

## 2025-08-18 RX ADMIN — Medication 1 APPLICATION(S): at 11:04

## 2025-08-18 RX ADMIN — Medication 1 DROP(S): at 18:15

## 2025-08-18 RX ADMIN — Medication 1 DROP(S): at 01:31

## 2025-08-18 RX ADMIN — IPRATROPIUM BROMIDE AND ALBUTEROL SULFATE 3 MILLILITER(S): .5; 2.5 SOLUTION RESPIRATORY (INHALATION) at 03:00

## 2025-08-18 RX ADMIN — ERYTHROMYCIN 1 APPLICATION(S): 5 OINTMENT OPHTHALMIC at 21:11

## 2025-08-18 RX ADMIN — ERYTHROMYCIN 1 APPLICATION(S): 5 OINTMENT OPHTHALMIC at 05:10

## 2025-08-18 RX ADMIN — OFLOXACIN 1 DROP(S): 3 SOLUTION OPHTHALMIC at 05:08

## 2025-08-18 RX ADMIN — RILUZOLE 50 MILLIGRAM(S): 5 LIQUID ORAL at 17:07

## 2025-08-18 RX ADMIN — Medication 15 MILLILITER(S): at 05:07

## 2025-08-18 RX ADMIN — Medication 1 DROP(S): at 20:09

## 2025-08-18 RX ADMIN — IPRATROPIUM BROMIDE AND ALBUTEROL SULFATE 3 MILLILITER(S): .5; 2.5 SOLUTION RESPIRATORY (INHALATION) at 15:02

## 2025-08-18 RX ADMIN — Medication 1 DROP(S): at 13:02

## 2025-08-18 RX ADMIN — OFLOXACIN 1 DROP(S): 3 SOLUTION OPHTHALMIC at 17:06

## 2025-08-18 RX ADMIN — ERYTHROMYCIN 1 APPLICATION(S): 5 OINTMENT OPHTHALMIC at 13:02

## 2025-08-18 RX ADMIN — ERYTHROMYCIN 1 APPLICATION(S): 5 OINTMENT OPHTHALMIC at 11:04

## 2025-08-18 RX ADMIN — ERYTHROMYCIN 1 APPLICATION(S): 5 OINTMENT OPHTHALMIC at 18:14

## 2025-08-18 RX ADMIN — RIVAROXABAN 10 MILLIGRAM(S): 10 TABLET, FILM COATED ORAL at 11:04

## 2025-08-18 RX ADMIN — Medication 1 APPLICATION(S): at 05:07

## 2025-08-18 RX ADMIN — ERYTHROMYCIN 1 APPLICATION(S): 5 OINTMENT OPHTHALMIC at 09:31

## 2025-08-18 RX ADMIN — ERYTHROMYCIN 1 APPLICATION(S): 5 OINTMENT OPHTHALMIC at 16:30

## 2025-08-18 RX ADMIN — Medication 1 DROP(S): at 11:03

## 2025-08-18 RX ADMIN — IPRATROPIUM BROMIDE AND ALBUTEROL SULFATE 3 MILLILITER(S): .5; 2.5 SOLUTION RESPIRATORY (INHALATION) at 08:50

## 2025-08-18 RX ADMIN — Medication 1 DROP(S): at 16:30

## 2025-08-18 RX ADMIN — Medication 1 DROP(S): at 05:07

## 2025-08-18 RX ADMIN — INSULIN LISPRO 1: 100 INJECTION, SOLUTION INTRAVENOUS; SUBCUTANEOUS at 17:07

## 2025-08-18 RX ADMIN — ERYTHROMYCIN 1 APPLICATION(S): 5 OINTMENT OPHTHALMIC at 19:10

## 2025-08-18 RX ADMIN — Medication 1 DROP(S): at 21:11

## 2025-08-18 RX ADMIN — Medication 25 GRAM(S): at 13:02

## 2025-08-18 RX ADMIN — ERYTHROMYCIN 1 APPLICATION(S): 5 OINTMENT OPHTHALMIC at 04:18

## 2025-08-18 RX ADMIN — Medication 15 MILLILITER(S): at 17:06

## 2025-08-18 RX ADMIN — Medication 25 GRAM(S): at 05:06

## 2025-08-18 RX ADMIN — RILUZOLE 50 MILLIGRAM(S): 5 LIQUID ORAL at 05:09

## 2025-08-18 RX ADMIN — Medication 1 DROP(S): at 05:09

## 2025-08-18 RX ADMIN — Medication 15 MILLILITER(S): at 11:03

## 2025-08-18 RX ADMIN — Medication 1 DROP(S): at 07:10

## 2025-08-18 RX ADMIN — ERYTHROMYCIN 1 APPLICATION(S): 5 OINTMENT OPHTHALMIC at 23:32

## 2025-08-18 RX ADMIN — Medication 25 GRAM(S): at 21:11

## 2025-08-18 RX ADMIN — ERYTHROMYCIN 1 APPLICATION(S): 5 OINTMENT OPHTHALMIC at 01:31

## 2025-08-18 RX ADMIN — IPRATROPIUM BROMIDE AND ALBUTEROL SULFATE 3 MILLILITER(S): .5; 2.5 SOLUTION RESPIRATORY (INHALATION) at 22:03

## 2025-08-18 RX ADMIN — SERTRALINE 75 MILLIGRAM(S): 100 TABLET, FILM COATED ORAL at 11:03

## 2025-08-18 RX ADMIN — ERYTHROMYCIN 1 APPLICATION(S): 5 OINTMENT OPHTHALMIC at 07:11

## 2025-08-18 RX ADMIN — Medication 1 DROP(S): at 23:32

## 2025-08-18 RX ADMIN — Medication 1 DROP(S): at 09:31

## 2025-08-19 LAB
ANION GAP SERPL CALC-SCNC: 13 MMOL/L — SIGNIFICANT CHANGE UP (ref 7–14)
BASOPHILS # BLD AUTO: 0.03 K/UL — SIGNIFICANT CHANGE UP (ref 0–0.2)
BASOPHILS NFR BLD AUTO: 0.3 % — SIGNIFICANT CHANGE UP (ref 0–2)
BUN SERPL-MCNC: 21 MG/DL — SIGNIFICANT CHANGE UP (ref 7–23)
CALCIUM SERPL-MCNC: 8.8 MG/DL — SIGNIFICANT CHANGE UP (ref 8.4–10.5)
CHLORIDE SERPL-SCNC: 98 MMOL/L — SIGNIFICANT CHANGE UP (ref 98–107)
CO2 SERPL-SCNC: 29 MMOL/L — SIGNIFICANT CHANGE UP (ref 22–31)
CREAT SERPL-MCNC: <0.2 MG/DL — LOW (ref 0.5–1.3)
EGFR: 124 ML/MIN/1.73M2 — SIGNIFICANT CHANGE UP
EGFR: 124 ML/MIN/1.73M2 — SIGNIFICANT CHANGE UP
EOSINOPHIL # BLD AUTO: 0.18 K/UL — SIGNIFICANT CHANGE UP (ref 0–0.5)
EOSINOPHIL NFR BLD AUTO: 1.6 % — SIGNIFICANT CHANGE UP (ref 0–6)
GLUCOSE BLDC GLUCOMTR-MCNC: 119 MG/DL — HIGH (ref 70–99)
GLUCOSE BLDC GLUCOMTR-MCNC: 122 MG/DL — HIGH (ref 70–99)
GLUCOSE BLDC GLUCOMTR-MCNC: 137 MG/DL — HIGH (ref 70–99)
GLUCOSE BLDC GLUCOMTR-MCNC: 143 MG/DL — HIGH (ref 70–99)
GLUCOSE SERPL-MCNC: 141 MG/DL — HIGH (ref 70–99)
HCT VFR BLD CALC: 32.1 % — LOW (ref 34.5–45)
HGB BLD-MCNC: 9.1 G/DL — LOW (ref 11.5–15.5)
IMM GRANULOCYTES # BLD AUTO: 0.16 K/UL — HIGH (ref 0–0.07)
IMM GRANULOCYTES NFR BLD AUTO: 1.4 % — HIGH (ref 0–0.9)
LYMPHOCYTES # BLD AUTO: 0.94 K/UL — LOW (ref 1–3.3)
LYMPHOCYTES NFR BLD AUTO: 8.2 % — LOW (ref 13–44)
MAGNESIUM SERPL-MCNC: 2.2 MG/DL — SIGNIFICANT CHANGE UP (ref 1.6–2.6)
MCHC RBC-ENTMCNC: 24.2 PG — LOW (ref 27–34)
MCHC RBC-ENTMCNC: 28.3 G/DL — LOW (ref 32–36)
MCV RBC AUTO: 85.4 FL — SIGNIFICANT CHANGE UP (ref 80–100)
MONOCYTES # BLD AUTO: 0.54 K/UL — SIGNIFICANT CHANGE UP (ref 0–0.9)
MONOCYTES NFR BLD AUTO: 4.7 % — SIGNIFICANT CHANGE UP (ref 2–14)
NEUTROPHILS # BLD AUTO: 9.6 K/UL — HIGH (ref 1.8–7.4)
NEUTROPHILS NFR BLD AUTO: 83.8 % — HIGH (ref 43–77)
NRBC # BLD AUTO: 0 K/UL — SIGNIFICANT CHANGE UP (ref 0–0)
NRBC # FLD: 0 K/UL — SIGNIFICANT CHANGE UP (ref 0–0)
NRBC BLD AUTO-RTO: 0 /100 WBCS — SIGNIFICANT CHANGE UP (ref 0–0)
PHOSPHATE SERPL-MCNC: 2 MG/DL — LOW (ref 2.5–4.5)
PLATELET # BLD AUTO: 312 K/UL — SIGNIFICANT CHANGE UP (ref 150–400)
PMV BLD: 10.6 FL — SIGNIFICANT CHANGE UP (ref 7–13)
POTASSIUM SERPL-MCNC: 3.9 MMOL/L — SIGNIFICANT CHANGE UP (ref 3.5–5.3)
POTASSIUM SERPL-SCNC: 3.9 MMOL/L — SIGNIFICANT CHANGE UP (ref 3.5–5.3)
RBC # BLD: 3.76 M/UL — LOW (ref 3.8–5.2)
RBC # FLD: 20 % — HIGH (ref 10.3–14.5)
SODIUM SERPL-SCNC: 140 MMOL/L — SIGNIFICANT CHANGE UP (ref 135–145)
WBC # BLD: 11.45 K/UL — HIGH (ref 3.8–10.5)
WBC # FLD AUTO: 11.45 K/UL — HIGH (ref 3.8–10.5)

## 2025-08-19 PROCEDURE — 99232 SBSQ HOSP IP/OBS MODERATE 35: CPT

## 2025-08-19 PROCEDURE — 99233 SBSQ HOSP IP/OBS HIGH 50: CPT | Mod: FS

## 2025-08-19 PROCEDURE — 99233 SBSQ HOSP IP/OBS HIGH 50: CPT

## 2025-08-19 PROCEDURE — G0545: CPT

## 2025-08-19 RX ORDER — ACETAMINOPHEN 500 MG/5ML
725 LIQUID (ML) ORAL ONCE
Refills: 0 | Status: COMPLETED | OUTPATIENT
Start: 2025-08-19 | End: 2025-08-19

## 2025-08-19 RX ADMIN — RILUZOLE 50 MILLIGRAM(S): 5 LIQUID ORAL at 17:54

## 2025-08-19 RX ADMIN — RIVAROXABAN 10 MILLIGRAM(S): 10 TABLET, FILM COATED ORAL at 11:46

## 2025-08-19 RX ADMIN — Medication 290 MILLIGRAM(S): at 01:57

## 2025-08-19 RX ADMIN — IPRATROPIUM BROMIDE AND ALBUTEROL SULFATE 3 MILLILITER(S): .5; 2.5 SOLUTION RESPIRATORY (INHALATION) at 09:35

## 2025-08-19 RX ADMIN — Medication 15 MILLILITER(S): at 11:47

## 2025-08-19 RX ADMIN — IPRATROPIUM BROMIDE AND ALBUTEROL SULFATE 3 MILLILITER(S): .5; 2.5 SOLUTION RESPIRATORY (INHALATION) at 15:28

## 2025-08-19 RX ADMIN — ERYTHROMYCIN 1 APPLICATION(S): 5 OINTMENT OPHTHALMIC at 15:24

## 2025-08-19 RX ADMIN — IPRATROPIUM BROMIDE AND ALBUTEROL SULFATE 3 MILLILITER(S): .5; 2.5 SOLUTION RESPIRATORY (INHALATION) at 03:52

## 2025-08-19 RX ADMIN — ERYTHROMYCIN 1 APPLICATION(S): 5 OINTMENT OPHTHALMIC at 04:57

## 2025-08-19 RX ADMIN — Medication 1 APPLICATION(S): at 11:46

## 2025-08-19 RX ADMIN — Medication 15 MILLILITER(S): at 17:55

## 2025-08-19 RX ADMIN — ERYTHROMYCIN 1 APPLICATION(S): 5 OINTMENT OPHTHALMIC at 19:12

## 2025-08-19 RX ADMIN — ERYTHROMYCIN 1 APPLICATION(S): 5 OINTMENT OPHTHALMIC at 17:53

## 2025-08-19 RX ADMIN — Medication 1 DROP(S): at 05:06

## 2025-08-19 RX ADMIN — Medication 1 APPLICATION(S): at 05:10

## 2025-08-19 RX ADMIN — Medication 25 GRAM(S): at 21:03

## 2025-08-19 RX ADMIN — OFLOXACIN 1 DROP(S): 3 SOLUTION OPHTHALMIC at 05:06

## 2025-08-19 RX ADMIN — ERYTHROMYCIN 1 APPLICATION(S): 5 OINTMENT OPHTHALMIC at 11:45

## 2025-08-19 RX ADMIN — ERYTHROMYCIN 1 APPLICATION(S): 5 OINTMENT OPHTHALMIC at 05:07

## 2025-08-19 RX ADMIN — ERYTHROMYCIN 1 APPLICATION(S): 5 OINTMENT OPHTHALMIC at 17:50

## 2025-08-19 RX ADMIN — Medication 1 DROP(S): at 17:53

## 2025-08-19 RX ADMIN — Medication 1 DROP(S): at 10:33

## 2025-08-19 RX ADMIN — Medication 1 DROP(S): at 01:37

## 2025-08-19 RX ADMIN — SERTRALINE 75 MILLIGRAM(S): 100 TABLET, FILM COATED ORAL at 11:47

## 2025-08-19 RX ADMIN — Medication 1 DROP(S): at 17:49

## 2025-08-19 RX ADMIN — ERYTHROMYCIN 1 APPLICATION(S): 5 OINTMENT OPHTHALMIC at 01:38

## 2025-08-19 RX ADMIN — Medication 25 GRAM(S): at 15:25

## 2025-08-19 RX ADMIN — Medication 1 DROP(S): at 04:56

## 2025-08-19 RX ADMIN — Medication 1 DROP(S): at 19:12

## 2025-08-19 RX ADMIN — Medication 15 MILLILITER(S): at 05:06

## 2025-08-19 RX ADMIN — IPRATROPIUM BROMIDE AND ALBUTEROL SULFATE 3 MILLILITER(S): .5; 2.5 SOLUTION RESPIRATORY (INHALATION) at 21:31

## 2025-08-19 RX ADMIN — ERYTHROMYCIN 1 APPLICATION(S): 5 OINTMENT OPHTHALMIC at 08:59

## 2025-08-19 RX ADMIN — Medication 25 GRAM(S): at 09:00

## 2025-08-19 RX ADMIN — OFLOXACIN 1 DROP(S): 3 SOLUTION OPHTHALMIC at 17:53

## 2025-08-19 RX ADMIN — Medication 1 DROP(S): at 11:46

## 2025-08-19 RX ADMIN — ERYTHROMYCIN 1 APPLICATION(S): 5 OINTMENT OPHTHALMIC at 21:04

## 2025-08-19 RX ADMIN — RILUZOLE 50 MILLIGRAM(S): 5 LIQUID ORAL at 05:06

## 2025-08-19 RX ADMIN — Medication 1 DROP(S): at 21:04

## 2025-08-19 RX ADMIN — ERYTHROMYCIN 1 APPLICATION(S): 5 OINTMENT OPHTHALMIC at 10:33

## 2025-08-19 RX ADMIN — Medication 1 DROP(S): at 15:24

## 2025-08-19 RX ADMIN — Medication 1 DROP(S): at 08:59

## 2025-08-19 RX ADMIN — Medication 725 MILLIGRAM(S): at 02:27

## 2025-08-20 LAB
ANION GAP SERPL CALC-SCNC: 12 MMOL/L — SIGNIFICANT CHANGE UP (ref 7–14)
BASOPHILS # BLD AUTO: 0.04 K/UL — SIGNIFICANT CHANGE UP (ref 0–0.2)
BASOPHILS NFR BLD AUTO: 0.4 % — SIGNIFICANT CHANGE UP (ref 0–2)
BLOOD GAS VENOUS COMPREHENSIVE RESULT: SIGNIFICANT CHANGE UP
BUN SERPL-MCNC: 19 MG/DL — SIGNIFICANT CHANGE UP (ref 7–23)
CALCIUM SERPL-MCNC: 8.6 MG/DL — SIGNIFICANT CHANGE UP (ref 8.4–10.5)
CHLORIDE SERPL-SCNC: 99 MMOL/L — SIGNIFICANT CHANGE UP (ref 98–107)
CO2 SERPL-SCNC: 28 MMOL/L — SIGNIFICANT CHANGE UP (ref 22–31)
CREAT SERPL-MCNC: <0.2 MG/DL — LOW (ref 0.5–1.3)
EGFR: 124 ML/MIN/1.73M2 — SIGNIFICANT CHANGE UP
EGFR: 124 ML/MIN/1.73M2 — SIGNIFICANT CHANGE UP
EOSINOPHIL # BLD AUTO: 0.14 K/UL — SIGNIFICANT CHANGE UP (ref 0–0.5)
EOSINOPHIL NFR BLD AUTO: 1.3 % — SIGNIFICANT CHANGE UP (ref 0–6)
GLUCOSE BLDC GLUCOMTR-MCNC: 105 MG/DL — HIGH (ref 70–99)
GLUCOSE BLDC GLUCOMTR-MCNC: 108 MG/DL — HIGH (ref 70–99)
GLUCOSE BLDC GLUCOMTR-MCNC: 119 MG/DL — HIGH (ref 70–99)
GLUCOSE BLDC GLUCOMTR-MCNC: 132 MG/DL — HIGH (ref 70–99)
GLUCOSE BLDC GLUCOMTR-MCNC: 138 MG/DL — HIGH (ref 70–99)
GLUCOSE SERPL-MCNC: 117 MG/DL — HIGH (ref 70–99)
HCT VFR BLD CALC: 31.4 % — LOW (ref 34.5–45)
HGB BLD-MCNC: 8.8 G/DL — LOW (ref 11.5–15.5)
IMM GRANULOCYTES # BLD AUTO: 0.11 K/UL — HIGH (ref 0–0.07)
IMM GRANULOCYTES NFR BLD AUTO: 1 % — HIGH (ref 0–0.9)
LYMPHOCYTES # BLD AUTO: 0.72 K/UL — LOW (ref 1–3.3)
LYMPHOCYTES NFR BLD AUTO: 6.8 % — LOW (ref 13–44)
MAGNESIUM SERPL-MCNC: 2 MG/DL — SIGNIFICANT CHANGE UP (ref 1.6–2.6)
MCHC RBC-ENTMCNC: 24.4 PG — LOW (ref 27–34)
MCHC RBC-ENTMCNC: 28 G/DL — LOW (ref 32–36)
MCV RBC AUTO: 87 FL — SIGNIFICANT CHANGE UP (ref 80–100)
MONOCYTES # BLD AUTO: 0.47 K/UL — SIGNIFICANT CHANGE UP (ref 0–0.9)
MONOCYTES NFR BLD AUTO: 4.5 % — SIGNIFICANT CHANGE UP (ref 2–14)
NEUTROPHILS # BLD AUTO: 9.08 K/UL — HIGH (ref 1.8–7.4)
NEUTROPHILS NFR BLD AUTO: 86 % — HIGH (ref 43–77)
NRBC # BLD AUTO: 0 K/UL — SIGNIFICANT CHANGE UP (ref 0–0)
NRBC # FLD: 0 K/UL — SIGNIFICANT CHANGE UP (ref 0–0)
NRBC BLD AUTO-RTO: 0 /100 WBCS — SIGNIFICANT CHANGE UP (ref 0–0)
PHOSPHATE SERPL-MCNC: 2.4 MG/DL — LOW (ref 2.5–4.5)
PLATELET # BLD AUTO: 287 K/UL — SIGNIFICANT CHANGE UP (ref 150–400)
PMV BLD: 10.4 FL — SIGNIFICANT CHANGE UP (ref 7–13)
POTASSIUM SERPL-MCNC: 4.7 MMOL/L — SIGNIFICANT CHANGE UP (ref 3.5–5.3)
POTASSIUM SERPL-SCNC: 4.7 MMOL/L — SIGNIFICANT CHANGE UP (ref 3.5–5.3)
RBC # BLD: 3.61 M/UL — LOW (ref 3.8–5.2)
RBC # FLD: 20.6 % — HIGH (ref 10.3–14.5)
SODIUM SERPL-SCNC: 139 MMOL/L — SIGNIFICANT CHANGE UP (ref 135–145)
WBC # BLD: 10.56 K/UL — HIGH (ref 3.8–10.5)
WBC # FLD AUTO: 10.56 K/UL — HIGH (ref 3.8–10.5)

## 2025-08-20 PROCEDURE — 99232 SBSQ HOSP IP/OBS MODERATE 35: CPT

## 2025-08-20 PROCEDURE — 99233 SBSQ HOSP IP/OBS HIGH 50: CPT | Mod: FS

## 2025-08-20 PROCEDURE — G0545: CPT

## 2025-08-20 RX ORDER — SOD PHOS DI, MONO/K PHOS MONO 250 MG
1 TABLET ORAL THREE TIMES A DAY
Refills: 0 | Status: COMPLETED | OUTPATIENT
Start: 2025-08-20 | End: 2025-08-21

## 2025-08-20 RX ADMIN — ERYTHROMYCIN 1 APPLICATION(S): 5 OINTMENT OPHTHALMIC at 02:54

## 2025-08-20 RX ADMIN — Medication 1 DROP(S): at 00:10

## 2025-08-20 RX ADMIN — Medication 1 DROP(S): at 05:02

## 2025-08-20 RX ADMIN — RIVAROXABAN 10 MILLIGRAM(S): 10 TABLET, FILM COATED ORAL at 11:45

## 2025-08-20 RX ADMIN — Medication 25 GRAM(S): at 05:02

## 2025-08-20 RX ADMIN — ERYTHROMYCIN 1 APPLICATION(S): 5 OINTMENT OPHTHALMIC at 05:01

## 2025-08-20 RX ADMIN — ERYTHROMYCIN 1 APPLICATION(S): 5 OINTMENT OPHTHALMIC at 07:00

## 2025-08-20 RX ADMIN — ERYTHROMYCIN 1 APPLICATION(S): 5 OINTMENT OPHTHALMIC at 00:09

## 2025-08-20 RX ADMIN — Medication 1 DROP(S): at 19:56

## 2025-08-20 RX ADMIN — IPRATROPIUM BROMIDE AND ALBUTEROL SULFATE 3 MILLILITER(S): .5; 2.5 SOLUTION RESPIRATORY (INHALATION) at 09:12

## 2025-08-20 RX ADMIN — Medication 15 MILLILITER(S): at 17:20

## 2025-08-20 RX ADMIN — IPRATROPIUM BROMIDE AND ALBUTEROL SULFATE 3 MILLILITER(S): .5; 2.5 SOLUTION RESPIRATORY (INHALATION) at 21:18

## 2025-08-20 RX ADMIN — Medication 1 DROP(S): at 07:00

## 2025-08-20 RX ADMIN — Medication 1 DROP(S): at 02:54

## 2025-08-20 RX ADMIN — ERYTHROMYCIN 1 APPLICATION(S): 5 OINTMENT OPHTHALMIC at 19:56

## 2025-08-20 RX ADMIN — Medication 1 DROP(S): at 16:01

## 2025-08-20 RX ADMIN — Medication 1 DROP(S): at 11:43

## 2025-08-20 RX ADMIN — ERYTHROMYCIN 1 APPLICATION(S): 5 OINTMENT OPHTHALMIC at 04:53

## 2025-08-20 RX ADMIN — Medication 25 GRAM(S): at 21:56

## 2025-08-20 RX ADMIN — Medication 1 DROP(S): at 14:50

## 2025-08-20 RX ADMIN — Medication 1 DROP(S): at 21:55

## 2025-08-20 RX ADMIN — Medication 1 DROP(S): at 17:20

## 2025-08-20 RX ADMIN — SERTRALINE 75 MILLIGRAM(S): 100 TABLET, FILM COATED ORAL at 11:44

## 2025-08-20 RX ADMIN — IPRATROPIUM BROMIDE AND ALBUTEROL SULFATE 3 MILLILITER(S): .5; 2.5 SOLUTION RESPIRATORY (INHALATION) at 15:44

## 2025-08-20 RX ADMIN — OFLOXACIN 1 DROP(S): 3 SOLUTION OPHTHALMIC at 17:19

## 2025-08-20 RX ADMIN — ERYTHROMYCIN 1 APPLICATION(S): 5 OINTMENT OPHTHALMIC at 21:55

## 2025-08-20 RX ADMIN — RILUZOLE 50 MILLIGRAM(S): 5 LIQUID ORAL at 17:19

## 2025-08-20 RX ADMIN — OFLOXACIN 1 DROP(S): 3 SOLUTION OPHTHALMIC at 05:01

## 2025-08-20 RX ADMIN — Medication 15 MILLILITER(S): at 11:44

## 2025-08-20 RX ADMIN — Medication 15 MILLILITER(S): at 05:01

## 2025-08-20 RX ADMIN — Medication 1 APPLICATION(S): at 05:03

## 2025-08-20 RX ADMIN — RILUZOLE 50 MILLIGRAM(S): 5 LIQUID ORAL at 05:01

## 2025-08-20 RX ADMIN — Medication 1 PACKET(S): at 15:45

## 2025-08-20 RX ADMIN — Medication 1 PACKET(S): at 21:55

## 2025-08-20 RX ADMIN — IPRATROPIUM BROMIDE AND ALBUTEROL SULFATE 3 MILLILITER(S): .5; 2.5 SOLUTION RESPIRATORY (INHALATION) at 03:09

## 2025-08-20 RX ADMIN — ERYTHROMYCIN 1 APPLICATION(S): 5 OINTMENT OPHTHALMIC at 09:28

## 2025-08-20 RX ADMIN — Medication 25 GRAM(S): at 16:00

## 2025-08-20 RX ADMIN — ERYTHROMYCIN 1 APPLICATION(S): 5 OINTMENT OPHTHALMIC at 16:01

## 2025-08-20 RX ADMIN — ERYTHROMYCIN 1 APPLICATION(S): 5 OINTMENT OPHTHALMIC at 17:20

## 2025-08-20 RX ADMIN — Medication 1 DROP(S): at 09:27

## 2025-08-20 RX ADMIN — ERYTHROMYCIN 1 APPLICATION(S): 5 OINTMENT OPHTHALMIC at 11:43

## 2025-08-20 RX ADMIN — Medication 1 APPLICATION(S): at 11:46

## 2025-08-20 RX ADMIN — ERYTHROMYCIN 1 APPLICATION(S): 5 OINTMENT OPHTHALMIC at 14:50

## 2025-08-20 RX ADMIN — Medication 1 DROP(S): at 04:53

## 2025-08-21 ENCOUNTER — TRANSCRIPTION ENCOUNTER (OUTPATIENT)
Age: 70
End: 2025-08-21

## 2025-08-21 VITALS
TEMPERATURE: 98 F | OXYGEN SATURATION: 100 % | DIASTOLIC BLOOD PRESSURE: 71 MMHG | SYSTOLIC BLOOD PRESSURE: 145 MMHG | HEART RATE: 95 BPM | RESPIRATION RATE: 18 BRPM

## 2025-08-21 LAB
ANION GAP SERPL CALC-SCNC: 13 MMOL/L — SIGNIFICANT CHANGE UP (ref 7–14)
BASOPHILS # BLD AUTO: 0.02 K/UL — SIGNIFICANT CHANGE UP (ref 0–0.2)
BASOPHILS NFR BLD AUTO: 0.2 % — SIGNIFICANT CHANGE UP (ref 0–2)
BUN SERPL-MCNC: 14 MG/DL — SIGNIFICANT CHANGE UP (ref 7–23)
CALCIUM SERPL-MCNC: 8.7 MG/DL — SIGNIFICANT CHANGE UP (ref 8.4–10.5)
CHLORIDE SERPL-SCNC: 100 MMOL/L — SIGNIFICANT CHANGE UP (ref 98–107)
CO2 SERPL-SCNC: 28 MMOL/L — SIGNIFICANT CHANGE UP (ref 22–31)
CREAT SERPL-MCNC: <0.2 MG/DL — LOW (ref 0.5–1.3)
EGFR: 124 ML/MIN/1.73M2 — SIGNIFICANT CHANGE UP
EGFR: 124 ML/MIN/1.73M2 — SIGNIFICANT CHANGE UP
EOSINOPHIL # BLD AUTO: 0.21 K/UL — SIGNIFICANT CHANGE UP (ref 0–0.5)
EOSINOPHIL NFR BLD AUTO: 2.5 % — SIGNIFICANT CHANGE UP (ref 0–6)
GLUCOSE BLDC GLUCOMTR-MCNC: 137 MG/DL — HIGH (ref 70–99)
GLUCOSE BLDC GLUCOMTR-MCNC: 143 MG/DL — HIGH (ref 70–99)
GLUCOSE BLDC GLUCOMTR-MCNC: 149 MG/DL — HIGH (ref 70–99)
GLUCOSE SERPL-MCNC: 135 MG/DL — HIGH (ref 70–99)
HCT VFR BLD CALC: 35.1 % — SIGNIFICANT CHANGE UP (ref 34.5–45)
HGB BLD-MCNC: 10 G/DL — LOW (ref 11.5–15.5)
IMM GRANULOCYTES # BLD AUTO: 0.12 K/UL — HIGH (ref 0–0.07)
IMM GRANULOCYTES NFR BLD AUTO: 1.4 % — HIGH (ref 0–0.9)
LYMPHOCYTES # BLD AUTO: 0.62 K/UL — LOW (ref 1–3.3)
LYMPHOCYTES NFR BLD AUTO: 7.4 % — LOW (ref 13–44)
MAGNESIUM SERPL-MCNC: 2.2 MG/DL — SIGNIFICANT CHANGE UP (ref 1.6–2.6)
MCHC RBC-ENTMCNC: 24.4 PG — LOW (ref 27–34)
MCHC RBC-ENTMCNC: 28.5 G/DL — LOW (ref 32–36)
MCV RBC AUTO: 85.6 FL — SIGNIFICANT CHANGE UP (ref 80–100)
MONOCYTES # BLD AUTO: 0.4 K/UL — SIGNIFICANT CHANGE UP (ref 0–0.9)
MONOCYTES NFR BLD AUTO: 4.8 % — SIGNIFICANT CHANGE UP (ref 2–14)
NEUTROPHILS # BLD AUTO: 6.97 K/UL — SIGNIFICANT CHANGE UP (ref 1.8–7.4)
NEUTROPHILS NFR BLD AUTO: 83.7 % — HIGH (ref 43–77)
NRBC # BLD AUTO: 0 K/UL — SIGNIFICANT CHANGE UP (ref 0–0)
NRBC # FLD: 0 K/UL — SIGNIFICANT CHANGE UP (ref 0–0)
NRBC BLD AUTO-RTO: 0 /100 WBCS — SIGNIFICANT CHANGE UP (ref 0–0)
PHOSPHATE SERPL-MCNC: 3.3 MG/DL — SIGNIFICANT CHANGE UP (ref 2.5–4.5)
PLATELET # BLD AUTO: 304 K/UL — SIGNIFICANT CHANGE UP (ref 150–400)
PMV BLD: 9.9 FL — SIGNIFICANT CHANGE UP (ref 7–13)
POTASSIUM SERPL-MCNC: 4.3 MMOL/L — SIGNIFICANT CHANGE UP (ref 3.5–5.3)
POTASSIUM SERPL-SCNC: 4.3 MMOL/L — SIGNIFICANT CHANGE UP (ref 3.5–5.3)
RBC # BLD: 4.1 M/UL — SIGNIFICANT CHANGE UP (ref 3.8–5.2)
RBC # FLD: 21.2 % — HIGH (ref 10.3–14.5)
SODIUM SERPL-SCNC: 141 MMOL/L — SIGNIFICANT CHANGE UP (ref 135–145)
WBC # BLD: 8.34 K/UL — SIGNIFICANT CHANGE UP (ref 3.8–10.5)
WBC # FLD AUTO: 8.34 K/UL — SIGNIFICANT CHANGE UP (ref 3.8–10.5)

## 2025-08-21 PROCEDURE — 99232 SBSQ HOSP IP/OBS MODERATE 35: CPT

## 2025-08-21 PROCEDURE — G0545: CPT

## 2025-08-21 PROCEDURE — 99233 SBSQ HOSP IP/OBS HIGH 50: CPT | Mod: FS

## 2025-08-21 RX ORDER — DIAZEPAM 5 MG/1
2 TABLET ORAL EVERY 6 HOURS
Refills: 0 | Status: DISCONTINUED | OUTPATIENT
Start: 2025-08-21 | End: 2025-08-21

## 2025-08-21 RX ORDER — LANOLIN/MINERAL OIL/PETROLATUM
1 OINTMENT (GRAM) OPHTHALMIC (EYE)
Qty: 1 | Refills: 0
Start: 2025-08-21 | End: 2025-09-19

## 2025-08-21 RX ORDER — COLLAGENASE CLOSTRIDIUM HIST. 250 UNIT/G
1 OINTMENT (GRAM) TOPICAL
Qty: 0 | Refills: 0 | DISCHARGE
Start: 2025-08-21

## 2025-08-21 RX ORDER — ERYTHROMYCIN 5 MG/G
1 OINTMENT OPHTHALMIC
Qty: 15 | Refills: 0
Start: 2025-08-21 | End: 2025-09-19

## 2025-08-21 RX ORDER — OFLOXACIN 3 MG/ML
1 SOLUTION OPHTHALMIC
Qty: 2 | Refills: 0
Start: 2025-08-21 | End: 2025-09-19

## 2025-08-21 RX ORDER — LANOLIN/MINERAL OIL/PETROLATUM
1 OINTMENT (GRAM) OPHTHALMIC (EYE)
Qty: 30 | Refills: 0
Start: 2025-08-21 | End: 2025-09-19

## 2025-08-21 RX ADMIN — Medication 15 MILLILITER(S): at 17:10

## 2025-08-21 RX ADMIN — ERYTHROMYCIN 1 APPLICATION(S): 5 OINTMENT OPHTHALMIC at 11:01

## 2025-08-21 RX ADMIN — ERYTHROMYCIN 1 APPLICATION(S): 5 OINTMENT OPHTHALMIC at 13:15

## 2025-08-21 RX ADMIN — ERYTHROMYCIN 1 APPLICATION(S): 5 OINTMENT OPHTHALMIC at 02:18

## 2025-08-21 RX ADMIN — Medication 1 DROP(S): at 10:47

## 2025-08-21 RX ADMIN — ERYTHROMYCIN 1 APPLICATION(S): 5 OINTMENT OPHTHALMIC at 15:30

## 2025-08-21 RX ADMIN — Medication 1 DROP(S): at 13:15

## 2025-08-21 RX ADMIN — OFLOXACIN 1 DROP(S): 3 SOLUTION OPHTHALMIC at 05:08

## 2025-08-21 RX ADMIN — RILUZOLE 50 MILLIGRAM(S): 5 LIQUID ORAL at 17:01

## 2025-08-21 RX ADMIN — ERYTHROMYCIN 1 APPLICATION(S): 5 OINTMENT OPHTHALMIC at 17:05

## 2025-08-21 RX ADMIN — Medication 25 GRAM(S): at 05:07

## 2025-08-21 RX ADMIN — ERYTHROMYCIN 1 APPLICATION(S): 5 OINTMENT OPHTHALMIC at 08:42

## 2025-08-21 RX ADMIN — Medication 1 DROP(S): at 05:07

## 2025-08-21 RX ADMIN — Medication 1 APPLICATION(S): at 05:07

## 2025-08-21 RX ADMIN — Medication 1 PACKET(S): at 13:16

## 2025-08-21 RX ADMIN — OFLOXACIN 1 DROP(S): 3 SOLUTION OPHTHALMIC at 17:08

## 2025-08-21 RX ADMIN — RIVAROXABAN 10 MILLIGRAM(S): 10 TABLET, FILM COATED ORAL at 11:01

## 2025-08-21 RX ADMIN — Medication 1 DROP(S): at 15:30

## 2025-08-21 RX ADMIN — Medication 1 DROP(S): at 08:42

## 2025-08-21 RX ADMIN — IPRATROPIUM BROMIDE AND ALBUTEROL SULFATE 3 MILLILITER(S): .5; 2.5 SOLUTION RESPIRATORY (INHALATION) at 08:49

## 2025-08-21 RX ADMIN — SERTRALINE 75 MILLIGRAM(S): 100 TABLET, FILM COATED ORAL at 11:01

## 2025-08-21 RX ADMIN — ERYTHROMYCIN 1 APPLICATION(S): 5 OINTMENT OPHTHALMIC at 10:48

## 2025-08-21 RX ADMIN — Medication 15 MILLILITER(S): at 05:07

## 2025-08-21 RX ADMIN — Medication 1 DROP(S): at 02:18

## 2025-08-21 RX ADMIN — IPRATROPIUM BROMIDE AND ALBUTEROL SULFATE 3 MILLILITER(S): .5; 2.5 SOLUTION RESPIRATORY (INHALATION) at 15:28

## 2025-08-21 RX ADMIN — RILUZOLE 50 MILLIGRAM(S): 5 LIQUID ORAL at 05:06

## 2025-08-21 RX ADMIN — Medication 15 MILLILITER(S): at 11:01

## 2025-08-21 RX ADMIN — IPRATROPIUM BROMIDE AND ALBUTEROL SULFATE 3 MILLILITER(S): .5; 2.5 SOLUTION RESPIRATORY (INHALATION) at 03:34

## 2025-08-21 RX ADMIN — ERYTHROMYCIN 1 APPLICATION(S): 5 OINTMENT OPHTHALMIC at 04:10

## 2025-08-21 RX ADMIN — ERYTHROMYCIN 1 APPLICATION(S): 5 OINTMENT OPHTHALMIC at 05:07

## 2025-08-21 RX ADMIN — Medication 1 DROP(S): at 17:06

## 2025-08-21 RX ADMIN — Medication 1 DROP(S): at 04:09

## 2025-08-21 RX ADMIN — ERYTHROMYCIN 1 APPLICATION(S): 5 OINTMENT OPHTHALMIC at 00:00

## 2025-08-21 RX ADMIN — Medication 1 DROP(S): at 00:00

## 2025-08-21 RX ADMIN — Medication 1 APPLICATION(S): at 11:02

## 2025-08-21 RX ADMIN — Medication 1 DROP(S): at 11:02

## 2025-08-21 RX ADMIN — Medication 25 GRAM(S): at 13:15

## 2025-08-21 RX ADMIN — Medication 1 PACKET(S): at 05:07

## 2025-08-25 ENCOUNTER — APPOINTMENT (OUTPATIENT)
Dept: PULMONOLOGY | Facility: CLINIC | Age: 70
End: 2025-08-25

## (undated) DEVICE — DRAPE STERI-DRAPE MEDIUM W APERTURE

## (undated) DEVICE — DRSG CURITY GAUZE SPONGE 4 X 4" 12-PLY

## (undated) DEVICE — POSITIONER STRAP ARMBOARD VELCRO TS-30

## (undated) DEVICE — DRAPE 3/4 SHEET 52X76"

## (undated) DEVICE — WARMING BLANKET FULL ADULT

## (undated) DEVICE — DRAPE THYROID 77" X 123"

## (undated) DEVICE — VALVE BIOPSY BRONCHOVIDEOSCOPE

## (undated) DEVICE — WARMING BLANKET LOWER ADULT

## (undated) DEVICE — LUBRICATING JELLY ONESHOT 1.25OZ

## (undated) DEVICE — SOL IRR POUR NS 0.9% 500ML

## (undated) DEVICE — ELCTR BOVIE TIP BLADE INSULATED 2.75" EDGE

## (undated) DEVICE — DRAPE MAGNETIC INSTRUMENT MEDIUM

## (undated) DEVICE — CHEST DRAIN PLEUR-EVAC DRY/WET ADULT-PEDS SINGLE (QUICK)

## (undated) DEVICE — SPONGE PEANUT AUTO COUNT

## (undated) DEVICE — TUBING SUCTION NONCONDUCTIVE 6MM X 12FT

## (undated) DEVICE — ADAPTER FIBEROPTIC BRONCHOSCOPE DUAL AXIS SWIVEL

## (undated) DEVICE — DRAPE TOWEL BLUE 17" X 24"

## (undated) DEVICE — SYR SLIP 10CC

## (undated) DEVICE — VENODYNE/SCD SLEEVE CALF MEDIUM

## (undated) DEVICE — DRAPE LARGE SHEET 72X85"

## (undated) DEVICE — DRAPE INSTRUMENT POUCH 6.75" X 11"

## (undated) DEVICE — PACK MINOR WITH LAP

## (undated) DEVICE — NDL HYPO REGULAR BEVEL 22G X 1.5" (TURQUOISE)

## (undated) DEVICE — TRAP SPECIMEN SPUTUM 40CC

## (undated) DEVICE — GLV 8 PROTEXIS (WHITE)

## (undated) DEVICE — DRAPE TOWEL BLUE STICKY

## (undated) DEVICE — CHEST DRAIN OASIS DRY SUCTION WATER SEAL

## (undated) DEVICE — ELCTR GROUNDING PAD ADULT COVIDIEN

## (undated) DEVICE — VALVE SUCTION EVIS 160/200/240

## (undated) DEVICE — DRSG TELFA 3 X 8

## (undated) DEVICE — DURABLE MEDICAL EQUIPMENT: Type: DURABLE MEDICAL EQUIPMENT